# Patient Record
Sex: MALE | Race: WHITE | Employment: OTHER | URBAN - METROPOLITAN AREA
[De-identification: names, ages, dates, MRNs, and addresses within clinical notes are randomized per-mention and may not be internally consistent; named-entity substitution may affect disease eponyms.]

---

## 2017-01-10 ENCOUNTER — PATIENT OUTREACH (OUTPATIENT)
Dept: CASE MANAGEMENT | Age: 82
End: 2017-01-10

## 2017-01-22 ENCOUNTER — ANESTHESIA (OUTPATIENT)
Dept: ENDOSCOPY | Age: 82
DRG: 378 | End: 2017-01-22
Payer: MEDICARE

## 2017-01-22 ENCOUNTER — ANESTHESIA EVENT (OUTPATIENT)
Dept: ENDOSCOPY | Age: 82
DRG: 378 | End: 2017-01-22
Payer: MEDICARE

## 2017-01-22 ENCOUNTER — HOSPITAL ENCOUNTER (INPATIENT)
Age: 82
LOS: 3 days | Discharge: HOME | DRG: 378 | End: 2017-01-25
Attending: EMERGENCY MEDICINE | Admitting: INTERNAL MEDICINE
Payer: MEDICARE

## 2017-01-22 DIAGNOSIS — K92.2 GASTROINTESTINAL HEMORRHAGE, UNSPECIFIED GASTROINTESTINAL HEMORRHAGE TYPE: Primary | ICD-10-CM

## 2017-01-22 LAB
ABO + RH BLD: NORMAL
ALBUMIN SERPL BCP-MCNC: 3.2 G/DL (ref 3.2–4.6)
ALBUMIN/GLOB SERPL: 0.7 {RATIO} (ref 1.2–3.5)
ALP SERPL-CCNC: 99 U/L (ref 50–136)
ALT SERPL-CCNC: 15 U/L (ref 12–65)
ANION GAP BLD CALC-SCNC: 9 MMOL/L (ref 7–16)
AST SERPL W P-5'-P-CCNC: 15 U/L (ref 15–37)
BASOPHILS # BLD AUTO: 0 K/UL (ref 0–0.2)
BASOPHILS # BLD: 0 % (ref 0–2)
BILIRUB SERPL-MCNC: 0.6 MG/DL (ref 0.2–1.1)
BLOOD GROUP ANTIBODIES SERPL: NORMAL
BUN SERPL-MCNC: 25 MG/DL (ref 8–23)
CALCIUM SERPL-MCNC: 8.3 MG/DL (ref 8.3–10.4)
CHLORIDE SERPL-SCNC: 101 MMOL/L (ref 98–107)
CO2 SERPL-SCNC: 28 MMOL/L (ref 21–32)
CREAT SERPL-MCNC: 1.54 MG/DL (ref 0.8–1.5)
DIFFERENTIAL METHOD BLD: ABNORMAL
EOSINOPHIL # BLD: 0.3 K/UL (ref 0–0.8)
EOSINOPHIL NFR BLD: 3 % (ref 0.5–7.8)
ERYTHROCYTE [DISTWIDTH] IN BLOOD BY AUTOMATED COUNT: 15.1 % (ref 11.9–14.6)
EST. AVERAGE GLUCOSE BLD GHB EST-MCNC: 169 MG/DL
GLOBULIN SER CALC-MCNC: 4.4 G/DL (ref 2.3–3.5)
GLUCOSE BLD STRIP.AUTO-MCNC: 133 MG/DL (ref 65–100)
GLUCOSE BLD STRIP.AUTO-MCNC: 180 MG/DL (ref 65–100)
GLUCOSE BLD STRIP.AUTO-MCNC: 198 MG/DL (ref 65–100)
GLUCOSE BLD STRIP.AUTO-MCNC: 222 MG/DL (ref 65–100)
GLUCOSE SERPL-MCNC: 339 MG/DL (ref 65–100)
HBA1C MFR BLD: 7.5 % (ref 4.8–6)
HCT VFR BLD AUTO: 39.6 % (ref 41.1–50.3)
HCT VFR BLD AUTO: 41.3 % (ref 41.1–50.3)
HCT VFR BLD AUTO: 41.7 % (ref 41.1–50.3)
HGB BLD-MCNC: 12.6 G/DL (ref 13.6–17.2)
HGB BLD-MCNC: 13.3 G/DL (ref 13.6–17.2)
HGB BLD-MCNC: 13.5 G/DL (ref 13.6–17.2)
IMM GRANULOCYTES # BLD: 0 K/UL (ref 0–0.5)
IMM GRANULOCYTES NFR BLD AUTO: 0.2 % (ref 0–5)
INR PPP: 1 (ref 0.9–1.2)
LYMPHOCYTES # BLD AUTO: 13 % (ref 13–44)
LYMPHOCYTES # BLD: 1.1 K/UL (ref 0.5–4.6)
MCH RBC QN AUTO: 28 PG (ref 26.1–32.9)
MCHC RBC AUTO-ENTMCNC: 32.7 G/DL (ref 31.4–35)
MCV RBC AUTO: 85.7 FL (ref 79.6–97.8)
MONOCYTES # BLD: 0.5 K/UL (ref 0.1–1.3)
MONOCYTES NFR BLD AUTO: 6 % (ref 4–12)
NEUTS SEG # BLD: 7.1 K/UL (ref 1.7–8.2)
NEUTS SEG NFR BLD AUTO: 78 % (ref 43–78)
PLATELET # BLD AUTO: 154 K/UL (ref 150–450)
PMV BLD AUTO: 12.1 FL (ref 10.8–14.1)
POTASSIUM SERPL-SCNC: 3.8 MMOL/L (ref 3.5–5.1)
PROT SERPL-MCNC: 7.6 G/DL (ref 6.3–8.2)
PROTHROMBIN TIME: 11 SEC (ref 9.6–12)
RBC # BLD AUTO: 4.82 M/UL (ref 4.23–5.67)
SODIUM SERPL-SCNC: 138 MMOL/L (ref 136–145)
SPECIMEN EXP DATE BLD: NORMAL
WBC # BLD AUTO: 9 K/UL (ref 4.3–11.1)

## 2017-01-22 PROCEDURE — 74011250637 HC RX REV CODE- 250/637: Performed by: INTERNAL MEDICINE

## 2017-01-22 PROCEDURE — 88305 TISSUE EXAM BY PATHOLOGIST: CPT | Performed by: INTERNAL MEDICINE

## 2017-01-22 PROCEDURE — 74011000258 HC RX REV CODE- 258: Performed by: EMERGENCY MEDICINE

## 2017-01-22 PROCEDURE — 74011250636 HC RX REV CODE- 250/636: Performed by: ANESTHESIOLOGY

## 2017-01-22 PROCEDURE — C9113 INJ PANTOPRAZOLE SODIUM, VIA: HCPCS | Performed by: EMERGENCY MEDICINE

## 2017-01-22 PROCEDURE — 82962 GLUCOSE BLOOD TEST: CPT

## 2017-01-22 PROCEDURE — 76040000025: Performed by: INTERNAL MEDICINE

## 2017-01-22 PROCEDURE — 85610 PROTHROMBIN TIME: CPT | Performed by: EMERGENCY MEDICINE

## 2017-01-22 PROCEDURE — 74011250636 HC RX REV CODE- 250/636

## 2017-01-22 PROCEDURE — 86580 TB INTRADERMAL TEST: CPT | Performed by: INTERNAL MEDICINE

## 2017-01-22 PROCEDURE — 65660000000 HC RM CCU STEPDOWN

## 2017-01-22 PROCEDURE — 74011636637 HC RX REV CODE- 636/637: Performed by: INTERNAL MEDICINE

## 2017-01-22 PROCEDURE — 83036 HEMOGLOBIN GLYCOSYLATED A1C: CPT | Performed by: INTERNAL MEDICINE

## 2017-01-22 PROCEDURE — 74011000302 HC RX REV CODE- 302: Performed by: INTERNAL MEDICINE

## 2017-01-22 PROCEDURE — 74011250636 HC RX REV CODE- 250/636: Performed by: EMERGENCY MEDICINE

## 2017-01-22 PROCEDURE — 77030009426 HC FCPS BIOP ENDOSC BSC -B: Performed by: INTERNAL MEDICINE

## 2017-01-22 PROCEDURE — 80053 COMPREHEN METABOLIC PANEL: CPT | Performed by: EMERGENCY MEDICINE

## 2017-01-22 PROCEDURE — 36415 COLL VENOUS BLD VENIPUNCTURE: CPT | Performed by: INTERNAL MEDICINE

## 2017-01-22 PROCEDURE — 88312 SPECIAL STAINS GROUP 1: CPT | Performed by: INTERNAL MEDICINE

## 2017-01-22 PROCEDURE — 99284 EMERGENCY DEPT VISIT MOD MDM: CPT | Performed by: EMERGENCY MEDICINE

## 2017-01-22 PROCEDURE — 85025 COMPLETE CBC W/AUTO DIFF WBC: CPT | Performed by: EMERGENCY MEDICINE

## 2017-01-22 PROCEDURE — 76060000031 HC ANESTHESIA FIRST 0.5 HR: Performed by: INTERNAL MEDICINE

## 2017-01-22 PROCEDURE — 0DB68ZX EXCISION OF STOMACH, VIA NATURAL OR ARTIFICIAL OPENING ENDOSCOPIC, DIAGNOSTIC: ICD-10-PCS | Performed by: INTERNAL MEDICINE

## 2017-01-22 PROCEDURE — 86900 BLOOD TYPING SEROLOGIC ABO: CPT | Performed by: EMERGENCY MEDICINE

## 2017-01-22 PROCEDURE — 96374 THER/PROPH/DIAG INJ IV PUSH: CPT | Performed by: EMERGENCY MEDICINE

## 2017-01-22 PROCEDURE — 85018 HEMOGLOBIN: CPT | Performed by: INTERNAL MEDICINE

## 2017-01-22 PROCEDURE — 65270000029 HC RM PRIVATE

## 2017-01-22 PROCEDURE — 74011000250 HC RX REV CODE- 250: Performed by: INTERNAL MEDICINE

## 2017-01-22 RX ORDER — ALLOPURINOL 300 MG/1
300 TABLET ORAL DAILY
Status: DISCONTINUED | OUTPATIENT
Start: 2017-01-23 | End: 2017-01-25 | Stop reason: HOSPADM

## 2017-01-22 RX ORDER — PROPOFOL 10 MG/ML
INJECTION, EMULSION INTRAVENOUS AS NEEDED
Status: DISCONTINUED | OUTPATIENT
Start: 2017-01-22 | End: 2017-01-22 | Stop reason: HOSPADM

## 2017-01-22 RX ORDER — ACETAMINOPHEN 325 MG/1
650 TABLET ORAL
Status: DISCONTINUED | OUTPATIENT
Start: 2017-01-22 | End: 2017-01-25 | Stop reason: HOSPADM

## 2017-01-22 RX ORDER — NALOXONE HYDROCHLORIDE 0.4 MG/ML
0.4 INJECTION, SOLUTION INTRAMUSCULAR; INTRAVENOUS; SUBCUTANEOUS AS NEEDED
Status: DISCONTINUED | OUTPATIENT
Start: 2017-01-22 | End: 2017-01-25 | Stop reason: HOSPADM

## 2017-01-22 RX ORDER — LIDOCAINE HYDROCHLORIDE 10 MG/ML
0.1 INJECTION INFILTRATION; PERINEURAL AS NEEDED
Status: DISCONTINUED | OUTPATIENT
Start: 2017-01-22 | End: 2017-01-22 | Stop reason: HOSPADM

## 2017-01-22 RX ORDER — SODIUM CHLORIDE 0.9 % (FLUSH) 0.9 %
5-10 SYRINGE (ML) INJECTION AS NEEDED
Status: DISCONTINUED | OUTPATIENT
Start: 2017-01-22 | End: 2017-01-25 | Stop reason: HOSPADM

## 2017-01-22 RX ORDER — SODIUM CHLORIDE, SODIUM LACTATE, POTASSIUM CHLORIDE, CALCIUM CHLORIDE 600; 310; 30; 20 MG/100ML; MG/100ML; MG/100ML; MG/100ML
100 INJECTION, SOLUTION INTRAVENOUS CONTINUOUS
Status: DISCONTINUED | OUTPATIENT
Start: 2017-01-22 | End: 2017-01-22 | Stop reason: HOSPADM

## 2017-01-22 RX ORDER — SODIUM CHLORIDE 0.9 % (FLUSH) 0.9 %
5-10 SYRINGE (ML) INJECTION EVERY 8 HOURS
Status: DISCONTINUED | OUTPATIENT
Start: 2017-01-22 | End: 2017-01-22 | Stop reason: HOSPADM

## 2017-01-22 RX ORDER — METOPROLOL SUCCINATE 25 MG/1
12.5 TABLET, EXTENDED RELEASE ORAL DAILY
Status: DISCONTINUED | OUTPATIENT
Start: 2017-01-23 | End: 2017-01-25 | Stop reason: HOSPADM

## 2017-01-22 RX ORDER — OXYCODONE HYDROCHLORIDE 15 MG/1
15 TABLET ORAL
Status: DISCONTINUED | OUTPATIENT
Start: 2017-01-22 | End: 2017-01-25 | Stop reason: HOSPADM

## 2017-01-22 RX ORDER — ONDANSETRON 2 MG/ML
4 INJECTION INTRAMUSCULAR; INTRAVENOUS
Status: DISCONTINUED | OUTPATIENT
Start: 2017-01-22 | End: 2017-01-25 | Stop reason: HOSPADM

## 2017-01-22 RX ORDER — PANTOPRAZOLE SODIUM 40 MG/1
40 TABLET, DELAYED RELEASE ORAL
Status: DISCONTINUED | OUTPATIENT
Start: 2017-01-22 | End: 2017-01-25 | Stop reason: HOSPADM

## 2017-01-22 RX ORDER — ISOSORBIDE MONONITRATE 30 MG/1
30 TABLET, EXTENDED RELEASE ORAL DAILY
Status: DISCONTINUED | OUTPATIENT
Start: 2017-01-23 | End: 2017-01-25 | Stop reason: HOSPADM

## 2017-01-22 RX ORDER — FLUTICASONE PROPIONATE 50 MCG
2 SPRAY, SUSPENSION (ML) NASAL DAILY
Status: DISCONTINUED | OUTPATIENT
Start: 2017-01-23 | End: 2017-01-25 | Stop reason: HOSPADM

## 2017-01-22 RX ORDER — INSULIN GLARGINE 100 [IU]/ML
8 INJECTION, SOLUTION SUBCUTANEOUS
Status: DISCONTINUED | OUTPATIENT
Start: 2017-01-22 | End: 2017-01-25 | Stop reason: HOSPADM

## 2017-01-22 RX ORDER — LEVOTHYROXINE SODIUM 50 UG/1
50 TABLET ORAL
Status: DISCONTINUED | OUTPATIENT
Start: 2017-01-23 | End: 2017-01-25 | Stop reason: HOSPADM

## 2017-01-22 RX ORDER — LATANOPROST 50 UG/ML
1 SOLUTION/ DROPS OPHTHALMIC
Status: DISCONTINUED | OUTPATIENT
Start: 2017-01-22 | End: 2017-01-25 | Stop reason: HOSPADM

## 2017-01-22 RX ORDER — SODIUM CHLORIDE 0.9 % (FLUSH) 0.9 %
5-10 SYRINGE (ML) INJECTION AS NEEDED
Status: DISCONTINUED | OUTPATIENT
Start: 2017-01-22 | End: 2017-01-22 | Stop reason: HOSPADM

## 2017-01-22 RX ORDER — ALBUTEROL SULFATE 0.83 MG/ML
5 SOLUTION RESPIRATORY (INHALATION)
Status: DISCONTINUED | OUTPATIENT
Start: 2017-01-22 | End: 2017-01-23 | Stop reason: ALTCHOICE

## 2017-01-22 RX ORDER — SODIUM CHLORIDE 0.9 % (FLUSH) 0.9 %
5-10 SYRINGE (ML) INJECTION EVERY 8 HOURS
Status: DISCONTINUED | OUTPATIENT
Start: 2017-01-22 | End: 2017-01-25 | Stop reason: HOSPADM

## 2017-01-22 RX ORDER — SERTRALINE HYDROCHLORIDE 50 MG/1
50 TABLET, FILM COATED ORAL DAILY
Status: DISCONTINUED | OUTPATIENT
Start: 2017-01-23 | End: 2017-01-25 | Stop reason: HOSPADM

## 2017-01-22 RX ORDER — LORAZEPAM 1 MG/1
1 TABLET ORAL
Status: DISCONTINUED | OUTPATIENT
Start: 2017-01-22 | End: 2017-01-25 | Stop reason: HOSPADM

## 2017-01-22 RX ORDER — PROPOFOL 10 MG/ML
INJECTION, EMULSION INTRAVENOUS
Status: DISCONTINUED | OUTPATIENT
Start: 2017-01-22 | End: 2017-01-22 | Stop reason: HOSPADM

## 2017-01-22 RX ORDER — HYDRALAZINE HYDROCHLORIDE 10 MG/1
10 TABLET, FILM COATED ORAL 3 TIMES DAILY
Status: DISCONTINUED | OUTPATIENT
Start: 2017-01-22 | End: 2017-01-25 | Stop reason: HOSPADM

## 2017-01-22 RX ORDER — INSULIN LISPRO 100 [IU]/ML
INJECTION, SOLUTION INTRAVENOUS; SUBCUTANEOUS
Status: DISCONTINUED | OUTPATIENT
Start: 2017-01-22 | End: 2017-01-25 | Stop reason: HOSPADM

## 2017-01-22 RX ADMIN — TUBERCULIN PURIFIED PROTEIN DERIVATIVE 5 UNITS: 5 INJECTION INTRADERMAL at 18:03

## 2017-01-22 RX ADMIN — SODIUM CHLORIDE 80 MG: 900 INJECTION, SOLUTION INTRAVENOUS at 13:49

## 2017-01-22 RX ADMIN — LATANOPROST 1 DROP: 50 SOLUTION OPHTHALMIC at 21:12

## 2017-01-22 RX ADMIN — INSULIN GLARGINE 8 UNITS: 100 INJECTION, SOLUTION SUBCUTANEOUS at 21:06

## 2017-01-22 RX ADMIN — OXYCODONE HYDROCHLORIDE 15 MG: 15 TABLET ORAL at 18:51

## 2017-01-22 RX ADMIN — Medication 5 ML: at 18:03

## 2017-01-22 RX ADMIN — HYDRALAZINE HYDROCHLORIDE 10 MG: 10 TABLET, FILM COATED ORAL at 18:02

## 2017-01-22 RX ADMIN — INSULIN LISPRO 2 UNITS: 100 INJECTION, SOLUTION INTRAVENOUS; SUBCUTANEOUS at 18:02

## 2017-01-22 RX ADMIN — Medication 5 ML: at 21:11

## 2017-01-22 RX ADMIN — HYDRALAZINE HYDROCHLORIDE 10 MG: 10 TABLET, FILM COATED ORAL at 21:06

## 2017-01-22 RX ADMIN — PROPOFOL 50 MG: 10 INJECTION, EMULSION INTRAVENOUS at 14:54

## 2017-01-22 RX ADMIN — PANTOPRAZOLE SODIUM 40 MG: 40 TABLET, DELAYED RELEASE ORAL at 18:02

## 2017-01-22 RX ADMIN — PROPOFOL 100 MCG/KG/MIN: 10 INJECTION, EMULSION INTRAVENOUS at 14:55

## 2017-01-22 RX ADMIN — SODIUM CHLORIDE, SODIUM LACTATE, POTASSIUM CHLORIDE, AND CALCIUM CHLORIDE 100 ML/HR: 600; 310; 30; 20 INJECTION, SOLUTION INTRAVENOUS at 14:53

## 2017-01-22 NOTE — ANESTHESIA POSTPROCEDURE EVALUATION
Post-Anesthesia Evaluation and Assessment    Patient: Lucille Ennis MRN: [id number]  SSN: [id number]    YOB: 1932  Age: 80 y.o. Sex: male       Cardiovascular Function/Vital Signs  Visit Vitals    /67    Pulse (!) 56    Temp 36.9 °C (98.5 °F)    Resp 18    Ht 5' 10\" (1.778 m)    Wt 114.8 kg (253 lb)    SpO2 93%    BMI 36.3 kg/m2       Patient is status post total IV anesthesia anesthesia for Procedure(s):  ESOPHAGOGASTRODUODENOSCOPY (EGD)  ESOPHAGOGASTRODUODENAL (EGD) BIOPSY. Nausea/Vomiting: None    Postoperative hydration reviewed and adequate. Pain:  Pain Scale 1: Numeric (0 - 10) (01/22/17 1201)  Pain Intensity 1: 0 (01/22/17 1201)   Managed    Neurological Status:   Neuro (WDL): Within Defined Limits (01/22/17 1441)   At baseline    Mental Status and Level of Consciousness: Arousable    Pulmonary Status:   O2 Device: Room air (01/22/17 1201)   Adequate oxygenation and airway patent    Complications related to anesthesia: None    Post-anesthesia assessment completed.  No concerns    Signed By: Kihsa Salcedo MD     January 22, 2017

## 2017-01-22 NOTE — ED NOTES
Bedside report to [facility], RN, patient traveling to preop at this time

## 2017-01-22 NOTE — IP AVS SNAPSHOT
Current Discharge Medication List  
  
Take these medications at their scheduled times Dose & Instructions Dispensing Information Comments Morning Noon Evening Bedtime  
 allopurinol 300 mg tablet Commonly known as:  [address] Your next dose is:  Tomorrow Take  by mouth daily. Refills:  0  
     
   
   
   
  
 aspirin 325 mg tablet Commonly known as:  ASPIRIN Start taking on:  1/30/2017 Dose:  325 mg Take 1 Tab by mouth daily. Quantity:  1 Tab Refills:  0  
     
   
   
   
  
 COMBIVENT RESPIMAT  mcg/actuation inhaler Generic drug:  ipratropium-albuterol Your next dose is: Today Dose:  1 Puff Take 1 Puff by inhalation every six (6) hours. Refills:  0  
     
   
   
   
  
 fluticasone 50 mcg/actuation nasal spray Commonly known as:  Imagene Poot Your next dose is:  Tomorrow Dose:  2 Spray 2 Sprays by Both Nostrils route daily. Quantity:  1 Bottle Refills:  3  
     
   
   
   
  
 glipiZIDE 5 mg tablet Commonly known as:  [facility] Your next dose is: Today Dose:  5 mg Take 5 mg by mouth two (2) times a day. Refills:  0  
     
   
   
  
   
  
 hydrALAZINE 10 mg tablet Commonly known as:  APRESOLINE Your next dose is: Today Take  by mouth three (3) times daily. Refills:  0  
     
   
   
  
   
  
  
 isosorbide mononitrate ER 30 mg tablet Commonly known as:  IMDUR Your next dose is:  Tomorrow Dose:  30 mg Take 1 Tab by mouth daily. Quantity:  30 Tab Refills:  5  
     
   
   
   
  
 K-DUR 20 mEq tablet Generic drug:  potassium chloride Your next dose is:  Tomorrow Dose:  20 mEq Take 20 mEq by mouth daily. Refills:  0  
     
   
   
   
  
 levothyroxine 50 mcg tablet Commonly known as:  SYNTHROID Your next dose is:  Tomorrow Dose:  50 mcg Take 1 Tab by mouth Daily (before breakfast). Quantity:  90 Tab Refills:  1 magnesium oxide 400 mg tablet Commonly known as:  MAG-OX Your next dose is:  Tomorrow Dose:  400 mg Take 1 Tab by mouth daily. Quantity:  30 Tab Refills:  5  
     
   
   
   
  
 pantoprazole 40 mg tablet Commonly known as:  PROTONIX Your next dose is: Today Dose:  40 mg Take 1 Tab by mouth Before breakfast and dinner. Quantity:  120 Tab Refills:  0  
     
   
   
  
   
  
 TRAVATAN Z 0.004 % ophthalmic solution Generic drug:  travoprost  
Your next dose is: Today Dose:  1 Drop Administer 1 Drop to both eyes two (2) times a day. Refills:  0 Take these medications as needed Dose & Instructions Dispensing Information Comments Morning Noon Evening Bedtime  
 furosemide 20 mg tablet Commonly known as:  LASIX Your next dose is:  Tomorrow Dose:  40 mg Take 40 mg by mouth as needed. Refills:  0 LORazepam 1 mg tablet Commonly known as:  ATIVAN Your next dose is: Today Dose:  1 mg Take 1 Tab by mouth two (2) times daily as needed for Anxiety. Max Daily Amount: 2 mg. Quantity:  24 Tab Refills:  0 STOOL SOFTENER 100 mg capsule Generic drug:  docusate sodium Your next dose is:  Tomorrow Dose:  100 mg Take 100 mg by mouth as needed. Refills:  0 Take these medications as directed Dose & Instructions Dispensing Information Comments Morning Noon Evening Bedtime  
 albuterol 2.5 mg /3 mL (0.083 %) nebulizer solution Commonly known as:  PROVENTIL VENTOLIN Your next dose is: Today [address] Q[address]   Order fax to [facility]  Indications: COPD Quantity:  360 Vial  
Refills:  3  
 - DX copd  
 
-  
 
- Pt to use Albuterol 00.83% 1 Vial [address]  Along with Pulmicort 0.5 mg 1 Vial [address] bid  
    
   
   
   
  
 clotrimazole-betamethasone topical cream  
Commonly known as:  Ilya Vieira  
 Your next dose is:  Tomorrow Apply sparingly to the affected areas twice daily for up to 2 weeks. Quantity:  15 g Refills:  1  
     
   
   
   
  
 cpap machine kit Your next dose is: Today  
   
 by Does Not Apply route. Bilevel 12/8 Refills:  0 HYDROcodone-acetaminophen  mg tablet Commonly known as:  Liliam Mejía Your next dose is: Today Take one tab by mouth every 4-6 hours as need for pain. Quantity:  140 Tab Refills:  0 PHARMACY FILL ON TIME. NO EARLY REFILLS. metoprolol succinate 25 mg XL tablet Commonly known as:  TOPROL-XL Your next dose is:  Tomorrow Pt takes 1/2 tab po daily. Quantity:  45 Tab Refills:  3  
     
   
   
   
  
 oxyCODONE IR 15 mg immediate release tablet Commonly known as:  OXY-IR Your next dose is: Today Take 1 tablet up to 4 times daily as needed for pain. Quantity:  120 Tab Refills:  0 PHARMACY FILL ON TIME. NO EARLY REFILLS. sertraline 50 mg tablet Commonly known as:  ZOLOFT Your next dose is: Today Take one tablet each evening for anxiety  Indications: GENERALIZED ANXIETY DISORDER Quantity:  30 Tab Refills:  3 Where to Get Your Medications Information about where to get these medications is not yet available ! Ask your nurse or doctor about these medications  
  aspirin 325 mg tablet  
 pantoprazole 40 mg tablet

## 2017-01-22 NOTE — PERIOP NOTES
TRANSFER - IN REPORT:    Verbal report received from Amy Guillory RN on [facility] [place]  being received from ER for ordered procedure      Report consisted of patients Situation, Background, Assessment and   Recommendations(SBAR). Information from the following report(s) Kardex, MAR and Recent Results was reviewed with the receiving nurse. Opportunity for questions and clarification was provided. Assessment completed upon patients arrival to unit and care assumed.

## 2017-01-22 NOTE — H&P
History and Physical    Subjective: Adin Palmer is an 80 y. o. white male, with a pmh of afib on aspirin 325 mg and CAD status post CABG last year, who presents to the ED today after waking this morning with his shoots covered in melena. He proceeded to the shower and to the ED from there as melenotic stool continued to run out. Hg and blood pressure stable in the ED. Had a gi bleed about a year ago for which his coumadin was stopped. PPI drip ordered in ED. Feels weak but denies chest pain, sob. No BRBPR. Has consistent bowel movements and takes stool softener at home. Denies NSAID use. Full code  PCP: Dr. Karey Young  Next of kin: wife, Lurdes Gaona    Past Medical History   Diagnosis Date    Atopic dermatitis 11/21/2012    Atrial fibrillation ([address]     CAD (coronary artery disease)     Carotid stenosis, bilateral 11/21/2012     50-79%  3-2010    1. Carotid Doppler (6/1/07): Greater than 70% stenosis in proximal LICA. 50% stenosis in right ICA. 2.  CTA of neck (3/15/10): Occluded distal segments of the vertebral arteries bilaterally. Atherosclerosis of the carotid bulbs bilaterally with a 50% stenosis on the left and a stenosis of less than 30% on the right. Small nodule in the right upper lobe near the apex. 3. CTA (8/29/13):  Less than 30% diameter stenosis of the cervical internal carotid arteries bilaterally.  CHF (congestive heart failure) ([address] 11/21/2012    Chronic kidney disease      hx elevated labs    Chronic obstructive pulmonary disease (HCC)     Diabetes (HCC)     Diastolic CHF, acute on chronic ([address] 9/12/2015     1. Echo (9/11/15) : EF 55-60%. Mild LVH. Moderate biatrial enlargement. Moderate mitral/tricuspid regurgitation.      Failed CABG (coronary artery bypass graft) 11/21/2012    GI bleed 1/2015     Hospitalized [facility]    Gout 11/21/2012    History of tobacco use     [place] (hard of hearing)     Hypercholesterolemia     Hypertension     Hyperuricemia 2012    [facility]bid [facility]     PAD (peripheral artery disease) ([facility] 2012     1. Bilateral proximal common iliac PCI (08):  8.0 X 100 mm Cordis smart stent on right and 10 X 40 mm cordis smart stent on right. Both inflated to 7.0 mm.  Poor historian     Radiologic findings of lung field, abnormal 10/31/2016     1. CT of chest  (11/24/10): Multiple small nodules in the right lobe and stable interstitial prominence. Consistent with chronic lung disease. No evidence of malignancy.  Seizure disorder ([facility] 2013    Shortness of breath dyspnea 2013    Thyroid disease     Unspecified sleep apnea       Past Surgical History   Procedure Laterality Date    Pr cardiac surg procedure unlist       cath ,cabg ,lexiscan cardiolite 11/10    Hx coronary artery bypass graft  2007    Hx cataract removal Left      os    Hx heart catheterization       left-2007, 2011    Hx coronary stent placement  2008     bilateral iliac artery PCI and stents    Hx heent  1970s     neck lipoma    Hx colonoscopy       Family History   Problem Relation Age of Onset    Heart Attack Mother     Other Father      old age   [facility] Other Brother      brain aneurysm    Heart Disease Other      2 children  with heart concerns, 36 & 47 yo    Heart Disease Son       Social History   Substance Use Topics    Smoking status: Former Smoker     Packs/day: 1.00     Years: 45.00     Types: Cigarettes     Quit date: 1984    Smokeless tobacco: Never Used      Comment: (stopped smoking in 1980s)    Alcohol use No       Prior to Admission medications    Medication Sig Start Date End Date Taking? Authorizing Provider   HYDROcodone-acetaminophen (NORCO)  mg tablet Take one tab by mouth every 4-6 hours as need for pain. 16   Ethel Rojas MD   oxyCODONE IR (OXY-IR) 15 mg immediate release tablet Take 1 tablet up to 4 times daily as needed for pain.  16 Elsy Moya MD   furosemide (LASIX) 20 mg tablet Take 40 mg by mouth as needed. Historical Provider   ipratropium-albuterol (COMBIVENT RESPIMAT)  mcg/actuation inhaler Take 1 Puff by inhalation every six (6) hours. Historical Provider   LORazepam (ATIVAN) 1 mg tablet Take 1 Tab by mouth two (2) times daily as needed for Anxiety. Max Daily Amount: 2 mg. 9/20/16   Mary Gruber DO   clotrimazole-betamethasone (LOTRISONE) topical cream Apply sparingly to the affected areas twice daily for up to 2 weeks. 8/11/16   Elsy Moya MD   metFORMIN ER (GLUCOPHAGE XR) 750 mg tablet Take one tablet daily with the evening meal for diabetes. 7/12/16   Elsy Moya MD   levothyroxine (SYNTHROID) 50 mcg tablet Take 1 Tab by mouth Daily (before breakfast). 7/7/16   Elsy Moya MD   magnesium oxide (MAG-OX) 400 mg tablet Take 1 Tab by mouth daily. 7/7/16   Elsy Moya MD   metoprolol succinate (TOPROL-XL) 25 mg XL tablet Pt takes 1/2 tab po daily. 7/7/16   Elsy Moya MD   fluticasone (FLONASE) 50 mcg/actuation nasal spray 2 Sprays by Both Nostrils route daily. 6/1/16   Elsy Moya MD   allopurinol (ZYLOPRIM) 300 mg tablet Take  by mouth daily. Historical Provider   sertraline (ZOLOFT) 50 mg tablet Take one tablet each evening for anxiety  Indications: GENERALIZED ANXIETY DISORDER 3/30/16   Elsy Moya MD   aspirin (ASPIRIN) 325 mg tablet Take 325 mg by mouth daily. Historical Provider   docusate sodium (STOOL SOFTENER) 100 mg capsule Take 100 mg by mouth as needed. Historical Provider   hydrALAZINE (APRESOLINE) 10 mg tablet Take  by mouth three (3) times daily. Historical Provider   cpap machine kit by Does Not Apply route. Bilevel 12/8    Historical Provider   isosorbide mononitrate ER (IMDUR) 30 mg tablet Take 1 Tab by mouth daily.  8/11/14   Elsy Moya MD   albuterol (PROVENTIL VENTOLIN) 2.5 mg /3 mL (0.083 %) nebulizer solution 1 Vial [address] Qid      Order fax to [facility]  Indications: COPD 12/11/13   Luis Kayser, NP   travoprost (TRAVATAN Z) 0.004 % ophthalmic solution Administer 1 Drop to both eyes two (2) times a day. Historical Provider   glipiZIDE (GLUCOTROL) 5 mg tablet Take 5 mg by mouth two (2) times a day. Historical Provider   K-DUR 20 mEq tablet Take 20 mEq by mouth daily. 1/13/11   Phys MD Gumaro     No Known Allergies     Review of Systems:  A comprehensive 12 point ROS was obtained and findings negative unless stated otherwise in above HPI     1/22/17:  Lives with wife, Ann Deal. No pets  Has had a blood transfusion (during sxr for CABG)  Has a left arm tattoo  No recent foreign travel    Objective: Intake and Output:            Physical Exam:     General: awake, alert, oriented, no acute distress, cooperative  Eyes; non icteric, EOMI  Neck; supple  CV; RRR  PULM: CTAB  Abd; soft, non tender, non distended, active BS  Neuro: 5/5 strength of all extremities, cranial nerves II-XII grossly intact  Ext; signs of bilateral calf venous stasis, dry skin    Data Review:   Recent Results (from the past 24 hour(s))   CBC WITH AUTOMATED DIFF    Collection Time: 01/22/17 12:03 PM   Result Value Ref Range    WBC 9.0 4.3 - 11.1 K/uL    RBC 4.82 4.23 - 5.67 M/uL    HGB 13.5 (L) 13.6 - 17.2 g/dL    HCT 41.3 41.1 - 50.3 %    MCV 85.7 79.6 - 97.8 FL    MCH 28.0 26.1 - 32.9 PG    MCHC 32.7 31.4 - 35.0 g/dL    RDW 15.1 (H) 11.9 - 14.6 %    PLATELET 506 610 - 978 K/uL    MPV 12.1 10.8 - 14.1 FL    DF AUTOMATED      NEUTROPHILS 78 43 - 78 %    LYMPHOCYTES 13 13 - 44 %    MONOCYTES 6 4.0 - 12.0 %    EOSINOPHILS 3 0.5 - 7.8 %    BASOPHILS 0 0.0 - 2.0 %    IMMATURE GRANULOCYTES 0.2 0.0 - 5.0 %    ABS. NEUTROPHILS 7.1 1.7 - 8.2 K/UL    ABS. LYMPHOCYTES 1.1 0.5 - 4.6 K/UL    ABS. MONOCYTES 0.5 0.1 - 1.3 K/UL    ABS. EOSINOPHILS 0.3 0.0 - 0.8 K/UL    ABS. BASOPHILS 0.0 0.0 - 0.2 K/UL    ABS. IMM. GRANS. 0.0 0.0 - 0.5 K/UL   METABOLIC PANEL, COMPREHENSIVE    Collection Time: 01/22/17 12:03 PM   Result Value Ref Range    Sodium 138 136 - 145 mmol/L    Potassium 3.8 3.5 - 5.1 mmol/L    Chloride 101 98 - 107 mmol/L    CO2 28 21 - 32 mmol/L    Anion gap 9 7 - 16 mmol/L    Glucose 339 (H) 65 - 100 mg/dL    BUN 25 (H) 8 - 23 MG/DL    Creatinine 1.54 (H) 0.8 - 1.5 MG/DL    GFR est AA 56 (L) >60 ml/min/1.73m2    GFR est non-AA 46 (L) >60 ml/min/1.73m2    Calcium 8.3 8.3 - 10.4 MG/DL    Bilirubin, total 0.6 0.2 - 1.1 MG/DL    ALT 15 12 - 65 U/L    AST 15 15 - 37 U/L    Alk. phosphatase 99 50 - 136 U/L    Protein, total 7.6 6.3 - 8.2 g/dL    Albumin 3.2 3.2 - 4.6 g/dL    Globulin 4.4 (H) 2.3 - 3.5 g/dL    A-G Ratio 0.7 (L) 1.2 - 3.5     PROTHROMBIN TIME + INR    Collection Time: 01/22/17 12:03 PM   Result Value Ref Range    Prothrombin time 11.0 9.6 - 12.0 sec    INR 1.0 0.9 - 1.2       Assessment:     Active Problems:    Upper GI bleed (1/22/2017)        Plan:     Admit to medical bed. Currently hemodynamically stable but has passed a lot of blood, will watch closely. Q6 h and h's, type and cross. Consult GI. protonix drip. Hold oral diabetic agents. BS >300. Hold oral agents. Start lantus 8 units qhs, SSI and check A1C. Ppx: SCDs. Full code  Place PPD. Consult PT, SW.   Anticipated date of dc: 3 days    Signed By: Massimo Rodrigues MD     January 22, 2017

## 2017-01-22 NOTE — CONSULTS
[facility] Consult Note       Primary GI Physician: Dr. Pilar Gallagher    Referring Physician: Dr. Adelaida Rogers Date:  1/22/2017    Admit Date:  1/22/2017    Chief Complaint:  GIB    Subjective:     History of Present Illness:  Patient is a 80 y.o. male with PMH of (but not limited to) A fib (on  mg only), CAD s/p CABG 2007, carotid artery stenosis, CKD, DM , CHF, HLD, HTN, COPD not on O2, ROBERTO with CPAP and seizure disorder who is seen in consultation at the request of Dr. Christin Villalobos for evaluation of GIB. Mr. Ankush Isabel awakened this am with a large amount of \"dark tarry stool in his bed. \" He has continued to have dark tarry stool and it is now becoming more marroon. It increases when he stands up. He has no associated abdominal pain, N&V, GERD, recent change in bowel habits or weight loss. He denies any anticoagulants or NSAIDS except  mg one po daily. He denies pepto bismol or iron tablets. He is being admitted by the hospitalist. He has a Hgb of 13.5(14.1 10/16). He has been typed and crossed and protonix drip has been started. Incidentally, his glucose was over 300 this am which is unusual for him. He just drank 1/2 cup of water in the ED. He had breakfast at 0900. He has chronic constipation unchanged with a BM every other day with stool softeners. He denies any hard stools. He denies any straining. Mr. Ankush Isabel had a similar incident in 2015, but with more BRRB and he was on warfarin at the time. He underwent an EGD on 1/23/15 by Dr. Daniel Mayorga that revealed a few small gastric erosions likely not the cause of bleeding. Colonoscopy on 1/22/15 by Dr. Pilar Gallagher revealed extensive diverticulosis without active bleeding. He had recurrent bleeding with a drop in his Hgb and underwent repeat EGD on 5/6/15 by Dr. Glen Coronado with friable gastric mucosa. Pillcam study was suggested, but not done.      PMH:  Past Medical History   Diagnosis Date    Atopic dermatitis 11/21/2012    Atrial fibrillation ([address]     CAD (coronary artery disease)     Carotid stenosis, bilateral 11/21/2012     50-79%  3-2010    1. Carotid Doppler (6/1/07): Greater than 70% stenosis in proximal LICA. 50% stenosis in right ICA. 2.  CTA of neck (3/15/10): Occluded distal segments of the vertebral arteries bilaterally. Atherosclerosis of the carotid bulbs bilaterally with a 50% stenosis on the left and a stenosis of less than 30% on the right. Small nodule in the right upper lobe near the apex. 3. CTA (8/29/13):  Less than 30% diameter stenosis of the cervical internal carotid arteries bilaterally.  CHF (congestive heart failure) ([address] 11/21/2012    Chronic kidney disease      hx elevated labs    Chronic obstructive pulmonary disease (HCC)     Diabetes (HCC)     Diastolic CHF, acute on chronic ([address] 9/12/2015     1. Echo (9/11/15) : EF 55-60%. Mild LVH. Moderate biatrial enlargement. Moderate mitral/tricuspid regurgitation.  Failed CABG (coronary artery bypass graft) 11/21/2012    GI bleed 1/2015     Hospitalized [facility]    Gout 11/21/2012    History of tobacco use     [place] (hard of hearing)     Hypercholesterolemia     Hypertension     Hyperuricemia 11/21/2012    Morbid obesity ([address]     PAD (peripheral artery disease) ([address] 11/21/2012     1. Bilateral proximal common iliac PCI (2/21/08):  8.0 X 100 mm Cordis smart stent on right and 10 X 40 mm cordis smart stent on right. Both inflated to 7.0 mm.  Poor historian     Radiologic findings of lung field, abnormal 10/31/2016     1. CT of chest  (11/24/10): Multiple small nodules in the right lobe and stable interstitial prominence. Consistent with chronic lung disease. No evidence of malignancy.     Seizure disorder ([address] 8/5/2013    Shortness of breath dyspnea 8/5/2013    Thyroid disease     Unspecified sleep apnea        PSH:  Past Surgical History   Procedure Laterality Date    Pr cardiac surg procedure unlist       cath 5/07,cabg 6/07,lexiscan cardiolite 11/10    Hx coronary artery bypass graft  06-    Hx cataract removal Left 2003     os    Hx heart catheterization       left-05-, 01-    Hx coronary stent placement  02-     bilateral iliac artery PCI and stents    Hx heent  1970s     neck lipoma    Hx colonoscopy         Allergies:  No Known Allergies    Home Medications:  Prior to Admission medications    Medication Sig Start Date End Date Taking? Authorizing Provider   HYDROcodone-acetaminophen (NORCO)  mg tablet Take one tab by mouth every 4-6 hours as need for pain. 12/13/16   Suze Concepcion MD   oxyCODONE IR (OXY-IR) 15 mg immediate release tablet Take 1 tablet up to 4 times daily as needed for pain. 12/13/16   Suze Concepcion MD   furosemide (LASIX) 20 mg tablet Take 40 mg by mouth as needed. Historical Provider   ipratropium-albuterol (COMBIVENT RESPIMAT)  mcg/actuation inhaler Take 1 Puff by inhalation every six (6) hours. Historical Provider   LORazepam (ATIVAN) 1 mg tablet Take 1 Tab by mouth two (2) times daily as needed for Anxiety. Max Daily Amount: 2 mg. 9/20/16   Patricia Joana,    clotrimazole-betamethasone (LOTRISONE) topical cream Apply sparingly to the affected areas twice daily for up to 2 weeks. 8/11/16   Suze Concepcion MD   metFORMIN ER (GLUCOPHAGE XR) 750 mg tablet Take one tablet daily with the evening meal for diabetes. 7/12/16   Suze Concepcion MD   levothyroxine (SYNTHROID) 50 mcg tablet Take 1 Tab by mouth Daily (before breakfast). 7/7/16   Suze Concepcion MD   magnesium oxide (MAG-OX) 400 mg tablet Take 1 Tab by mouth daily. 7/7/16   Suze Concepcion MD   metoprolol succinate (TOPROL-XL) 25 mg XL tablet Pt takes 1/2 tab po daily. 7/7/16   Suze Concepcion MD   fluticasone (FLONASE) 50 mcg/actuation nasal spray 2 Sprays by Both Nostrils route daily.  6/1/16   Suze Concepcion MD   allopurinol (ZYLOPRIM) 300 mg tablet Take  by mouth daily. Historical Provider   sertraline (ZOLOFT) 50 mg tablet Take one tablet each evening for anxiety  Indications: GENERALIZED ANXIETY DISORDER 3/30/16   Christ Morrissey MD   aspirin (ASPIRIN) 325 mg tablet Take 325 mg by mouth daily. Historical Provider   docusate sodium (STOOL SOFTENER) 100 mg capsule Take 100 mg by mouth as needed. Historical Provider   hydrALAZINE (APRESOLINE) 10 mg tablet Take  by mouth three (3) times daily. Historical Provider   cpap machine kit by Does Not Apply route. Bilevel 12/8    Historical Provider   isosorbide mononitrate ER (IMDUR) 30 mg tablet Take 1 Tab by mouth daily. 8/11/14   Christ Morrissey MD   albuterol (PROVENTIL VENTOLIN) 2.5 mg /3 mL (0.083 %) nebulizer solution 1 Vial Via Neb. Qid      [facility]  Indications: COPD 12/11/13   Verner Friends, NP   travoprost (TRAVATAN Z) 0.004 % ophthalmic solution Administer 1 Drop to both eyes two (2) times a day. Historical Provider   glipiZIDE (GLUCOTROL) 5 mg tablet Take 5 mg by mouth two (2) times a day. Historical Provider   K-DUR 20 mEq tablet Take 20 mEq by mouth daily.  1/13/11   Montez Naik MD       [facility] Medications:  Current Facility-Administered Medications   Medication Dose Route Frequency    pantoprazole (PROTONIX) 80 mg in 0.9% sodium chloride 100 mL infusion  80 mg IntraVENous ONCE    pantoprazole (PROTONIX) 80 mg in 0.9% sodium chloride 250 mL infusion  80 mg IntraVENous NOW    pantoprazole (PROTONIX) 80 mg in 0.9% sodium chloride 250 mL infusion  80 mg IntraVENous CONTINUOUS    tuberculin injection 5 Units  5 Units IntraDERMal ONCE    insulin lispro (HUMALOG) injection   SubCUTAneous AC&HS    insulin glargine (LANTUS) injection 8 Units  8 Units SubCUTAneous QHS     Current Outpatient Prescriptions   Medication Sig    HYDROcodone-acetaminophen (NORCO)  mg tablet Take one tab by mouth every 4-6 hours as need for pain.    oxyCODONE IR (OXY-IR) 15 mg immediate release tablet Take 1 tablet up to 4 times daily as needed for pain.  furosemide (LASIX) 20 mg tablet Take 40 mg by mouth as needed.  ipratropium-albuterol (COMBIVENT RESPIMAT)  mcg/actuation inhaler Take 1 Puff by inhalation every six (6) hours.  LORazepam (ATIVAN) 1 mg tablet Take 1 Tab by mouth two (2) times daily as needed for Anxiety. Max Daily Amount: 2 mg.  clotrimazole-betamethasone (LOTRISONE) topical cream Apply sparingly to the affected areas twice daily for up to 2 weeks.  metFORMIN ER (GLUCOPHAGE XR) 750 mg tablet Take one tablet daily with the evening meal for diabetes.  levothyroxine (SYNTHROID) 50 mcg tablet Take 1 Tab by mouth Daily (before breakfast).  magnesium oxide (MAG-OX) 400 mg tablet Take 1 Tab by mouth daily.  metoprolol succinate (TOPROL-XL) 25 mg XL tablet Pt takes 1/2 tab po daily.  fluticasone (FLONASE) 50 mcg/actuation nasal spray 2 Sprays by Both Nostrils route daily.  allopurinol (ZYLOPRIM) 300 mg tablet Take  by mouth daily.  sertraline (ZOLOFT) 50 mg tablet Take one tablet each evening for anxiety  Indications: GENERALIZED ANXIETY DISORDER    aspirin (ASPIRIN) 325 mg tablet Take 325 mg by mouth daily.  docusate sodium (STOOL SOFTENER) 100 mg capsule Take 100 mg by mouth as needed.  hydrALAZINE (APRESOLINE) 10 mg tablet Take  by mouth three (3) times daily.  cpap machine kit by Does Not Apply route. Bilevel 12/8    isosorbide mononitrate ER (IMDUR) 30 mg tablet Take 1 Tab by mouth daily.  albuterol (PROVENTIL VENTOLIN) 2.5 mg /3 mL (0.083 %) nebulizer solution 1 Vial Via Neb. Qid      Order fax to [facility]  Indications: COPD    travoprost (TRAVATAN Z) 0.004 % ophthalmic solution Administer 1 Drop to both eyes two (2) times a day.  glipiZIDE (GLUCOTROL) 5 mg tablet Take 5 mg by mouth two (2) times a day.  K-DUR 20 mEq tablet Take 20 mEq by mouth daily.        Social History:  Social History   Substance Use Topics    Smoking status: Former Smoker     Packs/day: 1.00     Years: 45.00     Types: Cigarettes     Quit date: 1984    Smokeless tobacco: Never Used      Comment: (stopped smoking in )    Alcohol use No       Family History:  Family History   Problem Relation Age of Onset    Heart Attack Mother    Aeleatha Other Father      old age   Ranjan Other Brother      brain aneurysm    Heart Disease Other      2 children  with heart concerns, 36 & 47 yo    Heart Disease Son        Review of Systems:  A detailed 10 system ROS is obtained, with pertinent positives as listed above. All others are negative. Diet:  NPO    Objective:     Physical Exam:  Vitals:  Visit Vitals    /72 (BP 1 Location: Left arm, BP Patient Position: At rest)    Pulse 79    Temp 98.5 °F (36.9 °C)    Resp 18    Ht 5' 10\" (1.778 m)    Wt 114.8 kg (253 lb)    SpO2 94%    BMI 36.3 kg/m2     Gen:  Pt is alert, cooperative, no acute distress  Skin:  Extremities and face reveal no rashes. HEENT: Sclerae anicteric. Extra-occular muscles are intact. No oral ulcers. No abnormal pigmentation of the lips. The neck is supple. Cardiovascular: Regular rate and rhythm. No murmurs, gallops, or rubs. Respiratory:  Comfortable breathing with no accessory muscle use. Clear breath sounds anteriorly with no wheezes, rales, or rhonchi. GI:  Abdomen nondistended, soft, and nontender. Normal active bowel sounds. No enlargement of the liver or spleen. No masses palpable. Rectal:  Deferred  Musculoskeletal:  No pitting edema of the lower legs. Neurological:  Gross memory appears intact. Patient is alert and oriented. Psychiatric:  Mood appears appropriate with judgement intact. Lymphatic:  No cervical or supraclavicular adenopathy.     Laboratory:    Recent Labs      17   1203   WBC  9.0   HGB  13.5*   HCT  41.3   PLT  154   MCV  85.7   NA  138   K  3.8   CL  101   CO2  28   BUN  25* CREA  1.54*   CA  8.3   GLU  339*   AP  99   SGOT  15   ALT  15   TBILI  0.6   ALB  3.2   TP  7.6   PTP  11.0   INR  1.0          Assessment:     Active Problems:    Upper GI bleed (1/22/2017)      Patient is a 80 y.o. male with PMH of (but not limited to) A fib (on  mg only), CAD s/p CABG 2007, carotid artery stenosis, CKD, DM , CHF, HLD, HTN, COPD not on O2, ROBERTO with CPAP and seizure disorder who is seen in consultation at the request of Dr. Kelvin Goel for evaluation of GIB. Mr. Modesto Sousa awakened this am with a large amount of \"dark tarry stool in his bed. \" He has continued to have dark tarry stool and it is now becoming more marroon. He had a similar incident in 2015, but with more BRRB and he was on warfarin at the time. He underwent an EGD on 1/23/15 by Dr. Emil Wu that revealed a few small gastric erosions likely not the cause of bleeding. Colonoscopy on 1/22/15 by Dr. Abe Malloy revealed extensive diverticulosis without active bleeding. He had recurrent bleeding with a drop in his Hgb and underwent repeat EGD on 5/6/15 by Dr. Ladonna Pierson with friable gastric mucosa. Pillcam study was suggested, but not done. His current Hgb is 13.5 (14.1 10/2016) and he is hemodynamically stable. This likely represent AVM, but PUD, Daron's erosions, Dieulafoy lesion are in the differential.       Plan: 1. Agree with PPI drip  2. NPO (explained to patient no further water in ED-he verbalized understanding). 3.Serial H&H and transfuse as needed  4. Plan for EGD by Dr. Reinier Bermudez today. Risks and benefits discussed to include risk of infection, bleeding, perforation, and anesthesia. If negative EGD, may consider colonoscopy tomorrow. Lauren Dodson. [place]i BullockT[place] [id number]   [facility]    Patient is seen and examined in collaboration with Dr. Reinier Bermudez. Assessment and plan as per Dr. Reinier Bermudez.

## 2017-01-22 NOTE — PERIOP NOTES
TRANSFER - OUT REPORT:    Verbal report given to Emery Palomino RN on [facility]  being transferred to [facility] for ordered procedure       Report consisted of patients Situation, Background, Assessment and   Recommendations(SBAR). Information from the following report(s) Kardex, MAR and Recent Results was reviewed with the receiving nurse. Lines:   Peripheral IV 09/16/16 Left Antecubital (Active)       Peripheral IV 09/16/16 Left Hand (Active)        Opportunity for questions and clarification was provided.       Patient transported with:   Registered Nurse

## 2017-01-22 NOTE — ED PROVIDER NOTES
HPI Comments: 81 yo male with history of GI bleed in the past presents with \"alot of blood\" in his stool. States he woke up this morning with dark blood all in his bed. States he got up to take a shower and states \"I was pouring blood\" all during the shower. He denies any abdominal pain, no nausea or vomiting, no fevers or chills, no chest pain or SOB, no further complaints. Patient is very well appearing, in NAD. Patient is a 80 y.o. male presenting with anal bleeding. The history is provided by the patient. No  was used. Rectal Bleeding    Associated symptoms include diarrhea. Pertinent negatives include no abdominal pain, no dysuria, no chills, no fever, no nausea, no back pain and no vomiting. Past Medical History:   Diagnosis Date    Atopic dermatitis 11/21/2012    Atrial fibrillation (HCC)     CAD (coronary artery disease)     Carotid stenosis, bilateral 11/21/2012     50-79%  3-2010    1. Carotid Doppler (6/1/07): Greater than 70% stenosis in proximal LICA. 50% stenosis in right ICA. 2.  CTA of neck (3/15/10): Occluded distal segments of the vertebral arteries bilaterally. Atherosclerosis of the carotid bulbs bilaterally with a 50% stenosis on the left and a stenosis of less than 30% on the right. Small nodule in the right upper lobe near the apex. 3. CTA (8/29/13):  Less than 30% diameter stenosis of the cervical internal carotid arteries bilaterally.  CHF (congestive heart failure) ([address] 11/21/2012    Chronic kidney disease      hx elevated labs    Chronic obstructive pulmonary disease (HCC)     Diabetes (HCC)     Diastolic CHF, acute on chronic ([address] 9/12/2015     1. Echo (9/11/15) : EF 55-60%. Mild LVH. Moderate biatrial enlargement. Moderate mitral/tricuspid regurgitation.      Failed CABG (coronary artery bypass graft) 11/21/2012    GI bleed 1/2015     Hospitalized [facility]    Gout 11/21/2012    History of tobacco use     [place] (hard of hearing)     Hypercholesterolemia     Hypertension     Hyperuricemia 2012    Morbid [facility]     PAD (peripheral artery disease) ([facility] 2012     1. Bilateral proximal common iliac PCI (08):  8.0 X 100 mm Cordis smart stent on right and 10 X 40 mm cordis smart stent on right. Both inflated to 7.0 mm.  Poor historian     Radiologic findings of lung field, abnormal 10/31/2016     1. CT of chest  (11/24/10): Multiple small nodules in the right lobe and stable interstitial prominence. Consistent with chronic lung disease. No evidence of malignancy.  Seizure disorder ([facility] 2013    Shortness of breath dyspnea 2013    Thyroid disease     Unspecified sleep apnea        Past Surgical History:   Procedure Laterality Date    Pr cardiac surg procedure unlist       cath ,cabg ,lexiscan cardiolite 11/10    Hx coronary artery bypass graft  2007    Hx cataract removal Left      os    Hx heart catheterization       left-2007, 2011    Hx coronary stent placement  2008     bilateral iliac artery PCI and stents    Hx heent       neck lipoma    Hx colonoscopy           Family History:   Problem Relation Age of Onset    Heart Attack Mother     Other Father      old age   [facility] Other Brother      brain aneurysm    Heart Disease Other      2 children  with heart concerns, 36 & 47 yo    Heart Disease Son        Social History     Social History    Marital status:      Spouse name: N/A    Number of children: N/A    Years of education: N/A     Occupational History    [employer]ile industry.  Retired     sales, 12 yrs     Social History Main Topics    Smoking status: Former Smoker     Packs/day: 1.00     Years: 45.00     Types: Cigarettes     Quit date: 1984    Smokeless tobacco: Never Used      Comment: (stopped smoking in )    Alcohol use No    Drug use: No    Sexual activity: Not Currently     Other Topics Concern    Not on file Social History Narrative    45 pack year history cigarette smoking, stopped in . 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 , two living children, two children  with coronary artery disease, ages 36 and 48. Has always lived in [address] No pets. ALLERGIES: Review of patient's allergies indicates no known allergies. Review of Systems   Constitutional: Negative for chills and fever. HENT: Negative for rhinorrhea and sore throat. Eyes: Negative for visual disturbance. Respiratory: Negative for cough and shortness of breath. Cardiovascular: Negative for chest pain and leg swelling. Gastrointestinal: Positive for anal bleeding, blood in stool and diarrhea. Negative for abdominal pain, nausea and vomiting. Genitourinary: Negative for dysuria. Musculoskeletal: Negative for back pain and neck pain. Skin: Negative for rash. Neurological: Negative for weakness and headaches. Psychiatric/Behavioral: The patient is not nervous/anxious. Vitals:    17 1201   BP: 161/72   Pulse: 79   Resp: 18   Temp: 98.5 °F (36.9 °C)   SpO2: 94%   Weight: 114.8 kg (253 lb)   Height: 5' 10\" (1.778 m)            Physical Exam   Constitutional: He is oriented to person, place, and time. He appears well-developed and well-nourished. HENT:   Head: Normocephalic. Right Ear: External ear normal.   Left Ear: External ear normal.   Eyes: Conjunctivae and EOM are normal. Pupils are equal, round, and reactive to light. Neck: Normal range of motion. Neck supple. No tracheal deviation present. Cardiovascular: Normal rate, regular rhythm, normal heart sounds and intact distal pulses. No murmur heard. Pulmonary/Chest: Effort normal and breath sounds normal. No respiratory distress. Abdominal: Soft. He exhibits no distension. There is no tenderness. There is no rebound.    Non-tender to deep palpation through-out, no epigastric tenderness   Genitourinary: Rectal exam shows guaiac positive stool. Genitourinary Comments: Large amount of melanotic stool. Sphincter tone intact   Musculoskeletal: Normal range of motion. Neurological: He is alert and oriented to person, place, and time. No cranial nerve deficit. Skin: No rash noted. Nursing note and vitals reviewed. MDM  Number of Diagnoses or Management Options  Gastrointestinal hemorrhage, unspecified gastrointestinal hemorrhage type: new and requires workup     Amount and/or Complexity of Data Reviewed  Clinical lab tests: ordered and reviewed  Review and summarize past medical records: yes  Discuss the patient with other providers: yes (Gastroenterology and Internal medicine)    Risk of Complications, Morbidity, and/or Mortality  Presenting problems: high  Diagnostic procedures: high  Management options: high    Patient Progress  Patient progress: stable    ED Course       Procedures    Recent Results (from the past 12 hour(s))   CBC WITH AUTOMATED DIFF    Collection Time: 01/22/17 12:03 PM   Result Value Ref Range    WBC 9.0 4.3 - 11.1 K/uL    RBC 4.82 4.23 - 5.67 M/uL    HGB 13.5 (L) 13.6 - 17.2 g/dL    HCT 41.3 41.1 - 50.3 %    MCV 85.7 79.6 - 97.8 FL    MCH 28.0 26.1 - 32.9 PG    MCHC 32.7 31.4 - 35.0 g/dL    RDW 15.1 (H) 11.9 - 14.6 %    PLATELET 748 841 - 690 K/uL    MPV 12.1 10.8 - 14.1 FL    DF AUTOMATED      NEUTROPHILS 78 43 - 78 %    LYMPHOCYTES 13 13 - 44 %    MONOCYTES 6 4.0 - 12.0 %    EOSINOPHILS 3 0.5 - 7.8 %    BASOPHILS 0 0.0 - 2.0 %    IMMATURE GRANULOCYTES 0.2 0.0 - 5.0 %    ABS. NEUTROPHILS 7.1 1.7 - 8.2 K/UL    ABS. LYMPHOCYTES 1.1 0.5 - 4.6 K/UL    ABS. MONOCYTES 0.5 0.1 - 1.3 K/UL    ABS. EOSINOPHILS 0.3 0.0 - 0.8 K/UL    ABS. BASOPHILS 0.0 0.0 - 0.2 K/UL    ABS. IMM.  GRANS. 0.0 0.0 - 0.5 K/UL   METABOLIC PANEL, COMPREHENSIVE    Collection Time: 01/22/17 12:03 PM   Result Value Ref Range    Sodium 138 136 - 145 mmol/L    Potassium 3.8 3.5 - 5.1 mmol/L    Chloride 101 98 - 107 mmol/L    CO2 28 21 - 32 mmol/L    Anion gap 9 7 - 16 mmol/L    Glucose 339 (H) 65 - 100 mg/dL    BUN 25 (H) 8 - 23 MG/DL    Creatinine 1.54 (H) 0.8 - 1.5 MG/DL    GFR est AA 56 (L) >60 ml/min/1.73m2    GFR est non-AA 46 (L) >60 ml/min/1.73m2    Calcium 8.3 8.3 - 10.4 MG/DL    Bilirubin, total 0.6 0.2 - 1.1 MG/DL    ALT 15 12 - 65 U/L    AST 15 15 - 37 U/L    Alk. phosphatase 99 50 - 136 U/L    Protein, total 7.6 6.3 - 8.2 g/dL    Albumin 3.2 3.2 - 4.6 g/dL    Globulin 4.4 (H) 2.3 - 3.5 g/dL    A-G Ratio 0.7 (L) 1.2 - 3.5     PROTHROMBIN TIME + INR    Collection Time: 01/22/17 12:03 PM   Result Value Ref Range    Prothrombin time 11.0 9.6 - 12.0 sec    INR 1.0 0.9 - 1.2           81 yo male with large GI bleed:     Patient with large blood on rectal exam. At this time Hgb stable and VSS however I feel given severity of bleed he needs to see GI today for possible scope. Started on protonix and discussed with GI who is coming to see patient.   Patient admitted to hospitalist

## 2017-01-22 NOTE — PROCEDURES
Esophagogastroduodenoscopy    DATE of PROCEDURE: 1/22/2017    INDICATION:  1. GI Bleed/Melena    POSTPROCEDURE DIAGNOSIS:  1. Gastritis  2. Gastric ulcer  3. Duodenitis    MEDICATIONS ADMINISTERED: MAC. Further details per anesthesia note. INSTRUMENT: [id number]    PROCEDURE:  After obtaining informed consent, the patient was placed in the left lateral position and sedated. The endoscope was advanced under direct vision without difficulty. The esophagus, stomach (including retroflexed views) and duodenum were evaluated. The patient was taken to the recovery area in stable condition. FINDINGS:  ESOPHAGUS: Normal exam.  STOMACH: 5-8 mm clean-based ulcer in antrum. Cold-forceps biopsies for pathology. No evidence of active or recent bleeding. Mild to moderate erosive gastritis of antrum. Mild gastritis of body of stomach. Cold-forceps biopsies for pathology. DUODENUM: Minimal duodenitis of bulb. Otherwise normal exam of 1st, 2nd, and 3rd portions of duodenum. No evidence of active or recent bleeding. Estimated blood loss: 0-minimal     Complications: none    Specimens obtained during procedure:   1. Gastric biopsies    PLAN:  1. Follow-up pathology  2. Minimize NSAID use  3. Protonix 40 mg PO BID for 8 weeks then daily  4. Clear liquid diet  5. NM Bleed scan if has significant GI bleeding overnight  6.  Monitor Hgb Q8H and transfuse for goal hgb 8-9    Will follow    Shiv Dixon MD  [facility] [place]

## 2017-01-22 NOTE — IP AVS SNAPSHOT
Blain Kehr 
 
 
 [address] 
[phone] Patient: Roberto Mejia MRN: [id number] You are allergic to the following No active allergies Immunizations Administered for This Admission Name Date  
 TB Skin Test (PPD) Intradermal 1/22/2017 Recent Documentation Height Weight BMI Smoking Status 1.778 m 114.8 kg 36.3 kg/m2 Former Smoker Emergency Contacts Name Discharge Info Relation Home Work Mobile Eulalia Navarro  Spouse [3] [id number] About your hospitalization You were admitted on:  January 22, 2017 You last received care in the:  [facility] 6 MED SURG You were discharged on:  January 25, 2017 Unit phone number:  [phone] Why you were hospitalized Your primary diagnosis was:  Not on File Your diagnoses also included:  Upper Gi Bleed Providers Seen During Your Hospitalizations Provider Role Specialty Primary office phone Irene Reese MD Attending Provider Emergency Medicine [phone] Candi Royal MD Attending Provider Internal Medicine [phone] Your Primary Care Physician (PCP) Primary Care Physician Office Phone Office Fax [facility] [phone] Follow-up Information Follow up With Details Comments Contact Info Marce Villalpando MD  left message for office to call patient with appointment date and time. [address] [place] [id number] 
[phone] Your Appointments Wednesday February 01, 2017 10:30 AM EST Office Visit with Irene Aguilar MD  
[facility]) [address] [place] [address] [phone] Monday March 06, 2017  1:20 PM EST  
(Arrive by 12:50 PM) Return appointment with TANGELA [facility] ([facility]) [address] [facility] [address] [place] [address] [facility]  
[phone] Current Discharge Medication List  
  
START taking these medications Dose & Instructions Dispensing Information Comments Morning Noon Evening Bedtime  
 pantoprazole 40 mg tablet Commonly known as:  PROTONIX Your next dose is: Today Dose:  40 mg Take 1 Tab by mouth Before breakfast and dinner. Quantity:  120 Tab Refills:  0 CONTINUE these medications which have NOT CHANGED Dose & Instructions Dispensing Information Comments Morning Noon Evening Bedtime  
 albuterol 2.5 mg /3 mL (0.083 %) nebulizer solution Commonly known as:  PROVENTIL VENTOLIN Your next dose is: Today [address] Qid   Order fax to [facility]  Indications: COPD Quantity:  360 Vial  
Refills:  3  
 - DX copd  
 
-  
 
- Pt to use Albuterol 00.83% 1 Vial Via Neb. Along with Pulmicort 0.5 mg 1 Vial Via Neb. bid  
    
   
   
   
  
 allopurinol 300 mg tablet Commonly known as:  Germain Night Your next dose is:  Tomorrow Take  by mouth daily. Refills:  0  
     
   
   
   
  
 aspirin 325 mg tablet Commonly known as:  ASPIRIN Start taking on:  1/30/2017 Dose:  325 mg Take 1 Tab by mouth daily. Quantity:  1 Tab Refills:  0  
     
   
   
   
  
 clotrimazole-betamethasone topical cream  
Commonly known as:  Penne Mole Your next dose is:  Tomorrow Apply sparingly to the affected areas twice daily for up to 2 weeks. Quantity:  15 g Refills:  1 COMBIVENT RESPIMAT  mcg/actuation inhaler Generic drug:  ipratropium-albuterol Your next dose is: Today Dose:  1 Puff Take 1 Puff by inhalation every six (6) hours. Refills:  0  
     
   
   
   
  
 cpap machine kit Your next dose is: Today  
   
 by Does Not Apply route. Bilevel 12/8 Refills:  0 fluticasone 50 mcg/actuation nasal spray Commonly known as:  Soraya [place] Your next dose is:  Tomorrow Dose:  2 Spray 2 Sprays by Both Nostrils route daily. Quantity:  1 Bottle Refills:  3  
     
   
   
   
  
 furosemide 20 mg tablet Commonly known as:  LASIX Your next dose is:  Tomorrow Dose:  40 mg Take 40 mg by mouth as needed. Refills:  0  
     
   
   
   
  
 glipiZIDE 5 mg tablet Commonly known as:  Sita Limb Your next dose is: Today Dose:  5 mg Take 5 mg by mouth two (2) times a day. Refills:  0  
     
   
   
  
   
  
 hydrALAZINE 10 mg tablet Commonly known as:  APRESOLINE Your next dose is: Today Take  by mouth three (3) times daily. Refills:  0 HYDROcodone-acetaminophen  mg tablet Commonly known as:  Radha Fothergill Your next dose is: Today Take one tab by mouth every 4-6 hours as need for pain. Quantity:  140 Tab Refills:  0 PHARMACY FILL ON TIME. NO EARLY REFILLS. isosorbide mononitrate ER 30 mg tablet Commonly known as:  IMDUR Your next dose is:  Tomorrow Dose:  30 mg Take 1 Tab by mouth daily. Quantity:  30 Tab Refills:  5  
     
   
   
   
  
 K-DUR 20 mEq tablet Generic drug:  potassium chloride Your next dose is:  Tomorrow Dose:  20 mEq Take 20 mEq by mouth daily. Refills:  0  
     
   
   
   
  
 levothyroxine 50 mcg tablet Commonly known as:  SYNTHROID Your next dose is:  Tomorrow Dose:  50 mcg Take 1 Tab by mouth Daily (before breakfast). Quantity:  90 Tab Refills:  1 LORazepam 1 mg tablet Commonly known as:  ATIVAN Your next dose is: Today Dose:  1 mg Take 1 Tab by mouth two (2) times daily as needed for Anxiety. Max Daily Amount: 2 mg. Quantity:  24 Tab Refills:  0  
     
   
   
   
  
 magnesium oxide 400 mg tablet Commonly known as:  MAG-OX Your next dose is:  Tomorrow Dose:  400 mg Take 1 Tab by mouth daily. Quantity:  30 Tab Refills:  5  
     
   
   
   
  
 metoprolol succinate 25 mg XL tablet Commonly known as:  TOPROL-XL Your next dose is:  Tomorrow Pt takes 1/2 tab po daily. Quantity:  45 Tab Refills:  3  
     
   
   
   
  
 oxyCODONE IR 15 mg immediate release tablet Commonly known as:  OXY-IR Your next dose is: Today Take 1 tablet up to 4 times daily as needed for pain. Quantity:  120 Tab Refills:  0 PHARMACY FILL ON TIME. NO EARLY REFILLS. sertraline 50 mg tablet Commonly known as:  ZOLOFT Your next dose is: Today Take one tablet each evening for anxiety  Indications: GENERALIZED ANXIETY DISORDER Quantity:  30 Tab Refills:  3 STOOL SOFTENER 100 mg capsule Generic drug:  docusate sodium Your next dose is:  Tomorrow Dose:  100 mg Take 100 mg by mouth as needed. Refills:  0  
     
   
   
   
  
 TRAVATAN Z 0.004 % ophthalmic solution Generic drug:  travoprost  
Your next dose is: Today Dose:  1 Drop Administer 1 Drop to both eyes two (2) times a day. Refills:  0 STOP taking these medications   
 metFORMIN  mg tablet Commonly known as:  GLUCOPHAGE XR  
   
  
 ZAROXOLYN 5 mg tablet Generic drug:  metOLazone Where to Get Your Medications Information on where to get these meds will be given to you by the nurse or doctor. ! Ask your nurse or doctor about these medications  
  aspirin 325 mg tablet  
 pantoprazole 40 mg tablet Discharge Instructions DISCHARGE SUMMARY from Nurse The following personal items are in your possession at time of discharge: 
 
Dental Appliances: With patient Home Medications: None Jewelry: With patient Clothing: With patient Other Valuables: With patient PATIENT INSTRUCTIONS: 
 
 
F-face looks uneven A-arms unable to move or move unevenly S-speech slurred or non-existent T-time-call 911 as soon as signs and symptoms begin-DO NOT go Back to bed or wait to see if you get better-TIME IS BRAIN. Warning Signs of HEART ATTACK Call 911 if you have these symptoms: 
? Chest discomfort. Most heart attacks involve discomfort in the center of the chest that lasts more than a few minutes, or that goes away and comes back. It can feel like uncomfortable pressure, squeezing, fullness, or pain. ? Discomfort in other areas of the upper body. Symptoms can include pain or discomfort in one or both arms, the back, neck, jaw, or stomach. ? Shortness of breath with or without chest discomfort. ? Other signs may include breaking out in a cold sweat, nausea, or lightheadedness. Don't wait more than five minutes to call [address]! Fast action can save your life. Calling 911 is almost always the fastest way to get lifesaving treatment. Emergency Medical Services staff can begin treatment when they arrive  up to an hour sooner than if someone gets to the hospital by car. The discharge information has been reviewed with the patient. The patient verbalized understanding. Discharge medications reviewed with the patient and appropriate educational materials and side effects teaching were provided. Gastrointestinal Bleeding: Care Instructions Your Care Instructions The digestive or gastrointestinal tract goes from the mouth to the anus. It is often called the GI tract. Bleeding can happen anywhere in the GI tract. It may be caused by an ulcer, an infection, or cancer. It may also be caused by medicines such as aspirin or ibuprofen. Light bleeding may not cause any symptoms at first. But if you continue to bleed for a while, you may feel very weak or tired. Sudden, heavy bleeding means you need to see a doctor right away. This kind of bleeding can be very dangerous. But it can usually be cured or controlled. The doctor may do some tests to find the cause of your bleeding. Follow-up care is a key part of your treatment and safety. Be sure to make and go to all appointments, and call your doctor if you are having problems. It's also a good idea to know your test results and keep a list of the medicines you take. How can you care for yourself at home? · Be safe with medicines. Take your medicines exactly as prescribed. Call your doctor if you think you are having a problem with your medicine. You will get more details on the specific medicines your doctor prescribes. · Do not take aspirin or other anti-inflammatory medicines, such as naproxen (Aleve) or ibuprofen (Advil, Motrin), without talking to your doctor first. Ask your doctor if it is okay to use acetaminophen (Tylenol). · Do not drink alcohol. · The bleeding may make you lose iron. So it's important to eat foods that have a lot of iron. These include red meat, shellfish, poultry, and eggs. They also include beans, raisins, whole-grain breads, and leafy green vegetables. If you want help planning meals, you can make an appointment with a dietitian. When should you call for help? Call 911 anytime you think you may need emergency care. For example, call if: 
· You have sudden, severe belly pain. · You vomit blood or what looks like coffee grounds. · You passed out (lost consciousness). · Your stools are maroon or very bloody. Call your doctor now or seek immediate medical care if: · You are dizzy or lightheaded, or you feel like you may faint. · Your stools are black and look like tar, or they have streaks of blood. · You have belly pain. · You vomit or have nausea. · You have trouble swallowing, or it hurts when you swallow. Watch closely for changes in your health, and be sure to contact your doctor if: 
· You do not get better as expected. Where can you learn more? Go to http://angelina-rajiv.info/. Enter D394 in the search box to learn more about \"Gastrointestinal Bleeding: Care Instructions. \" Current as of: May 27, 2016 Content Version: 11.1 © 6062-8522 [employer]. Care instructions adapted under license by [employer] (which disclaims liability or warranty for this information). If you have questions about a medical condition or this instruction, always ask your healthcare professional. [place] [address] any warranty or liability for your use of this information. Discharge Orders None [employer] Announcement We are excited to announce that we are making your provider's discharge notes available to you in [employer]. You will see these notes when they are completed and signed by the physician that discharged you from your recent hospital stay. If you have any questions or concerns about any information you see in [employer], please call the Health Information Department where you were seen or reach out to your Primary Care Provider for more information about your plan of care. Introducing [facility] HE[facility]! Nai Cline introduces [employer] patient portal. Now you can access parts of your medical record, email your doctor's office, and request medication refills online. 1. In your internet browser, go to [email or web address] 2. Click on the First Time User? Click Here link in the Sign In box. You will see the New Member Sign Up page. 3. Enter your [employer] Access Code exactly as it appears below. You will not need to use this code after youve completed the sign-up process. If you do not sign up before the expiration date, you must request a new code. · [employer] Access Code: [email or web address] Expires: 2/8/2017 10:13 AM 
 
4. Enter the last four digits of your Social Security Number (xxxx) and Date of Birth (mm/dd/yyyy) as indicated and click Submit. You will be taken to the next sign-up page. 5. Create a [employer] ID. This will be your [employer] login ID and cannot be changed, so think of one that is secure and easy to remember. 6. Create a [employer] password. You can change your password at any time. 7. Enter your Password Reset Question and Answer. This can be used at a later time if you forget your password. 8. Enter your e-mail address. You will receive e-mail notification when new information is available in [address]. 9. Click Sign Up. You can now view and download portions of your medical record. 10. Click the Download Summary menu link to download a portable copy of your medical information. If you have questions, please visit the Frequently Asked Questions section of the [employer] website. Remember, [employer] is NOT to be used for urgent needs. For medical emergencies, dial 911. Now available from your iPhone and Android! General Information Please provide this summary of care documentation to your next provider. Patient Signature:  ____________________________________________________________ Date:  ____________________________________________________________  
  
[facility] Provider Signature:  ____________________________________________________________ Date:  ____________________________________________________________

## 2017-01-22 NOTE — PERIOP NOTES
TRANSFER - OUT REPORT:    Verbal report given to Dasia MILLER on [employer]  being transferred to [address] [place] [address] for routine post - op       Report consisted of patients Situation, Background, Assessment and   Recommendations(SBAR). Information from the following report(s) SBAR, Kardex, OR Summary, Procedure Summary, Intake/Output and MAR was reviewed with the receiving nurse. Lines:   Peripheral IV 09/16/16 Left Antecubital (Active)       Peripheral IV 09/16/16 Left Hand (Active)       Peripheral IV 01/22/17 Left Wrist (Active)   Site Assessment Clean, dry, & intact 1/22/2017  3:16 PM   Phlebitis Assessment 0 1/22/2017  3:16 PM   Infiltration Assessment 0 1/22/2017  3:16 PM   Dressing Status Clean, dry, & intact 1/22/2017  3:16 PM   Dressing Type Transparent;Tape 1/22/2017  3:16 PM   Hub Color/Line Status Infusing 1/22/2017  3:16 PM   Alcohol Cap Used No 1/22/2017  3:16 PM        Opportunity for questions and clarification was provided. Patient transported on room air. With Registered Nurse and Cardiac Monitor    VTE prophylaxis orders have not been written for [facility]. Patient given room number and nurses name. Family updated re: pt status after security code verified.

## 2017-01-22 NOTE — ADDENDUM NOTE
Addendum  created 01/22/17 172 by Jolly Schaeffer MD    Anesthesia Intra Flowsheets edited, Flowsheet data copied forward

## 2017-01-22 NOTE — ANESTHESIA PREPROCEDURE EVALUATION
Anesthetic History               Review of Systems / Medical History  Patient summary reviewed, nursing notes reviewed and pertinent labs reviewed    Pulmonary    COPD: moderate    Sleep apnea           Neuro/Psych              Cardiovascular    Hypertension: well controlled  Valvular problems/murmurs: mitral insufficiency    CHF (Compensated)  Dysrhythmias : atrial fibrillation  CABG (2006)    Exercise tolerance: <4 METS     GI/Hepatic/Renal         Renal disease: CRI      Comments: Upper GI hemorrhage Endo/Other    Diabetes: poorly controlled, type 2  Hypothyroidism: well controlled  Obesity     Other Findings   Comments: Gout         Physical Exam    Airway  Mallampati: II  TM Distance: > 6 cm  Neck ROM: decreased range of motion, short neck   Mouth opening: Normal     Cardiovascular    Rhythm: irregular  Rate: normal         Dental    Dentition: Edentulous     Pulmonary  Breath sounds clear to auscultation               Abdominal  GI exam deferred       Other Findings            Anesthetic Plan    ASA: 4, emergent  Anesthesia type: total IV anesthesia            Anesthetic plan and risks discussed with: Patient

## 2017-01-22 NOTE — PROGRESS NOTES
TRANSFER - IN REPORT:    Verbal report received from Parisa Richards, [address] [place] [address] on [facility]  being received from PACU for routine progression of care      Report consisted of patients Situation, Background, Assessment and   Recommendations(SBAR). Information from the following report(s) Procedure Summary was reviewed with the receiving nurse. Opportunity for questions and clarification was provided. Assessment completed upon patients arrival to unit and care assumed.

## 2017-01-22 NOTE — PROGRESS NOTES
Admitted to room 628 from PACU/GI lab. Family at bedside. Oriented to room and call light system, instructed to call with needs, verbalized understanding. Dual skin assessment with this RN and Lady Yordan RN. Wound to L great toe. Will order wound care. Admission assessment and database completed upon arrival to floor.

## 2017-01-22 NOTE — ED TRIAGE NOTES
Reports black tarry, bloody stools since this AM.  States has had this before when he was on coumadin but he is no longer taking the coumadin.

## 2017-01-23 LAB
ANION GAP BLD CALC-SCNC: 6 MMOL/L (ref 7–16)
BUN SERPL-MCNC: 27 MG/DL (ref 8–23)
CALCIUM SERPL-MCNC: 8.2 MG/DL (ref 8.3–10.4)
CHLORIDE SERPL-SCNC: 103 MMOL/L (ref 98–107)
CO2 SERPL-SCNC: 33 MMOL/L (ref 21–32)
CREAT SERPL-MCNC: 1.43 MG/DL (ref 0.8–1.5)
GLUCOSE BLD STRIP.AUTO-MCNC: 157 MG/DL (ref 65–100)
GLUCOSE BLD STRIP.AUTO-MCNC: 181 MG/DL (ref 65–100)
GLUCOSE BLD STRIP.AUTO-MCNC: 186 MG/DL (ref 65–100)
GLUCOSE BLD STRIP.AUTO-MCNC: 205 MG/DL (ref 65–100)
GLUCOSE SERPL-MCNC: 188 MG/DL (ref 65–100)
HCT VFR BLD AUTO: 36.2 % (ref 41.1–50.3)
HCT VFR BLD AUTO: 38.3 % (ref 41.1–50.3)
HCT VFR BLD AUTO: 39.1 % (ref 41.1–50.3)
HGB BLD-MCNC: 11.6 G/DL (ref 13.6–17.2)
HGB BLD-MCNC: 12.3 G/DL (ref 13.6–17.2)
HGB BLD-MCNC: 12.4 G/DL (ref 13.6–17.2)
MAGNESIUM SERPL-MCNC: 2.3 MG/DL (ref 1.8–2.4)
PHOSPHATE SERPL-MCNC: 2.8 MG/DL (ref 2.3–3.7)
POTASSIUM SERPL-SCNC: 4.2 MMOL/L (ref 3.5–5.1)
SODIUM SERPL-SCNC: 142 MMOL/L (ref 136–145)

## 2017-01-23 PROCEDURE — 85018 HEMOGLOBIN: CPT | Performed by: INTERNAL MEDICINE

## 2017-01-23 PROCEDURE — 74011000250 HC RX REV CODE- 250: Performed by: INTERNAL MEDICINE

## 2017-01-23 PROCEDURE — 80048 BASIC METABOLIC PNL TOTAL CA: CPT | Performed by: INTERNAL MEDICINE

## 2017-01-23 PROCEDURE — 74011636637 HC RX REV CODE- 636/637: Performed by: INTERNAL MEDICINE

## 2017-01-23 PROCEDURE — 83735 ASSAY OF MAGNESIUM: CPT | Performed by: INTERNAL MEDICINE

## 2017-01-23 PROCEDURE — 84100 ASSAY OF PHOSPHORUS: CPT | Performed by: INTERNAL MEDICINE

## 2017-01-23 PROCEDURE — 94660 CPAP INITIATION&MGMT: CPT

## 2017-01-23 PROCEDURE — 77010033678 HC OXYGEN DAILY

## 2017-01-23 PROCEDURE — 36415 COLL VENOUS BLD VENIPUNCTURE: CPT | Performed by: INTERNAL MEDICINE

## 2017-01-23 PROCEDURE — 74011250637 HC RX REV CODE- 250/637: Performed by: INTERNAL MEDICINE

## 2017-01-23 PROCEDURE — G8978 MOBILITY CURRENT STATUS: HCPCS

## 2017-01-23 PROCEDURE — G8979 MOBILITY GOAL STATUS: HCPCS

## 2017-01-23 PROCEDURE — 94640 AIRWAY INHALATION TREATMENT: CPT

## 2017-01-23 PROCEDURE — 82962 GLUCOSE BLOOD TEST: CPT

## 2017-01-23 PROCEDURE — 97161 PT EVAL LOW COMPLEX 20 MIN: CPT

## 2017-01-23 PROCEDURE — 94760 N-INVAS EAR/PLS OXIMETRY 1: CPT

## 2017-01-23 PROCEDURE — 65270000029 HC RM PRIVATE

## 2017-01-23 RX ORDER — IPRATROPIUM BROMIDE AND ALBUTEROL SULFATE 2.5; .5 MG/3ML; MG/3ML
3 SOLUTION RESPIRATORY (INHALATION)
Status: DISCONTINUED | OUTPATIENT
Start: 2017-01-23 | End: 2017-01-25 | Stop reason: HOSPADM

## 2017-01-23 RX ORDER — METOLAZONE 5 MG/1
5 TABLET ORAL DAILY
COMMUNITY
End: 2017-01-25

## 2017-01-23 RX ORDER — HYDROCODONE BITARTRATE AND ACETAMINOPHEN 10; 325 MG/1; MG/1
1 TABLET ORAL
Status: DISCONTINUED | OUTPATIENT
Start: 2017-01-23 | End: 2017-01-25 | Stop reason: HOSPADM

## 2017-01-23 RX ADMIN — IPRATROPIUM BROMIDE AND ALBUTEROL SULFATE 3 ML: 2.5; .5 SOLUTION RESPIRATORY (INHALATION) at 11:35

## 2017-01-23 RX ADMIN — Medication 5 ML: at 22:32

## 2017-01-23 RX ADMIN — SERTRALINE HYDROCHLORIDE 50 MG: 50 TABLET, FILM COATED ORAL at 08:58

## 2017-01-23 RX ADMIN — ALBUTEROL SULFATE 2.5 MG: 2.5 SOLUTION RESPIRATORY (INHALATION) at 04:43

## 2017-01-23 RX ADMIN — INSULIN LISPRO 2 UNITS: 100 INJECTION, SOLUTION INTRAVENOUS; SUBCUTANEOUS at 17:55

## 2017-01-23 RX ADMIN — FLUTICASONE PROPIONATE 2 SPRAY: 50 SPRAY, METERED NASAL at 09:00

## 2017-01-23 RX ADMIN — OXYCODONE HYDROCHLORIDE 15 MG: 15 TABLET ORAL at 09:33

## 2017-01-23 RX ADMIN — PANTOPRAZOLE SODIUM 40 MG: 40 TABLET, DELAYED RELEASE ORAL at 16:44

## 2017-01-23 RX ADMIN — INSULIN LISPRO 2 UNITS: 100 INJECTION, SOLUTION INTRAVENOUS; SUBCUTANEOUS at 09:07

## 2017-01-23 RX ADMIN — OXYCODONE HYDROCHLORIDE 15 MG: 15 TABLET ORAL at 16:44

## 2017-01-23 RX ADMIN — OXYCODONE HYDROCHLORIDE 15 MG: 15 TABLET ORAL at 02:03

## 2017-01-23 RX ADMIN — INSULIN LISPRO 4 UNITS: 100 INJECTION, SOLUTION INTRAVENOUS; SUBCUTANEOUS at 12:13

## 2017-01-23 RX ADMIN — INSULIN LISPRO 2 UNITS: 100 INJECTION, SOLUTION INTRAVENOUS; SUBCUTANEOUS at 22:29

## 2017-01-23 RX ADMIN — LORAZEPAM 1 MG: 1 TABLET ORAL at 22:31

## 2017-01-23 RX ADMIN — ALLOPURINOL 300 MG: 300 TABLET ORAL at 08:58

## 2017-01-23 RX ADMIN — Medication 10 ML: at 14:00

## 2017-01-23 RX ADMIN — HYDRALAZINE HYDROCHLORIDE 10 MG: 10 TABLET, FILM COATED ORAL at 22:31

## 2017-01-23 RX ADMIN — LATANOPROST 1 DROP: 50 SOLUTION OPHTHALMIC at 22:32

## 2017-01-23 RX ADMIN — METOPROLOL SUCCINATE 12.5 MG: 25 TABLET, EXTENDED RELEASE ORAL at 08:58

## 2017-01-23 RX ADMIN — PANTOPRAZOLE SODIUM 40 MG: 40 TABLET, DELAYED RELEASE ORAL at 06:53

## 2017-01-23 RX ADMIN — ISOSORBIDE MONONITRATE 30 MG: 30 TABLET, EXTENDED RELEASE ORAL at 08:59

## 2017-01-23 RX ADMIN — HYDRALAZINE HYDROCHLORIDE 10 MG: 10 TABLET, FILM COATED ORAL at 16:44

## 2017-01-23 RX ADMIN — LEVOTHYROXINE SODIUM 50 MCG: 50 TABLET ORAL at 06:53

## 2017-01-23 RX ADMIN — LORAZEPAM 1 MG: 1 TABLET ORAL at 16:50

## 2017-01-23 RX ADMIN — HYDRALAZINE HYDROCHLORIDE 10 MG: 10 TABLET, FILM COATED ORAL at 08:59

## 2017-01-23 RX ADMIN — INSULIN GLARGINE 8 UNITS: 100 INJECTION, SOLUTION SUBCUTANEOUS at 22:29

## 2017-01-23 NOTE — PROGRESS NOTES
Problem: Mobility Impaired (Adult and Pediatric)  Goal: *Acute Goals and Plan of Care (Insert Text)  LTG:  (1.)Mr. Navarro will move from supine to sit and sit to supine, scoot up and down and roll side to side INDEPENDENTLY with bed flat within 7 day(s). (2.)Mr. Navarro will transfer from bed to chair and chair to bed INDEPENDENTLY using the least restrictive device within 7 day(s). (3.)Mr. Navarro will ambulate with SUPERVISION for 300+ feet with good balance and safety awareness using the least restrictive device within 7 day(s). (4.)Mr. Navarro will maintain O2 sats 90% or above during activity/ambulation within 7 days. ________________________________________________________________________________________________      PHYSICAL THERAPY: INITIAL ASSESSMENT, AM 1/23/2017  INPATIENT: Hospital Day: 2  Payor: [address] [place] [address] / Plan: [address] / Product Type: Managed Care Medicare /      NAME/AGE/GENDER: Suzi Butler is a 80 y.o. male      PRIMARY DIAGNOSIS: Melena [K92.1] <principal problem not specified> <principal problem not specified>  Procedure(s) (LRB):  ESOPHAGOGASTRODUODENOSCOPY (EGD) (N/A)  ESOPHAGOGASTRODUODENAL (EGD) BIOPSY (N/A)  1 Day Post-Op  ICD-10: Treatment Diagnosis:       · Generalized Muscle Weakness (M62.81)  · Other abnormalities of gait and mobility (R26.89)   Precaution/Allergies:  Review of patient's allergies indicates no known allergies. ASSESSMENT:      Mr. Reyna Iqbal is an 80year old male admitted from home for upper GI bleed; he is s/p EGD. Presents in supine without complaints and is agreeable to therapy assessment. Pt transfers to sitting with supervision and head of bed elevated. Pt performs transfers and mobility in room/bathroom with CGA-SBA for safety. Needs assist with donning new brief in standing. Pt has history of neuropathy in B LEs with some numbness at baseline and states uses cane to help with balance.  Ambulates ~170 ft in hallway with CGA-SBA for safety and some mild trunk sway using cane. He moves fairly well overall but does fatigue quickly and takes one standing rest break. Pt returned to room and up to chair afterwards with needs in reach. Mr. Johanne Matos currently appears to be slightly weaker than baseline with new GI bleed and will benefit from continued therapy during acute care stay to maximize safety and independence for discharge. Anticipate discharge home with possible home health PT. This section established at most recent assessment   PROBLEM LIST (Impairments causing functional limitations):  1. Decreased Strength  2. Decreased Transfer Abilities  3. Decreased Ambulation Ability/Technique  4. Decreased Balance  5. Decreased Activity Tolerance  6. Increased Shortness of Breath    INTERVENTIONS PLANNED: (Benefits and precautions of physical therapy have been discussed with the patient.)  1. Balance Exercise  2. Bed Mobility  3. Gait Training  4. Therapeutic Activites  5. Therapeutic Exercise/Strengthening  6. Transfer Training  7. Group Therapy      TREATMENT PLAN: Frequency/Duration: 3-4 times a week for duration of hospital stay  Rehabilitation Potential For Stated Goals: GOOD      RECOMMENDED REHABILITATION/EQUIPMENT: (at time of discharge pending progress): Continue Skilled Therapy and Home Health: Physical Therapy. HISTORY:   History of Present Injury/Illness (Reason for Referral):  Per H&P, \"Ken Lafleur is an 80 y. o. white male, with a pmh of afib on aspirin 325 mg and CAD status post CABG last year, who presents to the ED today after waking this morning with his shoots covered in melena. He proceeded to the shower and to the ED from there as melenotic stool continued to run out. Hg and blood pressure stable in the ED. Had a gi bleed about a year ago for which his coumadin was stopped. PPI drip ordered in ED. Feels weak but denies chest pain, sob. No BRBPR.  Has consistent bowel movements and takes stool softener at home. Denies NSAID use\"     Past Medical History/Comorbidities:   Mr. Kenya Tanner  has a past medical history of Atopic dermatitis (11/21/2012); Atrial fibrillation ([facility] [address] CAD (coronary artery disease); Carotid stenosis, bilateral (11/21/2012); CHF (congestive heart failure) ([facility] [address] (11/21/2012); Chronic kidney disease; Chronic obstructive pulmonary disease ([address] Diabetes ([facility] [address] Diastolic CHF, acute on chronic (HCC) (9/12/2015); Failed CABG (coronary artery bypass graft) (11/21/2012); GI bleed (1/2015); Gout (11/21/2012); History of tobacco use; [place] (hard of hearing); Hypercholesterolemia; Hypertension; Hyperuricemia (11/21/2012); Morbid obesity ([facility] [address] PAD (peripheral artery disease) ([facility] [address] (11/21/2012); Poor historian; Radiologic findings of lung field, abnormal (10/31/2016); Seizure disorder ([address] (8/5/2013); Shortness of breath dyspnea (8/5/2013); Thyroid disease; and Unspecified sleep apnea. He also has no past medical history of Arthritis; Asthma; Cancer ([address] Liver disease; Nausea & vomiting; PUD (peptic ulcer disease); or Stroke ([address] Mr. Kenya Tanner  has a past surgical history that includes cardiac surg procedure unlist; coronary artery bypass graft (06-); cataract removal (Left, 2003); heart catheterization; coronary stent placement (02-); heent (1970s); and colonoscopy. Social History/Living Environment:   Home Environment: Private residence  # Steps to Enter: 1  One/Two Story Residence: One story  Living Alone: No  Support Systems: Family member(s), Spouse/Significant Other/Partner, Child(tai)  Patient Expects to be Discharged to[de-identified] Private residence  Current DME Used/Available at Home: Cane, straight, Grab bars, Tub transfer bench  Tub or Shower Type: Tub/Shower combination  Prior Level of Function/Work/Activity:  Mr. Kenya Tanner lives at home with wife in single story home with 1 step to enter. He is independent with ambulation using cane at all times.  Wife assists with ADLs, specifically donning shoes and socks and does stand by while pt takes shower. He reports one fall last year but attributes this to developing pna. Number of Personal Factors/Comorbidities that affect the Plan of Care:  Neuropathy  Shoulder fracture last fall  Hx fall <6 months ago 1-2: MODERATE COMPLEXITY   EXAMINATION:   Most Recent Physical Functioning:   Gross Assessment:  AROM: Within functional limits  Strength: Generally decreased, functional  Coordination: Within functional limits  Sensation:  (pt reports neuropathy B feet)               Posture:  Posture (WDL): Exceptions to WDL  Posture Assessment: Forward head, Rounded shoulders  Balance:  Sitting: Intact  Standing: Impaired  Standing - Static: Good  Standing - Dynamic : Fair Bed Mobility:  Supine to Sit: Supervision  Scooting: Supervision  Wheelchair Mobility:     Transfers:  Sit to Stand: Supervision  Stand to Sit: Supervision  Bed to Chair: Stand-by asssistance  Gait:     Base of Support: Center of gravity altered  Speed/Helen: Slow  Step Length: Left shortened;Right shortened  Gait Abnormalities: Trunk sway increased;Decreased step clearance; Path deviations  Distance (ft): 170 Feet (ft)  Assistive Device: Cane, straight  Ambulation - Level of Assistance: Contact guard assistance;Stand-by asssistance  Interventions: Verbal cues; Visual/Demos; Safety awareness training; Tactile cues       Body Structures Involved:  1. Lungs  2. Muscles Body Functions Affected:  1. Sensory/Pain  2. Neuromusculoskeletal  3. Movement Related  4. Digestive Activities and Participation Affected:  1. General Tasks and Demands  2. Mobility  3. Self Care  4.  Community, Social and [place]   Number of elements that affect the Plan of Care: 1-2: LOW COMPLEXITY   CLINICAL PRESENTATION:   Presentation: Stable and uncomplicated: LOW COMPLEXITY   CLINICAL DECISION MAKIN [facility] Inpatient Short Form  How much difficulty does the patient currently have. .. Unable A Lot A Little None   1. Turning over in bed (including adjusting bedclothes, sheets and blankets)? [ ] 1   [ ] 2   [ ] 3   [X] 4   2. Sitting down on and standing up from a chair with arms ( e.g., wheelchair, bedside commode, etc.)   [ ] 1   [ ] 2   [ ] 3   [X] 4   3. Moving from lying on back to sitting on the side of the bed? [ ] 1   [ ] 2   [ ] 3   [X] 4   How much help from another person does the patient currently need. .. Total A Lot A Little None   4. Moving to and from a bed to a chair (including a wheelchair)? [ ] 1   [ ] 2   [X] 3   [ ] 4   5. Need to walk in hospital room? [ ] 1   [ ] 2   [X] 3   [ ] 4   6. Climbing 3-5 steps with a railing? [ ] 1   [ ] 2   [X] 3   [ ] 4   © 2007, Trustees of [facility] [employer], under license to [employer]. All rights reserved    Score:  Initial: 21 Most Recent: X (Date: -- )     Interpretation of Tool:  Represents activities that are increasingly more difficult (i.e. Bed mobility, Transfers, Gait). Score 24 23 22-20 19-15 14-10 9-7 6       Modifier CH CI CJ CK CL CM CN         · Mobility - Walking and Moving Around:               - CURRENT STATUS:    CJ - 20%-39% impaired, limited or restricted               - GOAL STATUS:           CI - 1%-19% impaired, limited or restricted               - D/C STATUS:                       ---------------To be determined---------------  Payor: [employer] MEDICARE COMPLETE / Plan: [employer] / Product Type: Managed Care Medicare /       Medical Necessity:     · Patient demonstrates good rehab potential due to higher previous functional level. Reason for Services/Other Comments:  · Patient continues to demonstrate capacity to improve strength, mobility, balance, transfers, activity tolerance which will increase independence, decrease amount of assistance required from caregiver and increase safety.    Use of outcome tool(s) and clinical judgement create a POC that gives a: Clear prediction of patient's progress: LOW COMPLEXITY                 TREATMENT:   (In addition to Assessment/Re-Assessment sessions the following treatments were rendered)   Pre-treatment Symptoms/Complaints:  Chronic LE/foot pain pre and post evaluation; otherwise no complaints  Pain: Initial:   Pain Intensity 1: 4  Pain Location 1: Foot  Pain Orientation 1: Left, Right  Pain Intervention(s) 1: Nurse notified  Post Session:  4/10 (RN notified)      Therapeutic Activity: (    10 Minutes): Therapeutic activities including Bed transfers, Chair transfers, Toilet transfers, standing static/dynamic balance activities during functional tasks, Ambulation on level ground and review of activity pacing techniques/breathing techniques to improve mobility, strength, balance and activity tolerance. Required minimal Verbal cues; Visual/Demos; Safety awareness training; Tactile cues to promote dynamic balance in standing. Treatment/Session Assessment:    · Response to Treatment:  Tolerates well, some shortness of breath following ambulation  · Interdisciplinary Collaboration:  · Physical Therapist  · Registered Nurse  · After treatment position/precautions:  · Up in chair  · Bed/Chair-wheels locked  · Bed in low position  · Call light within reach  · Compliance with Program/Exercises: compliant all of the time. · Recommendations/Intent for next treatment session: \"Next visit will focus on advancements to more challenging activities and reduction in assistance provided\".   Total Treatment Duration:  PT Patient Time In/Time Out  Time In: 0900  Time Out: José Rodriges DPT

## 2017-01-23 NOTE — PROGRESS NOTES
Hospitalist Progress Note    2017  Admit Date: 2017 12:38 PM   NAME: Johnson Morse   :  1932   MRN:  [id number]   Attending: Catalina Trujillo MD  PCP:  Mini Lee MD    SUBJECTIVE:    Johnson Morse is an 80 y. o. white male, with a pmh of afib on aspirin 325 mg and CAD status post CABG last year, who presents to the ED for teddy melena on day of admit. Had a gi bleed about a year ago for which his coumadin was stopped. GI consulted. S/P EGD  with finding of gastritis/ duodenitis.  - seen s/p EGD. Reports melena is improving. No acute complaints now. Family at bedside    Review of Systems negative with exception of pertinent positives noted above  PHYSICAL EXAM     Visit Vitals    /58    Pulse 74    Temp 98.4 °F (36.9 °C)    Resp 19    Ht 5' 10\" (1.778 m)    Wt 114.8 kg (253 lb)    SpO2 98%    BMI 36.3 kg/m2      Temp (24hrs), Av.6 °F (37 °C), Min:98.3 °F (36.8 °C), Max:98.9 °F (37.2 °C)    Oxygen Therapy  O2 Sat (%): 98 % (17 1557)  Pulse via Oximetry: 75 beats per minute (17 1135)  O2 Device: Nasal cannula (17 1135)  O2 Flow Rate (L/min): 2 l/min (17 1135)    Intake/Output Summary (Last 24 hours) at 17 1845  Last data filed at 17 1801   Gross per 24 hour   Intake              720 ml   Output                0 ml   Net              720 ml      General: No acute distress    Lungs:  CTA Bilaterally. Heart:  Regular rate and rhythm,  No murmur, rub, or gallop  Abdomen: Soft, Non distended, Non tender, Positive bowel sounds  Extremities: No cyanosis, clubbing or edema  Neurologic:  No focal deficits    ASSESSMENT      Active Hospital Problems    Diagnosis Date Noted    Upper GI bleed 2017     A/P  - Upper GI bleed - egd report as above. Cont PPI bid x 8 weeks. Avoid NSAID. Serial h/h. Advance diet. - CAD/ hx of PAFIB - will hold on resuming asa for 1 week.    - DM2 -  Fair control, cont current  - hypothyriodism - synthroid    DVT Prophylaxis: scds    Dispo - hopefully home in AM.     Signed By: Cornelius Neff DO     January 23, 2017

## 2017-01-23 NOTE — WOUND CARE
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 It has been present for over a year. Callous build up being pared down by dermatologist. Wife stated they do not have a special insert offloading this area in shoe. Discussed cause of wound and importance of offloading to allow wound to heal and callous to stop forming. This can be done by orthotist outpatient. Understanding verbalized. Answered all questions. Placed xeroform gauze dressing to site. Recommend this be changed daily.

## 2017-01-23 NOTE — PROGRESS NOTES
GI DAILY PROGRESS NOTE    Admit Date:  1/22/2017    Today's Date:  1/23/2017    CC:  GI Bleed, Melena    Subjective:     Per RN notes since admit 1/22 pt with oozing maroon stools to brief several times. Per pt, none this morning. No c/o abdominal pain, N/V. No BM this AM.  He is requesting diet. Medications:   Current Facility-Administered Medications   Medication Dose Route Frequency    HYDROcodone-acetaminophen (NORCO)  mg tablet 1 Tab  1 Tab Oral Q4H PRN    albuterol (PROVENTIL VENTOLIN) nebulizer solution 5 mg  5 mg Nebulization Q4H PRN    allopurinol (ZYLOPRIM) tablet 300 mg  300 mg Oral DAILY    fluticasone (FLONASE) 50 mcg/actuation nasal spray 2 Spray  2 Spray Both Nostrils DAILY    hydrALAZINE (APRESOLINE) tablet 10 mg  10 mg Oral TID    isosorbide mononitrate ER (IMDUR) tablet 30 mg  30 mg Oral DAILY    levothyroxine (SYNTHROID) tablet 50 mcg  50 mcg Oral ACB    LORazepam (ATIVAN) tablet 1 mg  1 mg Oral BID PRN    metoprolol succinate (TOPROL-XL) XL tablet 12.5 mg  12.5 mg Oral DAILY    oxyCODONE IR (OXY-IR) immediate release tablet 15 mg  15 mg Oral Q6H PRN    sertraline (ZOLOFT) tablet 50 mg  50 mg Oral DAILY    latanoprost (XALATAN) 0.005 % ophthalmic solution 1 Drop  1 Drop Both Eyes QHS    sodium chloride (NS) flush 5-10 mL  5-10 mL IntraVENous Q8H    sodium chloride (NS) flush 5-10 mL  5-10 mL IntraVENous PRN    acetaminophen (TYLENOL) tablet 650 mg  650 mg Oral Q4H PRN    naloxone (NARCAN) injection 0.4 mg  0.4 mg IntraVENous PRN    ondansetron (ZOFRAN) injection 4 mg  4 mg IntraVENous Q4H PRN    insulin lispro (HUMALOG) injection   SubCUTAneous AC&HS    insulin glargine (LANTUS) injection 8 Units  8 Units SubCUTAneous QHS    pantoprazole (PROTONIX) tablet 40 mg  40 mg Oral ACB&D       Review of Systems:  ROS was obtained, with pertinent positives as listed above. No chest pain or SOB.     Diet:  NPO    Objective:   Vitals:  Visit Vitals    /54    Pulse 84  Temp 98.3 °F (36.8 °C)    Resp 18    Ht 5' 10\" (1.778 m)    Wt 114.8 kg (253 lb)    SpO2 94%    BMI 36.3 kg/m2     Intake/Output:     01/21 1901 - 01/23 0700  In: 500 [I.V.:500]  Out: 0   Exam:  General appearance: alert, cooperative, no distress. Sitting up in chair. Lungs: clear to auscultation bilaterally anteriorly  Heart: regular rate and rhythm  Abdomen: soft, obese, Non-tender. Bowel sounds normal. No masses, no organomegaly  Neuro:  alert and oriented    Data Review (Labs):    Recent Labs      01/23/17   0525  01/22/17   2140  01/22/17   1650  01/22/17   1203   WBC   --    --    --   9.0   HGB  12.4*  12.6*  13.3*  13.5*   HCT  39.1*  39.6*  41.7  41.3   PLT   --    --    --   154   MCV   --    --    --   85.7   NA  142   --    --   138   K  4.2   --    --   3.8   CL  103   --    --   101   CO2  33*   --    --   28   BUN  27*   --    --   25*   CREA  1.43   --    --   1.54*   CA  8.2*   --    --   8.3   MG  2.3   --    --    --    GLU  188*   --    --   339*   AP   --    --    --   99   SGOT   --    --    --   15   ALT   --    --    --   15   TBILI   --    --    --   0.6   ALB   --    --    --   3.2   TP   --    --    --   7.6   PTP   --    --    --   11.0   INR   --    --    --   1.0     Esophagogastroduodenoscopy 1/22/2017  INDICATION:  1. GI Bleed/Melena  POSTPROCEDURE DIAGNOSIS:  1. Gastritis  2. Gastric ulcer  3. Duodenitis  MEDICATIONS ADMINISTERED: MAC. Further details per anesthesia note. INSTRUMENT: [id number]  PROCEDURE: After obtaining informed consent, the patient was placed in the left lateral position and sedated. The endoscope was advanced under direct vision without difficulty. The esophagus, stomach (including retroflexed views) and duodenum were evaluated. The patient was taken to the recovery area in stable condition. FINDINGS:  ESOPHAGUS: Normal exam.  STOMACH: 5-8 mm clean-based ulcer in antrum. Cold-forceps biopsies for pathology. No evidence of active or recent bleeding.  Mild to moderate erosive gastritis of antrum. Mild gastritis of body of stomach. Cold-forceps biopsies for pathology. DUODENUM: Minimal duodenitis of bulb. Otherwise normal exam of 1st, 2nd, and 3rd portions of duodenum. No evidence of active or recent bleeding. Estimated blood loss: 0-minimal   Complications: none  Specimens obtained during procedure:   1. Gastric biopsies     Assessment:     Active Problems:    Upper GI bleed (1/22/2017)      80 y.o. male with PMH of (but not limited to) A fib (on  mg only), CAD s/p CABG 2007, carotid artery stenosis, CKD, DM , CHF, HLD, HTN, COPD not on O2, ROBERTO with CPAP and seizure disorder who is seen in consultation 1/22/17 at the request of Dr. Stevenson Guy for evaluation of GIB with c/o \"dark tarry stool in his bed. \" Similar incident in 2015, but with more BRRB and he was on warfarin at the time. EGD on 1/23/15 by Dr. Corina Byrd that revealed a few small gastric erosions likely not the cause of bleeding. Colonoscopy on 1/22/15 by Dr. Travis Tesfaye revealed extensive diverticulosis without active bleeding. He had recurrent bleeding with a drop in his Hgb and underwent repeat EGD on 5/6/15 by Dr. Bhaskar Baez with friable gastric mucosa. Pillcam study was suggested, but not done. His current Hgb is 12.4 (12.6, 13.3, 13.5) (14.1 10/2016). EGD 1/22 as outlined above showed gastritis, , Duodenitis. Plan:     1. Follow-up pathology  2. Protonix 40 mg PO BID for 8 weeks then daily. Minimize NSAID use  3. Monitor for evidence of recurrent GI bleeding. Follow serial H/H-  Hgb Q8H and transfuse for goal hgb 8-9  4. Advance to clear liquid diet. As long as no further GI bleeding and H/H appears stable ok for gradual advancement as tolerated.   Pito Page PA-C

## 2017-01-24 LAB
COLLECTION COMMENT, COLCM: NORMAL
ERYTHROCYTE [DISTWIDTH] IN BLOOD BY AUTOMATED COUNT: 15.4 % (ref 11.9–14.6)
GLUCOSE BLD STRIP.AUTO-MCNC: 155 MG/DL (ref 65–100)
GLUCOSE BLD STRIP.AUTO-MCNC: 168 MG/DL (ref 65–100)
GLUCOSE BLD STRIP.AUTO-MCNC: 184 MG/DL (ref 65–100)
GLUCOSE BLD STRIP.AUTO-MCNC: 204 MG/DL (ref 65–100)
HCT VFR BLD AUTO: 34.5 % (ref 41.1–50.3)
HCT VFR BLD AUTO: 35.4 % (ref 41.1–50.3)
HCT VFR BLD AUTO: 36.6 % (ref 41.1–50.3)
HGB BLD-MCNC: 11.3 G/DL (ref 13.6–17.2)
HGB BLD-MCNC: 11.3 G/DL (ref 13.6–17.2)
HGB BLD-MCNC: 11.8 G/DL (ref 13.6–17.2)
MCH RBC QN AUTO: 28 PG (ref 26.1–32.9)
MCHC RBC AUTO-ENTMCNC: 32.8 G/DL (ref 31.4–35)
MCV RBC AUTO: 85.4 FL (ref 79.6–97.8)
PLATELET # BLD AUTO: 139 K/UL (ref 150–450)
PMV BLD AUTO: 11.8 FL (ref 10.8–14.1)
RBC # BLD AUTO: 4.04 M/UL (ref 4.23–5.67)
WBC # BLD AUTO: 7.2 K/UL (ref 4.3–11.1)

## 2017-01-24 PROCEDURE — 65270000029 HC RM PRIVATE

## 2017-01-24 PROCEDURE — 74011250637 HC RX REV CODE- 250/637: Performed by: INTERNAL MEDICINE

## 2017-01-24 PROCEDURE — 74011636637 HC RX REV CODE- 636/637: Performed by: INTERNAL MEDICINE

## 2017-01-24 PROCEDURE — 77010033678 HC OXYGEN DAILY

## 2017-01-24 PROCEDURE — 36415 COLL VENOUS BLD VENIPUNCTURE: CPT | Performed by: INTERNAL MEDICINE

## 2017-01-24 PROCEDURE — 94660 CPAP INITIATION&MGMT: CPT

## 2017-01-24 PROCEDURE — 82962 GLUCOSE BLOOD TEST: CPT

## 2017-01-24 PROCEDURE — 85018 HEMOGLOBIN: CPT | Performed by: INTERNAL MEDICINE

## 2017-01-24 PROCEDURE — 94760 N-INVAS EAR/PLS OXIMETRY 1: CPT

## 2017-01-24 PROCEDURE — 74011000250 HC RX REV CODE- 250: Performed by: INTERNAL MEDICINE

## 2017-01-24 PROCEDURE — 85027 COMPLETE CBC AUTOMATED: CPT | Performed by: INTERNAL MEDICINE

## 2017-01-24 PROCEDURE — 94640 AIRWAY INHALATION TREATMENT: CPT

## 2017-01-24 PROCEDURE — 97530 THERAPEUTIC ACTIVITIES: CPT

## 2017-01-24 RX ADMIN — Medication 5 ML: at 23:04

## 2017-01-24 RX ADMIN — HYDRALAZINE HYDROCHLORIDE 10 MG: 10 TABLET, FILM COATED ORAL at 10:19

## 2017-01-24 RX ADMIN — INSULIN LISPRO 4 UNITS: 100 INJECTION, SOLUTION INTRAVENOUS; SUBCUTANEOUS at 18:29

## 2017-01-24 RX ADMIN — IPRATROPIUM BROMIDE AND ALBUTEROL SULFATE 3 ML: 2.5; .5 SOLUTION RESPIRATORY (INHALATION) at 20:06

## 2017-01-24 RX ADMIN — FLUTICASONE PROPIONATE 2 SPRAY: 50 SPRAY, METERED NASAL at 09:30

## 2017-01-24 RX ADMIN — HYDROCODONE BITARTRATE AND ACETAMINOPHEN 1 TABLET: 10; 325 TABLET ORAL at 15:45

## 2017-01-24 RX ADMIN — Medication 10 ML: at 06:19

## 2017-01-24 RX ADMIN — LORAZEPAM 1 MG: 1 TABLET ORAL at 11:00

## 2017-01-24 RX ADMIN — PANTOPRAZOLE SODIUM 40 MG: 40 TABLET, DELAYED RELEASE ORAL at 18:30

## 2017-01-24 RX ADMIN — IPRATROPIUM BROMIDE AND ALBUTEROL SULFATE 3 ML: 2.5; .5 SOLUTION RESPIRATORY (INHALATION) at 13:29

## 2017-01-24 RX ADMIN — LATANOPROST 1 DROP: 50 SOLUTION OPHTHALMIC at 22:00

## 2017-01-24 RX ADMIN — ISOSORBIDE MONONITRATE 30 MG: 30 TABLET, EXTENDED RELEASE ORAL at 10:19

## 2017-01-24 RX ADMIN — OXYCODONE HYDROCHLORIDE 15 MG: 15 TABLET ORAL at 11:45

## 2017-01-24 RX ADMIN — ALLOPURINOL 300 MG: 300 TABLET ORAL at 10:19

## 2017-01-24 RX ADMIN — Medication 5 ML: at 14:00

## 2017-01-24 RX ADMIN — HYDRALAZINE HYDROCHLORIDE 10 MG: 10 TABLET, FILM COATED ORAL at 15:45

## 2017-01-24 RX ADMIN — LEVOTHYROXINE SODIUM 50 MCG: 50 TABLET ORAL at 06:18

## 2017-01-24 RX ADMIN — INSULIN GLARGINE 8 UNITS: 100 INJECTION, SOLUTION SUBCUTANEOUS at 23:01

## 2017-01-24 RX ADMIN — METOPROLOL SUCCINATE 12.5 MG: 25 TABLET, EXTENDED RELEASE ORAL at 10:19

## 2017-01-24 RX ADMIN — IPRATROPIUM BROMIDE AND ALBUTEROL SULFATE 3 ML: 2.5; .5 SOLUTION RESPIRATORY (INHALATION) at 08:02

## 2017-01-24 RX ADMIN — SERTRALINE HYDROCHLORIDE 50 MG: 50 TABLET, FILM COATED ORAL at 10:19

## 2017-01-24 RX ADMIN — INSULIN LISPRO 2 UNITS: 100 INJECTION, SOLUTION INTRAVENOUS; SUBCUTANEOUS at 23:01

## 2017-01-24 RX ADMIN — PANTOPRAZOLE SODIUM 40 MG: 40 TABLET, DELAYED RELEASE ORAL at 06:18

## 2017-01-24 NOTE — PROGRESS NOTES
Problem: Mobility Impaired (Adult and Pediatric)  Goal: *Acute Goals and Plan of Care (Insert Text)  LTG:  (1.)Mr. Navarro will move from supine to sit and sit to supine, scoot up and down and roll side to side INDEPENDENTLY with bed flat within 7 day(s). (2.)Mr. Navarro will transfer from bed to chair and chair to bed INDEPENDENTLY using the least restrictive device within 7 day(s). (3.)Mr. Navarro will ambulate with SUPERVISION for 300+ feet with good balance and safety awareness using the least restrictive device within 7 day(s). (4.)Mr. Navarro will maintain O2 sats 90% or above during activity/ambulation within 7 days. ________________________________________________________________________________________________      PHYSICAL THERAPY: Daily Note, Treatment Day: 1st and PM 1/24/2017  INPATIENT: Hospital Day: 3  Payor: [address] [place] [address] / Plan: [address] / Product Type: [employer] Medicare /      NAME/AGE/GENDER: Wesley Andrade is a 80 y.o. male      PRIMARY DIAGNOSIS: Melena [K92.1] <principal problem not specified> <principal problem not specified>  Procedure(s) (LRB):  ESOPHAGOGASTRODUODENOSCOPY (EGD) (N/A)  ESOPHAGOGASTRODUODENAL (EGD) BIOPSY (N/A)  2 Days Post-Op  ICD-10: Treatment Diagnosis:       · Generalized Muscle Weakness (M62.81)  · Other abnormalities of gait and mobility (R26.89)   Precaution/Allergies:  Review of patient's allergies indicates no known allergies. ASSESSMENT:      Mr. Rose Nuñez presents sitting up in chair without complaints, pleasant and agreeable to therapy treatment. Transfers to standing with supervision. Pt ambulates total of 250 ft in hallway with straight cane however he fatigues quickly and takes 4 short standing rest breaks. O2 sats on room air drop from 96% at rest to 93-94% during ambulation. Pt c/o fatigue, SOB, LE weakness. Reviewed activity pacing and pursed lip breathing technique.  Pt returned to room and up to chair after activity; using inhaler at his request. Overall pt demonstrates good progress with mobility and total gait distance however does need frequent breaks. Will benefit from continued therapy to address strength, balance, and activity tolerance. This section established at most recent assessment   PROBLEM LIST (Impairments causing functional limitations):  1. Decreased Strength  2. Decreased Transfer Abilities  3. Decreased Ambulation Ability/Technique  4. Decreased Balance  5. Decreased Activity Tolerance  6. Increased Shortness of Breath    INTERVENTIONS PLANNED: (Benefits and precautions of physical therapy have been discussed with the patient.)  1. Balance Exercise  2. Bed Mobility  3. Gait Training  4. Therapeutic Activites  5. Therapeutic Exercise/Strengthening  6. Transfer Training  7. Group Therapy      TREATMENT PLAN: Frequency/Duration: 3-4 times a week for duration of hospital stay  Rehabilitation Potential For Stated Goals: GOOD      RECOMMENDED REHABILITATION/EQUIPMENT: (at time of discharge pending progress): Continue Skilled Therapy and Home Health: Physical Therapy. HISTORY:   History of Present Injury/Illness (Reason for Referral):  Per H&P, \"Ken Qiu is an 80 y. o. white male, with a pmh of afib on aspirin 325 mg and CAD status post CABG last year, who presents to the ED today after waking this morning with his shoots covered in melena. He proceeded to the shower and to the ED from there as melenotic stool continued to run out. Hg and blood pressure stable in the ED. Had a gi bleed about a year ago for which his coumadin was stopped. PPI drip ordered in ED. Feels weak but denies chest pain, sob. No BRBPR. Has consistent bowel movements and takes stool softener at home. Denies NSAID use\"     Past Medical History/Comorbidities:   Mr. Contreras Hsieh  has a past medical history of Atopic dermatitis (11/21/2012); Atrial fibrillation ([facility] [address] CAD (coronary artery disease);  Carotid stenosis, bilateral (11/21/2012); CHF (congestive heart failure) ([facility] [address] (11/21/2012); Chronic kidney disease; Chronic obstructive pulmonary disease ([facility] [address] Diabetes ([facility] [address] Diastolic CHF, acute on chronic (HCC) (9/12/2015); Failed CABG (coronary artery bypass graft) (11/21/2012); GI bleed (1/2015); Gout (11/21/2012); History of tobacco use; [place] (hard of hearing); Hypercholesterolemia; Hypertension; Hyperuricemia (11/21/2012); Morbid obesity ([facility] [address] PAD (peripheral artery disease) ([facility] [address] (11/21/2012); Poor historian; Radiologic findings of lung field, abnormal (10/31/2016); Seizure disorder ([facility] [address] (8/5/2013); Shortness of breath dyspnea (8/5/2013); Thyroid disease; and Unspecified sleep apnea. He also has no past medical history of Arthritis; Asthma; Cancer ([facility] [address] Liver disease; Nausea & vomiting; PUD (peptic ulcer disease); or Stroke ([facility] [address] Mr. Barbie Benjamin  has a past surgical history that includes cardiac surg procedure unlist; coronary artery bypass graft (06-); cataract removal (Left, 2003); heart catheterization; coronary stent placement (02-); heent (1970s); and colonoscopy. Social History/Living Environment:   Home Environment: Private residence  # Steps to Enter: 1  One/Two Story Residence: One story  Living Alone: No  Support Systems: Family member(s), Spouse/Significant Other/Partner, Child(tai)  Patient Expects to be Discharged to[de-identified] Private residence  Current DME Used/Available at Home: Cane, straight, Grab bars, Tub transfer bench  Tub or Shower Type: Tub/Shower combination  Prior Level of Function/Work/Activity:  Mr. Barbie Benjamin lives at home with wife in single story home with 1 step to enter. He is independent with ambulation using cane at all times. Wife assists with ADLs, specifically donning shoes and socks and does stand by while pt takes shower. He reports one fall last year but attributes this to developing pna.        Number of Personal Factors/Comorbidities that affect the Plan of Care:  Neuropathy  Shoulder fracture last fall  Hx fall <6 months ago 1-2: MODERATE COMPLEXITY   EXAMINATION:   Most Recent Physical Functioning:   Gross Assessment:  AROM: Within functional limits  Strength: Generally decreased, functional  Coordination: Within functional limits  Sensation:  (pt reports neuropathy B feet)               Posture:  Posture (WDL): Exceptions to WDL  Posture Assessment: Forward head, Rounded shoulders  Balance:  Sitting: Intact  Standing: Impaired  Standing - Static: Good  Standing - Dynamic : Fair (+) Bed Mobility:     Wheelchair Mobility:     Transfers:  Sit to Stand: Supervision  Stand to Sit: Supervision  Interventions: Verbal cues; Visual cues; Safety awareness training  Duration: 17 Minutes  Gait:     Base of Support: Center of gravity altered  Speed/Helen: Slow;Pace decreased (<100 feet/min)  Step Length: Left shortened;Right shortened  Gait Abnormalities: Trunk sway increased; Path deviations  Distance (ft): 250 Feet (ft) (with 4 standing rest break due to fatigue, SOB)  Assistive Device: Cane, straight  Ambulation - Level of Assistance: Contact guard assistance  Interventions: Verbal cues; Visual/Demos; Safety awareness training       Body Structures Involved:  1. Lungs  2. Muscles Body Functions Affected:  1. Sensory/Pain  2. Neuromusculoskeletal  3. Movement Related  4. Digestive Activities and Participation Affected:  1. General Tasks and Demands  2. Mobility  3. Self Care  4. Community, Social and [place]   Number of elements that affect the Plan of Care: 1-2: LOW COMPLEXITY   CLINICAL PRESENTATION:   Presentation: Stable and uncomplicated: LOW COMPLEXITY   CLINICAL DECISION MAKIN [facility] Mobility Inpatient Short Form  How much difficulty does the patient currently have. .. Unable A Lot A Little None   1. Turning over in bed (including adjusting bedclothes, sheets and blankets)? [ ] 1   [ ] 2   [ ] 3   [X] 4   2.   Sitting down on and standing up from a chair with arms ( e.g., wheelchair, bedside commode, etc.)   [ ] 1   [ ] 2   [ ] 3   [X] 4   3. Moving from lying on back to sitting on the side of the bed? [ ] 1   [ ] 2   [ ] 3   [X] 4   How much help from another person does the patient currently need. .. Total A Lot A Little None   4. Moving to and from a bed to a chair (including a wheelchair)? [ ] 1   [ ] 2   [X] 3   [ ] 4   5. Need to walk in hospital room? [ ] 1   [ ] 2   [X] 3   [ ] 4   6. Climbing 3-5 steps with a railing? [ ] 1   [ ] 2   [X] 3   [ ] 4   © 2007, Trustees of [address] [place] [address] [address] [id number], under license to [employer]. All rights reserved    Score:  Initial: 21 Most Recent: X (Date: -- )     Interpretation of Tool:  Represents activities that are increasingly more difficult (i.e. Bed mobility, Transfers, Gait). Score 24 23 22-20 19-15 14-10 9-7 6       Modifier CH CI CJ CK CL CM CN         · Mobility - Walking and Moving Around:               - CURRENT STATUS:    CJ - 20%-39% impaired, limited or restricted               - GOAL STATUS:           CI - 1%-19% impaired, limited or restricted               - D/C STATUS:                       ---------------To be determined---------------  Payor: [employer]ARE COMPLETE / Plan: [address] / Product Type: Managed Care Medicare /       Medical Necessity:     · Patient demonstrates good rehab potential due to higher previous functional level. Reason for Services/Other Comments:  · Patient continues to demonstrate capacity to improve strength, mobility, balance, transfers, activity tolerance which will increase independence, decrease amount of assistance required from caregiver and increase safety.    Use of outcome tool(s) and clinical judgement create a POC that gives a: Clear prediction of patient's progress: LOW COMPLEXITY                 TREATMENT:   (In addition to Assessment/Re-Assessment sessions the following treatments were rendered)   Pre-treatment Symptoms/Complaints: no complaints  Pain: Initial:   Pain Intensity 1: 0  Pain Location 1: Foot  Pain Orientation 1: Left, Right  Pain Intervention(s) 1: Nurse notified  Post Session: 0/10      Therapeutic Activity: (  17 Minutes ):  Therapeutic activities including Chair transfers, standing static/dynamic balance activities during functional tasks, Ambulation on level ground and review of activity pacing techniques/breathing techniques to improve mobility, strength, balance and activity tolerance. Required minimal Verbal cues; Visual/Demos; Safety awareness training to promote dynamic balance in standing. Treatment/Session Assessment:    · Response to Treatment:  No complications during/after activity  · Interdisciplinary Collaboration:  · Physical Therapist  · Registered Nurse  · After treatment position/precautions:  · Up in chair  · Bed/Chair-wheels locked  · Bed in low position  · Call light within reach  · Compliance with Program/Exercises: compliant all of the time. · Recommendations/Intent for next treatment session: \"Next visit will focus on advancements to more challenging activities and reduction in assistance provided\".   Total Treatment Duration:  PT Patient Time In/Time Out  Time In: 1400  Time Out: [address], [place]

## 2017-01-24 NOTE — PROGRESS NOTES
Hospitalist Progress Note    2017  Admit Date: 2017 12:38 PM   NAME: Tonia Richards   :  1932   MRN:  [id number]   Attending: Vibha Nettles MD  PCP:  Johnie Soto MD    SUBJECTIVE:    Tonia Richards is an 80 y. o. white male, with a pmh of afib on aspirin 325 mg and CAD status post CABG last year, who presents to the ED for teddy melena on day of admit. Had a gi bleed about a year ago for which his coumadin was stopped. GI consulted. S/P EGD  with finding of gastritis/ duodenitis. - appears agitated today. Small smear of melanotic stool this afternoon. Wife at bedside - also anxious appearing    Review of Systems negative with exception of pertinent positives noted above  PHYSICAL EXAM     Visit Vitals    /59    Pulse 68    Temp 98.3 °F (36.8 °C)    Resp 19    Ht 5' 10\" (1.778 m)    Wt 114.8 kg (253 lb)    SpO2 95%    BMI 36.3 kg/m2      Temp (24hrs), Av.1 °F (36.7 °C), Min:96.4 °F (35.8 °C), Max:98.7 °F (37.1 °C)    Oxygen Therapy  O2 Sat (%): 95 % (17 1627)  Pulse via Oximetry: 72 beats per minute (17 1329)  O2 Device: Room air (17 1417)  O2 Flow Rate (L/min): 2 l/min (17 0802)  FIO2 (%): 21 % (17 1329)    Intake/Output Summary (Last 24 hours) at 17 1638  Last data filed at 17 1552   Gross per 24 hour   Intake             1080 ml   Output                0 ml   Net             1080 ml      General: No acute distress    Lungs:  CTA Bilaterally. Heart:  Regular rate and rhythm,  No murmur, rub, or gallop  Abdomen: Soft, Non distended, Non tender, Positive bowel sounds  Extremities: No cyanosis, clubbing or edema  Neurologic:  No focal deficits    ASSESSMENT      Active Hospital Problems    Diagnosis Date Noted    Upper GI bleed 2017     A/P  - Upper GI bleed - egd report as above. Cont PPI bid x 8 weeks. Avoid NSAID. Serial h/h.   - CAD/ hx of PAFIB - will hold on resuming asa for 1 week.    - DM2 - Fair control, cont current  - hypothyriodism - synthroid    DVT Prophylaxis: scds    Dispo - hopefully home in AM.     Signed By: Sarita Dai DO     January 24, 2017

## 2017-01-24 NOTE — PROGRESS NOTES
GI DAILY PROGRESS NOTE    Admit Date:  1/22/2017    Today's Date:  1/24/2017    CC:  GI Bleed, Melena    Subjective:     Per RN notes, 2 small burgundy stools during the night. None this AM.  Pt and his wife report less volume than what was previously seen. Hgb appears stable. No c/o abdominal pain, N/V. He is tolerating clear liquids and requesting more to eat.     Medications:   Current Facility-Administered Medications   Medication Dose Route Frequency    HYDROcodone-acetaminophen (NORCO)  mg tablet 1 Tab  1 Tab Oral Q4H PRN    albuterol-ipratropium (DUO-NEB) 2.5 MG-0.5 MG/3 ML  3 mL Nebulization Q6HWA RT    albuterol-ipratropium (DUO-NEB) 2.5 MG-0.5 MG/3 ML  3 mL Nebulization Q6H PRN    allopurinol (ZYLOPRIM) tablet 300 mg  300 mg Oral DAILY    fluticasone (FLONASE) 50 mcg/actuation nasal spray 2 Spray  2 Spray Both Nostrils DAILY    hydrALAZINE (APRESOLINE) tablet 10 mg  10 mg Oral TID    isosorbide mononitrate ER (IMDUR) tablet 30 mg  30 mg Oral DAILY    levothyroxine (SYNTHROID) tablet 50 mcg  50 mcg Oral ACB    LORazepam (ATIVAN) tablet 1 mg  1 mg Oral BID PRN    metoprolol succinate (TOPROL-XL) XL tablet 12.5 mg  12.5 mg Oral DAILY    oxyCODONE IR (OXY-IR) immediate release tablet 15 mg  15 mg Oral Q6H PRN    sertraline (ZOLOFT) tablet 50 mg  50 mg Oral DAILY    latanoprost (XALATAN) 0.005 % ophthalmic solution 1 Drop  1 Drop Both Eyes QHS    sodium chloride (NS) flush 5-10 mL  5-10 mL IntraVENous Q8H    sodium chloride (NS) flush 5-10 mL  5-10 mL IntraVENous PRN    acetaminophen (TYLENOL) tablet 650 mg  650 mg Oral Q4H PRN    naloxone (NARCAN) injection 0.4 mg  0.4 mg IntraVENous PRN    ondansetron (ZOFRAN) injection 4 mg  4 mg IntraVENous Q4H PRN    insulin lispro (HUMALOG) injection   SubCUTAneous AC&HS    insulin glargine (LANTUS) injection 8 Units  8 Units SubCUTAneous QHS    pantoprazole (PROTONIX) tablet 40 mg  40 mg Oral ACB&D       Review of Systems:  ROS was obtained, with pertinent positives as listed above. No chest pain or SOB. Diet:  Clear liquids    Objective:   Vitals:  Visit Vitals    /58    Pulse 66    Temp 98.3 °F (36.8 °C)    Resp 18    Ht 5' 10\" (1.778 m)    Wt 114.8 kg (253 lb)    SpO2 94%    BMI 36.3 kg/m2     Intake/Output:     01/22 1901 - 01/24 0700  In: 720 [P.O.:720]  Out: -   Exam:  General appearance: alert, cooperative, no distress. Sitting up on side of bed. Lungs: clear to auscultation bilaterally anteriorly  Heart: regular rate and rhythm  Abdomen: soft, obese, Non-tender. Bowel sounds normal. No masses, no organomegaly  Neuro:  alert and oriented    Data Review (Labs):    Recent Labs      01/24/17   0639  01/23/17   2155  01/23/17   1439  01/23/17   0525  01/22/17   2140  01/22/17   1650  01/22/17   1203   WBC  7.2   --    --    --    --    --   9.0   HGB  11.3*  11.6*  12.3*  12.4*  12.6*  13.3*  13.5*   HCT  34.5*  36.2*  38.3*  39.1*  39.6*  41.7  41.3   PLT  139*   --    --    --    --    --   154   MCV  85.4   --    --    --    --    --   85.7   NA   --    --    --   142   --    --   138   K   --    --    --   4.2   --    --   3.8   CL   --    --    --   103   --    --   101   CO2   --    --    --   33*   --    --   28   BUN   --    --    --   27*   --    --   25*   CREA   --    --    --   1.43   --    --   1.54*   CA   --    --    --   8.2*   --    --   8.3   MG   --    --    --   2.3   --    --    --    GLU   --    --    --   188*   --    --   339*   AP   --    --    --    --    --    --   99   SGOT   --    --    --    --    --    --   15   ALT   --    --    --    --    --    --   15   TBILI   --    --    --    --    --    --   0.6   ALB   --    --    --    --    --    --   3.2   TP   --    --    --    --    --    --   7.6   PTP   --    --    --    --    --    --   11.0   INR   --    --    --    --    --    --   1.0     Esophagogastroduodenoscopy 1/22/2017  INDICATION:  1. GI Bleed/Melena  POSTPROCEDURE DIAGNOSIS:  1. Gastritis  2. Gastric ulcer  3. Duodenitis  MEDICATIONS ADMINISTERED: MAC. Further details per anesthesia note. INSTRUMENT: [id number]  PROCEDURE: After obtaining informed consent, the patient was placed in the left lateral position and sedated. The endoscope was advanced under direct vision without difficulty. The esophagus, stomach (including retroflexed views) and duodenum were evaluated. The patient was taken to the recovery area in stable condition. FINDINGS:  ESOPHAGUS: Normal exam.  STOMACH: 5-8 mm clean-based ulcer in antrum. Cold-forceps biopsies for pathology. No evidence of active or recent bleeding. Mild to moderate erosive gastritis of antrum. Mild gastritis of body of stomach. Cold-forceps biopsies for pathology. DUODENUM: Minimal duodenitis of bulb. Otherwise normal exam of 1st, 2nd, and 3rd portions of duodenum. No evidence of active or recent bleeding. Estimated blood loss: 0-minimal   Complications: none  Specimens obtained during procedure:   1. Gastric biopsies- results pending     Assessment:     Active Problems:    Upper GI bleed (1/22/2017)      80 y.o. male with PMH of (but not limited to) A fib (on  mg only), CAD s/p CABG 2007, carotid artery stenosis, CKD, DM , CHF, HLD, HTN, COPD not on O2, ROBERTO with CPAP and seizure disorder who is seen in consultation 1/22/17 at the request of Dr. Rich Daily for evaluation of GIB with c/o \"dark tarry stool in his bed. \" Similar incident in 2015, but with more BRRB and he was on warfarin at the time. EGD on 1/23/15 by Dr. Priya Santo that revealed a few small gastric erosions likely not the cause of bleeding. Colonoscopy on 1/22/15 by Dr. Qiana Hernandez revealed extensive diverticulosis without active bleeding. He had recurrent bleeding with a drop in his Hgb and underwent repeat EGD on 5/6/15 by Dr. Louis Hernandez with friable gastric mucosa. Pillcam study was suggested, but not done. His current Hgb is 11.3 (11.6, 12.3, 12.4,12.6, 13.3, 13.5) (14.1 10/2016).   EGD 1/22 as outlined above showed gastritis, , Duodenitis. Plan:     1. Follow-up pathology  2. Protonix 40 mg PO BID for 8 weeks then daily. Minimize NSAID use  3. No further GI bleeding this morning. Hgb slightly lower though overall appears stable. Monitor for evidence of recurrent GI bleeding. Follow serial H/H-  Hgb Q8H and transfuse for goal hgb 8-9  4. Advance to GI soft diet. As long as no further GI bleeding and H/H appears stable ok for gradual advancement as tolerated.   Dom Pierre PA-C

## 2017-01-24 NOTE — PROGRESS NOTES
END OF SHIFT NOTE:  Slept at long intervals. Cpap on. Wife at bs.  2 small burgundy stools during the night. Hgb 11.6 hct 36.2. Denies nausea or pain. Ativan 1mg given for sleep. INTAKE/OUTPUT  01/23 0701 - 01/24 0700  In: 720 [P.O.:720]  Out: -   Voiding: YES  Catheter: NO  Color: clear  Drain:              DIET  GI soft    Flatus: Patient does have flatus present. Stool:  2 occurrences. Characteristics:  Stool Assessment  Stool Color: Black  Stool Appearance: Melena  Stool Amount: Medium  Stool Source/Status: Rectum    Ambulating  YES    Emesis: 0 occurrences.     Characteristics:          VITAL SIGNS  Patient Vitals for the past 12 hrs:   Temp Pulse Resp BP SpO2   01/24/17 0428 96.4 °F (35.8 °C) 72 18 138/71 93 %   01/23/17 2340 98.6 °F (37 °C) 73 18 138/72 94 %   01/23/17 1943 98.7 °F (37.1 °C) 71 18 142/79 92 %       Pain Assessment  Pain Intensity 1: 0 (01/24/17 0407)  Pain Location 1: Foot, Hand  Pain Intervention(s) 1: Medication (see MAR)  Patient Stated Pain Goal: 0            Briseida Chua RN

## 2017-01-24 NOTE — PROGRESS NOTES
Care Management Interventions  PCP Verified by CM: Yes  Transition of Care Consult (CM Consult): Discharge Planning  Current Support Network: Lives with Spouse  Confirm Follow Up Transport: Family  Plan discussed with Pt/Family/Caregiver: Yes   Met with patient and wife for discharge planning. Patient is 81 yo male admitted for GI bleed. Prior to admission patient was independent of ADL's and wife assists as needed. He has a cane he has a CPAP machine at home. He states he needs to be more compliant with CPAP at night. He was in a rehab for 35 days around October 2016 after fall. He is able to ambulate and uses a cane as needed. Wife assists patient with ADL's, follow up appointments. He has had home health care PT at home up until 1 week before [facility]. They voice no discharge needs and plan to return home once medically stable.

## 2017-01-25 ENCOUNTER — PATIENT OUTREACH (OUTPATIENT)
Dept: CASE MANAGEMENT | Age: 82
End: 2017-01-25

## 2017-01-25 VITALS
OXYGEN SATURATION: 95 % | TEMPERATURE: 97.9 F | HEART RATE: 69 BPM | HEIGHT: 70 IN | RESPIRATION RATE: 18 BRPM | BODY MASS INDEX: 36.22 KG/M2 | WEIGHT: 253 LBS | SYSTOLIC BLOOD PRESSURE: 148 MMHG | DIASTOLIC BLOOD PRESSURE: 71 MMHG

## 2017-01-25 LAB
GLUCOSE BLD STRIP.AUTO-MCNC: 164 MG/DL (ref 65–100)
HCT VFR BLD AUTO: 36.4 % (ref 41.1–50.3)
HGB BLD-MCNC: 11.7 G/DL (ref 13.6–17.2)

## 2017-01-25 PROCEDURE — 36415 COLL VENOUS BLD VENIPUNCTURE: CPT | Performed by: INTERNAL MEDICINE

## 2017-01-25 PROCEDURE — 85018 HEMOGLOBIN: CPT | Performed by: INTERNAL MEDICINE

## 2017-01-25 PROCEDURE — 74011000250 HC RX REV CODE- 250: Performed by: INTERNAL MEDICINE

## 2017-01-25 PROCEDURE — 99218 HC RM OBSERVATION: CPT

## 2017-01-25 PROCEDURE — 94760 N-INVAS EAR/PLS OXIMETRY 1: CPT

## 2017-01-25 PROCEDURE — 82962 GLUCOSE BLOOD TEST: CPT

## 2017-01-25 PROCEDURE — 74011636637 HC RX REV CODE- 636/637: Performed by: INTERNAL MEDICINE

## 2017-01-25 PROCEDURE — 74011250637 HC RX REV CODE- 250/637: Performed by: INTERNAL MEDICINE

## 2017-01-25 PROCEDURE — 94640 AIRWAY INHALATION TREATMENT: CPT

## 2017-01-25 RX ORDER — POLYETHYLENE GLYCOL 3350 17 G/17G
17 POWDER, FOR SOLUTION ORAL
Status: DISCONTINUED | OUTPATIENT
Start: 2017-01-25 | End: 2017-01-25 | Stop reason: HOSPADM

## 2017-01-25 RX ORDER — ASPIRIN 325 MG
325 TABLET ORAL DAILY
Qty: 1 TAB | Refills: 0 | Status: SHIPPED
Start: 2017-01-30 | End: 2017-05-26

## 2017-01-25 RX ORDER — PANTOPRAZOLE SODIUM 40 MG/1
40 TABLET, DELAYED RELEASE ORAL
Qty: 120 TAB | Refills: 0 | Status: SHIPPED
Start: 2017-01-25 | End: 2017-02-01 | Stop reason: SDUPTHER

## 2017-01-25 RX ADMIN — Medication 5 ML: at 06:35

## 2017-01-25 RX ADMIN — LEVOTHYROXINE SODIUM 50 MCG: 50 TABLET ORAL at 06:35

## 2017-01-25 RX ADMIN — FLUTICASONE PROPIONATE 2 SPRAY: 50 SPRAY, METERED NASAL at 09:21

## 2017-01-25 RX ADMIN — ISOSORBIDE MONONITRATE 30 MG: 30 TABLET, EXTENDED RELEASE ORAL at 09:20

## 2017-01-25 RX ADMIN — METOPROLOL SUCCINATE 12.5 MG: 25 TABLET, EXTENDED RELEASE ORAL at 09:20

## 2017-01-25 RX ADMIN — OXYCODONE HYDROCHLORIDE 15 MG: 15 TABLET ORAL at 09:25

## 2017-01-25 RX ADMIN — IPRATROPIUM BROMIDE AND ALBUTEROL SULFATE 3 ML: 2.5; .5 SOLUTION RESPIRATORY (INHALATION) at 09:55

## 2017-01-25 RX ADMIN — HYDRALAZINE HYDROCHLORIDE 10 MG: 10 TABLET, FILM COATED ORAL at 09:20

## 2017-01-25 RX ADMIN — Medication 5 ML: at 09:33

## 2017-01-25 RX ADMIN — INSULIN LISPRO 2 UNITS: 100 INJECTION, SOLUTION INTRAVENOUS; SUBCUTANEOUS at 08:50

## 2017-01-25 RX ADMIN — PANTOPRAZOLE SODIUM 40 MG: 40 TABLET, DELAYED RELEASE ORAL at 06:35

## 2017-01-25 RX ADMIN — ALLOPURINOL 300 MG: 300 TABLET ORAL at 09:20

## 2017-01-25 NOTE — DISCHARGE INSTRUCTIONS
DISCHARGE SUMMARY from Nurse    The following personal items are in your possession at time of discharge:    Dental Appliances: With patient        Home Medications: None  Jewelry: With patient  Clothing: With patient  Other Valuables: With patient             PATIENT INSTRUCTIONS:    After general anesthesia or intravenous sedation, for 24 hours or while taking prescription Narcotics:  · Limit your activities  · Do not drive and operate hazardous machinery  · Do not make important personal or business decisions  · Do  not drink alcoholic beverages  · If you have not urinated within 8 hours after discharge, please contact your surgeon on call. Report the following to your surgeon:  · Excessive pain, swelling, redness or odor of or around the surgical area  · Temperature over 100.5  · Nausea and vomiting lasting longer than 4 hours or if unable to take medications  · Any signs of decreased circulation or nerve impairment to extremity: change in color, persistent  numbness, tingling, coldness or increase pain  · Any questions        What to do at Home:  Recommended activity: Activity as tolerated, diet as tolerated    If you experience any of the following symptoms blood ins tool, vomiting blood, extreme fatigue, dizziness, or shortness of breath, please follow up with your primary care physician. *  Please give a list of your current medications to your Primary Care Provider. *  Please update this list whenever your medications are discontinued, doses are      changed, or new medications (including over-the-counter products) are added. *  Please carry medication information at all times in case of emergency situations. These are general instructions for a healthy lifestyle:    No smoking/ No tobacco products/ Avoid exposure to second hand smoke    Surgeon General's Warning:  Quitting smoking now greatly reduces serious risk to your health.     Obesity, smoking, and sedentary lifestyle greatly increases your risk for illness    A healthy diet, regular physical exercise & weight monitoring are important for maintaining a healthy lifestyle    You may be retaining fluid if you have a history of heart failure or if you experience any of the following symptoms:  Weight gain of 3 pounds or more overnight or 5 pounds in a week, increased swelling in our hands or feet or shortness of breath while lying flat in bed. Please call your doctor as soon as you notice any of these symptoms; do not wait until your next office visit. Recognize signs and symptoms of STROKE:    F-face looks uneven    A-arms unable to move or move unevenly    S-speech slurred or non-existent    T-time-call 911 as soon as signs and symptoms begin-DO NOT go       Back to bed or wait to see if you get better-TIME IS BRAIN. Warning Signs of HEART ATTACK     Call 911 if you have these symptoms:   Chest discomfort. Most heart attacks involve discomfort in the center of the chest that lasts more than a few minutes, or that goes away and comes back. It can feel like uncomfortable pressure, squeezing, fullness, or pain.  Discomfort in other areas of the upper body. Symptoms can include pain or discomfort in one or both arms, the back, neck, jaw, or stomach.  Shortness of breath with or without chest discomfort.  Other signs may include breaking out in a cold sweat, nausea, or lightheadedness. Don't wait more than five minutes to call 911 - MINUTES MATTER! Fast action can save your life. Calling 911 is almost always the fastest way to get lifesaving treatment. Emergency Medical Services staff can begin treatment when they arrive -- up to an hour sooner than if someone gets to the hospital by car. The discharge information has been reviewed with the patient. The patient verbalized understanding.     Discharge medications reviewed with the patient and appropriate educational materials and side effects teaching were provided. Gastrointestinal Bleeding: Care Instructions  Your Care Instructions    The digestive or gastrointestinal tract goes from the mouth to the anus. It is often called the GI tract. Bleeding can happen anywhere in the GI tract. It may be caused by an ulcer, an infection, or cancer. It may also be caused by medicines such as aspirin or ibuprofen. Light bleeding may not cause any symptoms at first. But if you continue to bleed for a while, you may feel very weak or tired. Sudden, heavy bleeding means you need to see a doctor right away. This kind of bleeding can be very dangerous. But it can usually be cured or controlled. The doctor may do some tests to find the cause of your bleeding. Follow-up care is a key part of your treatment and safety. Be sure to make and go to all appointments, and call your doctor if you are having problems. It's also a good idea to know your test results and keep a list of the medicines you take. How can you care for yourself at home? · Be safe with medicines. Take your medicines exactly as prescribed. Call your doctor if you think you are having a problem with your medicine. You will get more details on the specific medicines your doctor prescribes. · Do not take aspirin or other anti-inflammatory medicines, such as naproxen (Aleve) or ibuprofen (Advil, Motrin), without talking to your doctor first. Ask your doctor if it is okay to use acetaminophen (Tylenol). · Do not drink alcohol. · The bleeding may make you lose iron. So it's important to eat foods that have a lot of iron. These include red meat, shellfish, poultry, and eggs. They also include beans, raisins, whole-grain breads, and leafy green vegetables. If you want help planning meals, you can make an appointment with a dietitian. When should you call for help? Call 911 anytime you think you may need emergency care. For example, call if:  · You have sudden, severe belly pain.   · You vomit blood or what looks like coffee grounds. · You passed out (lost consciousness). · Your stools are maroon or very bloody. Call your doctor now or seek immediate medical care if:  · You are dizzy or lightheaded, or you feel like you may faint. · Your stools are black and look like tar, or they have streaks of blood. · You have belly pain. · You vomit or have nausea. · You have trouble swallowing, or it hurts when you swallow. Watch closely for changes in your health, and be sure to contact your doctor if:  · You do not get better as expected. Where can you learn more? Go to http://angelina-rajiv.info/. Enter M539 in the search box to learn more about \"Gastrointestinal Bleeding: Care Instructions. \"  Current as of: May 27, 2016  Content Version: 11.1  © 9867-6061 [employer]. Care instructions adapted under license by [employer] (which disclaims liability or warranty for this information). If you have questions about a medical condition or this instruction, always ask your healthcare professional. [address] any warranty or liability for your use of this information.

## 2017-01-25 NOTE — PROGRESS NOTES
Discharge instructions and prescriptions given and reviewed with pt and wife, verbalizes understanding, medication side effect sheet reviewed with pt, pt to be discharged home, waiting on prescription from MD.

## 2017-01-25 NOTE — PROGRESS NOTES
Problem: Gas Exchange - Impaired  Goal: *Absence of hypoxia  Outcome: Progressing Towards Goal  Pt sating 95% on room air. BBS diminished.

## 2017-01-25 NOTE — PROGRESS NOTES
GI DAILY PROGRESS NOTE    Admit Date:  1/22/2017    Today's Date:  1/25/2017    CC:  GI Bleed, Melena    Subjective:     Pt reports very small line of blood smear on bed this AM.  Feels much less volume than what he had experienced earlier in admission. Hgb value remains pending this AM.  No c/o abdominal pain, N/V. No BM for at least a few days per pt. He is tolerating GI soft diet.     Medications:   Current Facility-Administered Medications   Medication Dose Route Frequency    HYDROcodone-acetaminophen (NORCO)  mg tablet 1 Tab  1 Tab Oral Q4H PRN    albuterol-ipratropium (DUO-NEB) 2.5 MG-0.5 MG/3 ML  3 mL Nebulization Q6HWA RT    albuterol-ipratropium (DUO-NEB) 2.5 MG-0.5 MG/3 ML  3 mL Nebulization Q6H PRN    allopurinol (ZYLOPRIM) tablet 300 mg  300 mg Oral DAILY    fluticasone (FLONASE) 50 mcg/actuation nasal spray 2 Spray  2 Spray Both Nostrils DAILY    hydrALAZINE (APRESOLINE) tablet 10 mg  10 mg Oral TID    isosorbide mononitrate ER (IMDUR) tablet 30 mg  30 mg Oral DAILY    levothyroxine (SYNTHROID) tablet 50 mcg  50 mcg Oral ACB    LORazepam (ATIVAN) tablet 1 mg  1 mg Oral BID PRN    metoprolol succinate (TOPROL-XL) XL tablet 12.5 mg  12.5 mg Oral DAILY    oxyCODONE IR (OXY-IR) immediate release tablet 15 mg  15 mg Oral Q6H PRN    sertraline (ZOLOFT) tablet 50 mg  50 mg Oral DAILY    latanoprost (XALATAN) 0.005 % ophthalmic solution 1 Drop  1 Drop Both Eyes QHS    sodium chloride (NS) flush 5-10 mL  5-10 mL IntraVENous Q8H    sodium chloride (NS) flush 5-10 mL  5-10 mL IntraVENous PRN    acetaminophen (TYLENOL) tablet 650 mg  650 mg Oral Q4H PRN    naloxone (NARCAN) injection 0.4 mg  0.4 mg IntraVENous PRN    ondansetron (ZOFRAN) injection 4 mg  4 mg IntraVENous Q4H PRN    insulin lispro (HUMALOG) injection   SubCUTAneous AC&HS    insulin glargine (LANTUS) injection 8 Units  8 Units SubCUTAneous QHS    pantoprazole (PROTONIX) tablet 40 mg  40 mg Oral ACB&D       Review of Systems:  ROS was obtained, with pertinent positives as listed above. No chest pain or SOB. Diet:  GI soft    Objective:   Vitals:  Visit Vitals    /71 (BP 1 Location: Right arm, BP Patient Position: Sitting)    Pulse 69    Temp 97.9 °F (36.6 °C)    Resp 18    Ht 5' 10\" (1.778 m)    Wt 114.8 kg (253 lb)    SpO2 95%    BMI 36.3 kg/m2     Intake/Output:     01/23 1901 - 01/25 0700  In: 720 [P.O.:720]  Out: -   Exam:  General appearance: alert, cooperative, no distress. Sitting up on side of bed. Lungs: clear to auscultation bilaterally anteriorly  Heart: regular rate and rhythm  Abdomen: soft, obese, Non-tender.  Bowel sounds normal. No masses, no organomegaly  Neuro:  alert and oriented    Data Review (Labs):    Recent Labs      01/24/17   2111  01/24/17   1422  01/24/17   0639  01/23/17   2155  01/23/17   1439  01/23/17   0525  01/22/17   2140  01/22/17   1650  01/22/17   1203   WBC   --    --   7.2   --    --    --    --    --   9.0   HGB  11.3*  11.8*  11.3*  11.6*  12.3*  12.4*  12.6*  13.3*  13.5*   HCT  35.4*  36.6*  34.5*  36.2*  38.3*  39.1*  39.6*  41.7  41.3   PLT   --    --   139*   --    --    --    --    --   154   MCV   --    --   85.4   --    --    --    --    --   85.7   NA   --    --    --    --    --   142   --    --   138   K   --    --    --    --    --   4.2   --    --   3.8   CL   --    --    --    --    --   103   --    --   101   CO2   --    --    --    --    --   33*   --    --   28   BUN   --    --    --    --    --   27*   --    --   25*   CREA   --    --    --    --    --   1.43   --    --   1.54*   CA   --    --    --    --    --   8.2*   --    --   8.3   MG   --    --    --    --    --   2.3   --    --    --    GLU   --    --    --    --    --   188*   --    --   339*   AP   --    --    --    --    --    --    --    --   99   SGOT   --    --    --    --    --    --    --    --   15   ALT   --    --    --    --    --    --    --    --   15   TBILI   --    --    -- --    --    --    --    --   0.6   ALB   --    --    --    --    --    --    --    --   3.2   TP   --    --    --    --    --    --    --    --   7.6   PTP   --    --    --    --    --    --    --    --   11.0   INR   --    --    --    --    --    --    --    --   1.0     Esophagogastroduodenoscopy 1/22/2017  INDICATION:  1. GI Bleed/Melena  POSTPROCEDURE DIAGNOSIS:  1. Gastritis  2. Gastric ulcer  3. Duodenitis  MEDICATIONS ADMINISTERED: MAC. Further details per anesthesia note. INSTRUMENT: [id number]  PROCEDURE: After obtaining informed consent, the patient was placed in the left lateral position and sedated. The endoscope was advanced under direct vision without difficulty. The esophagus, stomach (including retroflexed views) and duodenum were evaluated. The patient was taken to the recovery area in stable condition. FINDINGS:  ESOPHAGUS: Normal exam.  STOMACH: 5-8 mm clean-based ulcer in antrum. Cold-forceps biopsies for pathology. No evidence of active or recent bleeding. Mild to moderate erosive gastritis of antrum. Mild gastritis of body of stomach. Cold-forceps biopsies for pathology. DUODENUM: Minimal duodenitis of bulb. Otherwise normal exam of 1st, 2nd, and 3rd portions of duodenum. No evidence of active or recent bleeding. Estimated blood loss: 0-minimal   Complications: none  Specimens obtained during procedure:   1. Gastric biopsies- results pending     Assessment:     Active Problems:    Upper GI bleed (1/22/2017)      80 y.o. male with PMH of (but not limited to) A fib (on  mg only), CAD s/p CABG 2007, carotid artery stenosis, CKD, DM , CHF, HLD, HTN, COPD not on O2, ROBERTO with CPAP and seizure disorder who is seen in consultation 1/22/17 at the request of Dr. Jarek Leon for evaluation of GIB with c/o \"dark tarry stool in his bed. \" Similar incident in 2015, but with more BRRB and he was on warfarin at the time.  EGD on 1/23/15 by Dr. Eliseo Godoy that revealed a few small gastric erosions likely not the cause of bleeding. Colonoscopy on 1/22/15 by Dr. Shaneka Quezada revealed extensive diverticulosis without active bleeding. He had recurrent bleeding with a drop in his Hgb and underwent repeat EGD on 5/6/15 by Dr. Michael Nicole with friable gastric mucosa. Pillcam study was suggested, but not done. His current Hgb is 11.3 (11.6, 12.3, 12.4,12.6, 13.3, 13.5) (14.1 10/2016). EGD 1/22 as outlined above showed gastritis, , Duodenitis. Plan:     1. Follow-up pathology  2. Protonix 40 mg PO BID for 8 weeks then daily. Minimize NSAID use  3. Very minimal streak of blood this AM per pt. Hgb value pending this AM, follow up results. Monitor for evidence of recurrent GI bleeding. Follow serial H/H-  Hgb Q8H and transfuse for goal hgb 8-9  4. Will add MiraLax as needed and advised pt to ask for this if necessary. 5. Possible d/c home in near future?   Mis Franz PA-C

## 2017-01-25 NOTE — PROGRESS NOTES
Transition of Care Discharge Follow-up Questionnaire   Date/Time of Call:   1/25/17 4pm    What was the patient hospitalized for? Upper GI bleed    Does the patient understand his/her diagnosis and/or treatment and what happened during the hospitalization? Yes    Did the patient receive discharge instructions? Yes    Review any discharge instructions. Ask patient if they understand these. Do they have any questions? Yes    Were home services ordered   No        If so, has the first visit occurred? If not, why? N/A   Was any DME ordered? No    If so, has it been received? If not, why?  N/A         Complete a review of all medications  CM, patient, and wife discussed changes to meds:  Lasix prn based upon daily weights which wife is good about taking daily. Hold metformin with, wife encouraged and provided rationale on why to check BSs; wife aware to hold to asa until 1/30  New:  pantoprazole (PROTONIX) 40 mg tablet  Stop:  metFORMIN ER (GLUCOPHAGE XR) 750 mg tablet;  metOLazone (ZAROXOLYN) 5 mg tablet   Were all new prescriptions filled? If not, why? Yes   Does the patient understand the purpose and dosing instructions for all medications? Yes, CM explained the patient the importance of drinking water with protonix   Does the patient have any problems in performing ADLs? No, same as previous assessment              Does the patient have all follow-up appointments scheduled? Has transportation been arranged? 2/1/2017 10:30 AM [facility] [id number]  Yes, wife transports    Any other questions or concerns expressed by the patient? No  wife was very appreciative of CM calling to follow up as she reported she did have questions about the meds once she was able to get home and review the discharge paperwork; wife has already setup patients weekly med box   Schedule next appointment with RN Case Manager as appropriate.  Yes    OSCAR Call Completed By: Leandra Crane, MSN, ABN, RN, CDP             This note will not be viewable in [address]

## 2017-01-25 NOTE — PROGRESS NOTES
Patient to be discharged today. He voices no concerns or discharge needs. Discharged home with wife.

## 2017-01-25 NOTE — PROGRESS NOTES
Spoke with Dr. Naeem Sales who states patient will be changed to observation as he does not meet inpatient admission. Reviewed  Medicare Observation Condition Code 40 with with patient and wife. Signed copy of observation letter and condition code 44 placed in chart and copy given to patient.

## 2017-01-25 NOTE — DISCHARGE SUMMARY
Hospitalist Discharge Summary     Patient ID:  Halina Marks  [id number]  88 y.o.  2/12/1932  Admit date: 1/22/2017 12:38 PM  Discharge date and time: 1/25/2017  Attending: Angel Moss MD  PCP:  Gurdeep Arias MD  Treatment Team: Attending Provider: Angel Moss MD; Consulting Provider: Guillermo Nunez MD; Utilization Review: Carlos Mcdonald RN; Care Manager: Sim Hunt RN    Principal Diagnosis <principal problem not specified>   Active Problems:    Upper GI bleed (1/22/2017)             Hospital Course:  Please refer to the admission H&P for details of presentation. In summary, Halina Marks is an 80 y. o. white male, with a pmh of afib on aspirin 325 mg and CAD status post CABG last year, who presents to the ED for teddy melena on day of admit. Had a gi bleed about a year ago for which his coumadin was stopped. GI consulted. S/P EGD 1-23 with finding of gastritis/ gastric ulcer/ duodenitis. He was started on PPI BID with resolution of melena. Did not require blood transfusion during admission. His diuretics were held on admit due to ALFREDITO which has resolved to baseline Cr 1.4. Will resume lasix prn based upon daily weights as taken PTA. Will hold metformin with instructions to continue BG log and call PCP if BG >200 - may need insulin. Instructed to hold asa until next week.        Significant Diagnostic Studies:   EGD - as above.  CXR - non acute    Labs: Results:       Chemistry Recent Labs      01/23/17   0525  01/22/17   1203   GLU  188*  339*   NA  142  138   K  4.2  3.8   CL  103  101   CO2  33*  28   BUN  27*  25*   CREA  1.43  1.54*   CA  8.2*  8.3   AGAP  6*  9   AP   --   99   TP   --   7.6   ALB   --   3.2   GLOB   --   4.4*   AGRAT   --   0.7*      CBC w/Diff Recent Labs      01/25/17   0508  01/24/17   2111  01/24/17   1422  01/24/17   0639   01/22/17   1203   WBC   --    --    --   7.2   --   9.0   RBC   --    --    --   4.04*   --   4.82   HGB  11.7*  11.3*  11.8*  11.3* < >  13.5*   HCT  36.4*  35.4*  36.6*  34.5*   < >  41.3   PLT   --    --    --   139*   --   154   GRANS   --    --    --    --    --   78   LYMPH   --    --    --    --    --   13   EOS   --    --    --    --    --   3    < > = values in this interval not displayed. Cardiac Enzymes No results for input(s): CPK, CKND1, RICK in the last 72 hours. No lab exists for component: CKRMB, TROIP   Coagulation Recent Labs      01/22/17   1203   PTP  11.0   INR  1.0       Lipid Panel Lab Results   Component Value Date/Time    Cholesterol, total 160 04/26/2016 09:20 AM    HDL Cholesterol 33 04/26/2016 09:20 AM    LDL, calculated 100 04/26/2016 09:20 AM    VLDL, calculated 27 04/26/2016 09:20 AM    Triglyceride 136 04/26/2016 09:20 AM    CHOL/HDL Ratio 4.9 04/26/2016 09:20 AM      BNP No results for input(s): BNPP in the last 72 hours. Liver Enzymes Recent Labs      01/22/17   1203   TP  7.6   ALB  3.2   AP  99   SGOT  15      Thyroid Studies Lab Results   Component Value Date/Time    T4, Total 8.0 11/05/2008 04:49 PM    T3 Uptake 31 11/05/2008 04:49 PM    TSH 8.150 05/15/2015 11:30 PM            Discharge Exam:  Visit Vitals    /71 (BP 1 Location: Right arm, BP Patient Position: Sitting)    Pulse 69    Temp 97.9 °F (36.6 °C)    Resp 18    Ht 5' 10\" (1.778 m)    Wt 114.8 kg (253 lb)    SpO2 95%    BMI 36.3 kg/m2     General appearance: alert, cooperative, no distress, appears stated age  Lungs: clear to auscultation bilaterally  Heart: regular rate and rhythm, S1, S2 normal, no murmur, click, rub or gallop  Abdomen: soft, non-tender. Bowel sounds normal. No masses,  no organomegaly  Extremities: no cyanosis or edema  Neurologic: Grossly normal    Disposition:stable  Discharge Condition: stable  Patient Instructions:   Current Discharge Medication List      START taking these medications    Details   pantoprazole (PROTONIX) 40 mg tablet Take 1 Tab by mouth Before breakfast and dinner.   Qty: 120 Tab, Refills: 0         CONTINUE these medications which have CHANGED    Details   aspirin (ASPIRIN) 325 mg tablet Take 1 Tab by mouth daily. Qty: 1 Tab, Refills: 0         CONTINUE these medications which have NOT CHANGED    Details   HYDROcodone-acetaminophen (NORCO)  mg tablet Take one tab by mouth every 4-6 hours as need for pain. Qty: 140 Tab, Refills: 0    Comments: PHARMACY FILL ON TIME. NO EARLY REFILLS. Associated Diagnoses: Other chronic pain      oxyCODONE IR (OXY-IR) 15 mg immediate release tablet Take 1 tablet up to 4 times daily as needed for pain. Qty: 120 Tab, Refills: 0    Comments: PHARMACY FILL ON TIME. NO EARLY REFILLS. Associated Diagnoses: Other chronic pain      furosemide (LASIX) 20 mg tablet Take 40 mg by mouth as needed. ipratropium-albuterol (COMBIVENT RESPIMAT)  mcg/actuation inhaler Take 1 Puff by inhalation every six (6) hours. LORazepam (ATIVAN) 1 mg tablet Take 1 Tab by mouth two (2) times daily as needed for Anxiety. Max Daily Amount: 2 mg. Qty: 24 Tab, Refills: 0      clotrimazole-betamethasone (LOTRISONE) topical cream Apply sparingly to the affected areas twice daily for up to 2 weeks. Qty: 15 g, Refills: 1      levothyroxine (SYNTHROID) 50 mcg tablet Take 1 Tab by mouth Daily (before breakfast). Qty: 90 Tab, Refills: 1      magnesium oxide (MAG-OX) 400 mg tablet Take 1 Tab by mouth daily. Qty: 30 Tab, Refills: 5      metoprolol succinate (TOPROL-XL) 25 mg XL tablet Pt takes 1/2 tab po daily. Qty: 45 Tab, Refills: 3      fluticasone (FLONASE) 50 mcg/actuation nasal spray 2 Sprays by Both Nostrils route daily. Qty: 1 Bottle, Refills: 3      allopurinol (ZYLOPRIM) 300 mg tablet Take  by mouth daily. sertraline (ZOLOFT) 50 mg tablet Take one tablet each evening for anxiety  Indications: GENERALIZED ANXIETY DISORDER  Qty: 30 Tab, Refills: 3      docusate sodium (STOOL SOFTENER) 100 mg capsule Take 100 mg by mouth as needed.       hydrALAZINE (APRESOLINE) 10 mg tablet Take  by mouth three (3) times daily. cpap machine kit by Does Not Apply route. Bilevel 12/8      isosorbide mononitrate ER (IMDUR) 30 mg tablet Take 1 Tab by mouth daily. Qty: 30 Tab, Refills: 5      albuterol (PROVENTIL VENTOLIN) 2.5 mg /3 mL (0.083 %) nebulizer solution 1 Vial Via Neb. Qid      Order fax [facility]  Indications: COPD  Qty: 360 Vial, Refills: 3    Comments: DX copd     Pt to use Albuterol 00.83% 1 Vial Via [employer]. Along with Pulmicort 0.5 mg 1 Vial Via Neb. bid  Associated Diagnoses: SOB (shortness of breath)      travoprost (TRAVATAN Z) 0.004 % ophthalmic solution Administer 1 Drop to both eyes two (2) times a day. glipiZIDE (GLUCOTROL) 5 mg tablet Take 5 mg by mouth two (2) times a day. K-DUR 20 mEq tablet Take 20 mEq by mouth daily.          STOP taking these medications       metOLazone (ZAROXOLYN) 5 mg tablet Comments:   Reason for Stopping:         metFORMIN ER (GLUCOPHAGE XR) 750 mg tablet Comments:   Reason for Stopping:               Activity: as tolerated  Diet: diabetic      Follow-up  · 1-2 weeks with PCP, repeat CBC    Time spent to discharge patient 35 minutes  Signed:  Jd Hameed DO  1/25/2017  10:56 AM

## 2017-01-25 NOTE — PROGRESS NOTES
END OF SHIFT NOTE:  Slept at long intervals. Wife at bs. No active bleeding noted. cpap at night. No c/o of pain. INTAKE/OUTPUT  01/24 0701 - 01/25 0700  In: 720 [P.O.:720]  Out: -   Voiding: YES  Catheter: NO  Color: no seen  Drain:              DIET  diabetic GI soft    Flatus: Patient does have flatus present. Stool:  1 occurrences. Characteristics:  Stool Assessment  Stool Color: Black  Stool Appearance: Melena  Stool Amount: Medium  Stool Source/Status: Rectum    Ambulating  YES    Emesis: 0 occurrences.     Characteristics:          VITAL SIGNS  Patient Vitals for the past 12 hrs:   Temp Pulse Resp BP SpO2   01/25/17 0410 98.3 °F (36.8 °C) 69 18 136/64 95 %   01/24/17 2323 98.2 °F (36.8 °C) 62 18 149/81 94 %   01/24/17 2020 98.8 °F (37.1 °C) 79 20 157/81 98 %   01/24/17 2006 - - - - 95 %       Pain Assessment  Pain Intensity 1: 0 (01/25/17 0410)  Pain Location 1: Leg, Foot  Pain Intervention(s) 1: Medication (see MAR)  Patient Stated Pain Goal: 0            Jin Murrell RN

## 2017-01-26 ENCOUNTER — HOSPITAL ENCOUNTER (OUTPATIENT)
Age: 82
Setting detail: OBSERVATION
Discharge: HOME | End: 2017-01-27
Admitting: INTERNAL MEDICINE
Payer: MEDICARE

## 2017-01-26 DIAGNOSIS — K92.2 UPPER GI BLEEDING: Primary | ICD-10-CM

## 2017-01-26 LAB
ALBUMIN SERPL BCP-MCNC: 3.2 G/DL (ref 3.2–4.6)
ALBUMIN/GLOB SERPL: 0.7 {RATIO} (ref 1.2–3.5)
ALP SERPL-CCNC: 76 U/L (ref 50–136)
ALT SERPL-CCNC: 15 U/L (ref 12–65)
ANION GAP BLD CALC-SCNC: 9 MMOL/L (ref 7–16)
APTT PPP: 28.5 SEC (ref 23.5–31.7)
AST SERPL W P-5'-P-CCNC: 20 U/L (ref 15–37)
BASOPHILS # BLD AUTO: 0 K/UL (ref 0–0.2)
BASOPHILS # BLD: 0 % (ref 0–2)
BILIRUB SERPL-MCNC: 0.7 MG/DL (ref 0.2–1.1)
BUN SERPL-MCNC: 27 MG/DL (ref 8–23)
CALCIUM SERPL-MCNC: 8.5 MG/DL (ref 8.3–10.4)
CHLORIDE SERPL-SCNC: 103 MMOL/L (ref 98–107)
CO2 SERPL-SCNC: 28 MMOL/L (ref 21–32)
CREAT SERPL-MCNC: 1.7 MG/DL (ref 0.8–1.5)
DIFFERENTIAL METHOD BLD: ABNORMAL
EOSINOPHIL # BLD: 0.2 K/UL (ref 0–0.8)
EOSINOPHIL NFR BLD: 3 % (ref 0.5–7.8)
ERYTHROCYTE [DISTWIDTH] IN BLOOD BY AUTOMATED COUNT: 15.6 % (ref 11.9–14.6)
GLOBULIN SER CALC-MCNC: 4.3 G/DL (ref 2.3–3.5)
GLUCOSE BLD STRIP.AUTO-MCNC: 130 MG/DL (ref 65–100)
GLUCOSE BLD STRIP.AUTO-MCNC: 156 MG/DL (ref 65–100)
GLUCOSE SERPL-MCNC: 182 MG/DL (ref 65–100)
HCT VFR BLD AUTO: 36.5 % (ref 41.1–50.3)
HCT VFR BLD AUTO: 37.9 % (ref 41.1–50.3)
HGB BLD-MCNC: 11.6 G/DL (ref 13.6–17.2)
HGB BLD-MCNC: 12.2 G/DL (ref 13.6–17.2)
IMM GRANULOCYTES # BLD: 0 K/UL (ref 0–0.5)
IMM GRANULOCYTES NFR BLD AUTO: 0.3 % (ref 0–5)
INR PPP: 1.1 (ref 0.9–1.2)
LYMPHOCYTES # BLD AUTO: 14 % (ref 13–44)
LYMPHOCYTES # BLD: 1 K/UL (ref 0.5–4.6)
MCH RBC QN AUTO: 27.4 PG (ref 26.1–32.9)
MCHC RBC AUTO-ENTMCNC: 31.8 G/DL (ref 31.4–35)
MCV RBC AUTO: 86.1 FL (ref 79.6–97.8)
MONOCYTES # BLD: 0.3 K/UL (ref 0.1–1.3)
MONOCYTES NFR BLD AUTO: 4 % (ref 4–12)
NEUTS SEG # BLD: 5.5 K/UL (ref 1.7–8.2)
NEUTS SEG NFR BLD AUTO: 79 % (ref 43–78)
PLATELET # BLD AUTO: 172 K/UL (ref 150–450)
PMV BLD AUTO: 11.9 FL (ref 10.8–14.1)
POTASSIUM SERPL-SCNC: 4.1 MMOL/L (ref 3.5–5.1)
PROT SERPL-MCNC: 7.5 G/DL (ref 6.3–8.2)
PROTHROMBIN TIME: 11.5 SEC (ref 9.6–12)
RBC # BLD AUTO: 4.24 M/UL (ref 4.23–5.67)
SODIUM SERPL-SCNC: 140 MMOL/L (ref 136–145)
WBC # BLD AUTO: 7 K/UL (ref 4.3–11.1)

## 2017-01-26 PROCEDURE — 96376 TX/PRO/DX INJ SAME DRUG ADON: CPT

## 2017-01-26 PROCEDURE — 94660 CPAP INITIATION&MGMT: CPT

## 2017-01-26 PROCEDURE — 99284 EMERGENCY DEPT VISIT MOD MDM: CPT

## 2017-01-26 PROCEDURE — 74011000250 HC RX REV CODE- 250

## 2017-01-26 PROCEDURE — 99218 HC RM OBSERVATION: CPT

## 2017-01-26 PROCEDURE — 80053 COMPREHEN METABOLIC PANEL: CPT

## 2017-01-26 PROCEDURE — 96374 THER/PROPH/DIAG INJ IV PUSH: CPT

## 2017-01-26 PROCEDURE — 94762 N-INVAS EAR/PLS OXIMTRY CONT: CPT

## 2017-01-26 PROCEDURE — 74011000250 HC RX REV CODE- 250: Performed by: INTERNAL MEDICINE

## 2017-01-26 PROCEDURE — 85730 THROMBOPLASTIN TIME PARTIAL: CPT

## 2017-01-26 PROCEDURE — 74011636637 HC RX REV CODE- 636/637: Performed by: INTERNAL MEDICINE

## 2017-01-26 PROCEDURE — 85018 HEMOGLOBIN: CPT | Performed by: INTERNAL MEDICINE

## 2017-01-26 PROCEDURE — 36415 COLL VENOUS BLD VENIPUNCTURE: CPT | Performed by: INTERNAL MEDICINE

## 2017-01-26 PROCEDURE — C9113 INJ PANTOPRAZOLE SODIUM, VIA: HCPCS

## 2017-01-26 PROCEDURE — 77030032490 HC SLV COMPR SCD KNE COVD -B

## 2017-01-26 PROCEDURE — 85610 PROTHROMBIN TIME: CPT

## 2017-01-26 PROCEDURE — 82962 GLUCOSE BLOOD TEST: CPT

## 2017-01-26 PROCEDURE — 74011250637 HC RX REV CODE- 250/637: Performed by: PHYSICIAN ASSISTANT

## 2017-01-26 PROCEDURE — 74011250637 HC RX REV CODE- 250/637: Performed by: INTERNAL MEDICINE

## 2017-01-26 PROCEDURE — C9113 INJ PANTOPRAZOLE SODIUM, VIA: HCPCS | Performed by: PHYSICIAN ASSISTANT

## 2017-01-26 PROCEDURE — 85025 COMPLETE CBC W/AUTO DIFF WBC: CPT

## 2017-01-26 PROCEDURE — 74011250636 HC RX REV CODE- 250/636: Performed by: PHYSICIAN ASSISTANT

## 2017-01-26 PROCEDURE — 74011250636 HC RX REV CODE- 250/636

## 2017-01-26 RX ORDER — INSULIN LISPRO 100 [IU]/ML
INJECTION, SOLUTION INTRAVENOUS; SUBCUTANEOUS
Status: DISCONTINUED | OUTPATIENT
Start: 2017-01-26 | End: 2017-01-27 | Stop reason: HOSPADM

## 2017-01-26 RX ORDER — LATANOPROST 50 UG/ML
1 SOLUTION/ DROPS OPHTHALMIC
Status: DISCONTINUED | OUTPATIENT
Start: 2017-01-26 | End: 2017-01-27 | Stop reason: HOSPADM

## 2017-01-26 RX ORDER — HYDROCODONE BITARTRATE AND ACETAMINOPHEN 10; 325 MG/1; MG/1
1 TABLET ORAL
Status: DISCONTINUED | OUTPATIENT
Start: 2017-01-26 | End: 2017-01-27 | Stop reason: HOSPADM

## 2017-01-26 RX ORDER — HYDRALAZINE HYDROCHLORIDE 20 MG/ML
20 INJECTION INTRAMUSCULAR; INTRAVENOUS
Status: DISCONTINUED | OUTPATIENT
Start: 2017-01-26 | End: 2017-01-27 | Stop reason: HOSPADM

## 2017-01-26 RX ORDER — LORAZEPAM 1 MG/1
1 TABLET ORAL
Status: DISCONTINUED | OUTPATIENT
Start: 2017-01-26 | End: 2017-01-27 | Stop reason: HOSPADM

## 2017-01-26 RX ORDER — ALBUTEROL SULFATE 0.83 MG/ML
2.5 SOLUTION RESPIRATORY (INHALATION)
Status: DISCONTINUED | OUTPATIENT
Start: 2017-01-26 | End: 2017-01-27 | Stop reason: HOSPADM

## 2017-01-26 RX ORDER — IPRATROPIUM BROMIDE AND ALBUTEROL SULFATE 2.5; .5 MG/3ML; MG/3ML
3 SOLUTION RESPIRATORY (INHALATION)
Status: DISCONTINUED | OUTPATIENT
Start: 2017-01-26 | End: 2017-01-27 | Stop reason: HOSPADM

## 2017-01-26 RX ORDER — HYDRALAZINE HYDROCHLORIDE 10 MG/1
10 TABLET, FILM COATED ORAL 3 TIMES DAILY
Status: DISCONTINUED | OUTPATIENT
Start: 2017-01-26 | End: 2017-01-27 | Stop reason: HOSPADM

## 2017-01-26 RX ORDER — SODIUM CHLORIDE 0.9 % (FLUSH) 0.9 %
5-10 SYRINGE (ML) INJECTION EVERY 8 HOURS
Status: DISCONTINUED | OUTPATIENT
Start: 2017-01-26 | End: 2017-01-27 | Stop reason: HOSPADM

## 2017-01-26 RX ORDER — SODIUM CHLORIDE 0.9 % (FLUSH) 0.9 %
5-10 SYRINGE (ML) INJECTION AS NEEDED
Status: DISCONTINUED | OUTPATIENT
Start: 2017-01-26 | End: 2017-01-27 | Stop reason: HOSPADM

## 2017-01-26 RX ORDER — ACETAMINOPHEN 325 MG/1
650 TABLET ORAL
Status: DISCONTINUED | OUTPATIENT
Start: 2017-01-26 | End: 2017-01-27 | Stop reason: HOSPADM

## 2017-01-26 RX ORDER — PANTOPRAZOLE SODIUM 40 MG/10ML
40 INJECTION, POWDER, LYOPHILIZED, FOR SOLUTION INTRAVENOUS EVERY 12 HOURS
Status: DISCONTINUED | OUTPATIENT
Start: 2017-01-26 | End: 2017-01-27 | Stop reason: HOSPADM

## 2017-01-26 RX ORDER — POLYETHYLENE GLYCOL 3350 17 G/17G
255 POWDER, FOR SOLUTION ORAL ONCE
Status: COMPLETED | OUTPATIENT
Start: 2017-01-26 | End: 2017-01-26

## 2017-01-26 RX ORDER — POTASSIUM CHLORIDE 20 MEQ/1
20 TABLET, EXTENDED RELEASE ORAL DAILY
Status: DISCONTINUED | OUTPATIENT
Start: 2017-01-27 | End: 2017-01-27 | Stop reason: HOSPADM

## 2017-01-26 RX ORDER — LEVOTHYROXINE SODIUM 50 UG/1
50 TABLET ORAL
Status: DISCONTINUED | OUTPATIENT
Start: 2017-01-27 | End: 2017-01-27 | Stop reason: HOSPADM

## 2017-01-26 RX ORDER — SERTRALINE HYDROCHLORIDE 50 MG/1
50 TABLET, FILM COATED ORAL DAILY
Status: DISCONTINUED | OUTPATIENT
Start: 2017-01-27 | End: 2017-01-27 | Stop reason: HOSPADM

## 2017-01-26 RX ORDER — BISACODYL 5 MG
10 TABLET, DELAYED RELEASE (ENTERIC COATED) ORAL
Status: COMPLETED | OUTPATIENT
Start: 2017-01-26 | End: 2017-01-26

## 2017-01-26 RX ORDER — ISOSORBIDE MONONITRATE 30 MG/1
30 TABLET, EXTENDED RELEASE ORAL DAILY
Status: DISCONTINUED | OUTPATIENT
Start: 2017-01-27 | End: 2017-01-27 | Stop reason: HOSPADM

## 2017-01-26 RX ORDER — FLUTICASONE PROPIONATE 50 MCG
2 SPRAY, SUSPENSION (ML) NASAL DAILY
Status: DISCONTINUED | OUTPATIENT
Start: 2017-01-27 | End: 2017-01-27 | Stop reason: HOSPADM

## 2017-01-26 RX ORDER — ONDANSETRON 2 MG/ML
4 INJECTION INTRAMUSCULAR; INTRAVENOUS
Status: DISCONTINUED | OUTPATIENT
Start: 2017-01-26 | End: 2017-01-27 | Stop reason: HOSPADM

## 2017-01-26 RX ORDER — METOPROLOL SUCCINATE 25 MG/1
12.5 TABLET, EXTENDED RELEASE ORAL DAILY
Status: DISCONTINUED | OUTPATIENT
Start: 2017-01-27 | End: 2017-01-27 | Stop reason: HOSPADM

## 2017-01-26 RX ORDER — LANOLIN ALCOHOL/MO/W.PET/CERES
400 CREAM (GRAM) TOPICAL DAILY
Status: DISCONTINUED | OUTPATIENT
Start: 2017-01-27 | End: 2017-01-27 | Stop reason: HOSPADM

## 2017-01-26 RX ORDER — ALLOPURINOL 300 MG/1
300 TABLET ORAL DAILY
Status: DISCONTINUED | OUTPATIENT
Start: 2017-01-27 | End: 2017-01-27 | Stop reason: HOSPADM

## 2017-01-26 RX ADMIN — Medication 10 ML: at 21:19

## 2017-01-26 RX ADMIN — PANTOPRAZOLE SODIUM 40 MG: 40 INJECTION, POWDER, FOR SOLUTION INTRAVENOUS at 20:29

## 2017-01-26 RX ADMIN — LATANOPROST 1 DROP: 50 SOLUTION OPHTHALMIC at 20:24

## 2017-01-26 RX ADMIN — HYDRALAZINE HYDROCHLORIDE 10 MG: 10 TABLET, FILM COATED ORAL at 21:42

## 2017-01-26 RX ADMIN — HYDROCODONE BITARTRATE AND ACETAMINOPHEN 1 TABLET: 10; 325 TABLET ORAL at 19:41

## 2017-01-26 RX ADMIN — INSULIN LISPRO 2 UNITS: 100 INJECTION, SOLUTION INTRAVENOUS; SUBCUTANEOUS at 21:17

## 2017-01-26 RX ADMIN — BISACODYL 10 MG: 5 TABLET, COATED ORAL at 18:31

## 2017-01-26 RX ADMIN — SODIUM CHLORIDE 80 MG: 9 INJECTION INTRAMUSCULAR; INTRAVENOUS; SUBCUTANEOUS at 13:38

## 2017-01-26 RX ADMIN — PANTOPRAZOLE SODIUM 40 MG: 40 INJECTION, POWDER, FOR SOLUTION INTRAVENOUS at 18:31

## 2017-01-26 RX ADMIN — POLYETHYLENE GLYCOL 3350 255 G: 17 POWDER, FOR SOLUTION ORAL at 20:21

## 2017-01-26 NOTE — ED TRIAGE NOTES
Pt d/c yesterday from 6th floor, was called by the gastroenterologist b/c abnormal labs and pt still bleeding from stool. Had 3 bloody BM's since last night.

## 2017-01-26 NOTE — ED PROVIDER NOTES
HPI Comments: 63-year-old male presents with black tarry bowel movements. Patient was discharged from the hospital yesterday for similar problems. Patient had upper GI bleeding. He was seen by the GI head endoscopy was placed on a PPI. Patient states that the patient had a black tarry bowel movement for discharge but did not fill the admitting service under the understanding that his bowel movements haven't returned to normal.  Was at home last night he had another large part bloody bowel movement and then had another one this morning. They contacted GI who told him to come to the ED. Patient is a 80 y.o. male presenting with anal bleeding. The history is provided by the patient. Rectal Bleeding    This is a recurrent problem. The current episode started 12 to 24 hours ago. Pertinent negatives include no abdominal pain, no dysuria, no abdominal distention, no fever, no back pain, no vomiting, no diarrhea and no constipation. He has tried nothing for the symptoms. Past Medical History:   Diagnosis Date    Atopic dermatitis 11/21/2012    Atrial fibrillation (HCC)     CAD (coronary artery disease)     Carotid stenosis, bilateral 11/21/2012     50-79%  3-2010    1. Carotid Doppler (6/1/07): Greater than 70% stenosis in proximal LICA. 50% stenosis in right ICA. 2.  CTA of neck (3/15/10): Occluded distal segments of the vertebral arteries bilaterally. Atherosclerosis of the carotid bulbs bilaterally with a 50% stenosis on the left and a stenosis of less than 30% on the right. Small nodule in the right upper lobe near the apex. 3. CTA (8/29/13):  Less than 30% diameter stenosis of the cervical internal carotid arteries bilaterally.  CHF (congestive heart failure) ([address] 11/21/2012    Chronic kidney disease      hx elevated labs    Chronic obstructive pulmonary disease (HCC)     Diabetes (HCC)     Diastolic CHF, acute on chronic ([address] 9/12/2015     1. Echo (9/11/15) : EF 55-60%. Mild LVH. Moderate biatrial enlargement. Moderate mitral/tricuspid regurgitation.  Failed CABG (coronary artery bypass graft) 2012    GI bleed 2015     Hospitalized [facility]    Gout 2012    History of tobacco use     [place] (hard of hearing)     Hypercholesterolemia     Hypertension     Hyperuricemia 2012    Morbid obesity ([address]     PAD [facility]peripheral artery disease) [facility] 2012     1. Bilateral proximal common iliac PCI (08):  8.0 X 100 mm Cordis smart stent on right and 10 X 40 mm cordis smart stent on right. Both inflated to 7.0 mm.  Poor historian     Radiologic findings of lung field, abnormal 10/31/2016     1. CT of chest  (11/24/10): Multiple small nodules in the right lobe and stable interstitial prominence. Consistent with chronic lung disease. No evidence of malignancy.  Seizure disorder ([facility] 2013    Shortness of breath dyspnea 2013    Thyroid disease     Unspecified sleep apnea        Past Surgical History:   Procedure Laterality Date    Pr cardiac surg procedure unlist       cath ,cabg ,lexiscan cardiolite 11/10    Hx coronary artery bypass graft  2007    Hx cataract removal Left      os    Hx heart catheterization       left-2007, 2011    Hx coronary stent placement  2008     bilateral iliac artery PCI and stents    Hx heent  1970s     neck lipoma    Hx colonoscopy           Family History:   Problem Relation Age of Onset    Heart Attack Mother     Other Father      old age   Sam Carter Other Brother      brain aneurysm    Heart Disease Other      2 children  with heart concerns, 36 & 49 yo    Heart Disease Son        Social History     Social History    Marital status:      Spouse name: N/A    Number of children: N/A    Years of education: N/A     Occupational History    [employer] industry.  Retired     sales, 12 yrs     Social History Main Topics    Smoking status: Former Smoker     Packs/day: 1.00 Years: 45.00     Types: Cigarettes     Quit date: 1984    Smokeless tobacco: Never Used      Comment: (stopped smoking in )    Alcohol use No    Drug use: No    Sexual activity: Not Currently     Other Topics Concern    Not on file     Social History Narrative    45 pack year history cigarette smoking, stopped in . 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 , two living children, two children  with coronary artery disease, ages 36 and 48. Has always lived in [address] No pets. ALLERGIES: Review of patient's allergies indicates no known allergies. Review of Systems   Constitutional: Negative. Negative for activity change and fever. HENT: Negative. Eyes: Negative. Respiratory: Negative. Cardiovascular: Negative. Gastrointestinal: Positive for anal bleeding. Negative for abdominal distention, abdominal pain, constipation, diarrhea and vomiting. Genitourinary: Negative. Negative for dysuria. Musculoskeletal: Negative. Negative for back pain. Skin: Negative. Neurological: Negative. Psychiatric/Behavioral: Negative. All other systems reviewed and are negative. Vitals:    17 0949   BP: 106/61   Pulse: 69   Resp: 16   Temp: 97.5 °F (36.4 °C)   SpO2: 94%   Weight: 114.8 kg (253 lb)   Height: 5' 10\" (1.778 m)            Physical Exam   Constitutional: He is oriented to person, place, and time. He appears well-developed and well-nourished. No distress. HENT:   Head: Normocephalic and atraumatic. Right Ear: External ear normal.   Left Ear: External ear normal.   Nose: Nose normal.   Mouth/Throat: Oropharynx is clear and moist. No oropharyngeal exudate. Eyes: Conjunctivae and EOM are normal. Pupils are equal, round, and reactive to light. Right eye exhibits no discharge. Left eye exhibits no discharge. No scleral icterus. Neck: Normal range of motion. Neck supple.  No JVD present. No tracheal deviation present. Cardiovascular: Normal rate, regular rhythm and intact distal pulses. Pulmonary/Chest: Effort normal and breath sounds normal. No stridor. No respiratory distress. He has no wheezes. He exhibits no tenderness. Abdominal: Soft. Bowel sounds are normal. He exhibits no distension and no mass. There is no tenderness. Genitourinary: Rectal exam shows guaiac positive stool. Musculoskeletal: Normal range of motion. He exhibits no edema or tenderness. Neurological: He is alert and oriented to person, place, and time. No cranial nerve deficit. Skin: Skin is warm and dry. No rash noted. He is not diaphoretic. No erythema. There is pallor. Psychiatric: He has a normal mood and affect. His behavior is normal. Thought content normal.   Nursing note and vitals reviewed. MDM  Number of Diagnoses or Management Options  Upper GI bleeding: established and worsening  Diagnosis management comments: Please include this black tarry bowel movements he had at home were residual blood transit length about blood. Contacted GI to see the patient contacted hospitals the patient for observation.        Amount and/or Complexity of Data Reviewed  Clinical lab tests: ordered and reviewed  Tests in the medicine section of CPT®: ordered and reviewed    Risk of Complications, Morbidity, and/or Mortality  Presenting problems: moderate  Diagnostic procedures: moderate  Management options: moderate      ED Course       Procedures

## 2017-01-26 NOTE — Clinical Note
Status[de-identified] Inpatient [101] Type of Bed: Medical [8] Inpatient Hospitalization Certified Necessary for the Following Reasons: 3. Patient receiving treatment that can only be provided in an inpatient setting (further clarification in H&P documentation) Admitting Diagnosis: GI bleed [818570] Admitting Physician: Bryon Lemos [[id number]] Attending Physician: Bryon Lemos [[id number]] Estimated Length of Stay: > or = to 2 Midnights Discharge Plan[de-identified] Home with Office Follow-up

## 2017-01-26 NOTE — H&P
HOSPITALIST H&P/CONSULT  NAME:  Shanae Vigil   Age:  80 y.o.  :   1932   MRN:   [id number]  PCP: Abby Smith MD  Consulting MD:  Treatment Team: Attending Provider: Soni Tay MD; Primary Nurse: Cate Juares RN; Consulting Provider: Salima Moe MD  HPI:   Patient Shanae Vigil is an 80 y. o. white male, with a pmh of afib on aspirin 325 mg and CAD status post CABG last year, who presents to the ED for teddy melena x 3 episodes overnight. Pt just discharged day prior () after admission for melena. S/P EGD  with finding of gastritis/ gastric ulcer/ duodenitis. He was started then on PPI BID continued at discharge and was instructed to hold asa until following week.    Denies abd pain, nausea, vomiting or other acute complaints. Sitting upright in bed and telling me jokes. GI has seen with plan for EGD/COLO in AM.       Complete ROS done and is as stated in HPI or otherwise negative  Past Medical History   Diagnosis Date    Atopic dermatitis 2012    [facility]     CAD (coronary artery disease)     Carotid stenosis, bilateral 2012     50-79%  3-    1. Carotid Doppler (07): Greater than 70% stenosis in proximal LICA. 50% stenosis in right ICA. 2.  CTA of neck (3/15/10): Occluded distal segments of the vertebral arteries bilaterally. Atherosclerosis of the carotid bulbs bilaterally with a 50% stenosis on the left and a stenosis of less than 30% on the right. Small nodule in the right upper lobe near the apex. 3. CTA (13):  Less than 30% diameter stenosis of the cervical internal carotid arteries bilaterally.  CHF (congestive heart failure) ([facility] 2012    Chronic kidney disease      hx elevated labs    Chronic obstructive pulmonary disease (HCC)     Diabetes (HCC)     Diastolic CHF, acute on chronic ([facility] 2015     1. Echo (9/11/15) : EF 55-60%. Mild LVH. Moderate biatrial enlargement.   Moderate mitral/tricuspid regurgitation.  Failed CABG (coronary artery bypass graft) 2012    GI bleed 2015     Hospitalized [facility]    Gout 2012    History of tobacco use     [place] (hard of hearing)     Hypercholesterolemia     Hypertension     Hyperuricemia 2012    [facility]     PAD (peripheral artery disease) ([facility] 2012     1. Bilateral proximal common iliac PCI (08):  8.0 X 100 mm Cordis smart stent on right and 10 X 40 mm cordis smart stent on right. Both inflated to 7.0 mm.  Poor historian     Radiologic findings of lung field, abnormal 10/31/2016     1. CT of chest  (11/24/10): Multiple small nodules in the right lobe and stable interstitial prominence. Consistent with chronic lung disease. No evidence of malignancy.  Seizure disorder ([facility] 2013    Shortness of breath dyspnea 2013    Thyroid disease     Unspecified sleep apnea       Past Surgical History   Procedure Laterality Date    Pr cardiac surg procedure unlist       cath ,cabg ,lexiscan cardiolite 11/10    Hx coronary artery bypass graft  2007    Hx cataract removal Left      os    Hx heart catheterization       left-2007, 2011    Hx coronary stent placement  2008     bilateral iliac artery PCI and stents    Hx heent  1970s     neck lipoma    Hx colonoscopy        Prior to Admission Medications   Prescriptions Last Dose Informant Patient Reported? Taking? HYDROcodone-acetaminophen (NORCO)  mg tablet   No No   Sig: Take one tab by mouth every 4-6 hours as need for pain. K-DUR 20 mEq tablet   Yes No   Sig: Take 20 mEq by mouth daily. LORazepam (ATIVAN) 1 mg tablet   No No   Sig: Take 1 Tab by mouth two (2) times daily as needed for Anxiety. Max Daily Amount: 2 mg. albuterol (PROVENTIL VENTOLIN) 2.5 mg /3 mL (0.083 %) nebulizer solution   No No   Si [address]  Qid      Order fax to [facility]  Indications: COPD   allopurinol (ZYLOPRIM) 300 mg tablet   Yes No   Sig: Take  by mouth daily. aspirin (ASPIRIN) 325 mg tablet   No No   Sig: Take 1 Tab by mouth daily. clotrimazole-betamethasone (LOTRISONE) topical cream   No No   Sig: Apply sparingly to the affected areas twice daily for up to 2 weeks. cpap machine kit   Yes No   Sig: by Does Not Apply route. Bilevel    docusate sodium (STOOL SOFTENER) 100 mg capsule   Yes No   Sig: Take 100 mg by mouth as needed. fluticasone (FLONASE) 50 mcg/actuation nasal spray   No No   Si Sprays by Both Nostrils route daily. furosemide (LASIX) 20 mg tablet   Yes No   Sig: Take 40 mg by mouth as needed. glipiZIDE (GLUCOTROL) 5 mg tablet   Yes No   Sig: Take 5 mg by mouth two (2) times a day. hydrALAZINE (APRESOLINE) 10 mg tablet   Yes No   Sig: Take  by mouth three (3) times daily. ipratropium-albuterol (COMBIVENT RESPIMAT)  mcg/actuation inhaler   Yes No   Sig: Take 1 Puff by inhalation every six (6) hours. isosorbide mononitrate ER (IMDUR) 30 mg tablet   No No   Sig: Take 1 Tab by mouth daily. levothyroxine (SYNTHROID) 50 mcg tablet   No No   Sig: Take 1 Tab by mouth Daily (before breakfast). magnesium oxide (MAG-OX) 400 mg tablet   No No   Sig: Take 1 Tab by mouth daily. metoprolol succinate (TOPROL-XL) 25 mg XL tablet   No No   Sig: Pt takes 1/2 tab po daily. oxyCODONE IR (OXY-IR) 15 mg immediate release tablet   No No   Sig: Take 1 tablet up to 4 times daily as needed for pain. pantoprazole (PROTONIX) 40 mg tablet   No No   Sig: Take 1 Tab by mouth Before breakfast and dinner. sertraline (ZOLOFT) 50 mg tablet   No No   Sig: Take one tablet each evening for anxiety  Indications: GENERALIZED ANXIETY DISORDER   travoprost (TRAVATAN Z) 0.004 % ophthalmic solution   Yes No   Sig: Administer 1 Drop to both eyes two (2) times a day.       Facility-Administered Medications: None     No Known Allergies   Social History   Substance Use Topics    Smoking status: Former Smoker     Packs/day: 1.00     Years: 45.00     Types: Cigarettes     Quit date: 1984    Smokeless tobacco: Never Used      Comment: (stopped smoking in )    Alcohol use No      Family History   Problem Relation Age of Onset    Heart Attack Mother    Aundria Mohit Other Father      old age   Aundreduardo Rueda Other Brother      brain aneurysm    Heart Disease Other      2 children  with heart concerns, 36 & 49 yo    Heart Disease Son       Objective:     Visit Vitals    /61    Pulse 69    Temp 97.5 °F (36.4 °C)    Resp 16    Ht 5' 10\" (1.778 m)    Wt 114.8 kg (253 lb)    SpO2 94%    BMI 36.3 kg/m2      Temp (24hrs), Av.5 °F (36.4 °C), Min:97.5 °F (36.4 °C), Max:97.5 °F (36.4 °C)    Oxygen Therapy  O2 Sat (%): 94 % (17 0949)  O2 Device: Room air (17 09)  Physical Exam:  General:    Alert, cooperative, no distress, appears stated age. Head:   Normocephalic, without obvious abnormality, atraumatic. Nose:  Nares normal. No drainage or sinus tenderness. Lungs:   Clear to auscultation bilaterally. No Wheezing or Rhonchi. No rales. Heart:   Regular rate and rhythm,  no murmur, rub or gallop. Abdomen:   Soft, non-tender. Not distended. Bowel sounds normal.   Extremities: No cyanosis. No edema. No clubbing  Skin:     Texture, turgor normal. No rashes or lesions.   Not Jaundiced  Neurologic: Alert and oriented x 3, no focal deficits   Data Review:   Recent Results (from the past 24 hour(s))   CBC WITH AUTOMATED DIFF    Collection Time: 17 10:05 AM   Result Value Ref Range    WBC 7.0 4.3 - 11.1 K/uL    RBC 4.24 4.23 - 5.67 M/uL    HGB 11.6 (L) 13.6 - 17.2 g/dL    HCT 36.5 (L) 41.1 - 50.3 %    MCV 86.1 79.6 - 97.8 FL    MCH 27.4 26.1 - 32.9 PG    MCHC 31.8 31.4 - 35.0 g/dL    RDW 15.6 (H) 11.9 - 14.6 %    PLATELET 205 456 - 627 K/uL    MPV 11.9 10.8 - 14.1 FL    DF AUTOMATED      NEUTROPHILS 79 (H) 43 - 78 %    LYMPHOCYTES 14 13 - 44 %    MONOCYTES 4 4.0 - 12.0 %    EOSINOPHILS 3 0.5 - 7.8 %    BASOPHILS 0 0.0 - 2.0 %    IMMATURE GRANULOCYTES 0.3 0.0 - 5.0 %    ABS. NEUTROPHILS 5.5 1.7 - 8.2 K/UL    ABS. LYMPHOCYTES 1.0 0.5 - 4.6 K/UL    ABS. MONOCYTES 0.3 0.1 - 1.3 K/UL    ABS. EOSINOPHILS 0.2 0.0 - 0.8 K/UL    ABS. BASOPHILS 0.0 0.0 - 0.2 K/UL    ABS. IMM. GRANS. 0.0 0.0 - 0.5 K/UL   METABOLIC PANEL, COMPREHENSIVE    Collection Time: 01/26/17 10:05 AM   Result Value Ref Range    Sodium 140 136 - 145 mmol/L    Potassium 4.1 3.5 - 5.1 mmol/L    Chloride 103 98 - 107 mmol/L    CO2 28 21 - 32 mmol/L    Anion gap 9 7 - 16 mmol/L    Glucose 182 (H) 65 - 100 mg/dL    BUN 27 (H) 8 - 23 MG/DL    Creatinine 1.70 (H) 0.8 - 1.5 MG/DL    GFR est AA 50 (L) >60 ml/min/1.73m2    GFR est non-AA 41 (L) >60 ml/min/1.73m2    Calcium 8.5 8.3 - 10.4 MG/DL    Bilirubin, total 0.7 0.2 - 1.1 MG/DL    ALT 15 12 - 65 U/L    AST 20 15 - 37 U/L    Alk. phosphatase 76 50 - 136 U/L    Protein, total 7.5 6.3 - 8.2 g/dL    Albumin 3.2 3.2 - 4.6 g/dL    Globulin 4.3 (H) 2.3 - 3.5 g/dL    A-G Ratio 0.7 (L) 1.2 - 3.5     PTT    Collection Time: 01/26/17 10:05 AM   Result Value Ref Range    aPTT 28.5 23.5 - 31.7 SEC   PROTHROMBIN TIME + INR    Collection Time: 01/26/17 10:05 AM   Result Value Ref Range    Prothrombin time 11.5 9.6 - 12.0 sec    INR 1.1 0.9 - 1.2           Assessment and Plan: Active Hospital Problems    Diagnosis Date Noted    GI bleed 01/21/2015     ADMIT TO OBS    A/P   - GI bleed -  Cont PPI bid, serial h/h. Plan for repeat EGD +colo in AM.   - CAD/ hx of PAFIB - holding asa, rate controlled  - DM2 - hold oral meds, start SSI  - hypothyriodism - synthroid  - CKD - close to baseline. Pt not taking diuretics daily at home. Euvolemic.  Hold and monitor w/ GI prep    Code Status: Full code    Anticipated discharge: 1-2 days    Signed By: Hermna Naranjo DO     January 26, 2017

## 2017-01-26 NOTE — CONSULTS
[facility] Consult Note       Consulting GI Physician: Dr. Brigido Cannon    Referring Physician:  Dr. Renetta Alonzo in [facility] ER    Consult Date:  1/26/2017    Admit Date:  1/26/2017    Chief Complaint:  GI bleed    Subjective:     History of Present Illness:  Patient is a  80 y.o. male with PMH of (but not limited to) A fib (on  mg only), CAD s/p CABG 2007, carotid artery stenosis, CKD, DM , CHF, HLD, HTN, COPD not on O2, ROBERTO with CPAP and seizure disorder who is being seen in consult at the request of Dr. Renetta Alonzo for GI bleed. He was discharged from this hospital 1/25/17 after addmission from 1/22/17 to 1/25/17, then seen in consultation 1/22/17 for GIB after pt c/o \"dark tarry stool in his bed. \" Similar incident in 2015, but with more BRRB and he was on warfarin at the time. EGD on 1/23/15 by Dr. Veronica Davenport revealed a few small gastric erosions, felt not likely the cause of his bleeding. Colonoscopy on 1/22/15 by Dr. Brando Vergara revealed extensive diverticulosis without active bleeding. He had recurrent bleeding with a drop in his Hgb and underwent repeat EGD on 5/6/15 by Dr. Jewels Collins with friable gastric mucosa. Pillcam study was suggested, but not done. His Hgb at time of his discharge on 1/25/17 was 11.7, (11.3,11.8, 11.3, 11.6, 12.3,12.4,12.6, 13.3, 13.5) (14.1 10/2016). EGD 1/22 as outlined below showed gastritis, , Duodenitis. He returned to [facility] ER today with reports of recurrent bloody bowel movement at least 2-3x since hospital discharge. In [facility] ED he was reported to have a rectal exam that revealed guaiac positive stool. He reports to me a large \"black\" stool that was difficult to clean at 1AM and 7AM this morning. He has remained off of his ASA. He denies use of NSAIDs or ETOH. He denies use of Iron or Pepto Bismol. He denies any associated symptoms such as N/V, heartburn, abdominal pain, chest pain.   He has shortness of breath at baseline with his known underlying COPD and denies any worsened breathing. Hgb returned at 11.6. Esophagogastroduodenoscopy 1/22/2017  INDICATION:  1. GI Bleed/Melena  POSTPROCEDURE DIAGNOSIS:  1. Gastritis  2. Gastric ulcer  3. Duodenitis  MEDICATIONS ADMINISTERED: MAC. Further details per anesthesia note. INSTRUMENT: [id number]  PROCEDURE: After obtaining informed consent, the patient was placed in the left lateral position and sedated. The endoscope was advanced under direct vision without difficulty. The esophagus, stomach (including retroflexed views) and duodenum were evaluated. The patient was taken to the recovery area in stable condition. FINDINGS:  ESOPHAGUS: Normal exam.  STOMACH: 5-8 mm clean-based ulcer in antrum. Cold-forceps biopsies for pathology. No evidence of active or recent bleeding. Mild to moderate erosive gastritis of antrum. Mild gastritis of body of stomach. Cold-forceps biopsies for pathology. DUODENUM: Minimal duodenitis of bulb. Otherwise normal exam of 1st, 2nd, and 3rd portions of duodenum. No evidence of active or recent bleeding. Estimated blood loss: 0-minimal   Complications: none  GASTRIC BIOPSIES: GASTRIC MUCOSA WITH CHRONIC REACTIVE AND REPARATIVE CHANGES. FOCALLY PROMINENT LAMINA PROPRIA LYMPHOID AGGREGATES PRESENT. NO DEFINITIVE HELICOBACTER PYLORI MICROORGANISMS IDENTIFIED. PMH:  Past Medical History   Diagnosis Date    Atopic dermatitis 11/21/2012    Atrial fibrillation (HCC)     CAD (coronary artery disease)     Carotid stenosis, bilateral 11/21/2012     50-79%  3-2010    1. Carotid Doppler (6/1/07): Greater than 70% stenosis in proximal LICA. 50% stenosis in right ICA. 2.  CTA of neck (3/15/10): Occluded distal segments of the vertebral arteries bilaterally. Atherosclerosis of the carotid bulbs bilaterally with a 50% stenosis on the left and a stenosis of less than 30% on the right. Small nodule in the right upper lobe near the apex.  3. CTA (8/29/13):  Less than 30% diameter stenosis of the cervical internal carotid arteries bilaterally.  CHF (congestive heart failure) ([facility] [address] 11/21/2012    Chronic kidney disease      hx elevated labs    Chronic obstructive pulmonary disease (HCC)     Diabetes (HCC)     Diastolic CHF, acute on chronic ([address] 9/12/2015     1. Echo (9/11/15) : EF 55-60%. Mild LVH. Moderate biatrial enlargement. Moderate mitral/tricuspid regurgitation.  Failed CABG (coronary artery bypass graft) 11/21/2012    GI bleed 1/2015     Hospitalized [facility]    Gout 11/21/2012    History of tobacco use     [place] (hard of hearing)     Hypercholesterolemia     Hypertension     Hyperuricemia 11/21/2012    Morbid obesity ([address]     PAD (peripheral artery disease) ([facility] [address] 11/21/2012     1. Bilateral proximal common iliac PCI (2/21/08):  8.0 X 100 mm Cordis smart stent on right and 10 X 40 mm cordis smart stent on right. Both inflated to 7.0 mm.  Poor historian     Radiologic findings of lung field, abnormal 10/31/2016     1. CT of chest  (11/24/10): Multiple small nodules in the right lobe and stable interstitial prominence. Consistent with chronic lung disease. No evidence of malignancy.  Seizure disorder ([facility] [address] 8/5/2013    Shortness of breath dyspnea 8/5/2013    Thyroid disease     Unspecified sleep apnea        PSH:  Past Surgical History   Procedure Laterality Date    Pr cardiac surg procedure unlist       cath 5/07,cabg 6/07,lexiscan cardiolite 11/10    Hx coronary artery bypass graft  06-    Hx cataract removal Left 2003     os    Hx heart catheterization       left-05-, 01-    Hx coronary stent placement  02-     bilateral iliac artery PCI and stents    Hx heent  1970s     neck lipoma    Hx colonoscopy         Allergies:  No Known Allergies    Home Medications:  Prior to Admission medications    Medication Sig Start Date End Date Taking? Authorizing Provider   aspirin (ASPIRIN) 325 mg tablet Take 1 Tab by mouth daily.  1/30/17   Syeda Garcia DO Dave   pantoprazole (PROTONIX) 40 mg tablet Take 1 Tab by mouth Before breakfast and dinner. 1/25/17   Teresa Herman DO   HYDROcodone-acetaminophen [facility])  mg tablet Take one tab by mouth every 4-6 hours as need for pain. 12/13/16   Elsy Moya MD   oxyCODONE IR (OXY-IR) 15 mg immediate release tablet Take 1 tablet up to 4 times daily as needed for pain. 12/13/16   Elsy Moya MD   furosemide (LASIX) 20 mg tablet Take 40 mg by mouth as needed. Historical Provider   ipratropium-albuterol (COMBIVENT RESPIMAT)  mcg/actuation inhaler Take 1 Puff by inhalation every six (6) hours. Historical Provider   LORazepam (ATIVAN) 1 mg tablet Take 1 Tab by mouth two (2) times daily as needed for Anxiety. Max Daily Amount: 2 mg. 9/20/16   Mary Gruber DO   clotrimazole-betamethasone (LOTRISONE) topical cream Apply sparingly to the affected areas twice daily for up to 2 weeks. 8/11/16   Elsy Moya MD   levothyroxine (SYNTHROID) 50 mcg tablet Take 1 Tab by mouth Daily (before breakfast). 7/7/16   Elsy Moya MD   magnesium oxide (MAG-OX) 400 mg tablet Take 1 Tab by mouth daily. 7/7/16   Elsy Moya MD   metoprolol succinate (TOPROL-XL) 25 mg XL tablet Pt takes 1/2 tab po daily. 7/7/16   Elsy Moya MD   fluticasone (FLONASE) 50 mcg/actuation nasal spray 2 Sprays by Both Nostrils route daily. 6/1/16   Elsy Moya MD   allopurinol (ZYLOPRIM) 300 mg tablet Take  by mouth daily. Historical Provider   sertraline (ZOLOFT) 50 mg tablet Take one tablet each evening for anxiety  Indications: GENERALIZED ANXIETY DISORDER 3/30/16   Elsy Moya MD   docusate sodium (STOOL SOFTENER) 100 mg capsule Take 100 mg by mouth as needed. Historical Provider   hydrALAZINE (APRESOLINE) 10 mg tablet Take  by mouth three (3) times daily. Historical Provider   cpap machine kit by Does Not Apply route.  Bilevel 12/8 Historical Provider   isosorbide mononitrate ER (IMDUR) 30 mg tablet Take 1 Tab by mouth daily. 8/11/14   Mari Glover MD   albuterol (PROVENTIL VENTOLIN) 2.5 mg /3 mL (0.083 %) nebulizer solution 1 Vial Via Neb. Qid      Order fax to [facility]  Indications: COPD 12/11/13   Steven Sorensen NP   travoprost (TRAVATAN Z) 0.004 % ophthalmic solution Administer 1 Drop to both eyes two (2) times a day. Historical Provider   glipiZIDE (GLUCOTROL) 5 mg tablet Take 5 mg by mouth two (2) times a day. Historical Provider   K-DUR 20 mEq tablet Take 20 mEq by mouth daily. 1/13/11   Montez Naik MD       Hospital Medications:  Current Facility-Administered Medications   Medication Dose Route Frequency    pantoprazole (PROTONIX) 80 mg in sodium chloride 0.9 % 20 mL injection  80 mg IntraVENous ONCE     Current Outpatient Prescriptions   Medication Sig    [START ON 1/30/2017] aspirin (ASPIRIN) 325 mg tablet Take 1 Tab by mouth daily.  pantoprazole (PROTONIX) 40 mg tablet Take 1 Tab by mouth Before breakfast and dinner.  HYDROcodone-acetaminophen (NORCO)  mg tablet Take one tab by mouth every 4-6 hours as need for pain.  oxyCODONE IR (OXY-IR) 15 mg immediate release tablet Take 1 tablet up to 4 times daily as needed for pain.  furosemide (LASIX) 20 mg tablet Take 40 mg by mouth as needed.  ipratropium-albuterol (COMBIVENT RESPIMAT)  mcg/actuation inhaler Take 1 Puff by inhalation every six (6) hours.  LORazepam (ATIVAN) 1 mg tablet Take 1 Tab by mouth two (2) times daily as needed for Anxiety. Max Daily Amount: 2 mg.  clotrimazole-betamethasone (LOTRISONE) topical cream Apply sparingly to the affected areas twice daily for up to 2 weeks.  levothyroxine (SYNTHROID) 50 mcg tablet Take 1 Tab by mouth Daily (before breakfast).  magnesium oxide (MAG-OX) 400 mg tablet Take 1 Tab by mouth daily.  metoprolol succinate (TOPROL-XL) 25 mg XL tablet Pt takes 1/2 tab po daily.     fluticasone (FLONASE) 50 mcg/actuation nasal spray 2 Sprays by Both Nostrils route daily.  allopurinol (ZYLOPRIM) 300 mg tablet Take  by mouth daily.  sertraline (ZOLOFT) 50 mg tablet Take one tablet each evening for anxiety  Indications: GENERALIZED ANXIETY DISORDER    docusate sodium (STOOL SOFTENER) 100 mg capsule Take 100 mg by mouth as needed.  hydrALAZINE (APRESOLINE) 10 mg tablet Take  by mouth three (3) times daily.  cpap machine kit by Does Not Apply route. Bilevel     isosorbide mononitrate ER (IMDUR) 30 mg tablet Take 1 Tab by mouth daily.  albuterol (PROVENTIL VENTOLIN) 2.5 mg /3 mL (0.083 %) nebulizer solution 1 Vial Via Neb. Qid      Order fax to [facility]  Indications: COPD    travoprost (TRAVATAN Z) 0.004 % ophthalmic solution Administer 1 Drop to both eyes two (2) times a day.  glipiZIDE (GLUCOTROL) 5 mg tablet Take 5 mg by mouth two (2) times a day.  K-DUR 20 mEq tablet Take 20 mEq by mouth daily. Social History:  Social History   Substance Use Topics    Smoking status: Former Smoker     Packs/day: 1.00     Years: 45.00     Types: Cigarettes     Quit date: 1984    Smokeless tobacco: Never Used      Comment: (stopped smoking in )    Alcohol use No     Family History:  Family History   Problem Relation Age of Onset    Heart Attack Mother    Gina Hernandez Other Father      old age   Gina Hernandez Other Brother      brain aneurysm    Heart Disease Other      2 children  with heart concerns, 36 & 49 yo    Heart Disease Son        Review of Systems:  A detailed 10 system ROS is obtained, with pertinent positives as listed above. All others are negative. Objective:     Physical Exam:  Vitals:  Visit Vitals    /61    Pulse 69    Temp 97.5 °F (36.4 °C)    Resp 16    Ht 5' 10\" (1.778 m)    Wt 114.8 kg (253 lb)    SpO2 94%    BMI 36.3 kg/m2     Gen:  Pt is alert, cooperative, no acute distress. Skin: Face reveal no rashes.    HEENT: Sclerae anicteric. Cardiovascular: Regular rate and rhythm. Respiratory:  Comfortable breathing with no accessory muscle use. Clear breath sounds anteriorly with no wheezes, rales, or rhonchi. GI:  Abdomen nondistended,Obese, and Nontender. Normal active bowel sounds. No masses palpable. Rectal:  Deferred  Neurological:  Gross memory appears intact. Patient is alert     Laboratory:    Recent Labs      01/26/17   1005  01/25/17   0508  01/24/17   2111  01/24/17   1422  01/24/17   0639  01/23/17   2155  01/23/17   1439   WBC  7.0   --    --    --   7.2   --    --    HGB  11.6*  11.7*  11.3*  11.8*  11.3*  11.6*  12.3*   HCT  36.5*  36.4*  35.4*  36.6*  34.5*  36.2*  38.3*   PLT  172   --    --    --   139*   --    --    MCV  86.1   --    --    --   85.4   --    --    NA  140   --    --    --    --    --    --    K  4.1   --    --    --    --    --    --    CL  103   --    --    --    --    --    --    CO2  28   --    --    --    --    --    --    BUN  27*   --    --    --    --    --    --    CREA  1.70*   --    --    --    --    --    --    CA  8.5   --    --    --    --    --    --    GLU  182*   --    --    --    --    --    --    AP  76   --    --    --    --    --    --    SGOT  20   --    --    --    --    --    --    ALT  15   --    --    --    --    --    --    TBILI  0.7   --    --    --    --    --    --    ALB  3.2   --    --    --    --    --    --    TP  7.5   --    --    --    --    --    --    PTP  11.5   --    --    --    --    --    --    INR  1.1   --    --    --    --    --    --    APTT  28.5   --    --    --    --    --    --           Assessment:     Active Problems:   GI Bleed     80 y.o. male with PMH of (but not limited to) A fib (on  mg only), CAD s/p CABG 2007, carotid artery stenosis, CKD, DM , CHF, HLD, HTN, COPD not on O2, ROBERTO with CPAP and seizure disorder who is being seen in consult at the request of Dr. Esha Hooker for GI bleed.  S/p recent admission 1/22/17 to 1/25/17 for GI bleed with EGD that was significant only for 5-8 mm clean-based ulcer in antrum, Mild to moderate erosive gastritis of antrum, Mild gastritis of body of stomach, and Minimal duodenitis of bulb with unremarkable gastric biopsies and no evidence for H.pylori. EGD on 1/23/15 by Dr. Sariah Adams revealed a few small gastric erosions, felt not likely the cause of his bleeding. Colonoscopy on 1/22/15 by Dr. Conchis Herr revealed extensive diverticulosis without active bleeding. Repeat EGD on 5/6/15 by Dr. Banuelos Beams with friable gastric mucosa. Plan:     - Will plan Colonoscopy +/- repeat EGD tomorrow to see if we can identify source of GI bleeding. Based upon the results he would benefit from small bowel evaluation.   - PPI therapy with IV Protonix 40mg q12hrs. - Monitor for evidence of recurrent GI bleeding.   - Follow H/H, transfuse as needed. Further recommendations will be based upon findings on endoscopic evaluation and pt clinical course. Jose Dawkins PA-C  Patient is seen and examined in collaboration with Dr. Tsering Anne. Assessment and plan as per Dr. Tsering Anne.

## 2017-01-26 NOTE — IP AVS SNAPSHOT
[facility] 
 
 
 [address] [place] [address] [address] [facility] 
[phone] Patient: Roberto Mejia MRN: [id number] You are allergic to the following No active allergies Recent Documentation Height Weight BMI Smoking Status 1.778 m 117.2 kg 37.08 kg/m2 Former Smoker Emergency Contacts Name Discharge Info Relation Home Work Mobile Eulalia Navarro  Spouse [3] [id number] About your hospitalization You were admitted on:  January 26, 2017 You last received care in the:  [address] You were discharged on:  January 27, 2017 Unit phone number:  [phone] Why you were hospitalized Your primary diagnosis was:  Gi Bleed Your diagnoses also included:  Chronic Diastolic Congestive Heart Failure (Hcc), Ckd (Chronic Kidney Disease) Stage 3, Gfr 30-59 Ml/Min, Copd (Chronic Obstructive Pulmonary Disease) (Hcc), Essential Hypertension, Billy On Cpap, Persistent Atrial Fibrillation (Hcc) Providers Seen During Your Hospitalizations Provider Role Specialty Primary office phone Pepper Beasley MD Attending Provider Emergency Medicine [phone] Krystal Barrera MD Attending Provider Internal Medicine [phone] Heaven Hernandez DO Attending Provider Internal Medicine [phone] Your Primary Care Physician (PCP) Primary Care Physician Office Phone Office Fax Chevis Cover [phone] Follow-up Information Follow up With Details Comments Contact Info Aury Amado MD In 3 days Call Monday for appointment [address] ,[address] 
[phone] Odessa Andrade MD In 2 weeks Call office on Monday for appointment  [address] [facility] [address] [facility] [address] 
[phone] Your Appointments Wednesday February 01, 2017 10:30 AM EST Office Visit with Reynaldo Davies MD  
[facility]) [address] [facility] [address] [phone] Monday March 06, 2017  1:20 PM EST  
(Arrive by 12:50 PM) Return appointment with TANGELA [facility] ([facility]) [address] [facility] [address] [place] [address] [facility]  
[phone] Current Discharge Medication List  
  
START taking these medications Dose & Instructions Dispensing Information Comments Morning Noon Evening Bedtime  
 polyethylene glycol 17 gram packet Commonly known as:  Carolin Fail Your next dose is: Today, Tomorrow Other:  _________ Dose:  17 g Take 1 Packet by mouth daily for 30 days. Quantity:  30 Packet Refills:  0 CONTINUE these medications which have NOT CHANGED Dose & Instructions Dispensing Information Comments Morning Noon Evening Bedtime  
 albuterol 2.5 mg /3 mL (0.083 %) nebulizer solution Commonly known as:  PROVENTIL VENTOLIN Your next dose is: Today, Tomorrow Other:  _________ [address] [facility] [address] Qid   Order fax to [facility]  Indications: COPD Quantity:  360 Vial  
Refills:  3  
 - DX copd  
 
-  
 
- Pt to use Albuterol 00.83% 1 Vial [address] Neb. Along with Pulmicort 0.5 mg 1 Vial [address] Neb. bid  
    
   
   
   
  
 allopurinol 300 mg tablet Commonly known as:  Bong Cons Your next dose is: Today, Tomorrow Other:  _________ Take  by mouth daily. Refills:  0  
     
   
   
   
  
 aspirin 325 mg tablet Commonly known as:  ASPIRIN Start taking on:  1/30/2017 Your next dose is: Today, Tomorrow Other:  _________ Dose:  325 mg Take 1 Tab by mouth daily. Quantity:  1 Tab Refills:  0  
     
   
   
   
  
 clotrimazole-betamethasone topical cream  
Commonly known as:  Thomos Apley  
   
 Your next dose is: Today, Tomorrow Other:  _________ Apply sparingly to the affected areas twice daily for up to 2 weeks. Quantity:  15 g Refills:  1 COMBIVENT RESPIMAT  mcg/actuation inhaler Generic drug:  ipratropium-albuterol Your next dose is: Today, Tomorrow Other:  _________ Dose:  1 Puff Take 1 Puff by inhalation every six (6) hours. Refills:  0  
     
   
   
   
  
 cpap machine kit Your next dose is: Today, Tomorrow Other:  _________  
   
   
 by Does Not Apply route. Bilevel 12/8 Refills:  0  
     
   
   
   
  
 fluticasone 50 mcg/actuation nasal spray Commonly known as:  Aracelis Kingston Your next dose is: Today, Tomorrow Other:  _________ Dose:  2 Spray 2 Sprays by Both Nostrils route daily. Quantity:  1 Bottle Refills:  3  
     
   
   
   
  
 furosemide 20 mg tablet Commonly known as:  LASIX Your next dose is: Today, Tomorrow Other:  _________ Dose:  40 mg Take 40 mg by mouth as needed. Refills:  0  
     
   
   
   
  
 glipiZIDE 5 mg tablet Commonly known as:  Murdock Helling Your next dose is: Today, Tomorrow Other:  _________ Dose:  5 mg Take 5 mg by mouth two (2) times a day. Refills:  0  
     
   
   
   
  
 hydrALAZINE 10 mg tablet Commonly known as:  APRESOLINE Your next dose is: Today, Tomorrow Other:  _________ Take  by mouth three (3) times daily. Refills:  0 HYDROcodone-acetaminophen  mg tablet Commonly known as:  Annamary Steve Your next dose is: Today, Tomorrow Other:  _________ Take one tab by mouth every 4-6 hours as need for pain. Quantity:  140 Tab Refills:  0 PHARMACY FILL ON TIME. NO EARLY REFILLS. isosorbide mononitrate ER 30 mg tablet Commonly known as:  IMDUR  
   
 Your next dose is: Today, Tomorrow Other:  _________ Dose:  30 mg Take 1 Tab by mouth daily. Quantity:  30 Tab Refills:  5  
     
   
   
   
  
 K-DUR 20 mEq tablet Generic drug:  potassium chloride Your next dose is: Today, Tomorrow Other:  _________ Dose:  20 mEq Take 20 mEq by mouth daily. Refills:  0  
     
   
   
   
  
 levothyroxine 50 mcg tablet Commonly known as:  SYNTHROID Your next dose is: Today, Tomorrow Other:  _________ Dose:  50 mcg Take 1 Tab by mouth Daily (before breakfast). Quantity:  90 Tab Refills:  1 LORazepam 1 mg tablet Commonly known as:  ATIVAN Your next dose is: Today, Tomorrow Other:  _________ Dose:  1 mg Take 1 Tab by mouth two (2) times daily as needed for Anxiety. Max Daily Amount: 2 mg. Quantity:  24 Tab Refills:  0  
     
   
   
   
  
 magnesium oxide 400 mg tablet Commonly known as:  MAG-OX Your next dose is: Today, Tomorrow Other:  _________ Dose:  400 mg Take 1 Tab by mouth daily. Quantity:  30 Tab Refills:  5  
     
   
   
   
  
 metoprolol succinate 25 mg XL tablet Commonly known as:  TOPROL-XL Your next dose is: Today, Tomorrow Other:  _________ Pt takes 1/2 tab po daily. Quantity:  45 Tab Refills:  3  
     
   
   
   
  
 oxyCODONE IR 15 mg immediate release tablet Commonly known as:  OXY-IR Your next dose is: Today, Tomorrow Other:  _________ Take 1 tablet up to 4 times daily as needed for pain. Quantity:  120 Tab Refills:  0 PHARMACY FILL ON TIME. NO EARLY REFILLS. pantoprazole 40 mg tablet Commonly known as:  PROTONIX Your next dose is: Today, Tomorrow Other:  _________ Dose:  40 mg Take 1 Tab by mouth Before breakfast and dinner. Quantity:  120 Tab Refills:  0 sertraline 50 mg tablet Commonly known as:  ZOLOFT Your next dose is: Today, Tomorrow Other:  _________ Take one tablet each evening for anxiety  Indications: GENERALIZED ANXIETY DISORDER Quantity:  30 Tab Refills:  3 STOOL SOFTENER 100 mg capsule Generic drug:  docusate sodium Your next dose is: Today, Tomorrow Other:  _________ Dose:  100 mg Take 100 mg by mouth as needed. Refills:  0  
     
   
   
   
  
 TRAVATAN Z 0.004 % ophthalmic solution Generic drug:  travoprost  
   
Your next dose is: Today, Tomorrow Other:  _________ Dose:  1 Drop Administer 1 Drop to both eyes two (2) times a day. Refills:  0 Where to Get Your Medications Information on where to get these meds will be given to you by the nurse or doctor. ! Ask your nurse or doctor about these medications  
  polyethylene glycol 17 gram packet Discharge Instructions DISCHARGE SUMMARY from Nurse The following personal items are in your possession at time of discharge: 
 
Dental Appliances: Lowers, Uppers, With patient Visual Aid: Glasses Hearing Aids/Status: At bedside Home Medications: None Jewelry: Other (comment) Clothing: Footwear, Shirt, Pants, Shorts, Slippers, Socks, Undergarments, With patient Other Valuables: Cell Phone, Jeramie Andrew PATIENT INSTRUCTIONS: 
 
After general anesthesia or intravenous sedation, for 24 hours or while taking prescription Narcotics: · Limit your activities · Do not drive and operate hazardous machinery · Do not make important personal or business decisions · Do  not drink alcoholic beverages · If you have not urinated within 8 hours after discharge, please contact your surgeon on call. Report the following to your surgeon: 
· Excessive pain, swelling, redness or odor of or around the surgical area · Temperature over 100.5 · Nausea and vomiting lasting longer than 4 hours or if unable to take medications · Any signs of decreased circulation or nerve impairment to extremity: change in color, persistent  numbness, tingling, coldness or increase pain · Any questions What to do at Home: 
Recommended activity: Activity as tolerated, per MD instructions If you experience any of the following symptoms fever > 101, nausea, vomiting, abdominal pain, rectal bleeding, chest pain, shortness of breath please follow up with MD. 
 
 
*  Please give a list of your current medications to your Primary Care Provider. *  Please update this list whenever your medications are discontinued, doses are 
    changed, or new medications (including over-the-counter products) are added. *  Please carry medication information at all times in case of emergency situations. These are general instructions for a healthy lifestyle: No smoking/ No tobacco products/ Avoid exposure to second hand smoke Surgeon General's Warning:  Quitting smoking now greatly reduces serious risk to your health. Obesity, smoking, and sedentary lifestyle greatly increases your risk for illness A healthy diet, regular physical exercise & weight monitoring are important for maintaining a healthy lifestyle You may be retaining fluid if you have a history of heart failure or if you experience any of the following symptoms:  Weight gain of 3 pounds or more overnight or 5 pounds in a week, increased swelling in our hands or feet or shortness of breath while lying flat in bed. Please call your doctor as soon as you notice any of these symptoms; do not wait until your next office visit. Recognize signs and symptoms of STROKE: 
 
F-face looks uneven A-arms unable to move or move unevenly S-speech slurred or non-existent T-time-call 911 as soon as signs and symptoms begin-DO NOT go  
 Back to bed or wait to see if you get better-TIME IS BRAIN. Warning Signs of HEART ATTACK Call 911 if you have these symptoms: 
? Chest discomfort. Most heart attacks involve discomfort in the center of the chest that lasts more than a few minutes, or that goes away and comes back. It can feel like uncomfortable pressure, squeezing, fullness, or pain. ? Discomfort in other areas of the upper body. Symptoms can include pain or discomfort in one or both arms, the back, neck, jaw, or stomach. ? Shortness of breath with or without chest discomfort. ? Other signs may include breaking out in a cold sweat, nausea, or lightheadedness. Don't wait more than five minutes to call [address]! Fast action can save your life. Calling 911 is almost always the fastest way to get lifesaving treatment. Emergency Medical Services staff can begin treatment when they arrive  up to an hour sooner than if someone gets to the hospital by car. The discharge information has been reviewed with the patient. The patient verbalized understanding. Discharge medications reviewed with the patient and appropriate educational materials and side effects teaching were provided. Gastrointestinal Bleeding: Care Instructions Your Care Instructions The digestive or gastrointestinal tract goes from the mouth to the anus. It is often called the GI tract. Bleeding can happen anywhere in the GI tract. It may be caused by an ulcer, an infection, or cancer. It may also be caused by medicines such as aspirin or ibuprofen. Light bleeding may not cause any symptoms at first. But if you continue to bleed for a while, you may feel very weak or tired. Sudden, heavy bleeding means you need to see a doctor right away. This kind of bleeding can be very dangerous. But it can usually be cured or controlled. The doctor may do some tests to find the cause of your bleeding. Follow-up care is a key part of your treatment and safety. Be sure to make and go to all appointments, and call your doctor if you are having problems. It's also a good idea to know your test results and keep a list of the medicines you take. How can you care for yourself at home? · Be safe with medicines. Take your medicines exactly as prescribed. Call your doctor if you think you are having a problem with your medicine. You will get more details on the specific medicines your doctor prescribes. · Do not take aspirin or other anti-inflammatory medicines, such as naproxen (Aleve) or ibuprofen (Advil, Motrin), without talking to your doctor first. Ask your doctor if it is okay to use acetaminophen (Tylenol). · Do not drink alcohol. · The bleeding may make you lose iron. So it's important to eat foods that have a lot of iron. These include red meat, shellfish, poultry, and eggs. They also include beans, raisins, whole-grain breads, and leafy green vegetables. If you want help planning meals, you can make an appointment with a dietitian. When should you call for help? Call 911 anytime you think you may need emergency care. For example, call if: 
· You have sudden, severe belly pain. · You vomit blood or what looks like coffee grounds. · You passed out (lost consciousness). · Your stools are maroon or very bloody. Call your doctor now or seek immediate medical care if: 
· You are dizzy or lightheaded, or you feel like you may faint. · Your stools are black and look like tar, or they have streaks of blood. · You have belly pain. · You vomit or have nausea. · You have trouble swallowing, or it hurts when you swallow. Watch closely for changes in your health, and be sure to contact your doctor if: 
· You do not get better as expected. Where can you learn more? Go to http://angelina-rajiv.info/.  
Enter V895 in the search box to learn more about \"Gastrointestinal Bleeding: Care Instructions. \" Current as of: May 27, 2016 Content Version: 11.1 © 8384-0442 [employer]. Care instructions adapted under license by [employer] (which disclaims liability or warranty for this information). If you have questions about a medical condition or this instruction, always ask your healthcare professional. [address] any warranty or liability for your use of this information. Discharge Orders None Introducing [facility]! Kasey Mera introduces Specpage patient portal. Now you can access parts of your medical record, email your doctor's office, and request medication refills online. 1. In your internet browser, go to [email or web address] 2. Click on the First Time User? Click Here link in the Sign In box. You will see the New Member Sign Up page. 3. Enter your Specpage Access Code exactly as it appears below. You will not need to use this code after youve completed the sign-up process. If you do not sign up before the expiration date, you must request a new code. · Specpage Access Code: [email or web address] Expires: 2/8/2017 10:13 AM 
 
4. Enter the last four digits of your Social Security Number (xxxx) and Date of Birth (mm/dd/yyyy) as indicated and click Submit. You will be taken to the next sign-up page. 5. Create a Specpage ID. This will be your Specpage login ID and cannot be changed, so think of one that is secure and easy to remember. 6. Create a Specpage password. You can change your password at any time. 7. Enter your Password Reset Question and Answer. This can be used at a later time if you forget your password. 8. Enter your e-mail address. You will receive e-mail notification when new information is available in [address] 9. Click Sign Up. You can now view and download portions of your medical record.  
10. Click the Download Summary menu link to download a portable copy of your medical information. If you have questions, please visit the Frequently Asked Questions section of the [employer] website. Remember, MyChart is NOT to be used for urgent needs. For medical emergencies, dial 911. Now available from your iPhone and Android! General Information Please provide this summary of care documentation to your next provider. Patient Signature:  ____________________________________________________________ Date:  ____________________________________________________________  
  
Leann Tifton Provider Signature:  ____________________________________________________________ Date:  ____________________________________________________________

## 2017-01-27 ENCOUNTER — ANESTHESIA (OUTPATIENT)
Dept: ENDOSCOPY | Age: 82
End: 2017-01-27
Payer: MEDICARE

## 2017-01-27 ENCOUNTER — ANESTHESIA EVENT (OUTPATIENT)
Dept: ENDOSCOPY | Age: 82
End: 2017-01-27
Payer: MEDICARE

## 2017-01-27 VITALS
SYSTOLIC BLOOD PRESSURE: 172 MMHG | TEMPERATURE: 97.7 F | BODY MASS INDEX: 36.99 KG/M2 | HEART RATE: 63 BPM | DIASTOLIC BLOOD PRESSURE: 75 MMHG | WEIGHT: 258.4 LBS | HEIGHT: 70 IN | RESPIRATION RATE: 18 BRPM | OXYGEN SATURATION: 96 %

## 2017-01-27 LAB
ANION GAP BLD CALC-SCNC: 11 MMOL/L (ref 7–16)
BUN SERPL-MCNC: 25 MG/DL (ref 8–23)
CALCIUM SERPL-MCNC: 8 MG/DL (ref 8.3–10.4)
CHLORIDE SERPL-SCNC: 108 MMOL/L (ref 98–107)
CO2 SERPL-SCNC: 25 MMOL/L (ref 21–32)
CREAT SERPL-MCNC: 1.5 MG/DL (ref 0.8–1.5)
ERYTHROCYTE [DISTWIDTH] IN BLOOD BY AUTOMATED COUNT: 15.4 % (ref 11.9–14.6)
GLUCOSE BLD STRIP.AUTO-MCNC: 141 MG/DL (ref 65–100)
GLUCOSE BLD STRIP.AUTO-MCNC: 149 MG/DL (ref 65–100)
GLUCOSE SERPL-MCNC: 146 MG/DL (ref 65–100)
HCT VFR BLD AUTO: 35.9 % (ref 41.1–50.3)
HGB BLD-MCNC: 11.4 G/DL (ref 13.6–17.2)
MCH RBC QN AUTO: 27.2 PG (ref 26.1–32.9)
MCHC RBC AUTO-ENTMCNC: 31.8 G/DL (ref 31.4–35)
MCV RBC AUTO: 85.7 FL (ref 79.6–97.8)
PLATELET # BLD AUTO: 187 K/UL (ref 150–450)
PMV BLD AUTO: 11.6 FL (ref 10.8–14.1)
POTASSIUM SERPL-SCNC: 4 MMOL/L (ref 3.5–5.1)
RBC # BLD AUTO: 4.19 M/UL (ref 4.23–5.67)
SODIUM SERPL-SCNC: 144 MMOL/L (ref 136–145)
WBC # BLD AUTO: 7.1 K/UL (ref 4.3–11.1)

## 2017-01-27 PROCEDURE — 80048 BASIC METABOLIC PNL TOTAL CA: CPT | Performed by: INTERNAL MEDICINE

## 2017-01-27 PROCEDURE — 74011250636 HC RX REV CODE- 250/636

## 2017-01-27 PROCEDURE — 74011250636 HC RX REV CODE- 250/636: Performed by: ANESTHESIOLOGY

## 2017-01-27 PROCEDURE — 76060000032 HC ANESTHESIA 0.5 TO 1 HR: Performed by: INTERNAL MEDICINE

## 2017-01-27 PROCEDURE — 74011000250 HC RX REV CODE- 250: Performed by: ANESTHESIOLOGY

## 2017-01-27 PROCEDURE — 85027 COMPLETE CBC AUTOMATED: CPT | Performed by: INTERNAL MEDICINE

## 2017-01-27 PROCEDURE — 36415 COLL VENOUS BLD VENIPUNCTURE: CPT | Performed by: INTERNAL MEDICINE

## 2017-01-27 PROCEDURE — 99218 HC RM OBSERVATION: CPT

## 2017-01-27 PROCEDURE — 74011250637 HC RX REV CODE- 250/637: Performed by: INTERNAL MEDICINE

## 2017-01-27 PROCEDURE — 82962 GLUCOSE BLOOD TEST: CPT

## 2017-01-27 PROCEDURE — 76040000025: Performed by: INTERNAL MEDICINE

## 2017-01-27 PROCEDURE — 74011000250 HC RX REV CODE- 250

## 2017-01-27 RX ORDER — SODIUM CHLORIDE, SODIUM LACTATE, POTASSIUM CHLORIDE, CALCIUM CHLORIDE 600; 310; 30; 20 MG/100ML; MG/100ML; MG/100ML; MG/100ML
100 INJECTION, SOLUTION INTRAVENOUS CONTINUOUS
Status: CANCELLED | OUTPATIENT
Start: 2017-01-27

## 2017-01-27 RX ORDER — FAMOTIDINE 10 MG/ML
20 INJECTION INTRAVENOUS
Status: COMPLETED | OUTPATIENT
Start: 2017-01-27 | End: 2017-01-27

## 2017-01-27 RX ORDER — PROPOFOL 10 MG/ML
INJECTION, EMULSION INTRAVENOUS AS NEEDED
Status: DISCONTINUED | OUTPATIENT
Start: 2017-01-27 | End: 2017-01-27 | Stop reason: HOSPADM

## 2017-01-27 RX ORDER — LIDOCAINE HYDROCHLORIDE 20 MG/ML
INJECTION, SOLUTION EPIDURAL; INFILTRATION; INTRACAUDAL; PERINEURAL AS NEEDED
Status: DISCONTINUED | OUTPATIENT
Start: 2017-01-27 | End: 2017-01-27 | Stop reason: HOSPADM

## 2017-01-27 RX ORDER — POLYETHYLENE GLYCOL 3350 17 G/17G
17 POWDER, FOR SOLUTION ORAL DAILY
Qty: 30 PACKET | Refills: 0 | Status: SHIPPED | OUTPATIENT
Start: 2017-01-27 | End: 2017-02-26

## 2017-01-27 RX ORDER — SODIUM CHLORIDE 0.9 % (FLUSH) 0.9 %
5-10 SYRINGE (ML) INJECTION AS NEEDED
Status: CANCELLED | OUTPATIENT
Start: 2017-01-27

## 2017-01-27 RX ORDER — PROPOFOL 10 MG/ML
INJECTION, EMULSION INTRAVENOUS
Status: DISCONTINUED | OUTPATIENT
Start: 2017-01-27 | End: 2017-01-27 | Stop reason: HOSPADM

## 2017-01-27 RX ORDER — SODIUM CHLORIDE, SODIUM LACTATE, POTASSIUM CHLORIDE, CALCIUM CHLORIDE 600; 310; 30; 20 MG/100ML; MG/100ML; MG/100ML; MG/100ML
100 INJECTION, SOLUTION INTRAVENOUS CONTINUOUS
Status: DISCONTINUED | OUTPATIENT
Start: 2017-01-27 | End: 2017-01-27 | Stop reason: HOSPADM

## 2017-01-27 RX ADMIN — METOPROLOL SUCCINATE 12.5 MG: 25 TABLET, EXTENDED RELEASE ORAL at 09:17

## 2017-01-27 RX ADMIN — HYDRALAZINE HYDROCHLORIDE 10 MG: 10 TABLET, FILM COATED ORAL at 09:17

## 2017-01-27 RX ADMIN — LEVOTHYROXINE SODIUM 50 MCG: 50 TABLET ORAL at 05:25

## 2017-01-27 RX ADMIN — Medication 400 MG: at 15:49

## 2017-01-27 RX ADMIN — HYDRALAZINE HYDROCHLORIDE 10 MG: 10 TABLET, FILM COATED ORAL at 15:49

## 2017-01-27 RX ADMIN — SODIUM CHLORIDE, SODIUM LACTATE, POTASSIUM CHLORIDE, AND CALCIUM CHLORIDE 100 ML/HR: 600; 310; 30; 20 INJECTION, SOLUTION INTRAVENOUS at 10:09

## 2017-01-27 RX ADMIN — ISOSORBIDE MONONITRATE 30 MG: 30 TABLET, EXTENDED RELEASE ORAL at 09:17

## 2017-01-27 RX ADMIN — LIDOCAINE HYDROCHLORIDE 40 MG: 20 INJECTION, SOLUTION EPIDURAL; INFILTRATION; INTRACAUDAL; PERINEURAL at 11:36

## 2017-01-27 RX ADMIN — SERTRALINE HYDROCHLORIDE 50 MG: 50 TABLET ORAL at 15:49

## 2017-01-27 RX ADMIN — PROPOFOL 180 MCG/KG/MIN: 10 INJECTION, EMULSION INTRAVENOUS at 11:36

## 2017-01-27 RX ADMIN — FAMOTIDINE 20 MG: 10 INJECTION, SOLUTION INTRAVENOUS at 10:21

## 2017-01-27 RX ADMIN — PROPOFOL 20 MG: 10 INJECTION, EMULSION INTRAVENOUS at 11:36

## 2017-01-27 RX ADMIN — ALLOPURINOL 300 MG: 300 TABLET ORAL at 15:49

## 2017-01-27 RX ADMIN — Medication 10 ML: at 05:20

## 2017-01-27 RX ADMIN — POTASSIUM CHLORIDE 20 MEQ: 20 TABLET, EXTENDED RELEASE ORAL at 15:49

## 2017-01-27 NOTE — PROGRESS NOTES
Discharge instructions and prescriptions given and reviewed with pt, verbalizes understanding, medication side effect sheet reviewed with pt, pt discharged home with family.

## 2017-01-27 NOTE — DISCHARGE SUMMARY
Patient ID:  Es Henson  [id number]  68 y.o.  2/12/1932  Admit date: 1/26/2017 12:41 PM  Discharge date and time: 1/27/2017  Attending: Ryan Thomson DO  PCP:  Roseann Smiley MD  Treatment Team: Attending Provider: Ryan Thomson DO; Consulting Provider: Ledy Gutierrez MD; Utilization Review: Bob López RN    Principal Diagnosis GI bleed   Principal Problem:    GI bleed (1/21/2015)    Active Problems:    Essential hypertension (11/21/2012)      Persistent atrial fibrillation ([facility] (11/21/2012)      Overview: Coumadin therapy discontinued due to recurrent GI bleeding. COPD (chronic obstructive pulmonary disease) ([facility] (11/21/2012)      CKD (chronic kidney disease) stage 3, GFR 30-59 ml/min (9/18/2013)      ROBERTO on CPAP (2/20/2015)      Overview: Patient is on BiPAP, no supplementary oxygen      Chronic diastolic congestive heart failure ([facility] (9/12/2015)      Overview: 1.  Echo (9/11/15) : EF 55-60%. Mild LVH. Moderate biatrial enlargement. Moderate mitral/tricuspid regurgitation. Hospital Course:  Please refer to the admission H&P for details of presentation. In summary, the patient is a 80year old gentleman with afib on aspirin, and CAD s/p CABG who was in hospital 1-22 - 1/25 with GI bleed found to have duodenal ulcer. He was monitored, and discharged home. Represented on 1/26 with melena bowel movement. His Hb did not drop, underwent an EGD and colonoscopy. EGD showed same ulcer with currently no bleeding. Colonoscopy showed some diverticulosis, and brown stool throughout. Patient has remained stable with no need for blood transfusion. Melena was likely delayed transition from previous gi bleed on first admission. He has chronic constipation which we will treat with miralax daily. Continue protonix bid,  monitor closely for bleeding, hold aspirin to 1 week.  Follow up with GI for healing of duodenal ulcer, will likely need repeat colonoscopy for screening will defer to PCP    Significant Diagnostic Studies:       Labs: Results:       Chemistry Recent Labs      01/27/17   0511  01/26/17   1005   GLU  146*  182*   NA  144  140   K  4.0  4.1   CL  108*  103   CO2  25  28   BUN  25*  27*   CREA  1.50  1.70*   CA  8.0*  8.5   AGAP  11  9   AP   --   76   TP   --   7.5   ALB   --   3.2   GLOB   --   4.3*   AGRAT   --   0.7*      CBC w/Diff Recent Labs      01/27/17   0511  01/26/17   2121  01/26/17   1005   WBC  7.1   --   7.0   RBC  4.19*   --   4.24   HGB  11.4*  12.2*  11.6*   HCT  35.9*  37.9*  36.5*   PLT  187   --   172   GRANS   --    --   79*   LYMPH   --    --   14   EOS   --    --   3      Cardiac Enzymes No results for input(s): CPK, CKND1, RICK in the last 72 hours. No lab exists for component: CKRMB, TROIP   Coagulation Recent Labs      01/26/17   1005   PTP  11.5   INR  1.1   APTT  28.5       Lipid Panel Lab Results   Component Value Date/Time    Cholesterol, total 160 04/26/2016 09:20 AM    HDL Cholesterol 33 04/26/2016 09:20 AM    LDL, calculated 100 04/26/2016 09:20 AM    VLDL, calculated 27 04/26/2016 09:20 AM    Triglyceride 136 04/26/2016 09:20 AM    CHOL/HDL Ratio 4.9 04/26/2016 09:20 AM      BNP No results for input(s): BNPP in the last 72 hours.    Liver Enzymes Recent Labs      01/26/17   1005   TP  7.5   ALB  3.2   AP  76   SGOT  20      Thyroid Studies Lab Results   Component Value Date/Time    T4, Total 8.0 11/05/2008 04:49 PM    T3 Uptake 31 11/05/2008 04:49 PM    TSH 8.150 05/15/2015 11:30 PM          Results for orders placed or performed during the hospital encounter of 09/16/16   EKG, 12 LEAD, INITIAL   Result Value Ref Range    Systolic BP  mmHg    Diastolic BP  mmHg    Ventricular Rate 64 BPM    Atrial Rate 66 BPM    P-R Interval  ms    QRS Duration 96 ms    Q-T Interval 420 ms    QTC Calculation (Bezet) 433 ms    Calculated P Axis  degrees    Calculated R Axis 30 degrees    Calculated T Axis -162 degrees    Diagnosis       Atrial fibrillation  T wave abnormality, consider lateral ischemia  Abnormal ECG  Confirmed by Cyndee Phipps MD (), ANA ROSA VARELA ([id number]) on 9/17/2016 8:02:18 AM       CT Results (most recent):    Results from Hospital Encounter encounter on 09/16/16   CT HEAD WO CONT   Narrative CT HEAD WITHOUT CONTRAST     HISTORY:  Head trauma. COMPARISON: 10/3/2015    TECHNIQUE: Axial imaging was performed without intravenous contrast utilizing  5mm slice thickness. Sagittal and coronal reformats were performed. FINDINGS:        *BRAIN:     -  There are no early signs of territorial or lacunar infarction by CT.    -  No intracranial mass, hematoma, or hydrocephalus. -  White matter changes consistent with chronic cerebral atherosclerosis. *VISUALIZED PARANASAL SINUSES: Well aerated. *MASTOIDS:  Clear. *CALVARIUM AND SCALP: Unremarkable. Impression IMPRESSION:    No evidence of acute intracranial trauma. Date of Dictation: 9/16/2016 11:20 PM        VAS/US Results (most recent):    Results from Office Visit encounter on 04/20/15   DUPLEX LOW EXT ARTERIES WITH RIANA   Narrative See scanned final report. XR Results (most recent):    Results from Hospital Encounter encounter on 11/03/16   XR CHEST PA LAT   Narrative Chest X-ray    INDICATION:  COPD, pneumonia    PA and lateral views of the chest were obtained. FINDINGS: Interstitial prominence appears chronic. There are no focal  infiltrates or effusions. There are stable cardia megaly. Sternotomy changes  are present.            Impression IMPRESSION: No acute findings in the chest          Discharge Exam:  Visit Vitals    /75    Pulse 63    Temp 97.7 °F (36.5 °C)    Resp 18    Ht 5' 10\" (1.778 m)    Wt 117.2 kg (258 lb 6.4 oz)    SpO2 96%    BMI 37.08 kg/m2     General appearance: alert, cooperative, no distress, appears stated age  Lungs: clear to auscultation bilaterally  Heart: regular rate and rhythm, S1, S2 normal, no murmur, click, rub or gallop  Abdomen: soft, non-tender. Bowel sounds normal. No masses,  no organomegaly  Extremities: no cyanosis or edema  Neurologic: Grossly normal    Disposition: home  Discharge Condition: stable  Patient Instructions:   Current Discharge Medication List      START taking these medications    Details   polyethylene glycol (MIRALAX) 17 gram packet Take 1 Packet by mouth daily for 30 days. Qty: 30 Packet, Refills: 0         CONTINUE these medications which have NOT CHANGED    Details   aspirin (ASPIRIN) 325 mg tablet Take 1 Tab by mouth daily. Qty: 1 Tab, Refills: 0      pantoprazole (PROTONIX) 40 mg tablet Take 1 Tab by mouth Before breakfast and dinner. Qty: 120 Tab, Refills: 0      HYDROcodone-acetaminophen (NORCO)  mg tablet Take one tab by mouth every 4-6 hours as need for pain. Qty: 140 Tab, Refills: 0    Comments: PHARMACY FILL ON TIME. NO EARLY REFILLS. Associated Diagnoses: Other chronic pain      oxyCODONE IR (OXY-IR) 15 mg immediate release tablet Take 1 tablet up to 4 times daily as needed for pain. Qty: 120 Tab, Refills: 0    Comments: PHARMACY FILL ON TIME. NO EARLY REFILLS. Associated Diagnoses: Other chronic pain      furosemide (LASIX) 20 mg tablet Take 40 mg by mouth as needed. ipratropium-albuterol (COMBIVENT RESPIMAT)  mcg/actuation inhaler Take 1 Puff by inhalation every six (6) hours. LORazepam (ATIVAN) 1 mg tablet Take 1 Tab by mouth two (2) times daily as needed for Anxiety. Max Daily Amount: 2 mg. Qty: 24 Tab, Refills: 0      clotrimazole-betamethasone (LOTRISONE) topical cream Apply sparingly to the affected areas twice daily for up to 2 weeks. Qty: 15 g, Refills: 1      levothyroxine (SYNTHROID) 50 mcg tablet Take 1 Tab by mouth Daily (before breakfast). Qty: 90 Tab, Refills: 1      magnesium oxide (MAG-OX) 400 mg tablet Take 1 Tab by mouth daily.   Qty: 30 Tab, Refills: 5      metoprolol succinate (TOPROL-XL) 25 mg XL tablet Pt takes 1/2 tab po daily.  Qty: 45 Tab, Refills: 3      fluticasone (FLONASE) 50 mcg/actuation nasal spray 2 Sprays by Both Nostrils route daily. Qty: 1 Bottle, Refills: 3      allopurinol (ZYLOPRIM) 300 mg tablet Take  by mouth daily. sertraline (ZOLOFT) 50 mg tablet Take one tablet each evening for anxiety  Indications: GENERALIZED ANXIETY DISORDER  Qty: 30 Tab, Refills: 3      docusate sodium (STOOL SOFTENER) 100 mg capsule Take 100 mg by mouth as needed. hydrALAZINE (APRESOLINE) 10 mg tablet Take  by mouth three (3) times daily. cpap machine kit by Does Not Apply route. Bilevel 12/8      isosorbide mononitrate ER (IMDUR) 30 mg tablet Take 1 Tab by mouth daily. Qty: 30 Tab, Refills: 5      albuterol (PROVENTIL VENTOLIN) 2.5 mg /3 mL (0.083 %) nebulizer solution 1 Vial Via Neb. Qid      Order fax to [facility]  Indications: COPD  Qty: 360 Vial, Refills: 3    Comments: DX copd     Pt to use Albuterol 00.83% 1 Vial Via [employer]. Along with Pulmicort 0.5 mg 1 Vial Via Neb. bid  Associated Diagnoses: SOB (shortness of breath)      travoprost (TRAVATAN Z) 0.004 % ophthalmic solution Administer 1 Drop to both eyes two (2) times a day. glipiZIDE (GLUCOTROL) 5 mg tablet Take 5 mg by mouth two (2) times a day. K-DUR 20 mEq tablet Take 20 mEq by mouth daily.              Activity: Activity as tolerated  Diet: Cardiac Diet  Wound Care: None needed    Follow-up  ·   PCP 3 days  · GI 2-3 weeks  Time spent to discharge patient greater than 30 minutes  Signed:  Linda Amezcua MD  1/27/2017  2:48 PM

## 2017-01-27 NOTE — PROGRESS NOTES
TRANSFER - OUT REPORT:    Verbal report given to Esthela on [facility]  being transferred to GI unit for ordered procedure       Report consisted of patients Situation, Background, Assessment and   Recommendations(SBAR). Information from the following report(s) SBAR was reviewed with the receiving nurse. Lines:   Peripheral IV 01/26/17 Right Antecubital (Active)   Site Assessment Clean, dry, & intact 1/27/2017  3:11 AM   Phlebitis Assessment 0 1/27/2017  3:11 AM   Infiltration Assessment 0 1/27/2017  3:11 AM   Dressing Status Clean, dry, & intact 1/27/2017  3:11 AM   Dressing Type Tape;Transparent 1/27/2017  3:11 AM   Hub Color/Line Status Capped 1/27/2017  3:11 AM        Opportunity for questions and clarification was provided.       Patient transported with:   [employer]

## 2017-01-27 NOTE — DISCHARGE INSTRUCTIONS
DISCHARGE SUMMARY from Nurse    The following personal items are in your possession at time of discharge:    Dental Appliances: Lowers, Uppers, With patient  Visual Aid: Glasses  Hearing Aids/Status: At bedside  Home Medications: None  Jewelry: Other (comment)  Clothing: Footwear, Shirt, Pants, Shorts, Slippers, Socks, Undergarments, With patient  Other Valuables: Cell Phone, Cane             PATIENT INSTRUCTIONS:    After general anesthesia or intravenous sedation, for 24 hours or while taking prescription Narcotics:  · Limit your activities  · Do not drive and operate hazardous machinery  · Do not make important personal or business decisions  · Do  not drink alcoholic beverages  · If you have not urinated within 8 hours after discharge, please contact your surgeon on call. Report the following to your surgeon:  · Excessive pain, swelling, redness or odor of or around the surgical area  · Temperature over 100.5  · Nausea and vomiting lasting longer than 4 hours or if unable to take medications  · Any signs of decreased circulation or nerve impairment to extremity: change in color, persistent  numbness, tingling, coldness or increase pain  · Any questions        What to do at Home:  Recommended activity: Activity as tolerated, per MD instructions    If you experience any of the following symptoms fever > 101, nausea, vomiting, abdominal pain, rectal bleeding, chest pain, shortness of breath please follow up with MD.      *  Please give a list of your current medications to your Primary Care Provider. *  Please update this list whenever your medications are discontinued, doses are      changed, or new medications (including over-the-counter products) are added. *  Please carry medication information at all times in case of emergency situations.           These are general instructions for a healthy lifestyle:    No smoking/ No tobacco products/ Avoid exposure to second hand smoke    Surgeon Rip Doing Warning:  Quitting smoking now greatly reduces serious risk to your health. Obesity, smoking, and sedentary lifestyle greatly increases your risk for illness    A healthy diet, regular physical exercise & weight monitoring are important for maintaining a healthy lifestyle    You may be retaining fluid if you have a history of heart failure or if you experience any of the following symptoms:  Weight gain of 3 pounds or more overnight or 5 pounds in a week, increased swelling in our hands or feet or shortness of breath while lying flat in bed. Please call your doctor as soon as you notice any of these symptoms; do not wait until your next office visit. Recognize signs and symptoms of STROKE:    F-face looks uneven    A-arms unable to move or move unevenly    S-speech slurred or non-existent    T-time-call 911 as soon as signs and symptoms begin-DO NOT go       Back to bed or wait to see if you get better-TIME IS BRAIN. Warning Signs of HEART ATTACK     Call 911 if you have these symptoms:   Chest discomfort. Most heart attacks involve discomfort in the center of the chest that lasts more than a few minutes, or that goes away and comes back. It can feel like uncomfortable pressure, squeezing, fullness, or pain.  Discomfort in other areas of the upper body. Symptoms can include pain or discomfort in one or both arms, the back, neck, jaw, or stomach.  Shortness of breath with or without chest discomfort.  Other signs may include breaking out in a cold sweat, nausea, or lightheadedness. Don't wait more than five minutes to call 911 - MINUTES MATTER! Fast action can save your life. Calling 911 is almost always the fastest way to get lifesaving treatment. Emergency Medical Services staff can begin treatment when they arrive -- up to an hour sooner than if someone gets to the hospital by car. The discharge information has been reviewed with the patient.   The patient verbalized understanding. Discharge medications reviewed with the patient and appropriate educational materials and side effects teaching were provided. Gastrointestinal Bleeding: Care Instructions  Your Care Instructions    The digestive or gastrointestinal tract goes from the mouth to the anus. It is often called the GI tract. Bleeding can happen anywhere in the GI tract. It may be caused by an ulcer, an infection, or cancer. It may also be caused by medicines such as aspirin or ibuprofen. Light bleeding may not cause any symptoms at first. But if you continue to bleed for a while, you may feel very weak or tired. Sudden, heavy bleeding means you need to see a doctor right away. This kind of bleeding can be very dangerous. But it can usually be cured or controlled. The doctor may do some tests to find the cause of your bleeding. Follow-up care is a key part of your treatment and safety. Be sure to make and go to all appointments, and call your doctor if you are having problems. It's also a good idea to know your test results and keep a list of the medicines you take. How can you care for yourself at home? · Be safe with medicines. Take your medicines exactly as prescribed. Call your doctor if you think you are having a problem with your medicine. You will get more details on the specific medicines your doctor prescribes. · Do not take aspirin or other anti-inflammatory medicines, such as naproxen (Aleve) or ibuprofen (Advil, Motrin), without talking to your doctor first. Ask your doctor if it is okay to use acetaminophen (Tylenol). · Do not drink alcohol. · The bleeding may make you lose iron. So it's important to eat foods that have a lot of iron. These include red meat, shellfish, poultry, and eggs. They also include beans, raisins, whole-grain breads, and leafy green vegetables. If you want help planning meals, you can make an appointment with a dietitian. When should you call for help?   Call [phone] anytime you think you may need emergency care. For example, call if:  · You have sudden, severe belly pain. · You vomit blood or what looks like coffee grounds. · You passed out (lost consciousness). · Your stools are maroon or very bloody. Call your doctor now or seek immediate medical care if:  · You are dizzy or lightheaded, or you feel like you may faint. · Your stools are black and look like tar, or they have streaks of blood. · You have belly pain. · You vomit or have nausea. · You have trouble swallowing, or it hurts when you swallow. Watch closely for changes in your health, and be sure to contact your doctor if:  · You do not get better as expected. Where can you learn more? Go to http://angelina-rajiv.info/. Enter M696 in the search box to learn more about \"Gastrointestinal Bleeding: Care Instructions. \"  Current as of: May 27, 2016  Content Version: 11.1  © 2451-9185 [employer]. Care instructions adapted under license by [employer] (which disclaims liability or warranty for this information). If you have questions about a medical condition or this instruction, always ask your healthcare professional. [place] [address] any warranty or liability for your use of this information.

## 2017-01-27 NOTE — PROGRESS NOTES
Dual skin assessment completed with Esperanza. Has open diabetic foot ulcer on the Left great toe with dressing intact.

## 2017-01-27 NOTE — INTERVAL H&P NOTE
H&P Update: Lisa Guillaume was seen and examined. History and physical has been reviewed. The patient has been examined.  There have been no significant clinical changes since the completion of the originally dated History and Physical.    Signed By: Natalia Hills MD     January 27, 2017 11:14 AM

## 2017-01-27 NOTE — PROGRESS NOTES
TRANSFER - OUT REPORT:    Verbal report given to [address] on [facility]  being transferred to [facility] (unit) for routine progression of care       Report consisted of patients Situation, Background, Assessment and   Recommendations(SBAR). Information from the following report(s) SBAR, Procedure Summary and Recent Results was reviewed with the receiving nurse. Lines:   Peripheral IV 01/26/17 Right Antecubital (Active)   Site Assessment Clean, dry, & intact 1/27/2017 10:00 AM   Phlebitis Assessment 0 1/27/2017 10:00 AM   Infiltration Assessment 0 1/27/2017 10:00 AM   Dressing Status Clean, dry, & intact 1/27/2017 10:00 AM   Dressing Type Tape;Transparent 1/27/2017 10:00 AM   Hub Color/Line Status Infusing 1/27/2017 10:00 AM        Opportunity for questions and clarification was provided.       Patient transported with:   [employer]

## 2017-01-27 NOTE — H&P (VIEW-ONLY)
[facility] Consult Note       Consulting GI Physician: Dr. Terence Paget    Referring Physician:  Dr. Leatha Douglas in [facility] ER    Consult Date:  1/26/2017    Admit Date:  1/26/2017    Chief Complaint:  GI bleed    Subjective:     History of Present Illness:  Patient is a  80 y.o. male with PMH of (but not limited to) A fib (on  mg only), CAD s/p CABG 2007, carotid artery stenosis, CKD, DM , CHF, HLD, HTN, COPD not on O2, ROBERTO with CPAP and seizure disorder who is being seen in consult at the request of Dr. Leatha Douglas for GI bleed. He was discharged from this hospital 1/25/17 after addmission from 1/22/17 to 1/25/17, then seen in consultation 1/22/17 for GIB after pt c/o \"dark tarry stool in his bed. \" Similar incident in 2015, but with more BRRB and he was on warfarin at the time. EGD on 1/23/15 by Dr. Daniel Mayorga revealed a few small gastric erosions, felt not likely the cause of his bleeding. Colonoscopy on 1/22/15 by Dr. Pilar Gallagher revealed extensive diverticulosis without active bleeding. He had recurrent bleeding with a drop in his Hgb and underwent repeat EGD on 5/6/15 by Dr. Glen Coronado with friable gastric mucosa. Pillcam study was suggested, but not done. His Hgb at time of his discharge on 1/25/17 was 11.7, (11.3,11.8, 11.3, 11.6, 12.3,12.4,12.6, 13.3, 13.5) (14.1 10/2016). EGD 1/22 as outlined below showed gastritis, , Duodenitis. He returned to [facility] ER today with reports of recurrent bloody bowel movement at least 2-3x since hospital discharge. In [facility] ED he was reported to have a rectal exam that revealed guaiac positive stool. He reports to me a large \"black\" stool that was difficult to clean at 1AM and 7AM this morning. He has remained off of his ASA. He denies use of NSAIDs or ETOH. He denies use of Iron or Pepto Bismol. He denies any associated symptoms such as N/V, heartburn, abdominal pain, chest pain.   He has shortness of breath at baseline with his known underlying COPD and denies any worsened breathing. Hgb returned at 11.6. Esophagogastroduodenoscopy 1/22/2017  INDICATION:  1. GI Bleed/Melena  POSTPROCEDURE DIAGNOSIS:  1. Gastritis  2. Gastric ulcer  3. Duodenitis  MEDICATIONS ADMINISTERED: MAC. Further details per anesthesia note. INSTRUMENT: [id number]  PROCEDURE: After obtaining informed consent, the patient was placed in the left lateral position and sedated. The endoscope was advanced under direct vision without difficulty. The esophagus, stomach (including retroflexed views) and duodenum were evaluated. The patient was taken to the recovery area in stable condition. FINDINGS:  ESOPHAGUS: Normal exam.  STOMACH: 5-8 mm clean-based ulcer in antrum. Cold-forceps biopsies for pathology. No evidence of active or recent bleeding. Mild to moderate erosive gastritis of antrum. Mild gastritis of body of stomach. Cold-forceps biopsies for pathology. DUODENUM: Minimal duodenitis of bulb. Otherwise normal exam of 1st, 2nd, and 3rd portions of duodenum. No evidence of active or recent bleeding. Estimated blood loss: 0-minimal   Complications: none  GASTRIC BIOPSIES: GASTRIC MUCOSA WITH CHRONIC REACTIVE AND REPARATIVE CHANGES. FOCALLY PROMINENT LAMINA PROPRIA LYMPHOID AGGREGATES PRESENT. NO DEFINITIVE HELICOBACTER PYLORI MICROORGANISMS IDENTIFIED. PMH:  Past Medical History   Diagnosis Date    Atopic dermatitis 11/21/2012    Atrial fibrillation (HCC)     CAD (coronary artery disease)     Carotid stenosis, bilateral 11/21/2012     50-79%  3-2010    1. Carotid Doppler (6/1/07): Greater than 70% stenosis in proximal LICA. 50% stenosis in right ICA. 2.  CTA of neck (3/15/10): Occluded distal segments of the vertebral arteries bilaterally. Atherosclerosis of the carotid bulbs bilaterally with a 50% stenosis on the left and a stenosis of less than 30% on the right. Small nodule in the right upper lobe near the apex.  3. CTA (8/29/13):  Less than 30% diameter stenosis of the cervical internal carotid arteries bilaterally.  CHF (congestive heart failure) ([address] 11/21/2012    Chronic kidney disease      hx elevated labs    Chronic obstructive pulmonary disease (HCC)     Diabetes (HCC)     Diastolic CHF, acute on chronic ([address] 9/12/2015     1. Echo (9/11/15) : EF 55-60%. Mild LVH. Moderate biatrial enlargement. Moderate mitral/tricuspid regurgitation.  Failed CABG (coronary artery bypass graft) 11/21/2012    GI bleed 1/2015     Hospitalized [facility]    Gout 11/21/2012    History of tobacco use     [place] (hard of hearing)     Hypercholesterolemia     Hypertension     Hyperuricemia 11/21/2012    Morbid obesity ([address]     PAD (peripheral artery disease) ([facility] 11/21/2012     1. Bilateral proximal common iliac PCI (2/21/08):  8.0 X 100 mm Cordis smart stent on right and 10 X 40 mm cordis smart stent on right. Both inflated to 7.0 mm.  Poor historian     Radiologic findings of lung field, abnormal 10/31/2016     1. CT of chest  (11/24/10): Multiple small nodules in the right lobe and stable interstitial prominence. Consistent with chronic lung disease. No evidence of malignancy.  Seizure disorder ([facility] 8/5/2013    Shortness of breath dyspnea 8/5/2013    Thyroid disease     Unspecified sleep apnea        PSH:  Past Surgical History   Procedure Laterality Date    Pr cardiac surg procedure unlist       cath 5/07,cabg 6/07,lexiscan cardiolite 11/10    Hx coronary artery bypass graft  06-    Hx cataract removal Left 2003     os    Hx heart catheterization       left-05-, 01-    Hx coronary stent placement  02-     bilateral iliac artery PCI and stents    Hx heent  1970s     neck lipoma    Hx colonoscopy         Allergies:  No Known Allergies    Home Medications:  Prior to Admission medications    Medication Sig Start Date End Date Taking? Authorizing Provider   aspirin (ASPIRIN) 325 mg tablet Take 1 Tab by mouth daily.  1/30/17   Azucena Elders DO Dave   pantoprazole (PROTONIX) 40 mg tablet Take 1 Tab by mouth Before breakfast and dinner. 1/25/17   Sarita Dai DO   HYDROcodone-acetaminophen [facility])  mg tablet Take one tab by mouth every 4-6 hours as need for pain. 12/13/16   Maxime Romano MD   oxyCODONE IR (OXY-IR) 15 mg immediate release tablet Take 1 tablet up to 4 times daily as needed for pain. 12/13/16   Maxime Romano MD   furosemide (LASIX) 20 mg tablet Take 40 mg by mouth as needed. Historical Provider   ipratropium-albuterol (COMBIVENT RESPIMAT)  mcg/actuation inhaler Take 1 Puff by inhalation every six (6) hours. Historical Provider   LORazepam (ATIVAN) 1 mg tablet Take 1 Tab by mouth two (2) times daily as needed for Anxiety. Max Daily Amount: 2 mg. 9/20/16   Amanda Vallejo DO   clotrimazole-betamethasone (LOTRISONE) topical cream Apply sparingly to the affected areas twice daily for up to 2 weeks. 8/11/16   Maxime Romano MD   levothyroxine (SYNTHROID) 50 mcg tablet Take 1 Tab by mouth Daily (before breakfast). 7/7/16   Maxime Romano MD   magnesium oxide (MAG-OX) 400 mg tablet Take 1 Tab by mouth daily. 7/7/16   Maxime Romano MD   metoprolol succinate (TOPROL-XL) 25 mg XL tablet Pt takes 1/2 tab po daily. 7/7/16   Maxime Romano MD   fluticasone (FLONASE) 50 mcg/actuation nasal spray 2 Sprays by Both Nostrils route daily. 6/1/16   Maxime Romano MD   allopurinol (ZYLOPRIM) 300 mg tablet Take  by mouth daily. Historical Provider   sertraline (ZOLOFT) 50 mg tablet Take one tablet each evening for anxiety  Indications: GENERALIZED ANXIETY DISORDER 3/30/16   Maxime Romano MD   docusate sodium (STOOL SOFTENER) 100 mg capsule Take 100 mg by mouth as needed. Historical Provider   hydrALAZINE (APRESOLINE) 10 mg tablet Take  by mouth three (3) times daily. Historical Provider   cpap machine kit by Does Not Apply route.  Bilevel 12/8 Historical Provider   isosorbide mononitrate ER (IMDUR) 30 mg tablet Take 1 Tab by mouth daily. 8/11/14   Shiv Donaldson MD   albuterol (PROVENTIL VENTOLIN) 2.5 mg /3 mL (0.083 %) nebulizer solution 1 Vial Via Neb. Qid      Order fax to [facility]  Indications: COPD 12/11/13   James Salinas NP   travoprost (TRAVATAN Z) 0.004 % ophthalmic solution Administer 1 Drop to both eyes two (2) times a day. Historical Provider   glipiZIDE (GLUCOTROL) 5 mg tablet Take 5 mg by mouth two (2) times a day. Historical Provider   K-DUR 20 mEq tablet Take 20 mEq by mouth daily. 1/13/11   Montez Naik MD       Hospital Medications:  Current Facility-Administered Medications   Medication Dose Route Frequency    pantoprazole (PROTONIX) 80 mg in sodium chloride 0.9 % 20 mL injection  80 mg IntraVENous ONCE     Current Outpatient Prescriptions   Medication Sig    [START ON 1/30/2017] aspirin (ASPIRIN) 325 mg tablet Take 1 Tab by mouth daily.  pantoprazole (PROTONIX) 40 mg tablet Take 1 Tab by mouth Before breakfast and dinner.  HYDROcodone-acetaminophen (NORCO)  mg tablet Take one tab by mouth every 4-6 hours as need for pain.  oxyCODONE IR (OXY-IR) 15 mg immediate release tablet Take 1 tablet up to 4 times daily as needed for pain.  furosemide (LASIX) 20 mg tablet Take 40 mg by mouth as needed.  ipratropium-albuterol (COMBIVENT RESPIMAT)  mcg/actuation inhaler Take 1 Puff by inhalation every six (6) hours.  LORazepam (ATIVAN) 1 mg tablet Take 1 Tab by mouth two (2) times daily as needed for Anxiety. Max Daily Amount: 2 mg.  clotrimazole-betamethasone (LOTRISONE) topical cream Apply sparingly to the affected areas twice daily for up to 2 weeks.  levothyroxine (SYNTHROID) 50 mcg tablet Take 1 Tab by mouth Daily (before breakfast).  magnesium oxide (MAG-OX) 400 mg tablet Take 1 Tab by mouth daily.  metoprolol succinate (TOPROL-XL) 25 mg XL tablet Pt takes 1/2 tab po daily.     fluticasone (FLONASE) 50 mcg/actuation nasal spray 2 Sprays by Both Nostrils route daily.  allopurinol (ZYLOPRIM) 300 mg tablet Take  by mouth daily.  sertraline (ZOLOFT) 50 mg tablet Take one tablet each evening for anxiety  Indications: GENERALIZED ANXIETY DISORDER    docusate sodium (STOOL SOFTENER) 100 mg capsule Take 100 mg by mouth as needed.  hydrALAZINE (APRESOLINE) 10 mg tablet Take  by mouth three (3) times daily.  cpap machine kit by Does Not Apply route. Bilevel     isosorbide mononitrate ER (IMDUR) 30 mg tablet Take 1 Tab by mouth daily.  albuterol (PROVENTIL VENTOLIN) 2.5 mg /3 mL (0.083 %) nebulizer solution 1 Vial Via Neb. Qid      Order fax to [facility]  Indications: COPD    travoprost (TRAVATAN Z) 0.004 % ophthalmic solution Administer 1 Drop to both eyes two (2) times a day.  glipiZIDE (GLUCOTROL) 5 mg tablet Take 5 mg by mouth two (2) times a day.  K-DUR 20 mEq tablet Take 20 mEq by mouth daily. Social History:  Social History   Substance Use Topics    Smoking status: Former Smoker     Packs/day: 1.00     Years: 45.00     Types: Cigarettes     Quit date: 1984    Smokeless tobacco: Never Used      Comment: (stopped smoking in )    Alcohol use No     Family History:  Family History   Problem Relation Age of Onset    Heart Attack Mother    Job Carlisle Other Father      old age   Job Carlisle Other Brother      brain aneurysm    Heart Disease Other      2 children  with heart concerns, 36 & 47 yo    Heart Disease Son        Review of Systems:  A detailed 10 system ROS is obtained, with pertinent positives as listed above. All others are negative. Objective:     Physical Exam:  Vitals:  Visit Vitals    /61    Pulse 69    Temp 97.5 °F (36.4 °C)    Resp 16    Ht 5' 10\" (1.778 m)    Wt 114.8 kg (253 lb)    SpO2 94%    BMI 36.3 kg/m2     Gen:  Pt is alert, cooperative, no acute distress. Skin: Face reveal no rashes.    HEENT: Sclerae anicteric. Cardiovascular: Regular rate and rhythm. Respiratory:  Comfortable breathing with no accessory muscle use. Clear breath sounds anteriorly with no wheezes, rales, or rhonchi. GI:  Abdomen nondistended,Obese, and Nontender. Normal active bowel sounds. No masses palpable. Rectal:  Deferred  Neurological:  Gross memory appears intact. Patient is alert     Laboratory:    Recent Labs      01/26/17   1005  01/25/17   0508  01/24/17   2111  01/24/17   1422  01/24/17   0639  01/23/17   2155  01/23/17   1439   WBC  7.0   --    --    --   7.2   --    --    HGB  11.6*  11.7*  11.3*  11.8*  11.3*  11.6*  12.3*   HCT  36.5*  36.4*  35.4*  36.6*  34.5*  36.2*  38.3*   PLT  172   --    --    --   139*   --    --    MCV  86.1   --    --    --   85.4   --    --    NA  140   --    --    --    --    --    --    K  4.1   --    --    --    --    --    --    CL  103   --    --    --    --    --    --    CO2  28   --    --    --    --    --    --    BUN  27*   --    --    --    --    --    --    CREA  1.70*   --    --    --    --    --    --    CA  8.5   --    --    --    --    --    --    GLU  182*   --    --    --    --    --    --    AP  76   --    --    --    --    --    --    SGOT  20   --    --    --    --    --    --    ALT  15   --    --    --    --    --    --    TBILI  0.7   --    --    --    --    --    --    ALB  3.2   --    --    --    --    --    --    TP  7.5   --    --    --    --    --    --    PTP  11.5   --    --    --    --    --    --    INR  1.1   --    --    --    --    --    --    APTT  28.5   --    --    --    --    --    --           Assessment:     Active Problems:   GI Bleed     80 y.o. male with PMH of (but not limited to) A fib (on  mg only), CAD s/p CABG 2007, carotid artery stenosis, CKD, DM , CHF, HLD, HTN, COPD not on O2, ROBERTO with CPAP and seizure disorder who is being seen in consult at the request of Dr. Lis Hooker for GI bleed.  S/p recent admission 1/22/17 to 1/25/17 for GI bleed with EGD that was significant only for 5-8 mm clean-based ulcer in antrum, Mild to moderate erosive gastritis of antrum, Mild gastritis of body of stomach, and Minimal duodenitis of bulb with unremarkable gastric biopsies and no evidence for H.pylori. EGD on 1/23/15 by Dr. Veronica Davenport revealed a few small gastric erosions, felt not likely the cause of his bleeding. Colonoscopy on 1/22/15 by Dr. Brando Vergara revealed extensive diverticulosis without active bleeding. Repeat EGD on 5/6/15 by Dr. Jewels Collins with friable gastric mucosa. Plan:     - Will plan Colonoscopy +/- repeat EGD tomorrow to see if we can identify source of GI bleeding. Based upon the results he would benefit from small bowel evaluation.   - PPI therapy with IV Protonix 40mg q12hrs. - Monitor for evidence of recurrent GI bleeding.   - Follow H/H, transfuse as needed. Further recommendations will be based upon findings on endoscopic evaluation and pt clinical course. Amber Barrios PA-C  Patient is seen and examined in collaboration with Dr. Brigido Cannon. Assessment and plan as per Dr. Brigido Cannon.

## 2017-01-27 NOTE — ADT AUTH CERT NOTES
R1 PAS RECOMMENDATION: Dr. Ratna Padilla with R1              Physician Advisor Notes              Dulce Ren is a 80-year-old male who presented to the ED on 1/22/17, with care beginning at approximately 12 PM, with complaints of rectal bleeding; he had no abdominal pain, nausea, vomiting, or fevers. His past medical/surgical history includes atrial fibrillation, coronary artery disease, failed CABG, carotid stenosis, chronic kidney disease, diabetes, diastolic heart failure, gout, hyperlipidemia, chronic obstructive pulmonary disease, seizure. He lives at home. His home medications include aspirin, Lasix, Ativan, metformin, levothyroxine, Zoloft, glipizide, albuterol, metoprolol. Vitals on presentation: T 98.5, /72, HR 79, R 18, SpO2 94%. On physical exam, he was alert and oriented, with clear lungs, regular heart rhythm, no abdominal tenderness; he had melenic heme positive stool. Pertinent labs: WBC 9.0, hemoglobin 13.5, platelets 423, Na 721, K 3.8, BUN 25, creatinine 1.54, INR 1.0, glucose 339. He was admitted to the hospital as an inpatient on 1/22/17 at 13:25 PM (and subsequently changed to outpatient status on 1/25/17) with the diagnosis of upper GI bleed and uncontrolled diabetes, with plan including serial hemoglobin, gastroenterology consultation, Protonix IV drip, insulin, and monitoring. GI consultant recommended to continue IV Protonix, NPO for EGD, consider colonoscopy if negative EGD, serial hemoglobin and transfuse as needed. EGD on 1/22/17 at 3 PM showed gastritis, gastric ulcer and duodenitis without active bleeding. Subsequent GI recommendations were for oral Protonix, clear liquid diet, minimize NSAIDs use, and nuclear bleeding scan if has significant GI bleeding overnight.  On 1/23/17, vitals stable, melena was improving, hemoglobin levels were 12.4, 12.3, and 11.6; GI consultant recommended continued monitoring for evidence of recurrent GI bleeding, follow serial hemoglobin every 8 hours, transfuse for goal hemoglobin 8-9, and advance diet gradually. On 1/24/17, vitals stable; the patient tolerating clear liquids, appeared agitated, with small smear of melanotic stool, hemoglobin 11.3 to 11.8; attending noted possible discharge in AM. On 1/25/17, the patient reported very small line of blood smear on bed, with no bowel movement. There are no updated notes and care plan. Per MAR review, the patient has received oral Protonix since 1/23/17.                In summary, Obi Cruz is a 60-year-old male with a history of atrial fibrillation, coronary artery disease, failed CABG, carotid stenosis, chronic kidney disease, diabetes, diastolic heart failure, hyperlipidemia, COPD, and seizure who presented to the ED on 1/22/17 with rectal bleeding. He was admitted to the hospital as an inpatient on 1/22/17 at 13:25 PM for management of suspected upper GI bleed. The patient had medical necessity for hospitalization on 1/22/17 due to acute bleeding requiring GI consultation, IV Protonix, further diagnostic testing, and monitoring. On day 2, this patient with severe comorbidities and high risk of poor outcome had declining hemoglobin levels warranting ongoing acute hospitalization for a second midnight on 1/23/17 based on the need for frequent laboratory testing and close clinical monitoring. The available documentation supports a greater than 2 midnight medically necessary hospital stay at the inpatient level.                Therefore, we recommend admitting Obi Cruz as an inpatient. INPATIENT.

## 2017-01-27 NOTE — PROGRESS NOTES
TRANSFER - IN REPORT:    Verbal report received from Leopoldo Stanton on [facility]  being received from Ed for routine progression of care      Report consisted of patients Situation, Background, Assessment and   Recommendations(SBAR). Information from the following report(s) SBAR was reviewed with the receiving nurse. Opportunity for questions and clarification was provided. Assessment completed upon patients arrival to unit and care assumed.

## 2017-01-27 NOTE — ANESTHESIA PREPROCEDURE EVALUATION
Anesthetic History               Review of Systems / Medical History  Patient summary reviewed, nursing notes reviewed and pertinent labs reviewed    Pulmonary    COPD: moderate    Sleep apnea           Neuro/Psych              Cardiovascular    Hypertension: well controlled  Valvular problems/murmurs: mitral insufficiency    CHF (Compensated)  Dysrhythmias : atrial fibrillation  CABG (2006)    Exercise tolerance: <4 METS     GI/Hepatic/Renal         Renal disease: CRI      Comments: Upper GI hemorrhage Endo/Other    Diabetes: poorly controlled, type 2  Hypothyroidism: well controlled  Obesity     Other Findings   Comments: Gout           Physical Exam    Airway  Mallampati: II  TM Distance: > 6 cm  Neck ROM: decreased range of motion, short neck   Mouth opening: Normal     Cardiovascular    Rhythm: irregular  Rate: normal         Dental    Dentition: Edentulous     Pulmonary  Breath sounds clear to auscultation               Abdominal  GI exam deferred       Other Findings            Anesthetic Plan    ASA: 4  Anesthesia type: total IV anesthesia            Anesthetic plan and risks discussed with: Patient and Spouse

## 2017-01-27 NOTE — ANESTHESIA POSTPROCEDURE EVALUATION
Post-Anesthesia Evaluation and Assessment    Patient: Roge Howell MRN: [id number]  SSN: [id number]    YOB: 1932  Age: 80 y.o. Sex: male       Cardiovascular Function/Vital Signs  Visit Vitals    /65    Pulse 67    Temp 36.5 °C (97.7 °F)    Resp 16    Ht 5' 10\" (1.778 m)    Wt 117.2 kg (258 lb 6.4 oz)    SpO2 97%    BMI 37.08 kg/m2       Patient is status post total IV anesthesia anesthesia for Procedure(s):  ESOPHAGOGASTRODUODENOSCOPY (EGD)  COLONOSCOPY  BMI 36 TO BE ADMITTED 1/26/17. Nausea/Vomiting: None    Postoperative hydration reviewed and adequate. Pain:  Pain Scale 1: Numeric (0 - 10) (01/27/17 0959)  Pain Intensity 1: 5 (01/27/17 0959)   Managed    Neurological Status: At baseline    Mental Status and Level of Consciousness: Arousable    Pulmonary Status:   O2 Device: Nasal cannula (01/27/17 1209)   Adequate oxygenation and airway patent    Complications related to anesthesia: None    Post-anesthesia assessment completed.  No concerns    Signed By: Shady Turner MD     January 27, 2017

## 2017-01-27 NOTE — PROGRESS NOTES
TRANSFER - IN REPORT:    Verbal report received from [facility], [facility] (name) on Halina Marks  being received from room 514 (unit) for ordered procedure. Report consisted of patients Situation, Background, Assessment and   Recommendations(SBAR). Information from the following report(s) SBAR was reviewed with the primary nurse. Opportunity for questions and clarification was provided. Procedure is scheduled for this afternoon.

## 2017-01-27 NOTE — PROGRESS NOTES
TRANSFER - IN REPORT:    Verbal report received from [address] on [facility]  being received from er) for routine progression of care      Report consisted of patients Situation, Background, Assessment and   Recommendations(SBAR). Information from the following report(s) SBAR was reviewed with the receiving nurse. Opportunity for questions and clarification was provided. Assessment to be  completed upon patients arrival to unit and care assumed.

## 2017-01-27 NOTE — PROGRESS NOTES
Cpap is ready in the room for pt, he stated that he was not ready for it yet and he will put it on when ready.

## 2017-01-27 NOTE — PROGRESS NOTES
Care Management Interventions  PCP Verified by CM: Yes  Palliative Care Consult (Criteria: CHF and RRAT>21): Yes  Mode of Transport at Discharge: Other (see comment)  Transition of Care Consult (CM Consult): Other  MyChart Signup: No  Discharge Durable Medical Equipment: No  Health Maintenance Reviewed: Yes  Physical Therapy Consult: No  Occupational Therapy Consult: No  Current Support Network: Other, Lives with Spouse, Own Home  Confirm Follow Up Transport: Family  Plan discussed with Pt/Family/Caregiver: Yes  Freedom of Choice Offered: Yes  Discharge Location  Discharge Placement: Home     Patient was provided his medicare observation and code 40 letter to day.   His wife was not present

## 2017-01-27 NOTE — PROCEDURES
Esophagogastroduodenoscopy    DATE of PROCEDURE: 1/27/2017    MEDICATIONS ADMINISTERED: MAC    INSTRUMENT: GIF    PROCEDURE:  After obtaining informed consent, the patient was placed in the left lateral position and sedated. The endoscope was advanced under direct vision without difficulty. The esophagus, stomach (including retroflexed views) and duodenum were evaluated. The patient was taken to the recovery area in stable condition. FINDINGS:  ESOPHAGUS:  Normal without esophagitis or hernia  STOMACH: 5-8 mm clean-based ulcer in antrum noted as on recent endoscopy without any stigmata of recent bleeding. No significant gastritis   DUODENUM: Normal without ulcerations    Estimated blood loss: 0-minimal     PLAN:  1. Continue PPI therapy  2. Colonoscopy to follow  3.  Repeat EGD in 3 months to confirm healing    Halima Edwards MD  [facility] [place]

## 2017-01-27 NOTE — PROGRESS NOTES
Pt transferred back to room via stretcher by transport tech. VSS on room air. Dr. Stacy Higginbotham spoke to pt and family at bedside.

## 2017-01-27 NOTE — PROCEDURES
COLONOSCOPY    DATE of PROCEDURE: 1/27/2017    MEDICATION:  MAC      INSTRUMENT: PCF    PROCEDURE: After obtaining informed consent, the patient was placed in the left lateral position and sedated. The endoscope was advanced to the cecum where the appendiceal orifice and ileocecal valve were identified. Scope was then advanced to ileum. On withdrawal, the colon was carefully visualized in a 360 degree fashion. Retroflexion was performed in the rectum. The patient was taken to the recovery area in stable condition. FINDINGS:  Rectum: normal  Sigmoid: Extensive diverticulosis with large amount of fecalith  Descending Colon: Poor prep throughout with all stool being brown  Transverse Colon: normal  Ascending Colon: normal  Cecum: Poor Prep that could not be suctioned and irrigated  Terminal ileum: normal with brown stool    Estimated blood loss: 0-minimal         ASSESSMENT/PLAN:  1. Bowel regimen  2. Advance diet as tolerated and check CBC periodically  3.  This was not a screening exam       Rivera Mcnally MD  [facility], [address]

## 2017-01-28 NOTE — PROGRESS NOTES
TRANSFER - IN REPORT:    Verbal report received from Jade Chadwick [id number] on [facility]  being received from GI unit for routine progression of care      Report consisted of patients Situation, Background, Assessment and   Recommendations(SBAR). Information from the following report(s) SBAR, Kardex and MAR was reviewed with the receiving nurse. Opportunity for questions and clarification was provided. Assessment completed upon patients arrival to unit and care assumed.

## 2017-02-01 ENCOUNTER — PATIENT OUTREACH (OUTPATIENT)
Dept: CASE MANAGEMENT | Age: 82
End: 2017-02-01

## 2017-02-01 NOTE — PROGRESS NOTES
CM, patient, and wife spoke. Wife updated CM on office visit today. Wife does report she continues to check patient's blood pressures at home. Patient is checking blood sugars, but not on regular schedule, last checked yesterday and was 140. CM reviewed noted which was to hold asa for a week, wife reported understanding. CM educated wife on the importance of monitoring sugars regularly since regimen has been changed. CM and wife discussed the differences between louann-lax and protonix. Transition of Care Discharge Follow-up Questionnaire   Date/Time of Call: 2/1/17 231pm   What was the patient hospitalized for? Upper GI bleed    Does the patient understand his/her diagnosis and/or treatment and what happened during the hospitalization? Yes    Did the patient receive discharge instructions? Yes    Review any discharge instructions. Ask patient if they understand these. Do they have any questions? Yes    Were home services ordered  No       If so, has the first visit occurred? If not, why? N/A   Was any DME ordered? No    If so, has it been received? If not, why?  N/A         Complete a review of all medications  CM, patient, and wife discussed changes to meds:  Stop ASA; Start Louann-lax   Were all new prescriptions filled? If not, why? Yes   Does the patient understand the purpose and dosing instructions for all medications? Yes, CM explained the patient the importance of drinking water with protonix and louann-lax   Does the patient have any problems in performing ADLs? No, same as previous assessment    Wife does stand by assist            Does the patient have all follow-up appointments scheduled? Has transportation been arranged? 2/1/2017 10:30 AM MD [facility]AR[facility] [address]  Yes, wife transports    Any other questions or concerns expressed by the patient?     Wife reported concerns that the patient was discharged to early from original admission date and was not pleased they had to return   Schedule next appointment with RN Case Manager as appropriate. Yes    OSCAR Call Completed By: Naveed Knutson, MSN, BSN, RN, CDP         This note will not be viewable in [address]

## 2017-02-07 ENCOUNTER — PATIENT OUTREACH (OUTPATIENT)
Dept: CASE MANAGEMENT | Age: 82
End: 2017-02-07

## 2017-02-07 NOTE — PROGRESS NOTES
CM and patient spoke, patient reported he is feeling well and has no complaints. CM spoke to patient's wife. Patient's blood sugars have been 190-200s, with the lowest one being today as 153, all are fasting. CM explained to wife she needs to call PCP office and inform of the recent removal of one of his diabetic meds while in the hospital. Wife has not called cardiologist office yet to follow up on ASA. Patient has had no bleeding. CM gave wife cardiologist office number. CM explained to wife what to say when she calls each office, wife reported understanding. This note will not be viewable in [address]

## 2017-02-15 ENCOUNTER — PATIENT OUTREACH (OUTPATIENT)
Dept: CASE MANAGEMENT | Age: 82
End: 2017-02-15

## 2017-02-15 NOTE — PROGRESS NOTES
CM, patient, and wife who is caregiver spoke. Patient has upcoming appointment with wound MD on Monday. CM and wife discussed appointments set for March, per record. Wife does not recall making an appointment with GI as recommended at last discharge. CM provided wife number again to call GI office for follow up. CM and wife discussed orders for Miralax. Patient denies any recent blood loss. Patient reports taking Protonix as ordered. Patient has started back on ASA with no issues. Wife was not aware of the calls the patient received from the office. CM updated the wife on the calls, patient did not remember to tell wife. CM informed that sugars have been about the same, neither the patient or wife wrote down the numbers. CM again explained the importance of writing down the sugars for the MD to review and make best decision for regimen. CM encouraged patient and wife to start today writing down fasting blood sugars, and be prepared to notify office of Dr. Suze Lara if over 150. CM, patient, and wife completed focused assessments as noted. This note will not be viewable in [address]

## 2017-02-20 ENCOUNTER — PATIENT OUTREACH (OUTPATIENT)
Dept: CASE MANAGEMENT | Age: 82
End: 2017-02-20

## 2017-02-20 NOTE — PROGRESS NOTES
CM reviewing records, and noted the call r/t breathing issues. CM called and spoke with wife and patient. Patient has started back taking Lasix, as he weight did increase a little. Patient now only reports shortness of breath during moderate activity. CM was also going to discuss changes with insurance, however during the call a rep from their insurance arrived to their home. CM requested wife of patient to return call to , when it is determined what they chose to do, so that CM can help facilitate, wife agreed. Patient and wife will transition over to [facility] in order to remain in [facility] network of providers. Patient has received new patient paperwork from GI office. Wife reports sugars over 150, but unsure if patient was taking before or after meals. CM again expressed the importance of having only fasting sugars to determine best DM regimen. This note will not be viewable in [address]

## 2017-02-28 ENCOUNTER — PATIENT OUTREACH (OUTPATIENT)
Dept: CASE MANAGEMENT | Age: 82
End: 2017-02-28

## 2017-03-06 ENCOUNTER — PATIENT OUTREACH (OUTPATIENT)
Dept: CASE MANAGEMENT | Age: 82
End: 2017-03-06

## 2017-03-06 NOTE — PROGRESS NOTES
Wife is caregiver to patient, although patient able to self care. Wife exhibits confusion at times. CHF- Patient's weight is stable and takes Lasix once a day. DM- wife reports she has made the changes to increase the glipizide, and the patient still takes it twice a day, but the patient's sugars remain over 150, up into 209. Patient reports he is doing better with his shoulder. Patient did see wound MD for foot ulcer, which does not requires daily wound care and is not open, but MD did discuss with patient having a small procedure in the office to remove the area causing pressure to the area. CM explained it is best to wait until blood sugars are better under control before that occurs, wife and patient both report understanding. Wife and patient both received new insurance cards with [facility], [employer] informed them patient needs to reschedule ov with pulmonary team.   CM reviewed and updated assessment as well completed focused assessments. CM will reach out to office to notify of concerns of blood sugars. This note will not be viewable in [address]

## 2017-03-06 NOTE — Clinical Note
Good morning, Dr. Maximiliano Roach, it seems the patient is still having issues maintain adequate blood sugar readings, readings are fasting, per patient and are over 150, this morning the wife reported his sugar was 209.

## 2017-03-09 ENCOUNTER — PATIENT OUTREACH (OUTPATIENT)
Dept: CASE MANAGEMENT | Age: 82
End: 2017-03-09

## 2017-03-09 NOTE — PROGRESS NOTES
Ambulatory Care Coordination  Social Work Assessment   Referral from which ANGELA CM: Manasa Hicks   Previously referred? If so, reason and brief outcome No   Reason for current referral: Januvia cost; was told $200 to fill at local pharmacy [facility]) but they only charged $40 yesterday. Income information (if needed): Didnt inquire on household in come yet as may not need if VA is affordable for the medication   Sources of Support: One son but still works, a granddaughter, and a daughter. Had two other children but both have passed. Medication Cost assistance needed? Yes   Referral to CLTC/Medicaid needed? No   Referral to Medicare Extra Help/LIS program needed? No   Small home repair needed? no   MOW referral? No   Any other concerns/questions? CM inquired of other needs and listed a few examples (groceries, other bills, transportation, etc) but pts spouse couldnt think of any. States granddaughter has been helping out quite a bit since spouse fell two weeks ago. Next steps: Pt or pts spouse is to call their pharmacy, [facility], and ask them to send the Januvia rx to the [place] to inquire if will be cheaper. CM and pts spouse made a plan to do this by Tuesday. Pt or Pts spouse will call the VA once rx sent to ask price. If cheaper, will utilize. If not, CM will  complete assistance application. CM will f/u on Tuesday afternoon. CM provided contact information if has questions prior to that time. This note will not be viewable in [address]

## 2017-03-14 ENCOUNTER — PATIENT OUTREACH (OUTPATIENT)
Dept: CASE MANAGEMENT | Age: 82
End: 2017-03-14

## 2017-03-14 NOTE — PROGRESS NOTES
[employer] outreached to pts spouse as planned re: follow up with [employer] for [employer]    Pt's spouse had not contacted yet but said will today. CM advised will contact tmrw or Thursday to inquire about what is learned. This note will not be viewable in [address]

## 2017-03-16 ENCOUNTER — PATIENT OUTREACH (OUTPATIENT)
Dept: CASE MANAGEMENT | Age: 82
End: 2017-03-16

## 2017-03-16 NOTE — PROGRESS NOTES
Social Work CM followed up with pt and spouse as planned re: requesting Januvia rx be sent to [place] from their pharmacy to determine if cost savings. Pt's spouse reported pt has not felt \"up to it\" so this still hasn't been done. CM advised this is something CM can't request on their behalf due to privacy so again encouraged to place the call to both [facility] [address] to have them send over the rx and then to the [place] pharmacy to see mann. Mrs. Derl Lennox said she would try to get pt to do this next week. CM did advise she most likely can call as well as they both use that pharmacy and are . CM did advise can assist with completing an assistance application if the VA is still too high. Pt's spouse and CM agreed to speak in 10 days, to allow time for them to contact, and then will assist from there if able. Pts spouse asked AMANDO ROGER if thought pt would be on this medicine long. CM suggested asking RN CM or office this as can't say but did share that if his blood sugar is doing better, it may be b/c he is taking this new medication so they may in fact want him to stay on for some time. This note will not be viewable in [address]

## 2017-03-20 ENCOUNTER — PATIENT OUTREACH (OUTPATIENT)
Dept: CASE MANAGEMENT | Age: 82
End: 2017-03-20

## 2017-03-20 NOTE — PROGRESS NOTES
CM, patient, and wife spoke. Wife reports she is doing better since her fall, patient has not had any falls. Patient continues with lasix once a day and more if needed based upon daily weights. Patient has not experienced any bleeding since started back on asa. Patient continues with glipizide to 5 mg bid; SITagliptin (JANUVIA) 50 mg tablet. CM and wife spoke about SS/SW CM notes, in regards to being on Januvia for long term. Patient has increased Combivent to 4 times daily 1 puff each time. The overnight on CPAP and room air is still pending. Patient has started with Mucinex 600 mg twice daily. CM and wife discussed in the past the need for the patient to follow up with the [address] [place] [address], but this was never done. CM noted it was again recommended, CM and wife discussed. Wife reported they received [employer] sne[employer] cards with their new insurance and is considering attending the local fitness center for her to walk and the patient to use the warm pool, where he likes to walk around in the pool. CM and wife did discuss the importance of activity r/t chronic disease management. CM completed focused assessments as noted. This note will not be viewable in [address]

## 2017-03-28 ENCOUNTER — PATIENT OUTREACH (OUTPATIENT)
Dept: CASE MANAGEMENT | Age: 82
End: 2017-03-28

## 2017-03-28 NOTE — Clinical Note
FYI only. May not be able to assist with cost of Januvia given VA coverage concern. Will keep you posted.

## 2017-03-28 NOTE — PROGRESS NOTES
[employer] f/u with pt and spouse re: [address] [facility] contact for Sylvia Guerrero. Pt was told by [facility] he would have to call due to the [address] asking too many questions when attempted to send rx over. CM then contacted [facility] pharmacy to inquire about needs for Januva coverage option. CM spoke with rep who advised Januvia is not on their formulary so would require special approval.  Rep shared pt [facility] and would need to bring records from outside provider for review but no guarantee they would approve. If approved however, cost would be $11 for 30 day supply. CM shared this information with pt's spouse who stated she has a few visit summaries from recent appt's with PCP she can bring to 5/2 [facility] appt. CM encouraged to do this but did not still possible they may not approve. Pt's spouse has enough Januvia for this week and part of next; CM will call pt/spouse on 4/10 to inquire if [facility] filled Januvia again for discounted rate. If so, will wait for 5/2 appt for approval decision, if not, will assist in completing [employer] application but on application it does state if has VA coverage then cannot assist.    This note will not be viewable in [address]

## 2017-04-03 ENCOUNTER — PATIENT OUTREACH (OUTPATIENT)
Dept: CASE MANAGEMENT | Age: 82
End: 2017-04-03

## 2017-04-03 NOTE — PROGRESS NOTES
CM reviewed record. CM called patient's home and spoke with wife. She reported they were trying to get ready to go to  the patient's new CPAP machine at the DME provider, CM requested wife to return call when they were available, wife agreed. This note will not be viewable in [address]

## 2017-04-10 ENCOUNTER — PATIENT OUTREACH (OUTPATIENT)
Dept: CASE MANAGEMENT | Age: 82
End: 2017-04-10

## 2017-04-10 NOTE — PROGRESS NOTES
Social Work CM followed up with pt's spouse as planned re: [facility] status with local pharmacy. Pt's spouse, Perfecto Acosta, reports they are going to  the prescription from [place] today and take to the [place] to see if they will fill this. CM reviewed what we discussed last call about needing outside providers notes to justify medication need as not on the VA's formulary--unsure if they will do this for them outside of an appt or not. CM asked pt's spouse to call with outcome of attempt to fill. CM reminded of appt on 5/2 with [facility]. Pt's spouse had a question about the pt's blood sugar being around 120 this morning and if that was too low. CM advised best to ask RN CM this and provided contact number. CM will attempt pt/spouse once more if no call back by middle of next week. If VA won't fill, then happy to assist with assistance application but again, on application, states if has VA benefits then ineligible. This note will not be viewable in [address]

## 2017-04-11 ENCOUNTER — PATIENT OUTREACH (OUTPATIENT)
Dept: CASE MANAGEMENT | Age: 82
End: 2017-04-11

## 2017-04-20 ENCOUNTER — PATIENT OUTREACH (OUTPATIENT)
Dept: CASE MANAGEMENT | Age: 82
End: 2017-04-20

## 2017-04-20 NOTE — PROGRESS NOTES
[employer] outreached to pt's spouse, Sariah Gamble, for update on [place] prescription status. Mrs. Barker Feng reports they took the rx for trulicity to the [place] after last spoke, but without an appt they didn't see a provider. A nurse took a picture of the rx and advised she'd show to the doctor and they'd call the pt to see if they will fill. They have not been called as of yet. Mrs Barker Feng also reported they now have insurance with [facility] rather than [facility] and is going to see if that is less expensive at their local pharmacy today or tmrw. CM advised to 1) f/u on [facility] costs at local pharmacy and 2) keep 5/2 appt with [facility] and take the rx again to that appt with the visit summary print outs from [place]. They should be able to tell if will fill at that appt. CM to f/u for final time on afternoon of 5/2 to learn outcome and if can be of any other assistance. This note will not be viewable in [address]

## 2017-05-02 ENCOUNTER — PATIENT OUTREACH (OUTPATIENT)
Dept: CASE MANAGEMENT | Age: 82
End: 2017-05-02

## 2017-05-02 NOTE — PROGRESS NOTES
Social Work CM outreached to pt's spouse as scheduled. CM inquired about [facility] appt and if they reviewed the [employer] costs. . Shakira Junior advised the appt is tmrw morning, not today as initially thought. CM advised of PTO remaining this week but will f/u in one week to see outcome of visit and if medication will be covered. This note will not be viewable in [address]

## 2017-05-10 ENCOUNTER — PATIENT OUTREACH (OUTPATIENT)
Dept: CASE MANAGEMENT | Age: 82
End: 2017-05-10

## 2017-05-10 NOTE — PROGRESS NOTES
Social Work [address] outreached to pt's spouse as planned re: VA appt and Januvia coverage. Mrs. Samia Robbins reported the [place] will not cover the Januvia but prescribed something in it's place; spouse could not recall the name and reports they will be mailing pt all the prescriptions he has that the [place] formulary will cover to help with costs. CM encouraged pt's spouse to take their print out they received after his appt yesterday to PCP's appt tmrw afternoon so they can review and be aware of what patient is actually taking. Due to the pt switching to a more affordable option, CM will cease outreach attempts to help. CM will advise PCP of todays call so aware for tmrw's visit. CM ending goal and removing from caseload. This note will not be viewable in [address]

## 2017-08-04 ENCOUNTER — HOSPITAL ENCOUNTER (INPATIENT)
Age: 82
LOS: 2 days | Discharge: HOME | DRG: 189 | End: 2017-08-06
Attending: EMERGENCY MEDICINE | Admitting: INTERNAL MEDICINE
Payer: MEDICARE

## 2017-08-04 ENCOUNTER — APPOINTMENT (OUTPATIENT)
Dept: GENERAL RADIOLOGY | Age: 82
DRG: 189 | End: 2017-08-04
Attending: EMERGENCY MEDICINE
Payer: MEDICARE

## 2017-08-04 DIAGNOSIS — J44.1 ACUTE EXACERBATION OF CHRONIC OBSTRUCTIVE PULMONARY DISEASE (COPD) (HCC): Primary | ICD-10-CM

## 2017-08-04 DIAGNOSIS — L03.116 CELLULITIS OF LEFT LOWER EXTREMITY: ICD-10-CM

## 2017-08-04 DIAGNOSIS — M10.039 ACUTE IDIOPATHIC GOUT OF WRIST, UNSPECIFIED LATERALITY: ICD-10-CM

## 2017-08-04 PROBLEM — J96.01 ACUTE RESPIRATORY FAILURE WITH HYPOXIA (HCC): Status: ACTIVE | Noted: 2017-08-04

## 2017-08-04 LAB
ALBUMIN SERPL BCP-MCNC: 3.1 G/DL (ref 3.2–4.6)
ALBUMIN/GLOB SERPL: 0.7 {RATIO} (ref 1.2–3.5)
ALP SERPL-CCNC: 92 U/L (ref 50–136)
ALT SERPL-CCNC: 16 U/L (ref 12–65)
ANION GAP BLD CALC-SCNC: 7 MMOL/L (ref 7–16)
AST SERPL W P-5'-P-CCNC: 27 U/L (ref 15–37)
BASOPHILS # BLD AUTO: 0 K/UL (ref 0–0.2)
BASOPHILS # BLD: 0 % (ref 0–2)
BILIRUB SERPL-MCNC: 1.9 MG/DL (ref 0.2–1.1)
BUN SERPL-MCNC: 45 MG/DL (ref 8–23)
CALCIUM SERPL-MCNC: 8.4 MG/DL (ref 8.3–10.4)
CHLORIDE SERPL-SCNC: 104 MMOL/L (ref 98–107)
CO2 SERPL-SCNC: 27 MMOL/L (ref 21–32)
CREAT SERPL-MCNC: 2.17 MG/DL (ref 0.8–1.5)
DIFFERENTIAL METHOD BLD: ABNORMAL
EOSINOPHIL # BLD: 0.2 K/UL (ref 0–0.8)
EOSINOPHIL NFR BLD: 1 % (ref 0.5–7.8)
ERYTHROCYTE [DISTWIDTH] IN BLOOD BY AUTOMATED COUNT: 18 % (ref 11.9–14.6)
GLOBULIN SER CALC-MCNC: 4.6 G/DL (ref 2.3–3.5)
GLUCOSE BLD STRIP.AUTO-MCNC: 252 MG/DL (ref 65–100)
GLUCOSE SERPL-MCNC: 170 MG/DL (ref 65–100)
HCT VFR BLD AUTO: 40.7 % (ref 41.1–50.3)
HGB BLD-MCNC: 13.5 G/DL (ref 13.6–17.2)
IMM GRANULOCYTES # BLD: 0 K/UL (ref 0–0.5)
IMM GRANULOCYTES NFR BLD AUTO: 0.3 % (ref 0–5)
LACTATE BLD-SCNC: 1 MMOL/L (ref 0.5–1.9)
LYMPHOCYTES # BLD AUTO: 6 % (ref 13–44)
LYMPHOCYTES # BLD: 0.7 K/UL (ref 0.5–4.6)
MCH RBC QN AUTO: 25.9 PG (ref 26.1–32.9)
MCHC RBC AUTO-ENTMCNC: 33.2 G/DL (ref 31.4–35)
MCV RBC AUTO: 78 FL (ref 79.6–97.8)
MONOCYTES # BLD: 0.8 K/UL (ref 0.1–1.3)
MONOCYTES NFR BLD AUTO: 7 % (ref 4–12)
NEUTS SEG # BLD: 10 K/UL (ref 1.7–8.2)
NEUTS SEG NFR BLD AUTO: 86 % (ref 43–78)
PLATELET # BLD AUTO: 145 K/UL (ref 150–450)
PMV BLD AUTO: ABNORMAL FL (ref 10.8–14.1)
POTASSIUM SERPL-SCNC: 5.3 MMOL/L (ref 3.5–5.1)
PROCALCITONIN SERPL-MCNC: 0.3 NG/ML
PROT SERPL-MCNC: 7.7 G/DL (ref 6.3–8.2)
RBC # BLD AUTO: 5.22 M/UL (ref 4.23–5.67)
SODIUM SERPL-SCNC: 138 MMOL/L (ref 136–145)
TROPONIN I SERPL-MCNC: <0.02 NG/ML (ref 0.02–0.05)
WBC # BLD AUTO: 11.7 K/UL (ref 4.3–11.1)

## 2017-08-04 PROCEDURE — 74011250636 HC RX REV CODE- 250/636: Performed by: INTERNAL MEDICINE

## 2017-08-04 PROCEDURE — 74011000258 HC RX REV CODE- 258: Performed by: EMERGENCY MEDICINE

## 2017-08-04 PROCEDURE — 74011000250 HC RX REV CODE- 250: Performed by: EMERGENCY MEDICINE

## 2017-08-04 PROCEDURE — 71010 XR CHEST PORT: CPT

## 2017-08-04 PROCEDURE — 74011250636 HC RX REV CODE- 250/636: Performed by: EMERGENCY MEDICINE

## 2017-08-04 PROCEDURE — 85025 COMPLETE CBC W/AUTO DIFF WBC: CPT | Performed by: EMERGENCY MEDICINE

## 2017-08-04 PROCEDURE — 96365 THER/PROPH/DIAG IV INF INIT: CPT | Performed by: EMERGENCY MEDICINE

## 2017-08-04 PROCEDURE — 94640 AIRWAY INHALATION TREATMENT: CPT

## 2017-08-04 PROCEDURE — 84484 ASSAY OF TROPONIN QUANT: CPT | Performed by: EMERGENCY MEDICINE

## 2017-08-04 PROCEDURE — 94760 N-INVAS EAR/PLS OXIMETRY 1: CPT

## 2017-08-04 PROCEDURE — 74011000250 HC RX REV CODE- 250: Performed by: INTERNAL MEDICINE

## 2017-08-04 PROCEDURE — 84145 PROCALCITONIN (PCT): CPT | Performed by: EMERGENCY MEDICINE

## 2017-08-04 PROCEDURE — 99285 EMERGENCY DEPT VISIT HI MDM: CPT | Performed by: EMERGENCY MEDICINE

## 2017-08-04 PROCEDURE — 77010033678 HC OXYGEN DAILY

## 2017-08-04 PROCEDURE — 83605 ASSAY OF LACTIC ACID: CPT

## 2017-08-04 PROCEDURE — 65660000000 HC RM CCU STEPDOWN

## 2017-08-04 PROCEDURE — 80053 COMPREHEN METABOLIC PANEL: CPT | Performed by: EMERGENCY MEDICINE

## 2017-08-04 PROCEDURE — 74011250637 HC RX REV CODE- 250/637: Performed by: INTERNAL MEDICINE

## 2017-08-04 PROCEDURE — 74011636637 HC RX REV CODE- 636/637: Performed by: INTERNAL MEDICINE

## 2017-08-04 PROCEDURE — 96375 TX/PRO/DX INJ NEW DRUG ADDON: CPT | Performed by: EMERGENCY MEDICINE

## 2017-08-04 PROCEDURE — 82962 GLUCOSE BLOOD TEST: CPT

## 2017-08-04 RX ORDER — ONDANSETRON 2 MG/ML
4 INJECTION INTRAMUSCULAR; INTRAVENOUS
Status: DISCONTINUED | OUTPATIENT
Start: 2017-08-04 | End: 2017-08-06 | Stop reason: HOSPADM

## 2017-08-04 RX ORDER — SERTRALINE HYDROCHLORIDE 50 MG/1
50 TABLET, FILM COATED ORAL DAILY
Status: DISCONTINUED | OUTPATIENT
Start: 2017-08-05 | End: 2017-08-06 | Stop reason: HOSPADM

## 2017-08-04 RX ORDER — HYDRALAZINE HYDROCHLORIDE 10 MG/1
10 TABLET, FILM COATED ORAL 2 TIMES DAILY
Status: DISCONTINUED | OUTPATIENT
Start: 2017-08-05 | End: 2017-08-06 | Stop reason: HOSPADM

## 2017-08-04 RX ORDER — SODIUM CHLORIDE 0.9 % (FLUSH) 0.9 %
5-10 SYRINGE (ML) INJECTION EVERY 8 HOURS
Status: DISCONTINUED | OUTPATIENT
Start: 2017-08-04 | End: 2017-08-06 | Stop reason: HOSPADM

## 2017-08-04 RX ORDER — OXYCODONE HYDROCHLORIDE 15 MG/1
15 TABLET ORAL
Status: DISCONTINUED | OUTPATIENT
Start: 2017-08-04 | End: 2017-08-06 | Stop reason: HOSPADM

## 2017-08-04 RX ORDER — IPRATROPIUM BROMIDE 0.5 MG/2.5ML
0.5 SOLUTION RESPIRATORY (INHALATION)
Status: DISCONTINUED | OUTPATIENT
Start: 2017-08-04 | End: 2017-08-06 | Stop reason: HOSPADM

## 2017-08-04 RX ORDER — PANTOPRAZOLE SODIUM 40 MG/1
40 TABLET, DELAYED RELEASE ORAL
Status: DISCONTINUED | OUTPATIENT
Start: 2017-08-05 | End: 2017-08-06 | Stop reason: HOSPADM

## 2017-08-04 RX ORDER — BUDESONIDE 0.5 MG/2ML
500 INHALANT ORAL
Status: DISCONTINUED | OUTPATIENT
Start: 2017-08-05 | End: 2017-08-06 | Stop reason: HOSPADM

## 2017-08-04 RX ORDER — ENOXAPARIN SODIUM 100 MG/ML
30 INJECTION SUBCUTANEOUS EVERY 24 HOURS
Status: DISCONTINUED | OUTPATIENT
Start: 2017-08-04 | End: 2017-08-06 | Stop reason: HOSPADM

## 2017-08-04 RX ORDER — GABAPENTIN 100 MG/1
100 CAPSULE ORAL 3 TIMES DAILY
Status: DISCONTINUED | OUTPATIENT
Start: 2017-08-04 | End: 2017-08-06 | Stop reason: HOSPADM

## 2017-08-04 RX ORDER — LEVOFLOXACIN 5 MG/ML
500 INJECTION, SOLUTION INTRAVENOUS
Status: DISCONTINUED | OUTPATIENT
Start: 2017-08-04 | End: 2017-08-06 | Stop reason: HOSPADM

## 2017-08-04 RX ORDER — HYDROCODONE BITARTRATE AND ACETAMINOPHEN 10; 325 MG/1; MG/1
1 TABLET ORAL
Status: DISCONTINUED | OUTPATIENT
Start: 2017-08-04 | End: 2017-08-06 | Stop reason: HOSPADM

## 2017-08-04 RX ORDER — SODIUM CHLORIDE 0.9 % (FLUSH) 0.9 %
5-10 SYRINGE (ML) INJECTION AS NEEDED
Status: DISCONTINUED | OUTPATIENT
Start: 2017-08-04 | End: 2017-08-06 | Stop reason: HOSPADM

## 2017-08-04 RX ORDER — METOPROLOL SUCCINATE 25 MG/1
25 TABLET, EXTENDED RELEASE ORAL DAILY
Status: DISCONTINUED | OUTPATIENT
Start: 2017-08-05 | End: 2017-08-06 | Stop reason: HOSPADM

## 2017-08-04 RX ORDER — CEPHALEXIN 500 MG/1
500 CAPSULE ORAL 3 TIMES DAILY
Qty: 21 CAP | Refills: 0 | Status: SHIPPED | OUTPATIENT
Start: 2017-08-04 | End: 2017-08-10

## 2017-08-04 RX ORDER — INSULIN LISPRO 100 [IU]/ML
INJECTION, SOLUTION INTRAVENOUS; SUBCUTANEOUS
Status: DISCONTINUED | OUTPATIENT
Start: 2017-08-04 | End: 2017-08-06 | Stop reason: HOSPADM

## 2017-08-04 RX ORDER — GUAIFENESIN 100 MG/5ML
81 LIQUID (ML) ORAL DAILY
Status: DISCONTINUED | OUTPATIENT
Start: 2017-08-05 | End: 2017-08-06 | Stop reason: HOSPADM

## 2017-08-04 RX ORDER — ALBUTEROL SULFATE 0.83 MG/ML
2.5 SOLUTION RESPIRATORY (INHALATION)
Status: COMPLETED | OUTPATIENT
Start: 2017-08-04 | End: 2017-08-04

## 2017-08-04 RX ORDER — HYDROMORPHONE HYDROCHLORIDE 1 MG/ML
0.5 INJECTION, SOLUTION INTRAMUSCULAR; INTRAVENOUS; SUBCUTANEOUS
Status: COMPLETED | OUTPATIENT
Start: 2017-08-04 | End: 2017-08-04

## 2017-08-04 RX ORDER — PREDNISONE 5 MG/1
TABLET ORAL
Qty: 21 TAB | Refills: 0 | Status: SHIPPED | OUTPATIENT
Start: 2017-08-04 | End: 2017-08-06

## 2017-08-04 RX ORDER — LEVOFLOXACIN 5 MG/ML
500 INJECTION, SOLUTION INTRAVENOUS EVERY 24 HOURS
Status: DISCONTINUED | OUTPATIENT
Start: 2017-08-04 | End: 2017-08-04 | Stop reason: DRUGHIGH

## 2017-08-04 RX ORDER — LANOLIN ALCOHOL/MO/W.PET/CERES
400 CREAM (GRAM) TOPICAL DAILY
Status: DISCONTINUED | OUTPATIENT
Start: 2017-08-05 | End: 2017-08-06 | Stop reason: HOSPADM

## 2017-08-04 RX ORDER — ALBUTEROL SULFATE 0.83 MG/ML
2.5 SOLUTION RESPIRATORY (INHALATION)
Status: DISCONTINUED | OUTPATIENT
Start: 2017-08-04 | End: 2017-08-05 | Stop reason: ALTCHOICE

## 2017-08-04 RX ORDER — DOCUSATE SODIUM 100 MG/1
100 CAPSULE, LIQUID FILLED ORAL 2 TIMES DAILY
Status: DISCONTINUED | OUTPATIENT
Start: 2017-08-04 | End: 2017-08-06 | Stop reason: HOSPADM

## 2017-08-04 RX ORDER — LEVOTHYROXINE SODIUM 50 UG/1
50 TABLET ORAL
Status: DISCONTINUED | OUTPATIENT
Start: 2017-08-05 | End: 2017-08-06 | Stop reason: HOSPADM

## 2017-08-04 RX ORDER — ISOSORBIDE MONONITRATE 30 MG/1
30 TABLET, EXTENDED RELEASE ORAL DAILY
Status: DISCONTINUED | OUTPATIENT
Start: 2017-08-05 | End: 2017-08-06 | Stop reason: HOSPADM

## 2017-08-04 RX ORDER — ONDANSETRON 2 MG/ML
4 INJECTION INTRAMUSCULAR; INTRAVENOUS
Status: COMPLETED | OUTPATIENT
Start: 2017-08-04 | End: 2017-08-04

## 2017-08-04 RX ORDER — LATANOPROST 50 UG/ML
1 SOLUTION/ DROPS OPHTHALMIC EVERY EVENING
Status: DISCONTINUED | OUTPATIENT
Start: 2017-08-05 | End: 2017-08-06 | Stop reason: HOSPADM

## 2017-08-04 RX ORDER — ENOXAPARIN SODIUM 100 MG/ML
40 INJECTION SUBCUTANEOUS EVERY 24 HOURS
Status: DISCONTINUED | OUTPATIENT
Start: 2017-08-04 | End: 2017-08-04 | Stop reason: DRUGHIGH

## 2017-08-04 RX ORDER — MORPHINE SULFATE 2 MG/ML
2 INJECTION, SOLUTION INTRAMUSCULAR; INTRAVENOUS
Status: DISCONTINUED | OUTPATIENT
Start: 2017-08-04 | End: 2017-08-04 | Stop reason: ALTCHOICE

## 2017-08-04 RX ADMIN — METHYLPREDNISOLONE SODIUM SUCCINATE 40 MG: 40 INJECTION, POWDER, FOR SOLUTION INTRAMUSCULAR; INTRAVENOUS at 22:29

## 2017-08-04 RX ADMIN — CEFAZOLIN SODIUM 1 G: 1 INJECTION, POWDER, FOR SOLUTION INTRAMUSCULAR; INTRAVENOUS at 14:42

## 2017-08-04 RX ADMIN — ALBUTEROL SULFATE 2.5 MG: 2.5 SOLUTION RESPIRATORY (INHALATION) at 23:30

## 2017-08-04 RX ADMIN — IPRATROPIUM BROMIDE 0.5 MG: 0.5 SOLUTION RESPIRATORY (INHALATION) at 23:30

## 2017-08-04 RX ADMIN — ENOXAPARIN SODIUM 30 MG: 30 INJECTION SUBCUTANEOUS at 22:28

## 2017-08-04 RX ADMIN — METHYLPREDNISOLONE SODIUM SUCCINATE 125 MG: 125 INJECTION, POWDER, FOR SOLUTION INTRAMUSCULAR; INTRAVENOUS at 14:42

## 2017-08-04 RX ADMIN — INSULIN LISPRO 6 UNITS: 100 INJECTION, SOLUTION INTRAVENOUS; SUBCUTANEOUS at 22:27

## 2017-08-04 RX ADMIN — LEVOFLOXACIN 500 MG: 5 INJECTION, SOLUTION INTRAVENOUS at 22:29

## 2017-08-04 RX ADMIN — DOCUSATE SODIUM 100 MG: 100 CAPSULE, LIQUID FILLED ORAL at 22:29

## 2017-08-04 RX ADMIN — ALBUTEROL SULFATE 2.5 MG: 2.5 SOLUTION RESPIRATORY (INHALATION) at 13:06

## 2017-08-04 RX ADMIN — ONDANSETRON 4 MG: 2 INJECTION INTRAMUSCULAR; INTRAVENOUS at 14:42

## 2017-08-04 RX ADMIN — Medication 10 ML: at 22:29

## 2017-08-04 RX ADMIN — ALBUTEROL SULFATE 2.5 MG: 2.5 SOLUTION RESPIRATORY (INHALATION) at 21:03

## 2017-08-04 RX ADMIN — GABAPENTIN 100 MG: 100 CAPSULE ORAL at 22:42

## 2017-08-04 RX ADMIN — IPRATROPIUM BROMIDE 0.5 MG: 0.5 SOLUTION RESPIRATORY (INHALATION) at 21:03

## 2017-08-04 RX ADMIN — HYDROMORPHONE HYDROCHLORIDE 0.5 MG: 1 INJECTION, SOLUTION INTRAMUSCULAR; INTRAVENOUS; SUBCUTANEOUS at 14:42

## 2017-08-04 NOTE — ED PROVIDER NOTES
HPI Comments: Patient presents to the emergency department via EMS with a multitude of complaints to include bilateral wrist pain secondary to his gout, increased redness to his left lower extremity with fevers to 100.9 last night, cough productive of yellowish sputum and the worsening shortness of breath since last night. According the patient's spouse patient is too weak to get up or move around and she is too old and weak to care for him. Patient is a 80 y.o. male presenting with shortness of breath. The history is provided by the patient and the spouse. Shortness of Breath   This is a new problem. The average episode lasts 1 day. The problem occurs continuously. The current episode started 12 to 24 hours ago. The problem has not changed since onset. Associated symptoms include a fever (100.9 last night), cough, sputum production, wheezing, orthopnea and leg swelling (chronic, primarily LLE since vein stripping for CABG). Pertinent negatives include no headaches, no coryza, no rhinorrhea, no sore throat, no swollen glands, no ear pain, no neck pain, no hemoptysis, no PND, no chest pain, no syncope, no vomiting, no abdominal pain, no rash, no leg pain and no claudication. He has tried nothing for the symptoms. The treatment provided no relief. He has had prior hospitalizations. He has had prior ED visits. He has had no prior ICU admissions. Associated medical issues include COPD, chronic lung disease, CAD and heart failure. Associated medical issues do not include asthma, pneumonia, PE, past MI, DVT or recent surgery. Past Medical History:   Diagnosis Date    Atopic dermatitis 11/21/2012    Atrial fibrillation (HCC)     CAD (coronary artery disease)     Carotid stenosis, bilateral 11/21/2012    50-79%  3-2010    1. Carotid Doppler (6/1/07): Greater than 70% stenosis in proximal LICA. 50% stenosis in right ICA. 2.  CTA of neck (3/15/10):    Occluded distal segments of the vertebral arteries bilaterally. Atherosclerosis of the carotid bulbs bilaterally with a 50% stenosis on the left and a stenosis of less than 30% on the right. Small nodule in the right upper lobe near the apex. 3. CTA (8/29/13):  Less than 30% diameter stenosis of the cervical internal carotid arteries bilaterally.  CHF (congestive heart failure) ([address] 11/21/2012    Chronic kidney disease     hx elevated labs    Chronic obstructive pulmonary disease (HCC)     Diabetes (HCC)     Diastolic CHF, acute on chronic ([address] 9/12/2015    1. Echo (9/11/15) : EF 55-60%. Mild LVH. Moderate biatrial enlargement. Moderate mitral/tricuspid regurgitation.  Failed CABG (coronary artery bypass graft) 11/21/2012    GI bleed 1/2015    Hospitalized [facility]    Gout 11/21/2012    History of tobacco use     [place] (hard of hearing)     Hypercholesterolemia     Hypertension     Hyperuricemia 11/21/2012    Morbid obesity ([address]     PAD (peripheral artery disease) ([address] 11/21/2012    1. Bilateral proximal common iliac PCI (2/21/08):  8.0 X 100 mm Cordis smart stent on right and 10 X 40 mm cordis smart stent on right. Both inflated to 7.0 mm.  Poor historian     Radiologic findings of lung field, abnormal 10/31/2016    1. CT of chest  (11/24/10): Multiple small nodules in the right lobe and stable interstitial prominence. Consistent with chronic lung disease. No evidence of malignancy.     Seizure disorder ([address] 8/5/2013    Shortness of breath dyspnea 8/5/2013    Thyroid disease     Unspecified sleep apnea        Past Surgical History:   Procedure Laterality Date    CARDIAC SURG PROCEDURE UNLIST      cath 5/07,cabg 6/07,lexiscan cardiolite 11/10    COLONOSCOPY N/A 1/27/2017    COLONOSCOPY  BMI 36 TO BE ADMITTED 1/26/17 performed by Hector Garcia MD at [facility] ENDOSCOPY    HX CATARACT REMOVAL Left 2003    os    HX COLONOSCOPY      HX CORONARY ARTERY BYPASS GRAFT  06-    HX CORONARY STENT PLACEMENT  02-    bilateral iliac artery PCI and stents    HX HEART CATHETERIZATION      left-2007, 2011    HX HEENT  1970s    neck lipoma         Family History:   Problem Relation Age of Onset    Heart Attack Mother    Miguelina Arzola Other Father      old age   Miguelina Arzola Other Brother      brain aneurysm    Heart Disease Other      2 children  with heart concerns, 36 & 47 yo    Heart Disease Son        Social History     Social History    Marital status:      Spouse name: N/A    Number of children: N/A    Years of education: N/A     Occupational History    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 , two living children, two children  with coronary artery disease, ages 36 and 48. Has always lived in [address]. No pets. ALLERGIES: Review of patient's allergies indicates no known allergies. Review of Systems   Constitutional: Positive for activity change, appetite change and fever (100.9 last night). Negative for chills. HENT: Negative for ear pain, rhinorrhea and sore throat. Respiratory: Positive for cough, sputum production, shortness of breath and wheezing. Negative for hemoptysis. Cardiovascular: Positive for orthopnea and leg swelling (chronic, primarily LLE since vein stripping for CABG). Negative for chest pain, claudication, syncope and PND. Gastrointestinal: Negative for abdominal pain and vomiting. Musculoskeletal: Positive for arthralgias. Negative for neck pain. Skin: Positive for color change (LLE). Negative for rash. Neurological: Negative for headaches. All other systems reviewed and are negative. Vitals:    08/04/17 1233   BP: 143/79   Pulse: 62   Resp: 24   Temp: 99.3 °F (37.4 °C)   SpO2: 93%   Weight: 95.3 kg (210 lb)   Height: 5' 10\" (1.778 m)            Physical Exam   Constitutional: He is oriented to person, place, and time. He appears well-developed and well-nourished. He appears distressed. Patient and spouse are extremely poor historians   HENT:   Head: Normocephalic and atraumatic. Right Ear: Tympanic membrane and external ear normal.   Left Ear: Tympanic membrane and external ear normal.   Mouth/Throat: Oropharynx is clear and moist.   Eyes: Conjunctivae and EOM are normal. Pupils are equal, round, and reactive to light. Neck: Normal range of motion. Neck supple. No tracheal deviation present. Cardiovascular: Normal rate, regular rhythm, normal heart sounds and intact distal pulses. Exam reveals no gallop and no friction rub. No murmur heard. Pulmonary/Chest: Effort normal. No tachypnea. No respiratory distress. He has wheezes (scattered throughout). He has rhonchi (diffuse and scattered). He has no rales. Abdominal: Soft. Bowel sounds are normal. He exhibits no distension and no mass. There is no hepatosplenomegaly. There is no tenderness. There is no rebound and no guarding. Musculoskeletal: Normal range of motion. He exhibits edema. Bilateral wrist mildly erythematous, warm and tender consistent with acute gout flare. Left lower extremity with 2+ pitting edema,  Chronic venous stasis changes,  And moderate erythema three quarters the way up the leg consistent with cellulitis. Lymphadenopathy:     He has no cervical adenopathy. Neurological: He is alert and oriented to person, place, and time. He displays normal reflexes. No cranial nerve deficit or sensory deficit. Skin: Skin is warm and dry. No rash noted.  He is not diaphoretic. No erythema. Psychiatric: He has a normal mood and affect. His speech is normal and behavior is normal. Cognition and memory are normal.   Nursing note and vitals reviewed. MDM  Number of Diagnoses or Management Options  Acute exacerbation of chronic obstructive pulmonary disease (COPD) ([facility]): established and worsening  Acute idiopathic gout of wrist, unspecified laterality: new and requires workup  Cellulitis of left lower extremity: new and requires workup     Amount and/or Complexity of Data Reviewed  Clinical lab tests: ordered and reviewed  Tests in the radiology section of CPT®: reviewed and ordered  Decide to obtain previous medical records or to obtain history from someone other than the patient: yes  Obtain history from someone other than the patient: yes  Review and summarize past medical records: yes  Independent visualization of images, tracings, or specimens: yes    Risk of Complications, Morbidity, and/or Mortality  Presenting problems: high  Diagnostic procedures: moderate  Management options: moderate    Patient Progress  Patient progress: improved    ED Course       Procedures    The patient was observed in the ED. He was given a very low dose of Dilaudid for his discomfort and breathing treatment for his respiratory distress, as well as antibiotics for the infection to his left lower extremity. Attempts to take patient off oxygen were unsuccessful without a drop into the mid 80s on his oxygen saturation. Case was discussed with the hospitalist will admit for COPD exacerbation as well as cellulitis left lower extremity.     Results Reviewed:      Recent Results (from the past 24 hour(s))   CBC WITH AUTOMATED DIFF    Collection Time: 08/04/17 12:36 PM   Result Value Ref Range    WBC 11.7 (H) 4.3 - 11.1 K/uL    RBC 5.22 4.23 - 5.67 M/uL    HGB 13.5 (L) 13.6 - 17.2 g/dL    HCT 40.7 (L) 41.1 - 50.3 %    MCV 78.0 (L) 79.6 - 97.8 FL    MCH 25.9 (L) 26.1 - 32.9 PG    MCHC 33.2 31.4 - 35.0 g/dL    RDW 18.0 (H) 11.9 - 14.6 %    PLATELET 918 (L) 864 - 450 K/uL    MPV Cannot be calulated 10.8 - 14.1 FL    DF AUTOMATED      NEUTROPHILS 86 (H) 43 - 78 %    LYMPHOCYTES 6 (L) 13 - 44 %    MONOCYTES 7 4.0 - 12.0 %    EOSINOPHILS 1 0.5 - 7.8 %    BASOPHILS 0 0.0 - 2.0 %    IMMATURE GRANULOCYTES 0.3 0.0 - 5.0 %    ABS. NEUTROPHILS 10.0 (H) 1.7 - 8.2 K/UL    ABS. LYMPHOCYTES 0.7 0.5 - 4.6 K/UL    ABS. MONOCYTES 0.8 0.1 - 1.3 K/UL    ABS. EOSINOPHILS 0.2 0.0 - 0.8 K/UL    ABS. BASOPHILS 0.0 0.0 - 0.2 K/UL    ABS. IMM. GRANS. 0.0 0.0 - 0.5 K/UL   METABOLIC PANEL, COMPREHENSIVE    Collection Time: 08/04/17 12:36 PM   Result Value Ref Range    Sodium 138 136 - 145 mmol/L    Potassium 5.3 (H) 3.5 - 5.1 mmol/L    Chloride 104 98 - 107 mmol/L    CO2 27 21 - 32 mmol/L    Anion gap 7 7 - 16 mmol/L    Glucose 170 (H) 65 - 100 mg/dL    BUN 45 (H) 8 - 23 MG/DL    Creatinine 2.17 (H) 0.8 - 1.5 MG/DL    GFR est AA 37 (L) >60 ml/min/1.73m2    GFR est non-AA 31 (L) >60 ml/min/1.73m2    Calcium 8.4 8.3 - 10.4 MG/DL    Bilirubin, total 1.9 (H) 0.2 - 1.1 MG/DL    ALT (SGPT) 16 12 - 65 U/L    AST (SGOT) 27 15 - 37 U/L    Alk. phosphatase 92 50 - 136 U/L    Protein, total 7.7 6.3 - 8.2 g/dL    Albumin 3.1 (L) 3.2 - 4.6 g/dL    Globulin 4.6 (H) 2.3 - 3.5 g/dL    A-G Ratio 0.7 (L) 1.2 - 3.5     TROPONIN I    Collection Time: 08/04/17 12:36 PM   Result Value Ref Range    Troponin-I, Qt. <0.02 (L) 0.02 - 0.05 NG/ML   PROCALCITONIN    Collection Time: 08/04/17 12:36 PM   Result Value Ref Range    Procalcitonin 0.3 ng/mL   POC LACTIC ACID    Collection Time: 08/04/17  1:20 PM   Result Value Ref Range    Lactic Acid (POC) 1.0 0.5 - 1.9 mmol/L     XR CHEST PORT   Final Result   IMPRESSION: Cardiac silhouette enlarged but no evidence of active failure seen.

## 2017-08-04 NOTE — H&P
HOSPITALIST H&P    NAME:  Merlene Goncalves   Age:  80 y.o.  :   1932   MRN:   [id number]  PCP: Merary Headley MD  Chief complaint: SOB    Subjective:     Patient is a 80 y.o. male who presents with shortness of breath that started last night. The patient is not a good historian, he seems to be tired. Not very interested in talking. According to his son in law, last night he started to complain of shortness of breath, which progressively worsened throughout the night. He has had cough, nonproductive. No fevers or chills but had diaphoresis. C/o chest tightness. No N/V/AP or diarrhea. No dizziness, or syncope  In the ED, patient was hypoxic at 85% on RA. Improved after he was placed on oxygen. CXR negative for CHF exacerbation or infiltrates. Lab work unremarkable. Patient received duonebs, Solumedrol, Antibiotics and then hospitalist was called to admit for COPD exacerbation. Past Medical History:   Diagnosis Date    Atopic dermatitis 2012    Atrial fibrillation (HCC)     CAD (coronary artery disease)     Carotid stenosis, bilateral 2012    50-79%  3-2010    1. Carotid Doppler (07): Greater than 70% stenosis in proximal LICA. 50% stenosis in right ICA. 2.  CTA of neck (3/15/10): Occluded distal segments of the vertebral arteries bilaterally. Atherosclerosis of the carotid bulbs bilaterally with a 50% stenosis on the left and a stenosis of less than 30% on the right. Small nodule in the right upper lobe near the apex. 3. CTA (13):  Less than 30% diameter stenosis of the cervical internal carotid arteries bilaterally.  CHF (congestive heart failure) ([facility] 2012    Chronic kidney disease     hx elevated labs    Chronic obstructive pulmonary disease (HCC)     Diabetes (HCC)     Diastolic CHF, acute on chronic ([address] 2015    1. Echo (9/11/15) : EF 55-60%. Mild LVH. Moderate biatrial enlargement. Moderate mitral/tricuspid regurgitation.      Failed CABG (coronary artery bypass graft) 11/21/2012    GI bleed 1/2015    Hospitalized [facility]    Gout 11/21/2012    History of tobacco use     [place] (hard of hearing)     Hypercholesterolemia     Hypertension     Hyperuricemia 11/21/2012    Morbid obesity ([facility]     PAD (peripheral artery disease) ([facility] 11/21/2012    1. Bilateral proximal common iliac PCI (2/21/08):  8.0 X 100 mm Cordis smart stent on right and 10 X 40 mm cordis smart stent on right. Both inflated to 7.0 mm.  Poor historian     Radiologic findings of lung field, abnormal 10/31/2016    1. CT of chest  (11/24/10): Multiple small nodules in the right lobe and stable interstitial prominence. Consistent with chronic lung disease. No evidence of malignancy.  Seizure disorder ([facility] 8/5/2013    Shortness of breath dyspnea 8/5/2013    Thyroid disease     Unspecified sleep apnea        Past Surgical History:   Procedure Laterality Date    CARDIAC SURG PROCEDURE UNLIST      cath 5/07,cabg 6/07,lexiscan cardiolite 11/10    COLONOSCOPY N/A 1/27/2017    COLONOSCOPY  BMI 36 TO BE ADMITTED 1/26/17 performed by Vida Heck MD at [facility] HX CATARACT REMOVAL Left 2003    os    HX COLONOSCOPY      HX CORONARY ARTERY BYPASS GRAFT  06-    HX CORONARY STENT PLACEMENT  02-    bilateral iliac artery PCI and stents    HX HEART CATHETERIZATION      left-05-, 01-    HX HEENT  1970s    neck lipoma       No current facility-administered medications on file prior to encounter. Current Outpatient Prescriptions on File Prior to Encounter   Medication Sig Dispense Refill    oxyCODONE IR (OXY-IR) 15 mg immediate release tablet Take 1 Tab by mouth every eight (8) hours as needed for Pain. Max Daily Amount: 45 mg. 90 Tab 0    HYDROcodone-acetaminophen (NORCO)  mg tablet . 90 Tab 0    aspirin 81 mg chewable tablet Take 1 Tab by mouth daily.  90 Tab 3    levothyroxine (SYNTHROID) 50 mcg tablet Take 1 Tab by mouth Daily (before breakfast). 90 Tab 3    sAXagliptin (ONGLYZA) 2.5 mg tablet Take 2.5 mg by mouth daily.  ferrous sulfate 324 mg (65 mg iron) tablet Take  by mouth Daily (before breakfast).  budesonide-formoterol (SYMBICORT) 160-4.5 mcg/actuation HFA inhaler Take 2 Puffs by inhalation two (2) times a day.  gabapentin (NEURONTIN) 100 mg capsule Take 1 Cap by mouth three (3) times daily. 90 Cap 3    SITagliptin (JANUVIA) 50 mg tablet Take 1 Tab by mouth daily. 90 Tab 3    pantoprazole (PROTONIX) 40 mg tablet Take 1 Tab by mouth Before breakfast and dinner. 120 Tab 0    magnesium oxide (MAG-OX) 400 mg tablet Take 1 Tab by mouth daily. 30 Tab 5    metoprolol succinate (TOPROL-XL) 25 mg XL tablet Pt takes 1/2 tab po daily. 45 Tab 3    fluticasone (FLONASE) 50 mcg/actuation nasal spray 2 Sprays by Both Nostrils route daily. 1 Bottle 3    sertraline (ZOLOFT) 50 mg tablet Take one tablet each evening for anxiety  Indications: GENERALIZED ANXIETY DISORDER 30 Tab 3    furosemide (LASIX) 20 mg tablet Take 40 mg by mouth as needed.  ipratropium-albuterol (COMBIVENT RESPIMAT)  mcg/actuation inhaler Take 1 Puff by inhalation every six (6) hours.  allopurinol (ZYLOPRIM) 300 mg tablet Take  by mouth daily.  docusate sodium (STOOL SOFTENER) 100 mg capsule Take 100 mg by mouth as needed.  hydrALAZINE (APRESOLINE) 10 mg tablet Take 10 mg by mouth two (2) times a day.  cpap machine kit by Does Not Apply route. Bilevel 12/8      isosorbide mononitrate ER (IMDUR) 30 mg tablet Take 1 Tab by mouth daily. 30 Tab 5    albuterol (PROVENTIL VENTOLIN) 2.5 mg /3 mL (0.083 %) nebulizer solution 1 Vial Via Neb. Qid      Order fax to [facility]  Indications: COPD 360 Vial 3    travoprost (TRAVATAN Z) 0.004 % ophthalmic solution Administer 1 Drop to both eyes two (2) times a day.  glipiZIDE (GLUCOTROL) 5 mg tablet Take 5 mg by mouth two (2) times a day.       K-DUR 20 mEq tablet Take 20 mEq by mouth daily. No Known Allergies    Social History   Substance Use Topics    Smoking status: Former Smoker     Packs/day: 1.00     Years: 45.00     Types: Cigarettes     Quit date: 1984    Smokeless tobacco: Never Used      Comment: (stopped smoking in )    Alcohol use No       Family History   Problem Relation Age of Onset    Heart Attack Mother    Ольга Hampton Other Father      old age   Ольга Hampton Other Brother      brain aneurysm    Heart Disease Other      2 children  with heart concerns, 36 & 47 yo    Heart Disease Son        I have personally reviewed and reconciled patients history. Review of Systems  A comprehensive 12 point review of systems is negative other than what is listed above. Objective:     Patient Vitals for the past 24 hrs:   BP Temp Pulse Resp SpO2 Height Weight   17 1840 170/80 - 71 - 94 % - -   17 1741 151/66 - 67 - 90 % - -   17 1620 153/66 - 65 - 93 % - -   17 1600 169/70 - 67 - 91 % - -   17 1540 147/66 - 60 - 94 % - -   17 1520 147/63 - 66 - 94 % - -   17 1459 147/63 - 63 - 92 % - -   17 1445 141/61 - 70 - 91 % - -   17 1440 162/80 - 67 - 92 % - -   17 1420 193/71 - 68 - 94 % - -   17 1400 172/70 - 62 - 94 % - -   17 1340 164/63 - 69 - 95 % - -   17 1320 147/64 - 70 - 94 % - -   17 1317 155/66 - 62 - 94 % - -   17 1306 - - - - 96 % - -   17 1233 143/79 99.3 °F (37.4 °C) 62 24 93 % 5' 10\" (1.778 m) 95.3 kg (210 lb)       Exam:  General: awake, alert, mild respiratory distress  Eyes: anicteric  Neck: Supple, trachea midline  Lungs: diminished breath sounds bilaterally. Rales at both bases. Mild expiratory wheezes. No rhonchi. Heart: Regular rate and rhythm. No appreciable murmur. Abdomen: Soft, nontender, nondistended. Bowel sounds normal.  No rebound tenderness, guarding, or rigidity. Extremities:  Left LE edema and erythema.  Chronic venous insufficiency changes  Skin: Warm/dry. No rashes or lesions. Neurologic: CN II-XII grossly intact bilaterally. Psych: AOx3. Flat affect. ECG: Negative for acute ischemic ST-T changes. I have personally reviewed and interpreted ECG. Data Review (Labs):   Recent Results (from the past 24 hour(s))   CBC WITH AUTOMATED DIFF    Collection Time: 08/04/17 12:36 PM   Result Value Ref Range    WBC 11.7 (H) 4.3 - 11.1 K/uL    RBC 5.22 4.23 - 5.67 M/uL    HGB 13.5 (L) 13.6 - 17.2 g/dL    HCT 40.7 (L) 41.1 - 50.3 %    MCV 78.0 (L) 79.6 - 97.8 FL    MCH 25.9 (L) 26.1 - 32.9 PG    MCHC 33.2 31.4 - 35.0 g/dL    RDW 18.0 (H) 11.9 - 14.6 %    PLATELET 053 (L) 250 - 450 K/uL    MPV Cannot be calulated 10.8 - 14.1 FL    DF AUTOMATED      NEUTROPHILS 86 (H) 43 - 78 %    LYMPHOCYTES 6 (L) 13 - 44 %    MONOCYTES 7 4.0 - 12.0 %    EOSINOPHILS 1 0.5 - 7.8 %    BASOPHILS 0 0.0 - 2.0 %    IMMATURE GRANULOCYTES 0.3 0.0 - 5.0 %    ABS. NEUTROPHILS 10.0 (H) 1.7 - 8.2 K/UL    ABS. LYMPHOCYTES 0.7 0.5 - 4.6 K/UL    ABS. MONOCYTES 0.8 0.1 - 1.3 K/UL    ABS. EOSINOPHILS 0.2 0.0 - 0.8 K/UL    ABS. BASOPHILS 0.0 0.0 - 0.2 K/UL    ABS. IMM. GRANS. 0.0 0.0 - 0.5 K/UL   METABOLIC PANEL, COMPREHENSIVE    Collection Time: 08/04/17 12:36 PM   Result Value Ref Range    Sodium 138 136 - 145 mmol/L    Potassium 5.3 (H) 3.5 - 5.1 mmol/L    Chloride 104 98 - 107 mmol/L    CO2 27 21 - 32 mmol/L    Anion gap 7 7 - 16 mmol/L    Glucose 170 (H) 65 - 100 mg/dL    BUN 45 (H) 8 - 23 MG/DL    Creatinine 2.17 (H) 0.8 - 1.5 MG/DL    GFR est AA 37 (L) >60 ml/min/1.73m2    GFR est non-AA 31 (L) >60 ml/min/1.73m2    Calcium 8.4 8.3 - 10.4 MG/DL    Bilirubin, total 1.9 (H) 0.2 - 1.1 MG/DL    ALT (SGPT) 16 12 - 65 U/L    AST (SGOT) 27 15 - 37 U/L    Alk.  phosphatase 92 50 - 136 U/L    Protein, total 7.7 6.3 - 8.2 g/dL    Albumin 3.1 (L) 3.2 - 4.6 g/dL    Globulin 4.6 (H) 2.3 - 3.5 g/dL    A-G Ratio 0.7 (L) 1.2 - 3.5     TROPONIN I    Collection Time: 08/04/17 12:36 PM   Result Value Ref Range Troponin-I, Qt. <0.02 (L) 0.02 - 0.05 NG/ML   PROCALCITONIN    Collection Time: 08/04/17 12:36 PM   Result Value Ref Range    Procalcitonin 0.3 ng/mL   POC LACTIC ACID    Collection Time: 08/04/17  1:20 PM   Result Value Ref Range    Lactic Acid (POC) 1.0 0.5 - 1.9 mmol/L       Assessment:   Active Problems:    Acute respiratory failure with hypoxia ([facility] (8/4/2017)        Plan:   # acute respiratory failure with hypoxia, likely secondary to COPD exacerbation  -duonebs, IV solumedrol and Abx with Levaquin    # COPD exacerbation, suspect exacerbated by early PNA  -continue treatment as above. # HTN, benign  -continue home meds    # chronic diastolic CHF  -seems stable and compensated  -will continue home regimen    # Diabetes mellitus type 2  -will hold home meds.  Start ISS    # DVT prophylaxis  -Lovenox      Plan of care discussed with: patient and son in law  Time spent on patient care: 30 minutes  Anticipated length of stay more than 2 midnights    Garcia King MD

## 2017-08-04 NOTE — ED TRIAGE NOTES
Patient from home via EMS. EMS called for SOB. Patient found to be lying in bed with home CPAP off. VSS for EMS. Patient reports pain 8/10 from gout and has been taking his medication. Patients right wrist appears red and swollen.

## 2017-08-04 NOTE — ED NOTES
This RN called to room over family concerns that \"patient isn't acting right. \" Dr Cabrera Fellers to bedside. It is believed that 0.5 mg Dilaudid that was administered to patient has made him drowsy. Patient repositioned in bed and nasal cannula at 3 L on patient.

## 2017-08-04 NOTE — ED NOTES
Patient continues to rest in bed with eyes closed. Respirations present. Vitals cycling. No distress observed.

## 2017-08-04 NOTE — ED NOTES
Patient sat straight up in bed and removed nasal cannula per MD orders. Patient unable to maintain o2 saturation above 85%. MD informed and nasal cannula put back on patient.

## 2017-08-04 NOTE — IP AVS SNAPSHOT
Radha Reece 
 
 
 [address] [facility] 
[phone] Patient: Roberto Mejia MRN: [id number] You are allergic to the following No active allergies Recent Documentation Height Weight BMI Smoking Status 1.778 m 118.3 kg 37.41 kg/m2 Former Smoker Emergency Contacts Name Discharge Info Relation Home Work Mobile Eulalia Navarro  Spouse [3] [id number] About your hospitalization You were admitted on:  August 4, 2017 You last received care in the:  [facility] 8 MED SURG You were discharged on:  August 6, 2017 Unit phone number:  [phone] Why you were hospitalized Your primary diagnosis was:  Not on File Your diagnoses also included:  Acute Respiratory Failure With Hypoxia (Hcc), Copd (Chronic Obstructive Pulmonary Disease) (Hcc) Providers Seen During Your Hospitalizations Provider Role Specialty Primary office phone Wilber Ring MD Attending Provider Emergency Medicine [phone] Laura Martinez MD Attending Provider Internal Medicine [phone] Your Primary Care Physician (PCP) Primary Care Physician Office Phone Office Fax Meena Matos [phone] Follow-up Information Follow up With Details Comments Contact Info Kelvin Felton MD Schedule an appointment as soon as possible for a visit in 1 week call on Monday for follow up appointment in 1 week: post hospital admission [address] ,[address] 
[phone] Your Appointments Thursday August 10, 2017  2:45 PM EDT Office Visit with Kelvin Felton MD  
[address]) [address] [facility] [address] [phone] Friday September 08, 2017  8:40 AM EDT Return appointment with Cynthia Mitchell NP  
[address] ([facility]) [address] [place] [address] [address] [place] [address] [facility]  
[phone] Current Discharge Medication List  
  
START taking these medications Dose & Instructions Dispensing Information Comments Morning Noon Evening Bedtime  
 cephALEXin 500 mg capsule Commonly known as:  Junius Alpers Your last dose was:  none Your next dose is: Today at noon Dose:  500 mg Take 1 Cap by mouth three (3) times daily for 7 days. Quantity:  21 Cap Refills:  0 CONTINUE these medications which have CHANGED Dose & Instructions Dispensing Information Comments Morning Noon Evening Bedtime  
 predniSONE 5 mg dose pack Commonly known as:  STERAPRED What changed:  additional instructions Your last dose was:  none Your next dose is: Today per pharmacy instructions See administration instruction per 5mg dose pack Quantity:  21 Tab Refills:  0 CONTINUE these medications which have NOT CHANGED Dose & Instructions Dispensing Information Comments Morning Noon Evening Bedtime  
 albuterol 2.5 mg /3 mL (0.083 %) nebulizer solution Commonly known as:  PROVENTIL VENTOLIN  
   
 1 Vial Via Neb. Qid   Order fax to [facility]  Indications: COPD Quantity:  360 Vial  
Refills:  3  
 - DX copd  
 
-  
 
- Pt to use Albuterol 00.83% 1 Vial [address] Neb. Along with Pulmicort 0.5 mg 1 Vial [address] bid  
    
   
   
   
  
 allopurinol 300 mg tablet Commonly known as:  Cassidy Orta Your last dose was:  8/6/17 am  
Your next dose is:  8/7/17 Take  by mouth daily. Refills:  0  
     
   
   
   
  
 aspirin 81 mg chewable tablet Your last dose was:  8/6/17 am  
Your next dose is:  8/7/17 Dose:  81 mg Take 1 Tab by mouth daily. Quantity:  90 Tab Refills:  3  
     
   
   
   
  
 budesonide-formoterol 160-4.5 mcg/actuation HFA inhaler Commonly known as:  SYMBICORT Dose:  2 Puff Take 2 Puffs by inhalation two (2) times a day. Refills:  0  
     
   
   
   
  
 COMBIVENT RESPIMAT  mcg/actuation inhaler Generic drug:  ipratropium-albuterol Dose:  1 Puff Take 1 Puff by inhalation every six (6) hours. Refills:  0  
     
   
   
   
  
 cpap machine kit  
   
 by Does Not Apply route. Bilevel 12/8 Refills:  0  
     
   
   
   
  
 ferrous sulfate 324 mg (65 mg iron) tablet Take  by mouth Daily (before breakfast). Refills:  0  
     
   
   
   
  
 fluticasone 50 mcg/actuation nasal spray Commonly known as:  Katlin Makefatuma Dose:  2 Spray 2 Sprays by Both Nostrils route daily. Quantity:  1 Bottle Refills:  3  
     
   
   
   
  
 furosemide 20 mg tablet Commonly known as:  LASIX Your last dose was:  8/5/17 pm  
Your next dose is:  As ordered Dose:  40 mg Take 40 mg by mouth as needed. Refills:  0  
     
   
   
   
  
 gabapentin 100 mg capsule Commonly known as:  NEURONTIN Your last dose was:  8/6/17 am  
Your next dose is:  8/6/17 pm  
   
 Dose:  100 mg Take 1 Cap by mouth three (3) times daily. Quantity:  90 Cap Refills:  3  
     
   
   
   
  
 glipiZIDE 5 mg tablet Commonly known as:  Emilee Hewitt Your last dose was:  8/5/17 Your next dose is:  8/6/17 Dose:  5 mg Take 5 mg by mouth two (2) times a day. Refills:  0  
     
   
   
   
  
 hydrALAZINE 10 mg tablet Commonly known as:  APRESOLINE Your last dose was:  8/6/17 am  
Your next dose is:  8/6/17 pm  
   
 Dose:  10 mg Take 10 mg by mouth two (2) times a day. Refills:  0 HYDROcodone-acetaminophen  mg tablet Commonly known as:  Trisha Bellevue Your last dose was:  8/6/17 am  
Your next dose is:  As needed Renetta Chaudhari Quantity:  90 Tab Refills:  0 PHARMACY FILL ON TIME. NO EARLY REFILLS. isosorbide mononitrate ER 30 mg tablet Commonly known as:  IMDUR  
 Your last dose was:  8/6/17 am  
Your next dose is:  8/7/17 am  
   
 Dose:  30 mg Take 1 Tab by mouth daily. Quantity:  30 Tab Refills:  5  
     
   
   
   
  
 K-DUR 20 mEq tablet Generic drug:  potassium chloride Dose:  20 mEq Take 20 mEq by mouth daily. Refills:  0  
     
   
   
   
  
 levothyroxine 50 mcg tablet Commonly known as:  SYNTHROID Dose:  50 mcg Take 1 Tab by mouth Daily (before breakfast). Quantity:  90 Tab Refills:  3  
     
   
   
   
  
 magnesium oxide 400 mg tablet Commonly known as:  MAG-OX Your last dose was:  8/6/17 am  
Your next dose is:  8/7/17 am  
   
 Dose:  400 mg Take 1 Tab by mouth daily. Quantity:  30 Tab Refills:  5  
     
   
   
   
  
 metoprolol succinate 25 mg XL tablet Commonly known as:  TOPROL-XL Your last dose was:  8/6/17 am  
Your next dose is:  8/7/17 am  
   
 Pt takes 1/2 tab po daily. Quantity:  45 Tab Refills:  3  
     
   
   
   
  
 oxyCODONE IR 15 mg immediate release tablet Commonly known as:  OXY-IR Dose:  15 mg Take 1 Tab by mouth every eight (8) hours as needed for Pain. Max Daily Amount: 45 mg.  
 Quantity:  90 Tab Refills:  0 PHARMACY FILL ON TIME. NO EARLY REFILLS. pantoprazole 40 mg tablet Commonly known as:  PROTONIX Dose:  40 mg Take 1 Tab by mouth Before breakfast and dinner. Quantity:  120 Tab Refills:  0  
     
   
   
   
  
 sAXagliptin 2.5 mg tablet Commonly known as:  ONGLYZA Dose:  2.5 mg Take 2.5 mg by mouth daily. Refills:  0  
     
   
   
   
  
 sertraline 50 mg tablet Commonly known as:  ZOLOFT Your last dose was:  8/6/17 am  
Your next dose is:  8/7/17 am  
   
 Take one tablet each evening for anxiety  Indications: GENERALIZED ANXIETY DISORDER Quantity:  30 Tab Refills:  3 SITagliptin 50 mg tablet Commonly known as:  Trini Chough  Dose:  50 mg  
 Take 1 Tab by mouth daily. Quantity:  90 Tab Refills:  3 STOOL SOFTENER 100 mg capsule Generic drug:  docusate sodium Your last dose was:  8/6/17 am  
Your next dose is:  8/7/17 as needed Dose:  100 mg Take 100 mg by mouth as needed. Refills:  0  
     
   
   
   
  
 TRAVATAN Z 0.004 % ophthalmic solution Generic drug:  travoprost  
Your last dose was:  8/5/17 pm  
Your next dose is:  8/6/17 pm  
   
 Dose:  1 Drop Administer 1 Drop to both eyes two (2) times a day. Refills:  0 Where to Get Your Medications These medications were sent to [address] [place] [address] Phone:  [phone]  
  predniSONE 5 mg dose pack Information on where to get these meds will be given to you by the nurse or doctor. ! Ask your nurse or doctor about these medications  
  cephALEXin 500 mg capsule Discharge Instructions DISCHARGE SUMMARY from Nurse The following personal items are in your possession at time of discharge: 
 
  
  
  
  
  
  
  
  
 
 
 
 
PATIENT INSTRUCTIONS: 
 
 
F-face looks uneven A-arms unable to move or move unevenly S-speech slurred or non-existent T-time-call 911 as soon as signs and symptoms begin-DO NOT go Back to bed or wait to see if you get better-TIME IS BRAIN. Warning Signs of HEART ATTACK Call 911 if you have these symptoms: 
? Chest discomfort.  Most heart attacks involve discomfort in the center of the chest that lasts more than a few minutes, or that goes away and comes back. It can feel like uncomfortable pressure, squeezing, fullness, or pain. ? Discomfort in other areas of the upper body. Symptoms can include pain or discomfort in one or both arms, the back, neck, jaw, or stomach. ? Shortness of breath with or without chest discomfort. ? Other signs may include breaking out in a cold sweat, nausea, or lightheadedness. Don't wait more than five minutes to call [address]! Fast action can save your life. Calling 911 is almost always the fastest way to get lifesaving treatment. Emergency Medical Services staff can begin treatment when they arrive  up to an hour sooner than if someone gets to the hospital by car. The discharge information has been reviewed with the patient and spouse. The patient and spouse verbalized understanding. Discharge medications reviewed with the patient and spouse and appropriate educational materials and side effects teaching were provided. Chronic Obstructive Pulmonary Disease (COPD): Care Instructions Your Care Instructions Chronic obstructive pulmonary disease (COPD) is a general term for a group of lung diseases, including emphysema and chronic bronchitis. People with COPD have decreased airflow in and out of the lungs, which makes it hard to breathe. The airways also can get clogged with thick mucus. Cigarette smoking is a major cause of COPD. Although there is no cure for COPD, you can slow its progress. Following your treatment plan and taking care of yourself can help you feel better and live longer. Follow-up care is a key part of your treatment and safety. Be sure to make and go to all appointments, and call your doctor if you are having problems. It's also a good idea to know your test results and keep a list of the medicines you take. How can you care for yourself at home? Staying healthy · Do not smoke.  This is the most important step you can take to prevent more damage to your lungs. If you need help quitting, talk to your doctor about stop-smoking programs and medicines. These can increase your chances of quitting for good. · Avoid colds and flu. Get a pneumococcal vaccine shot. If you have had one before, ask your doctor whether you need a second dose. Get the flu vaccine every fall. If you must be around people with colds or the flu, wash your hands often. · Avoid secondhand smoke, air pollution, and high altitudes. Also avoid cold, dry air and hot, humid air. Stay at home with your windows closed when air pollution is bad. Medicines and oxygen therapy · Take your medicines exactly as prescribed. Call your doctor if you think you are having a problem with your medicine. · You may be taking medicines such as: ¨ Bronchodilators. These help open your airways and make breathing easier. Bronchodilators are either short-acting (work for 6 to 9 hours) or long-acting (work for 24 hours). You inhale most bronchodilators, so they start to act quickly. Always carry your quick-relief inhaler with you in case you need it while you are away from home. ¨ Corticosteroids (prednisone, budesonide). These reduce airway inflammation. They come in pill or inhaled form. You must take these medicines every day for them to work well. · A spacer may help you get more inhaled medicine to your lungs. Ask your doctor or pharmacist if a spacer is right for you. If it is, ask how to use it properly. · Do not take any vitamins, over-the-counter medicine, or herbal products without talking to your doctor first. 
· If your doctor prescribed antibiotics, take them as directed. Do not stop taking them just because you feel better. You need to take the full course of antibiotics. · Oxygen therapy boosts the amount of oxygen in your blood and helps you breathe easier. Use the flow rate your doctor has recommended, and do not change it without talking to your doctor first. 
Activity · Get regular exercise. Walking is an easy way to get exercise. Start out slowly, and walk a little more each day. · Pay attention to your breathing. You are exercising too hard if you cannot talk while you are exercising. · Take short rest breaks when doing household chores and other activities. · Learn breathing methodssuch as breathing through pursed lipsto help you become less short of breath. · If your doctor has not set you up with a pulmonary rehabilitation program, talk to him or her about whether rehab is right for you. Rehab includes exercise programs, education about your disease and how to manage it, help with diet and other changes, and emotional support. Diet · Eat regular, healthy meals. Use bronchodilators about 1 hour before you eat to make it easier to eat. Eat several small meals instead of three large ones. Drink beverages at the end of the meal. Avoid foods that are hard to chew. · Eat foods that contain protein so that you do not lose muscle mass. · Talk with your doctor if you gain too much weight or if you lose weight without trying. Mental health · Talk to your family, friends, or a therapist about your feelings. It is normal to feel frightened, angry, hopeless, helpless, and even guilty. Talking openly about bad feelings can help you cope. If these feelings last, talk to your doctor. When should you call for help? Call 911 anytime you think you may need emergency care. For example, call if: 
· You have severe trouble breathing. Call your doctor now or seek immediate medical care if: 
· You have new or worse trouble breathing. · You cough up blood. · You have a fever. Watch closely for changes in your health, and be sure to contact your doctor if: 
· You cough more deeply or more often, especially if you notice more mucus or a change in the color of your mucus. · You have new or worse swelling in your legs or belly. · You are not getting better as expected. Where can you learn more? Go to http://angelina-rajiv.info/. Luis Goes in the search box to learn more about \"Chronic Obstructive Pulmonary Disease (COPD): Care Instructions. \" Current as of: March 25, 2017 Content Version: 11.3 © 8849-9364 [employer]. Care instructions adapted under license by "[employer] (which disclaims liability or warranty for this information). If you have questions about a medical condition or this instruction, always ask your healthcare professional. [address] any warranty or liability for your use of this information. Gout: Care Instructions Your Care Instructions Gout is a form of arthritis caused by a buildup of uric acid crystals in a joint. It causes sudden attacks of pain, swelling, redness, and stiffness, usually in one joint, especially the big toe. Gout usually comes on without a cause. But it can be brought on by drinking alcohol (especially beer) or eating seafood and red meat. Taking certain medicines, such as diuretics or aspirin, also can bring on an attack of gout. Taking your medicines as prescribed and following up with your doctor regularly can help you avoid gout attacks in the future. Follow-up care is a key part of your treatment and safety. Be sure to make and go to all appointments, and call your doctor if you are having problems. Its also a good idea to know your test results and keep a list of the medicines you take. How can you care for yourself at home? · If the joint is swollen, put ice or a cold pack on the area for 10 to 20 minutes at a time. Put a thin cloth between the ice and your skin. · Prop up the sore limb on a pillow when you ice it or anytime you sit or lie down during the next 3 days. Try to keep it above the level of your heart. This will help reduce swelling. · Rest sore joints.  Avoid activities that put weight or strain on the joints for a few days. Take short rest breaks from your regular activities during the day. · Take your medicines exactly as prescribed. Call your doctor if you think you are having a problem with your medicine. · Take pain medicines exactly as directed. ¨ If the doctor gave you a prescription medicine for pain, take it as prescribed. ¨ If you are not taking a prescription pain medicine, ask your doctor if you can take an over-the-counter medicine. · Eat less seafood and red meat. · Check with your doctor before drinking alcohol. · Losing weight, if you are overweight, may help reduce attacks of gout. But do not go on a [employer]. \" Losing a lot of weight in a short amount of time can cause a gout attack. When should you call for help? Call your doctor now or seek immediate medical care if: 
· You have a fever. · The joint is so painful you cannot use it. · You have sudden, unexplained swelling, redness, warmth, or severe pain in one or more joints. Watch closely for changes in your health, and be sure to contact your doctor if: 
· You have joint pain. · Your symptoms get worse or are not improving after 2 or 3 days. Where can you learn more? Go to http://angelina-rajiv.info/. Enter F758 in the search box to learn more about \"Gout: Care Instructions. \" Current as of: October 31, 2016 Content Version: 11.3 © 9009-0777 [employer]. Care instructions adapted under license by [employer] (which disclaims liability or warranty for this information). If you have questions about a medical condition or this instruction, always ask your healthcare professional. [place] [address] any warranty or liability for your use of this information. Discharge Orders None [employer] Announcement We are excited to announce that we are making your provider's discharge notes available to you in [employer].   You will see these notes when they are completed and signed by the physician that discharged you from your recent hospital stay. If you have any questions or concerns about any information you see in [employer], please call the Health Information Department where you were seen or reach out to your Primary Care Provider for more information about your plan of care. Introducing [facility]! [facility] introduces [employer] patient portal. Now you can access parts of your medical record, email your doctor's office, and request medication refills online. 1. In your internet browser, go to [email or web address] 2. Click on the First Time User? Click Here link in the Sign In box. You will see the New Member Sign Up page. 3. Enter your [employer] Access Code exactly as it appears below. You will not need to use this code after youve completed the sign-up process. If you do not sign up before the expiration date, you must request a new code. · [employer] Access Code: [email or web address] Expires: 8/22/2017  3:27 PM 
 
4. Enter the last four digits of your Social Security Number (xxxx) and Date of Birth (mm/dd/yyyy) as indicated and click Submit. You will be taken to the next sign-up page. 5. Create a [employer] ID. This will be your [employer] login ID and cannot be changed, so think of one that is secure and easy to remember. 6. Create a [employer] password. You can change your password at any time. 7. Enter your Password Reset Question and Answer. This can be used at a later time if you forget your password. 8. Enter your e-mail address. You will receive e-mail notification when new information is available in [address] 9. Click Sign Up. You can now view and download portions of your medical record. 10. Click the Download Summary menu link to download a portable copy of your medical information.  
 
If you have questions, please visit the Frequently Asked Questions section of the [employer]. Remember, MyChart is NOT to be used for urgent needs. For medical emergencies, dial 911. Now available from your iPhone and Android! General Information Please provide this summary of care documentation to your next provider. Patient Signature:  ____________________________________________________________ Date:  ____________________________________________________________  
  
Claudene Born Provider Signature:  ____________________________________________________________ Date:  ____________________________________________________________

## 2017-08-05 LAB
ANION GAP BLD CALC-SCNC: 11 MMOL/L (ref 7–16)
BASOPHILS # BLD AUTO: 0 K/UL (ref 0–0.2)
BASOPHILS # BLD: 0 % (ref 0–2)
BUN SERPL-MCNC: 51 MG/DL (ref 8–23)
CALCIUM SERPL-MCNC: 8.2 MG/DL (ref 8.3–10.4)
CHLORIDE SERPL-SCNC: 102 MMOL/L (ref 98–107)
CO2 SERPL-SCNC: 26 MMOL/L (ref 21–32)
CREAT SERPL-MCNC: 2.34 MG/DL (ref 0.8–1.5)
DIFFERENTIAL METHOD BLD: ABNORMAL
EOSINOPHIL # BLD: 0 K/UL (ref 0–0.8)
EOSINOPHIL NFR BLD: 0 % (ref 0.5–7.8)
ERYTHROCYTE [DISTWIDTH] IN BLOOD BY AUTOMATED COUNT: 18.2 % (ref 11.9–14.6)
GLUCOSE BLD STRIP.AUTO-MCNC: 189 MG/DL (ref 65–100)
GLUCOSE BLD STRIP.AUTO-MCNC: 261 MG/DL (ref 65–100)
GLUCOSE BLD STRIP.AUTO-MCNC: 288 MG/DL (ref 65–100)
GLUCOSE BLD STRIP.AUTO-MCNC: 294 MG/DL (ref 65–100)
GLUCOSE SERPL-MCNC: 280 MG/DL (ref 65–100)
HCT VFR BLD AUTO: 38.4 % (ref 41.1–50.3)
HGB BLD-MCNC: 12.3 G/DL (ref 13.6–17.2)
IMM GRANULOCYTES # BLD: 0 K/UL (ref 0–0.5)
IMM GRANULOCYTES NFR BLD AUTO: 0.2 % (ref 0–5)
LYMPHOCYTES # BLD AUTO: 3 % (ref 13–44)
LYMPHOCYTES # BLD: 0.3 K/UL (ref 0.5–4.6)
MAGNESIUM SERPL-MCNC: 2.8 MG/DL (ref 1.8–2.4)
MCH RBC QN AUTO: 25.2 PG (ref 26.1–32.9)
MCHC RBC AUTO-ENTMCNC: 32 G/DL (ref 31.4–35)
MCV RBC AUTO: 78.5 FL (ref 79.6–97.8)
MONOCYTES # BLD: 0.1 K/UL (ref 0.1–1.3)
MONOCYTES NFR BLD AUTO: 1 % (ref 4–12)
NEUTS SEG # BLD: 8.6 K/UL (ref 1.7–8.2)
NEUTS SEG NFR BLD AUTO: 96 % (ref 43–78)
PLATELET # BLD AUTO: 125 K/UL (ref 150–450)
PMV BLD AUTO: ABNORMAL FL (ref 10.8–14.1)
POTASSIUM SERPL-SCNC: 4.8 MMOL/L (ref 3.5–5.1)
RBC # BLD AUTO: 4.89 M/UL (ref 4.23–5.67)
SODIUM SERPL-SCNC: 139 MMOL/L (ref 136–145)
WBC # BLD AUTO: 9 K/UL (ref 4.3–11.1)

## 2017-08-05 PROCEDURE — 65270000029 HC RM PRIVATE

## 2017-08-05 PROCEDURE — 94760 N-INVAS EAR/PLS OXIMETRY 1: CPT

## 2017-08-05 PROCEDURE — 74011636637 HC RX REV CODE- 636/637: Performed by: INTERNAL MEDICINE

## 2017-08-05 PROCEDURE — 74011250636 HC RX REV CODE- 250/636: Performed by: INTERNAL MEDICINE

## 2017-08-05 PROCEDURE — 74011000250 HC RX REV CODE- 250: Performed by: INTERNAL MEDICINE

## 2017-08-05 PROCEDURE — 82962 GLUCOSE BLOOD TEST: CPT

## 2017-08-05 PROCEDURE — 83735 ASSAY OF MAGNESIUM: CPT | Performed by: INTERNAL MEDICINE

## 2017-08-05 PROCEDURE — 77030018846 HC SOL IRR STRL H20 ICUM -A

## 2017-08-05 PROCEDURE — 36415 COLL VENOUS BLD VENIPUNCTURE: CPT | Performed by: INTERNAL MEDICINE

## 2017-08-05 PROCEDURE — 80048 BASIC METABOLIC PNL TOTAL CA: CPT | Performed by: INTERNAL MEDICINE

## 2017-08-05 PROCEDURE — 85025 COMPLETE CBC W/AUTO DIFF WBC: CPT | Performed by: INTERNAL MEDICINE

## 2017-08-05 PROCEDURE — 74011250637 HC RX REV CODE- 250/637: Performed by: INTERNAL MEDICINE

## 2017-08-05 PROCEDURE — 94640 AIRWAY INHALATION TREATMENT: CPT

## 2017-08-05 RX ORDER — ALBUTEROL SULFATE 0.83 MG/ML
2.5 SOLUTION RESPIRATORY (INHALATION) EVERY 8 HOURS
Status: DISCONTINUED | OUTPATIENT
Start: 2017-08-05 | End: 2017-08-06 | Stop reason: HOSPADM

## 2017-08-05 RX ORDER — FUROSEMIDE 40 MG/1
40 TABLET ORAL ONCE
Status: COMPLETED | OUTPATIENT
Start: 2017-08-05 | End: 2017-08-05

## 2017-08-05 RX ORDER — ALLOPURINOL 300 MG/1
300 TABLET ORAL DAILY
Status: DISCONTINUED | OUTPATIENT
Start: 2017-08-05 | End: 2017-08-06 | Stop reason: HOSPADM

## 2017-08-05 RX ADMIN — INSULIN LISPRO 4 UNITS: 100 INJECTION, SOLUTION INTRAVENOUS; SUBCUTANEOUS at 12:35

## 2017-08-05 RX ADMIN — Medication 5 ML: at 05:38

## 2017-08-05 RX ADMIN — DOCUSATE SODIUM 100 MG: 100 CAPSULE, LIQUID FILLED ORAL at 10:19

## 2017-08-05 RX ADMIN — ENOXAPARIN SODIUM 30 MG: 30 INJECTION SUBCUTANEOUS at 22:05

## 2017-08-05 RX ADMIN — Medication 5 ML: at 22:05

## 2017-08-05 RX ADMIN — IPRATROPIUM BROMIDE 0.5 MG: 0.5 SOLUTION RESPIRATORY (INHALATION) at 07:20

## 2017-08-05 RX ADMIN — GABAPENTIN 100 MG: 100 CAPSULE ORAL at 10:19

## 2017-08-05 RX ADMIN — GABAPENTIN 100 MG: 100 CAPSULE ORAL at 22:07

## 2017-08-05 RX ADMIN — IPRATROPIUM BROMIDE 0.5 MG: 0.5 SOLUTION RESPIRATORY (INHALATION) at 21:02

## 2017-08-05 RX ADMIN — METHYLPREDNISOLONE SODIUM SUCCINATE 20 MG: 40 INJECTION, POWDER, FOR SOLUTION INTRAMUSCULAR; INTRAVENOUS at 10:40

## 2017-08-05 RX ADMIN — IPRATROPIUM BROMIDE 0.5 MG: 0.5 SOLUTION RESPIRATORY (INHALATION) at 13:27

## 2017-08-05 RX ADMIN — GABAPENTIN 100 MG: 100 CAPSULE ORAL at 15:53

## 2017-08-05 RX ADMIN — ALBUTEROL SULFATE 2.5 MG: 2.5 SOLUTION RESPIRATORY (INHALATION) at 03:36

## 2017-08-05 RX ADMIN — DOCUSATE SODIUM 100 MG: 100 CAPSULE, LIQUID FILLED ORAL at 16:08

## 2017-08-05 RX ADMIN — INSULIN LISPRO 6 UNITS: 100 INJECTION, SOLUTION INTRAVENOUS; SUBCUTANEOUS at 16:40

## 2017-08-05 RX ADMIN — HYDRALAZINE HYDROCHLORIDE 10 MG: 10 TABLET, FILM COATED ORAL at 10:19

## 2017-08-05 RX ADMIN — SERTRALINE HYDROCHLORIDE 50 MG: 50 TABLET ORAL at 10:19

## 2017-08-05 RX ADMIN — ISOSORBIDE MONONITRATE 30 MG: 30 TABLET, EXTENDED RELEASE ORAL at 10:19

## 2017-08-05 RX ADMIN — ALBUTEROL SULFATE 2.5 MG: 2.5 SOLUTION RESPIRATORY (INHALATION) at 21:05

## 2017-08-05 RX ADMIN — ALBUTEROL SULFATE 2.5 MG: 2.5 SOLUTION RESPIRATORY (INHALATION) at 13:27

## 2017-08-05 RX ADMIN — Medication 400 MG: at 10:19

## 2017-08-05 RX ADMIN — PANTOPRAZOLE SODIUM 40 MG: 40 TABLET, DELAYED RELEASE ORAL at 16:02

## 2017-08-05 RX ADMIN — ASPIRIN 81 MG 81 MG: 81 TABLET ORAL at 10:18

## 2017-08-05 RX ADMIN — ALLOPURINOL 300 MG: 300 TABLET ORAL at 17:49

## 2017-08-05 RX ADMIN — METHYLPREDNISOLONE SODIUM SUCCINATE 20 MG: 40 INJECTION, POWDER, FOR SOLUTION INTRAMUSCULAR; INTRAVENOUS at 22:05

## 2017-08-05 RX ADMIN — INSULIN LISPRO 6 UNITS: 100 INJECTION, SOLUTION INTRAVENOUS; SUBCUTANEOUS at 06:11

## 2017-08-05 RX ADMIN — METHYLPREDNISOLONE SODIUM SUCCINATE 40 MG: 40 INJECTION, POWDER, FOR SOLUTION INTRAMUSCULAR; INTRAVENOUS at 05:37

## 2017-08-05 RX ADMIN — BUDESONIDE 500 MCG: 0.5 INHALANT RESPIRATORY (INHALATION) at 09:45

## 2017-08-05 RX ADMIN — ALBUTEROL SULFATE 2.5 MG: 2.5 SOLUTION RESPIRATORY (INHALATION) at 07:20

## 2017-08-05 RX ADMIN — HYDROCODONE BITARTRATE AND ACETAMINOPHEN 1 TABLET: 10; 325 TABLET ORAL at 10:36

## 2017-08-05 RX ADMIN — HYDROCODONE BITARTRATE AND ACETAMINOPHEN 1 TABLET: 10; 325 TABLET ORAL at 20:27

## 2017-08-05 RX ADMIN — Medication 5 ML: at 15:56

## 2017-08-05 RX ADMIN — FUROSEMIDE 40 MG: 40 TABLET ORAL at 17:49

## 2017-08-05 RX ADMIN — LEVOTHYROXINE SODIUM 50 MCG: 50 TABLET ORAL at 05:39

## 2017-08-05 RX ADMIN — INSULIN LISPRO 2 UNITS: 100 INJECTION, SOLUTION INTRAVENOUS; SUBCUTANEOUS at 22:07

## 2017-08-05 RX ADMIN — HYDRALAZINE HYDROCHLORIDE 10 MG: 10 TABLET, FILM COATED ORAL at 16:06

## 2017-08-05 RX ADMIN — PANTOPRAZOLE SODIUM 40 MG: 40 TABLET, DELAYED RELEASE ORAL at 05:39

## 2017-08-05 RX ADMIN — METOPROLOL SUCCINATE 25 MG: 25 TABLET, EXTENDED RELEASE ORAL at 10:19

## 2017-08-05 NOTE — ED NOTES
TRANSFER - OUT REPORT:    Verbal report given to [facility] RN(name) on [facility] Showers  being transferred to  810(unit) for routine progression of care       Report consisted of patients Situation, Background, Assessment and   Recommendations(SBAR). Information from the following report(s) ED Summary was reviewed with the receiving nurse. Lines:       Opportunity for questions and clarification was provided.       Patient transported with:   O2 @ 3 liters

## 2017-08-05 NOTE — PROGRESS NOTES
Respirations even and unlabored. No c/o pain or SOB. Encouraged to call for needs. Assessment completed. Spouse present.

## 2017-08-05 NOTE — PROGRESS NOTES
Pt and spouse arrived to floor. Pt and spouse oriented to room. Pt is alert and oriented. No c/o distress or pain. VS stable, RRR, cardiac monitor to be placed, pt to be daily weight. Skin assessment completed with Yury Lim RN. Pt's LLE is reddened and swollen (+3 non-pitting). 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 No other skin abnormalities found at this time. Spouse is staying the night with pt. Call light is within reach. Pt and spouse know to call for any assistance. Will continue to monitor.

## 2017-08-05 NOTE — PROGRESS NOTES
Received shift report from Abiodun psear RN. Assessment complete. Patient resting in bed, call light within reach. Family at bedside. Respirations even and unlabored, patient wearing bipap. No acute distress noted. Will continue to monitor.

## 2017-08-05 NOTE — PROGRESS NOTES
Visit with patient to build rapport with . Patient hopes to be discharged today. Thanked him for allowing us the privilege of caring for him.   Signed by chaplain Talya

## 2017-08-05 NOTE — PROGRESS NOTES
Notified Dr. Espinoza Post re: PTA medications lasix 40 mg PO and allopurinol 300 mg PO. Orders received and placed.

## 2017-08-05 NOTE — PROGRESS NOTES
Progress Note    Patient: Chely Silveira MRN: [id number]  SSN: [id number]    YOB: 1932  Age: 80 y.o. Sex: male      Admit Date: 8/4/2017    LOS: 1 day     Subjective:     Patient is feeling significantly better today. His breathing is better. No cough. No oxygen requirements and breathing at 94% on RA. No CP. No fevers or chills. Patient admitted taking one pill of Ativan 2 mg prior to the onset of his shortness of breath. He states he never took Ativan in the past.       Review of Systems:  Pertinent per HPI.     Medications:  Current Facility-Administered Medications   Medication Dose Route Frequency    methylPREDNISolone (PF) (SOLU-MEDROL) injection 20 mg  20 mg IntraVENous Q12H    albuterol (PROVENTIL VENTOLIN) nebulizer solution 2.5 mg  2.5 mg Nebulization Q8H    sodium chloride (NS) flush 5-10 mL  5-10 mL IntraVENous Q8H    sodium chloride (NS) flush 5-10 mL  5-10 mL IntraVENous PRN    ondansetron (ZOFRAN) injection 4 mg  4 mg IntraVENous Q4H PRN    ipratropium (ATROVENT) 0.02 % nebulizer solution 0.5 mg  0.5 mg Nebulization Q6H RT    docusate sodium (COLACE) capsule 100 mg  100 mg Oral BID    levoFLOXacin (LEVAQUIN) 500 mg in D5W IVPB  500 mg IntraVENous Q48H    enoxaparin (LOVENOX) injection 30 mg  30 mg SubCUTAneous Q24H    insulin lispro (HUMALOG) injection   SubCUTAneous AC&HS    aspirin chewable tablet 81 mg  81 mg Oral DAILY    budesonide (PULMICORT) 500 mcg/2 ml nebulizer suspension  500 mcg Oral BID RT    levothyroxine (SYNTHROID) tablet 50 mcg  50 mcg Oral ACB    isosorbide mononitrate ER (IMDUR) tablet 30 mg  30 mg Oral DAILY    HYDROcodone-acetaminophen (NORCO)  mg tablet 1 Tab  1 Tab Oral Q6H PRN    hydrALAZINE (APRESOLINE) tablet 10 mg  10 mg Oral BID    gabapentin (NEURONTIN) capsule 100 mg  100 mg Oral TID    magnesium oxide (MAG-OX) tablet 400 mg  400 mg Oral DAILY    metoprolol succinate (TOPROL-XL) XL tablet 25 mg  25 mg Oral DAILY    oxyCODONE IR (OXY-IR) immediate release tablet 15 mg  15 mg Oral Q8H PRN    sertraline (ZOLOFT) tablet 50 mg  50 mg Oral DAILY    latanoprost (XALATAN) 0.005 % ophthalmic solution 1 Drop  1 Drop Both Eyes QPM    pantoprazole (PROTONIX) tablet 40 mg  40 mg Oral ACB&D       Objective:     Vitals:    08/05/17 0335 08/05/17 0337 08/05/17 0723 08/05/17 0729   BP: 134/68   143/63   Pulse: 60   67   Resp: 19   18   Temp: 98.4 °F (36.9 °C)   97.9 °F (36.6 °C)   SpO2: 94% 94% 94% 98%   Weight:       Height:            Physical Exam:   General: awake, alert, no apparent distress  HEENT: anicteric. PERRL  Lungs: Clear to auscultation bilaterally. Bibasilar dry rales up to 2/3 of the lungs. No wheezes, no rhonchi. Breathing nonlabored. Heart: Regular rate and rhythm. Abdomen: Soft, nontender, nondistended. Bowel sounds normal.  No rebound tenderness, guarding, or rigidity. Extremities:  Left leg trace pitting edema and erythema. Chronic venous insufficiency changes. Skin: Warm/dry. Chronic venous insufficiency changes in both legs L > R  Neurologic: nonfocal  Psych: AA Ox3. Normal mood and affect. Lab/Data Review:  Recent Results (from the past 24 hour(s))   CBC WITH AUTOMATED DIFF    Collection Time: 08/04/17 12:36 PM   Result Value Ref Range    WBC 11.7 (H) 4.3 - 11.1 K/uL    RBC 5.22 4.23 - 5.67 M/uL    HGB 13.5 (L) 13.6 - 17.2 g/dL    HCT 40.7 (L) 41.1 - 50.3 %    MCV 78.0 (L) 79.6 - 97.8 FL    MCH 25.9 (L) 26.1 - 32.9 PG    MCHC 33.2 31.4 - 35.0 g/dL    RDW 18.0 (H) 11.9 - 14.6 %    PLATELET 164 (L) 128 - 450 K/uL    MPV Cannot be calulated 10.8 - 14.1 FL    DF AUTOMATED      NEUTROPHILS 86 (H) 43 - 78 %    LYMPHOCYTES 6 (L) 13 - 44 %    MONOCYTES 7 4.0 - 12.0 %    EOSINOPHILS 1 0.5 - 7.8 %    BASOPHILS 0 0.0 - 2.0 %    IMMATURE GRANULOCYTES 0.3 0.0 - 5.0 %    ABS. NEUTROPHILS 10.0 (H) 1.7 - 8.2 K/UL    ABS. LYMPHOCYTES 0.7 0.5 - 4.6 K/UL    ABS. MONOCYTES 0.8 0.1 - 1.3 K/UL    ABS.  EOSINOPHILS 0.2 0.0 - 0.8 K/UL ABS. BASOPHILS 0.0 0.0 - 0.2 K/UL    ABS. IMM. GRANS. 0.0 0.0 - 0.5 K/UL   METABOLIC PANEL, COMPREHENSIVE    Collection Time: 08/04/17 12:36 PM   Result Value Ref Range    Sodium 138 136 - 145 mmol/L    Potassium 5.3 (H) 3.5 - 5.1 mmol/L    Chloride 104 98 - 107 mmol/L    CO2 27 21 - 32 mmol/L    Anion gap 7 7 - 16 mmol/L    Glucose 170 (H) 65 - 100 mg/dL    BUN 45 (H) 8 - 23 MG/DL    Creatinine 2.17 (H) 0.8 - 1.5 MG/DL    GFR est AA 37 (L) >60 ml/min/1.73m2    GFR est non-AA 31 (L) >60 ml/min/1.73m2    Calcium 8.4 8.3 - 10.4 MG/DL    Bilirubin, total 1.9 (H) 0.2 - 1.1 MG/DL    ALT (SGPT) 16 12 - 65 U/L    AST (SGOT) 27 15 - 37 U/L    Alk.  phosphatase 92 50 - 136 U/L    Protein, total 7.7 6.3 - 8.2 g/dL    Albumin 3.1 (L) 3.2 - 4.6 g/dL    Globulin 4.6 (H) 2.3 - 3.5 g/dL    A-G Ratio 0.7 (L) 1.2 - 3.5     TROPONIN I    Collection Time: 08/04/17 12:36 PM   Result Value Ref Range    Troponin-I, Qt. <0.02 (L) 0.02 - 0.05 NG/ML   PROCALCITONIN    Collection Time: 08/04/17 12:36 PM   Result Value Ref Range    Procalcitonin 0.3 ng/mL   POC LACTIC ACID    Collection Time: 08/04/17  1:20 PM   Result Value Ref Range    Lactic Acid (POC) 1.0 0.5 - 1.9 mmol/L   GLUCOSE, POC    Collection Time: 08/04/17 10:20 PM   Result Value Ref Range    Glucose (POC) 252 (H) 65 - 100 mg/dL   GLUCOSE, POC    Collection Time: 08/05/17  5:40 AM   Result Value Ref Range    Glucose (POC) 294 (H) 65 - 100 mg/dL   CBC WITH AUTOMATED DIFF    Collection Time: 08/05/17  6:45 AM   Result Value Ref Range    WBC 9.0 4.3 - 11.1 K/uL    RBC 4.89 4.23 - 5.67 M/uL    HGB 12.3 (L) 13.6 - 17.2 g/dL    HCT 38.4 (L) 41.1 - 50.3 %    MCV 78.5 (L) 79.6 - 97.8 FL    MCH 25.2 (L) 26.1 - 32.9 PG    MCHC 32.0 31.4 - 35.0 g/dL    RDW 18.2 (H) 11.9 - 14.6 %    PLATELET 456 (L) 630 - 450 K/uL    MPV Cannot be calulated 10.8 - 14.1 FL    DF AUTOMATED      NEUTROPHILS 96 (H) 43 - 78 %    LYMPHOCYTES 3 (L) 13 - 44 %    MONOCYTES 1 (L) 4.0 - 12.0 %    EOSINOPHILS 0 (L) 0.5 - 7.8 %    BASOPHILS 0 0.0 - 2.0 %    IMMATURE GRANULOCYTES 0.2 0.0 - 5.0 %    ABS. NEUTROPHILS 8.6 (H) 1.7 - 8.2 K/UL    ABS. LYMPHOCYTES 0.3 (L) 0.5 - 4.6 K/UL    ABS. MONOCYTES 0.1 0.1 - 1.3 K/UL    ABS. EOSINOPHILS 0.0 0.0 - 0.8 K/UL    ABS. BASOPHILS 0.0 0.0 - 0.2 K/UL    ABS. IMM. GRANS. 0.0 0.0 - 0.5 K/UL   METABOLIC PANEL, BASIC    Collection Time: 08/05/17  6:45 AM   Result Value Ref Range    Sodium 139 136 - 145 mmol/L    Potassium 4.8 3.5 - 5.1 mmol/L    Chloride 102 98 - 107 mmol/L    CO2 26 21 - 32 mmol/L    Anion gap 11 7 - 16 mmol/L    Glucose 280 (H) 65 - 100 mg/dL    BUN 51 (H) 8 - 23 MG/DL    Creatinine 2.34 (H) 0.8 - 1.5 MG/DL    GFR est AA 34 (L) >60 ml/min/1.73m2    GFR est non-AA 28 (L) >60 ml/min/1.73m2    Calcium 8.2 (L) 8.3 - 10.4 MG/DL   MAGNESIUM    Collection Time: 08/05/17  6:45 AM   Result Value Ref Range    Magnesium 2.8 (H) 1.8 - 2.4 mg/dL     I have reviewed new clinical data. Assessment:           Plan:   # Acute respiratory failure with hypoxia. -presumed secondary to COPD exacerbation, but probably multifactorial. Most likely cause is probably adverse effect of Ativan and then COPD exacerbation.  -today he is significantly better.  -will cut down his solumedrol dose to 20mg BID  -will decrease the Duonebs frequency to Q8h  -if symptoms stable by tomorrow, he can be discharged home    # COPD exacerbation  -suspected triggered by underlying developing or early PNA  -patient is doing well today. Will continue same management as described above  -will continue Levaquin for a total of 5 days. # Chronic diastolic CHF  -stable and compensated today    # Diabetes mellitus type 2  -will hold home meds. Start ISS     # HTN, benign  -continue home meds    # DVT prophylaxis  -Lovenox    # Disposition  -Home, no Home health needs. Tentative discharge date tomorrow.      Signed By: Alexandra Stark MD     August 5, 2017

## 2017-08-05 NOTE — PROGRESS NOTES
Sitting quietly on side of bed without c/o SOB or pain, respirations even and unlabored. Safety maintained through shift. Call bell and personal items within reach. Family present.

## 2017-08-06 VITALS
WEIGHT: 260.7 LBS | TEMPERATURE: 97.9 F | BODY MASS INDEX: 37.32 KG/M2 | RESPIRATION RATE: 20 BRPM | HEIGHT: 70 IN | HEART RATE: 71 BPM | DIASTOLIC BLOOD PRESSURE: 96 MMHG | OXYGEN SATURATION: 97 % | SYSTOLIC BLOOD PRESSURE: 147 MMHG

## 2017-08-06 LAB
GLUCOSE BLD STRIP.AUTO-MCNC: 206 MG/DL (ref 65–100)
GLUCOSE BLD STRIP.AUTO-MCNC: 214 MG/DL (ref 65–100)
GLUCOSE BLD STRIP.AUTO-MCNC: 272 MG/DL (ref 65–100)

## 2017-08-06 PROCEDURE — 74011250636 HC RX REV CODE- 250/636: Performed by: INTERNAL MEDICINE

## 2017-08-06 PROCEDURE — 77010033678 HC OXYGEN DAILY

## 2017-08-06 PROCEDURE — 94640 AIRWAY INHALATION TREATMENT: CPT

## 2017-08-06 PROCEDURE — 74011000250 HC RX REV CODE- 250: Performed by: INTERNAL MEDICINE

## 2017-08-06 PROCEDURE — 82962 GLUCOSE BLOOD TEST: CPT

## 2017-08-06 PROCEDURE — 94760 N-INVAS EAR/PLS OXIMETRY 1: CPT

## 2017-08-06 PROCEDURE — 74011636637 HC RX REV CODE- 636/637: Performed by: INTERNAL MEDICINE

## 2017-08-06 PROCEDURE — 74011250637 HC RX REV CODE- 250/637: Performed by: INTERNAL MEDICINE

## 2017-08-06 RX ORDER — PREDNISONE 5 MG/1
TABLET ORAL
Qty: 21 TAB | Refills: 0 | Status: SHIPPED | OUTPATIENT
Start: 2017-08-06 | End: 2017-08-10

## 2017-08-06 RX ADMIN — Medication 5 ML: at 06:12

## 2017-08-06 RX ADMIN — INSULIN LISPRO 4 UNITS: 100 INJECTION, SOLUTION INTRAVENOUS; SUBCUTANEOUS at 06:10

## 2017-08-06 RX ADMIN — SERTRALINE HYDROCHLORIDE 50 MG: 50 TABLET ORAL at 09:37

## 2017-08-06 RX ADMIN — ALBUTEROL SULFATE 2.5 MG: 2.5 SOLUTION RESPIRATORY (INHALATION) at 04:36

## 2017-08-06 RX ADMIN — PANTOPRAZOLE SODIUM 40 MG: 40 TABLET, DELAYED RELEASE ORAL at 06:12

## 2017-08-06 RX ADMIN — METOPROLOL SUCCINATE 25 MG: 25 TABLET, EXTENDED RELEASE ORAL at 09:37

## 2017-08-06 RX ADMIN — ALBUTEROL SULFATE 2.5 MG: 2.5 SOLUTION RESPIRATORY (INHALATION) at 08:02

## 2017-08-06 RX ADMIN — ALLOPURINOL 300 MG: 300 TABLET ORAL at 09:37

## 2017-08-06 RX ADMIN — LEVOTHYROXINE SODIUM 50 MCG: 50 TABLET ORAL at 06:12

## 2017-08-06 RX ADMIN — Medication 400 MG: at 09:37

## 2017-08-06 RX ADMIN — HYDRALAZINE HYDROCHLORIDE 10 MG: 10 TABLET, FILM COATED ORAL at 09:37

## 2017-08-06 RX ADMIN — BUDESONIDE 500 MCG: 0.5 INHALANT RESPIRATORY (INHALATION) at 08:00

## 2017-08-06 RX ADMIN — METHYLPREDNISOLONE SODIUM SUCCINATE 20 MG: 40 INJECTION, POWDER, FOR SOLUTION INTRAMUSCULAR; INTRAVENOUS at 09:37

## 2017-08-06 RX ADMIN — HYDROCODONE BITARTRATE AND ACETAMINOPHEN 1 TABLET: 10; 325 TABLET ORAL at 09:37

## 2017-08-06 RX ADMIN — ASPIRIN 81 MG 81 MG: 81 TABLET ORAL at 09:37

## 2017-08-06 RX ADMIN — DOCUSATE SODIUM 100 MG: 100 CAPSULE, LIQUID FILLED ORAL at 09:37

## 2017-08-06 RX ADMIN — IPRATROPIUM BROMIDE 0.5 MG: 0.5 SOLUTION RESPIRATORY (INHALATION) at 08:02

## 2017-08-06 RX ADMIN — ISOSORBIDE MONONITRATE 30 MG: 30 TABLET, EXTENDED RELEASE ORAL at 09:38

## 2017-08-06 RX ADMIN — INSULIN LISPRO 6 UNITS: 100 INJECTION, SOLUTION INTRAVENOUS; SUBCUTANEOUS at 12:13

## 2017-08-06 RX ADMIN — IPRATROPIUM BROMIDE 0.5 MG: 0.5 SOLUTION RESPIRATORY (INHALATION) at 04:37

## 2017-08-06 RX ADMIN — GABAPENTIN 100 MG: 100 CAPSULE ORAL at 09:37

## 2017-08-06 NOTE — PROGRESS NOTES
Escorted per [place] CNA in [facility] to discharge area to waiting vehicle at 1300.

## 2017-08-06 NOTE — DISCHARGE INSTRUCTIONS
DISCHARGE SUMMARY from Nurse    The following personal items are in your possession at time of discharge:                                    PATIENT INSTRUCTIONS:    After general anesthesia or intravenous sedation, for 24 hours or while taking prescription Narcotics:  · Limit your activities  · Do not drive and operate hazardous machinery  · Do not make important personal or business decisions  · Do  not drink alcoholic beverages  · If you have not urinated within 8 hours after discharge, please contact your surgeon on call. Report the following to your surgeon:  · Excessive pain, swelling, redness or odor of or around the surgical area  · Temperature over 100.5  · Nausea and vomiting lasting longer than 4 hours or if unable to take medications  · Any signs of decreased circulation or nerve impairment to extremity: change in color, persistent  numbness, tingling, coldness or increase pain  · Any questions        What to do at Home:  Recommended activity: Activity as tolerated, cardiac low salt diet    If you experience any of the following symptoms shortness of breath, chest pain, confusion, please follow up with Dr. Hillary Arrington at [phone]. *  Please give a list of your current medications to your Primary Care Provider. *  Please update this list whenever your medications are discontinued, doses are      changed, or new medications (including over-the-counter products) are added. *  Please carry medication information at all times in case of emergency situations. These are general instructions for a healthy lifestyle:    No smoking/ No tobacco products/ Avoid exposure to second hand smoke    Surgeon General's Warning:  Quitting smoking now greatly reduces serious risk to your health.     Obesity, smoking, and sedentary lifestyle greatly increases your risk for illness    A healthy diet, regular physical exercise & weight monitoring are important for maintaining a healthy lifestyle    You may be retaining fluid if you have a history of heart failure or if you experience any of the following symptoms:  Weight gain of 3 pounds or more overnight or 5 pounds in a week, increased swelling in our hands or feet or shortness of breath while lying flat in bed. Please call your doctor as soon as you notice any of these symptoms; do not wait until your next office visit. Recognize signs and symptoms of STROKE:    F-face looks uneven    A-arms unable to move or move unevenly    S-speech slurred or non-existent    T-time-call 911 as soon as signs and symptoms begin-DO NOT go       Back to bed or wait to see if you get better-TIME IS BRAIN. Warning Signs of HEART ATTACK     Call 911 if you have these symptoms:   Chest discomfort. Most heart attacks involve discomfort in the center of the chest that lasts more than a few minutes, or that goes away and comes back. It can feel like uncomfortable pressure, squeezing, fullness, or pain.  Discomfort in other areas of the upper body. Symptoms can include pain or discomfort in one or both arms, the back, neck, jaw, or stomach.  Shortness of breath with or without chest discomfort.  Other signs may include breaking out in a cold sweat, nausea, or lightheadedness. Don't wait more than five minutes to call 911 - MINUTES MATTER! Fast action can save your life. Calling 911 is almost always the fastest way to get lifesaving treatment. Emergency Medical Services staff can begin treatment when they arrive -- up to an hour sooner than if someone gets to the hospital by car. The discharge information has been reviewed with the patient and spouse. The patient and spouse verbalized understanding. Discharge medications reviewed with the patient and spouse and appropriate educational materials and side effects teaching were provided.                Chronic Obstructive Pulmonary Disease (COPD): Care Instructions  Your Care Instructions    Chronic obstructive pulmonary disease (COPD) is a general term for a group of lung diseases, including emphysema and chronic bronchitis. People with COPD have decreased airflow in and out of the lungs, which makes it hard to breathe. The airways also can get clogged with thick mucus. Cigarette smoking is a major cause of COPD. Although there is no cure for COPD, you can slow its progress. Following your treatment plan and taking care of yourself can help you feel better and live longer. Follow-up care is a key part of your treatment and safety. Be sure to make and go to all appointments, and call your doctor if you are having problems. It's also a good idea to know your test results and keep a list of the medicines you take. How can you care for yourself at home? Staying healthy  · Do not smoke. This is the most important step you can take to prevent more damage to your lungs. If you need help quitting, talk to your doctor about stop-smoking programs and medicines. These can increase your chances of quitting for good. · Avoid colds and flu. Get a pneumococcal vaccine shot. If you have had one before, ask your doctor whether you need a second dose. Get the flu vaccine every fall. If you must be around people with colds or the flu, wash your hands often. · Avoid secondhand smoke, air pollution, and high altitudes. Also avoid cold, dry air and hot, humid air. Stay at home with your windows closed when air pollution is bad. Medicines and oxygen therapy  · Take your medicines exactly as prescribed. Call your doctor if you think you are having a problem with your medicine. · You may be taking medicines such as:  ¨ Bronchodilators. These help open your airways and make breathing easier. Bronchodilators are either short-acting (work for 6 to 9 hours) or long-acting (work for 24 hours). You inhale most bronchodilators, so they start to act quickly.  Always carry your quick-relief inhaler with you in case you need it while you are away from home.  ¨ Corticosteroids (prednisone, budesonide). These reduce airway inflammation. They come in pill or inhaled form. You must take these medicines every day for them to work well. · A spacer may help you get more inhaled medicine to your lungs. Ask your doctor or pharmacist if a spacer is right for you. If it is, ask how to use it properly. · Do not take any vitamins, over-the-counter medicine, or herbal products without talking to your doctor first.  · If your doctor prescribed antibiotics, take them as directed. Do not stop taking them just because you feel better. You need to take the full course of antibiotics. · Oxygen therapy boosts the amount of oxygen in your blood and helps you breathe easier. Use the flow rate your doctor has recommended, and do not change it without talking to your doctor first.  Activity  · Get regular exercise. Walking is an easy way to get exercise. Start out slowly, and walk a little more each day. · Pay attention to your breathing. You are exercising too hard if you cannot talk while you are exercising. · Take short rest breaks when doing household chores and other activities. · Learn breathing methods--such as breathing through pursed lips--to help you become less short of breath. · If your doctor has not set you up with a pulmonary rehabilitation program, talk to him or her about whether rehab is right for you. Rehab includes exercise programs, education about your disease and how to manage it, help with diet and other changes, and emotional support. Diet  · Eat regular, healthy meals. Use bronchodilators about 1 hour before you eat to make it easier to eat. Eat several small meals instead of three large ones. Drink beverages at the end of the meal. Avoid foods that are hard to chew. · Eat foods that contain protein so that you do not lose muscle mass. · Talk with your doctor if you gain too much weight or if you lose weight without trying.   Mental health  · Talk to your family, friends, or a therapist about your feelings. It is normal to feel frightened, angry, hopeless, helpless, and even guilty. Talking openly about bad feelings can help you cope. If these feelings last, talk to your doctor. When should you call for help? Call 911 anytime you think you may need emergency care. For example, call if:  · You have severe trouble breathing. Call your doctor now or seek immediate medical care if:  · You have new or worse trouble breathing. · You cough up blood. · You have a fever. Watch closely for changes in your health, and be sure to contact your doctor if:  · You cough more deeply or more often, especially if you notice more mucus or a change in the color of your mucus. · You have new or worse swelling in your legs or belly. · You are not getting better as expected. Where can you learn more? Go to http://angelina-rajiv.info/. Andrea Newman in the search box to learn more about \"Chronic Obstructive Pulmonary Disease (COPD): Care Instructions. \"  Current as of: March 25, 2017  Content Version: 11.3  © 5935-7610 [employer]. Care instructions adapted under license by [employer] (which disclaims liability or warranty for this information). If you have questions about a medical condition or this instruction, always ask your healthcare professional. [address] any warranty or liability for your use of this information. Gout: Care Instructions  Your Care Instructions  Gout is a form of arthritis caused by a buildup of uric acid crystals in a joint. It causes sudden attacks of pain, swelling, redness, and stiffness, usually in one joint, especially the big toe. Gout usually comes on without a cause. But it can be brought on by drinking alcohol (especially beer) or eating seafood and red meat. Taking certain medicines, such as diuretics or aspirin, also can bring on an attack of gout.   Taking your medicines as prescribed and following up with your doctor regularly can help you avoid gout attacks in the future. Follow-up care is a key part of your treatment and safety. Be sure to make and go to all appointments, and call your doctor if you are having problems. Its also a good idea to know your test results and keep a list of the medicines you take. How can you care for yourself at home? · If the joint is swollen, put ice or a cold pack on the area for 10 to 20 minutes at a time. Put a thin cloth between the ice and your skin. · Prop up the sore limb on a pillow when you ice it or anytime you sit or lie down during the next 3 days. Try to keep it above the level of your heart. This will help reduce swelling. · Rest sore joints. Avoid activities that put weight or strain on the joints for a few days. Take short rest breaks from your regular activities during the day. · Take your medicines exactly as prescribed. Call your doctor if you think you are having a problem with your medicine. · Take pain medicines exactly as directed. ¨ If the doctor gave you a prescription medicine for pain, take it as prescribed. ¨ If you are not taking a prescription pain medicine, ask your doctor if you can take an over-the-counter medicine. · Eat less seafood and red meat. · Check with your doctor before drinking alcohol. · Losing weight, if you are overweight, may help reduce attacks of gout. But do not go on a [employer]. \" Losing a lot of weight in a short amount of time can cause a gout attack. When should you call for help? Call your doctor now or seek immediate medical care if:  · You have a fever. · The joint is so painful you cannot use it. · You have sudden, unexplained swelling, redness, warmth, or severe pain in one or more joints. Watch closely for changes in your health, and be sure to contact your doctor if:  · You have joint pain.   · Your symptoms get worse or are not improving after 2 or 3 days.  Where can you learn more? Go to http://angelina-rajiv.info/. Enter F990 in the search box to learn more about \"Gout: Care Instructions. \"  Current as of: October 31, 2016  Content Version: 11.3  © 3254-2687 [employer]. Care instructions adapted under license by [employer] (which disclaims liability or warranty for this information). If you have questions about a medical condition or this instruction, always ask your healthcare professional. [address] any warranty or liability for your use of this information.

## 2017-08-06 NOTE — DISCHARGE SUMMARY
Physician Discharge Summary     Patient: Dallas Locke MRN: [id number]  SSN: [id number]    YOB: 1932  Age: 80 y.o. Sex: male       Admit Date: 8/4/2017    Discharge Date: 8/6/2017    Admission Diagnoses: Acute respiratory failure with hypoxia [facility])    Discharge Diagnoses:   1. Acute respiratory failure with hypoxia, secondary to combination of Ativan adverse effect and COPD exacerbation  2. COPD exacerbation  3. Chronic diastolic CHF, with mild exacerbation, improved  4. Diabetes mellitus type 2  5. HTN, benign, controlled    Discharge Condition:   -stable    Hospital Course:   Patient is a 80 y.o. male who presented with shortness of breath that started the night before admission. The patient was not a good historian, he seemed to be tired. Not very interested in talking. According to his son in law who brought him to the ED, the night before he started to complain of shortness of breath, which progressively worsened throughout the night. He has had cough, nonproductive and chest tightness. No fevers or chills but had diaphoresis. In the ED, patient was hypoxic at 85% on RA. Improved after he was placed on oxygen. CXR negative for CHF exacerbation or infiltrates. Lab work unremarkable. Patient received duonebs, Solumedrol, Antibiotics and then hospitalist was called to admit for COPD exacerbation. The next day the patient was significantly better. His oxygen saturation was improved. He admitted taking a pill of Ativan 2mg, but he stated that he did not know why he took it. He had never taken Ativan in the past and it was his wife's medication. Most likely his acute respiratory failure with hypoxia was secondary to adverse effect of Ativan in addition to COPD exacerbation. He showed rapid improvements during this hospitalization and at the time of discharge he was recommended to continue tapering dose of Prednisone 5 mg for 6 days. No need to continue antibiotics.  He is to continue his regular medications for COPD. Primary Care Physician:  Rafia Ortez MD     Consults:   -none    Significant Diagnostic Studies:  1. CHEST XRAY 8/7/17     Findings: The cardiac silhouette is enlarged, median sternotomy wires are seen, lung fields appear clear, spurs are seen on the shoulders. Discharge Exam:  GA: comfortable. No acute distress  CV: S1S2 normal. RRR  Lungs: Clear to auscultation bilaterally. Bibasilar dry rales. No wheezing. Abdomen: Soft. Nondistended  Neuro: Nonfocal    Disposition:   -home    Discharge Medications:   Current Discharge Medication List      START taking these medications    Details   cephALEXin (KEFLEX) 500 mg capsule Take 1 Cap by mouth three (3) times daily for 7 days. Qty: 21 Cap, Refills: 0         CONTINUE these medications which have CHANGED    Details   predniSONE (STERAPRED) 5 mg dose pack See administration instruction per 5mg dose pack  Qty: 21 Tab, Refills: 0         CONTINUE these medications which have NOT CHANGED    Details   oxyCODONE IR (OXY-IR) 15 mg immediate release tablet Take 1 Tab by mouth every eight (8) hours as needed for Pain. Max Daily Amount: 45 mg.  Qty: 90 Tab, Refills: 0    Comments: PHARMACY FILL ON TIME. NO EARLY REFILLS. Associated Diagnoses: Other chronic pain      HYDROcodone-acetaminophen (NORCO)  mg tablet . Qty: 90 Tab, Refills: 0    Comments: PHARMACY FILL ON TIME. NO EARLY REFILLS. Associated Diagnoses: Other chronic pain      aspirin 81 mg chewable tablet Take 1 Tab by mouth daily. Qty: 90 Tab, Refills: 3      levothyroxine (SYNTHROID) 50 mcg tablet Take 1 Tab by mouth Daily (before breakfast). Qty: 90 Tab, Refills: 3      sAXagliptin (ONGLYZA) 2.5 mg tablet Take 2.5 mg by mouth daily. ferrous sulfate 324 mg (65 mg iron) tablet Take  by mouth Daily (before breakfast).       budesonide-formoterol (SYMBICORT) 160-4.5 mcg/actuation HFA inhaler Take 2 Puffs by inhalation two (2) times a day.      gabapentin (NEURONTIN) 100 mg capsule Take 1 Cap by mouth three (3) times daily. Qty: 90 Cap, Refills: 3      SITagliptin (JANUVIA) 50 mg tablet Take 1 Tab by mouth daily. Qty: 90 Tab, Refills: 3      pantoprazole (PROTONIX) 40 mg tablet Take 1 Tab by mouth Before breakfast and dinner. Qty: 120 Tab, Refills: 0      magnesium oxide (MAG-OX) 400 mg tablet Take 1 Tab by mouth daily. Qty: 30 Tab, Refills: 5      metoprolol succinate (TOPROL-XL) 25 mg XL tablet Pt takes 1/2 tab po daily. Qty: 45 Tab, Refills: 3      fluticasone (FLONASE) 50 mcg/actuation nasal spray 2 Sprays by Both Nostrils route daily. Qty: 1 Bottle, Refills: 3      sertraline (ZOLOFT) 50 mg tablet Take one tablet each evening for anxiety  Indications: GENERALIZED ANXIETY DISORDER  Qty: 30 Tab, Refills: 3      furosemide (LASIX) 20 mg tablet Take 40 mg by mouth as needed. ipratropium-albuterol (COMBIVENT RESPIMAT)  mcg/actuation inhaler Take 1 Puff by inhalation every six (6) hours. allopurinol (ZYLOPRIM) 300 mg tablet Take  by mouth daily. docusate sodium (STOOL SOFTENER) 100 mg capsule Take 100 mg by mouth as needed. hydrALAZINE (APRESOLINE) 10 mg tablet Take 10 mg by mouth two (2) times a day. cpap machine kit by Does Not Apply route. Bilevel 12/8      isosorbide mononitrate ER (IMDUR) 30 mg tablet Take 1 Tab by mouth daily. Qty: 30 Tab, Refills: 5      albuterol (PROVENTIL VENTOLIN) 2.5 mg /3 mL (0.083 %) nebulizer solution 1 Vial Via Neb. Qid      Order fax to [facility]  Indications: COPD  Qty: 360 Vial, Refills: 3    Comments: DX copd     Pt to use Albuterol 00.83% 1 Vial Via [employer]. Along with Pulmicort 0.5 mg 1 Vial Via Neb. bid  Associated Diagnoses: SOB (shortness of breath)      travoprost (TRAVATAN Z) 0.004 % ophthalmic solution Administer 1 Drop to both eyes two (2) times a day. glipiZIDE (GLUCOTROL) 5 mg tablet Take 5 mg by mouth two (2) times a day. K-DUR 20 mEq tablet Take 20 mEq by mouth daily. Activity:   -As tolerated    Diet:   -cardiac and diabetic diet      Follow-up Appointments   Procedures    FOLLOW UP VISIT Appointment in: One Week PCP     PCP     Standing Status:   Standing     Number of Occurrences:   1     Order Specific Question:   Appointment in     Answer:    One Week        Time spent on discharge 32 minutes    Signed By: Alexandra Stark MD     August 6, 2017

## 2017-08-06 NOTE — PROGRESS NOTES
Discharge instructions given per MD orders to patient and family. Opportunity given for questions, verbalized understanding. Will eat lunch then ready for discharge.

## 2017-08-06 NOTE — PROGRESS NOTES
Patient resting in bed, call light within. Family at bedside. Patient diaphoretic, fsbs 214. Patient denies any discomfort. Respirations even and unlabored. Home cpap in place. No acute distress noted. Will continue to monitor.

## 2017-08-07 ENCOUNTER — PATIENT OUTREACH (OUTPATIENT)
Dept: CASE MANAGEMENT | Age: 82
End: 2017-08-07

## 2017-08-07 NOTE — PROGRESS NOTES
This note will not be viewable in [address] Transition of Care Discharge Follow-up Questionnaire   Date/Time of Call:   August 7, 2017 1:08PM   What was the patient hospitalized for? Patient hospitalized for Acute respiratory failure with hypoxia. Does the patient understand his/her diagnosis and/or treatment and what happened during the hospitalization? Patient states understanding of diagnosis and treatment during hospitalization. Did the patient receive discharge instructions? Yes     Review any discharge instructions (see notes in [facility]). Ask patient if they understand these. Do they have any questions? Patient states understanding of discharge instructions, patient states no questions. Were home services ordered (nursing, PT, OT, ST, etc.)? No home health services ordered. If so, has the first visit occurred? If not, why? (Assist with coordination of services if necessary. ) NA         Was any DME ordered? No durable medical equipment ordered. If so, has it been received? If not, why?  (Assist with coordination of arranging DME orders if necessary. ) NA         Complete a review of all medications (new, continued and discontinued meds per the D/C instructions and medication tab in [facility]). Care Coordinator reviewed all medications with patient per [facility], one new medication prescribed and one current medication which have changed. Were all new prescriptions filled? If not, why?  (Assist with obtainment of medications if necessary.) Yes, patient states prescriptions filled and currently being taken per doctor's order. Does the patient understand the purpose and dosing instructions for all medications? (If patient has questions, provide explanation and education.) Patient states understanding of all current medications and dosing instructions.     Does the patient have any problems in performing ADLs? (If patient is unable to perform ADLs  what is the limiting factor(s)? Do they have a support system that can assist? If no support system is present, discuss possible assistance that they may be able to obtain.) Patient states he is independent with ADL's and ambulation. Patient states he is feeling good and states everything is going well. Does the patient have all follow-up appointments scheduled? 7 day f/up with PCP?    7-14 day f/up with specialist?    If f/up has not been made  what actions has the care coordinator made to accomplish this? Has transportation been arranged? [facility] follow-up should be within 7 days of discharge; all others should have PCP follow-up within 7 days of discharge; follow-ups with other specialists should be within 7-14 days of discharge.) Yes    8/10/2017 2:45 PM [address] [facility] MD SCARLET [address]     NA      NA      Yes      NA   Any other questions or concerns expressed by the patient? Patient states no questions or concerns. Schedule next appointment with SJ ANDINO Coordinator or refer to RN Case Manager/  per the workflow guidelines. When is care coordinators next follow-up call scheduled? If referred for CCM  what RN care manager was the referral assigned?  NA          NA      NA   OSCAR Call Completed By: ROLO Patrick ACO  Care Coordinator

## 2017-08-21 ENCOUNTER — APPOINTMENT (OUTPATIENT)
Dept: GENERAL RADIOLOGY | Age: 82
DRG: 291 | End: 2017-08-21
Attending: EMERGENCY MEDICINE
Payer: MEDICARE

## 2017-08-21 ENCOUNTER — HOSPITAL ENCOUNTER (INPATIENT)
Age: 82
LOS: 1 days | Discharge: HOME | DRG: 291 | End: 2017-08-23
Attending: EMERGENCY MEDICINE | Admitting: HOSPITALIST
Payer: MEDICARE

## 2017-08-21 ENCOUNTER — APPOINTMENT (OUTPATIENT)
Dept: ULTRASOUND IMAGING | Age: 82
DRG: 291 | End: 2017-08-21
Attending: INTERNAL MEDICINE
Payer: MEDICARE

## 2017-08-21 DIAGNOSIS — L03.116 CELLULITIS OF LEFT LOWER EXTREMITY: ICD-10-CM

## 2017-08-21 DIAGNOSIS — R09.02 HYPOXIA: Primary | ICD-10-CM

## 2017-08-21 DIAGNOSIS — I50.33 ACUTE ON CHRONIC DIASTOLIC CONGESTIVE HEART FAILURE (HCC): ICD-10-CM

## 2017-08-21 PROBLEM — D69.6 THROMBOCYTOPENIA (HCC): Status: ACTIVE | Noted: 2017-08-21

## 2017-08-21 LAB
ALBUMIN SERPL-MCNC: 2.9 G/DL (ref 3.2–4.6)
ALBUMIN/GLOB SERPL: 0.7 {RATIO} (ref 1.2–3.5)
ALP SERPL-CCNC: 102 U/L (ref 50–136)
ALT SERPL-CCNC: 18 U/L (ref 12–65)
ANION GAP SERPL CALC-SCNC: 8 MMOL/L (ref 7–16)
AST SERPL-CCNC: 14 U/L (ref 15–37)
ATRIAL RATE: 326 BPM
BASOPHILS # BLD: 0 K/UL (ref 0–0.2)
BASOPHILS NFR BLD: 0 % (ref 0–2)
BILIRUB SERPL-MCNC: 0.6 MG/DL (ref 0.2–1.1)
BNP SERPL-MCNC: 317 PG/ML
BUN SERPL-MCNC: 47 MG/DL (ref 8–23)
CALCIUM SERPL-MCNC: 8.7 MG/DL (ref 8.3–10.4)
CALCULATED R AXIS, ECG10: 33 DEGREES
CALCULATED T AXIS, ECG11: 174 DEGREES
CHLORIDE SERPL-SCNC: 104 MMOL/L (ref 98–107)
CO2 SERPL-SCNC: 27 MMOL/L (ref 21–32)
CREAT SERPL-MCNC: 2.46 MG/DL (ref 0.8–1.5)
DIAGNOSIS, 93000: NORMAL
DIFFERENTIAL METHOD BLD: ABNORMAL
EOSINOPHIL # BLD: 0.1 K/UL (ref 0–0.8)
EOSINOPHIL NFR BLD: 2 % (ref 0.5–7.8)
ERYTHROCYTE [DISTWIDTH] IN BLOOD BY AUTOMATED COUNT: 17.5 % (ref 11.9–14.6)
GLOBULIN SER CALC-MCNC: 4.2 G/DL (ref 2.3–3.5)
GLUCOSE BLD STRIP.AUTO-MCNC: 148 MG/DL (ref 65–100)
GLUCOSE BLD STRIP.AUTO-MCNC: 153 MG/DL (ref 65–100)
GLUCOSE SERPL-MCNC: 186 MG/DL (ref 65–100)
HCT VFR BLD AUTO: 39.9 % (ref 41.1–50.3)
HGB BLD-MCNC: 13.1 G/DL (ref 13.6–17.2)
IMM GRANULOCYTES # BLD: 0 K/UL (ref 0–0.5)
IMM GRANULOCYTES NFR BLD: 0.2 % (ref 0–5)
LACTATE BLD-SCNC: 1.7 MMOL/L (ref 0.5–1.9)
LYMPHOCYTES # BLD: 0.7 K/UL (ref 0.5–4.6)
LYMPHOCYTES NFR BLD: 11 % (ref 13–44)
MCH RBC QN AUTO: 25.5 PG (ref 26.1–32.9)
MCHC RBC AUTO-ENTMCNC: 32.8 G/DL (ref 31.4–35)
MCV RBC AUTO: 77.8 FL (ref 79.6–97.8)
MONOCYTES # BLD: 0.5 K/UL (ref 0.1–1.3)
MONOCYTES NFR BLD: 7 % (ref 4–12)
NEUTS SEG # BLD: 5.3 K/UL (ref 1.7–8.2)
NEUTS SEG NFR BLD: 80 % (ref 43–78)
PLATELET # BLD AUTO: 123 K/UL (ref 150–450)
PMV BLD AUTO: ABNORMAL FL (ref 10.8–14.1)
POTASSIUM SERPL-SCNC: 5 MMOL/L (ref 3.5–5.1)
PROCALCITONIN SERPL-MCNC: 0.1 NG/ML
PROT SERPL-MCNC: 7.1 G/DL (ref 6.3–8.2)
Q-T INTERVAL, ECG07: 452 MS
QRS DURATION, ECG06: 98 MS
QTC CALCULATION (BEZET), ECG08: 458 MS
RBC # BLD AUTO: 5.13 M/UL (ref 4.23–5.67)
SODIUM SERPL-SCNC: 139 MMOL/L (ref 136–145)
TROPONIN I BLD-MCNC: 0.03 NG/ML (ref 0–0.08)
VENTRICULAR RATE, ECG03: 62 BPM
WBC # BLD AUTO: 6.6 K/UL (ref 4.3–11.1)

## 2017-08-21 PROCEDURE — 99218 HC RM OBSERVATION: CPT

## 2017-08-21 PROCEDURE — 87040 BLOOD CULTURE FOR BACTERIA: CPT | Performed by: EMERGENCY MEDICINE

## 2017-08-21 PROCEDURE — 74011636637 HC RX REV CODE- 636/637: Performed by: INTERNAL MEDICINE

## 2017-08-21 PROCEDURE — 74011250637 HC RX REV CODE- 250/637: Performed by: INTERNAL MEDICINE

## 2017-08-21 PROCEDURE — 74011000250 HC RX REV CODE- 250: Performed by: INTERNAL MEDICINE

## 2017-08-21 PROCEDURE — 96365 THER/PROPH/DIAG IV INF INIT: CPT | Performed by: EMERGENCY MEDICINE

## 2017-08-21 PROCEDURE — 94760 N-INVAS EAR/PLS OXIMETRY 1: CPT

## 2017-08-21 PROCEDURE — 74011250636 HC RX REV CODE- 250/636: Performed by: EMERGENCY MEDICINE

## 2017-08-21 PROCEDURE — 84145 PROCALCITONIN (PCT): CPT | Performed by: INTERNAL MEDICINE

## 2017-08-21 PROCEDURE — 99285 EMERGENCY DEPT VISIT HI MDM: CPT | Performed by: EMERGENCY MEDICINE

## 2017-08-21 PROCEDURE — 93005 ELECTROCARDIOGRAM TRACING: CPT | Performed by: EMERGENCY MEDICINE

## 2017-08-21 PROCEDURE — C8929 TTE W OR WO FOL WCON,DOPPLER: HCPCS

## 2017-08-21 PROCEDURE — 80053 COMPREHEN METABOLIC PANEL: CPT | Performed by: EMERGENCY MEDICINE

## 2017-08-21 PROCEDURE — 83880 ASSAY OF NATRIURETIC PEPTIDE: CPT | Performed by: EMERGENCY MEDICINE

## 2017-08-21 PROCEDURE — 74011250636 HC RX REV CODE- 250/636: Performed by: INTERNAL MEDICINE

## 2017-08-21 PROCEDURE — 74011250637 HC RX REV CODE- 250/637: Performed by: EMERGENCY MEDICINE

## 2017-08-21 PROCEDURE — 93970 EXTREMITY STUDY: CPT

## 2017-08-21 PROCEDURE — 82962 GLUCOSE BLOOD TEST: CPT

## 2017-08-21 PROCEDURE — 96375 TX/PRO/DX INJ NEW DRUG ADDON: CPT | Performed by: EMERGENCY MEDICINE

## 2017-08-21 PROCEDURE — 85025 COMPLETE CBC W/AUTO DIFF WBC: CPT | Performed by: EMERGENCY MEDICINE

## 2017-08-21 PROCEDURE — 84484 ASSAY OF TROPONIN QUANT: CPT

## 2017-08-21 PROCEDURE — 94640 AIRWAY INHALATION TREATMENT: CPT

## 2017-08-21 PROCEDURE — 86022 PLATELET ANTIBODIES: CPT | Performed by: INTERNAL MEDICINE

## 2017-08-21 PROCEDURE — 71010 XR CHEST PORT: CPT

## 2017-08-21 PROCEDURE — 83605 ASSAY OF LACTIC ACID: CPT

## 2017-08-21 RX ORDER — BUDESONIDE 0.5 MG/2ML
500 INHALANT ORAL
Status: DISCONTINUED | OUTPATIENT
Start: 2017-08-21 | End: 2017-08-23 | Stop reason: HOSPADM

## 2017-08-21 RX ORDER — INSULIN LISPRO 100 [IU]/ML
INJECTION, SOLUTION INTRAVENOUS; SUBCUTANEOUS
Status: DISCONTINUED | OUTPATIENT
Start: 2017-08-21 | End: 2017-08-23 | Stop reason: HOSPADM

## 2017-08-21 RX ORDER — PANTOPRAZOLE SODIUM 40 MG/1
40 TABLET, DELAYED RELEASE ORAL
Status: DISCONTINUED | OUTPATIENT
Start: 2017-08-21 | End: 2017-08-23 | Stop reason: HOSPADM

## 2017-08-21 RX ORDER — GLIPIZIDE 5 MG/1
5 TABLET ORAL 2 TIMES DAILY
Status: DISCONTINUED | OUTPATIENT
Start: 2017-08-21 | End: 2017-08-23 | Stop reason: HOSPADM

## 2017-08-21 RX ORDER — IPRATROPIUM BROMIDE AND ALBUTEROL SULFATE 2.5; .5 MG/3ML; MG/3ML
3 SOLUTION RESPIRATORY (INHALATION)
Status: DISCONTINUED | OUTPATIENT
Start: 2017-08-21 | End: 2017-08-23 | Stop reason: HOSPADM

## 2017-08-21 RX ORDER — HYDRALAZINE HYDROCHLORIDE 10 MG/1
10 TABLET, FILM COATED ORAL 2 TIMES DAILY
Status: DISCONTINUED | OUTPATIENT
Start: 2017-08-21 | End: 2017-08-23 | Stop reason: HOSPADM

## 2017-08-21 RX ORDER — CLINDAMYCIN PHOSPHATE 900 MG/50ML
900 INJECTION INTRAVENOUS
Status: COMPLETED | OUTPATIENT
Start: 2017-08-21 | End: 2017-08-21

## 2017-08-21 RX ORDER — POTASSIUM CHLORIDE 20 MEQ/1
20 TABLET, EXTENDED RELEASE ORAL DAILY
Status: DISCONTINUED | OUTPATIENT
Start: 2017-08-22 | End: 2017-08-23 | Stop reason: HOSPADM

## 2017-08-21 RX ORDER — LEVOTHYROXINE SODIUM 50 UG/1
50 TABLET ORAL
Status: DISCONTINUED | OUTPATIENT
Start: 2017-08-22 | End: 2017-08-23 | Stop reason: HOSPADM

## 2017-08-21 RX ORDER — NITROGLYCERIN 0.4 MG/1
0.4 TABLET SUBLINGUAL ONCE
Status: COMPLETED | OUTPATIENT
Start: 2017-08-21 | End: 2017-08-21

## 2017-08-21 RX ORDER — FUROSEMIDE 10 MG/ML
60 INJECTION INTRAMUSCULAR; INTRAVENOUS 2 TIMES DAILY
Status: DISCONTINUED | OUTPATIENT
Start: 2017-08-21 | End: 2017-08-22

## 2017-08-21 RX ORDER — FUROSEMIDE 10 MG/ML
40 INJECTION INTRAMUSCULAR; INTRAVENOUS
Status: COMPLETED | OUTPATIENT
Start: 2017-08-21 | End: 2017-08-21

## 2017-08-21 RX ORDER — DOCUSATE SODIUM 100 MG/1
100 CAPSULE, LIQUID FILLED ORAL DAILY
Status: DISCONTINUED | OUTPATIENT
Start: 2017-08-22 | End: 2017-08-23 | Stop reason: HOSPADM

## 2017-08-21 RX ORDER — ALBUTEROL SULFATE 0.83 MG/ML
2.5 SOLUTION RESPIRATORY (INHALATION)
Status: DISCONTINUED | OUTPATIENT
Start: 2017-08-21 | End: 2017-08-21

## 2017-08-21 RX ORDER — OXYCODONE HYDROCHLORIDE 15 MG/1
15 TABLET ORAL
Status: DISCONTINUED | OUTPATIENT
Start: 2017-08-21 | End: 2017-08-23 | Stop reason: HOSPADM

## 2017-08-21 RX ORDER — LANOLIN ALCOHOL/MO/W.PET/CERES
400 CREAM (GRAM) TOPICAL DAILY
Status: DISCONTINUED | OUTPATIENT
Start: 2017-08-22 | End: 2017-08-23 | Stop reason: HOSPADM

## 2017-08-21 RX ORDER — SODIUM CHLORIDE 0.9 % (FLUSH) 0.9 %
5-10 SYRINGE (ML) INJECTION EVERY 8 HOURS
Status: DISCONTINUED | OUTPATIENT
Start: 2017-08-21 | End: 2017-08-23 | Stop reason: HOSPADM

## 2017-08-21 RX ORDER — NALOXONE HYDROCHLORIDE 0.4 MG/ML
0.4 INJECTION, SOLUTION INTRAMUSCULAR; INTRAVENOUS; SUBCUTANEOUS AS NEEDED
Status: DISCONTINUED | OUTPATIENT
Start: 2017-08-21 | End: 2017-08-23 | Stop reason: HOSPADM

## 2017-08-21 RX ORDER — ALLOPURINOL 300 MG/1
300 TABLET ORAL DAILY
Status: DISCONTINUED | OUTPATIENT
Start: 2017-08-22 | End: 2017-08-23 | Stop reason: HOSPADM

## 2017-08-21 RX ORDER — GABAPENTIN 100 MG/1
100 CAPSULE ORAL 3 TIMES DAILY
Status: DISCONTINUED | OUTPATIENT
Start: 2017-08-21 | End: 2017-08-23 | Stop reason: HOSPADM

## 2017-08-21 RX ORDER — GUAIFENESIN 100 MG/5ML
81 LIQUID (ML) ORAL DAILY
Status: DISCONTINUED | OUTPATIENT
Start: 2017-08-22 | End: 2017-08-23 | Stop reason: HOSPADM

## 2017-08-21 RX ORDER — SERTRALINE HYDROCHLORIDE 50 MG/1
50 TABLET, FILM COATED ORAL DAILY
Status: DISCONTINUED | OUTPATIENT
Start: 2017-08-22 | End: 2017-08-23 | Stop reason: HOSPADM

## 2017-08-21 RX ORDER — LATANOPROST 50 UG/ML
1 SOLUTION/ DROPS OPHTHALMIC EVERY EVENING
Status: DISCONTINUED | OUTPATIENT
Start: 2017-08-21 | End: 2017-08-23 | Stop reason: HOSPADM

## 2017-08-21 RX ORDER — ACETAMINOPHEN 325 MG/1
650 TABLET ORAL
Status: DISCONTINUED | OUTPATIENT
Start: 2017-08-21 | End: 2017-08-23 | Stop reason: HOSPADM

## 2017-08-21 RX ORDER — METOPROLOL SUCCINATE 25 MG/1
25 TABLET, EXTENDED RELEASE ORAL DAILY
Status: DISCONTINUED | OUTPATIENT
Start: 2017-08-21 | End: 2017-08-23 | Stop reason: HOSPADM

## 2017-08-21 RX ORDER — ISOSORBIDE MONONITRATE 30 MG/1
30 TABLET, EXTENDED RELEASE ORAL DAILY
Status: DISCONTINUED | OUTPATIENT
Start: 2017-08-21 | End: 2017-08-23 | Stop reason: HOSPADM

## 2017-08-21 RX ORDER — SODIUM CHLORIDE 0.9 % (FLUSH) 0.9 %
5-10 SYRINGE (ML) INJECTION AS NEEDED
Status: DISCONTINUED | OUTPATIENT
Start: 2017-08-21 | End: 2017-08-23 | Stop reason: HOSPADM

## 2017-08-21 RX ADMIN — LATANOPROST 1 DROP: 50 SOLUTION OPHTHALMIC at 22:27

## 2017-08-21 RX ADMIN — METOPROLOL SUCCINATE 25 MG: 25 TABLET, EXTENDED RELEASE ORAL at 14:55

## 2017-08-21 RX ADMIN — FUROSEMIDE 60 MG: 10 INJECTION, SOLUTION INTRAMUSCULAR; INTRAVENOUS at 17:28

## 2017-08-21 RX ADMIN — HYDRALAZINE HYDROCHLORIDE 10 MG: 10 TABLET, FILM COATED ORAL at 17:28

## 2017-08-21 RX ADMIN — FUROSEMIDE 40 MG: 10 INJECTION, SOLUTION INTRAMUSCULAR; INTRAVENOUS at 11:42

## 2017-08-21 RX ADMIN — CLINDAMYCIN PHOSPHATE 900 MG: 18 INJECTION, SOLUTION INTRAMUSCULAR; INTRAVENOUS at 13:00

## 2017-08-21 RX ADMIN — GABAPENTIN 100 MG: 100 CAPSULE ORAL at 14:55

## 2017-08-21 RX ADMIN — ISOSORBIDE MONONITRATE 30 MG: 30 TABLET, EXTENDED RELEASE ORAL at 14:55

## 2017-08-21 RX ADMIN — Medication 5 ML: at 21:41

## 2017-08-21 RX ADMIN — PANTOPRAZOLE SODIUM 40 MG: 40 TABLET, DELAYED RELEASE ORAL at 17:28

## 2017-08-21 RX ADMIN — NITROGLYCERIN 0.4 MG: 0.4 TABLET SUBLINGUAL at 11:42

## 2017-08-21 RX ADMIN — BUDESONIDE 500 MCG: 0.5 INHALANT RESPIRATORY (INHALATION) at 19:55

## 2017-08-21 RX ADMIN — OXYCODONE HYDROCHLORIDE 15 MG: 15 TABLET ORAL at 17:37

## 2017-08-21 RX ADMIN — GLIPIZIDE 5 MG: 5 TABLET ORAL at 17:28

## 2017-08-21 RX ADMIN — PERFLUTREN 1 ML: 6.52 INJECTION, SUSPENSION INTRAVENOUS at 16:17

## 2017-08-21 RX ADMIN — Medication 5 ML: at 14:55

## 2017-08-21 RX ADMIN — INSULIN LISPRO 2 UNITS: 100 INJECTION, SOLUTION INTRAVENOUS; SUBCUTANEOUS at 22:26

## 2017-08-21 RX ADMIN — GABAPENTIN 100 MG: 100 CAPSULE ORAL at 21:40

## 2017-08-21 RX ADMIN — IPRATROPIUM BROMIDE AND ALBUTEROL SULFATE 3 ML: .5; 3 SOLUTION RESPIRATORY (INHALATION) at 19:55

## 2017-08-21 NOTE — WOUND CARE
Left lower leg with 3+ pitting edema, varicose veins present, DP and PT pulses are palpable. Hx diabetes and CHF, treated with lasix. Venous stasis ulcer 3.5x3.5x0.3cm pink granular base, patient states had been there about 1 week. Recommend 2 times per week dressing of barrier cream to kelly wound areas, xeroform to wound, ABD,kerlex and coban from toes to mid calf. Will need follow up with home health or wound clinic. Will monitor.

## 2017-08-21 NOTE — ED TRIAGE NOTES
Pt states shortness of breath and fluid retention for the past few days. Pt has swelling in his legs and has put on 10 lbs of fluids since yesterday per the pt.

## 2017-08-21 NOTE — IP AVS SNAPSHOT
[address] 322 [facility] 
[phone] Patient: Roberto Mejia MRN: [id number] You are allergic to the following No active allergies Recent Documentation Height Weight BMI Smoking Status 1.778 m 119.8 kg 37.89 kg/m2 Former Smoker Unresulted Labs Order Current Status CULTURE, BLOOD Preliminary result CULTURE, BLOOD Preliminary result Emergency Contacts Name Discharge Info Relation Home Work Mobile Eulalia Navarro  Spouse [3] [id number] About your hospitalization You were admitted on:  August 21, 2017 You last received care in the:  [facility] You were discharged on:  August 23, 2017 Unit phone number:  [phone] Why you were hospitalized Your primary diagnosis was:  Acute On Chronic Diastolic (Congestive) Heart Failure (Hcc) Your diagnoses also included:  Hypoxia, Ckd (Chronic Kidney Disease) Stage 3, Gfr 30-59 Ml/Min, 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), Persistent Atrial Fibrillation (Hcc), Thrombocytopenia (Hcc), Billy On Cpap, Cellulitis Of Left Lower Extremity Providers Seen During Your Hospitalizations Provider Role Specialty Primary office phone Kilo Call MD Attending Provider Emergency Medicine [phone] [facility] Spencer Knigsley MD Attending Provider Internal Medicine [phone] Your Primary Care Physician (PCP) Primary Care Physician Office Phone Office Fax Rosie Friday [phone] Follow-up Information Follow up With Details Comments Contact Info Dmitry Tripp MD On 8/31/2017 Aug [facility] at [facility]  [address] [place] [address] 
[phone] Su Jain MD On 8/29/2017 10:15am 2 Eddiee Buck 
[place] [address] 
[phone] Your Appointments Tuesday August 29, 2017 10:15 AM EDT Office Visit with Vahid Martin MD  
[address]) [address] [id number] [phone] Thursday August 31, 2017  2:45 PM EDT TRANSITIONAL CARE with Yolie Harrison MD  
[place] OFFICE ([facility]) [address] [place]  
[address]  
[phone] Friday September 08, 2017  8:40 AM EDT Return appointment with Donya Brandt NP  
[address] ([place] [facility]) [facility] [address] [place] [address] [facility]  
[phone] Thursday September 21, 2017  1:00 PM EDT  
(Arrive by 12:40 PM) Return appointment with TANGELA [facility] ([facility]) [address] [place] [address] [facility]  
[phone] Current Discharge Medication List  
  
CONTINUE these medications which have CHANGED Dose & Instructions Dispensing Information Comments Morning Noon Evening Bedtime  
 furosemide 40 mg tablet Commonly known as:  LASIX What changed:   
- medication strength - when to take this 
- reasons to take this Your next dose is:  Tomorrow Morning Dose:  40 mg Take 1 Tab by mouth daily. Quantity:  30 Tab Refills:  1 HYDROcodone-acetaminophen  mg tablet Commonly known as:  Shraddha Saurav Start taking on:  10/15/2017 What changed:  Another medication with the same name was removed. Continue taking this medication, and follow the directions you see here. .  
 Quantity:  90 Tab Refills:  0 PHARMACY FILL ON TIME. NO EARLY REFILLS. oxyCODONE IR 15 mg immediate release tablet Commonly known as:  OXY-IR Start taking on:  10/15/2017 What changed:  Another medication with the same name was removed.  Continue taking this medication, and follow the directions you see here. Dose:  15 mg Take 1 Tab by mouth every eight (8) hours as needed for Pain. Max Daily Amount: 45 mg.  
 Quantity:  90 Tab Refills:  0 PHARMACY FILL ON TIME. NO EARLY REFILLS. CONTINUE these medications which have NOT CHANGED Dose & Instructions Dispensing Information Comments Morning Noon Evening Bedtime  
 albuterol 2.5 mg /3 mL (0.083 %) nebulizer solution Commonly known as:  PROVENTIL VENTOLIN  
   
 1 Vial [address] Qid   Order fax to [facility]  Indications: COPD Quantity:  360 Vial  
Refills:  3  
 - DX copd  
 
-  
 
- Pt to use Albuterol 00.83% 1 Vial [address] Along with Pulmicort 0.5 mg 1 Vial [address] bid  
    
   
   
   
  
 allopurinol 300 mg tablet Commonly known as:  Brandon Bee Take  by mouth daily. Refills:  0  
     
  
   
   
   
  
 aspirin 81 mg chewable tablet Dose:  81 mg Take 1 Tab by mouth daily. Quantity:  90 Tab Refills:  3  
     
  
   
   
   
  
 budesonide-formoterol 160-4.5 mcg/actuation HFA inhaler Commonly known as:  SYMBICORT Dose:  2 Puff Take 2 Puffs by inhalation two (2) times a day. Refills:  0  
     
  
   
   
  
   
  
 COMBIVENT RESPIMAT  mcg/actuation inhaler Generic drug:  ipratropium-albuterol Dose:  1 Puff Take 1 Puff by inhalation every six (6) hours. Refills:  0  
     
   
   
   
  
 cpap machine kit  
   
 by Does Not Apply route. Bilevel 12/8 Refills:  0  
     
   
   
   
  
 ferrous sulfate 324 mg (65 mg iron) tablet Take  by mouth Daily (before breakfast). Refills:  0  
     
  
   
   
   
  
 fluticasone 50 mcg/actuation nasal spray Commonly known as:  Arlys Pock Dose:  2 Spray 2 Sprays by Both Nostrils route daily. Quantity:  1 Bottle Refills:  3  
     
  
   
   
   
  
 gabapentin 100 mg capsule Commonly known as:  NEURONTIN  Dose:  100 mg  
 Take 1 Cap by mouth three (3) times daily. Quantity:  90 Cap Refills:  3  
     
  
   
   
  
   
  
  
 glipiZIDE 5 mg tablet Commonly known as:  Martya Ayan Dose:  5 mg Take 5 mg by mouth two (2) times a day. Refills:  0  
     
  
   
   
  
   
  
 hydrALAZINE 10 mg tablet Commonly known as:  APRESOLINE Dose:  10 mg Take 10 mg by mouth two (2) times a day. Refills:  0  
     
  
   
   
  
   
  
 isosorbide mononitrate ER 30 mg tablet Commonly known as:  IMDUR Dose:  30 mg Take 1 Tab by mouth daily. Quantity:  30 Tab Refills:  5  
     
  
   
   
   
  
 K-DUR 20 mEq tablet Generic drug:  potassium chloride Dose:  20 mEq Take 20 mEq by mouth daily. Refills:  0  
     
  
   
   
   
  
 levothyroxine 50 mcg tablet Commonly known as:  SYNTHROID Dose:  50 mcg Take 1 Tab by mouth Daily (before breakfast). Quantity:  90 Tab Refills:  3  
     
  
   
   
   
  
 magnesium oxide 400 mg tablet Commonly known as:  MAG-OX Dose:  400 mg Take 1 Tab by mouth daily. Quantity:  90 Tab Refills:  3  
     
  
   
   
   
  
 metoprolol succinate 25 mg XL tablet Commonly known as:  TOPROL-XL Pt takes 1/2 tab po daily. Quantity:  45 Tab Refills:  3  
     
  
   
   
   
  
 pantoprazole 40 mg tablet Commonly known as:  PROTONIX Dose:  40 mg Take 1 Tab by mouth Before breakfast and dinner. Quantity:  180 Tab Refills:  3  
     
  
   
   
  
   
  
 sertraline 50 mg tablet Commonly known as:  ZOLOFT Take one tablet each evening for anxiety  Indications: GENERALIZED ANXIETY DISORDER Quantity:  30 Tab Refills:  3 SITagliptin 50 mg tablet Commonly known as:  Noreene Sly Dose:  50 mg Take 1 Tab by mouth daily. Quantity:  90 Tab Refills:  3 STOOL SOFTENER 100 mg capsule Generic drug:  docusate sodium  Dose:  100 mg  
 Take 100 mg by mouth as needed. Refills:  0  
     
   
   
   
  
 TRAVATAN Z 0.004 % ophthalmic solution Generic drug:  travoprost  
   
 Dose:  1 Drop Administer 1 Drop to both eyes two (2) times a day. Refills:  0 Where to Get Your Medications Information on where to get these meds will be given to you by the nurse or doctor. ! Ask your nurse or doctor about these medications  
  furosemide 40 mg tablet Discharge Instructions Heart Failure: Care Instructions Your Care Instructions Heart failure occurs when your heart does not pump as much blood as the body needs. Failure does not mean that the heart has stopped pumping but rather that it is not pumping as well as it should. Over time, this causes fluid buildup in your lungs and other parts of your body. Fluid buildup can cause shortness of breath, fatigue, swollen ankles, and other problems. By taking medicines regularly, reducing sodium (salt) in your diet, checking your weight every day, and making lifestyle changes, you can feel better and live longer. Follow-up care is a key part of your treatment and safety. Be sure to make and go to all appointments, and call your doctor if you are having problems. It's also a good idea to know your test results and keep a list of the medicines you take. How can you care for yourself at home? Medicines · Be safe with medicines. Take your medicines exactly as prescribed. Call your doctor if you think you are having a problem with your medicine. · Do not take any vitamins, over-the-counter medicine, or herbal products without talking to your doctor first. Catherine Boys not take ibuprofen (Advil or Motrin) and naproxen (Aleve) without talking to your doctor first. They could make your heart failure worse. · You may be taking some of the following medicine.  
¨ Beta-blockers can slow heart rate, decrease blood pressure, and improve your condition. Taking a beta-blocker may lower your chance of needing to be hospitalized. ¨ Angiotensin-converting enzyme inhibitors (ACEIs) reduce the heart's workload, lower blood pressure, and reduce swelling. Taking an ACEI may lower your chance of needing to be hospitalized again. ¨ Angiotensin II receptor blockers (ARBs) work like ACEIs. Your doctor may prescribe them instead of ACEIs. ¨ Diuretics, also called water pills, reduce swelling. ¨ Potassium supplements replace this important mineral, which is sometimes lost with diuretics. ¨ Aspirin and other blood thinners prevent blood clots, which can cause a stroke or heart attack. You will get more details on the specific medicines your doctor prescribes. Diet · Your doctor may suggest that you limit sodium to 2,000 milligrams (mg) a day or less. That is less than 1 teaspoon of salt a day, including all the salt you eat in cooking or in packaged foods. People get most of their sodium from processed foods. Fast food and restaurant meals also tend to be very high in sodium. · Ask your doctor how much liquid you can drink each day. You may have to limit liquids. Weight · Weigh yourself without clothing at the same time each day. Record your weight. Call your doctor if you have a sudden weight gain, such as more than 2 to 3 pounds in a day or 5 pounds in a week. (Your doctor may suggest a different range of weight gain.) A sudden weight gain may mean that your heart failure is getting worse. Activity level · Start light exercise (if your doctor says it is okay). Even if you can only do a small amount, exercise will help you get stronger, have more energy, and manage your weight and your stress. Walking is an easy way to get exercise. Start out by walking a little more than you did before. Bit by bit, increase the amount you walk. · When you exercise, watch for signs that your heart is working too hard. You are pushing yourself too hard if you cannot talk while you are exercising. If you become short of breath or dizzy or have chest pain, stop, sit down, and rest. 
· If you feel \"wiped out\" the day after you exercise, walk slower or for a shorter distance until you can work up to a better pace. · Get enough rest at night. Sleeping with 1 or 2 pillows under your upper body and head may help you breathe easier. Lifestyle changes · Do not smoke. Smoking can make a heart condition worse. If you need help quitting, talk to your doctor about stop-smoking programs and medicines. These can increase your chances of quitting for good. Quitting smoking may be the most important step you can take to protect your heart. · Limit alcohol to 2 drinks a day for men and 1 drink a day for women. Too much alcohol can cause health problems. · Avoid getting sick from colds and the flu. Get a pneumococcal vaccine shot. If you have had one before, ask your doctor whether you need another dose. Get a flu shot each year. If you must be around people with colds or the flu, wash your hands often. When should you call for help? Call 911 if you have symptoms of sudden heart failure such as: 
· You have severe trouble breathing. · You cough up pink, foamy mucus. · You have a new irregular or rapid heartbeat. Call your doctor now or seek immediate medical care if: 
· You have new or increased shortness of breath. · You are dizzy or lightheaded, or you feel like you may faint. · You have sudden weight gain, such as more than 2 to 3 pounds in a day or 5 pounds in a week. (Your doctor may suggest a different range of weight gain.) · You have increased swelling in your legs, ankles, or feet. · You are suddenly so tired or weak that you cannot do your usual activities. Watch closely for changes in your health, and be sure to contact your doctor if: 
· You develop new symptoms. Where can you learn more? Go to http://angelina-rajiv.info/. Enter L719 in the search box to learn more about \"Heart Failure: Care Instructions. \" Current as of: April 3, 2017 Content Version: 11.3 © 7851-2894 [employer]. Care instructions adapted under license by [employer] (which disclaims liability or warranty for this information). If you have questions about a medical condition or this instruction, always ask your healthcare professional. [address] any warranty or liability for your use of this information. DISCHARGE SUMMARY from Nurse The following personal items are in your possession at time of discharge: 
 
Dental Appliances: At home Visual Aid: None Home Medications: None Jewelry: None Clothing: Nonda Ta, Footwear Other Valuables: None PATIENT INSTRUCTIONS: 
 
After general anesthesia or intravenous sedation, for 24 hours or while taking prescription Narcotics: · Limit your activities · Do not drive and operate hazardous machinery · Do not make important personal or business decisions · Do  not drink alcoholic beverages · If you have not urinated within 8 hours after discharge, please contact your surgeon on call. Report the following to your surgeon: 
· Excessive pain, swelling, redness or odor of or around the surgical area · Temperature over 100.5 · Nausea and vomiting lasting longer than 4 hours or if unable to take medications · Any signs of decreased circulation or nerve impairment to extremity: change in color, persistent  numbness, tingling, coldness or increase pain · Any questions What to do at Home: *  Please give a list of your current medications to your Primary Care Provider. *  Please update this list whenever your medications are discontinued, doses are 
    changed, or new medications (including over-the-counter products) are added. *  Please carry medication information at all times in case of emergency situations. These are general instructions for a healthy lifestyle: No smoking/ No tobacco products/ Avoid exposure to second hand smoke Surgeon General's Warning:  Quitting smoking now greatly reduces serious risk to your health. Obesity, smoking, and sedentary lifestyle greatly increases your risk for illness A healthy diet, regular physical exercise & weight monitoring are important for maintaining a healthy lifestyle You may be retaining fluid if you have a history of heart failure or if you experience any of the following symptoms:  Weight gain of 3 pounds or more overnight or 5 pounds in a week, increased swelling in our hands or feet or shortness of breath while lying flat in bed. Please call your doctor as soon as you notice any of these symptoms; do not wait until your next office visit. Recognize signs and symptoms of STROKE: 
 
F-face looks uneven A-arms unable to move or move unevenly S-speech slurred or non-existent T-time-call 911 as soon as signs and symptoms begin-DO NOT go Back to bed or wait to see if you get better-TIME IS BRAIN. Warning Signs of HEART ATTACK Call 911 if you have these symptoms: 
? Chest discomfort. Most heart attacks involve discomfort in the center of the chest that lasts more than a few minutes, or that goes away and comes back. It can feel like uncomfortable pressure, squeezing, fullness, or pain. ? Discomfort in other areas of the upper body. Symptoms can include pain or discomfort in one or both arms, the back, neck, jaw, or stomach. ? Shortness of breath with or without chest discomfort. ? Other signs may include breaking out in a cold sweat, nausea, or lightheadedness. Don't wait more than five minutes to call [address]! Fast action can save your life.  Calling 911 is almost always the fastest way to get lifesaving treatment. Emergency Medical Services staff can begin treatment when they arrive  up to an hour sooner than if someone gets to the hospital by car. The discharge information has been reviewed with the patient. The patient verbalized understanding. Discharge medications reviewed with the patient and appropriate educational materials and side effects teaching were provided. Discharge Orders None MONOCOhart Announcement We are excited to announce that we are making your provider's discharge notes available to you in MessageGears. You will see these notes when they are completed and signed by the physician that discharged you from your recent hospital stay. If you have any questions or concerns about any information you see in MONOCOharLocalsensor, please call the Health Information Department where you were seen or reach out to your Primary Care Provider for more information about your plan of care. Introducing \[facility]\"" & HEALTH SERVICES! Alberto Dc introduces Message[employer] patient portal. Now you can access parts of your medical record, email your doctor's office, and request medication refills online. 1. In your internet browser, go to [email or web address] 2. Click on the First Time User? Click Here link in the Sign In box. You will see the New Member Sign Up page. 3. Enter your MessageGears Access Code exactly as it appears below. You will not need to use this code after youve completed the sign-up process. If you do not sign up before the expiration date, you must request a new code. · MessageG[employer] Access Code: [email or web address] Expires: 11/21/2017  6:38 AM 
 
4. Enter the last four digits of your Social Security Number (xxxx) and Date of Birth (mm/dd/yyyy) as indicated and click Submit. You will be taken to the next sign-up page. 5. Create a MessageGears ID. This will be your MessageGears login ID and cannot be changed, so think of one that is secure and easy to remember. 6. Create a Happy Cloud password. You can change your password at any time. 7. Enter your Password Reset Question and Answer. This can be used at a later time if you forget your password. 8. Enter your e-mail address. You will receive e-mail notification when new information is available in [address] 9. Click Sign Up. You can now view and download portions of your medical record. 10. Click the Download Summary menu link to download a portable copy of your medical information. If you have questions, please visit the Frequently Asked Questions section of the Happy [employer] website. Remember, [employer] is NOT to be used for urgent needs. For medical emergencies, dial 911. Now available from your iPhone and Android! General Information Please provide this summary of care documentation to your next provider. Patient Signature:  ____________________________________________________________ Date:  ____________________________________________________________  
  
[facility] Signature:  ____________________________________________________________ Date:  ____________________________________________________________

## 2017-08-21 NOTE — ED PROVIDER NOTES
HPI Comments: Patient is an 49-year-old male with history of diabetes, COPD, persistent atrial fibrillation as well as diastolic heart failure who presents with 2 days  Of increased shortness of breath, weight gain and leg swelling. Oxygen saturation was 88%  On arrival to the ED. He does not wear oxygen at home. Mentions he takes Lasix daily. Denies any significant fevers, cough,, vomiting or abdominal pain. In addition to the above complaint patient has noticed increased erythema as well as a wound on his left lower extremity. Patient states it is not painful but he has peripheral neuropathy secondary to diabetes. His wife who is bedside says is gotten much more red and infected looking over the past several days. Patient is a 80 y.o. male presenting with shortness of breath. The history is provided by the patient. No  was used. Shortness of Breath   Associated symptoms include rash and leg swelling. Pertinent negatives include no fever, no neck pain, no vomiting and no abdominal pain. Past Medical History:   Diagnosis Date    Atopic dermatitis 11/21/2012    Atrial fibrillation (HCC)     CAD (coronary artery disease)     Carotid stenosis, bilateral 11/21/2012    50-79%  3-2010    1. Carotid Doppler (6/1/07): Greater than 70% stenosis in proximal LICA. 50% stenosis in right ICA. 2.  CTA of neck (3/15/10): Occluded distal segments of the vertebral arteries bilaterally. Atherosclerosis of the carotid bulbs bilaterally with a 50% stenosis on the left and a stenosis of less than 30% on the right. Small nodule in the right upper lobe near the apex. 3. CTA (8/29/13):  Less than 30% diameter stenosis of the cervical internal carotid arteries bilaterally.     CHF (congestive heart failure) ([address] 11/21/2012    Chronic kidney disease     hx elevated labs    Chronic obstructive pulmonary disease (HCC)     Diabetes (HCC)     Diastolic CHF, acute on chronic ([address] 9/12/2015 1.  Echo (9/11/15) : EF 55-60%. Mild LVH. Moderate biatrial enlargement. Moderate mitral/tricuspid regurgitation.  Failed CABG (coronary artery bypass graft) 2012    GI bleed 2015    Hospitalized [facility]    Gout 2012    History of tobacco use     [place] ([place] of hearing)     Hypercholesterolemia     Hypertension     Hyperuricemia 2012    Morbid obesity ([address]     PAD (peripheral artery disease) ([address] 2012    1. Bilateral proximal common iliac PCI (08):  8.0 X 100 mm Cordis smart stent on right and 10 X 40 mm cordis smart stent on right. Both inflated to 7.0 mm.  Poor historian     Radiologic findings of lung field, abnormal 10/31/2016    1. CT of chest  (11/24/10): Multiple small nodules in the right lobe and stable interstitial prominence. Consistent with chronic lung disease. No evidence of malignancy.     Seizure disorder ([address] 2013    Shortness of breath dyspnea 2013    Thyroid disease     Unspecified sleep apnea        Past Surgical History:   Procedure Laterality Date    CARDIAC SURG PROCEDURE UNLIST      cath ,cabg ,lexiscan cardiolite 11/10    COLONOSCOPY N/A 2017    COLONOSCOPY  BMI 36 TO BE ADMITTED 17 performed by Ra Mccord MD at [facility] ENDOSCOPY    HX CATARACT REMOVAL Left     os    HX COLONOSCOPY      HX CORONARY ARTERY BYPASS GRAFT  2007    HX CORONARY STENT PLACEMENT  2008    bilateral iliac artery PCI and stents    HX HEART CATHETERIZATION      left-2007, 2011    HX HEENT  1970s    neck lipoma         Family History:   Problem Relation Age of Onset    Heart Attack Mother    Keny Chaudhary Other Father      old age   Keny Chaudhary Other Brother      brain aneurysm    Heart Disease Other      2 children  with heart concerns, 36 & 47 yo    Heart Disease Son        Social History     Social History    Marital status:      Spouse name: N/A    Number of children: N/A    Years of education: N/A Occupational History   [address] Retired     sales, 12 yrs     Social History Main Topics    Smoking status: Former Smoker     Packs/day: 1.00     Years: 45.00     Types: Cigarettes     Quit date: 1984    Smokeless tobacco: Never Used      Comment: (stopped smoking in )    Alcohol use No    Drug use: No    Sexual activity: Not Currently     Other Topics Concern    Not on file     Social History Narrative    45 pack year history cigarette smoking, stopped in . 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 , two living children, two children  with coronary artery disease, ages 36 and 48. Has always lived in [place]. No pets. ALLERGIES: Review of patient's allergies indicates no known allergies. Review of Systems   Constitutional: Positive for unexpected weight change. Negative for fatigue and fever. HENT: Negative for congestion. Eyes: Negative for redness. Respiratory: Positive for shortness of breath. Cardiovascular: Positive for leg swelling. Gastrointestinal: Negative for abdominal pain, nausea and vomiting. Genitourinary: Negative for flank pain. Musculoskeletal: Negative for neck pain. Skin: Positive for rash. Neurological: Negative for dizziness. Psychiatric/Behavioral: Negative for confusion. Vitals:    17 0951   BP: 115/49   Pulse: 75   Resp: 30   Temp: 97.8 °F (36.6 °C)   SpO2: (!) 88%   Weight: 122 kg (269 lb)   Height: 5' 10\" (1.778 m)            Physical Exam   Constitutional: He is oriented to person, place, and time. He appears well-developed and well-nourished. No distress. HENT:   Head: Normocephalic and atraumatic. Eyes: Conjunctivae and EOM are normal. Pupils are equal, round, and reactive to light. Neck: Normal range of motion. Neck supple. Cardiovascular: Normal rate, regular rhythm and normal heart sounds.     Pulmonary/Chest: Effort normal and breath sounds normal. No respiratory distress. He has no wheezes. He has no rales. Somewhat decreased breath sounds bilaterally. No significant rales or rhonchi appreciated. Abdominal: Soft. He exhibits no distension. There is no tenderness. There is no rebound. Musculoskeletal: Normal range of motion. He exhibits edema. He exhibits no tenderness. +2 pitting edema bilaterally. On the patient's left lower extremity he has evidence of a wound on his left anterior shin with surrounding cellulitis and warmth. Cellulitis extends from approximately his sock line up to his knee and is circumferential.    Neurological: He is alert and oriented to person, place, and time. Skin: Skin is warm and dry. No rash noted. He is not diaphoretic. Psychiatric: He has a normal mood and affect. His behavior is normal.   Vitals reviewed. MDM  Number of Diagnoses or Management Options  Acute on chronic diastolic congestive heart failure ([facility]): new and requires workup  Cellulitis of left lower extremity: new and requires workup  Hypoxia: new and requires workup  Diagnosis management comments: We will admit to the hospitalist for diuresis as well as for IV antibiotics in regards to his left lower extremity cellulitis. Admitted in stable condition. Clindamycin started in the ED. Lasix and nitroglycerin also given in the ED.        Amount and/or Complexity of Data Reviewed  Clinical lab tests: ordered and reviewed (Results for orders placed or performed during the hospital encounter of 08/21/17  -CBC WITH AUTOMATED DIFF       Result                                            Value                         Ref Range                       WBC                                               6.6                           4.3 - 11.1 K/uL                 RBC                                               5.13                          4.23 - 5.67 M/uL                HGB                                               13.1 (L)                      13.6 - 17.2 g/dL                HCT                                               39.9 (L)                      41.1 - 50.3 %                   MCV                                               77.8 (L)                      79.6 - 97.8 FL                  MCH                                               25.5 (L)                      26.1 - 32.9 PG                  MCHC                                              32.8                          31.4 - 35.0 g/dL                RDW                                               17.5 (H)                      11.9 - 14.6 %                   PLATELET                                          123 (L)                       150 - 450 K/uL                  MPV                                               Cannot be calculated          10.8 - 14.1 FL                  DF                                                AUTOMATED                                                     NEUTROPHILS                                       80 (H)                        43 - 78 %                       LYMPHOCYTES                                       11 (L)                        13 - 44 %                       MONOCYTES                                         7                             4.0 - 12.0 %                    EOSINOPHILS                                       2                             0.5 - 7.8 %                     BASOPHILS                                         0                             0.0 - 2.0 %                     IMMATURE GRANULOCYTES                             0.2                           0.0 - 5.0 %                     ABS. NEUTROPHILS                                  5.3                           1.7 - 8.2 K/UL                  ABS.  LYMPHOCYTES                                  0.7                           0.5 - 4.6 K/UL                  ABS. MONOCYTES                                    0.5                           0.1 - 1.3 K/UL ABS. EOSINOPHILS                                  0.1                           0.0 - 0.8 K/UL                  ABS. BASOPHILS                                    0.0                           0.0 - 0.2 K/UL                  ABS. IMM.  GRANS.                                  0.0                           0.0 - 0.5 K/UL             -METABOLIC PANEL, COMPREHENSIVE       Result                                            Value                         Ref Range                       Sodium                                            139                           136 - 145 mmol/L                Potassium                                         5.0                           3.5 - 5.1 mmol/L                Chloride                                          104                           98 - 107 mmol/L                 CO2                                               27                            21 - 32 mmol/L                  Anion gap                                         8                             7 - 16 mmol/L                   Glucose                                           186 (H)                       65 - 100 mg/dL                  BUN                                               47 (H)                        8 - 23 MG/DL                    Creatinine                                        2.46 (H)                      0.8 - 1.5 MG/DL                 GFR est AA                                        32 (L)                        >60 ml/min/1.73m2               GFR est non-AA                                    27 (L)                        >60 ml/min/1.73m2               Calcium                                           8.7                           8.3 - 10.4 MG/DL                Bilirubin, total                                  0.6                           0.2 - 1.1 MG/DL                 ALT (SGPT)                                        18                            12 - 65 U/L AST (SGOT)                                        14 (L)                        15 - 37 U/L                     Alk. phosphatase                                  102                           50 - 136 U/L                    Protein, total                                    7.1                           6.3 - 8.2 g/dL                  Albumin                                           2.9 (L)                       3.2 - 4.6 g/dL                  Globulin                                          4.2 (H)                       2.3 - 3.5 g/dL                  A-G Ratio                                         0.7 (L)                       1.2 - 3.5                  -BNP       Result                                            Value                         Ref Range                       BNP                                               317                           pg/mL                      -POC TROPONIN-I       Result                                            Value                         Ref Range                       Troponin-I (POC)                                  0.03                          0.0 - 0.08 ng/ml           -EKG, 12 LEAD, INITIAL       Result                                            Value                         Ref Range                       Ventricular Rate                                  62                            BPM                             Atrial Rate                                       326                           BPM                             QRS Duration                                      98                            ms                              Q-T Interval                                      452                           ms                              QTC Calculation (Bezet)                           458                           ms                              Calculated R Axis                                 33                            degrees Calculated T Axis                                 174                           degrees                         Diagnosis                                                                                                   !! AGE AND GENDER SPECIFIC ECG ANALYSIS !! Atrial fibrillation   Anteroseptal infarct , age undetermined   ST & T wave abnormality, consider inferolateral ischemia or digitalis effect   Abnormal ECG   When compared with ECG of 16-SEP-2016 19:57,   Anteroseptal infarct is now Present   T wave inversion more evident in Lateral leads     )  Tests in the radiology section of CPT®: ordered and reviewed (Xr Chest Port    Result Date: 8/21/2017  HISTORY: Shortness of breath and fluid retention. EXAM: AP chest radiograph PRIOR STUDY: 08/04/2017 FINDINGS: There are postsurgical changes of prior median sternotomy. The heart is enlarged but stable. There is mild haziness of the pulmonary vasculature. No focal consolidation or evidence of a pleural effusion. There is no pneumothorax.      IMPRESSION: Cardiomegaly with mild pulmonary vascular congestion.    )  Review and summarize past medical records: yes  Discuss the patient with other providers: yes  Independent visualization of images, tracings, or specimens: yes    Risk of Complications, Morbidity, and/or Mortality  Presenting problems: high  Diagnostic procedures: high  Management options: high    Patient Progress  Patient progress: stable    ED Course       Procedures

## 2017-08-21 NOTE — CONSULTS
[address] Cardiology Consult    Attending Cardiologist:Dr. Arely Arciniega    Primary Cardiologist:Dr. Suzette Barr. David--CHF    Subjective: Charity Jodran is a 80 y.o. male with long standing history of CAD, prior CABG, diastolic HF, chronic atrial fibrillation, not on oral anticoagulation due to prior recurrent GI bleeds. He presented to ER today with ten pound wt gain in last two days. He denies cp. He has been compliant with fluid and sodium restrictions and taking his prescribed medications. He does endorse one week hx of orthopnea, PND, progressively worsening lower ext edema in last week. Left lower extremity wound has been present for several months that appears reddened and infected. Past Medical History:   Diagnosis Date    Atopic dermatitis 11/21/2012    Atrial fibrillation (HCC)     CAD (coronary artery disease)     Carotid stenosis, bilateral 11/21/2012    50-79%  3-2010    1. Carotid Doppler (6/1/07): Greater than 70% stenosis in proximal LICA. 50% stenosis in right ICA. 2.  CTA of neck (3/15/10): Occluded distal segments of the vertebral arteries bilaterally. Atherosclerosis of the carotid bulbs bilaterally with a 50% stenosis on the left and a stenosis of less than 30% on the right. Small nodule in the right upper lobe near the apex. 3. CTA (8/29/13):  Less than 30% diameter stenosis of the cervical internal carotid arteries bilaterally.  CHF (congestive heart failure) ([address] 11/21/2012    Chronic kidney disease     hx elevated labs    Chronic obstructive pulmonary disease (HCC)     Diabetes (HCC)     Diastolic CHF, acute on chronic ([address] 9/12/2015    1. Echo (9/11/15) : EF 55-60%. Mild LVH. Moderate biatrial enlargement. Moderate mitral/tricuspid regurgitation.      Failed CABG (coronary artery bypass graft) 11/21/2012    GI bleed 1/2015    Hospitalized SF    Gout 11/21/2012    History of tobacco use     [place] (hard of hearing)     Hypercholesterolemia     Hypertension     Hyperuricemia 11/21/2012    Morbid obesity [address]     PAD (peripheral artery disease) ([facility] [address] [facility] 11/21/2012    1. Bilateral proximal common iliac PCI (2/21/08):  8.0 X 100 mm Cordis smart stent on right and 10 X 40 mm cordis smart stent on right. Both inflated to 7.0 mm.  Poor historian     Radiologic findings of lung field, abnormal 10/31/2016    1. CT of chest  (11/24/10): Multiple small nodules in the right lobe and stable interstitial prominence. Consistent with chronic lung disease. No evidence of malignancy.     Seizure disorder ([facility] [address] [facility] 8/5/2013    Shortness of breath dyspnea 8/5/2013    Thyroid disease     Unspecified sleep apnea       Past Surgical History:   Procedure Laterality Date    CARDIAC SURG PROCEDURE UNLIST      cath 5/07,cabg 6/07,lexiscan cardiolite 11/10    COLONOSCOPY N/A 1/27/2017    COLONOSCOPY  BMI 36 TO BE ADMITTED 1/26/17 performed by Fabrizio Arroyo MD at [facility] ENDOSCOPY    HX CATARACT REMOVAL Left 2003    os    HX COLONOSCOPY      HX CORONARY ARTERY BYPASS GRAFT  06-    HX CORONARY STENT PLACEMENT  02-    bilateral iliac artery PCI and stents    HX HEART CATHETERIZATION      left-05-, 01-    HX HEENT  1970s    neck lipoma      Current Facility-Administered Medications   Medication Dose Route Frequency    [START ON 8/22/2017] allopurinol (ZYLOPRIM) tablet 300 mg  300 mg Oral DAILY    [START ON 8/22/2017] aspirin chewable tablet 81 mg  81 mg Oral DAILY    [START ON 8/22/2017] docusate sodium (COLACE) capsule 100 mg  100 mg Oral DAILY    gabapentin (NEURONTIN) capsule 100 mg  100 mg Oral TID    glipiZIDE (GLUCOTROL) tablet 5 mg  5 mg Oral BID    hydrALAZINE (APRESOLINE) tablet 10 mg  10 mg Oral BID    isosorbide mononitrate ER (IMDUR) tablet 30 mg  30 mg Oral DAILY    [START ON 8/22/2017] potassium chloride (K-DUR, KLOR-CON) SR tablet 20 mEq  20 mEq Oral DAILY    [START ON 2017] levothyroxine (SYNTHROID) tablet 50 mcg  50 mcg Oral ACB    [START ON 2017] magnesium oxide (MAG-OX) tablet 400 mg  400 mg Oral DAILY    metoprolol succinate (TOPROL-XL) XL tablet 25 mg  25 mg Oral DAILY    oxyCODONE IR (OXY-IR) immediate release tablet 15 mg  15 mg Oral Q8H PRN    pantoprazole (PROTONIX) tablet 40 mg  40 mg Oral ACB&D    [START ON 2017] sertraline (ZOLOFT) tablet 50 mg  50 mg Oral DAILY    furosemide (LASIX) injection 60 mg  60 mg IntraVENous BID    sodium chloride (NS) flush 5-10 mL  5-10 mL IntraVENous Q8H    sodium chloride (NS) flush 5-10 mL  5-10 mL IntraVENous PRN    acetaminophen (TYLENOL) tablet 650 mg  650 mg Oral Q4H PRN    naloxone (NARCAN) injection 0.4 mg  0.4 mg IntraVENous PRN    insulin lispro (HUMALOG) injection   SubCUTAneous AC&HS    budesonide (PULMICORT) 500 mcg/2 ml nebulizer suspension  500 mcg Nebulization BID RT    albuterol-ipratropium (DUO-NEB) 2.5 MG-0.5 MG/3 ML  3 mL Nebulization Q6H RT    latanoprost (XALATAN) 0.005 % ophthalmic solution 1 Drop  1 Drop Both Eyes QPM     No Known Allergies   Social History   Substance Use Topics    Smoking status: Former Smoker     Packs/day: 1.00     Years: 45.00     Types: Cigarettes     Quit date: 1984    Smokeless tobacco: Never Used      Comment: (stopped smoking in )    Alcohol use No      Family History   Problem Relation Age of Onset    Heart Attack Mother     Other Father      old age   [facility] Other Brother      brain aneurysm    Heart Disease Other      2 children  with heart concerns, 36 & 49 yo    Heart Disease Son         Review of Systems  Gen: as above, chronic edema, worsening SOB, increased abdominal girth.    HEENT: Denies frequent headaches, dizzyness, visual disturbances, Neck pain or swallowing difficulty  Lungs: asa naun   Cardiovascular: as above   GI: Denies hememesis, dark tarry stools, No prior Hx of GI bleed, Denies constipation  : Denies dysuria, no complaints of frequency, nocturia  Heme: No prior bleeding disorders, no prior Cancer  Neuro: Denies prior CVA, TIA. Endocrine: no diabetes, thyroid disorders  Psychiatric: Denies anxiety, or other psychiatric illnesses. Objective:     Visit Vitals    /70    Pulse (!) 59    Temp 97.9 °F (36.6 °C)    Resp 16    Ht 5' 10\" (1.778 m)    Wt 122 kg (269 lb)    SpO2 96%    BMI 38.6 kg/m2     General:Alert, cooperative, no distress, appears stated age  Head: Normocephalic, without obvious abnormality, atraumatic. Eyes: Conjunctivae/corneas clear. PERRL, EOMs intact  Nose:Nares normal. Septum midline. Mucosa normal. No drainage or sinus tenderness. Throat: Lips, mucosa, and tongue normal. Teeth and gums normal.   Neck: Supple, symmetrical, trachea midline,  no carotid bruit and no JVD. Lungs:Clear to auscultation bilaterally. Chest wall: No tenderness or deformity. Heart: irregular, irregular  Abdomen:Soft, non-tender. Bowel sounds normal. No masses, No organomegaly. Extremities:2+ edema L>R. Large 3 cm x 3 cm wound on left shin, reddened weeping. Pulses: 2+ and symmetric all extremities. Skin: as above   Lymph nodes: Cervical, supraclavicular, and axillary nodes normal  Neurologic:No focal deficits identified                 ECG: atrial fibrillation with controlled response. Nonspecific st/ t wave changes. Data Review:     Recent Results (from the past 24 hour(s))   EKG, 12 LEAD, INITIAL    Collection Time: 08/21/17 10:13 AM   Result Value Ref Range    Ventricular Rate 62 BPM    Atrial Rate 326 BPM    QRS Duration 98 ms    Q-T Interval 452 ms    QTC Calculation (Bezet) 458 ms    Calculated R Axis 33 degrees    Calculated T Axis 174 degrees    Diagnosis       !! AGE AND GENDER SPECIFIC ECG ANALYSIS !!   Atrial fibrillation  Anteroseptal infarct , age undetermined  ST & T wave abnormality, consider inferolateral ischemia or digitalis effect  Abnormal ECG  When compared with ECG of 16-SEP-2016 19:57,  Anteroseptal infarct is now Present  T wave inversion more evident in Lateral leads  Confirmed by ST NADIA AUGUSTE MD (), KERRY LIU ([id number]) on 8/21/2017 2:34:15 PM     CBC WITH AUTOMATED DIFF    Collection Time: 08/21/17 10:30 AM   Result Value Ref Range    WBC 6.6 4.3 - 11.1 K/uL    RBC 5.13 4.23 - 5.67 M/uL    HGB 13.1 (L) 13.6 - 17.2 g/dL    HCT 39.9 (L) 41.1 - 50.3 %    MCV 77.8 (L) 79.6 - 97.8 FL    MCH 25.5 (L) 26.1 - 32.9 PG    MCHC 32.8 31.4 - 35.0 g/dL    RDW 17.5 (H) 11.9 - 14.6 %    PLATELET 803 (L) 527 - 450 K/uL    MPV Cannot be calculated 10.8 - 14.1 FL    DF AUTOMATED      NEUTROPHILS 80 (H) 43 - 78 %    LYMPHOCYTES 11 (L) 13 - 44 %    MONOCYTES 7 4.0 - 12.0 %    EOSINOPHILS 2 0.5 - 7.8 %    BASOPHILS 0 0.0 - 2.0 %    IMMATURE GRANULOCYTES 0.2 0.0 - 5.0 %    ABS. NEUTROPHILS 5.3 1.7 - 8.2 K/UL    ABS. LYMPHOCYTES 0.7 0.5 - 4.6 K/UL    ABS. MONOCYTES 0.5 0.1 - 1.3 K/UL    ABS. EOSINOPHILS 0.1 0.0 - 0.8 K/UL    ABS. BASOPHILS 0.0 0.0 - 0.2 K/UL    ABS. IMM. GRANS. 0.0 0.0 - 0.5 K/UL   METABOLIC PANEL, COMPREHENSIVE    Collection Time: 08/21/17 10:30 AM   Result Value Ref Range    Sodium 139 136 - 145 mmol/L    Potassium 5.0 3.5 - 5.1 mmol/L    Chloride 104 98 - 107 mmol/L    CO2 27 21 - 32 mmol/L    Anion gap 8 7 - 16 mmol/L    Glucose 186 (H) 65 - 100 mg/dL    BUN 47 (H) 8 - 23 MG/DL    Creatinine 2.46 (H) 0.8 - 1.5 MG/DL    GFR est AA 32 (L) >60 ml/min/1.73m2    GFR est non-AA 27 (L) >60 ml/min/1.73m2    Calcium 8.7 8.3 - 10.4 MG/DL    Bilirubin, total 0.6 0.2 - 1.1 MG/DL    ALT (SGPT) 18 12 - 65 U/L    AST (SGOT) 14 (L) 15 - 37 U/L    Alk.  phosphatase 102 50 - 136 U/L    Protein, total 7.1 6.3 - 8.2 g/dL    Albumin 2.9 (L) 3.2 - 4.6 g/dL    Globulin 4.2 (H) 2.3 - 3.5 g/dL    A-G Ratio 0.7 (L) 1.2 - 3.5     BNP    Collection Time: 08/21/17 10:30 AM   Result Value Ref Range     pg/mL   PROCALCITONIN    Collection Time: 08/21/17 10:30 AM   Result Value Ref Range Procalcitonin 0.1 ng/mL   POC TROPONIN-I    Collection Time: 08/21/17 10:33 AM   Result Value Ref Range    Troponin-I (POC) 0.03 0.0 - 0.08 ng/ml   POC LACTIC ACID    Collection Time: 08/21/17 12:06 PM   Result Value Ref Range    Lactic Acid (POC) 1.7 0.5 - 1.9 mmol/L         Assessment / Plan     Principal Problem:    Acute on chronic diastolic (congestive) heart failure (HCC) (8/21/2017)--agree with IV diuresis, continue toprol, no ACE or ARB due to CKD stage 3-4, continue hydralazine --titrate meds as tolerated for SBP goal 130. Will review todays ordered echo. 2015 Echo--Normal EF in 2015 with noted valvular insufficiency. Active Problems:    Persistent atrial fibrillation ([facility] [address] (11/21/2012)--rate controlled. Overview: Coumadin therapy discontinued due to recurrent GI bleeding. CKD (chronic kidney disease) stage 3, GFR 30-59 ml/min (9/18/2013)--monitor closely with IV diuretics       ROBERTO on CPAP (2/20/2015)      Overview: Patient is on BiPAP, no supplementary oxygen      Type 2 diabetes mellitus with peripheral neuropathy ([facility] [address] (11/5/2015)--defer to IM       Hypoxia (8/21/2017)--improved since earlier today with initial response to IV lasix. Thrombocytopenia ([facility] [address] (8/21/2017)--no DVT prophylaxis--IM evaluating for HIT    Left leg edema, ?cellulitus--will check duplex given he is not on OA and size discrepancy with right leg. Alonzo Clark NP         [facility] CARDIOLOGY     8/21/2017     5:51 PM    I have personally seen and examined Beverly Navarro with  Cristobal Rajput NP. I agree and confirm findings in history, physical exam, and assessment/plan as outlined above with following pertinent additions/exceptions:    Patient with known coronary artery disease, morbid obesity, diastolic heart failure and chronic lung disease admitted due to a significant weight gain over the last 24 hours. He notes he gained 9 pounds. Notes worsening dyspnea.   He has received intravenous diuresis and his dyspnea has improved. Cardiology consultation was obtained due to his underlying CHF. PE: CV; IRIR  L: Coarse BS.  E: 3+ left edema. 1+ right. ASS/PLAN:  As above. Cautious diuresis. Daily BMP. Ultrasound of left leg but suspect venous insufficiency. Evaluation and treatment of potential cellulitis per IM.      Reza Kelly MD

## 2017-08-21 NOTE — H&P
HOSPITALIST H&P  NAME:  Chely Silveira   Age:  80 y.o.  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 He is accompanied by his wife. He reports he was doing okay until the last 24 hours when he became more short of breath. He weighed ~260 pounds yesterday and was up to 269 pounds when he weighed himself this morning (he weighs himself daily upon getting out of bed). He feels his legs are slightly more swollen than usual.  Also, he reports an enlarging shallow ulcer on his L anterior leg that he has noticed over the last week or so. He denies pain in that leg. He reports his L leg is always more swollen than his R due to vein havesting from L leg for his CABG. He denies chest pain or palpitations. In the ED, his initial oxygen saturation was 88% on RA. CXR consistent with volume overload with increased pulmonary vasculature. Complete ROS done and is as stated in HPI or otherwise negative  Past Medical History:   Diagnosis Date    Atopic dermatitis 2012    Atrial fibrillation (HCC)     CAD (coronary artery disease)     Carotid stenosis, bilateral 2012    50-79%  3-    1. Carotid Doppler (07): Greater than 70% stenosis in proximal LICA. 50% stenosis in right ICA. 2.  CTA of neck (3/15/10): Occluded distal segments of the vertebral arteries bilaterally. Atherosclerosis of the carotid bulbs bilaterally with a 50% stenosis on the left and a stenosis of less than 30% on the right. Small nodule in the right upper lobe near the apex. 3. CTA (13):  Less than 30% diameter stenosis of the cervical internal carotid arteries bilaterally.     CHF (congestive heart failure) ([facility] 11/21/2012    Chronic kidney disease     hx elevated labs    Chronic obstructive pulmonary disease (HCC)     Diabetes (HCC)     Diastolic CHF, acute on chronic ([address] 9/12/2015    1. Echo (9/11/15) : EF 55-60%. Mild LVH. Moderate biatrial enlargement. Moderate mitral/tricuspid regurgitation.  Failed CABG (coronary artery bypass graft) 11/21/2012    GI bleed 1/2015    Hospitalized [facility]    Gout 11/21/2012    History of tobacco use     Hus[place] (hard of hearing)     Hypercholesterolemia     Hypertension     Hyperuricemia 11/21/2012    Morbid obesity ([facility]     PAD (peripheral artery disease) ([facility] [address] 11/21/2012    1. Bilateral proximal common iliac PCI (2/21/08):  8.0 X 100 mm Cordis smart stent on right and 10 X 40 mm cordis smart stent on right. Both inflated to 7.0 mm.  Poor historian     Radiologic findings of lung field, abnormal 10/31/2016    1. CT of chest  (11/24/10): Multiple small nodules in the right lobe and stable interstitial prominence. Consistent with chronic lung disease. No evidence of malignancy.  Seizure disorder ([facility] [address] 8/5/2013    Shortness of breath dyspnea 8/5/2013    Thyroid disease     Unspecified sleep apnea       Past Surgical History:   Procedure Laterality Date    CARDIAC SURG PROCEDURE UNLIST      cath 5/07,cabg 6/07,lexiscan cardiolite 11/10    COLONOSCOPY N/A 1/27/2017    COLONOSCOPY  BMI 36 TO BE ADMITTED 1/26/17 performed by Adrián Campbell MD at [address] HX CATARACT REMOVAL Left 2003    os    HX COLONOSCOPY      HX CORONARY ARTERY BYPASS GRAFT  06-    HX CORONARY STENT PLACEMENT  02-    bilateral iliac artery PCI and stents    HX HEART CATHETERIZATION      left-05-, 01-    HX HEENT  1970s    neck lipoma      Prior to Admission Medications   Prescriptions Last Dose Informant Patient Reported? Taking? HYDROcodone-acetaminophen (NORCO)  mg tablet   Yes No   Sig: Take 1 Tab by mouth. HYDROcodone-acetaminophen (NORCO)  mg tablet   No No   Sig: . HYDROcodone-acetaminophen (NORCO)  mg tablet   No No   Sig: Take 1 Tab by mouth every eight (8) hours as needed for Pain. Max Daily Amount: 3 Tabs. K-DUR 20 mEq tablet   Yes No   Sig: Take 20 mEq by mouth daily. SITagliptin (JANUVIA) 50 mg tablet   No No   Sig: Take 1 Tab by mouth daily. albuterol (PROVENTIL VENTOLIN) 2.5 mg /3 mL (0.083 %) nebulizer solution   No No   Si Vial [address] Neb. Qid      Order fax to [facility]  Indications: COPD   allopurinol (ZYLOPRIM) 300 mg tablet   Yes No   Sig: Take  by mouth daily. aspirin 81 mg chewable tablet   No No   Sig: Take 1 Tab by mouth daily. budesonide-formoterol (SYMBICORT) 160-4.5 mcg/actuation HFA inhaler   Yes No   Sig: Take 2 Puffs by inhalation two (2) times a day. cpap machine kit   Yes No   Sig: by Does Not Apply route. Bilevel    docusate sodium (STOOL SOFTENER) 100 mg capsule   Yes No   Sig: Take 100 mg by mouth as needed. ferrous sulfate 324 mg (65 mg iron) tablet   Yes No   Sig: Take  by mouth Daily (before breakfast). fluticasone (FLONASE) 50 mcg/actuation nasal spray   No No   Si Sprays by Both Nostrils route daily. furosemide (LASIX) 20 mg tablet   Yes No   Sig: Take 40 mg by mouth as needed. gabapentin (NEURONTIN) 100 mg capsule   No No   Sig: Take 1 Cap by mouth three (3) times daily. glipiZIDE (GLUCOTROL) 5 mg tablet   Yes No   Sig: Take 5 mg by mouth two (2) times a day. hydrALAZINE (APRESOLINE) 10 mg tablet   Yes No   Sig: Take 10 mg by mouth two (2) times a day. ipratropium-albuterol (COMBIVENT RESPIMAT)  mcg/actuation inhaler   Yes No   Sig: Take 1 Puff by inhalation every six (6) hours. isosorbide mononitrate ER (IMDUR) 30 mg tablet   No No   Sig: Take 1 Tab by mouth daily. levothyroxine (SYNTHROID) 50 mcg tablet   No No   Sig: Take 1 Tab by mouth Daily (before breakfast).    magnesium oxide (MAG-OX) 400 mg tablet   No No Sig: Take 1 Tab by mouth daily. metoprolol succinate (TOPROL-XL) 25 mg XL tablet   No No   Sig: Pt takes 1/2 tab po daily. oxyCODONE IR (OXY-IR) 15 mg immediate release tablet   Yes No   Sig: Take 15 mg by mouth every eight (8) hours as needed for Pain. oxyCODONE IR (OXY-IR) 15 mg immediate release tablet   No No   Sig: Take 1 Tab by mouth every eight (8) hours as needed for Pain. Max Daily Amount: 45 mg.   oxyCODONE IR (OXY-IR) 15 mg immediate release tablet   No No   Sig: Take 1 Tab by mouth every eight (8) hours as needed for Pain. Max Daily Amount: 45 mg.   pantoprazole (PROTONIX) 40 mg tablet   No No   Sig: Take 1 Tab by mouth Before breakfast and dinner. sAXagliptin (ONGLYZA) 2.5 mg tablet   Yes No   Sig: Take 2.5 mg by mouth daily. sertraline (ZOLOFT) 50 mg tablet   No No   Sig: Take one tablet each evening for anxiety  Indications: GENERALIZED ANXIETY DISORDER   travoprost (TRAVATAN Z) 0.004 % ophthalmic solution   Yes No   Sig: Administer 1 Drop to both eyes two (2) times a day.       Facility-Administered Medications: None     No Known Allergies   Social History   Substance Use Topics    Smoking status: Former Smoker     Packs/day: 1.00     Years: 45.00     Types: Cigarettes     Quit date: 1984    Smokeless tobacco: Never Used      Comment: (stopped smoking in )    Alcohol use No      Family History   Problem Relation Age of Onset    Heart Attack Mother    [employer] Other Father      old age   [employer] Other Brother      brain aneurysm    Heart Disease Other      2 children  with heart concerns, 36 & 49 yo    Heart Disease Son       Objective:     Visit Vitals    BP (!) 174/111    Pulse 60    Temp 97.8 °F (36.6 °C)    Resp 18    Ht 5' 10\" (1.778 m)    Wt 122 kg (269 lb)    SpO2 91%    BMI 38.6 kg/m2      Temp (24hrs), Av.8 °F (36.6 °C), Min:97.8 °F (36.6 °C), Max:97.8 °F (36.6 °C)    Patient Vitals for the past 24 hrs:   Temp Pulse Resp BP SpO2   17 1219 - 60 - (!) 174/111 -   08/21/17 1141 - 62 - (!) 220/90 91 %   08/21/17 1120 - 61 18 155/67 96 %   08/21/17 1039 - (!) 57 16 138/64 94 %   08/21/17 1031 - (!) 51 23 136/63 95 %   08/21/17 1006 - 64 - 158/71 95 %   08/21/17 0951 97.8 °F (36.6 °C) 75 30 115/49 (!) 88 %     Oxygen Therapy  O2 Sat (%): 91 % (08/21/17 1141)  Pulse via Oximetry: 61 beats per minute (08/21/17 1120)  O2 Device: Room air (08/21/17 0951)  Physical Exam:  General:    Alert, cooperative, no distress, appears stated age. Head:   Normocephalic, without obvious abnormality, atraumatic. Nose:  Nares normal. No drainage or sinus tenderness. Lungs:   Bibasilar rales, no wheeze  Heart:   Irregular rhythm, rate controlled  Abdomen:   Soft, non-tender. Not distended. Bowel sounds normal.   Extremities: 1+ pitting edema of R leg, 2-3+ pitting edema of L leg with shallow based ulcer on anterior L leg with serous fluid noted  Skin:     Texture, turgor normal. L leg ulcer. Neurologic: Alert and oriented x 3, no focal deficits   Data Review:   Recent Results (from the past 24 hour(s))   EKG, 12 LEAD, INITIAL    Collection Time: 08/21/17 10:13 AM   Result Value Ref Range    Ventricular Rate 62 BPM    Atrial Rate 326 BPM    QRS Duration 98 ms    Q-T Interval 452 ms    QTC Calculation (Bezet) 458 ms    Calculated R Axis 33 degrees    Calculated T Axis 174 degrees    Diagnosis       !! AGE AND GENDER SPECIFIC ECG ANALYSIS !!   Atrial fibrillation  Anteroseptal infarct , age undetermined  ST & T wave abnormality, consider inferolateral ischemia or digitalis effect  Abnormal ECG  When compared with ECG of 16-SEP-2016 19:57,  Anteroseptal infarct is now Present  T wave inversion more evident in Lateral leads     CBC WITH AUTOMATED DIFF    Collection Time: 08/21/17 10:30 AM   Result Value Ref Range    WBC 6.6 4.3 - 11.1 K/uL    RBC 5.13 4.23 - 5.67 M/uL    HGB 13.1 (L) 13.6 - 17.2 g/dL    HCT 39.9 (L) 41.1 - 50.3 %    MCV 77.8 (L) 79.6 - 97.8 FL    MCH 25.5 (L) 26.1 - 32.9 PG MCHC 32.8 31.4 - 35.0 g/dL    RDW 17.5 (H) 11.9 - 14.6 %    PLATELET 997 (L) 702 - 450 K/uL    MPV Cannot be calculated 10.8 - 14.1 FL    DF AUTOMATED      NEUTROPHILS 80 (H) 43 - 78 %    LYMPHOCYTES 11 (L) 13 - 44 %    MONOCYTES 7 4.0 - 12.0 %    EOSINOPHILS 2 0.5 - 7.8 %    BASOPHILS 0 0.0 - 2.0 %    IMMATURE GRANULOCYTES 0.2 0.0 - 5.0 %    ABS. NEUTROPHILS 5.3 1.7 - 8.2 K/UL    ABS. LYMPHOCYTES 0.7 0.5 - 4.6 K/UL    ABS. MONOCYTES 0.5 0.1 - 1.3 K/UL    ABS. EOSINOPHILS 0.1 0.0 - 0.8 K/UL    ABS. BASOPHILS 0.0 0.0 - 0.2 K/UL    ABS. IMM. GRANS. 0.0 0.0 - 0.5 K/UL   METABOLIC PANEL, COMPREHENSIVE    Collection Time: 08/21/17 10:30 AM   Result Value Ref Range    Sodium 139 136 - 145 mmol/L    Potassium 5.0 3.5 - 5.1 mmol/L    Chloride 104 98 - 107 mmol/L    CO2 27 21 - 32 mmol/L    Anion gap 8 7 - 16 mmol/L    Glucose 186 (H) 65 - 100 mg/dL    BUN 47 (H) 8 - 23 MG/DL    Creatinine 2.46 (H) 0.8 - 1.5 MG/DL    GFR est AA 32 (L) >60 ml/min/1.73m2    GFR est non-AA 27 (L) >60 ml/min/1.73m2    Calcium 8.7 8.3 - 10.4 MG/DL    Bilirubin, total 0.6 0.2 - 1.1 MG/DL    ALT (SGPT) 18 12 - 65 U/L    AST (SGOT) 14 (L) 15 - 37 U/L    Alk. phosphatase 102 50 - 136 U/L    Protein, total 7.1 6.3 - 8.2 g/dL    Albumin 2.9 (L) 3.2 - 4.6 g/dL    Globulin 4.2 (H) 2.3 - 3.5 g/dL    A-G Ratio 0.7 (L) 1.2 - 3.5     BNP    Collection Time: 08/21/17 10:30 AM   Result Value Ref Range     pg/mL   POC TROPONIN-I    Collection Time: 08/21/17 10:33 AM   Result Value Ref Range    Troponin-I (POC) 0.03 0.0 - 0.08 ng/ml   POC LACTIC ACID    Collection Time: 08/21/17 12:06 PM   Result Value Ref Range    Lactic Acid (POC) 1.7 0.5 - 1.9 mmol/L     Imaging /Procedures /Studies   XR CHEST PORT   Final Result   IMPRESSION: Cardiomegaly with mild pulmonary vascular congestion. Assessment and Plan:      Active Hospital Problems    Diagnosis Date Noted    Acute on chronic diastolic (congestive) heart failure (HCC) 08/21/2017    Hypoxia 08/21/2017    Thrombocytopenia ([facility] 08/21/2017    Type 2 diabetes mellitus with peripheral neuropathy ([facility] 11/05/2015    ROBERTO on CPAP 02/20/2015     Patient is on BiPAP, no supplementary oxygen      CKD (chronic kidney disease) stage 3, GFR 30-59 ml/min 09/18/2013    Persistent atrial fibrillation ([facility] [address] 11/21/2012     Coumadin therapy discontinued due to recurrent GI bleeding. PLAN  · Place in observation status on remote telemetry. · Lasix 60 mg IV BID (start this evening as 40 mg IV given in ED). · Daily weight and strict I/O. Sodium and fluid restricted diabetic diet. · Check echo as last one done 9/11/15. · Supplemental oxygen and wean to room air as tolerated. · Check procalcitonin level. If elevated, will start abx for L leg ulcer. Not convinced there is acute infection. · Check HIT panel as platelet decreased after hospitalization in early August where he received Lovenox. · Consult wound care for L leg ulcer. · Continue home CPAP. · Continue glipizide and cover with SSI. · Monitor Cr. · Continue home bp meds. Plan of care discussed with patient and his wife at bedside. Code Status: Full    Anticipated discharge: 1-2 days    Signed By: Nereyda Vences.  Latesha Dunn MD     August 21, 2017

## 2017-08-21 NOTE — PROGRESS NOTES
Admission assessment complete. Pt resting in recliner. No complaints at this time. Safety measures in place. Dual skin assessment performed with Ирина Tao RN. Bilateral lower legs red, sylvester, ecchymosis, with blister to right leg. Ecchymosis to bilateral arms. Oriented to room and call light. Pt did not want to change into gown at this time. Call light in reach. Instructed to call for assistance.

## 2017-08-21 NOTE — ED NOTES
Patient reports worsening shortness of breath, and left lower leg swelling. States he has fluid build up.

## 2017-08-22 ENCOUNTER — APPOINTMENT (OUTPATIENT)
Dept: ULTRASOUND IMAGING | Age: 82
DRG: 291 | End: 2017-08-22
Attending: HOSPITALIST
Payer: MEDICARE

## 2017-08-22 PROBLEM — L03.116 CELLULITIS OF LEFT LOWER EXTREMITY: Status: ACTIVE | Noted: 2017-08-22

## 2017-08-22 LAB
ANION GAP SERPL CALC-SCNC: 10 MMOL/L (ref 7–16)
BASOPHILS # BLD: 0 K/UL (ref 0–0.2)
BASOPHILS NFR BLD: 0 % (ref 0–2)
BUN SERPL-MCNC: 49 MG/DL (ref 8–23)
CALCIUM SERPL-MCNC: 8.8 MG/DL (ref 8.3–10.4)
CHLORIDE SERPL-SCNC: 101 MMOL/L (ref 98–107)
CO2 SERPL-SCNC: 27 MMOL/L (ref 21–32)
CREAT SERPL-MCNC: 2.41 MG/DL (ref 0.8–1.5)
DIFFERENTIAL METHOD BLD: ABNORMAL
EOSINOPHIL # BLD: 0.3 K/UL (ref 0–0.8)
EOSINOPHIL NFR BLD: 4 % (ref 0.5–7.8)
ERYTHROCYTE [DISTWIDTH] IN BLOOD BY AUTOMATED COUNT: 17.4 % (ref 11.9–14.6)
GLUCOSE BLD STRIP.AUTO-MCNC: 128 MG/DL (ref 65–100)
GLUCOSE BLD STRIP.AUTO-MCNC: 169 MG/DL (ref 65–100)
GLUCOSE BLD STRIP.AUTO-MCNC: 179 MG/DL (ref 65–100)
GLUCOSE BLD STRIP.AUTO-MCNC: 192 MG/DL (ref 65–100)
GLUCOSE SERPL-MCNC: 137 MG/DL (ref 65–100)
HCT VFR BLD AUTO: 38.7 % (ref 41.1–50.3)
HGB BLD-MCNC: 12.2 G/DL (ref 13.6–17.2)
IMM GRANULOCYTES # BLD: 0 K/UL (ref 0–0.5)
IMM GRANULOCYTES NFR BLD: 0.1 % (ref 0–5)
LYMPHOCYTES # BLD: 1.2 K/UL (ref 0.5–4.6)
LYMPHOCYTES NFR BLD: 17 % (ref 13–44)
MCH RBC QN AUTO: 24.7 PG (ref 26.1–32.9)
MCHC RBC AUTO-ENTMCNC: 31.5 G/DL (ref 31.4–35)
MCV RBC AUTO: 78.5 FL (ref 79.6–97.8)
MONOCYTES # BLD: 0.5 K/UL (ref 0.1–1.3)
MONOCYTES NFR BLD: 7 % (ref 4–12)
NEUTS SEG # BLD: 5 K/UL (ref 1.7–8.2)
NEUTS SEG NFR BLD: 72 % (ref 43–78)
PLATELET # BLD AUTO: 149 K/UL (ref 150–450)
PMV BLD AUTO: 11.1 FL (ref 10.8–14.1)
POTASSIUM SERPL-SCNC: 4.3 MMOL/L (ref 3.5–5.1)
RBC # BLD AUTO: 4.93 M/UL (ref 4.23–5.67)
SODIUM SERPL-SCNC: 138 MMOL/L (ref 136–145)
WBC # BLD AUTO: 6.9 K/UL (ref 4.3–11.1)

## 2017-08-22 PROCEDURE — 74011000250 HC RX REV CODE- 250: Performed by: HOSPITALIST

## 2017-08-22 PROCEDURE — 74011636637 HC RX REV CODE- 636/637: Performed by: INTERNAL MEDICINE

## 2017-08-22 PROCEDURE — 74011250636 HC RX REV CODE- 250/636: Performed by: INTERNAL MEDICINE

## 2017-08-22 PROCEDURE — 85025 COMPLETE CBC W/AUTO DIFF WBC: CPT | Performed by: INTERNAL MEDICINE

## 2017-08-22 PROCEDURE — 74011000258 HC RX REV CODE- 258: Performed by: HOSPITALIST

## 2017-08-22 PROCEDURE — 74011000250 HC RX REV CODE- 250: Performed by: INTERNAL MEDICINE

## 2017-08-22 PROCEDURE — 65660000000 HC RM CCU STEPDOWN

## 2017-08-22 PROCEDURE — 94640 AIRWAY INHALATION TREATMENT: CPT

## 2017-08-22 PROCEDURE — 80048 BASIC METABOLIC PNL TOTAL CA: CPT | Performed by: INTERNAL MEDICINE

## 2017-08-22 PROCEDURE — 36415 COLL VENOUS BLD VENIPUNCTURE: CPT | Performed by: INTERNAL MEDICINE

## 2017-08-22 PROCEDURE — 74011250637 HC RX REV CODE- 250/637: Performed by: INTERNAL MEDICINE

## 2017-08-22 PROCEDURE — 82962 GLUCOSE BLOOD TEST: CPT

## 2017-08-22 PROCEDURE — 93922 UPR/L XTREMITY ART 2 LEVELS: CPT

## 2017-08-22 PROCEDURE — 99218 HC RM OBSERVATION: CPT

## 2017-08-22 PROCEDURE — 94760 N-INVAS EAR/PLS OXIMETRY 1: CPT

## 2017-08-22 RX ORDER — FUROSEMIDE 10 MG/ML
40 INJECTION INTRAMUSCULAR; INTRAVENOUS 2 TIMES DAILY
Status: DISCONTINUED | OUTPATIENT
Start: 2017-08-23 | End: 2017-08-23 | Stop reason: HOSPADM

## 2017-08-22 RX ORDER — POLYVINYL ALCOHOL 14 MG/ML
1 SOLUTION/ DROPS OPHTHALMIC AS NEEDED
Status: DISCONTINUED | OUTPATIENT
Start: 2017-08-22 | End: 2017-08-23 | Stop reason: HOSPADM

## 2017-08-22 RX ADMIN — ISOSORBIDE MONONITRATE 30 MG: 30 TABLET, EXTENDED RELEASE ORAL at 08:50

## 2017-08-22 RX ADMIN — GLIPIZIDE 5 MG: 5 TABLET ORAL at 17:40

## 2017-08-22 RX ADMIN — LATANOPROST 1 DROP: 50 SOLUTION OPHTHALMIC at 21:37

## 2017-08-22 RX ADMIN — FUROSEMIDE 60 MG: 10 INJECTION, SOLUTION INTRAMUSCULAR; INTRAVENOUS at 08:49

## 2017-08-22 RX ADMIN — POTASSIUM CHLORIDE 20 MEQ: 20 TABLET, EXTENDED RELEASE ORAL at 08:49

## 2017-08-22 RX ADMIN — METOPROLOL SUCCINATE 25 MG: 25 TABLET, EXTENDED RELEASE ORAL at 08:50

## 2017-08-22 RX ADMIN — PANTOPRAZOLE SODIUM 40 MG: 40 TABLET, DELAYED RELEASE ORAL at 17:40

## 2017-08-22 RX ADMIN — HYDRALAZINE HYDROCHLORIDE 10 MG: 10 TABLET, FILM COATED ORAL at 17:40

## 2017-08-22 RX ADMIN — DOCUSATE SODIUM 100 MG: 100 CAPSULE, LIQUID FILLED ORAL at 08:50

## 2017-08-22 RX ADMIN — BUDESONIDE 500 MCG: 0.5 INHALANT RESPIRATORY (INHALATION) at 09:05

## 2017-08-22 RX ADMIN — Medication 5 ML: at 13:28

## 2017-08-22 RX ADMIN — ALLOPURINOL 300 MG: 300 TABLET ORAL at 08:50

## 2017-08-22 RX ADMIN — IPRATROPIUM BROMIDE AND ALBUTEROL SULFATE 3 ML: .5; 3 SOLUTION RESPIRATORY (INHALATION) at 20:37

## 2017-08-22 RX ADMIN — GABAPENTIN 100 MG: 100 CAPSULE ORAL at 13:33

## 2017-08-22 RX ADMIN — IPRATROPIUM BROMIDE AND ALBUTEROL SULFATE 3 ML: .5; 3 SOLUTION RESPIRATORY (INHALATION) at 02:40

## 2017-08-22 RX ADMIN — IPRATROPIUM BROMIDE AND ALBUTEROL SULFATE 3 ML: .5; 3 SOLUTION RESPIRATORY (INHALATION) at 14:42

## 2017-08-22 RX ADMIN — SODIUM CHLORIDE 600 MG: 900 INJECTION, SOLUTION INTRAVENOUS at 21:38

## 2017-08-22 RX ADMIN — SERTRALINE HYDROCHLORIDE 50 MG: 50 TABLET ORAL at 08:50

## 2017-08-22 RX ADMIN — SODIUM CHLORIDE 600 MG: 900 INJECTION, SOLUTION INTRAVENOUS at 13:27

## 2017-08-22 RX ADMIN — ASPIRIN 81 MG 81 MG: 81 TABLET ORAL at 08:50

## 2017-08-22 RX ADMIN — POLYVINYL ALCOHOL 1 DROP: 14 SOLUTION/ DROPS OPHTHALMIC at 19:05

## 2017-08-22 RX ADMIN — OXYCODONE HYDROCHLORIDE 15 MG: 15 TABLET ORAL at 08:49

## 2017-08-22 RX ADMIN — INSULIN LISPRO 2 UNITS: 100 INJECTION, SOLUTION INTRAVENOUS; SUBCUTANEOUS at 21:37

## 2017-08-22 RX ADMIN — LEVOTHYROXINE SODIUM 50 MCG: 50 TABLET ORAL at 05:48

## 2017-08-22 RX ADMIN — Medication 10 ML: at 05:48

## 2017-08-22 RX ADMIN — GLIPIZIDE 5 MG: 5 TABLET ORAL at 08:50

## 2017-08-22 RX ADMIN — Medication 400 MG: at 08:50

## 2017-08-22 RX ADMIN — IPRATROPIUM BROMIDE AND ALBUTEROL SULFATE 3 ML: .5; 3 SOLUTION RESPIRATORY (INHALATION) at 09:05

## 2017-08-22 RX ADMIN — PANTOPRAZOLE SODIUM 40 MG: 40 TABLET, DELAYED RELEASE ORAL at 05:48

## 2017-08-22 RX ADMIN — GABAPENTIN 100 MG: 100 CAPSULE ORAL at 05:48

## 2017-08-22 RX ADMIN — GABAPENTIN 100 MG: 100 CAPSULE ORAL at 21:38

## 2017-08-22 RX ADMIN — OXYCODONE HYDROCHLORIDE 15 MG: 15 TABLET ORAL at 17:49

## 2017-08-22 RX ADMIN — INSULIN LISPRO 2 UNITS: 100 INJECTION, SOLUTION INTRAVENOUS; SUBCUTANEOUS at 17:40

## 2017-08-22 RX ADMIN — BUDESONIDE 500 MCG: 0.5 INHALANT RESPIRATORY (INHALATION) at 20:37

## 2017-08-22 RX ADMIN — INSULIN LISPRO 2 UNITS: 100 INJECTION, SOLUTION INTRAVENOUS; SUBCUTANEOUS at 13:27

## 2017-08-22 RX ADMIN — Medication 5 ML: at 21:39

## 2017-08-22 RX ADMIN — HYDRALAZINE HYDROCHLORIDE 10 MG: 10 TABLET, FILM COATED ORAL at 08:50

## 2017-08-22 RX ADMIN — OXYCODONE HYDROCHLORIDE 15 MG: 15 TABLET ORAL at 00:48

## 2017-08-22 NOTE — PROGRESS NOTES
Report to ANGELA Gomez. Did discuss at length with pt and wife earlier regarding 1500cc fluid restriction.  No further changes

## 2017-08-22 NOTE — PROGRESS NOTES
Ocycodone 15mg given po per c/o pain to hands and feet. See doc flowsheet. Pt not called for assistance to bathroom as instructed but does wait for staff when posey pad alarms.

## 2017-08-22 NOTE — PROGRESS NOTES
Assessment completed, see doc flowsheet. Pt sitting at the bedside. No distress. Left leg wrap in place from foot to knee. Denies pain at present but wants to make sure he get his pain pill so he won't hurt. Callbell in place. Pt instructed not to be up without assistance. Stevens pad in place.

## 2017-08-22 NOTE — PROGRESS NOTES
Shift assessment complete. Pt resting on side of bed. Wife at bedside. No complaints other than pain this AM. Will medicate when allowed. Safety measures in place. Call light in reach.

## 2017-08-22 NOTE — PROGRESS NOTES
Pt resting in bed. Wife at bedside. Left eye still bother him. Waiting on eyedrops from pharmacy. Pharmacy called twice already and stated they will send. Call light in reach.

## 2017-08-22 NOTE — PROGRESS NOTES
Hospitalist Progress Note    2017  Admit Date: 2017  9:55 AM   NAME: Cherelle Perez   :  1932   DOS:              17  MRN:  [id number]   Attending: Liseth Arroyo MD  PCP:  Yani Noel MD  Treatment Team: Attending Provider: Lydia Mar. Monica Ovalle MD; Consulting Provider: Clive Reynoso MD; Utilization Review: Jonh Norman; Care Manager: Patricia Alvarez RN    Full Code     SUBJECTIVE:   As previously documented: \" Mr. 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 Complained also of b/l LE swelling along with a enlarging shallow ulcer on his L anterior leg that is getting bigger in the last week. Hx of LLE vein havesting from L leg for his CABG. ED work-up showed SaO2 of 88% on RA and CXR consistent with volume overload with increased pulmonary vasculature. Started on IV lasix and cardiology consulted. \"          17   Mr. Cherelle Perez feels that his breathing is better. Denies SOB, CP or abdominal . Diuresis approx 3L. Comp,ains that LLE redness is larger today. Afebrile. For [address] and ROS please see observation H&P, located in the patients chart. I have reviewed the information and there have been no changes. 10+ ROS reviewed and negative except for positive in HPI.    No Known Allergies  Current Facility-Administered Medications   Medication Dose Route Frequency    clindamycin phosphate (CLEOCIN) 600 mg in 0.9% sodium chloride (MBP/ADV) 100 mL ADV  600 mg IntraVENous Q8H    polyvinyl alcohol (LIQUIFILM TEARS) 1.4 % ophthalmic solution 1 Drop  1 Drop Both Eyes PRN    allopurinol (ZYLOPRIM) tablet 300 mg  300 mg Oral DAILY    aspirin chewable tablet 81 mg  81 mg Oral DAILY    docusate sodium (COLACE) capsule 100 mg  100 mg Oral DAILY    gabapentin (NEURONTIN) capsule 100 mg  100 mg Oral TID    glipiZIDE (GLUCOTROL) tablet 5 mg  5 mg Oral BID    hydrALAZINE (APRESOLINE) tablet 10 mg  10 mg Oral BID    isosorbide mononitrate ER (IMDUR) tablet 30 mg  30 mg Oral DAILY    potassium chloride (K-DUR, KLOR-CON) SR tablet 20 mEq  20 mEq Oral DAILY    levothyroxine (SYNTHROID) tablet 50 mcg  50 mcg Oral ACB    magnesium oxide (MAG-OX) tablet 400 mg  400 mg Oral DAILY    metoprolol succinate (TOPROL-XL) XL tablet 25 mg  25 mg Oral DAILY    oxyCODONE IR (OXY-IR) immediate release tablet 15 mg  15 mg Oral Q8H PRN    pantoprazole (PROTONIX) tablet 40 mg  40 mg Oral ACB&D    sertraline (ZOLOFT) tablet 50 mg  50 mg Oral DAILY    furosemide (LASIX) injection 60 mg  60 mg IntraVENous BID    sodium chloride (NS) flush 5-10 mL  5-10 mL IntraVENous Q8H    sodium chloride (NS) flush 5-10 mL  5-10 mL IntraVENous PRN    acetaminophen (TYLENOL) tablet 650 mg  650 mg Oral Q4H PRN    naloxone (NARCAN) injection 0.4 mg  0.4 mg IntraVENous PRN    insulin lispro (HUMALOG) injection   SubCUTAneous AC&HS    budesonide (PULMICORT) 500 mcg/2 ml nebulizer suspension  500 mcg Nebulization BID RT    albuterol-ipratropium (DUO-NEB) 2.5 MG-0.5 MG/3 ML  3 mL Nebulization Q6H RT    latanoprost (XALATAN) 0.005 % ophthalmic solution 1 Drop  1 Drop Both Eyes QPM         Immunization History   Administered Date(s) Administered    Influenza High Dose Vaccine PF 10/24/2016    Influenza Vaccine 10/01/2010, 09/01/2012, 09/01/2013, 01/26/2015    Influenza Vaccine (Quad) PF 09/11/2015    Influenza Vaccine Whole 10/01/2007    Pneumococcal Conjugate (PCV-13) 10/19/2015    Pneumococcal Vaccine (Unspecified Type) 01/01/2015    TB Skin Test (PPD) Intradermal 05/07/2015, 09/17/2016, 01/22/2017    ZZZ-RETIRED (DO NOT USE) Pneumococcal Vaccine (Unspecified Type) 10/01/2006, 11/21/2011     Objective:     Patient Vitals for the past 24 hrs:   Temp Pulse Resp BP SpO2   08/22/17 2022 98.5 °F (36.9 °C) 64 18 128/65 92 %   08/22/17 1546 97.8 °F (36.6 °C) 65 18 133/64 92 %   17 1444 - - - - 90 %   17 1125 98.6 °F (37 °C) 62 19 136/59 96 %   17 0906 - - - - 90 %   17 0725 98.2 °F (36.8 °C) 65 19 129/50 90 %   17 0339 98.3 °F (36.8 °C) 68 20 116/83 91 %   17 0240 - - - - 91 %   17 2353 97.5 °F (36.4 °C) 69 20 141/70 96 %     Temp (24hrs), Av.2 °F (36.8 °C), Min:97.5 °F (36.4 °C), Max:98.6 °F (37 °C)    Oxygen Therapy  O2 Sat (%): 92 % (17)  Pulse via Oximetry: 70 beats per minute (17 1444)  O2 Device: Room air (17 144)  O2 Flow Rate (L/min): 2 l/min (17 1301)  Oxygen Therapy  O2 Sat (%): 92 % (17)  Pulse via Oximetry: 70 beats per minute (17 1444)  O2 Device: Room air (17 1444)  O2 Flow Rate (L/min): 2 l/min (17 1301)    Physical Exam:  General:         Alert, cooperative, no acute distress. Afebrile. HEENT:               NCAT. No obvious deformity. Nares normal. No drainage  Lungs:             Decreased air entry b/l at the base. Some crackles at the base. [place] No wheezing/rhonchi  Cardiovascular:   RRR. No m/r/g. +1-2 pitting pedal edema b/l. +2 PT/DT pulses b/l. Abdomen:       S/nt/nd. Bowel sounds normal. .   Skin:        Not Jaundiced  Musculoskeletal: LLE swelling, erythema with a superificial ulcer  Neurologic:    AAOx3. CN II- XII grossly WNL. No gross focal deficit. Moves all extremities. Psychiatric:         Good mood. Normal affect.                DIAGNOSTIC STUDIES      Data Review:   Recent Results (from the past 24 hour(s))   GLUCOSE, POC    Collection Time: 17  8:45 PM   Result Value Ref Range    Glucose (POC) 153 (H) 65 - 100 mg/dL   GLUCOSE, POC    Collection Time: 17  5:12 AM   Result Value Ref Range    Glucose (POC) 128 (H) 65 - 894 mg/dL   METABOLIC PANEL, BASIC    Collection Time: 17  5:50 AM   Result Value Ref Range    Sodium 138 136 - 145 mmol/L    Potassium 4.3 3.5 - 5.1 mmol/L    Chloride 101 98 - 107 mmol/L CO2 27 21 - 32 mmol/L    Anion gap 10 7 - 16 mmol/L    Glucose 137 (H) 65 - 100 mg/dL    BUN 49 (H) 8 - 23 MG/DL    Creatinine 2.41 (H) 0.8 - 1.5 MG/DL    GFR est AA 33 (L) >60 ml/min/1.73m2    GFR est non-AA 27 (L) >60 ml/min/1.73m2    Calcium 8.8 8.3 - 10.4 MG/DL   CBC WITH AUTOMATED DIFF    Collection Time: 08/22/17  5:50 AM   Result Value Ref Range    WBC 6.9 4.3 - 11.1 K/uL    RBC 4.93 4.23 - 5.67 M/uL    HGB 12.2 (L) 13.6 - 17.2 g/dL    HCT 38.7 (L) 41.1 - 50.3 %    MCV 78.5 (L) 79.6 - 97.8 FL    MCH 24.7 (L) 26.1 - 32.9 PG    MCHC 31.5 31.4 - 35.0 g/dL    RDW 17.4 (H) 11.9 - 14.6 %    PLATELET 918 (L) 059 - 450 K/uL    MPV 11.1 10.8 - 14.1 FL    DF AUTOMATED      NEUTROPHILS 72 43 - 78 %    LYMPHOCYTES 17 13 - 44 %    MONOCYTES 7 4.0 - 12.0 %    EOSINOPHILS 4 0.5 - 7.8 %    BASOPHILS 0 0.0 - 2.0 %    IMMATURE GRANULOCYTES 0.1 0.0 - 5.0 %    ABS. NEUTROPHILS 5.0 1.7 - 8.2 K/UL    ABS. LYMPHOCYTES 1.2 0.5 - 4.6 K/UL    ABS. MONOCYTES 0.5 0.1 - 1.3 K/UL    ABS. EOSINOPHILS 0.3 0.0 - 0.8 K/UL    ABS. BASOPHILS 0.0 0.0 - 0.2 K/UL    ABS. IMM.  GRANS. 0.0 0.0 - 0.5 K/UL   GLUCOSE, POC    Collection Time: 08/22/17 11:19 AM   Result Value Ref Range    Glucose (POC) 179 (H) 65 - 100 mg/dL   GLUCOSE, POC    Collection Time: 08/22/17  4:12 PM   Result Value Ref Range    Glucose (POC) 169 (H) 65 - 100 mg/dL       All Micro Results     Procedure Component Value Units Date/Time    CULTURE, BLOOD [[id number]] Collected:  08/21/17 1159    Order Status:  Completed Specimen:  Whole Blood from Blood Updated:  08/22/17 0811     Special Requests: LEFT HAND        Culture result: NO GROWTH AFTER 19 HOURS       CULTURE, BLOOD [[id number]] Collected:  08/21/17 1155    Order Status:  Completed Specimen:  Whole Blood from Blood Updated:  08/22/17 0811     Special Requests: RIGHT ANTECUBITAL        Culture result: NO GROWTH AFTER 19 HOURS             Imaging Cline Harada /Studies:    CXR Results  (Last 48 hours)               08/21/17 1003 XR CHEST PORT Final result    Impression:  IMPRESSION: Cardiomegaly with mild pulmonary vascular congestion. Narrative:  HISTORY: Shortness of breath and fluid retention. EXAM: AP chest radiograph       PRIOR STUDY: 2017       FINDINGS: There are postsurgical changes of prior median sternotomy. The heart   is enlarged but stable. There is mild haziness of the pulmonary vasculature. No   focal consolidation or evidence of a pleural effusion. There is no pneumothorax. Results for orders placed or performed during the hospital encounter of 17   2D ECHO COMPLETE ADULT (TTE) W OR [address] [facility] [address], [address] [facility]  [phone]    Transthoracic Echocardiogram  2D, M-mode, Doppler, and Color Doppler    Patient: Angelica Edwards  MR #: [id number]  : 1932  Age: 80 years  Gender: Male  Study date: 21-Aug-2017  Account #: [de-identified]  Height: 70 in  Weight: 268.4 lb  BSA: 2.37 mï¾²  Status:Routine  Location: 809  BP: 175/ 74    Allergies: NO KNOWN ALLERGIES    Sonographer:  Donis Blood RDCS  Group:  [facility]  Referring Physician:  Lito Torres MD  Reading Physician:  Aaron Vitla. Nellie Menezes MD [facility]    INDICATIONS: Diastolic Heart Failure. PROCEDURE: This was a routine study. A transthoracic echocardiogram was  performed. The study included complete 2D imaging, M-mode, complete spectral  Doppler, and color Doppler. Intravenous contrast (Definity) was administered. Echocardiographic views were limited by poor acoustic window availability. This  was a technically difficult study. LEFT VENTRICLE: Size was normal. Systolic function was normal. Ejection  fraction was estimated in the range of 55 % to 60 %. There were no regional  wall motion abnormalities. There was mild concentric hypertrophy. RIGHT VENTRICLE: Not well visualized.  Estimated peak pressure was in the   range  of 35-40 mmHg. LEFT ATRIUM: The atrium was moderately dilated. RIGHT ATRIUM: Not well visualized. SYSTEMIC VEINS: IVC: The inferior vena cava was mildly dilated. The  respirophasic change in diameter was less than 50%. AORTIC VALVE: The valve was probably trileaflet. The valve was not well  visualized. There was no evidence for stenosis. There was mild regurgitation. MITRAL VALVE: Not well visualized. The mitral valve area by pressure half   time  was 2.47 cm2. The peak velocity was 1.85 m/s. The mean pressure gradient was   3  mmHg. The findings were most consistent with mild mitral stenosis. The peak  pressure gradient was 14 mmHg. There was moderate regurgitation. TRICUSPID VALVE: Not well visualized. There was no evidence for stenosis. There  was moderate regurgitation. PULMONIC VALVE: Not well visualized. There was no evidence for stenosis. There  was no insufficiency. PERICARDIUM: There was no pericardial effusion. AORTA: The root exhibited normal size. SUMMARY:    -  Left ventricle: Systolic function was normal. Ejection fraction was  estimated in the range of 55 % to 60 %. There were no regional wall motion  abnormalities. There was mild concentric hypertrophy. -  Left atrium: The atrium was moderately dilated. -  Inferior vena cava, hepatic veins: The inferior vena cava was mildly  dilated. The respirophasic change in diameter was less than 50%. -  Aortic valve: There was mild regurgitation.    -  Mitral valve: There was moderate regurgitation. The mitral valve area by  pressure half time was 2.47 cm2. The findings were most consistent with mild  mitral stenosis. -  Tricuspid valve: There was moderate regurgitation.     SYSTEM MEASUREMENT TABLES    2D mode  AoR Diam (2D): 3.5 cm  LA Dimension (2D): 5.1 cm  Left Atrium Systolic Volume Index; Method of Disks, Biplane; 2D mode;: 54.4  ml/m2  IVS/LVPW (2D): 0.8  IVSd (2D): 1.3 cm  LVIDd (2D): 4.6 cm  LVIDs (2D): 2.5 cm  LVPWd (2D): 1.6 cm    Unspecified Scan Mode  Peak Grad; Mean; Antegrade Flow: 8 mm[Hg]  Vmax; Antegrade Flow: 147 cm/s    Prepared and signed by    Jerry Dodson MD [facility]  Signed 21-Aug-2017 16:38:12         Labs and Studies from previous 24 hours have been personally reviewed by myself [place] Problems as of 8/22/2017  Date Reviewed: 8/5/2017          Codes Class Noted - Resolved POA    Cellulitis of left lower extremity ICD-10-CM: L03.116  ICD-9-CM: 682.6  8/22/2017 - Present Yes        * (Principal)Acute on chronic diastolic (congestive) heart failure ([facility].) ICD-10-CM: I50.33  ICD-9-CM: 428.33, 428.0  8/21/2017 - Present Yes        Hypoxia ICD-10-CM: R09.02  ICD-9-CM: 799.02  8/21/2017 - Present Yes        Thrombocytopenia ([facility] ICD-10-CM: D69.6  ICD-9-CM: 287.5  8/21/2017 - Present Yes        Type 2 diabetes mellitus with peripheral neuropathy ([facility].) ICD-10-CM: E11.42  ICD-9-CM: 250.60, 357.2  11/5/2015 - Present Yes        ROBERTO on CPAP (Chronic) ICD-10-CM: G47.33, Z99.89  ICD-9-CM: 327.23, V46.8  2/20/2015 - Present Yes    Overview Signed 2/20/2015 10:42 AM by Casie Claros NP     Patient is on BiPAP, no supplementary oxygen             CKD (chronic kidney disease) stage 3, GFR 30-59 ml/min (Chronic) ICD-10-CM: N18.3  ICD-9-CM: 585.3  9/18/2013 - Present Yes        Persistent atrial fibrillation ([facility] ICD-10-CM: I48.1  ICD-9-CM: 427.31  11/21/2012 - Present Yes    Overview Addendum 11/22/2016  8:14 PM by Martha Thibodeaux MD     Coumadin therapy discontinued due to recurrent GI bleeding. Plan:  - Continue IV lasix BID, daily weight, strict I/O  - start IV Clindamycin. BC 2/2 negative at 19 hours  - wean O2 supplementation  - wound consult   - renal function improving - continue to monitor   - pAfib - not on coumadin due to GI bleeding.- On Hydralazine/Imdur/Toptol. EF:55-60%  - Continue CPAP when sleeping and napping  - HIT profile pending.  Plts improving   - check US LE  - PT/OT/CM    DVT Prophylaxis: SCDs  CODE Status: Full CODE  Plan of Care Discussed with: patient. Care team.  Medical Risk: moderate  Disposition: pending      Admission status changed to inpatient. For [address] and ROS please see observation H&P, located in the patients chart. I have reviewed the information and there have been no changes.     Fabiana Almaguer MD  08/22/17

## 2017-08-22 NOTE — PROGRESS NOTES
Problem: Interdisciplinary Rounds  Goal: Interdisciplinary Rounds  Outcome: Progressing Towards Goal  Interdisciplinary team rounds were held 8/22/2017 with the following team members:Care Management, Nursing and Clinical Coordinator and the patient. Plan of care discussed. See clinical pathway and/or care plan for interventions and desired outcomes.

## 2017-08-23 VITALS
DIASTOLIC BLOOD PRESSURE: 55 MMHG | HEART RATE: 68 BPM | OXYGEN SATURATION: 93 % | RESPIRATION RATE: 20 BRPM | HEIGHT: 70 IN | SYSTOLIC BLOOD PRESSURE: 106 MMHG | BODY MASS INDEX: 37.81 KG/M2 | WEIGHT: 264.1 LBS | TEMPERATURE: 98.2 F

## 2017-08-23 LAB
ANION GAP SERPL CALC-SCNC: 10 MMOL/L (ref 7–16)
BASOPHILS # BLD: 0 K/UL (ref 0–0.2)
BASOPHILS NFR BLD: 0 % (ref 0–2)
BUN SERPL-MCNC: 55 MG/DL (ref 8–23)
CALCIUM SERPL-MCNC: 8.9 MG/DL (ref 8.3–10.4)
CHLORIDE SERPL-SCNC: 99 MMOL/L (ref 98–107)
CO2 SERPL-SCNC: 29 MMOL/L (ref 21–32)
CREAT SERPL-MCNC: 2.54 MG/DL (ref 0.8–1.5)
DIFFERENTIAL METHOD BLD: ABNORMAL
EOSINOPHIL # BLD: 0.2 K/UL (ref 0–0.8)
EOSINOPHIL NFR BLD: 4 % (ref 0.5–7.8)
ERYTHROCYTE [DISTWIDTH] IN BLOOD BY AUTOMATED COUNT: 17.3 % (ref 11.9–14.6)
GLUCOSE BLD STRIP.AUTO-MCNC: 152 MG/DL (ref 65–100)
GLUCOSE SERPL-MCNC: 166 MG/DL (ref 65–100)
HCT VFR BLD AUTO: 39.7 % (ref 41.1–50.3)
HEPARIN INDUCED PLT,XHIPA: NEGATIVE
HGB BLD-MCNC: 13.2 G/DL (ref 13.6–17.2)
HIT INTERPRETATION,XINTPR: NEGATIVE
HIT PROFILE,XHITT: NORMAL
IMM GRANULOCYTES # BLD: 0 K/UL (ref 0–0.5)
IMM GRANULOCYTES NFR BLD: 0.2 % (ref 0–5)
LYMPHOCYTES # BLD: 1 K/UL (ref 0.5–4.6)
LYMPHOCYTES NFR BLD: 17 % (ref 13–44)
MAGNESIUM SERPL-MCNC: 2.4 MG/DL (ref 1.8–2.4)
MCH RBC QN AUTO: 25.4 PG (ref 26.1–32.9)
MCHC RBC AUTO-ENTMCNC: 33.2 G/DL (ref 31.4–35)
MCV RBC AUTO: 76.3 FL (ref 79.6–97.8)
MONOCYTES # BLD: 0.5 K/UL (ref 0.1–1.3)
MONOCYTES NFR BLD: 9 % (ref 4–12)
NEUTS SEG # BLD: 3.9 K/UL (ref 1.7–8.2)
NEUTS SEG NFR BLD: 70 % (ref 43–78)
OPTICAL DENSITY READ,XHITAO: 0.17 ABS
PLATELET # BLD AUTO: 148 K/UL (ref 150–450)
PMV BLD AUTO: 10.5 FL (ref 10.8–14.1)
POTASSIUM SERPL-SCNC: 4 MMOL/L (ref 3.5–5.1)
RBC # BLD AUTO: 5.2 M/UL (ref 4.23–5.67)
SODIUM SERPL-SCNC: 138 MMOL/L (ref 136–145)
WBC # BLD AUTO: 5.6 K/UL (ref 4.3–11.1)

## 2017-08-23 PROCEDURE — 80048 BASIC METABOLIC PNL TOTAL CA: CPT | Performed by: INTERNAL MEDICINE

## 2017-08-23 PROCEDURE — 74011000250 HC RX REV CODE- 250: Performed by: INTERNAL MEDICINE

## 2017-08-23 PROCEDURE — 74011250637 HC RX REV CODE- 250/637: Performed by: INTERNAL MEDICINE

## 2017-08-23 PROCEDURE — 77030012341 HC CHMB SPCR OPTC MDI VYRM -A

## 2017-08-23 PROCEDURE — 74011000258 HC RX REV CODE- 258: Performed by: HOSPITALIST

## 2017-08-23 PROCEDURE — 94640 AIRWAY INHALATION TREATMENT: CPT

## 2017-08-23 PROCEDURE — 94760 N-INVAS EAR/PLS OXIMETRY 1: CPT

## 2017-08-23 PROCEDURE — 82962 GLUCOSE BLOOD TEST: CPT

## 2017-08-23 PROCEDURE — 36415 COLL VENOUS BLD VENIPUNCTURE: CPT | Performed by: INTERNAL MEDICINE

## 2017-08-23 PROCEDURE — 74011636637 HC RX REV CODE- 636/637: Performed by: INTERNAL MEDICINE

## 2017-08-23 PROCEDURE — 85025 COMPLETE CBC W/AUTO DIFF WBC: CPT | Performed by: INTERNAL MEDICINE

## 2017-08-23 PROCEDURE — 77030020120 HC VLV RESP PEP HI -B

## 2017-08-23 PROCEDURE — 74011000250 HC RX REV CODE- 250: Performed by: HOSPITALIST

## 2017-08-23 PROCEDURE — 83735 ASSAY OF MAGNESIUM: CPT | Performed by: INTERNAL MEDICINE

## 2017-08-23 RX ORDER — FUROSEMIDE 40 MG/1
40 TABLET ORAL DAILY
Qty: 30 TAB | Refills: 1 | Status: SHIPPED | OUTPATIENT
Start: 2017-08-23 | End: 2017-09-15

## 2017-08-23 RX ADMIN — ISOSORBIDE MONONITRATE 30 MG: 30 TABLET, EXTENDED RELEASE ORAL at 09:42

## 2017-08-23 RX ADMIN — ALLOPURINOL 300 MG: 300 TABLET ORAL at 09:42

## 2017-08-23 RX ADMIN — GABAPENTIN 100 MG: 100 CAPSULE ORAL at 05:58

## 2017-08-23 RX ADMIN — GLIPIZIDE 5 MG: 5 TABLET ORAL at 09:42

## 2017-08-23 RX ADMIN — SERTRALINE HYDROCHLORIDE 50 MG: 50 TABLET ORAL at 09:42

## 2017-08-23 RX ADMIN — ASPIRIN 81 MG 81 MG: 81 TABLET ORAL at 09:42

## 2017-08-23 RX ADMIN — LEVOTHYROXINE SODIUM 50 MCG: 50 TABLET ORAL at 06:10

## 2017-08-23 RX ADMIN — INSULIN LISPRO 2 UNITS: 100 INJECTION, SOLUTION INTRAVENOUS; SUBCUTANEOUS at 06:02

## 2017-08-23 RX ADMIN — BUDESONIDE 500 MCG: 0.5 INHALANT RESPIRATORY (INHALATION) at 07:48

## 2017-08-23 RX ADMIN — IPRATROPIUM BROMIDE AND ALBUTEROL SULFATE 3 ML: .5; 3 SOLUTION RESPIRATORY (INHALATION) at 03:06

## 2017-08-23 RX ADMIN — OXYCODONE HYDROCHLORIDE 15 MG: 15 TABLET ORAL at 09:55

## 2017-08-23 RX ADMIN — POTASSIUM CHLORIDE 20 MEQ: 20 TABLET, EXTENDED RELEASE ORAL at 09:42

## 2017-08-23 RX ADMIN — METOPROLOL SUCCINATE 25 MG: 25 TABLET, EXTENDED RELEASE ORAL at 09:42

## 2017-08-23 RX ADMIN — PANTOPRAZOLE SODIUM 40 MG: 40 TABLET, DELAYED RELEASE ORAL at 06:10

## 2017-08-23 RX ADMIN — SODIUM CHLORIDE 600 MG: 900 INJECTION, SOLUTION INTRAVENOUS at 05:59

## 2017-08-23 RX ADMIN — IPRATROPIUM BROMIDE AND ALBUTEROL SULFATE 3 ML: .5; 3 SOLUTION RESPIRATORY (INHALATION) at 07:48

## 2017-08-23 RX ADMIN — HYDRALAZINE HYDROCHLORIDE 10 MG: 10 TABLET, FILM COATED ORAL at 09:42

## 2017-08-23 RX ADMIN — Medication 400 MG: at 09:42

## 2017-08-23 RX ADMIN — POLYVINYL ALCOHOL 1 DROP: 14 SOLUTION/ DROPS OPHTHALMIC at 09:43

## 2017-08-23 RX ADMIN — DOCUSATE SODIUM 100 MG: 100 CAPSULE, LIQUID FILLED ORAL at 09:42

## 2017-08-23 RX ADMIN — Medication 5 ML: at 06:00

## 2017-08-23 NOTE — PROGRESS NOTES
Bedside report given to Yolanda Magallanes, RN. Pt resting with his CPAP in place. No further changes.

## 2017-08-23 NOTE — PROGRESS NOTES
Dr. Loyda Andrade made aware of pt with 18 beats conduction change , rate 94 with return to at fib. Pt sleeping. No distress. AM changed to stat and lab made aware.

## 2017-08-23 NOTE — PROGRESS NOTES
Met with patient and his wife regarding discharge planning. He lives at home with his wife in their own home. Patient is independent in all ADLs at baseline and continues to drive. Patient states that he feels he is back to his baseline today. He sleeps with a CPAP, but neither patient or his wife can tell which DME company it came from. He does report good compliance with his CPAP. He denies use of any other DME. We discussed home health follow up for CHF exacerbation, but patient and his wife state that it is not necessary. Patient has no intent to be homebound, even for a short period. Patient and his wife deny any discharge needs. Case Management will remain available to assist as needed.     Care Management Interventions  Transition of Care Consult (CM Consult): Discharge Planning  Discharge Durable Medical Equipment: No  Physical Therapy Consult: Yes  Occupational Therapy Consult: No  Speech Therapy Consult: No  Current Support Network: Lives with Spouse, Own Home  Confirm Follow Up Transport: Family  Plan discussed with Pt/Family/Caregiver: Yes  Freedom of Choice Offered: Yes  Discharge Location  Discharge Placement: Home

## 2017-08-23 NOTE — ROUTINE PROCESS
CHF teaching started post introduction to pt/family; aware of diagnosis. Planner/scale(home) @ BS and will follow:   Start 2 liter Fluid Restriction daily  Palliative Care score:    CHF teaching continues to pt/family. Emphasis on taking prescription meds as ordered, stops smoking, maintain a healthy weight, reduce stress and to keep F/U appts and to call MD STAT . CHF teaching completed , verbalize emphasis on monitoring self and report to MD:   If you gain 2 lbs in one day or 5 lbs in a week, and short of breath.  If you can not lay flat without developing short of breath or rapid breathing at night; or if it wakes you up. Develop a cough.  If you notice swollen hands/feet/ankles or stomach with a bloated/ full feeling.  If you become confused or mentally fuzzy.  If you notice a rapid or change in your heart rate.  If you become more exhausted all the time and unable to do the same level of activity without stopping to catch your breath.  Reduce stress, Call myself or your doctor foe assistance  Drink no more than 8 cups a day in 8 oz. cups. Your Heart can not handle any more. Stay away from salt (anything with salt or sodium in it).   Pass post- test, diabetic teaching completed will make self available post DC ,if an questions arise:  60 mins total  reduce stress    [facility]

## 2017-08-23 NOTE — PROGRESS NOTES
Report received from [address]. Pt continued c/o of feeling like something is in his right eye. Bilat eyes red without drainage but no obvious foreign object seen. Pt right eye flushed with natural tears with relief of complaints.

## 2017-08-23 NOTE — PROGRESS NOTES
Assessment completed,see doc flowsheet. Pt on remote tele showing contolled at fib. Pt with wrap intact to left lower leg. States wound staff removed portion of wrapped that extended to his forefoot. IV site clear. No dypnea at rest. Lessened edema today.

## 2017-08-23 NOTE — DIABETES MGMT
Patient admitted with CHF, seen by diabetes educator. Patient states he was diagnosed in 2006. Patient reports he checks his blood glucose daily and is compliant with his medications. Patient states he takes glipizide 5 mg daily, and thinks he may take another medication but cannot remember the name of it. Patient states he does not take Onglyza or Januvia anymore. Notified discharge nurse that PTA med list may be incorrect. Blood glucose 186 on admission. Blood glucose ranged 128-192 yesterday with patient receiving glipizide 10 mg and Humalog 6 units. Educated patient on his hospital regimen of glipizide 5 mg BID and Humalog SSI. Patient verbalized understanding. HgA1c 7.5. Wife is at bedside. Patient states that he has never been to diabetic classes. Educated patient and wife about the [employer] program [address] [place] [address] offers. Wife states, \"Oh at our age we aren't interested in going back to school, but thanks. \" Patient and wife voice no questions regarding diabetes at this time.

## 2017-08-23 NOTE — DISCHARGE SUMMARY
Hospitalist Discharge Summary     Patient ID:  Chelsea Navarro  [id number]  1 y.o.  2/12/1932  Admit date: 8/21/2017  9:55 AM  Discharge date and time: 8/23/2017  Attending: Shahana Sharif. Tru Vaz MD  PCP:  Tuan Jennings MD  Treatment Team: Attending Provider: Shahana Sharif. Tru Vaz MD; Consulting Provider: Karen Garay MD; Utilization Review: Samantha Waldron; Care Manager: Joselito Richey RN    Principal Diagnosis Acute on chronic diastolic (congestive) heart failure [facility])   Principal Problem:    Acute on chronic diastolic (congestive) heart failure ([facility] (8/21/2017)    Active Problems:    Persistent atrial fibrillation ([facility] (11/21/2012)      Overview: Coumadin therapy discontinued due to recurrent GI bleeding. CKD (chronic kidney disease) stage 3, GFR 30-59 ml/min (9/18/2013)      ROBERTO on CPAP (2/20/2015)      Overview: Patient is on BiPAP, no supplementary oxygen      Type 2 diabetes mellitus with peripheral neuropathy ([facility] (11/5/2015)      Hypoxia (8/21/2017)      Thrombocytopenia ([facility] (8/21/2017)      Cellulitis of left lower extremity (8/22/2017)     HPI:  Diony Padgett is an 81 yo M with pmhx of diastolic CHF, CAD s/p CABG, a fib, stage 3-4 CKD, and T2DM, who presents with shortness of breath and a 9 pound weight gain over the last 24 hours. He is accompanied by his wife. He reports he was doing okay until the last 24 hours when he became more short of breath. He weighed ~260 pounds yesterday and was up to 269 pounds when he weighed himself this morning (he weighs himself daily upon getting out of bed). He feels his legs are slightly more swollen than usual.  Also, he reports an enlarging shallow ulcer on his L anterior leg that he has noticed over the last week or so. He denies pain in that leg. He reports his L leg is always more swollen than his R due to vein havesting from L leg for his CABG.   He denies chest pain or palpitations    Hospital Course:  Please refer to the admission H&P for details of presentation. In summary, the patient presented with dyspnea related to diastolic chf.  Started on iv lasix 40 mg bid and seen by cardiology in consultation. I/O and daily weights monitored with improvement in symptoms daily. Pt now stable on room air. Cardio recommending increasing daily lasix dose to 40 mg. Has arranged outpatient follow up with cardio in 2 weeks. Currently stable for dc to home, will follow up with pcp. Labs: Results:       Chemistry Recent Labs      08/23/17   0400  08/22/17   0550  08/21/17   1030   GLU  166*  137*  186*   NA  138  138  139   K  4.0  4.3  5.0   CL  99  101  104   CO2  29  27  27   BUN  55*  49*  47*   CREA  2.54*  2.41*  2.46*   CA  8.9  8.8  8.7   AGAP  10  10  8   AP   --    --   102   TP   --    --   7.1   ALB   --    --   2.9*   GLOB   --    --   4.2*   AGRAT   --    --   0.7*      CBC w/Diff Recent Labs      08/23/17   0400  08/22/17   0550  08/21/17   1030   WBC  5.6  6.9  6.6   RBC  5.20  4.93  5.13   HGB  13.2*  12.2*  13.1*   HCT  39.7*  38.7*  39.9*   PLT  148*  149*  123*   GRANS  70  72  80*   LYMPH  17  17  11*   EOS  4  4  2      Cardiac Enzymes No results for input(s): CPK, CKND1, RICK in the last 72 hours. No lab exists for component: CKRMB, TROIP   Coagulation No results for input(s): PTP, INR, APTT in the last 72 hours. No lab exists for component: INREXT    Lipid Panel Lab Results   Component Value Date/Time    Cholesterol, total 144 08/02/2017 10:04 AM    HDL Cholesterol 43 08/02/2017 10:04 AM    LDL, calculated 77 08/02/2017 10:04 AM    VLDL, calculated 24 08/02/2017 10:04 AM    Triglyceride 118 08/02/2017 10:04 AM    CHOL/HDL Ratio 4.9 04/26/2016 09:20 AM      BNP No results for input(s): BNPP in the last 72 hours.    Liver Enzymes Recent Labs      08/21/17   1030   TP  7.1   ALB  2.9*   AP  102   SGOT  14*      Thyroid Studies Lab Results   Component Value Date/Time    T4, Total 8.0 11/05/2008 04:49 PM    T3 Uptake 31 11/05/2008 04:49 PM    TSH 8.150 05/15/2015 11:30 PM            Discharge Exam:  Visit Vitals    /55    Pulse 68    Temp 98.2 °F (36.8 °C)    Resp 20    Ht 5' 10\" (1.778 m)    Wt 119.8 kg (264 lb 1.6 oz)    SpO2 93%    BMI 37.89 kg/m2     General appearance: alert, cooperative, no distress, appears stated age  Lungs: clear to auscultation bilaterally  Heart: irregular, S1, S2 normal, no murmur, click, rub or gallop  Abdomen: soft, non-tender. Bowel sounds normal. No masses,  no organomegaly  Extremities:2+ edema left leg, venous stasis changes   Neurologic: Grossly normal    Disposition: home  Discharge Condition: stable  Patient Instructions:   Current Discharge Medication List      CONTINUE these medications which have CHANGED    Details   furosemide (LASIX) 40 mg tablet Take 1 Tab by mouth daily. Qty: 30 Tab, Refills: 1         CONTINUE these medications which have NOT CHANGED    Details   metoprolol succinate (TOPROL-XL) 25 mg XL tablet Pt takes 1/2 tab po daily. Qty: 45 Tab, Refills: 3      magnesium oxide (MAG-OX) 400 mg tablet Take 1 Tab by mouth daily. Qty: 90 Tab, Refills: 3      pantoprazole (PROTONIX) 40 mg tablet Take 1 Tab by mouth Before breakfast and dinner. Qty: 180 Tab, Refills: 3      !! HYDROcodone-acetaminophen (NORCO)  mg tablet Take 1 Tab by mouth.      !! HYDROcodone-acetaminophen (NORCO)  mg tablet . Qty: 90 Tab, Refills: 0    Comments: PHARMACY FILL ON TIME. NO EARLY REFILLS. Associated Diagnoses: Other chronic pain      !! HYDROcodone-acetaminophen (NORCO)  mg tablet Take 1 Tab by mouth every eight (8) hours as needed for Pain. Max Daily Amount: 3 Tabs. Qty: 90 Tab, Refills: 0      oxyCODONE IR (OXY-IR) 15 mg immediate release tablet Take 1 Tab by mouth every eight (8) hours as needed for Pain. Max Daily Amount: 45 mg.  Qty: 90 Tab, Refills: 0    Comments: PHARMACY FILL ON TIME. NO EARLY REFILLS.   Associated Diagnoses: Other chronic pain      aspirin 81 mg chewable tablet Take 1 Tab by mouth daily. Qty: 90 Tab, Refills: 3      levothyroxine (SYNTHROID) 50 mcg tablet Take 1 Tab by mouth Daily (before breakfast). Qty: 90 Tab, Refills: 3      sAXagliptin (ONGLYZA) 2.5 mg tablet Take 2.5 mg by mouth daily. ferrous sulfate 324 mg (65 mg iron) tablet Take  by mouth Daily (before breakfast). budesonide-formoterol (SYMBICORT) 160-4.5 mcg/actuation HFA inhaler Take 2 Puffs by inhalation two (2) times a day.      gabapentin (NEURONTIN) 100 mg capsule Take 1 Cap by mouth three (3) times daily. Qty: 90 Cap, Refills: 3      SITagliptin (JANUVIA) 50 mg tablet Take 1 Tab by mouth daily. Qty: 90 Tab, Refills: 3      fluticasone (FLONASE) 50 mcg/actuation nasal spray 2 Sprays by Both Nostrils route daily. Qty: 1 Bottle, Refills: 3      sertraline (ZOLOFT) 50 mg tablet Take one tablet each evening for anxiety  Indications: GENERALIZED ANXIETY DISORDER  Qty: 30 Tab, Refills: 3      ipratropium-albuterol (COMBIVENT RESPIMAT)  mcg/actuation inhaler Take 1 Puff by inhalation every six (6) hours. allopurinol (ZYLOPRIM) 300 mg tablet Take  by mouth daily. docusate sodium (STOOL SOFTENER) 100 mg capsule Take 100 mg by mouth as needed. hydrALAZINE (APRESOLINE) 10 mg tablet Take 10 mg by mouth two (2) times a day. cpap machine kit by Does Not Apply route. Bilevel 12/8      isosorbide mononitrate ER (IMDUR) 30 mg tablet Take 1 Tab by mouth daily. Qty: 30 Tab, Refills: 5      albuterol (PROVENTIL VENTOLIN) 2.5 mg /3 mL (0.083 %) nebulizer solution 1 Vial Via Neb. Qid      Order fax to [facility]  Indications: COPD  Qty: 360 Vial, Refills: 3    Comments: DX copd     Pt to use Albuterol 00.83% 1 Vial Via [employer]. Along with Pulmicort 0.5 mg 1 Vial Via Neb. bid  Associated Diagnoses: SOB (shortness of breath)      travoprost (TRAVATAN Z) 0.004 % ophthalmic solution Administer 1 Drop to both eyes two (2) times a day.       glipiZIDE (GLUCOTROL) 5 mg tablet Take 5 mg by mouth two (2) times a day. K-DUR 20 mEq tablet Take 20 mEq by mouth daily. !! - Potential duplicate medications found. Please discuss with provider.           Activity: Activity as tolerated  Diet: Cardiac, diabetic Diet  Wound Care: None needed    Follow-up  ·   With pcp, cardio  Time spent to discharge patient 35 minutes  Signed:  Giovanni Rooney MD  8/23/2017  8:22 AM

## 2017-08-23 NOTE — PROGRESS NOTES
8/23/2017 12:16 PM    Admit Date: 8/21/2017    Admit Diagnosis: Acute on chronic diastolic (congestive) heart failure ([facility]*      Subjective:   No cp or sob      Objective:      Visit Vitals    /55    Pulse 68    Temp 98.2 °F (36.8 °C)    Resp 20    Ht 5' 10\" (1.778 m)    Wt 119.8 kg (264 lb 1.6 oz)    SpO2 93%    BMI 37.89 kg/m2       Physical Exam:  General-Well Developed, Well Nourished, No Acute Distress, Alert & Oriented x 3, appropriate mood. Neck- supple, no JVD  CV- regular rate and rhythm no MRG  Lung- clear bilaterally  Abd- soft, nontender, nondistended  Ext- no edema bilaterally. Skin- warm and dry        Data Review:   Recent Labs      08/23/17   0400   NA  138   K  4.0   BUN  55*   CREA  2.54*   WBC  5.6   HGB  13.2*   HCT  39.7*   PLT  148*       Assessment/Plan:     Principal Problem:    Acute on chronic diastolic (congestive) heart failure (HCC) (8/21/2017)Improved with current therapy. Will continue medications  Agree with dc    Active Problems:    Persistent atrial fibrillation ([facility] (11/21/2012)Stable. Continue current medical therapy. Overview: Coumadin therapy discontinued due to recurrent GI bleeding.        CKD (chronic kidney disease) stage 3, GFR 30-59 ml/min (9/18/2013)      ROBERTO on CPAP (2/20/2015)      Overview: Patient is on BiPAP, no supplementary oxygen      Type 2 diabetes mellitus with peripheral neuropathy ([facility] (11/5/2015)      Hypoxia (8/21/2017)      Thrombocytopenia ([facility] (8/21/2017)      Cellulitis of left lower extremity (8/22/2017)

## 2017-08-23 NOTE — PROGRESS NOTES
Discharge instructions and prescriptions provided and explained to the pt. Med side effect sheet reviewed. Opportunity for questions provided. Primary nurse giving patient morning medications then patient will get dressed. Instructed to call once ready to leave.

## 2017-08-24 NOTE — ROUTINE PROCESS
CHF F/U call completed to pt, left detailed message:    PT ID#:   Date:   Question YES NO COMMENTS   Low sodium diet or answered questions? Any short of breath? Fluid restriction: how much?/  questions        Problems sleeping? What medications do you take? What water pill do you take? Do you feel like you are making progress? Do you have any question about DC instructions? How was the care you received during your admission? Were you kept informed of your plan of care? Could we have done anything else to improve your stay? Can you contact a Nurse 24/7 , if any question or problems occur/ Who? Have a PCP/cardio. Appointment? Domitila Munoz? Did you weigh today? How much do you weigh? Have you gained any weight? What did have for dinner last night? Do you feel stressed?

## 2017-08-26 ENCOUNTER — PATIENT OUTREACH (OUTPATIENT)
Dept: CASE MANAGEMENT | Age: 82
End: 2017-08-26

## 2017-08-26 LAB
BACTERIA SPEC CULT: NORMAL
BACTERIA SPEC CULT: NORMAL
SERVICE CMNT-IMP: NORMAL
SERVICE CMNT-IMP: NORMAL

## 2017-08-26 NOTE — PROGRESS NOTES
Date/Time of Call:   08/24/17 11:00 AM   What was the patient hospitalized for? Patient was hospitalized for Acute on chronic Diastolic CHF   Does the patient understand his/her diagnosis and/or treatment and what happened during the hospitalization? Spoke to patient who verbalized understanding of treatment and diagnosis. Did the patient receive discharge instructions? Patient states d/c instructions received. Review any discharge instructions (see notes in [facility]). Ask patient if they understand these. Do they have any questions? Patient discussed discharge plan and instruction as she understood it to be with Care Coordinator. Which I have documented in the pertinent areas throughout this progress note. Were home services ordered (nursing, PT, OT, ST, etc.)? No HH ordered at d/c. If so, has the first visit occurred? If not, why? (Assist with coordination of services if necessary.) N/A   Was any DME ordered? Pt uses cane and CPAP. If so, has it been received? If not, why?  (Assist with coordination of arranging DME orders if necessary.) N/A   Complete a review of all medications (new, continued and discontinued meds per the D/C instructions and medication tab in [facility]). All meds reviewed with Care Coordinator and Patient. Were all new prescriptions filled? If not, why?  (Assist with obtainment of medications if necessary.) N/A   Does the patient understand the purpose and dosing instructions for all medications? (If patient has questions, provide explanation and education.) Indicated by patient to care coordinator, the purpose and dosing instructions for all medications were understood. Does the patient have any problems in performing ADLs? (If patient is unable to perform ADLs  what is the limiting factor(s)?   Do they have a support system that can assist? If no support system is present, discuss possible assistance that they may be able to obtain.) Patient states he is limited with ADLs, and wife assists as needed. Does the patient have all follow-up appointments scheduled? 7 day f/up with PCP?    7-14 day f/up with specialist?    If f/up has not been made  what actions has the care coordinator made to accomplish this? Has transportation been arranged? [facility] follow-up should be within 7 days of discharge; all others should have PCP follow-up within 7 days of discharge; follow-ups with other specialists as appropriate or ordered.) F/U with Cardiology Dr. Zuleima Limon is scheduled for 8/31 at 2:45 PM at the [place] office, and PCP f/u with Dr. Jose Manuel Palencia is scheduled for 8/29 at 10:15 AM.  Patient states he has transportation. Any other questions or concerns expressed by the patient? Gratitude stated and no further questions asked or needs identified. OSCAR Call Completed By: Vanessa Storm LPN  [facility] [address] [facility] Coordinator          This note will not be viewable in [address]

## 2017-09-11 ENCOUNTER — APPOINTMENT (OUTPATIENT)
Dept: GENERAL RADIOLOGY | Age: 82
DRG: 291 | End: 2017-09-11
Attending: NURSE PRACTITIONER
Payer: MEDICARE

## 2017-09-11 ENCOUNTER — HOSPITAL ENCOUNTER (INPATIENT)
Age: 82
LOS: 1 days | Discharge: HOME | DRG: 291 | End: 2017-09-15
Attending: EMERGENCY MEDICINE | Admitting: INTERNAL MEDICINE
Payer: MEDICARE

## 2017-09-11 DIAGNOSIS — R06.02 SOB (SHORTNESS OF BREATH): Primary | ICD-10-CM

## 2017-09-11 PROBLEM — I50.33 ACUTE ON CHRONIC DIASTOLIC HEART FAILURE (HCC): Status: ACTIVE | Noted: 2017-09-11

## 2017-09-11 LAB
ALBUMIN SERPL-MCNC: 3.3 G/DL (ref 3.2–4.6)
ALBUMIN/GLOB SERPL: 0.7 {RATIO} (ref 1.2–3.5)
ALP SERPL-CCNC: 90 U/L (ref 50–136)
ALT SERPL-CCNC: 17 U/L (ref 12–65)
ANION GAP SERPL CALC-SCNC: 11 MMOL/L (ref 7–16)
AST SERPL-CCNC: 19 U/L (ref 15–37)
ATRIAL RATE: 277 BPM
BASOPHILS # BLD: 0 K/UL (ref 0–0.2)
BASOPHILS NFR BLD: 0 % (ref 0–2)
BILIRUB SERPL-MCNC: 0.9 MG/DL (ref 0.2–1.1)
BNP SERPL-MCNC: 221 PG/ML
BUN SERPL-MCNC: 46 MG/DL (ref 8–23)
CALCIUM SERPL-MCNC: 8.8 MG/DL (ref 8.3–10.4)
CALCULATED R AXIS, ECG10: 26 DEGREES
CALCULATED T AXIS, ECG11: -155 DEGREES
CHLORIDE SERPL-SCNC: 97 MMOL/L (ref 98–107)
CO2 SERPL-SCNC: 30 MMOL/L (ref 21–32)
CREAT SERPL-MCNC: 2.34 MG/DL (ref 0.8–1.5)
DIAGNOSIS, 93000: NORMAL
DIFFERENTIAL METHOD BLD: ABNORMAL
EOSINOPHIL # BLD: 0.1 K/UL (ref 0–0.8)
EOSINOPHIL NFR BLD: 1 % (ref 0.5–7.8)
ERYTHROCYTE [DISTWIDTH] IN BLOOD BY AUTOMATED COUNT: 17 % (ref 11.9–14.6)
ERYTHROCYTE [DISTWIDTH] IN BLOOD BY AUTOMATED COUNT: 17.1 % (ref 11.9–14.6)
GLOBULIN SER CALC-MCNC: 4.5 G/DL (ref 2.3–3.5)
GLUCOSE BLD STRIP.AUTO-MCNC: 183 MG/DL (ref 65–100)
GLUCOSE BLD STRIP.AUTO-MCNC: 184 MG/DL (ref 65–100)
GLUCOSE SERPL-MCNC: 225 MG/DL (ref 65–100)
HCT VFR BLD AUTO: 42.9 % (ref 41.1–50.3)
HCT VFR BLD AUTO: 43 % (ref 41.1–50.3)
HGB BLD-MCNC: 14.4 G/DL (ref 13.6–17.2)
HGB BLD-MCNC: 14.6 G/DL (ref 13.6–17.2)
IMM GRANULOCYTES # BLD: 0 K/UL (ref 0–0.5)
IMM GRANULOCYTES NFR BLD: 0.1 % (ref 0–5)
LYMPHOCYTES # BLD: 1.1 K/UL (ref 0.5–4.6)
LYMPHOCYTES NFR BLD: 14 % (ref 13–44)
MCH RBC QN AUTO: 26.2 PG (ref 26.1–32.9)
MCH RBC QN AUTO: 26.3 PG (ref 26.1–32.9)
MCHC RBC AUTO-ENTMCNC: 33.5 G/DL (ref 31.4–35)
MCHC RBC AUTO-ENTMCNC: 34 G/DL (ref 31.4–35)
MCV RBC AUTO: 77.3 FL (ref 79.6–97.8)
MCV RBC AUTO: 78.2 FL (ref 79.6–97.8)
MONOCYTES # BLD: 0.4 K/UL (ref 0.1–1.3)
MONOCYTES NFR BLD: 5 % (ref 4–12)
NEUTS SEG # BLD: 6.2 K/UL (ref 1.7–8.2)
NEUTS SEG NFR BLD: 80 % (ref 43–78)
PLATELET # BLD AUTO: 160 K/UL (ref 150–450)
PLATELET # BLD AUTO: 161 K/UL (ref 150–450)
PMV BLD AUTO: 10.8 FL (ref 10.8–14.1)
PMV BLD AUTO: 11.3 FL (ref 10.8–14.1)
POTASSIUM SERPL-SCNC: 4.3 MMOL/L (ref 3.5–5.1)
PROT SERPL-MCNC: 7.8 G/DL (ref 6.3–8.2)
Q-T INTERVAL, ECG07: 446 MS
QRS DURATION, ECG06: 98 MS
QTC CALCULATION (BEZET), ECG08: 430 MS
RBC # BLD AUTO: 5.5 M/UL (ref 4.23–5.67)
RBC # BLD AUTO: 5.55 M/UL (ref 4.23–5.67)
SODIUM SERPL-SCNC: 138 MMOL/L (ref 136–145)
TROPONIN I SERPL-MCNC: <0.02 NG/ML (ref 0.02–0.05)
VENTRICULAR RATE, ECG03: 56 BPM
WBC # BLD AUTO: 7.2 K/UL (ref 4.3–11.1)
WBC # BLD AUTO: 7.8 K/UL (ref 4.3–11.1)

## 2017-09-11 PROCEDURE — 83880 ASSAY OF NATRIURETIC PEPTIDE: CPT | Performed by: NURSE PRACTITIONER

## 2017-09-11 PROCEDURE — 99218 HC RM OBSERVATION: CPT

## 2017-09-11 PROCEDURE — 93005 ELECTROCARDIOGRAM TRACING: CPT | Performed by: NURSE PRACTITIONER

## 2017-09-11 PROCEDURE — 74011250637 HC RX REV CODE- 250/637: Performed by: INTERNAL MEDICINE

## 2017-09-11 PROCEDURE — 71020 XR CHEST PA LAT: CPT

## 2017-09-11 PROCEDURE — 74011250636 HC RX REV CODE- 250/636: Performed by: EMERGENCY MEDICINE

## 2017-09-11 PROCEDURE — 80053 COMPREHEN METABOLIC PANEL: CPT | Performed by: NURSE PRACTITIONER

## 2017-09-11 PROCEDURE — 99284 EMERGENCY DEPT VISIT MOD MDM: CPT | Performed by: EMERGENCY MEDICINE

## 2017-09-11 PROCEDURE — 74011250637 HC RX REV CODE- 250/637: Performed by: NURSE PRACTITIONER

## 2017-09-11 PROCEDURE — 85027 COMPLETE CBC AUTOMATED: CPT | Performed by: NURSE PRACTITIONER

## 2017-09-11 PROCEDURE — 74011250636 HC RX REV CODE- 250/636: Performed by: NURSE PRACTITIONER

## 2017-09-11 PROCEDURE — 94640 AIRWAY INHALATION TREATMENT: CPT

## 2017-09-11 PROCEDURE — 85025 COMPLETE CBC W/AUTO DIFF WBC: CPT | Performed by: NURSE PRACTITIONER

## 2017-09-11 PROCEDURE — 74011000250 HC RX REV CODE- 250: Performed by: EMERGENCY MEDICINE

## 2017-09-11 PROCEDURE — 84484 ASSAY OF TROPONIN QUANT: CPT | Performed by: NURSE PRACTITIONER

## 2017-09-11 PROCEDURE — 82962 GLUCOSE BLOOD TEST: CPT

## 2017-09-11 PROCEDURE — 36415 COLL VENOUS BLD VENIPUNCTURE: CPT | Performed by: NURSE PRACTITIONER

## 2017-09-11 PROCEDURE — 74011636637 HC RX REV CODE- 636/637: Performed by: NURSE PRACTITIONER

## 2017-09-11 PROCEDURE — 96374 THER/PROPH/DIAG INJ IV PUSH: CPT | Performed by: EMERGENCY MEDICINE

## 2017-09-11 RX ORDER — DOCUSATE SODIUM 100 MG/1
100 CAPSULE, LIQUID FILLED ORAL
Status: DISCONTINUED | OUTPATIENT
Start: 2017-09-11 | End: 2017-09-15 | Stop reason: HOSPADM

## 2017-09-11 RX ORDER — OXYCODONE HYDROCHLORIDE 5 MG/1
15 TABLET ORAL
Status: DISCONTINUED | OUTPATIENT
Start: 2017-09-11 | End: 2017-09-15 | Stop reason: HOSPADM

## 2017-09-11 RX ORDER — ALBUTEROL SULFATE 0.83 MG/ML
2.5 SOLUTION RESPIRATORY (INHALATION)
Status: DISCONTINUED | OUTPATIENT
Start: 2017-09-11 | End: 2017-09-11 | Stop reason: SDUPTHER

## 2017-09-11 RX ORDER — ALBUTEROL SULFATE 2.5 MG/.5ML
2.5 SOLUTION RESPIRATORY (INHALATION)
Status: DISCONTINUED | OUTPATIENT
Start: 2017-09-11 | End: 2017-09-15 | Stop reason: HOSPADM

## 2017-09-11 RX ORDER — FUROSEMIDE 10 MG/ML
80 INJECTION INTRAMUSCULAR; INTRAVENOUS EVERY 12 HOURS
Status: DISCONTINUED | OUTPATIENT
Start: 2017-09-11 | End: 2017-09-13

## 2017-09-11 RX ORDER — IPRATROPIUM BROMIDE AND ALBUTEROL SULFATE 2.5; .5 MG/3ML; MG/3ML
3 SOLUTION RESPIRATORY (INHALATION)
Status: DISCONTINUED | OUTPATIENT
Start: 2017-09-11 | End: 2017-09-15 | Stop reason: HOSPADM

## 2017-09-11 RX ORDER — ISOSORBIDE MONONITRATE 30 MG/1
30 TABLET, EXTENDED RELEASE ORAL DAILY
Status: DISCONTINUED | OUTPATIENT
Start: 2017-09-12 | End: 2017-09-15 | Stop reason: HOSPADM

## 2017-09-11 RX ORDER — PANTOPRAZOLE SODIUM 40 MG/1
40 TABLET, DELAYED RELEASE ORAL
Status: DISCONTINUED | OUTPATIENT
Start: 2017-09-11 | End: 2017-09-15 | Stop reason: HOSPADM

## 2017-09-11 RX ORDER — LATANOPROST 50 UG/ML
1 SOLUTION/ DROPS OPHTHALMIC
Status: DISCONTINUED | OUTPATIENT
Start: 2017-09-11 | End: 2017-09-15 | Stop reason: HOSPADM

## 2017-09-11 RX ORDER — BUDESONIDE 0.5 MG/2ML
500 INHALANT ORAL
Status: DISCONTINUED | OUTPATIENT
Start: 2017-09-11 | End: 2017-09-15 | Stop reason: HOSPADM

## 2017-09-11 RX ORDER — GABAPENTIN 100 MG/1
100 CAPSULE ORAL 3 TIMES DAILY
Status: DISCONTINUED | OUTPATIENT
Start: 2017-09-11 | End: 2017-09-15 | Stop reason: HOSPADM

## 2017-09-11 RX ORDER — GUAIFENESIN 100 MG/5ML
81 LIQUID (ML) ORAL DAILY
Status: DISCONTINUED | OUTPATIENT
Start: 2017-09-12 | End: 2017-09-15 | Stop reason: HOSPADM

## 2017-09-11 RX ORDER — HYDRALAZINE HYDROCHLORIDE 10 MG/1
10 TABLET, FILM COATED ORAL 2 TIMES DAILY
Status: DISCONTINUED | OUTPATIENT
Start: 2017-09-11 | End: 2017-09-15 | Stop reason: HOSPADM

## 2017-09-11 RX ORDER — SERTRALINE HYDROCHLORIDE 50 MG/1
50 TABLET, FILM COATED ORAL DAILY
Status: DISCONTINUED | OUTPATIENT
Start: 2017-09-12 | End: 2017-09-15 | Stop reason: HOSPADM

## 2017-09-11 RX ORDER — ALLOPURINOL 300 MG/1
300 TABLET ORAL DAILY
Status: DISCONTINUED | OUTPATIENT
Start: 2017-09-12 | End: 2017-09-15 | Stop reason: HOSPADM

## 2017-09-11 RX ORDER — FUROSEMIDE 10 MG/ML
60 INJECTION INTRAMUSCULAR; INTRAVENOUS
Status: COMPLETED | OUTPATIENT
Start: 2017-09-11 | End: 2017-09-11

## 2017-09-11 RX ORDER — METOPROLOL SUCCINATE 25 MG/1
12.5 TABLET, EXTENDED RELEASE ORAL DAILY
Status: DISCONTINUED | OUTPATIENT
Start: 2017-09-12 | End: 2017-09-15 | Stop reason: HOSPADM

## 2017-09-11 RX ORDER — GLIPIZIDE 5 MG/1
5 TABLET ORAL 2 TIMES DAILY
Status: DISCONTINUED | OUTPATIENT
Start: 2017-09-11 | End: 2017-09-15 | Stop reason: HOSPADM

## 2017-09-11 RX ORDER — LEVOTHYROXINE SODIUM 50 UG/1
50 TABLET ORAL
Status: DISCONTINUED | OUTPATIENT
Start: 2017-09-12 | End: 2017-09-15 | Stop reason: HOSPADM

## 2017-09-11 RX ORDER — LANOLIN ALCOHOL/MO/W.PET/CERES
400 CREAM (GRAM) TOPICAL DAILY
Status: DISCONTINUED | OUTPATIENT
Start: 2017-09-12 | End: 2017-09-15 | Stop reason: HOSPADM

## 2017-09-11 RX ORDER — INSULIN LISPRO 100 [IU]/ML
INJECTION, SOLUTION INTRAVENOUS; SUBCUTANEOUS
Status: DISCONTINUED | OUTPATIENT
Start: 2017-09-11 | End: 2017-09-15 | Stop reason: HOSPADM

## 2017-09-11 RX ORDER — HYDROCODONE BITARTRATE AND ACETAMINOPHEN 10; 325 MG/1; MG/1
1 TABLET ORAL
Status: DISCONTINUED | OUTPATIENT
Start: 2017-09-11 | End: 2017-09-15 | Stop reason: HOSPADM

## 2017-09-11 RX ADMIN — OXYCODONE HYDROCHLORIDE 15 MG: 15 TABLET ORAL at 20:04

## 2017-09-11 RX ADMIN — GABAPENTIN 100 MG: 100 CAPSULE ORAL at 22:19

## 2017-09-11 RX ADMIN — FUROSEMIDE 80 MG: 10 INJECTION, SOLUTION INTRAMUSCULAR; INTRAVENOUS at 22:19

## 2017-09-11 RX ADMIN — ALBUTEROL SULFATE 2.5 MG: 2.5 SOLUTION RESPIRATORY (INHALATION) at 20:56

## 2017-09-11 RX ADMIN — INSULIN LISPRO 2 UNITS: 100 INJECTION, SOLUTION INTRAVENOUS; SUBCUTANEOUS at 22:19

## 2017-09-11 RX ADMIN — GLIPIZIDE 5 MG: 5 TABLET ORAL at 18:37

## 2017-09-11 RX ADMIN — BUDESONIDE 500 MCG: 0.5 INHALANT RESPIRATORY (INHALATION) at 20:56

## 2017-09-11 RX ADMIN — HYDROCODONE BITARTRATE AND ACETAMINOPHEN 1 TABLET: 10; 325 TABLET ORAL at 22:31

## 2017-09-11 RX ADMIN — GABAPENTIN 100 MG: 100 CAPSULE ORAL at 18:37

## 2017-09-11 RX ADMIN — PANTOPRAZOLE SODIUM 40 MG: 40 TABLET, DELAYED RELEASE ORAL at 18:37

## 2017-09-11 RX ADMIN — HYDRALAZINE HYDROCHLORIDE 10 MG: 10 TABLET, FILM COATED ORAL at 18:37

## 2017-09-11 RX ADMIN — FUROSEMIDE 60 MG: 10 INJECTION, SOLUTION INTRAMUSCULAR; INTRAVENOUS at 14:45

## 2017-09-11 NOTE — ED TRIAGE NOTES
Charity Jordan is a 80 y.o. male here for shortness of breath that increased this morning. Patient states cough. patient states shortness of breath increases when he lays down. He states he has gained approx 12 lbs over the past 2-3 days. Patient noted to have distended abdomen. Patient noted to have bilateral lower extremity swelling. He states he has been taking his lasix as prescribed. . Rapid assessment performed. --- Orders were placed.     Signed By: CRUZ Kumar     September 11, 2017

## 2017-09-11 NOTE — PROGRESS NOTES
Attempted to visit with patient in ER. Reviewed notes for spiritual concerns.     [id number] [phone]  /   [email or web address]

## 2017-09-11 NOTE — ED PROVIDER NOTES
HPI Comments: Patient is an 79 yo male with shortness of breath. States history of CHF, on lasix which he states he has been taking however states progressive SOB and difficulty laying down due to feeling like \"fluid\" is building up. States gained 5-6 lbs each of the last few days. Denies chest pain, no nausea or vomiting, no fevers or chills, no abdominal pain, no further complaints. Patient is a 80 y.o. male presenting with shortness of breath. The history is provided by the patient. No  was used. Shortness of Breath   Associated symptoms include leg swelling. Pertinent negatives include no fever, no headaches, no rhinorrhea, no sore throat, no neck pain, no cough, no chest pain, no vomiting, no abdominal pain and no rash. Past Medical History:   Diagnosis Date    Atopic dermatitis 11/21/2012    Atrial fibrillation (HCC)     CAD (coronary artery disease)     Carotid stenosis, bilateral 11/21/2012    50-79%  3-2010    1. Carotid Doppler (6/1/07): Greater than 70% stenosis in proximal LICA. 50% stenosis in right ICA. 2.  CTA of neck (3/15/10): Occluded distal segments of the vertebral arteries bilaterally. Atherosclerosis of the carotid bulbs bilaterally with a 50% stenosis on the left and a stenosis of less than 30% on the right. Small nodule in the right upper lobe near the apex. 3. CTA (8/29/13):  Less than 30% diameter stenosis of the cervical internal carotid arteries bilaterally.  CHF (congestive heart failure) ([address] 11/21/2012    Chronic kidney disease     hx elevated labs    Chronic obstructive pulmonary disease (HCC)     Diabetes (HCC)     Diastolic CHF, acute on chronic ([address] 9/12/2015    1. Echo (9/11/15) : EF 55-60%. Mild LVH. Moderate biatrial enlargement. Moderate mitral/tricuspid regurgitation.     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 Bilateral proximal common iliac PCI (08):  8.0 X 100 mm Cordis smart stent on right and 10 X 40 mm cordis smart stent on right. Both inflated to 7.0 mm.  Poor historian     Radiologic findings of lung field, abnormal 10/31/2016    1. CT of chest  (11/24/10): Multiple small nodules in the right lobe and stable interstitial prominence. Consistent with chronic lung disease. No evidence of malignancy.  Seizure disorder ([facility] 2013    Shortness of breath dyspnea 2013    Thyroid disease     Unspecified sleep apnea        Past Surgical History:   Procedure Laterality Date    CARDIAC SURG PROCEDURE UNLIST      cath ,cabg ,lexiscan cardiolite 11/10    COLONOSCOPY N/A 2017    COLONOSCOPY  BMI 36 TO BE ADMITTED 17 performed by Silva Dakins, MD at [facility] ENDOSCOPY    HX CATARACT REMOVAL Left     os    HX COLONOSCOPY      HX CORONARY ARTERY BYPASS GRAFT  2007    HX CORONARY STENT PLACEMENT  2008    bilateral iliac artery PCI and stents    HX HEART CATHETERIZATION      left-2007, 2011    HX HEENT  1970s    neck lipoma         Family History:   Problem Relation Age of Onset    Heart Attack Mother    [facility] Other Father      old age   [facility] Other Brother      brain aneurysm    Heart Disease Other      2 children  with heart concerns, 36 & 47 yo    Heart Disease Son        Social History     Social History    Marital status:      Spouse name: N/A    Number of children: N/A    Years of education: N/A     Occupational History    [employer].  Retired     sales, 12 yrs     Social History Main Topics    Smoking status: Former Smoker     Packs/day: 1.00     Years: 45.00     Types: Cigarettes     Quit date: 1984    Smokeless tobacco: Never Used      Comment: (stopped smoking in )    Alcohol use No    Drug use: No    Sexual activity: Not Currently     Other Topics Concern    Not on file     Social History Narrative    45 pack year history cigarette smoking, stopped in . 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 , two living children, two children  with coronary artery disease, ages 36 and 48. Has always lived in [address] No pets. ALLERGIES: Review of patient's allergies indicates no known allergies. Review of Systems   Constitutional: Negative for chills and fever. HENT: Negative for rhinorrhea and sore throat. Eyes: Negative for visual disturbance. Respiratory: Positive for shortness of breath. Negative for cough. Cardiovascular: Positive for leg swelling. Negative for chest pain. Gastrointestinal: Negative for abdominal pain, diarrhea, nausea and vomiting. Genitourinary: Negative for dysuria. Musculoskeletal: Negative for back pain and neck pain. Skin: Negative for rash. Neurological: Negative for weakness and headaches. Psychiatric/Behavioral: The patient is not nervous/anxious. Vitals:    17 1250   BP: 157/62   Pulse: 64   Resp: 28   Temp: 98.3 °F (36.8 °C)   SpO2: 94%   Weight: 119.3 kg (263 lb)   Height: 5' 10\" (1.778 m)            Physical Exam   Constitutional: He is oriented to person, place, and time. He appears well-developed and well-nourished. HENT:   Head: Normocephalic. Right Ear: External ear normal.   Left Ear: External ear normal.   Eyes: Conjunctivae and EOM are normal. Pupils are equal, round, and reactive to light. Neck: Normal range of motion. Neck supple. No tracheal deviation present. Cardiovascular: Normal rate, regular rhythm, normal heart sounds and intact distal pulses. No murmur heard. Pulmonary/Chest: Effort normal. No respiratory distress. He has rales. Abdominal: Soft. There is no tenderness. Musculoskeletal: Normal range of motion. He exhibits edema. Bilateral edema to knee. DP pulse 2+ bilaterally   Neurological: He is alert and oriented to person, place, and time. No cranial nerve deficit. Skin: No rash noted. Nursing note and vitals reviewed. MDM  Number of Diagnoses or Management Options  SOB (shortness of breath): new and requires workup     Amount and/or Complexity of Data Reviewed  Clinical lab tests: ordered and reviewed  Tests in the radiology section of CPT®: reviewed and ordered  Tests in the medicine section of CPT®: ordered and reviewed  Review and summarize past medical records: yes    Risk of Complications, Morbidity, and/or Mortality  Presenting problems: high  Diagnostic procedures: high  Management options: high    Patient Progress  Patient progress: stable    ED Course       Procedures      Recent Results (from the past 12 hour(s))   CBC WITH AUTOMATED DIFF    Collection Time: 09/11/17  1:00 PM   Result Value Ref Range    WBC 7.8 4.3 - 11.1 K/uL    RBC 5.50 4.23 - 5.67 M/uL    HGB 14.4 13.6 - 17.2 g/dL    HCT 43.0 41.1 - 50.3 %    MCV 78.2 (L) 79.6 - 97.8 FL    MCH 26.2 26.1 - 32.9 PG    MCHC 33.5 31.4 - 35.0 g/dL    RDW 17.1 (H) 11.9 - 14.6 %    PLATELET 207 684 - 417 K/uL    MPV 11.3 10.8 - 14.1 FL    DF AUTOMATED      NEUTROPHILS 80 (H) 43 - 78 %    LYMPHOCYTES 14 13 - 44 %    MONOCYTES 5 4.0 - 12.0 %    EOSINOPHILS 1 0.5 - 7.8 %    BASOPHILS 0 0.0 - 2.0 %    IMMATURE GRANULOCYTES 0.1 0.0 - 5.0 %    ABS. NEUTROPHILS 6.2 1.7 - 8.2 K/UL    ABS. LYMPHOCYTES 1.1 0.5 - 4.6 K/UL    ABS. MONOCYTES 0.4 0.1 - 1.3 K/UL    ABS. EOSINOPHILS 0.1 0.0 - 0.8 K/UL    ABS. BASOPHILS 0.0 0.0 - 0.2 K/UL    ABS. IMM. GRANS. 0.0 0.0 - 0.5 K/UL     Duplex Low Ext Arteries With Rhianna    Result Date: 8/23/2017  History: Peripheral arterial disease. Type 2 diabetes with peripheral neuropathy. Left lower extremity cellulitis.  FINDINGS: Duplex Doppler arterial ultrasound was performed of the lower extremity arterial system bilaterally. Color flow, grayscale, and spectral analysis was performed. Elevated velocity in the left common femoral artery measuring 202 cm/s. Monophasic waveforms in the left lower extremity. Focally elevated velocity at the mid left superficial femoral artery of 374 cm/s. Biphasic waveforms in the right lower extremity. Resting ankle-brachial index on the right 0.9. Resting ankle-brachial index on the left 0.9. Toe index on the right 0.59. Toe index on the left 0.26. Pulse volume recordings in the great toes reveals dampened waveforms bilaterally. Abdominal aorta not seen. IMPRESSION: Elevated velocity in the left common femoral artery raises the possibility of iliac artery stenosis. Left superficial femoral artery stenosis. Evidence of small vessel disease based on the low toe indices bilaterally. Xr Chest Port    Result Date: 8/21/2017  HISTORY: Shortness of breath and fluid retention. EXAM: AP chest radiograph PRIOR STUDY: 08/04/2017 FINDINGS: There are postsurgical changes of prior median sternotomy. The heart is enlarged but stable. There is mild haziness of the pulmonary vasculature. No focal consolidation or evidence of a pleural effusion. There is no pneumothorax. IMPRESSION: Cardiomegaly with mild pulmonary vascular congestion. Duplex Lower Ext Venous Bilat    Result Date: 8/21/2017  Bilateral lower extremity duplex venous study, 8/21/2017. CLINICAL HISTORY: Severe bilateral lower extremity swelling, left greater than right for one month. Technique: Grayscale, and duplex Doppler images of the bilateral lower extremity venous vascular systems were obtained using an 9 MHz transducer. In addition, compression and augmentation were performed at multiple levels. FINDINGS: No lower extremity DVT is directly visualized in either leg. Normal augmentation is seen at all evaluated levels without findings to suggest more proximal occlusion.  The soft tissues particularly in the left calf appear edematous. No abnormal fluid collection is identified, however. IMPRESSION: 1. No evidence for lower extremity DVT in either leg. Xr Chest Pa Lat    Result Date: 9/11/2017  Chest 2 view CLINICAL INDICATION: Acute moderate dyspnea and coughing with history of COPD, cardiac surgery, atrial fibrillation, cardiomegaly COMPARISON: 8/21/2017, 11/3/2016 TECHNIQUE: Upright PA and lateral views of the chest FINDINGS: Lung volumes are well inflated. Cardiomediastinal silhouette and hilar contours are stable with cardiomegaly and previous heart surgery again noted. The most superior sternotomy wires are again slightly fractured. The lungs are unchanged and grossly clear at this time. No acute osseous abnormalities are seen. IMPRESSION: 1. No acute airspace disease. 2. Stable cardiomegaly and previous heart surgery. Duplex Low Ext Arteries With Rhianna    Result Date: 8/23/2017  History: Peripheral arterial disease. Type 2 diabetes with peripheral neuropathy. Left lower extremity cellulitis. FINDINGS: Duplex Doppler arterial ultrasound was performed of the lower extremity arterial system bilaterally. Color flow, grayscale, and spectral analysis was performed. Elevated velocity in the left common femoral artery measuring 202 cm/s. Monophasic waveforms in the left lower extremity. Focally elevated velocity at the mid left superficial femoral artery of 374 cm/s. Biphasic waveforms in the right lower extremity. Resting ankle-brachial index on the right 0.9. Resting ankle-brachial index on the left 0.9. Toe index on the right 0.59. Toe index on the left 0.26. Pulse volume recordings in the great toes reveals dampened waveforms bilaterally. Abdominal aorta not seen. IMPRESSION: Elevated velocity in the left common femoral artery raises the possibility of iliac artery stenosis. Left superficial femoral artery stenosis.  Evidence of small vessel disease based on the low toe indices bilaterally. Xr Chest Port    Result Date: 8/21/2017  HISTORY: Shortness of breath and fluid retention. EXAM: AP chest radiograph PRIOR STUDY: 08/04/2017 FINDINGS: There are postsurgical changes of prior median sternotomy. The heart is enlarged but stable. There is mild haziness of the pulmonary vasculature. No focal consolidation or evidence of a pleural effusion. There is no pneumothorax. IMPRESSION: Cardiomegaly with mild pulmonary vascular congestion. Duplex Lower Ext Venous Bilat    Result Date: 8/21/2017  Bilateral lower extremity duplex venous study, 8/21/2017. CLINICAL HISTORY: Severe bilateral lower extremity swelling, left greater than right for one month. Technique: Grayscale, and duplex Doppler images of the bilateral lower extremity venous vascular systems were obtained using an 9 MHz transducer. In addition, compression and augmentation were performed at multiple levels. FINDINGS: No lower extremity DVT is directly visualized in either leg. Normal augmentation is seen at all evaluated levels without findings to suggest more proximal occlusion. The soft tissues particularly in the left calf appear edematous. No abnormal fluid collection is identified, however. IMPRESSION: 1. No evidence for lower extremity DVT in either leg. Recent Results (from the past 12 hour(s))   CBC WITH AUTOMATED DIFF    Collection Time: 09/11/17  1:00 PM   Result Value Ref Range    WBC 7.8 4.3 - 11.1 K/uL    RBC 5.50 4.23 - 5.67 M/uL    HGB 14.4 13.6 - 17.2 g/dL    HCT 43.0 41.1 - 50.3 %    MCV 78.2 (L) 79.6 - 97.8 FL    MCH 26.2 26.1 - 32.9 PG    MCHC 33.5 31.4 - 35.0 g/dL    RDW 17.1 (H) 11.9 - 14.6 %    PLATELET 714 765 - 836 K/uL    MPV 11.3 10.8 - 14.1 FL    DF AUTOMATED      NEUTROPHILS 80 (H) 43 - 78 %    LYMPHOCYTES 14 13 - 44 %    MONOCYTES 5 4.0 - 12.0 %    EOSINOPHILS 1 0.5 - 7.8 %    BASOPHILS 0 0.0 - 2.0 %    IMMATURE GRANULOCYTES 0.1 0.0 - 5.0 %    ABS. NEUTROPHILS 6.2 1.7 - 8.2 K/UL    ABS. LYMPHOCYTES 1.1 0.5 - 4.6 K/UL    ABS. MONOCYTES 0.4 0.1 - 1.3 K/UL    ABS. EOSINOPHILS 0.1 0.0 - 0.8 K/UL    ABS. BASOPHILS 0.0 0.0 - 0.2 K/UL    ABS. IMM. GRANS. 0.0 0.0 - 0.5 K/UL   METABOLIC PANEL, COMPREHENSIVE    Collection Time: 09/11/17  1:00 PM   Result Value Ref Range    Sodium 138 136 - 145 mmol/L    Potassium 4.3 3.5 - 5.1 mmol/L    Chloride 97 (L) 98 - 107 mmol/L    CO2 30 21 - 32 mmol/L    Anion gap 11 7 - 16 mmol/L    Glucose 225 (H) 65 - 100 mg/dL    BUN 46 (H) 8 - 23 MG/DL    Creatinine 2.34 (H) 0.8 - 1.5 MG/DL    GFR est AA 34 (L) >60 ml/min/1.73m2    GFR est non-AA 28 (L) >60 ml/min/1.73m2    Calcium 8.8 8.3 - 10.4 MG/DL    Bilirubin, total 0.9 0.2 - 1.1 MG/DL    ALT (SGPT) 17 12 - 65 U/L    AST (SGOT) 19 15 - 37 U/L    Alk. phosphatase 90 50 - 136 U/L    Protein, total 7.8 6.3 - 8.2 g/dL    Albumin 3.3 3.2 - 4.6 g/dL    Globulin 4.5 (H) 2.3 - 3.5 g/dL    A-G Ratio 0.7 (L) 1.2 - 3.5     TROPONIN I    Collection Time: 09/11/17  1:00 PM   Result Value Ref Range    Troponin-I, Qt. <0.02 (L) 0.02 - 0.05 NG/ML   BNP    Collection Time: 09/11/17  1:00 PM   Result Value Ref Range     pg/mL   EKG, 12 LEAD, INITIAL    Collection Time: 09/11/17  3:15 PM   Result Value Ref Range    Ventricular Rate 56 BPM    Atrial Rate 277 BPM    QRS Duration 98 ms    Q-T Interval 446 ms    QTC Calculation (Bezet) 430 ms    Calculated R Axis 26 degrees    Calculated T Axis -155 degrees    Diagnosis       !! AGE AND GENDER SPECIFIC ECG ANALYSIS !! Atrial fibrillation with slow ventricular response  Cannot rule out Inferior infarct , age undetermined  Anterior infarct (cited on or before 21-AUG-2017)  ST & T wave abnormality, consider lateral ischemia or digitalis effect  Abnormal ECG  When compared with ECG of 21-AUG-2017 10:13,  Serial changes of Anterior infarct Present         81 yo male with SOB:       Patient with worsening, SOB and weight gain.   BNP stable, discussed with cardiology for discharge home vs. Admission.

## 2017-09-11 NOTE — IP AVS SNAPSHOT
[address] [address] 
[phone] Patient: Roberto Mejia MRN: [id number] GDA:[id number]/[id number] You are allergic to the following No active allergies Recent Documentation Height Weight BMI Smoking Status 1.778 m 117.1 kg 37.03 kg/m2 Former Smoker Emergency Contacts Name Discharge Info Relation Home Work Mobile Eulalia Navarro  Spouse [3] [phone] About your hospitalization You were admitted on:  September 11, 2017 You last received care in the:  [facility] 3 CLINICAL OBSERVATION You were discharged on:  September 15, 2017 Unit phone number:  [phone] Why you were hospitalized Your primary diagnosis was:  Acute On Chronic Diastolic Heart Failure (Hcc) Your diagnoses also included:  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), Thrombocytopenia (Hcc), Persistent Atrial Fibrillation (Hcc), Morbid Obesity (Hcc), Hypoxia, Hyperlipidemia, Gout, Essential Hypertension, Dyspnea, Ckd (Chronic Kidney Disease) Stage 3, Gfr 30-59 Ml/Min, Copd (Chronic Obstructive Pulmonary Disease) (Hcc), Anemia, Chf (Congestive Heart Failure) (Hcc) Providers Seen During Your Hospitalizations Provider Role Specialty Primary office phone Chalo Esteban MD Attending Provider Emergency Medicine [phone] Corrine Tay MD Attending Provider Cardiology [phone] Your Primary Care Physician (PCP) Primary Care Physician Office Phone Office Fax Angi Yadira [phone] Follow-up Information Follow up With Details Comments Contact Info Mellisa Card MD  As needed [address] 
[phone] [facility]  transitional care appt one week with BMP prior.-- 9/25 at 10:15am in [facility] office  [address] 
[address] [address] [facility] 
[phone] Your Appointments Thursday September 21, 2017  1:00 PM EDT  
(Arrive by 12:40 PM) Return appointment with Avani Amin NP [facility] ([facility]) [address] [place] [address] [facility]  
[phone] Monday September 25, 2017 10:15 AM EDT TRANSITIONAL CARE MANAGEMENT with Clement Perez MD  
[address] ([address] [facility]) 2 [facility] 
[address] [place] [address]  
[phone] Wednesday October 11, 2017 10:40 AM EDT  
CPAP with PP CPAP RESOURCE [address] ([facility]) 11 [facility] [address] [place] [address] [facility]  
[phone] Current Discharge Medication List  
  
CONTINUE these medications which have CHANGED Dose & Instructions Dispensing Information Comments Morning Noon Evening Bedtime  
 furosemide 80 mg tablet Commonly known as:  LASIX What changed:   
- medication strength 
- how much to take - when to take this Your next dose is:  9/15 Dose:  80 mg Take 1 Tab by mouth two (2) times a day. Quantity:  60 Tab Refills:  6  
     
   
   
  
   
  
 pantoprazole 40 mg tablet Commonly known as:  PROTONIX What changed:   
- when to take this 
- reasons to take this Your next dose is:  9/15 Dose:  40 mg Take 1 Tab by mouth Before breakfast and dinner. Quantity:  180 Tab Refills:  3  
     
   
   
  
   
  
 sertraline 50 mg tablet Commonly known as:  ZOLOFT What changed:   
- when to take this 
- reasons to take this 
- additional instructions Your next dose is:  9/15 Take one tablet each evening for anxiety  Indications: GENERALIZED ANXIETY DISORDER Quantity:  30 Tab Refills:  3 CONTINUE these medications which have NOT CHANGED Dose & Instructions Dispensing Information Comments Morning Noon Evening Bedtime  
 albuterol 2.5 mg /3 mL (0.083 %) nebulizer solution Commonly known as:  PROVENTIL VENTOLIN  
   
 1 Vial [address] Qid   Order fax to [facility]  Indications: COPD Quantity:  360 Vial  
Refills:  3  
 - DX copd  
 
-  
 
- Pt to use Albuterol 00.83% 1 Vial [address] Along with Pulmicort 0.5 mg 1 Vial [address] bid  
    
   
   
   
  
 allopurinol 300 mg tablet Commonly known as:  Paty Singer Your next dose is:  9/16 Take  by mouth daily. Refills:  0  
     
   
   
   
  
 aspirin 81 mg chewable tablet Your next dose is:  9/16 Dose:  81 mg Take 1 Tab by mouth daily. Quantity:  90 Tab Refills:  3  
     
   
   
   
  
 budesonide-formoterol 160-4.5 mcg/actuation HFA inhaler Commonly known as:  SYMBICORT Dose:  2 Puff Take 2 Puffs by inhalation two (2) times a day. Refills:  0  
     
   
   
   
  
 COMBIVENT RESPIMAT  mcg/actuation inhaler Generic drug:  ipratropium-albuterol Dose:  1 Puff Take 1 Puff by inhalation every six (6) hours. Refills:  0  
     
   
   
   
  
 cpap machine kit  
   
 by Does Not Apply route. Bilevel 12/8 Refills:  0  
     
   
   
   
  
 ferrous sulfate 324 mg (65 mg iron) tablet Your next dose is:  9/16 Take  by mouth Daily (before breakfast). Refills:  0  
     
   
   
   
  
 fluticasone 50 mcg/actuation nasal spray Commonly known as:  Katlin Hallman Dose:  2 Spray 2 Sprays by Both Nostrils route daily. Quantity:  1 Bottle Refills:  3  
     
   
   
   
  
 gabapentin 100 mg capsule Commonly known as:  NEURONTIN Your next dose is:  9/15 Dose:  100 mg Take 1 Cap by mouth three (3) times daily. Quantity:  90 Cap Refills:  3  
     
   
   
   
  
 glipiZIDE 5 mg tablet Commonly known as:  Emilee Hewitt Your next dose is:  9/15 Dose:  5 mg Take 5 mg by mouth two (2) times a day. Refills:  0 hydrALAZINE 10 mg tablet Commonly known as:  APRESOLINE Your next dose is:  9/15 Dose:  10 mg Take 10 mg by mouth two (2) times a day. Refills:  0 HYDROcodone-acetaminophen  mg tablet Commonly known as:  Jasmin Maravilla Start taking on:  10/15/2017 Krishna Gallardo Quantity:  90 Tab Refills:  0 PHARMACY FILL ON TIME. NO EARLY REFILLS. isosorbide mononitrate ER 30 mg tablet Commonly known as:  IMDUR Your next dose is:  9/16 Dose:  30 mg Take 1 Tab by mouth daily. Quantity:  30 Tab Refills:  5  
     
   
   
   
  
 K-DUR 20 mEq tablet Generic drug:  potassium chloride Your next dose is:  9/16 Dose:  20 mEq Take 20 mEq by mouth daily. Refills:  0  
     
   
   
   
  
 levothyroxine 50 mcg tablet Commonly known as:  SYNTHROID Your next dose is:  9/16 Dose:  50 mcg Take 1 Tab by mouth Daily (before breakfast). Quantity:  90 Tab Refills:  3  
     
   
   
   
  
 magnesium oxide 400 mg tablet Commonly known as:  MAG-OX Your next dose is:  9/16 Dose:  400 mg Take 1 Tab by mouth daily. Quantity:  90 Tab Refills:  3  
     
   
   
   
  
 metoprolol succinate 25 mg XL tablet Commonly known as:  TOPROL-XL Your next dose is:  9/16 Pt takes 1/2 tab po daily. Quantity:  45 Tab Refills:  3  
     
   
   
   
  
 mupirocin calcium 2 % topical cream  
Commonly known as:  [facility] Apply  to affected area two (2) times a day. Quantity:  15 g Refills:  0  
     
   
   
   
  
 oxyCODONE IR 15 mg immediate release tablet Commonly known as:  OXY-IR Start taking on:  10/15/2017 Dose:  15 mg Take 1 Tab by mouth every eight (8) hours as needed for Pain. Max Daily Amount: 45 mg.  
 Quantity:  90 Tab Refills:  0 PHARMACY FILL ON TIME. NO EARLY REFILLS. SITagliptin 50 mg tablet Commonly known as:  Trinh López Your next dose is:  9/16 Dose:  50 mg Take 1 Tab by mouth daily. Quantity:  90 Tab Refills:  3 STOOL SOFTENER 100 mg capsule Generic drug:  docusate sodium Dose:  100 mg Take 100 mg by mouth as needed. Refills:  0  
     
   
   
   
  
 TRAVATAN Z 0.004 % ophthalmic solution Generic drug:  travoprost  
   
 Dose:  1 Drop Administer 1 Drop to both eyes two (2) times a day. Refills:  0  
     
   
   
   
  
 triamcinolone 0.5 % topical cream  
Commonly known as:  ARISTOCORT Apply  to affected area two (2) times a day. use thin layer Quantity:  15 g Refills:  0 Where to Get Your Medications Information on where to get these meds will be given to you by the nurse or doctor. ! Ask your nurse or doctor about these medications  
  furosemide 80 mg tablet Discharge Instructions Furosemide (By mouth) Furosemide (stdj-AL-un-mide) Treats fluid retention (edema) and high blood pressure. This medicine is a diuretic (water pill). Brand Name(s): Active-Medicated Specimen Collection Kit, Diuscreen Multi-Drug Medicated Test Kit, Lasix, RX-Specimen Collection Kit, Specimen Collection Kit There may be other brand names for this medicine. When This Medicine Should Not Be Used: This medicine is not right for everyone. Do not use it if you had an allergic reaction to furosemide. How to Use This Medicine:  
Liquid, Tablet · Take your medicine as directed. Your dose may need to be changed several times to find what works best for you. · You may take this medicine with food if it upsets your stomach. · Oral liquid: Measure the oral liquid medicine with a marked measuring spoon, oral syringe, or medicine cup. · Tablet: Swallow the tablet whole. Do not crush, break, or chew it. · Missed dose: Take a dose as soon as you remember.  If it is almost time for your next dose, wait until then and take a regular dose. Do not take extra medicine to make up for a missed dose. · Store the medicine in a closed container at room temperature, away from heat, moisture, and direct light. Drugs and Foods to Avoid: Ask your doctor or pharmacist before using any other medicine, including over-the-counter medicines, vitamins, and herbal products. · Some medicines can affect how furosemide works. Tell your doctor if you are also using any of the following: ¨ Cisplatin, cyclosporine, digoxin, ethacrynic acid, licorice, lithium, methotrexate, or phenytoin ¨ Adrenocorticotropic hormone (ACTH) ¨ Laxative ¨ Medicine to treat an infection ¨ NSAID pain or arthritis medicine (including aspirin, diclofenac, ibuprofen, indomethacin, naproxen) ¨ Other blood pressure medicines ¨ Steroid medicine (including dexamethasone, hydrocortisone, methylprednisolone, prednisolone, prednisone) ¨ Thyroid medicine · If you also take sucralfate, allow at least 2 hours between the time you take furosemide and the time you take sucralfate. · Alcohol, narcotic pain medicine, or sleeping pills may cause you to feel more lightheaded, dizzy, or faint when used with this medicine. Warnings While Using This Medicine: · Tell your doctor if you are pregnant or breastfeeding, or if you have kidney disease, liver disease (including cirrhosis), diabetes, gout, low blood pressure, lupus, an enlarged prostate, trouble urinating, or an allergy to sulfa drugs. Tell your doctor if you are on a low-salt diet. · This medicine may cause the following problems:  
¨ Low levels of minerals in your blood, such as potassium and sodium ¨ Blood sugar level changes ¨ Hearing problems · Make sure any doctor or dentist who treats you knows that you are using this medicine. · This medicine could lower your blood pressure too much, especially when you first use it or if you are dehydrated.  Stand or sit up slowly if you feel lightheaded or dizzy. · This medicine may make your skin more sensitive to sunlight. Wear sunscreen. Do not use sunlamps or tanning beds. · Your doctor will do lab tests at regular visits to check on the effects of this medicine. Keep all appointments. · Keep all medicine out of the reach of children. Never share your medicine with anyone. Possible Side Effects While Using This Medicine:  
Call your doctor right away if you notice any of these side effects: · Allergic reaction: Itching or hives, swelling in your face or hands, swelling or tingling in your mouth or throat, chest tightness, trouble breathing · Blistering, peeling, red skin rash · Confusion, weakness, muscle twitching · Dry mouth, increased thirst, muscle cramps, uneven heartbeat · Sudden and severe stomach pain, nausea, vomiting, fever, lightheadedness · Hearing loss, ringing in the ears · Lightheadedness, dizziness, fainting · Severe diarrhea · Unusual bleeding or bruising · Yellow skin or eyes If you notice these less serious side effects, talk with your doctor: · Loss of appetite, stomach cramps If you notice other side effects that you think are caused by this medicine, tell your doctor. Call your doctor for medical advice about side effects. You may report side effects to FDA at 7-298-PYE-5462 © 2017 [facility] Information is for End User's use only and may not be sold, redistributed or otherwise used for commercial purposes. The above information is an  only. It is not intended as medical advice for individual conditions or treatments. Talk to your doctor, nurse or pharmacist before following any medical regimen to see if it is safe and effective for you. Heart Failure: Care Instructions Your Care Instructions Heart failure occurs when your heart does not pump as much blood as the body needs.  Failure does not mean that the heart has stopped pumping but rather that it is not pumping as well as it should. Over time, this causes fluid buildup in your lungs and other parts of your body. Fluid buildup can cause shortness of breath, fatigue, swollen ankles, and other problems. By taking medicines regularly, reducing sodium (salt) in your diet, checking your weight every day, and making lifestyle changes, you can feel better and live longer. Follow-up care is a key part of your treatment and safety. Be sure to make and go to all appointments, and call your doctor if you are having problems. It's also a good idea to know your test results and keep a list of the medicines you take. How can you care for yourself at home? Medicines · Be safe with medicines. Take your medicines exactly as prescribed. Call your doctor if you think you are having a problem with your medicine. · Do not take any vitamins, over-the-counter medicine, or herbal products without talking to your doctor first. Jesús Fails not take ibuprofen (Advil or Motrin) and naproxen (Aleve) without talking to your doctor first. They could make your heart failure worse. · You may be taking some of the following medicine. ¨ Beta-blockers can slow heart rate, decrease blood pressure, and improve your condition. Taking a beta-blocker may lower your chance of needing to be hospitalized. ¨ Angiotensin-converting enzyme inhibitors (ACEIs) reduce the heart's workload, lower blood pressure, and reduce swelling. Taking an ACEI may lower your chance of needing to be hospitalized again. ¨ Angiotensin II receptor blockers (ARBs) work like ACEIs. Your doctor may prescribe them instead of ACEIs. ¨ Diuretics, also called water pills, reduce swelling. ¨ Potassium supplements replace this important mineral, which is sometimes lost with diuretics. ¨ Aspirin and other blood thinners prevent blood clots, which can cause a stroke or heart attack. You will get more details on the specific medicines your doctor prescribes. Diet · Your doctor may suggest that you limit sodium to 2,000 milligrams (mg) a day or less. That is less than 1 teaspoon of salt a day, including all the salt you eat in cooking or in packaged foods. People get most of their sodium from processed foods. Fast food and restaurant meals also tend to be very high in sodium. · Ask your doctor how much liquid you can drink each day. You may have to limit liquids. Weight · Weigh yourself without clothing at the same time each day. Record your weight. Call your doctor if you have a sudden weight gain, such as more than 2 to 3 pounds in a day or 5 pounds in a week. (Your doctor may suggest a different range of weight gain.) A sudden weight gain may mean that your heart failure is getting worse. Activity level · Start light exercise (if your doctor says it is okay). Even if you can only do a small amount, exercise will help you get stronger, have more energy, and manage your weight and your stress. Walking is an easy way to get exercise. Start out by walking a little more than you did before. Bit by bit, increase the amount you walk. · When you exercise, watch for signs that your heart is working too hard. You are pushing yourself too hard if you cannot talk while you are exercising. If you become short of breath or dizzy or have chest pain, stop, sit down, and rest. 
· If you feel \"wiped out\" the day after you exercise, walk slower or for a shorter distance until you can work up to a better pace. · Get enough rest at night. Sleeping with 1 or 2 pillows under your upper body and head may help you breathe easier. Lifestyle changes · Do not smoke. Smoking can make a heart condition worse. If you need help quitting, talk to your doctor about stop-smoking programs and medicines. These can increase your chances of quitting for good. Quitting smoking may be the most important step you can take to protect your heart. · Limit alcohol to 2 drinks a day for men and 1 drink a day for women. Too much alcohol can cause health problems. · Avoid getting sick from colds and the flu. Get a pneumococcal vaccine shot. If you have had one before, ask your doctor whether you need another dose. Get a flu shot each year. If you must be around people with colds or the flu, wash your hands often. When should you call for help? Call 911 if you have symptoms of sudden heart failure such as: 
· You have severe trouble breathing. · You cough up pink, foamy mucus. · You have a new irregular or rapid heartbeat. Call your doctor now or seek immediate medical care if: 
· You have new or increased shortness of breath. · You are dizzy or lightheaded, or you feel like you may faint. · You have sudden weight gain, such as more than 2 to 3 pounds in a day or 5 pounds in a week. (Your doctor may suggest a different range of weight gain.) · You have increased swelling in your legs, ankles, or feet. · You are suddenly so tired or weak that you cannot do your usual activities. Watch closely for changes in your health, and be sure to contact your doctor if: 
· You develop new symptoms. Where can you learn more? Go to http://angelina-rajiv.info/. Enter G246 in the search box to learn more about \"Heart Failure: Care Instructions. \" Current as of: April 3, 2017 Content Version: 11.3 © 5324-9614 [employer]. Care instructions adapted under license by [employer] (which disclaims liability or warranty for this information). If you have questions about a medical condition or this instruction, always ask your healthcare professional. [place] [address] any warranty or liability for your use of this information. Discharge Orders Procedure Order Date Status Priority Quantity Spec Type Associated Dx METABOLIC PANEL, BASIC 77/40/55 0744 Future Routine 1 Blood MyChart Announcement We are excited to announce that we are making your provider's discharge notes available to you in Beepi. You will see these notes when they are completed and signed by the physician that discharged you from your recent hospital stay. If you have any questions or concerns about any information you see in Beepi, please call the Health Information Department where you were seen or reach out to your Primary Care Provider for more information about your plan of care. Introducing [facility]! [facility] introduces Beepi patient portal. Now you can access parts of your medical record, email your doctor's office, and request medication refills online. 1. In your internet browser, go to [email or web address] 2. Click on the First Time User? Click Here link in the Sign In box. You will see the New Member Sign Up page. 3. Enter your Beepi Access Code exactly as it appears below. You will not need to use this code after youve completed the sign-up process. If you do not sign up before the expiration date, you must request a new code. · Beepi Access Code: [email or web address] Expires: 11/21/2017  6:38 AM 
 
4. Enter the last four digits of your Social Security Number (xxxx) and Date of Birth (mm/dd/yyyy) as indicated and click Submit. You will be taken to the next sign-up page. 5. Create a Beepi ID. This will be your Beepi login ID and cannot be changed, so think of one that is secure and easy to remember. 6. Create a Beepi password. You can change your password at any time. 7. Enter your Password Reset Question and Answer. This can be used at a later time if you forget your password. 8. Enter your e-mail address. You will receive e-mail notification when new information is available in [address] 9. Click Sign Up. You can now view and download portions of your medical record.  
10. Click the Download Summary menu link to download a portable copy of your medical information. If you have questions, please visit the Frequently Asked Questions section of the [employer] website. Remember, MyChart is NOT to be used for urgent needs. For medical emergencies, dial 911. Now available from your iPhone and Android! General Information Please provide this summary of care documentation to your next provider. Patient Signature:  ____________________________________________________________ Date:  ____________________________________________________________  
  
Vicente Snipe Provider Signature:  ____________________________________________________________ Date:  ____________________________________________________________

## 2017-09-11 NOTE — IP AVS SNAPSHOT
[facility] 
 
 
 [address] [facility] [address] 
[phone] Patient: Roberto Mejia MRN: [id number] Current Discharge Medication List  
  
CONTINUE these medications which have CHANGED Dose & Instructions Dispensing Information Comments Morning Noon Evening Bedtime  
 furosemide 80 mg tablet Commonly known as:  LASIX What changed:   
- medication strength 
- how much to take - when to take this Your next dose is:  9/15 Dose:  80 mg Take 1 Tab by mouth two (2) times a day. Quantity:  60 Tab Refills:  6  
     
   
   
  
   
  
 pantoprazole 40 mg tablet Commonly known as:  PROTONIX What changed:   
- when to take this 
- reasons to take this Your next dose is:  9/15 Dose:  40 mg Take 1 Tab by mouth Before breakfast and dinner. Quantity:  180 Tab Refills:  3  
     
   
   
  
   
  
 sertraline 50 mg tablet Commonly known as:  ZOLOFT What changed:   
- when to take this 
- reasons to take this 
- additional instructions Your next dose is:  9/15 Take one tablet each evening for anxiety  Indications: GENERALIZED ANXIETY DISORDER Quantity:  30 Tab Refills:  3 CONTINUE these medications which have NOT CHANGED Dose & Instructions Dispensing Information Comments Morning Noon Evening Bedtime  
 albuterol 2.5 mg /3 mL (0.083 %) nebulizer solution Commonly known as:  PROVENTIL VENTOLIN  
   
 1 Vial Via [employer] Qid   Order fax to [facility]  Indications: COPD Quantity:  360 Vial  
Refills:  3  
 - DX copd  
 
-  
 
- Pt to use Albuterol 00.83% 1 Vial Via [employer]. Along with Pulmicort 0.5 mg 1 Vial Via [employer]. bid  
    
   
   
   
  
 allopurinol 300 mg tablet Commonly known as:  Maritza Salts Your next dose is:  9/16 Take  by mouth daily. Refills:  0  
     
   
   
   
  
 aspirin 81 mg chewable tablet Your next dose is:  9/16 Dose:  81 mg Take 1 Tab by mouth daily. Quantity:  90 Tab Refills:  3  
     
   
   
   
  
 budesonide-formoterol 160-4.5 mcg/actuation HFA inhaler Commonly known as:  SYMBICORT Dose:  2 Puff Take 2 Puffs by inhalation two (2) times a day. Refills:  0  
     
   
   
   
  
 COMBIVENT RESPIMAT  mcg/actuation inhaler Generic drug:  ipratropium-albuterol Dose:  1 Puff Take 1 Puff by inhalation every six (6) hours. Refills:  0  
     
   
   
   
  
 cpap machine kit  
   
 by Does Not Apply route. Bilevel 12/8 Refills:  0  
     
   
   
   
  
 ferrous sulfate 324 mg (65 mg iron) tablet Your next dose is:  9/16 Take  by mouth Daily (before breakfast). Refills:  0  
     
   
   
   
  
 fluticasone 50 mcg/actuation nasal spray Commonly known as:  Star Serve Dose:  2 Spray 2 Sprays by Both Nostrils route daily. Quantity:  1 Bottle Refills:  3  
     
   
   
   
  
 gabapentin 100 mg capsule Commonly known as:  NEURONTIN Your next dose is:  9/15 Dose:  100 mg Take 1 Cap by mouth three (3) times daily. Quantity:  90 Cap Refills:  3  
     
   
   
   
  
 glipiZIDE 5 mg tablet Commonly known as:  Sheila Mert Your next dose is:  9/15 Dose:  5 mg Take 5 mg by mouth two (2) times a day. Refills:  0  
     
   
   
   
  
 hydrALAZINE 10 mg tablet Commonly known as:  APRESOLINE Your next dose is:  9/15 Dose:  10 mg Take 10 mg by mouth two (2) times a day. Refills:  0 HYDROcodone-acetaminophen  mg tablet Commonly known as:  Jenniferen Laura Start taking on:  10/15/2017 Frutoso Golder Quantity:  90 Tab Refills:  0 PHARMACY FILL ON TIME. NO EARLY REFILLS. isosorbide mononitrate ER 30 mg tablet Commonly known as:  IMDUR Your next dose is:  9/16 Dose:  30 mg Take 1 Tab by mouth daily. Quantity:  30 Tab Refills:  5 K-DUR 20 mEq tablet Generic drug:  potassium chloride Your next dose is:  9/16 Dose:  20 mEq Take 20 mEq by mouth daily. Refills:  0  
     
   
   
   
  
 levothyroxine 50 mcg tablet Commonly known as:  SYNTHROID Your next dose is:  9/16 Dose:  50 mcg Take 1 Tab by mouth Daily (before breakfast). Quantity:  90 Tab Refills:  3  
     
   
   
   
  
 magnesium oxide 400 mg tablet Commonly known as:  MAG-OX Your next dose is:  9/16 Dose:  400 mg Take 1 Tab by mouth daily. Quantity:  90 Tab Refills:  3  
     
   
   
   
  
 metoprolol succinate 25 mg XL tablet Commonly known as:  TOPROL-XL Your next dose is:  9/16 Pt takes 1/2 tab po daily. Quantity:  45 Tab Refills:  3  
     
   
   
   
  
 mupirocin calcium 2 % topical cream  
Commonly known as:  [employer] Apply  to affected area two (2) times a day. Quantity:  15 g Refills:  0  
     
   
   
   
  
 oxyCODONE IR 15 mg immediate release tablet Commonly known as:  OXY-IR Start taking on:  10/15/2017 Dose:  15 mg Take 1 Tab by mouth every eight (8) hours as needed for Pain. Max Daily Amount: 45 mg.  
 Quantity:  90 Tab Refills:  0 PHARMACY FILL ON TIME. NO EARLY REFILLS. SITagliptin 50 mg tablet Commonly known as:  Tyler Juarez Your next dose is:  9/16 Dose:  50 mg Take 1 Tab by mouth daily. Quantity:  90 Tab Refills:  3 STOOL SOFTENER 100 mg capsule Generic drug:  docusate sodium Dose:  100 mg Take 100 mg by mouth as needed. Refills:  0  
     
   
   
   
  
 TRAVATAN Z 0.004 % ophthalmic solution Generic drug:  travoprost  
   
 Dose:  1 Drop Administer 1 Drop to both eyes two (2) times a day. Refills:  0  
     
   
   
   
  
 triamcinolone 0.5 % topical cream  
Commonly known as:  ARISTOCORT Apply  to affected area two (2) times a day. use thin layer Quantity:  15 g Refills:  0 Where to Get Your Medications Information on where to get these meds will be given to you by the nurse or doctor. ! Ask your nurse or doctor about these medications  
  furosemide 80 mg tablet

## 2017-09-11 NOTE — ED NOTES
Pt ambulated to and from restroom to have a bowel movement. Back to bed at this time. Sitting up in bed. Family at bedside.

## 2017-09-11 NOTE — H&P
[address] Cardiology History & Physical      Date of  Admission: 9/11/2017 12:57 PM     Primary Care Physician:  Dr. Tano Craig  Primary Cardiologist:  Dr. Emy Barrera  Admitting Physician:  Dr. Maribell Hinton    CC:  LE edema, shortness of breath    HPI:  Alfredo Ovalle is a 80 y.o. male with PMH of CAD with CABG in 07, chronic diastolic HF with EF of 05-89 on echo 8-21-17, DM, thrombocytopenia, anemia, multiple GI bleeds, ROBERTO (cpap at home), CKD, HLP, HTN, persistent a fib, COPD, PAD with PPi to bilateral iliac, and TERRIE, who presented to the ED with complaints of increased edema, 10 lb weight gain and shortness of breath. In the ED he is noted to have increased LE edema as well as abdominal distention. Labs showed , creatinine of 2.34, and trop of 0.02. CXR suggestive of mild pulmonary edema. EKG showed rate controlled a fib. His oxygen sats are 93% on RA. He denies chest pain, palpitations, dizziness, syncope,fever, or chills. Denies missing medications. His creatinine has slowly been worsening since May when it was 1.6 and is now staying in 2.4 range. Past Medical History:   Diagnosis Date    Atopic dermatitis 11/21/2012    Atrial fibrillation (HCC)     CAD (coronary artery disease)     Carotid stenosis, bilateral 11/21/2012    50-79%  3-2010    1. Carotid Doppler (6/1/07): Greater than 70% stenosis in proximal LICA. 50% stenosis in right ICA. 2.  CTA of neck (3/15/10): Occluded distal segments of the vertebral arteries bilaterally. Atherosclerosis of the carotid bulbs bilaterally with a 50% stenosis on the left and a stenosis of less than 30% on the right. Small nodule in the right upper lobe near the apex. 3. CTA (8/29/13):  Less than 30% diameter stenosis of the cervical internal carotid arteries bilaterally.     CHF (congestive heart failure) ([facility] [address] 11/21/2012    Chronic kidney disease     hx elevated labs    Chronic obstructive pulmonary disease (HCC)     Diabetes (HCC)     Diastolic CHF, acute on chronic ([facility] [address] 9/12/2015    1. Echo (9/11/15) : EF 55-60%. Mild LVH. Moderate biatrial enlargement. Moderate mitral/tricuspid regurgitation.  Failed CABG (coronary artery bypass graft) 11/21/2012    GI bleed 1/2015    Hospitalized [facility]    Gout 11/21/2012    History of tobacco use     [place] (hard of hearing)     Hypercholesterolemia     Hypertension     Hyperuricemia 11/21/2012    Morbid obesity ([facility] [address]     PAD (peripheral artery disease) ([facility] [address] 11/21/2012    1. Bilateral proximal common iliac PCI (2/21/08):  8.0 X 100 mm Cordis smart stent on right and 10 X 40 mm cordis smart stent on right. Both inflated to 7.0 mm.  Poor historian     Radiologic findings of lung field, abnormal 10/31/2016    1. CT of chest  (11/24/10): Multiple small nodules in the right lobe and stable interstitial prominence. Consistent with chronic lung disease. No evidence of malignancy.  Seizure disorder ([facility] [address] 8/5/2013    Shortness of breath dyspnea 8/5/2013    Thyroid disease     Unspecified sleep apnea       Past Surgical History:   Procedure Laterality Date    CARDIAC SURG PROCEDURE UNLIST      cath 5/07,cabg 6/07,lexiscan cardiolite 11/10    COLONOSCOPY N/A 1/27/2017    COLONOSCOPY  BMI 36 TO BE ADMITTED 1/26/17 performed by Deb Mesa MD at [address] Left 2003    os    HX COLONOSCOPY      HX CORONARY ARTERY BYPASS GRAFT  06-    HX CORONARY STENT PLACEMENT  02-    bilateral iliac artery PCI and stents    HX HEART CATHETERIZATION      left-05-, 01-    HX HEENT  1970s    neck lipoma       No Known Allergies   Social History     Social History    Marital status:      Spouse name: N/A    Number of children: N/A    Years of education: N/A     Occupational History    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 , two living children, two children  with coronary artery disease, ages 36 and 48. Has always lived in [address]. No pets. Family History   Problem Relation Age of Onset    Heart Attack Mother    Zoë Hensley Other Father      old age   Zoë Hensley Other Brother      brain aneurysm    Heart Disease Other      2 children  with heart concerns, 36 & 49 yo    Heart Disease Son         No current facility-administered medications for this encounter. Current Outpatient Prescriptions   Medication Sig    triamcinolone (ARISTOCORT) 0.5 % topical cream Apply  to affected area two (2) times a day. use thin layer    mupirocin calcium (BACTROBAN) 2 % topical cream Apply  to affected area two (2) times a day.  furosemide (LASIX) 40 mg tablet Take 1 Tab by mouth daily.  metoprolol succinate (TOPROL-XL) 25 mg XL tablet Pt takes 1/2 tab po daily.  magnesium oxide (MAG-OX) 400 mg tablet Take 1 Tab by mouth daily.  pantoprazole (PROTONIX) 40 mg tablet Take 1 Tab by mouth Before breakfast and dinner. (Patient taking differently: Take 40 mg by mouth as needed.)    [START ON 10/15/2017] HYDROcodone-acetaminophen (NORCO)  mg tablet .  [START ON 10/15/2017] oxyCODONE IR (OXY-IR) 15 mg immediate release tablet Take 1 Tab by mouth every eight (8) hours as needed for Pain. Max Daily Amount: 45 mg.    aspirin 81 mg chewable tablet Take 1 Tab by mouth daily.  levothyroxine (SYNTHROID) 50 mcg tablet Take 1 Tab by mouth Daily (before breakfast).     ferrous sulfate 324 mg (65 mg iron) tablet Take  by mouth Daily (before breakfast).  budesonide-formoterol (SYMBICORT) 160-4.5 mcg/actuation HFA inhaler Take 2 Puffs by inhalation two (2) times a day.  gabapentin (NEURONTIN) 100 mg capsule Take 1 Cap by mouth three (3) times daily.  SITagliptin (JANUVIA) 50 mg tablet Take 1 Tab by mouth daily.  fluticasone (FLONASE) 50 mcg/actuation nasal spray 2 Sprays by Both Nostrils route daily.  sertraline (ZOLOFT) 50 mg tablet Take one tablet each evening for anxiety  Indications: GENERALIZED ANXIETY DISORDER (Patient taking differently: as needed. Take one tablet each evening for anxiety  Indications: GENERALIZED ANXIETY DISORDER)    ipratropium-albuterol (COMBIVENT RESPIMAT)  mcg/actuation inhaler Take 1 Puff by inhalation every six (6) hours.  allopurinol (ZYLOPRIM) 300 mg tablet Take  by mouth daily.  docusate sodium (STOOL SOFTENER) 100 mg capsule Take 100 mg by mouth as needed.  hydrALAZINE (APRESOLINE) 10 mg tablet Take 10 mg by mouth two (2) times a day.  cpap machine kit by Does Not Apply route. Bilevel 12/8    isosorbide mononitrate ER (IMDUR) 30 mg tablet Take 1 Tab by mouth daily.  albuterol (PROVENTIL VENTOLIN) 2.5 mg /3 mL (0.083 %) nebulizer solution 1 Vial [employer] Qid      Order fax to [facility]  Indications: COPD    travoprost (TRAVATAN Z) 0.004 % ophthalmic solution Administer 1 Drop to both eyes two (2) times a day.  glipiZIDE (GLUCOTROL) 5 mg tablet Take 5 mg by mouth two (2) times a day.  K-DUR 20 mEq tablet Take 20 mEq by mouth daily. Review of Systems    Review of Systems   Constitution: Positive for weight gain. HENT: Negative. Eyes: Negative. Cardiovascular: Positive for leg swelling. Respiratory: Positive for shortness of breath. Endocrine: Negative. Hematologic/Lymphatic: Negative. Skin: Negative. Musculoskeletal: Negative. Gastrointestinal: Positive for bloating. Genitourinary: Negative. Neurological: Negative. Psychiatric/Behavioral: Negative. Allergic/Immunologic: Negative. Subjective:     Visit Vitals    /58    Pulse (!) 59    Temp 98.3 °F (36.8 °C)    Resp 28    Ht 5' 10\" (1.778 m)    Wt 263 lb (119.3 kg)    SpO2 94%    BMI 37.74 kg/m2     Physical Exam   Constitutional: He is oriented to person, place, and time. He has a sickly appearance. HENT:   Head: Normocephalic. Eyes: Pupils are equal, round, and reactive to light. Neck: Normal range of motion. Cardiovascular: Normal rate. An irregularly irregular rhythm present. Pulmonary/Chest: Tachypnea noted. He has decreased breath sounds. Abdominal: Bowel sounds are normal. He exhibits distension. Musculoskeletal: He exhibits edema. Neurological: He is alert and oriented to person, place, and time. Skin: Skin is warm and dry. Psychiatric: Mood, memory, affect and judgment normal.       Cardiographics  Telemetry: AFIB  ECG: atrial fibrillation, rate 51  Echocardiogram: 8/21/17    Left ventricle: Systolic function was normal. Ejection fraction was  estimated in the range of 55 % to 60 %. There were no regional wall motion  abnormalities. There was mild concentric hypertrophy. -  Left atrium: The atrium was moderately dilated. -  Inferior vena cava, hepatic veins: The inferior vena cava was mildly  dilated. The respirophasic change in diameter was less than 50%. -  Aortic valve: There was mild regurgitation. -  Mitral valve: There was moderate regurgitation. The mitral valve area by  pressure half time was 2.47 cm2. The findings were most consistent with mild  mitral stenosis. -  Tricuspid valve: There was moderate regurgitation.     Labs:   Recent Results (from the past 24 hour(s))   CBC WITH AUTOMATED DIFF    Collection Time: 09/11/17  1:00 PM   Result Value Ref Range    WBC 7.8 4.3 - 11.1 K/uL    RBC 5.50 4.23 - 5.67 M/uL    HGB 14.4 13.6 - 17.2 g/dL    HCT 43.0 41.1 - 50.3 %    MCV 78.2 (L) 79.6 - 97.8 FL MCH 26.2 26.1 - 32.9 PG    MCHC 33.5 31.4 - 35.0 g/dL    RDW 17.1 (H) 11.9 - 14.6 %    PLATELET 051 441 - 871 K/uL    MPV 11.3 10.8 - 14.1 FL    DF AUTOMATED      NEUTROPHILS 80 (H) 43 - 78 %    LYMPHOCYTES 14 13 - 44 %    MONOCYTES 5 4.0 - 12.0 %    EOSINOPHILS 1 0.5 - 7.8 %    BASOPHILS 0 0.0 - 2.0 %    IMMATURE GRANULOCYTES 0.1 0.0 - 5.0 %    ABS. NEUTROPHILS 6.2 1.7 - 8.2 K/UL    ABS. LYMPHOCYTES 1.1 0.5 - 4.6 K/UL    ABS. MONOCYTES 0.4 0.1 - 1.3 K/UL    ABS. EOSINOPHILS 0.1 0.0 - 0.8 K/UL    ABS. BASOPHILS 0.0 0.0 - 0.2 K/UL    ABS. IMM. GRANS. 0.0 0.0 - 0.5 K/UL   METABOLIC PANEL, COMPREHENSIVE    Collection Time: 09/11/17  1:00 PM   Result Value Ref Range    Sodium 138 136 - 145 mmol/L    Potassium 4.3 3.5 - 5.1 mmol/L    Chloride 97 (L) 98 - 107 mmol/L    CO2 30 21 - 32 mmol/L    Anion gap 11 7 - 16 mmol/L    Glucose 225 (H) 65 - 100 mg/dL    BUN 46 (H) 8 - 23 MG/DL    Creatinine 2.34 (H) 0.8 - 1.5 MG/DL    GFR est AA 34 (L) >60 ml/min/1.73m2    GFR est non-AA 28 (L) >60 ml/min/1.73m2    Calcium 8.8 8.3 - 10.4 MG/DL    Bilirubin, total 0.9 0.2 - 1.1 MG/DL    ALT (SGPT) 17 12 - 65 U/L    AST (SGOT) 19 15 - 37 U/L    Alk. phosphatase 90 50 - 136 U/L    Protein, total 7.8 6.3 - 8.2 g/dL    Albumin 3.3 3.2 - 4.6 g/dL    Globulin 4.5 (H) 2.3 - 3.5 g/dL    A-G Ratio 0.7 (L) 1.2 - 3.5     TROPONIN I    Collection Time: 09/11/17  1:00 PM   Result Value Ref Range    Troponin-I, Qt. <0.02 (L) 0.02 - 0.05 NG/ML   BNP    Collection Time: 09/11/17  1:00 PM   Result Value Ref Range     pg/mL       Patient has been seen and examined by Dr. Luna Enriquez and he agrees with the following assessment and plan:     Assessment/Plan:       Principal Problem:    Acute on chronic diastolic heart failure -- admit patient to telemetry for IV lasix 80 mg Q12 hours. Strict I/Os and daily weights. No need to repeat echo as one was just completed 8-21-17.      Active Problems:    Essential hypertension-- stable, continue home Toprol XL, hydralazine and imdur      Persistent atrial fibrillation-- rate controlled, continue BB. AC discontinued due to recurrent GI bleeding. COPD (chronic obstructive pulmonary disease) -- continue home inhalers. PAD (peripheral artery disease)-- stable, continue ASA      Overview: 1. Bilateral proximal common iliac PCI (2/21/08):  8.0 X 100 mm Cordis       smart stent on right and 10 X 40 mm cordis smart stent on right. Both       inflated to 7.0 mm. Gout -- home gout meds ordered      Hyperlipidemia -- check lipids in AM      CKD (chronic kidney disease) stage 3, GFR 30-59 ml/min-- monitor renal function closely with IV diuresis, may need nephrology consult      Morbid obesity -- weight loss needed      Anemia-- hgb stable, will monitor      Type 2 diabetes mellitus with peripheral neuropathy-- home medications ordered will add humolog SSI while in hospital      Dyspnea-- should resolve with fluid out put      Hypoxia -- oxygen as needed       Thrombocytopenia-- monitor platelets.       Haleigh Soto NP  9/11/2017 3:02 PM

## 2017-09-12 LAB
ANION GAP SERPL CALC-SCNC: 15 MMOL/L (ref 7–16)
BUN SERPL-MCNC: 47 MG/DL (ref 8–23)
CALCIUM SERPL-MCNC: 8.8 MG/DL (ref 8.3–10.4)
CHLORIDE SERPL-SCNC: 94 MMOL/L (ref 98–107)
CO2 SERPL-SCNC: 28 MMOL/L (ref 21–32)
CREAT SERPL-MCNC: 2.39 MG/DL (ref 0.8–1.5)
ERYTHROCYTE [DISTWIDTH] IN BLOOD BY AUTOMATED COUNT: 16.9 % (ref 11.9–14.6)
GLUCOSE BLD STRIP.AUTO-MCNC: 139 MG/DL (ref 65–100)
GLUCOSE BLD STRIP.AUTO-MCNC: 147 MG/DL (ref 65–100)
GLUCOSE BLD STRIP.AUTO-MCNC: 169 MG/DL (ref 65–100)
GLUCOSE BLD STRIP.AUTO-MCNC: 235 MG/DL (ref 65–100)
GLUCOSE SERPL-MCNC: 149 MG/DL (ref 65–100)
HCT VFR BLD AUTO: 42.2 % (ref 41.1–50.3)
HGB BLD-MCNC: 14 G/DL (ref 13.6–17.2)
MAGNESIUM SERPL-MCNC: 2.4 MG/DL (ref 1.8–2.4)
MCH RBC QN AUTO: 25.9 PG (ref 26.1–32.9)
MCHC RBC AUTO-ENTMCNC: 33.2 G/DL (ref 31.4–35)
MCV RBC AUTO: 78 FL (ref 79.6–97.8)
PLATELET # BLD AUTO: 165 K/UL (ref 150–450)
PMV BLD AUTO: 11.8 FL (ref 10.8–14.1)
POTASSIUM SERPL-SCNC: 3.6 MMOL/L (ref 3.5–5.1)
RBC # BLD AUTO: 5.41 M/UL (ref 4.23–5.67)
SODIUM SERPL-SCNC: 137 MMOL/L (ref 136–145)
WBC # BLD AUTO: 6.8 K/UL (ref 4.3–11.1)

## 2017-09-12 PROCEDURE — 74011636637 HC RX REV CODE- 636/637: Performed by: NURSE PRACTITIONER

## 2017-09-12 PROCEDURE — 74011250636 HC RX REV CODE- 250/636: Performed by: NURSE PRACTITIONER

## 2017-09-12 PROCEDURE — 74011250637 HC RX REV CODE- 250/637: Performed by: INTERNAL MEDICINE

## 2017-09-12 PROCEDURE — 74011000250 HC RX REV CODE- 250: Performed by: EMERGENCY MEDICINE

## 2017-09-12 PROCEDURE — 74011000250 HC RX REV CODE- 250: Performed by: NURSE PRACTITIONER

## 2017-09-12 PROCEDURE — 36415 COLL VENOUS BLD VENIPUNCTURE: CPT | Performed by: NURSE PRACTITIONER

## 2017-09-12 PROCEDURE — 94640 AIRWAY INHALATION TREATMENT: CPT

## 2017-09-12 PROCEDURE — 77030011256 HC DRSG MEPILEX <16IN NO BORD MOLN -A

## 2017-09-12 PROCEDURE — 74011250637 HC RX REV CODE- 250/637: Performed by: NURSE PRACTITIONER

## 2017-09-12 PROCEDURE — 83735 ASSAY OF MAGNESIUM: CPT | Performed by: NURSE PRACTITIONER

## 2017-09-12 PROCEDURE — 99218 HC RM OBSERVATION: CPT

## 2017-09-12 PROCEDURE — 94760 N-INVAS EAR/PLS OXIMETRY 1: CPT

## 2017-09-12 PROCEDURE — 80048 BASIC METABOLIC PNL TOTAL CA: CPT | Performed by: NURSE PRACTITIONER

## 2017-09-12 PROCEDURE — 85027 COMPLETE CBC AUTOMATED: CPT | Performed by: NURSE PRACTITIONER

## 2017-09-12 PROCEDURE — 77010033678 HC OXYGEN DAILY

## 2017-09-12 PROCEDURE — 82962 GLUCOSE BLOOD TEST: CPT

## 2017-09-12 RX ORDER — POTASSIUM CHLORIDE 20 MEQ/1
40 TABLET, EXTENDED RELEASE ORAL
Status: COMPLETED | OUTPATIENT
Start: 2017-09-12 | End: 2017-09-12

## 2017-09-12 RX ORDER — DIPHENHYDRAMINE HCL 25 MG
50 CAPSULE ORAL
Status: DISCONTINUED | OUTPATIENT
Start: 2017-09-12 | End: 2017-09-15 | Stop reason: HOSPADM

## 2017-09-12 RX ADMIN — ALBUTEROL SULFATE 2.5 MG: 2.5 SOLUTION RESPIRATORY (INHALATION) at 14:21

## 2017-09-12 RX ADMIN — DIPHENHYDRAMINE HYDROCHLORIDE 50 MG: 25 CAPSULE ORAL at 00:35

## 2017-09-12 RX ADMIN — DIPHENHYDRAMINE HYDROCHLORIDE 50 MG: 25 CAPSULE ORAL at 22:52

## 2017-09-12 RX ADMIN — PANTOPRAZOLE SODIUM 40 MG: 40 TABLET, DELAYED RELEASE ORAL at 15:44

## 2017-09-12 RX ADMIN — LEVOTHYROXINE SODIUM 50 MCG: 50 TABLET ORAL at 05:36

## 2017-09-12 RX ADMIN — GABAPENTIN 100 MG: 100 CAPSULE ORAL at 08:16

## 2017-09-12 RX ADMIN — GABAPENTIN 100 MG: 100 CAPSULE ORAL at 15:44

## 2017-09-12 RX ADMIN — SITAGLIPTIN 50 MG: 50 TABLET, FILM COATED ORAL at 08:16

## 2017-09-12 RX ADMIN — METOPROLOL SUCCINATE 12.5 MG: 25 TABLET, EXTENDED RELEASE ORAL at 08:15

## 2017-09-12 RX ADMIN — OXYCODONE HYDROCHLORIDE 15 MG: 15 TABLET ORAL at 18:11

## 2017-09-12 RX ADMIN — POTASSIUM CHLORIDE 40 MEQ: 20 TABLET, EXTENDED RELEASE ORAL at 15:44

## 2017-09-12 RX ADMIN — OXYCODONE HYDROCHLORIDE 15 MG: 15 TABLET ORAL at 08:26

## 2017-09-12 RX ADMIN — LATANOPROST 1 DROP: 50 SOLUTION OPHTHALMIC at 22:52

## 2017-09-12 RX ADMIN — PANTOPRAZOLE SODIUM 40 MG: 40 TABLET, DELAYED RELEASE ORAL at 05:36

## 2017-09-12 RX ADMIN — ALBUTEROL SULFATE 2.5 MG: 2.5 SOLUTION RESPIRATORY (INHALATION) at 01:50

## 2017-09-12 RX ADMIN — ALBUTEROL SULFATE 2.5 MG: 2.5 SOLUTION RESPIRATORY (INHALATION) at 08:48

## 2017-09-12 RX ADMIN — Medication 400 MG: at 08:16

## 2017-09-12 RX ADMIN — INSULIN LISPRO 2 UNITS: 100 INJECTION, SOLUTION INTRAVENOUS; SUBCUTANEOUS at 11:27

## 2017-09-12 RX ADMIN — HYDRALAZINE HYDROCHLORIDE 10 MG: 10 TABLET, FILM COATED ORAL at 17:00

## 2017-09-12 RX ADMIN — FUROSEMIDE 80 MG: 10 INJECTION, SOLUTION INTRAMUSCULAR; INTRAVENOUS at 08:15

## 2017-09-12 RX ADMIN — ALBUTEROL SULFATE 2.5 MG: 2.5 SOLUTION RESPIRATORY (INHALATION) at 21:01

## 2017-09-12 RX ADMIN — SERTRALINE HYDROCHLORIDE 50 MG: 50 TABLET ORAL at 08:16

## 2017-09-12 RX ADMIN — BUDESONIDE 500 MCG: 0.5 INHALANT RESPIRATORY (INHALATION) at 21:01

## 2017-09-12 RX ADMIN — INSULIN LISPRO 4 UNITS: 100 INJECTION, SOLUTION INTRAVENOUS; SUBCUTANEOUS at 17:00

## 2017-09-12 RX ADMIN — ASPIRIN 81 MG 81 MG: 81 TABLET ORAL at 08:16

## 2017-09-12 RX ADMIN — FUROSEMIDE 80 MG: 10 INJECTION, SOLUTION INTRAMUSCULAR; INTRAVENOUS at 20:30

## 2017-09-12 RX ADMIN — GLIPIZIDE 5 MG: 5 TABLET ORAL at 17:00

## 2017-09-12 RX ADMIN — ISOSORBIDE MONONITRATE 30 MG: 30 TABLET, EXTENDED RELEASE ORAL at 08:15

## 2017-09-12 RX ADMIN — HYDROCODONE BITARTRATE AND ACETAMINOPHEN 1 TABLET: 10; 325 TABLET ORAL at 08:26

## 2017-09-12 RX ADMIN — GABAPENTIN 100 MG: 100 CAPSULE ORAL at 22:52

## 2017-09-12 RX ADMIN — BUDESONIDE 500 MCG: 0.5 INHALANT RESPIRATORY (INHALATION) at 08:48

## 2017-09-12 RX ADMIN — GLIPIZIDE 5 MG: 5 TABLET ORAL at 08:16

## 2017-09-12 RX ADMIN — HYDROCODONE BITARTRATE AND ACETAMINOPHEN 1 TABLET: 10; 325 TABLET ORAL at 15:44

## 2017-09-12 RX ADMIN — MENTHOL, METHYL SALICYLATE: 10; 15 CREAM TOPICAL at 00:34

## 2017-09-12 RX ADMIN — HYDRALAZINE HYDROCHLORIDE 10 MG: 10 TABLET, FILM COATED ORAL at 08:16

## 2017-09-12 RX ADMIN — ALLOPURINOL 300 MG: 300 TABLET ORAL at 08:16

## 2017-09-12 NOTE — ED NOTES
TRANSFER - OUT REPORT:    Verbal report given to Fern MILLER on [facility]  being transferred to 3rd floor       Report consisted of patients Situation, Background, Assessment and   Recommendations(SBAR). Information from the following report(s) SBAR, ED Summary and MAR was reviewed with the receiving nurse. Lines:   Peripheral IV 09/11/17 Right Antecubital (Active)   Site Assessment Clean, dry, & intact 9/11/2017  8:23 PM   Phlebitis Assessment 0 9/11/2017  8:23 PM   Infiltration Assessment 0 9/11/2017  8:23 PM   Dressing Status Clean, dry, & intact 9/11/2017  8:23 PM        Opportunity for questions and clarification was provided.       Patient transported with:   Registered Nurse

## 2017-09-12 NOTE — PHYSICIAN ADVISORY
Letter of Determination:  Outpatient states receiving Observation Services    This case was reviewed, and I concur with Outpatient status receiving observation services. This determination may change depending on further medical information, condition changes of the patient, or treatment requirements.       Lexus Guillaume MD, MARY ELLEN,   Physician Abiodun Malik.

## 2017-09-12 NOTE — PROGRESS NOTES
Bedside and Verbal shift change report given to self (oncoming nurse) by Jae Pena RN (offgoing nurse). Report included the following information SBAR, Kardex, ED Summary, Procedure Summary, MAR and Recent Results.

## 2017-09-12 NOTE — PROGRESS NOTES
Bedside and Verbal shift change report given to Keenan Lee RN (oncoming nurse) by Maria Alejandra Martinez RN (offgoing nurse). Report included the following information SBAR, Kardex, MAR and Recent Results.

## 2017-09-12 NOTE — PROGRESS NOTES
Care Management Interventions  PCP Verified by CM: Yes (saw 4 weeks ago)  Mode of Transport at Discharge: Other (see comment) (wife)  Transition of Care Consult (CM Consult): Discharge Planning  Current Support Network: Lives with Spouse  Confirm Follow Up Transport: Family  Plan discussed with Pt/Family/Caregiver: Yes    Met with patient for discharge planning. Patient is 81 yo male admitted for CHF. Patient lives with his wife and is independent with ADL's and if needs assistance his wife will assist. He does not drive regularly but his wife still drives and is able to take him to appointments. He has a cane and CPAP that he uses at home. Does not require Oxygen at home. Per wife they are able to obtain his prescription medications. CM will follow for possible home health care for CHF. Patient is in observation status. Discussed with patient and wife observation status. Medicare Outpatient Observation Notice provided to the patient. Oral explanation was provided and all questions answered. Signed document placed in the medical record under media tab. Copy to patient.

## 2017-09-12 NOTE — PROGRESS NOTES
Cardiac Rehab: Referral received for diagnosis of CHF. Cardiac rehab participation is appropriate for those with stable, chronic heart failure defined as patients with left ventricular ejection fraction of 35% or less and New York Heart Association (NYHA) class II to IV symptoms despite being on optimal heart failure therapy for at least six weeks. Stable patients are defined as pateints who have not had recent (6 weeks or less) or planned (6 months or less) major cardiovascular hospitalizations or procedures. Patient's EF55-60%, this excludes him from participating in CR with a diagnosis of CHF. Patient will be contacted after discharge if qualifying referral is obtained. Thank you.

## 2017-09-12 NOTE — PROGRESS NOTES
Pt complains of chronic pain in BLE. Pt states that at home he will take 15 mg Oxycodone IR and  Norco at the same time. Pt states \"its doesn't knock me out, I have to take  that like this at home. \" Meds given as pt requested. Will monitor.

## 2017-09-12 NOTE — PROGRESS NOTES
Problem: Falls - Risk of  Goal: *Absence of Falls  Document Nehemiah Fall Risk and appropriate interventions in the flowsheet. Outcome: Progressing Towards Goal  Fall Risk Interventions:  Mobility Interventions: Patient to call before getting OOB, Utilize walker, cane, or other assitive device           Medication Interventions: Teach patient to arise slowly, Patient to call before getting OOB             Patient progressing towards goal with no falls on current admission. Patient without confusion, agitation, or sensory perception deficits. Patient has somewhat unsteady gait on ambulation and utilizes a cane on ambulation. Personal belongings are within reach. Bed is in the low and locked position with side rails up x3. Yellow gripper socks to bilateral feet. Call light within reach and patient verbalizes understanding of use.

## 2017-09-12 NOTE — PROGRESS NOTES
9/12/2017 3:10 PM    Admit Date: 9/11/2017    Admit Diagnosis: Acute on chronic diastolic heart failure (HCC)      Subjective:   No cp- sob better but persists      Objective:      Visit Vitals    /79 (BP 1 Location: Left arm, BP Patient Position: Sitting)    Pulse 68    Temp 97.8 °F (36.6 °C)    Resp 16    Ht 5' 10\" (1.778 m)    Wt 117.8 kg (259 lb 9.6 oz)    SpO2 90%    BMI 37.25 kg/m2       Physical Exam:  [address] [facility], Well Nourished, No Acute Distress, Alert & Oriented x 3, appropriate mood. Neck- supple, no JVD  CV- regular rate and rhythm no MRG  Lung- clear bilaterally  Abd- soft, nontender, nondistended  Ext- 2 plus edema bilaterally- improved. Skin- warm and dry        Data Review:   Recent Labs      09/12/17   0531   NA  137   K  3.6   BUN  47*   CREA  2.39*   WBC  6.8   HGB  14.0   HCT  42.2   PLT  165       Assessment/Plan:     Principal Problem:    Acute on chronic diastolic heart failure (HCC) (9/11/2017)Stable. Continue current medical therapy. Continue iv lasix- replete K    Active Problems:    Essential hypertension (11/21/2012)Stable. Continue current medical therapy. Persistent atrial fibrillation ([address] (11/21/2012)      Overview: Coumadin therapy discontinued due to recurrent GI bleeding. COPD (chronic obstructive pulmonary disease) ([address] (11/21/2012)      PAD (peripheral artery disease) ([facility] (11/21/2012)      Overview: 1. Bilateral proximal common iliac PCI (2/21/08):  8.0 X 100 mm Cordis       smart stent on right and 10 X 40 mm cordis smart stent on right. Both       inflated to 7.0 mm.             Gout (11/21/2012)      Hyperlipidemia (8/5/2013)      CKD (chronic kidney disease) stage 3, GFR 30-59 ml/min (9/18/2013)      Morbid obesity ([address] (1/21/2015)      Anemia (9/10/2015)      Type 2 diabetes mellitus with peripheral neuropathy ([address] (11/5/2015)      Dyspnea (1/21/2016)      Hypoxia (8/21/2017)      Thrombocytopenia ([facility] (8/21/2017)

## 2017-09-12 NOTE — PROGRESS NOTES
Dual skin assessment completed on patient's arrival. Patient noted to have sylvester bilateral lower extremities with 3+ pitting edema in the right leg and 4+ pitting edema in the left leg. Left leg also noted to have very scaly, flaky skin and a small fluid filled blister on the outer side of the left leg. Bilateral heels are intact. Sacrum visualized and without impairment. Patient's bridge of nose with some irritation, likely due to CPAP use. Left side of nose with a small scab. Otherwise, skin is warm, dry, flaky, fragile, but intact.

## 2017-09-13 LAB
ANION GAP SERPL CALC-SCNC: 14 MMOL/L (ref 7–16)
BUN SERPL-MCNC: 56 MG/DL (ref 8–23)
CALCIUM SERPL-MCNC: 8.9 MG/DL (ref 8.3–10.4)
CHLORIDE SERPL-SCNC: 92 MMOL/L (ref 98–107)
CO2 SERPL-SCNC: 30 MMOL/L (ref 21–32)
CREAT SERPL-MCNC: 3.04 MG/DL (ref 0.8–1.5)
ERYTHROCYTE [DISTWIDTH] IN BLOOD BY AUTOMATED COUNT: 17.2 % (ref 11.9–14.6)
GLUCOSE BLD STRIP.AUTO-MCNC: 138 MG/DL (ref 65–100)
GLUCOSE BLD STRIP.AUTO-MCNC: 147 MG/DL (ref 65–100)
GLUCOSE BLD STRIP.AUTO-MCNC: 164 MG/DL (ref 65–100)
GLUCOSE BLD STRIP.AUTO-MCNC: 207 MG/DL (ref 65–100)
GLUCOSE SERPL-MCNC: 162 MG/DL (ref 65–100)
HCT VFR BLD AUTO: 44.3 % (ref 41.1–50.3)
HGB BLD-MCNC: 15 G/DL (ref 13.6–17.2)
MAGNESIUM SERPL-MCNC: 2.6 MG/DL (ref 1.8–2.4)
MCH RBC QN AUTO: 26.1 PG (ref 26.1–32.9)
MCHC RBC AUTO-ENTMCNC: 33.9 G/DL (ref 31.4–35)
MCV RBC AUTO: 77.2 FL (ref 79.6–97.8)
PLATELET # BLD AUTO: 160 K/UL (ref 150–450)
PMV BLD AUTO: 11.5 FL (ref 10.8–14.1)
POTASSIUM SERPL-SCNC: 4.2 MMOL/L (ref 3.5–5.1)
RBC # BLD AUTO: 5.74 M/UL (ref 4.23–5.67)
SODIUM SERPL-SCNC: 136 MMOL/L (ref 136–145)
WBC # BLD AUTO: 8.3 K/UL (ref 4.3–11.1)

## 2017-09-13 PROCEDURE — 74011250637 HC RX REV CODE- 250/637: Performed by: INTERNAL MEDICINE

## 2017-09-13 PROCEDURE — 94640 AIRWAY INHALATION TREATMENT: CPT

## 2017-09-13 PROCEDURE — 74011636637 HC RX REV CODE- 636/637: Performed by: NURSE PRACTITIONER

## 2017-09-13 PROCEDURE — 82962 GLUCOSE BLOOD TEST: CPT

## 2017-09-13 PROCEDURE — 94760 N-INVAS EAR/PLS OXIMETRY 1: CPT

## 2017-09-13 PROCEDURE — 83735 ASSAY OF MAGNESIUM: CPT | Performed by: NURSE PRACTITIONER

## 2017-09-13 PROCEDURE — 99218 HC RM OBSERVATION: CPT

## 2017-09-13 PROCEDURE — 36415 COLL VENOUS BLD VENIPUNCTURE: CPT | Performed by: NURSE PRACTITIONER

## 2017-09-13 PROCEDURE — 74011250637 HC RX REV CODE- 250/637: Performed by: NURSE PRACTITIONER

## 2017-09-13 PROCEDURE — 80048 BASIC METABOLIC PNL TOTAL CA: CPT | Performed by: NURSE PRACTITIONER

## 2017-09-13 PROCEDURE — 74011250636 HC RX REV CODE- 250/636: Performed by: NURSE PRACTITIONER

## 2017-09-13 PROCEDURE — 74011000250 HC RX REV CODE- 250: Performed by: EMERGENCY MEDICINE

## 2017-09-13 PROCEDURE — 85027 COMPLETE CBC AUTOMATED: CPT | Performed by: NURSE PRACTITIONER

## 2017-09-13 PROCEDURE — 77030018846 HC SOL IRR STRL H20 ICUM -A

## 2017-09-13 RX ORDER — POLYETHYLENE GLYCOL 3350 17 G/17G
17 POWDER, FOR SOLUTION ORAL 2 TIMES DAILY
Status: DISCONTINUED | OUTPATIENT
Start: 2017-09-13 | End: 2017-09-15 | Stop reason: HOSPADM

## 2017-09-13 RX ORDER — FUROSEMIDE 40 MG/1
40 TABLET ORAL 2 TIMES DAILY
Status: DISCONTINUED | OUTPATIENT
Start: 2017-09-13 | End: 2017-09-14

## 2017-09-13 RX ADMIN — GABAPENTIN 100 MG: 100 CAPSULE ORAL at 17:05

## 2017-09-13 RX ADMIN — Medication 400 MG: at 08:01

## 2017-09-13 RX ADMIN — ALBUTEROL SULFATE 2.5 MG: 2.5 SOLUTION RESPIRATORY (INHALATION) at 20:54

## 2017-09-13 RX ADMIN — INSULIN LISPRO 2 UNITS: 100 INJECTION, SOLUTION INTRAVENOUS; SUBCUTANEOUS at 08:00

## 2017-09-13 RX ADMIN — ASPIRIN 81 MG 81 MG: 81 TABLET ORAL at 08:01

## 2017-09-13 RX ADMIN — SERTRALINE HYDROCHLORIDE 50 MG: 50 TABLET ORAL at 08:01

## 2017-09-13 RX ADMIN — OXYCODONE HYDROCHLORIDE 15 MG: 15 TABLET ORAL at 17:05

## 2017-09-13 RX ADMIN — HYDROCODONE BITARTRATE AND ACETAMINOPHEN 1 TABLET: 10; 325 TABLET ORAL at 01:06

## 2017-09-13 RX ADMIN — BUDESONIDE 500 MCG: 0.5 INHALANT RESPIRATORY (INHALATION) at 08:58

## 2017-09-13 RX ADMIN — ALBUTEROL SULFATE 2.5 MG: 2.5 SOLUTION RESPIRATORY (INHALATION) at 15:59

## 2017-09-13 RX ADMIN — HYDROCODONE BITARTRATE AND ACETAMINOPHEN 1 TABLET: 10; 325 TABLET ORAL at 11:41

## 2017-09-13 RX ADMIN — LATANOPROST 1 DROP: 50 SOLUTION OPHTHALMIC at 22:14

## 2017-09-13 RX ADMIN — GABAPENTIN 100 MG: 100 CAPSULE ORAL at 22:14

## 2017-09-13 RX ADMIN — PANTOPRAZOLE SODIUM 40 MG: 40 TABLET, DELAYED RELEASE ORAL at 17:05

## 2017-09-13 RX ADMIN — LEVOTHYROXINE SODIUM 50 MCG: 50 TABLET ORAL at 06:21

## 2017-09-13 RX ADMIN — METOPROLOL SUCCINATE 12.5 MG: 25 TABLET, EXTENDED RELEASE ORAL at 08:01

## 2017-09-13 RX ADMIN — GABAPENTIN 100 MG: 100 CAPSULE ORAL at 08:01

## 2017-09-13 RX ADMIN — GLIPIZIDE 5 MG: 5 TABLET ORAL at 08:01

## 2017-09-13 RX ADMIN — OXYCODONE HYDROCHLORIDE 15 MG: 15 TABLET ORAL at 08:01

## 2017-09-13 RX ADMIN — ALBUTEROL SULFATE 2.5 MG: 2.5 SOLUTION RESPIRATORY (INHALATION) at 08:58

## 2017-09-13 RX ADMIN — BUDESONIDE 500 MCG: 0.5 INHALANT RESPIRATORY (INHALATION) at 20:54

## 2017-09-13 RX ADMIN — FUROSEMIDE 40 MG: 40 TABLET ORAL at 17:05

## 2017-09-13 RX ADMIN — ALLOPURINOL 300 MG: 300 TABLET ORAL at 08:01

## 2017-09-13 RX ADMIN — SITAGLIPTIN 50 MG: 50 TABLET, FILM COATED ORAL at 08:01

## 2017-09-13 RX ADMIN — HYDROCODONE BITARTRATE AND ACETAMINOPHEN 1 TABLET: 10; 325 TABLET ORAL at 20:41

## 2017-09-13 RX ADMIN — PANTOPRAZOLE SODIUM 40 MG: 40 TABLET, DELAYED RELEASE ORAL at 06:21

## 2017-09-13 RX ADMIN — INSULIN LISPRO 4 UNITS: 100 INJECTION, SOLUTION INTRAVENOUS; SUBCUTANEOUS at 17:05

## 2017-09-13 RX ADMIN — HYDRALAZINE HYDROCHLORIDE 10 MG: 10 TABLET, FILM COATED ORAL at 08:01

## 2017-09-13 RX ADMIN — POLYETHYLENE GLYCOL 3350 17 G: 17 POWDER, FOR SOLUTION ORAL at 17:01

## 2017-09-13 RX ADMIN — GLIPIZIDE 5 MG: 5 TABLET ORAL at 17:05

## 2017-09-13 RX ADMIN — ISOSORBIDE MONONITRATE 30 MG: 30 TABLET, EXTENDED RELEASE ORAL at 08:01

## 2017-09-13 RX ADMIN — HYDRALAZINE HYDROCHLORIDE 10 MG: 10 TABLET, FILM COATED ORAL at 17:05

## 2017-09-13 NOTE — PROGRESS NOTES
Problem: Falls - Risk of  Goal: *Absence of Falls  Document Nehemiah Fall Risk and appropriate interventions in the flowsheet. Outcome: Progressing Towards Goal  Fall Risk Interventions:  Mobility Interventions: Patient to call before getting OOB, Utilize walker, cane, or other assitive device           Medication Interventions: Patient to call before getting OOB, Teach patient to arise slowly             Patient progressing towards goal with no falls on current admission. Patient without confusion, agitation, or sensory perception deficits. Patient has unsteady gait on ambulation. Personal belongings are within reach. Bed is in the low and locked position with side rails up x2. Yellow gripper socks to bilateral feet. Call light within reach and patient verbalizes understanding of use.

## 2017-09-13 NOTE — PROGRESS NOTES
Bedside and Verbal shift change report given to ANGELA Byrnes (oncoming nurse) by Scotty West RN (offgoing nurse). Report included the following information SBAR, Kardex, MAR and Recent Results.

## 2017-09-13 NOTE — PROGRESS NOTES
[facility] CARDIOLOGY PROGRESS NOTE           9/13/2017 2:48 PM    Admit Date: 9/11/2017      Subjective:   Patient complains of cramping while on diuretics. Review of Systems   Constitutional: Negative for chills. HENT: Negative for hearing loss. Respiratory: Negative for cough. Cardiovascular: Positive for leg swelling. Genitourinary: Negative for flank pain. Musculoskeletal: Negative for neck pain. Skin: Positive for rash. Neurological: Positive for tingling and tremors. Objective:      Vitals:    09/13/17 0523 09/13/17 0830 09/13/17 0858 09/13/17 1302   BP: 140/70 129/62  91/55   Pulse: 87 78  73   Resp: 18 19  18   Temp: 97.7 °F (36.5 °C) 97.7 °F (36.5 °C)  97.4 °F (36.3 °C)   SpO2: 93% 90% 90% 93%   Weight: 117.2 kg (258 lb 4.8 oz)      Height:             Physical Exam   HENT:   Head: Atraumatic. Eyes: Left eye exhibits no discharge. Neck: No tracheal deviation present. Cardiovascular: Normal rate. Pulmonary/Chest: No respiratory distress. He has no wheezes. Abdominal: He exhibits no distension. Musculoskeletal: He exhibits edema. chorinic venous stasis changes. Data Review:   Recent Labs      09/13/17   0344  09/12/17   0531   09/11/17   1300   NA  136  137   --   138   K  4.2  3.6   --   4.3   MG  2.6*  2.4   --    --    BUN  56*  47*   --   46*   CREA  3.04*  2.39*   --   2.34*   GLU  162*  149*   --   225*   WBC  8.3  6.8   < >  7.8   HGB  15.0  14.0   < >  14.4   HCT  44.3  42.2   < >  43.0   PLT  160  165   < >  160   TROIQ   --    --    --   <0.02*    < > = values in this interval not displayed.          Intake/Output Summary (Last 24 hours) at 09/13/17 1448  Last data filed at 09/13/17 1314   Gross per 24 hour   Intake              930 ml   Output             1100 ml   Net             -170 ml     Current Facility-Administered Medications   Medication Dose Route Frequency    polyethylene glycol (MIRALAX) packet 17 g  17 g Oral BID    methyl salicylate-menthol (BENGAY) 15-10 % cream   Topical Q6H PRN    diphenhydrAMINE (BENADRYL) capsule 50 mg  50 mg Oral QHS PRN    gabapentin (NEURONTIN) capsule 100 mg  100 mg Oral TID    SITagliptin (JANUVIA) tablet 50 mg  50 mg Oral DAILY    glipiZIDE (GLUCOTROL) tablet 5 mg  5 mg Oral BID    hydrALAZINE (APRESOLINE) tablet 10 mg  10 mg Oral BID    albuterol-ipratropium (DUO-NEB) 2.5 MG-0.5 MG/3 ML  3 mL Nebulization Q6H PRN    metoprolol succinate (TOPROL-XL) XL tablet 12.5 mg  12.5 mg Oral DAILY    allopurinol (ZYLOPRIM) tablet 300 mg  300 mg Oral DAILY    docusate sodium (COLACE) capsule 100 mg  100 mg Oral DAILY PRN    magnesium oxide (MAG-OX) tablet 400 mg  400 mg Oral DAILY    latanoprost (XALATAN) 0.005 % ophthalmic solution 1 Drop  1 Drop Both Eyes QHS    aspirin chewable tablet 81 mg  81 mg Oral DAILY    pantoprazole (PROTONIX) tablet 40 mg  40 mg Oral ACB&D    sertraline (ZOLOFT) tablet 50 mg  50 mg Oral DAILY    levothyroxine (SYNTHROID) tablet 50 mcg  50 mcg Oral ACB    isosorbide mononitrate ER (IMDUR) tablet 30 mg  30 mg Oral DAILY    insulin lispro (HUMALOG) injection   SubCUTAneous AC&HS    budesonide (PULMICORT) 500 mcg/2 ml nebulizer suspension  500 mcg Nebulization BID RT    And    albuterol CONCENTRATE 2.5mg/0.5 mL neb soln  2.5 mg Nebulization Q6H RT    HYDROcodone-acetaminophen (NORCO)  mg tablet 1 Tab  1 Tab Oral Q8H PRN    oxyCODONE IR (OXY-IR) immediate release tablet 15 mg  15 mg Oral Q8H PRN           Assessment/Plan:     Principal Problem:    Acute on chronic diastolic heart failure ([facility] (9/11/2017)   Patient feels respiratory status is improving we will transition back to oral diuretic therapy in light of mildly worsening renal function overnight. Barriers to discharge appeared to be poor mobility which will be addressed with physical therapy consult. Patient lives with wife and daughter who are physically incapable of helping him out of bed.   Patient was not able to get out of bed today during exam.  Active Problems:    Essential hypertension (11/21/2012)      Persistent atrial fibrillation ([address] (11/21/2012)      Overview: Coumadin therapy discontinued due to recurrent GI bleeding. COPD (chronic obstructive pulmonary disease) ([address] (11/21/2012)      PAD (peripheral artery disease) ([address] (11/21/2012)      Overview: 1. Bilateral proximal common iliac PCI (2/21/08):  8.0 X 100 mm Cordis       smart stent on right and 10 X 40 mm cordis smart stent on right.   Both       inflated to 7.0 mm.           CKD (chronic kidney disease) stage 3, GFR 30-59 ml/min (9/18/2013)      Morbid obesity ([facility] (1/21/2015)      Type 2 diabetes mellitus with peripheral neuropathy ([facility] (11/5/2015)      Dyspnea (1/21/2016)            John Flores MD  9/13/2017 2:48 PM

## 2017-09-13 NOTE — PROGRESS NOTES
Bedside and Verbal shift change report given to Alexx Mejía RN  (oncoming nurse) by self Anabel chew). Report included the following information SBAR, Kardex, Procedure Summary, Intake/Output, Recent Results and Cardiac Rhythm a-fib.

## 2017-09-13 NOTE — PROGRESS NOTES
Bedside and Verbal shift change report received from [facility]. Report included the following information SBAR, Kardex, Procedure Summary, Intake/Output, MAR, Recent Results and Cardiac Rhythm A FIB.

## 2017-09-14 PROBLEM — I50.9 CHF (CONGESTIVE HEART FAILURE) (HCC): Status: ACTIVE | Noted: 2017-09-14

## 2017-09-14 LAB
ANION GAP SERPL CALC-SCNC: 11 MMOL/L (ref 7–16)
BUN SERPL-MCNC: 65 MG/DL (ref 8–23)
CALCIUM SERPL-MCNC: 8.4 MG/DL (ref 8.3–10.4)
CHLORIDE SERPL-SCNC: 91 MMOL/L (ref 98–107)
CO2 SERPL-SCNC: 32 MMOL/L (ref 21–32)
CREAT SERPL-MCNC: 3.11 MG/DL (ref 0.8–1.5)
GLUCOSE BLD STRIP.AUTO-MCNC: 153 MG/DL (ref 65–100)
GLUCOSE BLD STRIP.AUTO-MCNC: 172 MG/DL (ref 65–100)
GLUCOSE BLD STRIP.AUTO-MCNC: 200 MG/DL (ref 65–100)
GLUCOSE SERPL-MCNC: 158 MG/DL (ref 65–100)
MAGNESIUM SERPL-MCNC: 2.6 MG/DL (ref 1.8–2.4)
POTASSIUM SERPL-SCNC: 3.3 MMOL/L (ref 3.5–5.1)
SODIUM SERPL-SCNC: 134 MMOL/L (ref 136–145)

## 2017-09-14 PROCEDURE — 94640 AIRWAY INHALATION TREATMENT: CPT

## 2017-09-14 PROCEDURE — 74011250637 HC RX REV CODE- 250/637: Performed by: INTERNAL MEDICINE

## 2017-09-14 PROCEDURE — 94760 N-INVAS EAR/PLS OXIMETRY 1: CPT

## 2017-09-14 PROCEDURE — 82962 GLUCOSE BLOOD TEST: CPT

## 2017-09-14 PROCEDURE — 74011636637 HC RX REV CODE- 636/637: Performed by: NURSE PRACTITIONER

## 2017-09-14 PROCEDURE — 65660000000 HC RM CCU STEPDOWN

## 2017-09-14 PROCEDURE — 74011000250 HC RX REV CODE- 250: Performed by: EMERGENCY MEDICINE

## 2017-09-14 PROCEDURE — 77010033678 HC OXYGEN DAILY

## 2017-09-14 PROCEDURE — 80048 BASIC METABOLIC PNL TOTAL CA: CPT | Performed by: NURSE PRACTITIONER

## 2017-09-14 PROCEDURE — 36415 COLL VENOUS BLD VENIPUNCTURE: CPT | Performed by: NURSE PRACTITIONER

## 2017-09-14 PROCEDURE — G8978 MOBILITY CURRENT STATUS: HCPCS

## 2017-09-14 PROCEDURE — 97162 PT EVAL MOD COMPLEX 30 MIN: CPT

## 2017-09-14 PROCEDURE — 99218 HC RM OBSERVATION: CPT

## 2017-09-14 PROCEDURE — 74011250637 HC RX REV CODE- 250/637: Performed by: NURSE PRACTITIONER

## 2017-09-14 PROCEDURE — 74011250636 HC RX REV CODE- 250/636: Performed by: INTERNAL MEDICINE

## 2017-09-14 PROCEDURE — G8979 MOBILITY GOAL STATUS: HCPCS

## 2017-09-14 PROCEDURE — 83735 ASSAY OF MAGNESIUM: CPT | Performed by: NURSE PRACTITIONER

## 2017-09-14 RX ORDER — SODIUM CHLORIDE 9 MG/ML
75 INJECTION, SOLUTION INTRAVENOUS CONTINUOUS
Status: DISPENSED | OUTPATIENT
Start: 2017-09-14 | End: 2017-09-14

## 2017-09-14 RX ADMIN — GABAPENTIN 100 MG: 100 CAPSULE ORAL at 16:33

## 2017-09-14 RX ADMIN — GLIPIZIDE 5 MG: 5 TABLET ORAL at 08:46

## 2017-09-14 RX ADMIN — ALBUTEROL SULFATE 2.5 MG: 2.5 SOLUTION RESPIRATORY (INHALATION) at 19:50

## 2017-09-14 RX ADMIN — GLIPIZIDE 5 MG: 5 TABLET ORAL at 16:33

## 2017-09-14 RX ADMIN — POLYETHYLENE GLYCOL 3350 17 G: 17 POWDER, FOR SOLUTION ORAL at 08:46

## 2017-09-14 RX ADMIN — ALLOPURINOL 300 MG: 300 TABLET ORAL at 08:46

## 2017-09-14 RX ADMIN — Medication 400 MG: at 08:47

## 2017-09-14 RX ADMIN — LEVOTHYROXINE SODIUM 50 MCG: 50 TABLET ORAL at 07:29

## 2017-09-14 RX ADMIN — ISOSORBIDE MONONITRATE 30 MG: 30 TABLET, EXTENDED RELEASE ORAL at 08:46

## 2017-09-14 RX ADMIN — HYDRALAZINE HYDROCHLORIDE 10 MG: 10 TABLET, FILM COATED ORAL at 16:33

## 2017-09-14 RX ADMIN — HYDRALAZINE HYDROCHLORIDE 10 MG: 10 TABLET, FILM COATED ORAL at 08:47

## 2017-09-14 RX ADMIN — GABAPENTIN 100 MG: 100 CAPSULE ORAL at 20:57

## 2017-09-14 RX ADMIN — PANTOPRAZOLE SODIUM 40 MG: 40 TABLET, DELAYED RELEASE ORAL at 07:30

## 2017-09-14 RX ADMIN — POLYETHYLENE GLYCOL 3350 17 G: 17 POWDER, FOR SOLUTION ORAL at 16:32

## 2017-09-14 RX ADMIN — ALBUTEROL SULFATE 2.5 MG: 2.5 SOLUTION RESPIRATORY (INHALATION) at 09:25

## 2017-09-14 RX ADMIN — HYDROCODONE BITARTRATE AND ACETAMINOPHEN 1 TABLET: 10; 325 TABLET ORAL at 11:26

## 2017-09-14 RX ADMIN — METOPROLOL SUCCINATE 12.5 MG: 25 TABLET, EXTENDED RELEASE ORAL at 08:47

## 2017-09-14 RX ADMIN — SITAGLIPTIN 50 MG: 50 TABLET, FILM COATED ORAL at 08:46

## 2017-09-14 RX ADMIN — INSULIN LISPRO 4 UNITS: 100 INJECTION, SOLUTION INTRAVENOUS; SUBCUTANEOUS at 22:00

## 2017-09-14 RX ADMIN — BUDESONIDE 500 MCG: 0.5 INHALANT RESPIRATORY (INHALATION) at 09:25

## 2017-09-14 RX ADMIN — OXYCODONE HYDROCHLORIDE 15 MG: 15 TABLET ORAL at 20:57

## 2017-09-14 RX ADMIN — LATANOPROST 1 DROP: 50 SOLUTION OPHTHALMIC at 22:01

## 2017-09-14 RX ADMIN — GABAPENTIN 100 MG: 100 CAPSULE ORAL at 08:46

## 2017-09-14 RX ADMIN — SERTRALINE HYDROCHLORIDE 50 MG: 50 TABLET ORAL at 08:46

## 2017-09-14 RX ADMIN — INSULIN LISPRO 2 UNITS: 100 INJECTION, SOLUTION INTRAVENOUS; SUBCUTANEOUS at 11:26

## 2017-09-14 RX ADMIN — INSULIN LISPRO 2 UNITS: 100 INJECTION, SOLUTION INTRAVENOUS; SUBCUTANEOUS at 08:54

## 2017-09-14 RX ADMIN — ASPIRIN 81 MG 81 MG: 81 TABLET ORAL at 08:46

## 2017-09-14 RX ADMIN — BUDESONIDE 500 MCG: 0.5 INHALANT RESPIRATORY (INHALATION) at 19:50

## 2017-09-14 RX ADMIN — PANTOPRAZOLE SODIUM 40 MG: 40 TABLET, DELAYED RELEASE ORAL at 16:33

## 2017-09-14 RX ADMIN — INSULIN LISPRO 2 UNITS: 100 INJECTION, SOLUTION INTRAVENOUS; SUBCUTANEOUS at 16:33

## 2017-09-14 RX ADMIN — SODIUM CHLORIDE 75 ML/HR: 900 INJECTION, SOLUTION INTRAVENOUS at 08:55

## 2017-09-14 NOTE — PROGRESS NOTES
9/14/2017 10:24 AM    Admit Date: 9/11/2017    Admit Diagnosis: Acute on chronic diastolic heart failure (HCC)      Subjective:   No cp or sob      Objective:      Visit Vitals    /53    Pulse 75    Temp 97.6 °F (36.4 °C)    Resp 20    Ht 5' 10\" (1.778 m)    Wt 117.2 kg (258 lb 4.8 oz)    SpO2 92%    BMI 37.06 kg/m2       Physical Exam:  General-Well Developed, Well Nourished, No Acute Distress, Alert & Oriented x 3, appropriate mood. Neck- supple, no JVD  CV- regular rate and rhythm no MRG  Lung- clear bilaterally  Abd- soft, nontender, nondistended  Ext- left leg edema persists  Skin- warm and dry        Data Review:   Recent Labs      09/14/17   0337  09/13/17   0344   NA  134*  136   K  3.3*  4.2   BUN  65*  56*   CREA  3.11*  3.04*   WBC   --   8.3   HGB   --   15.0   HCT   --   44.3   PLT   --   160       Assessment/Plan:     Principal Problem:    Acute on chronic diastolic heart failure (HCC) (9/11/2017)Improved with current therapy. Will continue medications  Gentle hydration with arf    Active Problems:    Essential hypertension (11/21/2012)Stable. Continue current medical therapy. Persistent atrial fibrillation ([facility] (11/21/2012)Stable. Continue current medical therapy. Overview: Coumadin therapy discontinued due to recurrent GI bleeding. COPD (chronic obstructive pulmonary disease) ([facility] (11/21/2012)      PAD (peripheral artery disease) ([facility] (11/21/2012)      Overview: 1. Bilateral proximal common iliac PCI (2/21/08):  8.0 X 100 mm Cordis       smart stent on right and 10 X 40 mm cordis smart stent on right. Both       inflated to 7.0 mm. Gout (11/21/2012)      Hyperlipidemia (8/5/2013)Stable. Continue current medical therapy.       CKD (chronic kidney disease) stage 3, GFR 30-59 ml/min (9/18/2013)      Morbid obesity ([address] (1/21/2015)      Anemia (9/10/2015)      Type 2 diabetes mellitus with peripheral neuropathy ([facility] (11/5/2015)      Dyspnea (1/21/2016)      Hypoxia (8/21/2017)      Thrombocytopenia ([facility] (8/21/2017)

## 2017-09-14 NOTE — PHYSICIAN ADVISORY
Letter of Determination: Upgrade from Observation to Inpatient Status    This patient was originally hospitalized as observation status on 9/11/2017 for congestive heart failure. This patient now meets for Inpatient Admission in accordance with CMS regulation Section 43 .3. Specifically, patient's stay is now over Two Midnights and was medically necessary. The patient's stay was medically necessary based on multiple comorbidities including chronic kidney disease class 4, atrial fibrillation, chronic obstructive pulmonary disease, coronary artery disease, peripheral vascular diseaes, and morbid obesity. Laboratory studies were significant for a creatinine of 3.11 mg/dl, potassium of 3.3 mmol/L. VS were significant for blood pressure to 90/59. Physical exam was significant for increasing debility, being unable to get out of bed during hospitalizaiton. Consistent with CMS guidelines, patient meets for inpatient status. It is our recommendation that this patient's hospitalization status should be upgraded from OBSERVATION to INPATIENT status.      The final decision regarding the patient's hospitalization status depends on the attending physician's judgement.     Scot Ivey MD, MARY ELLEN,   Physician [place].

## 2017-09-14 NOTE — PROGRESS NOTES
Bedside and Verbal shift change report given to self (oncoming nurse) by Lynne Mitchell (offgoing nurse). Report included the following information SBAR, Kardex, MAR and Recent Results.

## 2017-09-14 NOTE — PROGRESS NOTES
Problem: Mobility Impaired (Adult and Pediatric)  Goal: *Acute Goals and Plan of Care (Insert Text)  LTG:  (1.)Mr. Navarro will transfer from bed to chair and chair to bed with modified INDEPENDENT using the least restrictive device within 7 day(s). (2.)Mr. Navarro will ambulate with modified INDEPENDENCE for 250+ feet with standing rest breaks as needed with the least restrictive device within 7 day(s). (3.)Mr. Navarro will perform standing static and dynamic balance activities x 8 minutes with SUPERVISION to improve safety within 7 day(s). (4.)Mr. Navarro will ascend and descend 1 stairs using one hand rail(s) with SUPERVISION to improve functional mobility and safety within 7 day(s). PHYSICAL THERAPY: INITIAL ASSESSMENT, AM 9/14/2017  OBSERVATION: Hospital Day: 4  Payor: [facility] MEDICARE / Plan: [place] 5to1 [facility] MEDICARE / Product Type: [employer] Medicare /      NAME/AGE/GENDER: Griffin Colunga is a 80 y.o. male      PRIMARY DIAGNOSIS: Acute on chronic diastolic heart failure (HCC) Acute on chronic diastolic heart failure (HCC) Acute on chronic diastolic heart failure (HCC)        ICD-10: Treatment Diagnosis:       · Difficulty in walking, Not elsewhere classified (R26.2)   Precaution/Allergies:  Review of patient's allergies indicates no known allergies. ASSESSMENT:      Mr. Reji Sommer presents supine in bed with CPAP on and wife at bedside. Patient removed CPAP mask and transitioned to sit with modified independence. Patient stood with modified independence and ambulated 150' with cane and CGA, SpO2 90%. Patient endorsed leg pain during ambulation and required 3 standing rest breaks. Patient has experienced slight decline in functional mobility, Mr. Reji Sommer would benefit from skilled physical therapy (medically necessary) to address his deficits and maximize his function.           This section established at most recent assessment   PROBLEM LIST (Impairments causing functional limitations):  1. Decreased Strength  2. Decreased ADL/Functional Activities  3. Decreased Transfer Abilities  4. Decreased Ambulation Ability/Technique  5. Decreased Balance  6. Increased Pain  7. Decreased Activity Tolerance    INTERVENTIONS PLANNED: (Benefits and precautions of physical therapy have been discussed with the patient.)  1. Balance Exercise  2. Bed Mobility  3. Gait Training  4. Therapeutic Activites  5. Therapeutic Exercise/Strengthening  6. Transfer Training  7. education  8. Group Therapy      TREATMENT PLAN: Frequency/Duration: 3 times a week for 1 week  Rehabilitation Potential For Stated Goals: GOOD      RECOMMENDED REHABILITATION/EQUIPMENT: (at time of discharge pending progress): Due to the probability of continued deficits (see above) this patient will likely need continued skilled physical therapy after discharge. Equipment:   · None at this time                   HISTORY:   History of Present Injury/Illness (Reason for Referral):  Per MD note, \"HPI:  Jeff Otoole is a 80 y.o. male with PMH of CAD with CABG in 07, chronic diastolic HF with EF of 22-78 on echo 8-21-17, DM, thrombocytopenia, anemia, multiple GI bleeds, ROBERTO (cpap at home), CKD, HLP, HTN, persistent a fib, COPD, PAD with PPi to bilateral iliac, and TERRIE, who presented to the ED with complaints of increased edema, 10 lb weight gain and shortness of breath. In the ED he is noted to have increased LE edema as well as abdominal distention. Labs showed , creatinine of 2.34, and trop of 0.02. CXR suggestive of mild pulmonary edema. EKG showed rate controlled a fib. His oxygen sats are 93% on RA. He denies chest pain, palpitations, dizziness, syncope,fever, or chills. Denies missing medications. His creatinine has slowly been worsening since May when it was 1.6 and is now staying in 2.4 range. \"  Past Medical History/Comorbidities:   Mr. 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 He also has no past medical history of Arthritis; Asthma; Cancer ([facility] Liver disease; Nausea & vomiting; PUD (peptic ulcer disease); or Stroke ([facility] [address] Mr. Michael Crystal  has a past surgical history that includes cardiac surg procedure unlist; coronary artery bypass graft (06-); cataract removal (Left, 2003); heart catheterization; coronary stent placement (02-); heent (1970s); colonoscopy; and colonoscopy (N/A, 1/27/2017). Social History/Living Environment:   Home Environment: Private residence  # Steps to Enter: 1  One/Two Story Residence: One story  Living Alone: No  Support Systems: Family member(s)  Patient Expects to be Discharged to[de-identified] Private residence  Current DME Used/Available at Home: Cane, straight, CPAP, Glucometer  Prior Level of Function/Work/Activity:  Lives at home with wife. Modified independent with adjustable bed and ambulates with cane.       Number of Personal Factors/Comorbidities that affect the Plan of Care: 1-2: MODERATE COMPLEXITY   EXAMINATION:   Most Recent Physical Functioning:   Gross Assessment:  AROM: Generally decreased, functional  Strength: Generally decreased, functional  Coordination: Generally decreased, functional  Sensation: Impaired               Posture:  Posture (WDL): Exceptions to WDL  Posture Assessment: Forward head, Rounded shoulders  Balance:  Sitting: Intact  Standing: Impaired  Standing - Static: Fair  Standing - Dynamic : Poor Bed Mobility:  Supine to Sit: Modified independent  Scooting: Independent  Wheelchair Mobility:     Transfers:  Sit to Stand: Modified independent  Stand to Sit: Modified independent  Gait:     Base of Support: Widened  Speed/Helen: Slow  Step Length: Right shortened;Left shortened  Gait Abnormalities: Altered arm swing;Decreased step clearance;Trunk sway increased  Distance (ft): 150 Feet (ft) (with several standing rest breaks)  Assistive Device: Gait belt;Cane, straight  Ambulation - Level of Assistance: Contact guard assistance  Interventions: Safety awareness training;Verbal cues       Body Structures Involved:  1. Nerves  2. Heart  3. Lungs Body Functions Affected:  1. Sensory/Pain  2. Cardio  3. Respiratory  4. Movement Related Activities and Participation Affected:  1. Mobility  2. Self Care  3. Domestic Life   Number of elements that affect the Plan of Care: 4+: HIGH COMPLEXITY   CLINICAL PRESENTATION:   Presentation: Evolving clinical presentation with changing clinical characteristics: MODERATE COMPLEXITY   CLINICAL DECISION MA[facility] Inpatient Short Form  How much difficulty does the patient currently have. .. Unable A Lot A Little None   1. Turning over in bed (including adjusting bedclothes, sheets and blankets)? [ ] 1   [ ] 2   [ ] 3   [X] 4   2. Sitting down on and standing up from a chair with arms ( e.g., wheelchair, bedside commode, etc.)   [ ] 1   [ ] 2   [ ] 3   [X] 4   3. Moving from lying on back to sitting on the side of the bed? [ ] 1   [ ] 2   [ ] 3   [X] 4   How much help from another person does the patient currently need. .. Total A Lot A Little None   4.   Moving to and from a bed to a chair (including a wheelchair)? [ ] 1   [ ] 2   [X] 3   [ ] 4   5. Need to walk in hospital room? [ ] 1   [ ] 2   [X] 3   [ ] 4   6. Climbing 3-5 steps with a railing? [ ] 1   [ ] 2   [X] 3   [ ] 4   © 2007, Trustees of [address] [place] [address] [address] [id number], under license to [employer]. All rights reserved    Score:  Initial: 21 Most Recent: X (Date: -- )     Interpretation of Tool:  Represents activities that are increasingly more difficult (i.e. Bed mobility, Transfers, Gait). Score 24 23 22-20 19-15 14-10 9-7 6       Modifier CH CI CJ CK CL CM CN         · Mobility - Walking and Moving Around:               - CURRENT STATUS:    CJ - 20%-39% impaired, limited or restricted               - GOAL STATUS:           CI - 1%-19% impaired, limited or restricted               - D/C STATUS:                       ---------------To be determined---------------  Payor: [facility] / Plan: [place] [facility] MED[facility] / Product Type: Managed Care Medicare /       Medical Necessity:     · Patient is expected to demonstrate progress in strength, range of motion, balance, coordination and functional technique to increase independence with   and improve safety during all functional mobility. Reason for Services/Other Comments:  · Patient continues to require skilled intervention due to decline in activity tolerance and safety.    Use of outcome tool(s) and clinical judgement create a POC that gives a: Questionable prediction of patient's progress: MODERATE COMPLEXITY                 TREATMENT:   (In addition to Assessment/Re-Assessment sessions the following treatments were rendered)   Pre-treatment Symptoms/Complaints:    Pain: Initial:   Pain Intensity 1: 6  Pain Location 1: Foot, Hand  Pain Orientation 1: Right, Left  Pain Intervention(s) 1: Repositioned  Post Session:  unchanged      Assessment/Reassessment only, no treatment provided today     Braces/Orthotics/Lines/Etc:   · O2 Device: Room air  Treatment/Session Assessment:    · Response to Treatment:  Leg pain during ambulation, relieved with sitting. · Interdisciplinary Collaboration:  · Physical Therapist  · Registered Nurse  · Certified Nursing Assistant/Patient Care Technician  · After treatment position/precautions:  · Bed/Chair-wheels locked  · Call light within reach  · Family at bedside  · sitting edge of bed  · Compliance with Program/Exercises: compliant all of the time. · Recommendations/Intent for next treatment session: \"Next visit will focus on advancements to more challenging activities and reduction in assistance provided\".   Total Treatment Duration:  PT Patient Time In/Time Out  Time In: 0950  Time Out: [address], PT, DPT

## 2017-09-14 NOTE — PROGRESS NOTES
Verbal bedside report given to Winsome Mccauley oncoming RN. Patient's situation, background, assessment and recommendations provided. Opportunity for questions provided. Oncoming RN assumed care of patient.

## 2017-09-15 VITALS
DIASTOLIC BLOOD PRESSURE: 62 MMHG | SYSTOLIC BLOOD PRESSURE: 154 MMHG | BODY MASS INDEX: 36.95 KG/M2 | WEIGHT: 258.1 LBS | HEART RATE: 63 BPM | RESPIRATION RATE: 18 BRPM | OXYGEN SATURATION: 91 % | TEMPERATURE: 97.2 F | HEIGHT: 70 IN

## 2017-09-15 LAB
ANION GAP SERPL CALC-SCNC: 10 MMOL/L (ref 7–16)
BNP SERPL-MCNC: 241 PG/ML
BUN SERPL-MCNC: 65 MG/DL (ref 8–23)
CALCIUM SERPL-MCNC: 8.7 MG/DL (ref 8.3–10.4)
CHLORIDE SERPL-SCNC: 92 MMOL/L (ref 98–107)
CO2 SERPL-SCNC: 33 MMOL/L (ref 21–32)
CREAT SERPL-MCNC: 2.66 MG/DL (ref 0.8–1.5)
GLUCOSE BLD STRIP.AUTO-MCNC: 198 MG/DL (ref 65–100)
GLUCOSE SERPL-MCNC: 201 MG/DL (ref 65–100)
MAGNESIUM SERPL-MCNC: 3 MG/DL (ref 1.8–2.4)
POTASSIUM SERPL-SCNC: 3.4 MMOL/L (ref 3.5–5.1)
SODIUM SERPL-SCNC: 135 MMOL/L (ref 136–145)

## 2017-09-15 PROCEDURE — 36415 COLL VENOUS BLD VENIPUNCTURE: CPT | Performed by: NURSE PRACTITIONER

## 2017-09-15 PROCEDURE — 94760 N-INVAS EAR/PLS OXIMETRY 1: CPT

## 2017-09-15 PROCEDURE — 74011636637 HC RX REV CODE- 636/637: Performed by: NURSE PRACTITIONER

## 2017-09-15 PROCEDURE — 74011250637 HC RX REV CODE- 250/637: Performed by: INTERNAL MEDICINE

## 2017-09-15 PROCEDURE — 82962 GLUCOSE BLOOD TEST: CPT

## 2017-09-15 PROCEDURE — 74011250637 HC RX REV CODE- 250/637: Performed by: NURSE PRACTITIONER

## 2017-09-15 PROCEDURE — 77010033678 HC OXYGEN DAILY

## 2017-09-15 PROCEDURE — 80048 BASIC METABOLIC PNL TOTAL CA: CPT | Performed by: NURSE PRACTITIONER

## 2017-09-15 PROCEDURE — 83735 ASSAY OF MAGNESIUM: CPT | Performed by: NURSE PRACTITIONER

## 2017-09-15 PROCEDURE — 74011000250 HC RX REV CODE- 250: Performed by: EMERGENCY MEDICINE

## 2017-09-15 PROCEDURE — 83880 ASSAY OF NATRIURETIC PEPTIDE: CPT | Performed by: INTERNAL MEDICINE

## 2017-09-15 PROCEDURE — 94640 AIRWAY INHALATION TREATMENT: CPT

## 2017-09-15 RX ORDER — FUROSEMIDE 40 MG/1
80 TABLET ORAL 2 TIMES DAILY
Status: DISCONTINUED | OUTPATIENT
Start: 2017-09-15 | End: 2017-09-15 | Stop reason: HOSPADM

## 2017-09-15 RX ORDER — FUROSEMIDE 80 MG/1
80 TABLET ORAL 2 TIMES DAILY
Qty: 60 TAB | Refills: 6 | Status: ON HOLD | OUTPATIENT
Start: 2017-09-15 | End: 2018-01-01

## 2017-09-15 RX ADMIN — POLYETHYLENE GLYCOL 3350 17 G: 17 POWDER, FOR SOLUTION ORAL at 08:31

## 2017-09-15 RX ADMIN — SERTRALINE HYDROCHLORIDE 50 MG: 50 TABLET ORAL at 08:32

## 2017-09-15 RX ADMIN — ALBUTEROL SULFATE 2.5 MG: 2.5 SOLUTION RESPIRATORY (INHALATION) at 07:56

## 2017-09-15 RX ADMIN — INSULIN LISPRO 2 UNITS: 100 INJECTION, SOLUTION INTRAVENOUS; SUBCUTANEOUS at 08:31

## 2017-09-15 RX ADMIN — ASPIRIN 81 MG 81 MG: 81 TABLET ORAL at 08:32

## 2017-09-15 RX ADMIN — Medication 400 MG: at 08:32

## 2017-09-15 RX ADMIN — HYDRALAZINE HYDROCHLORIDE 10 MG: 10 TABLET, FILM COATED ORAL at 08:32

## 2017-09-15 RX ADMIN — HYDROCODONE BITARTRATE AND ACETAMINOPHEN 1 TABLET: 10; 325 TABLET ORAL at 05:38

## 2017-09-15 RX ADMIN — ALBUTEROL SULFATE 2.5 MG: 2.5 SOLUTION RESPIRATORY (INHALATION) at 02:59

## 2017-09-15 RX ADMIN — GABAPENTIN 100 MG: 100 CAPSULE ORAL at 08:32

## 2017-09-15 RX ADMIN — LEVOTHYROXINE SODIUM 50 MCG: 50 TABLET ORAL at 05:32

## 2017-09-15 RX ADMIN — ALLOPURINOL 300 MG: 300 TABLET ORAL at 08:32

## 2017-09-15 RX ADMIN — BUDESONIDE 500 MCG: 0.5 INHALANT RESPIRATORY (INHALATION) at 07:56

## 2017-09-15 RX ADMIN — PANTOPRAZOLE SODIUM 40 MG: 40 TABLET, DELAYED RELEASE ORAL at 05:32

## 2017-09-15 RX ADMIN — FUROSEMIDE 80 MG: 40 TABLET ORAL at 08:32

## 2017-09-15 RX ADMIN — METOPROLOL SUCCINATE 12.5 MG: 25 TABLET, EXTENDED RELEASE ORAL at 08:32

## 2017-09-15 RX ADMIN — SITAGLIPTIN 50 MG: 50 TABLET, FILM COATED ORAL at 08:32

## 2017-09-15 RX ADMIN — GLIPIZIDE 5 MG: 5 TABLET ORAL at 08:32

## 2017-09-15 RX ADMIN — ISOSORBIDE MONONITRATE 30 MG: 30 TABLET, EXTENDED RELEASE ORAL at 08:32

## 2017-09-15 NOTE — ROUTINE PROCESS
CHF teaching started post introduction to pt/family; aware of diagnosis. Planner/scale @ BS and will follow. Smoking/ ETOH/Illicit drug use cessation and maintain a healthy weight covered. Pt/family aware that I can not prescribe nor adjust  medications: 15mins  Palliative Care score:  Start 2L/D Fluid restriction  CHF teaching continues to pt/family. Emphasis on taking prescription meds as ordered, to keep F/U appts and to call MD STAT . CHF teaching completed, verbalize emphasis on monitoring self and report to MD:   If you gain 2 lbs in one day or 5 lbs in a week, and short of breath.  If you can not lay flat without developing short of breath or rapid breathing at night; or if it wakes you up. Develop a cough or wheezing.  If you notice swollen hands/feet/ankles or stomach with a bloated/ full feeling.  If you be  ome confused or mentally fuzzy or dizzy.  If you notice a rapid or change in your heart rate.  If you become more exhausted all the time and unable to do the same level of activity without stopping to catch your breath. Drink no more than 8 cups a day in 8 oz. cups. Limit Cola Drinks. Your Heart can not handle any more. Stay away from salt (limit anything with salt or sodium in it). Limit to 250mg per serving. Exercise needs to be started with your Doctors approval.  Reduce stress; Call myself or Provider if assistance is needed. Pass post test via teach back, will make self available post DC ,if an questions arise. Diabetic teaching completed.  Planner/scale @ BS:  60 mins total

## 2017-09-15 NOTE — PROGRESS NOTES
Verbal bedside report given to oncoming RN, Nano Maradiaga. Patient's situation, background, assessment and recommendations provided. Opportunity for questions provided. Oncoming RN assumed care of patient.

## 2017-09-15 NOTE — PROGRESS NOTES
Problem: Falls - Risk of  Goal: *Absence of Falls  Document Nehemiah Fall Risk and appropriate interventions in the flowsheet.    Outcome: Progressing Towards Goal  Fall Risk Interventions:  Mobility Interventions: Bed/chair exit alarm     Mentation Interventions: Bed/chair exit alarm     Medication Interventions: Bed/chair exit alarm     Elimination Interventions: Bed/chair exit alarm

## 2017-09-15 NOTE — DISCHARGE INSTRUCTIONS
Furosemide (By mouth)   Furosemide ([id number]-VD-cp-mide)  Treats fluid retention (edema) and high blood pressure. This medicine is a diuretic (water pill). Brand Name(s): Active-Medicated Specimen Collection Kit, Diuscreen Multi-Drug Medicated Test Kit, Lasix, RX-Specimen Collection Kit, Specimen Collection Kit   There may be other brand names for this medicine. When This Medicine Should Not Be Used: This medicine is not right for everyone. Do not use it if you had an allergic reaction to furosemide. How to Use This Medicine:   Liquid, Tablet  · Take your medicine as directed. Your dose may need to be changed several times to find what works best for you. · You may take this medicine with food if it upsets your stomach. · Oral liquid: Measure the oral liquid medicine with a marked measuring spoon, oral syringe, or medicine cup. · Tablet: Swallow the tablet whole. Do not crush, break, or chew it. · Missed dose: Take a dose as soon as you remember. If it is almost time for your next dose, wait until then and take a regular dose. Do not take extra medicine to make up for a missed dose. · Store the medicine in a closed container at room temperature, away from heat, moisture, and direct light. Drugs and Foods to Avoid:   Ask your doctor or pharmacist before using any other medicine, including over-the-counter medicines, vitamins, and herbal products. · Some medicines can affect how furosemide works.  Tell your doctor if you are also using any of the following:  ¨ Cisplatin, cyclosporine, digoxin, ethacrynic acid, licorice, lithium, methotrexate, or phenytoin  ¨ Adrenocorticotropic hormone (ACTH)  ¨ Laxative  ¨ Medicine to treat an infection  ¨ NSAID pain or arthritis medicine (including aspirin, diclofenac, ibuprofen, indomethacin, naproxen)  ¨ Other blood pressure medicines  ¨ Steroid medicine (including dexamethasone, hydrocortisone, methylprednisolone, prednisolone, prednisone)  ¨ Thyroid medicine  · If you also take sucralfate, allow at least 2 hours between the time you take furosemide and the time you take sucralfate. · Alcohol, narcotic pain medicine, or sleeping pills may cause you to feel more lightheaded, dizzy, or faint when used with this medicine. Warnings While Using This Medicine:   · Tell your doctor if you are pregnant or breastfeeding, or if you have kidney disease, liver disease (including cirrhosis), diabetes, gout, low blood pressure, lupus, an enlarged prostate, trouble urinating, or an allergy to sulfa drugs. Tell your doctor if you are on a low-salt diet. · This medicine may cause the following problems:   ¨ Low levels of minerals in your blood, such as potassium and sodium  ¨ Blood sugar level changes  ¨ Hearing problems  · Make sure any doctor or dentist who treats you knows that you are using this medicine. · This medicine could lower your blood pressure too much, especially when you first use it or if you are dehydrated. Stand or sit up slowly if you feel lightheaded or dizzy. · This medicine may make your skin more sensitive to sunlight. Wear sunscreen. Do not use sunlamps or tanning beds. · Your doctor will do lab tests at regular visits to check on the effects of this medicine. Keep all appointments. · Keep all medicine out of the reach of children. Never share your medicine with anyone.   Possible Side Effects While Using This Medicine:   Call your doctor right away if you notice any of these side effects:  · Allergic reaction: Itching or hives, swelling in your face or hands, swelling or tingling in your mouth or throat, chest tightness, trouble breathing  · Blistering, peeling, red skin rash  · Confusion, weakness, muscle twitching  · Dry mouth, increased thirst, muscle cramps, uneven heartbeat  · Sudden and severe stomach pain, nausea, vomiting, fever, lightheadedness  · Hearing loss, ringing in the ears  · Lightheadedness, dizziness, fainting  · Severe diarrhea  · Unusual bleeding or bruising  · Yellow skin or eyes  If you notice these less serious side effects, talk with your doctor:   · Loss of appetite, stomach cramps  If you notice other side effects that you think are caused by this medicine, tell your doctor. Call your doctor for medical advice about side effects. You may report side effects to FDA at 7-872-FDA-6186  © 2017 [address] Information is for End User's use only and may not be sold, redistributed or otherwise used for commercial purposes. The above information is an  only. It is not intended as medical advice for individual conditions or treatments. Talk to your doctor, nurse or pharmacist before following any medical regimen to see if it is safe and effective for you. Heart Failure: Care Instructions  Your Care Instructions    Heart failure occurs when your heart does not pump as much blood as the body needs. Failure does not mean that the heart has stopped pumping but rather that it is not pumping as well as it should. Over time, this causes fluid buildup in your lungs and other parts of your body. Fluid buildup can cause shortness of breath, fatigue, swollen ankles, and other problems. By taking medicines regularly, reducing sodium (salt) in your diet, checking your weight every day, and making lifestyle changes, you can feel better and live longer. Follow-up care is a key part of your treatment and safety. Be sure to make and go to all appointments, and call your doctor if you are having problems. It's also a good idea to know your test results and keep a list of the medicines you take. How can you care for yourself at home? Medicines  · Be safe with medicines. Take your medicines exactly as prescribed. Call your doctor if you think you are having a problem with your medicine.   · Do not take any vitamins, over-the-counter medicine, or herbal products without talking to your doctor first. Loly Arboleda not take ibuprofen (Advil or Motrin) and naproxen (Aleve) without talking to your doctor first. They could make your heart failure worse. · You may be taking some of the following medicine. ¨ Beta-blockers can slow heart rate, decrease blood pressure, and improve your condition. Taking a beta-blocker may lower your chance of needing to be hospitalized. ¨ Angiotensin-converting enzyme inhibitors (ACEIs) reduce the heart's workload, lower blood pressure, and reduce swelling. Taking an ACEI may lower your chance of needing to be hospitalized again. ¨ Angiotensin II receptor blockers (ARBs) work like ACEIs. Your doctor may prescribe them instead of ACEIs. ¨ Diuretics, also called water pills, reduce swelling. ¨ Potassium supplements replace this important mineral, which is sometimes lost with diuretics. ¨ Aspirin and other blood thinners prevent blood clots, which can cause a stroke or heart attack. You will get more details on the specific medicines your doctor prescribes. Diet  · Your doctor may suggest that you limit sodium to 2,000 milligrams (mg) a day or less. That is less than 1 teaspoon of salt a day, including all the salt you eat in cooking or in packaged foods. People get most of their sodium from processed foods. Fast food and restaurant meals also tend to be very high in sodium. · Ask your doctor how much liquid you can drink each day. You may have to limit liquids. Weight  · Weigh yourself without clothing at the same time each day. Record your weight. Call your doctor if you have a sudden weight gain, such as more than 2 to 3 pounds in a day or 5 pounds in a week. (Your doctor may suggest a different range of weight gain.) A sudden weight gain may mean that your heart failure is getting worse. Activity level  · Start light exercise (if your doctor says it is okay). Even if you can only do a small amount, exercise will help you get stronger, have more energy, and manage your weight and your stress.  Walking is an easy way to get exercise. Start out by walking a little more than you did before. Bit by bit, increase the amount you walk. · When you exercise, watch for signs that your heart is working too hard. You are pushing yourself too hard if you cannot talk while you are exercising. If you become short of breath or dizzy or have chest pain, stop, sit down, and rest.  · If you feel \"wiped out\" the day after you exercise, walk slower or for a shorter distance until you can work up to a better pace. · Get enough rest at night. Sleeping with 1 or 2 pillows under your upper body and head may help you breathe easier. Lifestyle changes  · Do not smoke. Smoking can make a heart condition worse. If you need help quitting, talk to your doctor about stop-smoking programs and medicines. These can increase your chances of quitting for good. Quitting smoking may be the most important step you can take to protect your heart. · Limit alcohol to 2 drinks a day for men and 1 drink a day for women. Too much alcohol can cause health problems. · Avoid getting sick from colds and the flu. Get a pneumococcal vaccine shot. If you have had one before, ask your doctor whether you need another dose. Get a flu shot each year. If you must be around people with colds or the flu, wash your hands often. When should you call for help? Call 911 if you have symptoms of sudden heart failure such as:  · You have severe trouble breathing. · You cough up pink, foamy mucus. · You have a new irregular or rapid heartbeat. Call your doctor now or seek immediate medical care if:  · You have new or increased shortness of breath. · You are dizzy or lightheaded, or you feel like you may faint. · You have sudden weight gain, such as more than 2 to 3 pounds in a day or 5 pounds in a week. (Your doctor may suggest a different range of weight gain.)  · You have increased swelling in your legs, ankles, or feet.   · You are suddenly so tired or weak that you cannot do your usual activities. Watch closely for changes in your health, and be sure to contact your doctor if:  · You develop new symptoms. Where can you learn more? Go to http://angelina-rajiv.info/. Enter R151 in the search box to learn more about \"Heart Failure: Care Instructions. \"  Current as of: April 3, 2017  Content Version: 11.3  © 8261-6826 [employer]. Care instructions adapted under license by [employer] (which disclaims liability or warranty for this information). If you have questions about a medical condition or this instruction, always ask your healthcare professional. [place] [address] any warranty or liability for your use of this information.

## 2017-09-15 NOTE — PROGRESS NOTES
Discharge instructions reviewed with patient and wife. Prescriptions given for lab work and lasix and med info sheets provided for all new medications. Opportunity for questions provided. Patient and wife voiced understanding of all discharge instructions. IVs removed and monitor off at time of discharge.

## 2017-09-15 NOTE — PROGRESS NOTES
Care Management Interventions  PCP Verified by CM: Yes (4 weeks ago)  Mode of Transport at Discharge: Other (see comment) (wife to transport)  Transition of Care Consult (CM Consult): Discharge Planning  Current Support Network: Lives with Spouse  Confirm Follow Up Transport: Family  Plan discussed with Pt/Family/Caregiver: Yes  Freedom of Choice Offered: Yes  Discharge Location  Discharge Placement: Home     Patient to be discharged home today with his wife. He declined home health and states he \"is fine and I do not need it. \" He voices no needs or concerns. Discharged home.

## 2017-09-18 NOTE — ROUTINE PROCESS
CHF F/U call to pt's cell, left detailed message:    PT ID#:   Date:   Question YES NO COMMENTS   Low sodium diet or answered questions? Any short of breath? Fluid restriction: how much?/  questions        Problems sleeping? What medications do you take? What water pill do you take? Do you feel like you are making progress? Do you have any question about DC instructions? How was the care you received during your admission? Were you kept informed of your plan of care? Could we have done anything else to improve your stay? Can you contact a Nurse 24/7 , if any question or problems occur/ Who? Have a PCP/cardio. Appointment? Severino Douglas? Did you weigh today? How much do you weigh? Have you gained any weight? What did have for dinner last night? Do you feel stressed?

## 2017-09-19 ENCOUNTER — PATIENT OUTREACH (OUTPATIENT)
Dept: CASE MANAGEMENT | Age: 82
End: 2017-09-19

## 2017-09-19 NOTE — PROGRESS NOTES
This note will not be viewable in [address] Transition of Care Discharge Follow-up Questionnaire   Date/Time of Call:   September 19, 2017 1:10PM   What was the patient hospitalized for? Patient hospitalized for Acute on chronic diastolic heart failure ([facility] [address]              Does the patient understand his/her diagnosis and/or treatment and what happened during the hospitalization? Patient states understanding of diagnosis and treatment during hospitalization. Did the patient receive discharge instructions? Yes     Review any discharge instructions (see notes in [facility]). Ask patient if they understand these. Do they have any questions? Patient states understanding of discharge instructions, patient states no questions. Were home services ordered (nursing, PT, OT, ST, etc.)? No home health services ordered. If so, has the first visit occurred? If not, why? (Assist with coordination of services if necessary. ) NA         Was any DME ordered? No durable medical equipment ordered. If so, has it been received? If not, why?  (Assist with coordination of arranging DME orders if necessary. ) NA         Complete a review of all medications (new, continued and discontinued meds per the D/C instructions and medication tab in [facility]). Care Coordinator reviewed all medications with patient per [facility], one current medication which have changed. Were all new prescriptions filled? If not, why?  (Assist with obtainment of medications if necessary.) Yes         Does the patient understand the purpose and dosing instructions for all medications? (If patient has questions, provide explanation and education.) Patient states understanding of current medications and dosing instructions. Does the patient have any problems in performing ADLs? (If patient is unable to perform ADLs  what is the limiting factor(s)?   Do they have a support system that can assist? If no support system is present, discuss possible assistance that they may be able to obtain.) Patient states he is independent with ADL's and uses a Cane to assist with ambulation. Patient states spouse assist him as needed. Does the patient have all follow-up appointments scheduled? 7 day f/up with PCP?    7-14 day f/up with specialist?    If f/up has not been made  what actions has the care coordinator made to accomplish this? Has transportation been arranged? [facility] follow-up should be within 7 days of discharge; all others should have PCP follow-up within 7 days of discharge; follow-ups with other specialists should be within 7-14 days of discharge.) Yes      9/21/2017 1:00 PM TANGELA [facility]     9/25/2017 10:15 AM MD Katharine Hendrickson [address]      Yes        NA           Any other questions or concerns expressed by the patient? No further needs identified, patient instructed to call Care Coordinator if further questions or concerns arise. Schedule next appointment with Kenyatta Escalerau Coordinator or refer to RN Case Manager/  per the workflow guidelines. When is care coordinators next follow-up call scheduled? If referred for CCM  what RN care manager was the referral assigned?  NA          NA        NA   OSCAR Call Completed By: ROLO Kim ACO  Care Coordinator

## 2017-10-20 ENCOUNTER — APPOINTMENT (OUTPATIENT)
Dept: GENERAL RADIOLOGY | Age: 82
End: 2017-10-20
Attending: EMERGENCY MEDICINE
Payer: MEDICARE

## 2017-10-20 ENCOUNTER — HOSPITAL ENCOUNTER (EMERGENCY)
Age: 82
Discharge: HOME | End: 2017-10-20
Attending: EMERGENCY MEDICINE
Payer: MEDICARE

## 2017-10-20 VITALS
HEIGHT: 70 IN | DIASTOLIC BLOOD PRESSURE: 70 MMHG | HEART RATE: 68 BPM | RESPIRATION RATE: 20 BRPM | OXYGEN SATURATION: 95 % | BODY MASS INDEX: 36.94 KG/M2 | SYSTOLIC BLOOD PRESSURE: 155 MMHG | TEMPERATURE: 98 F | WEIGHT: 258 LBS

## 2017-10-20 DIAGNOSIS — M25.512 ACUTE PAIN OF LEFT SHOULDER: Primary | ICD-10-CM

## 2017-10-20 DIAGNOSIS — S43.102A SEPARATION OF LEFT ACROMIOCLAVICULAR JOINT, INITIAL ENCOUNTER: ICD-10-CM

## 2017-10-20 PROCEDURE — L3670 SO ACRO/CLAV CAN WEB PRE OTS: HCPCS

## 2017-10-20 PROCEDURE — 73030 X-RAY EXAM OF SHOULDER: CPT

## 2017-10-20 PROCEDURE — 99283 EMERGENCY DEPT VISIT LOW MDM: CPT | Performed by: EMERGENCY MEDICINE

## 2017-10-20 PROCEDURE — 74011250637 HC RX REV CODE- 250/637: Performed by: EMERGENCY MEDICINE

## 2017-10-20 RX ORDER — IBUPROFEN 400 MG/1
400 TABLET ORAL
Status: COMPLETED | OUTPATIENT
Start: 2017-10-20 | End: 2017-10-20

## 2017-10-20 RX ORDER — OXYCODONE HYDROCHLORIDE 5 MG/1
5 TABLET ORAL
Status: COMPLETED | OUTPATIENT
Start: 2017-10-20 | End: 2017-10-20

## 2017-10-20 RX ADMIN — IBUPROFEN 400 MG: 400 TABLET, FILM COATED ORAL at 10:14

## 2017-10-20 RX ADMIN — OXYCODONE HYDROCHLORIDE 5 MG: 5 TABLET ORAL at 10:14

## 2017-10-20 NOTE — ED NOTES
I have reviewed discharge instructions with the patient, spouse and caregiver. The patient, spouse and caregiver verbalized understanding. Patient left ED via Discharge Method: wheelchair to Home with (insert name of family/friend, self, transport this rn pushed wheelchair to waiting room). Opportunity for questions and clarification provided. Patient given 0 scripts.     pt sts he has pain meds at home

## 2017-10-20 NOTE — ED NOTES
Pt to er c/o having left shoulder pain. .. sts he hurt his shoulder when he was pushing himself up in his bed yesterday. .. sts no n/v/d. ..  Pt given ice water to drink after md ok'ed it

## 2017-10-20 NOTE — ED TRIAGE NOTES
Pt states he pushed himself up in the bed yesterday hurting his left shoulder. Pt states the pain is worse with movement. pt states the pain is so great he cannot raise his arm up. Pt denies any shortness of breath or nausea.

## 2017-10-20 NOTE — DISCHARGE INSTRUCTIONS
Wear sling 2 days -- take it off every 3-4 hours and move arm    Follow up with your doctor on Monday or Tuesday for a recheck    Your Xray did not show any broken bones         Shoulder Separation: Care Instructions  Your Care Instructions    A shoulder separation is a tearing of the ligaments that connect two bones of the shoulder--the collarbone (clavicle) and the end of the shoulder blade (acromion). The ligaments can be partially or completely torn. This is usually caused by a blow to the top of the shoulder or a fall onto an outstretched arm. Shoulder injuries can be slow to heal, but with time and effort, your shoulder should get better. Physical therapy can help you regain strength, motion, and flexibility in your shoulder. Follow-up care is a key part of your treatment and safety. Be sure to make and go to all appointments, and call your doctor if you are having problems. It's also a good idea to know your test results and keep a list of the medicines you take. How can you care for yourself at home? · If your doctor put your arm in a sling, wear the sling as directed. Do not take it off before your doctor tells you to. · Take pain medicines exactly as directed. ¨ If the doctor gave you a prescription medicine for pain, take it as prescribed. ¨ If you are not taking a prescription pain medicine, ask your doctor if you can take an over-the-counter medicine. · Rest your shoulder as much as you can. · Put ice or a cold pack on your shoulder for 10 to 20 minutes at a time. Try to do this every 1 to 2 hours for the next 3 days (when you are awake) or until the swelling goes down. Put a thin cloth between the ice and your skin. · You may use warm packs after the first 3 days for 15 to 20 minutes at a time to ease pain. · If your doctor gave you exercises to do at home, do them exactly as instructed. · Do not do anything that makes pain worse. · Go to all follow-up appointments.  You and your doctor will decide if you need further treatment, including surgery. You and your doctor will also decide when to begin physical therapy, if it is needed. When should you call for help? Call your doctor now or seek immediate medical care if:  · Your pain gets a lot worse. · You cannot move your arm. · You have new weakness, numbness, or tingling in your hand or arm. · Your arm or hand is cool or pale or changes color. · Your sling feels too tight, and you cannot loosen it. Watch closely for changes in your health, and be sure to contact your doctor if:  · You have new or increased swelling in your arm. · You have new pain that develops in another area of your arm. For example, you have pain in your hand or elbow. · You do not get better as expected. Where can you learn more? Go to http://angelina-rajiv.info/. Enter S051 in the search box to learn more about \"Shoulder Separation: Care Instructions. \"  Current as of: March 21, 2017  Content Version: 11.3  © 3203-8601 [employer]. Care instructions adapted under license by [employer] (which disclaims liability or warranty for this information). If you have questions about a medical condition or this instruction, always ask your healthcare professional. [place] [address] any warranty or liability for your use of this information. Xr Shoulder Lt Ap/lat Min 2 V    Result Date: 10/20/2017  Left shoulder CLINICAL INDICATION: Left shoulder pain after an injury yesterday FINDINGS: Three views of the left shoulder show no fracture or dislocation. The joint space is well-maintained. The soft tissues are unremarkable. IMPRESSION: No acute osseous abnormality or joint derangement of the left shoulder.

## 2017-10-20 NOTE — ED PROVIDER NOTES
HPI Comments: 42-year-old male. He pushed himself up in bed with his left elbow. Complaining of pain in the left shoulder. Worse with movement. Slightly better with rest.  No deformity. Some swelling    Patient is a 80 y.o. male presenting with shoulder pain. The history is provided by the patient and a relative. Shoulder Pain    The incident occurred 2 days ago. The incident occurred at home. Past Medical History:   Diagnosis Date    Atopic dermatitis 11/21/2012    Atrial fibrillation (HCC)     CAD (coronary artery disease)     Carotid stenosis, bilateral 11/21/2012    50-79%  3-2010    1. Carotid Doppler (6/1/07): Greater than 70% stenosis in proximal LICA. 50% stenosis in right ICA. 2.  CTA of neck (3/15/10): Occluded distal segments of the vertebral arteries bilaterally. Atherosclerosis of the carotid bulbs bilaterally with a 50% stenosis on the left and a stenosis of less than 30% on the right. Small nodule in the right upper lobe near the apex. 3. CTA (8/29/13):  Less than 30% diameter stenosis of the cervical internal carotid arteries bilaterally.  CHF (congestive heart failure) ([address] 11/21/2012    Chronic kidney disease     hx elevated labs    Chronic obstructive pulmonary disease (HCC)     Diabetes (HCC)     Diastolic CHF, acute on chronic ([address] 9/12/2015    1. Echo (9/11/15) : EF 55-60%. Mild LVH. Moderate biatrial enlargement. Moderate mitral/tricuspid regurgitation.  Failed CABG (coronary artery bypass graft) 11/21/2012    GI bleed 1/2015    Hospitalized SF    Gout 11/21/2012    History of tobacco use     [place] (hard of hearing)     Hypercholesterolemia     Hypertension     Hyperuricemia 11/21/2012    Morbid obesity ([address]     PAD (peripheral artery disease) ([address] 11/21/2012    1. Bilateral proximal common iliac PCI (2/21/08):  8.0 X 100 mm Cordis smart stent on right and 10 X 40 mm cordis smart stent on right. Both inflated to 7.0 mm.      Poor historian  Radiologic findings of lung field, abnormal 10/31/2016    1. CT of chest  (11/24/10): Multiple small nodules in the right lobe and stable interstitial prominence. Consistent with chronic lung disease. No evidence of malignancy.  Seizure disorder ([facility] 2013    Shortness of breath dyspnea 2013    Thyroid disease     Unspecified sleep apnea        Past Surgical History:   Procedure Laterality Date    CARDIAC SURG PROCEDURE UNLIST      cath ,cabg ,lexiscan cardiolite 11/10    COLONOSCOPY N/A 2017    COLONOSCOPY  BMI 36 TO BE ADMITTED 17 performed by Shea [facility] END[facility]COPY    HX CATARACT REMOVAL Left     os    HX COLONOSCOPY      HX CORONARY ARTERY BYPASS GRAFT  2007    HX CORONARY STENT PLACEMENT  2008    bilateral iliac artery PCI and stents    HX HEART CATHETERIZATION      left-2007, 2011    HX HEENT  1970s    neck lipoma         Family History:   Problem Relation Age of Onset    Heart Attack Mother    [facility] Other Father      old age   [facility] Other Brother      brain aneurysm    Heart Disease Other      2 children  with heart concerns, 36 & 47 yo    Heart Disease Son        Social History     Social History    Marital status:      Spouse name: N/A    Number of children: N/A    Years of education: N/A     Occupational History    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  , two living children, two children  with coronary artery disease, ages 36 and 48. Has always lived in [address] No pets. ALLERGIES: Review of patient's allergies indicates no known allergies. Review of Systems   Constitutional: Negative. Musculoskeletal: Positive for joint swelling. Vitals:    10/20/17 0930   BP: 158/68   Pulse: 70   Resp: 26   Temp: 98 °F (36.7 °C)   SpO2: 93%   Weight: 117 kg (258 lb)   Height: 5' 10\" (1.778 m)            Physical Exam   Constitutional: He is oriented to person, place, and time. He appears well-developed and well-nourished. Musculoskeletal:        Left shoulder: He exhibits decreased range of motion, tenderness and swelling. He exhibits no effusion, no crepitus and no deformity. Arms:  Neurological: He is alert and oriented to person, place, and time. Nursing note and vitals reviewed. MDM  Number of Diagnoses or Management Options  Diagnosis management comments: Left shoulder pain after pushing himself up. Unlikely mechanism for fracture. There is no deformity noted. Although he does have significant tenderness    X-ray does not show any fracture dislocation. I wonder if he strained AC joint. OR has a musculoskeletal strain. We'll put him in a sling short-term. Have him follow-up with his doctor on Monday or Tuesday.        Amount and/or Complexity of Data Reviewed  Tests in the radiology section of CPT®: ordered and reviewed      ED Course       Procedures

## 2017-11-01 ENCOUNTER — APPOINTMENT (OUTPATIENT)
Dept: GENERAL RADIOLOGY | Age: 82
DRG: 189 | End: 2017-11-01
Attending: EMERGENCY MEDICINE
Payer: MEDICARE

## 2017-11-01 ENCOUNTER — APPOINTMENT (OUTPATIENT)
Dept: CT IMAGING | Age: 82
DRG: 189 | End: 2017-11-01
Attending: EMERGENCY MEDICINE
Payer: MEDICARE

## 2017-11-01 ENCOUNTER — HOSPITAL ENCOUNTER (INPATIENT)
Age: 82
LOS: 2 days | Discharge: HOME | DRG: 189 | End: 2017-11-03
Attending: EMERGENCY MEDICINE | Admitting: INTERNAL MEDICINE
Payer: MEDICARE

## 2017-11-01 DIAGNOSIS — J44.9 CHRONIC OBSTRUCTIVE PULMONARY DISEASE, UNSPECIFIED COPD TYPE (HCC): ICD-10-CM
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
## 2017-11-01 DIAGNOSIS — J96.01 ACUTE RESPIRATORY FAILURE WITH HYPOXIA (HCC): ICD-10-CM

## 2017-11-01 DIAGNOSIS — E66.01 MORBID OBESITY (HCC): Chronic | ICD-10-CM

## 2017-11-01 DIAGNOSIS — J98.4 RESTRICTIVE LUNG DISEASE: Chronic | ICD-10-CM

## 2017-11-01 DIAGNOSIS — N18.30 CKD (CHRONIC KIDNEY DISEASE) STAGE 3, GFR 30-59 ML/MIN (HCC): Chronic | ICD-10-CM

## 2017-11-01 DIAGNOSIS — G47.33 OSA ON CPAP: Chronic | ICD-10-CM

## 2017-11-01 DIAGNOSIS — Z99.89 OSA ON CPAP: Chronic | ICD-10-CM

## 2017-11-01 DIAGNOSIS — R09.02 HYPOXEMIA: ICD-10-CM

## 2017-11-01 DIAGNOSIS — R53.81 DEBILITY: Chronic | ICD-10-CM

## 2017-11-01 DIAGNOSIS — E11.42 TYPE 2 DIABETES MELLITUS WITH PERIPHERAL NEUROPATHY (HCC): Chronic | ICD-10-CM
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
## 2017-11-01 DIAGNOSIS — R09.02 HYPOXIA: Primary | ICD-10-CM
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
## 2017-11-01 LAB
ALBUMIN SERPL-MCNC: 3.1 G/DL (ref 3.2–4.6)
ALBUMIN/GLOB SERPL: 0.6 {RATIO} (ref 1.2–3.5)
ALP SERPL-CCNC: 93 U/L (ref 50–136)
ALT SERPL-CCNC: 13 U/L (ref 12–65)
ANION GAP SERPL CALC-SCNC: 8 MMOL/L (ref 7–16)
APPEARANCE UR: CLEAR
ARTERIAL PATENCY WRIST A: ABNORMAL
AST SERPL-CCNC: 12 U/L (ref 15–37)
ATRIAL RATE: 375 BPM
BASE EXCESS BLDA CALC-SCNC: 3.2 MMOL/L (ref 0–3)
BASOPHILS # BLD: 0 K/UL (ref 0–0.2)
BASOPHILS NFR BLD: 0 % (ref 0–2)
BDY SITE: ABNORMAL
BILIRUB SERPL-MCNC: 0.8 MG/DL (ref 0.2–1.1)
BILIRUB UR QL: NEGATIVE
BNP SERPL-MCNC: 313 PG/ML
BUN SERPL-MCNC: 31 MG/DL (ref 8–23)
CALCIUM SERPL-MCNC: 8.9 MG/DL (ref 8.3–10.4)
CALCULATED R AXIS, ECG10: 36 DEGREES
CALCULATED T AXIS, ECG11: -148 DEGREES
CHLORIDE SERPL-SCNC: 97 MMOL/L (ref 98–107)
CO2 SERPL-SCNC: 32 MMOL/L (ref 21–32)
COHGB MFR BLD: 0.8 % (ref 0.5–1.5)
COLOR UR: YELLOW
CREAT SERPL-MCNC: 1.97 MG/DL (ref 0.8–1.5)
D DIMER PPP FEU-MCNC: 3.56 UG/ML(FEU)
DIAGNOSIS, 93000: NORMAL
DIFFERENTIAL METHOD BLD: ABNORMAL
DO-HGB BLD-MCNC: 10 % (ref 0–5)
EOSINOPHIL # BLD: 0.3 K/UL (ref 0–0.8)
EOSINOPHIL NFR BLD: 4 % (ref 0.5–7.8)
ERYTHROCYTE [DISTWIDTH] IN BLOOD BY AUTOMATED COUNT: 16.9 % (ref 11.9–14.6)
GLOBULIN SER CALC-MCNC: 4.9 G/DL (ref 2.3–3.5)
GLUCOSE BLD STRIP.AUTO-MCNC: 271 MG/DL (ref 65–100)
GLUCOSE BLD STRIP.AUTO-MCNC: 295 MG/DL (ref 65–100)
GLUCOSE SERPL-MCNC: 147 MG/DL (ref 65–100)
GLUCOSE UR STRIP.AUTO-MCNC: NEGATIVE MG/DL
HCO3 BLDA-SCNC: 28 MMOL/L (ref 22–26)
HCT VFR BLD AUTO: 40.1 % (ref 41.1–50.3)
HGB BLD-MCNC: 13.2 G/DL (ref 13.6–17.2)
HGB BLDMV-MCNC: 14 GM/DL (ref 11.7–15)
HGB UR QL STRIP: NEGATIVE
IMM GRANULOCYTES # BLD: 0 K/UL (ref 0–0.5)
IMM GRANULOCYTES NFR BLD: 0 % (ref 0–5)
KETONES UR QL STRIP.AUTO: NEGATIVE MG/DL
LEUKOCYTE ESTERASE UR QL STRIP.AUTO: NEGATIVE
LYMPHOCYTES # BLD: 1 K/UL (ref 0.5–4.6)
LYMPHOCYTES NFR BLD: 14 % (ref 13–44)
MCH RBC QN AUTO: 26.6 PG (ref 26.1–32.9)
MCHC RBC AUTO-ENTMCNC: 32.9 G/DL (ref 31.4–35)
MCV RBC AUTO: 80.7 FL (ref 79.6–97.8)
METHGB MFR BLD: 0.3 % (ref 0–1.5)
MONOCYTES # BLD: 0.3 K/UL (ref 0.1–1.3)
MONOCYTES NFR BLD: 5 % (ref 4–12)
NEUTS SEG # BLD: 5.8 K/UL (ref 1.7–8.2)
NEUTS SEG NFR BLD: 77 % (ref 43–78)
NITRITE UR QL STRIP.AUTO: NEGATIVE
OXYHGB MFR BLDA: 89.3 % (ref 94–97)
PCO2 BLDA: 46 MMHG (ref 35–45)
PH BLDA: 7.41 [PH] (ref 7.35–7.45)
PH UR STRIP: 5.5 [PH] (ref 5–9)
PLATELET # BLD AUTO: 238 K/UL (ref 150–450)
PMV BLD AUTO: 10.2 FL (ref 10.8–14.1)
PO2 BLDA: 59 MMHG (ref 80–105)
POTASSIUM SERPL-SCNC: 4 MMOL/L (ref 3.5–5.1)
PROT SERPL-MCNC: 8 G/DL (ref 6.3–8.2)
PROT UR STRIP-MCNC: NEGATIVE MG/DL
Q-T INTERVAL, ECG07: 410 MS
QRS DURATION, ECG06: 100 MS
QTC CALCULATION (BEZET), ECG08: 442 MS
RBC # BLD AUTO: 4.97 M/UL (ref 4.23–5.67)
SAO2 % BLD: 90 % (ref 92–98.5)
SODIUM SERPL-SCNC: 137 MMOL/L (ref 136–145)
SP GR UR REFRACTOMETRY: 1.01 (ref 1–1.02)
UROBILINOGEN UR QL STRIP.AUTO: 0.2 EU/DL (ref 0.2–1)
VENTRICULAR RATE, ECG03: 70 BPM
WBC # BLD AUTO: 7.4 K/UL (ref 4.3–11.1)

## 2017-11-01 PROCEDURE — 81003 URINALYSIS AUTO W/O SCOPE: CPT | Performed by: INTERNAL MEDICINE

## 2017-11-01 PROCEDURE — 94640 AIRWAY INHALATION TREATMENT: CPT

## 2017-11-01 PROCEDURE — 96374 THER/PROPH/DIAG INJ IV PUSH: CPT | Performed by: EMERGENCY MEDICINE

## 2017-11-01 PROCEDURE — 80053 COMPREHEN METABOLIC PANEL: CPT | Performed by: EMERGENCY MEDICINE

## 2017-11-01 PROCEDURE — 74011636637 HC RX REV CODE- 636/637: Performed by: INTERNAL MEDICINE

## 2017-11-01 PROCEDURE — 74011000250 HC RX REV CODE- 250: Performed by: INTERNAL MEDICINE

## 2017-11-01 PROCEDURE — 93005 ELECTROCARDIOGRAM TRACING: CPT | Performed by: EMERGENCY MEDICINE

## 2017-11-01 PROCEDURE — 86580 TB INTRADERMAL TEST: CPT | Performed by: INTERNAL MEDICINE

## 2017-11-01 PROCEDURE — 99285 EMERGENCY DEPT VISIT HI MDM: CPT | Performed by: EMERGENCY MEDICINE

## 2017-11-01 PROCEDURE — 83880 ASSAY OF NATRIURETIC PEPTIDE: CPT | Performed by: EMERGENCY MEDICINE

## 2017-11-01 PROCEDURE — 36600 WITHDRAWAL OF ARTERIAL BLOOD: CPT

## 2017-11-01 PROCEDURE — 99223 1ST HOSP IP/OBS HIGH 75: CPT | Performed by: INTERNAL MEDICINE

## 2017-11-01 PROCEDURE — 74011250636 HC RX REV CODE- 250/636: Performed by: EMERGENCY MEDICINE

## 2017-11-01 PROCEDURE — 74011250637 HC RX REV CODE- 250/637: Performed by: INTERNAL MEDICINE

## 2017-11-01 PROCEDURE — 65660000000 HC RM CCU STEPDOWN

## 2017-11-01 PROCEDURE — 82962 GLUCOSE BLOOD TEST: CPT

## 2017-11-01 PROCEDURE — 74011000302 HC RX REV CODE- 302: Performed by: INTERNAL MEDICINE

## 2017-11-01 PROCEDURE — 74011250636 HC RX REV CODE- 250/636: Performed by: INTERNAL MEDICINE

## 2017-11-01 PROCEDURE — 70450 CT HEAD/BRAIN W/O DYE: CPT

## 2017-11-01 PROCEDURE — 71010 XR CHEST PORT: CPT

## 2017-11-01 PROCEDURE — 85025 COMPLETE CBC W/AUTO DIFF WBC: CPT | Performed by: EMERGENCY MEDICINE

## 2017-11-01 PROCEDURE — 82803 BLOOD GASES ANY COMBINATION: CPT

## 2017-11-01 PROCEDURE — 77030013140 HC MSK NEB VYRM -A

## 2017-11-01 PROCEDURE — 85379 FIBRIN DEGRADATION QUANT: CPT | Performed by: INTERNAL MEDICINE

## 2017-11-01 PROCEDURE — 74011000250 HC RX REV CODE- 250: Performed by: EMERGENCY MEDICINE

## 2017-11-01 PROCEDURE — 96375 TX/PRO/DX INJ NEW DRUG ADDON: CPT | Performed by: EMERGENCY MEDICINE

## 2017-11-01 PROCEDURE — 36415 COLL VENOUS BLD VENIPUNCTURE: CPT | Performed by: INTERNAL MEDICINE

## 2017-11-01 PROCEDURE — 77030018846 HC SOL IRR STRL H20 ICUM -A

## 2017-11-01 RX ORDER — GUAIFENESIN 100 MG/5ML
81 LIQUID (ML) ORAL DAILY
Status: DISCONTINUED | OUTPATIENT
Start: 2017-11-02 | End: 2017-11-03 | Stop reason: HOSPADM

## 2017-11-01 RX ORDER — TRAVOPROST OPHTHALMIC SOLUTION 0.04 MG/ML
1 SOLUTION OPHTHALMIC 2 TIMES DAILY
Status: DISCONTINUED | OUTPATIENT
Start: 2017-11-01 | End: 2017-11-03 | Stop reason: HOSPADM

## 2017-11-01 RX ORDER — FLUTICASONE PROPIONATE 50 MCG
2 SPRAY, SUSPENSION (ML) NASAL DAILY
Status: DISCONTINUED | OUTPATIENT
Start: 2017-11-02 | End: 2017-11-03 | Stop reason: HOSPADM

## 2017-11-01 RX ORDER — ACETAMINOPHEN 325 MG/1
650 TABLET ORAL
Status: DISCONTINUED | OUTPATIENT
Start: 2017-11-01 | End: 2017-11-03 | Stop reason: HOSPADM

## 2017-11-01 RX ORDER — BUDESONIDE 0.5 MG/2ML
500 INHALANT ORAL
Status: DISCONTINUED | OUTPATIENT
Start: 2017-11-01 | End: 2017-11-03 | Stop reason: HOSPADM

## 2017-11-01 RX ORDER — FUROSEMIDE 10 MG/ML
40 INJECTION INTRAMUSCULAR; INTRAVENOUS ONCE
Status: COMPLETED | OUTPATIENT
Start: 2017-11-01 | End: 2017-11-01

## 2017-11-01 RX ORDER — ALLOPURINOL 300 MG/1
300 TABLET ORAL DAILY
Status: DISCONTINUED | OUTPATIENT
Start: 2017-11-02 | End: 2017-11-03 | Stop reason: HOSPADM

## 2017-11-01 RX ORDER — FUROSEMIDE 40 MG/1
80 TABLET ORAL 2 TIMES DAILY
Status: DISCONTINUED | OUTPATIENT
Start: 2017-11-01 | End: 2017-11-03 | Stop reason: HOSPADM

## 2017-11-01 RX ORDER — PREDNISONE 20 MG/1
20 TABLET ORAL
Status: DISCONTINUED | OUTPATIENT
Start: 2017-11-01 | End: 2017-11-03 | Stop reason: HOSPADM

## 2017-11-01 RX ORDER — IPRATROPIUM BROMIDE AND ALBUTEROL SULFATE 2.5; .5 MG/3ML; MG/3ML
3 SOLUTION RESPIRATORY (INHALATION)
Status: DISCONTINUED | OUTPATIENT
Start: 2017-11-01 | End: 2017-11-03 | Stop reason: HOSPADM

## 2017-11-01 RX ORDER — HYDROCODONE BITARTRATE AND ACETAMINOPHEN 5; 325 MG/1; MG/1
1 TABLET ORAL
Status: DISCONTINUED | OUTPATIENT
Start: 2017-11-01 | End: 2017-11-03 | Stop reason: HOSPADM

## 2017-11-01 RX ORDER — LEVOTHYROXINE SODIUM 50 UG/1
50 TABLET ORAL
Status: DISCONTINUED | OUTPATIENT
Start: 2017-11-02 | End: 2017-11-03 | Stop reason: HOSPADM

## 2017-11-01 RX ORDER — ALBUTEROL SULFATE 0.83 MG/ML
5 SOLUTION RESPIRATORY (INHALATION)
Status: DISCONTINUED | OUTPATIENT
Start: 2017-11-01 | End: 2017-11-03 | Stop reason: HOSPADM

## 2017-11-01 RX ORDER — FUROSEMIDE 10 MG/ML
40 INJECTION INTRAMUSCULAR; INTRAVENOUS
Status: COMPLETED | OUTPATIENT
Start: 2017-11-01 | End: 2017-11-01

## 2017-11-01 RX ORDER — LANOLIN ALCOHOL/MO/W.PET/CERES
325 CREAM (GRAM) TOPICAL
Status: DISCONTINUED | OUTPATIENT
Start: 2017-11-02 | End: 2017-11-03 | Stop reason: HOSPADM

## 2017-11-01 RX ORDER — METOPROLOL SUCCINATE 25 MG/1
12.5 TABLET, EXTENDED RELEASE ORAL DAILY
Status: DISCONTINUED | OUTPATIENT
Start: 2017-11-02 | End: 2017-11-03 | Stop reason: HOSPADM

## 2017-11-01 RX ORDER — INSULIN LISPRO 100 [IU]/ML
INJECTION, SOLUTION INTRAVENOUS; SUBCUTANEOUS
Status: DISCONTINUED | OUTPATIENT
Start: 2017-11-01 | End: 2017-11-03 | Stop reason: HOSPADM

## 2017-11-01 RX ORDER — IPRATROPIUM BROMIDE AND ALBUTEROL SULFATE 2.5; .5 MG/3ML; MG/3ML
3 SOLUTION RESPIRATORY (INHALATION)
Status: COMPLETED | OUTPATIENT
Start: 2017-11-01 | End: 2017-11-01

## 2017-11-01 RX ORDER — NALOXONE HYDROCHLORIDE 0.4 MG/ML
0.4 INJECTION, SOLUTION INTRAMUSCULAR; INTRAVENOUS; SUBCUTANEOUS AS NEEDED
Status: DISCONTINUED | OUTPATIENT
Start: 2017-11-01 | End: 2017-11-03 | Stop reason: HOSPADM

## 2017-11-01 RX ORDER — ISOSORBIDE MONONITRATE 30 MG/1
30 TABLET, EXTENDED RELEASE ORAL DAILY
Status: DISCONTINUED | OUTPATIENT
Start: 2017-11-02 | End: 2017-11-03 | Stop reason: HOSPADM

## 2017-11-01 RX ORDER — GABAPENTIN 100 MG/1
100 CAPSULE ORAL 3 TIMES DAILY
Status: DISCONTINUED | OUTPATIENT
Start: 2017-11-01 | End: 2017-11-03 | Stop reason: HOSPADM

## 2017-11-01 RX ORDER — HYDRALAZINE HYDROCHLORIDE 10 MG/1
10 TABLET, FILM COATED ORAL 2 TIMES DAILY
Status: DISCONTINUED | OUTPATIENT
Start: 2017-11-01 | End: 2017-11-03 | Stop reason: HOSPADM

## 2017-11-01 RX ORDER — SODIUM CHLORIDE 0.9 % (FLUSH) 0.9 %
5-10 SYRINGE (ML) INJECTION EVERY 8 HOURS
Status: DISCONTINUED | OUTPATIENT
Start: 2017-11-01 | End: 2017-11-03 | Stop reason: HOSPADM

## 2017-11-01 RX ORDER — ONDANSETRON 2 MG/ML
4 INJECTION INTRAMUSCULAR; INTRAVENOUS
Status: DISCONTINUED | OUTPATIENT
Start: 2017-11-01 | End: 2017-11-03 | Stop reason: HOSPADM

## 2017-11-01 RX ORDER — ALBUTEROL SULFATE 0.83 MG/ML
5 SOLUTION RESPIRATORY (INHALATION)
Status: DISCONTINUED | OUTPATIENT
Start: 2017-11-01 | End: 2017-11-01

## 2017-11-01 RX ORDER — HEPARIN SODIUM 5000 [USP'U]/ML
5000 INJECTION, SOLUTION INTRAVENOUS; SUBCUTANEOUS EVERY 8 HOURS
Status: DISCONTINUED | OUTPATIENT
Start: 2017-11-01 | End: 2017-11-03 | Stop reason: HOSPADM

## 2017-11-01 RX ORDER — SODIUM CHLORIDE 0.9 % (FLUSH) 0.9 %
5-10 SYRINGE (ML) INJECTION AS NEEDED
Status: DISCONTINUED | OUTPATIENT
Start: 2017-11-01 | End: 2017-11-03 | Stop reason: HOSPADM

## 2017-11-01 RX ADMIN — IPRATROPIUM BROMIDE AND ALBUTEROL SULFATE 3 ML: .5; 3 SOLUTION RESPIRATORY (INHALATION) at 20:38

## 2017-11-01 RX ADMIN — HYDRALAZINE HYDROCHLORIDE 10 MG: 10 TABLET, FILM COATED ORAL at 18:29

## 2017-11-01 RX ADMIN — FUROSEMIDE 40 MG: 10 INJECTION, SOLUTION INTRAMUSCULAR; INTRAVENOUS at 11:29

## 2017-11-01 RX ADMIN — PREDNISONE 20 MG: 20 TABLET ORAL at 18:32

## 2017-11-01 RX ADMIN — HEPARIN SODIUM 5000 UNITS: 5000 INJECTION, SOLUTION INTRAVENOUS; SUBCUTANEOUS at 18:28

## 2017-11-01 RX ADMIN — Medication 5 ML: at 18:33

## 2017-11-01 RX ADMIN — IPRATROPIUM BROMIDE AND ALBUTEROL SULFATE 3 ML: .5; 3 SOLUTION RESPIRATORY (INHALATION) at 08:58

## 2017-11-01 RX ADMIN — FUROSEMIDE 40 MG: 10 INJECTION, SOLUTION INTRAMUSCULAR; INTRAVENOUS at 18:29

## 2017-11-01 RX ADMIN — HYDROCODONE BITARTRATE AND ACETAMINOPHEN 1 TABLET: 5; 325 TABLET ORAL at 21:40

## 2017-11-01 RX ADMIN — GABAPENTIN 100 MG: 100 CAPSULE ORAL at 18:29

## 2017-11-01 RX ADMIN — METHYLPREDNISOLONE SODIUM SUCCINATE 125 MG: 125 INJECTION, POWDER, FOR SOLUTION INTRAMUSCULAR; INTRAVENOUS at 11:30

## 2017-11-01 RX ADMIN — INSULIN LISPRO 6 UNITS: 100 INJECTION, SOLUTION INTRAVENOUS; SUBCUTANEOUS at 21:37

## 2017-11-01 RX ADMIN — INSULIN LISPRO 6 UNITS: 100 INJECTION, SOLUTION INTRAVENOUS; SUBCUTANEOUS at 18:30

## 2017-11-01 RX ADMIN — GABAPENTIN 100 MG: 100 CAPSULE ORAL at 21:40

## 2017-11-01 RX ADMIN — TUBERCULIN PURIFIED PROTEIN DERIVATIVE 5 UNITS: 5 INJECTION, SOLUTION INTRADERMAL at 21:38

## 2017-11-01 RX ADMIN — BUDESONIDE 500 MCG: 0.5 INHALANT RESPIRATORY (INHALATION) at 20:38

## 2017-11-01 RX ADMIN — Medication 10 ML: at 21:41

## 2017-11-01 NOTE — ED TRIAGE NOTES
Pt arrives to this facility via EMS following a fall this AM. Pt reporting weakness this AM. Pt has had multiple falls recently. Pt has a skin tear to his right elbow.

## 2017-11-01 NOTE — CONSULTS
[facility] CONSULT :  11/1/2017    Date of Admission:  11/1/2017    Subjective:     Patient is a 80 y.o.  male presents with confusion and S/P fall. He is super morbidly obese with OHS on BiPAP, restrictive lung disease and chronic debility. His wife is present and helps provide information. He has been in his normal state of health until this morning when he ambulated to the bathroom and fell. She was unable to get him up and called EMS. EMS brought him to the ER for further evaluation. In the ER, CT head was done without acute abnormality. His CXR showed cardiomegaly with no acute abnormality and lungs were clear. He has chronic cellulitis and is debilitated/deconditioned. He was in rehab for ~ 1 month earlier this year and hid wife states that he wakes with a cane at home. He is on 2 lpm with sats in the 90's. He is not on home oxygen. We were asked to see him for hypoxemia. Past Medical History:   Diagnosis Date    Atopic dermatitis 11/21/2012    Atrial fibrillation (HCC)     CAD (coronary artery disease)     Carotid stenosis, bilateral 11/21/2012    50-79%  3-2010    1. Carotid Doppler (6/1/07): Greater than 70% stenosis in proximal LICA. 50% stenosis in right ICA. 2.  CTA of neck (3/15/10): Occluded distal segments of the vertebral arteries bilaterally. Atherosclerosis of the carotid bulbs bilaterally with a 50% stenosis on the left and a stenosis of less than 30% on the right. Small nodule in the right upper lobe near the apex. 3. CTA (8/29/13):  Less than 30% diameter stenosis of the cervical internal carotid arteries bilaterally.  CHF (congestive heart failure) ([address] 11/21/2012    Chronic kidney disease     hx elevated labs    Chronic obstructive pulmonary disease (HCC)     Diabetes (HCC)     Diastolic CHF, acute on chronic ([address] 9/12/2015    1. Echo (9/11/15) : EF 55-60%. Mild LVH. Moderate biatrial enlargement.   Moderate mitral/tricuspid regurgitation.  Failed CABG (coronary artery bypass graft) 2012    GI bleed 2015    Hospitalized [facility]    Gout 2012    History of tobacco use     [place] (hard of hearing)     Hypercholesterolemia     Hypertension     Hyperuricemia 2012    Morbid obesity ([address]     PAD (peripheral artery disease) ([facility] 2012    1. Bilateral proximal common iliac PCI (08):  8.0 X 100 mm Cordis smart stent on right and 10 X 40 mm cordis smart stent on right. Both inflated to 7.0 mm.  Poor historian     Radiologic findings of lung field, abnormal 10/31/2016    1. CT of chest  (11/24/10): Multiple small nodules in the right lobe and stable interstitial prominence. Consistent with chronic lung disease. No evidence of malignancy.     Seizure disorder ([address] 2013    Shortness of breath dyspnea 2013    Thyroid disease     Unspecified sleep apnea       Past Surgical History:   Procedure Laterality Date    CARDIAC SURG PROCEDURE UNLIST      cath ,cabg ,lexiscan cardiolite 11/10    COLONOSCOPY N/A 2017    COLONOSCOPY  BMI 36 TO BE ADMITTED 17 performed by [facility] ENDOSCOPY    HX CATARACT REMOVAL Left     os    HX COLONOSCOPY      HX CORONARY ARTERY BYPASS GRAFT  2007    HX CORONARY STENT PLACEMENT  2008    bilateral iliac artery PCI and stents    HX HEART CATHETERIZATION      left-2007, 2011    HX HEENT  1970s    neck lipoma        Social History   Substance Use Topics    Smoking status: Former Smoker     Packs/day: 1.00     Years: 45.00     Types: Cigarettes     Quit date: 1984    Smokeless tobacco: Never Used      Comment: (stopped smoking in )    Alcohol use No      Family History   Problem Relation Age of Onset    Heart Attack Mother     Other Father      old age   [place]sorio Other Brother      brain aneurysm    Heart Disease Other      2 children  with heart concerns, 36 & 49 yo    Heart Disease Son No Known Allergies   Prior to Admission Medications   Prescriptions Last Dose Informant Patient Reported? Taking? HYDROcodone-acetaminophen (NORCO)  mg tablet   No No   Sig: . Patient taking differently: Take 1 Tab by mouth every six (6) hours as needed. Watson Spies K-DUR 20 mEq tablet   Yes No   Sig: Take 20 mEq by mouth daily. SITagliptin (JANUVIA) 50 mg tablet   No No   Sig: Take 1 Tab by mouth daily. albuterol (PROVENTIL VENTOLIN) 2.5 mg /3 mL (0.083 %) nebulizer solution   Yes No   Si.5 mg by Nebulization route every four (4) hours as needed for Wheezing. allopurinol (ZYLOPRIM) 300 mg tablet   Yes No   Sig: Take  by mouth daily. aspirin 81 mg chewable tablet   No No   Sig: Take 1 Tab by mouth daily. budesonide-formoterol (SYMBICORT) 160-4.5 mcg/actuation HFA inhaler   No No   Sig: Take 2 Puffs by inhalation two (2) times a day. cpap machine kit   Yes No   Sig: by Does Not Apply route. Bilevel    docusate sodium (STOOL SOFTENER) 100 mg capsule   Yes No   Sig: Take 100 mg by mouth as needed. ferrous sulfate 324 mg (65 mg iron) tablet   Yes No   Sig: Take  by mouth Daily (before breakfast). fluticasone (FLONASE) 50 mcg/actuation nasal spray   No No   Si Sprays by Both Nostrils route daily. furosemide (LASIX) 80 mg tablet   No No   Sig: Take 1 Tab by mouth two (2) times a day.   gabapentin (NEURONTIN) 100 mg capsule   No No   Sig: Take 1 Cap by mouth three (3) times daily. glipiZIDE (GLUCOTROL) 5 mg tablet   Yes No   Sig: Take 5 mg by mouth two (2) times a day. hydrALAZINE (APRESOLINE) 10 mg tablet   No No   Sig: Take 1 Tab by mouth two (2) times a day. ipratropium-albuterol (COMBIVENT RESPIMAT)  mcg/actuation inhaler   No No   Sig: Take 1 Puff by inhalation every six (6) hours. isosorbide mononitrate ER (IMDUR) 30 mg tablet   No No   Sig: Take 1 Tab by mouth daily.    levothyroxine (SYNTHROID) 50 mcg tablet   No No   Sig: Take 1 Tab by mouth Daily (before breakfast). magnesium oxide (MAG-OX) 400 mg tablet   No No   Sig: Take 1 Tab by mouth daily. metoprolol succinate (TOPROL-XL) 25 mg XL tablet   No No   Sig: Pt takes 1/2 tab po daily. mupirocin calcium (BACTROBAN) 2 % topical cream   No No   Sig: Apply  to affected area two (2) times a day. oxyCODONE IR (OXY-IR) 15 mg immediate release tablet   No No   Sig: Take 1 Tab by mouth every eight (8) hours as needed for Pain. Max Daily Amount: 45 mg.   pantoprazole (PROTONIX) 40 mg tablet   No No   Sig: Take 1 Tab by mouth Before breakfast and dinner. Patient taking differently: Take 40 mg by mouth as needed. sertraline (ZOLOFT) 50 mg tablet   No No   Sig: Take one tablet each evening for anxiety  Indications: GENERALIZED ANXIETY DISORDER   Patient taking differently: as needed. Take one tablet each evening for anxiety  Indications: GENERALIZED ANXIETY DISORDER   travoprost (TRAVATAN Z) 0.004 % ophthalmic solution   Yes No   Sig: Administer 1 Drop to both eyes two (2) times a day. triamcinolone (ARISTOCORT) 0.5 % topical cream   No No   Sig: Apply  to affected area two (2) times a day. use thin layer      Facility-Administered Medications: None       MEDS SCHEDULED:    No current facility-administered medications for this encounter. Current Outpatient Prescriptions   Medication Sig    gabapentin (NEURONTIN) 100 mg capsule Take 1 Cap by mouth three (3) times daily.  albuterol (PROVENTIL VENTOLIN) 2.5 mg /3 mL (0.083 %) nebulizer solution 2.5 mg by Nebulization route every four (4) hours as needed for Wheezing.  budesonide-formoterol (SYMBICORT) 160-4.5 mcg/actuation HFA inhaler Take 2 Puffs by inhalation two (2) times a day.  ipratropium-albuterol (COMBIVENT RESPIMAT)  mcg/actuation inhaler Take 1 Puff by inhalation every six (6) hours.  hydrALAZINE (APRESOLINE) 10 mg tablet Take 1 Tab by mouth two (2) times a day.     furosemide (LASIX) 80 mg tablet Take 1 Tab by mouth two (2) times a day.    triamcinolone (ARISTOCORT) 0.5 % topical cream Apply  to affected area two (2) times a day. use thin layer    mupirocin calcium (BACTROBAN) 2 % topical cream Apply  to affected area two (2) times a day.  metoprolol succinate (TOPROL-XL) 25 mg XL tablet Pt takes 1/2 tab po daily.  magnesium oxide (MAG-OX) 400 mg tablet Take 1 Tab by mouth daily.  pantoprazole (PROTONIX) 40 mg tablet Take 1 Tab by mouth Before breakfast and dinner. (Patient taking differently: Take 40 mg by mouth as needed.)    HYDROcodone-acetaminophen (NORCO)  mg tablet . (Patient taking differently: Take 1 Tab by mouth every six (6) hours as needed. Christina Banks )    oxyCODONE IR (OXY-IR) 15 mg immediate release tablet Take 1 Tab by mouth every eight (8) hours as needed for Pain. Max Daily Amount: 45 mg.    aspirin 81 mg chewable tablet Take 1 Tab by mouth daily.  levothyroxine (SYNTHROID) 50 mcg tablet Take 1 Tab by mouth Daily (before breakfast).  ferrous sulfate 324 mg (65 mg iron) tablet Take  by mouth Daily (before breakfast).  SITagliptin (JANUVIA) 50 mg tablet Take 1 Tab by mouth daily.  fluticasone (FLONASE) 50 mcg/actuation nasal spray 2 Sprays by Both Nostrils route daily.  sertraline (ZOLOFT) 50 mg tablet Take one tablet each evening for anxiety  Indications: GENERALIZED ANXIETY DISORDER (Patient taking differently: as needed. Take one tablet each evening for anxiety  Indications: GENERALIZED ANXIETY DISORDER)    allopurinol (ZYLOPRIM) 300 mg tablet Take  by mouth daily.  docusate sodium (STOOL SOFTENER) 100 mg capsule Take 100 mg by mouth as needed.  cpap machine kit by Does Not Apply route. Bilevel 12/8    isosorbide mononitrate ER (IMDUR) 30 mg tablet Take 1 Tab by mouth daily.  travoprost (TRAVATAN Z) 0.004 % ophthalmic solution Administer 1 Drop to both eyes two (2) times a day.  glipiZIDE (GLUCOTROL) 5 mg tablet Take 5 mg by mouth two (2) times a day.     K-DUR 20 mEq tablet Take 20 mEq by mouth daily. Review of Systems  Constitutional: positive for fatigue, malaise and falling, super morbid obesity  Respiratory: positive for dyspnea on exertion  Cardiovascular: negative for chest pain, chest pressure/discomfort, irregular heart beats, near-syncope, syncope  Gastrointestinal: negative for reflux symptoms, nausea, vomiting, change in bowel habits, melena, diarrhea, constipation, abdominal pain and jaundice  Musculoskeletal:positive for back pain, deconditioning and severe debility    Objective:     Vitals:    11/01/17 0731 11/01/17 0859 11/01/17 0907 11/01/17 1129   BP: 169/72  154/68 177/74   Pulse: 66  71 69   Resp: 26  21    Temp:   98.7 °F (37.1 °C)    SpO2: 94% 93%     Weight:       Height:                   PHYSICAL EXAM     Physical Exam:   General:  Alert, super morbidly obese   Eyes:  Conjunctivae/corneas clear. Nose: Nares patent. Mucosa pink/moist.    Mouth/Throat: Lips, mucosa, and tongue pink and intact. Neck: Supple, symmetrical.   Respiratory:   CESAR, clear posteriorly to auscultation bilaterally on 2 lpm   Cardiovascular:  Regular rate and rhythm, S1, S2, no murmur, click, rub or gallop. Telemetry monitor:NST   GI:   Soft, non-tender. Bowel sounds active X 4 Q. massive   Extremities: Extremities with erythema > left than right   Pulses: 2+ and symmetric all extremities. Skin: Skin color, texture, turgor normal. No rashes or lesions       Neurologic: 2+ strength bilateral upper and lower extremities. Alert and oriented. CHEST X-RAYS:      CULTURES: NTD    LABS    Recent Labs      11/01/17   0647   WBC  7.4   HGB  13.2*   HCT  40.1*   PLT  238     Recent Labs      11/01/17   0647   NA  137   K  4.0   CL  97*   GLU  147*   CO2  32   BUN  31*   CREA  1.97*     Recent Labs      11/01/17   0746   PH  7.41   PCO2  46*   PO2  59*   HCO3  28*       Chest CT 2010  IMPRESSION: Multiple small nodules in the right lobe and stable interstitial   prominence.  Consistent with chronic lung disease. No evidence of malignancy.        Echocardiogram  SUMMARY:    -  Left ventricle: Systolic function was normal. Ejection fraction was  estimated in the range of 55 % to 60 %. There were no regional wall motion  abnormalities. There was mild concentric hypertrophy. -  Left atrium: The atrium was moderately dilated. -  Inferior vena cava, hepatic veins: The inferior vena cava was mildly  dilated. The respirophasic change in diameter was less than 50%. -  Aortic valve: There was mild regurgitation. -  Mitral valve: There was moderate regurgitation. The mitral valve area by  pressure half time was 2.47 cm2. The findings were most consistent with mild  mitral stenosis. -  Tricuspid valve: There was moderate regurgitation. Assessment:     Hospital Problems  Date Reviewed: 11/1/2017          Codes Class Noted POA    Restrictive lung disease (Chronic) ICD-10-CM: J98.4  ICD-9-CM: 518.89  11/1/2017 Yes        Type 2 diabetes mellitus with peripheral neuropathy (HCC) (Chronic) ICD-10-CM: E11.42  ICD-9-CM: 250.60, 357.2  11/5/2015 Yes        ROBERTO on CPAP (Chronic) ICD-10-CM: G47.33, Z99.89  ICD-9-CM: 327.23, V46.8  2/20/2015 Yes    Overview Signed 2/20/2015 10:42 AM by Flakita Patino NP     Patient is on BiPAP, no supplementary oxygen             Morbid obesity ([address] (Chronic) ICD-10-CM: E66.01  ICD-9-CM: 278.01  1/21/2015 Yes        CKD (chronic kidney disease) stage 3, GFR 30-59 ml/min (Chronic) ICD-10-CM: N18.3  ICD-9-CM: 585.3  9/18/2013 Yes        Debility (Chronic) ICD-10-CM: R53.81  ICD-9-CM: 799.3  8/5/2013 Yes                Plan:     Weight loss  Continue BiPAP Q HS  Severely debilitated and suspect he needs rehab  2 lpm PRN  Add nebulizers  He may need home oxygen. Weight loss imperative. Extremely deconditioned and morbidly obese.    Hospitalist for various other medical issues: cellulitis, debility, CKD falls       [employer], NP-C  More than 50% of time documented was spent in face-to-face contact with the patient and in the care of the patient on the floor/unit where the patient is located. Lungs:  Minimal bibasilar crackles  Heart:  RRR with no Murmur/Rubs/Gallops    Additional Comments:    Patient still appears somewhat sedated. ABG compensated. On pain medication at home for shoulder pain that could be contributing. Also wife reports he c/o dysuria when using urinal here this am. UTI a possibility. Check UA. Agree with hospital admit to w/u fall and AMS from unknown cause. We will follow and make sure COPD is treated adequately. (pulmicort and duoneb to mimic his home regimen). Asked wife to bring in home CPAP to wear at night. I have spoken with and examined the patient. I agree with the above assessment and plan as documented.     Samanta Chaney MD

## 2017-11-01 NOTE — PROGRESS NOTES
Spiritual Care Assessment/Progress Notes    Nahed Fee [id number]  [id number]    2/12/1932  80 y.o.  male    Patient Telephone Number: [phone] (home)   Jehovah's witness Affiliation: [facility]   Language: English   Extended Emergency Contact Information  Primary Emergency Contact: Eulalia Navarro  Address: [address] Phone: [phone]  Relation: Spouse   Patient Active Problem List    Diagnosis Date Noted    Restrictive lung disease 11/01/2017    Hypoxemia 11/01/2017    Fall in home 11/01/2017    CHF (congestive heart failure) ([address] 09/14/2017    Acute on chronic diastolic heart failure ([address] 09/11/2017    Cellulitis of left lower extremity 08/22/2017    Acute on chronic diastolic (congestive) heart failure 08/21/2017    Hypoxia 08/21/2017    Thrombocytopenia ([address] 08/21/2017    Acute respiratory failure with hypoxia ([address] 08/04/2017    Upper GI bleed 01/22/2017    Coronary atherosclerosis of native coronary vessel 10/31/2016    Falls frequently 09/17/2016    Dyspnea 01/21/2016    Type 2 diabetes mellitus with peripheral neuropathy ([address] 11/05/2015    Chronic diastolic congestive heart failure ([address] 09/12/2015    Anemia 09/10/2015    Atherosclerosis of native arteries of extremity with intermittent claudication ([address] 04/17/2015    Atherosclerosis of artery of extremity with ulceration ([address] 04/17/2015    ROBERTO on CPAP 02/20/2015    Colon, diverticulosis 01/24/2015    Chronic pain 01/22/2015    GI bleed 01/21/2015    Morbid obesity ([address] 01/21/2015    Diabetic foot ulcer ([address] 01/21/2015    Seizure disorder ([address] 05/22/2014    CKD (chronic kidney disease) stage 3, GFR 30-59 ml/min 09/18/2013    Polycythemia 09/18/2013    Hyperlipidemia 08/05/2013    Debility 08/05/2013    Essential hypertension 11/21/2012    Persistent atrial fibrillation ([address] 11/21/2012    Peripheral neuropathy 11/21/2012    COPD (chronic obstructive pulmonary disease) ([facility] 11/21/2012    PAD (peripheral artery disease) ([facility] 11/21/2012    Carotid stenosis, bilateral 11/21/2012    Gout 11/21/2012        Date: 11/1/2017       Level of Gnosticist/Spiritual Activity:  []         Involved in frieda tradition/spiritual practice    []         Not involved in frieda tradition/spiritual practice  []         Spiritually oriented    []         Claims no spiritual orientation    []         seeking spiritual identity  []         Feels alienated from Mosque practice/tradition  []         Feels angry about Mosque practice/tradition  [x]         Spirituality/Mosque tradition is not a resource for coping at this time. []         Not able to assess due to medical condition    Services Provided Today:  []         crisis intervention    []         reading Scriptures  []         spiritual assessment    []         prayer  []         empathic listening/emotional support  []         rites and rituals (cite in comments)  []         life review     []         Mosque support  []         theological development   []         advocacy  []         ethical dialog     []         blessing  []         bereavement support    []         support to family  []         anticipatory grief support   []         help with AMD  []         spiritual guidance    []         meditation      Spiritual Care Needs  []         Emotional Support  []         Spiritual/Gnosticist Care  []         Loss/Adjustment  []         Advocacy/Referral                /Ethics  [x]         No needs expressed at               this time  []         Other: (note in               comments)  [address]  []         Follow up visits with               pt/family  []         Provide materials  []         Schedule sacraments  []         Contact Community               Clergy  [x]         Follow up as needed  []         Other: (note in               comments)     Comments: Patient expressed no needs at this time.  offered card; Patient took it and said, \"I'll give this to [facility] when she gets here. \"    Ronan Kong

## 2017-11-01 NOTE — PROGRESS NOTES
Patient received to room 823 from ER  Alert and oriented  On 4l high flow  Grand daughter at bedside

## 2017-11-01 NOTE — Clinical Note
Patient Class[de-identified] Observation [691] Type of Bed: Remote Telemetry [29] Reason for Observation: acute hypoxia Admitting Diagnosis: Hypoxia [842457] Admitting Physician: Víctor Piedra [address] [place] [address] [[id number]] Attending Physician: Víctor Piedra [address] [place] [address] [[id number]]

## 2017-11-01 NOTE — PROGRESS NOTES
TRANSFER - IN REPORT:    Verbal report received from Pineda Escalona RN(name) on [facility]  being received from ER(unit) for routine progression of care      Report consisted of patients Situation, Background, Assessment and   Recommendations(SBAR). Information from the following report(s) SBAR was reviewed with the receiving nurse. Opportunity for questions and clarification was provided. Assessment completed upon patients arrival to unit and care assumed.

## 2017-11-01 NOTE — IP AVS SNAPSHOT
[address] 
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  
  
Phone:  [phone] Where:  [address] Follow up with [address] Phone:  [phone] Where:  [address] [address] Monday Nov 06, 2017 LAB with [address] [place] [address] LAB at  9:20 AM  
Where:  [address]) Monday Nov 13, 2017 Office Visit with Ming Hu MD at  2:15 PM  
Where:  [address]) Wednesday Nov 15, 2017 CPAP with PP CPAP RESOURCE at  1:40 PM  
Where:  [address] ([facility]) Monday Dec 11, 2017 Office Visit with Sherri Martinez MD at  2:00 PM  
Where:  [facility] ([address] [place] [address]) Discharge Orders None A check chandler indicates which time of day the medication should be taken. My Medications TAKE these medications as instructed Instructions Each Dose to Equal  
 Morning Noon Evening Bedtime  
 albuterol 2.5 mg /3 mL (0.083 %) nebulizer solution Commonly known as:  PROVENTIL VENTOLIN Your last dose was:  11/03/17 11:30am  
 Your next dose is:  Per as needed schedule 2.5 mg by Nebulization route every four (4) hours as needed for Wheezing. 2.5 mg  
    
   
   
   
  
 allopurinol 300 mg tablet Commonly known as:  Alexandre Mauricior Your last dose was:  11/03/17 am  
Your next dose is:  11/04/17 am  
   
 Take  by mouth daily. aspirin 81 mg chewable tablet Your last dose was:  11/03/17 am  
Your next dose is:  11/04/17 am  
   
 Take 1 Tab by mouth daily. 81 mg  
    
  
   
   
   
  
 budesonide-formoterol 160-4.5 mcg/actuation Hfaa Commonly known as:  SYMBICORT Your next dose is:  11/03/17 8pm  
   
 Take 2 Puffs by inhalation two (2) times a day. 2 Puff  
    
  
   
   
   
  
  
 cpap machine kit  
   
 by Does Not Apply route. Bilevel 12/8  
     
   
   
   
  
 ferrous sulfate 324 mg (65 mg iron) tablet Your last dose was:  11/03/17 am  
Your next dose is:  11/04/17 am  
   
 Take  by mouth Daily (before breakfast). fluticasone 50 mcg/actuation nasal spray Commonly known as:  Juan Daniel Dunn Your last dose was:  11/03/17 am  
Your next dose is:  11/04/17 am  
   
 2 Sprays by Both Nostrils route daily. 2 Spray  
    
  
   
   
   
  
 furosemide 80 mg tablet Commonly known as:  LASIX Your last dose was:  11/03/17 am  
Your next dose is:  11/03/17 6pm  
   
 Take 1 Tab by mouth two (2) times a day. 80 mg  
    
  
   
   
  
   
  
 gabapentin 100 mg capsule Commonly known as:  NEURONTIN Your last dose was:  11/03/17 am  
Your next dose is:  11/03/17 4pm  
   
 Take 1 Cap by mouth three (3) times daily. 100 mg  
    
  
   
   
  
   
  
  
 glipiZIDE 5 mg tablet Commonly known as:  Carin Barrier Your next dose is:  Resume home schedule Take 5 mg by mouth two (2) times a day. 5 mg  
    
  
   
   
  
   
  
 hydrALAZINE 10 mg tablet Commonly known as:  APRESOLINE Your last dose was:  11/03/17 am  
 Your next dose is:  11/03/17 6pm  
   
 Take 1 Tab by mouth two (2) times a day. 10 mg HYDROcodone-acetaminophen  mg tablet Commonly known as:  Mimi Segura Your last dose was:  11/03/17 9:30am  
   
 .  
     
  
   
   
   
  
 ipratropium-albuterol  mcg/actuation inhaler Commonly known as:  Linda Oppelo Your next dose is:  Resume home schedule Take 1 Puff by inhalation every six (6) hours. 1 Puff  
    
  
   
  
   
  
   
  
  
 isosorbide mononitrate ER 30 mg tablet Commonly known as:  IMDUR Your last dose was:  11/03/17 am  
Your next dose is:  11/04/17 am  
   
 Take 1 Tab by mouth daily. 30 mg  
    
  
   
   
   
  
 K-DUR 20 mEq tablet Generic drug:  potassium chloride Your next dose is:  Resume home schedule Take 20 mEq by mouth daily. 20 mEq  
    
  
   
   
   
  
 levothyroxine 50 mcg tablet Commonly known as:  SYNTHROID Your last dose was:  11/03/17 am  
Your next dose is:  11/04/17 am  
   
 Take 1 Tab by mouth Daily (before breakfast). 50 mcg  
    
  
   
   
   
  
 magnesium oxide 400 mg tablet Commonly known as:  MAG-OX Your next dose is:  Resume home schedule Take 1 Tab by mouth daily. 400 mg  
    
  
   
   
   
  
 metoprolol succinate 25 mg XL tablet Commonly known as:  TOPROL-XL Your last dose was:  11/03/17 am  
Your next dose is:  11/04/17 am  
   
 Pt takes 1/2 tab po daily. mupirocin calcium 2 % topical cream  
Commonly known as:  [facility] Your next dose is:  Resume home schedule Apply  to affected area two (2) times a day. oxyCODONE IR 15 mg immediate release tablet Commonly known as:  OXY-IR Your next dose is:  Per as needed schedule Take 1 Tab by mouth every eight (8) hours as needed for Pain. Max Daily Amount: 45 mg.  
 15 mg  
    
   
   
   
  
 pantoprazole 40 mg tablet Commonly known as:  PROTONIX Your next dose is:  11/03/17 4pm  
   
 Take 1 Tab by mouth Before breakfast and dinner. 40 mg  
    
  
   
   
  
   
  
 sertraline 50 mg tablet Commonly known as:  ZOLOFT Your next dose is:  Resume home schedule Take one tablet each evening for anxiety  Indications: GENERALIZED ANXIETY DISORDER SITagliptin 50 mg tablet Commonly known as:  Tabitha Pipe Your next dose is:  Resume home schedule Take 1 Tab by mouth daily. 50 mg STOOL SOFTENER 100 mg capsule Generic drug:  docusate sodium Your next dose is:  Per as needed schedule Take 100 mg by mouth as needed. 100 mg  
    
   
   
   
  
 TRAVATAN Z 0.004 % ophthalmic solution Generic drug:  travoprost  
Your next dose is:  11/03/17 6pm  
   
 Administer 1 Drop to both eyes two (2) times a day. 1 Drop  
    
  
   
   
  
   
  
 triamcinolone 0.5 % topical cream  
Commonly known as:  ARISTOCORT Your next dose is:  Resume home schedule Apply  to affected area two (2) times a day. use thin layer Discharge Instructions DISCHARGE SUMMARY from Nurse PATIENT INSTRUCTIONS: 
 
After general anesthesia or intravenous sedation, for 24 hours or while taking prescription Narcotics: · Limit your activities · Do not drive and operate hazardous machinery · Do not make important personal or business decisions · Do  not drink alcoholic beverages · If you have not urinated within 8 hours after discharge, please contact your surgeon on call. Report the following to your surgeon: 
· Excessive pain, swelling, redness or odor of or around the surgical area · Temperature over 100.5 · Nausea and vomiting lasting longer than 4 hours or if unable to take medications · Any signs of decreased circulation or nerve impairment to extremity: change in color, persistent  numbness, tingling, coldness or increase pain · Any questions What to do at Home: 
Recommended activity: Activity as tolerated. FALL PRECAUTIONS If you experience any of the following symptoms temp >101.5, unrelieved pain, nausea or vomiting, shortness of breath or fatigue not relieved with rest, please follow up with MD. 
 
*  Please give a list of your current medications to your Primary Care Provider. *  Please update this list whenever your medications are discontinued, doses are 
    changed, or new medications (including over-the-counter products) are added. *  Please carry medication information at all times in case of emergency situations. These are general instructions for a healthy lifestyle: No smoking/ No tobacco products/ Avoid exposure to second hand smoke Surgeon General's Warning:  Quitting smoking now greatly reduces serious risk to your health. Obesity, smoking, and sedentary lifestyle greatly increases your risk for illness A healthy diet, regular physical exercise & weight monitoring are important for maintaining a healthy lifestyle You may be retaining fluid if you have a history of heart failure or if you experience any of the following symptoms:  Weight gain of 3 pounds or more overnight or 5 pounds in a week, increased swelling in our hands or feet or shortness of breath while lying flat in bed. Please call your doctor as soon as you notice any of these symptoms; do not wait until your next office visit. Recognize signs and symptoms of STROKE: 
 
F-face looks uneven A-arms unable to move or move unevenly S-speech slurred or non-existent T-time-call 911 as soon as signs and symptoms begin-DO NOT go Back to bed or wait to see if you get better-TIME IS BRAIN. Warning Signs of HEART ATTACK Call 911 if you have these symptoms: 
? Chest discomfort.  Most heart attacks involve discomfort in the center of the chest that lasts more than a few minutes, or that goes away and comes back. It can feel like uncomfortable pressure, squeezing, fullness, or pain. ? Discomfort in other areas of the upper body. Symptoms can include pain or discomfort in one or both arms, the back, neck, jaw, or stomach. ? Shortness of breath with or without chest discomfort. ? Other signs may include breaking out in a cold sweat, nausea, or lightheadedness. Don't wait more than five minutes to call [address]! Fast action can save your life. Calling 911 is almost always the fastest way to get lifesaving treatment. Emergency Medical Services staff can begin treatment when they arrive  up to an hour sooner than if someone gets to the hospital by car. The discharge information has been reviewed with the patient. The patient verbalized understanding. Discharge medications reviewed with the patient and appropriate educational materials and side effects teaching were provided. Preventing Falls: Care Instructions Your Care Instructions Getting around your home safely can be a challenge if you have injuries or health problems that make it easy for you to fall. Loose rugs and furniture in walkways are among the dangers for many older people who have problems walking or who have poor eyesight. People who have conditions such as arthritis, osteoporosis, or dementia also have to be careful not to fall. You can make your home safer with a few simple measures. Follow-up care is a key part of your treatment and safety. Be sure to make and go to all appointments, and call your doctor if you are having problems. It's also a good idea to know your test results and keep a list of the medicines you take. How can you care for yourself at home? Taking care of yourself · You may get dizzy if you do not drink enough water.  To prevent dehydration, drink plenty of fluids, enough so that your urine is light yellow or clear like water. Choose water and other caffeine-free clear liquids. If you have kidney, heart, or liver disease and have to limit fluids, talk with your doctor before you increase the amount of fluids you drink. · Exercise regularly to improve your strength, muscle tone, and balance. Walk if you can. Swimming may be a good choice if you cannot walk easily. · Have your vision and hearing checked each year or any time you notice a change. If you have trouble seeing and hearing, you might not be able to avoid objects and could lose your balance. · Know the side effects of the medicines you take. Ask your doctor or pharmacist whether the medicines you take can affect your balance. Sleeping pills or sedatives can affect your balance. · Limit the amount of alcohol you drink. Alcohol can impair your balance and other senses. · Ask your doctor whether calluses or corns on your feet need to be removed. If you wear loose-fitting shoes because of calluses or corns, you can lose your balance and fall. · Talk to your doctor if you have numbness in your feet. Preventing falls at home · Remove raised doorway thresholds, throw rugs, and clutter. Repair loose carpet or raised areas in the floor. · Move furniture and electrical cords to keep them out of walking paths. · Use nonskid floor wax, and wipe up spills right away, especially on ceramic tile floors. · If you use a walker or cane, put rubber tips on it. If you use crutches, clean the bottoms of them regularly with an abrasive pad, such as steel wool. · Keep your house well lit, especially Yunier Fake, and outside walkways. Use night-lights in areas such as hallways and bathrooms. Add extra light switches or use remote switches (such as switches that go on or off when you clap your hands) to make it easier to turn lights on if you have to get up during the night. · Install sturdy handrails on stairways. · Move items in your cabinets so that the things you use a lot are on the lower shelves (about waist level). · Keep a cordless phone and a flashlight with new batteries by your bed. If possible, put a phone in each of the main rooms of your house, or carry a cell phone in case you fall and cannot reach a phone. Or, you can wear a device around your neck or wrist. You push a button that sends a signal for help. · Wear low-heeled shoes that fit well and give your feet good support. Use footwear with nonskid soles. Check the heels and soles of your shoes for wear. Repair or replace worn heels or soles. · Do not wear socks without shoes on wood floors. · Walk on the grass when the sidewalks are slippery. If you live in an area that gets snow and ice in the winter, sprinkle salt on slippery steps and sidewalks. Preventing falls in the bath · Install grab bars and nonskid mats inside and outside your shower or tub and near the toilet and sinks. · Use shower chairs and bath benches. · Use a hand-held shower head that will allow you to sit while showering. · Get into a tub or shower by putting the weaker leg in first. Get out of a tub or shower with your strong side first. 
· Repair loose toilet seats and consider installing a raised toilet seat to make getting on and off the toilet easier. · Keep your bathroom door unlocked while you are in the shower. Where can you learn more? Go to http://angelina-rajiv.info/. Enter 0476 [id number] in the search box to learn more about \"Preventing Falls: Care Instructions. \" Current as of: May 12, 2017 Content Version: 11.4 © 6647-4825 Healthwise, Incorporated. Care instructions adapted under license by [employer] (which disclaims liability or warranty for this information).  If you have questions about a medical condition or this instruction, always ask your healthcare professional. Paula Quinter[employer] disclaims any warranty or liability for your use of this information. Learning About Hypoxemia What is hypoxemia? Hypoxemia means that you don't have enough oxygen in your blood. It's a result of diseases that affect your heart or lungs. These include heart failure, COPD, and pulmonary fibrosis (scarring of the lungs). Being at high altitudes can also lead to hypoxemia. What happens when you have hypoxemia? Oxygen gets into your blood through your lungs. Your blood carries the oxygen to all parts of your body. When you have too little oxygen in your blood, your body doesn't get enough of it. With too little oxygen, your heart and other parts of your body don't work very well. What are the symptoms? In addition to the symptoms of whatever is causing your hypoxemia, you may: · Get tired quickly. · Be short of breath when you are active. · Feel like your heart is pounding or racing. · Feel weak or dizzy. · Become confused. How is hypoxemia treated? Your doctor will do tests to find out how much oxygen is in your blood. He or she will look for the cause of your hypoxemia and treat that problem. For example, if you have heart failure, you may need medicines that help your heart pump better. · If your hypoxemia is not severe, your doctor may give you oxygen through a mask or nasal cannula (say \"JACOB-pito-milli\"). A cannula is a thin tube with two openings that fit just inside your nose. · If your hypoxemia is severe, you may have a breathing tube put into your windpipe. The breathing tube is attached to a machine that pushes air into your lungs. This machine is called a ventilator. · If you have a long-term problem with hypoxemia, your doctor may recommend that you use oxygen regularly. Some people need it all the time. Others need it from time to time throughout the day or overnight. Your doctor will tell you how much oxygen you need and how often to use it. Follow-up care is a key part of your treatment and safety. Be sure to make and go to all appointments, and call your doctor if you are having problems. It's also a good idea to know your test results and keep a list of the medicines you take. Where can you learn more? Go to http://angelina-rajiv.info/. Enter M375 in the search box to learn more about \"Learning About Hypoxemia. \" Current as of: May 12, 2017 Content Version: 11.4 © 7617-9518 [employer]. Care instructions adapted under license by [employer] (which disclaims liability or warranty for this information). If you have questions about a medical condition or this instruction, always ask your healthcare professional. [address] any warranty or liability for your use of this information. ___________________________________________________________________________________________________________________________________ readeohart Announcement We are excited to announce that we are making your provider's discharge notes available to you in Nanomix. You will see these notes when they are completed and signed by the physician that discharged you from your recent hospital stay. If you have any questions or concerns about any information you see in Nanomix, please call the Health Information Department where you were seen or reach out to your Primary Care Provider for more information about your plan of care. Introducing [facility]! Sanaz Li introduces Nanomix patient portal. Now you can access parts of your medical record, email your doctor's office, and request medication refills online. 1. In your internet browser, go to [email or web address] 2. Click on the First Time User? Click Here link in the Sign In box. You will see the New Member Sign Up page. 3. Enter your Nanomix Access Code exactly as it appears below.  You will not need to use this code after youve completed the sign-up process. If you do not sign up before the expiration date, you must request a new code. · [facility] Buddy Access Code: [email or web address] Expires: 11/21/2017  6:38 AM 
 
4. Enter the last four digits of your Social Security Number (xxxx) and Date of Birth (mm/dd/yyyy) as indicated and click Submit. You will be taken to the next sign-up page. 5. Create a Heart Buddy ID. This will be your [facility] Buddy login ID and cannot be changed, so think of one that is secure and easy to remember. 6. Create a [employer] Buddy password. You can change your password at any time. 7. Enter your Password Reset Question and Answer. This can be used at a later time if you forget your password. 8. Enter your e-mail address. You will receive e-mail notification when new information is available in [address]. 9. Click Sign Up. You can now view and download portions of your medical record. 10. Click the Download Summary menu link to download a portable copy of your medical information. If you have questions, please visit the Frequently Asked Questions section of the Heart Buddy website. Remember, Heart Buddy is NOT to be used for urgent needs. For medical emergencies, dial 911. Now available from your iPhone and Android! Unresulted Labs-Please follow up with your PCP about these lab tests Order Current Status HEMOGLOBIN A1C WITH EAG In process Providers Seen During Your Hospitalization Provider Specialty Primary office phone Celeste Osuna MD Emergency Medicine [phone] Babatunde Fuller MD Internal Medicine [phone] Immunizations Administered for This Admission Name Date  
 TB Skin Test (PPD) Intradermal 11/1/2017 Your Primary Care Physician (PCP) Primary Care Physician Office Phone Office Fax Andres Atwood [phone] You are allergic to the following No active allergies Recent Documentation Height Weight BMI Smoking Status 1.778 m 116.1 kg 36.73 kg/m2 Former Smoker Emergency Contacts Name Discharge Info Relation Home Work Mobile Eulalia Navarro  Spouse [4] [phone] Patient Belongings The following personal items are in your possession at time of discharge: 
  Dental Appliances: At home  Visual Aid: None      Home Medications: None   Jewelry: None  Clothing: Pants, Shirt, Footwear    Other Valuables: None Please provide this summary of care documentation to your next provider. Signatures-by signing, you are acknowledging that this After Visit Summary has been reviewed with you and you have received a copy. Patient Signature:  ____________________________________________________________ Date:  ____________________________________________________________  
  
Replaced by [facility] Provider Signature:  ____________________________________________________________ Date:  ____________________________________________________________

## 2017-11-01 NOTE — IP AVS SNAPSHOT
[address] 
[phone] Patient: Roberto Mejia MRN: [id number] LEV:[id number] My Medications TAKE these medications as instructed Instructions Each Dose to Equal  
 Morning Noon Evening Bedtime  
 albuterol 2.5 mg /3 mL (0.083 %) nebulizer solution Commonly known as:  PROVENTIL VENTOLIN Your last dose was:  11/03/17 11:30am  
Your next dose is:  Per as needed schedule 2.5 mg by Nebulization route every four (4) hours as needed for Wheezing. 2.5 mg  
    
   
   
   
  
 allopurinol 300 mg tablet Commonly known as:  Pal Cullen Your last dose was:  11/03/17 am  
Your next dose is:  11/04/17 am  
   
 Take  by mouth daily. aspirin 81 mg chewable tablet Your last dose was:  11/03/17 am  
Your next dose is:  11/04/17 am  
   
 Take 1 Tab by mouth daily. 81 mg  
    
  
   
   
   
  
 budesonide-formoterol 160-4.5 mcg/actuation Hfaa Commonly known as:  SYMBICORT Your next dose is:  11/03/17 8pm  
   
 Take 2 Puffs by inhalation two (2) times a day. 2 Puff  
    
  
   
   
   
  
  
 cpap machine kit  
   
 by Does Not Apply route. Bilevel 12/8  
     
   
   
   
  
 ferrous sulfate 324 mg (65 mg iron) tablet Your last dose was:  11/03/17 am  
Your next dose is:  11/04/17 am  
   
 Take  by mouth Daily (before breakfast). fluticasone 50 mcg/actuation nasal spray Commonly known as:  Safia Richey Your last dose was:  11/03/17 am  
Your next dose is:  11/04/17 am  
   
 2 Sprays by Both Nostrils route daily. 2 Spray  
    
  
   
   
   
  
 furosemide 80 mg tablet Commonly known as:  LASIX Your last dose was:  11/03/17 am  
Your next dose is:  11/03/17 6pm  
   
 Take 1 Tab by mouth two (2) times a day. 80 mg  
    
  
   
   
  
   
  
 gabapentin 100 mg capsule Commonly known as:  NEURONTIN Your last dose was:  11/03/17 am  
 Your next dose is:  11/03/17 4pm  
   
 Take 1 Cap by mouth three (3) times daily. 100 mg  
    
  
   
   
  
   
  
  
 glipiZIDE 5 mg tablet Commonly known as:  Tennille Ismarline Your next dose is:  Resume home schedule Take 5 mg by mouth two (2) times a day. 5 mg  
    
  
   
   
  
   
  
 hydrALAZINE 10 mg tablet Commonly known as:  APRESOLINE Your last dose was:  11/03/17 am  
Your next dose is:  11/03/17 6pm  
   
 Take 1 Tab by mouth two (2) times a day. 10 mg HYDROcodone-acetaminophen  mg tablet Commonly known as:  Obi Pringleing Your last dose was:  11/03/17 9:30am  
   
 .  
     
  
   
   
   
  
 ipratropium-albuterol  mcg/actuation inhaler Commonly known as:  Sumaya Deleon Your next dose is:  Resume home schedule Take 1 Puff by inhalation every six (6) hours. 1 Puff  
    
  
   
  
   
  
   
  
  
 isosorbide mononitrate ER 30 mg tablet Commonly known as:  IMDUR Your last dose was:  11/03/17 am  
Your next dose is:  11/04/17 am  
   
 Take 1 Tab by mouth daily. 30 mg  
    
  
   
   
   
  
 K-DUR 20 mEq tablet Generic drug:  potassium chloride Your next dose is:  Resume home schedule Take 20 mEq by mouth daily. 20 mEq  
    
  
   
   
   
  
 levothyroxine 50 mcg tablet Commonly known as:  SYNTHROID Your last dose was:  11/03/17 am  
Your next dose is:  11/04/17 am  
   
 Take 1 Tab by mouth Daily (before breakfast). 50 mcg  
    
  
   
   
   
  
 magnesium oxide 400 mg tablet Commonly known as:  MAG-OX Your next dose is:  Resume home schedule Take 1 Tab by mouth daily. 400 mg  
    
  
   
   
   
  
 metoprolol succinate 25 mg XL tablet Commonly known as:  TOPROL-XL Your last dose was:  11/03/17 am  
Your next dose is:  11/04/17 am  
   
 Pt takes 1/2 tab po daily.   
     
  
   
   
   
  
 mupirocin calcium 2 % topical cream  
Commonly known as:  [employer]  
 Your next dose is:  Resume home schedule Apply  to affected area two (2) times a day. oxyCODONE IR 15 mg immediate release tablet Commonly known as:  OXY-IR Your next dose is:  Per as needed schedule Take 1 Tab by mouth every eight (8) hours as needed for Pain. Max Daily Amount: 45 mg.  
 15 mg  
    
   
   
   
  
 pantoprazole 40 mg tablet Commonly known as:  PROTONIX Your next dose is:  11/03/17 4pm  
   
 Take 1 Tab by mouth Before breakfast and dinner. 40 mg  
    
  
   
   
  
   
  
 sertraline 50 mg tablet Commonly known as:  ZOLOFT Your next dose is:  Resume home schedule Take one tablet each evening for anxiety  Indications: GENERALIZED ANXIETY DISORDER SITagliptin 50 mg tablet Commonly known as:  Ty Muscat Your next dose is:  Resume home schedule Take 1 Tab by mouth daily. 50 mg STOOL SOFTENER 100 mg capsule Generic drug:  docusate sodium Your next dose is:  Per as needed schedule Take 100 mg by mouth as needed. 100 mg  
    
   
   
   
  
 TRAVATAN Z 0.004 % ophthalmic solution Generic drug:  travoprost  
Your next dose is:  11/03/17 6pm  
   
 Administer 1 Drop to both eyes two (2) times a day. 1 Drop  
    
  
   
   
  
   
  
 triamcinolone 0.5 % topical cream  
Commonly known as:  ARISTOCORT Your next dose is:  Resume home schedule Apply  to affected area two (2) times a day. use thin layer

## 2017-11-01 NOTE — PROGRESS NOTES
BSR from [employer], RN. Patient resting in bed with home CPAP in place, resp even and unlabored. . Alert and oriented times three, [place]. Remote telemetry in place. Denies discomfort. Safety measures in place. Wife at bedside.

## 2017-11-01 NOTE — PROGRESS NOTES
Assessment complete  Dual skin assessment with Yi Tolliver RN  Patient with abrasion right knee  Has light bruise left shoulder  Shoulder looks to be dropping  Patient says he fell at home on shoulder but xrays did not show any fractures  Skin is very dry and flaky  Generalized edema is 2t  Lower extremities are edematous and discolored  Heels are blanchable red and boggy

## 2017-11-01 NOTE — ED PROVIDER NOTES
HPI Comments: 72-year-old gentleman with a history of falling this morning. Patient said that he was trying to get out of bed when he fell. He said that he is not in any pain at the moment and otherwise had been feeling fine. Patient says that he went to bed feeling okay yesterday  And was not having any chest pain when he woke up. Apparently the patient has been falling more than usual over the past few weeks. Elements of this note were made using speech recognition software. As such, there may be errors of speech recognition present. Patient is a 80 y.o. male presenting with fall. The history is provided by the patient. Fall   Pertinent negatives include no fever, no abdominal pain, no nausea, no vomiting, no hematuria and no headaches. Past Medical History:   Diagnosis Date    Atopic dermatitis 11/21/2012    Atrial fibrillation (HCC)     CAD (coronary artery disease)     Carotid stenosis, bilateral 11/21/2012    50-79%  3-2010    1. Carotid Doppler (6/1/07): Greater than 70% stenosis in proximal LICA. 50% stenosis in right ICA. 2.  CTA of neck (3/15/10): Occluded distal segments of the vertebral arteries bilaterally. Atherosclerosis of the carotid bulbs bilaterally with a 50% stenosis on the left and a stenosis of less than 30% on the right. Small nodule in the right upper lobe near the apex. 3. CTA (8/29/13):  Less than 30% diameter stenosis of the cervical internal carotid arteries bilaterally.  CHF (congestive heart failure) ([address] 11/21/2012    Chronic kidney disease     hx elevated labs    Chronic obstructive pulmonary disease (HCC)     Diabetes (HCC)     Diastolic CHF, acute on chronic ([address] 9/12/2015    1. Echo (9/11/15) : EF 55-60%. Mild LVH. Moderate biatrial enlargement. Moderate mitral/tricuspid regurgitation.     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 Bilateral proximal common iliac PCI (08):  8.0 X 100 mm Cordis smart stent on right and 10 X 40 mm cordis smart stent on right. Both inflated to 7.0 mm.  Poor historian     Radiologic findings of lung field, abnormal 10/31/2016    1. CT of chest  (11/24/10): Multiple small nodules in the right lobe and stable interstitial prominence. Consistent with chronic lung disease. No evidence of malignancy.  Seizure disorder ([address] 2013    Shortness of breath dyspnea 2013    Thyroid disease     Unspecified sleep apnea        Past Surgical History:   Procedure Laterality Date    CARDIAC SURG PROCEDURE UNLIST      cath ,cabg ,lexiscan cardiolite 11/10    COLONOSCOPY N/A 2017    COLONOSCOPY  BMI 36 TO BE ADMITTED 17 performed by Marlene Borden [facility] at [facility] ENDOSCOPY    HX CATARACT REMOVAL Left     os    HX COLONOSCOPY      HX CORONARY ARTERY BYPASS GRAFT  2007    HX CORONARY STENT PLACEMENT  2008    bilateral iliac artery PCI and stents    HX HEART CATHETERIZATION      left-2007, 2011    HX HEENT  1970s    neck lipoma         Family History:   Problem Relation Age of Onset    Heart Attack Mother    [place] Other Father      old age   [place] Other Brother      brain aneurysm    Heart Disease Other      2 children  with heart concerns, 36 & 47 yo    Heart Disease Son        Social History     Social History    Marital status:      Spouse name: N/A    Number of children: N/A    Years of education: N/A     Occupational History    [employer].  Retired     sales, 12 yrs     Social History Main Topics    Smoking status: Former Smoker     Packs/day: 1.00     Years: 45.00     Types: Cigarettes     Quit date: 1984    Smokeless tobacco: Never Used      Comment: (stopped smoking in )    Alcohol use No    Drug use: No    Sexual activity: Not Currently     Other Topics Concern    Not on file     Social History Narrative    45 pack year history cigarette smoking, stopped in . 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 , two living children, two children  with coronary artery disease, ages 36 and 48. Has always lived in [address] No pets. ALLERGIES: Review of patient's allergies indicates no known allergies. Review of Systems   Constitutional: Negative for chills, diaphoresis and fever. HENT: Negative for congestion, rhinorrhea and sore throat. Eyes: Negative for redness and visual disturbance. Respiratory: Positive for shortness of breath. Negative for cough, chest tightness and wheezing. Cardiovascular: Negative for chest pain and palpitations. Gastrointestinal: Negative for abdominal pain, blood in stool, diarrhea, nausea and vomiting. Endocrine: Negative for polydipsia and polyuria. Genitourinary: Negative for dysuria and hematuria. Musculoskeletal: Negative for arthralgias, myalgias and neck stiffness. Skin: Negative for rash. Allergic/Immunologic: Negative for environmental allergies and food allergies. Neurological: Negative for dizziness, weakness and headaches. Hematological: Negative for adenopathy. Does not bruise/bleed easily. Psychiatric/Behavioral: Negative for confusion and sleep disturbance. The patient is not nervous/anxious. Vitals:    17 0641 17 0642 17 0645   BP: 182/85     Pulse: 71 70 70   Resp: 20     Temp: 99.4 °F (37.4 °C)     SpO2: (!) 88% (!) 86% 94%   Weight: 117.9 kg (260 lb)     Height: 5' 10\" (1.778 m)              Physical Exam   Constitutional: He is oriented to person, place, and time. He appears well-developed and well-nourished. HENT:   Head: Normocephalic and atraumatic.    Eyes: Conjunctivae and EOM are normal. Pupils are equal, round, and reactive to light. Neck: Normal range of motion. Cardiovascular: Normal rate and regular rhythm. Pulmonary/Chest: Effort normal and breath sounds normal. No respiratory distress. He has no wheezes. He has no rales. He exhibits no tenderness. Mild tachypnea   Abdominal: Soft. Bowel sounds are normal. There is no rebound and no guarding. Musculoskeletal: Normal range of motion. He exhibits edema. He exhibits no tenderness. 2+ pitting edema in both legs   Lymphadenopathy:     He has no cervical adenopathy. Neurological: He is alert and oriented to person, place, and time. Skin: Skin is warm and dry. Small skin tear on his right elbow   Psychiatric: He has a normal mood and affect. Nursing note and vitals reviewed. MDM  Number of Diagnoses or Management Options  Hypoxia:   Diagnosis management comments: Given the multiple falls I will get a head CT scan to rule out a chronic subdural.  He also seemed a little tachypneic on exam so I will check a chest x-ray  And he did have some edema so I will check a BNP.    8:53 AM  Patient was hypoxic on his ABG despite being on oxygen. I will get a VQ scan to evaluate for possible PE. His chest x-ray was unrevealing and his BNP was only 300. Patient's family confirmed that he does not use oxygen at home. 11:20 AM  Patient was unable to tolerate the VQ scan. Therefore, given his persistent hypoxia I will discuss the case with on call for pulmonary. I am uncertain if his current clinical scenario represents just a severe COPD exacerbation, congestive heart failure, pulmonary embolism, or a combination of all 3. I spoke with on-call for pulmonary who kindly agreed to see the patient. 12:13 PM  Patient was seen by pulmonary who asked that we talk to the hospitalist about the admission.     ED Course       Procedures

## 2017-11-01 NOTE — PROGRESS NOTES
TRANSFER - IN REPORT:    Verbal report received from arnaud(name) [place]  being received from er(unit) for routine progression of care      Report consisted of patients Situation, Background, Assessment and   Recommendations(SBAR). Information from the following report(s) ED Summary was reviewed with the receiving nurse. Opportunity for questions and clarification was provided. Assessment completed upon patients arrival to unit and care assumed.  Awaiting arrival, report to Sabrina Villaseñor 83 brandan rn primary nurse

## 2017-11-01 NOTE — PROGRESS NOTES
Patient confused and unable to obtain ambulating oxygen status at this time. Patient requiring 4L oxygen to maintain oxygen saturation 92%.

## 2017-11-02 ENCOUNTER — HOME HEALTH ADMISSION (OUTPATIENT)
Dept: HOME HEALTH SERVICES | Facility: HOME HEALTH | Age: 82
End: 2017-11-02
Payer: MEDICARE

## 2017-11-02 ENCOUNTER — PATIENT OUTREACH (OUTPATIENT)
Dept: CASE MANAGEMENT | Age: 82
End: 2017-11-02

## 2017-11-02 LAB
ANION GAP SERPL CALC-SCNC: 12 MMOL/L (ref 7–16)
BASOPHILS # BLD: 0 K/UL (ref 0–0.2)
BASOPHILS NFR BLD: 0 % (ref 0–2)
BUN SERPL-MCNC: 37 MG/DL (ref 8–23)
CALCIUM SERPL-MCNC: 8.8 MG/DL (ref 8.3–10.4)
CHLORIDE SERPL-SCNC: 98 MMOL/L (ref 98–107)
CO2 SERPL-SCNC: 29 MMOL/L (ref 21–32)
CREAT SERPL-MCNC: 1.8 MG/DL (ref 0.8–1.5)
DIFFERENTIAL METHOD BLD: ABNORMAL
EOSINOPHIL # BLD: 0 K/UL (ref 0–0.8)
EOSINOPHIL NFR BLD: 0 % (ref 0.5–7.8)
ERYTHROCYTE [DISTWIDTH] IN BLOOD BY AUTOMATED COUNT: 16.4 % (ref 11.9–14.6)
GLUCOSE BLD STRIP.AUTO-MCNC: 184 MG/DL (ref 65–100)
GLUCOSE BLD STRIP.AUTO-MCNC: 195 MG/DL (ref 65–100)
GLUCOSE BLD STRIP.AUTO-MCNC: 252 MG/DL (ref 65–100)
GLUCOSE BLD STRIP.AUTO-MCNC: 323 MG/DL (ref 65–100)
GLUCOSE SERPL-MCNC: 244 MG/DL (ref 65–100)
HCT VFR BLD AUTO: 38.6 % (ref 41.1–50.3)
HGB BLD-MCNC: 12.6 G/DL (ref 13.6–17.2)
IMM GRANULOCYTES # BLD: 0 K/UL (ref 0–0.5)
IMM GRANULOCYTES NFR BLD: 0 % (ref 0–5)
LYMPHOCYTES # BLD: 0.6 K/UL (ref 0.5–4.6)
LYMPHOCYTES NFR BLD: 7 % (ref 13–44)
MAGNESIUM SERPL-MCNC: 2.8 MG/DL (ref 1.8–2.4)
MCH RBC QN AUTO: 26.1 PG (ref 26.1–32.9)
MCHC RBC AUTO-ENTMCNC: 32.6 G/DL (ref 31.4–35)
MCV RBC AUTO: 79.9 FL (ref 79.6–97.8)
MONOCYTES # BLD: 0.2 K/UL (ref 0.1–1.3)
MONOCYTES NFR BLD: 2 % (ref 4–12)
NEUTS SEG # BLD: 7.5 K/UL (ref 1.7–8.2)
NEUTS SEG NFR BLD: 91 % (ref 43–78)
PHOSPHATE SERPL-MCNC: 3.6 MG/DL (ref 2.3–3.7)
PLATELET # BLD AUTO: 204 K/UL (ref 150–450)
PMV BLD AUTO: 10.5 FL (ref 10.8–14.1)
POTASSIUM SERPL-SCNC: 3.9 MMOL/L (ref 3.5–5.1)
RBC # BLD AUTO: 4.83 M/UL (ref 4.23–5.67)
SODIUM SERPL-SCNC: 139 MMOL/L (ref 136–145)
WBC # BLD AUTO: 8.3 K/UL (ref 4.3–11.1)

## 2017-11-02 PROCEDURE — 84100 ASSAY OF PHOSPHORUS: CPT | Performed by: INTERNAL MEDICINE

## 2017-11-02 PROCEDURE — 85025 COMPLETE CBC W/AUTO DIFF WBC: CPT | Performed by: INTERNAL MEDICINE

## 2017-11-02 PROCEDURE — 94640 AIRWAY INHALATION TREATMENT: CPT

## 2017-11-02 PROCEDURE — 74011250636 HC RX REV CODE- 250/636: Performed by: INTERNAL MEDICINE

## 2017-11-02 PROCEDURE — 74011636637 HC RX REV CODE- 636/637: Performed by: INTERNAL MEDICINE

## 2017-11-02 PROCEDURE — 65660000000 HC RM CCU STEPDOWN

## 2017-11-02 PROCEDURE — 80048 BASIC METABOLIC PNL TOTAL CA: CPT | Performed by: INTERNAL MEDICINE

## 2017-11-02 PROCEDURE — 77010033678 HC OXYGEN DAILY

## 2017-11-02 PROCEDURE — 94762 N-INVAS EAR/PLS OXIMTRY CONT: CPT

## 2017-11-02 PROCEDURE — 94760 N-INVAS EAR/PLS OXIMETRY 1: CPT

## 2017-11-02 PROCEDURE — 74011250637 HC RX REV CODE- 250/637: Performed by: INTERNAL MEDICINE

## 2017-11-02 PROCEDURE — 97161 PT EVAL LOW COMPLEX 20 MIN: CPT

## 2017-11-02 PROCEDURE — 36415 COLL VENOUS BLD VENIPUNCTURE: CPT | Performed by: INTERNAL MEDICINE

## 2017-11-02 PROCEDURE — 99232 SBSQ HOSP IP/OBS MODERATE 35: CPT | Performed by: INTERNAL MEDICINE

## 2017-11-02 PROCEDURE — 83735 ASSAY OF MAGNESIUM: CPT | Performed by: INTERNAL MEDICINE

## 2017-11-02 PROCEDURE — 74011000250 HC RX REV CODE- 250: Performed by: INTERNAL MEDICINE

## 2017-11-02 PROCEDURE — 97530 THERAPEUTIC ACTIVITIES: CPT

## 2017-11-02 PROCEDURE — 82962 GLUCOSE BLOOD TEST: CPT

## 2017-11-02 RX ADMIN — Medication 10 ML: at 05:50

## 2017-11-02 RX ADMIN — GABAPENTIN 100 MG: 100 CAPSULE ORAL at 08:36

## 2017-11-02 RX ADMIN — GABAPENTIN 100 MG: 100 CAPSULE ORAL at 21:05

## 2017-11-02 RX ADMIN — FERROUS SULFATE TAB 325 MG (65 MG ELEMENTAL FE) 325 MG: 325 (65 FE) TAB at 05:53

## 2017-11-02 RX ADMIN — HYDRALAZINE HYDROCHLORIDE 10 MG: 10 TABLET, FILM COATED ORAL at 17:22

## 2017-11-02 RX ADMIN — BUDESONIDE 500 MCG: 0.5 INHALANT RESPIRATORY (INHALATION) at 11:12

## 2017-11-02 RX ADMIN — HEPARIN SODIUM 5000 UNITS: 5000 INJECTION, SOLUTION INTRAVENOUS; SUBCUTANEOUS at 17:23

## 2017-11-02 RX ADMIN — LEVOTHYROXINE SODIUM 50 MCG: 50 TABLET ORAL at 05:52

## 2017-11-02 RX ADMIN — METOPROLOL SUCCINATE 12.5 MG: 25 TABLET, EXTENDED RELEASE ORAL at 08:37

## 2017-11-02 RX ADMIN — Medication 10 ML: at 17:24

## 2017-11-02 RX ADMIN — INSULIN LISPRO 6 UNITS: 100 INJECTION, SOLUTION INTRAVENOUS; SUBCUTANEOUS at 06:33

## 2017-11-02 RX ADMIN — FLUTICASONE PROPIONATE 2 SPRAY: 50 SPRAY, METERED NASAL at 08:38

## 2017-11-02 RX ADMIN — Medication 10 ML: at 21:08

## 2017-11-02 RX ADMIN — FUROSEMIDE 80 MG: 40 TABLET ORAL at 08:36

## 2017-11-02 RX ADMIN — ISOSORBIDE MONONITRATE 30 MG: 30 TABLET, EXTENDED RELEASE ORAL at 08:37

## 2017-11-02 RX ADMIN — HYDRALAZINE HYDROCHLORIDE 10 MG: 10 TABLET, FILM COATED ORAL at 08:38

## 2017-11-02 RX ADMIN — HEPARIN SODIUM 5000 UNITS: 5000 INJECTION, SOLUTION INTRAVENOUS; SUBCUTANEOUS at 12:47

## 2017-11-02 RX ADMIN — HEPARIN SODIUM 5000 UNITS: 5000 INJECTION, SOLUTION INTRAVENOUS; SUBCUTANEOUS at 02:26

## 2017-11-02 RX ADMIN — ALLOPURINOL 300 MG: 300 TABLET ORAL at 08:36

## 2017-11-02 RX ADMIN — PREDNISONE 20 MG: 20 TABLET ORAL at 08:36

## 2017-11-02 RX ADMIN — INSULIN LISPRO 2 UNITS: 100 INJECTION, SOLUTION INTRAVENOUS; SUBCUTANEOUS at 22:00

## 2017-11-02 RX ADMIN — FUROSEMIDE 80 MG: 40 TABLET ORAL at 17:22

## 2017-11-02 RX ADMIN — ASPIRIN 81 MG 81 MG: 81 TABLET ORAL at 08:36

## 2017-11-02 RX ADMIN — INSULIN LISPRO 8 UNITS: 100 INJECTION, SOLUTION INTRAVENOUS; SUBCUTANEOUS at 17:22

## 2017-11-02 RX ADMIN — GABAPENTIN 100 MG: 100 CAPSULE ORAL at 17:22

## 2017-11-02 RX ADMIN — HYDROCODONE BITARTRATE AND ACETAMINOPHEN 1 TABLET: 5; 325 TABLET ORAL at 21:05

## 2017-11-02 RX ADMIN — INSULIN LISPRO 2 UNITS: 100 INJECTION, SOLUTION INTRAVENOUS; SUBCUTANEOUS at 12:46

## 2017-11-02 RX ADMIN — BUDESONIDE 500 MCG: 0.5 INHALANT RESPIRATORY (INHALATION) at 20:20

## 2017-11-02 NOTE — PROGRESS NOTES
Problem: Mobility Impaired (Adult and Pediatric)  Goal: *Acute Goals and Plan of Care (Insert Text)  LTG:  (1.)Mr. Navarro will move from supine to sit and sit to supine , scoot up and down and roll side to side with INDEPENDENT within 7 day(s) from flat surface without handrail. (2.)Mr. Navarro will transfer from bed to chair and chair to bed with MODIFIED INDEPENDENT using the least restrictive device within 7 day(s). (3.)Mr. Navarro will ambulate with MODIFIED INDEPENDENCE for 250+ feet with the least restrictive device within 7 day(s), O2 stats >90%. (4.)Mr. Navarro will perform 2 steps without handrail and SUPERVISION within 7 days for safety ascending and descending stairs for home. (5.)Mr. Ankush Isabel will participate in 23+ minutes of therapeutic activity within 7 days, O2 stats >90% throughout, for increased mobility and activity tolerance.   _____________________________________________________________________________________________    PHYSICAL THERAPY: Initial Assessment, AM 11/2/2017  INPATIENT: Hospital Day: 2  Payor: [facility] [place] MEDICARE / Plan: [facility] MEDICARE / Product Type: Managed Care Medicare /      NAME/AGE/GENDER: Eli Guerra is a 80 y.o. male   PRIMARY DIAGNOSIS: Hypoxia  Acute respiratory failure with hypoxia ([facility] [address] Fall in home Fall in home        ICD-10: Treatment Diagnosis:   · Generalized Muscle Weakness (M62.81)  · Difficulty in walking, Not elsewhere classified (R26.2)   Precaution/Allergies:  Review of patient's allergies indicates no known allergies. ASSESSMENT:     Mr. Ankush Isabel presents with decreased bed mobility, transfers, ambulation, balance, activity tolerance and overall general functional mobility s/p hospital admission with fall at home. Pt with SOB, confused on admission. Pt A & O x 4 on arrival, in bathroom. Spouse present in room. Pt off O2 in bathroom.  Pt required CGA to MIN A for ambulation from bathroom to bed, pt SOB with activity, unsteady using str cane. Pt MMT in sitting, B LE grossly 4+/5, sensation intact to light touch B LE. Pt relates few falls at home, pt uses str cane with ambulation, requires some assist and additional time for ADLs. Pt presently on room air, O2 stats 94% sitting. Pt ambulated into hallway on room air for 100', 3 standing rest breaks required, SOB with activity, O2 stats 88-91%. Pt with increased fatigue and unsteady at times on cane. Pt would benefit from rollator walker for increased balance and ability for seated rest breaks as needed. Pt returned to room and requested assist to bathroom once again. Pt required CGA/MIN A for transfers, independent in all other toileting needs. Pt then left up in chair with spouse present, on room air, stats 92%. RN notified. Spoke with case management regarding discharge needs, pt open to HHPT at discharge and use of RW. PT to cont to follow for acute care needs to address deficits noted above. This section established at most recent assessment   PROBLEM LIST (Impairments causing functional limitations):  1. Decreased Strength  2. Decreased ADL/Functional Activities  3. Decreased Transfer Abilities  4. Decreased Ambulation Ability/Technique  5. Decreased Balance  6. Decreased Activity Tolerance  7. Decreased Pacing Skills  8. Decreased Work Simplification/Energy Conservation Techniques  9. Increased Fatigue  10. Increased Shortness of Breath   INTERVENTIONS PLANNED: (Benefits and precautions of physical therapy have been discussed with the patient.)  1. Balance Exercise  2. Bed Mobility  3. Family Education  4. Gait Training  5. Home Exercise Program (HEP)  6. Therapeutic Activites  7. Therapeutic Exercise/Strengthening  8. Transfer Training  9.  Group Therapy     TREATMENT PLAN: Frequency/Duration: 3 times a week for duration of hospital stay  Rehabilitation Potential For Stated Goals: Good     RECOMMENDED REHABILITATION/EQUIPMENT: (at time of discharge pending progress): Due to the probability of continued deficits (see above) this patient will likely need continued skilled physical therapy after discharge. Equipment:    rollator walker and ? BSC if family does not have at home              HISTORY:   History of Present Injury/Illness (Reason for Referral): Suzi Butler is an 80 y.o. white male with a pmh of COPD, atrial fibrillation and CAD, who presents to the ED today after a fall at home and is found to be in acute hypoxic respiratory failure and is currently requiring 5 liters NC to maintain saturation of 90%. Doesn't wear oxygen at baseline, although I feel he likely needs to. He is on cpap qhs and while sleeping for OHS and is morbidly obese. He endorses CESAR, orthopnea, and cannot lie flat. Echo in August with mild pulmonary HTN and ef 55-60%. His echos are not able to evaluate diastolic function due to body habitus and I suspect he has an element of diastolic dysfunction. Legs with evidence of bilateral venous stasis. ABG shows o2 sat of 90% on two liters earlier, now up to 5 liters. Didn't tolerate VQ scan. Denies chest pain but does have left shoulder pain from a fall and has been taking narcotics at home.      Past Medical History/Comorbidities:   Mr. 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 He also has no past medical history of Arthritis; Asthma; Cancer ([facility] [address] Liver disease; Nausea & vomiting; PUD (peptic ulcer disease); or Stroke ([facility] [address] Mr. Rosalina Prieto  has a past surgical history that includes cardiac surg procedure unlist; coronary artery bypass graft (06-); cataract removal (Left, 2003); heart catheterization; coronary stent placement (02-); heent (1970s); colonoscopy; and colonoscopy (N/A, 1/27/2017). Social History/Living Environment:   Home Environment: Private residence  # Steps to Enter: 2  Rails to Enter: No  One/Two Story Residence: One story  Living Alone: No  Support Systems: Spouse/Significant Other/Partner, Family member(s)  Patient Expects to be Discharged to[de-identified] Private residence  Current DME Used/Available at Home: Cane, straight, Commode, bedside, CPAP, Grab bars, Shower chair, Wheelchair (pt uses str cane, grab bars and shower seat)  Tub or Shower Type: Tub/Shower combination  Prior Level of Function/Work/Activity:  Lives with spouse; uses str cane for ambulation; some assist with ADLs, multiple falls; pt household Ambulator; uses w/c in community     Number of Personal Factors/Comorbidities that affect the Plan of Care:  CHF, DM, age, COPD 3+: HIGH COMPLEXITY   EXAMINATION:   Most Recent Physical Functioning:   Gross Assessment:  AROM: Generally decreased, functional (B LE)  Strength: Generally decreased, functional (B LE grossly 4+/5)  Coordination: Generally decreased, functional  Sensation: Intact               Posture:  Posture (WDL): Exceptions to WDL  Posture Assessment:  Forward head, Rounded shoulders  Balance:  Sitting: Intact  Standing: Impaired  Standing - Static: Good;Fair  Standing - Dynamic : Fair Bed Mobility:  Supine to Sit:  (in bathroom on arrival)  Sit to Supine:  (left sitting up in chair)  Scooting: Supervision  Wheelchair Mobility:     Transfers:  Sit to Stand: Minimum assistance  Stand to Sit: Contact guard assistance  Bed to Chair: Minimum assistance  Gait:     Base of Support: Widened  Speed/Helen: Slow  Step Length: Left shortened;Right shortened  Swing Pattern: Left symmetrical;Right symmetrical  Gait Abnormalities: Decreased step clearance;Trunk sway increased (occ LOB)  Distance (ft): 100 Feet (ft) (3 standing rest breaks, O2 90% room air)  Assistive Device: Cane, straight;Gait belt (would benefit from RW)  Ambulation - Level of Assistance: Contact guard assistance;Minimal assistance  Interventions: Safety awareness training;Verbal cues      Body Structures Involved:  1. Lungs  2. Muscles Body Functions Affected:  1. Respiratory  2. Movement Related Activities and Participation Affected:  1. General Tasks and Demands  2. Mobility  3. Self Care   Number of elements that affect the Plan of Care: 4+: HIGH COMPLEXITY   CLINICAL PRESENTATION:   Presentation: Evolving clinical presentation with changing clinical characteristics: MODERATE COMPLEXITY   CLINICAL DECISION MAKIN [facility] Mobility Inpatient Short Form  How much difficulty does the patient currently have. .. Unable A Lot A Little None   1. Turning over in bed (including adjusting bedclothes, sheets and blankets)? [] 1   [] 2   [x] 3   [] 4   2. Sitting down on and standing up from a chair with arms ( e.g., wheelchair, bedside commode, etc.)   [] 1   [] 2   [x] 3   [] 4   3. Moving from lying on back to sitting on the side of the bed? [] 1   [] 2   [x] 3   [] 4   How much help from another person does the patient currently need. .. Total A Lot A Little None   4. Moving to and from a bed to a chair (including a wheelchair)? [] 1   [] 2   [x] 3   [] 4   5. Need to walk in hospital room? [] 1   [] 2   [x] 3   [] 4   6. Climbing 3-5 steps with a railing? [] 1   [] 2   [x] 3   [] 4   © , Trustees of [address] [place] [address] [address] [id number], under license to [employer].  All rights reserved      Score:  Initial: 18 Most Recent: X (Date: -- )    Interpretation of Tool:  Represents activities that are increasingly more difficult (i.e. Bed mobility, Transfers, Gait). Score 24 23 22-20 19-15 14-10 9-7 6     Modifier CH CI CJ CK CL CM CN      ? Mobility - Walking and Moving Around:     - CURRENT STATUS: CK - 40%-59% impaired, limited or restricted    - GOAL STATUS: CJ - 20%-39% impaired, limited or restricted    - D/C STATUS:  ---------------To be determined---------------  Payor: [employer] [facility] [place] MEDICARE / Plan: [place] [employer] [place] MEDICARE / Product Type: [employer] Care Medicare /      Medical Necessity:     · Patient is expected to demonstrate progress in strength, range of motion, balance and coordination to decrease assistance required with overall functional mobility, transfers, ambulation. · Patient demonstrates good rehab potential due to higher previous functional level. Reason for Services/Other Comments:  · Patient continues to require present interventions due to patient's inability to perform bed mobility, transfers, ambulation safely and effectively. Use of outcome tool(s) and clinical judgement create a POC that gives a: Clear prediction of patient's progress: LOW COMPLEXITY            TREATMENT:   (In addition to Assessment/Re-Assessment sessions the following treatments were rendered)   Pre-treatment Symptoms/Complaints:  \"I am ready to go home\"  Pain: Initial:   Pain Intensity 1: 0  Post Session:  0/10     Therapeutic Activity: (    23 min): Therapeutic activities including Bed transfers, Chair transfers, Toilet transfers, Ambulation on level ground and pursed lip breathing, energy conservation techniques to improve mobility, strength, balance and coordination. Required minimal Safety awareness training;Verbal cues to promote static and dynamic balance in standing and promote coordination of bilateral, lower extremity(s).      Braces/Orthotics/Lines/Etc: · O2 Device: Nasal cannula  Treatment/Session Assessment:    · Response to Treatment:  Fatigues with gait; decreased balance  · Interdisciplinary Collaboration:   o Physical Therapist  o Registered Nurse  o   · After treatment position/precautions:   o Up in chair  o Bed/Chair-wheels locked  o Bed in low position  o Call light within reach  o RN notified  o Family at bedside   · Compliance with Program/Exercises: Will assess as treatment progresses. · Recommendations/Intent for next treatment session: \"Next visit will focus on advancements to more challenging activities\".   Total Treatment Duration:  PT Patient Time In/Time Out  Time In: 1027  Time Out: [address], PT

## 2017-11-02 NOTE — PROGRESS NOTES
Hospitalist Progress Note    2017  Admit Date: 2017  6:39 AM   NAME: Milton Monique   :  1932   MRN:  [id number]   Attending: Rigo Adler MD  PCP:  Oren Peraza MD    SUBJECTIVE:   Patient presented s/p fall at home and found to be hypoxic on arrival requiring 5 L O2 via NC for improvement. Started on neb treatments and seen by pulmonology. Feeling better this am.  Still requiring O2. Notes general weakness      Review of Systems negative with exception of pertinent positives noted above  PHYSICAL EXAM     Visit Vitals    /73    Pulse 62    Temp 97.7 °F (36.5 °C)    Resp 19    Ht 5' 10\" (1.778 m)    Wt 117.3 kg (258 lb 9.6 oz)    SpO2 91%    BMI 37.11 kg/m2      Temp (24hrs), Av °F (36.7 °C), Min:97.5 °F (36.4 °C), Max:98.6 °F (37 °C)    Oxygen Therapy  O2 Sat (%): 91 % (17 1151)  Pulse via Oximetry: 63 beats per minute (17 1151)  O2 Device: Nasal cannula (17 1151)  O2 Flow Rate (L/min): 2 l/min (17 1151)    Intake/Output Summary (Last 24 hours) at 17 1330  Last data filed at 17 1038   Gross per 24 hour   Intake              360 ml   Output              500 ml   Net             -140 ml      General: No acute distress    Lungs:  CTA Bilaterally.    Heart:  Regular rate and rhythm,  No murmur, rub, or gallop  Abdomen: Soft, Non distended, Non tender, Positive bowel sounds, morbid obesity  Extremities: No cyanosis, clubbing or edema  Neurologic:  No focal deficits    ASSESSMENT      Active Hospital Problems    Diagnosis Date Noted    Restrictive lung disease 2017    Hypoxemia 2017    Fall in home 2017    Hypoxia 2017    Acute respiratory failure with hypoxia ([address] 2017    Type 2 diabetes mellitus with peripheral neuropathy ([facility] [address] 2015    ROBERTO on CPAP 2015     Patient is on BiPAP, no supplementary oxygen      Morbid obesity ([address] 2015    CKD (chronic kidney disease) stage 3, GFR 30-59 ml/min 09/18/2013    Debility 08/05/2013     Plan:  · Continue nebs  · Wean O2 as able  · PT to eval and treat  · Check ambulatory sats. Likely needs home O2 arranged.   Discussed with SW  · cpap at night  · Appreciate specialist input    DVT Prophylaxis: heparin    Signed By: Mariama Thomas MD     November 2, 2017

## 2017-11-02 NOTE — PROGRESS NOTES
[facility] [place] - INPATIENT  Face to Face Encounter    Patients Name: Dallas Snow    YOB: 1932    Primary Diagnosis: restrictive lung disease  Date of Face to Face:   11/2/17                             Face to Face Encounter findings are related to primary reason for home care:   yes    1. I certify that the patient needs intermittent skilled nursing care, physical therapy and/or speech therapy. I will not be following this patient in the Community and Dr. Janna Dowling will be responsible for signing the [address] of Care. 2. Initial Orders for Care: Must be completed only if Face to Face MD will not be signing the [address] [facility] [address] Skilled Nursing and Physical Therapy    3. I certify that this patient is homebound for the following reason(s): Requires considerable and taxing effort to leave the home     4. I certify that this patient is under my care and that I, or a nurse practitioner or  [id number] working with me, had a Face-to-Face Encounter that meets the physician Face-to-Face Encounter requirements. Document the physical findings from the Face-to-Face Encounter that support the need for skilled services: Has new diagnosis that requires skilled nursing teaching and intervention     Jimmie Knowles Orf  11/2/2017

## 2017-11-02 NOTE — PROGRESS NOTES
Dr. Vale Esparza called and made aware of d. Dimer =3.56. MD reviewed chart and no new additional orders received.

## 2017-11-02 NOTE — PROGRESS NOTES
Patient has been stable this shift with no c/o distress. Wife at bedside. No distress noted. Report to be given to oncoming nurse.

## 2017-11-02 NOTE — PROGRESS NOTES
Roberto Mejia  Admission Date: 11/1/2017             Daily Progress Note: 11/2/2017     Patient is a 80 y.o.  male presents with confusion and S/P fall. He is super morbidly obese with OHS on BiPAP, restrictive lung disease and chronic debility. His wife is present and helps provide information. He has been in his normal state of health until this morning when he ambulated to the bathroom and fell. She was unable to get him up and called EMS. EMS brought him to the ER for further evaluation. In the ER, CT head was done without acute abnormality. His CXR showed cardiomegaly with no acute abnormality and lungs were clear. He has chronic cellulitis and is debilitated/deconditioned. He was in rehab for ~ 1 month earlier this year and hid wife states that he wakes with a cane at home. He is on 2 lpm with sats in the 90's. He is not on home oxygen. We were asked to see him for hypoxemia. The patient's chart is reviewed and the patient is discussed with the staff. Subjective:     Patient sitting in chair with wife and is awake and alert. Did eat and feels stronger. Slept with his home BIPAP and did fine. Did ambulated and did feel very short of breath earlier. Moist cough  .      Current Facility-Administered Medications   Medication Dose Route Frequency    aspirin chewable tablet 81 mg  81 mg Oral DAILY    allopurinol (ZYLOPRIM) tablet 300 mg  300 mg Oral DAILY    ferrous sulfate tablet 325 mg  325 mg Oral ACB    furosemide (LASIX) tablet 80 mg  80 mg Oral BID    fluticasone (FLONASE) 50 mcg/actuation nasal spray 2 Spray  2 Spray Both Nostrils DAILY    gabapentin (NEURONTIN) capsule 100 mg  100 mg Oral TID    hydrALAZINE (APRESOLINE) tablet 10 mg  10 mg Oral BID    isosorbide mononitrate ER (IMDUR) tablet 30 mg  30 mg Oral DAILY    levothyroxine (SYNTHROID) tablet 50 mcg  50 mcg Oral ACB    metoprolol succinate (TOPROL-XL) XL tablet 12.5 mg  12.5 mg Oral DAILY    travoprost (TRAVATAN Z) 0.004 % ophthalmic drops 1 Drop (Patient Supplied)  1 Drop Both Eyes BID    sodium chloride (NS) flush 5-10 mL  5-10 mL IntraVENous Q8H    sodium chloride (NS) flush 5-10 mL  5-10 mL IntraVENous PRN    tuberculin injection 5 Units  5 Units IntraDERMal ONCE    acetaminophen (TYLENOL) tablet 650 mg  650 mg Oral Q4H PRN    HYDROcodone-acetaminophen (NORCO) 5-325 mg per tablet 1 Tab  1 Tab Oral Q4H PRN    naloxone (NARCAN) injection 0.4 mg  0.4 mg IntraVENous PRN    ondansetron (ZOFRAN) injection 4 mg  4 mg IntraVENous Q4H PRN    heparin (porcine) injection 5,000 Units  5,000 Units SubCUTAneous Q8H    insulin lispro (HUMALOG) injection   SubCUTAneous AC&HS    albuterol (PROVENTIL VENTOLIN) nebulizer solution 5 mg  5 mg Nebulization Q4H PRN    budesonide (PULMICORT) 500 mcg/2 ml nebulizer suspension  500 mcg Nebulization BID RT    albuterol-ipratropium (DUO-NEB) 2.5 MG-0.5 MG/3 ML  3 mL Nebulization QID RT    predniSONE (DELTASONE) tablet 20 mg  20 mg Oral DAILY WITH BREAKFAST     Review of Systems:  -Fever  -Headaches  -Chest pain  +Dyspnea,- wheezing,+ cough  -Abdominal pain, -constipation  -Leg swelling  All other organ systems grossly normal.      Objective:     Vitals:    11/02/17 0738 11/02/17 1027 11/02/17 1140 11/02/17 1151   BP: 157/75  135/73    Pulse: (!) 55  62    Resp: 18  19    Temp: 97.6 °F (36.4 °C)  97.7 °F (36.5 °C)    SpO2: 92% 94%  91%   Weight:       Height:         Intake and Output:   10/31 1901 - 11/02 0700  In: 120 [P.O.:120]  Out: 500 [Urine:500]  11/02 0701 - 11/02 1900  In: 240 [P.O.:240]  Out: -     Physical Exam:   Constitution:  the patient is well developed and in no acute distress  EENMT:  Sclera clear, pupils equal, oral mucosa moist  Respiratory: mild coarse sounds. Moist cough  Cardiovascular:  RRR without M,G,R  Gastrointestinal: soft and non-tender; with positive bowel sounds. Musculoskeletal: warm without cyanosis.  There is chronic lower leg edema.  Skin:  no jaundice or rashes, old chronic wounds   Neurologic: no gross neuro deficits     Psychiatric:  alert and oriented x 3    CXR: essentially clear b/l          LAB  Recent Labs      11/02/17   1132  11/02/17   0548  11/01/17   1943  11/01/17   1737   GLUCPOC  184*  252*  295*  271*      Recent Labs      11/02/17   0600  11/01/17   0647   WBC  8.3  7.4   HGB  12.6*  13.2*   HCT  38.6*  40.1*   PLT  204  238     Recent Labs      11/02/17   0600  11/01/17   0647   NA  139  137   K  3.9  4.0   CL  98  97*   CO2  29  32   GLU  244*  147*   BUN  37*  31*   CREA  1.80*  1.97*   MG  2.8*   --    CA  8.8  8.9   PHOS  3.6   --    ALB   --   3.1*   TBILI   --   0.8   ALT   --   13   SGOT   --   12*     Recent Labs      11/01/17   0746   PH  7.41   PCO2  46*   PO2  59*   HCO3  28*     No results for input(s): LCAD, LAC in the last 72 hours. Assessment:     Hospital Problems  Date Reviewed: 11/1/2017          Codes Class Noted POA    Restrictive lung disease (Chronic) ICD-10-CM: J98.4  ICD-9-CM: 518.89  11/1/2017 Yes        Type 2 diabetes mellitus with peripheral neuropathy (HCC) (Chronic) ICD-10-CM: E11.42  ICD-9-CM: 250.60, 357.2  11/5/2015 Yes        ROBERTO on CPAP (Chronic) ICD-10-CM: G47.33, Z99.89  ICD-9-CM: 327.23, V46.8  2/20/2015 Yes    Overview Signed 2/20/2015 10:42 AM by Milena Maradiaga NP     Patient is on BiPAP, no supplementary oxygen             Morbid obesity ([address] (Chronic) ICD-10-CM: E66.01  ICD-9-CM: 278.01  1/21/2015 Yes        CKD (chronic kidney disease) stage 3, GFR 30-59 ml/min (Chronic) ICD-10-CM: N18.3  ICD-9-CM: 585.3  9/18/2013 Yes        Debility (Chronic) ICD-10-CM: R53.81  ICD-9-CM: 799.3  8/5/2013 Yes                Plan:     --check ambulatory saturation since may need with exertion  --continue nebs and uses at home  --continue home BIPAP QHS. Will get overnight on BIPAP if still here today  --Weight loss imperative. Extremely deconditioned and morbidly obese. --remaining treatment per hospitalist    More than 50% of the time documented was spent in face-to-face contact with the patient and in the care of the patient on the floor/unit where the patient is located.     Devan Padilla MD

## 2017-11-02 NOTE — PROGRESS NOTES
Monitor room reported 2 sec run or 7beats of wide complex rhythm where conduction was flipped then returned to at fib rate 60's. Occurrence called to Dr. Tremaine Mclaughlin without orders received.

## 2017-11-02 NOTE — PROGRESS NOTES
DC screening  AMANDO met with pt's granddaughter. She lives with pt and pt's wife. DME: Cpap, nebulizer  PT recommends a rollator. AMANDO faxed order to [address] RN/PT ordered. [facility] liaison notified of dc. AMANDO watching for home 02 needs.

## 2017-11-02 NOTE — PROGRESS NOTES
Inpatient case management referral - Vaishali Washington - patient high risk, 5th hospitalization in last 6 months.   Current admission precipitated by a fall - patient in acute hypoxic respiratory failure     - 5 L O2/NC to maintain 90% oxygenation     - No O2 @ baseline but uses CPAP for ROBERTO    History of   COPD  AF  CAD - failed CABG  DM  CKD - Stage 3  Morbid obesity    Plan - hospitalist estimated 2-3 days  Coordinate with inpatient case management

## 2017-11-03 ENCOUNTER — PATIENT OUTREACH (OUTPATIENT)
Dept: CASE MANAGEMENT | Age: 82
End: 2017-11-03

## 2017-11-03 VITALS
WEIGHT: 256 LBS | OXYGEN SATURATION: 92 % | RESPIRATION RATE: 18 BRPM | DIASTOLIC BLOOD PRESSURE: 63 MMHG | SYSTOLIC BLOOD PRESSURE: 163 MMHG | BODY MASS INDEX: 36.65 KG/M2 | HEART RATE: 60 BPM | TEMPERATURE: 97.7 F | HEIGHT: 70 IN

## 2017-11-03 LAB
ATRIAL RATE: 441 BPM
CALCULATED R AXIS, ECG10: 35 DEGREES
CALCULATED T AXIS, ECG11: -139 DEGREES
DIAGNOSIS, 93000: NORMAL
EST. AVERAGE GLUCOSE BLD GHB EST-MCNC: 183 MG/DL
GLUCOSE BLD STRIP.AUTO-MCNC: 155 MG/DL (ref 65–100)
GLUCOSE BLD STRIP.AUTO-MCNC: 209 MG/DL (ref 65–100)
HBA1C MFR BLD: 8 % (ref 4.8–6)
Q-T INTERVAL, ECG07: 454 MS
QRS DURATION, ECG06: 100 MS
QTC CALCULATION (BEZET), ECG08: 434 MS
VENTRICULAR RATE, ECG03: 55 BPM

## 2017-11-03 PROCEDURE — 94640 AIRWAY INHALATION TREATMENT: CPT

## 2017-11-03 PROCEDURE — 74011250637 HC RX REV CODE- 250/637: Performed by: INTERNAL MEDICINE

## 2017-11-03 PROCEDURE — 93005 ELECTROCARDIOGRAM TRACING: CPT | Performed by: HOSPITALIST

## 2017-11-03 PROCEDURE — 83036 HEMOGLOBIN GLYCOSYLATED A1C: CPT | Performed by: INTERNAL MEDICINE

## 2017-11-03 PROCEDURE — 82962 GLUCOSE BLOOD TEST: CPT

## 2017-11-03 PROCEDURE — 74011250636 HC RX REV CODE- 250/636: Performed by: INTERNAL MEDICINE

## 2017-11-03 PROCEDURE — 74011000250 HC RX REV CODE- 250: Performed by: INTERNAL MEDICINE

## 2017-11-03 PROCEDURE — 74011636637 HC RX REV CODE- 636/637: Performed by: INTERNAL MEDICINE

## 2017-11-03 PROCEDURE — 97530 THERAPEUTIC ACTIVITIES: CPT

## 2017-11-03 PROCEDURE — 36415 COLL VENOUS BLD VENIPUNCTURE: CPT | Performed by: INTERNAL MEDICINE

## 2017-11-03 PROCEDURE — 99232 SBSQ HOSP IP/OBS MODERATE 35: CPT | Performed by: INTERNAL MEDICINE

## 2017-11-03 RX ADMIN — ISOSORBIDE MONONITRATE 30 MG: 30 TABLET, EXTENDED RELEASE ORAL at 09:23

## 2017-11-03 RX ADMIN — HYDROCODONE BITARTRATE AND ACETAMINOPHEN 1 TABLET: 5; 325 TABLET ORAL at 09:33

## 2017-11-03 RX ADMIN — PREDNISONE 20 MG: 20 TABLET ORAL at 09:24

## 2017-11-03 RX ADMIN — Medication 10 ML: at 05:59

## 2017-11-03 RX ADMIN — LEVOTHYROXINE SODIUM 50 MCG: 50 TABLET ORAL at 05:57

## 2017-11-03 RX ADMIN — FLUTICASONE PROPIONATE 2 SPRAY: 50 SPRAY, METERED NASAL at 09:34

## 2017-11-03 RX ADMIN — ALLOPURINOL 300 MG: 300 TABLET ORAL at 09:23

## 2017-11-03 RX ADMIN — INSULIN LISPRO 4 UNITS: 100 INJECTION, SOLUTION INTRAVENOUS; SUBCUTANEOUS at 11:49

## 2017-11-03 RX ADMIN — GABAPENTIN 100 MG: 100 CAPSULE ORAL at 09:23

## 2017-11-03 RX ADMIN — METOPROLOL SUCCINATE 12.5 MG: 25 TABLET, EXTENDED RELEASE ORAL at 09:24

## 2017-11-03 RX ADMIN — IPRATROPIUM BROMIDE AND ALBUTEROL SULFATE 3 ML: .5; 3 SOLUTION RESPIRATORY (INHALATION) at 11:34

## 2017-11-03 RX ADMIN — FERROUS SULFATE TAB 325 MG (65 MG ELEMENTAL FE) 325 MG: 325 (65 FE) TAB at 05:57

## 2017-11-03 RX ADMIN — HYDROCODONE BITARTRATE AND ACETAMINOPHEN 1 TABLET: 5; 325 TABLET ORAL at 03:29

## 2017-11-03 RX ADMIN — INSULIN LISPRO 2 UNITS: 100 INJECTION, SOLUTION INTRAVENOUS; SUBCUTANEOUS at 06:00

## 2017-11-03 RX ADMIN — HEPARIN SODIUM 5000 UNITS: 5000 INJECTION, SOLUTION INTRAVENOUS; SUBCUTANEOUS at 09:24

## 2017-11-03 RX ADMIN — ASPIRIN 81 MG 81 MG: 81 TABLET ORAL at 09:24

## 2017-11-03 RX ADMIN — HEPARIN SODIUM 5000 UNITS: 5000 INJECTION, SOLUTION INTRAVENOUS; SUBCUTANEOUS at 02:11

## 2017-11-03 RX ADMIN — HYDRALAZINE HYDROCHLORIDE 10 MG: 10 TABLET, FILM COATED ORAL at 09:24

## 2017-11-03 RX ADMIN — IPRATROPIUM BROMIDE AND ALBUTEROL SULFATE 3 ML: .5; 3 SOLUTION RESPIRATORY (INHALATION) at 07:55

## 2017-11-03 RX ADMIN — BUDESONIDE 500 MCG: 0.5 INHALANT RESPIRATORY (INHALATION) at 07:55

## 2017-11-03 RX ADMIN — FUROSEMIDE 80 MG: 40 TABLET ORAL at 09:23

## 2017-11-03 NOTE — PROGRESS NOTES
Problem: Mobility Impaired (Adult and Pediatric)  Goal: *Acute Goals and Plan of Care (Insert Text)  LTG:  (1.)Mr. Navarro will move from supine to sit and sit to supine , scoot up and down and roll side to side with INDEPENDENT within 7 day(s) from flat surface without handrail. (2.)Mr. Navarro will transfer from bed to chair and chair to bed with MODIFIED INDEPENDENT using the least restrictive device within 7 day(s). (3.)Mr. Navarro will ambulate with MODIFIED INDEPENDENCE for 250+ feet with the least restrictive device within 7 day(s), O2 stats >90%. (4.)Mr. Navarro will perform 2 steps without handrail and SUPERVISION within 7 days for safety ascending and descending stairs for home. (5.)Mr. Rosalina Prieto will participate in 23+ minutes of therapeutic activity within 7 days, O2 stats >90% throughout, for increased mobility and activity tolerance.   _____________________________________________________________________________________________    PHYSICAL THERAPY: Daily Note, Treatment Day: 1st, AM 11/3/2017  INPATIENT: Hospital Day: 3  Payor: [facility] MEDICARE / Plan: [facility] MEDICARE / Product Type: Managed Care Medicare /      NAME/AGE/GENDER: Dulce Ren is a 80 y.o. male   PRIMARY DIAGNOSIS: Hypoxia  Acute respiratory failure with hypoxia ([facility] [address] Fall in home Fall in home        ICD-10: Treatment Diagnosis:   · Generalized Muscle Weakness (M62.81)  · Difficulty in walking, Not elsewhere classified (R26.2)   Precaution/Allergies:  Review of patient's allergies indicates no known allergies. ASSESSMENT:     Mr. Rosalina Prieto presents sitting EOB on arrival, on room air. Pt with sling for L UE support due to sore shoulder. Pt doffs sling for ambulation. Pt with improved mobility this date. Pt with improved balance and activity tolerance with use of rollator walker for ambulation. Pt instructed in safety of rollator (using breaks, sitting appropriately with breaks, etc.).  Pt O2 stats ambulating >90% throughout. Resting 94%, ambulation 90-92% on room air. Pt conts to have slow samantha, required 1 seated rest break due to fatigue. Pt expressed that he likes the use of the rollator, feels more comfortable and safe. PT to cont to follow for acute care needs, pt making good progress toward goals. This section established at most recent assessment   PROBLEM LIST (Impairments causing functional limitations):  1. Decreased Strength  2. Decreased ADL/Functional Activities  3. Decreased Transfer Abilities  4. Decreased Ambulation Ability/Technique  5. Decreased Balance  6. Decreased Activity Tolerance  7. Decreased Pacing Skills  8. Decreased Work Simplification/Energy Conservation Techniques  9. Increased Fatigue  10. Increased Shortness of Breath   INTERVENTIONS PLANNED: (Benefits and precautions of physical therapy have been discussed with the patient.)  1. Balance Exercise  2. Bed Mobility  3. Family Education  4. Gait Training  5. Home Exercise Program (HEP)  6. Therapeutic Activites  7. Therapeutic Exercise/Strengthening  8. Transfer Training  9. Group Therapy     TREATMENT PLAN: Frequency/Duration: 3 times a week for duration of hospital stay  Rehabilitation Potential For Stated Goals: Good     RECOMMENDED REHABILITATION/EQUIPMENT: (at time of discharge pending progress): Due to the probability of continued deficits (see above) this patient will likely need continued skilled physical therapy after discharge. Equipment:    rollator walker and ? BSC if family does not have at home              HISTORY:   History of Present Injury/Illness (Reason for Referral): Navya Wood is an 80 y.o. white male with a pmh of COPD, atrial fibrillation and CAD, who presents to the ED today after a fall at home and is found to be in acute hypoxic respiratory failure and is currently requiring 5 liters NC to maintain saturation of 90%. Doesn't wear oxygen at baseline, although I feel he likely needs to.   He is on cpap qhs and while sleeping for OHS and is morbidly obese. He endorses CESAR, orthopnea, and cannot lie flat. Echo in August with mild pulmonary HTN and ef 55-60%. His echos are not able to evaluate diastolic function due to body habitus and I suspect he has an element of diastolic dysfunction. Legs with evidence of bilateral venous stasis. ABG shows o2 sat of 90% on two liters earlier, now up to 5 liters. Didn't tolerate VQ scan. Denies chest pain but does have left shoulder pain from a fall and has been taking narcotics at home.      Past Medical History/Comorbidities:   Mr. 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 He also has no past medical history of Arthritis; Asthma; Cancer ([address] Liver disease; Nausea & vomiting; PUD (peptic ulcer disease); or Stroke ([address] Mr. Kenya Tanner  has a past surgical history that includes cardiac surg procedure unlist; coronary artery bypass graft (06-); cataract removal (Left, 2003); heart catheterization; coronary stent placement (02-); heent (1970s); colonoscopy; and colonoscopy (N/A, 1/27/2017).   Social History/Living Environment:   Home Environment: Private residence  # Steps to Enter: 2  Rails to Enter: No  One/Two Story Residence: One story  Living Alone: No  Support Systems: Spouse/Significant Other/Partner, Family member(s)  Patient Expects to be Discharged to[de-identified] Private residence  Current DME Used/Available at Home: Cane, straight, Commode, bedside, CPAP, Grab bars, Shower chair, Wheelchair (pt uses str cane, grab bars and shower seat)  Tub or Shower Type: Tub/Shower combination  Prior Level of Function/Work/Activity:  Lives with spouse; uses str cane for ambulation; some assist with ADLs, multiple falls; pt household Ambulator; uses w/c in community     Number of Personal Factors/Comorbidities that affect the Plan of Care:  CHF, DM, age, COPD 3+: HIGH COMPLEXITY   EXAMINATION:   Most Recent Physical Functioning:   Gross Assessment:  AROM: Generally decreased, functional (B LE)  Strength: Generally decreased, functional (B LE grossly 4+/5)  Coordination: Generally decreased, functional  Sensation: Intact               Posture:  Posture (WDL): Exceptions to WDL  Posture Assessment: Forward head, Rounded shoulders  Balance:  Sitting: Intact  Standing: Impaired  Standing - Static: Good (with support)  Standing - Dynamic : Fair Bed Mobility:  Supine to Sit:  (sitting EOB on arrival)  Sit to Supine:  (left sitting EOB with spouse present)  Wheelchair Mobility:     Transfers:  Sit to Stand: Stand-by asssistance  Stand to Sit: Stand-by asssistance  Gait:     Base of Support: Widened  Speed/Helen: Slow  Step Length: Left shortened;Right shortened  Swing Pattern: Left symmetrical;Right symmetrical  Gait Abnormalities: Decreased step clearance  Distance (ft): 150 Feet (ft) (1 seated break with rollator)  Assistive Device: Walker, rollator  Ambulation - Level of Assistance: Stand-by asssistance;Contact guard assistance (improved balance, less assist with rollator)  Interventions: Safety awareness training;Verbal cues      Body Structures Involved:  1. Lungs  2.  Muscles Body Functions Affected:  1. Respiratory  2. Movement Related Activities and Participation Affected:  1. General Tasks and Demands  2. Mobility  3. Self Care   Number of elements that affect the Plan of Care: 4+: HIGH COMPLEXITY   CLINICAL PRESENTATION:   Presentation: Evolving clinical presentation with changing clinical characteristics: MODERATE COMPLEXITY   CLINICAL DECISION MAKIN [facility] Mobility Inpatient Short Form  How much difficulty does the patient currently have. .. Unable A Lot A Little None   1. Turning over in bed (including adjusting bedclothes, sheets and blankets)? [] 1   [] 2   [x] 3   [] 4   2. Sitting down on and standing up from a chair with arms ( e.g., wheelchair, bedside commode, etc.)   [] 1   [] 2   [x] 3   [] 4   3. Moving from lying on back to sitting on the side of the bed? [] 1   [] 2   [x] 3   [] 4   How much help from another person does the patient currently need. .. Total A Lot A Little None   4. Moving to and from a bed to a chair (including a wheelchair)? [] 1   [] 2   [x] 3   [] 4   5. Need to walk in hospital room? [] 1   [] 2   [x] 3   [] 4   6. Climbing 3-5 steps with a railing? [] 1   [] 2   [x] 3   [] 4   © , Trustees of [address] [place], [place] [id number], under license to [employer]. All rights reserved      Score:  Initial: 18 Most Recent: X (Date: -- )    Interpretation of Tool:  Represents activities that are increasingly more difficult (i.e. Bed mobility, Transfers, Gait). Score 24 23 22-20 19-15 14-10 9-7 6     Modifier CH CI CJ CK CL CM CN      ?  Mobility - Walking and Moving Around:     - CURRENT STATUS: CK - 40%-59% impaired, limited or restricted    - GOAL STATUS: CJ - 20%-39% impaired, limited or restricted    - D/C STATUS:  ---------------To be determined---------------  Payor: [facility] [place] [facility] / Plan: [place] [facility] [place] / Product Type: Managed Care Medicare /      Medical Necessity:     · Patient is expected to demonstrate progress in strength, range of motion, balance and coordination to decrease assistance required with overall functional mobility, transfers, ambulation. · Patient demonstrates good rehab potential due to higher previous functional level. Reason for Services/Other Comments:  · Patient continues to require present interventions due to patient's inability to perform bed mobility, transfers, ambulation safely and effectively. Use of outcome tool(s) and clinical judgement create a POC that gives a: Clear prediction of patient's progress: LOW COMPLEXITY            TREATMENT:   (In addition to Assessment/Re-Assessment sessions the following treatments were rendered)   Pre-treatment Symptoms/Complaints: \"Are they going to let me go today? \"  Pain: Initial:   Pain Intensity 1: 0  Post Session:  0/10     Therapeutic Activity: (    33 min): Therapeutic activities including sit to stand transfers, use of rollator walker, Ambulation on level ground and pursed lip breathing, energy conservation techniques to improve mobility, strength, balance and coordination. Required minimal Safety awareness training;Verbal cues to promote static and dynamic balance in standing and promote coordination of bilateral, lower extremity(s). Braces/Orthotics/Lines/Etc:   · O2 Device: Nasal cannula  Treatment/Session Assessment:    · Response to Treatment:  Improved balance with use of walker; decreased fatigue  · Interdisciplinary Collaboration:   o Physical Therapist  o Registered Nurse  o   · After treatment position/precautions:   o Bed/Chair-wheels locked  o Bed in low position  o Call light within reach  o RN notified  o Family at bedside  o sitting EOB   · Compliance with Program/Exercises: Will assess as treatment progresses. · Recommendations/Intent for next treatment session: \"Next visit will focus on advancements to more challenging activities\".   Total Treatment Duration:  PT Patient Time In/Time Out  Time In: 1042  Time Out: [address], PT

## 2017-11-03 NOTE — PROGRESS NOTES
Patient to discharge home today  Inpatient SW - Ekaterina Aldana - confirms that [facility] PT & RN have been arranged.     Plan -  OSCAR call by this nurse planned for Monday 11/6/2017  Confirm that [address] has been initiated  Insure that patient keeps post hospitalization follow up appointments  Coordinate with [facility] on plan of care

## 2017-11-03 NOTE — Clinical Note
Ayan Felix - please let me know who RN Case manager will be for this patient.  Thanks so much LEOPOLDO

## 2017-11-03 NOTE — Clinical Note
FYI Only Patient is high risk for readmission I will be following closely - and will cc you on my notes.  Thanks so much Roque Kiran, MSN, RN, [address] [facility]  - [address] [place] [address] [phone]

## 2017-11-03 NOTE — DISCHARGE SUMMARY
Hospitalist Discharge Summary     Patient ID:  Herman Tamayo  [id number]  10 y.o.  2/12/1932  Admit date: 11/1/2017  6:39 AM  Discharge date and time: 11/3/2017  Attending: Beckie Barksdale MD  PCP:  Reynaldo Davies MD  Treatment Team: Attending Provider: Beckie Barksdale MD; Consulting Provider: Samanta Chaney MD; Utilization Review: Pamela Fierro; Care Manager: Esa Viramontes    Principal Diagnosis Fall in home   Principal Problem:    Fall in home (11/1/2017)    Active Problems:    Debility (8/5/2013)      CKD (chronic kidney disease) stage 3, GFR 30-59 ml/min (9/18/2013)      Morbid obesity ([address] (1/21/2015)      ROBERTO on CPAP (2/20/2015)      Overview: Patient is on BiPAP, no supplementary oxygen      Type 2 diabetes mellitus with peripheral neuropathy ([facility] [address] (11/5/2015)      Acute respiratory failure with hypoxia ([address] (8/4/2017)      Hypoxia (8/21/2017)      Restrictive lung disease (11/1/2017)      Hypoxemia (11/1/2017)     HPI: Herman Tamayo is an 80 y.o. white male with a pmh of COPD, atrial fibrillation and CAD, who presents to the ED today after a fall at home and is found to be in acute hypoxic respiratory failure and is currently requiring 5 liters NC to maintain saturation of 90%. Doesn't wear oxygen at baseline, although I feel he likely needs to. He is on cpap qhs and while sleeping for OHS and is morbidly obese. He endorses CESAR, orthopnea, and cannot lie flat. Echo in August with mild pulmonary HTN and ef 55-60%. His echos are not able to evaluate diastolic function due to body habitus and I suspect he has an element of diastolic dysfunction. Legs with evidence of bilateral venous stasis. ABG shows o2 sat of 90% on two liters earlier, now up to 5 liters. Didn't tolerate VQ scan. Denies chest pain but does have left shoulder pain from a fall and has been taking narcotics at home. Hospital Course:  Please refer to the admission H&P for details of presentation.  In summary, the patient presented s/p fall at home and found to be hypoxic on arrival requiring 5 L O2 via NC for improvement. Started on neb treatments and seen by pulmonology. Oxygen weaned to room air and pt able to maintain O2 sat of at least 90% even with ambulation. Evaluated by physical therapy and will benefit from home health PT and use of a walker. SW has arranged care. Pt is currently stable for dc to home, recommend follow up with pcp. Labs: Results:       Chemistry Recent Labs      11/02/17   0600 11/01/17   0647   GLU  244*  147*   NA  139  137   K  3.9  4.0   CL  98  97*   CO2  29  32   BUN  37*  31*   CREA  1.80*  1.97*   CA  8.8  8.9   AGAP  12  8   AP   --   93   TP   --   8.0   ALB   --   3.1*   GLOB   --   4.9*   AGRAT   --   0.6*      CBC w/Diff Recent Labs      11/02/17   0600 11/01/17   0647   WBC  8.3  7.4   RBC  4.83  4.97   HGB  12.6*  13.2*   HCT  38.6*  40.1*   PLT  204  238   GRANS  91*  77   LYMPH  7*  14   EOS  0*  4      Cardiac Enzymes No results for input(s): CPK, CKND1, RICK in the last 72 hours. No lab exists for component: CKRMB, TROIP   Coagulation No results for input(s): PTP, INR, APTT in the last 72 hours. No lab exists for component: INREXT, INREXT    Lipid Panel Lab Results   Component Value Date/Time    Cholesterol, total 144 08/02/2017 10:04 AM    HDL Cholesterol 43 08/02/2017 10:04 AM    LDL, calculated 77 08/02/2017 10:04 AM    VLDL, calculated 24 08/02/2017 10:04 AM    Triglyceride 118 08/02/2017 10:04 AM    CHOL/HDL Ratio 4.9 04/26/2016 09:20 AM      BNP No results for input(s): BNPP in the last 72 hours.    Liver Enzymes Recent Labs      11/01/17   0647   TP  8.0   ALB  3.1*   AP  93   SGOT  12*      Thyroid Studies Lab Results   Component Value Date/Time    T4, Total 8.0 11/05/2008 04:49 PM    T3 Uptake 31 11/05/2008 04:49 PM    TSH 8.150 05/15/2015 11:30 PM            Discharge Exam:  Visit Vitals    /76 (BP 1 Location: Left arm, BP Patient Position: At rest)    Pulse 73    Temp 97.6 °F (36.4 °C)    Resp 18    Ht 5' 10\" (1.778 m)    Wt 116.1 kg (256 lb)    SpO2 97%    BMI 36.73 kg/m2     General appearance: alert, cooperative, no distress, appears stated age  Lungs: clear to auscultation bilaterally  Heart: regular rate and rhythm, S1, S2 normal, no murmur, click, rub or gallop  Abdomen: soft, non-tender. Bowel sounds normal. No masses,  no organomegaly  Extremities: no cyanosis or edema  Neurologic: Grossly normal    Disposition: home  Discharge Condition: stable  Patient Instructions:   Current Discharge Medication List      CONTINUE these medications which have NOT CHANGED    Details   gabapentin (NEURONTIN) 100 mg capsule Take 1 Cap by mouth three (3) times daily. Qty: 270 Cap, Refills: 3      albuterol (PROVENTIL VENTOLIN) 2.5 mg /3 mL (0.083 %) nebulizer solution 2.5 mg by Nebulization route every four (4) hours as needed for Wheezing. budesonide-formoterol (SYMBICORT) 160-4.5 mcg/actuation HFA inhaler Take 2 Puffs by inhalation two (2) times a day. Qty: 3 Inhaler, Refills: 3      ipratropium-albuterol (COMBIVENT RESPIMAT)  mcg/actuation inhaler Take 1 Puff by inhalation every six (6) hours. Qty: 3 Inhaler, Refills: 3      hydrALAZINE (APRESOLINE) 10 mg tablet Take 1 Tab by mouth two (2) times a day. Qty: 180 Tab, Refills: 3      furosemide (LASIX) 80 mg tablet Take 1 Tab by mouth two (2) times a day. Qty: 60 Tab, Refills: 6      triamcinolone (ARISTOCORT) 0.5 % topical cream Apply  to affected area two (2) times a day. use thin layer  Qty: 15 g, Refills: 0      mupirocin calcium (BACTROBAN) 2 % topical cream Apply  to affected area two (2) times a day. Qty: 15 g, Refills: 0      metoprolol succinate (TOPROL-XL) 25 mg XL tablet Pt takes 1/2 tab po daily. Qty: 45 Tab, Refills: 3      magnesium oxide (MAG-OX) 400 mg tablet Take 1 Tab by mouth daily.   Qty: 90 Tab, Refills: 3      pantoprazole (PROTONIX) 40 mg tablet Take 1 Tab by mouth Before breakfast and dinner. Qty: 180 Tab, Refills: 3      HYDROcodone-acetaminophen (NORCO)  mg tablet . Qty: 90 Tab, Refills: 0    Comments: PHARMACY FILL ON TIME. NO EARLY REFILLS. Associated Diagnoses: Other chronic pain      oxyCODONE IR (OXY-IR) 15 mg immediate release tablet Take 1 Tab by mouth every eight (8) hours as needed for Pain. Max Daily Amount: 45 mg.  Qty: 90 Tab, Refills: 0    Comments: PHARMACY FILL ON TIME. NO EARLY REFILLS. Associated Diagnoses: Other chronic pain      aspirin 81 mg chewable tablet Take 1 Tab by mouth daily. Qty: 90 Tab, Refills: 3      levothyroxine (SYNTHROID) 50 mcg tablet Take 1 Tab by mouth Daily (before breakfast). Qty: 90 Tab, Refills: 3      ferrous sulfate 324 mg (65 mg iron) tablet Take  by mouth Daily (before breakfast). SITagliptin (JANUVIA) 50 mg tablet Take 1 Tab by mouth daily. Qty: 90 Tab, Refills: 3      fluticasone (FLONASE) 50 mcg/actuation nasal spray 2 Sprays by Both Nostrils route daily. Qty: 1 Bottle, Refills: 3      sertraline (ZOLOFT) 50 mg tablet Take one tablet each evening for anxiety  Indications: GENERALIZED ANXIETY DISORDER  Qty: 30 Tab, Refills: 3      allopurinol (ZYLOPRIM) 300 mg tablet Take  by mouth daily. docusate sodium (STOOL SOFTENER) 100 mg capsule Take 100 mg by mouth as needed. cpap machine kit by Does Not Apply route. Bilevel 12/8      isosorbide mononitrate ER (IMDUR) 30 mg tablet Take 1 Tab by mouth daily. Qty: 30 Tab, Refills: 5      travoprost (TRAVATAN Z) 0.004 % ophthalmic solution Administer 1 Drop to both eyes two (2) times a day. glipiZIDE (GLUCOTROL) 5 mg tablet Take 5 mg by mouth two (2) times a day. K-DUR 20 mEq tablet Take 20 mEq by mouth daily.              Activity: Activity as tolerated  Diet: Diabetic Diet  Wound Care: None needed    Follow-up  ·   With pcp  Time spent to discharge patient 35 minutes  Signed:  Krystal Barrera MD  11/3/2017  11:29 AM

## 2017-11-03 NOTE — PROGRESS NOTES
Pt started on Overnight sat study on Home BIPAP with 3 LPM O2.  Previous data deleted, date and time are correct

## 2017-11-03 NOTE — PROGRESS NOTES
Remote telemetry reports a 3 sec change in conduction and then back to Afib at 53. On call MD Dr. Evi Ferguson made aware. He will have rounding MD review. No new orders at this time. Patient resting in bed with cpap on, responds to verbal stimuli, alert and oriented times four. Wife at bedside. No distress noted.

## 2017-11-03 NOTE — PROGRESS NOTES
Roberto Mejia  Admission Date: 11/1/2017             Daily Progress Note: 11/3/2017     Patient is a 80 y.o.  male presents with confusion and S/P fall. He is super morbidly obese with OHS on BiPAP, restrictive lung disease and chronic debility. His wife is present and helps provide information. He has been in his normal state of health until this morning when he ambulated to the bathroom and fell. She was unable to get him up and called EMS. EMS brought him to the ER for further evaluation. In the ER, CT head was done without acute abnormality. His CXR showed cardiomegaly with no acute abnormality and lungs were clear. He has chronic cellulitis and is debilitated/deconditioned. He was in rehab for ~ 1 month earlier this year and hid wife states that he wakes with a cane at home. He is on 2 lpm with sats in the 90's. He is not on home oxygen. We were asked to see him for hypoxemia. The patient's chart is reviewed and the patient is discussed with the staff. Subjective:     Patient sitting in chair with wife and doing well. Much stronger today. Hospitalist to discharge today.    .     Current Facility-Administered Medications   Medication Dose Route Frequency    aspirin chewable tablet 81 mg  81 mg Oral DAILY    allopurinol (ZYLOPRIM) tablet 300 mg  300 mg Oral DAILY    ferrous sulfate tablet 325 mg  325 mg Oral ACB    furosemide (LASIX) tablet 80 mg  80 mg Oral BID    fluticasone (FLONASE) 50 mcg/actuation nasal spray 2 Spray  2 Spray Both Nostrils DAILY    gabapentin (NEURONTIN) capsule 100 mg  100 mg Oral TID    hydrALAZINE (APRESOLINE) tablet 10 mg  10 mg Oral BID    isosorbide mononitrate ER (IMDUR) tablet 30 mg  30 mg Oral DAILY    levothyroxine (SYNTHROID) tablet 50 mcg  50 mcg Oral ACB    metoprolol succinate (TOPROL-XL) XL tablet 12.5 mg  12.5 mg Oral DAILY    travoprost (TRAVATAN Z) 0.004 % ophthalmic drops 1 Drop (Patient Supplied)  1 Drop Both Eyes BID    sodium chloride (NS) flush 5-10 mL  5-10 mL IntraVENous Q8H    sodium chloride (NS) flush 5-10 mL  5-10 mL IntraVENous PRN    acetaminophen (TYLENOL) tablet 650 mg  650 mg Oral Q4H PRN    HYDROcodone-acetaminophen (NORCO) 5-325 mg per tablet 1 Tab  1 Tab Oral Q4H PRN    naloxone (NARCAN) injection 0.4 mg  0.4 mg IntraVENous PRN    ondansetron (ZOFRAN) injection 4 mg  4 mg IntraVENous Q4H PRN    heparin (porcine) injection 5,000 Units  5,000 Units SubCUTAneous Q8H    insulin lispro (HUMALOG) injection   SubCUTAneous AC&HS    albuterol (PROVENTIL VENTOLIN) nebulizer solution 5 mg  5 mg Nebulization Q4H PRN    budesonide (PULMICORT) 500 mcg/2 ml nebulizer suspension  500 mcg Nebulization BID RT    albuterol-ipratropium (DUO-NEB) 2.5 MG-0.5 MG/3 ML  3 mL Nebulization QID RT    predniSONE (DELTASONE) tablet 20 mg  20 mg Oral DAILY WITH BREAKFAST     Review of Systems:  -Fever  -Headaches  -Chest pain  -Dyspnea,- wheezing,+ cough  -Abdominal pain, -constipation  -Leg swelling  All other organ systems grossly normal.      Objective:     Vitals:    11/02/17 2339 11/03/17 0425 11/03/17 0630 11/03/17 0740   BP: 123/54 165/77  160/76   Pulse: 84 67  73   Resp: 18 18  18   Temp: 97.5 °F (36.4 °C) 97.5 °F (36.4 °C)  97.6 °F (36.4 °C)   SpO2: 98% 97%  97%   Weight:   256 lb (116.1 kg)    Height:         Intake and Output:   11/01 1901 - 11/03 0700  In: 360 [P.O.:360]  Out: 1700 [Urine:1700]       Physical Exam:   Constitution:  the patient is well developed and in no acute distress  EENMT:  Sclera clear, pupils equal, oral mucosa moist  Respiratory: mild coarse sounds in left lung base. Moist cough  Cardiovascular:  RRR without M,G,R  Gastrointestinal: soft and non-tender; with positive bowel sounds. Musculoskeletal: warm without cyanosis. There is chronic lower leg edema.   Skin:  no jaundice or rashes, old chronic wounds   Neurologic: no gross neuro deficits     Psychiatric:  alert and oriented x 3    CXR: essentially clear b/l          LAB  Recent Labs      11/03/17   0552  11/02/17   2112  11/02/17   1612  11/02/17   1132  11/02/17   0548   GLUCPOC  155*  195*  323*  184*  252*      Recent Labs      11/02/17   0600  11/01/17   0647   WBC  8.3  7.4   HGB  12.6*  13.2*   HCT  38.6*  40.1*   PLT  204  238     Recent Labs      11/02/17   0600  11/01/17   0647   NA  139  137   K  3.9  4.0   CL  98  97*   CO2  29  32   GLU  244*  147*   BUN  37*  31*   CREA  1.80*  1.97*   MG  2.8*   --    CA  8.8  8.9   PHOS  3.6   --    ALB   --   3.1*   TBILI   --   0.8   ALT   --   13   SGOT   --   12*     Recent Labs      11/01/17   0746   PH  7.41   PCO2  46*   PO2  59*   HCO3  28*     No results for input(s): LCAD, LAC in the last 72 hours. 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 [address] (Chronic) ICD-10-CM: E66.01  ICD-9-CM: 278.01  1/21/2015 Yes        CKD (chronic kidney disease) stage 3, GFR 30-59 ml/min (Chronic) ICD-10-CM: N18.3  ICD-9-CM: 585.3  9/18/2013 Yes        Debility (Chronic) ICD-10-CM: R53.81  ICD-9-CM: 799.3  8/5/2013 Yes                Plan:     --ambulatory saturation fine  With RA.   --continue nebs and uses at home  --continue home BIPAP QHS. --Weight loss imperative. Extremely deconditioned and morbidly obese.    --remaining treatment per hospitalist    More than 50% of the time documented was spent in face-to-face contact with the patient and in the care of the patient on the floor/unit where the patient is located.     Jeniffer Maher MD

## 2017-11-03 NOTE — DISCHARGE INSTRUCTIONS
DISCHARGE SUMMARY from Nurse    PATIENT INSTRUCTIONS:    After general anesthesia or intravenous sedation, for 24 hours or while taking prescription Narcotics:  · Limit your activities  · Do not drive and operate hazardous machinery  · Do not make important personal or business decisions  · Do  not drink alcoholic beverages  · If you have not urinated within 8 hours after discharge, please contact your surgeon on call. Report the following to your surgeon:  · Excessive pain, swelling, redness or odor of or around the surgical area  · Temperature over 100.5  · Nausea and vomiting lasting longer than 4 hours or if unable to take medications  · Any signs of decreased circulation or nerve impairment to extremity: change in color, persistent  numbness, tingling, coldness or increase pain  · Any questions    What to do at Home:  Recommended activity: Activity as tolerated. FALL PRECAUTIONS    If you experience any of the following symptoms temp >101.5, unrelieved pain, nausea or vomiting, shortness of breath or fatigue not relieved with rest, please follow up with MD.    *  Please give a list of your current medications to your Primary Care Provider. *  Please update this list whenever your medications are discontinued, doses are      changed, or new medications (including over-the-counter products) are added. *  Please carry medication information at all times in case of emergency situations. These are general instructions for a healthy lifestyle:    No smoking/ No tobacco products/ Avoid exposure to second hand smoke  Surgeon General's Warning:  Quitting smoking now greatly reduces serious risk to your health.     Obesity, smoking, and sedentary lifestyle greatly increases your risk for illness    A healthy diet, regular physical exercise & weight monitoring are important for maintaining a healthy lifestyle    You may be retaining fluid if you have a history of heart failure or if you experience any of the following symptoms:  Weight gain of 3 pounds or more overnight or 5 pounds in a week, increased swelling in our hands or feet or shortness of breath while lying flat in bed. Please call your doctor as soon as you notice any of these symptoms; do not wait until your next office visit. Recognize signs and symptoms of STROKE:    F-face looks uneven    A-arms unable to move or move unevenly    S-speech slurred or non-existent    T-time-call 911 as soon as signs and symptoms begin-DO NOT go       Back to bed or wait to see if you get better-TIME IS BRAIN. Warning Signs of HEART ATTACK     Call 911 if you have these symptoms:   Chest discomfort. Most heart attacks involve discomfort in the center of the chest that lasts more than a few minutes, or that goes away and comes back. It can feel like uncomfortable pressure, squeezing, fullness, or pain.  Discomfort in other areas of the upper body. Symptoms can include pain or discomfort in one or both arms, the back, neck, jaw, or stomach.  Shortness of breath with or without chest discomfort.  Other signs may include breaking out in a cold sweat, nausea, or lightheadedness. Don't wait more than five minutes to call 911 - MINUTES MATTER! Fast action can save your life. Calling 911 is almost always the fastest way to get lifesaving treatment. Emergency Medical Services staff can begin treatment when they arrive -- up to an hour sooner than if someone gets to the hospital by car. The discharge information has been reviewed with the patient. The patient verbalized understanding. Discharge medications reviewed with the patient and appropriate educational materials and side effects teaching were provided. Preventing Falls: Care Instructions  Your Care Instructions    Getting around your home safely can be a challenge if you have injuries or health problems that make it easy for you to fall.  Loose rugs and furniture in walkways are among the dangers for many older people who have problems walking or who have poor eyesight. People who have conditions such as arthritis, osteoporosis, or dementia also have to be careful not to fall. You can make your home safer with a few simple measures. Follow-up care is a key part of your treatment and safety. Be sure to make and go to all appointments, and call your doctor if you are having problems. It's also a good idea to know your test results and keep a list of the medicines you take. How can you care for yourself at home? Taking care of yourself  · You may get dizzy if you do not drink enough water. To prevent dehydration, drink plenty of fluids, enough so that your urine is light yellow or clear like water. Choose water and other caffeine-free clear liquids. If you have kidney, heart, or liver disease and have to limit fluids, talk with your doctor before you increase the amount of fluids you drink. · Exercise regularly to improve your strength, muscle tone, and balance. Walk if you can. Swimming may be a good choice if you cannot walk easily. · Have your vision and hearing checked each year or any time you notice a change. If you have trouble seeing and hearing, you might not be able to avoid objects and could lose your balance. · Know the side effects of the medicines you take. Ask your doctor or pharmacist whether the medicines you take can affect your balance. Sleeping pills or sedatives can affect your balance. · Limit the amount of alcohol you drink. Alcohol can impair your balance and other senses. · Ask your doctor whether calluses or corns on your feet need to be removed. If you wear loose-fitting shoes because of calluses or corns, you can lose your balance and fall. · Talk to your doctor if you have numbness in your feet. Preventing falls at home  · Remove raised doorway thresholds, throw rugs, and clutter. Repair loose carpet or raised areas in the floor.   · Move furniture and electrical cords to keep them out of walking paths. · Use nonskid floor wax, and wipe up spills right away, especially on ceramic tile floors. · If you use a walker or cane, put rubber tips on it. If you use crutches, clean the bottoms of them regularly with an abrasive pad, such as steel wool. · Keep your house well lit, especially Mammie Sitter, and outside walkways. Use night-lights in areas such as hallways and bathrooms. Add extra light switches or use remote switches (such as switches that go on or off when you clap your hands) to make it easier to turn lights on if you have to get up during the night. · Install sturdy handrails on stairways. · Move items in your cabinets so that the things you use a lot are on the lower shelves (about waist level). · Keep a cordless phone and a flashlight with new batteries by your bed. If possible, put a phone in each of the main rooms of your house, or carry a cell phone in case you fall and cannot reach a phone. Or, you can wear a device around your neck or wrist. You push a button that sends a signal for help. · Wear low-heeled shoes that fit well and give your feet good support. Use footwear with nonskid soles. Check the heels and soles of your shoes for wear. Repair or replace worn heels or soles. · Do not wear socks without shoes on wood floors. · Walk on the grass when the sidewalks are slippery. If you live in an area that gets snow and ice in the winter, sprinkle salt on slippery steps and sidewalks. Preventing falls in the bath  · Install grab bars and nonskid mats inside and outside your shower or tub and near the toilet and sinks. · Use shower chairs and bath benches. · Use a hand-held shower head that will allow you to sit while showering.   · Get into a tub or shower by putting the weaker leg in first. Get out of a tub or shower with your strong side first.  · Repair loose toilet seats and consider installing a raised toilet seat to make getting on and off the toilet easier. · Keep your bathroom door unlocked while you are in the shower. Where can you learn more? Go to http://angelina-rajiv.info/. Enter [id number] [id number] [phone] in the search box to learn more about \"Preventing Falls: Care Instructions. \"  Current as of: May 12, 2017  Content Version: 11.4  © 9933-1129 [employer]. Care instructions adapted under license by [employer] (which disclaims liability or warranty for this information). If you have questions about a medical condition or this instruction, always ask your healthcare professional. [place] [address] any warranty or liability for your use of this information. Learning About Hypoxemia  What is hypoxemia? Hypoxemia means that you don't have enough oxygen in your blood. It's a result of diseases that affect your heart or lungs. These include heart failure, COPD, and pulmonary fibrosis (scarring of the lungs). Being at high altitudes can also lead to hypoxemia. What happens when you have hypoxemia? Oxygen gets into your blood through your lungs. Your blood carries the oxygen to all parts of your body. When you have too little oxygen in your blood, your body doesn't get enough of it. With too little oxygen, your heart and other parts of your body don't work very well. What are the symptoms? In addition to the symptoms of whatever is causing your hypoxemia, you may:  · Get tired quickly. · Be short of breath when you are active. · Feel like your heart is pounding or racing. · Feel weak or dizzy. · Become confused. How is hypoxemia treated? Your doctor will do tests to find out how much oxygen is in your blood. He or she will look for the cause of your hypoxemia and treat that problem. For example, if you have heart failure, you may need medicines that help your heart pump better.   · If your hypoxemia is not severe, your doctor may give you oxygen through a mask or nasal cannula (say \"JACOB-yuh-milli\"). A cannula is a thin tube with two openings that fit just inside your nose. · If your hypoxemia is severe, you may have a breathing tube put into your windpipe. The breathing tube is attached to a machine that pushes air into your lungs. This machine is called a ventilator. · If you have a long-term problem with hypoxemia, your doctor may recommend that you use oxygen regularly. Some people need it all the time. Others need it from time to time throughout the day or overnight. Your doctor will tell you how much oxygen you need and how often to use it. Follow-up care is a key part of your treatment and safety. Be sure to make and go to all appointments, and call your doctor if you are having problems. It's also a good idea to know your test results and keep a list of the medicines you take. Where can you learn more? Go to http://angelina-rajiv.info/. Enter M375 in the search box to learn more about \"Learning About Hypoxemia. \"  Current as of: May 12, 2017  Content Version: 11.4  © 5802-5627 [employer]. Care instructions adapted under license by [employer] (which disclaims liability or warranty for this information). If you have questions about a medical condition or this instruction, always ask your healthcare professional. [address] any warranty or liability for your use of this information.     ___________________________________________________________________________________________________________________________________

## 2017-11-03 NOTE — PROGRESS NOTES
Discharge instructions, follow up information, and medication list provided and explained to the pt. No new prescriptions written. IV removed from left AC. Opportunity for questions provided. Instructed to call once ready to leave.

## 2017-11-06 ENCOUNTER — HOME CARE VISIT (OUTPATIENT)
Dept: SCHEDULING | Facility: HOME HEALTH | Age: 82
End: 2017-11-06
Payer: MEDICARE

## 2017-11-06 ENCOUNTER — PATIENT OUTREACH (OUTPATIENT)
Dept: CASE MANAGEMENT | Age: 82
End: 2017-11-06

## 2017-11-06 PROCEDURE — 400013 HH SOC

## 2017-11-06 PROCEDURE — 3331090001 HH PPS REVENUE CREDIT

## 2017-11-06 PROCEDURE — 3331090002 HH PPS REVENUE DEBIT

## 2017-11-06 PROCEDURE — G0299 HHS/HOSPICE OF RN EA 15 MIN: HCPCS

## 2017-11-06 NOTE — Clinical Note
ANGELICAI only Dr. Lin Tirado Please let me know of any input.  Thanks so much, Georgette Price, MSN, [address] [place], [place] [phone]

## 2017-11-06 NOTE — PROGRESS NOTES
Follow up call with patient's wife - Renate Huggins - as Transitions of Care Call  Patient was discharged home on Friday 11/3/2017; [address] is scheduled to do [facility] this afternoon. Patient has been sleeping well, using CPAP - although the mask needs replacing (wife is following up with this later in the week). Patient using his cane, but also has a rollator. Home with hardwood floors except in the bedroom. One step to get into the den. Wife reports that she has been keeping a close eye on patient. Of note - patient's brother unexpectedly  yestreday. This has definitely affected patient's spirits and especially as he doesn't feel he will be able to attend services (as yet not planned).     Plan -  Coordination with Home Health  Provided contact information   Will follow closely

## 2017-11-06 NOTE — PROGRESS NOTES
Per [facility] patient is currently engaged with LEOPOLDO Clement RN CCM, Care Coordinator will send CM notification of patient hospital discharge on 11/03/2017. No Transitions of Care Outreach This note will not be viewable in [email or web address].

## 2017-11-07 PROCEDURE — 3331090002 HH PPS REVENUE DEBIT

## 2017-11-07 PROCEDURE — 3331090001 HH PPS REVENUE CREDIT

## 2017-11-08 ENCOUNTER — HOME CARE VISIT (OUTPATIENT)
Dept: SCHEDULING | Facility: HOME HEALTH | Age: 82
End: 2017-11-08
Payer: MEDICARE

## 2017-11-08 VITALS
SYSTOLIC BLOOD PRESSURE: 132 MMHG | TEMPERATURE: 98.6 F | DIASTOLIC BLOOD PRESSURE: 70 MMHG | OXYGEN SATURATION: 95 % | RESPIRATION RATE: 22 BRPM | HEART RATE: 67 BPM

## 2017-11-08 PROCEDURE — G0151 HHCP-SERV OF PT,EA 15 MIN: HCPCS

## 2017-11-08 PROCEDURE — 3331090002 HH PPS REVENUE DEBIT

## 2017-11-08 PROCEDURE — 3331090001 HH PPS REVENUE CREDIT

## 2017-11-09 VITALS
DIASTOLIC BLOOD PRESSURE: 60 MMHG | OXYGEN SATURATION: 90 % | HEART RATE: 54 BPM | TEMPERATURE: 98.4 F | SYSTOLIC BLOOD PRESSURE: 138 MMHG

## 2017-11-09 PROCEDURE — 3331090001 HH PPS REVENUE CREDIT

## 2017-11-09 PROCEDURE — 3331090002 HH PPS REVENUE DEBIT

## 2017-11-10 ENCOUNTER — HOME CARE VISIT (OUTPATIENT)
Dept: SCHEDULING | Facility: HOME HEALTH | Age: 82
End: 2017-11-10
Payer: MEDICARE

## 2017-11-10 ENCOUNTER — PATIENT OUTREACH (OUTPATIENT)
Dept: CASE MANAGEMENT | Age: 82
End: 2017-11-10

## 2017-11-10 PROCEDURE — G0299 HHS/HOSPICE OF RN EA 15 MIN: HCPCS

## 2017-11-10 PROCEDURE — 3331090001 HH PPS REVENUE CREDIT

## 2017-11-10 PROCEDURE — 3331090002 HH PPS REVENUE DEBIT

## 2017-11-11 VITALS
HEART RATE: 62 BPM | OXYGEN SATURATION: 93 % | DIASTOLIC BLOOD PRESSURE: 64 MMHG | TEMPERATURE: 97.4 F | SYSTOLIC BLOOD PRESSURE: 132 MMHG | RESPIRATION RATE: 20 BRPM

## 2017-11-11 PROCEDURE — 3331090001 HH PPS REVENUE CREDIT

## 2017-11-11 PROCEDURE — 3331090002 HH PPS REVENUE DEBIT

## 2017-11-12 PROCEDURE — 3331090001 HH PPS REVENUE CREDIT

## 2017-11-12 PROCEDURE — 3331090002 HH PPS REVENUE DEBIT

## 2017-11-13 PROCEDURE — 3331090001 HH PPS REVENUE CREDIT

## 2017-11-13 PROCEDURE — 3331090002 HH PPS REVENUE DEBIT

## 2017-11-14 ENCOUNTER — HOME CARE VISIT (OUTPATIENT)
Dept: SCHEDULING | Facility: HOME HEALTH | Age: 82
End: 2017-11-14
Payer: MEDICARE

## 2017-11-14 ENCOUNTER — PATIENT OUTREACH (OUTPATIENT)
Dept: CASE MANAGEMENT | Age: 82
End: 2017-11-14

## 2017-11-14 VITALS
SYSTOLIC BLOOD PRESSURE: 150 MMHG | RESPIRATION RATE: 19 BRPM | DIASTOLIC BLOOD PRESSURE: 80 MMHG | HEART RATE: 70 BPM | OXYGEN SATURATION: 94 % | TEMPERATURE: 97.9 F

## 2017-11-14 PROCEDURE — G0299 HHS/HOSPICE OF RN EA 15 MIN: HCPCS

## 2017-11-14 PROCEDURE — 3331090002 HH PPS REVENUE DEBIT

## 2017-11-14 PROCEDURE — 3331090001 HH PPS REVENUE CREDIT

## 2017-11-14 PROCEDURE — G0157 HHC PT ASSISTANT EA 15: HCPCS

## 2017-11-14 NOTE — PROGRESS NOTES
Confirmed HH involvement. RN Case Manager Lonnie Lopez following this patient.   Patient saw PCP in hospital follow up yesterday 11/13

## 2017-11-15 VITALS
HEART RATE: 68 BPM | DIASTOLIC BLOOD PRESSURE: 60 MMHG | SYSTOLIC BLOOD PRESSURE: 115 MMHG | TEMPERATURE: 97.7 F | RESPIRATION RATE: 20 BRPM | OXYGEN SATURATION: 95 %

## 2017-11-15 PROCEDURE — 3331090001 HH PPS REVENUE CREDIT

## 2017-11-15 PROCEDURE — 3331090002 HH PPS REVENUE DEBIT

## 2017-11-16 ENCOUNTER — HOME CARE VISIT (OUTPATIENT)
Dept: SCHEDULING | Facility: HOME HEALTH | Age: 82
End: 2017-11-16
Payer: MEDICARE

## 2017-11-16 VITALS
RESPIRATION RATE: 18 BRPM | OXYGEN SATURATION: 94 % | DIASTOLIC BLOOD PRESSURE: 60 MMHG | TEMPERATURE: 98.5 F | HEART RATE: 84 BPM | SYSTOLIC BLOOD PRESSURE: 140 MMHG

## 2017-11-16 PROCEDURE — 3331090001 HH PPS REVENUE CREDIT

## 2017-11-16 PROCEDURE — 3331090002 HH PPS REVENUE DEBIT

## 2017-11-16 PROCEDURE — G0157 HHC PT ASSISTANT EA 15: HCPCS

## 2017-11-17 ENCOUNTER — HOME CARE VISIT (OUTPATIENT)
Dept: SCHEDULING | Facility: HOME HEALTH | Age: 82
End: 2017-11-17
Payer: MEDICARE

## 2017-11-17 VITALS
TEMPERATURE: 98.6 F | RESPIRATION RATE: 20 BRPM | DIASTOLIC BLOOD PRESSURE: 60 MMHG | HEART RATE: 83 BPM | SYSTOLIC BLOOD PRESSURE: 118 MMHG | OXYGEN SATURATION: 95 %

## 2017-11-17 PROCEDURE — G0299 HHS/HOSPICE OF RN EA 15 MIN: HCPCS

## 2017-11-17 PROCEDURE — 3331090002 HH PPS REVENUE DEBIT

## 2017-11-17 PROCEDURE — 3331090001 HH PPS REVENUE CREDIT

## 2017-11-18 PROCEDURE — 3331090002 HH PPS REVENUE DEBIT

## 2017-11-18 PROCEDURE — 3331090001 HH PPS REVENUE CREDIT

## 2017-11-19 PROCEDURE — 3331090001 HH PPS REVENUE CREDIT

## 2017-11-19 PROCEDURE — 3331090002 HH PPS REVENUE DEBIT

## 2017-11-20 ENCOUNTER — HOME CARE VISIT (OUTPATIENT)
Dept: SCHEDULING | Facility: HOME HEALTH | Age: 82
End: 2017-11-20
Payer: MEDICARE

## 2017-11-20 VITALS
SYSTOLIC BLOOD PRESSURE: 130 MMHG | HEART RATE: 72 BPM | OXYGEN SATURATION: 93 % | DIASTOLIC BLOOD PRESSURE: 68 MMHG | RESPIRATION RATE: 18 BRPM | TEMPERATURE: 95.7 F

## 2017-11-20 PROCEDURE — 3331090001 HH PPS REVENUE CREDIT

## 2017-11-20 PROCEDURE — G0157 HHC PT ASSISTANT EA 15: HCPCS

## 2017-11-20 PROCEDURE — 3331090002 HH PPS REVENUE DEBIT

## 2017-11-21 ENCOUNTER — HOME CARE VISIT (OUTPATIENT)
Dept: SCHEDULING | Facility: HOME HEALTH | Age: 82
End: 2017-11-21
Payer: MEDICARE

## 2017-11-21 ENCOUNTER — PATIENT OUTREACH (OUTPATIENT)
Dept: CASE MANAGEMENT | Age: 82
End: 2017-11-21

## 2017-11-21 PROCEDURE — 3331090001 HH PPS REVENUE CREDIT

## 2017-11-21 PROCEDURE — G0299 HHS/HOSPICE OF RN EA 15 MIN: HCPCS

## 2017-11-21 PROCEDURE — 3331090002 HH PPS REVENUE DEBIT

## 2017-11-21 NOTE — PROGRESS NOTES
Chart review, coordination with [address] [place] [address] scheduled to follow patient for approximately 10 more days  RN providing nutritional guidance, education re: disease, conservation of energy    Plan - continue to follow remotely  Consideration for cardiac rehab - follow progress with PT

## 2017-11-21 NOTE — Clinical Note
I will not step in with this patient until [facility] has concluded. However, do you think cardiac rehab is a consideration? (when [facility] PT is finished)  Would he tolerate?  Franca Martinez

## 2017-11-22 ENCOUNTER — HOME CARE VISIT (OUTPATIENT)
Dept: SCHEDULING | Facility: HOME HEALTH | Age: 82
End: 2017-11-22
Payer: MEDICARE

## 2017-11-22 VITALS — DIASTOLIC BLOOD PRESSURE: 68 MMHG | RESPIRATION RATE: 18 BRPM | HEART RATE: 70 BPM | SYSTOLIC BLOOD PRESSURE: 138 MMHG

## 2017-11-22 PROCEDURE — G0157 HHC PT ASSISTANT EA 15: HCPCS

## 2017-11-22 PROCEDURE — 3331090002 HH PPS REVENUE DEBIT

## 2017-11-22 PROCEDURE — 3331090001 HH PPS REVENUE CREDIT

## 2017-11-23 PROCEDURE — 3331090002 HH PPS REVENUE DEBIT

## 2017-11-23 PROCEDURE — 3331090001 HH PPS REVENUE CREDIT

## 2017-11-24 ENCOUNTER — HOME CARE VISIT (OUTPATIENT)
Dept: HOME HEALTH SERVICES | Facility: HOME HEALTH | Age: 82
End: 2017-11-24
Payer: MEDICARE

## 2017-11-24 VITALS
TEMPERATURE: 98.3 F | RESPIRATION RATE: 20 BRPM | OXYGEN SATURATION: 95 % | DIASTOLIC BLOOD PRESSURE: 70 MMHG | HEART RATE: 80 BPM | SYSTOLIC BLOOD PRESSURE: 140 MMHG

## 2017-11-24 PROCEDURE — 3331090002 HH PPS REVENUE DEBIT

## 2017-11-24 PROCEDURE — 3331090001 HH PPS REVENUE CREDIT

## 2017-11-25 PROCEDURE — 3331090002 HH PPS REVENUE DEBIT

## 2017-11-25 PROCEDURE — 3331090001 HH PPS REVENUE CREDIT

## 2017-11-26 PROCEDURE — 3331090001 HH PPS REVENUE CREDIT

## 2017-11-26 PROCEDURE — 3331090002 HH PPS REVENUE DEBIT

## 2017-11-27 ENCOUNTER — HOME CARE VISIT (OUTPATIENT)
Dept: SCHEDULING | Facility: HOME HEALTH | Age: 82
End: 2017-11-27
Payer: MEDICARE

## 2017-11-27 VITALS
DIASTOLIC BLOOD PRESSURE: 56 MMHG | OXYGEN SATURATION: 92 % | HEART RATE: 80 BPM | SYSTOLIC BLOOD PRESSURE: 130 MMHG | RESPIRATION RATE: 16 BRPM

## 2017-11-27 VITALS
TEMPERATURE: 99.6 F | SYSTOLIC BLOOD PRESSURE: 140 MMHG | OXYGEN SATURATION: 92 % | RESPIRATION RATE: 18 BRPM | DIASTOLIC BLOOD PRESSURE: 70 MMHG | HEART RATE: 62 BPM

## 2017-11-27 PROCEDURE — 3331090001 HH PPS REVENUE CREDIT

## 2017-11-27 PROCEDURE — 3331090002 HH PPS REVENUE DEBIT

## 2017-11-27 PROCEDURE — G0157 HHC PT ASSISTANT EA 15: HCPCS

## 2017-11-27 PROCEDURE — G0299 HHS/HOSPICE OF RN EA 15 MIN: HCPCS

## 2017-11-28 ENCOUNTER — HOME CARE VISIT (OUTPATIENT)
Dept: HOME HEALTH SERVICES | Facility: HOME HEALTH | Age: 82
End: 2017-11-28
Payer: MEDICARE

## 2017-11-28 PROCEDURE — 3331090002 HH PPS REVENUE DEBIT

## 2017-11-28 PROCEDURE — 3331090001 HH PPS REVENUE CREDIT

## 2017-11-29 ENCOUNTER — HOME CARE VISIT (OUTPATIENT)
Dept: SCHEDULING | Facility: HOME HEALTH | Age: 82
End: 2017-11-29
Payer: MEDICARE

## 2017-11-29 PROCEDURE — 3331090002 HH PPS REVENUE DEBIT

## 2017-11-29 PROCEDURE — 3331090001 HH PPS REVENUE CREDIT

## 2017-11-29 PROCEDURE — G0151 HHCP-SERV OF PT,EA 15 MIN: HCPCS

## 2017-11-30 ENCOUNTER — HOME CARE VISIT (OUTPATIENT)
Dept: HOME HEALTH SERVICES | Facility: HOME HEALTH | Age: 82
End: 2017-11-30
Payer: MEDICARE

## 2017-11-30 VITALS
SYSTOLIC BLOOD PRESSURE: 114 MMHG | OXYGEN SATURATION: 91 % | DIASTOLIC BLOOD PRESSURE: 50 MMHG | TEMPERATURE: 97.9 F | RESPIRATION RATE: 20 BRPM | HEART RATE: 64 BPM

## 2017-11-30 PROCEDURE — 3331090002 HH PPS REVENUE DEBIT

## 2017-11-30 PROCEDURE — 3331090001 HH PPS REVENUE CREDIT

## 2017-12-01 PROCEDURE — 3331090001 HH PPS REVENUE CREDIT

## 2017-12-01 PROCEDURE — 3331090002 HH PPS REVENUE DEBIT

## 2017-12-02 PROCEDURE — 3331090001 HH PPS REVENUE CREDIT

## 2017-12-02 PROCEDURE — 3331090002 HH PPS REVENUE DEBIT

## 2017-12-03 PROCEDURE — 3331090002 HH PPS REVENUE DEBIT

## 2017-12-03 PROCEDURE — 3331090001 HH PPS REVENUE CREDIT

## 2017-12-04 ENCOUNTER — HOME CARE VISIT (OUTPATIENT)
Dept: HOME HEALTH SERVICES | Facility: HOME HEALTH | Age: 82
End: 2017-12-04
Payer: MEDICARE

## 2017-12-04 ENCOUNTER — PATIENT OUTREACH (OUTPATIENT)
Dept: CASE MANAGEMENT | Age: 82
End: 2017-12-04

## 2017-12-04 VITALS
DIASTOLIC BLOOD PRESSURE: 80 MMHG | HEART RATE: 53 BPM | RESPIRATION RATE: 20 BRPM | TEMPERATURE: 98.2 F | SYSTOLIC BLOOD PRESSURE: 142 MMHG

## 2017-12-04 PROCEDURE — G0299 HHS/HOSPICE OF RN EA 15 MIN: HCPCS

## 2017-12-04 PROCEDURE — 3331090002 HH PPS REVENUE DEBIT

## 2017-12-04 PROCEDURE — 3331090001 HH PPS REVENUE CREDIT

## 2017-12-04 NOTE — PROGRESS NOTES
Care Coordination with Home Health re: patient readiness for Cardiac Rehab  Email sent to [employer]Team members  Of note patient has ortho consult pending - PT likely to be on hold

## 2017-12-05 PROCEDURE — 3331090002 HH PPS REVENUE DEBIT

## 2017-12-05 PROCEDURE — 3331090001 HH PPS REVENUE CREDIT

## 2017-12-06 ENCOUNTER — HOME CARE VISIT (OUTPATIENT)
Dept: HOME HEALTH SERVICES | Facility: HOME HEALTH | Age: 82
End: 2017-12-06
Payer: MEDICARE

## 2017-12-06 PROCEDURE — 3331090002 HH PPS REVENUE DEBIT

## 2017-12-06 PROCEDURE — 3331090001 HH PPS REVENUE CREDIT

## 2017-12-07 PROCEDURE — 3331090001 HH PPS REVENUE CREDIT

## 2017-12-07 PROCEDURE — 3331090002 HH PPS REVENUE DEBIT

## 2017-12-08 PROCEDURE — 3331090001 HH PPS REVENUE CREDIT

## 2017-12-08 PROCEDURE — 3331090002 HH PPS REVENUE DEBIT

## 2017-12-09 PROCEDURE — 3331090002 HH PPS REVENUE DEBIT

## 2017-12-09 PROCEDURE — 3331090001 HH PPS REVENUE CREDIT

## 2017-12-10 PROCEDURE — 3331090001 HH PPS REVENUE CREDIT

## 2017-12-10 PROCEDURE — 3331090002 HH PPS REVENUE DEBIT

## 2017-12-11 PROCEDURE — 3331090001 HH PPS REVENUE CREDIT

## 2017-12-11 PROCEDURE — 3331090002 HH PPS REVENUE DEBIT

## 2017-12-12 PROCEDURE — 3331090002 HH PPS REVENUE DEBIT

## 2017-12-12 PROCEDURE — 3331090001 HH PPS REVENUE CREDIT

## 2017-12-13 ENCOUNTER — HOME CARE VISIT (OUTPATIENT)
Dept: HOME HEALTH SERVICES | Facility: HOME HEALTH | Age: 82
End: 2017-12-13
Payer: MEDICARE

## 2017-12-13 PROCEDURE — 3331090002 HH PPS REVENUE DEBIT

## 2017-12-13 PROCEDURE — 3331090001 HH PPS REVENUE CREDIT

## 2017-12-14 PROCEDURE — 3331090001 HH PPS REVENUE CREDIT

## 2017-12-14 PROCEDURE — 3331090002 HH PPS REVENUE DEBIT

## 2017-12-15 ENCOUNTER — HOME CARE VISIT (OUTPATIENT)
Dept: SCHEDULING | Facility: HOME HEALTH | Age: 82
End: 2017-12-15
Payer: MEDICARE

## 2017-12-15 VITALS
DIASTOLIC BLOOD PRESSURE: 80 MMHG | TEMPERATURE: 97.3 F | OXYGEN SATURATION: 95 % | SYSTOLIC BLOOD PRESSURE: 140 MMHG | HEART RATE: 71 BPM

## 2017-12-15 PROCEDURE — 3331090001 HH PPS REVENUE CREDIT

## 2017-12-15 PROCEDURE — 3331090002 HH PPS REVENUE DEBIT

## 2017-12-15 PROCEDURE — G0299 HHS/HOSPICE OF RN EA 15 MIN: HCPCS

## 2017-12-16 PROCEDURE — 3331090001 HH PPS REVENUE CREDIT

## 2017-12-16 PROCEDURE — 3331090002 HH PPS REVENUE DEBIT

## 2017-12-17 PROCEDURE — 3331090001 HH PPS REVENUE CREDIT

## 2017-12-17 PROCEDURE — 3331090002 HH PPS REVENUE DEBIT

## 2017-12-18 ENCOUNTER — HOME CARE VISIT (OUTPATIENT)
Dept: HOME HEALTH SERVICES | Facility: HOME HEALTH | Age: 82
End: 2017-12-18
Payer: MEDICARE

## 2017-12-18 PROCEDURE — 3331090001 HH PPS REVENUE CREDIT

## 2017-12-18 PROCEDURE — 3331090002 HH PPS REVENUE DEBIT

## 2017-12-19 ENCOUNTER — HOME CARE VISIT (OUTPATIENT)
Dept: SCHEDULING | Facility: HOME HEALTH | Age: 82
End: 2017-12-19
Payer: MEDICARE

## 2017-12-19 PROCEDURE — 3331090001 HH PPS REVENUE CREDIT

## 2017-12-19 PROCEDURE — 3331090002 HH PPS REVENUE DEBIT

## 2017-12-20 ENCOUNTER — HOME CARE VISIT (OUTPATIENT)
Dept: SCHEDULING | Facility: HOME HEALTH | Age: 82
End: 2017-12-20
Payer: MEDICARE

## 2017-12-20 VITALS
HEART RATE: 58 BPM | DIASTOLIC BLOOD PRESSURE: 80 MMHG | RESPIRATION RATE: 20 BRPM | TEMPERATURE: 98 F | SYSTOLIC BLOOD PRESSURE: 138 MMHG

## 2017-12-20 PROCEDURE — 3331090002 HH PPS REVENUE DEBIT

## 2017-12-20 PROCEDURE — 3331090001 HH PPS REVENUE CREDIT

## 2017-12-20 PROCEDURE — G0299 HHS/HOSPICE OF RN EA 15 MIN: HCPCS

## 2017-12-21 PROCEDURE — 3331090001 HH PPS REVENUE CREDIT

## 2017-12-21 PROCEDURE — 3331090002 HH PPS REVENUE DEBIT

## 2017-12-22 PROCEDURE — 3331090001 HH PPS REVENUE CREDIT

## 2017-12-22 PROCEDURE — 3331090002 HH PPS REVENUE DEBIT

## 2017-12-23 PROCEDURE — 3331090001 HH PPS REVENUE CREDIT

## 2017-12-23 PROCEDURE — 3331090002 HH PPS REVENUE DEBIT

## 2017-12-24 PROCEDURE — 3331090001 HH PPS REVENUE CREDIT

## 2017-12-24 PROCEDURE — 3331090002 HH PPS REVENUE DEBIT

## 2017-12-25 PROCEDURE — 3331090002 HH PPS REVENUE DEBIT

## 2017-12-25 PROCEDURE — 3331090001 HH PPS REVENUE CREDIT

## 2017-12-26 ENCOUNTER — HOME CARE VISIT (OUTPATIENT)
Dept: HOME HEALTH SERVICES | Facility: HOME HEALTH | Age: 82
End: 2017-12-26
Payer: MEDICARE

## 2017-12-26 VITALS
HEART RATE: 60 BPM | RESPIRATION RATE: 20 BRPM | OXYGEN SATURATION: 98 % | SYSTOLIC BLOOD PRESSURE: 118 MMHG | TEMPERATURE: 97 F | DIASTOLIC BLOOD PRESSURE: 60 MMHG

## 2017-12-26 PROCEDURE — 3331090002 HH PPS REVENUE DEBIT

## 2017-12-26 PROCEDURE — 3331090001 HH PPS REVENUE CREDIT

## 2017-12-26 PROCEDURE — G0299 HHS/HOSPICE OF RN EA 15 MIN: HCPCS

## 2017-12-27 PROCEDURE — 3331090002 HH PPS REVENUE DEBIT

## 2017-12-27 PROCEDURE — 3331090001 HH PPS REVENUE CREDIT

## 2017-12-28 PROCEDURE — 3331090001 HH PPS REVENUE CREDIT

## 2017-12-28 PROCEDURE — 3331090002 HH PPS REVENUE DEBIT

## 2017-12-29 ENCOUNTER — PATIENT OUTREACH (OUTPATIENT)
Dept: CASE MANAGEMENT | Age: 82
End: 2017-12-29

## 2017-12-29 NOTE — PROGRESS NOTES
Encounter opened to set reminder to call patient in one week. SF  has discharged patient.   Next MD follow up appointments are in February

## 2018-01-01 ENCOUNTER — HOME CARE VISIT (OUTPATIENT)
Dept: SCHEDULING | Facility: HOME HEALTH | Age: 83
End: 2018-01-01
Payer: MEDICARE

## 2018-01-01 ENCOUNTER — APPOINTMENT (OUTPATIENT)
Dept: GENERAL RADIOLOGY | Age: 83
End: 2018-01-01
Attending: EMERGENCY MEDICINE
Payer: MEDICARE

## 2018-01-01 ENCOUNTER — HOSPITAL ENCOUNTER (EMERGENCY)
Age: 83
Discharge: HOME | End: 2018-01-17
Payer: MEDICARE

## 2018-01-01 ENCOUNTER — HOME CARE VISIT (OUTPATIENT)
Dept: HOME HEALTH SERVICES | Facility: HOME HEALTH | Age: 83
End: 2018-01-01
Payer: MEDICARE

## 2018-01-01 ENCOUNTER — HOSPITAL ENCOUNTER (OUTPATIENT)
Dept: LAB | Age: 83
Discharge: HOME | End: 2018-07-06
Payer: MEDICARE

## 2018-01-01 ENCOUNTER — PATIENT OUTREACH (OUTPATIENT)
Dept: CASE MANAGEMENT | Age: 83
End: 2018-01-01

## 2018-01-01 ENCOUNTER — HOSPICE ADMISSION (OUTPATIENT)
Dept: HOSPICE | Facility: HOSPICE | Age: 83
End: 2018-01-01

## 2018-01-01 ENCOUNTER — APPOINTMENT (OUTPATIENT)
Dept: CT IMAGING | Age: 83
DRG: 291 | End: 2018-01-01
Payer: MEDICARE

## 2018-01-01 ENCOUNTER — APPOINTMENT (OUTPATIENT)
Dept: GENERAL RADIOLOGY | Age: 83
DRG: 682 | End: 2018-01-01
Attending: INTERNAL MEDICINE
Payer: MEDICARE

## 2018-01-01 ENCOUNTER — APPOINTMENT (OUTPATIENT)
Dept: GENERAL RADIOLOGY | Age: 83
DRG: 871 | End: 2018-01-01
Attending: EMERGENCY MEDICINE
Payer: MEDICARE

## 2018-01-01 ENCOUNTER — APPOINTMENT (OUTPATIENT)
Dept: MRI IMAGING | Age: 83
DRG: 101 | End: 2018-01-01
Attending: NURSE PRACTITIONER
Payer: MEDICARE

## 2018-01-01 ENCOUNTER — APPOINTMENT (OUTPATIENT)
Dept: ULTRASOUND IMAGING | Age: 83
End: 2018-01-01
Attending: EMERGENCY MEDICINE
Payer: MEDICARE

## 2018-01-01 ENCOUNTER — APPOINTMENT (OUTPATIENT)
Dept: INTERVENTIONAL RADIOLOGY/VASCULAR | Age: 83
End: 2018-01-01
Attending: SURGERY
Payer: MEDICARE

## 2018-01-01 ENCOUNTER — APPOINTMENT (OUTPATIENT)
Dept: GENERAL RADIOLOGY | Age: 83
DRG: 689 | End: 2018-01-01
Attending: EMERGENCY MEDICINE
Payer: MEDICARE

## 2018-01-01 ENCOUNTER — HOME HEALTH ADMISSION (OUTPATIENT)
Dept: HOME HEALTH SERVICES | Facility: HOME HEALTH | Age: 83
End: 2018-01-01

## 2018-01-01 ENCOUNTER — APPOINTMENT (OUTPATIENT)
Dept: CT IMAGING | Age: 83
End: 2018-01-01
Attending: EMERGENCY MEDICINE
Payer: MEDICARE

## 2018-01-01 ENCOUNTER — HOME CARE VISIT (OUTPATIENT)
Dept: HOME HEALTH SERVICES | Facility: HOME HEALTH | Age: 83
End: 2018-01-01

## 2018-01-01 ENCOUNTER — HOSPITAL ENCOUNTER (OUTPATIENT)
Age: 83
Setting detail: OUTPATIENT SURGERY
Discharge: HOME | End: 2018-09-12
Attending: SURGERY | Admitting: SURGERY
Payer: MEDICARE

## 2018-01-01 ENCOUNTER — APPOINTMENT (OUTPATIENT)
Dept: CT IMAGING | Age: 83
DRG: 101 | End: 2018-01-01
Attending: EMERGENCY MEDICINE
Payer: MEDICARE

## 2018-01-01 ENCOUNTER — HOSPITAL ENCOUNTER (OUTPATIENT)
Dept: LAB | Age: 83
Discharge: HOME | End: 2018-02-27
Payer: MEDICARE

## 2018-01-01 ENCOUNTER — APPOINTMENT (OUTPATIENT)
Dept: ULTRASOUND IMAGING | Age: 83
DRG: 871 | End: 2018-01-01
Attending: INTERNAL MEDICINE
Payer: MEDICARE

## 2018-01-01 ENCOUNTER — ANESTHESIA (OUTPATIENT)
Dept: SURGERY | Age: 83
End: 2018-01-01
Payer: MEDICARE

## 2018-01-01 ENCOUNTER — APPOINTMENT (OUTPATIENT)
Dept: MRI IMAGING | Age: 83
DRG: 291 | End: 2018-01-01
Attending: FAMILY MEDICINE
Payer: MEDICARE

## 2018-01-01 ENCOUNTER — APPOINTMENT (OUTPATIENT)
Dept: CT IMAGING | Age: 83
End: 2018-01-01
Payer: MEDICARE

## 2018-01-01 ENCOUNTER — APPOINTMENT (OUTPATIENT)
Dept: GENERAL RADIOLOGY | Age: 83
DRG: 871 | End: 2018-01-01
Attending: INTERNAL MEDICINE
Payer: MEDICARE

## 2018-01-01 ENCOUNTER — APPOINTMENT (OUTPATIENT)
Dept: GENERAL RADIOLOGY | Age: 83
DRG: 682 | End: 2018-01-01
Attending: STUDENT IN AN ORGANIZED HEALTH CARE EDUCATION/TRAINING PROGRAM
Payer: MEDICARE

## 2018-01-01 ENCOUNTER — APPOINTMENT (OUTPATIENT)
Dept: CT IMAGING | Age: 83
DRG: 871 | End: 2018-01-01
Attending: INTERNAL MEDICINE
Payer: MEDICARE

## 2018-01-01 ENCOUNTER — ANESTHESIA (OUTPATIENT)
Dept: ENDOSCOPY | Age: 83
DRG: 682 | End: 2018-01-01
Payer: MEDICARE

## 2018-01-01 ENCOUNTER — APPOINTMENT (OUTPATIENT)
Dept: ULTRASOUND IMAGING | Age: 83
DRG: 682 | End: 2018-01-01
Attending: INTERNAL MEDICINE
Payer: MEDICARE

## 2018-01-01 ENCOUNTER — APPOINTMENT (OUTPATIENT)
Dept: ULTRASOUND IMAGING | Age: 83
DRG: 193 | End: 2018-01-01
Attending: INTERNAL MEDICINE
Payer: MEDICARE

## 2018-01-01 ENCOUNTER — APPOINTMENT (OUTPATIENT)
Dept: GENERAL RADIOLOGY | Age: 83
End: 2018-01-01
Payer: MEDICARE

## 2018-01-01 ENCOUNTER — APPOINTMENT (OUTPATIENT)
Dept: NUCLEAR MEDICINE | Age: 83
DRG: 682 | End: 2018-01-01
Payer: MEDICARE

## 2018-01-01 ENCOUNTER — APPOINTMENT (OUTPATIENT)
Dept: ULTRASOUND IMAGING | Age: 83
DRG: 291 | End: 2018-01-01
Attending: FAMILY MEDICINE
Payer: MEDICARE

## 2018-01-01 ENCOUNTER — HOSPITAL ENCOUNTER (INPATIENT)
Age: 83
LOS: 12 days | Discharge: HOSPICE | DRG: 101 | End: 2019-01-11
Attending: EMERGENCY MEDICINE | Admitting: INTERNAL MEDICINE
Payer: MEDICARE

## 2018-01-01 ENCOUNTER — HOSPITAL ENCOUNTER (OUTPATIENT)
Dept: LAB | Age: 83
Discharge: HOME | End: 2018-02-20
Payer: MEDICARE

## 2018-01-01 ENCOUNTER — HOSPITAL ENCOUNTER (EMERGENCY)
Age: 83
Discharge: HOME | End: 2018-10-26
Attending: EMERGENCY MEDICINE
Payer: MEDICARE

## 2018-01-01 ENCOUNTER — HOSPITAL ENCOUNTER (EMERGENCY)
Age: 83
Discharge: HOME | End: 2018-09-02
Attending: EMERGENCY MEDICINE
Payer: MEDICARE

## 2018-01-01 ENCOUNTER — ANESTHESIA EVENT (OUTPATIENT)
Dept: ENDOSCOPY | Age: 83
DRG: 682 | End: 2018-01-01
Payer: MEDICARE

## 2018-01-01 ENCOUNTER — HOSPITAL ENCOUNTER (INPATIENT)
Age: 83
LOS: 1 days | Discharge: HOME | DRG: 689 | End: 2018-10-10
Attending: EMERGENCY MEDICINE | Admitting: FAMILY MEDICINE
Payer: MEDICARE

## 2018-01-01 ENCOUNTER — HOSPITAL ENCOUNTER (INPATIENT)
Age: 83
LOS: 3 days | Discharge: HOME HEALTH | DRG: 291 | End: 2018-04-25
Admitting: FAMILY MEDICINE
Payer: MEDICARE

## 2018-01-01 ENCOUNTER — APPOINTMENT (OUTPATIENT)
Dept: CT IMAGING | Age: 83
DRG: 682 | End: 2018-01-01
Attending: STUDENT IN AN ORGANIZED HEALTH CARE EDUCATION/TRAINING PROGRAM
Payer: MEDICARE

## 2018-01-01 ENCOUNTER — HOSPITAL ENCOUNTER (EMERGENCY)
Age: 83
Discharge: HOME | End: 2018-10-11
Attending: EMERGENCY MEDICINE
Payer: MEDICARE

## 2018-01-01 ENCOUNTER — APPOINTMENT (OUTPATIENT)
Dept: CT IMAGING | Age: 83
DRG: 689 | End: 2018-01-01
Attending: EMERGENCY MEDICINE
Payer: MEDICARE

## 2018-01-01 ENCOUNTER — HOSPITAL ENCOUNTER (OUTPATIENT)
Dept: GENERAL RADIOLOGY | Age: 83
Discharge: HOME | End: 2018-05-09
Payer: MEDICARE

## 2018-01-01 ENCOUNTER — HOSPITAL ENCOUNTER (INPATIENT)
Age: 83
LOS: 13 days | Discharge: NURSING, REHAB OR LONG-TERM CARE | DRG: 871 | End: 2018-11-13
Attending: EMERGENCY MEDICINE | Admitting: INTERNAL MEDICINE
Payer: MEDICARE

## 2018-01-01 ENCOUNTER — HOME HEALTH ADMISSION (OUTPATIENT)
Dept: HOME HEALTH SERVICES | Facility: HOME HEALTH | Age: 83
End: 2018-01-01
Payer: MEDICARE

## 2018-01-01 ENCOUNTER — HOSPITAL ENCOUNTER (OUTPATIENT)
Age: 83
Setting detail: OBSERVATION
Discharge: HOME HEALTH | DRG: 193 | End: 2018-02-02
Attending: STUDENT IN AN ORGANIZED HEALTH CARE EDUCATION/TRAINING PROGRAM | Admitting: INTERNAL MEDICINE
Payer: MEDICARE

## 2018-01-01 ENCOUNTER — APPOINTMENT (OUTPATIENT)
Dept: GENERAL RADIOLOGY | Age: 83
DRG: 291 | End: 2018-01-01
Payer: MEDICARE

## 2018-01-01 ENCOUNTER — HOSPITAL ENCOUNTER (EMERGENCY)
Age: 83
Discharge: HOME | End: 2018-03-01
Attending: EMERGENCY MEDICINE
Payer: MEDICARE

## 2018-01-01 ENCOUNTER — APPOINTMENT (OUTPATIENT)
Dept: GENERAL RADIOLOGY | Age: 83
DRG: 101 | End: 2018-01-01
Attending: EMERGENCY MEDICINE
Payer: MEDICARE

## 2018-01-01 ENCOUNTER — APPOINTMENT (OUTPATIENT)
Dept: MRI IMAGING | Age: 83
DRG: 101 | End: 2018-01-01
Attending: INTERNAL MEDICINE
Payer: MEDICARE

## 2018-01-01 ENCOUNTER — HOSPITAL ENCOUNTER (INPATIENT)
Age: 83
LOS: 4 days | Discharge: NURSING, REHAB OR LONG-TERM CARE | DRG: 682 | End: 2018-08-06
Attending: STUDENT IN AN ORGANIZED HEALTH CARE EDUCATION/TRAINING PROGRAM | Admitting: INTERNAL MEDICINE
Payer: MEDICARE

## 2018-01-01 ENCOUNTER — APPOINTMENT (OUTPATIENT)
Dept: GENERAL RADIOLOGY | Age: 83
DRG: 193 | End: 2018-01-01
Attending: EMERGENCY MEDICINE
Payer: MEDICARE

## 2018-01-01 ENCOUNTER — ANESTHESIA EVENT (OUTPATIENT)
Dept: SURGERY | Age: 83
End: 2018-01-01
Payer: MEDICARE

## 2018-01-01 VITALS
SYSTOLIC BLOOD PRESSURE: 122 MMHG | DIASTOLIC BLOOD PRESSURE: 60 MMHG | HEART RATE: 74 BPM | OXYGEN SATURATION: 95 % | RESPIRATION RATE: 20 BRPM | WEIGHT: 225 LBS | TEMPERATURE: 98.7 F | BODY MASS INDEX: 28.89 KG/M2

## 2018-01-01 VITALS
HEART RATE: 62 BPM | SYSTOLIC BLOOD PRESSURE: 150 MMHG | DIASTOLIC BLOOD PRESSURE: 60 MMHG | OXYGEN SATURATION: 95 % | BODY MASS INDEX: 38.01 KG/M2 | WEIGHT: 250 LBS | RESPIRATION RATE: 18 BRPM | TEMPERATURE: 97.7 F

## 2018-01-01 VITALS
HEART RATE: 68 BPM | DIASTOLIC BLOOD PRESSURE: 64 MMHG | OXYGEN SATURATION: 92 % | RESPIRATION RATE: 18 BRPM | TEMPERATURE: 98.2 F | SYSTOLIC BLOOD PRESSURE: 136 MMHG

## 2018-01-01 VITALS
SYSTOLIC BLOOD PRESSURE: 138 MMHG | OXYGEN SATURATION: 97 % | DIASTOLIC BLOOD PRESSURE: 80 MMHG | TEMPERATURE: 98.1 F | HEART RATE: 68 BPM | RESPIRATION RATE: 18 BRPM

## 2018-01-01 VITALS
WEIGHT: 251 LBS | HEART RATE: 82 BPM | TEMPERATURE: 97.7 F | SYSTOLIC BLOOD PRESSURE: 132 MMHG | DIASTOLIC BLOOD PRESSURE: 66 MMHG | RESPIRATION RATE: 16 BRPM | BODY MASS INDEX: 38.16 KG/M2 | OXYGEN SATURATION: 96 %

## 2018-01-01 VITALS
RESPIRATION RATE: 18 BRPM | HEART RATE: 50 BPM | OXYGEN SATURATION: 95 % | SYSTOLIC BLOOD PRESSURE: 130 MMHG | TEMPERATURE: 97.5 F | DIASTOLIC BLOOD PRESSURE: 58 MMHG

## 2018-01-01 VITALS — HEIGHT: 68 IN | BODY MASS INDEX: 37.74 KG/M2 | WEIGHT: 249 LBS

## 2018-01-01 VITALS
DIASTOLIC BLOOD PRESSURE: 54 MMHG | RESPIRATION RATE: 18 BRPM | BODY MASS INDEX: 35.07 KG/M2 | TEMPERATURE: 97.7 F | WEIGHT: 245 LBS | OXYGEN SATURATION: 90 % | SYSTOLIC BLOOD PRESSURE: 124 MMHG | HEART RATE: 78 BPM | HEIGHT: 70 IN

## 2018-01-01 VITALS
SYSTOLIC BLOOD PRESSURE: 154 MMHG | OXYGEN SATURATION: 98 % | DIASTOLIC BLOOD PRESSURE: 70 MMHG | RESPIRATION RATE: 16 BRPM | TEMPERATURE: 98.2 F | HEART RATE: 78 BPM

## 2018-01-01 VITALS
OXYGEN SATURATION: 99 % | DIASTOLIC BLOOD PRESSURE: 69 MMHG | HEART RATE: 59 BPM | SYSTOLIC BLOOD PRESSURE: 153 MMHG | WEIGHT: 250 LBS | TEMPERATURE: 99.1 F | RESPIRATION RATE: 17 BRPM | BODY MASS INDEX: 37.89 KG/M2 | HEIGHT: 68 IN

## 2018-01-01 VITALS
RESPIRATION RATE: 18 BRPM | SYSTOLIC BLOOD PRESSURE: 132 MMHG | WEIGHT: 260 LBS | DIASTOLIC BLOOD PRESSURE: 60 MMHG | OXYGEN SATURATION: 94 % | BODY MASS INDEX: 39.53 KG/M2 | TEMPERATURE: 97.7 F | HEART RATE: 58 BPM

## 2018-01-01 VITALS
RESPIRATION RATE: 20 BRPM | OXYGEN SATURATION: 99 % | SYSTOLIC BLOOD PRESSURE: 152 MMHG | HEART RATE: 73 BPM | DIASTOLIC BLOOD PRESSURE: 70 MMHG | TEMPERATURE: 98 F

## 2018-01-01 VITALS
RESPIRATION RATE: 18 BRPM | DIASTOLIC BLOOD PRESSURE: 80 MMHG | OXYGEN SATURATION: 93 % | SYSTOLIC BLOOD PRESSURE: 132 MMHG | HEART RATE: 70 BPM

## 2018-01-01 VITALS
HEART RATE: 62 BPM | SYSTOLIC BLOOD PRESSURE: 118 MMHG | DIASTOLIC BLOOD PRESSURE: 60 MMHG | OXYGEN SATURATION: 96 % | RESPIRATION RATE: 16 BRPM | TEMPERATURE: 97.7 F

## 2018-01-01 VITALS
OXYGEN SATURATION: 98 % | DIASTOLIC BLOOD PRESSURE: 60 MMHG | RESPIRATION RATE: 15 BRPM | SYSTOLIC BLOOD PRESSURE: 120 MMHG | HEART RATE: 78 BPM | TEMPERATURE: 98 F

## 2018-01-01 VITALS
RESPIRATION RATE: 19 BRPM | TEMPERATURE: 98.2 F | SYSTOLIC BLOOD PRESSURE: 154 MMHG | HEART RATE: 78 BPM | OXYGEN SATURATION: 98 % | DIASTOLIC BLOOD PRESSURE: 70 MMHG

## 2018-01-01 VITALS
TEMPERATURE: 97.8 F | OXYGEN SATURATION: 94 % | RESPIRATION RATE: 20 BRPM | BODY MASS INDEX: 28.88 KG/M2 | SYSTOLIC BLOOD PRESSURE: 120 MMHG | WEIGHT: 225 LBS | HEART RATE: 60 BPM | DIASTOLIC BLOOD PRESSURE: 61 MMHG | HEIGHT: 74 IN

## 2018-01-01 VITALS
HEIGHT: 74 IN | SYSTOLIC BLOOD PRESSURE: 125 MMHG | WEIGHT: 236.7 LBS | HEART RATE: 63 BPM | TEMPERATURE: 97.8 F | DIASTOLIC BLOOD PRESSURE: 58 MMHG | OXYGEN SATURATION: 95 % | BODY MASS INDEX: 30.38 KG/M2 | RESPIRATION RATE: 18 BRPM

## 2018-01-01 VITALS
WEIGHT: 248.7 LBS | HEIGHT: 70 IN | HEART RATE: 71 BPM | TEMPERATURE: 97.7 F | DIASTOLIC BLOOD PRESSURE: 81 MMHG | SYSTOLIC BLOOD PRESSURE: 159 MMHG | OXYGEN SATURATION: 92 % | BODY MASS INDEX: 35.6 KG/M2 | RESPIRATION RATE: 18 BRPM

## 2018-01-01 VITALS
SYSTOLIC BLOOD PRESSURE: 152 MMHG | HEIGHT: 74 IN | OXYGEN SATURATION: 98 % | DIASTOLIC BLOOD PRESSURE: 68 MMHG | BODY MASS INDEX: 30.67 KG/M2 | TEMPERATURE: 98.8 F | RESPIRATION RATE: 16 BRPM | WEIGHT: 239 LBS | HEART RATE: 68 BPM

## 2018-01-01 VITALS
DIASTOLIC BLOOD PRESSURE: 56 MMHG | HEART RATE: 60 BPM | BODY MASS INDEX: 23.11 KG/M2 | WEIGHT: 152 LBS | RESPIRATION RATE: 18 BRPM | SYSTOLIC BLOOD PRESSURE: 118 MMHG | OXYGEN SATURATION: 95 % | TEMPERATURE: 97.7 F

## 2018-01-01 VITALS
BODY MASS INDEX: 35.93 KG/M2 | HEART RATE: 65 BPM | SYSTOLIC BLOOD PRESSURE: 200 MMHG | HEIGHT: 70 IN | RESPIRATION RATE: 18 BRPM | OXYGEN SATURATION: 97 % | TEMPERATURE: 97.5 F | DIASTOLIC BLOOD PRESSURE: 87 MMHG | WEIGHT: 251 LBS

## 2018-01-01 VITALS
HEART RATE: 68 BPM | OXYGEN SATURATION: 96 % | DIASTOLIC BLOOD PRESSURE: 60 MMHG | TEMPERATURE: 97.7 F | RESPIRATION RATE: 16 BRPM | SYSTOLIC BLOOD PRESSURE: 148 MMHG

## 2018-01-01 VITALS
BODY MASS INDEX: 40.45 KG/M2 | WEIGHT: 266 LBS | HEART RATE: 64 BPM | OXYGEN SATURATION: 91 % | SYSTOLIC BLOOD PRESSURE: 150 MMHG | RESPIRATION RATE: 20 BRPM | TEMPERATURE: 98.4 F | DIASTOLIC BLOOD PRESSURE: 67 MMHG

## 2018-01-01 VITALS
HEART RATE: 71 BPM | SYSTOLIC BLOOD PRESSURE: 138 MMHG | RESPIRATION RATE: 20 BRPM | DIASTOLIC BLOOD PRESSURE: 61 MMHG | TEMPERATURE: 98.6 F | OXYGEN SATURATION: 93 %

## 2018-01-01 VITALS
OXYGEN SATURATION: 90 % | SYSTOLIC BLOOD PRESSURE: 118 MMHG | TEMPERATURE: 97.9 F | DIASTOLIC BLOOD PRESSURE: 56 MMHG | HEART RATE: 64 BPM | RESPIRATION RATE: 18 BRPM

## 2018-01-01 DIAGNOSIS — R91.8 PULMONARY INFILTRATE: ICD-10-CM

## 2018-01-01 DIAGNOSIS — R00.1 BRADYCARDIA: ICD-10-CM

## 2018-01-01 DIAGNOSIS — Z51.5 PALLIATIVE CARE STATUS: ICD-10-CM

## 2018-01-01 DIAGNOSIS — R41.82 ALTERED MENTAL STATUS, UNSPECIFIED ALTERED MENTAL STATUS TYPE: ICD-10-CM

## 2018-01-01 DIAGNOSIS — N18.30 CKD (CHRONIC KIDNEY DISEASE) STAGE 3, GFR 30-59 ML/MIN (HCC): Chronic | ICD-10-CM

## 2018-01-01 DIAGNOSIS — I50.32 CHRONIC DIASTOLIC CONGESTIVE HEART FAILURE (HCC): Chronic | ICD-10-CM

## 2018-01-01 DIAGNOSIS — N30.00 ACUTE CYSTITIS WITHOUT HEMATURIA: ICD-10-CM

## 2018-01-01 DIAGNOSIS — A41.9 SEPTIC SHOCK (HCC): ICD-10-CM

## 2018-01-01 DIAGNOSIS — R78.81 BACTEREMIA: ICD-10-CM

## 2018-01-01 DIAGNOSIS — N18.9 ACUTE RENAL FAILURE SUPERIMPOSED ON CHRONIC KIDNEY DISEASE, UNSPECIFIED CKD STAGE, UNSPECIFIED ACUTE RENAL FAILURE TYPE (HCC): ICD-10-CM

## 2018-01-01 DIAGNOSIS — E16.2 HYPOGLYCEMIA: ICD-10-CM

## 2018-01-01 DIAGNOSIS — G93.40 ENCEPHALOPATHY ACUTE: Primary | ICD-10-CM

## 2018-01-01 DIAGNOSIS — R06.82 TACHYPNEA: ICD-10-CM

## 2018-01-01 DIAGNOSIS — R56.9 SEIZURE (HCC): Primary | ICD-10-CM

## 2018-01-01 DIAGNOSIS — J44.1 COPD EXACERBATION (HCC): ICD-10-CM

## 2018-01-01 DIAGNOSIS — S09.90XA CLOSED HEAD INJURY, INITIAL ENCOUNTER: Primary | ICD-10-CM

## 2018-01-01 DIAGNOSIS — R79.89 ELEVATED LFTS: ICD-10-CM

## 2018-01-01 DIAGNOSIS — N17.9 ACUTE RENAL FAILURE SUPERIMPOSED ON CHRONIC KIDNEY DISEASE, UNSPECIFIED CKD STAGE, UNSPECIFIED ACUTE RENAL FAILURE TYPE (HCC): ICD-10-CM

## 2018-01-01 DIAGNOSIS — G89.29 OTHER CHRONIC PAIN: ICD-10-CM

## 2018-01-01 DIAGNOSIS — R41.82 ALTERED MENTAL STATUS, UNSPECIFIED ALTERED MENTAL STATUS TYPE: Primary | ICD-10-CM

## 2018-01-01 DIAGNOSIS — F95.9 FACIAL TIC: Primary | ICD-10-CM

## 2018-01-01 DIAGNOSIS — M54.6 ACUTE BILATERAL THORACIC BACK PAIN: ICD-10-CM

## 2018-01-01 DIAGNOSIS — R10.32 ABDOMINAL PAIN, LLQ (LEFT LOWER QUADRANT): Primary | ICD-10-CM

## 2018-01-01 DIAGNOSIS — N17.9 AKI (ACUTE KIDNEY INJURY) (HCC): ICD-10-CM

## 2018-01-01 DIAGNOSIS — I10 ESSENTIAL HYPERTENSION: ICD-10-CM

## 2018-01-01 DIAGNOSIS — J10.1 INFLUENZA B: Primary | ICD-10-CM

## 2018-01-01 DIAGNOSIS — A41.9 SEPSIS, DUE TO UNSPECIFIED ORGANISM: Primary | ICD-10-CM

## 2018-01-01 DIAGNOSIS — Z51.5 ENCOUNTER FOR PALLIATIVE CARE: ICD-10-CM

## 2018-01-01 DIAGNOSIS — N18.9 ACUTE RENAL FAILURE WITH OTHER SPECIFIED PATHOLOGICAL KIDNEY LESION SUPERIMPOSED ON CHRONIC KIDNEY DISEASE, UNSPECIFIED CKD STAGE (HCC): ICD-10-CM

## 2018-01-01 DIAGNOSIS — R79.89 ELEVATED BRAIN NATRIURETIC PEPTIDE (BNP) LEVEL: ICD-10-CM

## 2018-01-01 DIAGNOSIS — Z95.828 S/P IVC FILTER: Chronic | ICD-10-CM

## 2018-01-01 DIAGNOSIS — R09.02 HYPOXIA: ICD-10-CM

## 2018-01-01 DIAGNOSIS — I48.19 PERSISTENT ATRIAL FIBRILLATION (HCC): ICD-10-CM

## 2018-01-01 DIAGNOSIS — R53.81 DEBILITY: ICD-10-CM

## 2018-01-01 DIAGNOSIS — J96.01 ACUTE RESPIRATORY FAILURE WITH HYPOXIA (HCC): ICD-10-CM

## 2018-01-01 DIAGNOSIS — E66.01 MORBID OBESITY (HCC): Chronic | ICD-10-CM

## 2018-01-01 DIAGNOSIS — R29.6 FALLS FREQUENTLY: ICD-10-CM
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
## 2018-01-01 DIAGNOSIS — N18.9 CHRONIC KIDNEY DISEASE, UNSPECIFIED CKD STAGE: ICD-10-CM

## 2018-01-01 DIAGNOSIS — J90 PLEURAL EFFUSION: ICD-10-CM

## 2018-01-01 DIAGNOSIS — N17.9 ACUTE RENAL FAILURE, UNSPECIFIED ACUTE RENAL FAILURE TYPE (HCC): Primary | ICD-10-CM

## 2018-01-01 DIAGNOSIS — I50.9 ACUTE ON CHRONIC CONGESTIVE HEART FAILURE, UNSPECIFIED HEART FAILURE TYPE (HCC): Primary | ICD-10-CM

## 2018-01-01 DIAGNOSIS — E80.6 HYPERBILIRUBINEMIA: ICD-10-CM

## 2018-01-01 DIAGNOSIS — J44.9 CHRONIC OBSTRUCTIVE PULMONARY DISEASE, UNSPECIFIED COPD TYPE (HCC): ICD-10-CM

## 2018-01-01 DIAGNOSIS — R65.21 SEPTIC SHOCK (HCC): ICD-10-CM

## 2018-01-01 DIAGNOSIS — R25.1 TREMOR: ICD-10-CM

## 2018-01-01 DIAGNOSIS — I50.32 CHRONIC DIASTOLIC CONGESTIVE HEART FAILURE (HCC): ICD-10-CM

## 2018-01-01 DIAGNOSIS — I48.91 ATRIAL FIBRILLATION, UNSPECIFIED TYPE (HCC): ICD-10-CM

## 2018-01-01 DIAGNOSIS — N17.8 ACUTE RENAL FAILURE WITH OTHER SPECIFIED PATHOLOGICAL KIDNEY LESION SUPERIMPOSED ON CHRONIC KIDNEY DISEASE, UNSPECIFIED CKD STAGE (HCC): ICD-10-CM

## 2018-01-01 DIAGNOSIS — G47.33 OSA TREATED WITH BIPAP: Chronic | ICD-10-CM

## 2018-01-01 LAB
A1AT SERPL-MCNC: 195 MG/DL (ref 90–200)
ACID FAST STN SPEC: NEGATIVE
ACTIN IGG SERPL-ACNC: 12 UNITS (ref 0–19)
ALBUMIN SERPL-MCNC: 2.4 G/DL (ref 3.2–4.6)
ALBUMIN SERPL-MCNC: 2.5 G/DL (ref 3.2–4.6)
ALBUMIN SERPL-MCNC: 2.6 G/DL (ref 3.2–4.6)
ALBUMIN SERPL-MCNC: 2.8 G/DL (ref 3.2–4.6)
ALBUMIN SERPL-MCNC: 2.8 G/DL (ref 3.2–4.6)
ALBUMIN SERPL-MCNC: 2.9 G/DL (ref 3.2–4.6)
ALBUMIN SERPL-MCNC: 3 G/DL (ref 3.2–4.6)
ALBUMIN SERPL-MCNC: 3.1 G/DL (ref 3.2–4.6)
ALBUMIN SERPL-MCNC: 3.2 G/DL (ref 3.2–4.6)
ALBUMIN SERPL-MCNC: 3.2 G/DL (ref 3.2–4.6)
ALBUMIN SERPL-MCNC: 3.3 G/DL (ref 3.2–4.6)
ALBUMIN SERPL-MCNC: 3.3 G/DL (ref 3.2–4.6)
ALBUMIN/GLOB SERPL: 0.6 {RATIO} (ref 1.2–3.5)
ALBUMIN/GLOB SERPL: 0.7 {RATIO} (ref 1.2–3.5)
ALBUMIN/GLOB SERPL: 0.8 {RATIO} (ref 1.2–3.5)
ALP SERPL-CCNC: 112 U/L (ref 50–136)
ALP SERPL-CCNC: 114 U/L (ref 50–136)
ALP SERPL-CCNC: 119 U/L (ref 50–136)
ALP SERPL-CCNC: 121 U/L (ref 50–136)
ALP SERPL-CCNC: 131 U/L (ref 50–136)
ALP SERPL-CCNC: 140 U/L (ref 50–136)
ALP SERPL-CCNC: 142 U/L (ref 50–136)
ALP SERPL-CCNC: 166 U/L (ref 50–136)
ALP SERPL-CCNC: 177 U/L (ref 50–136)
ALP SERPL-CCNC: 194 U/L (ref 50–136)
ALP SERPL-CCNC: 207 U/L (ref 50–136)
ALP SERPL-CCNC: 220 U/L (ref 50–136)
ALP SERPL-CCNC: 260 U/L (ref 50–136)
ALP SERPL-CCNC: 318 U/L (ref 50–136)
ALP SERPL-CCNC: 70 U/L (ref 50–136)
ALT SERPL-CCNC: 11 U/L (ref 12–65)
ALT SERPL-CCNC: 12 U/L (ref 12–65)
ALT SERPL-CCNC: 13 U/L (ref 12–65)
ALT SERPL-CCNC: 14 U/L (ref 12–65)
ALT SERPL-CCNC: 14 U/L (ref 12–65)
ALT SERPL-CCNC: 15 U/L (ref 12–65)
ALT SERPL-CCNC: 16 U/L (ref 12–65)
ALT SERPL-CCNC: 20 U/L (ref 12–65)
ALT SERPL-CCNC: 21 U/L (ref 12–65)
ALT SERPL-CCNC: 25 U/L (ref 12–65)
ALT SERPL-CCNC: 33 U/L (ref 12–65)
ALT SERPL-CCNC: 34 U/L (ref 12–65)
ALT SERPL-CCNC: 58 U/L (ref 12–65)
ALT SERPL-CCNC: 64 U/L (ref 12–65)
ALT SERPL-CCNC: 8 U/L (ref 12–65)
AMMONIA PLAS-SCNC: 18 UMOL/L (ref 11–32)
AMMONIA PLAS-SCNC: 21 UMOL/L (ref 11–32)
AMPHET UR QL SCN: NEGATIVE
ANA SER QL: NEGATIVE
ANION GAP SERPL CALC-SCNC: 10 MMOL/L (ref 7–16)
ANION GAP SERPL CALC-SCNC: 11 MMOL/L (ref 7–16)
ANION GAP SERPL CALC-SCNC: 12 MMOL/L (ref 7–16)
ANION GAP SERPL CALC-SCNC: 13 MMOL/L (ref 7–16)
ANION GAP SERPL CALC-SCNC: 5 MMOL/L (ref 7–16)
ANION GAP SERPL CALC-SCNC: 6 MMOL/L (ref 7–16)
ANION GAP SERPL CALC-SCNC: 7 MMOL/L (ref 7–16)
ANION GAP SERPL CALC-SCNC: 8 MMOL/L (ref 7–16)
ANION GAP SERPL CALC-SCNC: 9 MMOL/L (ref 7–16)
APPEARANCE FLD: CLEAR
APPEARANCE UR: ABNORMAL
APPEARANCE UR: CLEAR
APTT PPP: 35.5 SEC (ref 23.2–35.3)
ARTERIAL PATENCY WRIST A: POSITIVE
ARTERIAL PATENCY WRIST A: YES
AST SERPL-CCNC: 104 U/L (ref 15–37)
AST SERPL-CCNC: 13 U/L (ref 15–37)
AST SERPL-CCNC: 15 U/L (ref 15–37)
AST SERPL-CCNC: 15 U/L (ref 15–37)
AST SERPL-CCNC: 16 U/L (ref 15–37)
AST SERPL-CCNC: 17 U/L (ref 15–37)
AST SERPL-CCNC: 18 U/L (ref 15–37)
AST SERPL-CCNC: 19 U/L (ref 15–37)
AST SERPL-CCNC: 19 U/L (ref 15–37)
AST SERPL-CCNC: 22 U/L (ref 15–37)
AST SERPL-CCNC: 24 U/L (ref 15–37)
AST SERPL-CCNC: 55 U/L (ref 15–37)
AST SERPL-CCNC: 66 U/L (ref 15–37)
AST SERPL-CCNC: 84 U/L (ref 15–37)
AST SERPL-CCNC: 9 U/L (ref 15–37)
ATRIAL RATE: 125 BPM
ATRIAL RATE: 136 BPM
ATRIAL RATE: 202 BPM
ATRIAL RATE: 202 BPM
ATRIAL RATE: 220 BPM
ATRIAL RATE: 227 BPM
ATRIAL RATE: 234 BPM
ATRIAL RATE: 267 BPM
ATRIAL RATE: 357 BPM
ATRIAL RATE: 357 BPM
ATRIAL RATE: 59 BPM
ATRIAL RATE: 70 BPM
BACTERIA SPEC CULT: ABNORMAL
BACTERIA SPEC CULT: NORMAL
BACTERIA URNS QL MICRO: 0 /HPF
BACTERIA URNS QL MICRO: ABNORMAL /HPF
BACTERIA URNS QL MICRO: NORMAL /HPF
BARBITURATES UR QL SCN: NEGATIVE
BASE DEFICIT BLDA-SCNC: 0.6 MMOL/L (ref 0–2)
BASE EXCESS BLD CALC-SCNC: 2 MMOL/L
BASE EXCESS BLDA CALC-SCNC: 2.3 MMOL/L (ref 0–3)
BASE EXCESS BLDA CALC-SCNC: 6.5 MMOL/L (ref 0–3)
BASOPHILS # BLD: 0 K/UL (ref 0–0.2)
BASOPHILS NFR BLD: 0 % (ref 0–2)
BASOPHILS NFR BLD: 1 % (ref 0–2)
BASOPHILS NFR BLD: 1 % (ref 0–2)
BDY SITE: ABNORMAL
BENZODIAZ UR QL: NEGATIVE
BILIRUB DIRECT SERPL-MCNC: 0.8 MG/DL
BILIRUB DIRECT SERPL-MCNC: 0.9 MG/DL
BILIRUB DIRECT SERPL-MCNC: 4.2 MG/DL
BILIRUB DIRECT SERPL-MCNC: 5.1 MG/DL
BILIRUB SERPL-MCNC: 1 MG/DL (ref 0.2–1.1)
BILIRUB SERPL-MCNC: 1.2 MG/DL (ref 0.2–1.1)
BILIRUB SERPL-MCNC: 1.2 MG/DL (ref 0.2–1.1)
BILIRUB SERPL-MCNC: 1.3 MG/DL (ref 0.2–1.1)
BILIRUB SERPL-MCNC: 1.4 MG/DL (ref 0.2–1.1)
BILIRUB SERPL-MCNC: 1.4 MG/DL (ref 0.2–1.1)
BILIRUB SERPL-MCNC: 1.9 MG/DL (ref 0.2–1.1)
BILIRUB SERPL-MCNC: 3.8 MG/DL (ref 0.2–1.1)
BILIRUB SERPL-MCNC: 5.7 MG/DL (ref 0.2–1.1)
BILIRUB SERPL-MCNC: 5.9 MG/DL (ref 0.2–1.1)
BILIRUB SERPL-MCNC: 6.4 MG/DL (ref 0.2–1.1)
BILIRUB UR QL: ABNORMAL
BILIRUB UR QL: NEGATIVE
BNP SERPL-MCNC: 1594 PG/ML
BNP SERPL-MCNC: 2876 PG/ML
BNP SERPL-MCNC: 509 PG/ML
BNP SERPL-MCNC: 529 PG/ML
BNP SERPL-MCNC: 535 PG/ML
BNP SERPL-MCNC: 729 PG/ML
BNP SERPL-MCNC: 830 PG/ML
BNP SERPL-MCNC: 979 PG/ML
BNP SERPL-MCNC: 986 PG/ML
BODY TEMPERATURE: 98.6
BUN SERPL-MCNC: 16 MG/DL (ref 8–23)
BUN SERPL-MCNC: 17 MG/DL (ref 8–23)
BUN SERPL-MCNC: 17 MG/DL (ref 8–23)
BUN SERPL-MCNC: 18 MG/DL (ref 8–23)
BUN SERPL-MCNC: 19 MG/DL (ref 8–23)
BUN SERPL-MCNC: 22 MG/DL (ref 8–23)
BUN SERPL-MCNC: 24 MG/DL (ref 8–23)
BUN SERPL-MCNC: 24 MG/DL (ref 8–23)
BUN SERPL-MCNC: 27 MG/DL (ref 8–23)
BUN SERPL-MCNC: 29 MG/DL (ref 8–23)
BUN SERPL-MCNC: 31 MG/DL (ref 8–23)
BUN SERPL-MCNC: 33 MG/DL (ref 8–23)
BUN SERPL-MCNC: 34 MG/DL (ref 8–23)
BUN SERPL-MCNC: 35 MG/DL (ref 8–23)
BUN SERPL-MCNC: 35 MG/DL (ref 8–23)
BUN SERPL-MCNC: 39 MG/DL (ref 8–23)
BUN SERPL-MCNC: 47 MG/DL (ref 8–23)
BUN SERPL-MCNC: 50 MG/DL (ref 8–23)
BUN SERPL-MCNC: 52 MG/DL (ref 8–23)
BUN SERPL-MCNC: 56 MG/DL (ref 8–23)
BUN SERPL-MCNC: 58 MG/DL (ref 8–23)
BUN SERPL-MCNC: 59 MG/DL (ref 8–23)
BUN SERPL-MCNC: 60 MG/DL (ref 8–23)
BUN SERPL-MCNC: 62 MG/DL (ref 8–23)
BUN SERPL-MCNC: 64 MG/DL (ref 8–23)
BUN SERPL-MCNC: 66 MG/DL (ref 8–23)
BUN SERPL-MCNC: 67 MG/DL (ref 8–23)
BUN SERPL-MCNC: 68 MG/DL (ref 8–23)
BUN SERPL-MCNC: 69 MG/DL (ref 8–23)
BUN SERPL-MCNC: 70 MG/DL (ref 8–23)
BUN SERPL-MCNC: 71 MG/DL (ref 8–23)
BUN SERPL-MCNC: 73 MG/DL (ref 8–23)
BUN SERPL-MCNC: 74 MG/DL (ref 8–23)
BUN SERPL-MCNC: 74 MG/DL (ref 8–23)
BUN SERPL-MCNC: 76 MG/DL (ref 8–23)
BUN SERPL-MCNC: 80 MG/DL (ref 8–23)
CA-I BLD-MCNC: 1.02 MMOL/L (ref 1.12–1.32)
CALCIUM SERPL-MCNC: 7.1 MG/DL (ref 8.3–10.4)
CALCIUM SERPL-MCNC: 7.2 MG/DL (ref 8.3–10.4)
CALCIUM SERPL-MCNC: 7.3 MG/DL (ref 8.3–10.4)
CALCIUM SERPL-MCNC: 7.5 MG/DL (ref 8.3–10.4)
CALCIUM SERPL-MCNC: 7.8 MG/DL (ref 8.3–10.4)
CALCIUM SERPL-MCNC: 7.8 MG/DL (ref 8.3–10.4)
CALCIUM SERPL-MCNC: 7.9 MG/DL (ref 8.3–10.4)
CALCIUM SERPL-MCNC: 8 MG/DL (ref 8.3–10.4)
CALCIUM SERPL-MCNC: 8.1 MG/DL (ref 8.3–10.4)
CALCIUM SERPL-MCNC: 8.2 MG/DL (ref 8.3–10.4)
CALCIUM SERPL-MCNC: 8.2 MG/DL (ref 8.3–10.4)
CALCIUM SERPL-MCNC: 8.3 MG/DL (ref 8.3–10.4)
CALCIUM SERPL-MCNC: 8.4 MG/DL (ref 8.3–10.4)
CALCIUM SERPL-MCNC: 8.5 MG/DL (ref 8.3–10.4)
CALCIUM SERPL-MCNC: 8.5 MG/DL (ref 8.3–10.4)
CALCIUM SERPL-MCNC: 8.6 MG/DL (ref 8.3–10.4)
CALCIUM SERPL-MCNC: 8.7 MG/DL (ref 8.3–10.4)
CALCIUM SERPL-MCNC: 8.7 MG/DL (ref 8.3–10.4)
CALCIUM SERPL-MCNC: 8.8 MG/DL (ref 8.3–10.4)
CALCIUM SERPL-MCNC: 8.9 MG/DL (ref 8.3–10.4)
CALCIUM SERPL-MCNC: 9 MG/DL (ref 8.3–10.4)
CALCIUM SERPL-MCNC: 9.5 MG/DL (ref 8.3–10.4)
CALCULATED P AXIS, ECG09: 39 DEGREES
CALCULATED R AXIS, ECG10: 11 DEGREES
CALCULATED R AXIS, ECG10: 116 DEGREES
CALCULATED R AXIS, ECG10: 119 DEGREES
CALCULATED R AXIS, ECG10: 34 DEGREES
CALCULATED R AXIS, ECG10: 38 DEGREES
CALCULATED R AXIS, ECG10: 44 DEGREES
CALCULATED R AXIS, ECG10: 48 DEGREES
CALCULATED R AXIS, ECG10: 5 DEGREES
CALCULATED R AXIS, ECG10: 50 DEGREES
CALCULATED R AXIS, ECG10: 56 DEGREES
CALCULATED R AXIS, ECG10: 62 DEGREES
CALCULATED R AXIS, ECG10: 64 DEGREES
CALCULATED T AXIS, ECG11: -136 DEGREES
CALCULATED T AXIS, ECG11: -138 DEGREES
CALCULATED T AXIS, ECG11: -142 DEGREES
CALCULATED T AXIS, ECG11: -149 DEGREES
CALCULATED T AXIS, ECG11: -153 DEGREES
CALCULATED T AXIS, ECG11: -157 DEGREES
CALCULATED T AXIS, ECG11: -166 DEGREES
CALCULATED T AXIS, ECG11: -179 DEGREES
CALCULATED T AXIS, ECG11: -3 DEGREES
CALCULATED T AXIS, ECG11: -4 DEGREES
CALCULATED T AXIS, ECG11: 172 DEGREES
CALCULATED T AXIS, ECG11: 177 DEGREES
CANNABINOIDS UR QL SCN: NEGATIVE
CASTS URNS QL MICRO: 0 /LPF
CASTS URNS QL MICRO: ABNORMAL /LPF
CASTS URNS QL MICRO: ABNORMAL /LPF
CASTS URNS QL MICRO: NORMAL /LPF
CHLORIDE SERPL-SCNC: 100 MMOL/L (ref 98–107)
CHLORIDE SERPL-SCNC: 101 MMOL/L (ref 98–107)
CHLORIDE SERPL-SCNC: 102 MMOL/L (ref 98–107)
CHLORIDE SERPL-SCNC: 102 MMOL/L (ref 98–107)
CHLORIDE SERPL-SCNC: 103 MMOL/L (ref 98–107)
CHLORIDE SERPL-SCNC: 104 MMOL/L (ref 98–107)
CHLORIDE SERPL-SCNC: 105 MMOL/L (ref 98–107)
CHLORIDE SERPL-SCNC: 105 MMOL/L (ref 98–107)
CHLORIDE SERPL-SCNC: 106 MMOL/L (ref 98–107)
CHLORIDE SERPL-SCNC: 107 MMOL/L (ref 98–107)
CHLORIDE SERPL-SCNC: 107 MMOL/L (ref 98–107)
CHLORIDE SERPL-SCNC: 109 MMOL/L (ref 98–107)
CHLORIDE SERPL-SCNC: 94 MMOL/L (ref 98–107)
CHLORIDE SERPL-SCNC: 95 MMOL/L (ref 98–107)
CHLORIDE SERPL-SCNC: 96 MMOL/L (ref 98–107)
CHLORIDE SERPL-SCNC: 97 MMOL/L (ref 98–107)
CHLORIDE SERPL-SCNC: 98 MMOL/L (ref 98–107)
CHLORIDE SERPL-SCNC: 99 MMOL/L (ref 98–107)
CHOLEST SERPL-MCNC: 85 MG/DL
CO2 SERPL-SCNC: 23 MMOL/L (ref 21–32)
CO2 SERPL-SCNC: 25 MMOL/L (ref 21–32)
CO2 SERPL-SCNC: 26 MMOL/L (ref 21–32)
CO2 SERPL-SCNC: 26 MMOL/L (ref 21–32)
CO2 SERPL-SCNC: 27 MMOL/L (ref 21–32)
CO2 SERPL-SCNC: 28 MMOL/L (ref 21–32)
CO2 SERPL-SCNC: 29 MMOL/L (ref 21–32)
CO2 SERPL-SCNC: 30 MMOL/L (ref 21–32)
CO2 SERPL-SCNC: 31 MMOL/L (ref 21–32)
CO2 SERPL-SCNC: 32 MMOL/L (ref 21–32)
CO2 SERPL-SCNC: 33 MMOL/L (ref 21–32)
CO2 SERPL-SCNC: 34 MMOL/L (ref 21–32)
CO2 SERPL-SCNC: 36 MMOL/L (ref 21–32)
CO2 SERPL-SCNC: 37 MMOL/L (ref 21–32)
CO2 SERPL-SCNC: 37 MMOL/L (ref 21–32)
COCAINE UR QL SCN: NEGATIVE
COHGB MFR BLD: 0.6 % (ref 0.5–1.5)
COHGB MFR BLD: 0.7 % (ref 0.5–1.5)
COHGB MFR BLD: 1.1 % (ref 0.5–1.5)
COLOR FLD: YELLOW
COLOR UR: ABNORMAL
COLOR UR: YELLOW
CREAT SERPL-MCNC: 1.31 MG/DL (ref 0.8–1.5)
CREAT SERPL-MCNC: 1.48 MG/DL (ref 0.8–1.5)
CREAT SERPL-MCNC: 1.49 MG/DL (ref 0.8–1.5)
CREAT SERPL-MCNC: 1.57 MG/DL (ref 0.8–1.5)
CREAT SERPL-MCNC: 1.59 MG/DL (ref 0.8–1.5)
CREAT SERPL-MCNC: 1.6 MG/DL (ref 0.8–1.5)
CREAT SERPL-MCNC: 1.63 MG/DL (ref 0.8–1.5)
CREAT SERPL-MCNC: 1.65 MG/DL (ref 0.8–1.5)
CREAT SERPL-MCNC: 1.67 MG/DL (ref 0.8–1.5)
CREAT SERPL-MCNC: 1.68 MG/DL (ref 0.8–1.5)
CREAT SERPL-MCNC: 1.68 MG/DL (ref 0.8–1.5)
CREAT SERPL-MCNC: 1.7 MG/DL (ref 0.8–1.5)
CREAT SERPL-MCNC: 1.71 MG/DL (ref 0.8–1.5)
CREAT SERPL-MCNC: 1.72 MG/DL (ref 0.8–1.5)
CREAT SERPL-MCNC: 1.76 MG/DL (ref 0.8–1.5)
CREAT SERPL-MCNC: 1.76 MG/DL (ref 0.8–1.5)
CREAT SERPL-MCNC: 1.9 MG/DL (ref 0.8–1.5)
CREAT SERPL-MCNC: 1.91 MG/DL (ref 0.8–1.5)
CREAT SERPL-MCNC: 1.98 MG/DL (ref 0.8–1.5)
CREAT SERPL-MCNC: 2.01 MG/DL (ref 0.8–1.5)
CREAT SERPL-MCNC: 2.03 MG/DL (ref 0.8–1.5)
CREAT SERPL-MCNC: 2.04 MG/DL (ref 0.8–1.5)
CREAT SERPL-MCNC: 2.07 MG/DL (ref 0.8–1.5)
CREAT SERPL-MCNC: 2.2 MG/DL (ref 0.8–1.5)
CREAT SERPL-MCNC: 2.24 MG/DL (ref 0.8–1.5)
CREAT SERPL-MCNC: 2.27 MG/DL (ref 0.8–1.5)
CREAT SERPL-MCNC: 2.29 MG/DL (ref 0.8–1.5)
CREAT SERPL-MCNC: 2.51 MG/DL (ref 0.8–1.5)
CREAT SERPL-MCNC: 2.58 MG/DL (ref 0.8–1.5)
CREAT SERPL-MCNC: 2.7 MG/DL (ref 0.8–1.5)
CREAT SERPL-MCNC: 2.71 MG/DL (ref 0.8–1.5)
CREAT SERPL-MCNC: 2.78 MG/DL (ref 0.8–1.5)
CREAT SERPL-MCNC: 2.81 MG/DL (ref 0.8–1.5)
CREAT SERPL-MCNC: 2.93 MG/DL (ref 0.8–1.5)
CREAT SERPL-MCNC: 2.97 MG/DL (ref 0.8–1.5)
CREAT SERPL-MCNC: 3.46 MG/DL (ref 0.8–1.5)
CREAT SERPL-MCNC: 4.18 MG/DL (ref 0.8–1.5)
CREAT SERPL-MCNC: 4.56 MG/DL (ref 0.8–1.5)
CREAT UR-MCNC: 117 MG/DL
CRYSTALS URNS QL MICRO: 0 /LPF
CRYSTALS URNS QL MICRO: ABNORMAL /LPF
DIAGNOSIS, 93000: NORMAL
DIFFERENTIAL METHOD BLD: ABNORMAL
DO-HGB BLD-MCNC: 2 % (ref 0–5)
DO-HGB BLD-MCNC: 3 % (ref 0–5)
DO-HGB BLD-MCNC: 9 % (ref 0–5)
EOSINOPHIL # BLD: 0 K/UL (ref 0–0.8)
EOSINOPHIL # BLD: 0.1 K/UL (ref 0–0.8)
EOSINOPHIL # BLD: 0.2 K/UL (ref 0–0.8)
EOSINOPHIL # BLD: 0.3 K/UL (ref 0–0.8)
EOSINOPHIL NFR BLD: 0 % (ref 0.5–7.8)
EOSINOPHIL NFR BLD: 1 % (ref 0.5–7.8)
EOSINOPHIL NFR BLD: 2 % (ref 0.5–7.8)
EOSINOPHIL NFR BLD: 3 % (ref 0.5–7.8)
EOSINOPHIL NFR BLD: 4 % (ref 0.5–7.8)
EPI CELLS #/AREA URNS HPF: ABNORMAL /HPF
EPI CELLS #/AREA URNS HPF: NORMAL /HPF
ERYTHROCYTE [DISTWIDTH] IN BLOOD BY AUTOMATED COUNT: 15.8 % (ref 11.9–14.6)
ERYTHROCYTE [DISTWIDTH] IN BLOOD BY AUTOMATED COUNT: 16.1 % (ref 11.9–14.6)
ERYTHROCYTE [DISTWIDTH] IN BLOOD BY AUTOMATED COUNT: 16.2 % (ref 11.9–14.6)
ERYTHROCYTE [DISTWIDTH] IN BLOOD BY AUTOMATED COUNT: 16.4 % (ref 11.9–14.6)
ERYTHROCYTE [DISTWIDTH] IN BLOOD BY AUTOMATED COUNT: 16.8 %
ERYTHROCYTE [DISTWIDTH] IN BLOOD BY AUTOMATED COUNT: 16.9 %
ERYTHROCYTE [DISTWIDTH] IN BLOOD BY AUTOMATED COUNT: 17.2 %
ERYTHROCYTE [DISTWIDTH] IN BLOOD BY AUTOMATED COUNT: 17.2 %
ERYTHROCYTE [DISTWIDTH] IN BLOOD BY AUTOMATED COUNT: 17.2 % (ref 11.9–14.6)
ERYTHROCYTE [DISTWIDTH] IN BLOOD BY AUTOMATED COUNT: 17.3 %
ERYTHROCYTE [DISTWIDTH] IN BLOOD BY AUTOMATED COUNT: 17.3 % (ref 11.9–14.6)
ERYTHROCYTE [DISTWIDTH] IN BLOOD BY AUTOMATED COUNT: 17.4 % (ref 11.9–14.6)
ERYTHROCYTE [DISTWIDTH] IN BLOOD BY AUTOMATED COUNT: 17.4 % (ref 11.9–14.6)
ERYTHROCYTE [DISTWIDTH] IN BLOOD BY AUTOMATED COUNT: 17.5 %
ERYTHROCYTE [DISTWIDTH] IN BLOOD BY AUTOMATED COUNT: 17.6 % (ref 11.9–14.6)
ERYTHROCYTE [DISTWIDTH] IN BLOOD BY AUTOMATED COUNT: 17.7 %
ERYTHROCYTE [DISTWIDTH] IN BLOOD BY AUTOMATED COUNT: 18 % (ref 11.9–14.6)
ERYTHROCYTE [DISTWIDTH] IN BLOOD BY AUTOMATED COUNT: 18.1 %
ERYTHROCYTE [DISTWIDTH] IN BLOOD BY AUTOMATED COUNT: 18.2 % (ref 11.9–14.6)
ERYTHROCYTE [DISTWIDTH] IN BLOOD BY AUTOMATED COUNT: 18.5 % (ref 11.9–14.6)
ERYTHROCYTE [DISTWIDTH] IN BLOOD BY AUTOMATED COUNT: 18.6 %
ERYTHROCYTE [DISTWIDTH] IN BLOOD BY AUTOMATED COUNT: 18.6 % (ref 11.9–14.6)
ERYTHROCYTE [DISTWIDTH] IN BLOOD BY AUTOMATED COUNT: 19.3 % (ref 11.9–14.6)
ERYTHROCYTE [DISTWIDTH] IN BLOOD BY AUTOMATED COUNT: 19.4 %
ERYTHROCYTE [DISTWIDTH] IN BLOOD BY AUTOMATED COUNT: 19.4 %
ERYTHROCYTE [DISTWIDTH] IN BLOOD BY AUTOMATED COUNT: 19.6 %
ERYTHROCYTE [DISTWIDTH] IN BLOOD BY AUTOMATED COUNT: 19.8 %
EST. AVERAGE GLUCOSE BLD GHB EST-MCNC: 128 MG/DL
EST. AVERAGE GLUCOSE BLD GHB EST-MCNC: 140 MG/DL
EST. AVERAGE GLUCOSE BLD GHB EST-MCNC: 151 MG/DL
EST. AVERAGE GLUCOSE BLD GHB EST-MCNC: 151 MG/DL
ETHANOL SERPL-MCNC: <3 MG/DL
FAX TO INFO,FAXT: NORMAL
FAX TO INFO,FAXT: NORMAL
FAX TO NUMBER,FAXN: NORMAL
FAX TO NUMBER,FAXN: NORMAL
FERRITIN SERPL-MCNC: 130 NG/ML (ref 8–388)
FERRITIN SERPL-MCNC: 49 NG/ML (ref 8–388)
FERRITIN SERPL-MCNC: 83 NG/ML (ref 8–388)
FIO2 ON VENT: 21 %
FLUAV AG NPH QL IA: NEGATIVE
FLUAV AG NPH QL IA: NEGATIVE
FLUBV AG NPH QL IA: NEGATIVE
FLUBV AG NPH QL IA: POSITIVE
FLUID COMMENTS, FCOM: NORMAL
FOLATE SERPL-MCNC: 11.9 NG/ML (ref 3.1–17.5)
FUNGUS CULTURE, RFCO2T: NEGATIVE
FUNGUS SMEAR, RFCO1T: NORMAL
FUNGUS SPEC CULT: NORMAL
FUNGUS STAIN, 188244: NORMAL
GAS FLOW.O2 O2 DELIVERY SYS: 2 L/MIN
GAS FLOW.O2 O2 DELIVERY SYS: 4 L/MIN
GAS FLOW.O2 O2 DELIVERY SYS: ABNORMAL L/MIN
GLOBULIN SER CALC-MCNC: 3.2 G/DL (ref 2.3–3.5)
GLOBULIN SER CALC-MCNC: 3.4 G/DL (ref 2.3–3.5)
GLOBULIN SER CALC-MCNC: 3.6 G/DL (ref 2.3–3.5)
GLOBULIN SER CALC-MCNC: 3.9 G/DL (ref 2.3–3.5)
GLOBULIN SER CALC-MCNC: 4 G/DL (ref 2.3–3.5)
GLOBULIN SER CALC-MCNC: 4.2 G/DL (ref 2.3–3.5)
GLOBULIN SER CALC-MCNC: 4.2 G/DL (ref 2.3–3.5)
GLOBULIN SER CALC-MCNC: 4.3 G/DL (ref 2.3–3.5)
GLOBULIN SER CALC-MCNC: 4.4 G/DL (ref 2.3–3.5)
GLOBULIN SER CALC-MCNC: 4.5 G/DL (ref 2.3–3.5)
GLOBULIN SER CALC-MCNC: 4.6 G/DL (ref 2.3–3.5)
GLOBULIN SER CALC-MCNC: 4.7 G/DL (ref 2.3–3.5)
GLOBULIN SER CALC-MCNC: 4.7 G/DL (ref 2.3–3.5)
GLOBULIN SER CALC-MCNC: 5.1 G/DL (ref 2.3–3.5)
GLOBULIN SER CALC-MCNC: 5.2 G/DL (ref 2.3–3.5)
GLUCOSE BLD STRIP.AUTO-MCNC: 100 MG/DL (ref 65–100)
GLUCOSE BLD STRIP.AUTO-MCNC: 103 MG/DL (ref 65–100)
GLUCOSE BLD STRIP.AUTO-MCNC: 105 MG/DL (ref 65–100)
GLUCOSE BLD STRIP.AUTO-MCNC: 106 MG/DL (ref 65–100)
GLUCOSE BLD STRIP.AUTO-MCNC: 107 MG/DL (ref 65–100)
GLUCOSE BLD STRIP.AUTO-MCNC: 108 MG/DL (ref 65–100)
GLUCOSE BLD STRIP.AUTO-MCNC: 111 MG/DL (ref 65–100)
GLUCOSE BLD STRIP.AUTO-MCNC: 113 MG/DL (ref 65–100)
GLUCOSE BLD STRIP.AUTO-MCNC: 115 MG/DL (ref 65–100)
GLUCOSE BLD STRIP.AUTO-MCNC: 115 MG/DL (ref 65–100)
GLUCOSE BLD STRIP.AUTO-MCNC: 117 MG/DL (ref 65–100)
GLUCOSE BLD STRIP.AUTO-MCNC: 118 MG/DL (ref 65–100)
GLUCOSE BLD STRIP.AUTO-MCNC: 118 MG/DL (ref 65–100)
GLUCOSE BLD STRIP.AUTO-MCNC: 119 MG/DL (ref 65–100)
GLUCOSE BLD STRIP.AUTO-MCNC: 121 MG/DL (ref 65–100)
GLUCOSE BLD STRIP.AUTO-MCNC: 121 MG/DL (ref 65–100)
GLUCOSE BLD STRIP.AUTO-MCNC: 122 MG/DL (ref 65–100)
GLUCOSE BLD STRIP.AUTO-MCNC: 123 MG/DL (ref 65–100)
GLUCOSE BLD STRIP.AUTO-MCNC: 123 MG/DL (ref 65–100)
GLUCOSE BLD STRIP.AUTO-MCNC: 126 MG/DL (ref 65–100)
GLUCOSE BLD STRIP.AUTO-MCNC: 127 MG/DL (ref 65–100)
GLUCOSE BLD STRIP.AUTO-MCNC: 127 MG/DL (ref 65–100)
GLUCOSE BLD STRIP.AUTO-MCNC: 130 MG/DL (ref 65–100)
GLUCOSE BLD STRIP.AUTO-MCNC: 131 MG/DL (ref 65–100)
GLUCOSE BLD STRIP.AUTO-MCNC: 133 MG/DL (ref 65–100)
GLUCOSE BLD STRIP.AUTO-MCNC: 135 MG/DL (ref 65–100)
GLUCOSE BLD STRIP.AUTO-MCNC: 136 MG/DL (ref 65–100)
GLUCOSE BLD STRIP.AUTO-MCNC: 136 MG/DL (ref 65–100)
GLUCOSE BLD STRIP.AUTO-MCNC: 137 MG/DL (ref 65–100)
GLUCOSE BLD STRIP.AUTO-MCNC: 139 MG/DL (ref 65–100)
GLUCOSE BLD STRIP.AUTO-MCNC: 140 MG/DL (ref 65–100)
GLUCOSE BLD STRIP.AUTO-MCNC: 140 MG/DL (ref 65–100)
GLUCOSE BLD STRIP.AUTO-MCNC: 141 MG/DL (ref 65–100)
GLUCOSE BLD STRIP.AUTO-MCNC: 142 MG/DL (ref 65–100)
GLUCOSE BLD STRIP.AUTO-MCNC: 144 MG/DL (ref 65–100)
GLUCOSE BLD STRIP.AUTO-MCNC: 145 MG/DL (ref 65–100)
GLUCOSE BLD STRIP.AUTO-MCNC: 148 MG/DL (ref 65–100)
GLUCOSE BLD STRIP.AUTO-MCNC: 148 MG/DL (ref 65–100)
GLUCOSE BLD STRIP.AUTO-MCNC: 151 MG/DL (ref 65–100)
GLUCOSE BLD STRIP.AUTO-MCNC: 151 MG/DL (ref 65–100)
GLUCOSE BLD STRIP.AUTO-MCNC: 153 MG/DL (ref 65–100)
GLUCOSE BLD STRIP.AUTO-MCNC: 157 MG/DL (ref 65–100)
GLUCOSE BLD STRIP.AUTO-MCNC: 162 MG/DL (ref 65–100)
GLUCOSE BLD STRIP.AUTO-MCNC: 163 MG/DL (ref 65–100)
GLUCOSE BLD STRIP.AUTO-MCNC: 163 MG/DL (ref 65–100)
GLUCOSE BLD STRIP.AUTO-MCNC: 164 MG/DL (ref 65–100)
GLUCOSE BLD STRIP.AUTO-MCNC: 165 MG/DL (ref 65–100)
GLUCOSE BLD STRIP.AUTO-MCNC: 165 MG/DL (ref 65–100)
GLUCOSE BLD STRIP.AUTO-MCNC: 166 MG/DL (ref 65–100)
GLUCOSE BLD STRIP.AUTO-MCNC: 166 MG/DL (ref 65–100)
GLUCOSE BLD STRIP.AUTO-MCNC: 173 MG/DL (ref 65–100)
GLUCOSE BLD STRIP.AUTO-MCNC: 177 MG/DL (ref 65–100)
GLUCOSE BLD STRIP.AUTO-MCNC: 178 MG/DL (ref 65–100)
GLUCOSE BLD STRIP.AUTO-MCNC: 179 MG/DL (ref 65–100)
GLUCOSE BLD STRIP.AUTO-MCNC: 180 MG/DL (ref 65–100)
GLUCOSE BLD STRIP.AUTO-MCNC: 180 MG/DL (ref 65–100)
GLUCOSE BLD STRIP.AUTO-MCNC: 182 MG/DL (ref 65–100)
GLUCOSE BLD STRIP.AUTO-MCNC: 182 MG/DL (ref 65–100)
GLUCOSE BLD STRIP.AUTO-MCNC: 183 MG/DL (ref 65–100)
GLUCOSE BLD STRIP.AUTO-MCNC: 184 MG/DL (ref 65–100)
GLUCOSE BLD STRIP.AUTO-MCNC: 185 MG/DL (ref 65–100)
GLUCOSE BLD STRIP.AUTO-MCNC: 187 MG/DL (ref 65–100)
GLUCOSE BLD STRIP.AUTO-MCNC: 187 MG/DL (ref 65–100)
GLUCOSE BLD STRIP.AUTO-MCNC: 188 MG/DL (ref 65–100)
GLUCOSE BLD STRIP.AUTO-MCNC: 189 MG/DL (ref 65–100)
GLUCOSE BLD STRIP.AUTO-MCNC: 190 MG/DL (ref 65–100)
GLUCOSE BLD STRIP.AUTO-MCNC: 190 MG/DL (ref 65–100)
GLUCOSE BLD STRIP.AUTO-MCNC: 192 MG/DL (ref 65–100)
GLUCOSE BLD STRIP.AUTO-MCNC: 193 MG/DL (ref 65–100)
GLUCOSE BLD STRIP.AUTO-MCNC: 193 MG/DL (ref 65–100)
GLUCOSE BLD STRIP.AUTO-MCNC: 194 MG/DL (ref 65–100)
GLUCOSE BLD STRIP.AUTO-MCNC: 197 MG/DL (ref 65–100)
GLUCOSE BLD STRIP.AUTO-MCNC: 199 MG/DL (ref 65–100)
GLUCOSE BLD STRIP.AUTO-MCNC: 201 MG/DL (ref 65–100)
GLUCOSE BLD STRIP.AUTO-MCNC: 207 MG/DL (ref 65–100)
GLUCOSE BLD STRIP.AUTO-MCNC: 213 MG/DL (ref 65–100)
GLUCOSE BLD STRIP.AUTO-MCNC: 213 MG/DL (ref 65–100)
GLUCOSE BLD STRIP.AUTO-MCNC: 216 MG/DL (ref 65–100)
GLUCOSE BLD STRIP.AUTO-MCNC: 217 MG/DL (ref 65–100)
GLUCOSE BLD STRIP.AUTO-MCNC: 223 MG/DL (ref 65–100)
GLUCOSE BLD STRIP.AUTO-MCNC: 223 MG/DL (ref 65–100)
GLUCOSE BLD STRIP.AUTO-MCNC: 224 MG/DL (ref 65–100)
GLUCOSE BLD STRIP.AUTO-MCNC: 226 MG/DL (ref 65–100)
GLUCOSE BLD STRIP.AUTO-MCNC: 231 MG/DL (ref 65–100)
GLUCOSE BLD STRIP.AUTO-MCNC: 234 MG/DL (ref 65–100)
GLUCOSE BLD STRIP.AUTO-MCNC: 246 MG/DL (ref 65–100)
GLUCOSE BLD STRIP.AUTO-MCNC: 250 MG/DL (ref 65–100)
GLUCOSE BLD STRIP.AUTO-MCNC: 250 MG/DL (ref 65–100)
GLUCOSE BLD STRIP.AUTO-MCNC: 251 MG/DL (ref 65–100)
GLUCOSE BLD STRIP.AUTO-MCNC: 263 MG/DL (ref 65–100)
GLUCOSE BLD STRIP.AUTO-MCNC: 263 MG/DL (ref 65–100)
GLUCOSE BLD STRIP.AUTO-MCNC: 272 MG/DL (ref 65–100)
GLUCOSE BLD STRIP.AUTO-MCNC: 285 MG/DL (ref 65–100)
GLUCOSE BLD STRIP.AUTO-MCNC: 285 MG/DL (ref 65–100)
GLUCOSE BLD STRIP.AUTO-MCNC: 45 MG/DL (ref 65–100)
GLUCOSE BLD STRIP.AUTO-MCNC: 47 MG/DL (ref 65–100)
GLUCOSE BLD STRIP.AUTO-MCNC: 59 MG/DL (ref 65–100)
GLUCOSE BLD STRIP.AUTO-MCNC: 59 MG/DL (ref 65–100)
GLUCOSE BLD STRIP.AUTO-MCNC: 60 MG/DL (ref 65–100)
GLUCOSE BLD STRIP.AUTO-MCNC: 62 MG/DL (ref 65–100)
GLUCOSE BLD STRIP.AUTO-MCNC: 63 MG/DL (ref 65–100)
GLUCOSE BLD STRIP.AUTO-MCNC: 66 MG/DL (ref 65–100)
GLUCOSE BLD STRIP.AUTO-MCNC: 67 MG/DL (ref 65–100)
GLUCOSE BLD STRIP.AUTO-MCNC: 71 MG/DL (ref 65–100)
GLUCOSE BLD STRIP.AUTO-MCNC: 73 MG/DL (ref 65–100)
GLUCOSE BLD STRIP.AUTO-MCNC: 80 MG/DL (ref 65–100)
GLUCOSE BLD STRIP.AUTO-MCNC: 85 MG/DL (ref 65–100)
GLUCOSE BLD STRIP.AUTO-MCNC: 89 MG/DL (ref 65–100)
GLUCOSE BLD STRIP.AUTO-MCNC: 90 MG/DL (ref 65–100)
GLUCOSE BLD STRIP.AUTO-MCNC: 90 MG/DL (ref 65–100)
GLUCOSE BLD STRIP.AUTO-MCNC: 93 MG/DL (ref 65–100)
GLUCOSE BLD STRIP.AUTO-MCNC: 93 MG/DL (ref 65–100)
GLUCOSE BLD STRIP.AUTO-MCNC: 94 MG/DL (ref 65–100)
GLUCOSE BLD STRIP.AUTO-MCNC: 95 MG/DL (ref 65–100)
GLUCOSE BLD STRIP.AUTO-MCNC: 96 MG/DL (ref 65–100)
GLUCOSE BLD STRIP.AUTO-MCNC: 98 MG/DL (ref 65–100)
GLUCOSE BLD STRIP.AUTO-MCNC: 98 MG/DL (ref 65–100)
GLUCOSE BLD STRIP.AUTO-MCNC: 99 MG/DL (ref 65–100)
GLUCOSE FLD-MCNC: 241 MG/DL
GLUCOSE SERPL-MCNC: 103 MG/DL (ref 65–100)
GLUCOSE SERPL-MCNC: 105 MG/DL (ref 65–100)
GLUCOSE SERPL-MCNC: 110 MG/DL (ref 65–100)
GLUCOSE SERPL-MCNC: 112 MG/DL (ref 65–100)
GLUCOSE SERPL-MCNC: 112 MG/DL (ref 65–100)
GLUCOSE SERPL-MCNC: 115 MG/DL (ref 65–100)
GLUCOSE SERPL-MCNC: 118 MG/DL (ref 65–100)
GLUCOSE SERPL-MCNC: 123 MG/DL (ref 65–100)
GLUCOSE SERPL-MCNC: 131 MG/DL (ref 65–100)
GLUCOSE SERPL-MCNC: 132 MG/DL (ref 65–100)
GLUCOSE SERPL-MCNC: 133 MG/DL (ref 65–100)
GLUCOSE SERPL-MCNC: 136 MG/DL (ref 65–100)
GLUCOSE SERPL-MCNC: 137 MG/DL (ref 65–100)
GLUCOSE SERPL-MCNC: 141 MG/DL (ref 65–100)
GLUCOSE SERPL-MCNC: 145 MG/DL (ref 65–100)
GLUCOSE SERPL-MCNC: 148 MG/DL (ref 65–100)
GLUCOSE SERPL-MCNC: 149 MG/DL (ref 65–100)
GLUCOSE SERPL-MCNC: 149 MG/DL (ref 65–100)
GLUCOSE SERPL-MCNC: 153 MG/DL (ref 65–100)
GLUCOSE SERPL-MCNC: 166 MG/DL (ref 65–100)
GLUCOSE SERPL-MCNC: 166 MG/DL (ref 65–100)
GLUCOSE SERPL-MCNC: 173 MG/DL (ref 65–100)
GLUCOSE SERPL-MCNC: 174 MG/DL (ref 65–100)
GLUCOSE SERPL-MCNC: 174 MG/DL (ref 65–100)
GLUCOSE SERPL-MCNC: 175 MG/DL (ref 65–100)
GLUCOSE SERPL-MCNC: 177 MG/DL (ref 65–100)
GLUCOSE SERPL-MCNC: 186 MG/DL (ref 65–100)
GLUCOSE SERPL-MCNC: 211 MG/DL (ref 65–100)
GLUCOSE SERPL-MCNC: 222 MG/DL (ref 65–100)
GLUCOSE SERPL-MCNC: 43 MG/DL (ref 65–100)
GLUCOSE SERPL-MCNC: 58 MG/DL (ref 65–100)
GLUCOSE SERPL-MCNC: 62 MG/DL (ref 65–100)
GLUCOSE SERPL-MCNC: 66 MG/DL (ref 65–100)
GLUCOSE SERPL-MCNC: 71 MG/DL (ref 65–100)
GLUCOSE SERPL-MCNC: 79 MG/DL (ref 65–100)
GLUCOSE SERPL-MCNC: 80 MG/DL (ref 65–100)
GLUCOSE SERPL-MCNC: 87 MG/DL (ref 65–100)
GLUCOSE SERPL-MCNC: 94 MG/DL (ref 65–100)
GLUCOSE UR STRIP.AUTO-MCNC: NEGATIVE MG/DL
GRAM STN SPEC: ABNORMAL
GRAM STN SPEC: NORMAL
GRAM STN SPEC: NORMAL
HAV IGM SERPL QL IA: NEGATIVE
HBA1C MFR BLD: 6.1 % (ref 4.8–6)
HBA1C MFR BLD: 6.5 % (ref 4.8–6)
HBA1C MFR BLD: 6.9 % (ref 4.8–6)
HBA1C MFR BLD: 6.9 % (ref 4.8–6)
HBV CORE IGM SERPL QL IA: NEGATIVE
HBV SURFACE AG SERPL QL IA: NEGATIVE
HCO3 BLD-SCNC: 26.8 MMOL/L (ref 22–26)
HCO3 BLDA-SCNC: 26 MMOL/L (ref 22–26)
HCO3 BLDA-SCNC: 29 MMOL/L (ref 22–26)
HCO3 BLDA-SCNC: 32 MMOL/L (ref 22–26)
HCT VFR BLD AUTO: 25 % (ref 41.1–50.3)
HCT VFR BLD AUTO: 26 % (ref 41.1–50.3)
HCT VFR BLD AUTO: 26.3 % (ref 41.1–50.3)
HCT VFR BLD AUTO: 26.4 % (ref 41.1–50.3)
HCT VFR BLD AUTO: 26.5 % (ref 41.1–50.3)
HCT VFR BLD AUTO: 28.6 % (ref 41.1–50.3)
HCT VFR BLD AUTO: 28.6 % (ref 41.1–50.3)
HCT VFR BLD AUTO: 29 % (ref 41.1–50.3)
HCT VFR BLD AUTO: 29.5 % (ref 41.1–50.3)
HCT VFR BLD AUTO: 30.3 % (ref 41.1–50.3)
HCT VFR BLD AUTO: 31.8 % (ref 41.1–50.3)
HCT VFR BLD AUTO: 32 % (ref 41.1–50.3)
HCT VFR BLD AUTO: 32.5 % (ref 41.1–50.3)
HCT VFR BLD AUTO: 32.5 % (ref 41.1–50.3)
HCT VFR BLD AUTO: 32.7 % (ref 41.1–50.3)
HCT VFR BLD AUTO: 33 % (ref 41.1–50.3)
HCT VFR BLD AUTO: 33.2 % (ref 41.1–50.3)
HCT VFR BLD AUTO: 33.3 % (ref 41.1–50.3)
HCT VFR BLD AUTO: 34.3 % (ref 41.1–50.3)
HCT VFR BLD AUTO: 35.5 % (ref 41.1–50.3)
HCT VFR BLD AUTO: 35.7 % (ref 41.1–50.3)
HCT VFR BLD AUTO: 36.8 % (ref 41.1–50.3)
HCT VFR BLD AUTO: 36.8 % (ref 41.1–50.3)
HCT VFR BLD AUTO: 37.2 % (ref 41.1–50.3)
HCT VFR BLD AUTO: 37.2 % (ref 41.1–50.3)
HCT VFR BLD AUTO: 37.7 % (ref 41.1–50.3)
HCT VFR BLD AUTO: 37.7 % (ref 41.1–50.3)
HCT VFR BLD AUTO: 38.1 % (ref 41.1–50.3)
HCT VFR BLD AUTO: 39.6 % (ref 41.1–50.3)
HCT VFR BLD AUTO: 39.8 % (ref 41.1–50.3)
HCT VFR BLD AUTO: 40.3 % (ref 41.1–50.3)
HCT VFR BLD AUTO: 40.6 % (ref 41.1–50.3)
HCT VFR BLD AUTO: 40.6 % (ref 41.1–50.3)
HCT VFR BLD AUTO: 40.8 % (ref 41.1–50.3)
HCT VFR BLD AUTO: 41.1 % (ref 41.1–50.3)
HCT VFR BLD AUTO: 41.4 % (ref 41.1–50.3)
HCT VFR BLD AUTO: 42.1 % (ref 41.1–50.3)
HCT VFR BLD AUTO: 42.7 % (ref 41.1–50.3)
HCT VFR BLD AUTO: 43.3 % (ref 41.1–50.3)
HCV AB S/CO SERPL IA: <0.1 S/CO RATIO (ref 0–0.9)
HDLC SERPL-MCNC: 43 MG/DL (ref 40–60)
HDLC SERPL: 2 {RATIO}
HEMOCCULT STL QL: POSITIVE
HGB BLD-MCNC: 10.1 G/DL (ref 13.6–17.2)
HGB BLD-MCNC: 10.1 G/DL (ref 13.6–17.2)
HGB BLD-MCNC: 10.3 G/DL (ref 13.6–17.2)
HGB BLD-MCNC: 10.6 G/DL (ref 13.6–17.2)
HGB BLD-MCNC: 11.1 G/DL (ref 13.6–17.2)
HGB BLD-MCNC: 11.1 G/DL (ref 13.6–17.2)
HGB BLD-MCNC: 11.3 G/DL (ref 13.6–17.2)
HGB BLD-MCNC: 11.4 G/DL (ref 13.6–17.2)
HGB BLD-MCNC: 11.4 G/DL (ref 13.6–17.2)
HGB BLD-MCNC: 11.5 G/DL (ref 13.6–17.2)
HGB BLD-MCNC: 12 G/DL (ref 13.6–17.2)
HGB BLD-MCNC: 12.4 G/DL (ref 13.6–17.2)
HGB BLD-MCNC: 12.5 G/DL (ref 13.6–17.2)
HGB BLD-MCNC: 12.6 G/DL (ref 13.6–17.2)
HGB BLD-MCNC: 12.9 G/DL (ref 13.6–17.2)
HGB BLD-MCNC: 13.1 G/DL (ref 13.6–17.2)
HGB BLD-MCNC: 13.3 G/DL (ref 13.6–17.2)
HGB BLD-MCNC: 13.5 G/DL (ref 13.6–17.2)
HGB BLD-MCNC: 13.5 G/DL (ref 13.6–17.2)
HGB BLD-MCNC: 13.7 G/DL (ref 13.6–17.2)
HGB BLD-MCNC: 7 G/DL (ref 13.6–17.2)
HGB BLD-MCNC: 7.4 G/DL (ref 13.6–17.2)
HGB BLD-MCNC: 7.6 G/DL (ref 13.6–17.2)
HGB BLD-MCNC: 7.7 G/DL (ref 13.6–17.2)
HGB BLD-MCNC: 7.7 G/DL (ref 13.6–17.2)
HGB BLD-MCNC: 8.6 G/DL (ref 13.6–17.2)
HGB BLD-MCNC: 8.6 G/DL (ref 13.6–17.2)
HGB BLD-MCNC: 8.7 G/DL (ref 13.6–17.2)
HGB BLD-MCNC: 8.8 G/DL (ref 13.6–17.2)
HGB BLD-MCNC: 8.8 G/DL (ref 13.6–17.2)
HGB BLD-MCNC: 9.2 G/DL (ref 13.6–17.2)
HGB BLD-MCNC: 9.5 G/DL (ref 13.6–17.2)
HGB BLD-MCNC: 9.7 G/DL (ref 13.6–17.2)
HGB BLD-MCNC: 9.8 G/DL (ref 13.6–17.2)
HGB BLD-MCNC: 9.9 G/DL (ref 13.6–17.2)
HGB BLD-MCNC: 9.9 G/DL (ref 13.6–17.2)
HGB BLDMV-MCNC: 11 GM/DL (ref 11.7–15)
HGB BLDMV-MCNC: 12.9 GM/DL (ref 11.7–15)
HGB BLDMV-MCNC: 13.1 GM/DL (ref 11.7–15)
HGB UR QL STRIP: ABNORMAL
HGB UR QL STRIP: NEGATIVE
IMM GRANULOCYTES # BLD: 0 K/UL (ref 0–0.5)
IMM GRANULOCYTES # BLD: 0.1 K/UL (ref 0–0.5)
IMM GRANULOCYTES # BLD: 0.2 K/UL (ref 0–0.5)
IMM GRANULOCYTES NFR BLD AUTO: 0 % (ref 0–5)
IMM GRANULOCYTES NFR BLD AUTO: 1 % (ref 0–5)
INR PPP: 1.8
IRON SATN MFR SERPL: 6 %
IRON SATN MFR SERPL: 7 %
IRON SERPL-MCNC: 106 UG/DL (ref 35–150)
IRON SERPL-MCNC: 16 UG/DL (ref 35–150)
IRON SERPL-MCNC: 16 UG/DL (ref 35–150)
IRON SERPL-MCNC: 20 UG/DL (ref 35–150)
KETONES UR QL STRIP.AUTO: NEGATIVE MG/DL
LACTATE BLD-SCNC: 0.8 MMOL/L (ref 0.5–1.9)
LACTATE BLD-SCNC: 1 MMOL/L (ref 0.5–1.9)
LACTATE BLD-SCNC: 1.1 MMOL/L (ref 0.5–1.9)
LACTATE BLD-SCNC: 1.2 MMOL/L (ref 0.5–1.9)
LACTATE BLD-SCNC: 1.45 MMOL/L (ref 0.5–1.9)
LACTATE BLD-SCNC: 1.8 MMOL/L (ref 0.5–1.9)
LACTATE BLD-SCNC: 2.1 MMOL/L (ref 0.5–1.9)
LACTATE BLD-SCNC: 3.55 MMOL/L (ref 0.5–1.9)
LACTATE BLD-SCNC: 3.87 MMOL/L (ref 0.5–1.9)
LACTATE SERPL-SCNC: 1.6 MMOL/L (ref 0.4–2)
LACTATE SERPL-SCNC: 1.9 MMOL/L (ref 0.4–2)
LACTATE SERPL-SCNC: 2.4 MMOL/L (ref 0.4–2)
LACTATE SERPL-SCNC: 2.5 MMOL/L (ref 0.4–2)
LDH FLD L TO P-CCNC: 94 U/L
LDLC SERPL CALC-MCNC: 28.8 MG/DL
LEUKOCYTE ESTERASE UR QL STRIP.AUTO: ABNORMAL
LEUKOCYTE ESTERASE UR QL STRIP.AUTO: NEGATIVE
LIPASE SERPL-CCNC: 104 U/L (ref 73–393)
LIPASE SERPL-CCNC: 126 U/L (ref 73–393)
LIPID PROFILE,FLP: NORMAL
LYMPHOCYTES # BLD: 0.1 K/UL (ref 0.5–4.6)
LYMPHOCYTES # BLD: 0.3 K/UL (ref 0.5–4.6)
LYMPHOCYTES # BLD: 0.3 K/UL (ref 0.5–4.6)
LYMPHOCYTES # BLD: 0.4 K/UL (ref 0.5–4.6)
LYMPHOCYTES # BLD: 0.6 K/UL (ref 0.5–4.6)
LYMPHOCYTES # BLD: 0.7 K/UL (ref 0.5–4.6)
LYMPHOCYTES # BLD: 0.8 K/UL (ref 0.5–4.6)
LYMPHOCYTES # BLD: 0.9 K/UL (ref 0.5–4.6)
LYMPHOCYTES # BLD: 0.9 K/UL (ref 0.5–4.6)
LYMPHOCYTES # BLD: 1.1 K/UL (ref 0.5–4.6)
LYMPHOCYTES # BLD: 1.2 K/UL (ref 0.5–4.6)
LYMPHOCYTES # BLD: 1.2 K/UL (ref 0.5–4.6)
LYMPHOCYTES # BLD: 1.4 K/UL (ref 0.5–4.6)
LYMPHOCYTES # BLD: 1.5 K/UL (ref 0.5–4.6)
LYMPHOCYTES NFR BLD: 12 % (ref 13–44)
LYMPHOCYTES NFR BLD: 13 % (ref 13–44)
LYMPHOCYTES NFR BLD: 14 % (ref 13–44)
LYMPHOCYTES NFR BLD: 18 % (ref 13–44)
LYMPHOCYTES NFR BLD: 18 % (ref 13–44)
LYMPHOCYTES NFR BLD: 19 % (ref 13–44)
LYMPHOCYTES NFR BLD: 2 % (ref 13–44)
LYMPHOCYTES NFR BLD: 3 % (ref 13–44)
LYMPHOCYTES NFR BLD: 4 % (ref 13–44)
LYMPHOCYTES NFR BLD: 8 % (ref 13–44)
LYMPHOCYTES NFR BLD: 9 % (ref 13–44)
LYMPHOCYTES NFR BLD: 9 % (ref 13–44)
LYMPHOCYTES NFR FLD: 89 %
MAGNESIUM SERPL-MCNC: 1.7 MG/DL (ref 1.8–2.4)
MAGNESIUM SERPL-MCNC: 2.2 MG/DL (ref 1.8–2.4)
MAGNESIUM SERPL-MCNC: 2.4 MG/DL (ref 1.8–2.4)
MAGNESIUM SERPL-MCNC: 2.5 MG/DL (ref 1.8–2.4)
MAGNESIUM SERPL-MCNC: 2.5 MG/DL (ref 1.8–2.4)
MAGNESIUM SERPL-MCNC: 2.6 MG/DL (ref 1.8–2.4)
MAGNESIUM SERPL-MCNC: 2.8 MG/DL (ref 1.8–2.4)
MAGNESIUM SERPL-MCNC: 2.8 MG/DL (ref 1.8–2.4)
MAGNESIUM SERPL-MCNC: 2.9 MG/DL (ref 1.8–2.4)
MCH RBC QN AUTO: 25.3 PG (ref 26.1–32.9)
MCH RBC QN AUTO: 25.6 PG (ref 26.1–32.9)
MCH RBC QN AUTO: 25.7 PG (ref 26.1–32.9)
MCH RBC QN AUTO: 25.7 PG (ref 26.1–32.9)
MCH RBC QN AUTO: 25.8 PG (ref 26.1–32.9)
MCH RBC QN AUTO: 25.8 PG (ref 26.1–32.9)
MCH RBC QN AUTO: 25.9 PG (ref 26.1–32.9)
MCH RBC QN AUTO: 25.9 PG (ref 26.1–32.9)
MCH RBC QN AUTO: 26 PG (ref 26.1–32.9)
MCH RBC QN AUTO: 26.2 PG (ref 26.1–32.9)
MCH RBC QN AUTO: 26.3 PG (ref 26.1–32.9)
MCH RBC QN AUTO: 26.4 PG (ref 26.1–32.9)
MCH RBC QN AUTO: 26.5 PG (ref 26.1–32.9)
MCH RBC QN AUTO: 26.5 PG (ref 26.1–32.9)
MCH RBC QN AUTO: 26.6 PG (ref 26.1–32.9)
MCH RBC QN AUTO: 26.7 PG (ref 26.1–32.9)
MCH RBC QN AUTO: 27.1 PG (ref 26.1–32.9)
MCH RBC QN AUTO: 27.2 PG (ref 26.1–32.9)
MCHC RBC AUTO-ENTMCNC: 28.5 G/DL (ref 31.4–35)
MCHC RBC AUTO-ENTMCNC: 29.2 G/DL (ref 31.4–35)
MCHC RBC AUTO-ENTMCNC: 29.4 G/DL (ref 31.4–35)
MCHC RBC AUTO-ENTMCNC: 29.7 G/DL (ref 31.4–35)
MCHC RBC AUTO-ENTMCNC: 29.7 G/DL (ref 31.4–35)
MCHC RBC AUTO-ENTMCNC: 29.8 G/DL (ref 31.4–35)
MCHC RBC AUTO-ENTMCNC: 29.8 G/DL (ref 31.4–35)
MCHC RBC AUTO-ENTMCNC: 29.9 G/DL (ref 31.4–35)
MCHC RBC AUTO-ENTMCNC: 29.9 G/DL (ref 31.4–35)
MCHC RBC AUTO-ENTMCNC: 30 G/DL (ref 31.4–35)
MCHC RBC AUTO-ENTMCNC: 30.4 G/DL (ref 31.4–35)
MCHC RBC AUTO-ENTMCNC: 30.5 G/DL (ref 31.4–35)
MCHC RBC AUTO-ENTMCNC: 30.9 G/DL (ref 31.4–35)
MCHC RBC AUTO-ENTMCNC: 31.2 G/DL (ref 31.4–35)
MCHC RBC AUTO-ENTMCNC: 31.5 G/DL (ref 31.4–35)
MCHC RBC AUTO-ENTMCNC: 31.6 G/DL (ref 31.4–35)
MCHC RBC AUTO-ENTMCNC: 31.7 G/DL (ref 31.4–35)
MCHC RBC AUTO-ENTMCNC: 31.8 G/DL (ref 31.4–35)
MCHC RBC AUTO-ENTMCNC: 32 G/DL (ref 31.4–35)
MCHC RBC AUTO-ENTMCNC: 32.1 G/DL (ref 31.4–35)
MCHC RBC AUTO-ENTMCNC: 32.8 G/DL (ref 31.4–35)
MCV RBC AUTO: 81.1 FL (ref 79.6–97.8)
MCV RBC AUTO: 81.4 FL (ref 79.6–97.8)
MCV RBC AUTO: 82.3 FL (ref 79.6–97.8)
MCV RBC AUTO: 82.5 FL (ref 79.6–97.8)
MCV RBC AUTO: 83 FL (ref 79.6–97.8)
MCV RBC AUTO: 83.3 FL (ref 79.6–97.8)
MCV RBC AUTO: 83.3 FL (ref 79.6–97.8)
MCV RBC AUTO: 83.4 FL (ref 79.6–97.8)
MCV RBC AUTO: 83.6 FL (ref 79.6–97.8)
MCV RBC AUTO: 84.3 FL (ref 79.6–97.8)
MCV RBC AUTO: 85.1 FL (ref 79.6–97.8)
MCV RBC AUTO: 85.3 FL (ref 79.6–97.8)
MCV RBC AUTO: 85.4 FL (ref 79.6–97.8)
MCV RBC AUTO: 85.4 FL (ref 79.6–97.8)
MCV RBC AUTO: 85.8 FL (ref 79.6–97.8)
MCV RBC AUTO: 86 FL (ref 79.6–97.8)
MCV RBC AUTO: 86.1 FL (ref 79.6–97.8)
MCV RBC AUTO: 86.9 FL (ref 79.6–97.8)
MCV RBC AUTO: 87.1 FL (ref 79.6–97.8)
MCV RBC AUTO: 88.1 FL (ref 79.6–97.8)
MCV RBC AUTO: 88.6 FL (ref 79.6–97.8)
MCV RBC AUTO: 88.7 FL (ref 79.6–97.8)
MCV RBC AUTO: 88.7 FL (ref 79.6–97.8)
MCV RBC AUTO: 88.8 FL (ref 79.6–97.8)
MCV RBC AUTO: 88.8 FL (ref 79.6–97.8)
MCV RBC AUTO: 89.5 FL (ref 79.6–97.8)
MCV RBC AUTO: 89.5 FL (ref 79.6–97.8)
METHADONE UR QL: NEGATIVE
METHGB MFR BLD: 0.3 % (ref 0–1.5)
METHGB MFR BLD: 0.4 % (ref 0–1.5)
METHGB MFR BLD: 0.7 % (ref 0–1.5)
MITOCHONDRIA M2 IGG SER-ACNC: 45.6 UNITS (ref 0–20)
MM INDURATION POC: 0 MM (ref 0–5)
MM INDURATION POC: NORMAL MM (ref 0–5)
MONOCYTES # BLD: 0.1 K/UL (ref 0.1–1.3)
MONOCYTES # BLD: 0.2 K/UL (ref 0.1–1.3)
MONOCYTES # BLD: 0.3 K/UL (ref 0.1–1.3)
MONOCYTES # BLD: 0.3 K/UL (ref 0.1–1.3)
MONOCYTES # BLD: 0.4 K/UL (ref 0.1–1.3)
MONOCYTES # BLD: 0.5 K/UL (ref 0.1–1.3)
MONOCYTES # BLD: 0.6 K/UL (ref 0.1–1.3)
MONOCYTES # BLD: 0.7 K/UL (ref 0.1–1.3)
MONOCYTES # BLD: 0.7 K/UL (ref 0.1–1.3)
MONOCYTES # BLD: 1.1 K/UL (ref 0.1–1.3)
MONOCYTES NFR BLD: 1 % (ref 4–12)
MONOCYTES NFR BLD: 2 % (ref 4–12)
MONOCYTES NFR BLD: 3 % (ref 4–12)
MONOCYTES NFR BLD: 4 % (ref 4–12)
MONOCYTES NFR BLD: 4 % (ref 4–12)
MONOCYTES NFR BLD: 5 % (ref 4–12)
MONOCYTES NFR BLD: 6 % (ref 4–12)
MONOCYTES NFR BLD: 7 % (ref 4–12)
MONOCYTES NFR BLD: 8 % (ref 4–12)
MUCOUS THREADS URNS QL MICRO: 0 /LPF
MYCOBACTERIUM SPEC QL CULT: NEGATIVE
NEUTROPHILS NFR FLD: 11 %
NEUTS SEG # BLD: 17.7 K/UL (ref 1.7–8.2)
NEUTS SEG # BLD: 22.4 K/UL (ref 1.7–8.2)
NEUTS SEG # BLD: 4.4 K/UL (ref 1.7–8.2)
NEUTS SEG # BLD: 4.6 K/UL (ref 1.7–8.2)
NEUTS SEG # BLD: 4.6 K/UL (ref 1.7–8.2)
NEUTS SEG # BLD: 4.7 K/UL (ref 1.7–8.2)
NEUTS SEG # BLD: 4.7 K/UL (ref 1.7–8.2)
NEUTS SEG # BLD: 4.8 K/UL (ref 1.7–8.2)
NEUTS SEG # BLD: 5.3 K/UL (ref 1.7–8.2)
NEUTS SEG # BLD: 5.5 K/UL (ref 1.7–8.2)
NEUTS SEG # BLD: 5.7 K/UL (ref 1.7–8.2)
NEUTS SEG # BLD: 6.1 K/UL (ref 1.7–8.2)
NEUTS SEG # BLD: 6.7 K/UL (ref 1.7–8.2)
NEUTS SEG # BLD: 6.9 K/UL (ref 1.7–8.2)
NEUTS SEG # BLD: 7.2 K/UL (ref 1.7–8.2)
NEUTS SEG # BLD: 7.3 K/UL (ref 1.7–8.2)
NEUTS SEG # BLD: 7.6 K/UL (ref 1.7–8.2)
NEUTS SEG # BLD: 8.6 K/UL (ref 1.7–8.2)
NEUTS SEG # BLD: 9.1 K/UL (ref 1.7–8.2)
NEUTS SEG # BLD: 9.4 K/UL (ref 1.7–8.2)
NEUTS SEG NFR BLD: 70 % (ref 43–78)
NEUTS SEG NFR BLD: 72 % (ref 43–78)
NEUTS SEG NFR BLD: 72 % (ref 43–78)
NEUTS SEG NFR BLD: 76 % (ref 43–78)
NEUTS SEG NFR BLD: 77 % (ref 43–78)
NEUTS SEG NFR BLD: 78 % (ref 43–78)
NEUTS SEG NFR BLD: 80 % (ref 43–78)
NEUTS SEG NFR BLD: 83 % (ref 43–78)
NEUTS SEG NFR BLD: 86 % (ref 43–78)
NEUTS SEG NFR BLD: 87 % (ref 43–78)
NEUTS SEG NFR BLD: 88 % (ref 43–78)
NEUTS SEG NFR BLD: 90 % (ref 43–78)
NEUTS SEG NFR BLD: 91 % (ref 43–78)
NEUTS SEG NFR BLD: 92 % (ref 43–78)
NEUTS SEG NFR BLD: 93 % (ref 43–78)
NEUTS SEG NFR BLD: 96 % (ref 43–78)
NITRITE UR QL STRIP.AUTO: NEGATIVE
NITRITE UR QL STRIP.AUTO: POSITIVE
NRBC # BLD: 0 K/UL (ref 0–0.2)
NRBC # BLD: 0.02 K/UL (ref 0–0.2)
NRBC # BLD: 0.13 K/UL (ref 0–0.2)
NRBC # BLD: 0.14 K/UL (ref 0–0.2)
NUC CELL # FLD: 283 /CU MM
OPIATES UR QL: POSITIVE
OTHER OBSERVATIONS,UCOM: ABNORMAL
OTHER OBSERVATIONS,UCOM: ABNORMAL
OXYHGB MFR BLDA: 89.8 % (ref 94–97)
OXYHGB MFR BLDA: 95.1 % (ref 94–97)
OXYHGB MFR BLDA: 97.5 % (ref 94–97)
PCO2 BLD: 44.1 MMHG (ref 35–45)
PCO2 BLDA: 50 MMHG (ref 35–45)
PCO2 BLDA: 51 MMHG (ref 35–45)
PCO2 BLDA: 54 MMHG (ref 35–45)
PCP UR QL: NEGATIVE
PH BLD: 7.39 [PH] (ref 7.35–7.45)
PH BLDA: 7.33 [PH] (ref 7.35–7.45)
PH BLDA: 7.35 [PH] (ref 7.35–7.45)
PH BLDA: 7.42 [PH] (ref 7.35–7.45)
PH UR STRIP: 5 [PH] (ref 5–9)
PH UR STRIP: 5.5 [PH] (ref 5–9)
PH UR STRIP: 5.5 [PH] (ref 5–9)
PH UR STRIP: 6 [PH] (ref 5–9)
PHOSPHATE SERPL-MCNC: 4.4 MG/DL (ref 2.3–3.7)
PHOSPHATE SERPL-MCNC: 4.9 MG/DL (ref 2.3–3.7)
PHOSPHATE SERPL-MCNC: 5 MG/DL (ref 2.3–3.7)
PLATELET # BLD AUTO: 115 K/UL (ref 150–450)
PLATELET # BLD AUTO: 122 K/UL (ref 150–450)
PLATELET # BLD AUTO: 123 K/UL (ref 150–450)
PLATELET # BLD AUTO: 124 K/UL (ref 150–450)
PLATELET # BLD AUTO: 132 K/UL (ref 150–450)
PLATELET # BLD AUTO: 132 K/UL (ref 150–450)
PLATELET # BLD AUTO: 133 K/UL (ref 150–450)
PLATELET # BLD AUTO: 145 K/UL (ref 150–450)
PLATELET # BLD AUTO: 149 K/UL (ref 150–450)
PLATELET # BLD AUTO: 152 K/UL (ref 150–450)
PLATELET # BLD AUTO: 153 K/UL (ref 150–450)
PLATELET # BLD AUTO: 153 K/UL (ref 150–450)
PLATELET # BLD AUTO: 157 K/UL (ref 150–450)
PLATELET # BLD AUTO: 163 K/UL (ref 150–450)
PLATELET # BLD AUTO: 165 K/UL (ref 150–450)
PLATELET # BLD AUTO: 169 K/UL (ref 150–450)
PLATELET # BLD AUTO: 170 K/UL (ref 150–450)
PLATELET # BLD AUTO: 176 K/UL (ref 150–450)
PLATELET # BLD AUTO: 182 K/UL (ref 150–450)
PLATELET # BLD AUTO: 193 K/UL (ref 150–450)
PLATELET # BLD AUTO: 196 K/UL (ref 150–450)
PLATELET # BLD AUTO: 201 K/UL (ref 150–450)
PLATELET # BLD AUTO: 208 K/UL (ref 150–450)
PLATELET # BLD AUTO: 209 K/UL (ref 150–450)
PLATELET # BLD AUTO: 211 K/UL (ref 150–450)
PLATELET # BLD AUTO: 224 K/UL (ref 150–450)
PLATELET # BLD AUTO: 256 K/UL (ref 150–450)
PMV BLD AUTO: 10.6 FL (ref 10.8–14.1)
PMV BLD AUTO: 10.7 FL (ref 10.8–14.1)
PMV BLD AUTO: 10.9 FL (ref 10.8–14.1)
PMV BLD AUTO: 11 FL (ref 10.8–14.1)
PMV BLD AUTO: 11 FL (ref 9.4–12.3)
PMV BLD AUTO: 11.1 FL (ref 10.8–14.1)
PMV BLD AUTO: 11.1 FL (ref 9.4–12.3)
PMV BLD AUTO: 11.1 FL (ref 9.4–12.3)
PMV BLD AUTO: 11.2 FL (ref 10.8–14.1)
PMV BLD AUTO: 11.2 FL (ref 9.4–12.3)
PMV BLD AUTO: 11.3 FL (ref 10.8–14.1)
PMV BLD AUTO: 11.3 FL (ref 10.8–14.1)
PMV BLD AUTO: 11.3 FL (ref 9.4–12.3)
PMV BLD AUTO: 11.4 FL (ref 9.4–12.3)
PMV BLD AUTO: 11.6 FL (ref 10.8–14.1)
PMV BLD AUTO: 11.6 FL (ref 10.8–14.1)
PMV BLD AUTO: 11.8 FL (ref 9.4–12.3)
PMV BLD AUTO: 11.8 FL (ref 9.4–12.3)
PMV BLD AUTO: 12 FL (ref 9.4–12.3)
PMV BLD AUTO: 12.1 FL (ref 9.4–12.3)
PMV BLD AUTO: 12.2 FL (ref 10.8–14.1)
PMV BLD AUTO: 12.2 FL (ref 9.4–12.3)
PMV BLD AUTO: 12.4 FL (ref 10.8–14.1)
PMV BLD AUTO: 12.4 FL (ref 9.4–12.3)
PMV BLD AUTO: 12.5 FL (ref 9.4–12.3)
PMV BLD AUTO: 12.5 FL (ref 9.4–12.3)
PMV BLD AUTO: 12.7 FL (ref 9.4–12.3)
PO2 BLD: 99 MMHG (ref 75–100)
PO2 BLDA: 150 MMHG (ref 80–105)
PO2 BLDA: 60 MMHG (ref 80–105)
PO2 BLDA: 95 MMHG (ref 80–105)
POTASSIUM BLD-SCNC: 5 MMOL/L (ref 3.5–5.1)
POTASSIUM SERPL-SCNC: 3.3 MMOL/L (ref 3.5–5.1)
POTASSIUM SERPL-SCNC: 3.5 MMOL/L (ref 3.5–5.1)
POTASSIUM SERPL-SCNC: 3.6 MMOL/L (ref 3.5–5.1)
POTASSIUM SERPL-SCNC: 3.6 MMOL/L (ref 3.5–5.1)
POTASSIUM SERPL-SCNC: 3.7 MMOL/L (ref 3.5–5.1)
POTASSIUM SERPL-SCNC: 3.9 MMOL/L (ref 3.5–5.1)
POTASSIUM SERPL-SCNC: 4 MMOL/L (ref 3.5–5.1)
POTASSIUM SERPL-SCNC: 4.1 MMOL/L (ref 3.5–5.1)
POTASSIUM SERPL-SCNC: 4.2 MMOL/L (ref 3.5–5.1)
POTASSIUM SERPL-SCNC: 4.3 MMOL/L (ref 3.5–5.1)
POTASSIUM SERPL-SCNC: 4.3 MMOL/L (ref 3.5–5.1)
POTASSIUM SERPL-SCNC: 4.4 MMOL/L (ref 3.5–5.1)
POTASSIUM SERPL-SCNC: 4.7 MMOL/L (ref 3.5–5.1)
POTASSIUM SERPL-SCNC: 4.8 MMOL/L (ref 3.5–5.1)
POTASSIUM SERPL-SCNC: 4.9 MMOL/L (ref 3.5–5.1)
POTASSIUM SERPL-SCNC: 4.9 MMOL/L (ref 3.5–5.1)
POTASSIUM SERPL-SCNC: 5 MMOL/L (ref 3.5–5.1)
POTASSIUM SERPL-SCNC: 5.1 MMOL/L (ref 3.5–5.1)
POTASSIUM SERPL-SCNC: 5.3 MMOL/L (ref 3.5–5.1)
POTASSIUM SERPL-SCNC: 5.4 MMOL/L (ref 3.5–5.1)
PPD POC: NEGATIVE NEGATIVE
PPD POC: NORMAL NEGATIVE
PROT FLD-MCNC: 2.1 G/DL
PROT SERPL-MCNC: 5.8 G/DL (ref 6.3–8.2)
PROT SERPL-MCNC: 5.9 G/DL (ref 6.3–8.2)
PROT SERPL-MCNC: 6 G/DL (ref 6.3–8.2)
PROT SERPL-MCNC: 6.8 G/DL (ref 6.3–8.2)
PROT SERPL-MCNC: 6.9 G/DL (ref 6.3–8.2)
PROT SERPL-MCNC: 7 G/DL (ref 6.3–8.2)
PROT SERPL-MCNC: 7 G/DL (ref 6.3–8.2)
PROT SERPL-MCNC: 7.3 G/DL (ref 6.3–8.2)
PROT SERPL-MCNC: 7.3 G/DL (ref 6.3–8.2)
PROT SERPL-MCNC: 7.4 G/DL (ref 6.3–8.2)
PROT SERPL-MCNC: 7.6 G/DL (ref 6.3–8.2)
PROT SERPL-MCNC: 7.7 G/DL (ref 6.3–8.2)
PROT SERPL-MCNC: 8 G/DL (ref 6.3–8.2)
PROT SERPL-MCNC: 8 G/DL (ref 6.3–8.2)
PROT SERPL-MCNC: 8.2 G/DL (ref 6.3–8.2)
PROT UR STRIP-MCNC: 100 MG/DL
PROT UR STRIP-MCNC: NEGATIVE MG/DL
PROTHROMBIN TIME: 20.3 SEC (ref 11.5–14.5)
Q-T INTERVAL, ECG07: 348 MS
Q-T INTERVAL, ECG07: 370 MS
Q-T INTERVAL, ECG07: 380 MS
Q-T INTERVAL, ECG07: 402 MS
Q-T INTERVAL, ECG07: 416 MS
Q-T INTERVAL, ECG07: 426 MS
Q-T INTERVAL, ECG07: 432 MS
Q-T INTERVAL, ECG07: 456 MS
Q-T INTERVAL, ECG07: 470 MS
Q-T INTERVAL, ECG07: 476 MS
Q-T INTERVAL, ECG07: 480 MS
Q-T INTERVAL, ECG07: 510 MS
QRS DURATION, ECG06: 104 MS
QRS DURATION, ECG06: 108 MS
QRS DURATION, ECG06: 110 MS
QRS DURATION, ECG06: 90 MS
QRS DURATION, ECG06: 92 MS
QRS DURATION, ECG06: 94 MS
QRS DURATION, ECG06: 96 MS
QRS DURATION, ECG06: 98 MS
QTC CALCULATION (BEZET), ECG08: 386 MS
QTC CALCULATION (BEZET), ECG08: 393 MS
QTC CALCULATION (BEZET), ECG08: 405 MS
QTC CALCULATION (BEZET), ECG08: 406 MS
QTC CALCULATION (BEZET), ECG08: 411 MS
QTC CALCULATION (BEZET), ECG08: 430 MS
QTC CALCULATION (BEZET), ECG08: 432 MS
QTC CALCULATION (BEZET), ECG08: 448 MS
QTC CALCULATION (BEZET), ECG08: 449 MS
QTC CALCULATION (BEZET), ECG08: 463 MS
QTC CALCULATION (BEZET), ECG08: 466 MS
QTC CALCULATION (BEZET), ECG08: 496 MS
RBC # BLD AUTO: 2.93 M/UL (ref 4.23–5.6)
RBC # BLD AUTO: 2.98 M/UL (ref 4.23–5.6)
RBC # BLD AUTO: 3.33 M/UL (ref 4.23–5.6)
RBC # BLD AUTO: 3.35 M/UL (ref 4.23–5.6)
RBC # BLD AUTO: 3.44 M/UL (ref 4.23–5.6)
RBC # BLD AUTO: 3.58 M/UL (ref 4.23–5.6)
RBC # BLD AUTO: 3.72 M/UL (ref 4.23–5.6)
RBC # BLD AUTO: 3.78 M/UL (ref 4.23–5.6)
RBC # BLD AUTO: 3.83 M/UL (ref 4.23–5.6)
RBC # BLD AUTO: 3.9 M/UL (ref 4.23–5.6)
RBC # BLD AUTO: 3.99 M/UL (ref 4.23–5.6)
RBC # BLD AUTO: 4 M/UL (ref 4.23–5.6)
RBC # BLD AUTO: 4.02 M/UL (ref 4.23–5.67)
RBC # BLD AUTO: 4.03 M/UL (ref 4.23–5.6)
RBC # BLD AUTO: 4.21 M/UL (ref 4.23–5.6)
RBC # BLD AUTO: 4.28 M/UL (ref 4.23–5.6)
RBC # BLD AUTO: 4.6 M/UL (ref 4.23–5.67)
RBC # BLD AUTO: 4.72 M/UL (ref 4.23–5.67)
RBC # BLD AUTO: 4.73 M/UL (ref 4.23–5.67)
RBC # BLD AUTO: 4.84 M/UL (ref 4.23–5.67)
RBC # BLD AUTO: 4.85 M/UL (ref 4.23–5.67)
RBC # BLD AUTO: 4.89 M/UL (ref 4.23–5.67)
RBC # BLD AUTO: 4.96 M/UL (ref 4.23–5.67)
RBC # BLD AUTO: 4.98 M/UL (ref 4.23–5.67)
RBC # BLD AUTO: 5.12 M/UL (ref 4.23–5.67)
RBC # BLD AUTO: 5.17 M/UL (ref 4.23–5.67)
RBC # BLD AUTO: 5.2 M/UL (ref 4.23–5.67)
RBC # FLD: 2000 /CU MM
RBC #/AREA URNS HPF: 0 /HPF
RBC #/AREA URNS HPF: >100 /HPF
RBC #/AREA URNS HPF: ABNORMAL /HPF
RBC #/AREA URNS HPF: ABNORMAL /HPF
RBC #/AREA URNS HPF: NORMAL /HPF
REFLEX TO ID, RFCO3T: NORMAL
SAO2 % BLD: 91 % (ref 92–98.5)
SAO2 % BLD: 97 % (ref 92–98.5)
SAO2 % BLD: 98 % (ref 95–98)
SAO2 % BLD: 99 % (ref 92–98.5)
SERVICE CMNT-IMP: ABNORMAL
SERVICE CMNT-IMP: NORMAL
SODIUM BLD-SCNC: 134 MMOL/L (ref 136–145)
SODIUM SERPL-SCNC: 133 MMOL/L (ref 136–145)
SODIUM SERPL-SCNC: 133 MMOL/L (ref 136–145)
SODIUM SERPL-SCNC: 134 MMOL/L (ref 136–145)
SODIUM SERPL-SCNC: 135 MMOL/L (ref 136–145)
SODIUM SERPL-SCNC: 136 MMOL/L (ref 136–145)
SODIUM SERPL-SCNC: 137 MMOL/L (ref 136–145)
SODIUM SERPL-SCNC: 138 MMOL/L (ref 136–145)
SODIUM SERPL-SCNC: 139 MMOL/L (ref 136–145)
SODIUM SERPL-SCNC: 140 MMOL/L (ref 136–145)
SODIUM SERPL-SCNC: 140 MMOL/L (ref 136–145)
SODIUM SERPL-SCNC: 141 MMOL/L (ref 136–145)
SODIUM SERPL-SCNC: 141 MMOL/L (ref 136–145)
SODIUM SERPL-SCNC: 142 MMOL/L (ref 136–145)
SODIUM SERPL-SCNC: 143 MMOL/L (ref 136–145)
SODIUM SERPL-SCNC: 144 MMOL/L (ref 136–145)
SODIUM SERPL-SCNC: 146 MMOL/L (ref 136–145)
SODIUM UR-SCNC: 28 MMOL/L
SP GR UR REFRACTOMETRY: 1.01 (ref 1–1.02)
SP GR UR REFRACTOMETRY: 1.02 (ref 1–1.02)
SPECIMEN PREPARATION: NORMAL
SPECIMEN SOURCE FLD: NORMAL
SPECIMEN SOURCE: NORMAL
SPECIMEN TYPE: ABNORMAL
T3FREE SERPL-MCNC: 2.3 PG/ML (ref 2–4.4)
T4 FREE SERPL-MCNC: 1 NG/DL (ref 0.78–1.46)
TIBC SERPL-MCNC: 241 UG/DL (ref 250–450)
TIBC SERPL-MCNC: 269 UG/DL (ref 250–450)
TRANSFERRIN SERPL-MCNC: 191 MG/DL (ref 202–364)
TRIGL SERPL-MCNC: 66 MG/DL (ref 35–150)
TROPONIN I BLD-MCNC: 0 NG/ML (ref 0.02–0.05)
TROPONIN I BLD-MCNC: 0.01 NG/ML (ref 0.02–0.05)
TROPONIN I BLD-MCNC: 0.05 NG/ML (ref 0.02–0.05)
TROPONIN I BLD-MCNC: 1.65 NG/ML (ref 0.02–0.05)
TROPONIN I SERPL-MCNC: 0.02 NG/ML (ref 0.02–0.05)
TROPONIN I SERPL-MCNC: 0.03 NG/ML (ref 0.02–0.05)
TROPONIN I SERPL-MCNC: <0.02 NG/ML (ref 0.02–0.05)
TSH SERPL DL<=0.005 MIU/L-ACNC: 1.94 UIU/ML (ref 0.36–3.74)
TSH SERPL DL<=0.005 MIU/L-ACNC: 10.1 UIU/ML (ref 0.36–3.74)
TSH SERPL DL<=0.005 MIU/L-ACNC: 4.98 UIU/ML (ref 0.36–3.74)
UROBILINOGEN UR QL STRIP.AUTO: 0.2 EU/DL (ref 0.2–1)
UROBILINOGEN UR QL STRIP.AUTO: 0.2 EU/DL (ref 0.2–1)
UROBILINOGEN UR QL STRIP.AUTO: 1 EU/DL (ref 0.2–1)
UROBILINOGEN UR QL STRIP.AUTO: 1 EU/DL (ref 0.2–1)
VANCOMYCIN SERPL-MCNC: 17.6 UG/ML
VANCOMYCIN SERPL-MCNC: 19.9 UG/ML
VANCOMYCIN SERPL-MCNC: 26.5 UG/ML
VENTILATION MODE VENT: ABNORMAL
VENTRICULAR RATE, ECG03: 44 BPM
VENTRICULAR RATE, ECG03: 49 BPM
VENTRICULAR RATE, ECG03: 52 BPM
VENTRICULAR RATE, ECG03: 55 BPM
VENTRICULAR RATE, ECG03: 56 BPM
VENTRICULAR RATE, ECG03: 57 BPM
VENTRICULAR RATE, ECG03: 60 BPM
VENTRICULAR RATE, ECG03: 72 BPM
VENTRICULAR RATE, ECG03: 72 BPM
VENTRICULAR RATE, ECG03: 75 BPM
VENTRICULAR RATE, ECG03: 77 BPM
VENTRICULAR RATE, ECG03: 77 BPM
VIT B12 SERPL-MCNC: 459 PG/ML (ref 193–986)
VLDLC SERPL CALC-MCNC: 13.2 MG/DL (ref 6–23)
WBC # BLD AUTO: 10 K/UL (ref 4.3–11.1)
WBC # BLD AUTO: 10.2 K/UL (ref 4.3–11.1)
WBC # BLD AUTO: 10.6 K/UL (ref 4.3–11.1)
WBC # BLD AUTO: 18.9 K/UL (ref 4.3–11.1)
WBC # BLD AUTO: 24.4 K/UL (ref 4.3–11.1)
WBC # BLD AUTO: 26.3 K/UL (ref 4.3–11.1)
WBC # BLD AUTO: 5.3 K/UL (ref 4.3–11.1)
WBC # BLD AUTO: 5.8 K/UL (ref 4.3–11.1)
WBC # BLD AUTO: 5.9 K/UL (ref 4.3–11.1)
WBC # BLD AUTO: 6 K/UL (ref 4.3–11.1)
WBC # BLD AUTO: 6.1 K/UL (ref 4.3–11.1)
WBC # BLD AUTO: 6.2 K/UL (ref 4.3–11.1)
WBC # BLD AUTO: 6.5 K/UL (ref 4.3–11.1)
WBC # BLD AUTO: 6.6 K/UL (ref 4.3–11.1)
WBC # BLD AUTO: 7.1 K/UL (ref 4.3–11.1)
WBC # BLD AUTO: 7.2 K/UL (ref 4.3–11.1)
WBC # BLD AUTO: 7.3 K/UL (ref 4.3–11.1)
WBC # BLD AUTO: 7.4 K/UL (ref 4.3–11.1)
WBC # BLD AUTO: 7.9 K/UL (ref 4.3–11.1)
WBC # BLD AUTO: 8.1 K/UL (ref 4.3–11.1)
WBC # BLD AUTO: 8.4 K/UL (ref 4.3–11.1)
WBC # BLD AUTO: 8.7 K/UL (ref 4.3–11.1)
WBC # BLD AUTO: 8.8 K/UL (ref 4.3–11.1)
WBC # BLD AUTO: 9 K/UL (ref 4.3–11.1)
WBC # BLD AUTO: 9.7 K/UL (ref 4.3–11.1)
WBC URNS QL MICRO: >100 /HPF
WBC URNS QL MICRO: ABNORMAL /HPF
YEAST URNS QL MICRO: ABNORMAL
YEAST URNS QL MICRO: NORMAL

## 2018-01-01 PROCEDURE — 94640 AIRWAY INHALATION TREATMENT: CPT

## 2018-01-01 PROCEDURE — 3331090002 HH PPS REVENUE DEBIT

## 2018-01-01 PROCEDURE — 70450 CT HEAD/BRAIN W/O DYE: CPT

## 2018-01-01 PROCEDURE — 74011000250 HC RX REV CODE- 250: Performed by: INTERNAL MEDICINE

## 2018-01-01 PROCEDURE — 74011250637 HC RX REV CODE- 250/637: Performed by: FAMILY MEDICINE

## 2018-01-01 PROCEDURE — 85025 COMPLETE CBC W/AUTO DIFF WBC: CPT

## 2018-01-01 PROCEDURE — 80048 BASIC METABOLIC PNL TOTAL CA: CPT | Performed by: INTERNAL MEDICINE

## 2018-01-01 PROCEDURE — 99285 EMERGENCY DEPT VISIT HI MDM: CPT

## 2018-01-01 PROCEDURE — 74011250636 HC RX REV CODE- 250/636: Performed by: INTERNAL MEDICINE

## 2018-01-01 PROCEDURE — 74011250637 HC RX REV CODE- 250/637: Performed by: INTERNAL MEDICINE

## 2018-01-01 PROCEDURE — 3331090001 HH PPS REVENUE CREDIT

## 2018-01-01 PROCEDURE — 74011000302 HC RX REV CODE- 302: Performed by: INTERNAL MEDICINE

## 2018-01-01 PROCEDURE — 87641 MR-STAPH DNA AMP PROBE: CPT

## 2018-01-01 PROCEDURE — 99218 HC RM OBSERVATION: CPT

## 2018-01-01 PROCEDURE — 94760 N-INVAS EAR/PLS OXIMETRY 1: CPT

## 2018-01-01 PROCEDURE — 81001 URINALYSIS AUTO W/SCOPE: CPT

## 2018-01-01 PROCEDURE — 71045 X-RAY EXAM CHEST 1 VIEW: CPT

## 2018-01-01 PROCEDURE — 74011250636 HC RX REV CODE- 250/636: Performed by: SURGERY

## 2018-01-01 PROCEDURE — 74011636637 HC RX REV CODE- 636/637: Performed by: FAMILY MEDICINE

## 2018-01-01 PROCEDURE — C1769 GUIDE WIRE: HCPCS

## 2018-01-01 PROCEDURE — 74011636637 HC RX REV CODE- 636/637: Performed by: INTERNAL MEDICINE

## 2018-01-01 PROCEDURE — 85027 COMPLETE CBC AUTOMATED: CPT

## 2018-01-01 PROCEDURE — G0151 HHCP-SERV OF PT,EA 15 MIN: HCPCS

## 2018-01-01 PROCEDURE — 77030039270 HC TU BLD FLTR CARD -A

## 2018-01-01 PROCEDURE — 36415 COLL VENOUS BLD VENIPUNCTURE: CPT

## 2018-01-01 PROCEDURE — 80048 BASIC METABOLIC PNL TOTAL CA: CPT

## 2018-01-01 PROCEDURE — 97535 SELF CARE MNGMENT TRAINING: CPT

## 2018-01-01 PROCEDURE — 82962 GLUCOSE BLOOD TEST: CPT

## 2018-01-01 PROCEDURE — 93005 ELECTROCARDIOGRAM TRACING: CPT | Performed by: EMERGENCY MEDICINE

## 2018-01-01 PROCEDURE — 97165 OT EVAL LOW COMPLEX 30 MIN: CPT

## 2018-01-01 PROCEDURE — 0W993ZZ DRAINAGE OF RIGHT PLEURAL CAVITY, PERCUTANEOUS APPROACH: ICD-10-PCS | Performed by: INTERNAL MEDICINE

## 2018-01-01 PROCEDURE — 77010033678 HC OXYGEN DAILY

## 2018-01-01 PROCEDURE — 83735 ASSAY OF MAGNESIUM: CPT

## 2018-01-01 PROCEDURE — 77030011256 HC DRSG MEPILEX <16IN NO BORD MOLN -A

## 2018-01-01 PROCEDURE — 80053 COMPREHEN METABOLIC PANEL: CPT

## 2018-01-01 PROCEDURE — 85018 HEMOGLOBIN: CPT

## 2018-01-01 PROCEDURE — 84100 ASSAY OF PHOSPHORUS: CPT

## 2018-01-01 PROCEDURE — 81001 URINALYSIS AUTO W/SCOPE: CPT | Performed by: INTERNAL MEDICINE

## 2018-01-01 PROCEDURE — 97530 THERAPEUTIC ACTIVITIES: CPT

## 2018-01-01 PROCEDURE — 87086 URINE CULTURE/COLONY COUNT: CPT

## 2018-01-01 PROCEDURE — 97161 PT EVAL LOW COMPLEX 20 MIN: CPT

## 2018-01-01 PROCEDURE — 74011000258 HC RX REV CODE- 258: Performed by: INTERNAL MEDICINE

## 2018-01-01 PROCEDURE — 74011250636 HC RX REV CODE- 250/636: Performed by: EMERGENCY MEDICINE

## 2018-01-01 PROCEDURE — 74011250637 HC RX REV CODE- 250/637: Performed by: NURSE PRACTITIONER

## 2018-01-01 PROCEDURE — 83036 HEMOGLOBIN GLYCOSYLATED A1C: CPT | Performed by: FAMILY MEDICINE

## 2018-01-01 PROCEDURE — 80076 HEPATIC FUNCTION PANEL: CPT

## 2018-01-01 PROCEDURE — 83605 ASSAY OF LACTIC ACID: CPT

## 2018-01-01 PROCEDURE — 76210000006 HC OR PH I REC 0.5 TO 1 HR: Performed by: SURGERY

## 2018-01-01 PROCEDURE — 99232 SBSQ HOSP IP/OBS MODERATE 35: CPT | Performed by: INTERNAL MEDICINE

## 2018-01-01 PROCEDURE — G0299 HHS/HOSPICE OF RN EA 15 MIN: HCPCS

## 2018-01-01 PROCEDURE — 36600 WITHDRAWAL OF ARTERIAL BLOOD: CPT

## 2018-01-01 PROCEDURE — 96361 HYDRATE IV INFUSION ADD-ON: CPT | Performed by: STUDENT IN AN ORGANIZED HEALTH CARE EDUCATION/TRAINING PROGRAM

## 2018-01-01 PROCEDURE — 65660000000 HC RM CCU STEPDOWN

## 2018-01-01 PROCEDURE — 36415 COLL VENOUS BLD VENIPUNCTURE: CPT | Performed by: INTERNAL MEDICINE

## 2018-01-01 PROCEDURE — 82746 ASSAY OF FOLIC ACID SERUM: CPT | Performed by: INTERNAL MEDICINE

## 2018-01-01 PROCEDURE — 82140 ASSAY OF AMMONIA: CPT

## 2018-01-01 PROCEDURE — 84443 ASSAY THYROID STIM HORMONE: CPT

## 2018-01-01 PROCEDURE — 86038 ANTINUCLEAR ANTIBODIES: CPT

## 2018-01-01 PROCEDURE — 74011000250 HC RX REV CODE- 250: Performed by: FAMILY MEDICINE

## 2018-01-01 PROCEDURE — 83516 IMMUNOASSAY NONANTIBODY: CPT

## 2018-01-01 PROCEDURE — 65270000029 HC RM PRIVATE

## 2018-01-01 PROCEDURE — 95816 EEG AWAKE AND DROWSY: CPT | Performed by: INTERNAL MEDICINE

## 2018-01-01 PROCEDURE — 81015 MICROSCOPIC EXAM OF URINE: CPT

## 2018-01-01 PROCEDURE — 85025 COMPLETE CBC W/AUTO DIFF WBC: CPT | Performed by: STUDENT IN AN ORGANIZED HEALTH CARE EDUCATION/TRAINING PROGRAM

## 2018-01-01 PROCEDURE — 96375 TX/PRO/DX INJ NEW DRUG ADDON: CPT | Performed by: EMERGENCY MEDICINE

## 2018-01-01 PROCEDURE — 84484 ASSAY OF TROPONIN QUANT: CPT

## 2018-01-01 PROCEDURE — 99285 EMERGENCY DEPT VISIT HI MDM: CPT | Performed by: EMERGENCY MEDICINE

## 2018-01-01 PROCEDURE — 82803 BLOOD GASES ANY COMBINATION: CPT

## 2018-01-01 PROCEDURE — 65620000000 HC RM CCU GENERAL

## 2018-01-01 PROCEDURE — 71046 X-RAY EXAM CHEST 2 VIEWS: CPT

## 2018-01-01 PROCEDURE — 74011250636 HC RX REV CODE- 250/636: Performed by: FAMILY MEDICINE

## 2018-01-01 PROCEDURE — 83880 ASSAY OF NATRIURETIC PEPTIDE: CPT

## 2018-01-01 PROCEDURE — 97110 THERAPEUTIC EXERCISES: CPT

## 2018-01-01 PROCEDURE — C1894 INTRO/SHEATH, NON-LASER: HCPCS

## 2018-01-01 PROCEDURE — 87088 URINE BACTERIA CULTURE: CPT

## 2018-01-01 PROCEDURE — 94664 DEMO&/EVAL PT USE INHALER: CPT

## 2018-01-01 PROCEDURE — 82570 ASSAY OF URINE CREATININE: CPT | Performed by: INTERNAL MEDICINE

## 2018-01-01 PROCEDURE — 87106 FUNGI IDENTIFICATION YEAST: CPT

## 2018-01-01 PROCEDURE — 84466 ASSAY OF TRANSFERRIN: CPT

## 2018-01-01 PROCEDURE — 83735 ASSAY OF MAGNESIUM: CPT | Performed by: INTERNAL MEDICINE

## 2018-01-01 PROCEDURE — 77030020782 HC GWN BAIR PAWS FLX 3M -B: Performed by: NURSE ANESTHETIST, CERTIFIED REGISTERED

## 2018-01-01 PROCEDURE — 74181 MRI ABDOMEN W/O CONTRAST: CPT

## 2018-01-01 PROCEDURE — 89050 BODY FLUID CELL COUNT: CPT

## 2018-01-01 PROCEDURE — 88305 TISSUE EXAM BY PATHOLOGIST: CPT

## 2018-01-01 PROCEDURE — 74011636320 HC RX REV CODE- 636/320: Performed by: SURGERY

## 2018-01-01 PROCEDURE — 96374 THER/PROPH/DIAG INJ IV PUSH: CPT

## 2018-01-01 PROCEDURE — 77030018846 HC SOL IRR STRL H20 ICUM -A

## 2018-01-01 PROCEDURE — 96374 THER/PROPH/DIAG INJ IV PUSH: CPT | Performed by: EMERGENCY MEDICINE

## 2018-01-01 PROCEDURE — 99284 EMERGENCY DEPT VISIT MOD MDM: CPT | Performed by: EMERGENCY MEDICINE

## 2018-01-01 PROCEDURE — 86580 TB INTRADERMAL TEST: CPT | Performed by: FAMILY MEDICINE

## 2018-01-01 PROCEDURE — 76705 ECHO EXAM OF ABDOMEN: CPT

## 2018-01-01 PROCEDURE — 74011000258 HC RX REV CODE- 258: Performed by: FAMILY MEDICINE

## 2018-01-01 PROCEDURE — 74011250637 HC RX REV CODE- 250/637: Performed by: STUDENT IN AN ORGANIZED HEALTH CARE EDUCATION/TRAINING PROGRAM

## 2018-01-01 PROCEDURE — 36430 TRANSFUSION BLD/BLD COMPNT: CPT

## 2018-01-01 PROCEDURE — 76775 US EXAM ABDO BACK WALL LIM: CPT

## 2018-01-01 PROCEDURE — 93005 ELECTROCARDIOGRAM TRACING: CPT | Performed by: STUDENT IN AN ORGANIZED HEALTH CARE EDUCATION/TRAINING PROGRAM

## 2018-01-01 PROCEDURE — 80053 COMPREHEN METABOLIC PANEL: CPT | Performed by: FAMILY MEDICINE

## 2018-01-01 PROCEDURE — 87205 SMEAR GRAM STAIN: CPT

## 2018-01-01 PROCEDURE — 51798 US URINE CAPACITY MEASURE: CPT

## 2018-01-01 PROCEDURE — G8987 SELF CARE CURRENT STATUS: HCPCS

## 2018-01-01 PROCEDURE — 0DJ08ZZ INSPECTION OF UPPER INTESTINAL TRACT, VIA NATURAL OR ARTIFICIAL OPENING ENDOSCOPIC: ICD-10-PCS | Performed by: INTERNAL MEDICINE

## 2018-01-01 PROCEDURE — 85025 COMPLETE CBC W/AUTO DIFF WBC: CPT | Performed by: INTERNAL MEDICINE

## 2018-01-01 PROCEDURE — 96365 THER/PROPH/DIAG IV INF INIT: CPT | Performed by: EMERGENCY MEDICINE

## 2018-01-01 PROCEDURE — 83735 ASSAY OF MAGNESIUM: CPT | Performed by: STUDENT IN AN ORGANIZED HEALTH CARE EDUCATION/TRAINING PROGRAM

## 2018-01-01 PROCEDURE — 83540 ASSAY OF IRON: CPT

## 2018-01-01 PROCEDURE — 74011250636 HC RX REV CODE- 250/636

## 2018-01-01 PROCEDURE — 77030013794 HC KT TRNSDUC BLD EDWD -B

## 2018-01-01 PROCEDURE — 83615 LACTATE (LD) (LDH) ENZYME: CPT

## 2018-01-01 PROCEDURE — C1880 VENA CAVA FILTER: HCPCS

## 2018-01-01 PROCEDURE — 81003 URINALYSIS AUTO W/O SCOPE: CPT | Performed by: EMERGENCY MEDICINE

## 2018-01-01 PROCEDURE — 92610 EVALUATE SWALLOWING FUNCTION: CPT

## 2018-01-01 PROCEDURE — 0T9B70Z DRAINAGE OF BLADDER WITH DRAINAGE DEVICE, VIA NATURAL OR ARTIFICIAL OPENING: ICD-10-PCS | Performed by: INTERNAL MEDICINE

## 2018-01-01 PROCEDURE — 84295 ASSAY OF SERUM SODIUM: CPT

## 2018-01-01 PROCEDURE — 82248 BILIRUBIN DIRECT: CPT

## 2018-01-01 PROCEDURE — 84443 ASSAY THYROID STIM HORMONE: CPT | Performed by: FAMILY MEDICINE

## 2018-01-01 PROCEDURE — G8979 MOBILITY GOAL STATUS: HCPCS

## 2018-01-01 PROCEDURE — 77030020186 HC BOOT HL PROTCT SAGE -B

## 2018-01-01 PROCEDURE — 87040 BLOOD CULTURE FOR BACTERIA: CPT

## 2018-01-01 PROCEDURE — 74011000250 HC RX REV CODE- 250: Performed by: EMERGENCY MEDICINE

## 2018-01-01 PROCEDURE — 74011250637 HC RX REV CODE- 250/637: Performed by: PHYSICIAN ASSISTANT

## 2018-01-01 PROCEDURE — C9113 INJ PANTOPRAZOLE SODIUM, VIA: HCPCS | Performed by: INTERNAL MEDICINE

## 2018-01-01 PROCEDURE — 85730 THROMBOPLASTIN TIME PARTIAL: CPT

## 2018-01-01 PROCEDURE — A9560 TC99M LABELED RBC: HCPCS

## 2018-01-01 PROCEDURE — 77030013579 HC DRSG WND CA ALG BMS -A

## 2018-01-01 PROCEDURE — 76040000019: Performed by: INTERNAL MEDICINE

## 2018-01-01 PROCEDURE — 85027 COMPLETE CBC AUTOMATED: CPT | Performed by: INTERNAL MEDICINE

## 2018-01-01 PROCEDURE — G8988 SELF CARE GOAL STATUS: HCPCS

## 2018-01-01 PROCEDURE — 85025 COMPLETE CBC W/AUTO DIFF WBC: CPT | Performed by: EMERGENCY MEDICINE

## 2018-01-01 PROCEDURE — 93880 EXTRACRANIAL BILAT STUDY: CPT

## 2018-01-01 PROCEDURE — 74011250636 HC RX REV CODE- 250/636: Performed by: STUDENT IN AN ORGANIZED HEALTH CARE EDUCATION/TRAINING PROGRAM

## 2018-01-01 PROCEDURE — 87804 INFLUENZA ASSAY W/OPTIC: CPT | Performed by: STUDENT IN AN ORGANIZED HEALTH CARE EDUCATION/TRAINING PROGRAM

## 2018-01-01 PROCEDURE — 96372 THER/PROPH/DIAG INJ SC/IM: CPT

## 2018-01-01 PROCEDURE — 76010000138 HC OR TIME 0.5 TO 1 HR: Performed by: SURGERY

## 2018-01-01 PROCEDURE — 77030021668 HC NEB PREFIL KT VYRM -A

## 2018-01-01 PROCEDURE — C8929 TTE W OR WO FOL WCON,DOPPLER: HCPCS

## 2018-01-01 PROCEDURE — 77030020263 HC SOL INJ SOD CL0.9% LFCR 1000ML

## 2018-01-01 PROCEDURE — 84300 ASSAY OF URINE SODIUM: CPT | Performed by: INTERNAL MEDICINE

## 2018-01-01 PROCEDURE — C1887 CATHETER, GUIDING: HCPCS

## 2018-01-01 PROCEDURE — 74011000302 HC RX REV CODE- 302: Performed by: FAMILY MEDICINE

## 2018-01-01 PROCEDURE — P9016 RBC LEUKOCYTES REDUCED: HCPCS

## 2018-01-01 PROCEDURE — 82607 VITAMIN B-12: CPT | Performed by: INTERNAL MEDICINE

## 2018-01-01 PROCEDURE — 77030029488 HC ELECTRD PAD DEFIB ZOLL -B

## 2018-01-01 PROCEDURE — 99285 EMERGENCY DEPT VISIT HI MDM: CPT | Performed by: STUDENT IN AN ORGANIZED HEALTH CARE EDUCATION/TRAINING PROGRAM

## 2018-01-01 PROCEDURE — 77030011254 HC DRSG HYDRGEL S&N -A

## 2018-01-01 PROCEDURE — 96374 THER/PROPH/DIAG INJ IV PUSH: CPT | Performed by: STUDENT IN AN ORGANIZED HEALTH CARE EDUCATION/TRAINING PROGRAM

## 2018-01-01 PROCEDURE — 76040000025: Performed by: INTERNAL MEDICINE

## 2018-01-01 PROCEDURE — 97166 OT EVAL MOD COMPLEX 45 MIN: CPT

## 2018-01-01 PROCEDURE — 81003 URINALYSIS AUTO W/O SCOPE: CPT | Performed by: STUDENT IN AN ORGANIZED HEALTH CARE EDUCATION/TRAINING PROGRAM

## 2018-01-01 PROCEDURE — 74011000250 HC RX REV CODE- 250: Performed by: STUDENT IN AN ORGANIZED HEALTH CARE EDUCATION/TRAINING PROGRAM

## 2018-01-01 PROCEDURE — 77030020120 HC VLV RESP PEP HI -B

## 2018-01-01 PROCEDURE — 76060000032 HC ANESTHESIA 0.5 TO 1 HR: Performed by: INTERNAL MEDICINE

## 2018-01-01 PROCEDURE — 77030019605

## 2018-01-01 PROCEDURE — 83880 ASSAY OF NATRIURETIC PEPTIDE: CPT | Performed by: STUDENT IN AN ORGANIZED HEALTH CARE EDUCATION/TRAINING PROGRAM

## 2018-01-01 PROCEDURE — 30233N1 TRANSFUSION OF NONAUTOLOGOUS RED BLOOD CELLS INTO PERIPHERAL VEIN, PERCUTANEOUS APPROACH: ICD-10-PCS | Performed by: INTERNAL MEDICINE

## 2018-01-01 PROCEDURE — 81003 URINALYSIS AUTO W/O SCOPE: CPT | Performed by: INTERNAL MEDICINE

## 2018-01-01 PROCEDURE — 96367 TX/PROPH/DG ADDL SEQ IV INF: CPT | Performed by: EMERGENCY MEDICINE

## 2018-01-01 PROCEDURE — 83605 ASSAY OF LACTIC ACID: CPT | Performed by: INTERNAL MEDICINE

## 2018-01-01 PROCEDURE — 84157 ASSAY OF PROTEIN OTHER: CPT

## 2018-01-01 PROCEDURE — 82728 ASSAY OF FERRITIN: CPT

## 2018-01-01 PROCEDURE — 84443 ASSAY THYROID STIM HORMONE: CPT | Performed by: STUDENT IN AN ORGANIZED HEALTH CARE EDUCATION/TRAINING PROGRAM

## 2018-01-01 PROCEDURE — 84481 FREE ASSAY (FT-3): CPT | Performed by: FAMILY MEDICINE

## 2018-01-01 PROCEDURE — 97162 PT EVAL MOD COMPLEX 30 MIN: CPT

## 2018-01-01 PROCEDURE — 80202 ASSAY OF VANCOMYCIN: CPT

## 2018-01-01 PROCEDURE — 83690 ASSAY OF LIPASE: CPT

## 2018-01-01 PROCEDURE — 93005 ELECTROCARDIOGRAM TRACING: CPT

## 2018-01-01 PROCEDURE — 77030020253 HC SOL INJ D545NS .05 DEX .45 SAL

## 2018-01-01 PROCEDURE — G8978 MOBILITY CURRENT STATUS: HCPCS

## 2018-01-01 PROCEDURE — 0T2BX0Z CHANGE DRAINAGE DEVICE IN BLADDER, EXTERNAL APPROACH: ICD-10-PCS | Performed by: INTERNAL MEDICINE

## 2018-01-01 PROCEDURE — 87102 FUNGUS ISOLATION CULTURE: CPT

## 2018-01-01 PROCEDURE — 36415 COLL VENOUS BLD VENIPUNCTURE: CPT | Performed by: FAMILY MEDICINE

## 2018-01-01 PROCEDURE — 74011250637 HC RX REV CODE- 250/637: Performed by: EMERGENCY MEDICINE

## 2018-01-01 PROCEDURE — 99223 1ST HOSP IP/OBS HIGH 75: CPT | Performed by: INTERNAL MEDICINE

## 2018-01-01 PROCEDURE — 82272 OCCULT BLD FECES 1-3 TESTS: CPT

## 2018-01-01 PROCEDURE — 81003 URINALYSIS AUTO W/O SCOPE: CPT

## 2018-01-01 PROCEDURE — 80074 ACUTE HEPATITIS PANEL: CPT

## 2018-01-01 PROCEDURE — 96361 HYDRATE IV INFUSION ADD-ON: CPT

## 2018-01-01 PROCEDURE — 93005 ELECTROCARDIOGRAM TRACING: CPT | Performed by: INTERNAL MEDICINE

## 2018-01-01 PROCEDURE — G0157 HHC PT ASSISTANT EA 15: HCPCS

## 2018-01-01 PROCEDURE — 83605 ASSAY OF LACTIC ACID: CPT | Performed by: FAMILY MEDICINE

## 2018-01-01 PROCEDURE — 37191 INS ENDOVAS VENA CAVA FILTR: CPT

## 2018-01-01 PROCEDURE — C1751 CATH, INF, PER/CENT/MIDLINE: HCPCS

## 2018-01-01 PROCEDURE — 77030020245 HC SOL INJ 5% D/0.9%NACL

## 2018-01-01 PROCEDURE — 87804 INFLUENZA ASSAY W/OPTIC: CPT

## 2018-01-01 PROCEDURE — 87186 SC STD MICRODIL/AGAR DIL: CPT

## 2018-01-01 PROCEDURE — 86580 TB INTRADERMAL TEST: CPT | Performed by: INTERNAL MEDICINE

## 2018-01-01 PROCEDURE — 94660 CPAP INITIATION&MGMT: CPT

## 2018-01-01 PROCEDURE — C1729 CATH, DRAINAGE: HCPCS | Performed by: INTERNAL MEDICINE

## 2018-01-01 PROCEDURE — 96365 THER/PROPH/DIAG IV INF INIT: CPT

## 2018-01-01 PROCEDURE — 83880 ASSAY OF NATRIURETIC PEPTIDE: CPT | Performed by: INTERNAL MEDICINE

## 2018-01-01 PROCEDURE — 85025 COMPLETE CBC W/AUTO DIFF WBC: CPT | Performed by: FAMILY MEDICINE

## 2018-01-01 PROCEDURE — 96376 TX/PRO/DX INJ SAME DRUG ADON: CPT

## 2018-01-01 PROCEDURE — 85027 COMPLETE CBC AUTOMATED: CPT | Performed by: FAMILY MEDICINE

## 2018-01-01 PROCEDURE — 96375 TX/PRO/DX INJ NEW DRUG ADDON: CPT

## 2018-01-01 PROCEDURE — 88112 CYTOPATH CELL ENHANCE TECH: CPT

## 2018-01-01 PROCEDURE — 85610 PROTHROMBIN TIME: CPT

## 2018-01-01 PROCEDURE — 83690 ASSAY OF LIPASE: CPT | Performed by: EMERGENCY MEDICINE

## 2018-01-01 PROCEDURE — 74011000250 HC RX REV CODE- 250

## 2018-01-01 PROCEDURE — 93971 EXTREMITY STUDY: CPT

## 2018-01-01 PROCEDURE — 76450000000

## 2018-01-01 PROCEDURE — 99223 1ST HOSP IP/OBS HIGH 75: CPT | Performed by: NURSE PRACTITIONER

## 2018-01-01 PROCEDURE — 71250 CT THORAX DX C-: CPT

## 2018-01-01 PROCEDURE — 77030032490 HC SLV COMPR SCD KNE COVD -B

## 2018-01-01 PROCEDURE — 80053 COMPREHEN METABOLIC PANEL: CPT | Performed by: EMERGENCY MEDICINE

## 2018-01-01 PROCEDURE — 84484 ASSAY OF TROPONIN QUANT: CPT | Performed by: EMERGENCY MEDICINE

## 2018-01-01 PROCEDURE — 77030034850

## 2018-01-01 PROCEDURE — 74176 CT ABD & PELVIS W/O CONTRAST: CPT

## 2018-01-01 PROCEDURE — 80048 BASIC METABOLIC PNL TOTAL CA: CPT | Performed by: FAMILY MEDICINE

## 2018-01-01 PROCEDURE — 74011000258 HC RX REV CODE- 258: Performed by: EMERGENCY MEDICINE

## 2018-01-01 PROCEDURE — 32555 ASPIRATE PLEURA W/ IMAGING: CPT | Performed by: INTERNAL MEDICINE

## 2018-01-01 PROCEDURE — 77030011943

## 2018-01-01 PROCEDURE — 85014 HEMATOCRIT: CPT

## 2018-01-01 PROCEDURE — 99222 1ST HOSP IP/OBS MODERATE 55: CPT | Performed by: PSYCHIATRY & NEUROLOGY

## 2018-01-01 PROCEDURE — 87116 MYCOBACTERIA CULTURE: CPT

## 2018-01-01 PROCEDURE — 83036 HEMOGLOBIN GLYCOSYLATED A1C: CPT | Performed by: INTERNAL MEDICINE

## 2018-01-01 PROCEDURE — 77030020246 HC SOL INJ D 10% LFCR 1000ML BG

## 2018-01-01 PROCEDURE — 80307 DRUG TEST PRSMV CHEM ANLYZR: CPT | Performed by: INTERNAL MEDICINE

## 2018-01-01 PROCEDURE — 86923 COMPATIBILITY TEST ELECTRIC: CPT

## 2018-01-01 PROCEDURE — 84439 ASSAY OF FREE THYROXINE: CPT | Performed by: FAMILY MEDICINE

## 2018-01-01 PROCEDURE — 82103 ALPHA-1-ANTITRYPSIN TOTAL: CPT

## 2018-01-01 PROCEDURE — 77030034849

## 2018-01-01 PROCEDURE — 72070 X-RAY EXAM THORAC SPINE 2VWS: CPT

## 2018-01-01 PROCEDURE — 80053 COMPREHEN METABOLIC PANEL: CPT | Performed by: STUDENT IN AN ORGANIZED HEALTH CARE EDUCATION/TRAINING PROGRAM

## 2018-01-01 PROCEDURE — 80307 DRUG TEST PRSMV CHEM ANLYZR: CPT

## 2018-01-01 PROCEDURE — 92526 ORAL FUNCTION THERAPY: CPT

## 2018-01-01 PROCEDURE — 96368 THER/DIAG CONCURRENT INF: CPT | Performed by: EMERGENCY MEDICINE

## 2018-01-01 PROCEDURE — 72125 CT NECK SPINE W/O DYE: CPT

## 2018-01-01 PROCEDURE — 76060000032 HC ANESTHESIA 0.5 TO 1 HR: Performed by: SURGERY

## 2018-01-01 PROCEDURE — 400013 HH SOC

## 2018-01-01 PROCEDURE — 86900 BLOOD TYPING SEROLOGIC ABO: CPT

## 2018-01-01 PROCEDURE — 83735 ASSAY OF MAGNESIUM: CPT | Performed by: FAMILY MEDICINE

## 2018-01-01 PROCEDURE — 95816 EEG AWAKE AND DROWSY: CPT | Performed by: PSYCHIATRY & NEUROLOGY

## 2018-01-01 PROCEDURE — 92523 SPEECH SOUND LANG COMPREHEN: CPT

## 2018-01-01 PROCEDURE — 82945 GLUCOSE OTHER FLUID: CPT

## 2018-01-01 PROCEDURE — 74011636320 HC RX REV CODE- 636/320: Performed by: EMERGENCY MEDICINE

## 2018-01-01 PROCEDURE — 96360 HYDRATION IV INFUSION INIT: CPT | Performed by: STUDENT IN AN ORGANIZED HEALTH CARE EDUCATION/TRAINING PROGRAM

## 2018-01-01 PROCEDURE — 80061 LIPID PANEL: CPT | Performed by: FAMILY MEDICINE

## 2018-01-01 PROCEDURE — 70551 MRI BRAIN STEM W/O DYE: CPT

## 2018-01-01 DEVICE — FILTER CV L48MM DIA30MM SHTH L65CM DIA7FR UNISET JUG FEM: Type: IMPLANTABLE DEVICE | Site: VENA CAVA | Status: FUNCTIONAL

## 2018-01-01 RX ORDER — HYDROCODONE BITARTRATE AND HOMATROPINE METHYLBROMIDE 1.5; 5 MG/5ML; MG/5ML
5 SYRUP ORAL
Status: DISCONTINUED | OUTPATIENT
Start: 2018-01-01 | End: 2018-01-01 | Stop reason: HOSPADM

## 2018-01-01 RX ORDER — LANSOPRAZOLE 30 MG/1
30 TABLET, ORALLY DISINTEGRATING, DELAYED RELEASE ORAL DAILY
Status: DISCONTINUED | OUTPATIENT
Start: 2018-01-01 | End: 2018-01-01 | Stop reason: HOSPADM

## 2018-01-01 RX ORDER — MIDAZOLAM HYDROCHLORIDE 1 MG/ML
INJECTION, SOLUTION INTRAMUSCULAR; INTRAVENOUS AS NEEDED
Status: DISCONTINUED | OUTPATIENT
Start: 2018-01-01 | End: 2018-01-01 | Stop reason: HOSPADM

## 2018-01-01 RX ORDER — SERTRALINE HYDROCHLORIDE 50 MG/1
50 TABLET, FILM COATED ORAL EVERY EVENING
Status: DISCONTINUED | OUTPATIENT
Start: 2018-01-01 | End: 2018-01-01 | Stop reason: HOSPADM

## 2018-01-01 RX ORDER — PANTOPRAZOLE SODIUM 40 MG/1
40 TABLET, DELAYED RELEASE ORAL
Status: DISCONTINUED | OUTPATIENT
Start: 2018-01-01 | End: 2018-01-01

## 2018-01-01 RX ORDER — FLUTICASONE FUROATE AND VILANTEROL 100; 25 UG/1; UG/1
1 POWDER RESPIRATORY (INHALATION) DAILY
COMMUNITY
End: 2019-01-01

## 2018-01-01 RX ORDER — NYSTATIN 100000 [USP'U]/G
POWDER TOPICAL 2 TIMES DAILY
Status: DISCONTINUED | OUTPATIENT
Start: 2018-01-01 | End: 2018-01-01 | Stop reason: HOSPADM

## 2018-01-01 RX ORDER — ACETAMINOPHEN 325 MG/1
650 TABLET ORAL
Status: DISCONTINUED | OUTPATIENT
Start: 2018-01-01 | End: 2018-01-01 | Stop reason: HOSPADM

## 2018-01-01 RX ORDER — LANOLIN ALCOHOL/MO/W.PET/CERES
400 CREAM (GRAM) TOPICAL DAILY
Status: DISCONTINUED | OUTPATIENT
Start: 2018-01-01 | End: 2018-01-01

## 2018-01-01 RX ORDER — DEXTROSE 50 % IN WATER (D50W) INTRAVENOUS SYRINGE
Status: COMPLETED
Start: 2018-01-01 | End: 2018-01-01

## 2018-01-01 RX ORDER — OXYCODONE HYDROCHLORIDE 5 MG/1
5 TABLET ORAL
Qty: 10 TAB | Refills: 0 | Status: SHIPPED | OUTPATIENT
Start: 2018-01-01 | End: 2019-01-01

## 2018-01-01 RX ORDER — OSELTAMIVIR PHOSPHATE 30 MG/1
30 CAPSULE ORAL 2 TIMES DAILY
Status: DISCONTINUED | OUTPATIENT
Start: 2018-01-01 | End: 2018-01-01 | Stop reason: HOSPADM

## 2018-01-01 RX ORDER — PANTOPRAZOLE SODIUM 40 MG/1
40 TABLET, DELAYED RELEASE ORAL 2 TIMES DAILY
Status: DISCONTINUED | OUTPATIENT
Start: 2018-01-01 | End: 2018-01-01 | Stop reason: HOSPADM

## 2018-01-01 RX ORDER — HYDROCODONE BITARTRATE AND ACETAMINOPHEN 10; 325 MG/1; MG/1
1 TABLET ORAL
Status: DISCONTINUED | OUTPATIENT
Start: 2018-01-01 | End: 2018-01-01

## 2018-01-01 RX ORDER — LANOLIN ALCOHOL/MO/W.PET/CERES
400 CREAM (GRAM) TOPICAL DAILY
Status: DISCONTINUED | OUTPATIENT
Start: 2018-01-01 | End: 2018-01-01 | Stop reason: HOSPADM

## 2018-01-01 RX ORDER — HEPARIN SODIUM 5000 [USP'U]/ML
5000 INJECTION, SOLUTION INTRAVENOUS; SUBCUTANEOUS EVERY 8 HOURS
Status: DISCONTINUED | OUTPATIENT
Start: 2018-01-01 | End: 2018-01-01

## 2018-01-01 RX ORDER — LEVOTHYROXINE SODIUM 50 UG/1
50 TABLET ORAL
Status: DISCONTINUED | OUTPATIENT
Start: 2018-01-01 | End: 2018-01-01 | Stop reason: HOSPADM

## 2018-01-01 RX ORDER — IODIXANOL 320 MG/ML
INJECTION, SOLUTION INTRAVASCULAR AS NEEDED
Status: DISCONTINUED | OUTPATIENT
Start: 2018-01-01 | End: 2018-01-01 | Stop reason: HOSPADM

## 2018-01-01 RX ORDER — SODIUM CHLORIDE 0.9 % (FLUSH) 0.9 %
5-10 SYRINGE (ML) INJECTION AS NEEDED
Status: DISCONTINUED | OUTPATIENT
Start: 2018-01-01 | End: 2018-01-01 | Stop reason: HOSPADM

## 2018-01-01 RX ORDER — FAMOTIDINE 20 MG/1
20 TABLET, FILM COATED ORAL DAILY
Status: DISCONTINUED | OUTPATIENT
Start: 2018-01-01 | End: 2018-01-01 | Stop reason: HOSPADM

## 2018-01-01 RX ORDER — LIDOCAINE HYDROCHLORIDE 20 MG/ML
INJECTION, SOLUTION EPIDURAL; INFILTRATION; INTRACAUDAL; PERINEURAL AS NEEDED
Status: DISCONTINUED | OUTPATIENT
Start: 2018-01-01 | End: 2018-01-01 | Stop reason: HOSPADM

## 2018-01-01 RX ORDER — INSULIN LISPRO 100 [IU]/ML
INJECTION, SOLUTION INTRAVENOUS; SUBCUTANEOUS
Status: DISCONTINUED | OUTPATIENT
Start: 2018-01-01 | End: 2018-01-01 | Stop reason: HOSPADM

## 2018-01-01 RX ORDER — DIPHENHYDRAMINE HYDROCHLORIDE 50 MG/ML
12.5 INJECTION, SOLUTION INTRAMUSCULAR; INTRAVENOUS
Status: DISCONTINUED | OUTPATIENT
Start: 2018-01-01 | End: 2018-01-01 | Stop reason: HOSPADM

## 2018-01-01 RX ORDER — FLUMAZENIL 0.1 MG/ML
0.2 INJECTION INTRAVENOUS AS NEEDED
Status: DISCONTINUED | OUTPATIENT
Start: 2018-01-01 | End: 2018-01-01 | Stop reason: HOSPADM

## 2018-01-01 RX ORDER — PRAVASTATIN SODIUM 20 MG/1
40 TABLET ORAL
Status: DISCONTINUED | OUTPATIENT
Start: 2018-01-01 | End: 2018-01-01 | Stop reason: HOSPADM

## 2018-01-01 RX ORDER — BISACODYL 5 MG
5 TABLET, DELAYED RELEASE (ENTERIC COATED) ORAL DAILY
Status: DISCONTINUED | OUTPATIENT
Start: 2018-01-01 | End: 2018-01-01 | Stop reason: HOSPADM

## 2018-01-01 RX ORDER — OSELTAMIVIR PHOSPHATE 75 MG/1
75 CAPSULE ORAL ONCE
Status: COMPLETED | OUTPATIENT
Start: 2018-01-01 | End: 2018-01-01

## 2018-01-01 RX ORDER — SERTRALINE HYDROCHLORIDE 50 MG/1
50 TABLET, FILM COATED ORAL
Status: DISCONTINUED | OUTPATIENT
Start: 2018-01-01 | End: 2018-01-01 | Stop reason: HOSPADM

## 2018-01-01 RX ORDER — ALLOPURINOL 300 MG/1
300 TABLET ORAL DAILY
Status: DISCONTINUED | OUTPATIENT
Start: 2018-01-01 | End: 2018-01-01 | Stop reason: HOSPADM

## 2018-01-01 RX ORDER — FUROSEMIDE 10 MG/ML
40 INJECTION INTRAMUSCULAR; INTRAVENOUS
Status: COMPLETED | OUTPATIENT
Start: 2018-01-01 | End: 2018-01-01

## 2018-01-01 RX ORDER — DOPAMINE HYDROCHLORIDE 320 MG/100ML
2-20 INJECTION, SOLUTION INTRAVENOUS
Status: DISCONTINUED | OUTPATIENT
Start: 2018-01-01 | End: 2018-01-01

## 2018-01-01 RX ORDER — OXYCODONE HYDROCHLORIDE 5 MG/1
10 TABLET ORAL
Status: DISCONTINUED | OUTPATIENT
Start: 2018-01-01 | End: 2018-01-01 | Stop reason: HOSPADM

## 2018-01-01 RX ORDER — ZOLPIDEM TARTRATE 5 MG/1
5 TABLET ORAL
Status: DISCONTINUED | OUTPATIENT
Start: 2018-01-01 | End: 2018-01-01 | Stop reason: HOSPADM

## 2018-01-01 RX ORDER — SODIUM CHLORIDE 0.9 % (FLUSH) 0.9 %
5-10 SYRINGE (ML) INJECTION EVERY 8 HOURS
Status: DISCONTINUED | OUTPATIENT
Start: 2018-01-01 | End: 2019-01-01 | Stop reason: HOSPADM

## 2018-01-01 RX ORDER — DOBUTAMINE HYDROCHLORIDE 200 MG/100ML
0-10 INJECTION INTRAVENOUS
Status: DISCONTINUED | OUTPATIENT
Start: 2018-01-01 | End: 2019-01-01

## 2018-01-01 RX ORDER — GABAPENTIN 100 MG/1
100 CAPSULE ORAL 3 TIMES DAILY
Status: DISCONTINUED | OUTPATIENT
Start: 2018-01-01 | End: 2018-01-01

## 2018-01-01 RX ORDER — BISACODYL 5 MG
5 TABLET, DELAYED RELEASE (ENTERIC COATED) ORAL DAILY
Qty: 15 TAB | Refills: 0 | Status: SHIPPED | OUTPATIENT
Start: 2018-01-01

## 2018-01-01 RX ORDER — SODIUM CHLORIDE, SODIUM LACTATE, POTASSIUM CHLORIDE, CALCIUM CHLORIDE 600; 310; 30; 20 MG/100ML; MG/100ML; MG/100ML; MG/100ML
100 INJECTION, SOLUTION INTRAVENOUS CONTINUOUS
Status: DISCONTINUED | OUTPATIENT
Start: 2018-01-01 | End: 2018-01-01 | Stop reason: HOSPADM

## 2018-01-01 RX ORDER — FUROSEMIDE 40 MG/1
40 TABLET ORAL 2 TIMES DAILY
Status: DISCONTINUED | OUTPATIENT
Start: 2018-01-01 | End: 2018-01-01 | Stop reason: HOSPADM

## 2018-01-01 RX ORDER — DEXTROSE MONOHYDRATE AND SODIUM CHLORIDE 5; .9 G/100ML; G/100ML
100 INJECTION, SOLUTION INTRAVENOUS CONTINUOUS
Status: DISPENSED | OUTPATIENT
Start: 2018-01-01 | End: 2018-01-01

## 2018-01-01 RX ORDER — ONDANSETRON 4 MG/1
4 TABLET, ORALLY DISINTEGRATING ORAL
Qty: 20 TAB | Refills: 0 | Status: SHIPPED | OUTPATIENT
Start: 2018-01-01 | End: 2018-01-01

## 2018-01-01 RX ORDER — ALBUTEROL SULFATE 0.83 MG/ML
2.5 SOLUTION RESPIRATORY (INHALATION)
Status: DISCONTINUED | OUTPATIENT
Start: 2018-01-01 | End: 2018-01-01 | Stop reason: HOSPADM

## 2018-01-01 RX ORDER — ONDANSETRON 2 MG/ML
4 INJECTION INTRAMUSCULAR; INTRAVENOUS
Status: DISCONTINUED | OUTPATIENT
Start: 2018-01-01 | End: 2018-01-01 | Stop reason: HOSPADM

## 2018-01-01 RX ORDER — SODIUM CHLORIDE 0.9 % (FLUSH) 0.9 %
5 SYRINGE (ML) INJECTION EVERY 8 HOURS
Status: DISCONTINUED | OUTPATIENT
Start: 2018-01-01 | End: 2018-01-01 | Stop reason: HOSPADM

## 2018-01-01 RX ORDER — ISOSORBIDE MONONITRATE 30 MG/1
30 TABLET, EXTENDED RELEASE ORAL DAILY
Status: DISCONTINUED | OUTPATIENT
Start: 2018-01-01 | End: 2018-01-01 | Stop reason: HOSPADM

## 2018-01-01 RX ORDER — HALOPERIDOL 5 MG/ML
2 INJECTION INTRAMUSCULAR
Status: DISCONTINUED | OUTPATIENT
Start: 2018-01-01 | End: 2018-01-01 | Stop reason: HOSPADM

## 2018-01-01 RX ORDER — HALOPERIDOL 5 MG/1
5 TABLET ORAL
Status: DISCONTINUED | OUTPATIENT
Start: 2018-01-01 | End: 2018-01-01 | Stop reason: HOSPADM

## 2018-01-01 RX ORDER — IPRATROPIUM BROMIDE AND ALBUTEROL SULFATE 2.5; .5 MG/3ML; MG/3ML
3 SOLUTION RESPIRATORY (INHALATION)
Status: DISCONTINUED | OUTPATIENT
Start: 2018-01-01 | End: 2018-01-01 | Stop reason: HOSPADM

## 2018-01-01 RX ORDER — CLOPIDOGREL BISULFATE 75 MG/1
75 TABLET ORAL DAILY
Qty: 30 TAB | Refills: 1 | Status: SHIPPED | OUTPATIENT
Start: 2018-01-01 | End: 2018-01-01

## 2018-01-01 RX ORDER — ENOXAPARIN SODIUM 100 MG/ML
30 INJECTION SUBCUTANEOUS EVERY 24 HOURS
Status: DISCONTINUED | OUTPATIENT
Start: 2018-01-01 | End: 2018-01-01

## 2018-01-01 RX ORDER — VANCOMYCIN/0.9 % SOD CHLORIDE 1.5G/250ML
1500 PLASTIC BAG, INJECTION (ML) INTRAVENOUS ONCE
Status: COMPLETED | OUTPATIENT
Start: 2018-01-01 | End: 2018-01-01

## 2018-01-01 RX ORDER — ONDANSETRON 2 MG/ML
4 INJECTION INTRAMUSCULAR; INTRAVENOUS
Status: DISCONTINUED | OUTPATIENT
Start: 2018-01-01 | End: 2019-01-01 | Stop reason: HOSPADM

## 2018-01-01 RX ORDER — BUDESONIDE 0.5 MG/2ML
500 INHALANT ORAL
Status: DISCONTINUED | OUTPATIENT
Start: 2018-01-01 | End: 2018-01-01 | Stop reason: HOSPADM

## 2018-01-01 RX ORDER — LEVOTHYROXINE SODIUM 50 UG/1
50 TABLET ORAL
Status: DISCONTINUED | OUTPATIENT
Start: 2018-01-01 | End: 2019-01-01 | Stop reason: HOSPADM

## 2018-01-01 RX ORDER — BISACODYL 5 MG
5 TABLET, DELAYED RELEASE (ENTERIC COATED) ORAL DAILY PRN
Status: DISCONTINUED | OUTPATIENT
Start: 2018-01-01 | End: 2018-01-01 | Stop reason: HOSPADM

## 2018-01-01 RX ORDER — CEFAZOLIN SODIUM/WATER 2 G/20 ML
2 SYRINGE (ML) INTRAVENOUS ONCE
Status: COMPLETED | OUTPATIENT
Start: 2018-01-01 | End: 2018-01-01

## 2018-01-01 RX ORDER — PANTOPRAZOLE SODIUM 40 MG/1
40 TABLET, DELAYED RELEASE ORAL
Status: DISCONTINUED | OUTPATIENT
Start: 2018-01-01 | End: 2018-01-01 | Stop reason: HOSPADM

## 2018-01-01 RX ORDER — SODIUM CHLORIDE, SODIUM LACTATE, POTASSIUM CHLORIDE, CALCIUM CHLORIDE 600; 310; 30; 20 MG/100ML; MG/100ML; MG/100ML; MG/100ML
INJECTION, SOLUTION INTRAVENOUS
Status: DISCONTINUED | OUTPATIENT
Start: 2018-01-01 | End: 2018-01-01 | Stop reason: HOSPADM

## 2018-01-01 RX ORDER — HYDROCODONE BITARTRATE AND ACETAMINOPHEN 5; 325 MG/1; MG/1
1 TABLET ORAL
Status: DISCONTINUED | OUTPATIENT
Start: 2018-01-01 | End: 2018-01-01 | Stop reason: HOSPADM

## 2018-01-01 RX ORDER — PROPOFOL 10 MG/ML
INJECTION, EMULSION INTRAVENOUS AS NEEDED
Status: DISCONTINUED | OUTPATIENT
Start: 2018-01-01 | End: 2018-01-01 | Stop reason: HOSPADM

## 2018-01-01 RX ORDER — DEXTROSE 40 %
15 GEL (GRAM) ORAL AS NEEDED
Status: DISCONTINUED | OUTPATIENT
Start: 2018-01-01 | End: 2018-01-01 | Stop reason: HOSPADM

## 2018-01-01 RX ORDER — OSELTAMIVIR PHOSPHATE 30 MG/1
30 CAPSULE ORAL 2 TIMES DAILY
Qty: 8 CAP | Refills: 0 | Status: SHIPPED | OUTPATIENT
Start: 2018-01-01 | End: 2018-01-01

## 2018-01-01 RX ORDER — LORAZEPAM 2 MG/ML
0.5 INJECTION INTRAMUSCULAR
Status: COMPLETED | OUTPATIENT
Start: 2018-01-01 | End: 2018-01-01

## 2018-01-01 RX ORDER — OXYCODONE HYDROCHLORIDE 5 MG/1
5 TABLET ORAL
Status: DISCONTINUED | OUTPATIENT
Start: 2018-01-01 | End: 2018-01-01 | Stop reason: HOSPADM

## 2018-01-01 RX ORDER — FUROSEMIDE 10 MG/ML
40 INJECTION INTRAMUSCULAR; INTRAVENOUS ONCE
Status: COMPLETED | OUTPATIENT
Start: 2018-01-01 | End: 2018-01-01

## 2018-01-01 RX ORDER — BUDESONIDE 0.5 MG/2ML
500 INHALANT ORAL
Status: DISCONTINUED | OUTPATIENT
Start: 2018-01-01 | End: 2019-01-01 | Stop reason: HOSPADM

## 2018-01-01 RX ORDER — INSULIN GLARGINE 100 [IU]/ML
10 INJECTION, SOLUTION SUBCUTANEOUS
Status: DISCONTINUED | OUTPATIENT
Start: 2018-01-01 | End: 2018-01-01 | Stop reason: HOSPADM

## 2018-01-01 RX ORDER — SODIUM CHLORIDE, SODIUM LACTATE, POTASSIUM CHLORIDE, CALCIUM CHLORIDE 600; 310; 30; 20 MG/100ML; MG/100ML; MG/100ML; MG/100ML
100 INJECTION, SOLUTION INTRAVENOUS CONTINUOUS
Status: CANCELLED | OUTPATIENT
Start: 2018-01-01

## 2018-01-01 RX ORDER — FLUTICASONE PROPIONATE 50 MCG
2 SPRAY, SUSPENSION (ML) NASAL DAILY
Status: DISCONTINUED | OUTPATIENT
Start: 2018-01-01 | End: 2018-01-01 | Stop reason: HOSPADM

## 2018-01-01 RX ORDER — ASPIRIN 325 MG
325 TABLET ORAL ONCE
Status: COMPLETED | OUTPATIENT
Start: 2018-01-01 | End: 2018-01-01

## 2018-01-01 RX ORDER — NALOXONE HYDROCHLORIDE 0.4 MG/ML
0.1 INJECTION, SOLUTION INTRAMUSCULAR; INTRAVENOUS; SUBCUTANEOUS
Status: DISCONTINUED | OUTPATIENT
Start: 2018-01-01 | End: 2018-01-01 | Stop reason: HOSPADM

## 2018-01-01 RX ORDER — HEPARIN SODIUM 200 [USP'U]/100ML
INJECTION, SOLUTION INTRAVENOUS
Status: DISCONTINUED | OUTPATIENT
Start: 2018-01-01 | End: 2018-01-01 | Stop reason: HOSPADM

## 2018-01-01 RX ORDER — INSULIN LISPRO 100 [IU]/ML
0-10 INJECTION, SOLUTION INTRAVENOUS; SUBCUTANEOUS
Status: DISCONTINUED | OUTPATIENT
Start: 2018-01-01 | End: 2018-01-01

## 2018-01-01 RX ORDER — TRAMADOL HYDROCHLORIDE 50 MG/1
50 TABLET ORAL
Status: DISCONTINUED | OUTPATIENT
Start: 2018-01-01 | End: 2018-01-01 | Stop reason: HOSPADM

## 2018-01-01 RX ORDER — GUAIFENESIN 100 MG/5ML
81 LIQUID (ML) ORAL DAILY
Status: DISCONTINUED | OUTPATIENT
Start: 2018-01-01 | End: 2018-01-01 | Stop reason: HOSPADM

## 2018-01-01 RX ORDER — CALCIUM CARBONATE 500(1250)
500 TABLET ORAL
Status: DISCONTINUED | OUTPATIENT
Start: 2018-01-01 | End: 2018-01-01 | Stop reason: HOSPADM

## 2018-01-01 RX ORDER — OXYCODONE HYDROCHLORIDE 5 MG/1
5 TABLET ORAL
COMMUNITY
End: 2018-01-01

## 2018-01-01 RX ORDER — DEXTROSE 50 % IN WATER (D50W) INTRAVENOUS SYRINGE
25-50 AS NEEDED
Status: DISCONTINUED | OUTPATIENT
Start: 2018-01-01 | End: 2018-01-01 | Stop reason: HOSPADM

## 2018-01-01 RX ORDER — LIDOCAINE HYDROCHLORIDE 10 MG/ML
0.1 INJECTION INFILTRATION; PERINEURAL AS NEEDED
Status: DISCONTINUED | OUTPATIENT
Start: 2018-01-01 | End: 2018-01-01 | Stop reason: HOSPADM

## 2018-01-01 RX ORDER — AMOXICILLIN AND CLAVULANATE POTASSIUM 875; 125 MG/1; MG/1
1 TABLET, FILM COATED ORAL EVERY 12 HOURS
Status: DISCONTINUED | OUTPATIENT
Start: 2018-01-01 | End: 2018-01-01 | Stop reason: DRUGHIGH

## 2018-01-01 RX ORDER — LEVOFLOXACIN 250 MG/1
750 TABLET ORAL
Status: DISCONTINUED | OUTPATIENT
Start: 2018-01-01 | End: 2018-01-01

## 2018-01-01 RX ORDER — POTASSIUM CHLORIDE 20 MEQ/1
20 TABLET, EXTENDED RELEASE ORAL DAILY
Status: DISCONTINUED | OUTPATIENT
Start: 2018-01-01 | End: 2018-01-01 | Stop reason: HOSPADM

## 2018-01-01 RX ORDER — SODIUM CHLORIDE 9 MG/ML
25 INJECTION, SOLUTION INTRAVENOUS CONTINUOUS
Status: DISCONTINUED | OUTPATIENT
Start: 2018-01-01 | End: 2018-01-01

## 2018-01-01 RX ORDER — PRAVASTATIN SODIUM 20 MG/1
40 TABLET ORAL
Status: DISCONTINUED | OUTPATIENT
Start: 2018-01-01 | End: 2019-01-01 | Stop reason: HOSPADM

## 2018-01-01 RX ORDER — LEVOTHYROXINE SODIUM 75 UG/1
75 TABLET ORAL
Status: DISCONTINUED | OUTPATIENT
Start: 2018-01-01 | End: 2018-01-01 | Stop reason: HOSPADM

## 2018-01-01 RX ORDER — SERTRALINE HYDROCHLORIDE 50 MG/1
50 TABLET, FILM COATED ORAL DAILY
Status: DISCONTINUED | OUTPATIENT
Start: 2018-01-01 | End: 2019-01-01 | Stop reason: HOSPADM

## 2018-01-01 RX ORDER — SODIUM CHLORIDE 0.9 % (FLUSH) 0.9 %
5-10 SYRINGE (ML) INJECTION AS NEEDED
Status: DISCONTINUED | OUTPATIENT
Start: 2018-01-01 | End: 2019-01-01 | Stop reason: HOSPADM

## 2018-01-01 RX ORDER — ACETAMINOPHEN 325 MG/1
650 TABLET ORAL
Status: DISCONTINUED | OUTPATIENT
Start: 2018-01-01 | End: 2019-01-01 | Stop reason: HOSPADM

## 2018-01-01 RX ORDER — PREDNISONE 20 MG/1
60 TABLET ORAL
Status: DISCONTINUED | OUTPATIENT
Start: 2018-01-01 | End: 2018-01-01

## 2018-01-01 RX ORDER — METOPROLOL SUCCINATE 50 MG/1
25 TABLET, EXTENDED RELEASE ORAL DAILY
Status: DISCONTINUED | OUTPATIENT
Start: 2018-01-01 | End: 2018-01-01

## 2018-01-01 RX ORDER — CEPHALEXIN 500 MG/1
500 CAPSULE ORAL EVERY 8 HOURS
Status: DISCONTINUED | OUTPATIENT
Start: 2018-01-01 | End: 2018-01-01 | Stop reason: HOSPADM

## 2018-01-01 RX ORDER — LATANOPROST 50 UG/ML
1 SOLUTION/ DROPS OPHTHALMIC
Status: DISCONTINUED | OUTPATIENT
Start: 2018-01-01 | End: 2018-01-01 | Stop reason: HOSPADM

## 2018-01-01 RX ORDER — MELATONIN
1000 DAILY
Status: DISCONTINUED | OUTPATIENT
Start: 2018-01-01 | End: 2019-01-01 | Stop reason: HOSPADM

## 2018-01-01 RX ORDER — NOREPINEPHRINE BIT/0.9 % NACL 4MG/250ML
2-16 PLASTIC BAG, INJECTION (ML) INTRAVENOUS
Status: DISCONTINUED | OUTPATIENT
Start: 2018-01-01 | End: 2018-01-01

## 2018-01-01 RX ORDER — CEPHALEXIN 500 MG/1
500 CAPSULE ORAL 2 TIMES DAILY
Qty: 10 CAP | Refills: 0 | Status: ON HOLD | OUTPATIENT
Start: 2018-01-01 | End: 2018-01-01

## 2018-01-01 RX ORDER — FUROSEMIDE 10 MG/ML
80 INJECTION INTRAMUSCULAR; INTRAVENOUS DAILY
Status: DISCONTINUED | OUTPATIENT
Start: 2018-01-01 | End: 2018-01-01 | Stop reason: HOSPADM

## 2018-01-01 RX ORDER — DOCUSATE SODIUM 100 MG/1
100 CAPSULE, LIQUID FILLED ORAL DAILY
Status: DISCONTINUED | OUTPATIENT
Start: 2018-01-01 | End: 2018-01-01 | Stop reason: HOSPADM

## 2018-01-01 RX ORDER — SERTRALINE HYDROCHLORIDE 50 MG/1
50 TABLET, FILM COATED ORAL DAILY
Status: DISCONTINUED | OUTPATIENT
Start: 2018-01-01 | End: 2018-01-01 | Stop reason: HOSPADM

## 2018-01-01 RX ORDER — FAMOTIDINE 20 MG/1
20 TABLET, FILM COATED ORAL DAILY
Qty: 30 TAB | Refills: 0 | Status: SHIPPED | OUTPATIENT
Start: 2018-01-01 | End: 2018-01-01

## 2018-01-01 RX ORDER — CLOPIDOGREL BISULFATE 75 MG/1
75 TABLET ORAL DAILY
Status: DISCONTINUED | OUTPATIENT
Start: 2018-01-01 | End: 2018-01-01 | Stop reason: HOSPADM

## 2018-01-01 RX ORDER — ONDANSETRON 4 MG/1
4 TABLET, ORALLY DISINTEGRATING ORAL
Status: DISCONTINUED | OUTPATIENT
Start: 2018-01-01 | End: 2018-01-01 | Stop reason: HOSPADM

## 2018-01-01 RX ORDER — INSULIN LISPRO 100 [IU]/ML
3 INJECTION, SOLUTION INTRAVENOUS; SUBCUTANEOUS
Status: DISCONTINUED | OUTPATIENT
Start: 2018-01-01 | End: 2018-01-01 | Stop reason: HOSPADM

## 2018-01-01 RX ORDER — IPRATROPIUM BROMIDE AND ALBUTEROL SULFATE 2.5; .5 MG/3ML; MG/3ML
3 SOLUTION RESPIRATORY (INHALATION)
Status: COMPLETED | OUTPATIENT
Start: 2018-01-01 | End: 2018-01-01

## 2018-01-01 RX ORDER — PROPOFOL 10 MG/ML
INJECTION, EMULSION INTRAVENOUS
Status: DISCONTINUED | OUTPATIENT
Start: 2018-01-01 | End: 2018-01-01 | Stop reason: HOSPADM

## 2018-01-01 RX ORDER — PETROLATUM 42 G/100G
OINTMENT TOPICAL DAILY
Status: DISCONTINUED | OUTPATIENT
Start: 2018-01-01 | End: 2018-01-01 | Stop reason: HOSPADM

## 2018-01-01 RX ORDER — GLIPIZIDE 5 MG/1
5 TABLET ORAL 2 TIMES DAILY WITH MEALS
Status: DISCONTINUED | OUTPATIENT
Start: 2018-01-01 | End: 2018-01-01

## 2018-01-01 RX ORDER — OXYCODONE HYDROCHLORIDE 5 MG/1
15 TABLET ORAL
Status: DISCONTINUED | OUTPATIENT
Start: 2018-01-01 | End: 2018-01-01 | Stop reason: HOSPADM

## 2018-01-01 RX ORDER — INSULIN LISPRO 100 [IU]/ML
8 INJECTION, SOLUTION INTRAVENOUS; SUBCUTANEOUS
Status: DISCONTINUED | OUTPATIENT
Start: 2018-01-01 | End: 2018-01-01 | Stop reason: HOSPADM

## 2018-01-01 RX ORDER — SODIUM CHLORIDE 0.9 % (FLUSH) 0.9 %
5-10 SYRINGE (ML) INJECTION EVERY 8 HOURS
Status: DISCONTINUED | OUTPATIENT
Start: 2018-01-01 | End: 2018-01-01 | Stop reason: HOSPADM

## 2018-01-01 RX ORDER — LANOLIN ALCOHOL/MO/W.PET/CERES
1 CREAM (GRAM) TOPICAL
Status: DISCONTINUED | OUTPATIENT
Start: 2018-01-01 | End: 2019-01-01 | Stop reason: HOSPADM

## 2018-01-01 RX ORDER — ASPIRIN 325 MG
325 TABLET ORAL DAILY
Status: DISCONTINUED | OUTPATIENT
Start: 2018-01-01 | End: 2018-01-01

## 2018-01-01 RX ORDER — ACETAMINOPHEN 325 MG/1
650 TABLET ORAL
Status: COMPLETED | OUTPATIENT
Start: 2018-01-01 | End: 2018-01-01

## 2018-01-01 RX ORDER — SODIUM CHLORIDE 9 MG/ML
1 INJECTION, SOLUTION INTRAVENOUS AS NEEDED
Status: DISCONTINUED | OUTPATIENT
Start: 2018-01-01 | End: 2018-01-01 | Stop reason: HOSPADM

## 2018-01-01 RX ORDER — OXYCODONE HYDROCHLORIDE 5 MG/1
15 TABLET ORAL
Status: DISCONTINUED | OUTPATIENT
Start: 2018-01-01 | End: 2018-01-01

## 2018-01-01 RX ORDER — FLUTICASONE PROPIONATE 50 MCG
2 SPRAY, SUSPENSION (ML) NASAL DAILY
Status: DISCONTINUED | OUTPATIENT
Start: 2018-01-01 | End: 2019-01-01 | Stop reason: HOSPADM

## 2018-01-01 RX ORDER — ACETAMINOPHEN 325 MG/1
650 TABLET ORAL
Qty: 20 TAB | Refills: 0 | Status: SHIPPED | OUTPATIENT
Start: 2018-01-01 | End: 2018-01-01

## 2018-01-01 RX ORDER — MORPHINE SULFATE 2 MG/ML
4 INJECTION, SOLUTION INTRAMUSCULAR; INTRAVENOUS
Status: COMPLETED | OUTPATIENT
Start: 2018-01-01 | End: 2018-01-01

## 2018-01-01 RX ORDER — SODIUM CHLORIDE 9 MG/ML
100 INJECTION, SOLUTION INTRAVENOUS ONCE
Status: DISCONTINUED | OUTPATIENT
Start: 2018-01-01 | End: 2018-01-01

## 2018-01-01 RX ORDER — SODIUM CHLORIDE, SODIUM LACTATE, POTASSIUM CHLORIDE, CALCIUM CHLORIDE 600; 310; 30; 20 MG/100ML; MG/100ML; MG/100ML; MG/100ML
75 INJECTION, SOLUTION INTRAVENOUS CONTINUOUS
Status: DISCONTINUED | OUTPATIENT
Start: 2018-01-01 | End: 2018-01-01 | Stop reason: HOSPADM

## 2018-01-01 RX ORDER — GUAIFENESIN 100 MG/5ML
81 LIQUID (ML) ORAL DAILY
Status: DISCONTINUED | OUTPATIENT
Start: 2018-01-01 | End: 2019-01-01 | Stop reason: HOSPADM

## 2018-01-01 RX ORDER — METOPROLOL SUCCINATE 25 MG/1
12.5 TABLET, EXTENDED RELEASE ORAL DAILY
Status: DISCONTINUED | OUTPATIENT
Start: 2018-01-01 | End: 2018-01-01 | Stop reason: HOSPADM

## 2018-01-01 RX ORDER — ALBUTEROL SULFATE 90 UG/1
2 AEROSOL, METERED RESPIRATORY (INHALATION)
Qty: 1 INHALER | Refills: 0 | Status: SHIPPED | OUTPATIENT
Start: 2018-01-01 | End: 2019-01-01

## 2018-01-01 RX ORDER — TRAMADOL HYDROCHLORIDE 50 MG/1
50 TABLET ORAL
COMMUNITY
End: 2018-01-01

## 2018-01-01 RX ORDER — MAGNESIUM SULFATE 1 G/100ML
1 INJECTION INTRAVENOUS ONCE
Status: COMPLETED | OUTPATIENT
Start: 2018-01-01 | End: 2018-01-01

## 2018-01-01 RX ORDER — OXYCODONE HYDROCHLORIDE 5 MG/1
5 TABLET ORAL
Status: DISCONTINUED | OUTPATIENT
Start: 2018-01-01 | End: 2018-01-01

## 2018-01-01 RX ORDER — OSELTAMIVIR PHOSPHATE 75 MG/1
75 CAPSULE ORAL 2 TIMES DAILY
Status: DISCONTINUED | OUTPATIENT
Start: 2018-01-01 | End: 2018-01-01 | Stop reason: DRUGHIGH

## 2018-01-01 RX ORDER — IPRATROPIUM BROMIDE AND ALBUTEROL SULFATE 2.5; .5 MG/3ML; MG/3ML
3 SOLUTION RESPIRATORY (INHALATION)
Status: DISCONTINUED | OUTPATIENT
Start: 2018-01-01 | End: 2019-01-01 | Stop reason: HOSPADM

## 2018-01-01 RX ORDER — FUROSEMIDE 40 MG/1
80 TABLET ORAL 2 TIMES DAILY
Status: DISCONTINUED | OUTPATIENT
Start: 2018-01-01 | End: 2018-01-01

## 2018-01-01 RX ORDER — GUAIFENESIN 100 MG/5ML
81 LIQUID (ML) ORAL DAILY
Status: DISCONTINUED | OUTPATIENT
Start: 2018-01-01 | End: 2018-01-01

## 2018-01-01 RX ORDER — METOPROLOL SUCCINATE 25 MG/1
12.5 TABLET, EXTENDED RELEASE ORAL ONCE
Status: COMPLETED | OUTPATIENT
Start: 2018-01-01 | End: 2018-01-01

## 2018-01-01 RX ORDER — PREDNISONE 20 MG/1
40 TABLET ORAL
Status: DISCONTINUED | OUTPATIENT
Start: 2018-01-01 | End: 2018-01-01

## 2018-01-01 RX ORDER — FUROSEMIDE 40 MG/1
40 TABLET ORAL 2 TIMES DAILY
Status: DISCONTINUED | OUTPATIENT
Start: 2018-01-01 | End: 2019-01-01

## 2018-01-01 RX ORDER — PETROLATUM 42 G/100G
OINTMENT TOPICAL DAILY
Status: DISCONTINUED | OUTPATIENT
Start: 2019-01-01 | End: 2019-01-01 | Stop reason: HOSPADM

## 2018-01-01 RX ORDER — ALLOPURINOL 300 MG/1
300 TABLET ORAL DAILY
Status: DISCONTINUED | OUTPATIENT
Start: 2018-01-01 | End: 2019-01-01 | Stop reason: HOSPADM

## 2018-01-01 RX ORDER — HEPARIN SODIUM 5000 [USP'U]/ML
5000 INJECTION, SOLUTION INTRAVENOUS; SUBCUTANEOUS EVERY 12 HOURS
Status: DISCONTINUED | OUTPATIENT
Start: 2018-01-01 | End: 2018-01-01

## 2018-01-01 RX ORDER — HYDROCODONE BITARTRATE AND ACETAMINOPHEN 10; 325 MG/1; MG/1
1 TABLET ORAL
Status: DISCONTINUED | OUTPATIENT
Start: 2018-01-01 | End: 2018-01-01 | Stop reason: HOSPADM

## 2018-01-01 RX ORDER — HEPARIN SODIUM 5000 [USP'U]/ML
5000 INJECTION, SOLUTION INTRAVENOUS; SUBCUTANEOUS EVERY 8 HOURS
Status: DISCONTINUED | OUTPATIENT
Start: 2018-01-01 | End: 2018-01-01 | Stop reason: HOSPADM

## 2018-01-01 RX ORDER — INSULIN GLARGINE 100 [IU]/ML
20 INJECTION, SOLUTION SUBCUTANEOUS
Status: DISCONTINUED | OUTPATIENT
Start: 2018-01-01 | End: 2018-01-01

## 2018-01-01 RX ORDER — CLOPIDOGREL BISULFATE 75 MG/1
75 TABLET ORAL DAILY
Status: DISCONTINUED | OUTPATIENT
Start: 2018-01-01 | End: 2018-01-01

## 2018-01-01 RX ORDER — AMOXICILLIN AND CLAVULANATE POTASSIUM 500; 125 MG/1; MG/1
1 TABLET, FILM COATED ORAL 2 TIMES DAILY WITH MEALS
Status: COMPLETED | OUTPATIENT
Start: 2018-01-01 | End: 2018-01-01

## 2018-01-01 RX ORDER — PREDNISONE 20 MG/1
40 TABLET ORAL
Qty: 3 TAB | Refills: 0 | Status: SHIPPED | OUTPATIENT
Start: 2018-01-01 | End: 2018-01-01

## 2018-01-01 RX ORDER — ONDANSETRON 2 MG/ML
4 INJECTION INTRAMUSCULAR; INTRAVENOUS
Status: COMPLETED | OUTPATIENT
Start: 2018-01-01 | End: 2018-01-01

## 2018-01-01 RX ORDER — PREDNISONE 20 MG/1
40 TABLET ORAL DAILY
Qty: 8 TAB | Refills: 0 | Status: ON HOLD | OUTPATIENT
Start: 2018-01-01 | End: 2018-01-01

## 2018-01-01 RX ORDER — LIDOCAINE HYDROCHLORIDE 10 MG/ML
INJECTION INFILTRATION; PERINEURAL AS NEEDED
Status: DISCONTINUED | OUTPATIENT
Start: 2018-01-01 | End: 2018-01-01 | Stop reason: HOSPADM

## 2018-01-01 RX ORDER — GUAIFENESIN 100 MG/5ML
81 LIQUID (ML) ORAL DAILY
Qty: 90 TAB | Refills: 3 | Status: SHIPPED | OUTPATIENT
Start: 2018-01-01 | End: 2018-01-01

## 2018-01-01 RX ORDER — INSULIN LISPRO 100 [IU]/ML
INJECTION, SOLUTION INTRAVENOUS; SUBCUTANEOUS
Status: DISCONTINUED | OUTPATIENT
Start: 2018-01-01 | End: 2019-01-01 | Stop reason: HOSPADM

## 2018-01-01 RX ORDER — HYDROMORPHONE HYDROCHLORIDE 2 MG/ML
0.5 INJECTION, SOLUTION INTRAMUSCULAR; INTRAVENOUS; SUBCUTANEOUS
Status: DISCONTINUED | OUTPATIENT
Start: 2018-01-01 | End: 2018-01-01 | Stop reason: HOSPADM

## 2018-01-01 RX ORDER — PETROLATUM 42 G/100G
OINTMENT TOPICAL AS NEEDED
Status: DISCONTINUED | OUTPATIENT
Start: 2018-01-01 | End: 2018-01-01 | Stop reason: HOSPADM

## 2018-01-01 RX ORDER — DEXTROSE MONOHYDRATE AND SODIUM CHLORIDE 5; .45 G/100ML; G/100ML
50 INJECTION, SOLUTION INTRAVENOUS CONTINUOUS
Status: DISCONTINUED | OUTPATIENT
Start: 2018-01-01 | End: 2018-01-01

## 2018-01-01 RX ORDER — HEPARIN SODIUM 5000 [USP'U]/ML
INJECTION, SOLUTION INTRAVENOUS; SUBCUTANEOUS AS NEEDED
Status: DISCONTINUED | OUTPATIENT
Start: 2018-01-01 | End: 2018-01-01 | Stop reason: HOSPADM

## 2018-01-01 RX ORDER — PREDNISONE 20 MG/1
40 TABLET ORAL
Status: DISCONTINUED | OUTPATIENT
Start: 2018-01-01 | End: 2018-01-01 | Stop reason: HOSPADM

## 2018-01-01 RX ORDER — DEXTROSE MONOHYDRATE AND SODIUM CHLORIDE 5; .9 G/100ML; G/100ML
75 INJECTION, SOLUTION INTRAVENOUS CONTINUOUS
Status: DISCONTINUED | OUTPATIENT
Start: 2018-01-01 | End: 2018-01-01

## 2018-01-01 RX ORDER — SERTRALINE HYDROCHLORIDE 50 MG/1
25 TABLET, FILM COATED ORAL DAILY
Status: DISCONTINUED | OUTPATIENT
Start: 2018-01-01 | End: 2018-01-01 | Stop reason: HOSPADM

## 2018-01-01 RX ORDER — HYDRALAZINE HYDROCHLORIDE 10 MG/1
10 TABLET, FILM COATED ORAL 3 TIMES DAILY
Status: DISCONTINUED | OUTPATIENT
Start: 2018-01-01 | End: 2018-01-01 | Stop reason: HOSPADM

## 2018-01-01 RX ORDER — ENOXAPARIN SODIUM 100 MG/ML
INJECTION SUBCUTANEOUS EVERY 12 HOURS
COMMUNITY
End: 2018-01-01

## 2018-01-01 RX ORDER — DEXTROSE MONOHYDRATE AND SODIUM CHLORIDE 5; .45 G/100ML; G/100ML
50 INJECTION, SOLUTION INTRAVENOUS CONTINUOUS
Status: DISCONTINUED | OUTPATIENT
Start: 2018-01-01 | End: 2018-01-01 | Stop reason: HOSPADM

## 2018-01-01 RX ORDER — FUROSEMIDE 10 MG/ML
80 INJECTION INTRAMUSCULAR; INTRAVENOUS
Status: COMPLETED | OUTPATIENT
Start: 2018-01-01 | End: 2018-01-01

## 2018-01-01 RX ORDER — FUROSEMIDE 20 MG/1
20 TABLET ORAL AS NEEDED
COMMUNITY
End: 2018-01-01

## 2018-01-01 RX ORDER — METOPROLOL SUCCINATE 25 MG/1
25 TABLET, EXTENDED RELEASE ORAL DAILY
Qty: 30 TAB | Refills: 0 | Status: SHIPPED | OUTPATIENT
Start: 2018-01-01 | End: 2018-01-01

## 2018-01-01 RX ORDER — ENOXAPARIN SODIUM 100 MG/ML
40 INJECTION SUBCUTANEOUS EVERY 24 HOURS
Status: DISCONTINUED | OUTPATIENT
Start: 2018-01-01 | End: 2018-01-01 | Stop reason: HOSPADM

## 2018-01-01 RX ORDER — FUROSEMIDE 10 MG/ML
80 INJECTION INTRAMUSCULAR; INTRAVENOUS 2 TIMES DAILY
Status: DISCONTINUED | OUTPATIENT
Start: 2018-01-01 | End: 2018-01-01

## 2018-01-01 RX ORDER — PANTOPRAZOLE SODIUM 40 MG/1
40 GRANULE, DELAYED RELEASE ORAL DAILY
Status: DISCONTINUED | OUTPATIENT
Start: 2018-01-01 | End: 2018-01-01

## 2018-01-01 RX ORDER — LANSOPRAZOLE 30 MG/1
30 TABLET, ORALLY DISINTEGRATING, DELAYED RELEASE ORAL DAILY
Qty: 30 TAB | Refills: 0 | Status: SHIPPED | OUTPATIENT
Start: 2018-01-01 | End: 2018-01-01

## 2018-01-01 RX ORDER — ALBUTEROL SULFATE 0.83 MG/ML
2.5 SOLUTION RESPIRATORY (INHALATION)
Status: DISCONTINUED | OUTPATIENT
Start: 2018-01-01 | End: 2019-01-01 | Stop reason: HOSPADM

## 2018-01-01 RX ORDER — NALOXONE HYDROCHLORIDE 0.4 MG/ML
0.4 INJECTION, SOLUTION INTRAMUSCULAR; INTRAVENOUS; SUBCUTANEOUS AS NEEDED
Status: DISCONTINUED | OUTPATIENT
Start: 2018-01-01 | End: 2018-01-01 | Stop reason: HOSPADM

## 2018-01-01 RX ORDER — LEVOTHYROXINE SODIUM 50 UG/1
50 TABLET ORAL
Status: DISCONTINUED | OUTPATIENT
Start: 2018-01-01 | End: 2018-01-01

## 2018-01-01 RX ORDER — INSULIN GLARGINE 100 [IU]/ML
8 INJECTION, SOLUTION SUBCUTANEOUS
Qty: 2.4 ML | Refills: 0 | Status: SHIPPED | OUTPATIENT
Start: 2018-01-01 | End: 2018-01-01

## 2018-01-01 RX ORDER — METOPROLOL SUCCINATE 25 MG/1
12.5 TABLET, EXTENDED RELEASE ORAL DAILY
Status: DISCONTINUED | OUTPATIENT
Start: 2018-01-01 | End: 2018-01-01

## 2018-01-01 RX ORDER — SODIUM CHLORIDE 9 MG/ML
75 INJECTION, SOLUTION INTRAVENOUS CONTINUOUS
Status: DISCONTINUED | OUTPATIENT
Start: 2018-01-01 | End: 2018-01-01 | Stop reason: HOSPADM

## 2018-01-01 RX ORDER — MORPHINE SULFATE 2 MG/ML
1 INJECTION, SOLUTION INTRAMUSCULAR; INTRAVENOUS ONCE
Status: COMPLETED | OUTPATIENT
Start: 2018-01-01 | End: 2018-01-01

## 2018-01-01 RX ORDER — FUROSEMIDE 40 MG/1
40 TABLET ORAL
Status: DISCONTINUED | OUTPATIENT
Start: 2018-01-01 | End: 2018-01-01

## 2018-01-01 RX ORDER — LANOLIN ALCOHOL/MO/W.PET/CERES
325 CREAM (GRAM) TOPICAL
Status: DISCONTINUED | OUTPATIENT
Start: 2018-01-01 | End: 2018-01-01 | Stop reason: HOSPADM

## 2018-01-01 RX ORDER — ALBUTEROL SULFATE 2.5 MG/.5ML
2.5 SOLUTION RESPIRATORY (INHALATION)
Status: DISCONTINUED | OUTPATIENT
Start: 2018-01-01 | End: 2018-01-01 | Stop reason: HOSPADM

## 2018-01-01 RX ORDER — GABAPENTIN 100 MG/1
100 CAPSULE ORAL 3 TIMES DAILY
Status: DISCONTINUED | OUTPATIENT
Start: 2018-01-01 | End: 2018-01-01 | Stop reason: HOSPADM

## 2018-01-01 RX ORDER — AMOXICILLIN 250 MG
1 CAPSULE ORAL DAILY
Status: DISCONTINUED | OUTPATIENT
Start: 2018-01-01 | End: 2018-01-01 | Stop reason: HOSPADM

## 2018-01-01 RX ORDER — FUROSEMIDE 20 MG/1
20-40 TABLET ORAL DAILY
Qty: 40 TAB | Refills: 1 | Status: SHIPPED | OUTPATIENT
Start: 2018-01-01

## 2018-01-01 RX ORDER — MELATONIN
1000 DAILY
Status: DISCONTINUED | OUTPATIENT
Start: 2018-01-01 | End: 2018-01-01 | Stop reason: HOSPADM

## 2018-01-01 RX ORDER — FUROSEMIDE 80 MG/1
80 TABLET ORAL 2 TIMES DAILY
Qty: 60 TAB | Refills: 1 | Status: SHIPPED | OUTPATIENT
Start: 2018-01-01 | End: 2018-01-01

## 2018-01-01 RX ORDER — DEXTROSE MONOHYDRATE AND SODIUM CHLORIDE 5; .9 G/100ML; G/100ML
75 INJECTION, SOLUTION INTRAVENOUS CONTINUOUS
Status: DISPENSED | OUTPATIENT
Start: 2018-01-01 | End: 2018-01-01

## 2018-01-01 RX ORDER — METOPROLOL SUCCINATE 25 MG/1
25 TABLET, EXTENDED RELEASE ORAL DAILY
Status: DISCONTINUED | OUTPATIENT
Start: 2018-01-01 | End: 2018-01-01 | Stop reason: HOSPADM

## 2018-01-01 RX ORDER — HYDROCORTISONE 25 MG/G
CREAM TOPICAL 4 TIMES DAILY
Status: DISCONTINUED | OUTPATIENT
Start: 2018-01-01 | End: 2018-01-01 | Stop reason: HOSPADM

## 2018-01-01 RX ORDER — GABAPENTIN 100 MG/1
200 CAPSULE ORAL 3 TIMES DAILY
Status: DISCONTINUED | OUTPATIENT
Start: 2018-01-01 | End: 2019-01-01 | Stop reason: HOSPADM

## 2018-01-01 RX ORDER — ALBUTEROL SULFATE 0.83 MG/ML
2.5 SOLUTION RESPIRATORY (INHALATION)
Qty: 1 PACKAGE | Refills: 1 | Status: SHIPPED | OUTPATIENT
Start: 2018-01-01 | End: 2018-01-01

## 2018-01-01 RX ORDER — SODIUM CHLORIDE 9 MG/ML
75 INJECTION, SOLUTION INTRAVENOUS CONTINUOUS
Status: CANCELLED | OUTPATIENT
Start: 2018-01-01 | End: 2018-01-01

## 2018-01-01 RX ORDER — LATANOPROST 50 UG/ML
1 SOLUTION/ DROPS OPHTHALMIC
Status: DISCONTINUED | OUTPATIENT
Start: 2018-01-01 | End: 2019-01-01 | Stop reason: HOSPADM

## 2018-01-01 RX ORDER — SODIUM CHLORIDE 0.9 % (FLUSH) 0.9 %
10 SYRINGE (ML) INJECTION
Status: COMPLETED | OUTPATIENT
Start: 2018-01-01 | End: 2018-01-01

## 2018-01-01 RX ORDER — FAMOTIDINE 20 MG/1
20 TABLET, FILM COATED ORAL EVERY 12 HOURS
Status: DISCONTINUED | OUTPATIENT
Start: 2018-01-01 | End: 2018-01-01

## 2018-01-01 RX ORDER — SODIUM CHLORIDE 9 MG/ML
250 INJECTION, SOLUTION INTRAVENOUS AS NEEDED
Status: DISCONTINUED | OUTPATIENT
Start: 2018-01-01 | End: 2019-01-01 | Stop reason: SDUPTHER

## 2018-01-01 RX ORDER — DIAZEPAM 2 MG/1
2 TABLET ORAL
Status: DISCONTINUED | OUTPATIENT
Start: 2018-01-01 | End: 2018-01-01

## 2018-01-01 RX ADMIN — FUROSEMIDE 40 MG: 10 INJECTION, SOLUTION INTRAMUSCULAR; INTRAVENOUS at 10:13

## 2018-01-01 RX ADMIN — ALBUTEROL SULFATE 2.5 MG: 2.5 SOLUTION RESPIRATORY (INHALATION) at 20:25

## 2018-01-01 RX ADMIN — BUDESONIDE 500 MCG: 0.5 INHALANT RESPIRATORY (INHALATION) at 08:52

## 2018-01-01 RX ADMIN — ASPIRIN 81 MG 81 MG: 81 TABLET ORAL at 08:30

## 2018-01-01 RX ADMIN — MORPHINE SULFATE 1 MG: 2 INJECTION, SOLUTION INTRAMUSCULAR; INTRAVENOUS at 14:00

## 2018-01-01 RX ADMIN — FUROSEMIDE 40 MG: 40 TABLET ORAL at 15:43

## 2018-01-01 RX ADMIN — Medication 400 MG: at 09:35

## 2018-01-01 RX ADMIN — NYSTATIN: 100000 POWDER TOPICAL at 10:04

## 2018-01-01 RX ADMIN — SODIUM CHLORIDE 500 ML: 900 INJECTION, SOLUTION INTRAVENOUS at 14:09

## 2018-01-01 RX ADMIN — GABAPENTIN 100 MG: 100 CAPSULE ORAL at 16:16

## 2018-01-01 RX ADMIN — Medication 400 MG: at 08:52

## 2018-01-01 RX ADMIN — SERTRALINE HYDROCHLORIDE 25 MG: 50 TABLET ORAL at 08:35

## 2018-01-01 RX ADMIN — CLOPIDOGREL BISULFATE 75 MG: 75 TABLET ORAL at 08:53

## 2018-01-01 RX ADMIN — LEVOTHYROXINE SODIUM 75 MCG: 75 TABLET ORAL at 05:26

## 2018-01-01 RX ADMIN — IPRATROPIUM BROMIDE AND ALBUTEROL SULFATE 3 ML: .5; 3 SOLUTION RESPIRATORY (INHALATION) at 20:53

## 2018-01-01 RX ADMIN — SERTRALINE HYDROCHLORIDE 25 MG: 50 TABLET ORAL at 10:02

## 2018-01-01 RX ADMIN — Medication 5 ML: at 21:41

## 2018-01-01 RX ADMIN — DEXTROSE MONOHYDRATE 25 G: 25 INJECTION, SOLUTION INTRAVENOUS at 21:40

## 2018-01-01 RX ADMIN — LATANOPROST 1 DROP: 50 SOLUTION OPHTHALMIC at 00:09

## 2018-01-01 RX ADMIN — TUBERCULIN PURIFIED PROTEIN DERIVATIVE 5 UNITS: 5 INJECTION, SOLUTION INTRADERMAL at 10:06

## 2018-01-01 RX ADMIN — PANTOPRAZOLE SODIUM 40 MG: 40 TABLET, DELAYED RELEASE ORAL at 08:30

## 2018-01-01 RX ADMIN — METOPROLOL SUCCINATE 12.5 MG: 25 TABLET, EXTENDED RELEASE ORAL at 17:39

## 2018-01-01 RX ADMIN — Medication 400 MG: at 09:40

## 2018-01-01 RX ADMIN — ISOSORBIDE MONONITRATE 30 MG: 30 TABLET, EXTENDED RELEASE ORAL at 08:43

## 2018-01-01 RX ADMIN — LATANOPROST 1 DROP: 50 SOLUTION OPHTHALMIC at 21:20

## 2018-01-01 RX ADMIN — LIDOCAINE HYDROCHLORIDE 40 MG: 20 INJECTION, SOLUTION EPIDURAL; INFILTRATION; INTRACAUDAL; PERINEURAL at 15:25

## 2018-01-01 RX ADMIN — INSULIN LISPRO 2 UNITS: 100 INJECTION, SOLUTION INTRAVENOUS; SUBCUTANEOUS at 21:14

## 2018-01-01 RX ADMIN — ISOSORBIDE MONONITRATE 30 MG: 30 TABLET, EXTENDED RELEASE ORAL at 09:05

## 2018-01-01 RX ADMIN — ALBUTEROL SULFATE 2.5 MG: 2.5 SOLUTION RESPIRATORY (INHALATION) at 14:17

## 2018-01-01 RX ADMIN — SERTRALINE HYDROCHLORIDE 50 MG: 50 TABLET ORAL at 17:19

## 2018-01-01 RX ADMIN — POTASSIUM CHLORIDE 20 MEQ: 20 TABLET, EXTENDED RELEASE ORAL at 08:31

## 2018-01-01 RX ADMIN — PRAVASTATIN SODIUM 40 MG: 20 TABLET ORAL at 21:02

## 2018-01-01 RX ADMIN — ASPIRIN 81 MG 81 MG: 81 TABLET ORAL at 09:04

## 2018-01-01 RX ADMIN — SERTRALINE HYDROCHLORIDE 50 MG: 50 TABLET ORAL at 22:38

## 2018-01-01 RX ADMIN — TRAMADOL HYDROCHLORIDE 50 MG: 50 TABLET, FILM COATED ORAL at 08:31

## 2018-01-01 RX ADMIN — Medication 10 ML: at 21:52

## 2018-01-01 RX ADMIN — Medication 10 ML: at 21:50

## 2018-01-01 RX ADMIN — FUROSEMIDE 40 MG: 10 INJECTION, SOLUTION INTRAMUSCULAR; INTRAVENOUS at 15:52

## 2018-01-01 RX ADMIN — HEPARIN SODIUM 5000 UNITS: 5000 INJECTION INTRAVENOUS; SUBCUTANEOUS at 05:33

## 2018-01-01 RX ADMIN — INSULIN LISPRO 234 UNITS: 100 INJECTION, SOLUTION INTRAVENOUS; SUBCUTANEOUS at 12:27

## 2018-01-01 RX ADMIN — Medication 5 ML: at 15:38

## 2018-01-01 RX ADMIN — Medication 10 ML: at 14:00

## 2018-01-01 RX ADMIN — PRAVASTATIN SODIUM 40 MG: 20 TABLET ORAL at 21:12

## 2018-01-01 RX ADMIN — HYDRALAZINE HYDROCHLORIDE 10 MG: 10 TABLET, FILM COATED ORAL at 08:42

## 2018-01-01 RX ADMIN — FUROSEMIDE 40 MG: 40 TABLET ORAL at 17:14

## 2018-01-01 RX ADMIN — Medication 10 ML: at 21:25

## 2018-01-01 RX ADMIN — PRAVASTATIN SODIUM 40 MG: 20 TABLET ORAL at 21:16

## 2018-01-01 RX ADMIN — ALBUTEROL SULFATE 2.5 MG: 2.5 SOLUTION RESPIRATORY (INHALATION) at 08:00

## 2018-01-01 RX ADMIN — HYDROCODONE BITARTRATE AND ACETAMINOPHEN 1 TABLET: 10; 325 TABLET ORAL at 04:00

## 2018-01-01 RX ADMIN — NYSTATIN: 100000 POWDER TOPICAL at 15:36

## 2018-01-01 RX ADMIN — LEVOTHYROXINE SODIUM 50 MCG: 50 TABLET ORAL at 05:33

## 2018-01-01 RX ADMIN — PRAVASTATIN SODIUM 40 MG: 20 TABLET ORAL at 22:38

## 2018-01-01 RX ADMIN — HYDROCORTISONE 2.5%: 25 CREAM TOPICAL at 18:00

## 2018-01-01 RX ADMIN — Medication 5 ML: at 17:03

## 2018-01-01 RX ADMIN — PROPOFOL 20 MG: 10 INJECTION, EMULSION INTRAVENOUS at 10:22

## 2018-01-01 RX ADMIN — CEPHALEXIN 500 MG: 500 CAPSULE ORAL at 14:04

## 2018-01-01 RX ADMIN — Medication 5 ML: at 21:13

## 2018-01-01 RX ADMIN — PROPOFOL 40 MG: 10 INJECTION, EMULSION INTRAVENOUS at 10:17

## 2018-01-01 RX ADMIN — INSULIN LISPRO 6 UNITS: 100 INJECTION, SOLUTION INTRAVENOUS; SUBCUTANEOUS at 16:55

## 2018-01-01 RX ADMIN — Medication 10 ML: at 13:59

## 2018-01-01 RX ADMIN — TRAMADOL HYDROCHLORIDE 50 MG: 50 TABLET, FILM COATED ORAL at 10:44

## 2018-01-01 RX ADMIN — ALLOPURINOL 300 MG: 300 TABLET ORAL at 08:52

## 2018-01-01 RX ADMIN — HEPARIN SODIUM 5000 UNITS: 5000 INJECTION, SOLUTION INTRAVENOUS; SUBCUTANEOUS at 22:31

## 2018-01-01 RX ADMIN — Medication 10 ML: at 13:16

## 2018-01-01 RX ADMIN — LEVOTHYROXINE SODIUM 50 MCG: 50 TABLET ORAL at 05:38

## 2018-01-01 RX ADMIN — SERTRALINE HYDROCHLORIDE 25 MG: 50 TABLET ORAL at 08:53

## 2018-01-01 RX ADMIN — ALBUTEROL SULFATE 2.5 MG: 2.5 SOLUTION RESPIRATORY (INHALATION) at 14:34

## 2018-01-01 RX ADMIN — NYSTATIN: 100000 POWDER TOPICAL at 19:00

## 2018-01-01 RX ADMIN — PANTOPRAZOLE SODIUM 40 MG: 40 TABLET, DELAYED RELEASE ORAL at 17:57

## 2018-01-01 RX ADMIN — CLOPIDOGREL BISULFATE 75 MG: 75 TABLET ORAL at 09:05

## 2018-01-01 RX ADMIN — INSULIN LISPRO 6 UNITS: 100 INJECTION, SOLUTION INTRAVENOUS; SUBCUTANEOUS at 21:36

## 2018-01-01 RX ADMIN — INSULIN GLARGINE 10 UNITS: 100 INJECTION, SOLUTION SUBCUTANEOUS at 21:49

## 2018-01-01 RX ADMIN — ALLOPURINOL 300 MG: 300 TABLET ORAL at 08:05

## 2018-01-01 RX ADMIN — VANCOMYCIN HYDROCHLORIDE 750 MG: 10 INJECTION, POWDER, LYOPHILIZED, FOR SOLUTION INTRAVENOUS at 10:20

## 2018-01-01 RX ADMIN — PANTOPRAZOLE SODIUM 40 MG: 40 TABLET, DELAYED RELEASE ORAL at 08:48

## 2018-01-01 RX ADMIN — INSULIN LISPRO 3 UNITS: 100 INJECTION, SOLUTION INTRAVENOUS; SUBCUTANEOUS at 17:04

## 2018-01-01 RX ADMIN — Medication 5 ML: at 22:16

## 2018-01-01 RX ADMIN — Medication 10 ML: at 05:01

## 2018-01-01 RX ADMIN — PANTOPRAZOLE SODIUM 40 MG: 40 TABLET, DELAYED RELEASE ORAL at 10:03

## 2018-01-01 RX ADMIN — Medication 400 MG: at 09:05

## 2018-01-01 RX ADMIN — FUROSEMIDE 80 MG: 10 INJECTION, SOLUTION INTRAMUSCULAR; INTRAVENOUS at 13:48

## 2018-01-01 RX ADMIN — GABAPENTIN 100 MG: 100 CAPSULE ORAL at 21:32

## 2018-01-01 RX ADMIN — OXYCODONE HYDROCHLORIDE 5 MG: 5 TABLET ORAL at 15:36

## 2018-01-01 RX ADMIN — METOPROLOL SUCCINATE 25 MG: 25 TABLET, EXTENDED RELEASE ORAL at 08:49

## 2018-01-01 RX ADMIN — Medication 10 ML: at 14:12

## 2018-01-01 RX ADMIN — Medication 10 ML: at 05:34

## 2018-01-01 RX ADMIN — Medication 10 ML: at 22:21

## 2018-01-01 RX ADMIN — GABAPENTIN 100 MG: 100 CAPSULE ORAL at 17:57

## 2018-01-01 RX ADMIN — INSULIN LISPRO 3 UNITS: 100 INJECTION, SOLUTION INTRAVENOUS; SUBCUTANEOUS at 15:44

## 2018-01-01 RX ADMIN — OSELTAMIVIR PHOSPHATE 30 MG: 30 CAPSULE ORAL at 05:09

## 2018-01-01 RX ADMIN — SENNOSIDES AND DOCUSATE SODIUM 1 TABLET: 8.6; 5 TABLET ORAL at 08:04

## 2018-01-01 RX ADMIN — INSULIN GLARGINE 10 UNITS: 100 INJECTION, SOLUTION SUBCUTANEOUS at 20:45

## 2018-01-01 RX ADMIN — BISACODYL 5 MG: 5 TABLET, COATED ORAL at 12:28

## 2018-01-01 RX ADMIN — BUDESONIDE 500 MCG: 0.5 INHALANT RESPIRATORY (INHALATION) at 20:46

## 2018-01-01 RX ADMIN — DOCUSATE SODIUM 100 MG: 100 CAPSULE, LIQUID FILLED ORAL at 08:44

## 2018-01-01 RX ADMIN — Medication 400 MG: at 08:31

## 2018-01-01 RX ADMIN — NYSTATIN: 100000 POWDER TOPICAL at 09:04

## 2018-01-01 RX ADMIN — LEVOTHYROXINE SODIUM 50 MCG: 50 TABLET ORAL at 05:30

## 2018-01-01 RX ADMIN — HUMAN INSULIN 2 UNITS: 100 INJECTION, SOLUTION SUBCUTANEOUS at 12:44

## 2018-01-01 RX ADMIN — INSULIN LISPRO 4 UNITS: 100 INJECTION, SOLUTION INTRAVENOUS; SUBCUTANEOUS at 13:54

## 2018-01-01 RX ADMIN — INSULIN LISPRO 4 UNITS: 100 INJECTION, SOLUTION INTRAVENOUS; SUBCUTANEOUS at 17:04

## 2018-01-01 RX ADMIN — BUDESONIDE 500 MCG: 0.5 INHALANT RESPIRATORY (INHALATION) at 20:21

## 2018-01-01 RX ADMIN — GABAPENTIN 100 MG: 100 CAPSULE ORAL at 08:45

## 2018-01-01 RX ADMIN — INSULIN LISPRO 3 UNITS: 100 INJECTION, SOLUTION INTRAVENOUS; SUBCUTANEOUS at 16:43

## 2018-01-01 RX ADMIN — ALBUTEROL SULFATE 2.5 MG: 2.5 SOLUTION RESPIRATORY (INHALATION) at 14:07

## 2018-01-01 RX ADMIN — PIPERACILLIN SODIUM,TAZOBACTAM SODIUM 4.5 G: 4; .5 INJECTION, POWDER, FOR SOLUTION INTRAVENOUS at 21:55

## 2018-01-01 RX ADMIN — INSULIN HUMAN 6 UNITS: 100 INJECTION, SOLUTION PARENTERAL at 22:38

## 2018-01-01 RX ADMIN — Medication 5 ML: at 14:00

## 2018-01-01 RX ADMIN — ASPIRIN 81 MG 81 MG: 81 TABLET ORAL at 08:53

## 2018-01-01 RX ADMIN — GABAPENTIN 100 MG: 100 CAPSULE ORAL at 09:05

## 2018-01-01 RX ADMIN — INSULIN LISPRO 3 UNITS: 100 INJECTION, SOLUTION INTRAVENOUS; SUBCUTANEOUS at 10:02

## 2018-01-01 RX ADMIN — SERTRALINE HYDROCHLORIDE 50 MG: 50 TABLET ORAL at 09:07

## 2018-01-01 RX ADMIN — OXYCODONE HYDROCHLORIDE 5 MG: 5 TABLET ORAL at 09:39

## 2018-01-01 RX ADMIN — Medication 500 MG: at 10:03

## 2018-01-01 RX ADMIN — OXYCODONE HYDROCHLORIDE 15 MG: 5 TABLET ORAL at 16:43

## 2018-01-01 RX ADMIN — LATANOPROST 1 DROP: 50 SOLUTION OPHTHALMIC at 22:20

## 2018-01-01 RX ADMIN — INSULIN LISPRO 3 UNITS: 100 INJECTION, SOLUTION INTRAVENOUS; SUBCUTANEOUS at 11:42

## 2018-01-01 RX ADMIN — ACETAMINOPHEN 650 MG: 325 TABLET ORAL at 11:51

## 2018-01-01 RX ADMIN — PRAVASTATIN SODIUM 40 MG: 20 TABLET ORAL at 21:25

## 2018-01-01 RX ADMIN — POTASSIUM CHLORIDE 20 MEQ: 20 TABLET, EXTENDED RELEASE ORAL at 09:36

## 2018-01-01 RX ADMIN — LIDOCAINE HYDROCHLORIDE 20 MG: 20 INJECTION, SOLUTION EPIDURAL; INFILTRATION; INTRACAUDAL; PERINEURAL at 15:23

## 2018-01-01 RX ADMIN — HYDRALAZINE HYDROCHLORIDE 10 MG: 10 TABLET, FILM COATED ORAL at 16:38

## 2018-01-01 RX ADMIN — BUDESONIDE 500 MCG: 0.5 INHALANT RESPIRATORY (INHALATION) at 20:01

## 2018-01-01 RX ADMIN — NYSTATIN: 100000 POWDER TOPICAL at 16:17

## 2018-01-01 RX ADMIN — DEXTROSE MONOHYDRATE AND SODIUM CHLORIDE 50 ML/HR: 5; .45 INJECTION, SOLUTION INTRAVENOUS at 09:40

## 2018-01-01 RX ADMIN — HALOPERIDOL LACTATE 2 MG: 5 INJECTION, SOLUTION INTRAMUSCULAR at 03:25

## 2018-01-01 RX ADMIN — OXYCODONE HYDROCHLORIDE 5 MG: 5 TABLET ORAL at 05:29

## 2018-01-01 RX ADMIN — OXYCODONE HYDROCHLORIDE 15 MG: 5 TABLET ORAL at 08:52

## 2018-01-01 RX ADMIN — FUROSEMIDE 80 MG: 10 INJECTION, SOLUTION INTRAMUSCULAR; INTRAVENOUS at 09:37

## 2018-01-01 RX ADMIN — HALOPERIDOL LACTATE 2 MG: 5 INJECTION, SOLUTION INTRAMUSCULAR at 23:49

## 2018-01-01 RX ADMIN — ASPIRIN 81 MG 81 MG: 81 TABLET ORAL at 08:35

## 2018-01-01 RX ADMIN — PRAVASTATIN SODIUM 40 MG: 20 TABLET ORAL at 05:50

## 2018-01-01 RX ADMIN — VITAMIN D, TAB 1000IU (100/BT) 1000 UNITS: 25 TAB at 08:45

## 2018-01-01 RX ADMIN — ISOSORBIDE MONONITRATE 30 MG: 30 TABLET, EXTENDED RELEASE ORAL at 09:36

## 2018-01-01 RX ADMIN — IPRATROPIUM BROMIDE AND ALBUTEROL SULFATE 3 ML: .5; 3 SOLUTION RESPIRATORY (INHALATION) at 13:51

## 2018-01-01 RX ADMIN — AMOXICILLIN AND CLAVULANATE POTASSIUM 1 TABLET: 500; 125 TABLET, FILM COATED ORAL at 10:02

## 2018-01-01 RX ADMIN — LANSOPRAZOLE 30 MG: 30 TABLET, ORALLY DISINTEGRATING, DELAYED RELEASE ORAL at 09:39

## 2018-01-01 RX ADMIN — LEVOTHYROXINE SODIUM 50 MCG: 50 TABLET ORAL at 05:18

## 2018-01-01 RX ADMIN — LEVOTHYROXINE SODIUM 50 MCG: 50 TABLET ORAL at 05:35

## 2018-01-01 RX ADMIN — PANTOPRAZOLE SODIUM 40 MG: 40 TABLET, DELAYED RELEASE ORAL at 05:38

## 2018-01-01 RX ADMIN — BUDESONIDE 500 MCG: 0.5 INHALANT RESPIRATORY (INHALATION) at 20:22

## 2018-01-01 RX ADMIN — IPRATROPIUM BROMIDE AND ALBUTEROL SULFATE 3 ML: .5; 3 SOLUTION RESPIRATORY (INHALATION) at 16:50

## 2018-01-01 RX ADMIN — NYSTATIN: 100000 POWDER TOPICAL at 17:05

## 2018-01-01 RX ADMIN — PRAVASTATIN SODIUM 40 MG: 20 TABLET ORAL at 21:44

## 2018-01-01 RX ADMIN — HYDRALAZINE HYDROCHLORIDE 10 MG: 10 TABLET, FILM COATED ORAL at 17:14

## 2018-01-01 RX ADMIN — FUROSEMIDE 80 MG: 40 TABLET ORAL at 17:05

## 2018-01-01 RX ADMIN — Medication 500 MG: at 08:36

## 2018-01-01 RX ADMIN — Medication 5 ML: at 18:47

## 2018-01-01 RX ADMIN — IPRATROPIUM BROMIDE AND ALBUTEROL SULFATE 3 ML: .5; 3 SOLUTION RESPIRATORY (INHALATION) at 14:34

## 2018-01-01 RX ADMIN — SODIUM CHLORIDE, SODIUM LACTATE, POTASSIUM CHLORIDE, CALCIUM CHLORIDE: 600; 310; 30; 20 INJECTION, SOLUTION INTRAVENOUS at 14:55

## 2018-01-01 RX ADMIN — GABAPENTIN 200 MG: 100 CAPSULE ORAL at 21:25

## 2018-01-01 RX ADMIN — HYDROCODONE BITARTRATE AND ACETAMINOPHEN 1 TABLET: 10; 325 TABLET ORAL at 05:26

## 2018-01-01 RX ADMIN — Medication 500 MG: at 08:18

## 2018-01-01 RX ADMIN — INSULIN LISPRO 3 UNITS: 100 INJECTION, SOLUTION INTRAVENOUS; SUBCUTANEOUS at 11:30

## 2018-01-01 RX ADMIN — SERTRALINE HYDROCHLORIDE 25 MG: 50 TABLET ORAL at 08:45

## 2018-01-01 RX ADMIN — INSULIN LISPRO 4 UNITS: 100 INJECTION, SOLUTION INTRAVENOUS; SUBCUTANEOUS at 17:09

## 2018-01-01 RX ADMIN — METHYLPREDNISOLONE SODIUM SUCCINATE 40 MG: 125 INJECTION, POWDER, FOR SOLUTION INTRAMUSCULAR; INTRAVENOUS at 12:23

## 2018-01-01 RX ADMIN — LEVOTHYROXINE SODIUM 50 MCG: 50 TABLET ORAL at 06:17

## 2018-01-01 RX ADMIN — Medication 10 ML: at 21:45

## 2018-01-01 RX ADMIN — PRAVASTATIN SODIUM 40 MG: 20 TABLET ORAL at 21:57

## 2018-01-01 RX ADMIN — LANSOPRAZOLE 30 MG: 30 TABLET, ORALLY DISINTEGRATING, DELAYED RELEASE ORAL at 08:45

## 2018-01-01 RX ADMIN — METOPROLOL SUCCINATE 25 MG: 25 TABLET, EXTENDED RELEASE ORAL at 08:40

## 2018-01-01 RX ADMIN — ONDANSETRON 4 MG: 2 INJECTION INTRAMUSCULAR; INTRAVENOUS at 05:49

## 2018-01-01 RX ADMIN — Medication 2 G: at 15:06

## 2018-01-01 RX ADMIN — LEVOTHYROXINE SODIUM 50 MCG: 50 TABLET ORAL at 06:46

## 2018-01-01 RX ADMIN — ASPIRIN 81 MG 81 MG: 81 TABLET ORAL at 09:39

## 2018-01-01 RX ADMIN — GABAPENTIN 100 MG: 100 CAPSULE ORAL at 20:38

## 2018-01-01 RX ADMIN — PETROLATUM: 42 OINTMENT TOPICAL at 09:00

## 2018-01-01 RX ADMIN — SODIUM CHLORIDE, SODIUM LACTATE, POTASSIUM CHLORIDE, CALCIUM CHLORIDE: 600; 310; 30; 20 INJECTION, SOLUTION INTRAVENOUS at 10:07

## 2018-01-01 RX ADMIN — LEVOTHYROXINE SODIUM 50 MCG: 50 TABLET ORAL at 05:37

## 2018-01-01 RX ADMIN — FLUTICASONE PROPIONATE 2 SPRAY: 50 SPRAY, METERED NASAL at 09:00

## 2018-01-01 RX ADMIN — Medication 10 ML: at 05:19

## 2018-01-01 RX ADMIN — SERTRALINE HYDROCHLORIDE 25 MG: 50 TABLET ORAL at 09:39

## 2018-01-01 RX ADMIN — OXYCODONE HYDROCHLORIDE 5 MG: 5 TABLET ORAL at 11:12

## 2018-01-01 RX ADMIN — BUDESONIDE 500 MCG: 0.5 INHALANT RESPIRATORY (INHALATION) at 07:47

## 2018-01-01 RX ADMIN — METOPROLOL SUCCINATE 12.5 MG: 25 TABLET, EXTENDED RELEASE ORAL at 09:39

## 2018-01-01 RX ADMIN — DEXTROSE MONOHYDRATE AND SODIUM CHLORIDE 100 ML/HR: 5; .9 INJECTION, SOLUTION INTRAVENOUS at 10:45

## 2018-01-01 RX ADMIN — Medication 10 ML: at 05:26

## 2018-01-01 RX ADMIN — ALBUTEROL SULFATE 2.5 MG: 2.5 SOLUTION RESPIRATORY (INHALATION) at 14:26

## 2018-01-01 RX ADMIN — BUDESONIDE 500 MCG: 0.5 INHALANT RESPIRATORY (INHALATION) at 19:30

## 2018-01-01 RX ADMIN — PETROLATUM: 42 OINTMENT TOPICAL at 08:54

## 2018-01-01 RX ADMIN — INSULIN LISPRO 3 UNITS: 100 INJECTION, SOLUTION INTRAVENOUS; SUBCUTANEOUS at 08:56

## 2018-01-01 RX ADMIN — ALBUTEROL SULFATE 2.5 MG: 2.5 SOLUTION RESPIRATORY (INHALATION) at 19:34

## 2018-01-01 RX ADMIN — HYDRALAZINE HYDROCHLORIDE 10 MG: 10 TABLET, FILM COATED ORAL at 08:04

## 2018-01-01 RX ADMIN — SERTRALINE HYDROCHLORIDE 25 MG: 50 TABLET ORAL at 08:36

## 2018-01-01 RX ADMIN — Medication 10 ML: at 06:29

## 2018-01-01 RX ADMIN — INSULIN LISPRO 2 UNITS: 100 INJECTION, SOLUTION INTRAVENOUS; SUBCUTANEOUS at 16:19

## 2018-01-01 RX ADMIN — GABAPENTIN 100 MG: 100 CAPSULE ORAL at 21:02

## 2018-01-01 RX ADMIN — PANTOPRAZOLE SODIUM 40 MG: 40 TABLET, DELAYED RELEASE ORAL at 06:16

## 2018-01-01 RX ADMIN — PIPERACILLIN SODIUM,TAZOBACTAM SODIUM 4.5 G: 4; .5 INJECTION, POWDER, FOR SOLUTION INTRAVENOUS at 05:20

## 2018-01-01 RX ADMIN — GABAPENTIN 100 MG: 100 CAPSULE ORAL at 17:00

## 2018-01-01 RX ADMIN — OXYCODONE HYDROCHLORIDE 5 MG: 5 TABLET ORAL at 22:05

## 2018-01-01 RX ADMIN — HYDROCORTISONE 2.5%: 25 CREAM TOPICAL at 22:07

## 2018-01-01 RX ADMIN — Medication 10 ML: at 06:51

## 2018-01-01 RX ADMIN — TRAMADOL HYDROCHLORIDE 50 MG: 50 TABLET, FILM COATED ORAL at 09:06

## 2018-01-01 RX ADMIN — ASPIRIN 81 MG 81 MG: 81 TABLET ORAL at 08:48

## 2018-01-01 RX ADMIN — HEPARIN SODIUM 5000 UNITS: 5000 INJECTION INTRAVENOUS; SUBCUTANEOUS at 21:52

## 2018-01-01 RX ADMIN — VITAMIN D, TAB 1000IU (100/BT) 1000 UNITS: 25 TAB at 08:04

## 2018-01-01 RX ADMIN — PREDNISONE 40 MG: 20 TABLET ORAL at 08:35

## 2018-01-01 RX ADMIN — INSULIN GLARGINE 10 UNITS: 100 INJECTION, SOLUTION SUBCUTANEOUS at 21:37

## 2018-01-01 RX ADMIN — SENNOSIDES AND DOCUSATE SODIUM 1 TABLET: 8.6; 5 TABLET ORAL at 08:31

## 2018-01-01 RX ADMIN — BUDESONIDE 500 MCG: 0.5 INHALANT RESPIRATORY (INHALATION) at 07:55

## 2018-01-01 RX ADMIN — GABAPENTIN 100 MG: 100 CAPSULE ORAL at 21:44

## 2018-01-01 RX ADMIN — BUDESONIDE 500 MCG: 0.5 INHALANT RESPIRATORY (INHALATION) at 20:25

## 2018-01-01 RX ADMIN — INSULIN LISPRO 3 UNITS: 100 INJECTION, SOLUTION INTRAVENOUS; SUBCUTANEOUS at 08:36

## 2018-01-01 RX ADMIN — CEFTRIAXONE SODIUM 1 G: 1 INJECTION, POWDER, FOR SOLUTION INTRAMUSCULAR; INTRAVENOUS at 17:40

## 2018-01-01 RX ADMIN — INSULIN LISPRO 2 UNITS: 100 INJECTION, SOLUTION INTRAVENOUS; SUBCUTANEOUS at 05:38

## 2018-01-01 RX ADMIN — FUROSEMIDE 40 MG: 40 TABLET ORAL at 08:45

## 2018-01-01 RX ADMIN — ALBUTEROL SULFATE 2.5 MG: 2.5 SOLUTION RESPIRATORY (INHALATION) at 20:22

## 2018-01-01 RX ADMIN — INSULIN LISPRO 2 UNITS: 100 INJECTION, SOLUTION INTRAVENOUS; SUBCUTANEOUS at 17:14

## 2018-01-01 RX ADMIN — NYSTATIN: 100000 POWDER TOPICAL at 08:11

## 2018-01-01 RX ADMIN — MAGNESIUM SULFATE 1 G: 1 INJECTION INTRAVENOUS at 19:36

## 2018-01-01 RX ADMIN — NYSTATIN: 100000 POWDER TOPICAL at 17:27

## 2018-01-01 RX ADMIN — PRAVASTATIN SODIUM 40 MG: 20 TABLET ORAL at 21:41

## 2018-01-01 RX ADMIN — PRAVASTATIN SODIUM 40 MG: 20 TABLET ORAL at 00:53

## 2018-01-01 RX ADMIN — BUDESONIDE 500 MCG: 0.5 INHALANT RESPIRATORY (INHALATION) at 07:54

## 2018-01-01 RX ADMIN — INSULIN LISPRO 2 UNITS: 100 INJECTION, SOLUTION INTRAVENOUS; SUBCUTANEOUS at 11:30

## 2018-01-01 RX ADMIN — Medication 5 ML: at 06:16

## 2018-01-01 RX ADMIN — LATANOPROST 1 DROP: 50 SOLUTION OPHTHALMIC at 21:45

## 2018-01-01 RX ADMIN — PANTOPRAZOLE SODIUM 40 MG: 40 TABLET, DELAYED RELEASE ORAL at 06:46

## 2018-01-01 RX ADMIN — FUROSEMIDE 40 MG: 40 TABLET ORAL at 16:15

## 2018-01-01 RX ADMIN — Medication 5 ML: at 05:57

## 2018-01-01 RX ADMIN — ASPIRIN 81 MG 81 MG: 81 TABLET ORAL at 08:33

## 2018-01-01 RX ADMIN — GABAPENTIN 200 MG: 100 CAPSULE ORAL at 00:53

## 2018-01-01 RX ADMIN — LEVETIRACETAM 500 MG: 100 INJECTION, SOLUTION INTRAVENOUS at 04:51

## 2018-01-01 RX ADMIN — HEPARIN SODIUM 5000 UNITS: 5000 INJECTION, SOLUTION INTRAVENOUS; SUBCUTANEOUS at 17:56

## 2018-01-01 RX ADMIN — INSULIN GLARGINE 10 UNITS: 100 INJECTION, SOLUTION SUBCUTANEOUS at 21:36

## 2018-01-01 RX ADMIN — PERFLUTREN 1 ML: 6.52 INJECTION, SUSPENSION INTRAVENOUS at 10:00

## 2018-01-01 RX ADMIN — METOPROLOL SUCCINATE 12.5 MG: 25 TABLET, EXTENDED RELEASE ORAL at 08:04

## 2018-01-01 RX ADMIN — METOPROLOL SUCCINATE 12.5 MG: 25 TABLET, EXTENDED RELEASE ORAL at 12:28

## 2018-01-01 RX ADMIN — Medication 10 ML: at 22:15

## 2018-01-01 RX ADMIN — FAMOTIDINE 20 MG: 20 TABLET ORAL at 08:53

## 2018-01-01 RX ADMIN — OXYCODONE HYDROCHLORIDE 5 MG: 5 TABLET ORAL at 12:50

## 2018-01-01 RX ADMIN — INSULIN GLARGINE 10 UNITS: 100 INJECTION, SOLUTION SUBCUTANEOUS at 22:16

## 2018-01-01 RX ADMIN — Medication 400 MG: at 09:06

## 2018-01-01 RX ADMIN — BUDESONIDE 500 MCG: 0.5 INHALANT RESPIRATORY (INHALATION) at 08:14

## 2018-01-01 RX ADMIN — ALBUTEROL SULFATE 2.5 MG: 2.5 SOLUTION RESPIRATORY (INHALATION) at 19:55

## 2018-01-01 RX ADMIN — GABAPENTIN 200 MG: 100 CAPSULE ORAL at 08:44

## 2018-01-01 RX ADMIN — LORAZEPAM 0.5 MG: 2 INJECTION INTRAMUSCULAR; INTRAVENOUS at 14:08

## 2018-01-01 RX ADMIN — LEVOTHYROXINE SODIUM 50 MCG: 50 TABLET ORAL at 05:50

## 2018-01-01 RX ADMIN — NYSTATIN: 100000 POWDER TOPICAL at 18:00

## 2018-01-01 RX ADMIN — Medication 400 MG: at 09:04

## 2018-01-01 RX ADMIN — Medication 10 ML: at 21:28

## 2018-01-01 RX ADMIN — PANTOPRAZOLE SODIUM 40 MG: 40 TABLET, DELAYED RELEASE ORAL at 17:04

## 2018-01-01 RX ADMIN — Medication 5 ML: at 05:32

## 2018-01-01 RX ADMIN — ISOSORBIDE MONONITRATE 30 MG: 30 TABLET, EXTENDED RELEASE ORAL at 09:08

## 2018-01-01 RX ADMIN — HYDRALAZINE HYDROCHLORIDE 10 MG: 10 TABLET, FILM COATED ORAL at 21:02

## 2018-01-01 RX ADMIN — OXYCODONE HYDROCHLORIDE 5 MG: 5 TABLET ORAL at 13:50

## 2018-01-01 RX ADMIN — IPRATROPIUM BROMIDE AND ALBUTEROL SULFATE 3 ML: .5; 3 SOLUTION RESPIRATORY (INHALATION) at 07:22

## 2018-01-01 RX ADMIN — ASPIRIN 325 MG ORAL TABLET 325 MG: 325 PILL ORAL at 15:44

## 2018-01-01 RX ADMIN — FUROSEMIDE 80 MG: 10 INJECTION, SOLUTION INTRAMUSCULAR; INTRAVENOUS at 08:51

## 2018-01-01 RX ADMIN — INSULIN LISPRO 3 UNITS: 100 INJECTION, SOLUTION INTRAVENOUS; SUBCUTANEOUS at 18:00

## 2018-01-01 RX ADMIN — OXYCODONE HYDROCHLORIDE 5 MG: 5 TABLET ORAL at 14:41

## 2018-01-01 RX ADMIN — INSULIN LISPRO 2 UNITS: 100 INJECTION, SOLUTION INTRAVENOUS; SUBCUTANEOUS at 17:15

## 2018-01-01 RX ADMIN — LANSOPRAZOLE 30 MG: 30 TABLET, ORALLY DISINTEGRATING, DELAYED RELEASE ORAL at 08:48

## 2018-01-01 RX ADMIN — GABAPENTIN 100 MG: 100 CAPSULE ORAL at 21:13

## 2018-01-01 RX ADMIN — Medication 10 ML: at 22:12

## 2018-01-01 RX ADMIN — SODIUM CHLORIDE 125 MG: 9 INJECTION, SOLUTION INTRAVENOUS at 12:16

## 2018-01-01 RX ADMIN — BUDESONIDE 500 MCG: 0.5 INHALANT RESPIRATORY (INHALATION) at 07:30

## 2018-01-01 RX ADMIN — NYSTATIN: 100000 POWDER TOPICAL at 18:38

## 2018-01-01 RX ADMIN — Medication 500 MG: at 08:30

## 2018-01-01 RX ADMIN — IPRATROPIUM BROMIDE AND ALBUTEROL SULFATE 3 ML: .5; 3 SOLUTION RESPIRATORY (INHALATION) at 19:15

## 2018-01-01 RX ADMIN — GABAPENTIN 100 MG: 100 CAPSULE ORAL at 17:11

## 2018-01-01 RX ADMIN — BUDESONIDE 500 MCG: 0.5 INHALANT RESPIRATORY (INHALATION) at 20:16

## 2018-01-01 RX ADMIN — GABAPENTIN 100 MG: 100 CAPSULE ORAL at 08:04

## 2018-01-01 RX ADMIN — ISOSORBIDE MONONITRATE 30 MG: 30 TABLET, EXTENDED RELEASE ORAL at 08:52

## 2018-01-01 RX ADMIN — ALBUTEROL SULFATE 2.5 MG: 2.5 SOLUTION RESPIRATORY (INHALATION) at 08:23

## 2018-01-01 RX ADMIN — OXYCODONE HYDROCHLORIDE 5 MG: 5 TABLET ORAL at 22:43

## 2018-01-01 RX ADMIN — SODIUM CHLORIDE 500 ML: 900 INJECTION, SOLUTION INTRAVENOUS at 15:12

## 2018-01-01 RX ADMIN — FUROSEMIDE 80 MG: 40 TABLET ORAL at 09:04

## 2018-01-01 RX ADMIN — AMOXICILLIN AND CLAVULANATE POTASSIUM 1 TABLET: 500; 125 TABLET, FILM COATED ORAL at 16:46

## 2018-01-01 RX ADMIN — SERTRALINE HYDROCHLORIDE 50 MG: 50 TABLET ORAL at 08:31

## 2018-01-01 RX ADMIN — INSULIN LISPRO 2 UNITS: 100 INJECTION, SOLUTION INTRAVENOUS; SUBCUTANEOUS at 21:49

## 2018-01-01 RX ADMIN — ALBUTEROL SULFATE 2.5 MG: 2.5 SOLUTION RESPIRATORY (INHALATION) at 07:47

## 2018-01-01 RX ADMIN — TRAMADOL HYDROCHLORIDE 50 MG: 50 TABLET, FILM COATED ORAL at 19:31

## 2018-01-01 RX ADMIN — NYSTATIN: 100000 POWDER TOPICAL at 17:16

## 2018-01-01 RX ADMIN — SODIUM CHLORIDE 125 MG: 9 INJECTION, SOLUTION INTRAVENOUS at 10:54

## 2018-01-01 RX ADMIN — ALBUTEROL SULFATE 2.5 MG: 2.5 SOLUTION RESPIRATORY (INHALATION) at 07:25

## 2018-01-01 RX ADMIN — FERROUS SULFATE TAB 325 MG (65 MG ELEMENTAL FE) 325 MG: 325 (65 FE) TAB at 08:45

## 2018-01-01 RX ADMIN — IPRATROPIUM BROMIDE AND ALBUTEROL SULFATE 3 ML: .5; 3 SOLUTION RESPIRATORY (INHALATION) at 07:40

## 2018-01-01 RX ADMIN — INSULIN LISPRO 4 UNITS: 100 INJECTION, SOLUTION INTRAVENOUS; SUBCUTANEOUS at 11:56

## 2018-01-01 RX ADMIN — PIPERACILLIN SODIUM,TAZOBACTAM SODIUM 4.5 G: 4; .5 INJECTION, POWDER, FOR SOLUTION INTRAVENOUS at 05:14

## 2018-01-01 RX ADMIN — Medication 10 ML: at 04:53

## 2018-01-01 RX ADMIN — SODIUM CHLORIDE 500 ML: 900 INJECTION, SOLUTION INTRAVENOUS at 17:24

## 2018-01-01 RX ADMIN — OXYCODONE HYDROCHLORIDE 5 MG: 5 TABLET ORAL at 21:41

## 2018-01-01 RX ADMIN — Medication 5 ML: at 17:12

## 2018-01-01 RX ADMIN — LEVOTHYROXINE SODIUM 50 MCG: 50 TABLET ORAL at 05:22

## 2018-01-01 RX ADMIN — HYDRALAZINE HYDROCHLORIDE 10 MG: 10 TABLET, FILM COATED ORAL at 08:53

## 2018-01-01 RX ADMIN — PANTOPRAZOLE SODIUM 40 MG: 40 TABLET, DELAYED RELEASE ORAL at 14:00

## 2018-01-01 RX ADMIN — Medication 500 MG: at 08:39

## 2018-01-01 RX ADMIN — INSULIN LISPRO 2 UNITS: 100 INJECTION, SOLUTION INTRAVENOUS; SUBCUTANEOUS at 12:03

## 2018-01-01 RX ADMIN — INSULIN GLARGINE 10 UNITS: 100 INJECTION, SOLUTION SUBCUTANEOUS at 22:09

## 2018-01-01 RX ADMIN — LANSOPRAZOLE 30 MG: 30 TABLET, ORALLY DISINTEGRATING, DELAYED RELEASE ORAL at 08:36

## 2018-01-01 RX ADMIN — PANTOPRAZOLE SODIUM 40 MG: 40 TABLET, DELAYED RELEASE ORAL at 17:13

## 2018-01-01 RX ADMIN — GABAPENTIN 200 MG: 100 CAPSULE ORAL at 15:32

## 2018-01-01 RX ADMIN — HYDROCORTISONE 2.5%: 25 CREAM TOPICAL at 13:00

## 2018-01-01 RX ADMIN — INSULIN GLARGINE 20 UNITS: 100 INJECTION, SOLUTION SUBCUTANEOUS at 22:38

## 2018-01-01 RX ADMIN — Medication 5 ML: at 05:34

## 2018-01-01 RX ADMIN — SODIUM CHLORIDE 100 ML/HR: 900 INJECTION, SOLUTION INTRAVENOUS at 05:50

## 2018-01-01 RX ADMIN — GABAPENTIN 100 MG: 100 CAPSULE ORAL at 21:50

## 2018-01-01 RX ADMIN — ISOSORBIDE MONONITRATE 30 MG: 30 TABLET, EXTENDED RELEASE ORAL at 09:39

## 2018-01-01 RX ADMIN — IPRATROPIUM BROMIDE AND ALBUTEROL SULFATE 3 ML: .5; 3 SOLUTION RESPIRATORY (INHALATION) at 01:21

## 2018-01-01 RX ADMIN — PREDNISONE 40 MG: 20 TABLET ORAL at 08:18

## 2018-01-01 RX ADMIN — Medication 10 ML: at 21:03

## 2018-01-01 RX ADMIN — HYDROCODONE BITARTRATE AND ACETAMINOPHEN 1 TABLET: 10; 325 TABLET ORAL at 21:02

## 2018-01-01 RX ADMIN — DOCUSATE SODIUM 100 MG: 100 CAPSULE, LIQUID FILLED ORAL at 08:53

## 2018-01-01 RX ADMIN — ALBUTEROL SULFATE 2.5 MG: 2.5 SOLUTION RESPIRATORY (INHALATION) at 14:16

## 2018-01-01 RX ADMIN — PRAVASTATIN SODIUM 40 MG: 20 TABLET ORAL at 21:13

## 2018-01-01 RX ADMIN — POTASSIUM CHLORIDE 20 MEQ: 20 TABLET, EXTENDED RELEASE ORAL at 09:05

## 2018-01-01 RX ADMIN — VITAMIN D, TAB 1000IU (100/BT) 1000 UNITS: 25 TAB at 08:31

## 2018-01-01 RX ADMIN — ALBUTEROL SULFATE 2.5 MG: 2.5 SOLUTION RESPIRATORY (INHALATION) at 07:38

## 2018-01-01 RX ADMIN — IPRATROPIUM BROMIDE AND ALBUTEROL SULFATE 3 ML: .5; 3 SOLUTION RESPIRATORY (INHALATION) at 07:48

## 2018-01-01 RX ADMIN — INSULIN LISPRO 2 UNITS: 100 INJECTION, SOLUTION INTRAVENOUS; SUBCUTANEOUS at 21:40

## 2018-01-01 RX ADMIN — ASPIRIN 81 MG 81 MG: 81 TABLET ORAL at 09:11

## 2018-01-01 RX ADMIN — NYSTATIN: 100000 POWDER TOPICAL at 12:28

## 2018-01-01 RX ADMIN — SODIUM CHLORIDE 1 ML/KG/HR: 900 INJECTION, SOLUTION INTRAVENOUS at 12:03

## 2018-01-01 RX ADMIN — AMOXICILLIN AND CLAVULANATE POTASSIUM 1 TABLET: 500; 125 TABLET, FILM COATED ORAL at 08:35

## 2018-01-01 RX ADMIN — INSULIN LISPRO 4 UNITS: 100 INJECTION, SOLUTION INTRAVENOUS; SUBCUTANEOUS at 11:43

## 2018-01-01 RX ADMIN — INSULIN LISPRO 8 UNITS: 100 INJECTION, SOLUTION INTRAVENOUS; SUBCUTANEOUS at 11:59

## 2018-01-01 RX ADMIN — ALBUTEROL SULFATE 2.5 MG: 2.5 SOLUTION RESPIRATORY (INHALATION) at 07:40

## 2018-01-01 RX ADMIN — IPRATROPIUM BROMIDE AND ALBUTEROL SULFATE 3 ML: .5; 3 SOLUTION RESPIRATORY (INHALATION) at 13:37

## 2018-01-01 RX ADMIN — DOCUSATE SODIUM 100 MG: 100 CAPSULE, LIQUID FILLED ORAL at 09:36

## 2018-01-01 RX ADMIN — HYDROCODONE BITARTRATE AND ACETAMINOPHEN 1 TABLET: 10; 325 TABLET ORAL at 06:36

## 2018-01-01 RX ADMIN — ISOSORBIDE MONONITRATE 30 MG: 30 TABLET, EXTENDED RELEASE ORAL at 08:31

## 2018-01-01 RX ADMIN — HALOPERIDOL 5 MG: 5 TABLET ORAL at 21:44

## 2018-01-01 RX ADMIN — PANTOPRAZOLE SODIUM 40 MG: 40 TABLET, DELAYED RELEASE ORAL at 05:09

## 2018-01-01 RX ADMIN — BUDESONIDE 500 MCG: 0.5 INHALANT RESPIRATORY (INHALATION) at 07:25

## 2018-01-01 RX ADMIN — LATANOPROST 1 DROP: 50 SOLUTION OPHTHALMIC at 22:09

## 2018-01-01 RX ADMIN — PRAVASTATIN SODIUM 40 MG: 20 TABLET ORAL at 21:53

## 2018-01-01 RX ADMIN — FERROUS SULFATE TAB 325 MG (65 MG ELEMENTAL FE) 325 MG: 325 (65 FE) TAB at 08:47

## 2018-01-01 RX ADMIN — PANTOPRAZOLE SODIUM 40 MG: 40 TABLET, DELAYED RELEASE ORAL at 08:08

## 2018-01-01 RX ADMIN — SODIUM CHLORIDE 1000 ML: 900 INJECTION, SOLUTION INTRAVENOUS at 17:15

## 2018-01-01 RX ADMIN — METOPROLOL SUCCINATE 12.5 MG: 25 TABLET, EXTENDED RELEASE ORAL at 09:05

## 2018-01-01 RX ADMIN — ASPIRIN 81 MG 81 MG: 81 TABLET ORAL at 08:03

## 2018-01-01 RX ADMIN — BUDESONIDE 500 MCG: 0.5 INHALANT RESPIRATORY (INHALATION) at 20:14

## 2018-01-01 RX ADMIN — ALLOPURINOL 300 MG: 300 TABLET ORAL at 09:05

## 2018-01-01 RX ADMIN — BUDESONIDE 500 MCG: 0.5 INHALANT RESPIRATORY (INHALATION) at 07:38

## 2018-01-01 RX ADMIN — METOPROLOL SUCCINATE 25 MG: 25 TABLET, EXTENDED RELEASE ORAL at 08:07

## 2018-01-01 RX ADMIN — Medication 10 ML: at 18:27

## 2018-01-01 RX ADMIN — GABAPENTIN 100 MG: 100 CAPSULE ORAL at 08:39

## 2018-01-01 RX ADMIN — GABAPENTIN 100 MG: 100 CAPSULE ORAL at 08:44

## 2018-01-01 RX ADMIN — IPRATROPIUM BROMIDE AND ALBUTEROL SULFATE 3 ML: .5; 3 SOLUTION RESPIRATORY (INHALATION) at 08:46

## 2018-01-01 RX ADMIN — ALLOPURINOL 300 MG: 300 TABLET ORAL at 08:43

## 2018-01-01 RX ADMIN — NYSTATIN: 100000 POWDER TOPICAL at 09:00

## 2018-01-01 RX ADMIN — ALBUTEROL SULFATE 2.5 MG: 2.5 SOLUTION RESPIRATORY (INHALATION) at 13:56

## 2018-01-01 RX ADMIN — METHYLPREDNISOLONE SODIUM SUCCINATE 40 MG: 125 INJECTION, POWDER, FOR SOLUTION INTRAMUSCULAR; INTRAVENOUS at 21:41

## 2018-01-01 RX ADMIN — SERTRALINE HYDROCHLORIDE 25 MG: 50 TABLET ORAL at 08:33

## 2018-01-01 RX ADMIN — SODIUM CHLORIDE 125 MG: 9 INJECTION, SOLUTION INTRAVENOUS at 11:02

## 2018-01-01 RX ADMIN — SERTRALINE HYDROCHLORIDE 50 MG: 50 TABLET ORAL at 12:28

## 2018-01-01 RX ADMIN — SERTRALINE HYDROCHLORIDE 25 MG: 50 TABLET ORAL at 08:41

## 2018-01-01 RX ADMIN — BUDESONIDE 500 MCG: 0.5 INHALANT RESPIRATORY (INHALATION) at 08:45

## 2018-01-01 RX ADMIN — OXYCODONE HYDROCHLORIDE 15 MG: 5 TABLET ORAL at 10:52

## 2018-01-01 RX ADMIN — ALBUTEROL SULFATE 2.5 MG: 2.5 SOLUTION RESPIRATORY (INHALATION) at 20:03

## 2018-01-01 RX ADMIN — INSULIN LISPRO 3 UNITS: 100 INJECTION, SOLUTION INTRAVENOUS; SUBCUTANEOUS at 16:19

## 2018-01-01 RX ADMIN — LANSOPRAZOLE 30 MG: 30 TABLET, ORALLY DISINTEGRATING, DELAYED RELEASE ORAL at 08:33

## 2018-01-01 RX ADMIN — PANTOPRAZOLE SODIUM 40 MG: 40 TABLET, DELAYED RELEASE ORAL at 05:35

## 2018-01-01 RX ADMIN — DEXTROSE MONOHYDRATE AND SODIUM CHLORIDE 75 ML/HR: 5; .9 INJECTION, SOLUTION INTRAVENOUS at 16:23

## 2018-01-01 RX ADMIN — GABAPENTIN 100 MG: 100 CAPSULE ORAL at 17:56

## 2018-01-01 RX ADMIN — GABAPENTIN 100 MG: 100 CAPSULE ORAL at 08:31

## 2018-01-01 RX ADMIN — Medication 500 MG: at 08:33

## 2018-01-01 RX ADMIN — ALBUTEROL SULFATE 2.5 MG: 2.5 SOLUTION RESPIRATORY (INHALATION) at 14:13

## 2018-01-01 RX ADMIN — PANTOPRAZOLE SODIUM 40 MG: 40 TABLET, DELAYED RELEASE ORAL at 08:52

## 2018-01-01 RX ADMIN — ALBUTEROL SULFATE 2.5 MG: 2.5 SOLUTION RESPIRATORY (INHALATION) at 20:16

## 2018-01-01 RX ADMIN — LEVOTHYROXINE SODIUM 50 MCG: 50 TABLET ORAL at 09:09

## 2018-01-01 RX ADMIN — Medication 10 ML: at 14:06

## 2018-01-01 RX ADMIN — PANTOPRAZOLE SODIUM 40 MG: 40 TABLET, DELAYED RELEASE ORAL at 16:43

## 2018-01-01 RX ADMIN — NOREPINEPHRINE BITARTRATE 4 MCG/MIN: 1 INJECTION INTRAVENOUS at 14:27

## 2018-01-01 RX ADMIN — Medication 5 ML: at 06:00

## 2018-01-01 RX ADMIN — ACETAMINOPHEN 650 MG: 325 TABLET ORAL at 16:51

## 2018-01-01 RX ADMIN — PREDNISONE 40 MG: 20 TABLET ORAL at 08:53

## 2018-01-01 RX ADMIN — AMOXICILLIN AND CLAVULANATE POTASSIUM 1 TABLET: 500; 125 TABLET, FILM COATED ORAL at 16:15

## 2018-01-01 RX ADMIN — LEVOTHYROXINE SODIUM 50 MCG: 50 TABLET ORAL at 05:49

## 2018-01-01 RX ADMIN — FUROSEMIDE 40 MG: 40 TABLET ORAL at 05:23

## 2018-01-01 RX ADMIN — GABAPENTIN 100 MG: 100 CAPSULE ORAL at 17:10

## 2018-01-01 RX ADMIN — ALBUTEROL SULFATE 2.5 MG: 2.5 SOLUTION RESPIRATORY (INHALATION) at 14:29

## 2018-01-01 RX ADMIN — FLUTICASONE PROPIONATE 2 SPRAY: 50 SPRAY, METERED NASAL at 08:46

## 2018-01-01 RX ADMIN — OSELTAMIVIR PHOSPHATE 30 MG: 30 CAPSULE ORAL at 17:00

## 2018-01-01 RX ADMIN — Medication 500 MG: at 08:52

## 2018-01-01 RX ADMIN — ALBUTEROL SULFATE 2.5 MG: 2.5 SOLUTION RESPIRATORY (INHALATION) at 21:10

## 2018-01-01 RX ADMIN — LACTULOSE 20 G: 20 SOLUTION ORAL at 21:24

## 2018-01-01 RX ADMIN — Medication 500 MG: at 08:08

## 2018-01-01 RX ADMIN — ASPIRIN 81 MG 81 MG: 81 TABLET ORAL at 08:45

## 2018-01-01 RX ADMIN — PIPERACILLIN SODIUM,TAZOBACTAM SODIUM 4.5 G: 4; .5 INJECTION, POWDER, FOR SOLUTION INTRAVENOUS at 21:40

## 2018-01-01 RX ADMIN — NYSTATIN: 100000 POWDER TOPICAL at 09:45

## 2018-01-01 RX ADMIN — SERTRALINE HYDROCHLORIDE 25 MG: 50 TABLET ORAL at 08:30

## 2018-01-01 RX ADMIN — HEPARIN SODIUM 5000 UNITS: 5000 INJECTION, SOLUTION INTRAVENOUS; SUBCUTANEOUS at 16:44

## 2018-01-01 RX ADMIN — Medication 5 ML: at 05:30

## 2018-01-01 RX ADMIN — HEPARIN SODIUM 5000 UNITS: 5000 INJECTION, SOLUTION INTRAVENOUS; SUBCUTANEOUS at 23:54

## 2018-01-01 RX ADMIN — BUDESONIDE 500 MCG: 0.5 INHALANT RESPIRATORY (INHALATION) at 20:26

## 2018-01-01 RX ADMIN — PETROLATUM: 42 OINTMENT TOPICAL at 08:46

## 2018-01-01 RX ADMIN — GABAPENTIN 100 MG: 100 CAPSULE ORAL at 08:48

## 2018-01-01 RX ADMIN — LEVOFLOXACIN 750 MG: 250 TABLET, FILM COATED ORAL at 00:54

## 2018-01-01 RX ADMIN — HEPARIN SODIUM 5000 UNITS: 5000 INJECTION, SOLUTION INTRAVENOUS; SUBCUTANEOUS at 08:51

## 2018-01-01 RX ADMIN — HEPARIN SODIUM 5000 UNITS: 5000 INJECTION, SOLUTION INTRAVENOUS; SUBCUTANEOUS at 23:53

## 2018-01-01 RX ADMIN — INSULIN LISPRO 3 UNITS: 100 INJECTION, SOLUTION INTRAVENOUS; SUBCUTANEOUS at 11:35

## 2018-01-01 RX ADMIN — PIPERACILLIN SODIUM,TAZOBACTAM SODIUM 4.5 G: 4; .5 INJECTION, POWDER, FOR SOLUTION INTRAVENOUS at 14:27

## 2018-01-01 RX ADMIN — GABAPENTIN 100 MG: 100 CAPSULE ORAL at 21:12

## 2018-01-01 RX ADMIN — Medication 10 ML: at 10:22

## 2018-01-01 RX ADMIN — ACETAMINOPHEN 650 MG: 325 TABLET ORAL at 15:44

## 2018-01-01 RX ADMIN — ALBUTEROL SULFATE 2.5 MG: 2.5 SOLUTION RESPIRATORY (INHALATION) at 19:15

## 2018-01-01 RX ADMIN — ALBUTEROL SULFATE 2.5 MG: 2.5 SOLUTION RESPIRATORY (INHALATION) at 08:34

## 2018-01-01 RX ADMIN — BUDESONIDE 500 MCG: 0.5 INHALANT RESPIRATORY (INHALATION) at 07:40

## 2018-01-01 RX ADMIN — SODIUM CHLORIDE 1000 ML: 900 INJECTION, SOLUTION INTRAVENOUS at 09:38

## 2018-01-01 RX ADMIN — FLUTICASONE PROPIONATE 2 SPRAY: 50 SPRAY, METERED NASAL at 12:29

## 2018-01-01 RX ADMIN — ASPIRIN 81 MG 81 MG: 81 TABLET ORAL at 09:00

## 2018-01-01 RX ADMIN — ALBUTEROL SULFATE 2.5 MG: 2.5 SOLUTION RESPIRATORY (INHALATION) at 19:30

## 2018-01-01 RX ADMIN — GABAPENTIN 100 MG: 100 CAPSULE ORAL at 10:03

## 2018-01-01 RX ADMIN — ALBUTEROL SULFATE 2.5 MG: 2.5 SOLUTION RESPIRATORY (INHALATION) at 07:13

## 2018-01-01 RX ADMIN — FUROSEMIDE 40 MG: 40 TABLET ORAL at 08:05

## 2018-01-01 RX ADMIN — Medication 5 ML: at 21:54

## 2018-01-01 RX ADMIN — FUROSEMIDE 40 MG: 40 TABLET ORAL at 09:00

## 2018-01-01 RX ADMIN — ALBUTEROL SULFATE 2.5 MG: 2.5 SOLUTION RESPIRATORY (INHALATION) at 02:14

## 2018-01-01 RX ADMIN — AMOXICILLIN AND CLAVULANATE POTASSIUM 1 TABLET: 500; 125 TABLET, FILM COATED ORAL at 08:39

## 2018-01-01 RX ADMIN — GABAPENTIN 100 MG: 100 CAPSULE ORAL at 21:16

## 2018-01-01 RX ADMIN — FUROSEMIDE 80 MG: 40 TABLET ORAL at 08:52

## 2018-01-01 RX ADMIN — ALBUTEROL SULFATE 2.5 MG: 2.5 SOLUTION RESPIRATORY (INHALATION) at 07:54

## 2018-01-01 RX ADMIN — ALBUTEROL SULFATE 2.5 MG: 2.5 SOLUTION RESPIRATORY (INHALATION) at 07:55

## 2018-01-01 RX ADMIN — OXYCODONE HYDROCHLORIDE 5 MG: 5 TABLET ORAL at 13:34

## 2018-01-01 RX ADMIN — DEXTROSE MONOHYDRATE AND SODIUM CHLORIDE 50 ML/HR: 5; .45 INJECTION, SOLUTION INTRAVENOUS at 08:42

## 2018-01-01 RX ADMIN — METOPROLOL SUCCINATE 25 MG: 25 TABLET, EXTENDED RELEASE ORAL at 08:35

## 2018-01-01 RX ADMIN — BUDESONIDE 500 MCG: 0.5 INHALANT RESPIRATORY (INHALATION) at 08:20

## 2018-01-01 RX ADMIN — HUMAN INSULIN 4 UNITS: 100 INJECTION, SOLUTION SUBCUTANEOUS at 22:09

## 2018-01-01 RX ADMIN — BUDESONIDE 500 MCG: 0.5 INHALANT RESPIRATORY (INHALATION) at 19:55

## 2018-01-01 RX ADMIN — PANTOPRAZOLE SODIUM 40 MG: 40 TABLET, DELAYED RELEASE ORAL at 17:10

## 2018-01-01 RX ADMIN — INSULIN LISPRO 3 UNITS: 100 INJECTION, SOLUTION INTRAVENOUS; SUBCUTANEOUS at 07:30

## 2018-01-01 RX ADMIN — INSULIN LISPRO 3 UNITS: 100 INJECTION, SOLUTION INTRAVENOUS; SUBCUTANEOUS at 12:18

## 2018-01-01 RX ADMIN — PROPOFOL 10 MG: 10 INJECTION, EMULSION INTRAVENOUS at 15:06

## 2018-01-01 RX ADMIN — Medication 5 ML: at 13:21

## 2018-01-01 RX ADMIN — INSULIN LISPRO 4 UNITS: 100 INJECTION, SOLUTION INTRAVENOUS; SUBCUTANEOUS at 22:08

## 2018-01-01 RX ADMIN — PANTOPRAZOLE SODIUM 40 MG: 40 TABLET, DELAYED RELEASE ORAL at 08:35

## 2018-01-01 RX ADMIN — GABAPENTIN 100 MG: 100 CAPSULE ORAL at 17:04

## 2018-01-01 RX ADMIN — TUBERCULIN PURIFIED PROTEIN DERIVATIVE 5 UNITS: 5 INJECTION, SOLUTION INTRADERMAL at 09:02

## 2018-01-01 RX ADMIN — ASPIRIN 325 MG ORAL TABLET 325 MG: 325 PILL ORAL at 08:42

## 2018-01-01 RX ADMIN — BUDESONIDE 500 MCG: 0.5 INHALANT RESPIRATORY (INHALATION) at 08:28

## 2018-01-01 RX ADMIN — POTASSIUM CHLORIDE 20 MEQ: 20 TABLET, EXTENDED RELEASE ORAL at 08:52

## 2018-01-01 RX ADMIN — LANSOPRAZOLE 30 MG: 30 TABLET, ORALLY DISINTEGRATING, DELAYED RELEASE ORAL at 08:52

## 2018-01-01 RX ADMIN — LEVETIRACETAM 500 MG: 100 INJECTION, SOLUTION INTRAVENOUS at 16:59

## 2018-01-01 RX ADMIN — CEFTRIAXONE SODIUM 1 G: 1 INJECTION, POWDER, FOR SOLUTION INTRAMUSCULAR; INTRAVENOUS at 17:11

## 2018-01-01 RX ADMIN — ALBUTEROL SULFATE 2.5 MG: 2.5 SOLUTION RESPIRATORY (INHALATION) at 08:28

## 2018-01-01 RX ADMIN — GABAPENTIN 100 MG: 100 CAPSULE ORAL at 08:08

## 2018-01-01 RX ADMIN — METOPROLOL SUCCINATE 25 MG: 25 TABLET, EXTENDED RELEASE ORAL at 08:30

## 2018-01-01 RX ADMIN — INSULIN LISPRO 2 UNITS: 100 INJECTION, SOLUTION INTRAVENOUS; SUBCUTANEOUS at 18:00

## 2018-01-01 RX ADMIN — NYSTATIN: 100000 POWDER TOPICAL at 17:40

## 2018-01-01 RX ADMIN — PRAVASTATIN SODIUM 40 MG: 20 TABLET ORAL at 20:37

## 2018-01-01 RX ADMIN — METOPROLOL SUCCINATE 12.5 MG: 25 TABLET, EXTENDED RELEASE ORAL at 09:06

## 2018-01-01 RX ADMIN — AMOXICILLIN AND CLAVULANATE POTASSIUM 1 TABLET: 500; 125 TABLET, FILM COATED ORAL at 17:10

## 2018-01-01 RX ADMIN — INSULIN LISPRO 3 UNITS: 100 INJECTION, SOLUTION INTRAVENOUS; SUBCUTANEOUS at 12:45

## 2018-01-01 RX ADMIN — PRAVASTATIN SODIUM 40 MG: 20 TABLET ORAL at 21:43

## 2018-01-01 RX ADMIN — DEXTROSE MONOHYDRATE 25 G: 25 INJECTION, SOLUTION INTRAVENOUS at 06:23

## 2018-01-01 RX ADMIN — IPRATROPIUM BROMIDE AND ALBUTEROL SULFATE 3 ML: .5; 3 SOLUTION RESPIRATORY (INHALATION) at 19:28

## 2018-01-01 RX ADMIN — ASPIRIN 81 MG 81 MG: 81 TABLET ORAL at 08:32

## 2018-01-01 RX ADMIN — METOPROLOL SUCCINATE 12.5 MG: 25 TABLET, EXTENDED RELEASE ORAL at 09:35

## 2018-01-01 RX ADMIN — Medication 5 ML: at 05:48

## 2018-01-01 RX ADMIN — LANSOPRAZOLE 30 MG: 30 TABLET, ORALLY DISINTEGRATING, DELAYED RELEASE ORAL at 08:07

## 2018-01-01 RX ADMIN — LATANOPROST 1 DROP: 50 SOLUTION/ DROPS OPHTHALMIC at 23:18

## 2018-01-01 RX ADMIN — BISACODYL 5 MG: 5 TABLET, COATED ORAL at 09:40

## 2018-01-01 RX ADMIN — Medication 10 ML: at 14:27

## 2018-01-01 RX ADMIN — Medication 5 ML: at 21:40

## 2018-01-01 RX ADMIN — Medication 500 MG: at 09:39

## 2018-01-01 RX ADMIN — NYSTATIN: 100000 POWDER TOPICAL at 08:54

## 2018-01-01 RX ADMIN — LEVOTHYROXINE SODIUM 50 MCG: 50 TABLET ORAL at 05:14

## 2018-01-01 RX ADMIN — GABAPENTIN 100 MG: 100 CAPSULE ORAL at 21:57

## 2018-01-01 RX ADMIN — Medication 5 ML: at 05:17

## 2018-01-01 RX ADMIN — ACETAMINOPHEN 650 MG: 325 TABLET ORAL at 22:44

## 2018-01-01 RX ADMIN — INSULIN LISPRO 2 UNITS: 100 INJECTION, SOLUTION INTRAVENOUS; SUBCUTANEOUS at 12:15

## 2018-01-01 RX ADMIN — LATANOPROST 1 DROP: 50 SOLUTION OPHTHALMIC at 22:08

## 2018-01-01 RX ADMIN — ASPIRIN 81 MG 81 MG: 81 TABLET ORAL at 08:44

## 2018-01-01 RX ADMIN — HYDROCORTISONE 2.5%: 25 CREAM TOPICAL at 08:47

## 2018-01-01 RX ADMIN — ALBUTEROL SULFATE 2.5 MG: 2.5 SOLUTION RESPIRATORY (INHALATION) at 15:39

## 2018-01-01 RX ADMIN — ALBUTEROL SULFATE 2.5 MG: 2.5 SOLUTION RESPIRATORY (INHALATION) at 13:47

## 2018-01-01 RX ADMIN — FUROSEMIDE 40 MG: 10 INJECTION, SOLUTION INTRAMUSCULAR; INTRAVENOUS at 06:44

## 2018-01-01 RX ADMIN — SODIUM CHLORIDE 80 MG: 9 INJECTION, SOLUTION INTRAMUSCULAR; INTRAVENOUS; SUBCUTANEOUS at 21:25

## 2018-01-01 RX ADMIN — Medication: at 23:18

## 2018-01-01 RX ADMIN — HYDROCORTISONE 2.5%: 25 CREAM TOPICAL at 22:20

## 2018-01-01 RX ADMIN — HYDRALAZINE HYDROCHLORIDE 10 MG: 10 TABLET, FILM COATED ORAL at 09:37

## 2018-01-01 RX ADMIN — Medication 10 ML: at 17:56

## 2018-01-01 RX ADMIN — SODIUM CHLORIDE 125 MG: 9 INJECTION, SOLUTION INTRAVENOUS at 12:14

## 2018-01-01 RX ADMIN — ENOXAPARIN SODIUM 30 MG: 100 INJECTION SUBCUTANEOUS at 19:51

## 2018-01-01 RX ADMIN — Medication 400 MG: at 12:28

## 2018-01-01 RX ADMIN — ASPIRIN 81 MG 81 MG: 81 TABLET ORAL at 09:05

## 2018-01-01 RX ADMIN — ALBUTEROL SULFATE 2.5 MG: 2.5 SOLUTION RESPIRATORY (INHALATION) at 14:36

## 2018-01-01 RX ADMIN — Medication 10 ML: at 21:56

## 2018-01-01 RX ADMIN — METHYLPREDNISOLONE SODIUM SUCCINATE 40 MG: 125 INJECTION, POWDER, FOR SOLUTION INTRAMUSCULAR; INTRAVENOUS at 05:32

## 2018-01-01 RX ADMIN — NYSTATIN: 100000 POWDER TOPICAL at 09:08

## 2018-01-01 RX ADMIN — HUMAN INSULIN 2 UNITS: 100 INJECTION, SOLUTION SUBCUTANEOUS at 21:55

## 2018-01-01 RX ADMIN — SERTRALINE HYDROCHLORIDE 50 MG: 50 TABLET ORAL at 17:05

## 2018-01-01 RX ADMIN — CEFTRIAXONE SODIUM 1 G: 1 INJECTION, POWDER, FOR SOLUTION INTRAMUSCULAR; INTRAVENOUS at 08:47

## 2018-01-01 RX ADMIN — CLOPIDOGREL BISULFATE 75 MG: 75 TABLET ORAL at 08:42

## 2018-01-01 RX ADMIN — BUDESONIDE 500 MCG: 0.5 INHALANT RESPIRATORY (INHALATION) at 07:13

## 2018-01-01 RX ADMIN — PRAVASTATIN SODIUM 40 MG: 20 TABLET ORAL at 22:18

## 2018-01-01 RX ADMIN — BUDESONIDE 500 MCG: 0.5 INHALANT RESPIRATORY (INHALATION) at 08:23

## 2018-01-01 RX ADMIN — PIPERACILLIN SODIUM,TAZOBACTAM SODIUM 4.5 G: 4; .5 INJECTION, POWDER, FOR SOLUTION INTRAVENOUS at 15:03

## 2018-01-01 RX ADMIN — BISACODYL 5 MG: 5 TABLET, COATED ORAL at 09:05

## 2018-01-01 RX ADMIN — ALBUTEROL SULFATE 2.5 MG: 2.5 SOLUTION RESPIRATORY (INHALATION) at 20:21

## 2018-01-01 RX ADMIN — ALLOPURINOL 300 MG: 300 TABLET ORAL at 08:31

## 2018-01-01 RX ADMIN — INSULIN LISPRO 6 UNITS: 100 INJECTION, SOLUTION INTRAVENOUS; SUBCUTANEOUS at 23:08

## 2018-01-01 RX ADMIN — INSULIN LISPRO 3 UNITS: 100 INJECTION, SOLUTION INTRAVENOUS; SUBCUTANEOUS at 08:34

## 2018-01-01 RX ADMIN — NYSTATIN: 100000 POWDER TOPICAL at 09:10

## 2018-01-01 RX ADMIN — PROPOFOL 25 MCG/KG/MIN: 10 INJECTION, EMULSION INTRAVENOUS at 15:02

## 2018-01-01 RX ADMIN — NYSTATIN: 100000 POWDER TOPICAL at 18:02

## 2018-01-01 RX ADMIN — ALBUTEROL SULFATE 2.5 MG: 2.5 SOLUTION RESPIRATORY (INHALATION) at 07:58

## 2018-01-01 RX ADMIN — HUMAN INSULIN 2 UNITS: 100 INJECTION, SOLUTION SUBCUTANEOUS at 16:37

## 2018-01-01 RX ADMIN — NYSTATIN: 100000 POWDER TOPICAL at 10:20

## 2018-01-01 RX ADMIN — PANTOPRAZOLE SODIUM 40 MG: 40 TABLET, DELAYED RELEASE ORAL at 17:00

## 2018-01-01 RX ADMIN — Medication 5 ML: at 13:09

## 2018-01-01 RX ADMIN — PROPOFOL 10 MG: 10 INJECTION, EMULSION INTRAVENOUS at 15:11

## 2018-01-01 RX ADMIN — HEPARIN SODIUM 5000 UNITS: 5000 INJECTION INTRAVENOUS; SUBCUTANEOUS at 22:38

## 2018-01-01 RX ADMIN — BUDESONIDE 500 MCG: 0.5 INHALANT RESPIRATORY (INHALATION) at 07:59

## 2018-01-01 RX ADMIN — BUDESONIDE 500 MCG: 0.5 INHALANT RESPIRATORY (INHALATION) at 21:30

## 2018-01-01 RX ADMIN — LEVOTHYROXINE SODIUM 50 MCG: 50 TABLET ORAL at 05:28

## 2018-01-01 RX ADMIN — AMOXICILLIN AND CLAVULANATE POTASSIUM 1 TABLET: 500; 125 TABLET, FILM COATED ORAL at 17:04

## 2018-01-01 RX ADMIN — ALBUTEROL SULFATE 2.5 MG: 2.5 SOLUTION RESPIRATORY (INHALATION) at 20:50

## 2018-01-01 RX ADMIN — METHYLPREDNISOLONE SODIUM SUCCINATE 125 MG: 125 INJECTION, POWDER, FOR SOLUTION INTRAMUSCULAR; INTRAVENOUS at 16:41

## 2018-01-01 RX ADMIN — FUROSEMIDE 40 MG: 40 TABLET ORAL at 16:00

## 2018-01-01 RX ADMIN — PANTOPRAZOLE SODIUM 40 MG: 40 TABLET, DELAYED RELEASE ORAL at 08:45

## 2018-01-01 RX ADMIN — SERTRALINE HYDROCHLORIDE 50 MG: 50 TABLET ORAL at 21:49

## 2018-01-01 RX ADMIN — INSULIN LISPRO 2 UNITS: 100 INJECTION, SOLUTION INTRAVENOUS; SUBCUTANEOUS at 17:00

## 2018-01-01 RX ADMIN — ALBUTEROL SULFATE 2.5 MG: 2.5 SOLUTION RESPIRATORY (INHALATION) at 20:35

## 2018-01-01 RX ADMIN — NYSTATIN: 100000 POWDER TOPICAL at 08:34

## 2018-01-01 RX ADMIN — OXYCODONE HYDROCHLORIDE 5 MG: 5 TABLET ORAL at 14:24

## 2018-01-01 RX ADMIN — Medication 5 ML: at 05:21

## 2018-01-01 RX ADMIN — Medication 5 ML: at 05:14

## 2018-01-01 RX ADMIN — LEVOTHYROXINE SODIUM 50 MCG: 50 TABLET ORAL at 05:23

## 2018-01-01 RX ADMIN — Medication 10 ML: at 00:49

## 2018-01-01 RX ADMIN — DEXTROSE MONOHYDRATE AND SODIUM CHLORIDE 75 ML/HR: 5; .9 INJECTION, SOLUTION INTRAVENOUS at 18:21

## 2018-01-01 RX ADMIN — GABAPENTIN 100 MG: 100 CAPSULE ORAL at 15:36

## 2018-01-01 RX ADMIN — METOPROLOL SUCCINATE 12.5 MG: 25 TABLET, EXTENDED RELEASE ORAL at 13:09

## 2018-01-01 RX ADMIN — LATANOPROST 1 DROP: 50 SOLUTION OPHTHALMIC at 21:47

## 2018-01-01 RX ADMIN — ASPIRIN 81 MG 81 MG: 81 TABLET ORAL at 08:08

## 2018-01-01 RX ADMIN — ALBUTEROL SULFATE 2.5 MG: 2.5 SOLUTION RESPIRATORY (INHALATION) at 08:45

## 2018-01-01 RX ADMIN — NYSTATIN: 100000 POWDER TOPICAL at 17:14

## 2018-01-01 RX ADMIN — BISACODYL 5 MG: 5 TABLET, COATED ORAL at 08:32

## 2018-01-01 RX ADMIN — NITROGLYCERIN 1 INCH: 20 OINTMENT TOPICAL at 13:11

## 2018-01-01 RX ADMIN — HEPARIN SODIUM 5000 UNITS: 5000 INJECTION, SOLUTION INTRAVENOUS; SUBCUTANEOUS at 09:35

## 2018-01-01 RX ADMIN — NYSTATIN: 100000 POWDER TOPICAL at 08:36

## 2018-01-01 RX ADMIN — HEPARIN SODIUM 5000 UNITS: 5000 INJECTION, SOLUTION INTRAVENOUS; SUBCUTANEOUS at 00:32

## 2018-01-01 RX ADMIN — FAMOTIDINE 20 MG: 20 TABLET ORAL at 08:44

## 2018-01-01 RX ADMIN — INSULIN LISPRO 4 UNITS: 100 INJECTION, SOLUTION INTRAVENOUS; SUBCUTANEOUS at 21:38

## 2018-01-01 RX ADMIN — OSELTAMIVIR PHOSPHATE 75 MG: 75 CAPSULE ORAL at 16:57

## 2018-01-01 RX ADMIN — OXYCODONE HYDROCHLORIDE 5 MG: 5 TABLET ORAL at 02:53

## 2018-01-01 RX ADMIN — MIDAZOLAM HYDROCHLORIDE 0.5 MG: 1 INJECTION, SOLUTION INTRAMUSCULAR; INTRAVENOUS at 15:10

## 2018-01-01 RX ADMIN — ALBUTEROL SULFATE 2.5 MG: 2.5 SOLUTION RESPIRATORY (INHALATION) at 20:26

## 2018-01-01 RX ADMIN — GABAPENTIN 100 MG: 100 CAPSULE ORAL at 16:39

## 2018-01-01 RX ADMIN — HEPARIN SODIUM 5000 UNITS: 5000 INJECTION, SOLUTION INTRAVENOUS; SUBCUTANEOUS at 17:19

## 2018-01-01 RX ADMIN — OXYCODONE HYDROCHLORIDE 5 MG: 5 TABLET ORAL at 06:43

## 2018-01-01 RX ADMIN — PIPERACILLIN SODIUM,TAZOBACTAM SODIUM 4.5 G: 4; .5 INJECTION, POWDER, FOR SOLUTION INTRAVENOUS at 14:34

## 2018-01-01 RX ADMIN — SERTRALINE HYDROCHLORIDE 50 MG: 50 TABLET ORAL at 09:04

## 2018-01-01 RX ADMIN — ISOSORBIDE MONONITRATE 30 MG: 30 TABLET, EXTENDED RELEASE ORAL at 08:04

## 2018-01-01 RX ADMIN — INSULIN LISPRO 3 UNITS: 100 INJECTION, SOLUTION INTRAVENOUS; SUBCUTANEOUS at 17:00

## 2018-01-01 RX ADMIN — BUDESONIDE 500 MCG: 0.5 INHALANT RESPIRATORY (INHALATION) at 19:28

## 2018-01-01 RX ADMIN — LEVOTHYROXINE SODIUM 50 MCG: 50 TABLET ORAL at 05:27

## 2018-01-01 RX ADMIN — GABAPENTIN 100 MG: 100 CAPSULE ORAL at 09:36

## 2018-01-01 RX ADMIN — Medication 5 ML: at 13:57

## 2018-01-01 RX ADMIN — ALBUTEROL SULFATE 2.5 MG: 2.5 SOLUTION RESPIRATORY (INHALATION) at 15:12

## 2018-01-01 RX ADMIN — ALLOPURINOL 300 MG: 300 TABLET ORAL at 08:45

## 2018-01-01 RX ADMIN — VANCOMYCIN HYDROCHLORIDE 1500 MG: 10 INJECTION, POWDER, LYOPHILIZED, FOR SOLUTION INTRAVENOUS at 12:48

## 2018-01-01 RX ADMIN — INSULIN LISPRO 2 UNITS: 100 INJECTION, SOLUTION INTRAVENOUS; SUBCUTANEOUS at 15:46

## 2018-01-01 RX ADMIN — GABAPENTIN 100 MG: 100 CAPSULE ORAL at 16:42

## 2018-01-01 RX ADMIN — INSULIN LISPRO 2 UNITS: 100 INJECTION, SOLUTION INTRAVENOUS; SUBCUTANEOUS at 07:42

## 2018-01-01 RX ADMIN — Medication 5 ML: at 21:58

## 2018-01-01 RX ADMIN — NYSTATIN: 100000 POWDER TOPICAL at 18:27

## 2018-01-01 RX ADMIN — DEXTROSE MONOHYDRATE 25 G: 25 INJECTION, SOLUTION INTRAVENOUS at 04:39

## 2018-01-01 RX ADMIN — INSULIN LISPRO 3 UNITS: 100 INJECTION, SOLUTION INTRAVENOUS; SUBCUTANEOUS at 12:04

## 2018-01-01 RX ADMIN — PREDNISONE 40 MG: 20 TABLET ORAL at 09:39

## 2018-01-01 RX ADMIN — CLOPIDOGREL BISULFATE 75 MG: 75 TABLET ORAL at 09:35

## 2018-01-01 RX ADMIN — PANTOPRAZOLE SODIUM 40 MG: 40 TABLET, DELAYED RELEASE ORAL at 05:33

## 2018-01-01 RX ADMIN — Medication 10 ML: at 16:42

## 2018-01-01 RX ADMIN — AMOXICILLIN AND CLAVULANATE POTASSIUM 1 TABLET: 500; 125 TABLET, FILM COATED ORAL at 08:18

## 2018-01-01 RX ADMIN — HYDRALAZINE HYDROCHLORIDE 10 MG: 10 TABLET, FILM COATED ORAL at 21:44

## 2018-01-01 RX ADMIN — INSULIN LISPRO 3 UNITS: 100 INJECTION, SOLUTION INTRAVENOUS; SUBCUTANEOUS at 09:43

## 2018-01-01 RX ADMIN — Medication 5 ML: at 13:49

## 2018-01-01 RX ADMIN — DOBUTAMINE HYDROCHLORIDE 5 MCG/KG/MIN: 200 INJECTION INTRAVENOUS at 18:55

## 2018-01-01 RX ADMIN — POTASSIUM CHLORIDE 20 MEQ: 20 TABLET, EXTENDED RELEASE ORAL at 08:04

## 2018-01-01 RX ADMIN — LIDOCAINE HYDROCHLORIDE 40 MG: 20 INJECTION, SOLUTION EPIDURAL; INFILTRATION; INTRACAUDAL; PERINEURAL at 15:02

## 2018-01-01 RX ADMIN — BUDESONIDE 500 MCG: 0.5 INHALANT RESPIRATORY (INHALATION) at 20:08

## 2018-01-01 RX ADMIN — PRAVASTATIN SODIUM 40 MG: 20 TABLET ORAL at 21:55

## 2018-01-01 RX ADMIN — PRAVASTATIN SODIUM 40 MG: 20 TABLET ORAL at 21:32

## 2018-01-01 RX ADMIN — INSULIN LISPRO 2 UNITS: 100 INJECTION, SOLUTION INTRAVENOUS; SUBCUTANEOUS at 22:17

## 2018-01-01 RX ADMIN — NYSTATIN: 100000 POWDER TOPICAL at 18:09

## 2018-01-01 RX ADMIN — Medication 10 ML: at 15:36

## 2018-01-01 RX ADMIN — Medication 5 ML: at 21:50

## 2018-01-01 RX ADMIN — PROPOFOL 20 MG: 10 INJECTION, EMULSION INTRAVENOUS at 15:02

## 2018-01-01 RX ADMIN — INSULIN GLARGINE 10 UNITS: 100 INJECTION, SOLUTION SUBCUTANEOUS at 21:40

## 2018-01-01 RX ADMIN — DEXTROSE MONOHYDRATE 25 G: 25 INJECTION, SOLUTION INTRAVENOUS at 17:34

## 2018-01-01 RX ADMIN — SODIUM CHLORIDE 125 MG: 9 INJECTION, SOLUTION INTRAVENOUS at 09:00

## 2018-01-01 RX ADMIN — INSULIN LISPRO 3 UNITS: 100 INJECTION, SOLUTION INTRAVENOUS; SUBCUTANEOUS at 17:12

## 2018-01-01 RX ADMIN — ALBUTEROL SULFATE 2.5 MG: 2.5 SOLUTION RESPIRATORY (INHALATION) at 13:51

## 2018-01-01 RX ADMIN — ALBUTEROL SULFATE 2.5 MG: 2.5 SOLUTION RESPIRATORY (INHALATION) at 20:47

## 2018-01-01 RX ADMIN — HYDROCODONE BITARTRATE AND ACETAMINOPHEN 1 TABLET: 10; 325 TABLET ORAL at 06:18

## 2018-01-01 RX ADMIN — LANSOPRAZOLE 30 MG: 30 TABLET, ORALLY DISINTEGRATING, DELAYED RELEASE ORAL at 09:00

## 2018-01-01 RX ADMIN — HYDROCODONE BITARTRATE AND ACETAMINOPHEN 1 TABLET: 10; 325 TABLET ORAL at 11:50

## 2018-01-01 RX ADMIN — HUMAN INSULIN 2 UNITS: 100 INJECTION, SOLUTION SUBCUTANEOUS at 11:25

## 2018-01-01 RX ADMIN — FERROUS SULFATE TAB 325 MG (65 MG ELEMENTAL FE) 325 MG: 325 (65 FE) TAB at 05:33

## 2018-01-01 RX ADMIN — INSULIN LISPRO 2 UNITS: 100 INJECTION, SOLUTION INTRAVENOUS; SUBCUTANEOUS at 12:44

## 2018-01-01 RX ADMIN — Medication 5 ML: at 20:42

## 2018-01-01 RX ADMIN — ALBUTEROL SULFATE 2.5 MG: 2.5 SOLUTION RESPIRATORY (INHALATION) at 20:08

## 2018-01-01 RX ADMIN — INSULIN LISPRO 2 UNITS: 100 INJECTION, SOLUTION INTRAVENOUS; SUBCUTANEOUS at 05:30

## 2018-01-01 RX ADMIN — METOPROLOL SUCCINATE 12.5 MG: 25 TABLET, EXTENDED RELEASE ORAL at 08:36

## 2018-01-01 RX ADMIN — Medication 10 ML: at 14:55

## 2018-01-01 RX ADMIN — PIPERACILLIN SODIUM,TAZOBACTAM SODIUM 4.5 G: 4; .5 INJECTION, POWDER, FOR SOLUTION INTRAVENOUS at 21:44

## 2018-01-01 RX ADMIN — INSULIN LISPRO 2 UNITS: 100 INJECTION, SOLUTION INTRAVENOUS; SUBCUTANEOUS at 16:30

## 2018-01-01 RX ADMIN — LANSOPRAZOLE 30 MG: 30 TABLET, ORALLY DISINTEGRATING, DELAYED RELEASE ORAL at 09:09

## 2018-01-01 RX ADMIN — INSULIN LISPRO 4 UNITS: 100 INJECTION, SOLUTION INTRAVENOUS; SUBCUTANEOUS at 18:07

## 2018-01-01 RX ADMIN — BUDESONIDE 500 MCG: 0.5 INHALANT RESPIRATORY (INHALATION) at 20:35

## 2018-01-01 RX ADMIN — Medication 5 ML: at 05:26

## 2018-01-01 RX ADMIN — SODIUM CHLORIDE 500 ML: 900 INJECTION, SOLUTION INTRAVENOUS at 16:57

## 2018-01-01 RX ADMIN — Medication 500 MG: at 08:35

## 2018-01-01 RX ADMIN — LATANOPROST 1 DROP: 50 SOLUTION OPHTHALMIC at 22:11

## 2018-01-01 RX ADMIN — ALBUTEROL SULFATE 2.5 MG: 2.5 SOLUTION RESPIRATORY (INHALATION) at 14:10

## 2018-01-01 RX ADMIN — Medication 5 ML: at 05:28

## 2018-01-01 RX ADMIN — INSULIN LISPRO 6 UNITS: 100 INJECTION, SOLUTION INTRAVENOUS; SUBCUTANEOUS at 21:51

## 2018-01-01 RX ADMIN — METOPROLOL SUCCINATE 12.5 MG: 25 TABLET, EXTENDED RELEASE ORAL at 08:52

## 2018-01-01 RX ADMIN — METOPROLOL SUCCINATE 25 MG: 50 TABLET, EXTENDED RELEASE ORAL at 09:05

## 2018-01-01 RX ADMIN — TUBERCULIN PURIFIED PROTEIN DERIVATIVE 5 UNITS: 5 INJECTION, SOLUTION INTRADERMAL at 16:42

## 2018-01-01 RX ADMIN — INSULIN LISPRO 3 UNITS: 100 INJECTION, SOLUTION INTRAVENOUS; SUBCUTANEOUS at 12:15

## 2018-01-01 RX ADMIN — POTASSIUM CHLORIDE 20 MEQ: 20 TABLET, EXTENDED RELEASE ORAL at 08:42

## 2018-01-01 RX ADMIN — ENOXAPARIN SODIUM 30 MG: 100 INJECTION SUBCUTANEOUS at 18:07

## 2018-01-01 RX ADMIN — DOPAMINE HYDROCHLORIDE IN DEXTROSE 5 MCG/KG/MIN: 3.2 INJECTION, SOLUTION INTRAVENOUS at 17:50

## 2018-01-01 RX ADMIN — BUDESONIDE 500 MCG: 0.5 INHALANT RESPIRATORY (INHALATION) at 08:34

## 2018-01-01 RX ADMIN — ALBUTEROL SULFATE 2.5 MG: 2.5 SOLUTION RESPIRATORY (INHALATION) at 08:13

## 2018-01-01 RX ADMIN — LEVOTHYROXINE SODIUM 50 MCG: 50 TABLET ORAL at 08:33

## 2018-01-01 RX ADMIN — GABAPENTIN 100 MG: 100 CAPSULE ORAL at 08:52

## 2018-01-01 RX ADMIN — GABAPENTIN 100 MG: 100 CAPSULE ORAL at 08:53

## 2018-01-01 RX ADMIN — FUROSEMIDE 40 MG: 40 TABLET ORAL at 05:22

## 2018-01-01 RX ADMIN — SERTRALINE HYDROCHLORIDE 25 MG: 50 TABLET ORAL at 08:07

## 2018-01-01 RX ADMIN — INSULIN LISPRO 2 UNITS: 100 INJECTION, SOLUTION INTRAVENOUS; SUBCUTANEOUS at 22:02

## 2018-01-01 RX ADMIN — LANSOPRAZOLE 30 MG: 30 TABLET, ORALLY DISINTEGRATING, DELAYED RELEASE ORAL at 08:19

## 2018-01-01 RX ADMIN — SODIUM CHLORIDE 125 MG: 9 INJECTION, SOLUTION INTRAVENOUS at 09:11

## 2018-01-01 RX ADMIN — PIPERACILLIN SODIUM,TAZOBACTAM SODIUM 4.5 G: 4; .5 INJECTION, POWDER, FOR SOLUTION INTRAVENOUS at 05:33

## 2018-01-01 RX ADMIN — NYSTATIN: 100000 POWDER TOPICAL at 16:44

## 2018-01-01 RX ADMIN — ALBUTEROL SULFATE 2.5 MG: 2.5 SOLUTION RESPIRATORY (INHALATION) at 20:01

## 2018-01-01 RX ADMIN — INSULIN LISPRO 3 UNITS: 100 INJECTION, SOLUTION INTRAVENOUS; SUBCUTANEOUS at 16:30

## 2018-01-01 RX ADMIN — DIATRIZOATE MEGLUMINE AND DIATRIZOATE SODIUM 15 ML: 660; 100 LIQUID ORAL; RECTAL at 06:34

## 2018-01-01 RX ADMIN — ACETAMINOPHEN 650 MG: 325 TABLET ORAL at 15:41

## 2018-01-01 RX ADMIN — BUDESONIDE 500 MCG: 0.5 INHALANT RESPIRATORY (INHALATION) at 20:50

## 2018-01-01 RX ADMIN — BUDESONIDE 500 MCG: 0.5 INHALANT RESPIRATORY (INHALATION) at 20:03

## 2018-01-01 RX ADMIN — IPRATROPIUM BROMIDE AND ALBUTEROL SULFATE 3 ML: .5; 3 SOLUTION RESPIRATORY (INHALATION) at 20:34

## 2018-01-01 RX ADMIN — HUMAN INSULIN 2 UNITS: 100 INJECTION, SOLUTION SUBCUTANEOUS at 11:30

## 2018-01-01 RX ADMIN — LEVOTHYROXINE SODIUM 50 MCG: 50 TABLET ORAL at 08:45

## 2018-01-01 RX ADMIN — SODIUM CHLORIDE 40 MG: 9 INJECTION, SOLUTION INTRAMUSCULAR; INTRAVENOUS; SUBCUTANEOUS at 11:43

## 2018-01-01 RX ADMIN — ALBUTEROL SULFATE 2.5 MG: 2.5 SOLUTION RESPIRATORY (INHALATION) at 07:36

## 2018-01-01 RX ADMIN — PRAVASTATIN SODIUM 40 MG: 20 TABLET ORAL at 21:50

## 2018-01-01 RX ADMIN — NYSTATIN: 100000 POWDER TOPICAL at 17:25

## 2018-01-01 RX ADMIN — INSULIN LISPRO 2 UNITS: 100 INJECTION, SOLUTION INTRAVENOUS; SUBCUTANEOUS at 05:39

## 2018-01-01 RX ADMIN — BUDESONIDE 500 MCG: 0.5 INHALANT RESPIRATORY (INHALATION) at 08:00

## 2018-01-01 RX ADMIN — DOBUTAMINE HYDROCHLORIDE 5 MCG/KG/MIN: 200 INJECTION INTRAVENOUS at 11:41

## 2018-01-01 RX ADMIN — METOPROLOL SUCCINATE 25 MG: 25 TABLET, EXTENDED RELEASE ORAL at 08:52

## 2018-01-01 RX ADMIN — IPRATROPIUM BROMIDE AND ALBUTEROL SULFATE 3 ML: .5; 3 SOLUTION RESPIRATORY (INHALATION) at 20:14

## 2018-01-01 RX ADMIN — IPRATROPIUM BROMIDE AND ALBUTEROL SULFATE 3 ML: .5; 3 SOLUTION RESPIRATORY (INHALATION) at 07:03

## 2018-01-01 RX ADMIN — GABAPENTIN 100 MG: 100 CAPSULE ORAL at 17:13

## 2018-01-01 RX ADMIN — OXYCODONE HYDROCHLORIDE 5 MG: 5 TABLET ORAL at 13:14

## 2018-01-01 RX ADMIN — IPRATROPIUM BROMIDE AND ALBUTEROL SULFATE 3 ML: .5; 3 SOLUTION RESPIRATORY (INHALATION) at 14:53

## 2018-01-01 RX ADMIN — ALBUTEROL SULFATE 2.5 MG: 2.5 SOLUTION RESPIRATORY (INHALATION) at 21:31

## 2018-01-01 RX ADMIN — OXYCODONE HYDROCHLORIDE 15 MG: 5 TABLET ORAL at 01:13

## 2018-01-01 RX ADMIN — GABAPENTIN 100 MG: 100 CAPSULE ORAL at 22:18

## 2018-01-01 RX ADMIN — SODIUM CHLORIDE 125 MG: 9 INJECTION, SOLUTION INTRAVENOUS at 11:41

## 2018-01-01 RX ADMIN — NYSTATIN: 100000 POWDER TOPICAL at 08:40

## 2018-01-01 RX ADMIN — INSULIN LISPRO 4 UNITS: 100 INJECTION, SOLUTION INTRAVENOUS; SUBCUTANEOUS at 16:43

## 2018-01-01 RX ADMIN — ALBUTEROL SULFATE 2.5 MG: 2.5 SOLUTION RESPIRATORY (INHALATION) at 08:20

## 2018-01-01 RX ADMIN — SERTRALINE HYDROCHLORIDE 25 MG: 50 TABLET ORAL at 08:49

## 2018-01-01 RX ADMIN — NYSTATIN: 100000 POWDER TOPICAL at 17:11

## 2018-01-01 RX ADMIN — HYDRALAZINE HYDROCHLORIDE 10 MG: 10 TABLET, FILM COATED ORAL at 08:31

## 2018-01-01 RX ADMIN — Medication 5 ML: at 21:55

## 2018-01-01 RX ADMIN — Medication 500 MG: at 08:45

## 2018-01-01 RX ADMIN — PANTOPRAZOLE SODIUM 40 MG: 40 TABLET, DELAYED RELEASE ORAL at 08:41

## 2018-01-01 RX ADMIN — Medication 10 ML: at 05:08

## 2018-01-01 RX ADMIN — INSULIN LISPRO 3 UNITS: 100 INJECTION, SOLUTION INTRAVENOUS; SUBCUTANEOUS at 08:00

## 2018-01-01 RX ADMIN — BUDESONIDE 500 MCG: 0.5 INHALANT RESPIRATORY (INHALATION) at 19:15

## 2018-01-01 RX ADMIN — BUDESONIDE 500 MCG: 0.5 INHALANT RESPIRATORY (INHALATION) at 07:36

## 2018-01-01 RX ADMIN — FUROSEMIDE 40 MG: 40 TABLET ORAL at 05:50

## 2018-01-01 RX ADMIN — METOPROLOL SUCCINATE 25 MG: 25 TABLET, EXTENDED RELEASE ORAL at 08:18

## 2018-01-01 RX ADMIN — AMOXICILLIN AND CLAVULANATE POTASSIUM 1 TABLET: 500; 125 TABLET, FILM COATED ORAL at 17:13

## 2018-01-01 RX ADMIN — HYDROCODONE BITARTRATE AND ACETAMINOPHEN 1 TABLET: 10; 325 TABLET ORAL at 22:20

## 2018-01-01 RX ADMIN — HYDRALAZINE HYDROCHLORIDE 10 MG: 10 TABLET, FILM COATED ORAL at 17:20

## 2018-01-01 RX ADMIN — HEPARIN SODIUM 5000 UNITS: 5000 INJECTION, SOLUTION INTRAVENOUS; SUBCUTANEOUS at 09:04

## 2018-01-01 RX ADMIN — PANTOPRAZOLE SODIUM 40 MG: 40 TABLET, DELAYED RELEASE ORAL at 06:29

## 2018-01-01 RX ADMIN — GABAPENTIN 100 MG: 100 CAPSULE ORAL at 21:47

## 2018-01-01 RX ADMIN — INSULIN GLARGINE 10 UNITS: 100 INJECTION, SOLUTION SUBCUTANEOUS at 22:01

## 2018-01-01 RX ADMIN — HEPARIN SODIUM 5000 UNITS: 5000 INJECTION, SOLUTION INTRAVENOUS; SUBCUTANEOUS at 16:38

## 2018-01-01 RX ADMIN — NYSTATIN: 100000 POWDER TOPICAL at 08:19

## 2018-01-01 RX ADMIN — SODIUM CHLORIDE 125 MG: 9 INJECTION, SOLUTION INTRAVENOUS at 09:43

## 2018-01-01 RX ADMIN — Medication 400 MG: at 08:53

## 2018-01-01 RX ADMIN — FUROSEMIDE 40 MG: 10 INJECTION, SOLUTION INTRAMUSCULAR; INTRAVENOUS at 14:27

## 2018-01-01 RX ADMIN — INSULIN LISPRO 3 UNITS: 100 INJECTION, SOLUTION INTRAVENOUS; SUBCUTANEOUS at 15:59

## 2018-01-01 RX ADMIN — ALBUTEROL SULFATE 2.5 MG: 2.5 SOLUTION RESPIRATORY (INHALATION) at 07:30

## 2018-01-01 RX ADMIN — Medication 10 ML: at 05:57

## 2018-01-01 RX ADMIN — Medication 10 ML: at 00:10

## 2018-01-01 RX ADMIN — HYDRALAZINE HYDROCHLORIDE 10 MG: 10 TABLET, FILM COATED ORAL at 21:11

## 2018-01-01 RX ADMIN — Medication 10 ML: at 05:17

## 2018-01-01 RX ADMIN — DEXTROSE MONOHYDRATE 25 G: 25 INJECTION, SOLUTION INTRAVENOUS at 11:06

## 2018-01-01 RX ADMIN — Medication 5 ML: at 21:37

## 2018-01-01 RX ADMIN — ASPIRIN 81 MG 81 MG: 81 TABLET ORAL at 08:18

## 2018-01-01 RX ADMIN — HYDROCODONE BITARTRATE AND HOMATROPINE METHYLBROMIDE 5 ML: 5; 1.5 SOLUTION ORAL at 04:29

## 2018-01-01 RX ADMIN — GABAPENTIN 100 MG: 100 CAPSULE ORAL at 21:41

## 2018-01-01 RX ADMIN — Medication 500 MG: at 09:09

## 2018-01-01 RX ADMIN — GABAPENTIN 100 MG: 100 CAPSULE ORAL at 08:35

## 2018-01-01 RX ADMIN — FAMOTIDINE 20 MG: 20 TABLET ORAL at 09:04

## 2018-01-01 RX ADMIN — NYSTATIN: 100000 POWDER TOPICAL at 08:42

## 2018-01-01 RX ADMIN — LANSOPRAZOLE 30 MG: 30 TABLET, ORALLY DISINTEGRATING, DELAYED RELEASE ORAL at 08:30

## 2018-01-01 RX ADMIN — INSULIN GLARGINE 10 UNITS: 100 INJECTION, SOLUTION SUBCUTANEOUS at 21:13

## 2018-01-01 RX ADMIN — GABAPENTIN 100 MG: 100 CAPSULE ORAL at 17:19

## 2018-01-01 RX ADMIN — METOPROLOL SUCCINATE 12.5 MG: 25 TABLET, EXTENDED RELEASE ORAL at 08:30

## 2018-01-01 RX ADMIN — DEXTROSE 50 % IN WATER (D50W) INTRAVENOUS SYRINGE 25 G: at 17:34

## 2018-01-01 RX ADMIN — INSULIN LISPRO 2 UNITS: 100 INJECTION, SOLUTION INTRAVENOUS; SUBCUTANEOUS at 06:13

## 2018-01-01 RX ADMIN — ACETAMINOPHEN 650 MG: 325 TABLET ORAL at 03:16

## 2018-01-01 RX ADMIN — HYDROCODONE BITARTRATE AND ACETAMINOPHEN 1 TABLET: 10; 325 TABLET ORAL at 21:13

## 2018-01-01 RX ADMIN — INSULIN LISPRO 3 UNITS: 100 INJECTION, SOLUTION INTRAVENOUS; SUBCUTANEOUS at 12:14

## 2018-01-01 RX ADMIN — SERTRALINE HYDROCHLORIDE 50 MG: 50 TABLET ORAL at 08:45

## 2018-01-01 RX ADMIN — ONDANSETRON 4 MG: 2 INJECTION INTRAMUSCULAR; INTRAVENOUS at 09:54

## 2018-01-01 RX ADMIN — OSELTAMIVIR PHOSPHATE 30 MG: 30 CAPSULE ORAL at 10:13

## 2018-01-01 RX ADMIN — LEVOTHYROXINE SODIUM 50 MCG: 50 TABLET ORAL at 06:18

## 2018-01-01 RX ADMIN — AMOXICILLIN AND CLAVULANATE POTASSIUM 1 TABLET: 500; 125 TABLET, FILM COATED ORAL at 08:52

## 2018-01-01 RX ADMIN — Medication 10 ML: at 06:16

## 2018-01-01 RX ADMIN — IPRATROPIUM BROMIDE AND ALBUTEROL SULFATE 3 ML: .5; 3 SOLUTION RESPIRATORY (INHALATION) at 01:27

## 2018-01-01 RX ADMIN — SERTRALINE HYDROCHLORIDE 25 MG: 50 TABLET ORAL at 09:09

## 2018-01-01 RX ADMIN — HEPARIN SODIUM 5000 UNITS: 5000 INJECTION INTRAVENOUS; SUBCUTANEOUS at 06:46

## 2018-01-01 RX ADMIN — INSULIN LISPRO 3 UNITS: 100 INJECTION, SOLUTION INTRAVENOUS; SUBCUTANEOUS at 08:44

## 2018-01-01 RX ADMIN — FUROSEMIDE 40 MG: 40 TABLET ORAL at 16:49

## 2018-01-01 RX ADMIN — CLOPIDOGREL BISULFATE 75 MG: 75 TABLET ORAL at 09:04

## 2018-01-01 RX ADMIN — PROPOFOL 10 MG: 10 INJECTION, EMULSION INTRAVENOUS at 15:07

## 2018-01-01 RX ADMIN — HYDROCORTISONE 2.5%: 25 CREAM TOPICAL at 13:13

## 2018-01-01 RX ADMIN — OXYCODONE HYDROCHLORIDE 15 MG: 5 TABLET ORAL at 14:25

## 2018-01-01 RX ADMIN — FUROSEMIDE 80 MG: 10 INJECTION, SOLUTION INTRAMUSCULAR; INTRAVENOUS at 17:19

## 2018-01-01 RX ADMIN — LATANOPROST 1 DROP: 50 SOLUTION OPHTHALMIC at 22:13

## 2018-01-01 RX ADMIN — ALBUTEROL SULFATE 2.5 MG: 2.5 SOLUTION RESPIRATORY (INHALATION) at 08:52

## 2018-01-01 RX ADMIN — FUROSEMIDE 40 MG: 40 TABLET ORAL at 22:37

## 2018-01-01 RX ADMIN — PANTOPRAZOLE SODIUM 40 MG: 40 TABLET, DELAYED RELEASE ORAL at 15:36

## 2018-01-01 RX ADMIN — HYDROCODONE BITARTRATE AND ACETAMINOPHEN 1 TABLET: 5; 325 TABLET ORAL at 10:13

## 2018-01-01 RX ADMIN — PRAVASTATIN SODIUM 40 MG: 20 TABLET ORAL at 21:37

## 2018-01-01 RX ADMIN — SODIUM CHLORIDE 40 MG: 9 INJECTION, SOLUTION INTRAMUSCULAR; INTRAVENOUS; SUBCUTANEOUS at 08:47

## 2018-01-01 RX ADMIN — GABAPENTIN 100 MG: 100 CAPSULE ORAL at 21:14

## 2018-01-01 RX ADMIN — MORPHINE SULFATE 4 MG: 2 INJECTION, SOLUTION INTRAMUSCULAR; INTRAVENOUS at 06:44

## 2018-01-01 RX ADMIN — ISOSORBIDE MONONITRATE 30 MG: 30 TABLET, EXTENDED RELEASE ORAL at 12:28

## 2018-01-01 RX ADMIN — TUBERCULIN PURIFIED PROTEIN DERIVATIVE 5 UNITS: 5 INJECTION, SOLUTION INTRADERMAL at 05:50

## 2018-01-01 RX ADMIN — Medication 5 ML: at 21:12

## 2018-01-01 RX ADMIN — INSULIN LISPRO 4 UNITS: 100 INJECTION, SOLUTION INTRAVENOUS; SUBCUTANEOUS at 08:33

## 2018-01-01 RX ADMIN — FAMOTIDINE 20 MG: 20 TABLET ORAL at 09:35

## 2018-01-01 RX ADMIN — ALBUTEROL SULFATE 2.5 MG: 2.5 SOLUTION RESPIRATORY (INHALATION) at 07:15

## 2018-01-01 RX ADMIN — INSULIN LISPRO 3 UNITS: 100 INJECTION, SOLUTION INTRAVENOUS; SUBCUTANEOUS at 08:52

## 2018-01-01 RX ADMIN — ALBUTEROL SULFATE 2.5 MG: 2.5 SOLUTION RESPIRATORY (INHALATION) at 13:42

## 2018-01-01 RX ADMIN — HEPARIN SODIUM 5000 UNITS: 5000 INJECTION INTRAVENOUS; SUBCUTANEOUS at 13:14

## 2018-01-01 RX ADMIN — ISOSORBIDE MONONITRATE 30 MG: 30 TABLET, EXTENDED RELEASE ORAL at 09:06

## 2018-01-01 RX ADMIN — INSULIN LISPRO 4 UNITS: 100 INJECTION, SOLUTION INTRAVENOUS; SUBCUTANEOUS at 20:38

## 2018-01-01 RX ADMIN — BISACODYL 5 MG: 5 TABLET, COATED ORAL at 08:04

## 2018-01-01 RX ADMIN — NYSTATIN: 100000 POWDER TOPICAL at 08:31

## 2018-01-01 RX ADMIN — PETROLATUM: 42 OINTMENT TOPICAL at 08:31

## 2018-01-01 RX ADMIN — FUROSEMIDE 40 MG: 10 INJECTION, SOLUTION INTRAMUSCULAR; INTRAVENOUS at 12:26

## 2018-01-01 RX ADMIN — Medication 5 ML: at 14:04

## 2018-01-01 RX ADMIN — VANCOMYCIN HYDROCHLORIDE 2500 MG: 10 INJECTION, POWDER, LYOPHILIZED, FOR SOLUTION INTRAVENOUS at 15:11

## 2018-01-01 RX ADMIN — INSULIN LISPRO 3 UNITS: 100 INJECTION, SOLUTION INTRAVENOUS; SUBCUTANEOUS at 17:09

## 2018-01-01 RX ADMIN — Medication 10 ML: at 21:20

## 2018-01-01 RX ADMIN — HYDROCODONE BITARTRATE AND ACETAMINOPHEN 1 TABLET: 10; 325 TABLET ORAL at 11:24

## 2018-01-01 RX ADMIN — ALBUTEROL SULFATE 2.5 MG: 2.5 SOLUTION RESPIRATORY (INHALATION) at 13:53

## 2018-01-01 RX ADMIN — ASPIRIN 81 MG 81 MG: 81 TABLET ORAL at 09:36

## 2018-01-01 RX ADMIN — IPRATROPIUM BROMIDE AND ALBUTEROL SULFATE 3 ML: .5; 3 SOLUTION RESPIRATORY (INHALATION) at 03:15

## 2018-01-01 RX ADMIN — HEPARIN SODIUM 5000 UNITS: 5000 INJECTION, SOLUTION INTRAVENOUS; SUBCUTANEOUS at 00:14

## 2018-01-01 RX ADMIN — PIPERACILLIN SODIUM,TAZOBACTAM SODIUM 4.5 G: 4; .5 INJECTION, POWDER, FOR SOLUTION INTRAVENOUS at 05:31

## 2018-01-01 RX ADMIN — PREDNISONE 60 MG: 20 TABLET ORAL at 09:36

## 2018-01-01 RX ADMIN — LANSOPRAZOLE 30 MG: 30 TABLET, ORALLY DISINTEGRATING, DELAYED RELEASE ORAL at 08:35

## 2018-01-01 RX ADMIN — ENOXAPARIN SODIUM 40 MG: 40 INJECTION SUBCUTANEOUS at 16:59

## 2018-01-01 RX ADMIN — BUDESONIDE 500 MCG: 0.5 INHALANT RESPIRATORY (INHALATION) at 19:34

## 2018-01-01 RX ADMIN — HYDROCODONE BITARTRATE AND ACETAMINOPHEN 1 TABLET: 10; 325 TABLET ORAL at 22:16

## 2018-01-01 RX ADMIN — BUDESONIDE 500 MCG: 0.5 INHALANT RESPIRATORY (INHALATION) at 21:09

## 2018-01-01 RX ADMIN — GABAPENTIN 100 MG: 100 CAPSULE ORAL at 22:38

## 2018-01-01 RX ADMIN — PIPERACILLIN SODIUM,TAZOBACTAM SODIUM 4.5 G: 4; .5 INJECTION, POWDER, FOR SOLUTION INTRAVENOUS at 21:56

## 2018-01-01 RX ADMIN — LATANOPROST 1 DROP: 50 SOLUTION OPHTHALMIC at 22:00

## 2018-01-01 RX ADMIN — PRAVASTATIN SODIUM 40 MG: 20 TABLET ORAL at 21:14

## 2018-01-01 RX ADMIN — HYDRALAZINE HYDROCHLORIDE 10 MG: 10 TABLET, FILM COATED ORAL at 09:04

## 2018-01-01 RX ADMIN — METOPROLOL SUCCINATE 25 MG: 50 TABLET, EXTENDED RELEASE ORAL at 08:44

## 2018-01-01 RX ADMIN — INSULIN LISPRO 4 UNITS: 100 INJECTION, SOLUTION INTRAVENOUS; SUBCUTANEOUS at 12:20

## 2018-01-01 RX ADMIN — HEPARIN SODIUM 5000 UNITS: 5000 INJECTION, SOLUTION INTRAVENOUS; SUBCUTANEOUS at 08:43

## 2018-01-01 RX ADMIN — Medication 5 ML: at 05:37

## 2018-01-01 RX ADMIN — TUBERCULIN PURIFIED PROTEIN DERIVATIVE 5 UNITS: 5 INJECTION, SOLUTION INTRADERMAL at 11:03

## 2018-01-01 RX ADMIN — ALBUTEROL SULFATE 2.5 MG: 2.5 SOLUTION RESPIRATORY (INHALATION) at 19:28

## 2018-01-01 RX ADMIN — Medication 5 ML: at 15:03

## 2018-01-01 RX ADMIN — IPRATROPIUM BROMIDE AND ALBUTEROL SULFATE 3 ML: .5; 3 SOLUTION RESPIRATORY (INHALATION) at 15:50

## 2018-01-01 RX ADMIN — BUDESONIDE 500 MCG: 0.5 INHALANT RESPIRATORY (INHALATION) at 07:15

## 2018-01-01 RX ADMIN — DOCUSATE SODIUM 100 MG: 100 CAPSULE, LIQUID FILLED ORAL at 09:04

## 2018-01-01 RX ADMIN — PIPERACILLIN SODIUM,TAZOBACTAM SODIUM 4.5 G: 4; .5 INJECTION, POWDER, FOR SOLUTION INTRAVENOUS at 14:05

## 2018-01-01 RX ADMIN — Medication 5 ML: at 05:08

## 2018-01-01 RX ADMIN — NYSTATIN: 100000 POWDER TOPICAL at 08:51

## 2018-01-01 RX ADMIN — LEVOTHYROXINE SODIUM 50 MCG: 50 TABLET ORAL at 06:30

## 2018-01-01 RX ADMIN — ISOSORBIDE MONONITRATE 30 MG: 30 TABLET, EXTENDED RELEASE ORAL at 10:25

## 2018-01-01 RX ADMIN — INSULIN LISPRO 3 UNITS: 100 INJECTION, SOLUTION INTRAVENOUS; SUBCUTANEOUS at 11:56

## 2018-01-01 RX ADMIN — Medication 5 ML: at 05:33

## 2018-01-01 RX ADMIN — Medication 10 ML: at 22:13

## 2018-01-01 RX ADMIN — INSULIN GLARGINE 10 UNITS: 100 INJECTION, SOLUTION SUBCUTANEOUS at 21:50

## 2018-01-01 RX ADMIN — ALLOPURINOL 300 MG: 300 TABLET ORAL at 09:36

## 2018-01-01 RX ADMIN — IPRATROPIUM BROMIDE AND ALBUTEROL SULFATE 3 ML: .5; 3 SOLUTION RESPIRATORY (INHALATION) at 02:14

## 2018-01-01 RX ADMIN — LEVOTHYROXINE SODIUM 50 MCG: 50 TABLET ORAL at 06:16

## 2018-01-01 RX ADMIN — NYSTATIN: 100000 POWDER TOPICAL at 17:00

## 2018-01-01 RX ADMIN — NYSTATIN: 100000 POWDER TOPICAL at 08:46

## 2018-01-01 RX ADMIN — HYDRALAZINE HYDROCHLORIDE 10 MG: 10 TABLET, FILM COATED ORAL at 17:13

## 2018-01-01 RX ADMIN — Medication 10 ML: at 13:55

## 2018-01-01 RX ADMIN — Medication 500 MG: at 08:48

## 2018-01-01 RX ADMIN — NYSTATIN: 100000 POWDER TOPICAL at 17:18

## 2018-01-01 RX ADMIN — HYDROCORTISONE 2.5%: 25 CREAM TOPICAL at 08:07

## 2018-01-01 RX ADMIN — SERTRALINE HYDROCHLORIDE 25 MG: 50 TABLET ORAL at 08:19

## 2018-01-01 RX ADMIN — SERTRALINE HYDROCHLORIDE 50 MG: 50 TABLET ORAL at 09:40

## 2018-01-01 RX ADMIN — Medication 5 ML: at 15:13

## 2018-01-05 ENCOUNTER — PATIENT OUTREACH (OUTPATIENT)
Dept: CASE MANAGEMENT | Age: 83
End: 2018-01-05

## 2018-01-05 NOTE — PROGRESS NOTES
Follow up outreach today - unable to speak with wife or patient  Left message    Plan - hand off this patient to  associated with PCP practice

## 2018-01-12 ENCOUNTER — PATIENT OUTREACH (OUTPATIENT)
Dept: CASE MANAGEMENT | Age: 83
End: 2018-01-12

## 2018-01-12 NOTE — PROGRESS NOTES
CM reviewed record and recalls being engaged with spouse and patient before. CM made outreach to spouse, CM and spouse reviewed assessment and updates as noted. No major changes to assessment. However patient has had falls. New walker in the home, which patient is using. Spouse is caregiver and is older than patient, granddaughter still lives with them and provides support. Spouse is interested in palliative care. She and patient do not discus end of life care. She does acknowledge patient's health is not improving and that he has lost interest in some things. CM submitted referral to [facility]. This note will not be viewable in [address]

## 2018-01-17 NOTE — PROGRESS NOTES
CM reviewing record to make outreach. CM noted ED visit on today. CM spoke with spouse. Patient fell at home, in a different place than last fall. Patient is using walker. Spouse explained to CM that the house carport was turned into a room and it has 1 stair that the pt has to get up and down in order to go from living room to kitchen. Spouse confirmed the patient has  the walker along with himself in order to get up the stair. CM explained to spouse the reason for this not being a good idea with patient's history of falls, shoulder pain, and his age, CM explained that presents a great risk to the patient and even herself. CM encouraged spouse to speak with son who was able to get bars installed, to ask that the a piece of wood be brought and made into a make shift ramp for the ramp for patient, so that it prevents the need for patient to  the walker, this way he can just roll the walker and walk. Wife was pleased with CM's suggestion and said she would speak with her son tonight. [facility] has seen patient. CM will either work with them to determine if they would be willing to write for the patient to participate in the safe stride program or have spouse move up appointment with Dr. Vladimir Mejia. CM has confirmed with [facility], that they will work to refer patient [facility] [facility] program.       This note will not be viewable in [address]

## 2018-01-17 NOTE — ED PROVIDER NOTES
HPI Comments: Patient triage note and intake state alcohol intoxication, however patient is not known to drink alcohol and does not appear intoxicated. Patient never had an alcohol lab test come back positive for elevated. Family states the patient never drinks alcohol. They are confused on why this was his triage note. 80-year-old male fell at home. Patient has abrasions to both lower extremities. Family relates that he's had a low-grade fever recently. Patient is back to baseline however the wife states that over the last several months he's been increasingly forgetful and  Having some early signs of dementia. Patient is a 80 y.o. male presenting with fall. The history is provided by the patient. [facility]OR[facility]INE of impact: bilateral lower  legs. Pain location: abrasion bilateral lower legs. The pain is at a severity of 0/10. The patient is experiencing no pain. He was ambulatory at the scene. There was no alcohol use involved in the accident. Pertinent negatives include no visual change, no numbness, no abdominal pain, no nausea, no vomiting, no headaches, no extremity weakness, no hearing loss, no loss of consciousness, no tingling and no laceration. The risk factors include dementia and being elderly. The symptoms are aggravated by activity. Past Medical History:   Diagnosis Date    Atopic dermatitis 11/21/2012    Atrial fibrillation (HCC)     CAD (coronary artery disease)     Carotid stenosis, bilateral 11/21/2012    50-79%  3-2010    1. Carotid Doppler (6/1/07): Greater than 70% stenosis in proximal LICA. 50% stenosis in right ICA. 2.  CTA of neck (3/15/10): Occluded distal segments of the vertebral arteries bilaterally. Atherosclerosis of the carotid bulbs bilaterally with a 50% stenosis on the left and a stenosis of less than 30% on the right. Small nodule in the right upper lobe near the apex.  3. CTA (8/29/13):  Less than 30% diameter stenosis of the cervical internal carotid arteries bilaterally.  CHF (congestive heart failure) ([address] 11/21/2012    Chronic kidney disease     hx elevated labs    Chronic obstructive pulmonary disease (HCC)     Diabetes (HCC)     Diastolic CHF, acute on chronic ([address] 9/12/2015    1. Echo (9/11/15) : EF 55-60%. Mild LVH. Moderate biatrial enlargement. Moderate mitral/tricuspid regurgitation.  Failed CABG (coronary artery bypass graft) 11/21/2012    GI bleed 1/2015    Hospitalized [facility]    Gout 11/21/2012    History of tobacco use     [place] (hard of hearing)     Hypercholesterolemia     Hypertension     Hyperuricemia 11/21/2012    Morbid obesity ([address]     PAD (peripheral artery disease) ([address] 11/21/2012    1. Bilateral proximal common iliac PCI (2/21/08):  8.0 X 100 mm Cordis smart stent on right and 10 X 40 mm cordis smart stent on right. Both inflated to 7.0 mm.  Poor historian     Radiologic findings of lung field, abnormal 10/31/2016    1. CT of chest  (11/24/10): Multiple small nodules in the right lobe and stable interstitial prominence. Consistent with chronic lung disease. No evidence of malignancy.     Seizure disorder ([address] 8/5/2013    Shortness of breath dyspnea 8/5/2013    Thyroid disease     Unspecified sleep apnea        Past Surgical History:   Procedure Laterality Date    CARDIAC SURG PROCEDURE UNLIST      cath 5/07,cabg 6/07,lexiscan cardiolite 11/10    COLONOSCOPY N/A 1/27/2017    COLONOSCOPY  BMI 36 TO BE ADMITTED 1/26/17 performed by Therese Squires MD at [address] Left 2003    os    HX COLONOSCOPY      HX CORONARY ARTERY BYPASS GRAFT  06-    HX CORONARY STENT PLACEMENT  02-    bilateral iliac artery PCI and stents    HX HEART CATHETERIZATION      left-05-, 01-    HX HEENT  1970s    neck lipoma         Family History:   Problem Relation Age of Onset    Heart Attack Mother    [facility] Father      old age   [facility] Brother      brain aneurysm    Heart Disease Other      2 children  with heart concerns, 36 & 47 yo    Heart Disease Son        Social History     Social History    Marital status:      Spouse name: N/A    Number of children: N/A    Years of education: N/A     Occupational History    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 , two living children, two children  with coronary artery disease, ages 36 and 48. Has always lived in [address]. No pets. ALLERGIES: Review of patient's allergies indicates no known allergies. Review of Systems   Constitutional: Negative. Negative for activity change. HENT: Negative. Eyes: Negative. Respiratory: Negative. Cardiovascular: Negative. Gastrointestinal: Negative. Negative for abdominal pain, nausea and vomiting. Genitourinary: Negative. Musculoskeletal: Negative. Negative for extremity weakness. Skin: Negative. Neurological: Negative. Negative for tingling, loss of consciousness, numbness and headaches. Psychiatric/Behavioral: Negative. All other systems reviewed and are negative. Vitals:    18 0500   BP: 174/73   Pulse: 70   Resp: 18   Temp: 97.5 °F (36.4 °C)   SpO2: 94%   Weight: 113.9 kg (251 lb)   Height: 5' 10\" (1.778 m)            Physical Exam   Constitutional: He is oriented to person, place, and time. He appears well-developed and well-nourished. No distress.    HENT:   Head: Normocephalic and atraumatic. Right Ear: External ear normal.   Left Ear: External ear normal.   Nose: Nose normal.   Mouth/Throat: Oropharynx is clear and moist. No oropharyngeal exudate. Eyes: Conjunctivae and EOM are normal. Pupils are equal, round, and reactive to light. Right eye exhibits no discharge. Left eye exhibits no discharge. No scleral icterus. Neck: Normal range of motion. Neck supple. No JVD present. No tracheal deviation present. Cardiovascular: Normal rate, regular rhythm and intact distal pulses. Pulmonary/Chest: Effort normal and breath sounds normal. No stridor. No respiratory distress. He has no wheezes. He exhibits no tenderness. Abdominal: Soft. Bowel sounds are normal. He exhibits no distension and no mass. There is no tenderness. Musculoskeletal: Normal range of motion. He exhibits no edema. Right lower leg: He exhibits tenderness. Left lower leg: He exhibits tenderness. Legs:  Neurological: He is alert and oriented to person, place, and time. No cranial nerve deficit. Skin: Skin is warm and dry. No laceration and no rash noted. He is not diaphoretic. No erythema. No pallor. Psychiatric: He has a normal mood and affect. His behavior is normal. Thought content normal.   Nursing note and vitals reviewed. MDM  Number of Diagnoses or Management Options  Fall, initial encounter:   Diagnosis management comments: Patient remains at his baseline mental status. The patient is not an alcoholic and is not intoxicated as intake  Triage nurse indicated. Patient does not drink alcohol. Patient had a fall and does not have intracranial injury labs are normal he is mentating at his baseline and is asking to go home.        Amount and/or Complexity of Data Reviewed  Clinical lab tests: ordered and reviewed  Tests in the radiology section of CPT®: ordered and reviewed  Tests in the medicine section of CPT®: ordered and reviewed    Risk of Complications, Morbidity, and/or Mortality  Presenting problems: high  Diagnostic procedures: high  Management options: high      ED Course       Procedures

## 2018-01-17 NOTE — ED NOTES
I have reviewed discharge instructions with the spouse. The spouse verbalized understanding. Patient left ED via Discharge Method: stretcher to Home with [employer] for questions and clarification provided. Patient given 0 scripts. To continue your aftercare when you leave the hospital, you may receive an automated call from our care team to check in on how you are doing. This is a free service and part of our promise to provide the best care and service to meet your aftercare needs.  If you have questions, or wish to unsubscribe from this service please call [phone]. Thank you for Choosing our [facility] Emergency Department.

## 2018-01-17 NOTE — DISCHARGE INSTRUCTIONS

## 2018-01-22 NOTE — PROGRESS NOTES
CM reviewed record; CM pleased to note patient and spouse changed their mind to attend an earlier follow up appointment. CM spoke with spouse, she reported she has spoken with her son about the wood plank, to make a ramp, for the 1 step patient has to manipulate in the home with his walker. Son agreed to do the work however patient did not believe this would be beneficial, spouse is unsure what to do. Patient seems to report he blacks out during the falls and does not recall falling. Patient has neuropathy also. CM discussed [facility] program. Patient is 72 and patient has had 2 falls requiring emergency medical care. The patient takes several medications and an environmental assessment would be beneficial to family and patient to reduce further risk of falls. Wife is very interested in the program.     CM called [facility] [phone] and was informed of the details of the program and confirmed the fax number is [phone]. This note will not be viewable in [address]

## 2018-01-22 NOTE — Clinical Note
Good afternoon, Dr. Marlon Pruett will be in to see you on 1/25. I was wondering if you would review the [employer] program and determine if you would be willing to write a referral for him for physical therapy/safe strides. It is a in-home program that works to prevent falls from all different aspects. For more information you can review: [email or web address] com/our-services/home-care/types-[email or web address]-care/fall-prevention Their fax number is   Thank you for your time and consideration - Luis Lebron, MSN, BSN, RN, [facility], [facility] Ambulatory Care Manager

## 2018-01-29 NOTE — PROGRESS NOTES
CM reviewed record, patient cancelled appointment. CM attempted to reach, no success on this outreach      This note will not be viewable in [address]

## 2018-01-31 PROBLEM — J10.1 INFLUENZA B: Status: ACTIVE | Noted: 2018-01-01

## 2018-01-31 NOTE — IP AVS SNAPSHOT
[address] [facility] 
 
 
 [address] [facility] 
[phone] Patient: Roberto Mejia MRN: [id number] About your hospitalization You were admitted on:  January 31, 2018 You last received care in the:  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 
[phone] Gerson Iverson MD In 1 week left message for office to call patient with appointment date and time. [address] [facility] [address],[address]  
[phone] Your Scheduled Appointments Tuesday February 06, 2018  9:30 AM EST  
LAB with Democracia [address] [facility]) [address] [address] [phone] Tuesday February 13, 2018  2:15 PM EST Office Visit with Gerson Iverson MD  
[facility]) [address] [address] [phone] Thursday March 22, 2018  1:20 PM EDT  
(Arrive by 1:00 PM) Return appointment with [facility] ([facility]) [address] [place] [address] [facility]  
[phone] Discharge Orders None A check chandler indicates which time of day the medication should be taken. My Medications START taking these medications Instructions Each Dose to Equal  
 Morning Noon Evening Bedtime  
 oseltamivir 30 mg capsule Commonly known as:  TAMIFLU Your next dose is: This evening Take 1 Cap by mouth two (2) times a day for 4 days. 30 mg CHANGE how you take these medications Instructions Each Dose to Equal  
 Morning Noon Evening Bedtime  
 sertraline 50 mg tablet Commonly known as:  ZOLOFT What changed:   
- when to take this 
- reasons to take this 
- additional instructions Your next dose is: This evening Take one tablet each evening for anxiety  Indications: GENERALIZED ANXIETY DISORDER  
     
   
   
  
   
  
  
CONTINUE taking these medications Instructions Each Dose to Equal  
 Morning Noon Evening Bedtime  
 albuterol 2.5 mg /3 mL (0.083 %) nebulizer solution Commonly known as:  PROVENTIL VENTOLIN Your next dose is: Take on as needed schedule 2.5 mg by Nebulization route every four (4) hours as needed for Wheezing. 2.5 mg  
    
   
   
   
  
 allopurinol 300 mg tablet Commonly known as:  Vonore Geeta Your next dose is:  Tomorrow Morning Take  by mouth daily. aspirin 81 mg chewable tablet Your next dose is:  Tomorrow Morning Take 1 Tab by mouth daily. 81 mg  
    
  
   
   
   
  
 budesonide-formoterol 160-4.5 mcg/actuation Hfaa Commonly known as:  SYMBICORT Your next dose is: This evening Take 2 Puffs by inhalation two (2) times a day. 2 Puff  
    
  
   
   
  
   
  
 cholecalciferol 1,000 unit Cap Commonly known as:  VITAMIN D3 Your last dose was: Tomorrow Morning Take 1,000 Units by mouth daily. 1000 Units  
    
  
   
   
   
  
 cpap machine kit  
   
 by Does Not Apply route. Bilevel 12/8  
     
   
   
   
  
 ferrous sulfate 324 mg (65 mg iron) tablet Your next dose is:  Tomorrow Morning Take  by mouth Daily (before breakfast). fluticasone 50 mcg/actuation nasal spray Commonly known as:  Diamond Thomas Your next dose is:  Tomorrow Morning 2 Sprays by Both Nostrils route daily. 2 Spray  
    
  
   
   
   
  
 furosemide 80 mg tablet Commonly known as:  LASIX Your next dose is: This evening Take 1 Tab by mouth two (2) times a day. 80 mg  
    
  
   
   
  
   
  
 gabapentin 100 mg capsule Commonly known as:  NEURONTIN Your next dose is: This afternoon Take 1 Cap by mouth three (3) times daily. 100 mg  
    
  
   
  
   
  
   
  
 glipiZIDE 5 mg tablet Commonly known as:  Purnima Formosa Your next dose is: This evening Take 5 mg by mouth two (2) times a day. 5 mg  
    
  
   
   
  
   
  
 hydrALAZINE 10 mg tablet Commonly known as:  APRESOLINE Your next dose is: This afternoon TAKE 1 TABLET THREE TIMES DAILY. HYDROcodone-acetaminophen  mg tablet Commonly known as:  Mortimer Newness Your next dose is: Take on as needed schedule Take 1 Tab by mouth every eight (8) hours as needed for Pain. Max Daily Amount: 3 Tabs. 1 Tab  
    
   
   
   
  
 ipratropium-albuterol  mcg/actuation inhaler Commonly known as:  Mackenzie Qualia Your next dose is: This afternoon Take 1 Puff by inhalation every six (6) hours. 1 Puff  
    
  
   
  
   
  
   
  
  
 isosorbide mononitrate ER 30 mg tablet Commonly known as:  IMDUR Your next dose is:  Tomorrow Morning Take 1 Tab by mouth daily. 30 mg  
    
  
   
   
   
  
 K-DUR 20 mEq tablet Generic drug:  potassium chloride Your next dose is:  Tomorrow Morning Take 20 mEq by mouth daily. 20 mEq  
    
  
   
   
   
  
 levothyroxine 50 mcg tablet Commonly known as:  SYNTHROID Your next dose is:  Tomorrow Morning Take 1 Tab by mouth Daily (before breakfast). 50 mcg  
    
  
   
   
   
  
 magnesium oxide 400 mg tablet Commonly known as:  MAG-OX Your next dose is:  Tomorrow Morning Take 1 Tab by mouth daily. 400 mg  
    
  
   
   
   
  
 metoprolol succinate 25 mg XL tablet Commonly known as:  TOPROL-XL Your next dose is:  Tomorrow Morning Pt takes 1/2 tab po daily. mupirocin calcium 2 % topical cream  
Commonly known as:  [facility] Your next dose is: This evening Apply  to affected area two (2) times a day. OTHER One pair compression stocking gradient 20 - 30 mm hg Please measure to fit  
     
   
   
   
  
 oxyCODONE IR 15 mg immediate release tablet Commonly known as:  OXY-IR Your next dose is: Take on as needed schedule Take 1 Tab by mouth every eight (8) hours as needed for Pain. Max Daily Amount: 45 mg.  
 15 mg  
    
   
   
   
  
 pravastatin 40 mg tablet Commonly known as:  PRAVACHOL Your next dose is: This evening Take 1 Tab by mouth nightly. 40 mg PROTONIX 40 mg tablet Generic drug:  pantoprazole Your next dose is: Take on as needed schedule Take 40 mg by mouth daily as needed. 40 mg sAXagliptin 2.5 mg tablet Commonly known as:  ONGLYZA Your next dose is:  Tomorrow Morning Take 2.5 mg by mouth daily. 2.5 mg  
    
  
   
   
   
  
 STOOL SOFTENER 100 mg capsule Generic drug:  docusate sodium Your next dose is: Take on as needed schedule Take 100 mg by mouth as needed. 100 mg  
    
   
   
   
  
 TRAVATAN Z 0.004 % ophthalmic solution Generic drug:  travoprost  
Your next dose is: This evening Administer 1 Drop to both eyes two (2) times a day. 1 Drop  * triamcinolone 0.5 % topical cream  
Commonly known as:  ARISTOCORT  
 Your next dose is: This evening Apply  to affected area two (2) times a day. use thin layer * triamcinolone acetonide 0.1 % ointment Commonly known as:  KENALOG Your next dose is: This evening Apply  to affected area two (2) times a day. * Notice: This list has 2 medication(s) that are the same as other medications prescribed for you. Read the directions carefully, and ask your doctor or other care provider to review them with you. Where to Get Your Medications Information on where to get these meds will be given to you by the nurse or doctor. ! Ask your nurse or doctor about these medications  
  oseltamivir 30 mg capsule Discharge Instructions DISCHARGE SUMMARY from Nurse PATIENT INSTRUCTIONS: 
 
 
F-face looks uneven A-arms unable to move or move unevenly S-speech slurred or non-existent T-time-call 911 as soon as signs and symptoms begin-DO NOT go Back to bed or wait to see if you get better-TIME IS BRAIN. Warning Signs of HEART ATTACK Call 911 if you have these symptoms: 
? Chest discomfort. Most heart attacks involve discomfort in the center of the chest that lasts more than a few minutes, or that goes away and comes back. It can feel like uncomfortable pressure, squeezing, fullness, or pain. ? Discomfort in other areas of the upper body. Symptoms can include pain or discomfort in one or both arms, the back, neck, jaw, or stomach. ? Shortness of breath with or without chest discomfort. ? Other signs may include breaking out in a cold sweat, nausea, or lightheadedness. Don't wait more than five minutes to call [address]! Fast action can save your life. Calling 911 is almost always the fastest way to get lifesaving treatment. Emergency Medical Services staff can begin treatment when they arrive  up to an hour sooner than if someone gets to the hospital by car. The discharge information has been reviewed with the patient. The patient verbalized understanding. Discharge medications reviewed with the patient and appropriate educational materials and side effects teaching were provided. ___________________________________________________________________________________________________________________________________ [employer] Announcement We are excited to announce that we are making your provider's discharge notes available to you in [employer]. You will see these notes when they are completed and signed by the physician that discharged you from your recent hospital stay. If you have any questions or concerns about any information you see in [employer], please call the Health Information Department where you were seen or reach out to your Primary Care Provider for more information about your plan of care. Introducing Deon! Cristela Ferris introduces [employer] patient portal. Now you can access parts of your medical record, email your doctor's office, and request medication refills online. 1. In your internet browser, go to [email or web address] [email or web address] 2. Click on the First Time User? Click Here link in the Sign In box. You will see the New Member Sign Up page. 3. Enter your [employer] Access Code exactly as it appears below. You will not need to use this code after youve completed the sign-up process. If you do not sign up before the expiration date, you must request a new code. · [employer] Access Code: [email or web address] Expires: 2/17/2018  3:22 PM 
 
4.  Enter the last four digits of your Social Security Number (xxxx) and Date of Birth (mm/dd/yyyy) as indicated and click Submit. You will be taken to the next sign-up page. 5. Create a [employer] ID. This will be your [employer] login ID and cannot be changed, so think of one that is secure and easy to remember. 6. Create a [employer] password. You can change your password at any time. 7. Enter your Password Reset Question and Answer. This can be used at a later time if you forget your password. 8. Enter your e-mail address. You will receive e-mail notification when new information is available in [address] 9. Click Sign Up. You can now view and download portions of your medical record. 10. Click the Download Summary menu link to download a portable copy of your medical information. If you have questions, please visit the Frequently Asked Questions section of the [employer] website. Remember, [employer] is NOT to be used for urgent needs. For medical emergencies, dial 911. Now available from your iPhone and Android! Providers Seen During Your Hospitalization Provider Specialty Primary office phone Rachel Larson DO Emergency Medicine [phone] William Manuel MD Internal Medicine [phone] Your Primary Care Physician (PCP) Primary Care Physician Office Phone Office Fax Agustin Abraham [phone] You are allergic to the following No active allergies Recent Documentation Height Weight BMI Smoking Status 1.778 m 111.1 kg 35.15 kg/m2 Former Smoker Emergency Contacts Name Discharge Info Relation Home Work Mobile Eulalia Navarro  Spouse [3] [phone] Patient Belongings The following personal items are in your possession at time of discharge: 
                   Clothing: At bedside Discharge Instructions Attachments/References INFLUENZA (ENGLISH) Patient Handouts Influenza (Flu): Care Instructions Your Care Instructions Influenza (flu) is an infection in the lungs and breathing passages. It is caused by the influenza virus. There are different strains, or types, of the flu virus from year to year. Unlike the common cold, the flu comes on suddenly and the symptoms, such as a cough, congestion, fever, chills, fatigue, aches, and pains, are more severe. These symptoms may last up to 10 days. Although the flu can make you feel very sick, it usually doesn't cause serious health problems. Home treatment is usually all you need for flu symptoms. But your doctor may prescribe antiviral medicine to prevent other health problems, such as pneumonia, from developing. Older people and those who have a long-term health condition, such as lung disease, are most at risk for having pneumonia or other health problems. Follow-up care is a key part of your treatment and safety. Be sure to make and go to all appointments, and call your doctor if you are having problems. It's also a good idea to know your test results and keep a list of the medicines you take. How can you care for yourself at home? · Get plenty of rest. 
· Drink plenty of fluids, enough so that your urine is light yellow or clear like water. If you have kidney, heart, or liver disease and have to limit fluids, talk with your doctor before you increase the amount of fluids you drink. · Take an over-the-counter pain medicine if needed, such as acetaminophen (Tylenol), ibuprofen (Advil, Motrin), or naproxen (Aleve), to relieve fever, headache, and muscle aches. Read and follow all instructions on the label. No one younger than 20 should take aspirin. It has been linked to Reye syndrome, a serious illness. · Do not smoke. Smoking can make the flu worse. If you need help quitting, talk to your doctor about stop-smoking programs and medicines. These can increase your chances of quitting for good.  
· Breathe moist air from a hot shower or from a sink filled with hot water to help clear a stuffy nose. · Before you use cough and cold medicines, check the label. These medicines may not be safe for young children or for people with certain health problems. · If the skin around your nose and lips becomes sore, put some petroleum jelly on the area. · To ease coughing: ¨ Drink fluids to soothe a scratchy throat. ¨ Suck on cough drops or plain hard candy. ¨ Take an over-the-counter cough medicine that contains dextromethorphan to help you get some sleep. Read and follow all instructions on the label. ¨ Raise your head at night with an extra pillow. This may help you rest if coughing keeps you awake. · Take any prescribed medicine exactly as directed. Call your doctor if you think you are having a problem with your medicine. To avoid spreading the flu · Wash your hands regularly, and keep your hands away from your face. · Stay home from school, work, and other public places until you are feeling better and your fever has been gone for at least 24 hours. The fever needs to have gone away on its own without the help of medicine. · Ask people living with you to talk to their doctors about preventing the flu. They may get antiviral medicine to keep from getting the flu from you. · To prevent the flu in the future, get a flu vaccine every fall. Encourage people living with you to get the vaccine. · Cover your mouth when you cough or sneeze. When should you call for help? Call 911 anytime you think you may need emergency care. For example, call if: 
? · You have severe trouble breathing. ?Call your doctor now or seek immediate medical care if: 
? · You have new or worse trouble breathing. ? · You seem to be getting much sicker. ? · You feel very sleepy or confused. ? · You have a new or higher fever. ? · You get a new rash. ? Watch closely for changes in your health, and be sure to contact your doctor if: 
? · You begin to get better and then get worse. ? · You are not getting better after 1 week. Where can you learn more? Go to http://angelina-rajiv.info/. Enter Z648 in the search box to learn more about \"Influenza (Flu): Care Instructions. \" Current as of: May 12, 2017 Content Version: 11.4 © 7777-9377 [employer]. Care instructions adapted under license by [employer] (which disclaims liability or warranty for this information). If you have questions about a medical condition or this instruction, always ask your healthcare professional. [address] any warranty or liability for your use of this information. Please provide this summary of care documentation to your next provider. Signatures-by signing, you are acknowledging that this After Visit Summary has been reviewed with you and you have received a copy. Patient Signature:  ____________________________________________________________ Date:  ____________________________________________________________  
  
Sabra Bosch Provider Signature:  ____________________________________________________________ Date:  ____________________________________________________________

## 2018-01-31 NOTE — H&P
HOSPITALIST H&P/CONSULT  NAME:  April López   Age:  80 y.o.  :   1932   MRN:   [id number]  PCP: Richard Rodriguez MD  Consulting MD:  Treatment Team: Attending Provider: Rachel Larson DO; Primary Nurse: Bryan Farr  HPI:   81yo M with multiple medical problems as listed below presented to the ER with CC of Epigastric CP for 2 days with generalized weakness, cough and sneezing for 2-3 days. Styates the pain was epigastric and off and on, has resolved now. He does reports COPD and states he has been having more SOB and CESAR than usual. He denies any fever, chills, nausea, vomiting , diarrhea or abd pain. Denies any recent sick contact or travel. LAbs showed trop -ve X 2, elevated ALK Phos, nasal swan +ve for Flu B. LA was 2.1. He was given Tamiflu and 500cc fluid bolus. BNP was 509. CXR -ve for Pneumonia. Pt became hypoxic on ambulating so Hospitalist asked to admit pt. States he has had increased swelling in Left leg, also has some pain on palpation of left leg. States he feels a bit better since the CP has resolved. 10 point ROS done and is negative except as noted in HPI. Past Medical History:   Diagnosis Date    Atopic dermatitis 2012    Atrial fibrillation (HCC)     CAD (coronary artery disease)     Carotid stenosis, bilateral 2012    50-79%  3-    1. Carotid Doppler (07): Greater than 70% stenosis in proximal LICA. 50% stenosis in right ICA. 2.  CTA of neck (3/15/10): Occluded distal segments of the vertebral arteries bilaterally. Atherosclerosis of the carotid bulbs bilaterally with a 50% stenosis on the left and a stenosis of less than 30% on the right. Small nodule in the right upper lobe near the apex. 3. CTA (13):  Less than 30% diameter stenosis of the cervical internal carotid arteries bilaterally.     CHF (congestive heart failure) ([facility] [address] 2012    Chronic kidney disease     hx elevated labs    Chronic obstructive pulmonary disease [facility] [address]     Diabetes ([facility] [address]     Diastolic CHF, acute on chronic ([facility] [address] 9/12/2015    1. Echo (9/11/15) : EF 55-60%. Mild LVH. Moderate biatrial enlargement. Moderate mitral/tricuspid regurgitation. 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 Bilateral proximal common iliac PCI (2/21/08):  8.0 X 100 mm Cordis smart stent on right and 10 X 40 mm cordis smart stent on right. Both inflated to 7.0 mm.  Poor historian     Radiologic findings of lung field, abnormal 10/31/2016    1. CT of chest  (11/24/10): Multiple small nodules in the right lobe and stable interstitial prominence. Consistent with chronic lung disease. No evidence of malignancy.  Seizure disorder ([facility] [address] 8/5/2013    Shortness of breath dyspnea 8/5/2013    Thyroid disease     Unspecified sleep apnea       Past Surgical History:   Procedure Laterality Date    CARDIAC SURG PROCEDURE UNLIST      cath 5/07,cabg 6/07,lexiscan cardiolite 11/10    COLONOSCOPY N/A 1/27/2017    COLONOSCOPY  BMI 36 TO BE ADMITTED 1/26/17 performed by Brandi Pineda MD at [facility] 58 HX CATARACT REMOVAL Left 2003    os    HX COLONOSCOPY      HX CORONARY ARTERY BYPASS GRAFT  06-    HX CORONARY STENT PLACEMENT  02-    bilateral iliac artery PCI and stents    HX HEART CATHETERIZATION      left-05-, 01-    HX HEENT  1970s    neck lipoma      Prior to Admission Medications   Prescriptions Last Dose Informant Patient Reported? Taking? HYDROcodone-acetaminophen (NORCO)  mg tablet   No No   Sig: Take 1 Tab by mouth every eight (8) hours as needed for Pain. Max Daily Amount: 3 Tabs. K-DUR 20 mEq tablet   Yes No   Sig: Take 20 mEq by mouth daily. OTHER   No No   Sig: One pair compression stocking gradient 20 - 30 mm hg  Please measure to fit   SITagliptin (JANUVIA) 50 mg tablet   No No   Sig: Take 1 Tab by mouth daily. albuterol (PROVENTIL VENTOLIN) 2.5 mg /3 mL (0.083 %) nebulizer solution   Yes No   Si.5 mg by Nebulization route every four (4) hours as needed for Wheezing. allopurinol (ZYLOPRIM) 300 mg tablet   Yes No   Sig: Take  by mouth daily. aspirin 81 mg chewable tablet   No No   Sig: Take 1 Tab by mouth daily. budesonide-formoterol (SYMBICORT) 160-4.5 mcg/actuation HFA inhaler   No No   Sig: Take 2 Puffs by inhalation two (2) times a day. cholecalciferol (VITAMIN D3) 1,000 unit cap   Yes No   Sig: Take 1,000 Units by mouth daily. cpap machine kit   Yes No   Sig: by Does Not Apply route. Bilevel    docusate sodium (STOOL SOFTENER) 100 mg capsule   Yes No   Sig: Take 100 mg by mouth as needed. ferrous sulfate 324 mg (65 mg iron) tablet   Yes No   Sig: Take  by mouth Daily (before breakfast). fluticasone (FLONASE) 50 mcg/actuation nasal spray   No No   Si Sprays by Both Nostrils route daily. furosemide (LASIX) 80 mg tablet   No No   Sig: Take 1 Tab by mouth two (2) times a day.   gabapentin (NEURONTIN) 100 mg capsule   No No   Sig: Take 1 Cap by mouth three (3) times daily. glipiZIDE (GLUCOTROL) 5 mg tablet   Yes No   Sig: Take 5 mg by mouth two (2) times a day. hydrALAZINE (APRESOLINE) 10 mg tablet   No No   Sig: TAKE 1 TABLET THREE TIMES DAILY. ipratropium-albuterol (COMBIVENT RESPIMAT)  mcg/actuation inhaler   No No   Sig: Take 1 Puff by inhalation every six (6) hours. isosorbide mononitrate ER (IMDUR) 30 mg tablet   No No   Sig: Take 1 Tab by mouth daily. levothyroxine (SYNTHROID) 50 mcg tablet   No No   Sig: Take 1 Tab by mouth Daily (before breakfast). magnesium oxide (MAG-OX) 400 mg tablet   No No   Sig: Take 1 Tab by mouth daily.    metoprolol succinate (TOPROL-XL) 25 mg XL tablet   No No   Sig: Pt takes 1/2 tab po daily. mupirocin calcium (BACTROBAN) 2 % topical cream   No No   Sig: Apply  to affected area two (2) times a day. oxyCODONE IR (OXY-IR) 15 mg immediate release tablet   No No   Sig: Take 1 Tab by mouth every eight (8) hours as needed for Pain. Max Daily Amount: 45 mg.   pantoprazole (PROTONIX) 40 mg tablet   Yes No   Sig: Take 40 mg by mouth daily as needed. pravastatin (PRAVACHOL) 40 mg tablet   No No   Sig: Take 1 Tab by mouth nightly. sAXagliptin (ONGLYZA) 2.5 mg tablet   Yes No   Sig: Take 2.5 mg by mouth daily. sertraline (ZOLOFT) 50 mg tablet   No No   Sig: Take one tablet each evening for anxiety  Indications: GENERALIZED ANXIETY DISORDER   Patient taking differently: as needed. Take one tablet each evening for anxiety  Indications: GENERALIZED ANXIETY DISORDER   travoprost (TRAVATAN Z) 0.004 % ophthalmic solution   Yes No   Sig: Administer 1 Drop to both eyes two (2) times a day. triamcinolone (ARISTOCORT) 0.5 % topical cream   No No   Sig: Apply  to affected area two (2) times a day. use thin layer   triamcinolone acetonide (KENALOG) 0.1 % ointment   No No   Sig: Apply  to affected area two (2) times a day. Facility-Administered Medications: None     Home meds reconciled.   No Known Allergies   Social History   Substance Use Topics    Smoking status: Former Smoker     Packs/day: 1.00     Years: 45.00     Types: Cigarettes     Quit date: 1984    Smokeless tobacco: Never Used      Comment: (stopped smoking in )    Alcohol use No      Family History   Problem Relation Age of Onset    Heart Attack Mother    [facility] Other Father      old age   [facility] Other Brother      brain aneurysm    Heart Disease Other      2 children  with heart concerns, 36 & 49 yo    Heart Disease Son       Immunization History   Administered Date(s) Administered    Influenza High Dose Vaccine PF 10/24/2016, 2017    Influenza Vaccine 10/01/2010, 2012, 2013, 2015    Influenza Vaccine (Quad) PF 2015    Influenza Vaccine Whole 10/01/2007    Pneumococcal Conjugate (PCV-13) 10/19/2015    Pneumococcal Vaccine (Unspecified Type) 2015    TB Skin Test (PPD) Intradermal 2015, 2016, 2017, 2017    ZZZ-RETIRED (DO NOT USE) Pneumococcal Vaccine (Unspecified Type) 10/01/2006, 2011     Objective:     Visit Vitals    /72    Pulse 82    Temp (!) 100.6 °F (38.1 °C)    Resp 20    Ht 5' 10\" (1.778 m)    Wt 112.5 kg (248 lb)    SpO2 91%    BMI 35.58 kg/m2      Temp (24hrs), Av.6 °F (38.1 °C), Min:100.6 °F (38.1 °C), Max:100.6 °F (38.1 °C)    Oxygen Therapy  O2 Sat (%): 91 % (18)  Pulse via Oximetry: 73 beats per minute (18)  O2 Device: Room air (18)  Physical Exam:  General:    Alert, cooperative, no distress, unkempt  Head:   NCAT. No obvious deformity  Nose:  Nares normal. No drainage  Lungs:   CTABL. No wheezing/rhonchi/rales  Heart:   RRR. No m/r/g. Abdomen:   S/nt/nd. Bowel sounds normal.   Extremities: No cyanosis. Left leg swollen  Skin:     Some crusted lesions of Left leg. No visible drainage. Neurologic: Moves all extremities. no gross focal deficits      Data Review:   Recent Results (from the past 24 hour(s))   CBC WITH AUTOMATED DIFF    Collection Time: 18 12:38 PM   Result Value Ref Range    WBC 10.6 4.3 - 11.1 K/uL    RBC 4.98 4.23 - 5.67 M/uL    HGB 13.5 (L) 13.6 - 17.2 g/dL    HCT 41.1 41.1 - 50.3 %    MCV 82.5 79.6 - 97.8 FL    MCH 27.1 26.1 - 32.9 PG    MCHC 32.8 31.4 - 35.0 g/dL    RDW 18.2 (H) 11.9 - 14.6 %    PLATELET 036 138 - 460 K/uL    MPV 10.6 (L) 10.8 - 14.1 FL    DF AUTOMATED      NEUTROPHILS 90 (H) 43 - 78 %    LYMPHOCYTES 3 (L) 13 - 44 %    MONOCYTES 6 4.0 - 12.0 %    EOSINOPHILS 1 0.5 - 7.8 %    BASOPHILS 0 0.0 - 2.0 %    IMMATURE GRANULOCYTES 0 0.0 - 5.0 %    ABS. NEUTROPHILS 9.4 (H) 1.7 - 8.2 K/UL    ABS. LYMPHOCYTES 0.3 (L) 0.5 - 4.6 K/UL    ABS. MONOCYTES 0.7 0.1 - 1.3 K/UL    ABS. EOSINOPHILS 0.1 0.0 - 0.8 K/UL    ABS. BASOPHILS 0.0 0.0 - 0.2 K/UL    ABS. IMM. GRANS. 0.0 0.0 - 0.5 K/UL   METABOLIC PANEL, COMPREHENSIVE    Collection Time: 01/31/18 12:38 PM   Result Value Ref Range    Sodium 137 136 - 145 mmol/L    Potassium 4.7 3.5 - 5.1 mmol/L    Chloride 101 98 - 107 mmol/L    CO2 23 21 - 32 mmol/L    Anion gap 13 7 - 16 mmol/L    Glucose 132 (H) 65 - 100 mg/dL    BUN 17 8 - 23 MG/DL    Creatinine 1.31 0.8 - 1.5 MG/DL    GFR est AA >60 >60 ml/min/1.73m2    GFR est non-AA 55 (L) >60 ml/min/1.73m2    Calcium 8.5 8.3 - 10.4 MG/DL    Bilirubin, total 3.8 (H) 0.2 - 1.1 MG/DL    ALT (SGPT) 33 12 - 65 U/L    AST (SGOT) 66 (H) 15 - 37 U/L    Alk. phosphatase 260 (H) 50 - 136 U/L    Protein, total 7.7 6.3 - 8.2 g/dL    Albumin 3.2 3.2 - 4.6 g/dL    Globulin 4.5 (H) 2.3 - 3.5 g/dL    A-G Ratio 0.7 (L) 1.2 - 3.5     LIPASE    Collection Time: 01/31/18 12:38 PM   Result Value Ref Range    Lipase 104 73 - 393 U/L   TROPONIN I    Collection Time: 01/31/18 12:38 PM   Result Value Ref Range    Troponin-I, Qt. <0.02 (L) 0.02 - 0.05 NG/ML   BNP    Collection Time: 01/31/18 12:38 PM   Result Value Ref Range     pg/mL   INFLUENZA A & B AG (RAPID TEST)    Collection Time: 01/31/18  3:00 PM   Result Value Ref Range    Influenza A Ag NEGATIVE  NEG      Influenza B Ag POSITIVE (A) NEG     MAGNESIUM    Collection Time: 01/31/18  3:15 PM   Result Value Ref Range    Magnesium 1.7 (L) 1.8 - 2.4 mg/dL   TROPONIN I    Collection Time: 01/31/18  3:15 PM   Result Value Ref Range    Troponin-I, Qt. 0.03 0.02 - 0.05 NG/ML   EKG, 12 LEAD, INITIAL    Collection Time: 01/31/18  3:53 PM   Result Value Ref Range    Ventricular Rate 77 BPM    Atrial Rate 267 BPM    QRS Duration 92 ms    Q-T Interval 348 ms    QTC Calculation (Bezet) 393 ms    Calculated R Axis 34 degrees    Calculated T Axis -166 degrees    Diagnosis       !! AGE AND GENDER SPECIFIC ECG ANALYSIS !!   Atrial fibrillation  Septal infarct (cited on or before 21-AUG-2017)  ST & T wave abnormality, consider inferolateral ischemia or digitalis effect  Abnormal ECG  When compared with ECG of 17-JAN-2018 09:23,  Questionable change in initial forces of Anterior leads  QT has shortened  Confirmed by ST NADIA AUGUSTE MD (), KERRY LIU ([id number]) on 1/31/2018 4:48:32 PM     POC LACTIC ACID    Collection Time: 01/31/18  4:09 PM   Result Value Ref Range    Lactic Acid (POC) 2.1 (H) 0.5 - 1.9 mmol/L     Imaging /Procedures /Studies:  I personally reviewed all labs, imaging, and other studies this admission:  CXR Results  (Last 48 hours)               01/31/18 1306  XR CHEST PA LAT Final result    Impression:  IMPRESSION: Stable cardiomegaly without significant change. Mild interstitial   prominence. .               Narrative:  CHEST X-RAY, 2 views. HISTORY:  Weakness and chills. TECHNIQUE: PA and lateral views. COMPARISON: 17 January 2018. FINDINGS: Heart is enlarged. Mild interstitial prominence. Cost phrenic angles   are blunted. There are sternal wires. No significant change from prior exam.               CT Results  (Last 48 hours)    None          Assessment and Plan: Active Hospital Problems    Diagnosis Date Noted    Influenza B 01/31/2018    Restrictive lung disease 11/01/2017    Cellulitis of left lower extremity 08/22/2017    Acute respiratory failure with hypoxia ([facility] [address] 08/04/2017    Coronary atherosclerosis of native coronary vessel 10/31/2016     1. Cath (5/31/07):  LMCA: 25%. LAD: ostial 75-95% stenosis. 50-75% mid. D1:  25-50%. OM3: small with 75% stenosis. RCA: 100% mid. with left to right collaterals. 2 Vessel CABG (6/4/07):  LIMA to LAD and SVG to OM. SVG was anastomosed proximally to LIMA to due severe atherosclerosis of aorta. 3.  Lexiscan cardiolite (11/30/10): Abnormal with reversible lateral wall defects. Unable to gate given atrial fibrillation. 4.  LHC (1/14/11):  EF 60%. LVEDP 21. Patent LIMA to LAD and SVG to OM1 (off LIMA). occluded small RCA with left to right collaterals. Small D1 (2 mm vessel) with 70% mid stenosis.  Type 2 diabetes mellitus with peripheral neuropathy ([address] 11/05/2015    Chronic diastolic congestive heart failure ([address] 09/12/2015     1. Echo (9/11/15) : EF 55-60%. Mild LVH. Moderate biatrial enlargement. Moderate mitral/tricuspid regurgitation. 2.  Echo (8/21/17):  EF 55-60%. Moderate LAE. Mild mitral stenosis. Moderate tricuspid regurgitation. RVSP 35-40.  ROBERTO on CPAP 02/20/2015     Patient is on BiPAP, no supplementary oxygen      Seizure disorder ([address] 05/22/2014    Hyperlipidemia 08/05/2013    COPD (chronic obstructive pulmonary disease) ([address] 11/21/2012    Essential hypertension 11/21/2012    Gout 11/21/2012    PAD (peripheral artery disease) ([address] 11/21/2012     1. Bilateral proximal common iliac PCI (2/21/08):  8.0 X 100 mm Cordis smart stent on right and 10 X 40 mm cordis smart stent on right. Both inflated to 7.0 mm.  Persistent atrial fibrillation ([address] 11/21/2012     Coumadin therapy discontinued due to recurrent GI bleeding. PLAN:    · Admit to inpt medical bed  · Start on Tamiflu for 5 days  · He has had IVF in ER, bnp is >500. Watch for fluid overload. · Trend LA in am   · A1c was 8.0 in 11/17, start on ISS and resume home meds  · A Fib not on AC due to [address]. Resume rate control meds  · Confirm PTA meds list and resume pertinent meds.     FEN:  Diabetic/cardiac diet  DVT ppx: lovenox   Code status:  Full  Estimated LOS:  2-3 days  Risk assessment:  High risk given acute resp failure, +ve flu and comorbidities  Plan of care discussed with: patient     Signed By: Lauro Dickens MD     January 31, 2018

## 2018-01-31 NOTE — ED PROVIDER NOTES
HPI Comments: 27-year-old male patient presents with reports of substernal/lower epigastric pain started while at home this morning. Patient estimates that his pain lasted for approximately 1-2 hours. It resolved spontaneously. Patient denies any alleviating or exacerbating factors. He describes a dull aching sensation at the substernal/lower epigastric region, nonradiating in nature. He reports mild shortness of breath and states he was slightly nauseated. Bedside states patient did not feel like eating this morning and did not take his medications as well. He reports subjective fever unquantified fever of 100 earlier today home. Patient reports nasal congestion and coughing, intermittently productive of clear sputum. Patient appears slightly tachypneic on exam.  He is speaking in clear sentences. He states he has no pain at this time. Patient is a 80 y.o. male presenting with epigastric pain. The history is provided by the patient. Epigastric Pain    This is a new problem. The current episode started 3 to 5 hours ago. The problem occurs constantly. The problem has been resolved. The pain is located in the epigastric region and chest. The quality of the pain is aching and dull. The pain is moderate. Associated symptoms include a fever, nausea, constipation and chest pain. Pertinent negatives include no anorexia, no belching, no diarrhea, no flatus, no hematochezia, no melena, no vomiting, no dysuria, no frequency, no hematuria, no headaches, no arthralgias, no myalgias, no trauma, no testicular pain and no back pain. Nothing worsens the pain. The pain is relieved by nothing. Past Medical History:   Diagnosis Date    Atopic dermatitis 11/21/2012    Atrial fibrillation (HCC)     CAD (coronary artery disease)     Carotid stenosis, bilateral 11/21/2012    50-79%  3-2010    1. Carotid Doppler (6/1/07): Greater than 70% stenosis in proximal LICA. 50% stenosis in right ICA.  2.  CTA of neck (3/15/10): Occluded distal segments of the vertebral arteries bilaterally. Atherosclerosis of the carotid bulbs bilaterally with a 50% stenosis on the left and a stenosis of less than 30% on the right. Small nodule in the right upper lobe near the apex. 3. CTA (8/29/13):  Less than 30% diameter stenosis of the cervical internal carotid arteries bilaterally.  CHF (congestive heart failure) ([address] 11/21/2012    Chronic kidney disease     hx elevated labs    Chronic obstructive pulmonary disease (HCC)     Diabetes (HCC)     Diastolic CHF, acute on chronic ([address] 9/12/2015    1. Echo (9/11/15) : EF 55-60%. Mild LVH. Moderate biatrial enlargement. Moderate mitral/tricuspid regurgitation.  Failed CABG (coronary artery bypass graft) 11/21/2012    GI bleed 1/2015    Hospitalized [facility]    Gout 11/21/2012    History of tobacco use     [place] (hard of hearing)     Hypercholesterolemia     Hypertension     Hyperuricemia 11/21/2012    Morbid obesity ([address]     PAD (peripheral artery disease) ([address] 11/21/2012    1. Bilateral proximal common iliac PCI (2/21/08):  8.0 X 100 mm Cordis smart stent on right and 10 X 40 mm cordis smart stent on right. Both inflated to 7.0 mm.  Poor historian     Radiologic findings of lung field, abnormal 10/31/2016    1. CT of chest  (11/24/10): Multiple small nodules in the right lobe and stable interstitial prominence. Consistent with chronic lung disease. No evidence of malignancy.     Seizure disorder ([address] 8/5/2013    Shortness of breath dyspnea 8/5/2013    Thyroid disease     Unspecified sleep apnea        Past Surgical History:   Procedure Laterality Date    CARDIAC SURG PROCEDURE UNLIST      cath 5/07,cabg 6/07,lexiscan cardiolite 11/10    COLONOSCOPY N/A 1/27/2017    COLONOSCOPY  BMI 36 TO BE ADMITTED 1/26/17 performed by Johnnie Hallman MD at [address] Left 2003    os    HX COLONOSCOPY      HX CORONARY ARTERY BYPASS GRAFT 2007    HX CORONARY STENT PLACEMENT  2008    bilateral iliac artery PCI and stents    HX HEART CATHETERIZATION      left-2007, 2011    HX HEENT  1970s    neck lipoma         Family History:   Problem Relation Age of Onset    Heart Attack Mother    [facility] Other Father      old age   [facility] Other Brother      brain aneurysm    Heart Disease Other      2 children  with heart concerns, 36 & 49 yo    Heart Disease Son        Social History     Social History    Marital status:      Spouse name: N/A    Number of children: N/A    Years of education: N/A     Occupational History    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 Worked as a salesman for 16 years and in the [address] [facility] [employer] to [address] yrs. , two living children, two children  with coronary artery disease, ages 36 and 48. Has always lived in [facility] [place]. No pets. ALLERGIES: Review of patient's allergies indicates no known allergies. Review of Systems   Constitutional: Positive for fever. Negative for chills and diaphoresis. HENT: Negative for congestion, sneezing and sore throat. Eyes: Negative for visual disturbance. Respiratory: Positive for cough and shortness of breath. Negative for chest tightness and wheezing. Cardiovascular: Positive for chest pain. Negative for leg swelling. Gastrointestinal: Positive for constipation and nausea. Negative for abdominal pain, anorexia, blood in stool, diarrhea, flatus, hematochezia, melena and vomiting. Endocrine: Negative for polyuria. Genitourinary: Negative for difficulty urinating, dysuria, flank pain, frequency, hematuria, testicular pain and urgency. Musculoskeletal: Negative for arthralgias, back pain, myalgias, neck pain and neck stiffness. Skin: Negative for color change and rash. Neurological: Negative for dizziness, syncope, speech difficulty, weakness, light-headedness, numbness and headaches. Psychiatric/Behavioral: Negative for behavioral problems. All other systems reviewed and are negative. Vitals:    01/31/18 1228   BP: (!) 163/91   Pulse: 88   Resp: 20   Temp: (!) 100.6 °F (38.1 °C)   SpO2: 93%   Weight: 112.5 kg (248 lb)   Height: 5' 10\" (1.778 m)            Physical Exam   Constitutional: He is oriented to person, place, and time. He appears well-developed and well-nourished. No distress. Alert and oriented to person, place and time. No acute distress. Speaks in clear, fluent sentences. HENT:   Head: Normocephalic and atraumatic. Nose: Nose normal.   Eyes: Conjunctivae and EOM are normal. Pupils are equal, round, and reactive to light. Neck: Normal range of motion. Neck supple. No JVD present. No tracheal deviation present. Cardiovascular: Normal rate, regular rhythm, S1 normal, S2 normal, normal heart sounds and intact distal pulses. Exam reveals no gallop, no distant heart sounds and no friction rub. No murmur heard. Pulmonary/Chest: Effort normal. No accessory muscle usage or stridor. Tachypnea noted. No bradypnea. No respiratory distress. He has decreased breath sounds. He has no wheezes. He has no rhonchi. He has no rales. He exhibits no tenderness. Poor inspiratory effort, diminished throughout. No focal findings. Abdominal: Soft. Normal appearance. He exhibits no distension and no mass. There is no hepatosplenomegaly, splenomegaly or hepatomegaly. There is no tenderness.  There is no rigidity, no rebound, no guarding, no CVA tenderness, no tenderness at McBurney's point and negative Mandel's sign. Obese, no reproducible pain on exam.   Musculoskeletal: Normal range of motion. He exhibits no edema, tenderness or deformity. Bilateral lower extremity edema present this is more pronounced over the left lower extremity than the right. There is no pain on exam.  Discoloration of the skin of the lower legs bilaterally consistent with chronic peripheral insufficiency. Patient states the swelling is at baseline. Neurological: He is alert and oriented to person, place, and time. No cranial nerve deficit. Skin: Skin is warm and dry. No rash noted. He is not diaphoretic. Psychiatric: He has a normal mood and affect. His behavior is normal.   Nursing note and vitals reviewed. MDM  Number of Diagnoses or Management Options  Influenza B: new and requires workup  Tachypnea: new and requires workup  Diagnosis management comments: Patient is unable to tell me exactly what brings him to the emergency department at this time however his wife at bedside states that he was complaining of substernal/lower epigastric abdominal pain this morning. His pain resolved spontaneously. Patient is influenza B-positive, troponin within normal limits, chest x-ray clear. Patient becomes very tachypneic with any movement in his saturation dropped into the high 80s. He will be admitted for observation at this time. Spoke with the on-call hospitalist agrees to evaluate patient.        Amount and/or Complexity of Data Reviewed  Clinical lab tests: ordered and reviewed  Tests in the radiology section of CPT®: ordered and reviewed  Tests in the medicine section of CPT®: ordered and reviewed  Independent visualization of images, tracings, or specimens: yes    Risk of Complications, Morbidity, and/or Mortality  Presenting problems: moderate  Diagnostic procedures: low  Management options: moderate    Patient Progress  Patient progress: stable        ED Course Procedures

## 2018-01-31 NOTE — PROGRESS NOTES
CM called, spoke with granddaughter and was informed that EMS was called and patient is headed to ED for chest pain. This note will not be viewable in [address]

## 2018-01-31 NOTE — ED TRIAGE NOTES
Pt to ed via ems. wc with epigastric pain that has been on and off for months - pt denies any n/v/d - bgl 115 -98.4 temp - history of a.fib and a.fib on ems monitor

## 2018-02-01 NOTE — PROGRESS NOTES
Care Management Interventions  PCP Verified by CM: Yes  Transition of Care Consult (CM Consult): Discharge Planning  Physical Therapy Consult: No  Occupational Therapy Consult: No  Current Support Network: Lives with Spouse  Confirm Follow Up Transport: Family  Plan discussed with Pt/Family/Caregiver: Yes  Freedom of Choice Offered: Yes  Discharge Location  Discharge Placement: Home     Met with patient at the bedside. He is alert and oriented and very pleasant. Asking when he can go home. Feeling better and at baseline. He is oob in the chair. Did not feel he needed PT. He lives with spouse in a one level home with one step to enter. He has his own c-pap. He drives occasionally but states his wife does most of the driving. DC plan: Home with spouse. Will add in PT of patient has a prolonged stay. Sha Wagner

## 2018-02-01 NOTE — INTERDISCIPLINARY ROUNDS
Interdisciplinary team rounds were held 2/1/2018 with the following team members:Care Management, Nursing, Physical Therapy and Physician. Plan of care discussed. See clinical pathway and/or care plan for interventions and desired outcomes. Anticipating discharge to home today.

## 2018-02-01 NOTE — ED NOTES
TRANSFER - OUT REPORT:    Verbal report given to Jacqueline Ried RN (name) on Lilo Herman  being transferred to  (unit) for routine progression of care       Report consisted of patients Situation, Background, Assessment and   Recommendations(SBAR). Information from the following report(s) SBAR, ED Summary, [place] and Recent Results was reviewed with the receiving nurse. Lines:   Peripheral IV 01/31/18 Left Hand (Active)       Peripheral IV 01/31/18 Left Antecubital (Active)   Site Assessment Clean, dry, & intact 1/31/2018  7:04 PM   Phlebitis Assessment 0 1/31/2018  7:04 PM   Infiltration Assessment 0 1/31/2018  7:04 PM   Dressing Status Clean, dry, & intact 1/31/2018  7:04 PM   Dressing Type Transparent 1/31/2018  7:04 PM        Opportunity for questions and clarification was provided.       Patient transported with:   O2 @ 2 liters  Tech

## 2018-02-01 NOTE — ED NOTES
Patient report to [facility], [address]. Patient care to be assumed at this time.

## 2018-02-01 NOTE — PROGRESS NOTES
HOSPITALIST H&P/CONSULT  NAME:  Nahed Calixto   Age:  80 y.o.  :   1932   MRN:   [id number]  PCP: Keisha Ji MD  Consulting MD:  Treatment Team: Attending Provider: Dinah Potter MD; Utilization Review: Richard Haney RN; Care Manager: HORACE Delgado  HPI:   79yo M with multiple medical problems as listed below presented to the ER with CC of Epigastric CP for 2 days with generalized weakness, cough and sneezing for 2-3 days. Styates the pain was epigastric and off and on, has resolved now. He does reports COPD and states he has been having more SOB and CESAR than usual. He denies any fever, chills, nausea, vomiting , diarrhea or abd pain. Denies any recent sick contact or travel. LAbs showed trop -ve X 2, elevated ALK Phos, nasal swan +ve for Flu B. LA was 2.1. He was given Tamiflu and 500cc fluid bolus. BNP was 509. CXR -ve for Pneumonia. Pt became hypoxic on ambulating so Hospitalist asked to admit pt. States he has had increased swelling in Left leg, also has some pain on palpation of left leg. States he feels a bit better since the CP has resolved. 10 point ROS done and is negative except as noted in HPI.      2018- seen no new complaints  Past Medical History:   Diagnosis Date    Atopic dermatitis 2012    Atrial fibrillation (HCC)     CAD (coronary artery disease)     Carotid stenosis, bilateral 2012    50-79%  3-    1. Carotid Doppler (07): Greater than 70% stenosis in proximal LICA. 50% stenosis in right ICA. 2.  CTA of neck (3/15/10): Occluded distal segments of the vertebral arteries bilaterally. Atherosclerosis of the carotid bulbs bilaterally with a 50% stenosis on the left and a stenosis of less than 30% on the right. Small nodule in the right upper lobe near the apex. 3. CTA (13):  Less than 30% diameter stenosis of the cervical internal carotid arteries bilaterally.     CHF (congestive heart failure) (HCC) 2012    Chronic kidney disease     hx elevated labs    Chronic obstructive pulmonary disease (HCC)     Diabetes ([address]     Diastolic CHF, acute on chronic ([address] 9/12/2015    1. Echo (9/11/15) : EF 55-60%. Mild LVH. Moderate biatrial enlargement. Moderate mitral/tricuspid regurgitation. 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 Bilateral proximal common iliac PCI (2/21/08):  8.0 X 100 mm Cordis smart stent on right and 10 X 40 mm cordis smart stent on right. Both inflated to 7.0 mm.  Poor historian     Radiologic findings of lung field, abnormal 10/31/2016    1. CT of chest  (11/24/10): Multiple small nodules in the right lobe and stable interstitial prominence. Consistent with chronic lung disease. No evidence of malignancy.  Seizure disorder ([facility] 8/5/2013    Shortness of breath dyspnea 8/5/2013    Thyroid disease     Unspecified sleep apnea       Past Surgical History:   Procedure Laterality Date    CARDIAC SURG PROCEDURE UNLIST      cath 5/07,cabg 6/07,lexiscan cardiolite 11/10    COLONOSCOPY N/A 1/27/2017    COLONOSCOPY  BMI 36 TO BE ADMITTED 1/26/17 performed by Emili White MD at [address] HX CATARACT REMOVAL Left 2003    os    HX COLONOSCOPY      HX CORONARY ARTERY BYPASS GRAFT  06-    HX CORONARY STENT PLACEMENT  02-    bilateral iliac artery PCI and stents    HX HEART CATHETERIZATION      left-05-, 01-    HX HEENT  1970s    neck lipoma      Prior to Admission Medications   Prescriptions Last Dose Informant Patient Reported? Taking? HYDROcodone-acetaminophen (NORCO)  mg tablet   No No   Sig: Take 1 Tab by mouth every eight (8) hours as needed for Pain.  Max Daily Amount: 3 Tabs. K-DUR 20 mEq tablet   Yes No   Sig: Take 20 mEq by mouth daily. OTHER   No No   Sig: One pair compression stocking gradient 20 - 30 mm hg  Please measure to fit   SITagliptin (JANUVIA) 50 mg tablet   No No   Sig: Take 1 Tab by mouth daily. albuterol (PROVENTIL VENTOLIN) 2.5 mg /3 mL (0.083 %) nebulizer solution   Yes No   Si.5 mg by Nebulization route every four (4) hours as needed for Wheezing. allopurinol (ZYLOPRIM) 300 mg tablet   Yes No   Sig: Take  by mouth daily. aspirin 81 mg chewable tablet   No No   Sig: Take 1 Tab by mouth daily. budesonide-formoterol (SYMBICORT) 160-4.5 mcg/actuation HFA inhaler   No No   Sig: Take 2 Puffs by inhalation two (2) times a day. cholecalciferol (VITAMIN D3) 1,000 unit cap   Yes No   Sig: Take 1,000 Units by mouth daily. cpap machine kit   Yes No   Sig: by Does Not Apply route. Bilevel    docusate sodium (STOOL SOFTENER) 100 mg capsule   Yes No   Sig: Take 100 mg by mouth as needed. ferrous sulfate 324 mg (65 mg iron) tablet   Yes No   Sig: Take  by mouth Daily (before breakfast). fluticasone (FLONASE) 50 mcg/actuation nasal spray   No No   Si Sprays by Both Nostrils route daily. furosemide (LASIX) 80 mg tablet   No No   Sig: Take 1 Tab by mouth two (2) times a day.   gabapentin (NEURONTIN) 100 mg capsule   No No   Sig: Take 1 Cap by mouth three (3) times daily. glipiZIDE (GLUCOTROL) 5 mg tablet   Yes No   Sig: Take 5 mg by mouth two (2) times a day. hydrALAZINE (APRESOLINE) 10 mg tablet   No No   Sig: TAKE 1 TABLET THREE TIMES DAILY. ipratropium-albuterol (COMBIVENT RESPIMAT)  mcg/actuation inhaler   No No   Sig: Take 1 Puff by inhalation every six (6) hours. isosorbide mononitrate ER (IMDUR) 30 mg tablet   No No   Sig: Take 1 Tab by mouth daily. levothyroxine (SYNTHROID) 50 mcg tablet   No No   Sig: Take 1 Tab by mouth Daily (before breakfast).    magnesium oxide (MAG-OX) 400 mg tablet   No No   Sig: Take 1 Tab by mouth daily. metoprolol succinate (TOPROL-XL) 25 mg XL tablet   No No   Sig: Pt takes 1/2 tab po daily. mupirocin calcium (BACTROBAN) 2 % topical cream   No No   Sig: Apply  to affected area two (2) times a day. oxyCODONE IR (OXY-IR) 15 mg immediate release tablet   No No   Sig: Take 1 Tab by mouth every eight (8) hours as needed for Pain. Max Daily Amount: 45 mg.   pantoprazole (PROTONIX) 40 mg tablet   Yes No   Sig: Take 40 mg by mouth daily as needed. pravastatin (PRAVACHOL) 40 mg tablet   No No   Sig: Take 1 Tab by mouth nightly. sAXagliptin (ONGLYZA) 2.5 mg tablet   Yes No   Sig: Take 2.5 mg by mouth daily. sertraline (ZOLOFT) 50 mg tablet   No No   Sig: Take one tablet each evening for anxiety  Indications: GENERALIZED ANXIETY DISORDER   Patient taking differently: as needed. Take one tablet each evening for anxiety  Indications: GENERALIZED ANXIETY DISORDER   travoprost (TRAVATAN Z) 0.004 % ophthalmic solution   Yes No   Sig: Administer 1 Drop to both eyes two (2) times a day. triamcinolone (ARISTOCORT) 0.5 % topical cream   No No   Sig: Apply  to affected area two (2) times a day. use thin layer   triamcinolone acetonide (KENALOG) 0.1 % ointment   No No   Sig: Apply  to affected area two (2) times a day. Facility-Administered Medications: None     Home meds reconciled.   No Known Allergies   Social History   Substance Use Topics    Smoking status: Former Smoker     Packs/day: 1.00     Years: 45.00     Types: Cigarettes     Quit date: 1984    Smokeless tobacco: Never Used      Comment: (stopped smoking in )    Alcohol use No      Family History   Problem Relation Age of Onset    Heart Attack Mother    Radha Herman Other Father      old age   Radha Herman Other Brother      brain aneurysm    Heart Disease Other      2 children  with heart concerns, 36 & 49 yo    Heart Disease Son       Immunization History   Administered Date(s) Administered    Influenza High Dose Vaccine PF 10/24/2016, 2017    Influenza Vaccine 10/01/2010, 2012, 2013, 2015    Influenza Vaccine (Quad) PF 2015    Influenza Vaccine Whole 10/01/2007    Pneumococcal Conjugate (PCV-13) 10/19/2015    Pneumococcal Vaccine (Unspecified Type) 2015    TB Skin Test (PPD) Intradermal 2015, 2016, 2017, 2017    ZZZ-RETIRED (DO NOT USE) Pneumococcal Vaccine (Unspecified Type) 10/01/2006, 2011     Objective:     Visit Vitals    /62 (BP 1 Location: Right arm, BP Patient Position: Head of bed elevated (Comment degrees))    Pulse 63    Temp 98.2 °F (36.8 °C)    Resp 20    Ht 5' 10\" (1.778 m)    Wt 112.5 kg (248 lb)    SpO2 97%    BMI 35.58 kg/m2      Temp (24hrs), Av.2 °F (36.8 °C), Min:97.7 °F (36.5 °C), Max:98.9 °F (37.2 °C)    Oxygen Therapy  O2 Sat (%): 97 % (18)  Pulse via Oximetry: 74 beats per minute (18)  O2 Device: Room air (18)  O2 Flow Rate (L/min): 0 l/min (18)  FIO2 (%): 21 % (18)  Physical Exam:  General:    Alert, cooperative, no distress, unkempt  Head:   NCAT. No obvious deformity  Nose:  Nares normal. No drainage  Lungs:   CTABL. No wheezing/rhonchi/rales  Heart:   RRR. No m/r/g. Abdomen:   S/nt/nd. Bowel sounds normal.   Extremities: No cyanosis. Left leg swollen  Skin:     Some crusted lesions of Left leg. No visible drainage. Neurologic: Moves all extremities.   no gross focal deficits      Data Review:   Recent Results (from the past 24 hour(s))   GLUCOSE, POC    Collection Time: 18  9:20 PM   Result Value Ref Range    Glucose (POC) 153 (H) 65 - 100 mg/dL   TROPONIN I    Collection Time: 18 10:52 PM   Result Value Ref Range    Troponin-I, Qt. 0.03 0.02 - 5.20 NG/ML   METABOLIC PANEL, BASIC    Collection Time: 18  5:04 AM   Result Value Ref Range    Sodium 139 136 - 145 mmol/L    Potassium 4.9 3.5 - 5.1 mmol/L    Chloride 102 98 - 107 mmol/L    CO2 27 21 - 32 mmol/L    Anion gap 10 7 - 16 mmol/L    Glucose 136 (H) 65 - 100 mg/dL    BUN 17 8 - 23 MG/DL    Creatinine 1.48 0.8 - 1.5 MG/DL    GFR est AA 58 (L) >60 ml/min/1.73m2    GFR est non-AA 48 (L) >60 ml/min/1.73m2    Calcium 9.0 8.3 - 10.4 MG/DL   CBC W/O DIFF    Collection Time: 02/01/18  5:04 AM   Result Value Ref Range    WBC 8.8 4.3 - 11.1 K/uL    RBC 4.96 4.23 - 5.67 M/uL    HGB 13.1 (L) 13.6 - 17.2 g/dL    HCT 40.8 (L) 41.1 - 50.3 %    MCV 82.3 79.6 - 97.8 FL    MCH 26.4 26.1 - 32.9 PG    MCHC 32.1 31.4 - 35.0 g/dL    RDW 18.5 (H) 11.9 - 14.6 %    PLATELET 617 773 - 761 K/uL    MPV 11.1 10.8 - 14.1 FL   MAGNESIUM    Collection Time: 02/01/18  5:04 AM   Result Value Ref Range    Magnesium 2.4 1.8 - 2.4 mg/dL   LACTIC ACID    Collection Time: 02/01/18  5:04 AM   Result Value Ref Range    Lactic acid 2.4 (HH) 0.4 - 2.0 MMOL/L   GLUCOSE, POC    Collection Time: 02/01/18  8:02 AM   Result Value Ref Range    Glucose (POC) 126 (H) 65 - 100 mg/dL   LACTIC ACID    Collection Time: 02/01/18 10:59 AM   Result Value Ref Range    Lactic acid 1.9 0.4 - 2.0 MMOL/L   GLUCOSE, POC    Collection Time: 02/01/18 11:27 AM   Result Value Ref Range    Glucose (POC) 121 (H) 65 - 100 mg/dL   GLUCOSE, POC    Collection Time: 02/01/18  3:43 PM   Result Value Ref Range    Glucose (POC) 148 (H) 65 - 100 mg/dL     Imaging /Procedures /Studies:  I personally reviewed all labs, imaging, and other studies this admission:  CXR Results  (Last 48 hours)               01/31/18 1306  XR CHEST PA LAT Final result    Impression:  IMPRESSION: Stable cardiomegaly without significant change. Mild interstitial   prominence. .               Narrative:  CHEST X-RAY, 2 views. HISTORY:  Weakness and chills. TECHNIQUE: PA and lateral views. COMPARISON: 17 January 2018. FINDINGS: Heart is enlarged. Mild interstitial prominence. Cost phrenic angles   are blunted. There are sternal wires.  No significant change from prior exam. CT Results  (Last 48 hours)    None          Assessment and Plan: Active Hospital Problems    Diagnosis Date Noted    Influenza B 01/31/2018    Restrictive lung disease 11/01/2017    Cellulitis of left lower extremity 08/22/2017    Acute respiratory failure with hypoxia ([facility] 08/04/2017    Coronary atherosclerosis of native coronary vessel 10/31/2016     1. Cath (5/31/07):  LMCA: 25%. LAD: ostial 75-95% stenosis. 50-75% mid. D1:  25-50%. OM3: small with 75% stenosis. RCA: 100% mid. with left to right collaterals. 2 Vessel CABG (6/4/07):  LIMA to LAD and SVG to OM. SVG was anastomosed proximally to LIMA to due severe atherosclerosis of aorta. 3.  Lexiscan cardiolite (11/30/10): Abnormal with reversible lateral wall defects. Unable to gate given atrial fibrillation. 4.  LHC (1/14/11):  EF 60%. LVEDP 21. Patent LIMA to LAD and SVG to OM1 (off LIMA). occluded small RCA with left to right collaterals. Small D1 (2 mm vessel) with 70% mid stenosis.  Type 2 diabetes mellitus with peripheral neuropathy ([facility] 11/05/2015    Chronic diastolic congestive heart failure ([facility] 09/12/2015     1. Echo (9/11/15) : EF 55-60%. Mild LVH. Moderate biatrial enlargement. Moderate mitral/tricuspid regurgitation. 2.  Echo (8/21/17):  EF 55-60%. Moderate LAE. Mild mitral stenosis. Moderate tricuspid regurgitation. RVSP 35-40.  ROBERTO on CPAP 02/20/2015     Patient is on BiPAP, no supplementary oxygen      Seizure disorder ([address] 05/22/2014    Hyperlipidemia 08/05/2013    COPD (chronic obstructive pulmonary disease) ([address] 11/21/2012    Essential hypertension 11/21/2012    Gout 11/21/2012    PAD (peripheral artery disease) ([facility] 11/21/2012     1. Bilateral proximal common iliac PCI (2/21/08):  8.0 X 100 mm Cordis smart stent on right and 10 X 40 mm cordis smart stent on right. Both inflated to 7.0 mm.         Persistent atrial fibrillation ([facility] 11/21/2012     Coumadin therapy discontinued due to recurrent GI bleeding. PLAN:    · Admit to inpt medical bed  · Continue on Tamiflu for a total 5 days  · He has had IVF in ER and floor. bnp is >500. Granddaughter who is the Primary caretaker has been holding lasix at home because weight stable and makes pt cramp- will give a dose of iv lasix  · A1c was 8.0 in 11/17, start on ISS and resume home meds  · A Fib not on AC due to [address]. Resume rate control meds  · Confirm PTA meds list and resume pertinent meds.   · Pt eval- Granddaughter states pt unsteady and falling at home- pt states he is fine as long as he has his cane  · hopeful for discharge home after pt eval- may need home PT    FEN:  Diabetic/cardiac diet  DVT ppx: lovenox   Code status:  Full  Estimated LOS:  2-3 days  Risk assessment:  High risk given acute resp failure, +ve flu and comorbidities  Plan of care discussed with: patient, family, idt    Signed By: Belia Landis MD     February 1, 2018

## 2018-02-01 NOTE — PROGRESS NOTES
End of shift note:     All hourly rounds were completed on pt throughout shift. Pt was cooperative and med compliant. All pt needs are met at this time. Will continue to monitor pt throughout shift and give report to oncoming day shift nurse.

## 2018-02-01 NOTE — PROGRESS NOTES
Initial visit by  to convey care and concern and encourage patient that  services are available if desired. Mr. Cortez Code voiced no spiritual/emotional concerns. I assisted Mr. Carlos Pappas with using the nurse's call button to discuss his medicine. Provided business card for future reference.      Christin Rene 68  Board Certified

## 2018-02-01 NOTE — PROGRESS NOTES
Rounded every hour and prn. Reports feeling better. Spouse at bedside all day. Ate from meal trays. Given prn pain meds for reported chronic back and LE pain. LE appears more edematous then Right and pt reports this as his baseline since his heart surgery. Two circular healing scabs on LLE that pt and spouse report have been there pta and for quite some time. LLE appears more sylvester than RLE.

## 2018-02-02 PROBLEM — E87.70 VOLUME OVERLOAD: Status: ACTIVE | Noted: 2018-01-01

## 2018-02-02 NOTE — PROGRESS NOTES
Hourly rounds performed; all pt needs met. Pt states he is feeling much better. Voiding to urinal clear kody urine. Bed L/L, SR up x2, call light/ personal items within reach. Bedside shift report to [facility] ANGELA ROSE.

## 2018-02-02 NOTE — DISCHARGE INSTRUCTIONS
DISCHARGE SUMMARY from Nurse    PATIENT INSTRUCTIONS:    After general anesthesia or intravenous sedation, for 24 hours or while taking prescription Narcotics:  · Limit your activities  · Do not drive and operate hazardous machinery  · Do not make important personal or business decisions  · Do  not drink alcoholic beverages  · If you have not urinated within 8 hours after discharge, please contact your surgeon on call. Report the following to your surgeon:  · Excessive pain, swelling, redness or odor of or around the surgical area  · Temperature over 100.5  · Nausea and vomiting lasting longer than 4 hours or if unable to take medications  · Any signs of decreased circulation or nerve impairment to extremity: change in color, persistent  numbness, tingling, coldness or increase pain  · Any questions    What to do at Home:  Recommended activity: Activity as tolerated,     If you experience any of the following symptoms fever, chills, new or unrelieved pain, persistent nausea or vomiting, or any other worrisome symptoms, please follow up with PCP. *  Please give a list of your current medications to your Primary Care Provider. *  Please update this list whenever your medications are discontinued, doses are      changed, or new medications (including over-the-counter products) are added. *  Please carry medication information at all times in case of emergency situations. These are general instructions for a healthy lifestyle:    No smoking/ No tobacco products/ Avoid exposure to second hand smoke  Surgeon General's Warning:  Quitting smoking now greatly reduces serious risk to your health.     Obesity, smoking, and sedentary lifestyle greatly increases your risk for illness    A healthy diet, regular physical exercise & weight monitoring are important for maintaining a healthy lifestyle    You may be retaining fluid if you have a history of heart failure or if you experience any of the following symptoms:  Weight gain of 3 pounds or more overnight or 5 pounds in a week, increased swelling in our hands or feet or shortness of breath while lying flat in bed. Please call your doctor as soon as you notice any of these symptoms; do not wait until your next office visit. Recognize signs and symptoms of STROKE:    F-face looks uneven    A-arms unable to move or move unevenly    S-speech slurred or non-existent    T-time-call 911 as soon as signs and symptoms begin-DO NOT go       Back to bed or wait to see if you get better-TIME IS BRAIN. Warning Signs of HEART ATTACK     Call 911 if you have these symptoms:   Chest discomfort. Most heart attacks involve discomfort in the center of the chest that lasts more than a few minutes, or that goes away and comes back. It can feel like uncomfortable pressure, squeezing, fullness, or pain.  Discomfort in other areas of the upper body. Symptoms can include pain or discomfort in one or both arms, the back, neck, jaw, or stomach.  Shortness of breath with or without chest discomfort.  Other signs may include breaking out in a cold sweat, nausea, or lightheadedness. Don't wait more than five minutes to call 911 - MINUTES MATTER! Fast action can save your life. Calling 911 is almost always the fastest way to get lifesaving treatment. Emergency Medical Services staff can begin treatment when they arrive -- up to an hour sooner than if someone gets to the hospital by car. The discharge information has been reviewed with the patient. The patient verbalized understanding. Discharge medications reviewed with the patient and appropriate educational materials and side effects teaching were provided.   ___________________________________________________________________________________________________________________________________

## 2018-02-02 NOTE — PROGRESS NOTES
Hourly rounds complete this shift, no new complaints at this time. Will continue to monitor, report to day shift, nurse.

## 2018-02-02 NOTE — PROGRESS NOTES
[address]  Face to Face Encounter    Patients Name: Lynnie Duane    YOB: 1932    Ordering Physician: Pedro Mendiola    Primary Diagnosis: Influenza B    Date of Face to Face:   2/2/2018                                  Face to Face Encounter findings are related to primary reason for home care:   yes. 1. I certify that the patient needs intermittent care as follows: skilled nursing care:  skilled observation/assessment, patient education  physical therapy: strengthening, balance training and pt/caregiver education    2. I certify that this patient is homebound, that is: 1) patient requires the use of a walker device, special transportation, or assistance of another to leave the home; or 2) patient's condition makes leaving the home medically contraindicated; and 3) patient has a normal inability to leave the home and leaving the home requires considerable and taxing effort. Patient may leave the home for infrequent and short duration for medical reasons, and occasional absences for non-medical reasons. Homebound status is due to the following functional limitations: Patient with strength deficits limiting the performance of all ADL's without caregiver assistance or the use of an assistive device. 3. I certify that this patient is under my care and that I, or a nurse practitioner or  [id number], or clinical nurse specialist, or certified nurse midwife, working with me, had a Face-to-Face Encounter that meets the physician Face-to-Face Encounter requirements. The following are the clinical findings from the [address] [place] [address] encounter that support the need for skilled services and is a summary of the encounter:     See hospital chart      Megan Garcia, [address]  2/2/2018      THE FOLLOWING TO BE COMPLETED BY THE COMMUNITY PHYSICIAN:    I concur with the findings described above from the [facility] encounter that this patient is homebound and in need of a skilled service.     Certifying Physician: _____________________________________      Printed Certifying Physician Name: _____________________________________    Date: _________________

## 2018-02-02 NOTE — PROGRESS NOTES
Problem: Mobility Impaired (Adult and Pediatric)  Goal: *Acute Goals and Plan of Care (Insert Text)  Discharge Goals:  (1.)Mr. Navarro will move from supine to sit and sit to supine , scoot up and down and roll side to side with MODIFIED INDEPENDENCE within 5 day(s). (2.)Mr. Navarro will transfer from bed to chair and chair to bed with MODIFIED INDEPENDENCE using the least restrictive device within 5 day(s). (3.)Mr. Navarro will ambulate with MODIFIED INDEPENDENCE for 250 feet with the least restrictive device within 5 day(s). (4.)Mr. Navarro will perform standing static and dynamic balance activities x 25+ minutes with MODIFIED INDEPENDENCE to improve safety within 5 day(s). (5.)Mr. Navarro will maintain stable vital signs throughout all functional mobility within 5 days. ________________________________________________________________________________________________      PHYSICAL THERAPY: Initial Assessment, AM 2/2/2018  INPATIENT: Hospital Day: 3  Payor: [facility] MED[facility]ARE / [place] / Product Type: Managed Care Medicare /      NAME/AGE/GENDER: Bill Singletary is a 80 y.o. male   PRIMARY DIAGNOSIS: Influenza B Influenza B Influenza B        ICD-10: Treatment Diagnosis:   · Difficulty in walking, Not elsewhere classified (R26.2)   Precaution/Allergies:  Review of patient's allergies indicates no known allergies. ASSESSMENT:     Mr. Lilly Smith is a 80 y.o. male with influenza B. He lives with wife in single story home and typically ambulates with a rollator. He reports his wife assists with ADLs at baseline and he does not typically drive. He had 1 fall in the past 6 months. He is sitting in chair and agreeable to therapy. Maximal assist to don B shoes. He stood with SBA and additional time, ambulated with rolling walker 150' and SBA to occasional CGA. Noted SOB with ambulation, SpO2 91% on room air. Upon sitting in chair, SpO2 99% within 2 minutes.  Education on pursed lip breathing provided. He perseverated throughout conversation on going home. He is agreeable to HHPT. Discussed with . Latoya Valentin is currently functioning below his baseline and would benefit from skilled PT during acute care stay to maximize safety and independence with functional mobility. This section established at most recent assessment   PROBLEM LIST (Impairments causing functional limitations):  1. Decreased Strength  2. Decreased ADL/Functional Activities  3. Decreased Transfer Abilities  4. Decreased Ambulation Ability/Technique  5. Decreased Balance  6. Increased Pain  7. Decreased Activity Tolerance  8. Decreased Pacing Skills  9. Increased Shortness of Breath  10. Decreased Knowledge of Precautions   INTERVENTIONS PLANNED: (Benefits and precautions of physical therapy have been discussed with the patient.)  1. Balance Exercise  2. Bed Mobility  3. Family Education  4. Gait Training  5. Home Exercise Program (HEP)  6. Therapeutic Activites  7. Therapeutic Exercise/Strengthening  8. Transfer Training  9. Patient Education  10. Group Therapy     TREATMENT PLAN: Frequency/Duration: 3 times a week for duration of hospital stay  Rehabilitation Potential For Stated Goals: Good     RECOMMENDED REHABILITATION/EQUIPMENT: (at time of discharge pending progress): Due to the probability of continued deficits (see above) this patient will likely need continued skilled physical therapy after discharge. Equipment:    None at this time              HISTORY:   History of Present Injury/Illness (Reason for Referral):  Per MD: 81yo M with multiple medical problems as listed below presented to the ER with CC of Epigastric CP for 2 days with generalized weakness, cough and sneezing for 2-3 days. Styates the pain was epigastric and off and on, has resolved now.  He does reports COPD and states he has been having more SOB and CESAR than usual. He denies any fever, chills, nausea, vomiting , diarrhea or abd pain. Denies any recent sick contact or travel. LAbs showed trop -ve X 2, elevated ALK Phos, nasal swan +ve for Flu B. LA was 2.1. He was given Tamiflu and 500cc fluid bolus. BNP was 509. CXR -ve for Pneumonia. Pt became hypoxic on ambulating so Hospitalist asked to admit pt.     States he has had increased swelling in Left leg, also has some pain on palpation of left leg. States he feels a bit better since the CP has resolved. Past Medical History/Comorbidities:   Mr. Manuel Castro  has a past medical history of Atopic dermatitis (11/21/2012); Atrial fibrillation ([address] CAD (coronary artery disease); Carotid stenosis, bilateral (11/21/2012); CHF (congestive heart failure) ([address] (11/21/2012); Chronic kidney disease; Chronic obstructive pulmonary disease ([address] Diabetes ([facility] Diastolic CHF, acute on chronic (HCC) (9/12/2015); Failed CABG (coronary artery bypass graft) (11/21/2012); GI bleed (1/2015); Gout (11/21/2012); History of tobacco use; [place] (hard of hearing); Hypercholesterolemia; Hypertension; Hyperuricemia (11/21/2012); Morbid obesity ([address] PAD (peripheral artery disease) ([facility] (11/21/2012); Poor historian; Radiologic findings of lung field, abnormal (10/31/2016); Seizure disorder ([address] (8/5/2013); Shortness of breath dyspnea (8/5/2013); Thyroid disease; and Unspecified sleep apnea. He also has no past medical history of Arthritis; Asthma; Cancer ([address] Liver disease; Nausea & vomiting; PUD (peptic ulcer disease); or Stroke ([address] Mr. Manuel Castro  has a past surgical history that includes pr cardiac surg procedure unlist; hx coronary artery bypass graft (06-); hx cataract removal (Left, 2003); hx heart catheterization; hx coronary stent placement (02-); hx heent (1970s); hx colonoscopy; and colonoscopy (N/A, 1/27/2017).   Social History/Living Environment:   Home Environment: Private residence  # Steps to Enter: 1  One/Two Story Residence: One story  Living Alone: No  Support Systems: Spouse/Significant Other/Partner, Family member(s)  Patient Expects to be Discharged to[de-identified] Private residence  Current DME Used/Available at Home: [employer], straight, Walker, rollator, CPAP  Prior Level of Function/Work/Activity:  Patient ambulated with a cane or rollator walker, required assistance from wife for ADLs. Number of Personal Factors/Comorbidities that affect the Plan of Care: 3+: HIGH COMPLEXITY   EXAMINATION:   Most Recent Physical Functioning:   Gross Assessment:  AROM: Generally decreased, functional  PROM: Generally decreased, functional  Strength: Generally decreased, functional  Coordination: Generally decreased, functional  Tone: Normal  Sensation: Impaired               Posture:  Posture (WDL): Exceptions to WDL  Posture Assessment: Forward head  Balance:  Sitting: Intact  Standing: Impaired  Standing - Static: Good  Standing - Dynamic : Fair Bed Mobility:     Wheelchair Mobility:     Transfers:  Sit to Stand: Stand-by asssistance  Stand to Sit: Stand-by asssistance  Gait:     Base of Support: Center of gravity altered; Widened  Speed/Helen: Shuffled;Slow;Pace decreased (<100 feet/min)  Step Length: Right shortened;Left shortened  Gait Abnormalities: Decreased step clearance;Trunk sway increased; Path deviations  Distance (ft): 150 Feet (ft)  Assistive Device: Walker, rolling  Ambulation - Level of Assistance: Contact guard assistance;Stand-by asssistance  Interventions: Safety awareness training;Manual cues; Verbal cues; Visual/Demos      Body Structures Involved:  1. Nerves  2. Heart  3. Lungs  4. Muscles Body Functions Affected:  1. Sensory/Pain  2. Cardio  3. Respiratory  4. Neuromusculoskeletal  5. Movement Related Activities and Participation Affected:  1. Mobility  2. Self Care  3. Domestic Life  4. Interpersonal Interactions and Relationships  5.  Community, Social and [place]   Number of elements that affect the Plan of Care: 4+: HIGH COMPLEXITY   CLINICAL PRESENTATION:   Presentation: Stable and uncomplicated: LOW COMPLEXITY   CLINICAL DECISION MAKIN[address] [place] [id number] AM-PAC 6 Clicks   Basic Mobility Inpatient Short Form  How much difficulty does the patient currently have. .. Unable A Lot A Little None   1. Turning over in bed (including adjusting bedclothes, sheets and blankets)? [] 1   [] 2   [x] 3   [] 4   2. Sitting down on and standing up from a chair with arms ( e.g., wheelchair, bedside commode, etc.)   [] 1   [] 2   [x] 3   [] 4   3. Moving from lying on back to sitting on the side of the bed? [] 1   [] 2   [x] 3   [] 4   How much help from another person does the patient currently need. .. Total A Lot A Little None   4. Moving to and from a bed to a chair (including a wheelchair)? [] 1   [] 2   [x] 3   [] 4   5. Need to walk in hospital room? [] 1   [] 2   [x] 3   [] 4   6. Climbing 3-5 steps with a railing? [] 1   [x] 2   [] 3   [] 4   © , Trustees of [address] [place] [address], under license to [employer]. All rights reserved      Score:  Initial: 17 Most Recent: X (Date: -- )    Interpretation of Tool:  Represents activities that are increasingly more difficult (i.e. Bed mobility, Transfers, Gait). Score 24 23 22-20 19-15 14-10 9-7 6     Modifier CH CI CJ CK CL CM CN      ? Mobility - Walking and Moving Around:     - CURRENT STATUS: CK - 40%-59% impaired, limited or restricted    - GOAL STATUS: CJ - 20%-39% impaired, limited or restricted    - D/C STATUS:  ---------------To be determined---------------  Payor: [facility] / [place] / Product Type: Managed Care Medicare /      Medical Necessity:     · Patient demonstrates good rehab potential due to higher previous functional level. Reason for Services/Other Comments:  · Patient continues to require modification of therapeutic interventions to increase complexity of exercises.    Use of outcome tool(s) and clinical judgement create a POC that gives a: Clear prediction of patient's progress: LOW COMPLEXITY            TREATMENT:   (In addition to Assessment/Re-Assessment sessions the following treatments were rendered)   Pre-treatment Symptoms/Complaints:  none  Pain: Initial:   Pain Intensity 1: 5  Post Session:  2/10, hand cramping     Assessment/Reassessment only, no treatment provided today    Braces/Orthotics/Lines/Etc:   · O2 Device: Room air  Treatment/Session Assessment:    · Response to Treatment:  Patient tolerated well with c/o SOB. · Interdisciplinary Collaboration:   o Physical Therapist  o Registered Nurse  o Physician  o   · After treatment position/precautions:   o Up in chair  o Bed/Chair-wheels locked  o Bed in low position  o Call light within reach  o RN notified   · Compliance with Program/Exercises: Will assess as treatment progresses. · Recommendations/Intent for next treatment session: \"Next visit will focus on advancements to more challenging activities and reduction in assistance provided\".   Total Treatment Duration:  PT Patient Time In/Time Out  Time In: 0949  Time Out: [facility]

## 2018-02-02 NOTE — DISCHARGE SUMMARY
Patient ID:  Lilo Herman  [id number]  89 y.o.  2/12/1932  Admit date: 1/31/2018  2:43 PM  Discharge date and time: 2/2/2018  Attending: Lauri Aviles MD  PCP:  Courtney Soto MD  Treatment Team: Attending Provider: Lauri Aviles MD; Care Manager: HORACE Manrique    Principal Diagnosis Influenza B   Principal Problem:    Influenza B (1/31/2018)    Active Problems:    Acute respiratory failure with hypoxia ([address] (8/4/2017)      Volume overload (2/2/2018)      COPD (chronic obstructive pulmonary disease) ([address] (11/21/2012)      Chronic diastolic congestive heart failure ([address] (9/12/2015)      Overview: 1.  Echo (9/11/15) : EF 55-60%. Mild LVH. Moderate biatrial enlargement. Moderate mitral/tricuspid regurgitation. 2.  Echo (8/21/17):  EF 55-60%. Moderate LAE. Mild mitral stenosis. Moderate tricuspid regurgitation. RVSP 35-40. Essential hypertension (11/21/2012)      PAD (peripheral artery disease) ([address] (11/21/2012)      Overview: 1. Bilateral proximal common iliac PCI (2/21/08):  8.0 X 100 mm Cordis       smart stent on right and 10 X 40 mm cordis smart stent on right. Both       inflated to 7.0 mm. Hyperlipidemia (8/5/2013)      ROBERTO on CPAP (2/20/2015)      Overview: Patient is on BiPAP, no supplementary oxygen      Type 2 diabetes mellitus with peripheral neuropathy ([facility] (11/5/2015)      Restrictive lung disease (11/1/2017)             Hospital Course:  Please refer to the admission H&P for details of presentation. In summary, the patient is 81yo M with multiple medical problems as listed below presented to the ER with CC of Epigastric CP for 2 days with generalized weakness, cough and sneezing for 2-3 days. Styates the pain was epigastric and off and on, has resolved now. He does reports COPD and states he has been having more SOB and CESAR than usual. He denies any fever, chills, nausea, vomiting , diarrhea or abd pain. Denies any recent sick contact or travel. LAbs showed trop -ve X 2, elevated ALK Phos, nasal swan +ve for Flu B. LA was 2.1. He was given Tamiflu and 500cc fluid bolus. BNP was 509. CXR -ve for Pneumonia. Pt became hypoxic on ambulating so Hospitalist asked to admit pt.     States he has had increased swelling in Left leg, also has some pain on palpation of left leg. States he feels a bit better since the CP has resolved. The patient was admitted for influenza and hypoxic resp failure which resolved. DVT of the left leg ruled out by ultrasound. Also noted to be a bit volume overloaded and diuresed. The patient does not take his lasix at home secondary to cramping except when he feels its actually needed. Per him and his granddaughter they weigh him daily and give lasix based on that. They do not volume restrict. His symptoms have improved and he is stable for home. He was seen by PT and HHPT are recommended. Significant Diagnostic Studies:       Labs: Results:       Chemistry Recent Labs      02/02/18   1122  02/01/18   0504  01/31/18   1238   GLU  145*  136*  132*   NA  135*  139  137   K  3.9  4.9  4.7   CL  96*  102  101   CO2  27  27  23   BUN  22  17  17   CREA  1.60*  1.48  1.31   CA  8.8  9.0  8.5   AGAP  12  10  13   AP   --    --   260*   TP   --    --   7.7   ALB   --    --   3.2   GLOB   --    --   4.5*   AGRAT   --    --   0.7*      CBC w/Diff Recent Labs      02/01/18   0504  01/31/18   1238   WBC  8.8  10.6   RBC  4.96  4.98   HGB  13.1*  13.5*   HCT  40.8*  41.1   PLT  152  165   GRANS   --   90*   LYMPH   --   3*   EOS   --   1      Cardiac Enzymes No results for input(s): CPK, CKND1, RICK in the last 72 hours. No lab exists for component: CKRMB, TROIP   Coagulation No results for input(s): PTP, INR, APTT in the last 72 hours.     No lab exists for component: INREXT    Lipid Panel Lab Results   Component Value Date/Time    Cholesterol, total 144 08/02/2017 10:04 AM    HDL Cholesterol 43 08/02/2017 10:04 AM    LDL, calculated 77 08/02/2017 10:04 AM    VLDL, calculated 24 08/02/2017 10:04 AM    Triglyceride 118 08/02/2017 10:04 AM    CHOL/HDL Ratio 4.9 04/26/2016 09:20 AM      BNP No results for input(s): BNPP in the last 72 hours. Liver Enzymes Recent Labs      01/31/18   1238   TP  7.7   ALB  3.2   AP  260*   SGOT  66*      Thyroid Studies Lab Results   Component Value Date/Time    T4, Total 8.0 11/05/2008 04:49 PM    T3 Uptake 31 11/05/2008 04:49 PM    TSH 8.150 05/15/2015 11:30 PM          Results for orders placed or performed during the hospital encounter of 01/31/18   EKG, 12 LEAD, INITIAL   Result Value Ref Range    Ventricular Rate 77 BPM    Atrial Rate 267 BPM    QRS Duration 92 ms    Q-T Interval 348 ms    QTC Calculation (Bezet) 393 ms    Calculated R Axis 34 degrees    Calculated T Axis -166 degrees    Diagnosis       !! AGE AND GENDER SPECIFIC ECG ANALYSIS !! Atrial fibrillation  Septal infarct (cited on or before 21-AUG-2017)  ST & T wave abnormality, consider inferolateral ischemia or digitalis effect  Abnormal ECG  When compared with ECG of 17-JAN-2018 09:23,  Questionable change in initial forces of Anterior leads  QT has shortened  Confirmed by ST NADIA AUGUSTE MD (), KERRY LIU ([id number]) on 1/31/2018 4:48:32 PM       CT Results (most recent):    Results from [place] encounter on 01/17/18   CT HEAD WO CONT   Narrative CT HEAD WITHOUT CONTRAST, 1/17/2018     History: Fall today. Patient does not remember fall. Comparison: CT head without contrast 11/1/2017     Technique:   5 mm axial scans from the skull base to the vertex. All CT scans  performed at this facility use one or all of the following: Automated exposure  control, adjustment of the mA and/or kVp according to patient's size, iterative  reconstruction. Findings:  No evidence of intracranial hemorrhage is seen. No abnormal  extra-axial fluid collections are seen. No evidence for acute hydrocephalus is  seen.   No evidence of midline shift or herniation is seen. No abnormal edema  pattern is seen in a vascular distribution to suggest large artery infarction. Stable vague periventricular white matter hypoattenuation is seen likely  representing chronic microangiopathic changes. A stable focal hypodensity is  seen in the right basal ganglia on image 15 likely representing a chronic  lacunar infarct. Evaluation with bone windows shows no acute osseous changes. The visualized  sinuses, middle ears, and mastoid air cells are well aerated. Impression IMPRESSION:    1. No acute intracranial process evident by noncontrast CT study of the head. VAS/US Results (most recent):    Results from Hospital Encounter encounter on 01/31/18   DUPLEX LOWER EXT VENOUS LEFT   Narrative Left lower extremity duplex venous study, 1/31/2018. CLINICAL HISTORY: Mild left lower extremity swelling and pain which is chronic. Technique: Grayscale, and duplex Doppler images of the left lower extremity  venous vascular system were obtained using an 9 MHz transducer. In addition,  compression and augmentation were performed at multiple levels. FINDINGS:    No left lower extremity DVT is directly visualized. The right common femoral  vein is patent. Normal augmentation is seen at all evaluated levels without  findings to suggest more proximal occlusion. Soft tissue edema is suggested in  the left calf without an abnormal fluid collection identified. Impression IMPRESSION:  1. No evidence for left lower extremity DVT. XR Results (most recent):    Results from Hospital Encounter encounter on 01/31/18   XR CHEST PA LAT   Narrative CHEST X-RAY, 2 views. HISTORY:  Weakness and chills. TECHNIQUE: PA and lateral views. COMPARISON: 17 January 2018. FINDINGS: Heart is enlarged. Mild interstitial prominence. Cost phrenic angles  are blunted. There are sternal wires.  No significant change from prior exam.         Impression IMPRESSION: Stable cardiomegaly without significant change. Mild interstitial  prominence. Alicia Mindi Discharge Exam:  Visit Vitals    /54    Pulse 78    Temp 97.7 °F (36.5 °C)    Resp 18    Ht 5' 10\" (1.778 m)    Wt 111.1 kg (245 lb)    SpO2 90%    BMI 35.15 kg/m2     General appearance: alert, cooperative, no distress, appears stated age  Lungs: clear to auscultation bilaterally  Heart: regular rate and rhythm, S1, S2 normal, no murmur, click, rub or gallop  Abdomen: soft, non-tender. Bowel sounds normal. No masses,  no organomegaly  Extremities: no cyanosis or edema  Neurologic: Grossly normal    Disposition: home with HHPT  Discharge Condition: stable  Patient Instructions:   Current Discharge Medication List      START taking these medications    Details   oseltamivir (TAMIFLU) 30 mg capsule Take 1 Cap by mouth two (2) times a day for 4 days. Qty: 8 Cap, Refills: 0         CONTINUE these medications which have NOT CHANGED    Details   hydrALAZINE (APRESOLINE) 10 mg tablet TAKE 1 TABLET THREE TIMES DAILY. Qty: 90 Tab, Refills: 3      cholecalciferol (VITAMIN D3) 1,000 unit cap Take 1,000 Units by mouth daily. sAXagliptin (ONGLYZA) 2.5 mg tablet Take 2.5 mg by mouth daily. pantoprazole (PROTONIX) 40 mg tablet Take 40 mg by mouth daily as needed. pravastatin (PRAVACHOL) 40 mg tablet Take 1 Tab by mouth nightly. Qty: 90 Tab, Refills: 3    Associated Diagnoses: Mixed hyperlipidemia      HYDROcodone-acetaminophen (NORCO)  mg tablet Take 1 Tab by mouth every eight (8) hours as needed for Pain. Max Daily Amount: 3 Tabs. Qty: 60 Tab, Refills: 0      oxyCODONE IR (OXY-IR) 15 mg immediate release tablet Take 1 Tab by mouth every eight (8) hours as needed for Pain. Max Daily Amount: 45 mg.  Qty: 90 Tab, Refills: 0      triamcinolone acetonide (KENALOG) 0.1 % ointment Apply  to affected area two (2) times a day.   Qty: 80 g, Refills: 0      OTHER One pair compression stocking gradient 20 - 30 mm hg  Please measure to fit  Qty: 1 Each, Refills: 1      gabapentin (NEURONTIN) 100 mg capsule Take 1 Cap by mouth three (3) times daily. Qty: 270 Cap, Refills: 3      albuterol (PROVENTIL VENTOLIN) 2.5 mg /3 mL (0.083 %) nebulizer solution 2.5 mg by Nebulization route every four (4) hours as needed for Wheezing. budesonide-formoterol (SYMBICORT) 160-4.5 mcg/actuation HFA inhaler Take 2 Puffs by inhalation two (2) times a day. Qty: 3 Inhaler, Refills: 3      ipratropium-albuterol (COMBIVENT RESPIMAT)  mcg/actuation inhaler Take 1 Puff by inhalation every six (6) hours. Qty: 3 Inhaler, Refills: 3      furosemide (LASIX) 80 mg tablet Take 1 Tab by mouth two (2) times a day. Qty: 60 Tab, Refills: 6      triamcinolone (ARISTOCORT) 0.5 % topical cream Apply  to affected area two (2) times a day. use thin layer  Qty: 15 g, Refills: 0      mupirocin calcium (BACTROBAN) 2 % topical cream Apply  to affected area two (2) times a day. Qty: 15 g, Refills: 0      metoprolol succinate (TOPROL-XL) 25 mg XL tablet Pt takes 1/2 tab po daily. Qty: 45 Tab, Refills: 3      magnesium oxide (MAG-OX) 400 mg tablet Take 1 Tab by mouth daily. Qty: 90 Tab, Refills: 3      aspirin 81 mg chewable tablet Take 1 Tab by mouth daily. Qty: 90 Tab, Refills: 3      levothyroxine (SYNTHROID) 50 mcg tablet Take 1 Tab by mouth Daily (before breakfast). Qty: 90 Tab, Refills: 3      ferrous sulfate 324 mg (65 mg iron) tablet Take  by mouth Daily (before breakfast). fluticasone (FLONASE) 50 mcg/actuation nasal spray 2 Sprays by Both Nostrils route daily. Qty: 1 Bottle, Refills: 3      sertraline (ZOLOFT) 50 mg tablet Take one tablet each evening for anxiety  Indications: GENERALIZED ANXIETY DISORDER  Qty: 30 Tab, Refills: 3      allopurinol (ZYLOPRIM) 300 mg tablet Take  by mouth daily. docusate sodium (STOOL SOFTENER) 100 mg capsule Take 100 mg by mouth as needed. cpap machine kit by Does Not Apply route.  Bilevel 12/8 isosorbide mononitrate ER (IMDUR) 30 mg tablet Take 1 Tab by mouth daily. Qty: 30 Tab, Refills: 5      travoprost (TRAVATAN Z) 0.004 % ophthalmic solution Administer 1 Drop to both eyes two (2) times a day. glipiZIDE (GLUCOTROL) 5 mg tablet Take 5 mg by mouth two (2) times a day. K-DUR 20 mEq tablet Take 20 mEq by mouth daily.              Activity: as tolerated  Diet: Cardiac   Wound Care: none    Follow-up  ·   PCP in 1 week  · Cardiologist as scheduled   Time spent to discharge patient greater than 30 minutes  Signed:  Lianet Douglas MD  2/2/2018  12:53 PM

## 2018-02-05 NOTE — PROGRESS NOTES
Patient has 7 day follow up scheduled. Patient has been seen by Bill Puga RN with interventions required. ACACIA RN updated pall. Care team.    ACACIA RN only speaks with spouse, per request of spouse and patient, patient does not remember to tell wife, wife is the caregiver to patient. Wife was out of the home at time of call    This note will not be viewable in [address]

## 2018-02-06 NOTE — PROGRESS NOTES
Transition of Care Discharge Follow-up Questionnaire   Date/Time of Call:   2/6/18 100pm   What was the patient hospitalized for? Flu   Does the patient understand his/her diagnosis and/or treatment and what happened during the hospitalization? Yes    Did the patient receive discharge instructions? Yes    Review any discharge instructions. Ask patient if they understand these. Do they have any questions? No   Were home services ordered  Yes      If so, has the first visit occurred? If not, why? Yes    Was any DME ordered? No    If so, has it been received? If not, why?  N/A     Complete a review of all medications  Yes, Tamiflu was retrieved as well antibiotics ordered from Dr. Malgorzata Elder office   Were all new prescriptions filled? If not, why? Yes, CM encouraged wife to ensure patient eats and drinks with new medications   Does the patient understand the purpose and dosing instructions for all medications? Yes   Does the patient have any problems in performing ADLs? Yes, wife and granddaughter supports      Does the patient have all follow-up appointments scheduled? Has transportation been arranged? Yes, wife and granddaughter supports   Any other questions or concerns expressed by the patient? No   Schedule next appointment with RN Case Manager as appropriate. Yes   OSCAR Call Completed By: Gwendolyn Eastman, MSN, BSN, RN, [facility]               This note will not be viewable in [address]

## 2018-02-12 PROBLEM — E11.21 TYPE 2 DIABETES WITH NEPHROPATHY (HCC): Status: ACTIVE | Noted: 2018-01-01

## 2018-02-12 NOTE — PROGRESS NOTES
ACACIA RN reviewed record; Patient under care of [facility], with upcoming appointment with Dr. Tushar Santiago. ACACIA RN attempted to reach, no answer, patient maybe headed to appointment. This note will not be viewable in [address]

## 2018-02-19 NOTE — PROGRESS NOTES
ACACIA RN reviewed office notes, no major changes.  continues to see patient. Wife, caregiver to patient, not available at this time, ACACIA RN requested return call      This note will not be viewable in [address]

## 2018-02-26 NOTE — PROGRESS NOTES
ACACIA RN reviewed record, patient has required skilled intervention by [facility], [facility] continues to see patient. Patient has upcoming appointment with primary care to follow up on issues that required skilled intervention. This note will not be viewable in [address]

## 2018-02-28 NOTE — PROGRESS NOTES
ACACIA RN reviewed record, patient has required skilled intervention by [facility], [facility] continues to see patient. Spouse confirmed [facility] continues to see patient as well. Patient prescribed Augmentin and has 2 doses left. Patient keeps leg elevated. Patient has not applied stockings; spouse reports he has no stockings. CM RN encouraged spouse to get some, no prescription is needed as it is not covered by insurance. ACACIA RN explained rationale for stocking use daily and educated on when to take off. ACACIA RN encouraged to purchase at [facility]. Patient did not want to attend appointment because his legs are better. ACACIA RN explained to spouse that this was not a good idea because more antibiotics maybe required if his legs continue to have discoloration. CM RN requested that spouse prepare to call Dr. Mallissa Litten back, if patient complete antibiotic and his legs are still discolored, spouse agreed. This note will not be viewable in [address]

## 2018-03-01 NOTE — ED TRIAGE NOTES
Patient states tremors in his mouth today. Denies feeling bad. States just has some jerks in his mouth today.

## 2018-03-02 NOTE — ED NOTES
I have reviewed discharge instructions with the patient. The patient verbalized understanding. Patient left ED via Discharge Method: ambulatory to Home with wife  . Opportunity for questions and clarification provided. Patient given 0 scripts. To continue your aftercare when you leave the hospital, you may receive an automated call from our care team to check in on how you are doing. This is a free service and part of our promise to provide the best care and service to meet your aftercare needs.  If you have questions, or wish to unsubscribe from this service please call [phone]. Thank you for Choosing our [facility] Emergency Department.

## 2018-03-02 NOTE — DISCHARGE INSTRUCTIONS
Benign Essential Tremor: Care Instructions  Your Care Instructions    Benign essential tremor is a medical term for shaking that you can't control. Your hand or fingers may shake when you lift a cup or point at something. Or your voice may shake when you speak. This type of tremor is not harmful. It is not caused by a stroke or Parkinson's disease. Some things can affect how much you shake. For example, drinking or eating something with caffeine may make tremors worse for a while. Some medicines also can increase tremors. These include antidepressants and too much thyroid replacement. Talk to your doctor if you think one of your medicines makes your tremors worse. If you are self-conscious about your tremors, there are some things you can do to reduce them or make them less noticeable. This includes taking medicine. Follow-up care is a key part of your treatment and safety. Be sure to make and go to all appointments, and call your doctor if you are having problems. It's also a good idea to know your test results and keep a list of the medicines you take. How can you care for yourself at home? · Take your medicines exactly as prescribed. Call your doctor if you think you are having a problem with your medicine. Some medicines that help control tremors have to be taken every day, even if you are not having tremors. You will get more details on the specific medicines your doctor prescribes. · Get plenty of rest.  · Eat a balanced, healthy diet. · Try to reduce stress. Regular exercise and massages may help. · Limit alcohol. Heavy drinking can make your tremors worse. · Avoid drinks or foods with caffeine if they make your tremors worse. These include tea, cola, coffee, and chocolate. · Wear a heavy bracelet or watch. This adds a little weight to your hand. The extra weight may reduce tremors. · Drink from cups or glasses that are only half full. You may also want to try drinking with a straw.   When should you call for help? Watch closely for changes in your health, and be sure to contact your doctor if:  ? · You notice your tremors are getting worse. ? · You can't do your everyday activities because of your tremors. ? · You are sad and embarrassed about your shaking. Where can you learn more? Go to http://angelina-rajiv.info/. Enter B746 in the search box to learn more about \"Benign Essential Tremor: Care Instructions. \"  Current as of: October 14, 2016  Content Version: 11.4  © 3219-3948 [employer]. Care instructions adapted under license by [employer] (which disclaims liability or warranty for this information). If you have questions about a medical condition or this instruction, always ask your healthcare professional. [address] any warranty or liability for your use of this information.            Tics in Children: Care Instructions  Your Care Instructions    Tics are repeated sounds, jerks, or muscle movements, such as in the arms, neck, or face. Repeated clearing of the throat, sniffing, excessive blinking, and shrugging the shoulders are examples of tics. They tend to come and go in spurts. And they may get worse when your child is stressed or tired. Your child may feel an urge that gets stronger before doing the tic. He or she may be able to control the tic, but only for a short time. Tics may be mild, or they may be severe enough at times to get in the way of daily activities. Home treatment is usually all that is needed to help manage mild tics. Your doctor may recommend other treatments, such as medicines or therapy, if tics are severe enough to get in the way of your child's daily life. Habit reversal is a kind of therapy that helps your child become aware of tics and do things in place of the tics. Tics may go away on their own within a year.  In some children, tics may become chronic, which means they last longer than a year.  Follow-up care is a key part of your child's treatment and safety. Be sure to make and go to all appointments, and call your doctor if your child is having problems. It's also a good idea to know your child's test results and keep a list of the medicines your child takes. How can you care for your child at home? · Remember that your child cannot control the tics. Although tics can appear to be \"on purpose\" and may frustrate you, do not show frustration or punish your child for having tics. Give your child plenty of love and support. · Keep a record of your child's tics and what triggers them. After you find out what causes certain tics, you can help your child avoid those triggers. For example, you may find ways to help your child manage stress. · Notice when your child's tics get worse. Reassure your child by staying calm and helping him or her to relax. · Encourage your child to increase responsibilities at his or her own pace. Helping your child keep a manageable schedule can help with stress. · Give your child free time after doing tasks or chores. · If the doctor gave your child a prescription medicine, use it exactly as prescribed. Call your doctor if you think your child is having a problem with his or her medicine. · Talk to your child, your family, and your child's teachers about what tics are and how they're managed. · Ask your child's teachers to make helpful changes at school. For example, ask if they can:  ¨ Give your child a seat with few distractions and some privacy. ¨ Give your child more time to take tests if needed. ¨ Allow for rest periods if needed. ¨ Allow your child to leave the room at times to deal with severe tics in private. When should you call for help? Watch closely for changes in your child's health, and be sure to contact your doctor if:  ? · Your child's tics are frequent or severe enough to get in the way of school or daily activities.    Where can you learn more?  Go to http://angelina-rajiv.info/. Enter E756 in the search box to learn more about \"Tics in Children: Care Instructions. \"  Current as of: May 12, 2017  Content Version: 11.4  © 2616-9120 [employer], [employer]. Care instructions adapted under license by [employer] (which disclaims liability or warranty for this information). If you have questions about a medical condition or this instruction, always ask your healthcare professional. [address] any warranty or liability for your use of this information.

## 2018-03-02 NOTE — ED PROVIDER NOTES
HPI Comments: 24-year-old male w/ history of A. derick presents with complaint of right-sided facial tic/tremor that began on yesterday. States it occurs randomly every few minutes to several hours. Denies headache, focal weakness, numbness, tingling, trouble speaking, nausea, vomiting, or recent trauma or injury. Patient denies chest pain, shortness of breath, vision disturbance, trouble walking. Patient denies any other complaints at this time. States he's never had any similar episodes in the past.    Patient is a 80 y.o. male presenting with tremors. The history is provided by the patient. No  was used. Tremors    This is a new problem. The current episode started yesterday. The problem occurs constantly. The problem has not changed since onset. Pertinent negatives include no anorexia, no fever, no belching, no flatus, no hematochezia, no melena, no nausea, no vomiting, no constipation, no dysuria, no hematuria, no headaches, no arthralgias, no myalgias, no trauma, no chest pain and no back pain. Nothing worsens the pain. The pain is relieved by nothing. Past Medical History:   Diagnosis Date    Atopic dermatitis 11/21/2012    Atrial fibrillation (HCC)     CAD (coronary artery disease)     Carotid stenosis, bilateral 11/21/2012    50-79%  3-2010    1. Carotid Doppler (6/1/07): Greater than 70% stenosis in proximal LICA. 50% stenosis in right ICA. 2.  CTA of neck (3/15/10): Occluded distal segments of the vertebral arteries bilaterally. Atherosclerosis of the carotid bulbs bilaterally with a 50% stenosis on the left and a stenosis of less than 30% on the right. Small nodule in the right upper lobe near the apex. 3. CTA (8/29/13):  Less than 30% diameter stenosis of the cervical internal carotid arteries bilaterally.     CHF (congestive heart failure) ([address] 11/21/2012    Chronic kidney disease     hx elevated labs    Chronic obstructive pulmonary disease ([address]     Diabetes ([address]     Diastolic CHF, acute on chronic ([address] 2015    1. Echo (9/11/15) : EF 55-60%. Mild LVH. Moderate biatrial enlargement. Moderate mitral/tricuspid regurgitation.  Failed CABG (coronary artery bypass graft) 2012    GI bleed 2015    Hospitalized [facility]    Gout 2012    History of tobacco use     [place] (hard of hearing)     Hypercholesterolemia     Hypertension     Hyperuricemia 2012    Morbid obesity ([address]     PAD (peripheral artery disease) ([address] 2012    1. Bilateral proximal common iliac PCI (08):  8.0 X 100 mm Cordis smart stent on right and 10 X 40 mm cordis smart stent on right. Both inflated to 7.0 mm.  Poor historian     Radiologic findings of lung field, abnormal 10/31/2016    1. CT of chest  (11/24/10): Multiple small nodules in the right lobe and stable interstitial prominence. Consistent with chronic lung disease. No evidence of malignancy.     Seizure disorder ([address] 2013    Shortness of breath dyspnea 2013    Thyroid disease     Unspecified sleep apnea        Past Surgical History:   Procedure Laterality Date    CARDIAC SURG PROCEDURE UNLIST      cath ,cabg ,lexiscan cardiolite 11/10    COLONOSCOPY N/A 2017    COLONOSCOPY  BMI 36 TO BE ADMITTED 17 performed by Beba Diaz MD at [facility] ENDOSCOPY    HX CATARACT REMOVAL Left     os    HX COLONOSCOPY      HX CORONARY ARTERY BYPASS GRAFT  2007    HX CORONARY STENT PLACEMENT  2008    bilateral iliac artery PCI and stents    HX HEART CATHETERIZATION      left-2007, 2011    HX HEENT  1970s    neck lipoma         Family History:   Problem Relation Age of Onset    Heart Attack Mother    [facility] Other Father      old age   [facility] Other Brother      brain aneurysm    Heart Disease Other      2 children  with heart concerns, 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 , two living children, two children  with coronary artery disease, ages 36 and 48. Has always lived in [address] No pets. ALLERGIES: Review of patient's allergies indicates no known allergies. Review of Systems   Constitutional: Negative for chills and fever. HENT: Negative for congestion, facial swelling and sore throat. Eyes: Negative for visual disturbance. Respiratory: Negative for cough and shortness of breath. Cardiovascular: Negative for chest pain, palpitations and leg swelling. Gastrointestinal: Negative for abdominal pain, anorexia, constipation, flatus, hematochezia, melena, nausea and vomiting. Genitourinary: Negative for dysuria and hematuria. Musculoskeletal: Negative for arthralgias, back pain, joint swelling, myalgias and neck pain. Skin: Negative for pallor and wound. Neurological: Positive for tremors. Negative for dizziness, syncope, weakness, numbness and headaches. Psychiatric/Behavioral: Negative for agitation and confusion. Vitals:    18 1745   BP: 137/51   Pulse: (!) 58   Resp: 17   Temp: 99.1 °F (37.3 °C)   Weight: 113.4 kg (250 lb)   Height: 5' 8\" (1.727 m)            Physical Exam   Constitutional: He is oriented to person, place, and time.  He appears well-developed and well-nourished. HENT:   Head: Normocephalic. Mouth/Throat: Oropharynx is clear and moist.   No facial tics noticed on examination. Eyes: Conjunctivae and EOM are normal. Pupils are equal, round, and reactive to light. Neck: Normal range of motion. No JVD present. No tracheal deviation present. Cardiovascular: Normal rate, regular rhythm, normal heart sounds and intact distal pulses. No murmur heard. Pulmonary/Chest: Effort normal and breath sounds normal. No respiratory distress. He has no wheezes. He has no rales. He exhibits no tenderness. Abdominal: Soft. There is no tenderness. There is no rebound and no guarding. Musculoskeletal: Normal range of motion. He exhibits no edema, tenderness or deformity. Neurological: He is alert and oriented to person, place, and time. No cranial nerve deficit. Coordination normal.   Strength 5/5 throughout. Normal sensory. No dysarthria. No facial droop. Normal gait. Skin: Skin is warm and dry. Nursing note and vitals reviewed. MDM  Number of Diagnoses or Management Options  Facial tic: new and requires workup  Diagnosis management comments: CT head negative. Vital signs stable. Labs within normal limits. Neuro intact. Will have patient follow-up with neurology as well as primary care physician in 1 to 2 days. Amount and/or Complexity of Data Reviewed  Clinical lab tests: ordered and reviewed  Tests in the radiology section of CPT®: ordered and reviewed  Tests in the medicine section of CPT®: ordered and reviewed  Review and summarize past medical records: yes  Independent visualization of images, tracings, or specimens: yes    Risk of Complications, Morbidity, and/or Mortality  Presenting problems: moderate  Diagnostic procedures: moderate  Management options: moderate    Patient Progress  Patient progress: stable        ED Course   Comment By Time   CT head IMPRESSION:    No acute intracranial abnormalities.  Richard De Leon., MD 03/01 2202       EKG  Date/Time: 3/1/2018 10:19 PM  Performed by: Marj Lyn  Authorized by: Andrzej Gonzalez     ECG reviewed by ED Physician in the absence of a cardiologist: yes    Rate:     ECG rate:  52    ECG rate assessment: bradycardic    Rhythm:     Rhythm: atrial fibrillation    Ectopy:     Ectopy: none    QRS:     QRS axis:  Normal    QRS intervals:  Normal  Conduction:     Conduction: normal    ST segments:     ST segments:  Normal  T waves:     T waves: normal

## 2018-03-02 NOTE — ED NOTES
I have reviewed discharge instructions with the patient. The patient verbalized understanding. Patient left ED via Discharge Method: ambulatory to Home with wife. Opportunity for questions and clarification provided. Patient given 0 scripts. To continue your aftercare when you leave the hospital, you may receive an automated call from our care team to check in on how you are doing. This is a free service and part of our promise to provide the best care and service to meet your aftercare needs.  If you have questions, or wish to unsubscribe from this service please call [phone]. Thank you for Choosing our [facility] Emergency Department.

## 2018-03-07 NOTE — PROGRESS NOTES
ACACIA RN noted ED visit. Patient continues with HH. Patient attended PCP appointment today. ACACIA RN attempted to reach spouse, caregiver to patient, no answer, may still be out for appointment. ACACIA RN will await final office notes to outreach for follow up. This note will not be viewable in [address]

## 2018-03-12 NOTE — PROGRESS NOTES
ACACIA RN reviewed record, patient has discharged from [facility]. Spouse confirmed [facility] continues to see patient as well. Spouse is not feeling well today. She has taking otc cold/flu medication and Tylenol. CM RN advise on the side effects of too much Tylenol as most cold/flu medication have Tylenol as well. Spouse is not sure if she has had a fever. ACACIA RN advised spouse if she is not feeling better by the end of today, then to call her PCP appointment and if she can not get in then visit the urgent care facility as she may need a flu swab since her  was diagnosed weeks prior with flu. Spouse reported understanding. Patient attended follow up appointment, patient still has no interest to follow up with neurologist.      This note will not be viewable in [address]

## 2018-03-26 NOTE — PROGRESS NOTES
ACACIA RN reviewed record, no updates. Spouse confirmed [facility] continues to see patient as well - scheduled now to see patient weekly. Spouse is feeling well today- believes she suffers from allergies due to pollen. She has taking otc medication. Patient is having blood pressures, pulse ox, and blood sugars checked at home. No weight gain or edema. Patient continues to wear CPAP. Patient on room air  CM RN and spouse discussed self-care and one purpose of the palliative is to help patient manage s/s and conditions to relieve her, spouse agreed, that [place] has been a great help. At this time there is no evidence to suggest that further CCM services are needed. No further outreaches being scheduled at this time. This note will not be viewable in [address]
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
## 2018-04-22 PROBLEM — I50.33 ACUTE ON CHRONIC DIASTOLIC (CONGESTIVE) HEART FAILURE (HCC): Status: RESOLVED | Noted: 2017-08-21 | Resolved: 2018-01-01

## 2018-04-22 PROBLEM — D69.6 THROMBOCYTOPENIA (HCC): Chronic | Status: ACTIVE | Noted: 2017-08-21

## 2018-04-22 PROBLEM — E11.21 TYPE 2 DIABETES WITH NEPHROPATHY (HCC): Chronic | Status: ACTIVE | Noted: 2018-01-01

## 2018-04-22 PROBLEM — L03.116 CELLULITIS OF LEFT LOWER EXTREMITY: Status: RESOLVED | Noted: 2017-08-22 | Resolved: 2018-01-01

## 2018-04-22 PROBLEM — J96.01 ACUTE RESPIRATORY FAILURE WITH HYPOXIA (HCC): Status: RESOLVED | Noted: 2017-08-04 | Resolved: 2018-01-01

## 2018-04-22 PROBLEM — K92.2 UPPER GI BLEED: Status: RESOLVED | Noted: 2017-01-22 | Resolved: 2018-01-01

## 2018-04-22 PROBLEM — I87.2 VENOUS STASIS DERMATITIS OF BOTH LOWER EXTREMITIES: Chronic | Status: ACTIVE | Noted: 2018-01-01

## 2018-04-22 PROBLEM — E87.70 VOLUME OVERLOAD: Status: RESOLVED | Noted: 2018-01-01 | Resolved: 2018-01-01

## 2018-04-22 PROBLEM — I50.33 ACUTE ON CHRONIC DIASTOLIC HEART FAILURE (HCC): Status: RESOLVED | Noted: 2017-09-11 | Resolved: 2018-01-01
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
## 2018-04-22 PROBLEM — I50.9 CHF (CONGESTIVE HEART FAILURE) (HCC): Status: RESOLVED | Noted: 2017-09-14 | Resolved: 2018-01-01

## 2018-04-22 PROBLEM — J10.1 INFLUENZA B: Status: RESOLVED | Noted: 2018-01-01 | Resolved: 2018-01-01

## 2018-04-22 PROBLEM — R41.82 ALTERED MENTAL STATE: Status: ACTIVE | Noted: 2018-01-01

## 2018-04-22 PROBLEM — R09.02 HYPOXEMIA: Status: RESOLVED | Noted: 2017-11-01 | Resolved: 2018-01-01

## 2018-04-22 NOTE — PROGRESS NOTES
Chaplains have followed patient since 2017  Full code with no directives on file  [address]  Kaleb Chun [place]  Debility 2013  Obesity 2015  Chronic pain 2015  Falls frequently 2016    Jarad Myrick, staff [address] [facility]  /   [email or web address]

## 2018-04-22 NOTE — PROGRESS NOTES
Patient arrived from the ER via stretcher. Son and wife at bedside. Patient is able to state name, confused to time, place and situation. Reoriented patient. Currently on 5 LPM via Nasal Cannula. Dual skin assessment performed with Paulo Bae RN. Patient has ecchymosis bilaterally on both arms. Noted excoriation under stomach folds with yeast present. Upper chest reveals old open heart surgery scar. Back clean, dry and intact. Lower legs exhibiting cellulitis; left plus 2, right plus 1. Denies pain at this time. No signs of sob, dyspnea, and dizziness. Will continue to monitor.

## 2018-04-22 NOTE — ED TRIAGE NOTES
PT arrived ED via EMS for AMS. Per EMS Pt had an initial pulse rate of 45. PT was given 0.5 mg atropine.  PT BGL of 108

## 2018-04-22 NOTE — ED PROVIDER NOTES
HPI Comments: 51-year-old male brought to the ER for confusion and decreased mental status. States has been going on  All afternoon and got worse. Family states he been talking out of his head at times. No reported fever. The patient has a history of diabetes and COPD and cardiac disease/CHF. Patient is on oxygen at home as needed. Patient is a 80 y.o. male presenting with altered mental status. The history is provided by the patient. Altered mental status    This is a new problem. The current episode started 6 to 12 hours ago. The problem has not changed since onset. Associated symptoms include confusion and somnolence. Pertinent negatives include no unresponsiveness. Mental status baseline is mild dementia. His past medical history is significant for diabetes, seizures, hypertension and COPD. Past Medical History:   Diagnosis Date    Atopic dermatitis 11/21/2012    Atrial fibrillation (HCC)     CAD (coronary artery disease)     Carotid stenosis, bilateral 11/21/2012    50-79%  3-2010    1. Carotid Doppler (6/1/07): Greater than 70% stenosis in proximal LICA. 50% stenosis in right ICA. 2.  CTA of neck (3/15/10): Occluded distal segments of the vertebral arteries bilaterally. Atherosclerosis of the carotid bulbs bilaterally with a 50% stenosis on the left and a stenosis of less than 30% on the right. Small nodule in the right upper lobe near the apex. 3. CTA (8/29/13):  Less than 30% diameter stenosis of the cervical internal carotid arteries bilaterally.  CHF (congestive heart failure) ([address] 11/21/2012    Chronic kidney disease     hx elevated labs    Chronic obstructive pulmonary disease (HCC)     Diabetes (HCC)     Diastolic CHF, acute on chronic ([address] 9/12/2015    1. Echo (9/11/15) : EF 55-60%. Mild LVH. Moderate biatrial enlargement. Moderate mitral/tricuspid regurgitation.     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 Bilateral proximal common iliac PCI (08):  8.0 X 100 mm Cordis smart stent on right and 10 X 40 mm cordis smart stent on right. Both inflated to 7.0 mm.  Poor historian     Radiologic findings of lung field, abnormal 10/31/2016    1. CT of chest  (11/24/10): Multiple small nodules in the right lobe and stable interstitial prominence. Consistent with chronic lung disease. No evidence of malignancy.  Seizure disorder ([address] 2013    Shortness of breath dyspnea 2013    Thyroid disease     Unspecified sleep apnea        Past Surgical History:   Procedure Laterality Date    CARDIAC SURG PROCEDURE UNLIST      cath ,cabg ,lexiscan cardiolite 11/10    COLONOSCOPY N/A 2017    COLONOSCOPY  BMI 36 TO BE ADMITTED 17 performed by Naomi Castillo [facility] at [facility] ENDOSCOPY    HX CATARACT REMOVAL Left     os    HX COLONOSCOPY      HX CORONARY ARTERY BYPASS GRAFT  2007    HX CORONARY STENT PLACEMENT  2008    bilateral iliac artery PCI and stents    HX HEART CATHETERIZATION      left-2007, 2011    HX HEENT  1970s    neck lipoma         Family History:   Problem Relation Age of Onset    Heart Attack Mother    Derek Edouard Other Father      old age   Derek Edouard Other Brother      brain aneurysm    Heart Disease Other      2 children  with heart concerns, 36 & 49 yo    Heart Disease Son        Social History     Social History    Marital status:      Spouse name: N/A    Number of children: N/A    Years of education: N/A     Occupational History    [employer].  Retired     sales, 12 yrs     Social History Main Topics    Smoking status: Former Smoker     Packs/day: 1.00     Years: 45.00     Types: Cigarettes     Quit date: 1984  Smokeless tobacco: Never Used      Comment: (stopped smoking in )    Alcohol use No    Drug use: No    Sexual activity: Not Currently     Other Topics Concern    Not on file     Social History Narrative    45 pack year history cigarette smoking, stopped in . 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 , two living children, two children  with coronary artery disease, ages 36 and 48. Has always lived in [address] No pets. ALLERGIES: Review of patient's allergies indicates no known allergies. Review of Systems   Constitutional: Negative. Negative for activity change. HENT: Negative. Eyes: Negative. Respiratory: Negative. Cardiovascular: Negative. Gastrointestinal: Negative. Genitourinary: Negative. Musculoskeletal: Negative. Skin: Negative. Neurological: Negative. Psychiatric/Behavioral: Positive for confusion. All other systems reviewed and are negative. Vitals:    18 0227 18 0232   BP: 142/62 128/55   Pulse: 60 (!) 58   Resp: 16 13   Temp: 97.7 °F (36.5 °C)    SpO2: (!) 86% (!) 86%            Physical Exam   Constitutional: He is oriented to person, place, and time. He appears well-developed and well-nourished. No distress. HENT:   Head: Normocephalic and atraumatic. Right Ear: External ear normal.   Left Ear: External ear normal.   Nose: Nose normal.   Mouth/Throat: Oropharynx is clear and moist. No oropharyngeal exudate. Eyes: Conjunctivae and EOM are normal. Pupils are equal, round, and reactive to light. Right eye exhibits no discharge. Left eye exhibits no discharge. No scleral icterus. Neck: Normal range of motion. Neck supple. No JVD present. No tracheal deviation present. Cardiovascular: Normal rate, regular rhythm and intact distal pulses. Pulmonary/Chest: Effort normal and breath sounds normal. No stridor. No respiratory distress.  He has no wheezes. He exhibits no tenderness. Abdominal: Soft. Bowel sounds are normal. He exhibits no distension and no mass. There is no tenderness. Musculoskeletal: Normal range of motion. He exhibits no edema or tenderness. Neurological: He is alert and oriented to person, place, and time. No cranial nerve deficit. Skin: Skin is warm and dry. No rash noted. He is not diaphoretic. No erythema. No pallor. Psychiatric: He has a normal mood and affect. His behavior is normal. Thought content normal.   Nursing note and vitals reviewed. MDM  Number of Diagnoses or Management Options  Altered mental status, unspecified altered mental status type:   Diagnosis management comments: Altered mental status etiology is unclear patient was bradycardic in the field however remained in the 50s and 60s while in the emergency department.        Amount and/or Complexity of Data Reviewed  Clinical lab tests: reviewed and ordered  Tests in the radiology section of CPT®: ordered and reviewed  Tests in the medicine section of CPT®: ordered and reviewed    Risk of Complications, Morbidity, and/or Mortality  Presenting problems: high  Diagnostic procedures: high  Management options: high          ED Course       Procedures

## 2018-04-22 NOTE — PROGRESS NOTES
Patient transferred from 8th floor for CVA workup. Patient is alert and oriented x 3. No difficulty swallowing or any neuro deficits noted. VSS, [place] to room and surrounding. Instructions given to patient to call for any assistance if needed. CL placed within reach.  Spouse at bedside

## 2018-04-22 NOTE — PROGRESS NOTES
Hospitalist Progress Note     Admit Date:  2018  2:23 AM   Name:  James Pop   Age:  80 y.o.  :  1932   MRN:  [id number]   PCP:  Mary Simons MD  Treatment Team: Attending Provider: Moy Brannon MD; Primary Nurse: Ace Santiago    Subjective:   Patient is a 80 y.o. male who presents to the ER due to AMS. Started in the afternoon and has just worsened. Family reports that he is \"talking out of his head\". He has multiple significant medical problems- see below. No other neuro signs/symptoms noted. Pt cannot provide history due to AMS. No known fever/chills, n/v/d, chest pain or SOB.    18  Pt awake alert. Says he been seeing things which arent  There, thinks that hsi thoughts have been fluctuating. Apart from that was also mild sob hence was brought to er. Son elva at the bedside thinks that pt had recently started on zoloft- thinks that it has been causing to be confused.   Pt is on 2lit /min oxygen and cpap at night-- presently on 4 lit/min oxygen- says sob is better      Objective:   Patient Vitals for the past 24 hrs:   Temp Pulse Resp BP SpO2   18 1200 97.5 °F (36.4 °C) (!) 54 17 143/68 97 %   18 0951 97.4 °F (36.3 °C) 63 18 150/63 93 %   18 0755 - - - - 100 %   18 0736 97.2 °F (36.2 °C) 60 18 135/68 100 %   18 0441 97.4 °F (36.3 °C) (!) 58 18 156/65 98 %   18 0402 - 62 16 128/59 94 %   18 0302 - (!) 58 14 132/61 90 %   18 0238 - 60 16 - 92 %   18 0232 - (!) 58 13 128/55 (!) 86 %   18 0227 97.7 °F (36.5 °C) 60 16 142/62 (!) 86 %     Oxygen Therapy  O2 Sat (%): 97 % (18 1200)  Pulse via Oximetry: 63 beats per minute (18 0755)  O2 Device: Nasal cannula (18 08)  O2 Flow Rate (L/min): 4 l/min (18 0836)    Intake/Output Summary (Last 24 hours) at 18 1316  Last data filed at 18 0848   Gross per 24 hour   Intake           621.67 ml   Output                0 ml   Net 621.67 ml         General:    Well nourished. Alert. On 4 lit/min oxygen  HEENT normal  CV:   RRR. No murmur, rub, or gallop. Lungs:   Basilar crep - mild wheezing  Abdomen:   Soft, nontender, nondistended. Obese  Cns- moves all ext- awake alert oriented x3, no focal neurological deficits  Extremities: Warm and dry. No cyanosis or edema. Skin:     No rashes or jaundice. psy- normal mood and affect- but says at home recently had been seeing things in tv when infact nothing there, feels that his thought have been differernt- eg says want to go to beach- but also says some body said not to go--but infact no body said that to him     Data Review:  I have reviewed all labs, meds, telemetry events, and studies from the last 24 hours. Recent Results (from the past 24 hour(s))   POC LACTIC ACID    Collection Time: 04/22/18  2:38 AM   Result Value Ref Range    Lactic Acid (POC) 0.8 0.5 - 1.9 mmol/L   CBC WITH AUTOMATED DIFF    Collection Time: 04/22/18  2:39 AM   Result Value Ref Range    WBC 6.1 4.3 - 11.1 K/uL    RBC 4.60 4.23 - 5.67 M/uL    HGB 12.5 (L) 13.6 - 17.2 g/dL    HCT 39.6 (L) 41.1 - 50.3 %    MCV 86.1 79.6 - 97.8 FL    MCH 27.2 26.1 - 32.9 PG    MCHC 31.6 31.4 - 35.0 g/dL    RDW 17.3 (H) 11.9 - 14.6 %    PLATELET 934 (L) 691 - 450 K/uL    MPV 11.3 10.8 - 14.1 FL    DF AUTOMATED      NEUTROPHILS 76 43 - 78 %    LYMPHOCYTES 12 (L) 13 - 44 %    MONOCYTES 8 4.0 - 12.0 %    EOSINOPHILS 4 0.5 - 7.8 %    BASOPHILS 0 0.0 - 2.0 %    IMMATURE GRANULOCYTES 0 0.0 - 5.0 %    ABS. NEUTROPHILS 4.6 1.7 - 8.2 K/UL    ABS. LYMPHOCYTES 0.7 0.5 - 4.6 K/UL    ABS. MONOCYTES 0.5 0.1 - 1.3 K/UL    ABS. EOSINOPHILS 0.3 0.0 - 0.8 K/UL    ABS. BASOPHILS 0.0 0.0 - 0.2 K/UL    ABS. IMM.  GRANS. 0.0 0.0 - 0.5 K/UL   METABOLIC PANEL, COMPREHENSIVE    Collection Time: 04/22/18  2:39 AM   Result Value Ref Range    Sodium 141 136 - 145 mmol/L    Potassium 4.4 3.5 - 5.1 mmol/L    Chloride 106 98 - 107 mmol/L    CO2 29 21 - 32 mmol/L Anion gap 6 (L) 7 - 16 mmol/L    Glucose 103 (H) 65 - 100 mg/dL    BUN 35 (H) 8 - 23 MG/DL    Creatinine 1.65 (H) 0.8 - 1.5 MG/DL    GFR est AA 51 (L) >60 ml/min/1.73m2    GFR est non-AA 42 (L) >60 ml/min/1.73m2    Calcium 8.2 (L) 8.3 - 10.4 MG/DL    Bilirubin, total 1.0 0.2 - 1.1 MG/DL    ALT (SGPT) 14 12 - 65 U/L    AST (SGOT) 18 15 - 37 U/L    Alk. phosphatase 131 50 - 136 U/L    Protein, total 7.0 6.3 - 8.2 g/dL    Albumin 3.1 (L) 3.2 - 4.6 g/dL    Globulin 3.9 (H) 2.3 - 3.5 g/dL    A-G Ratio 0.8 (L) 1.2 - 3.5     TROPONIN I    Collection Time: 04/22/18  2:39 AM   Result Value Ref Range    Troponin-I, Qt. 0.02 0.02 - 0.05 NG/ML   EKG, 12 LEAD, INITIAL    Collection Time: 04/22/18  2:45 AM   Result Value Ref Range    Ventricular Rate 60 BPM    Atrial Rate 70 BPM    QRS Duration 104 ms    Q-T Interval 432 ms    QTC Calculation (Bezet) 432 ms    Calculated R Axis 64 degrees    Calculated T Axis -149 degrees    Diagnosis       !! AGE AND GENDER SPECIFIC ECG ANALYSIS !!   Atrial fibrillation with premature ventricular or aberrantly conducted   complexes  Low voltage QRS  Septal infarct (cited on or before 21-AUG-2017)  ST & T wave abnormality, consider inferolateral ischemia or digitalis effect  Abnormal ECG  When compared with ECG of 01-MAR-2018 22:09,  QT has lengthened     BLOOD GAS, ARTERIAL    Collection Time: 04/22/18  3:00 AM   Result Value Ref Range    pH 7.35 7.35 - 7.45      PCO2 54 (H) 35 - 45 mmHg    PO2 150 (H) 80 - 105 mmHg    BICARBONATE 29 (H) 22 - 26 mmol/L    BASE EXCESS 2.3 0 - 3 mmol/L    TOTAL HEMOGLOBIN 13.1 11.7 - 15.0 GM/DL    O2 SAT 99 (H) 92 - 98.5 %    Arterial O2 Hgb 97.5 (H) 94 - 97 %    CARBOXYHEMOGLOBIN 0.6 0.5 - 1.5 %    METHEMOGLOBIN 0.4 0.0 - 1.5 %    DEOXYHEMOGLOBIN 2 0.0 - 5.0 %    SITE LB     ALLENS TEST POSITIVE      MODE NC     O2 FLOW 4.00 L/min   GLUCOSE, POC    Collection Time: 04/22/18  5:55 AM   Result Value Ref Range    Glucose (POC) 94 65 - 100 mg/dL   GLUCOSE, POC Collection Time: 04/22/18 11:02 AM   Result Value Ref Range    Glucose (POC) 148 (H) 65 - 100 mg/dL        All Micro Results     None          Current Meds:  Current Facility-Administered Medications   Medication Dose Route Frequency    albuterol (PROVENTIL VENTOLIN) nebulizer solution 2.5 mg  2.5 mg Nebulization Q4H PRN    allopurinol (ZYLOPRIM) tablet 300 mg  300 mg Oral DAILY    aspirin chewable tablet 81 mg  81 mg Oral DAILY    docusate sodium (COLACE) capsule 100 mg  100 mg Oral DAILY    furosemide (LASIX) tablet 80 mg  80 mg Oral BID    gabapentin (NEURONTIN) capsule 100 mg  100 mg Oral TID    hydrALAZINE (APRESOLINE) tablet 10 mg  10 mg Oral TID    HYDROcodone-acetaminophen (NORCO)  mg tablet 1 Tab  1 Tab Oral Q8H PRN    albuterol-ipratropium (DUO-NEB) 2.5 MG-0.5 MG/3 ML  3 mL Nebulization Q4H PRN    isosorbide mononitrate ER (IMDUR) tablet 30 mg  30 mg Oral DAILY    potassium chloride (K-DUR, KLOR-CON) SR tablet 20 mEq  20 mEq Oral DAILY    latanoprost (XALATAN) 0.005 % ophthalmic solution 1 Drop  1 Drop Both Eyes QHS    levothyroxine (SYNTHROID) tablet 50 mcg  50 mcg Oral ACB    magnesium oxide (MAG-OX) tablet 400 mg  400 mg Oral DAILY    metoprolol succinate (TOPROL-XL) XL tablet 25 mg  25 mg Oral DAILY    oxyCODONE IR (OXY-IR) immediate release tablet 15 mg  15 mg Oral Q8H PRN    pravastatin (PRAVACHOL) tablet 40 mg  40 mg Oral QHS    sertraline (ZOLOFT) tablet 50 mg  50 mg Oral QPM    0.9% sodium chloride infusion  25 mL/hr IntraVENous CONTINUOUS    sodium chloride (NS) flush 5-10 mL  5-10 mL IntraVENous Q8H    sodium chloride (NS) flush 5-10 mL  5-10 mL IntraVENous PRN    ondansetron (ZOFRAN) injection 4 mg  4 mg IntraVENous Q6H PRN    aspirin (ASPIRIN) tablet 325 mg  325 mg Oral DAILY    acetaminophen (TYLENOL) tablet 650 mg  650 mg Oral Q4H PRN    bisacodyl (DULCOLAX) tablet 5 mg  5 mg Oral DAILY PRN    clopidogrel (PLAVIX) tablet 75 mg  75 mg Oral DAILY    heparin (porcine) injection 5,000 Units  5,000 Units SubCUTAneous Q8H    budesonide (PULMICORT) 500 mcg/2 ml nebulizer suspension  500 mcg Nebulization BID RT    And    albuterol CONCENTRATE 2.5mg/0.5 mL neb soln  2.5 mg Nebulization Q6H RT    insulin regular (NOVOLIN R, HUMULIN R) injection   SubCUTAneous AC&HS    dextrose 40% (GLUTOSE) oral gel 1 Tube  15 g Oral PRN    glucagon (GLUCAGEN) injection 1 mg  1 mg IntraMUSCular PRN    dextrose (D50W) injection syrg 12.5-25 g  25-50 mL IntraVENous PRN    tuberculin injection 5 Units  5 Units IntraDERMal ONCE    nystatin (MYCOSTATIN) 100,000 unit/gram powder   Topical BID    [START ON 4/23/2018] famotidine (PEPCID) tablet 20 mg  20 mg Oral DAILY       Other Studies (last 24 hours):  Ct Head Wo Cont    Result Date: 4/22/2018  Noncontrast CT of the brain. COMPARISON: March 1, 2018 INDICATION: Confusion, decreased mental status TECHNIQUE: Contiguous axial images were obtained from the skull base through the vertex without IV contrast. Radiation dose reduction techniques were used for this study:  Our CT scanners use one or all of the following: Automated exposure control, adjustment of the mA and/or kVp according to patient's size, iterative reconstruction. FINDINGS: There is no acute intracranial hemorrhage or evidence for acute territorial infarction. There is no mass effect, midline shift or hydrocephalus. There is no extra-axial fluid collection. The cerebellum and brainstem are grossly unremarkable. Periventricular diffuse hypodensities are nonspecific and likely secondary to chronic small vessel disease. There is generalized cerebral volume loss, age-related. Included orbits and the globes are unremarkable. Visualized paranasal sinuses and the mastoid air cells are aerated. Surrounding bones are intact. IMPRESSION: 1. Chronic small vessel changes. Generalized cerebral volume loss, age-related.  Findings are similar to prior exam. 2. No evidence of acute intracranial abnormality. No hydrocephalus. Xr Chest Port    Result Date: 4/22/2018  Portable chest xray  COMPARISON: January 31, 2018 INDICATION: Altered mental status FINDINGS: Lungs are underinflated. There is suggestion of vascular congestion. There are bibasilar densities. No pneumothorax. Cardiac silhouette is enlarged. Mediastinal contour is within normal limits. Stable postsurgical changes of sternotomy. IMPRESSION: 1. Bibasilar densities, likely atelectasis or consolidation. Suggestion of vascular congestion. 2. Stable cardiomegaly. 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) ICD-10-CM: E11.21  ICD-9-CM: 250.40, 583.81  2/12/2018 - Present Yes        Restrictive lung disease (Chronic) ICD-10-CM: J98.4  ICD-9-CM: 518.89  11/1/2017 - Present Yes        Hypoxia ICD-10-CM: R09.02  ICD-9-CM: 799.02  8/21/2017 - Present Yes        Thrombocytopenia (HCC) (Chronic) ICD-10-CM: D69.6  ICD-9-CM: 287.5  8/21/2017 - Present Yes        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 Echo (9/11/15) : EF 55-60%. Mild LVH. Moderate biatrial enlargement. Moderate mitral/tricuspid regurgitation. 2.  Echo (8/21/17):  EF 55-60%. Moderate LAE. Mild mitral stenosis. Moderate tricuspid regurgitation. RVSP 35-40.               ROBERTO on CPAP (Chronic) ICD-10-CM: G47.33, Z99.89  ICD-9-CM: 327.23, V46.8  2/20/2015 - Present Yes    Overview Signed 2/20/2015 10:42 AM by Casie Claros     Patient is on BiPAP, no supplementary oxygen             Morbid obesity ([facility] (Chronic) ICD-10-CM: E66.01  ICD-9-CM: 278.01  1/21/2015 - Present Yes        CKD (chronic kidney disease) stage 3, GFR 30-59 ml/min (Chronic) ICD-10-CM: N18.3  ICD-9-CM: 585.3  9/18/2013 - Present Yes        Debility (Chronic) ICD-10-CM: R53.81  ICD-9-CM: 799.3  8/5/2013 - Present Yes        Essential hypertension (Chronic) ICD-10-CM: I10  ICD-9-CM: 401.9  11/21/2012 - Present Yes        Persistent atrial fibrillation (HCC) (Chronic) ICD-10-CM: I48.1  ICD-9-CM: 427.31  11/21/2012 - Present Yes    Overview Addendum 11/22/2016  8:14 PM by Martha Thibodeaux MD     Coumadin therapy discontinued due to recurrent GI bleeding. COPD (chronic obstructive pulmonary disease) (HCC) (Chronic) ICD-10-CM: J44.9  ICD-9-CM: 496  11/21/2012 - Present Yes        Carotid stenosis, bilateral (Chronic) ICD-10-CM: H12.24  ICD-9-CM: 433.10, 433.30  11/21/2012 - Present Yes    Overview Addendum 11/22/2016  8:13 PM by Martha Thibodeaux MD     1. Carotid Doppler (6/1/07): Greater than 70% stenosis in proximal LICA. 50% stenosis in right ICA. 2.  CTA of neck (3/15/10): Occluded distal segments of the vertebral arteries bilaterally. Atherosclerosis of the carotid bulbs bilaterally with a 50% stenosis on the left and a stenosis of less than 30% on the right. Small nodule in the right upper lobe near the apex. 3. CTA (8/29/13):  Less than 30% diameter stenosis of the cervical internal carotid arteries bilaterally. RESOLVED: Seizure disorder ([facility] [address] [facility] (Chronic) ICD-10-CM: G40.909  ICD-9-CM: 345.90  5/22/2014 - 4/22/2018 Yes              PLAN:    AMS-resolved- pt is being worked up for cns etiology-- this could be ?  Psychosis/dementia- on zoloft  Acute on chronic hypoxia- cxr pul congestion- 2 lit at home - presently 4 lit/min-already on lasix- will add bumex- will order incentive spirometry  Obesity  Copd exa- duonebs - steroids  Sleep apnea- on cpap at night  Cad  Dm type 2- insulin    DC planning/Dispo:    DVT ppx:  heparin    Signed:  Jc Navarro MD

## 2018-04-22 NOTE — PROGRESS NOTES
Problem: Dysphagia (Adult)  Goal: *Acute Goals and Plan of Care (Insert Text)  STG: Patient will participate in speech/language and/or cognitive evaluation x1 if/when deemed appropriate following review of MRI results and after discussion with family regarding pt's baseline. LTG: Patient will effectively and efficiently communicate verbally, at his baseline, within direct environment with family/friends and caregivers. Speech language pathology: bedside swallow note: Initial Assessment    NAME/AGE/GENDER: Megan Moore is a 80 y.o. male  DATE: 4/22/2018  PRIMARY DIAGNOSIS: Altered mental state  Hypoxia       ICD-10: Treatment Diagnosis: R13.12 oropharyngeal dysphagia  INTERDISCIPLINARY COLLABORATION: Registered Nurse  PRECAUTIONS/ALLERGIES: Review of patient's allergies indicates no known allergies. ASSESSMENT:Based on the objective data described below, Mr. Nellie Coburn presents with a normal oropharyngeal swallow function characterized by no overt signs/symptoms of laryngeal penetration/aspiration with thin liquids or regular solids as evidenced by no cough response and no change in clear/dry vocal quality. Pt able to tolerate regular solids (breaded chicken cut up by SLP) adequately despite no dentition. No family present to discuss pt's baseline speech/language and cognition level and some confusion noted during conversation. MRI is pending, therefore, SLP will follow-up to review MRI results and discuss pt's baseline with family prior to completing speech/language/cognition evaluation. Recommend continue diet as ordered. ?????? ? ? This section established at most recent assessment??????????  PROBLEM LIST (Impairments causing functional limitations): 1. confusion  REHABILITATION POTENTIAL FOR STATED GOALS: Good  PLAN OF CARE:   Patient will benefit from skilled intervention to address the following impairments.   RECOMMENDATIONS AND PLANNED INTERVENTIONS (Benefits and precautions of therapy have been discussed with the patient.):  · continue prescribed diet  MEDICATIONS:  · With liquid  COMPENSATORY STRATEGIES/MODIFICATIONS INCLUDING:  · Small sips and bites  · Slow rate  · Assist with meals as needed  OTHER RECOMMENDATIONS (including follow up treatment recommendations): · Family training/education  · Patient education  RECOMMENDED DIET MODIFICATIONS DISCUSSED WITH:  · Nursing  · Patient  FREQUENCY/DURATION: Continue to follow patient 3 times a week until goals met. RECOMMENDED REHABILITATION/EQUIPMENT: (at time of discharge pending progress): Due to the probability of continued deficits (see above) this patient will not likely need continued skilled speech therapy after discharge. SUBJECTIVE:   Calm and cooperative with some confusion noted. History of Present Injury/Illness: Mr. 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 ([facility] [address] (4/17/2015); Atopic dermatitis (11/21/2012); Atrial fibrillation ([facility] [address] CAD (coronary artery disease); Carotid stenosis, bilateral (11/21/2012); CHF (congestive heart failure) ([address] (11/21/2012); Chronic kidney disease; Chronic obstructive pulmonary disease ([address] Colon, diverticulosis (1/24/2015); Coronary atherosclerosis of native coronary vessel (10/31/2016); Diabetes ([address] Diastolic CHF, acute on chronic (HCC) (9/12/2015); Failed CABG (coronary artery bypass graft) (11/21/2012); GI bleed (1/2015); Gout (11/21/2012); History of tobacco use; [place] (hard of hearing); Hypercholesterolemia; Hypertension; Hyperuricemia (11/21/2012); Morbid obesity ([address] PAD (peripheral artery disease) ([facility] [address] (11/21/2012); Poor historian; Radiologic findings of lung field, abnormal (10/31/2016); Seizure disorder ([address] (8/5/2013); Shortness of breath dyspnea (8/5/2013); Thyroid disease; and Unspecified sleep apnea.  He also has no past medical history of Arthritis; Asthma; Cancer ([facility] [address] Liver disease; Nausea & vomiting; PUD (peptic ulcer disease); or Stroke ([facility] [address] He also  has a past surgical history that includes pr cardiac surg procedure unlist; hx coronary artery bypass graft (06-); hx cataract removal (Left, 2003); hx heart catheterization; hx coronary stent placement (02-); hx heent (1970s); hx colonoscopy; and colonoscopy (N/A, 1/27/2017). Present Symptoms:    Pain Intensity 1: 0  Current Medications:   No current facility-administered medications on file prior to encounter. Current Outpatient Prescriptions on File Prior to Encounter   Medication Sig Dispense Refill    albuterol (PROVENTIL VENTOLIN) 2.5 mg /3 mL (0.083 %) nebulizer solution 2.5 mg by Nebulization route every four (4) hours as needed for Wheezing.  latanoprost (XALATAN) 0.005 % ophthalmic solution Administer 1 Drop to both eyes nightly.  HYDROcodone-acetaminophen (NORCO)  mg tablet Take 1 Tab by mouth every eight (8) hours as needed for Pain. Max Daily Amount: 3 Tabs. 60 Tab 0    oxyCODONE IR (OXY-IR) 15 mg immediate release tablet Take 1 Tab by mouth every eight (8) hours as needed for Pain. Max Daily Amount: 45 mg. 90 Tab 0    sertraline (ZOLOFT) 50 mg tablet Take one tablet each evening for anxiety  Indications: Generalized Anxiety Disorder 30 Tab 3    fluticasone (FLONASE) 50 mcg/actuation nasal spray 2 Sprays by Both Nostrils route daily. 1 Bottle 3    levothyroxine (SYNTHROID) 50 mcg tablet Take 1 Tab by mouth Daily (before breakfast). 90 Tab 3    hydrALAZINE (APRESOLINE) 10 mg tablet TAKE 1 TABLET THREE TIMES DAILY. 90 Tab 3    cholecalciferol (VITAMIN D3) 1,000 unit cap Take 1,000 Units by mouth daily.  sAXagliptin (ONGLYZA) 2.5 mg tablet Take 2.5 mg by mouth daily.  pravastatin (PRAVACHOL) 40 mg tablet Take 1 Tab by mouth nightly. 90 Tab 3    triamcinolone acetonide (KENALOG) 0.1 % ointment Apply  to affected area two (2) times a day.  80 g 0  OTHER One pair compression stocking gradient 20 - 30 mm hg  Please measure to fit 1 Each 1    gabapentin (NEURONTIN) 100 mg capsule Take 1 Cap by mouth three (3) times daily. 270 Cap 3    budesonide-formoterol (SYMBICORT) 160-4.5 mcg/actuation HFA inhaler Take 2 Puffs by inhalation two (2) times a day. 3 Inhaler 3    ipratropium-albuterol (COMBIVENT RESPIMAT)  mcg/actuation inhaler Take 1 Puff by inhalation every six (6) hours. 3 Inhaler 3    furosemide (LASIX) 80 mg tablet Take 1 Tab by mouth two (2) times a day. 60 Tab 6    triamcinolone (ARISTOCORT) 0.5 % topical cream Apply  to affected area two (2) times a day. use thin layer 15 g 0    mupirocin calcium (BACTROBAN) 2 % topical cream Apply  to affected area two (2) times a day. 15 g 0    metoprolol succinate (TOPROL-XL) 25 mg XL tablet Pt takes 1/2 tab po daily. 45 Tab 3    magnesium oxide (MAG-OX) 400 mg tablet Take 1 Tab by mouth daily. 90 Tab 3    aspirin 81 mg chewable tablet Take 1 Tab by mouth daily. 90 Tab 3    ferrous sulfate 324 mg (65 mg iron) tablet Take  by mouth Daily (before breakfast).  allopurinol (ZYLOPRIM) 300 mg tablet Take  by mouth daily.  docusate sodium (STOOL SOFTENER) 100 mg capsule Take 100 mg by mouth as needed.  cpap machine kit by Does Not Apply route. Bilevel 12/8      isosorbide mononitrate ER (IMDUR) 30 mg tablet Take 1 Tab by mouth daily. 30 Tab 5    glipiZIDE (GLUCOTROL) 5 mg tablet Take 5 mg by mouth two (2) times a day.  K-DUR 20 mEq tablet Take 20 mEq by mouth daily.        Current Dietary Status:  Regular/thin liquids      History of reflux:  NO    Reflux medication:n/a  Social History/Home Situation:    Home Environment: Private residence  One/Two Story Residence: One story  Living Alone: No  Support Systems: Child(tai), Spouse/Significant Other/Partner  Patient Expects to be Discharged to[de-identified] Private residence  Current DME Used/Available at Home: CPAP  OBJECTIVE:   Respiratory Status:  Nasal cannula  4 l/min  CXR Results:bibasilar densities, likely atelectasis or consolidation, suggestion of vascular congestion  MRI/CT Results:MRI pending, CT negative  Oral Motor Structure/Speech:  Oral-Motor Structure/Motor Speech  Labial: No impairment  Dentition: Edentulous (pt stated dentures are at home, \"I don't wear them half the time. \" Pt stated he often eats without them, \"I can eat a steak without them. \")  Oral Hygiene: adequate  Lingual: No impairment    Cognitive and Communication Status:  Neurologic State: Alert;Confused  Orientation Level: Oriented to person  Cognition: Follows commands  Perception: Appears intact  Perseveration: No perseveration noted       BEDSIDE SWALLOW EVALUATION  Oral Assessment:  Oral Assessment  Labial: No impairment  Dentition: Edentulous (pt stated dentures are at home, \"I don't wear them half the time. \" Pt stated he often eats without them, \"I can eat a steak without them. \")  Oral Hygiene: adequate  Lingual: No impairment  P.O. Trials:  Patient Position: upright in chair    The patient was given bite/sip amounts of the following:   Consistency Presented: Thin liquid;Puree; Solid;Mixed consistency  How Presented: Self-fed/presented;Straw;Successive swallows;Cup/gulp;Cup/sip    ORAL PHASE:  Bolus Acceptance: No impairment  Bolus Formation/Control: No impairment  Propulsion: No impairment     Oral Residue: None    PHARYNGEAL PHASE:  Initiation of Swallow: No impairment  Laryngeal Elevation: Functional  Aspiration Signs/Symptoms: None  Vocal Quality: No impairment           Pharyngeal Phase Characteristics: No impairment, issues, or problems     OTHER OBSERVATIONS:  Rate/bite size: WNL   Endurance:   WNL   Comments: assistance needed to cut meat, pt fed self     Tool Used: Dysphagia Outcome and Severity Scale (IRVING)    Score Comments   Normal Diet  [x] 7 With no strategies or extra time needed   Functional Swallow  [] 6 May have mild oral or pharyngeal delay       Mild Dysphagia    [] 5 Which may require one diet consistency restricted (those who demonstrate penetration which is entirely cleared on MBS would be included)   Mild-Moderate Dysphagia  [] 4 With 1-2 diet consistencies restricted       Moderate Dysphagia  [] 3 With 2 or more diet consistencies restricted       Moderately Severe Dysphagia  [] 2 With partial PO strategies (trials with ST only)       Severe Dysphagia  [] 1 With inability to tolerate any PO safely          Score:  Initial: 7 Most Recent: X (Date: -- )   Interpretation of Tool: The Dysphagia Outcome and Severity Scale (IRVING) is a simple, easy-to-use, 7-point scale developed to systematically rate the functional severity of dysphagia based on objective assessment and make recommendations for diet level, independence level, and type of nutrition. Score 7 6 5 4 3 2 1   Modifier CH CI CJ CK CL CM CN   ?  Swallowing:     - CURRENT STATUS: CH - 0% impaired, limited or restricted    - GOAL STATUS:  CH - 0% impaired, limited or restricted    - D/C STATUS:  [address] [place] [address] - 0% impaired, limited or restricted  Payor: Katharine Conrad [id number] / [place] [employer] [place] / Product Type: Managed Care Medicare /     TREATMENT:    (In addition to Assessment/Re-Assessment sessions the following treatments were rendered)  Assessment/Reassessment only, no treatment provided today  MODALITIES:                                                                    ORAL MOTOR  EXERCISES:                                                                                                                                                                      LARYNGEAL / PHARYNGEAL EXERCISES:                                                                                                                                     __________________________________________________________________________________________________  Safety:   After treatment position/precautions:  · Up in chair  · RN notified  Progression/Medical Necessity:   · Skilled intervention continues to be required due to some confusion noted and baseline is unknown, MRI results pending. Compliance with Program/Exercises: Will assess as treatment progresses. Reason for Continuation of Services/Other Comments:  · Patient continues to require skilled intervention due to patient unable to attend/participate in therapy as expected. Recommendations/Intent for next treatment session: \"Treatment next visit will focus on review of MRI results and/or family discussion regarding pt's baseline speech/language/cognitive level\".     Total Treatment Duration:  Time In: 1210  Time Out: [address] MA, CCC-SLP

## 2018-04-22 NOTE — IP AVS SNAPSHOT
[address] 322 [facility] 
[phone] Patient: Roberto Mejia MRN: [id number] QCN:[id number] About your hospitalization You were admitted on:  April 22, 2018 You last received care in the:  [facility] MED SURG You were discharged on:  April 25, 2018 Why you were hospitalized Your primary diagnosis was: Altered Mental State Your diagnoses also included:  Hypoxia, Type 2 Diabetes With Nephropathy (Hcc), Seizure Disorder (Hcc), Restrictive Lung Disease, Billy On Cpap, Morbid Obesity (Hcc), Essential Hypertension, Debility, Copd (Chronic Obstructive Pulmonary Disease) (Hcc), Chronic Diastolic Congestive Heart Failure (Hcc), Ckd (Chronic Kidney Disease) Stage 3, Gfr 30-59 Ml/Min, Persistent Atrial Fibrillation (Hcc), Thrombocytopenia (Hcc), 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 [address] [facility] [address] [facility] [address] 
[phone] Sven Fritz MD In 3 days office to call patient with appointment information [address] [place] [address] 
[phone] Brandon Stearns MD On 5/2/2018 11:30 AM [address] [place] [place] [address] [address] [facility] [place] [address] 
[phone] Judit Miller MD On 5/15/2018 9:30 AM [address]  [address] [facility] [place] [address] 
[phone] Your Scheduled Appointments Wednesday May 02, 2018 11:30 AM EDT HOSPITAL FOLLOW-UP with Brandon Stearns MD  
[facility] ([address] [place] [address]) [address]  
[address] [place] [address]  
[phone]  Friday May 04, 2018  9:00 AM EDT  
LAB with [facility] LAB  
 [address]) [address] [phone] [phone] Wednesday May 09, 2018  1:00 PM EDT  
(Arrive by 12:40 PM) Return appointment with [facility] ([facility]) [address] [place] [address] [facility], [place]  
[phone] Monday May 14, 2018  1:30 PM EDT [facility]ICARE WELLNESS with [address]) [address] [address] [phone] Monday May 14, 2018  2:00 PM EDT Office Visit with [address] [place], MD  
[address]) [address] [id number] [phone] Tuesday May 15, 2018  9:30 AM EDT New Patient with Sarah Leblanc MD  
[facility] VASCULAR SURGERY ([facility]) [facility] [address] [phone]  
[phone] Wednesday June 13, 2018  4:00 PM EDT Office Visit with Moni Lam MD  
[facility] CARDIOLOGY [place] OFFICE ([address] [place] [address]) [address] [place] [address]  
[phone] Discharge Orders None A check chandler indicates which time of day the medication should be taken. My Medications START taking these medications Instructions Each Dose to Equal  
 Morning Noon Evening Bedtime  
 bisacodyl 5 mg EC tablet Commonly known as:  DULCOLAX Your next dose is:  Tomorrow Morning Take 1 Tab by mouth daily. 5 mg  
    
  
   
   
   
  
 clopidogrel 75 mg Tab Commonly known as:  PLAVIX Start taking on:  4/26/2018 Your next dose is:  Tomorrow Morning Take 1 Tab by mouth daily. 75 mg  
    
  
   
   
   
  
 famotidine 20 mg tablet Commonly known as:  PEPCID Start taking on:  4/26/2018 Your next dose is:  Tomorrow Morning Take 1 Tab by mouth daily.   
 20 mg  
 predniSONE 20 mg tablet Commonly known as:  Alistair Bird Start taking on:  4/26/2018 Your next dose is:  Tomorrow Morning Take 2 Tabs by mouth daily (with breakfast). 40 mg CONTINUE taking these medications Instructions Each Dose to Equal  
 Morning Noon Evening Bedtime  
 albuterol 2.5 mg /3 mL (0.083 %) nebulizer solution Commonly known as:  PROVENTIL VENTOLIN Your next dose is: Take on as needed schedule 2.5 mg by Nebulization route every four (4) hours as needed for Wheezing. 2.5 mg  
    
   
   
   
  
 allopurinol 300 mg tablet Commonly known as:  Geradine Colder Your next dose is:  Tomorrow Morning Take  by mouth daily. aspirin 81 mg chewable tablet Your next dose is:  Tomorrow Morning Take 1 Tab by mouth daily. 81 mg  
    
  
   
   
   
  
 budesonide-formoterol 160-4.5 mcg/actuation Hfaa Commonly known as:  SYMBICORT Your next dose is: This evening Take 2 Puffs by inhalation two (2) times a day. 2 Puff  
    
  
   
   
  
   
  
 cholecalciferol 1,000 unit Cap Commonly known as:  VITAMIN D3 Your next dose is:  Tomorrow Morning Take 1,000 Units by mouth daily. 1000 Units  
    
  
   
   
   
  
 cpap machine kit  
   
 by Does Not Apply route. Bilevel 12/8  
     
   
   
   
  
 ferrous sulfate 324 mg (65 mg iron) tablet Your next dose is:  Tomorrow Morning Take  by mouth Daily (before breakfast). fluticasone 50 mcg/actuation nasal spray Commonly known as:  Donna Sale Your next dose is:  Tomorrow Morning 2 Sprays by Both Nostrils route daily. 2 Spray  
    
  
   
   
   
  
 furosemide 80 mg tablet Commonly known as:  LASIX Your next dose is: This evening Take 1 Tab by mouth two (2) times a day. 80 mg  
    
  
   
   
  
   
  
 gabapentin 100 mg capsule Commonly known as:  NEURONTIN Your next dose is:  TODAY, evening and bedtime Take 1 Cap by mouth three (3) times daily. 100 mg  
    
  
   
   
  
   
  
  
 glipiZIDE 5 mg tablet Commonly known as:  Bernardino Fragmin Your next dose is: This evening Take 5 mg by mouth two (2) times a day. 5 mg  
    
  
   
   
  
   
  
 hydrALAZINE 10 mg tablet Commonly known as:  APRESOLINE Your next dose is:  TODAY, evening and bedtime TAKE 1 TABLET THREE TIMES DAILY. HYDROcodone-acetaminophen  mg tablet Commonly known as:  [place] No Your next dose is: Take on as needed schedule Take 1 Tab by mouth every eight (8) hours as needed for Pain. Max Daily Amount: 3 Tabs. 1 Tab  
    
   
   
   
  
 ipratropium-albuterol  mcg/actuation inhaler Commonly known as:  Doc Serge Your next dose is: Take on as needed schedule Take 1 Puff by inhalation every six (6) hours. 1 Puff  
    
   
   
   
  
 isosorbide mononitrate ER 30 mg tablet Commonly known as:  IMDUR Your next dose is:  Tomorrow Morning Take 1 Tab by mouth daily. 30 mg  
    
  
   
   
   
  
 K-DUR 20 mEq tablet Generic drug:  potassium chloride Your next dose is:  Tomorrow Morning Take 20 mEq by mouth daily. 20 mEq  
    
  
   
   
   
  
 levothyroxine 50 mcg tablet Commonly known as:  SYNTHROID Your next dose is:  Tomorrow Morning Take 1 Tab by mouth Daily (before breakfast). 50 mcg  
    
  
   
   
   
  
 magnesium oxide 400 mg tablet Commonly known as:  MAG-OX Take 1 Tab by mouth daily. 400 mg  
    
   
   
   
  
 metoprolol succinate 25 mg XL tablet Commonly known as:  TOPROL-XL Your next dose is:  Tomorrow Morning Pt takes 1/2 tab po daily. mupirocin calcium 2 % topical cream  
Commonly known as:  Marnell Elizabet Your next dose is: This evening Apply  to affected area two (2) times a day. OTHER One pair compression stocking gradient 20 - 30 mm hg Please measure to fit  
     
   
   
   
  
 oxyCODONE IR 15 mg immediate release tablet Commonly known as:  OXY-IR Your next dose is: Take on as needed schedule Take 1 Tab by mouth every eight (8) hours as needed for Pain. Max Daily Amount: 45 mg.  
 15 mg  
    
   
   
   
  
 pravastatin 40 mg tablet Commonly known as:  PRAVACHOL Your next dose is: Take tonight Take 1 Tab by mouth nightly. 40 mg  
    
   
   
   
  
  
 sertraline 50 mg tablet Commonly known as:  ZOLOFT Your next dose is: Take on as needed schedule Take one tablet each evening for anxiety  Indications: Generalized Anxiety Disorder STOOL SOFTENER 100 mg capsule Generic drug:  docusate sodium Your next dose is: Take on as needed schedule Take 100 mg by mouth as needed. 100 mg  
    
   
   
   
  
 * triamcinolone 0.5 % topical cream  
Commonly known as:  ARISTOCORT Your next dose is: This evening Apply  to affected area two (2) times a day. use thin layer * triamcinolone acetonide 0.1 % ointment Commonly known as:  KENALOG Your next dose is: This evening Apply  to affected area two (2) times a day. XALATAN 0.005 % ophthalmic solution Generic drug:  latanoprost  
Your next dose is: Take tonight Administer 1 Drop to both eyes nightly. 1 Drop * Notice: This list has 2 medication(s) that are the same as other medications prescribed for you. Read the directions carefully, and ask your doctor or other care provider to review them with you. STOP taking these medications sAXagliptin 2.5 mg tablet Commonly known as:  ONGLYZA Where to Get Your Medications These medications were sent to [address] [place] [address].[place] [address] Phone:  [phone]  
  aspirin 81 mg chewable tablet  
 bisacodyl 5 mg EC tablet  
 clopidogrel 75 mg Tab  
 famotidine 20 mg tablet  
 furosemide 80 mg tablet  
 predniSONE 20 mg tablet Opioid Education Prescription Opioids: What You Need to Know: 
 
Prescription opioids can be used to help relieve moderate-to-severe pain and are often prescribed following a surgery or injury, or for certain health conditions. These medications can be an important part of treatment but also come with serious risks. Opioids are strong pain medicines. Examples include hydrocodone, oxycodone, fentanyl, and morphine. Heroin is an example of an illegal opioid. It is important to work with your health care provider to make sure you are getting the safest, most effective care. WHAT ARE THE RISKS AND SIDE EFFECTS OF OPIOID USE? Prescription opioids carry serious risks of addiction and overdose, especially with prolonged use. An opioid overdose, often marked by slow breathing, can cause sudden death. The use of prescription opioids can have a number of side effects as well, even when taken as directed. · Tolerance-meaning you might need to take more of a medication for the same pain relief · Physical dependence-meaning you have symptoms of withdrawal when the medication is stopped. Withdrawal symptoms can include nausea, sweating, chills, diarrhea, stomach cramps, and muscle aches. Withdrawal can last up to several weeks, depending on which drug you took and how long you took it. · Increased sensitivity to pain · Constipation · Nausea, vomiting, and dry mouth · Sleepiness and dizziness · Confusion · Depression · Low levels of testosterone that can result in lower sex drive, energy, and strength · Itching and sweating RISKS ARE GREATER WITH:      
 · History of drug misuse, substance use disorder, or overdose · Mental health conditions (such as depression or anxiety) · Sleep apnea · Older age (72 years or older) · Pregnancy Avoid alcohol while taking prescription opioids. Also, unless specifically advised by your health care provider, medications to avoid include: · Benzodiazepines (such as Xanax or Valium) · Muscle relaxants (such as Soma or Flexeril) · Hypnotics (such as Ambien or Lunesta) · Other prescription opioids KNOW YOUR OPTIONS Talk to your health care provider about ways to manage your pain that don't involve prescription opioids. Some of these options may actually work better and have fewer risks and side effects. Options may include: 
· Pain relievers such as acetaminophen, ibuprofen, and naproxen · Some medications that are also used for depression or seizures · Physical therapy and exercise · Counseling to help patients learn how to cope better with triggers of pain and stress. · Application of heat or cold compress · Massage therapy · Relaxation techniques Be Informed Make sure you know the name of your medication, how much and how often to take it, and its potential risks & side effects. IF YOU ARE PRESCRIBED OPIOIDS FOR PAIN: 
· Never take opioids in greater amounts or more often than prescribed. Remember the goal is not to be pain-free but to manage your pain at a tolerable level. · Follow up with your primary care provider to: · Work together to create a plan on how to manage your pain. · Talk about ways to help manage your pain that don't involve prescription opioids. · Talk about any and all concerns and side effects. · Help prevent misuse and abuse. · Never sell or share prescription opioids · Help prevent misuse and abuse. · Store prescription opioids in a secure place and out of reach of others (this may include visitors, children, friends, and family). · Safely dispose of unused/unwanted prescription opioids: Find your community drug take-back program or your pharmacy mail-back program, or flush them down the toilet, following guidance from the Food and Drug Administration (www.fda.gov/Drugs/ResourcesForYou). · Visit www.cdc.gov/drugoverdose to learn about the risks of opioid abuse and overdose. · If you believe you may be struggling with addiction, tell your health care provider and ask for guidance or call [address] [place], [place] [address] at [phone]. Discharge Instructions Chronic Obstructive Pulmonary Disease (COPD): Care Instructions Your Care Instructions Chronic obstructive pulmonary disease (COPD) is a general term for a group of lung diseases, including emphysema and chronic bronchitis. People with COPD have decreased airflow in and out of the lungs, which makes it hard to breathe. The airways also can get clogged with thick mucus. Cigarette smoking is a major cause of COPD. Although there is no cure for COPD, you can slow its progress. Following your treatment plan and taking care of yourself can help you feel better and live longer. Follow-up care is a key part of your treatment and safety. Be sure to make and go to all appointments, and call your doctor if you are having problems. It's also a good idea to know your test results and keep a list of the medicines you take. How can you care for yourself at home? ?Staying healthy ? · Do not smoke. This is the most important step you can take to prevent more damage to your lungs. If you need help quitting, talk to your doctor about stop-smoking programs and medicines. These can increase your chances of quitting for good. ? · Avoid colds and flu. Get a pneumococcal vaccine shot. If you have had one before, ask your doctor whether you need a second dose.  Get the flu vaccine every fall. If you must be around people with colds or the flu, wash your hands often. ? · Avoid secondhand smoke, air pollution, and high altitudes. Also avoid cold, dry air and hot, humid air. Stay at home with your windows closed when air pollution is bad. ?Medicines and oxygen therapy ? · Take your medicines exactly as prescribed. Call your doctor if you think you are having a problem with your medicine. ? · You may be taking medicines such as: ¨ Bronchodilators. These help open your airways and make breathing easier. Bronchodilators are either short-acting (work for 6 to 9 hours) or long-acting (work for 24 hours). You inhale most bronchodilators, so they start to act quickly. Always carry your quick-relief inhaler with you in case you need it while you are away from home. ¨ Corticosteroids (prednisone, budesonide). These reduce airway inflammation. They come in pill or inhaled form. You must take these medicines every day for them to work well. ? · A spacer may help you get more inhaled medicine to your lungs. Ask your doctor or pharmacist if a spacer is right for you. If it is, ask how to use it properly. ? · Do not take any vitamins, over-the-counter medicine, or herbal products without talking to your doctor first.  
? · If your doctor prescribed antibiotics, take them as directed. Do not stop taking them just because you feel better. You need to take the full course of antibiotics. ? · Oxygen therapy boosts the amount of oxygen in your blood and helps you breathe easier. Use the flow rate your doctor has recommended, and do not change it without talking to your doctor first.  
Activity ? · Get regular exercise. Walking is an easy way to get exercise. Start out slowly, and walk a little more each day. ? · Pay attention to your breathing. You are exercising too hard if you cannot talk while you are exercising.   
? · Take short rest breaks when doing household chores and other activities. ? · Learn breathing methods-such as breathing through pursed lips-to help you become less short of breath. ? · If your doctor has not set you up with a pulmonary rehabilitation program, talk to him or her about whether rehab is right for you. Rehab includes exercise programs, education about your disease and how to manage it, help with diet and other changes, and emotional support. Diet ? · Eat regular, healthy meals. Use bronchodilators about 1 hour before you eat to make it easier to eat. Eat several small meals instead of three large ones. Drink beverages at the end of the meal. Avoid foods that are hard to chew. ? · Eat foods that contain protein so that you do not lose muscle mass. ? · Talk with your doctor if you gain too much weight or if you lose weight without trying. ?Mental health ? · Talk to your family, friends, or a therapist about your feelings. It is normal to feel frightened, angry, hopeless, helpless, and even guilty. Talking openly about bad feelings can help you cope. If these feelings last, talk to your doctor. When should you call for help? Call 911 anytime you think you may need emergency care. For example, call if: 
? · You have severe trouble breathing. ?Call your doctor now or seek immediate medical care if: 
? · You have new or worse trouble breathing. ? · You cough up blood. ? · You have a fever. ? Watch closely for changes in your health, and be sure to contact your doctor if: 
? · You cough more deeply or more often, especially if you notice more mucus or a change in the color of your mucus. ? · You have new or worse swelling in your legs or belly. ? · You are not getting better as expected. Where can you learn more? Go to http://angelina-rajiv.info/. Vignesh Look in the search box to learn more about \"Chronic Obstructive Pulmonary Disease (COPD): Care Instructions. \" Current as of: May 12, 2017 Content Version: 11.4 © 6122-0098 Healthwise, Incorporated. Care instructions adapted under license by [employer] (which disclaims liability or warranty for this information). If you have questions about a medical condition or this instruction, always ask your healthcare professional. [address] any warranty or liability for your use of this information. Altered Mental Status: Care Instructions Your Care Instructions Altered mental status is a change in how well your brain is working. As a result, you may be confused, be less alert than usual, or act in odd ways. This may include seeing or hearing things that aren't really there (hallucinations). A mental status change has many possible causes. For example, it may be the result of an infection, an imbalance of chemicals in the body, or a chronic disease such as diabetes or COPD. It can also be caused by things such as a head injury, taking certain medicines, or using alcohol or drugs. The doctor may do tests to look for the cause. These tests may include urine tests, blood tests, and imaging tests such as a CT scan. Sometimes a clear cause isn't found. But tests can help the doctor rule out a serious cause of your symptoms. A change in mental status can be scary. But mental status will often return to normal when the cause is treated. So it is important to get any follow-up testing or treatment the doctor has suggested. The doctor has checked you carefully, but problems can develop later. If you notice any problems or new symptoms, get medical treatment right away. Follow-up care is a key part of your treatment and safety. Be sure to make and go to all appointments, and call your doctor if you are having problems. It's also a good idea to know your test results and keep a list of the medicines you take. How can you care for yourself at home? · Be safe with medicines. Take your medicines exactly as prescribed.  Call your doctor if you think you are having a problem with your medicine. · Have another adult stay with you until you are better. This can help keep you safe. Ask that person to watch for signs that your mental status is getting worse. When should you call for help? Call 911 anytime you think you may need emergency care. For example, call if: 
· You passed out (lost consciousness). Call your doctor now or seek immediate medical care if: 
· Your mental status is getting worse. · You have new symptoms, such as a fever, chills, or shortness of breath. · You do not feel safe. Watch closely for changes in your health, and be sure to contact your doctor if: 
· You do not get better as expected. Where can you learn more? Go to [employer].be Enter M957 in the search box to learn more about \"Altered Mental Status: Care Instructions. \"  
© 5549-0514 Healthwise, BERD. Care instructions adapted under license by [employer] (which disclaims liability or warranty for this information). This care instruction is for use with your licensed healthcare professional. If you have questions about a medical condition or this instruction, always ask your healthcare professional. [address] any warranty or liability for your use of this information. Content Version: 44.8.921186; Current as of: November 20, 2015 DISCHARGE SUMMARY from Nurse PATIENT INSTRUCTIONS: 
 
 
F-face looks uneven A-arms unable to move or move unevenly S-speech slurred or non-existent T-time-call 911 as soon as signs and symptoms begin-DO NOT go Back to bed or wait to see if you get better-TIME IS BRAIN. Warning Signs of HEART ATTACK Call 911 if you have these symptoms: 
? Chest discomfort.  Most heart attacks involve discomfort in the center of the chest that lasts more than a few minutes, or that goes away and comes back. It can feel like uncomfortable pressure, squeezing, fullness, or pain. ? Discomfort in other areas of the upper body. Symptoms can include pain or discomfort in one or both arms, the back, neck, jaw, or stomach. ? Shortness of breath with or without chest discomfort. ? Other signs may include breaking out in a cold sweat, nausea, or lightheadedness. Don't wait more than five minutes to call [address]! Fast action can save your life. Calling 911 is almost always the fastest way to get lifesaving treatment. Emergency Medical Services staff can begin treatment when they arrive  up to an hour sooner than if someone gets to the hospital by car. The discharge information has been reviewed with the patient. The patient verbalized understanding. Discharge medications reviewed with the patient and appropriate educational materials and side effects teaching were provided. ___________________________________________________________________________________________________________________________________ Social[employer] Announcement We are excited to announce that we are making your provider's discharge notes available to you in IMRSV. You will see these notes when they are completed and signed by the physician that discharged you from your recent hospital stay. If you have any questions or concerns about any information you see in Spirationt, please call the Health Information Department where you were seen or reach out to your Primary Care Provider for more information about your plan of care. Introducing [facility] SERVICES! [facility] introduces IMRSV patient portal. Now you can access parts of your medical record, email your doctor's office, and request medication refills online. 1. In your internet browser, go to [email or web address] 2. Click on the First Time User? Click Here link in the Sign In box. You will see the New Member Sign Up page. 3. Enter your Syntricity Access Code exactly as it appears below. You will not need to use this code after youve completed the sign-up process. If you do not sign up before the expiration date, you must request a new code. · Syntricity Access Code: [email or web address] Expires: 5/19/2018 10:39 AM 
 
4. Enter the last four digits of your Social Security Number (xxxx) and Date of Birth (mm/dd/yyyy) as indicated and click Submit. You will be taken to the next sign-up page. 5. Create a Syntricity ID. This will be your Syntricity login ID and cannot be changed, so think of one that is secure and easy to remember. 6. Create a Syntricity password. You can change your password at any time. 7. Enter your Password Reset Question and Answer. This can be used at a later time if you forget your password. 8. Enter your e-mail address. You will receive e-mail notification when new information is available in [address] 9. Click Sign Up. You can now view and download portions of your medical record. 10. Click the Download Summary menu link to download a portable copy of your medical information. If you have questions, please visit the Frequently Asked Questions section of the Syntricity website. Remember, Syntricity is NOT to be used for urgent needs. For medical emergencies, dial 911. Now available from your iPhone and Android! Introducing Lucho Burrell As a [facility] patient, I wanted to make you aware of our electronic visit tool called Lucho Burrell. [facility] 24/7 allows you to connect within minutes with a medical provider 24 hours a day, seven days a week via a mobile device or tablet or logging into a secure website from your computer. You can access Lucho Burrell from anywhere in the [place].  
 
A virtual visit might be right for you when you have a simple condition and feel like you just dont want to get out of bed, or cant get away from work for an appointment, when your regular [facility] provider is not available (evenings, weekends or holidays), or when youre out of town and need minor care. Electronic visits cost only $49 and if the [facility] ([facility] 24/7 provider determines a prescription is needed to treat your condition, one can be electronically transmitted to a nearby pharmacy*. Please take a moment to enroll today if you have not already done so. The enrollment process is free and takes just a few minutes. To enroll, please download the [facility] ([facility] 24/7 yuri to your tablet or phone, or visit [email or web address] to enroll on your computer. And, as an [address] [place] [address] patient with a [employer] account, the results of your visits will be scanned into your electronic medical record and your primary care provider will be able to view the scanned results. We urge you to continue to see your regular [facility] provider for your ongoing medical care. And while your primary care provider may not be the one available when you seek a Collect virtual visit, the peace of mind you get from getting a real diagnosis real time can be priceless. For more information on Collect, view our Frequently Asked Questions (FAQs) at [email or web address]. Sincerely, 
 
Verito Delacruz MD 
Chief Medical Officer Diana Sherry Chappell *:  certain medications cannot be prescribed via Collect Providers Seen During Your Hospitalization Provider Specialty Primary office phone Porter Parker MD Emergency Medicine [phone] Joslyn Umana MD Family Practice [phone] Immunizations Administered for This Admission Name Date  
 TB Skin Test (PPD) Intradermal  Deferred (),  Deferred (), 4/22/2018 Your Primary Care Physician (PCP) Primary Care Physician Office Phone Office Fax Derick Benedict [phone] You are allergic to the following No active allergies Recent Documentation Weight BMI Smoking Status  
  
  
  
 120.7 kg 40.45 kg/m2 Former Smoker Emergency Contacts Name Discharge Info Relation Home Work Mobile Eulalia Navarro  Spouse [3] [phone] Isai Navarro  Son [22]   [phone] Patient Belongings The following personal items are in your possession at time of discharge: 
  Dental Appliances: Uppers, Lowers, At home      Hearing Aids/Status: At home  Home Medications: None   Jewelry: None  Clothing: Socks, Pajamas    Other Valuables: None Please provide this summary of care documentation to your next provider. Signatures-by signing, you are acknowledging that this After Visit Summary has been reviewed with you and you have received a copy. Patient Signature:  ____________________________________________________________ Date:  ____________________________________________________________  
  
[facility] Signature:  ____________________________________________________________ Date:  ____________________________________________________________

## 2018-04-22 NOTE — H&P
HOSPITALIST H&P/CONSULT  NAME:  Malgorzata Fang   Age:  80 y.o.  :   1932   MRN:   [id number]  PCP: Evi Osman MD  Treatment Team: Attending Provider: Vinod Bose MD; Primary Nurse: Janneth Cuadra    Prior     CC: Reason for admission is: AMS    HPI:   Patient history was obtained from the ER provider prior to seeing the patient. Patient is a 80 y.o. male who presents to the ER due to AMS. Started in the afternoon and has just worsened. Family reports that he is \"talking out of his head\". He has multiple significant medical problems- see below. No other neuro signs/symptoms noted. Pt cannot provide history due to AMS. No known fever/chills, n/v/d, chest pain or SOB. ROS:  Pt unable due to AMS      Past Medical History:   Diagnosis Date    Atherosclerosis of artery of extremity with ulceration [facility] 2015    Atherosclerosis of native arteries of extremity with intermittent claudication ([facility] 2015    Atopic dermatitis 2012    Atrial fibrillation (HCC)     CAD (coronary artery disease)     Carotid stenosis, bilateral 2012    50-79%  3-    1. Carotid Doppler (07): Greater than 70% stenosis in proximal LICA. 50% stenosis in right ICA. 2.  CTA of neck (3/15/10): Occluded distal segments of the vertebral arteries bilaterally. Atherosclerosis of the carotid bulbs bilaterally with a 50% stenosis on the left and a stenosis of less than 30% on the right. Small nodule in the right upper lobe near the apex. 3. CTA (13):  Less than 30% diameter stenosis of the cervical internal carotid arteries bilaterally.  CHF (congestive heart failure) ([facility] 2012    Chronic kidney disease     hx elevated labs    Chronic obstructive pulmonary disease (HCC)     Colon, diverticulosis 2015    Coronary atherosclerosis of native coronary vessel 10/31/2016    1. Cath (07):  LMCA: 25%. LAD: ostial 75-95% stenosis. 50-75% mid. D1:  25-50%. OM3: small with 75% stenosis. RCA: 100% mid. with left to right collaterals. 2 Vessel CABG (6/4/07):  LIMA to LAD and SVG to OM. SVG was anastomosed proximally to LIMA to due severe atherosclerosis of aorta. 3.  Lexiscan cardiolite (11/30/10): Abnormal with reversible lateral wall defects. Unable to gate given atrial fibrillation. 4.  LHC (1/14/11):  EF 60%. LVEDP 21. Patent LIMA to LAD and SVG to OM1 (off LIMA). occluded small RCA with left to right collaterals. Small D1 (2 mm vessel) with 70% mid stenosis.  Diabetes ([address]     Diastolic CHF, acute on chronic ([address] 9/12/2015    1. Echo (9/11/15) : EF 55-60%. Mild LVH. Moderate biatrial enlargement. Moderate mitral/tricuspid regurgitation.  Failed CABG (coronary artery bypass graft) 11/21/2012    GI bleed 1/2015    Hospitalized [facility]    Gout 11/21/2012    History of tobacco use     [place] (hard of hearing)     Hypercholesterolemia     Hypertension     Hyperuricemia 11/21/2012    Morbid obesity ([address]     PAD (peripheral artery disease) ([address] 11/21/2012    1. Bilateral proximal common iliac PCI (2/21/08):  8.0 X 100 mm Cordis smart stent on right and 10 X 40 mm cordis smart stent on right. Both inflated to 7.0 mm.  Poor historian     Radiologic findings of lung field, abnormal 10/31/2016    1. CT of chest  (11/24/10): Multiple small nodules in the right lobe and stable interstitial prominence. Consistent with chronic lung disease. No evidence of malignancy.     Seizure disorder ([address] 8/5/2013    Shortness of breath dyspnea 8/5/2013    Thyroid disease     Unspecified sleep apnea       Past Surgical History:   Procedure Laterality Date    CARDIAC SURG PROCEDURE UNLIST      cath 5/07,cabg 6/07,lexiscan cardiolite 11/10    COLONOSCOPY N/A 1/27/2017    COLONOSCOPY  BMI 36 TO BE ADMITTED 1/26/17 performed by Nuha Beaver MD at [address] Left 2003    os    HX COLONOSCOPY      HX CORONARY ARTERY BYPASS GRAFT  2007    HX CORONARY STENT PLACEMENT  2008    bilateral iliac artery PCI and stents    HX HEART CATHETERIZATION      left-2007, 2011    HX HEENT  1970s    neck lipoma      Social History   Substance Use Topics    Smoking status: Former Smoker     Packs/day: 1.00     Years: 45.00     Types: Cigarettes     Quit date: 1984    Smokeless tobacco: Never Used      Comment: (stopped smoking in )    Alcohol use No      Family History   Problem Relation Age of Onset    Heart Attack Mother    [facility] Father      old age   [facility] Brother      brain aneurysm    Heart Disease Other      2 children  with heart concerns, 36 & 47 yo    Heart Disease Son        FH Reviewed and non-contributory to admitting diagnosis    No Known Allergies   Prior to Admission Medications   Prescriptions Last Dose Informant Patient Reported? Taking? HYDROcodone-acetaminophen (NORCO)  mg tablet   No No   Sig: Take 1 Tab by mouth every eight (8) hours as needed for Pain. Max Daily Amount: 3 Tabs. HYDROcodone-acetaminophen (NORCO)  mg tablet   No No   Sig: Take 1 Tab by mouth every eight (8) hours as needed for Pain. Max Daily Amount: 3 Tabs. HYDROcodone-acetaminophen (NORCO)  mg tablet   No No   Sig: Take 1 Tab by mouth every eight (8) hours as needed for Pain. Max Daily Amount: 3 Tabs. K-DUR 20 mEq tablet   Yes No   Sig: Take 20 mEq by mouth daily. OTHER   No No   Sig: One pair compression stocking gradient 20 - 30 mm hg  Please measure to fit   albuterol (PROVENTIL VENTOLIN) 2.5 mg /3 mL (0.083 %) nebulizer solution   Yes No   Si.5 mg by Nebulization route every four (4) hours as needed for Wheezing. allopurinol (ZYLOPRIM) 300 mg tablet   Yes No   Sig: Take  by mouth daily. aspirin 81 mg chewable tablet   No No   Sig: Take 1 Tab by mouth daily.    budesonide-formoterol (SYMBICORT) 160-4.5 mcg/actuation HFA inhaler   No No   Sig: Take 2 Puffs by inhalation two (2) times a day. cholecalciferol (VITAMIN D3) 1,000 unit cap   Yes No   Sig: Take 1,000 Units by mouth daily. cpap machine kit   Yes No   Sig: by Does Not Apply route. Bilevel    docusate sodium (STOOL SOFTENER) 100 mg capsule   Yes No   Sig: Take 100 mg by mouth as needed. ferrous sulfate 324 mg (65 mg iron) tablet   Yes No   Sig: Take  by mouth Daily (before breakfast). fluticasone (FLONASE) 50 mcg/actuation nasal spray   No No   Si Sprays by Both Nostrils route daily. furosemide (LASIX) 80 mg tablet   No No   Sig: Take 1 Tab by mouth two (2) times a day.   gabapentin (NEURONTIN) 100 mg capsule   No No   Sig: Take 1 Cap by mouth three (3) times daily. glipiZIDE (GLUCOTROL) 5 mg tablet   Yes No   Sig: Take 5 mg by mouth two (2) times a day. hydrALAZINE (APRESOLINE) 10 mg tablet   No No   Sig: TAKE 1 TABLET THREE TIMES DAILY. ipratropium-albuterol (COMBIVENT RESPIMAT)  mcg/actuation inhaler   No No   Sig: Take 1 Puff by inhalation every six (6) hours. isosorbide mononitrate ER (IMDUR) 30 mg tablet   No No   Sig: Take 1 Tab by mouth daily. latanoprost (XALATAN) 0.005 % ophthalmic solution   Yes No   Sig: Administer 1 Drop to both eyes nightly. levothyroxine (SYNTHROID) 50 mcg tablet   No No   Sig: Take 1 Tab by mouth Daily (before breakfast). magnesium oxide (MAG-OX) 400 mg tablet   No No   Sig: Take 1 Tab by mouth daily. metoprolol succinate (TOPROL-XL) 25 mg XL tablet   No No   Sig: Pt takes 1/2 tab po daily. mupirocin calcium (BACTROBAN) 2 % topical cream   No No   Sig: Apply  to affected area two (2) times a day. oxyCODONE IR (OXY-IR) 15 mg immediate release tablet   No No   Sig: Take 1 Tab by mouth every eight (8) hours as needed for Pain. Max Daily Amount: 45 mg.   oxyCODONE IR (OXY-IR) 15 mg immediate release tablet   No No   Sig: Take 1 Tab by mouth every eight (8) hours as needed for Pain.  Max Daily Amount: 45 mg.   oxyCODONE IR (OXY-IR) 15 mg immediate release tablet No No   Sig: Take 1 Tab by mouth every eight (8) hours as needed for Pain. Max Daily Amount: 45 mg.   pantoprazole (PROTONIX) 40 mg tablet   Yes No   Sig: Take 40 mg by mouth daily as needed. pravastatin (PRAVACHOL) 40 mg tablet   No No   Sig: Take 1 Tab by mouth nightly. sAXagliptin (ONGLYZA) 2.5 mg tablet   Yes No   Sig: Take 2.5 mg by mouth daily. sertraline (ZOLOFT) 50 mg tablet   No No   Sig: Take one tablet each evening for anxiety  Indications: Generalized Anxiety Disorder   triamcinolone (ARISTOCORT) 0.5 % topical cream   No No   Sig: Apply  to affected area two (2) times a day. use thin layer   triamcinolone acetonide (KENALOG) 0.1 % ointment   No No   Sig: Apply  to affected area two (2) times a day. Facility-Administered Medications: None         Objective:   Patient Vitals for the past 24 hrs:   Temp Pulse Resp BP SpO2   18 - (!) 58 14 132/61 90 %   18 - 60 16 - 92 %   18 - (!) 58 13 128/55 (!) 86 %   18 0227 97.7 °F (36.5 °C) 60 16 142/62 (!) 86 %     No intake or output data in the 24 hours ending 18 0417   Temp (24hrs), Av.7 °F (36.5 °C), Min:97.7 °F (36.5 °C), Max:97.7 °F (36.5 °C)    Oxygen Therapy  O2 Sat (%): 90 % (18)  Pulse via Oximetry: 64 beats per minute (18)  O2 Device: Nasal cannula (18)  O2 Flow Rate (L/min): 4 l/min (18)   There is no height or weight on file to calculate BMI. Physical Exam:    General:    WD and WN, No apparent distress. Head:   Normocephalic, without obvious abnormality, atraumatic. Eyes:  PERRL; EOMI; sclera normal/non-icteric  ENT:  Hearing is normal.  Oropharynx is clear with tacky mucous membranes   Resp:    Clear to auscultation bilaterally. No Wheezing or Rhonchi. Resp are even and unlabored  Heart[de-identified]  Regular rate and rhythm,  no murmur,   1+ indurated LE edema with venous stasis dermatitis  Abdomen:   Soft, non-tender. Not distended.   Bowel sounds normal.  hepato-splenomegaly cannot be assess due to obesity   Musc/SK: Muscle strength is good and tone normal; No cyanosis. No clubbing  Skin:     Texture, turgor normal. No significant rashes or lesions. Neurologic: CN II - XII are grossly intact - other than Eye exam as noted above  Psych: Alert and oriented x 1;  Judgement and insight are impaired     Data Review:   Recent Results (from the past 24 hour(s))   POC LACTIC ACID    Collection Time: 04/22/18  2:38 AM   Result Value Ref Range    Lactic Acid (POC) 0.8 0.5 - 1.9 mmol/L   CBC WITH AUTOMATED DIFF    Collection Time: 04/22/18  2:39 AM   Result Value Ref Range    WBC 6.1 4.3 - 11.1 K/uL    RBC 4.60 4.23 - 5.67 M/uL    HGB 12.5 (L) 13.6 - 17.2 g/dL    HCT 39.6 (L) 41.1 - 50.3 %    MCV 86.1 79.6 - 97.8 FL    MCH 27.2 26.1 - 32.9 PG    MCHC 31.6 31.4 - 35.0 g/dL    RDW 17.3 (H) 11.9 - 14.6 %    PLATELET 815 (L) 997 - 450 K/uL    MPV 11.3 10.8 - 14.1 FL    DF AUTOMATED      NEUTROPHILS 76 43 - 78 %    LYMPHOCYTES 12 (L) 13 - 44 %    MONOCYTES 8 4.0 - 12.0 %    EOSINOPHILS 4 0.5 - 7.8 %    BASOPHILS 0 0.0 - 2.0 %    IMMATURE GRANULOCYTES 0 0.0 - 5.0 %    ABS. NEUTROPHILS 4.6 1.7 - 8.2 K/UL    ABS. LYMPHOCYTES 0.7 0.5 - 4.6 K/UL    ABS. MONOCYTES 0.5 0.1 - 1.3 K/UL    ABS. EOSINOPHILS 0.3 0.0 - 0.8 K/UL    ABS. BASOPHILS 0.0 0.0 - 0.2 K/UL    ABS. IMM. GRANS. 0.0 0.0 - 0.5 K/UL   METABOLIC PANEL, COMPREHENSIVE    Collection Time: 04/22/18  2:39 AM   Result Value Ref Range    Sodium 141 136 - 145 mmol/L    Potassium 4.4 3.5 - 5.1 mmol/L    Chloride 106 98 - 107 mmol/L    CO2 29 21 - 32 mmol/L    Anion gap 6 (L) 7 - 16 mmol/L    Glucose 103 (H) 65 - 100 mg/dL    BUN 35 (H) 8 - 23 MG/DL    Creatinine 1.65 (H) 0.8 - 1.5 MG/DL    GFR est AA 51 (L) >60 ml/min/1.73m2    GFR est non-AA 42 (L) >60 ml/min/1.73m2    Calcium 8.2 (L) 8.3 - 10.4 MG/DL    Bilirubin, total 1.0 0.2 - 1.1 MG/DL    ALT (SGPT) 14 12 - 65 U/L    AST (SGOT) 18 15 - 37 U/L    Alk.  phosphatase 131 50 - 136 U/L    Protein, total 7.0 6.3 - 8.2 g/dL    Albumin 3.1 (L) 3.2 - 4.6 g/dL    Globulin 3.9 (H) 2.3 - 3.5 g/dL    A-G Ratio 0.8 (L) 1.2 - 3.5     TROPONIN I    Collection Time: 04/22/18  2:39 AM   Result Value Ref Range    Troponin-I, Qt. 0.02 0.02 - 0.05 NG/ML   BLOOD GAS, ARTERIAL    Collection Time: 04/22/18  3:00 AM   Result Value Ref Range    pH 7.35 7.35 - 7.45      PCO2 54 (H) 35 - 45 mmHg    PO2 150 (H) 80 - 105 mmHg    BICARBONATE 29 (H) 22 - 26 mmol/L    BASE EXCESS 2.3 0 - 3 mmol/L    TOTAL HEMOGLOBIN 13.1 11.7 - 15.0 GM/DL    O2 SAT 99 (H) 92 - 98.5 %    Arterial O2 Hgb 97.5 (H) 94 - 97 %    CARBOXYHEMOGLOBIN 0.6 0.5 - 1.5 %    METHEMOGLOBIN 0.4 0.0 - 1.5 %    DEOXYHEMOGLOBIN 2 0.0 - 5.0 %    SITE LB     ALLENS TEST POSITIVE      MODE NC     O2 FLOW 4.00 L/min     CXR Results  (Last 48 hours)               04/22/18 0237  XR CHEST PORT Final result    Impression:  IMPRESSION:       1. Bibasilar densities, likely atelectasis or consolidation. Suggestion of   vascular congestion. 2. Stable cardiomegaly. Narrative:  Portable chest xray         COMPARISON: January 31, 2018       INDICATION: Altered mental status       FINDINGS:        Lungs are underinflated. There is suggestion of vascular congestion. There are   bibasilar densities. No pneumothorax. Cardiac silhouette is enlarged. Mediastinal contour is within normal limits. Stable postsurgical changes of   sternotomy. CT Results  (Last 48 hours)    None              Assessment and Plan: Active Hospital Problems    Diagnosis Date Noted    Altered mental state 04/22/2018    Type 2 diabetes with nephropathy ([facility] 02/12/2018    Restrictive lung disease 11/01/2017    Hypoxia 08/21/2017    Thrombocytopenia ([facility] 08/21/2017    Type 2 diabetes mellitus with peripheral neuropathy ([facility] 11/05/2015    Chronic diastolic congestive heart failure ([facility] 09/12/2015     1. Echo (9/11/15) : EF 55-60%. Mild LVH.   Moderate biatrial enlargement. Moderate mitral/tricuspid regurgitation. 2.  Echo (8/21/17):  EF 55-60%. Moderate LAE. Mild mitral stenosis. Moderate tricuspid regurgitation. RVSP 35-40.  ROBERTO on CPAP 02/20/2015     Patient is on BiPAP, no supplementary oxygen      Morbid obesity ([address] 01/21/2015    Seizure disorder ([address] 05/22/2014    CKD (chronic kidney disease) stage 3, GFR 30-59 ml/min 09/18/2013    Debility 08/05/2013    Essential hypertension 11/21/2012    COPD (chronic obstructive pulmonary disease) ([facility] 11/21/2012    Carotid stenosis, bilateral 11/21/2012     1. Carotid Doppler (6/1/07): Greater than 70% stenosis in proximal LICA. 50% stenosis in right ICA. 2.  CTA of neck (3/15/10): Occluded distal segments of the vertebral arteries bilaterally. Atherosclerosis of the carotid bulbs bilaterally with a 50% stenosis on the left and a stenosis of less than 30% on the right. Small nodule in the right upper lobe near the apex. 3. CTA (8/29/13):  Less than 30% diameter stenosis of the cervical internal carotid arteries bilaterally.  Persistent atrial fibrillation ([facility] 11/21/2012     Coumadin therapy discontinued due to recurrent GI bleeding. · PLAN   · CVA w/u; MRI, carotids, labs  · Echo  · SSI  · Cont appropriate home meds (see MAR)  · Control symptoms (pain, n/v, fever, etc)  · Monitor appropriate labs   · DVT prophylaxis:  Heparin  · Code status: Full  · Risk: high  · Anticipated DC needs:  · Estimated LOS:  Greater than 2 midnights  · Plans discussed with patient and/or caregiver; questions answered.       Med records reviewed if applicable; findings:     Critical care time if applicable:      Signed By: Wendy Smith MD     April 22, 2018

## 2018-04-22 NOTE — PROGRESS NOTES
Problem: Mobility Impaired (Adult and Pediatric)  Goal: *Acute Goals and Plan of Care (Insert Text)  Discharge Goals:  (1.)Mr. Navarro will move from supine to sit and sit to supine , scoot up and down and roll side to side with INDEPENDENT within 7 treatment day(s). (2.)Mr. Navarro will transfer from bed to chair and chair to bed with SUPERVISION using the least restrictive device within 7 treatment day(s). (3.)Mr. Navarro will ambulate with SUPERVISION for 250+ feet with the least restrictive device within 7 treatment day(s). ________________________________________________________________________________________________      PHYSICAL THERAPY: Initial Assessment, Treatment Day: Day of Assessment, PM 4/22/2018  INPATIENT: Hospital Day: 1  Payor: [employer] [place] / Plan: [place] [employer] [place] MED[place] H[place]/PPO / Product Type: Managed Care Medicare /      NAME/AGE/GENDER: Patrick Florez is a 80 y.o. male   PRIMARY DIAGNOSIS: Altered mental state  Hypoxia Altered mental state Altered mental state        ICD-10: Treatment Diagnosis:    · Generalized Muscle Weakness (M62.81)  · Difficulty in walking, Not elsewhere classified (R26.2)  · History of falling (Z91.81)   Precaution/Allergies:  Review of patient's allergies indicates no known allergies. ASSESSMENT:     Mr. Lana Stanley is finishing up in bathroom with PCT upon contact and agreeable to PT evaluation this afternoon and is currently on 2L O2 NC. Pt reports 0/10 pain and is A&O X 4 this session. Pt lives with his wife in 1 story home with 1 step to enter. Pt reports use of rollator for household and community gait, independence with ADLs, drives, and 1 fall in past year. Pt states he uses supplemental O2 as needed only at baseline. Pt ambulated 30 ft around room with RW and CGA and returned to bedside chair. Pt stated \"I don't think I could have walked around another time I'm just give out\". Pt with O2 sat at 94% after activity.  Pt left sitting up in chair with all needs met and within reach. Ligia Viveros will benefit from skilled PT (medically necessary) to address decreased strength, decreased balance, decreased functional tolerance, decreased cardiopulmonary endurance affecting participation in basic ADLs and functional tasks. This section established at most recent assessment   PROBLEM LIST (Impairments causing functional limitations):  1. Decreased Strength  2. Decreased ADL/Functional Activities  3. Decreased Transfer Abilities  4. Decreased Ambulation Ability/Technique  5. Decreased Balance  6. Decreased Activity Tolerance  7. Decreased Pacing Skills  8. Increased Fatigue  9. Increased Shortness of Breath  10. Decreased [place] with Home Exercise Program   INTERVENTIONS PLANNED: (Benefits and precautions of physical therapy have been discussed with the patient.)  1. Balance Exercise  2. Bed Mobility  3. Family Education  4. Gait Training  5. Home Exercise Program (HEP)  6. Neuromuscular Re-education/Strengthening  7. Therapeutic Activites  8. Therapeutic Exercise/Strengthening  9. Transfer Training  10. Group Therapy     TREATMENT PLAN: Frequency/Duration: 3 times a week for duration of hospital stay  Rehabilitation Potential For Stated Goals: Good     RECOMMENDED REHABILITATION/EQUIPMENT: (at time of discharge pending progress): Due to the probability of continued deficits (see above) this patient will likely need continued skilled physical therapy after discharge. Equipment:    None at this time              HISTORY:   History of Present Injury/Illness (Reason for Referral):  See H&P below  Patient is a 80 y.o. male who presents to the ER due to [address]. Started in the afternoon and has just worsened. Family reports that he is \"talking out of his head\". He has multiple significant medical problems- see below. No other neuro signs/symptoms noted. Pt cannot provide history due to AMS. No known fever/chills, n/v/d, chest pain or SOB.   Past Medical History/Comorbidities:   Mr. Jasmyn Rodriguez  has a past medical history of Atherosclerosis of artery of extremity with ulceration ([facility] (4/17/2015); Atherosclerosis of native arteries of extremity with intermittent claudication ([facility] (4/17/2015); Atopic dermatitis (11/21/2012); Atrial fibrillation ([address] CAD (coronary artery disease); Carotid stenosis, bilateral (11/21/2012); CHF (congestive heart failure) ([address] (11/21/2012); Chronic kidney disease; Chronic obstructive pulmonary disease ([address] Colon, diverticulosis (1/24/2015); Coronary atherosclerosis of native coronary vessel (10/31/2016); Diabetes ([address] Diastolic CHF, acute on chronic (HCC) (9/12/2015); Failed CABG (coronary artery bypass graft) (11/21/2012); GI bleed (1/2015); Gout (11/21/2012); History of tobacco use; [place] (hard of hearing); Hypercholesterolemia; Hypertension; Hyperuricemia (11/21/2012); Morbid obesity ([facility] PAD (peripheral artery disease) ([facility] (11/21/2012); Poor historian; Radiologic findings of lung field, abnormal (10/31/2016); Seizure disorder ([address] (8/5/2013); Shortness of breath dyspnea (8/5/2013); Thyroid disease; and Unspecified sleep apnea. He also has no past medical history of Arthritis; Asthma; Cancer ([address] Liver disease; Nausea & vomiting; PUD (peptic ulcer disease); or Stroke ([address] Mr. Jasmyn Rodriguez  has a past surgical history that includes pr cardiac surg procedure unlist; hx coronary artery bypass graft (06-); hx cataract removal (Left, 2003); hx heart catheterization; hx coronary stent placement (02-); hx heent (1970s); hx colonoscopy; and colonoscopy (N/A, 1/27/2017).   Social History/Living Environment:   Home Environment: Private residence  # Steps to Enter: 1  One/Two Story Residence: One story  Living Alone: No  Support Systems: Spouse/Significant Other/Partner, Family member(s)  Patient Expects to be Discharged to[de-identified] Private residence  Current DME Used/Available at Home: Shower chair, Walker, rollator, Oxygen, portable  Tub or Shower Type: Tub  Prior Level of Function/Work/Activity:  Lives with wife, use of rollator for gait, indep with ADLs, 1 fall, drives   Number of Personal Factors/Comorbidities that affect the Plan of Care: 3+: HIGH COMPLEXITY   EXAMINATION:   Most Recent Physical Functioning:   Gross Assessment:  AROM: Generally decreased, functional  Strength: Generally decreased, functional  Coordination: Generally decreased, functional               Posture:     Balance:  Sitting: Intact; Without support  Standing: Intact; With support Bed Mobility:     Wheelchair Mobility:     Transfers:  Sit to Stand: Contact guard assistance  Stand to Sit: Contact guard assistance  Gait:     Base of Support: Widened  Speed/Helen: Slow;Shuffled  Step Length: Left shortened;Right shortened  Gait Abnormalities: Decreased step clearance;Trunk sway increased  Distance (ft): 30 Feet (ft)  Assistive Device: Walker, rolling  Ambulation - Level of Assistance: Contact guard assistance  Interventions: Safety awareness training; Tactile cues; Verbal cues      Body Structures Involved:  1. Nerves  2. Heart  3. Lungs  4. Bones  5. Joints  6. Muscles  7. Ligaments Body Functions Affected:  1. Sensory/Pain  2. Cardio  3. Respiratory  4. Neuromusculoskeletal  5. Movement Related Activities and Participation Affected:  1. General Tasks and Demands  2. Mobility  3. Self Care  4. Domestic Life  5. Interpersonal Interactions and Relationships  6. Community, Social and [place]   Number of elements that affect the Plan of Care: 4+: HIGH COMPLEXITY   CLINICAL PRESENTATION:   Presentation: Evolving clinical presentation with changing clinical characteristics: MODERATE COMPLEXITY   CLINICAL DECISION MAKIN [facility] Mobility Inpatient Short Form  How much difficulty does the patient currently have. .. Unable A Lot A Little None   1. Turning over in bed (including adjusting bedclothes, sheets and blankets)?    [] 1 [] 2   [x] 3   [] 4   2. Sitting down on and standing up from a chair with arms ( e.g., wheelchair, bedside commode, etc.)   [] 1   [] 2   [x] 3   [] 4   3. Moving from lying on back to sitting on the side of the bed? [] 1   [] 2   [x] 3   [] 4   How much help from another person does the patient currently need. .. Total A Lot A Little None   4. Moving to and from a bed to a chair (including a wheelchair)? [] 1   [] 2   [x] 3   [] 4   5. Need to walk in hospital room? [] 1   [] 2   [x] 3   [] 4   6. Climbing 3-5 steps with a railing? [] 1   [] 2   [x] 3   [] 4   © 2007, Trustees of [address] [place] [address], under license to [employer]. All rights reserved      Score:  Initial: 18 Most Recent: X (Date: -- )    Interpretation of Tool:  Represents activities that are increasingly more difficult (i.e. Bed mobility, Transfers, Gait). Score 24 23 22-20 19-15 14-10 9-7 6     Modifier CH CI CJ CK CL CM CN      ? Mobility - Walking and Moving Around:     - CURRENT STATUS: CK - 40%-59% impaired, limited or restricted    - GOAL STATUS: CJ - 20%-39% impaired, limited or restricted    - D/C STATUS:  ---------------To be determined---------------  Payor: [facility] [place] MEDICARE / Plan[place] [employer] [place]O / Product Type: Managed Care Medicare /      Medical Necessity:     · Patient is expected to demonstrate progress in strength, balance, coordination and functional technique to decrease assistance required with gait, transfers, and functional mobility. .  Reason for Services/Other Comments:  · Patient continues to require skilled intervention due to decreased strength, decreased balance, decreased functional tolerance, decreased cardiopulmonary endurance affecting participation in basic ADLs and functional tasks.    Use of outcome tool(s) and clinical judgement create a POC that gives a: Clear prediction of patient's progress: LOW COMPLEXITY            TREATMENT:   (In addition to Assessment/Re-Assessment sessions the following treatments were rendered)   Pre-treatment Symptoms/Complaints:  Fatigue, weakness  Pain: Initial:   Pain Intensity 1: 0  Post Session:  0/10     Assessment/Reassessment only, no treatment provided today    Braces/Orthotics/Lines/Etc:   · O2 Device: Nasal cannula  Treatment/Session Assessment:    · Response to Treatment:  Ambulated 30 ft in room with RW and CGA  · Interdisciplinary Collaboration:   o Physical Therapist  o Registered Nurse  o Certified Nursing Assistant/Patient Care Technician  · After treatment position/precautions:   o Up in chair  o Bed alarm/tab alert on  o Bed/Chair-wheels locked  o Bed in low position  o Call light within reach  o RN notified   · Compliance with Program/Exercises: Will assess as treatment progresses. · Recommendations/Intent for next treatment session: \"Next visit will focus on advancements to more challenging activities and reduction in assistance provided\".   Total Treatment Duration:  PT Patient Time In/Time Out  Time In: 1413  Time Out: [address]

## 2018-04-22 NOTE — ED NOTES
TRANSFER - OUT REPORT:    Verbal report given to Zari on [facility]  being transferred to [facility] for routine progression of care       Report consisted of patients Situation, Background, Assessment and   Recommendations(SBAR). Information from the following report(s) SBAR, ED Summary, Recent Results and Cardiac Rhythm Afib was reviewed with the receiving nurse. Lines:   Peripheral IV Left Hand (Active)   Site Assessment Clean, dry, & intact 4/22/2018  2:33 AM   Phlebitis Assessment 0 4/22/2018  2:33 AM   Infiltration Assessment 0 4/22/2018  2:33 AM   Dressing Status Clean, dry, & intact 4/22/2018  2:33 AM   Dressing Type Transparent 4/22/2018  2:33 AM        Opportunity for questions and clarification was provided.       Patient transported with:   O2 @ 4 liters

## 2018-04-22 NOTE — PROGRESS NOTES
STROKE NAVIGATOR: Spoke with Maddie Otto, RN re: pt needs to have STAND completed.  He will inform primary RN, Brock Helm

## 2018-04-23 PROBLEM — J96.01 ACUTE RESPIRATORY FAILURE WITH HYPOXIA (HCC): Status: ACTIVE | Noted: 2018-01-01

## 2018-04-23 NOTE — WOUND CARE
Patient seen for lower leg venous stasis. Wife present and treatment options discussed. Noted PAD diagnosis. Noted RIANA's last year had bilateral results of 0.9. Right toe index 0.59 and left toe 0.26 indicating small vessel disease. Also has history of CHF and kidney disease. Considered compression wraps but not sure this would be appropriate. Skin intact at present, dry and scaly. Wife decided to keep legs elevated and aquaphor ointment for now. May benefit from light compression stockings if edema improves with elevation. Patient in agreement with elevation for now. Wound team will monitor and adjust treatment as needed.

## 2018-04-23 NOTE — PROGRESS NOTES
Initial visit to assess pt's spiritual needs. Pt was sleeping,  left a card.       Chaplain Medina Hernandez, MDiv,ThM,PhD

## 2018-04-23 NOTE — PROGRESS NOTES
Hospitalist Progress Note     Admit Date:  2018  2:23 AM   Name:  Rajesh Fermin   Age:  80 y.o.  :  1932   MRN:  [id number]   PCP:  Jesus Briggs MD  Treatment Team: Attending Provider: Luiz Renee MD; Primary Nurse: Luis Jama; Consulting Provider: Wojciech James MD; Charge Nurse: Maverick Amaro    Subjective:       Mr. Adrien Hartman is a 79 yo male with PMH of HTN,afib (no AC due to recurrent GI bleeding), CAD, carotid stenosis, dCHF, CKD, COPD/chronic hypoxic respiratory failure on 2L NC, ROBERTO on CPAP , evaluated with altered mentation. Per family there is no prior dementia but they are concerned about polypharmacy. CT head negative. MRI brain attempted however he did not tolerate due to inability to lay flat. CXR shows bibasilar densities/ vascular congestion. ECHO EF 50-55% with right to left shunt noted. Carotid duplex shows right ICA 50-69% and left ICA 70-99% stenosis.   He was seen by psychiatry who felt mentation changes were due to delirium  Metabolic workup consisted of TSH (slightly elevated on synthroid) , ABG    dispo will depend upon course           18 granddaughter jani liliana present and feels he is much improved, he is telling riddles and joking, ate ok, has chronic LE edema, some dyspnea, had BM   Follows with pulm and cardiology - was just placed on home O2 by home health according to granddaughter       Objective:   Patient Vitals for the past 24 hrs:   Temp Pulse Resp BP SpO2   18 1602 98.1 °F (36.7 °C) (!) 48 20 159/76 99 %   18 1427 - - - - 98 %   18 1130 - (!) 53 - - -   18 1108 97.6 °F (36.4 °C) (!) 48 22 168/72 98 %   18 0758 97.9 °F (36.6 °C) (!) 55 20 136/44 95 %   18 0713 - - - - 98 %   18 0400 97.8 °F (36.6 °C) (!) 52 18 148/63 95 %   18 0214 - - - - 93 %   18 0000 98.3 °F (36.8 °C) (!) 55 18 159/69 91 %   18 97.6 °F (36.4 °C) (!) 56 18 136/65 91 %   18 - - - - 93 % Oxygen Therapy  O2 Sat (%): 99 % (04/23/18 1602)  Pulse via Oximetry: 54 beats per minute (04/23/18 1427)  O2 Device: Nasal cannula (04/23/18 1427)  O2 Flow Rate (L/min): 3 l/min (04/23/18 1427)  FIO2 (%): 21 % (04/23/18 0214)    Intake/Output Summary (Last 24 hours) at 04/23/18 1652  Last data filed at 04/23/18 1108   Gross per 24 hour   Intake              360 ml   Output                0 ml   Net              360 ml         General:    Well nourished. Alert obese. CV:   Rate controlled and irregular. No murmur, rub, or gallop. 2+ edema with venous stasis  Lungs:   Diffuse rhales and wheezing  Abdomen:   Soft, nontender, nondistended. obese  Extremities: Warm and dry. Skin:     Venous stasis   Neuro:  A and O x 3     Data Review:  I have reviewed all labs, meds, telemetry events, and studies from the last 24 hours.     Recent Results (from the past 24 hour(s))   GLUCOSE, POC    Collection Time: 04/22/18  9:14 PM   Result Value Ref Range    Glucose (POC) 180 (H) 65 - 100 mg/dL   CBC W/O DIFF    Collection Time: 04/23/18  5:19 AM   Result Value Ref Range    WBC 6.6 4.3 - 11.1 K/uL    RBC 4.85 4.23 - 5.67 M/uL    HGB 12.9 (L) 13.6 - 17.2 g/dL    HCT 41.4 41.1 - 50.3 %    MCV 85.4 79.6 - 97.8 FL    MCH 26.6 26.1 - 32.9 PG    MCHC 31.2 (L) 31.4 - 35.0 g/dL    RDW 17.4 (H) 11.9 - 14.6 %    PLATELET 778 (L) 370 - 450 K/uL    MPV 11.6 10.8 - 14.1 FL   LIPID PANEL    Collection Time: 04/23/18  5:19 AM   Result Value Ref Range    LIPID PROFILE          Cholesterol, total 85 <200 MG/DL    Triglyceride 66 35 - 150 MG/DL    HDL Cholesterol 43 40 - 60 MG/DL    LDL, calculated 28.8 <100 MG/DL    VLDL, calculated 13.2 6.0 - 23.0 MG/DL    CHOL/HDL Ratio 2.0     HEMOGLOBIN A1C WITH EAG    Collection Time: 04/23/18  5:19 AM   Result Value Ref Range    Hemoglobin A1c 6.1 (H) 4.8 - 6.0 %    Est. average glucose 128 mg/dL   TSH 3RD GENERATION    Collection Time: 04/23/18  5:19 AM   Result Value Ref Range    TSH 4.980 (H) 0.358 - 3.740 uIU/mL   T4, FREE    Collection Time: 04/23/18  5:19 AM   Result Value Ref Range    T4, Free 1.0 0.78 - 1.94 NG/DL   METABOLIC PANEL, BASIC    Collection Time: 04/23/18  5:19 AM   Result Value Ref Range    Sodium 142 136 - 145 mmol/L    Potassium 4.1 3.5 - 5.1 mmol/L    Chloride 104 98 - 107 mmol/L    CO2 30 21 - 32 mmol/L    Anion gap 8 7 - 16 mmol/L    Glucose 105 (H) 65 - 100 mg/dL    BUN 34 (H) 8 - 23 MG/DL    Creatinine 1.68 (H) 0.8 - 1.5 MG/DL    GFR est AA 50 (L) >60 ml/min/1.73m2    GFR est non-AA 41 (L) >60 ml/min/1.73m2    Calcium 8.4 8.3 - 10.4 MG/DL   MAGNESIUM    Collection Time: 04/23/18  5:19 AM   Result Value Ref Range    Magnesium 2.5 (H) 1.8 - 2.4 mg/dL   PLEASE READ & DOCUMENT PPD TEST IN 24 HRS    Collection Time: 04/23/18  5:55 AM   Result Value Ref Range    PPD neg Negative    mm Induration 0 mm   GLUCOSE, POC    Collection Time: 04/23/18  7:38 AM   Result Value Ref Range    Glucose (POC) 98 65 - 100 mg/dL   GLUCOSE, POC    Collection Time: 04/23/18 11:11 AM   Result Value Ref Range    Glucose (POC) 144 (H) 65 - 100 mg/dL   GLUCOSE, POC    Collection Time: 04/23/18  3:48 PM   Result Value Ref Range    Glucose (POC) 140 (H) 65 - 100 mg/dL        All Micro Results     None          Current Meds:  Current Facility-Administered Medications   Medication Dose Route Frequency    [START ON 4/24/2018] mineral oil-hydrophil petrolat (AQUAPHOR) ointment   Topical DAILY    albuterol (PROVENTIL VENTOLIN) nebulizer solution 2.5 mg  2.5 mg Nebulization Q4H PRN    allopurinol (ZYLOPRIM) tablet 300 mg  300 mg Oral DAILY    aspirin chewable tablet 81 mg  81 mg Oral DAILY    docusate sodium (COLACE) capsule 100 mg  100 mg Oral DAILY    furosemide (LASIX) tablet 80 mg  80 mg Oral BID    gabapentin (NEURONTIN) capsule 100 mg  100 mg Oral TID    hydrALAZINE (APRESOLINE) tablet 10 mg  10 mg Oral TID    HYDROcodone-acetaminophen (NORCO)  mg tablet 1 Tab  1 Tab Oral Q8H PRN    albuterol-ipratropium (DUO-NEB) 2.5 MG-0.5 MG/3 ML  3 mL Nebulization Q4H PRN    isosorbide mononitrate ER (IMDUR) tablet 30 mg  30 mg Oral DAILY    potassium chloride (K-DUR, KLOR-CON) SR tablet 20 mEq  20 mEq Oral DAILY    latanoprost (XALATAN) 0.005 % ophthalmic solution 1 Drop  1 Drop Both Eyes QHS    levothyroxine (SYNTHROID) tablet 50 mcg  50 mcg Oral ACB    magnesium oxide (MAG-OX) tablet 400 mg  400 mg Oral DAILY    metoprolol succinate (TOPROL-XL) XL tablet 25 mg  25 mg Oral DAILY    oxyCODONE IR (OXY-IR) immediate release tablet 15 mg  15 mg Oral Q8H PRN    pravastatin (PRAVACHOL) tablet 40 mg  40 mg Oral QHS    sertraline (ZOLOFT) tablet 50 mg  50 mg Oral QPM    sodium chloride (NS) flush 5-10 mL  5-10 mL IntraVENous Q8H    sodium chloride (NS) flush 5-10 mL  5-10 mL IntraVENous PRN    ondansetron (ZOFRAN) injection 4 mg  4 mg IntraVENous Q6H PRN    acetaminophen (TYLENOL) tablet 650 mg  650 mg Oral Q4H PRN    bisacodyl (DULCOLAX) tablet 5 mg  5 mg Oral DAILY PRN    clopidogrel (PLAVIX) tablet 75 mg  75 mg Oral DAILY    heparin (porcine) injection 5,000 Units  5,000 Units SubCUTAneous Q8H    budesonide (PULMICORT) 500 mcg/2 ml nebulizer suspension  500 mcg Nebulization BID RT    And    albuterol CONCENTRATE 2.5mg/0.5 mL neb soln  2.5 mg Nebulization Q6H RT    insulin regular (NOVOLIN R, HUMULIN R) injection   SubCUTAneous AC&HS    dextrose 40% (GLUTOSE) oral gel 1 Tube  15 g Oral PRN    glucagon (GLUCAGEN) injection 1 mg  1 mg IntraMUSCular PRN    dextrose (D50W) injection syrg 12.5-25 g  25-50 mL IntraVENous PRN    nystatin (MYCOSTATIN) 100,000 unit/gram powder   Topical BID    famotidine (PEPCID) tablet 20 mg  20 mg Oral DAILY    haloperidol (HALDOL) tablet 5 mg  5 mg Oral TID PRN       Diet:  DIET DIABETIC WITH OPTIONS    Other Studies (last 24 hours):  Duplex Carotid Bilateral    Result Date: 4/23/2018  History: Street CVA acute altered mental status Sonographic evaluation of the carotid arteries was performed bilaterally Grayscale imaging on the right demonstrates no thickened plaque disease at the carotid bulb. Velocities are elevated in the internal carotid artery measuring 716 cm/s systolic. Ratio of ICA to CCA is elevated at 2.1. The right vertebral artery demonstrates antegrade flow without evidence of steal. Grayscale imaging on the left demonstrates significant plaque disease at the carotid bulb. Velocities are markedly elevated measuring 381 cm/s systolic within the internal carotid artery. Ratio of ICA to CCA is elevated 3.9. The left vertebral artery demonstrates antegrade flow without evidence of steal.     Impression: 1. 50-69% stenosis of the right internal carotid artery. 2. 70-99% stenosis of the left internal carotid artery. Assessment and Plan:     Hospital Problems as of 4/23/2018  Date Reviewed: 4/3/2018          Codes Class Noted - Resolved POA    * (Principal)Altered mental state ICD-10-CM: R41.82  ICD-9-CM: 780.97  4/22/2018 - Present Yes        Venous stasis dermatitis of both lower extremities (Chronic) ICD-10-CM: I87.2  ICD-9-CM: 454.1  4/22/2018 - Present Yes        Type 2 diabetes with nephropathy (HCC) (Chronic) ICD-10-CM: E11.21  ICD-9-CM: 250.40, 583.81  2/12/2018 - Present Yes        Restrictive lung disease (Chronic) ICD-10-CM: J98.4  ICD-9-CM: 518.89  11/1/2017 - Present Yes        Hypoxia ICD-10-CM: R09.02  ICD-9-CM: 799.02  8/21/2017 - Present Yes        Thrombocytopenia (HCC) (Chronic) ICD-10-CM: D69.6  ICD-9-CM: 287.5  8/21/2017 - Present Yes        Type 2 diabetes mellitus with peripheral neuropathy (HCC) (Chronic) ICD-10-CM: E11.42  ICD-9-CM: 250.60, 357.2  11/5/2015 - Present Yes        Chronic diastolic congestive heart failure (HCC) (Chronic) ICD-10-CM: I50.32  ICD-9-CM: 428.32, 428.0  9/12/2015 - Present Yes    Overview Addendum 8/31/2017  3:44 PM by Ela Mathur MD     1. Echo (9/11/15) : EF 55-60%. Mild LVH.   Moderate biatrial enlargement. Moderate mitral/tricuspid regurgitation. 2.  Echo (8/21/17):  EF 55-60%. Moderate LAE. Mild mitral stenosis. Moderate tricuspid regurgitation. RVSP 35-40. ROBERTO on CPAP (Chronic) ICD-10-CM: G47.33, Z99.89  ICD-9-CM: 327.23, V46.8  2/20/2015 - Present Yes    Overview Signed 2/20/2015 10:42 AM by Sheila Morris     Patient is on BiPAP, no supplementary oxygen             Morbid obesity ([address] (Chronic) ICD-10-CM: E66.01  ICD-9-CM: 278.01  1/21/2015 - Present Yes        CKD (chronic kidney disease) stage 3, GFR 30-59 ml/min (Chronic) ICD-10-CM: N18.3  ICD-9-CM: 585.3  9/18/2013 - Present Yes        Debility (Chronic) ICD-10-CM: R53.81  ICD-9-CM: 799.3  8/5/2013 - Present Yes        Essential hypertension (Chronic) ICD-10-CM: I10  ICD-9-CM: 401.9  11/21/2012 - Present Yes        Persistent atrial fibrillation (HCC) (Chronic) ICD-10-CM: I48.1  ICD-9-CM: 427.31  11/21/2012 - Present Yes    Overview Addendum 11/22/2016  8:14 PM by Heaven Seay MD     Coumadin therapy discontinued due to recurrent GI bleeding. COPD (chronic obstructive pulmonary disease) (HCC) (Chronic) ICD-10-CM: J44.9  ICD-9-CM: 496  11/21/2012 - Present Yes        Carotid stenosis, bilateral (Chronic) ICD-10-CM: U18.64  ICD-9-CM: 433.10, 433.30  11/21/2012 - Present Yes    Overview Addendum 11/22/2016  8:13 PM by Heaven Seay MD     1. Carotid Doppler (6/1/07): Greater than 70% stenosis in proximal LICA. 50% stenosis in right ICA. 2.  CTA of neck (3/15/10): Occluded distal segments of the vertebral arteries bilaterally. Atherosclerosis of the carotid bulbs bilaterally with a 50% stenosis on the left and a stenosis of less than 30% on the right. Small nodule in the right upper lobe near the apex. 3. CTA (8/29/13):  Less than 30% diameter stenosis of the cervical internal carotid arteries bilaterally.              RESOLVED: Seizure disorder (HCC) (Chronic) ICD-10-CM: G40.909  ICD-9-CM: 345.90 5/22/2014 - 4/22/2018 Yes              PLAN:    · Change to IV lasix with strict I and O and daily weights for acute on chronic diastolic CHF exacerbation  · Apparently has been on home O2 due to COPD and dCHF, wean to baseline of 2 L NC as tolerant   · Add oral steroid dose for COPD exacerbation, continue albuterol/pulmicort   · Continue asa/plavix with known carotid disease, unable to CTA head and neck due to CKD, will need vascular surgery followup  · Will need to retry for MRI brain due to recent mentation changes if he is tolerant as he is high risk for CVA due to noted vascular disease and PFO noted on ECHO  · decrease BBlocker due to continuous bradycardia, unable to anticoagulate afib due to recurrent GIBleed  · Place on CPAP QHS/prn  · Continue statin and antihypertensives   · Check UA, UDS, folate, B12  · Increase synthroid and recheck TSH 8 weeks   · PPD placed, PT/OT / case management for discharge needs     DC planning/Dispo:    DVT ppx:  heparin    Signed:  Tori Habermann, MD

## 2018-04-23 NOTE — PROGRESS NOTES
SW met with patient's granddaughter at bedside to discuss discharge planning. Patient was asleep at time of discussion, and wife was not in the room. Granddaughter reports patient lives with wife and granddaughter in a one level home with a one step entry. Patient's son and daughter are also available for additional support, and live nearby. Patient was reportedly independent with ADLs and ambulating with a rollator in the home prior to admission. He reportedly drives approximately 2-3 times per month. PCP and insurance were confirmed as listed. Granddaughter informed patient has a CPAP, cane, rollator, shower chair, and hospital bed in the home. He uses 2 liters/minute of home oxygen, but granddaughter was unable to identify the company providing it. Patient has a [facility] of STR [facility]. Granddaughter states he is current with [facility] for nursing and PT. Patient is a , and granddaughter states patient's prescriptions are typically covered by insurance. Granddaughter informed patient's wife is primary . SW provided granddaughter with a list of SNF options covered by patient's insurance to review with patient and wife. SW will follow up with patient regarding SNF choices for STR, or if patient is interested in resuming home health services. Care Management Interventions  PCP Verified by CM: Yes  Mode of Transport at Discharge:  Other (see comment)  Transition of Care Consult (CM Consult): Discharge Planning  Discharge Durable Medical Equipment: No (Patient has a hospital bed, rollator, cane, and shower chair at home.)  Physical Therapy Consult: Yes  Occupational Therapy Consult: Yes  Speech Therapy Consult: Yes  Current Support Network: Lives with Spouse, Own Home, Family Lives Nearby  Confirm Follow Up Transport: Family  Plan discussed with Pt/Family/Caregiver: Yes  Freedom of Choice Offered: Yes  Discharge Location  Discharge Placement: Unable to determine at this time

## 2018-04-23 NOTE — PROGRESS NOTES
STG: Pt will participate with verbal problem solving tasks related to safety awareness with 95% accuracy  STG: Pt will complete basic math/time reasoning related to ADLs with 90% accuracy  STG: Pt will participate with short term memory tasks with 90% accuracy  STG: Pt will complete functional reading comprehension tasks with 90% accuracy  LTG: Pt will reach highest cognitive level possible for maximum independence at discharge    Speech language pathology: Speech-language and cognitive note: Initial Assessment    NAME/AGE/GENDER: Arash Morgan is a 80 y.o. male  DATE: 4/23/2018  PRIMARY DIAGNOSIS: Altered mental state  Hypoxia       ICD-10: Treatment Diagnosis: cognitive communication impairment R41.841  INTERDISCIPLINARY COLLABORATION: Registered NurseASSESSMENT:Based on the objective data described below, Mr. Krishna Arroyo presents with mild cognitive deficits. Patient's granddaughter was present and reports that mentation has improved compared with admission but not entirely at baseline. His wife assists with medication management and finances. Patient still drives he estimated about 2-3x a month. Patient reports claustrophobia and was unable to have MRI completed this pm.  Granddaughter reports concerns of patient taking too many medications with AMS potentially related to encephalopathy secondary to medication interaction. Patient is oriented except to exact date and only partially oriented to situation unable to recall details of admission. He completed basic reasoning tasks related to safety awareness and ADLs with 85% accuracy. Recommend continued speech therapy while in acute setting for cognition to maximize independence and safety at discharge. Patient will benefit from skilled intervention to address the below impairments. ?????? ? ? This section established at most recent assessment??????????  PROBLEM LIST (Impairments causing functional limitations):   1. cognition  REHABILITATION POTENTIAL FOR STATED GOALS: Good  PLAN OF CARE:   Patient will benefit from skilled intervention to address the following impairments. INTERVENTIONS PLANNED: (Benefits and precautions of therapy have been discussed with the patient.)  1. cognitiive tx  FREQUENCY/DURATION: Continue to follow patient 3 times a week for duration of hospital stay to address above goals. RECOMMENDED REHABILITATION/EQUIPMENT: (at time of discharge pending progress): Due to the probability of continued deficits (see above) this patient will not likely need continued skilled speech therapy after discharge. SUBJECTIVE:   Cooperative. History of Present Injury/Illness: Mr. Jose L Riley  has a past medical history of Atherosclerosis of artery of extremity with ulceration ([facility] [address] (4/17/2015); Atherosclerosis of native arteries of extremity with intermittent claudication ([address] (4/17/2015); Atopic dermatitis (11/21/2012); Atrial fibrillation ([address] CAD (coronary artery disease); Carotid stenosis, bilateral (11/21/2012); CHF (congestive heart failure) ([address] (11/21/2012); Chronic kidney disease; Chronic obstructive pulmonary disease ([address] Colon, diverticulosis (1/24/2015); Coronary atherosclerosis of native coronary vessel (10/31/2016); Diabetes ([address] Diastolic CHF, acute on chronic (HCC) (9/12/2015); Failed CABG (coronary artery bypass graft) (11/21/2012); GI bleed (1/2015); Gout (11/21/2012); History of tobacco use; [place] (hard of hearing); Hypercholesterolemia; Hypertension; Hyperuricemia (11/21/2012); Morbid obesity ([address] PAD (peripheral artery disease) ([address] (11/21/2012); Poor historian; Radiologic findings of lung field, abnormal (10/31/2016); Seizure disorder ([address] (8/5/2013); Shortness of breath dyspnea (8/5/2013); Thyroid disease; and Unspecified sleep apnea. He also has no past medical history of Arthritis; Asthma; Cancer ([address] Liver disease; Nausea & vomiting; PUD (peptic ulcer disease); or Stroke ([address] .  He also  has a past surgical history that includes pr cardiac surg procedure unlist; hx coronary artery bypass graft (06-); hx cataract removal (Left, 2003); hx heart catheterization; hx coronary stent placement (02-); hx heent (1970s); hx colonoscopy; and colonoscopy (N/A, 1/27/2017). Present Symptoms: admitted with AMS  Pain Intensity 1: 0  Pain Location 1: Foot  Pain Orientation 1: Left, Right  Pain Intervention(s) 1: Medication (see MAR)  Current Medications:   No current facility-administered medications on file prior to encounter. Current Outpatient Prescriptions on File Prior to Encounter   Medication Sig Dispense Refill    albuterol (PROVENTIL VENTOLIN) 2.5 mg /3 mL (0.083 %) nebulizer solution 2.5 mg by Nebulization route every four (4) hours as needed for Wheezing.  latanoprost (XALATAN) 0.005 % ophthalmic solution Administer 1 Drop to both eyes nightly.  HYDROcodone-acetaminophen (NORCO)  mg tablet Take 1 Tab by mouth every eight (8) hours as needed for Pain. Max Daily Amount: 3 Tabs. 60 Tab 0    oxyCODONE IR (OXY-IR) 15 mg immediate release tablet Take 1 Tab by mouth every eight (8) hours as needed for Pain. Max Daily Amount: 45 mg. 90 Tab 0    sertraline (ZOLOFT) 50 mg tablet Take one tablet each evening for anxiety  Indications: Generalized Anxiety Disorder 30 Tab 3    fluticasone (FLONASE) 50 mcg/actuation nasal spray 2 Sprays by Both Nostrils route daily. 1 Bottle 3    levothyroxine (SYNTHROID) 50 mcg tablet Take 1 Tab by mouth Daily (before breakfast). 90 Tab 3    hydrALAZINE (APRESOLINE) 10 mg tablet TAKE 1 TABLET THREE TIMES DAILY. 90 Tab 3    cholecalciferol (VITAMIN D3) 1,000 unit cap Take 1,000 Units by mouth daily.  sAXagliptin (ONGLYZA) 2.5 mg tablet Take 2.5 mg by mouth daily.  pravastatin (PRAVACHOL) 40 mg tablet Take 1 Tab by mouth nightly. 90 Tab 3    triamcinolone acetonide (KENALOG) 0.1 % ointment Apply  to affected area two (2) times a day.  80 g 0    OTHER One pair compression stocking gradient 20 - 30 mm hg  Please measure to fit 1 Each 1    gabapentin (NEURONTIN) 100 mg capsule Take 1 Cap by mouth three (3) times daily. 270 Cap 3    budesonide-formoterol (SYMBICORT) 160-4.5 mcg/actuation HFA inhaler Take 2 Puffs by inhalation two (2) times a day. 3 Inhaler 3    ipratropium-albuterol (COMBIVENT RESPIMAT)  mcg/actuation inhaler Take 1 Puff by inhalation every six (6) hours. 3 Inhaler 3    furosemide (LASIX) 80 mg tablet Take 1 Tab by mouth two (2) times a day. 60 Tab 6    triamcinolone (ARISTOCORT) 0.5 % topical cream Apply  to affected area two (2) times a day. use thin layer 15 g 0    mupirocin calcium (BACTROBAN) 2 % topical cream Apply  to affected area two (2) times a day. 15 g 0    metoprolol succinate (TOPROL-XL) 25 mg XL tablet Pt takes 1/2 tab po daily. 45 Tab 3    magnesium oxide (MAG-OX) 400 mg tablet Take 1 Tab by mouth daily. 90 Tab 3    aspirin 81 mg chewable tablet Take 1 Tab by mouth daily. 90 Tab 3    ferrous sulfate 324 mg (65 mg iron) tablet Take  by mouth Daily (before breakfast).  allopurinol (ZYLOPRIM) 300 mg tablet Take  by mouth daily.  docusate sodium (STOOL SOFTENER) 100 mg capsule Take 100 mg by mouth as needed.  cpap machine kit by Does Not Apply route. Bilevel 12/8      isosorbide mononitrate ER (IMDUR) 30 mg tablet Take 1 Tab by mouth daily. 30 Tab 5    glipiZIDE (GLUCOTROL) 5 mg tablet Take 5 mg by mouth two (2) times a day.  K-DUR 20 mEq tablet Take 20 mEq by mouth daily.        Current Dietary Status:  diabetic        Social History/Home Situation: home with wife  Home Environment: Private residence  # Steps to Enter: 1  One/Two Story Residence: One story  Living Alone: No  Support Systems: Spouse/Significant Other/Partner, Family member(s)  Patient Expects to be Discharged to[de-identified] Private residence  Current DME Used/Available at Home: Shower chair, Walker, rollator, Oxygen, portable  Tub or Shower Type: Tub  Work/Activity History: retired  OBJECTIVE:   Oral Motor Structure/Speech:  Oral-Motor Structure/Motor Speech  Labial: No impairment  Dentition: Edentulous (pt stated dentures are at home, \"I don't wear them half the time. \" Pt stated he often eats without them, \"I can eat a steak without them. \")  Oral Hygiene: adequate  Lingual: No impairment    SPEECH-LANGUAGE COGNITIVE EVALUATION  Tests Given:RIPA G    Mental Status:  Neurologic State: Alert  Orientation Level: Oriented X4     Motor Speech:   WFL    Neuro-Linguistics:        Verbal Problem Solving Exercises  Solution Level of Impairment: Intermediate  Solution Accuracy (%): 85 %       Pragmatics:   WFL       Assessment/Reassessment only, no treatment provided today    Tool Used: Functional [place] Measure (FIMTM)   Score Comments   Eating       Comprehension       Expression       Social Interaction       Problem Solving       Memory  5        Score:  Initial:  Most Recent: X (Date: -- )   Interpretation of Tool: Provides a uniform system of measurement for disability based on the International Classification of Impairment, Disabilities and Handicaps; measures the level of a patient's disability and indicates how much assistance is required for the individual to carry out activities of daily living. Score 7 6 5 4 3 2 1   Modifier CH CI CJ CK CL CM CN   ?  Memory:     - CURRENT STATUS: CJ - 20%-39% impaired, limited or restricted    - GOAL STATUS:  CI - 1%-19% impaired, limited or restricted    - D/C STATUS:  ---------------To be determined---------------  Payor: [facility] MEDICARE / Plan: [place] [facility] [place] MEDICARE HMO/PPO / Product Type: Managed Care Medicare /   __________________________________________________________________________________________________  Safety:   After treatment position/precautions:  · Up in chair  · Family at bedside  · MD at bedside  Progression/Medical Necessity:   · Skilled intervention continues to be required due to decreased reading comprehension, decreased cognitive skills and decreased independence with activities of daily living. Compliance with Program/Exercises: Will assess as treatment progresses. Reason for Continuation of Services/Other Comments:  · Patient continues to require skilled intervention due to patient unable to attend/participate in therapy as expected. Recommendations/Intent for next treatment session: \"Treatment next visit will focus on cognitive tx\".     Total Treatment Duration:  Time In: 1403  Time Out: [address] MS, CCC-SLP

## 2018-04-23 NOTE — PROGRESS NOTES
Pt attempted mri and is unable to lay flat due to COPD became very short of breath and red faced.  Pt refused mri due to being unable to lay flat and breath

## 2018-04-24 NOTE — PROGRESS NOTES
OCCUPATIONAL THERAPY: Initial Assessment, Discharge and PM 4/24/2018  INPATIENT: Hospital Day: 3  Payor: [facility] [place] MEDICARE / Plan: [place] MEDICARE HMO/PPO / Product Type: Managed Care Medicare /      NAME/AGE/GENDER: Ligia Viveros is a 80 y.o. male   PRIMARY DIAGNOSIS:  Altered mental state  Hypoxia Altered mental state Altered mental state        ICD-10: Treatment Diagnosis:    · Generalized Muscle Weakness (M62.81)  · Other lack of cordination (R27.8)   Precautions/Allergies:     Review of patient's allergies indicates no known allergies. ASSESSMENT:     Mr. Clemencia Naqvi presents for the above. Pt in chair at sink receiving bath from PCT upon arrival. Pt agreeable and cooperative to OT evaluation. Pt is alert and oriented x 4. Pt reports living with wife in a 1-story home with 1 step to enter. Pt states that he has access to a tub-shower combo with shower chair. Pt also uses rollator for ambulation around the home and community. Pt reports independence in ADLs, with the exception of bathing in which his wife provides assistance. Pt's B UE is within functional limits. Pt able to perform sit to stand transfer with CGA x RW. Pt ambulate to chair with CGA. Pt left upright in chair with posey alarm on and needs met. Pt provided max reinforcement to call nurse when he wanted to return to bed. At this time pt is functioning at baseline for ADL performance. Pt's functional mobility will be deferred to PT at this time. Pt will be discharged from OT services. This section established at most recent assessment   PROBLEM LIST (Impairments causing functional limitations):  1. Decreased ADL/Functional Activities  2. Decreased Transfer Abilities  3. Decreased Ambulation Ability/Technique  4. Decreased Balance  5. Decreased Activity Tolerance   INTERVENTIONS PLANNED: (Benefits and precautions of occupational therapy have been discussed with the patient.)  1.  Activities of daily living training  2. Adaptive equipment training  3. Balance training  4. Clothing management  5. Group therapy  6. Re-evaluation  7. Therapeutic activity  8. Therapeutic exercise     TREATMENT PLAN: Frequency/Duration:   Rehabilitation Potential For Stated Goals:      RECOMMENDED REHABILITATION/EQUIPMENT: (at time of discharge pending progress): Due to the probability of continued deficits (see above) this patient will not likely need continued skilled occupational therapy after discharge. Equipment:    TBD              OCCUPATIONAL PROFILE AND HISTORY:   History of Present Injury/Illness (Reason for Referral):  See H&P  Past Medical History/Comorbidities:   Mr. Mary Lord  has a past medical history of Atherosclerosis of artery of extremity with ulceration ([facility] [address] (4/17/2015); Atherosclerosis of native arteries of extremity with intermittent claudication ([facility] [address] (4/17/2015); Atopic dermatitis (11/21/2012); Atrial fibrillation ([address] CAD (coronary artery disease); Carotid stenosis, bilateral (11/21/2012); CHF (congestive heart failure) ([facility] [address] (11/21/2012); Chronic kidney disease; Chronic obstructive pulmonary disease ([address] Colon, diverticulosis (1/24/2015); Coronary atherosclerosis of native coronary vessel (10/31/2016); Diabetes ([address] Diastolic CHF, acute on chronic (HCC) (9/12/2015); Failed CABG (coronary artery bypass graft) (11/21/2012); GI bleed (1/2015); Gout (11/21/2012); History of tobacco use; [place] (hard of hearing); Hypercholesterolemia; Hypertension; Hyperuricemia (11/21/2012); Morbid obesity ([facility] [address] PAD (peripheral artery disease) ([facility] [address] (11/21/2012); Poor historian; Radiologic findings of lung field, abnormal (10/31/2016); Seizure disorder ([address] (8/5/2013); Shortness of breath dyspnea (8/5/2013); Thyroid disease; and Unspecified sleep apnea. He also has no past medical history of Arthritis; Asthma; Cancer ([address] Liver disease; Nausea & vomiting; PUD (peptic ulcer disease); or Stroke ([address]   Mr. Mary Lord  has a past surgical history that includes pr cardiac surg procedure unlist; hx coronary artery bypass graft (06-); hx cataract removal (Left, 2003); hx heart catheterization; hx coronary stent placement (02-); hx heent (1970s); hx colonoscopy; and colonoscopy (N/A, 1/27/2017). Social History/Living Environment:   Home Environment: Private residence  # Steps to Enter: 1  One/Two Story Residence: One story  Living Alone: No  Support Systems: Family member(s), Spouse/Significant Other/Partner  Patient Expects to be Discharged to[de-identified] Private residence  Current DME Used/Available at Home: Shower chair, Walker, rolling, Grab bars (walking stick)  Tub or Shower Type: Tub/Shower combination   Prior Level of Function/Work/Activity:  Pt reports independence in ADLs prior to hospitalization. Pt lives with wife in [place] home with 1 step to enter. Uses rollator for ambulation within the household and community. Personal Factors:          Sex:  male        Age:  80 y.o. Past/Current Experience:  Hx of falls        Other factors that influence how disability is experienced by the patient:  Multiple co-morbidities    Number of Personal Factors/Comorbidities that affect the Plan of Care: Brief history (0):  LOW COMPLEXITY   ASSESSMENT OF OCCUPATIONAL PERFORMANCE[de-identified]   Activities of Daily Living:           Basic ADLs (From Assessment) Complex ADLs (From Assessment)   Feeding: Independent  Oral Facial Hygiene/Grooming: Independent  Bathing: Moderate assistance  Upper Body Dressing: Minimum assistance  Lower Body Dressing: Moderate assistance  Toileting: Moderate assistance Instrumental ADL  Meal Preparation: Maximum assistance  Homemaking: Maximum assistance   Grooming/Bathing/Dressing Activities of Daily Living     Cognitive Retraining  Safety/Judgement: Decreased awareness of need for assistance;Decreased awareness of need for safety; Fall prevention;Home safety                       Bed/Mat Mobility  Sit to Stand: Contact guard assistance       Most Recent Physical Functioning:   Gross Assessment:  AROM: Generally decreased, functional  Strength: Generally decreased, functional  Coordination: Generally decreased, functional               Posture:     Balance:  Sitting: Intact  Standing: Impaired  Standing - Static: Good  Standing - Dynamic : Good Bed Mobility:     Wheelchair Mobility:     Transfers:  Sit to Stand: Contact guard assistance  Stand to Sit: Contact guard assistance                Patient Vitals for the past 6 hrs:   BP BP Patient Position SpO2 O2 Flow Rate (L/min) Pulse   18 1157 149/68 At rest 98 % - 63   18 1353 - - 97 % 2 l/min -       Mental Status  Neurologic State: Alert  Orientation Level: Oriented X4  Cognition: Follows commands, Decreased attention/concentration, Poor safety awareness  Perception: Appears intact  Perseveration: No perseveration noted  Safety/Judgement: Decreased awareness of need for assistance, Decreased awareness of need for safety, Fall prevention, Home safety                          Physical Skills Involved:  1. Balance  2. Strength  3. Activity Tolerance  4. Gross Motor Control Cognitive Skills Affected (resulting in the inability to perform in a timely and safe manner):  1. Sustained Attention  2. Divided Attention Psychosocial Skills Affected:  1. Habits/Routines  2. Environmental Adaptation  3. Social Interaction   Number of elements that affect the Plan of Care: 5+:  HIGH COMPLEXITY   CLINICAL DECISION MAKIN [facility] [address] [id number] AM-PAC 6 Clicks   Daily Activity Inpatient Short Form  How much help from another person does the patient currently need. .. Total A Lot A Little None   1. Putting on and taking off regular lower body clothing? [] 1   [x] 2   [] 3   [] 4   2. Bathing (including washing, rinsing, drying)? [] 1   [x] 2   [] 3   [] 4   3. Toileting, which includes using toilet, bedpan or urinal?   [] 1   [x] 2   [] 3   [] 4   4.   Putting on and taking off regular upper body clothing? [] 1   [] 2   [x] 3   [] 4   5. Taking care of personal grooming such as brushing teeth? [] 1   [] 2   [x] 3   [x] 4   6. Eating meals? [] 1   [] 2   [] 3   [x] 4   © 2007, Trustees of [address] [place] [address] [address] [address], under license to [employer]. All rights reserved      Score:  Initial: 20 Most Recent: X (Date: -- )    Interpretation of Tool:  Represents activities that are increasingly more difficult (i.e. Bed mobility, Transfers, Gait). Score 24 23 22-20 19-15 14-10 9-7 6     Modifier CH CI CJ CK CL CM CN      ? Self Care:     - CURRENT STATUS: CJ - 20%-39% impaired, limited or restricted    - GOAL STATUS: CJ - 20%-39% impaired, limited or restricted    - D/C STATUS:  CJ - 20%-39% impaired, limited or restricted  Payor: Katharine Conrad [id number] / Plan: [place] [facility] MEDICARE HMO/PPO / Product Type: [employer] Care Medicare /      Medical Necessity:     ·   Reason for Services/Other Comments:  ·    Use of outcome tool(s) and clinical judgement create a POC that gives a: LOW COMPLEXITY         TREATMENT:   (In addition to Assessment/Re-Assessment sessions the following treatments were rendered)     Pre-treatment Symptoms/Complaints:  eval only. Pain: Initial:   Pain Intensity 1: 0 /10 Post Session:  0       Assessment/Reassessment only, no treatment provided today    Braces/Orthotics/Lines/Etc:   · O2 Device: Nasal cannula  Treatment/Session Assessment:    · Response to Treatment:  Fair. eval only.    · Interdisciplinary Collaboration:   o Occupational Therapist  o Registered Nurse  o Certified Nursing Assistant/Patient Care Technician  · After treatment position/precautions:   o Up in chair  o Bed alarm/tab alert on  o Call light within reach  o Family at bedside   · Compliance with Program/Exercises:   · Recommendations/Intent for next treatment session:    Total Treatment Duration:  OT Patient Time In/Time Out  Time In: 1329  Time Out: [facility]

## 2018-04-24 NOTE — PROGRESS NOTES
Hospitalist Progress Note    2018  Admit Date: 2018  2:23 AM   NAME: Bry Cruz   :  1932   DOS:              18  MRN:  [id number]   Attending: Alex Dominguez MD  PCP:  Rose Marie Shelton MD  Treatment Team: Attending Provider: Harika Bernard MD; Primary Nurse: Marie Rodriguez; Consulting Provider: Trev Rock MD; Care Manager: Jorge Guy    Full Code     SUBJECTIVE:   As previously documented: 81 yo male with PMH of HTN,afib (no AC due to recurrent GI bleeding), CAD, carotid stenosis, dCHF, CKD,COPD/chronic hypoxic respiratory failure on 2L NC, ROBERTO on CPAP , evaluated with altered mentation. Per family there is no prior dementia but they are concerned about polypharmacy. CT head negative. MRI brain attempted however he did not tolerate due to inability to lay flat. CXR shows bibasilar densities/ vascular congestion. ECHO EF 50-55% with right to left shunt noted. Carotid duplex shows right ICA 50-69% and left ICA 70-99% stenosis. He was seen by psychiatry who felt mentation changes were due to deliriumMetabolic workup consisted of TSH (slightly elevated on synthroid). Currently on treatment for acute diastolic HF and COPD exacerbation. 18    Ken Navarro stated his breathing is improving. Patient is AAO x 3 and answering questions appropriately. Family member at bedside stated his mental status is improving. 10+ ROS reviewed and negative except for positive in HPI.    No Known Allergies  Current Facility-Administered Medications   Medication Dose Route Frequency    mineral oil-hydrophil petrolat (AQUAPHOR) ointment   Topical DAILY    levothyroxine (SYNTHROID) tablet 75 mcg  75 mcg Oral ACB    furosemide (LASIX) injection 80 mg  80 mg IntraVENous BID    metoprolol succinate (TOPROL-XL) XL tablet 12.5 mg  12.5 mg Oral DAILY    predniSONE (DELTASONE) tablet 60 mg  60 mg Oral DAILY WITH BREAKFAST    albuterol (PROVENTIL VENTOLIN) nebulizer solution 2.5 mg  2.5 mg Nebulization Q4H PRN    allopurinol (ZYLOPRIM) tablet 300 mg  300 mg Oral DAILY    aspirin chewable tablet 81 mg  81 mg Oral DAILY    docusate sodium (COLACE) capsule 100 mg  100 mg Oral DAILY    gabapentin (NEURONTIN) capsule 100 mg  100 mg Oral TID    hydrALAZINE (APRESOLINE) tablet 10 mg  10 mg Oral TID    HYDROcodone-acetaminophen (NORCO)  mg tablet 1 Tab  1 Tab Oral Q8H PRN    albuterol-ipratropium (DUO-NEB) 2.5 MG-0.5 MG/3 ML  3 mL Nebulization Q4H PRN    isosorbide mononitrate ER (IMDUR) tablet 30 mg  30 mg Oral DAILY    potassium chloride (K-DUR, KLOR-CON) SR tablet 20 mEq  20 mEq Oral DAILY    latanoprost (XALATAN) 0.005 % ophthalmic solution 1 Drop  1 Drop Both Eyes QHS    magnesium oxide (MAG-OX) tablet 400 mg  400 mg Oral DAILY    oxyCODONE IR (OXY-IR) immediate release tablet 15 mg  15 mg Oral Q8H PRN    pravastatin (PRAVACHOL) tablet 40 mg  40 mg Oral QHS    sertraline (ZOLOFT) tablet 50 mg  50 mg Oral QPM    sodium chloride (NS) flush 5-10 mL  5-10 mL IntraVENous Q8H    sodium chloride (NS) flush 5-10 mL  5-10 mL IntraVENous PRN    ondansetron (ZOFRAN) injection 4 mg  4 mg IntraVENous Q6H PRN    acetaminophen (TYLENOL) tablet 650 mg  650 mg Oral Q4H PRN    bisacodyl (DULCOLAX) tablet 5 mg  5 mg Oral DAILY PRN    clopidogrel (PLAVIX) tablet 75 mg  75 mg Oral DAILY    heparin (porcine) injection 5,000 Units  5,000 Units SubCUTAneous Q8H    budesonide (PULMICORT) 500 mcg/2 ml nebulizer suspension  500 mcg Nebulization BID RT    And    albuterol CONCENTRATE 2.5mg/0.5 mL neb soln  2.5 mg Nebulization Q6H RT    insulin regular (NOVOLIN R, HUMULIN R) injection   SubCUTAneous AC&HS    dextrose 40% (GLUTOSE) oral gel 1 Tube  15 g Oral PRN    glucagon (GLUCAGEN) injection 1 mg  1 mg IntraMUSCular PRN    dextrose (D50W) injection syrg 12.5-25 g  25-50 mL IntraVENous PRN    nystatin (MYCOSTATIN) 100,000 unit/gram powder   Topical BID    famotidine (PEPCID) tablet 20 mg  20 mg Oral DAILY    haloperidol (HALDOL) tablet 5 mg  5 mg Oral TID PRN         Immunization History   Administered Date(s) Administered    Influenza High Dose Vaccine PF 10/24/2016, 2017    Influenza Vaccine 10/01/2010, 2012, 2013, 2015    Influenza Vaccine (Quad) PF 2015    Influenza Vaccine Whole 10/01/2007    Pneumococcal Conjugate (PCV-13) 10/19/2015    Pneumococcal Vaccine (Unspecified Type) 2015    TB Skin Test (PPD) Intradermal 2015, 2016, 2017, 2017, 2018    ZZZ-RETIRED (DO NOT USE) Pneumococcal Vaccine (Unspecified Type) 10/01/2006, 2011     Objective:   Patient Vitals for the past 24 hrs:   Temp Pulse Resp BP SpO2   18 1157 98.6 °F (37 °C) 63 20 149/68 98 %   18 0801 97.6 °F (36.4 °C) 63 18 157/70 98 %   18 0400 97.5 °F (36.4 °C) (!) 56 18 155/67 91 %   18 0216 - - - - 95 %   18 0000 97.7 °F (36.5 °C) 60 18 144/62 95 %   18 2034 - (!) 56 - - -   18 - - - - 99 %   18 2000 97.4 °F (36.3 °C) (!) 49 18 158/65 96 %   18 1602 98.1 °F (36.7 °C) (!) 48 20 159/76 99 %   18 1427 - - - - 98 %     Temp (24hrs), Av.8 °F (36.6 °C), Min:97.4 °F (36.3 °C), Max:98.6 °F (37 °C)    Oxygen Therapy  O2 Sat (%): 98 % (18 1157)  Pulse via Oximetry: 86 beats per minute (18 0216)  O2 Device: Nasal cannula (18)  O2 Flow Rate (L/min): 2 l/min (18)  FIO2 (%): 21 % (18 021)  Oxygen Therapy  O2 Sat (%): 98 % (18 1157)  Pulse via Oximetry: 86 beats per minute (18)  O2 Device: Nasal cannula (18)  O2 Flow Rate (L/min): 2 l/min (18)  FIO2 (%): 21 % (18 021)    Physical Exam:  General:         Alert, cooperative, no distress   HEENT:               NCAT. No obvious deformity. Nares normal. No drainage  Lungs:                 Some crackles at lung bases.  No wheezing or amish  Cardiovascular:   RRR. No m/r/g. No pedal edema b/l. Abdomen:       S/nt/nd. Bowel sounds normal. .   Skin:         No rashes or lesions. Not Jaundiced  Neurologic:    AAOx3. CN II- XII grossly WNL. No gross focal deficit. Psychiatric:         Good mood. Normal affect.        DIAGNOSTIC STUDIES      Data Review:   Recent Results (from the past 24 hour(s))   GLUCOSE, POC    Collection Time: 04/23/18  3:48 PM   Result Value Ref Range    Glucose (POC) 140 (H) 65 - 100 mg/dL   URINALYSIS W/ RFLX MICROSCOPIC    Collection Time: 04/23/18  5:50 PM   Result Value Ref Range    Color YELLOW      Appearance CLEAR      Specific gravity 1.009 1.001 - 1.023      pH (UA) 5.5 5.0 - 9.0      Protein NEGATIVE  NEG mg/dL    Glucose NEGATIVE  mg/dL    Ketone NEGATIVE  NEG mg/dL    Bilirubin NEGATIVE  NEG      Blood NEGATIVE  NEG      Urobilinogen 0.2 0.2 - 1.0 EU/dL    Nitrites NEGATIVE  NEG      Leukocyte Esterase NEGATIVE  NEG     DRUG SCREEN, URINE    Collection Time: 04/23/18  5:50 PM   Result Value Ref Range    PCP(PHENCYCLIDINE) NEGATIVE       BENZODIAZEPINES NEGATIVE       COCAINE NEGATIVE       AMPHETAMINES NEGATIVE       METHADONE NEGATIVE       THC (TH-CANNABINOL) NEGATIVE       OPIATES POSITIVE      BARBITURATES NEGATIVE      VITAMIN B12    Collection Time: 04/23/18  7:26 PM   Result Value Ref Range    Vitamin B12 459 193 - 986 pg/mL   FOLATE    Collection Time: 04/23/18  7:26 PM   Result Value Ref Range    Folate 11.9 3.1 - 17.5 ng/mL   GLUCOSE, POC    Collection Time: 04/23/18 10:01 PM   Result Value Ref Range    Glucose (POC) 151 (H) 65 - 100 mg/dL   PLEASE READ & DOCUMENT PPD TEST IN 48 HRS    Collection Time: 04/24/18  5:57 AM   Result Value Ref Range    PPD neg Negative    mm Induration 0 mm   GLUCOSE, POC    Collection Time: 04/24/18  6:57 AM   Result Value Ref Range    Glucose (POC) 115 (H) 65 - 100 mg/dL   GLUCOSE, POC    Collection Time: 04/24/18 10:58 AM   Result Value Ref Range    Glucose (POC) 163 (H) 65 - 100 mg/dL       All Micro Results     None          Imaging /Procedures /Studies:    CXR Results  (Last 48 hours)    None        CT Results  (Last 48 hours)    None        No results found. Results for orders placed or performed during the hospital encounter of 18   2D ECHO COMPLETE ADULT (TTE) W OR [address] [facility]  [address] [facility], [address] [facility]  [phone]    Transthoracic Echocardiogram  2D, M-mode, Doppler, and Color Doppler    Patient: Otilia Nathan  MR #: [id number]  : 1932  Age: 80 years  Gender: Male  Study date: 2018  Account #: [de-identified]  Height: 68 in  Weight: 265.5 lb  BSA: 2.31 mï¾²  Status:Routine  Location: [facility]  BP: 148/ 63    Allergies: NO KNOWN ALLERGIES    Sonographer:  PUJA Mckenzie  Group:  [id number] [facility]  Referring Physician:  Yassine Lovelace. Yanci Vegas MD  Reading Physician:  Marilu Roach. [address] MD [facility]    INDICATIONS: Possible CVA, AFIB, Edema    HISTORY: PRIOR HISTORY: CABG    PROCEDURE: This was a routine study. A transthoracic echocardiogram was  performed. The study included complete 2D imaging, M-mode, complete spectral  Doppler, and color Doppler. Intravenous contrast (agitated saline) was  administered in the left arm. Intravenous contrast (Definity) was   administered. Image quality was adequate. LEFT VENTRICLE: Size was normal. Systolic function was at the lower limits of  normal. Ejection fraction was estimated in the range of 50 % to 55 %. There  were no regional wall motion abnormalities. Wall thickness was mildly to  moderately increased. The study was not technically sufficient to allow  evaluation of LV diastolic function in the presence of AFIB. RIGHT VENTRICLE: The ventricle was mildly dilated. Systolic function was  moderately reduced. Estimated peak pressure was in the range of 30-35 mmHg. LEFT ATRIUM: The atrium was markedly dilated.     ATRIAL SEPTUM: There was a right-to-left shunt, induced by the Valsalva  maneuver. RIGHT ATRIUM: The atrium was moderately to markedly dilated. SYSTEMIC VEINS: IVC: The inferior vena cava was moderately dilated. The  respirophasic change in diameter was less than 50%. AORTIC VALVE: The valve was trileaflet. Leaflets exhibited mild sclerosis. There was no evidence for stenosis. There was trace insufficiency. MITRAL VALVE: Valve structure was normal. There was no evidence for stenosis. There was mild to moderate regurgitation. TRICUSPID VALVE: The valve structure was normal. There was no evidence for  stenosis. There was mild regurgitation. PULMONIC VALVE: The valve structure was normal. There was no evidence for  stenosis. There was trivial regurgitation. PERICARDIUM: There was no pericardial effusion. AORTA: The root exhibited normal size. SUMMARY:    -  Left ventricle: Systolic function was at the lower limits of normal.  Ejection fraction was estimated in the range of 50 % to 55 %. There were no  regional wall motion abnormalities. Wall thickness was mildly to moderately  increased. -  Right ventricle: The ventricle was mildly dilated. Systolic function was  moderately reduced. -  Left atrium: The atrium was markedly dilated. -  Atrial septum: There was a right-to-left shunt, induced by the Valsalva  maneuver. -  Right atrium: The atrium was moderately to markedly dilated. -  Inferior vena cava, hepatic veins: The inferior vena cava was moderately  dilated. The respirophasic change in diameter was less than 50%. -  Mitral valve: There was mild to moderate regurgitation.    -  Tricuspid valve: There was mild regurgitation.     SYSTEM MEASUREMENT TABLES    2D mode  AoR Diam (2D): 3 cm  LA Dimension (2D): 4.3 cm  Left Atrium Systolic Volume Index; Method of Disks, Biplane; 2D mode;: 61   ml/m2  IVS/LVPW (2D): 1  IVSd (2D): 1.5 cm  LVIDd (2D): 4.9 cm  LVIDs (2D): 3.4 cm  LVOT Area (2D): 3.1 cm2  LVPWd (2D): 1.6 cm  RVIDd (2D): 4.6 cm    Unspecified Scan Mode  Peak Grad; Mean; Antegrade Flow: 9 mm[Hg]  Vmax; Antegrade Flow: 159 cm/s  LVOT Diam: 2 cm  RVSP: 26 mm[Hg]    Prepared and signed by    Mary Baig. Melvin Infante MD [facility]  Signed 23-Apr-2018 13:53:42         Labs and Studies from previous 24 hours have been personally reviewed by myself [place] Problems    Diagnosis Date Noted    Acute respiratory failure with hypoxia ([address] 04/23/2018    Altered mental state 04/22/2018    Venous stasis dermatitis of both lower extremities 04/22/2018    Type 2 diabetes with nephropathy ([address] 02/12/2018    Restrictive lung disease 11/01/2017    Hypoxia 08/21/2017    Thrombocytopenia ([address] 08/21/2017    Type 2 diabetes mellitus with peripheral neuropathy ([address] 11/05/2015    Chronic diastolic congestive heart failure ([address] 09/12/2015     1. Echo (9/11/15) : EF 55-60%. Mild LVH. Moderate biatrial enlargement. Moderate mitral/tricuspid regurgitation. 2.  Echo (8/21/17):  EF 55-60%. Moderate LAE. Mild mitral stenosis. Moderate tricuspid regurgitation. RVSP 35-40.  ROBERTO on CPAP 02/20/2015     Patient is on BiPAP, no supplementary oxygen      Morbid obesity ([address] 01/21/2015    CKD (chronic kidney disease) stage 3, GFR 30-59 ml/min 09/18/2013    Debility 08/05/2013    Essential hypertension 11/21/2012    COPD (chronic obstructive pulmonary disease) ([address] 11/21/2012    Carotid stenosis, bilateral 11/21/2012     1. Carotid Doppler (6/1/07): Greater than 70% stenosis in proximal LICA. 50% stenosis in right ICA. 2.  CTA of neck (3/15/10): Occluded distal segments of the vertebral arteries bilaterally. Atherosclerosis of the carotid bulbs bilaterally with a 50% stenosis on the left and a stenosis of less than 30% on the right. Small nodule in the right upper lobe near the apex.   3. CTA (8/29/13):  Less than 30% diameter stenosis of the cervical internal carotid arteries bilaterally.  Persistent atrial fibrillation ([facility] 11/21/2012     Coumadin therapy discontinued due to recurrent GI bleeding. Hospital Problems as of 4/24/2018  Date Reviewed: 4/3/2018          Codes Class Noted - Resolved POA    Acute respiratory failure with hypoxia (HCC) ICD-10-CM: J96.01  ICD-9-CM: 518.81  4/23/2018 - Present Yes        * (Principal)Altered mental state ICD-10-CM: R41.82  ICD-9-CM: 780.97  4/22/2018 - Present Yes        Venous stasis dermatitis of both lower extremities (Chronic) ICD-10-CM: I87.2  ICD-9-CM: 454.1  4/22/2018 - Present Yes        Type 2 diabetes with nephropathy (HCC) (Chronic) ICD-10-CM: E11.21  ICD-9-CM: 250.40, 583.81  2/12/2018 - Present Yes        Restrictive lung disease (Chronic) ICD-10-CM: J98.4  ICD-9-CM: 518.89  11/1/2017 - Present Yes        Hypoxia ICD-10-CM: R09.02  ICD-9-CM: 799.02  8/21/2017 - Present Yes        Thrombocytopenia (HCC) (Chronic) ICD-10-CM: D69.6  ICD-9-CM: 287.5  8/21/2017 - Present Yes        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 Echo (9/11/15) : EF 55-60%. Mild LVH. Moderate biatrial enlargement. Moderate mitral/tricuspid regurgitation. 2.  Echo (8/21/17):  EF 55-60%. Moderate LAE. Mild mitral stenosis. Moderate tricuspid regurgitation. RVSP 35-40.               ROBERTO on CPAP (Chronic) ICD-10-CM: G47.33, Z99.89  ICD-9-CM: 327.23, V46.8  2/20/2015 - Present Yes    Overview Signed 2/20/2015 10:42 AM by Adore Haq     Patient is on BiPAP, no supplementary oxygen             Morbid obesity ([address] (Chronic) ICD-10-CM: E66.01  ICD-9-CM: 278.01  1/21/2015 - Present Yes        CKD (chronic kidney disease) stage 3, GFR 30-59 ml/min (Chronic) ICD-10-CM: N18.3  ICD-9-CM: 585.3  9/18/2013 - Present Yes        Debility (Chronic) ICD-10-CM: R53.81  ICD-9-CM: 799.3  8/5/2013 - Present Yes        Essential hypertension (Chronic) ICD-10-CM: I10  ICD-9-CM: 401.9  11/21/2012 - Present Yes        Persistent atrial fibrillation (HCC) (Chronic) ICD-10-CM: I48.1  ICD-9-CM: 427.31  11/21/2012 - Present Yes    Overview Addendum 11/22/2016  8:14 PM by Ruby Francois MD     Coumadin therapy discontinued due to recurrent GI bleeding. COPD (chronic obstructive pulmonary disease) (HCC) (Chronic) ICD-10-CM: J44.9  ICD-9-CM: 496  11/21/2012 - Present Yes        Carotid stenosis, bilateral (Chronic) ICD-10-CM: A05.28  ICD-9-CM: 433.10, 433.30  11/21/2012 - Present Yes    Overview Addendum 11/22/2016  8:13 PM by Ruby Francois MD     1. Carotid Doppler (6/1/07): Greater than 70% stenosis in proximal LICA. 50% stenosis in right ICA. 2.  CTA of neck (3/15/10): Occluded distal segments of the vertebral arteries bilaterally. Atherosclerosis of the carotid bulbs bilaterally with a 50% stenosis on the left and a stenosis of less than 30% on the right. Small nodule in the right upper lobe near the apex. 3. CTA (8/29/13):  Less than 30% diameter stenosis of the cervical internal carotid arteries bilaterally.              RESOLVED: Seizure disorder ([facility] (Chronic) ICD-10-CM: G40.909  ICD-9-CM: 345.90  5/22/2014 - 4/22/2018 Yes              A/P:    -Acute hypoxic respi failure  Likely due to acute diastolic HF and a component of COPD exacerbation  Cr has been stable  Echo as above  Check BMP today  Switch IV lasix to every day as clinically improving    -COPD exacerbation  Taper steroid and cont home treatment    -AMS  Resolved  Likely secondary to delirium from above problems  CT brain with no acute disease  Unable to have MRI as cannot tolerate being flat  Will try on AM    -Carotid stenosis  Chronic problem- seen on US 2013  Unable to get CTA due to CKD  Cont ASA/ plavix  Patient to follow up with vascular surgery upon discharge for possible CEA    -PFO  Incidental finding on echocardiogram  Cont DAPT as above  To follow up with Cardiology upon discharge    -CKD  Cr has been stable    Rest of medical problems stable. DVT Prophylaxis: heparin  CODE Status: Full  Plan of Care Discussed with: patient.  Care team.      Parul Vega MD  04/24/18

## 2018-04-24 NOTE — PROGRESS NOTES
SPEECH PATHOLOGY NOTE:    Patient's wife was present this date. Reports patient now at cognitive baseline. Family concerned regarding initial AMS being related to polypharmacy. CT negative for acute infarct with MRI attempted x2 but patient unable to lie flat for and reporting claustrophobia. Patient appears at baseline level of function from a cognitive standpoint. His wife confirms that she assists with higher level ADLs such as medication/finance management at home. No further speech therapy indicated at this time.     Akin aG MS, CCC-SLP

## 2018-04-24 NOTE — PROGRESS NOTES
[facility] [place] - INPATIENT  Face to Face Encounter    Patients Name: Chelsea Navarro    YOB: 1932    Ordering Physician: Dr. Lore Pal    Primary Diagnosis: Altered mental state  Hypoxia    Date of Face to Face:   4/24/2018                                  Face to Face Encounter findings are related to primary reason for home care:   yes. 1. I certify that the patient needs intermittent care as follows: physical therapy: strengthening, stretching/ROM, transfer training, gait/stair training, balance training and pt/caregiver education    2. I certify that this patient is homebound, that is: 1) patient requires the use of a walker device, special transportation, or assistance of another to leave the home; or 2) patient's condition makes leaving the home medically contraindicated; and 3) patient has a normal inability to leave the home and leaving the home requires considerable and taxing effort. Patient may leave the home for infrequent and short duration for medical reasons, and occasional absences for non-medical reasons. Homebound status is due to the following functional limitations: Patient with strength deficits limiting the performance of all ADL's without caregiver assistance or the use of an assistive device. Patient with poor safety awareness and is at risk for falls without assistance of another person and the use of an assistive device. Patient with poor ambulation endurance limiting their safe ability to ascend/descend the required number of steps to leave the home. 3. I certify that this patient is under my care and that I, or a nurse practitioner or  [id number], or clinical nurse specialist, or certified nurse midwife, working with me, had a Face-to-Face Encounter that meets the physician Face-to-Face Encounter requirements.   The following are the clinical findings from the [address] [place], [place] [address]e Street encounter that support the need for skilled services and is a summary of the encounter:     See hospital chart. Arash Anna  4/24/2018      THE FOLLOWING TO BE COMPLETED BY THE COMMUNITY PHYSICIAN:    I concur with the findings described above from the F2F encounter that this patient is homebound and in need of a skilled service.     Certifying Physician: _____________________________________      Printed Certifying Physician Name: _____________________________________    Date: _________________

## 2018-04-24 NOTE — PROGRESS NOTES
Problem: Mobility Impaired (Adult and Pediatric)  Goal: *Acute Goals and Plan of Care (Insert Text)  Discharge Goals:  (1.)Mr. Navarro will move from supine to sit and sit to supine , scoot up and down and roll side to side with INDEPENDENT within 7 treatment day(s). (2.)Mr. Navarro will transfer from bed to chair and chair to bed with SUPERVISION using the least restrictive device within 7 treatment day(s). (3.)Mr. Navarro will ambulate with SUPERVISION for 250+ feet with the least restrictive device within 7 treatment day(s). ________________________________________________________________________________________________      PHYSICAL THERAPY: Daily Note, Treatment Day: 1st, PM 4/24/2018  INPATIENT: Hospital Day: 3  Payor: [facility] [place] [facility] MEDIC[facility] HMO/PPO / Product Type: Managed Care Medicare /      NAME/AGE/GENDER: Thao Dickerson is a 80 y.o. male   PRIMARY DIAGNOSIS: Altered mental state  Hypoxia Altered mental state Altered mental state        ICD-10: Treatment Diagnosis:    · Generalized Muscle Weakness (M62.81)  · Difficulty in walking, Not elsewhere classified (R26.2)  · History of falling (Z91.81)   Precaution/Allergies:  Review of patient's allergies indicates no known allergies. ASSESSMENT:     Mr. Leti Chappell was supine at arrival stating he just got BTB, but agreed to participate. Pt on 2L O2 and 97% prior to session. Pt able to get to EOB SUP. Stood with SBA, ambulated 259ft on RA with 4 standing rests to check sats, each time he was at 91-92% on RA. Returned to room and reported he wanted to go toilet. After finishing he returned to sitting EOB to visit with visitor. Pt improving with gait distance and O2 during activity. Replaced 2L after session. This section established at most recent assessment   PROBLEM LIST (Impairments causing functional limitations):  1. Decreased Strength  2. Decreased ADL/Functional Activities  3.  Decreased Transfer Abilities  4. Decreased Ambulation Ability/Technique  5. Decreased Balance  6. Decreased Activity Tolerance  7. Decreased Pacing Skills  8. Increased Fatigue  9. Increased Shortness of Breath  10. Decreased [place] with Home Exercise Program   INTERVENTIONS PLANNED: (Benefits and precautions of physical therapy have been discussed with the patient.)  1. Balance Exercise  2. Bed Mobility  3. Family Education  4. Gait Training  5. Home Exercise Program (HEP)  6. Neuromuscular Re-education/Strengthening  7. Therapeutic Activites  8. Therapeutic Exercise/Strengthening  9. Transfer Training  10. Group Therapy     TREATMENT PLAN: Frequency/Duration: 3 times a week for duration of hospital stay  Rehabilitation Potential For Stated Goals: Good     RECOMMENDED REHABILITATION/EQUIPMENT: (at time of discharge pending progress): Due to the probability of continued deficits (see above) this patient will likely need continued skilled physical therapy after discharge. Equipment:    None at this time              HISTORY:   History of Present Injury/Illness (Reason for Referral):  See H&P below  Patient is a 80 y.o. male who presents to the ER due to [address]. Started in the afternoon and has just worsened. Family reports that he is \"talking out of his head\". He has multiple significant medical problems- see below. No other neuro signs/symptoms noted. Pt cannot provide history due to AMS. No known fever/chills, n/v/d, chest pain or SOB. Past Medical History/Comorbidities:   Mr. Karina rossi  has a past medical history of Atherosclerosis of artery of extremity with ulceration ([address] (4/17/2015); Atherosclerosis of native arteries of extremity with intermittent claudication ([address] (4/17/2015); Atopic dermatitis (11/21/2012); Atrial fibrillation ([address] CAD (coronary artery disease); Carotid stenosis, bilateral (11/21/2012); CHF (congestive heart failure) ([address] (11/21/2012);  Chronic kidney disease; Chronic obstructive pulmonary disease ([facility] Colon, diverticulosis (1/24/2015); Coronary atherosclerosis of native coronary vessel (10/31/2016); Diabetes ([facility] Diastolic CHF, acute on chronic (HCC) (9/12/2015); Failed CABG (coronary artery bypass graft) (11/21/2012); GI bleed (1/2015); Gout (11/21/2012); History of tobacco use; [place] (hard of hearing); Hypercholesterolemia; Hypertension; Hyperuricemia (11/21/2012); Morbid obesity ([facility] PAD (peripheral artery disease) ([facility] (11/21/2012); Poor historian; Radiologic findings of lung field, abnormal (10/31/2016); Seizure disorder ([facility] (8/5/2013); Shortness of breath dyspnea (8/5/2013); Thyroid disease; and Unspecified sleep apnea. He also has no past medical history of Arthritis; Asthma; Cancer ([facility] Liver disease; Nausea & vomiting; PUD (peptic ulcer disease); or Stroke ([facility] Mr. Tito Grady  has a past surgical history that includes pr cardiac surg procedure unlist; hx coronary artery bypass graft (06-); hx cataract removal (Left, 2003); hx heart catheterization; hx coronary stent placement (02-); hx heent (1970s); hx colonoscopy; and colonoscopy (N/A, 1/27/2017).   Social History/Living Environment:   Home Environment: Private residence  # Steps to Enter: 1  One/Two Story Residence: One story  Living Alone: No  Support Systems: Family member(s), Spouse/Significant Other/Partner  Patient Expects to be Discharged to[de-identified] Private residence  Current DME Used/Available at Home: Shower chair, Walker, rolling, Grab bars (walking stick)  Tub or Shower Type: Tub/Shower combination  Prior Level of Function/Work/Activity:  Lives with wife, use of rollator for gait, indep with ADLs, 1 fall, drives   Number of Personal Factors/Comorbidities that affect the Plan of Care: 3+: HIGH COMPLEXITY   EXAMINATION:   Most Recent Physical Functioning:   Gross Assessment:                  Posture:     Balance:  Sitting: Intact  Standing: Impaired  Standing - Static: Good  Standing - Dynamic : Good Bed Mobility: Wheelchair Mobility:     Transfers:  Sit to Stand: Stand-by assistance  Stand to Sit: Stand-by assistance  Gait:     Base of Support: Widened  Speed/Helen: Shuffled  Distance (ft): 250 Feet (ft)  Assistive Device: Walker, rolling  Ambulation - Level of Assistance: Contact guard assistance      Body Structures Involved:  1. Nerves  2. Heart  3. Lungs  4. Bones  5. Joints  6. Muscles  7. Ligaments Body Functions Affected:  1. Sensory/Pain  2. Cardio  3. Respiratory  4. Neuromusculoskeletal  5. Movement Related Activities and Participation Affected:  1. General Tasks and Demands  2. Mobility  3. Self Care  4. Domestic Life  5. Interpersonal Interactions and Relationships  6. Community, Social and [place]   Number of elements that affect the Plan of Care: 4+: HIGH COMPLEXITY   CLINICAL PRESENTATION:   Presentation: Evolving clinical presentation with changing clinical characteristics: MODERATE COMPLEXITY   CLINICAL DECISION MAKIN [facility] Mobility Inpatient Short Form  How much difficulty does the patient currently have. .. Unable A Lot A Little None   1. Turning over in bed (including adjusting bedclothes, sheets and blankets)? [] 1   [] 2   [x] 3   [] 4   2. Sitting down on and standing up from a chair with arms ( e.g., wheelchair, bedside commode, etc.)   [] 1   [] 2   [x] 3   [] 4   3. Moving from lying on back to sitting on the side of the bed? [] 1   [] 2   [x] 3   [] 4   How much help from another person does the patient currently need. .. Total A Lot A Little None   4. Moving to and from a bed to a chair (including a wheelchair)? [] 1   [] 2   [x] 3   [] 4   5. Need to walk in hospital room? [] 1   [] 2   [x] 3   [] 4   6. Climbing 3-5 steps with a railing? [] 1   [] 2   [x] 3   [] 4   © , Trustees of [address] [place] [address] [address] [address], under license to [employer].  All rights reserved      Score:  Initial: 18 Most Recent: X (Date: -- )    Interpretation of Tool: Represents activities that are increasingly more difficult (i.e. Bed mobility, Transfers, Gait). Score 24 23 22-20 19-15 14-10 9-7 6     Modifier CH CI CJ CK CL CM CN      ? Mobility - Walking and Moving Around:     - CURRENT STATUS: CK - 40%-59% impaired, limited or restricted    - GOAL STATUS: CJ - 20%-39% impaired, limited or restricted    - D/C STATUS:  ---------------To be determined---------------  [facility] / [place] [facility] [place] / Product Type: Managed Care Medicare /      Medical Necessity:     · Patient is expected to demonstrate progress in strength, balance, coordination and functional technique to decrease assistance required with gait, transfers, and functional mobility. .  Reason for Services/Other Comments:  · Patient continues to require skilled intervention due to decreased strength, decreased balance, decreased functional tolerance, decreased cardiopulmonary endurance affecting participation in basic ADLs and functional tasks. Use of outcome tool(s) and clinical judgement create a POC that gives a: Clear prediction of patient's progress: LOW COMPLEXITY            TREATMENT:   (In addition to Assessment/Re-Assessment sessions the following treatments were rendered)   Pre-treatment Symptoms/Complaints:  Fatigue, weakness  Pain: Initial:   Pain Intensity 1: 0  Post Session:  0/10     Therapeutic Activity: (    24): Therapeutic activities including Bed transfers, Chair transfers, Toilet transfers and Ambulation on level ground to improve mobility, strength, balance and coordination. Required minimal   to promote static and dynamic balance in standing, promote coordination of bilateral, lower extremity(s) and promote motor control of bilateral, lower extremity(s).          Braces/Orthotics/Lines/Etc:   · O2 Device: Nasal cannula  Treatment/Session Assessment:    · Response to Treatment:  Ambulated 30 ft in room with RW and CGA  · Interdisciplinary Collaboration:   o Physical Therapist  o Registered Nurse  · After treatment position/precautions:   o Supine in bed  o Bed alarm/tab alert on  o Bed/Chair-wheels locked  o Bed in low position  o Call light within reach  o RN notified   · Compliance with Program/Exercises: Will assess as treatment progresses. · Recommendations/Intent for next treatment session: \"Next visit will focus on advancements to more challenging activities and reduction in assistance provided\".   Total Treatment Duration:  PT Patient Time In/Time Out  Time In: 1350  Time Out: [address] [facility], [facility]

## 2018-04-24 NOTE — PROGRESS NOTES
Pt unable to undergo second attempt at MRI. Pt st he has difficulty lying flat. We had his head elevated as much as the camera would allow. Pt also stated he was claustrophobic.

## 2018-04-24 NOTE — PROGRESS NOTES
SW met with patient and wife at bedside to follow up regarding their decisions for discharge planning. Patient expressed interest in having [facility] services at discharge for PT. Order, referral, and facetoface completed.

## 2018-04-25 NOTE — PROGRESS NOTES
Wife apparently called someone and obtained the phone # to the oxygen service. His oxygen is serviced by [id number] ([phone]). I spoke with Sterling Thomas at [address] and informed them of the need for a portable oxygen tank. She stated that she would notify his Hospice of the need and it would be delivered to his home today.

## 2018-04-25 NOTE — PROGRESS NOTES
Problem: Falls - Risk of  Goal: *Absence of Falls  Document Nehemiah Fall Risk and appropriate interventions in the flowsheet.    Outcome: Progressing Towards Goal  Fall Risk Interventions:  Mobility Interventions: Bed/chair exit alarm, Patient to call before getting OOB    Mentation Interventions: Bed/chair exit alarm, Adequate sleep, hydration, pain control    Medication Interventions: Patient to call before getting OOB, Bed/chair exit alarm    Elimination Interventions: Bed/chair exit alarm, Call light in reach, Patient to call for help with toileting needs    History of Falls Interventions: Bed/chair exit alarm

## 2018-04-25 NOTE — PROGRESS NOTES
04/25/18 0741   Oxygen Therapy   O2 Sat (%) 97 %   O2 Device Nasal cannula   O2 Flow Rate (L/min) 2 l/min  (decreased to 1)

## 2018-04-25 NOTE — PROGRESS NOTES
Problem: Mobility Impaired (Adult and Pediatric)  Goal: *Acute Goals and Plan of Care (Insert Text)  Discharge Goals:  (1.)Mr. Navarro will move from supine to sit and sit to supine , scoot up and down and roll side to side with INDEPENDENT within 7 treatment day(s). (2.)Mr. Navarro will transfer from bed to chair and chair to bed with SUPERVISION using the least restrictive device within 7 treatment day(s). (3.)Mr. Navarro will ambulate with SUPERVISION for 250+ feet with the least restrictive device within 7 treatment day(s). ________________________________________________________________________________________________      PHYSICAL THERAPY: Daily Note, Treatment Day: 2nd, AM 4/25/2018  INPATIENT: Hospital Day: 4  Payor: [facility] [place] [facility] H[place]/PPO / Product Type: Managed Care Medicare /      NAME/AGE/GENDER: Cherelle Perez is a 80 y.o. male   PRIMARY DIAGNOSIS: Altered mental state  Hypoxia Altered mental state Altered mental state        ICD-10: Treatment Diagnosis:    · Generalized Muscle Weakness (M62.81)  · Difficulty in walking, Not elsewhere classified (R26.2)  · History of falling (Z91.81)   Precaution/Allergies:  Review of patient's allergies indicates no known allergies. ASSESSMENT:     Mr. Kelly Khan was sitting in chair at arrival  agreed to participate. Pt on 2L O2 and 89% prior to session. Stood with SBA, ambulated 250ft on 2L with 4 standing rests to check sats, each time he was at 91-92% on 2L. Returned to room and went to chair. Multiple cues for breathing during ambulation. Pt improving with gait distance and O2 during activity. Replaced 2L after session. This section established at most recent assessment   PROBLEM LIST (Impairments causing functional limitations):  1. Decreased Strength  2. Decreased ADL/Functional Activities  3. Decreased Transfer Abilities  4. Decreased Ambulation Ability/Technique  5.  Decreased Balance  6. Decreased Activity Tolerance  7. Decreased Pacing Skills  8. Increased Fatigue  9. Increased Shortness of Breath  10. Decreased [place] with Home Exercise Program   INTERVENTIONS PLANNED: (Benefits and precautions of physical therapy have been discussed with the patient.)  1. Balance Exercise  2. Bed Mobility  3. Family Education  4. Gait Training  5. Home Exercise Program (HEP)  6. Neuromuscular Re-education/Strengthening  7. Therapeutic Activites  8. Therapeutic Exercise/Strengthening  9. Transfer Training  10. Group Therapy     TREATMENT PLAN: Frequency/Duration: 3 times a week for duration of hospital stay  Rehabilitation Potential For Stated Goals: Good     RECOMMENDED REHABILITATION/EQUIPMENT: (at time of discharge pending progress): Due to the probability of continued deficits (see above) this patient will likely need continued skilled physical therapy after discharge. Equipment:    None at this time              HISTORY:   History of Present Injury/Illness (Reason for Referral):  See H&P below  Patient is a 80 y.o. male who presents to the ER due to [address]. Started in the afternoon and has just worsened. Family reports that he is \"talking out of his head\". He has multiple significant medical problems- see below. No other neuro signs/symptoms noted. Pt cannot provide history due to AMS. No known fever/chills, n/v/d, chest pain or SOB. Past Medical History/Comorbidities:   Mr. Jasmyn Rodriguez  has a past medical history of Atherosclerosis of artery of extremity with ulceration ([address] (4/17/2015); Atherosclerosis of native arteries of extremity with intermittent claudication ([address] (4/17/2015); Atopic dermatitis (11/21/2012); Atrial fibrillation ([address] CAD (coronary artery disease); Carotid stenosis, bilateral (11/21/2012); CHF (congestive heart failure) ([address] (11/21/2012); Chronic kidney disease; Chronic obstructive pulmonary disease ([address] Colon, diverticulosis (1/24/2015);  Coronary atherosclerosis of native coronary vessel (10/31/2016); Diabetes ([facility] Diastolic CHF, acute on chronic (HCC) (9/12/2015); Failed CABG (coronary artery bypass graft) (11/21/2012); GI bleed (1/2015); Gout (11/21/2012); History of tobacco use; [place] (hard of hearing); Hypercholesterolemia; Hypertension; Hyperuricemia (11/21/2012); Morbid obesity ([facility] PAD (peripheral artery disease) ([facility] (11/21/2012); Poor historian; Radiologic findings of lung field, abnormal (10/31/2016); Seizure disorder ([facility] (8/5/2013); Shortness of breath dyspnea (8/5/2013); Thyroid disease; and Unspecified sleep apnea. He also has no past medical history of Arthritis; Asthma; Cancer ([facility] Liver disease; Nausea & vomiting; PUD (peptic ulcer disease); or Stroke ([facility] Mr. Wendy Boone  has a past surgical history that includes pr cardiac surg procedure unlist; hx coronary artery bypass graft (06-); hx cataract removal (Left, 2003); hx heart catheterization; hx coronary stent placement (02-); hx heent (1970s); hx colonoscopy; and colonoscopy (N/A, 1/27/2017).   Social History/Living Environment:   Home Environment: Private residence  # Steps to Enter: 1  One/Two Story Residence: One story  Living Alone: No  Support Systems: Family member(s), Spouse/Significant Other/Partner  Patient Expects to be Discharged to[de-identified] Private residence  Current DME Used/Available at Home: Shower chair, Walker, rolling, Grab bars (walking stick)  Tub or Shower Type: Tub/Shower combination  Prior Level of Function/Work/Activity:  Lives with wife, use of rollator for gait, indep with ADLs, 1 fall, drives   Number of Personal Factors/Comorbidities that affect the Plan of Care: 3+: HIGH COMPLEXITY   EXAMINATION:   Most Recent Physical Functioning:   Gross Assessment:                  Posture:     Balance:  Sitting: Intact  Standing: Impaired  Standing - Static: Good  Standing - Dynamic : Good Bed Mobility:     Wheelchair Mobility:     Transfers:  Sit to Stand: Supervision  Stand to Sit: Supervision  Gait:     Distance (ft): 250 Feet (ft)  Assistive Device: Walker, rolling  Ambulation - Level of Assistance: Stand-by assistance      Body Structures Involved:  1. Nerves  2. Heart  3. Lungs  4. Bones  5. Joints  6. Muscles  7. Ligaments Body Functions Affected:  1. Sensory/Pain  2. Cardio  3. Respiratory  4. Neuromusculoskeletal  5. Movement Related Activities and Participation Affected:  1. General Tasks and Demands  2. Mobility  3. Self Care  4. Domestic Life  5. Interpersonal Interactions and Relationships  6. Community, Social and [place]   Number of elements that affect the Plan of Care: 4+: HIGH COMPLEXITY   CLINICAL PRESENTATION:   Presentation: Evolving clinical presentation with changing clinical characteristics: MODERATE COMPLEXITY   CLINICAL DECISION MAKIN [facility] Mobility Inpatient Short Form  How much difficulty does the patient currently have. .. Unable A Lot A Little None   1. Turning over in bed (including adjusting bedclothes, sheets and blankets)? [] 1   [] 2   [x] 3   [] 4   2. Sitting down on and standing up from a chair with arms ( e.g., wheelchair, bedside commode, etc.)   [] 1   [] 2   [x] 3   [] 4   3. Moving from lying on back to sitting on the side of the bed? [] 1   [] 2   [x] 3   [] 4   How much help from another person does the patient currently need. .. Total A Lot A Little None   4. Moving to and from a bed to a chair (including a wheelchair)? [] 1   [] 2   [x] 3   [] 4   5. Need to walk in hospital room? [] 1   [] 2   [x] 3   [] 4   6. Climbing 3-5 steps with a railing? [] 1   [] 2   [x] 3   [] 4   © , Trustees of [address] [place] [address] [address] [address], under license to [employer]. All rights reserved      Score:  Initial: 18 Most Recent: X (Date: -- )    Interpretation of Tool:  Represents activities that are increasingly more difficult (i.e. Bed mobility, Transfers, Gait).    Score 24 23 22-20 19-15 14-10 9-7 6     Modifier CH CI CJ CK CL CM CN      ? Mobility - Walking and Moving Around:     - CURRENT STATUS: CK - 40%-59% impaired, limited or restricted    - GOAL STATUS: CJ - 20%-39% impaired, limited or restricted    - D/C STATUS:  ---------------To be determined---------------  [place]or[place] [facility] [place] / [place] [facility] [place] / Product Type: Managed Care Medicare /      Medical Necessity:     · Patient is expected to demonstrate progress in strength, balance, coordination and functional technique to decrease assistance required with gait, transfers, and functional mobility. .  Reason for Services/Other Comments:  · Patient continues to require skilled intervention due to decreased strength, decreased balance, decreased functional tolerance, decreased cardiopulmonary endurance affecting participation in basic ADLs and functional tasks. Use of outcome tool(s) and clinical judgement create a POC that gives a: Clear prediction of patient's progress: LOW COMPLEXITY            TREATMENT:   (In addition to Assessment/Re-Assessment sessions the following treatments were rendered)   Pre-treatment Symptoms/Complaints:  Fatigue, weakness  Pain: Initial:   Pain Intensity 1: 0  Post Session:  0/10     Therapeutic Activity: (    24min):  Therapeutic activities including Bed transfers, Chair transfers, Toilet transfers and Ambulation on level ground to improve mobility, strength, balance and coordination. Required minimal   to promote static and dynamic balance in standing, promote coordination of bilateral, lower extremity(s) and promote motor control of bilateral, lower extremity(s).          Braces/Orthotics/Lines/Etc:   · O2 Device: Nasal cannula  Treatment/Session Assessment:    · Response to Treatment:  Ambulated 30 ft in room with RW and CGA  · Interdisciplinary Collaboration:   o Physical Therapist  o Registered Nurse  · After treatment position/precautions:   o Up in chair  o Bed alarm/tab alert on  o Bed/Chair-wheels locked  o Bed in low position  o Call light within reach  o RN notified   · Compliance with Program/Exercises: Will assess as treatment progresses. · Recommendations/Intent for next treatment session: \"Next visit will focus on advancements to more challenging activities and reduction in assistance provided\".   Total Treatment Duration:  PT Patient Time In/Time Out  Time In: 1105  Time Out: [address] [facility] [place]ran, DPT

## 2018-04-25 NOTE — DISCHARGE SUMMARY
Hospitalist Discharge Summary     Admit Date:  2018  2:23 AM   Name:  Earl Jain   Age:  80 y.o.  :  1932   MRN:  [id number]   PCP:  Richard Banerjee MD  Treatment Team: Attending Provider: Philippe Sosa MD; Primary Nurse: Hanane Olvera; Consulting Provider: [place] MD; Care Manager: Jose Eduardo Holder    Problem List for this Hospitalization:  Hospital Problems as of 2018  Date Reviewed: 4/3/2018          Codes Class Noted - Resolved POA    Acute respiratory failure with hypoxia ([facility] ICD-10-CM: J96.01  ICD-9-CM: 518.81  2018 - Present Yes        * (Principal)Altered mental state ICD-10-CM: R41.82  ICD-9-CM: 780.97  2018 - Present Yes        Venous stasis dermatitis of both lower extremities (Chronic) ICD-10-CM: I87.2  ICD-9-CM: 454.1  2018 - Present Yes        Type 2 diabetes with nephropathy ([facility] (Chronic) ICD-10-CM: E11.21  ICD-9-CM: 250.40, 583.81  2018 - Present Yes        Restrictive lung disease (Chronic) ICD-10-CM: J98.4  ICD-9-CM: 518.89  2017 - Present Yes        Hypoxia ICD-10-CM: R09.02  ICD-9-CM: 799.02  2017 - Present Yes        Thrombocytopenia ([facility] (Chronic) ICD-10-CM: D69.6  ICD-9-CM: 287.5  2017 - Present Yes        Type 2 diabetes mellitus with peripheral neuropathy ([facility] (Chronic) ICD-10-CM: E11.42  ICD-9-CM: 250.60, 357.2  2015 - Present Yes        Chronic diastolic congestive heart failure (HCC) (Chronic) ICD-10-CM: I50.32  ICD-9-CM: 428.32, 428.0  2015 - Present Yes    Overview Addendum 2017  3:44 PM by Feroz Willingham MD     1. Echo (9/11/15) : EF 55-60%. Mild LVH. Moderate biatrial enlargement. Moderate mitral/tricuspid regurgitation. 2.  Echo (17):  EF 55-60%. Moderate LAE. Mild mitral stenosis. Moderate tricuspid regurgitation. RVSP 35-40.               ROBERTO on CPAP (Chronic) ICD-10-CM: G47.33, Z99.89  ICD-9-CM: 327.23, V46.8  2015 - Present Yes    Overview Signed 2015 10:42 AM by Sarah Broderick     Patient is on BiPAP, no supplementary oxygen             Morbid obesity ([facility] (Chronic) ICD-10-CM: E66.01  ICD-9-CM: 278.01  1/21/2015 - Present Yes        CKD (chronic kidney disease) stage 3, GFR 30-59 ml/min (Chronic) ICD-10-CM: N18.3  ICD-9-CM: 585.3  9/18/2013 - Present Yes        Debility (Chronic) ICD-10-CM: R53.81  ICD-9-CM: 799.3  8/5/2013 - Present Yes        Essential hypertension (Chronic) ICD-10-CM: I10  ICD-9-CM: 401.9  11/21/2012 - Present Yes        Persistent atrial fibrillation (HCC) (Chronic) ICD-10-CM: I48.1  ICD-9-CM: 427.31  11/21/2012 - Present Yes    Overview Addendum 11/22/2016  8:14 PM by Clive Reynoso MD     Coumadin therapy discontinued due to recurrent GI bleeding. COPD (chronic obstructive pulmonary disease) (HCC) (Chronic) ICD-10-CM: J44.9  ICD-9-CM: 496  11/21/2012 - Present Yes        Carotid stenosis, bilateral (Chronic) ICD-10-CM: G51.47  ICD-9-CM: 433.10, 433.30  11/21/2012 - Present Yes    Overview Addendum 11/22/2016  8:13 PM by Clive Reynoso MD     1. Carotid Doppler (6/1/07): Greater than 70% stenosis in proximal LICA. 50% stenosis in right ICA. 2.  CTA of neck (3/15/10): Occluded distal segments of the vertebral arteries bilaterally. Atherosclerosis of the carotid bulbs bilaterally with a 50% stenosis on the left and a stenosis of less than 30% on the right. Small nodule in the right upper lobe near the apex. 3. CTA (8/29/13):  Less than 30% diameter stenosis of the cervical internal carotid arteries bilaterally.              RESOLVED: Seizure disorder ([address] [facility] (Chronic) ICD-10-CM: G40.909  ICD-9-CM: 345.90  5/22/2014 - 4/22/2018 Yes                Admission HPI from 4/22/2018:    \"  See HPI for more details\"    Hospital Course:    79 yo male with PMH of HTN,afib (no AC due to recurrent GI bleeding), CAD, carotid stenosis, dCHF, CKD,COPD/chronic hypoxic respiratory failure on 2L NC, ROBERTO on CPAP , evaluated with altered mentation. Per family there is no prior dementia but they are concerned about polypharmacy. CT head negative. MRI brain attempted however he did not tolerate due to inability to lay flat. CXR shows bibasilar densities/ vascular congestion. ECHO EF 50-55% with right to left shunt noted. Carotid duplex shows right ICA 50-69% and left ICA 70-99% stenosis. He was seen by psychiatry who felt mentation changes were due to delirium. Patient had acute hypoxic respiratory failure likely secondary to acute diastolic HF. Patient also had a component of COPD exacerbation. Patient was started on IV lasix, nebs and steroids with resolution of the symptoms. Patient symptoms improved and O2 was weaned to 1L NC. Patient and wife were advised to follow up appointments as scheduled. Upon discharge patient was AAO x3 following commands appropiately. AMS likely delirium from hypoxia, less likely stroke. Patient to follow up with vascular for chronic carotids stenosis. Patient needs to follow up with cardiology for PFO and diastolic HF. Follow up instructions below. Plan was discussed with Patient/family/ careteam.  All questions answered. Patient was stable at time of discharge and was instructed to call or return if there are any concerns or recurrence of symptoms. Diagnostic Imaging/Tests: All Micro Results     None          Labs: Results:       BMP, Mg, Phos Recent Labs      04/25/18   0525  04/24/18   1301  04/23/18   0519   NA  142  143  142   K  4.2  4.2  4.1   CL  101  101  104   CO2  33*  36*  30   AGAP  8  6*  8   BUN  39*  34*  34*   CREA  1.72*  1.76*  1.68*   CA  8.7  8.6  8.4   GLU  131*  174*  105*   MG   --    --   2.5*      CBC Recent Labs      04/23/18   0519   WBC  6.6   RBC  4.85   HGB  12.9*   HCT  41.4   PLT  145*      LFT No results for input(s): SGOT, ALT, TBIL, AP, TP, ALB, GLOB, AGRAT, GPT in the last 72 hours.    Cardiac Testing Lab Results   Component Value Date/Time     02/27/2018 11:30 AM     02/20/2018 11:10 AM     01/31/2018 12:38 PM     01/21/2016 12:23 PM     09/21/2015 02:38 PM     09/09/2015 10:10 AM    CK 91 05/06/2015 07:55 PM     09/21/2013 06:35 AM    Troponin-I <0.05 03/28/2012 03:00 PM    Troponin-I, Qt. 0.02 04/22/2018 02:39 AM    Troponin-I, Qt. 0.03 01/31/2018 10:52 PM    Troponin-I, Qt. 0.03 01/31/2018 03:15 PM      Coagulation Tests Lab Results   Component Value Date/Time    Prothrombin time 11.5 01/26/2017 10:05 AM    Prothrombin time 11.0 01/22/2017 12:03 PM    Prothrombin time 27.8 (H) 09/12/2015 07:05 AM    INR 1.1 01/26/2017 10:05 AM    INR 1.0 01/22/2017 12:03 PM    INR 2.5 (H) 09/12/2015 07:05 AM    aPTT 28.5 01/26/2017 10:05 AM    aPTT 37.1 (H) 07/13/2008 05:40 AM    aPTT 46.9  HEPARIN THERAPY RANGE:  48 - 75 SECONDS (H) 05/05/2008 05:02 PM      A1c Lab Results   Component Value Date/Time    Hemoglobin A1c 6.1 (H) 04/23/2018 05:19 AM    Hemoglobin A1c 6.3 (H) 02/06/2018 09:40 AM    Hemoglobin A1c 8.0 (H) 11/03/2017 12:12 PM      Lipid Panel Lab Results   Component Value Date/Time    Cholesterol, total 85 04/23/2018 05:19 AM    HDL Cholesterol 43 04/23/2018 05:19 AM    LDL, calculated 28.8 04/23/2018 05:19 AM    VLDL, calculated 13.2 04/23/2018 05:19 AM    Triglyceride 66 04/23/2018 05:19 AM    CHOL/HDL Ratio 2.0 04/23/2018 05:19 AM      Thyroid Panel Lab Results   Component Value Date/Time    T4, Total 8.0 11/05/2008 04:49 PM    T3 Uptake 31 11/05/2008 04:49 PM    TSH 4.980 (H) 04/23/2018 05:19 AM    TSH 8.150 (H) 05/15/2015 11:30 PM        Most Recent UA Lab Results   Component Value Date/Time    Color YELLOW 04/23/2018 05:50 PM    Appearance CLEAR 04/23/2018 05:50 PM    Specific gravity 1.009 04/23/2018 05:50 PM    pH (UA) 5.5 04/23/2018 05:50 PM    Protein NEGATIVE  04/23/2018 05:50 PM    Glucose NEGATIVE  04/23/2018 05:50 PM    Ketone NEGATIVE  04/23/2018 05:50 PM    Bilirubin NEGATIVE  04/23/2018 05:50 PM Blood NEGATIVE  04/23/2018 05:50 PM    Urobilinogen 0.2 04/23/2018 05:50 PM    Nitrites NEGATIVE  04/23/2018 05:50 PM    Leukocyte Esterase NEGATIVE  04/23/2018 05:50 PM        No Known Allergies  Immunization History   Administered Date(s) Administered    Influenza High Dose Vaccine PF 10/24/2016, 08/29/2017    Influenza Vaccine 10/01/2010, 09/01/2012, 09/01/2013, 01/26/2015    Influenza Vaccine (Quad) PF 09/11/2015    Influenza Vaccine Whole 10/01/2007    Pneumococcal Conjugate (PCV-13) 10/19/2015    Pneumococcal Vaccine (Unspecified Type) 01/01/2015    TB Skin Test (PPD) Intradermal 05/07/2015, 09/17/2016, 01/22/2017, 11/01/2017, 04/22/2018    ZZZ-RETIRED (DO NOT USE) Pneumococcal Vaccine (Unspecified Type) 10/01/2006, 11/21/2011       All Labs from Last 24 Hrs:  Recent Results (from the past 24 hour(s))   METABOLIC PANEL, BASIC    Collection Time: 04/24/18  1:01 PM   Result Value Ref Range    Sodium 143 136 - 145 mmol/L    Potassium 4.2 3.5 - 5.1 mmol/L    Chloride 101 98 - 107 mmol/L    CO2 36 (H) 21 - 32 mmol/L    Anion gap 6 (L) 7 - 16 mmol/L    Glucose 174 (H) 65 - 100 mg/dL    BUN 34 (H) 8 - 23 MG/DL    Creatinine 1.76 (H) 0.8 - 1.5 MG/DL    GFR est AA 47 (L) >60 ml/min/1.73m2    GFR est non-AA 39 (L) >60 ml/min/1.73m2    Calcium 8.6 8.3 - 10.4 MG/DL   GLUCOSE, POC    Collection Time: 04/24/18  4:09 PM   Result Value Ref Range    Glucose (POC) 166 (H) 65 - 100 mg/dL   GLUCOSE, POC    Collection Time: 04/24/18  9:29 PM   Result Value Ref Range    Glucose (POC) 216 (H) 65 - 664 mg/dL   METABOLIC PANEL, BASIC    Collection Time: 04/25/18  5:25 AM   Result Value Ref Range    Sodium 142 136 - 145 mmol/L    Potassium 4.2 3.5 - 5.1 mmol/L    Chloride 101 98 - 107 mmol/L    CO2 33 (H) 21 - 32 mmol/L    Anion gap 8 7 - 16 mmol/L    Glucose 131 (H) 65 - 100 mg/dL    BUN 39 (H) 8 - 23 MG/DL    Creatinine 1.72 (H) 0.8 - 1.5 MG/DL    GFR est AA 49 (L) >60 ml/min/1.73m2    GFR est non-AA 40 (L) >60 ml/min/1.73m2 Calcium 8.7 8.3 - 10.4 MG/DL   PLEASE READ & DOCUMENT PPD TEST IN 72 HRS    Collection Time: 04/25/18  5:56 AM   Result Value Ref Range    PPD negative Negative    mm Induration 0 mm   GLUCOSE, POC    Collection Time: 04/25/18  7:44 AM   Result Value Ref Range    Glucose (POC) 135 (H) 65 - 100 mg/dL   GLUCOSE, POC    Collection Time: 04/25/18 11:21 AM   Result Value Ref Range    Glucose (POC) 164 (H) 65 - 100 mg/dL       Discharge Exam:  Patient Vitals for the past 24 hrs:   Temp Pulse Resp BP SpO2   04/25/18 1131 98.4 °F (36.9 °C) 64 20 150/67 91 %   04/25/18 0851 - 65 - - -   04/25/18 0741 - - - - 97 %   04/25/18 0726 97.9 °F (36.6 °C) (!) 51 20 160/72 99 %   04/25/18 0400 97.5 °F (36.4 °C) (!) 55 16 160/68 96 %   04/25/18 0000 98.8 °F (37.1 °C) 62 17 154/64 95 %   04/24/18 2133 - - - - 93 %   04/24/18 2000 98.3 °F (36.8 °C) 63 16 160/75 91 %   04/24/18 1603 98.2 °F (36.8 °C) 60 20 158/83 94 %   04/24/18 1353 - - - - 97 %     Oxygen Therapy  O2 Sat (%): 91 % (04/25/18 1131)  Pulse via Oximetry: 55 beats per minute (04/25/18 0400)  O2 Device: Nasal cannula (04/25/18 0741)  O2 Flow Rate (L/min): 2 l/min (decreased to 1) (04/25/18 0741)  FIO2 (%): 21 % (04/23/18 0214)    Intake/Output Summary (Last 24 hours) at 04/25/18 1221  Last data filed at 04/25/18 0909   Gross per 24 hour   Intake              500 ml   Output              670 ml   Net             -170 ml       General:    Well nourished. Alert. No distress. Eyes:   Normal sclera. Extraocular movements intact. ENT:  Normocephalic, atraumatic. Moist mucous membranes  CV:   Regular rate and rhythm. No murmur, rub, or gallop. Lungs:  Clear to auscultation bilaterally. Abdomen: Soft, nontender, nondistended. Bowel sounds normal.   Extremities: Warm and dry. Trace pedal edema  Neurologic: CN II-XII grossly intact. Sensation intact.  ambulatory      Discharge Info:   Current Discharge Medication List      START taking these medications    Details bisacodyl (DULCOLAX) 5 mg EC tablet Take 1 Tab by mouth daily. Qty: 15 Tab, Refills: 0      clopidogrel (PLAVIX) 75 mg tab Take 1 Tab by mouth daily. Qty: 30 Tab, Refills: 1      famotidine (PEPCID) 20 mg tablet Take 1 Tab by mouth daily. Qty: 30 Tab, Refills: 0      predniSONE (DELTASONE) 20 mg tablet Take 2 Tabs by mouth daily (with breakfast). Qty: 3 Tab, Refills: 0         CONTINUE these medications which have CHANGED    Details   furosemide (LASIX) 80 mg tablet Take 1 Tab by mouth two (2) times a day. Qty: 60 Tab, Refills: 1      aspirin 81 mg chewable tablet Take 1 Tab by mouth daily. Qty: 90 Tab, Refills: 3         CONTINUE these medications which have NOT CHANGED    Details   albuterol (PROVENTIL VENTOLIN) 2.5 mg /3 mL (0.083 %) nebulizer solution 2.5 mg by Nebulization route every four (4) hours as needed for Wheezing. latanoprost (XALATAN) 0.005 % ophthalmic solution Administer 1 Drop to both eyes nightly. HYDROcodone-acetaminophen (NORCO)  mg tablet Take 1 Tab by mouth every eight (8) hours as needed for Pain. Max Daily Amount: 3 Tabs. Qty: 60 Tab, Refills: 0    Associated Diagnoses: Other chronic pain      oxyCODONE IR (OXY-IR) 15 mg immediate release tablet Take 1 Tab by mouth every eight (8) hours as needed for Pain. Max Daily Amount: 45 mg.  Qty: 90 Tab, Refills: 0    Associated Diagnoses: Other chronic pain      sertraline (ZOLOFT) 50 mg tablet Take one tablet each evening for anxiety  Indications: Generalized Anxiety Disorder  Qty: 30 Tab, Refills: 3      fluticasone (FLONASE) 50 mcg/actuation nasal spray 2 Sprays by Both Nostrils route daily. Qty: 1 Bottle, Refills: 3      levothyroxine (SYNTHROID) 50 mcg tablet Take 1 Tab by mouth Daily (before breakfast). Qty: 90 Tab, Refills: 3      hydrALAZINE (APRESOLINE) 10 mg tablet TAKE 1 TABLET THREE TIMES DAILY. Qty: 90 Tab, Refills: 3      cholecalciferol (VITAMIN D3) 1,000 unit cap Take 1,000 Units by mouth daily. pravastatin (PRAVACHOL) 40 mg tablet Take 1 Tab by mouth nightly. Qty: 90 Tab, Refills: 3    Associated Diagnoses: Mixed hyperlipidemia      triamcinolone acetonide (KENALOG) 0.1 % ointment Apply  to affected area two (2) times a day. Qty: 80 g, Refills: 0      OTHER One pair compression stocking gradient 20 - 30 mm hg  Please measure to fit  Qty: 1 Each, Refills: 1      gabapentin (NEURONTIN) 100 mg capsule Take 1 Cap by mouth three (3) times daily. Qty: 270 Cap, Refills: 3      budesonide-formoterol (SYMBICORT) 160-4.5 mcg/actuation HFA inhaler Take 2 Puffs by inhalation two (2) times a day. Qty: 3 Inhaler, Refills: 3      ipratropium-albuterol (COMBIVENT RESPIMAT)  mcg/actuation inhaler Take 1 Puff by inhalation every six (6) hours. Qty: 3 Inhaler, Refills: 3      triamcinolone (ARISTOCORT) 0.5 % topical cream Apply  to affected area two (2) times a day. use thin layer  Qty: 15 g, Refills: 0      mupirocin calcium (BACTROBAN) 2 % topical cream Apply  to affected area two (2) times a day. Qty: 15 g, Refills: 0      metoprolol succinate (TOPROL-XL) 25 mg XL tablet Pt takes 1/2 tab po daily. Qty: 45 Tab, Refills: 3      magnesium oxide (MAG-OX) 400 mg tablet Take 1 Tab by mouth daily. Qty: 90 Tab, Refills: 3      ferrous sulfate 324 mg (65 mg iron) tablet Take  by mouth Daily (before breakfast). allopurinol (ZYLOPRIM) 300 mg tablet Take  by mouth daily. docusate sodium (STOOL SOFTENER) 100 mg capsule Take 100 mg by mouth as needed. cpap machine kit by Does Not Apply route. Bilevel 12/8      isosorbide mononitrate ER (IMDUR) 30 mg tablet Take 1 Tab by mouth daily. Qty: 30 Tab, Refills: 5      glipiZIDE (GLUCOTROL) 5 mg tablet Take 5 mg by mouth two (2) times a day. K-DUR 20 mEq tablet Take 20 mEq by mouth daily.          STOP taking these medications       sAXagliptin (ONGLYZA) 2.5 mg tablet Comments:   Reason for Stopping:                 Disposition: home  Activity: PT/OT per Home Health  Diet: Cardiac Diet    Follow-up Information     Follow up With Details Comments [address] [facility]  They will contact you within 48 hours [facility]. [address] [facility]  [address] [facility]  Hellen Pacheco MD In 3 days  [address] [facility] [address] [facility] [address] In 1 week  [address] [place]   [address] [facility] [address] [facility]  [phone]    [facility] In 2 weeks chronic carotid stenosis [address]. [address] [facility]  [phone]              Signed:   Anabelle Hercules MD

## 2018-04-25 NOTE — PROGRESS NOTES
Spoke with patient and wife regarding suggestion for portable oxygen tank at home. Wife states they already have a service at home that will provide him a portable oxygen tank but they do not remember the name of the company that services his oxygen. Wife was instructed to call the company as soon as she gets home to have them deliver a tank to the home. Wife verbalized understanding. O2 sats were 91% while ambulating on room air prior to discharge. Care Management Interventions  PCP Verified by CM: Yes  Mode of Transport at Discharge:  Other (see comment)  Transition of Care Consult (CM Consult): [address]: Yes  Discharge Durable Medical Equipment: No (Patient has a hospital bed, rollator, cane, and shower chair at home.)  Physical Therapy Consult: Yes  Occupational Therapy Consult: Yes  Speech Therapy Consult: Yes  Current Support Network: Lives with Spouse, Own Home, Family Lives Nearby  Confirm Follow Up Transport: Family  Plan discussed with Pt/Family/Caregiver: Yes  Freedom of Choice Offered: Yes  Discharge Location  Discharge Placement: Home with home health

## 2018-04-25 NOTE — DISCHARGE INSTRUCTIONS
Chronic Obstructive Pulmonary Disease (COPD): Care Instructions  Your Care Instructions    Chronic obstructive pulmonary disease (COPD) is a general term for a group of lung diseases, including emphysema and chronic bronchitis. People with COPD have decreased airflow in and out of the lungs, which makes it hard to breathe. The airways also can get clogged with thick mucus. Cigarette smoking is a major cause of COPD. Although there is no cure for COPD, you can slow its progress. Following your treatment plan and taking care of yourself can help you feel better and live longer. Follow-up care is a key part of your treatment and safety. Be sure to make and go to all appointments, and call your doctor if you are having problems. It's also a good idea to know your test results and keep a list of the medicines you take. How can you care for yourself at home? ?Staying healthy  ? · Do not smoke. This is the most important step you can take to prevent more damage to your lungs. If you need help quitting, talk to your doctor about stop-smoking programs and medicines. These can increase your chances of quitting for good. ? · Avoid colds and flu. Get a pneumococcal vaccine shot. If you have had one before, ask your doctor whether you need a second dose. Get the flu vaccine every fall. If you must be around people with colds or the flu, wash your hands often. ? · Avoid secondhand smoke, air pollution, and high altitudes. Also avoid cold, dry air and hot, humid air. Stay at home with your windows closed when air pollution is bad. ?Medicines and oxygen therapy  ? · Take your medicines exactly as prescribed. Call your doctor if you think you are having a problem with your medicine. ? · You may be taking medicines such as:  ¨ Bronchodilators. These help open your airways and make breathing easier. Bronchodilators are either short-acting (work for 6 to 9 hours) or long-acting (work for 24 hours).  You inhale most bronchodilators, so they start to act quickly. Always carry your quick-relief inhaler with you in case you need it while you are away from home. ¨ Corticosteroids (prednisone, budesonide). These reduce airway inflammation. They come in pill or inhaled form. You must take these medicines every day for them to work well. ? · A spacer may help you get more inhaled medicine to your lungs. Ask your doctor or pharmacist if a spacer is right for you. If it is, ask how to use it properly. ? · Do not take any vitamins, over-the-counter medicine, or herbal products without talking to your doctor first.   ? · If your doctor prescribed antibiotics, take them as directed. Do not stop taking them just because you feel better. You need to take the full course of antibiotics. ? · Oxygen therapy boosts the amount of oxygen in your blood and helps you breathe easier. Use the flow rate your doctor has recommended, and do not change it without talking to your doctor first.   Activity  ? · Get regular exercise. Walking is an easy way to get exercise. Start out slowly, and walk a little more each day. ? · Pay attention to your breathing. You are exercising too hard if you cannot talk while you are exercising. ? · Take short rest breaks when doing household chores and other activities. ? · Learn breathing methods-such as breathing through pursed lips-to help you become less short of breath. ? · If your doctor has not set you up with a pulmonary rehabilitation program, talk to him or her about whether rehab is right for you. Rehab includes exercise programs, education about your disease and how to manage it, help with diet and other changes, and emotional support. Diet  ? · Eat regular, healthy meals. Use bronchodilators about 1 hour before you eat to make it easier to eat. Eat several small meals instead of three large ones. Drink beverages at the end of the meal. Avoid foods that are hard to chew.    ? · Eat foods that contain protein so that you do not lose muscle mass. ? · Talk with your doctor if you gain too much weight or if you lose weight without trying. ?Mental health  ? · Talk to your family, friends, or a therapist about your feelings. It is normal to feel frightened, angry, hopeless, helpless, and even guilty. Talking openly about bad feelings can help you cope. If these feelings last, talk to your doctor. When should you call for help? Call 911 anytime you think you may need emergency care. For example, call if:  ? · You have severe trouble breathing. ?Call your doctor now or seek immediate medical care if:  ? · You have new or worse trouble breathing. ? · You cough up blood. ? · You have a fever. ? Watch closely for changes in your health, and be sure to contact your doctor if:  ? · You cough more deeply or more often, especially if you notice more mucus or a change in the color of your mucus. ? · You have new or worse swelling in your legs or belly. ? · You are not getting better as expected. Where can you learn more? Go to http://angelina-rajiv.info/. Berto Leonard in the search box to learn more about \"Chronic Obstructive Pulmonary Disease (COPD): Care Instructions. \"  Current as of: May 12, 2017  Content Version: 11.4  © 1604-6749 [employer]. Care instructions adapted under license by [employer] (which disclaims liability or warranty for this information). If you have questions about a medical condition or this instruction, always ask your healthcare professional. [place] [address] any warranty or liability for your use of this information. Altered Mental Status: Care Instructions  Your Care Instructions  Altered mental status is a change in how well your brain is working. As a result, you may be confused, be less alert than usual, or act in odd ways.  This may include seeing or hearing things that aren't really there (hallucinations). A mental status change has many possible causes. For example, it may be the result of an infection, an imbalance of chemicals in the body, or a chronic disease such as diabetes or COPD. It can also be caused by things such as a head injury, taking certain medicines, or using alcohol or drugs. The doctor may do tests to look for the cause. These tests may include urine tests, blood tests, and imaging tests such as a CT scan. Sometimes a clear cause isn't found. But tests can help the doctor rule out a serious cause of your symptoms. A change in mental status can be scary. But mental status will often return to normal when the cause is treated. So it is important to get any follow-up testing or treatment the doctor has suggested. The doctor has checked you carefully, but problems can develop later. If you notice any problems or new symptoms, get medical treatment right away. Follow-up care is a key part of your treatment and safety. Be sure to make and go to all appointments, and call your doctor if you are having problems. It's also a good idea to know your test results and keep a list of the medicines you take. How can you care for yourself at home? · Be safe with medicines. Take your medicines exactly as prescribed. Call your doctor if you think you are having a problem with your medicine. · Have another adult stay with you until you are better. This can help keep you safe. Ask that person to watch for signs that your mental status is getting worse. When should you call for help? Call 911 anytime you think you may need emergency care. For example, call if:  · You passed out (lost consciousness). Call your doctor now or seek immediate medical care if:  · Your mental status is getting worse. · You have new symptoms, such as a fever, chills, or shortness of breath. · You do not feel safe.   Watch closely for changes in your health, and be sure to contact your doctor if:  · You do not get better as expected. Where can you learn more? Go to Identify.be  Enter J452 in the search box to learn more about \"Altered Mental Status: Care Instructions. \"   © 9519-1713 [employer]. Care instructions adapted under license by [facility] (which disclaims liability or warranty for this information). This care instruction is for use with your licensed healthcare professional. If you have questions about a medical condition or this instruction, always ask your healthcare professional. [place] [address] any warranty or liability for your use of this information. Content Version: 32.8.931096; Current as of: November 20, 2015    DISCHARGE SUMMARY from Nurse    PATIENT INSTRUCTIONS:    After general anesthesia or intravenous sedation, for 24 hours or while taking prescription Narcotics:  · Limit your activities  · Do not drive and operate hazardous machinery  · Do not make important personal or business decisions  · Do  not drink alcoholic beverages  · If you have not urinated within 8 hours after discharge, please contact your surgeon on call. Report the following to your surgeon:  · Excessive pain, swelling, redness or odor of or around the surgical area  · Temperature over 100.5  · Nausea and vomiting lasting longer than 4 hours or if unable to take medications  · Any signs of decreased circulation or nerve impairment to extremity: change in color, persistent  numbness, tingling, coldness or increase pain  · Any questions    What to do at Home:  Recommended activity: activity as tolerated, diet as tolerated. *  Please give a list of your current medications to your Primary Care Provider. *  Please update this list whenever your medications are discontinued, doses are      changed, or new medications (including over-the-counter products) are added. *  Please carry medication information at all times in case of emergency situations.     These are general instructions for a healthy lifestyle:    No smoking/ No tobacco products/ Avoid exposure to second hand smoke  Surgeon General's Warning:  Quitting smoking now greatly reduces serious risk to your health. Obesity, smoking, and sedentary lifestyle greatly increases your risk for illness    A healthy diet, regular physical exercise & weight monitoring are important for maintaining a healthy lifestyle    You may be retaining fluid if you have a history of heart failure or if you experience any of the following symptoms:  Weight gain of 3 pounds or more overnight or 5 pounds in a week, increased swelling in our hands or feet or shortness of breath while lying flat in bed. Please call your doctor as soon as you notice any of these symptoms; do not wait until your next office visit. Recognize signs and symptoms of STROKE:    F-face looks uneven    A-arms unable to move or move unevenly    S-speech slurred or non-existent    T-time-call 911 as soon as signs and symptoms begin-DO NOT go       Back to bed or wait to see if you get better-TIME IS BRAIN. Warning Signs of HEART ATTACK     Call 911 if you have these symptoms:   Chest discomfort. Most heart attacks involve discomfort in the center of the chest that lasts more than a few minutes, or that goes away and comes back. It can feel like uncomfortable pressure, squeezing, fullness, or pain.  Discomfort in other areas of the upper body. Symptoms can include pain or discomfort in one or both arms, the back, neck, jaw, or stomach.  Shortness of breath with or without chest discomfort.  Other signs may include breaking out in a cold sweat, nausea, or lightheadedness. Don't wait more than five minutes to call 911 - MINUTES MATTER! Fast action can save your life. Calling 911 is almost always the fastest way to get lifesaving treatment.  Emergency Medical Services staff can begin treatment when they arrive -- up to an hour sooner than if someone gets to the hospital by car. The discharge information has been reviewed with the patient. The patient verbalized understanding. Discharge medications reviewed with the patient and appropriate educational materials and side effects teaching were provided.   ___________________________________________________________________________________________________________________________________

## 2018-04-25 NOTE — PROGRESS NOTES
Discharge order noted, pt may require portable O2 at discharge, and will need his wife here for review of discharge instructions. Spoke to pt's son, unsure of company that supplies O2 concentrator at home (no portable tanks) but the son is going to try and get in touch with pt's wife and then call this RN back.

## 2018-04-25 NOTE — PROGRESS NOTES
Problem: Falls - Risk of  Goal: *Absence of Falls  Document Nehemiah Fall Risk and appropriate interventions in the flowsheet.    Outcome: Progressing Towards Goal  Fall Risk Interventions:  Mobility Interventions: Bed/chair exit alarm    Mentation Interventions: Bed/chair exit alarm    Medication Interventions: Patient to call before getting OOB    Elimination Interventions: Bed/chair exit alarm    History of Falls Interventions: Bed/chair exit alarm

## 2018-04-25 NOTE — PROGRESS NOTES
Discharge instructions  given and explained to pt and wife. Prescriptions electronically sent to preferred pharmacy. Pt and wife verbalized understanding. Medication side effects sheet reviewed with pt. Pt to be discharged home.

## 2018-04-26 NOTE — PROGRESS NOTES
Transition of Care Discharge Follow-up Questionnaire   Date/Time of Call:   April 26, 2018 1:02PM   What was the patient hospitalized for? Altered Mental Status       Does the patient understand his/her diagnosis and/or treatment and what happened during the hospitalization? Patient's spouse states understanding of patient diagnosis and treatment plan during hospitalization     Did the patient receive discharge instructions? Yes   CM Assessed Risk for Readmission:       Patient stated Risk for Readmission:      Yes      Patient's spouse states patient has no risk for readmission. Review any discharge instructions (see discharge instructions/AVS in [facility]). Ask patient if they understand these. Do they have any questions? Patient's spouse states understanding of patient discharge instructions, patient's spouse states no questions. Were home services ordered (nursing, PT, OT, ST, etc.)? Yes, home health services ordered with [facility] (PT). If so, has the first visit occurred? If not, why? (Assist with coordination of services if necessary.)   Patient's spouse stated she was unsure of when home health would be out for first visit. Care Coordinator called [facility] @ [phone], spoke with Kathy Mcgill who informed CC that patient would receive a call later today or in the morning informing patient of appointment date/time. Care Coordinator called and informed patient's spouse of home health details. Was any DME ordered? Patient's spouse states Portable Oxygen Tank ordered. If so, has it been received? If not, why?  (Assist patient in obtaining DME orders &/or equipment if necessary.) Patient's spouse states Portable Oxygen Tank is scheduled for delivery today. Complete a review of all medications (new, continued and discontinued meds per the D/C instructions and medication tab in [address] [place] [address]).  Care Coordinator reviewed all medications with patient's spouse per connect care.      START taking these medications     Details   bisacodyl (DULCOLAX) 5 mg EC tablet 04/25/201 Take 1 Tab by mouth daily. Qty: 15 Tab, Refills: 0       clopidogrel (PLAVIX) 75 mg tab  04/26/2018 Take 1 Tab by mouth daily. Qty: 30 Tab, Refills: 1       famotidine (PEPCID) 20 mg tablet 04/26/2018 Take 1 Tab by mouth daily. Qty: 30 Tab, Refills: 0       predniSONE (DELTASONE) 20 mg tablet  04/26/2018 Take 2 Tabs by mouth daily (with breakfast). Qty: 3 Tab, Refills: 0     CONTINUE these medications which have CHANGED 04/25/2018     Details   furosemide (LASIX) 80 mg tablet Take 1 Tab by mouth two (2) times a day. Qty: 60 Tab, Refills: 1       aspirin 81 mg chewable tablet Take 1 Tab by mouth daily. Qty: 90 Tab, Refills: 3         STOP taking these medications         sAXagliptin (ONGLYZA) 2.5 mg tablet Comments:   Reason for Stopping:               Were all new prescriptions filled? If not, why?  (Assist patient in obtaining medications if necessary  escalate for CCM &/or SW if ongoing issues are verbalized by pt or anticipated)   Yes         Does the patient understand the purpose and dosing instructions for all medications? (If patient has questions, provide explanation and education.)   Patient's spouse states understanding of patient current medications and dosing instructions. Care Coordinator educated patient's spouse on the importance of ensuring medication compliance and reporting medication side effects to patient's PCP/Specialist.    Does the patient have any problems in performing ADLs? (If patient is unable to perform ADLs  what is the limiting factor(s)? Do they have a support system that can assist? If no support system is present, discuss possible assistance that they may be able to obtain. Escalate for CCM/SW if ongoing issues are verbalized by pt or anticipated)   Patient's spouse states patient is independent with ADL's and uses a Rollator to assist with ambulation.  Patient's spouse states patient is doing well and states no shortness of breath or mental status changes. Patient's spouse states patient only uses oxygen when he is resting or at bedtime. Patient's spouse states she is assisting patient as needed. Does the patient have all follow-up appointments scheduled? 7 day f/up with PCP?   (f/up with PCP may be w/in 14 days if patient has a f/up with their specialist w/in 7 days)    7-14 day f/up with specialist?   (or per discharge instructions)    If f/up has not been made  what actions has the care coordinator made to accomplish this? Has transportation been arranged? Yes      5/1/2018 3:45 PM Fantasma Martínez MD R Dylan [phone]       5/2/2018 11:30 AM MD Katharine Alvarado [phone]     5/9/2018 1:00 PM GUILLERMO Mayers [facility]     Yes   Any other questions or concerns expressed by the patient? No further needs identified, patient's spouse instructed to call Care Coordinator if further questions or concerns arise. Schedule next appointment with SJ ANDINO Coordinator or refer to RN Case Manager/ per the workflow guidelines. When is care coordinators next follow-up call scheduled? If referred for CCM  what RN care manager was the referral assigned? NA            NA        NA   OSCAR Call Completed By: ROLO Ambrosio ACO  Care Coordinator         This note will not be viewable in [address]

## 2018-08-01 PROBLEM — N18.9 ACUTE ON CHRONIC RENAL FAILURE (HCC): Status: ACTIVE | Noted: 2018-01-01

## 2018-08-01 PROBLEM — E16.2 HYPOGLYCEMIA: Status: ACTIVE | Noted: 2018-01-01

## 2018-08-01 PROBLEM — N17.9 ACUTE ON CHRONIC RENAL FAILURE (HCC): Status: ACTIVE | Noted: 2018-01-01

## 2018-08-01 NOTE — ED TRIAGE NOTES
Pt arrives via EMS with a shake/tic/tremor mainly in upper body and hands since yesterday, no hx of parkinsons. Pt's wife was admitted with pancreatitis yesterday here. Pt states BGL was 70, HR 64, /41, O2 96%

## 2018-08-01 NOTE — H&P
Hospitalist H&P/Consult Note Admit Date:  2018 12:02 PM  
Name:  Jereld Fothergill Age:  80 y.o. 
:  1932 MRN:  [id number] PCP:  Vernon Fritz MD 
Treatment Team: Attending Provider: Kennth Fleischer, DO; Primary Nurse: Roxane Barakat HPI:  
80years old M with PMH of afib not on AC due to GI bleed, COPD, DM, D-CHF, CKD presented to the hospital for worsening tremors of upper extremities since last night. Granddaughter stated symptoms lasted whole night. Tremors usually come and go, last just few seconds. Granddaughter reported patient has been very anxious after his wife was hospitalized at [facility]. Patient reported he feels better after arriving to the ED. Family stated patient is lightly drowsy after medication given at ED for sedation. Family stated he is also having poor PO intake at home for the last day. Patient denies productive cough, fever, urinary symptoms, SOB, abdominal pain, new medication or chest pain. In the ED labs revealed a Cr: 4.5, baseline 1.7. Patient also had hypoglycemia requiring PO diet. CXR did not show any acute disease. 10 systems reviewed and negative except as noted in HPI. Past Medical History:  
Diagnosis Date  Atherosclerosis of artery of extremity with ulceration ([facility] [address] 2015  Atherosclerosis of native arteries of extremity with intermittent claudication ([facility] [address] 2015  Atopic dermatitis 2012  Atrial fibrillation ([facility] [address]  CAD (coronary artery disease)  Carotid stenosis, bilateral 2012 50-79%  3-    1. Carotid Doppler (07): Greater than 70% stenosis in proximal LICA. 50% stenosis in right ICA. 2.  CTA of neck (3/15/10): Occluded distal segments of the vertebral arteries bilaterally. Atherosclerosis of the carotid bulbs bilaterally with a 50% stenosis on the left and a stenosis of less than 30% on the right. Small nodule in the right upper lobe near the apex.  3. CTA (13):  Less than 30% diameter stenosis of the cervical internal carotid arteries bilaterally.  CHF (congestive heart failure) ([address] 11/21/2012  Chronic kidney disease   
 hx elevated labs  Chronic obstructive pulmonary disease ([address]  Colon, diverticulosis 1/24/2015  Coronary atherosclerosis of native coronary vessel 10/31/2016 1. Cath (5/31/07):  LMCA: 25%. LAD: ostial 75-95% stenosis. 50-75% mid. D1:  25-50%. OM3: small with 75% stenosis. RCA: 100% mid. with left to right collaterals. 2 Vessel CABG (6/4/07):  LIMA to LAD and SVG to OM. SVG was anastomosed proximally to LIMA to due severe atherosclerosis of aorta. 3.  Lexiscan cardiolite (11/30/10): Abnormal with reversible lateral wall defects. Unable to gate given atrial fibrillation. 4.  LHC (1/14/11):  EF 60%. LVEDP 21. Patent LIMA to LAD and SVG to OM1 (off LIMA). occluded small RCA with left to right collaterals. Small D1 (2 mm vessel) with 70% mid stenosis.  Diabetes ([address]  Diastolic CHF, acute on chronic (HCC) 9/12/2015 1. Echo (9/11/15) : EF 55-60%. Mild LVH. Moderate biatrial enlargement. Moderate mitral/tricuspid regurgitation.  Failed CABG (coronary artery bypass graft) 11/21/2012  GI bleed 1/2015 Hospitalized [facility]  Gout 11/21/2012  History of tobacco use  [place] (hard of hearing)  Hypercholesterolemia  Hypertension  Hyperuricemia 11/21/2012  Morbid obesity ([address]  PAD (peripheral artery disease) ([address] 11/21/2012 1. Bilateral proximal common iliac PCI (2/21/08):  8.0 X 100 mm Cordis smart stent on right and 10 X 40 mm cordis smart stent on right. Both inflated to 7.0 mm.  Poor historian  Radiologic findings of lung field, abnormal 10/31/2016 1. CT of chest  (11/24/10): Multiple small nodules in the right lobe and stable interstitial prominence. Consistent with chronic lung disease. No evidence of malignancy.  Seizure disorder ([address] 8/5/2013  Shortness of breath dyspnea 2013  Thyroid disease  Unspecified sleep apnea Past Surgical History:  
Procedure Laterality Date  CARDIAC SURG PROCEDURE UNLIST    
 cath ,cabg ,lexiscan cardiolite 11/10  
 COLONOSCOPY N/A 2017 COLONOSCOPY  BMI 36 TO BE ADMITTED 17 performed by Evie Newsome MD at [facility] ENDOSCOPY  
 HX CATARACT REMOVAL Left   
 os  HX COLONOSCOPY    
 HX CORONARY ARTERY BYPASS GRAFT  2007  HX CORONARY STENT PLACEMENT  2008  
 bilateral iliac artery PCI and stents  HX HEART CATHETERIZATION    
 left-2007, 2011  HX HEENT  1970s  
 neck lipoma Prior to Admission Medications Prescriptions Last Dose Informant Patient Reported? Taking? HYDROcodone-acetaminophen (NORCO)  mg tablet   No No  
Sig: Take 1 Tab by mouth two (2) times daily as needed for Pain. Max Daily Amount: 2 Tabs. K-DUR 20 mEq tablet   Yes No  
Sig: Take 20 mEq by mouth daily. OTHER   No No  
Sig: One pair compression stocking gradient 20 - 30 mm hg 
Please measure to fit OXYGEN-AIR DELIVERY SYSTEMS   Yes No  
Sig: by Does Not Apply route. 2LPM every day  
albuterol (PROVENTIL VENTOLIN) 2.5 mg /3 mL (0.083 %) nebulizer solution   Yes No  
Si.5 mg by Nebulization route every four (4) hours as needed for Wheezing. allopurinol (ZYLOPRIM) 300 mg tablet   Yes No  
Sig: Take  by mouth daily. aspirin 81 mg chewable tablet   No No  
Sig: Take 1 Tab by mouth daily. bisacodyl (DULCOLAX) 5 mg EC tablet   No No  
Sig: Take 1 Tab by mouth daily. budesonide-formoterol (SYMBICORT) 160-4.5 mcg/actuation HFA inhaler   No No  
Sig: Take 2 Puffs by inhalation two (2) times a day. calcium carbonate (CALCIUM 600 PO)   Yes No  
Sig: Take  by mouth. cholecalciferol (VITAMIN D3) 1,000 unit cap   Yes No  
Sig: Take 1,000 Units by mouth daily. clopidogrel (PLAVIX) 75 mg tab   No No  
Sig: Take 1 Tab by mouth daily.   
cpap machine kit   Yes No  
Sig: by Does Not Apply route. Bilevel   
docusate sodium (STOOL SOFTENER) 100 mg capsule   Yes No  
Sig: Take 100 mg by mouth as needed. famotidine (PEPCID) 20 mg tablet   No No  
Sig: Take 1 Tab by mouth daily. ferrous sulfate 324 mg (65 mg iron) tablet   Yes No  
Sig: Take  by mouth Daily (before breakfast). fluticasone (FLONASE) 50 mcg/actuation nasal spray   No No  
Si Sprays by Both Nostrils route daily. furosemide (LASIX) 80 mg tablet   No No  
Sig: Take 1 Tab by mouth two (2) times a day.  
gabapentin (NEURONTIN) 100 mg capsule   No No  
Sig: Take 1 Cap by mouth three (3) times daily. glipiZIDE (GLUCOTROL) 5 mg tablet   Yes No  
Sig: Take 5 mg by mouth daily. hydrALAZINE (APRESOLINE) 10 mg tablet   No No  
Sig: TAKE 1 TABLET THREE TIMES DAILY. ipratropium-albuterol (COMBIVENT RESPIMAT)  mcg/actuation inhaler   No No  
Sig: Take 1 Puff by inhalation every six (6) hours. isosorbide mononitrate ER (IMDUR) 30 mg tablet   No No  
Sig: Take 1 Tab by mouth daily. latanoprost (XALATAN) 0.005 % ophthalmic solution   Yes No  
Sig: Administer 1 Drop to both eyes nightly. levothyroxine (SYNTHROID) 50 mcg tablet   No No  
Sig: Take 1 Tab by mouth Daily (before breakfast). magnesium oxide (MAG-OX) 400 mg tablet   No No  
Sig: Take 1 Tab by mouth daily. metoprolol succinate (TOPROL-XL) 25 mg XL tablet   No No  
Sig: Pt takes 1/2 tab po daily. mupirocin calcium (BACTROBAN) 2 % topical cream   No No  
Sig: Apply  to affected area two (2) times a day. oxyCODONE IR (OXY-IR) 15 mg immediate release tablet   No No  
Sig: Take 1 Tab by mouth two (2) times daily as needed for Pain. Max Daily Amount: 30 mg.  
pantoprazole (PROTONIX) 40 mg granules for oral suspension   Yes No  
Si mg daily. pravastatin (PRAVACHOL) 40 mg tablet   No No  
Sig: Take 1 Tab by mouth nightly.   
sertraline (ZOLOFT) 50 mg tablet   No No  
Sig: Take one tablet each evening for anxiety  Indications: Generalized Anxiety Disorder triamcinolone (ARISTOCORT) 0.5 % topical cream   No No  
Sig: Apply  to affected area two (2) times a day. use thin layer  
triamcinolone acetonide (KENALOG) 0.1 % ointment   No No  
Sig: Apply  to affected area two (2) times a day. zolpidem (AMBIEN) 5 mg tablet   Yes No  
Sig: Take  by mouth nightly as needed for Sleep. Facility-Administered Medications: None No Known Allergies Social History Substance Use Topics  Smoking status: Former Smoker Packs/day: 1.00 Years: 45.00 Types: Cigarettes Quit date: 1984  Smokeless tobacco: Never Used Comment: (stopped smoking in )  Alcohol use No  
  
Family History Problem Relation Age of Onset  Heart Attack Mother Tobi Parra Other Father   
  old age  Other Brother   
  brain aneurysm  Heart Disease Other 2 children  with heart concerns, 36 & 47 yo  
 Heart Disease Son Immunization History Administered Date(s) Administered  Influenza High Dose Vaccine PF 10/24/2016, 2017  Influenza Vaccine 10/01/2010, 2012, 2013, 2015  Influenza Vaccine (Quad) PF 2015  Influenza Vaccine Whole 10/01/2007  Pneumococcal Conjugate (PCV-13) 10/19/2015  Pneumococcal Vaccine (Unspecified Type) 2015  TB Skin Test (PPD) Intradermal 2015, 2016, 2017, 2017, 2018  ZZZ-RETIRED (DO NOT USE) Pneumococcal Vaccine (Unspecified Type) 10/01/2006, 2011 Objective:  
Patient Vitals for the past 24 hrs: 
 Temp Pulse Resp BP SpO2  
18 1447 - 60 - 128/56 -  
18 1434 - 62 - (!) 146/106 -  
18 1352 - - - 128/58 91 % 18 1211 - - - - 91 % 18 1210 - - - (!) 150/97 -  
18 1201 97.7 °F (36.5 °C) 60 18 131/63 92 % Oxygen Therapy O2 Sat (%): 91 % (18 1352) Pulse via Oximetry: 67 beats per minute (18 1447) O2 Device: Room air (18 1201) No intake or output data in the 24 hours ending 08/01/18 1602 Physical Exam: 
General:    Well nourished. drowsy Eyes:   Normal sclera. Extraocular movements intact. ENT:  Normocephalic, atraumatic. Moist mucous membranes CV:   Irregular  No murmur, rub, or gallop. Lungs:  CTAB. No wheezing, rhonchi, or rales. Abdomen: Soft, nontender, nondistended. Bowel sounds normal.  
Extremities: Warm and dry. No cyanosis or edema. Neurologic: CN II-XII grossly intact. Sensation intact. Moving upper and lower extremities. AAO x3. Exam limited as drowsy Skin:     No rashes or jaundice. No wounds. Psych:  Normal mood and affect. I reviewed the labs and other studies done this admission. Data Review:  
Recent Results (from the past 24 hour(s)) EKG, 12 LEAD, INITIAL Collection Time: 08/01/18  2:00 PM  
Result Value Ref Range Ventricular Rate 55 BPM  
 Atrial Rate 202 BPM  
 QRS Duration 96 ms  
 Q-T Interval 470 ms QTC Calculation (Bezet) 449 ms Calculated R Axis 48 degrees Calculated T Axis 177 degrees Diagnosis    
  !! AGE AND GENDER SPECIFIC ECG ANALYSIS !! Atrial fibrillation with slow ventricular response ST & T wave abnormality, consider inferolateral ischemia or digitalis effect Abnormal ECG When compared with ECG of 22-APR-2018 02:45, Nonspecific T wave abnormality has replaced inverted T waves in Inferior  
leads CBC WITH AUTOMATED DIFF Collection Time: 08/01/18  2:08 PM  
Result Value Ref Range WBC 9.7 4.3 - 11.1 K/uL  
 RBC 4.89 4.23 - 5.67 M/uL  
 HGB 12.9 (L) 13.6 - 17.2 g/dL HCT 40.6 (L) 41.1 - 50.3 % MCV 83.0 79.6 - 97.8 FL  
 MCH 26.4 26.1 - 32.9 PG  
 MCHC 31.8 31.4 - 35.0 g/dL  
 RDW 15.8 (H) 11.9 - 14.6 % PLATELET 758 100 - 837 K/uL MPV 11.3 10.8 - 14.1 FL  
 DF AUTOMATED NEUTROPHILS 88 (H) 43 - 78 % LYMPHOCYTES 9 (L) 13 - 44 % MONOCYTES 2 (L) 4.0 - 12.0 % EOSINOPHILS 1 0.5 - 7.8 % BASOPHILS 0 0.0 - 2.0 % IMMATURE GRANULOCYTES 0 0.0 - 5.0 %  
 ABS.  NEUTROPHILS 8.6 (H) 1.7 - 8.2 K/UL  
 ABS. LYMPHOCYTES 0.9 0.5 - 4.6 K/UL  
 ABS. MONOCYTES 0.2 0.1 - 1.3 K/UL  
 ABS. EOSINOPHILS 0.1 0.0 - 0.8 K/UL  
 ABS. BASOPHILS 0.0 0.0 - 0.2 K/UL  
 ABS. IMM. GRANS. 0.0 0.0 - 0.5 K/UL METABOLIC PANEL, COMPREHENSIVE Collection Time: 08/01/18  2:08 PM  
Result Value Ref Range Sodium 138 136 - 145 mmol/L Potassium 5.1 3.5 - 5.1 mmol/L Chloride 98 98 - 107 mmol/L  
 CO2 30 21 - 32 mmol/L Anion gap 10 7 - 16 mmol/L Glucose 43 (L) 65 - 100 mg/dL BUN 66 (H) 8 - 23 MG/DL Creatinine 4.56 (H) 0.8 - 1.5 MG/DL  
 GFR est AA 16 (L) >60 ml/min/1.73m2 GFR est non-AA 13 (L) >60 ml/min/1.73m2 Calcium 8.7 8.3 - 10.4 MG/DL Bilirubin, total 1.2 (H) 0.2 - 1.1 MG/DL  
 ALT (SGPT) 13 12 - 65 U/L  
 AST (SGOT) 15 15 - 37 U/L Alk. phosphatase 140 (H) 50 - 136 U/L Protein, total 7.3 6.3 - 8.2 g/dL Albumin 2.6 (L) 3.2 - 4.6 g/dL Globulin 4.7 (H) 2.3 - 3.5 g/dL A-G Ratio 0.6 (L) 1.2 - 3.5 MAGNESIUM Collection Time: 08/01/18  2:08 PM  
Result Value Ref Range Magnesium 2.9 (H) 1.8 - 2.4 mg/dL TSH 3RD GENERATION Collection Time: 08/01/18  2:08 PM  
Result Value Ref Range TSH 1.940 0.358 - 3.740 uIU/mL Imaging Studies: CXR Results  (Last 48 hours) 08/01/18 1311  XR CHEST PA LAT Final result Impression:  IMPRESSION: Cardiac silhouette enlarged without evidence of active failure. Narrative:  Two-view chest x-ray August 1, 2018 Reference exam: May 9, 2018 INDICATION: Chest pain and short of breath FINDINGS: An AP upright and 2 lateral views are presented, cardiac silhouette is  
still felt to be enlarged with median sternotomy wires present, pulmonary  
vascularity and lung fields appear clear with increased AP dimension of  
flattened diaphragms suggesting COPD change. Demineralization is seen in the  
bones demineralization is seen in the bones. CT Results  (Last 48 hours)  08/01/18 1324  CT HEAD WO CONT Final result Impression:  Impression:  Normal CT of the brain for age done without contrast.   
   
   
  
 Narrative:  CT Brain Without Contrast Dated  August 1, 2018 Reference Exam: April 22, 2018 Indication: Tremor in upper body in hand since yesterday Technique: Radiation dose reduction techniques were used for this study using  
one or more of the following: automated exposure control; adjustment of mA  
and/or kV (according to patient size);  iterative reconstruction. Routine CT of  
the brain was performed using 5 mm axial images obtained. Images are presumed to  
have been obtained less than 24 hours from presentation. Findings:  The ventricular system, basilar cisterns, and cortical sulci all are  
normal in size and position for the patient's age. There are no abnormal  
intracranial masses, mass effect, nor extra-axial collections seen. There are  
no abnormal areas of attenuation that would indicate a recent intracranial  
hemorrhage or infarction. Bone windows show no fractures nor foreign bodies. Vascular calcifications are noted again. Assessment and Plan:  
 
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 
ICD-9-CM: 250.40, 583.81  2/12/2018 - Present Yes ROBERTO on CPAP (Chronic) ICD-10-CM: G47.33, Z99.89 ICD-9-CM: 327.23, V46.8  2/20/2015 - Present Yes Overview Signed 2/20/2015 10:42 AM by Eli Roberts Patient is on BiPAP, no supplementary oxygen CKD (chronic kidney disease) stage 3, GFR 30-59 ml/min (Chronic) ICD-10-CM: N18.3 ICD-9-CM: 585.3  9/18/2013 - Present Yes Persistent atrial fibrillation (HCC) (Chronic) ICD-10-CM: I48.1 ICD-9-CM: 427.31  11/21/2012 - Present Yes Overview Addendum 11/22/2016  8:14 PM by Mini Rebolledo MD  
  Coumadin therapy discontinued due to recurrent GI bleeding. COPD (chronic obstructive pulmonary disease) (HCC) (Chronic) ICD-10-CM: J44.9 ICD-9-CM: 953  11/21/2012 - Present Yes A/P: 
 
-Acute on chronic kidney injury ? Poor PO intake On lasix at home for d-CHF Baseline Cr 1.7 Plan Start hydration with IVFs for 12 hours( CHF) Get US kidneys and place alejo cath for strict I&O Consider Nephro evaluation if not improvement -Hypoglycemia On glipizide at home, ? Not excreted in the urine due to ALFREDITO CXR with no infection reported Start D5 with IVFs. Get UA to rule out infection 
 
-Upper extremity tremors ? Due to hypoglycemia CT brain with no acute disease TSH wnl Get EEG 
 
-ROBERTO Restart CPAP 
 
-Afib Rate controlled Restart home meds 
 
-COPD No active wheezing Restart inhalers 
 
-DM Sliding scale for now 
 
-Chronic D-CHF Appears compensated Hold lasix DVT ppx : heparin Code status: full Estimated LOS:  1-2 nights Signed: Gale Pineda MD

## 2018-08-01 NOTE — ED PROVIDER NOTES
HPI Comments: 80-year-old male patient presents with reports of intermittent tremors involving bilateral upper extremities, face and trunk. Patient states his symptoms started last evening. They've been intermittent in nature coming every several seconds. These tremors last seconds prior to resolution. Denies any alleviating or exacerbating factors. Denies any pain with this tremors. Patient denies associated fever, chills, chest pain, shortness of breath, abdominal pain or changes in bowel or bladder habits. He reports no new medications or changes in medication dose. He reports previously similar symptoms to a much less degree lasting a small amount of time in the past.  He has no formal diagnosis to explain these tremors. Of note, patient's wife is currently admitted to the hospital with pancreatitis. In the bedside states he has been anxious about this issue. Patient is a 80 y.o. male presenting with tremors. The history is provided by the patient and a relative. Tremors This is a new problem. The current episode started 6 to 12 hours ago. The problem occurs constantly. The problem has not changed since onset. The patient is experiencing no pain. Pertinent negatives include no fever, no diarrhea, no nausea, no vomiting, no dysuria, no hematuria, no headaches, no myalgias, no chest pain and no back pain. Past Medical History:  
Diagnosis Date  Atherosclerosis of artery of extremity with ulceration ([address] 4/17/2015  Atherosclerosis of native arteries of extremity with intermittent claudication ([address] 4/17/2015  Atopic dermatitis 11/21/2012  Atrial fibrillation ([address]  CAD (coronary artery disease)  Carotid stenosis, bilateral 11/21/2012 50-79%  3-2010    1. Carotid Doppler (6/1/07): Greater than 70% stenosis in proximal LICA. 50% stenosis in right ICA. 2.  CTA of neck (3/15/10): Occluded distal segments of the vertebral arteries bilaterally.  Atherosclerosis of the carotid bulbs bilaterally with a 50% stenosis on the left and a stenosis of less than 30% on the right. Small nodule in the right upper lobe near the apex. 3. CTA (8/29/13):  Less than 30% diameter stenosis of the cervical internal carotid arteries bilaterally.  CHF (congestive heart failure) ([address] 11/21/2012  Chronic kidney disease   
 hx elevated labs  Chronic obstructive pulmonary disease ([address]  Colon, diverticulosis 1/24/2015  Coronary atherosclerosis of native coronary vessel 10/31/2016 1. Cath (5/31/07):  LMCA: 25%. LAD: ostial 75-95% stenosis. 50-75% mid. D1:  25-50%. OM3: small with 75% stenosis. RCA: 100% mid. with left to right collaterals. 2 Vessel CABG (6/4/07):  LIMA to LAD and SVG to OM. SVG was anastomosed proximally to LIMA to due severe atherosclerosis of aorta. 3.  Lexiscan cardiolite (11/30/10): Abnormal with reversible lateral wall defects. Unable to gate given atrial fibrillation. 4.  LHC (1/14/11):  EF 60%. LVEDP 21. Patent LIMA to LAD and SVG to OM1 (off LIMA). occluded small RCA with left to right collaterals. Small D1 (2 mm vessel) with 70% mid stenosis.  Diabetes ([address]  Diastolic CHF, acute on chronic (HCC) 9/12/2015 1. Echo (9/11/15) : EF 55-60%. Mild LVH. Moderate biatrial enlargement. Moderate mitral/tricuspid regurgitation.  Failed CABG (coronary artery bypass graft) 11/21/2012  GI bleed 1/2015 Hospitalized [facility]  Gout 11/21/2012  History of tobacco use  [place] (hard of hearing)  Hypercholesterolemia  Hypertension  Hyperuricemia 11/21/2012  Morbid obesity ([address]  PAD (peripheral artery disease) ([address] 11/21/2012 1. Bilateral proximal common iliac PCI (2/21/08):  8.0 X 100 mm Cordis smart stent on right and 10 X 40 mm cordis smart stent on right. Both inflated to 7.0 mm.  Poor historian  Radiologic findings of lung field, abnormal 10/31/2016 1. CT of chest  (11/24/10):    Multiple small nodules in the right lobe and stable interstitial prominence. Consistent with chronic lung disease. No evidence of malignancy.  Seizure disorder ([address] 2013  Shortness of breath dyspnea 2013  Thyroid disease  Unspecified sleep apnea Past Surgical History:  
Procedure Laterality Date  CARDIAC SURG PROCEDURE UNLIST    
 cath ,cabg ,lexiscan cardiolite 11/10  
 COLONOSCOPY N/A 2017 COLONOSCOPY  BMI 36 TO BE ADMITTED 17 performed by Jimy Figueroa MD at [facility] ENDOSCOPY  
 HX CATARACT REMOVAL Left   
 os  HX COLONOSCOPY    
 HX CORONARY ARTERY BYPASS GRAFT  2007  HX CORONARY STENT PLACEMENT  2008  
 bilateral iliac artery PCI and stents  HX HEART CATHETERIZATION    
 left-2007, 2011  HX HEENT  1970s  
 neck lipoma Family History:  
Problem Relation Age of Onset  Heart Attack Mother [de-identified] Other Father   
  old age  Other Brother   
  brain aneurysm  Heart Disease Other 2 children  with heart concerns, 36 & 49 yo  
 Heart Disease Son   
 
 
Social History Social History  Marital status:  Spouse name: N/A  
 Number of children: N/A  
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  
 Drug use: No  
 Sexual activity: Not Currently Other Topics Concern  Not on file Social History Narrative 45 pack year history cigarette smoking, stopped in . 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 , two living children, two children  with coronary artery disease, ages 36 and 48. Has always lived in [place]. No pets. ALLERGIES: Review of patient's allergies indicates no known allergies. Review of Systems Constitutional: Negative for chills, diaphoresis and fever. HENT: Negative for congestion, sneezing and sore throat. Eyes: Negative for visual disturbance. Respiratory: Negative for cough, chest tightness, shortness of breath and wheezing. Cardiovascular: Negative for chest pain and leg swelling. Gastrointestinal: Negative for abdominal pain, blood in stool, diarrhea, nausea and vomiting. Endocrine: Negative for polyuria. Genitourinary: Negative for difficulty urinating, dysuria, flank pain, hematuria and urgency. Musculoskeletal: Negative for back pain, myalgias, neck pain and neck stiffness. Skin: Negative for color change and rash. Neurological: Positive for tremors. Negative for dizziness, syncope, speech difficulty, weakness, light-headedness, numbness and headaches. Psychiatric/Behavioral: Negative for behavioral problems. All other systems reviewed and are negative. Vitals:  
 08/01/18 1201 BP: 131/63 Pulse: 60 Resp: 18 Temp: 97.7 °F (36.5 °C) SpO2: 92% Weight: 112.5 kg (248 lb) Height: 5' 10\" (1.778 m) Physical Exam  
Constitutional: He is oriented to person, place, and time. He appears well-developed and well-nourished. No distress. Alert and oriented to person, place and time. Mild acute distress. Speaks in clear, fluent sentences. Intermittent twitching  Noted to bilateral upper extremities, face and trunk. Patient moaning intermittently but denies pain. HENT:  
Head: Normocephalic and atraumatic. Right Ear: External ear normal.  
Left Ear: External ear normal.  
Nose: Nose normal.  
Mouth/Throat: Oropharynx is clear and moist.  
Eyes: Conjunctivae and EOM are normal. Pupils are equal, round, and reactive to light. Neck: Normal range of motion. Neck supple. No JVD present. No tracheal deviation present.   
Cardiovascular: Normal rate, regular rhythm, S1 normal, S2 normal, normal heart sounds and intact distal pulses. Exam reveals no gallop, no distant heart sounds and no friction rub. No murmur heard. Pulmonary/Chest: Effort normal and breath sounds normal. No accessory muscle usage or stridor. No tachypnea and no bradypnea. No respiratory distress. He has no decreased breath sounds. He has no wheezes. He has no rhonchi. He has no rales. He exhibits no tenderness. Abdominal: Soft. Normal appearance. He exhibits no distension and no mass. There is no hepatosplenomegaly, splenomegaly or hepatomegaly. There is no tenderness. There is no rigidity, no rebound, no guarding, no CVA tenderness, no tenderness at McBurney's point and negative Mandel's sign. Musculoskeletal: Normal range of motion. He exhibits no edema, tenderness or deformity. Neurological: He is alert and oriented to person, place, and time. He has normal strength. He displays tremor. No cranial nerve deficit or sensory deficit. GCS eye subscore is 4. GCS verbal subscore is 5. GCS motor subscore is 6. There is a tremor involving the bilateral upper extremities. This occurs at rest and involves both the left and right arm, face and trunk. Skin: Skin is warm and dry. No rash noted. He is not diaphoretic. Psychiatric: He has a normal mood and affect. His behavior is normal.  
Nursing note and vitals reviewed. MDM Number of Diagnoses or Management Options Acute renal failure, unspecified acute renal failure type [facility]): new and requires workup Hypoglycemia: new and requires workup Tremor: new and requires workup Diagnosis management comments: EKG obtained on arrival shows rate controlled atrial fibrillation with a rate of 55 beats a minute. No evidence of acute ischemic change. Consistent with prior tracings. CT imaging unremarkable, chest x-ray clear. Labs pending.  
 
On reevaluation, patient reports continued tremors intermittently but states his symptoms have improved. Sleeping soundly at this time. Wakes easily to voice. States he still feels poorly but has trouble describing his symptoms. Discussed findings of new onset acute renal failure with patient and family. Discussed plan for admission for further evaluation and continued IV hydration. Noted hypoglycemia on CMP as well, pt. Provided juice and sandwich, will monitor. Voice understanding and agreement with plan. Amount and/or Complexity of Data Reviewed Clinical lab tests: ordered and reviewed Tests in the radiology section of CPT®: ordered and reviewed Tests in the medicine section of CPT®: ordered and reviewed Discuss the patient with other providers: yes Independent visualization of images, tracings, or specimens: yes Risk of Complications, Morbidity, and/or Mortality Presenting problems: moderate Diagnostic procedures: low Management options: moderate Patient Progress Patient progress: stable ED Course Procedures

## 2018-08-01 NOTE — PROGRESS NOTES
Dual skin assessment done with ANGELA Guillermo. Pt to floor with 2L of O2. No skin breakdown seen. Skin tear to left forearm. Tattoo to left forearm. Pt has very fragile skin. Feet dry and flaky. Ecchymosis to BUE and BLE. Yeasty in skin folds and Nystatin applied. Adams was placed and an immediate 1000mL return of very dark tea colored urine. Bolus infusing and meds given. Pt and family oriented to room and verbalization of understanding of calling nurse or CNA for needs. Call light in reach. Pt eating dinner.

## 2018-08-01 NOTE — ED NOTES
TRANSFER - OUT REPORT: 
 
Verbal report given to [address] [place] [address] RN(name) on [place]  being transferred to 6th floor(unit) for routine progression of care Report consisted of patients Situation, Background, Assessment and  
Recommendations(SBAR). Information from the following report(s) ED Summary was reviewed with the receiving nurse. Lines:  
Peripheral IV 08/01/18 Right Antecubital (Active) Site Assessment Clean, dry, & intact 8/1/2018  2:08 PM  
Phlebitis Assessment 0 8/1/2018  2:08 PM  
Infiltration Assessment 0 8/1/2018  2:08 PM  
Hub Color/Line Status Pink;Patent; Flushed 8/1/2018  2:08 PM  
Action Taken Blood drawn 8/1/2018  2:08 PM  
  
 
Opportunity for questions and clarification was provided. Patient transported with: 
 O2 @ 2 liters

## 2018-08-01 NOTE — PROGRESS NOTES
made initial visit. Staff was working with pt getting him settled into his room and beginning helping him. Pt was alert, verbal, uncomfortable as a Rn was looking for a vein. Pt's son was present and stated that his mother, pt's wife was pt in rm 56.  welcomed them to DT and shared information about  services.  provided spiritual care through presence, pastoral conversation, and assurance of prayer.

## 2018-08-01 NOTE — PROGRESS NOTES
TRANSFER - IN REPORT: 
 
Verbal report received from ANGELA Coleman(name) on [facility]  being received from ED(unit) for routine progression of care Report consisted of patients Situation, Background, Assessment and  
Recommendations(SBAR). Information from the following report(s) SBAR, Kardex, Intake/Output, MAR and Recent Results was reviewed with the receiving nurse. Opportunity for questions and clarification was provided. Assessment completed upon patients arrival to unit and care assumed.

## 2018-08-02 PROBLEM — N17.9 AKI (ACUTE KIDNEY INJURY) (HCC): Status: ACTIVE | Noted: 2018-01-01

## 2018-08-02 NOTE — PROGRESS NOTES
Interdisciplinary Rounds completed 08/02/18. Nursing, Case Management, Physician and PT present. Plan of care reviewed and updated. Creatinine slowly improving.

## 2018-08-02 NOTE — PROGRESS NOTES
Pt is awake and confused. Hourly rounds were done and pt needs were met. Report will be given to day shift nurse and will continue to monitor.

## 2018-08-02 NOTE — PROGRESS NOTES
Leni Saunders granddaughter called and want to be called if anything happened. She is the care taker and her number is [phone].

## 2018-08-02 NOTE — PROGRESS NOTES
Pt BGL had not be stable all night. hospitalist was notify and the order was to hold the morning insulin and the morning team will address the issue. Will continue to monitor.

## 2018-08-02 NOTE — PROGRESS NOTES
Problem: Falls - Risk of 
Goal: *Absence of Falls Document Sunny Blank Fall Risk and appropriate interventions in the flowsheet. Outcome: Progressing Towards Goal 
Fall Risk Interventions: 
Mobility Interventions: Bed/chair exit alarm, OT consult for ADLs, Patient to call before getting OOB, PT Consult for mobility concerns Mentation Interventions: Bed/chair exit alarm, Door open when patient unattended, Increase mobility, More frequent rounding, Reorient patient, Toileting rounds Medication Interventions: Bed/chair exit alarm, Patient to call before getting OOB, Teach patient to arise slowly Elimination Interventions: Bed/chair exit alarm, Call light in reach, Patient to call for help with toileting needs, Toileting schedule/hourly rounds

## 2018-08-02 NOTE — PROGRESS NOTES
Problem: Mobility Impaired (Adult and Pediatric) Goal: *Acute Goals and Plan of Care (Insert Text) STG: 
(1.)Mr. Navarro will move from supine to sit and sit to supine , scoot up and down and roll side to side with MODERATE ASSIST within 3 treatment day(s). (2.)Mr. Navarro will transfer from bed to chair and chair to bed with MODERATE ASSIST with minimal cueing using the least restrictive device within 3 treatment day(s). (3.)Mr. Navarro will ambulate with MODERATE ASSIST for 10 feet with the least restrictive device within 3 treatment day(s). LTG: 
(1.)Mr. Navarro will move from supine to sit and sit to supine , scoot up and down and roll side to side in bed with MINIMAL ASSIST within 7 treatment day(s). (2.)Mr. Navarro will transfer from bed to chair and chair to bed with MINIMAL ASSIST using the least restrictive device within 7 treatment day(s). (3.)Mr. Navarro will ambulate with MINIMAL ASSIST for 100+ feet with the least restrictive device within 7 treatment day(s). ________________________________________________________________________________________________ PHYSICAL THERAPY: Initial Assessment, Treatment Day: Day of Assessment, PM 8/2/2018 INPATIENT: Hospital Day: 2 Payor: [facility] MEDICARE / Plan: [facility] Gall[facility] MEDICARE HMO/PPO / Product Type: Managed Care Medicare /  
  
NAME/AGE/GENDER: Sherlyn Blackwell is a 80 y.o. male PRIMARY DIAGNOSIS: Acute on chronic renal failure ([address] ALFREDITO (acute kidney injury) ([address] Acute on chronic renal failure ([facility] Acute on chronic renal failure ([facility] ICD-10: Treatment Diagnosis:  
 · Other abnormalities of gait and mobility (R26.89) Precaution/Allergies: 
Review of patient's allergies indicates no known allergies. ASSESSMENT:  
 
Mr. Mckinley Saint presents supine in bed and sleeping soundly. He was difficult to awaken, but agreeable for PT. Patient oriented to self only and seems quite confused. UE's twitching.   When instructed to sit up, patient made no attempts to move. He then required total Ax2 to transition from supine to sit. He sat via prop sitting with CGA, with trunk lean right. He had difficulty following commands for MMT and required max cueing. He perseverated on speech and movements. Patient then stood with mod assist of 2 to RW. He was unable to take steps. He required mod assist to sit and max to total assist to return to supine. Patient has declined in functional mobility, Mr. Demetri George would benefit from skilled physical therapy (medically necessary) to address his deficits and maximize his function. This section established at most recent assessment PROBLEM LIST (Impairments causing functional limitations): 1. Decreased Strength 2. Decreased ADL/Functional Activities 3. Decreased Transfer Abilities 4. Decreased Ambulation Ability/Technique 5. Decreased Balance 6. Decreased Activity Tolerance 7. Decreased Cognition INTERVENTIONS PLANNED: (Benefits and precautions of physical therapy have been discussed with the patient.) 1. Balance Exercise 2. Bed Mobility 3. Gait Training 4. Therapeutic Activites 5. Therapeutic Exercise/Strengthening 6. Transfer Training 7. education 8. Group Therapy TREATMENT PLAN: Frequency/Duration: 3 times a week for duration of hospital stay Rehabilitation Potential For Stated Goals: Good RECOMMENDED REHABILITATION/EQUIPMENT: (at time of discharge pending progress): Due to the probability of continued deficits (see above) this patient will likely need continued skilled physical therapy after discharge. Equipment:  
 None at this time HISTORY:  
History of Present Injury/Illness (Reason for Referral): 
Per MD note, \"80 years old M with PMH of afib not on AC due to GI bleed, COPD, DM, D-CHF, CKD presented to the hospital for worsening tremors of upper extremities since last night. Granddaughter stated symptoms lasted whole night.  Tremors usually come and go, last just few seconds. Granddaughter reported patient has been very anxious after his wife was hospitalized at [address] [place] [address]. Patient reported he feels better after arriving to the ED. Family stated patient is lightly drowsy after medication given at ED for sedation. Family stated he is also having poor PO intake at home for the last day. Patient denies productive cough, fever, urinary symptoms, SOB, abdominal pain, new medication or chest pain. \" 
Past Medical History/Comorbidities:  
Mr. Re Montes  has a past medical history of Atherosclerosis of artery of extremity with ulceration ([address] (4/17/2015); Atherosclerosis of native arteries of extremity with intermittent claudication ([address] (4/17/2015); Atopic dermatitis (11/21/2012); Atrial fibrillation ([address] CAD (coronary artery disease); Carotid stenosis, bilateral (11/21/2012); CHF (congestive heart failure) ([address] (11/21/2012); Chronic kidney disease; Chronic obstructive pulmonary disease ([address] Colon, diverticulosis (1/24/2015); Coronary atherosclerosis of native coronary vessel (10/31/2016); Diabetes ([address] Diastolic CHF, acute on chronic (HCC) (9/12/2015); Failed CABG (coronary artery bypass graft) (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 He also has no past medical history of Arthritis; Asthma; Cancer ([address] Liver disease; Nausea & vomiting; PUD (peptic ulcer disease); or Stroke ([address]   Mr. Re Montes  has a past surgical history that includes pr cardiac surg procedure unlist; hx coronary artery bypass graft (06-); hx cataract removal (Left, 2003); hx heart catheterization; hx coronary stent placement (02-); hx heent (1970s); hx colonoscopy; and colonoscopy (N/A, 1/27/2017). Social History/Living Environment:  
Home Environment: Private residence Wheelchair Ramp: Yes One/Two Story Residence: One story Living Alone: No 
Support Systems: Child(tai), Friends \ neighbors, Spouse/Significant Other/Partner Patient Expects to be Discharged to[de-identified] Private residence Current DME Used/Available at Home: Cane, straight, Shower chair Tub or Shower Type: Shower Prior Level of Function/Work/Activity: 
Patient lives at home with wife, who also happens to be in the hospital.  Unreliable historian due to confusion. Number of Personal Factors/Comorbidities that affect the Plan of Care: 3+: HIGH COMPLEXITY EXAMINATION:  
Most Recent Physical Functioning:  
Gross Assessment: 
AROM: Generally decreased, functional 
Strength: Generally decreased, functional 
         
  
Posture: 
Posture (WDL): Exceptions to [facility] Posture Assessment: Forward head, Rounded shoulders Balance: 
Sitting: Impaired Sitting - Static: Prop sitting Sitting - Dynamic: Fair (occasional) Standing: Impaired Standing - Static: Constant support Standing - Dynamic : Poor Bed Mobility: 
Supine to Sit: Assist x2;Total assistance Sit to Supine: Maximum assistance; Total assistance;Assist x2 Scooting: Maximum assistance;Assist x2 Wheelchair Mobility: 
  
Transfers: 
Sit to Stand: Assist x2; Moderate assistance Stand to Sit: Moderate assistance;Assist x2 Gait: 
  
   
  
Body Structures Involved: 1. Muscles Body Functions Affected: 1. Mental 
2. Movement Related 3. Metobolic/Endocrine Activities and Participation Affected: 1. Mobility 2. Self Care 3. Domestic Life Number of elements that affect the Plan of Care: 4+: HIGH COMPLEXITY CLINICAL PRESENTATION:  
Presentation: Evolving clinical presentation with changing clinical characteristics: MODERATE COMPLEXITY CLINICAL DECISION MAKING: [facility] Basic Mobility Inpatient Short Form How much difficulty does the patient currently have. .. Unable A Lot A Little None 1. Turning over in bed (including adjusting bedclothes, sheets and blankets)? [x] 1   [] 2   [] 3   [] 4  
2. Sitting down on and standing up from a chair with arms ( e.g., wheelchair, bedside commode, etc.)   [] 1   [x] 2   [] 3   [] 4  
3. Moving from lying on back to sitting on the side of the bed? [x] 1   [] 2   [] 3   [] 4 How much help from another person does the patient currently need. .. Total A Lot A Little None 4. Moving to and from a bed to a chair (including a wheelchair)? [] 1   [x] 2   [] 3   [] 4  
5. Need to walk in hospital room? [] 1   [x] 2   [] 3   [] 4  
6. Climbing 3-5 steps with a railing? [x] 1   [] 2   [] 3   [] 4  
© 2007, Trustees of [address] [place] [address], under license to [employer]. All rights reserved Score:  Initial: 9 Most Recent: X (Date: -- ) Interpretation of Tool:  Represents activities that are increasingly more difficult (i.e. Bed mobility, Transfers, Gait). Score 24 23 22-20 19-15 14-10 9-7 6 Modifier CH CI CJ CK CL CM CN   
 
? Mobility - Walking and Moving Around:  
  - CURRENT STATUS: CM - 80%-99% impaired, limited or restricted  - GOAL STATUS: CL - 60%-79% impaired, limited or restricted  - D/C STATUS:  ---------------To be determined--------------- Payor: [facility] MEDICARE / Plan: [place] [facility] [place] H[place]/PPO / Product Type: Managed Care Medicare /   
 
Medical Necessity:    
· Patient is expected to demonstrate progress in strength, range of motion, balance, coordination and functional technique to decrease assistance required with all functional mobility. Reason for Services/Other Comments: 
· Patient continues to require skilled intervention due to medical complications and patient unable to attend/participate in therapy as expected.   
Use of outcome tool(s) and clinical judgement create a POC that gives a: Questionable prediction of patient's progress: MODERATE COMPLEXITY  
  
 
 
 
TREATMENT:  
(In addition to Assessment/Re-Assessment sessions the following treatments were rendered) Pre-treatment Symptoms/Complaints:   
Pain: Initial:  
Pain Intensity 1: 0  Post Session:  0 Therapeutic Activity: (    10 minutes): Therapeutic activities including sitting balance activities on EOB to improve mobility, strength and balance. Required maximal cueing   to promote participation. Braces/Orthotics/Lines/Etc:  
· IV 
· alejo catheter · O2 Device: Nasal cannula Treatment/Session Assessment:   
· Response to Treatment:  Pleasant and cooperative. · Interdisciplinary Collaboration:  
o Physical Therapist 
o Registered Nurse 
o Certified Nursing Assistant/Patient Care Technician 
o SPT · After treatment position/precautions:  
o Supine in bed 
o Bed alarm/tab alert on 
o Bed in low position 
o Caregiver at bedside 
o Call light within reach 
o RN notified · Compliance with Program/Exercises: Will assess as treatment progresses. · Recommendations/Intent for next treatment session: \"Next visit will focus on advancements to more challenging activities and reduction in assistance provided\". Total Treatment Duration: PT Patient Time In/Time Out Time In: 1975 Time Out: [address] Nurse, PT, DPT

## 2018-08-02 NOTE — PHYSICIAN ADVISORY
Letter of Determination: Upgrade from Observation to Inpatient Status This patient was originally hospitalized as Outpatient Status with Observation Services on 8/1/2018 for acute on chronic renal disease. This patient now meets for Inpatient Admission based on  medical necessity. The patient's stay was medically necessary based on end stage renal disease, with blood sugar to 45 mg/dl. It is our recommendation that this patient's hospitalization status should be upgraded from OBSERVATION to INPATIENT status.  
  
The final decision regarding the patient's hospitalization status depends on the attending physician's judgement. Belén Hooks MD, MARY ELLEN, Physician Advisor [address] [place] [address].

## 2018-08-02 NOTE — PROGRESS NOTES
Problem: Self Care Deficits Care Plan (Adult) Goal: *Acute Goals and Plan of Care (Insert Text) 1. Patient will upper body bathing and dressing with supervision and adaptive equipment as needed. 2. Patient will complete self-feeding/grooming tasks with set-up to improve participation with ADL. 3. Patient will tolerate 25 minutes of OT treatment with 1-2 rest breaks to increase activity tolerance for ADLs. 4. Patient will complete functional transfers with minimal assistance and adaptive equipment as needed. 5. Patient will complete functional mobility with minimal assistance and minimal cues for safety awareness. Timeframe: 7 visits OCCUPATIONAL THERAPY: Initial Assessment, Treatment Day: 1st and AM 8/2/2018 INPATIENT: Hospital Day: 2 Payor: [facility] [place] MEDICARE / Plan: Angel[place] / Product Type: [employer] Care Medicare /  
  
NAME/AGE/GENDER: Suzi Butler is a 80 y.o. male PRIMARY DIAGNOSIS:  Acute on chronic renal failure ([address] ALFREDITO (acute kidney injury) ([facility] Acute on chronic renal failure ([address] Acute on chronic renal failure ([address] ICD-10: Treatment Diagnosis:  
 · Generalized Muscle Weakness (M62.81) · Other lack of cordination (R27.8) Precautions/Allergies: 
   Review of patient's allergies indicates no known allergies. ASSESSMENT:  
 
Mr. Reyna Iqbal presents to the hospital with ALFREDITO. Pt also has been having difficulty with hypoglycemia. Pt was supine in bed upon arrival and is calling out and tearful. Pt's wife is also here in the hospital and he is very concerned for her. Pt is alert and oriented to self and place but disoriented to situation. Pt is hard of hearing and confused, therefore communication is difficult at times. Pt lives with his wife and states he uses a cane for functional mobility and completes his own ADL. Pt initially states he has had no falls but later mentions he thinks he recently had a fall.  Pt assisted to the edge of the bed with moderate assistance. Pt noted to have twitching throughout his body. Pt's nurse checked blood sugar at the EOB and it was 115. Pt currently has nasal cannula on 2L and reports he normally does not need this at home. Pt perseverates in conversation and with mobility. Pt stood with a walker with minimal to moderate assistance multiple times throughout session. Pt attempted to transfer to the chair with walker but was unable to initiate movement towards chair. Pt is weak and unsteady. Another staff member helped to transfer pt to chair with moderate assistance x 2 with poor ability to follow directions to turn and sit in chair. Pt left sitting upright with lunch tray in front of him. Pt able to take one bite with cueing and set-up of food on his spoon. Pt appears fairly drowsy by end of treatment. Pt is currently limited by AMS, strength, dynamic balance, and activity tolerance impacting his ability to complete ADL. Pt will benefit from OT services to address stated goals and plan of care. This section established at most recent assessment PROBLEM LIST (Impairments causing functional limitations): 1. Decreased Strength 2. Decreased ADL/Functional Activities 3. Decreased Transfer Abilities 4. Decreased Ambulation Ability/Technique 5. Decreased Balance 6. Increased Pain 7. Decreased Activity Tolerance 8. Decreased Flexibility/Joint Mobility 9. Decreased Skin Integrity/Hygeine 10. Decreased [place] with Home Exercise Program 
11. Decreased Cognition INTERVENTIONS PLANNED: (Benefits and precautions of occupational therapy have been discussed with the patient.) 1. Activities of daily living training 2. Adaptive equipment training 3. Balance training 4. Clothing management 5. Cognitive training 6. Donning&doffing training 7. Group therapy 8. Neuromuscular re-eduation 9. Therapeutic activity 10. Therapeutic exercise TREATMENT PLAN: Frequency/Duration: Follow patient 3 times per week to address above goals. Rehabilitation Potential For Stated Goals: Good RECOMMENDED REHABILITATION/EQUIPMENT: (at time of discharge pending progress): Due to the probability of continued deficits (see above) this patient will likely need continued skilled occupational therapy after discharge. Equipment:  TBD OCCUPATIONAL PROFILE AND HISTORY:  
History of Present Injury/Illness (Reason for Referral): 
See H&P Past Medical History/Comorbidities:  
Mr. Barbie Benjamin  has a past medical history of Atherosclerosis of artery of extremity with ulceration ([facility] (4/17/2015); Atherosclerosis of native arteries of extremity with intermittent claudication ([address] (4/17/2015); Atopic dermatitis (11/21/2012); Atrial fibrillation ([address] CAD (coronary artery disease); Carotid stenosis, bilateral (11/21/2012); CHF (congestive heart failure) ([address] (11/21/2012); Chronic kidney disease; Chronic obstructive pulmonary disease ([address] Colon, diverticulosis (1/24/2015); Coronary atherosclerosis of native coronary vessel (10/31/2016); Diabetes ([address] Diastolic CHF, acute on chronic (HCC) (9/12/2015); Failed CABG (coronary artery bypass graft) (11/21/2012); GI bleed (1/2015); Gout (11/21/2012); History of tobacco use; [place] (hard of hearing); Hypercholesterolemia; Hypertension; Hyperuricemia (11/21/2012); Morbid obesity ([address] PAD (peripheral artery disease) ([facility] [address] [facility] (11/21/2012); Poor historian; Radiologic findings of lung field, abnormal (10/31/2016); Seizure disorder ([address] (8/5/2013); Shortness of breath dyspnea (8/5/2013); Thyroid disease; and Unspecified sleep apnea. He also has no past medical history of Arthritis; Asthma; Cancer ([address] Liver disease; Nausea & vomiting; PUD (peptic ulcer disease); or Stroke ([address]   Mr. Barbie Benjamin  has a past surgical history that includes pr cardiac surg procedure unlist; hx coronary artery bypass graft (06-); hx cataract removal (Left, 2003); hx heart catheterization; hx coronary stent placement (02-); hx heent (1970s); hx colonoscopy; and colonoscopy (N/A, 1/27/2017). Social History/Living Environment:  
Home Environment: Private residence Wheelchair Ramp: Yes One/Two Story Residence: One story Living Alone: No 
Support Systems: Child(tai), Friends \ neighbors, Spouse/Significant Other/Partner Patient Expects to be Discharged to[de-identified] Private residence Current DME Used/Available at Home: Cane, straight, Shower chair Tub or Shower Type: Shower Prior Level of Function/Work/Activity: 
Pt lives at home with his wife. Pt had difficulty providing hx due to AMS. States he uses a cane occasionally and completes ADL without assistance. States no falls but later states he may have had a fall. Personal Factors:   
      Age:  80 y.o. Social Background:  Lives with wife, who is also currently admitted into the hospital 
      Overall Behavior:  Confused; tearful; AMS; perseverating Other factors that influence how disability is experienced by the patient:  Multiple co-morbidities Number of Personal Factors/Comorbidities that affect the Plan of Care: Extensive review of physical, cognitive, and psychosocial performance (3+):  HIGH COMPLEXITY ASSESSMENT OF OCCUPATIONAL PERFORMANCE[de-identified]  
Activities of Daily Living:  
Basic ADLs (From Assessment) Complex ADLs (From Assessment) Feeding: Moderate assistance Oral Facial Hygiene/Grooming: Moderate assistance Bathing: Maximum assistance Upper Body Dressing: Maximum assistance Lower Body Dressing: Total assistance Toileting: Total assistance Instrumental ADL Meal Preparation: Total assistance Homemaking: Total assistance Grooming/Bathing/Dressing Activities of Daily Living Cognitive Retraining Safety/Judgement: Fall prevention;Home safety Bed/Mat Mobility Rolling: Moderate assistance Supine to Sit: Moderate assistance Sit to Stand: Minimum assistance; Moderate assistance Bed to Chair: Moderate assistance;Assist x2 Scooting: Minimum assistance Most Recent Physical Functioning:  
Gross Assessment: 
  
         
  
Posture: 
  
Balance: 
Sitting: Impaired Sitting - Static: Prop sitting Sitting - Dynamic: Fair (occasional) Standing: Impaired Standing - Static: Fair Standing - Dynamic : Poor Bed Mobility: 
Rolling: Moderate assistance Supine to Sit: Moderate assistance Scooting: Minimum assistance Wheelchair Mobility: 
  
Transfers: 
Sit to Stand: Minimum assistance; Moderate assistance Stand to Sit: Moderate assistance Bed to Chair: Moderate assistance;Assist x2 Patient Vitals for the past 6 hrs: 
 BP BP Patient Position SpO2 O2 Flow Rate (L/min) Pulse 18 0721 (!) 164/98 At rest 95 % - 60  
18 0834 - - 98 % 1 l/min -  
18 1104 165/73 At rest 97 % - 63 Mental Status Neurologic State: Alert Orientation Level: Disoriented to situation, Oriented to person, Oriented to place Cognition: Decreased attention/concentration, Decreased command following, Impaired decision making, Poor safety awareness Perseveration: Perseverates during ADLS, Perseverates during conversation, Perseverates during mobility Safety/Judgement: Fall prevention, Home safety Physical Skills Involved: 1. Range of Motion 2. Balance 3. Strength 4. Activity Tolerance 5. Sensation 6. Fine Motor Control 7. Gross Motor Control 8. Pain (acute) 9. Skin Integrity Cognitive Skills Affected (resulting in the inability to perform in a timely and safe manner): 1. Perception 2. Executive Function 3. Short Term Recall 4. Sustained Attention 5. Divided Attention 6. Comprehension 7. Expression Psychosocial Skills Affected: 1. Habits/Routines 2. Environmental Adaptation 3. Emotional Regulation 4. Self-Awareness Number of elements that affect the Plan of Care: 5+:  HIGH COMPLEXITY CLINICAL DECISION MAKIN [facility] [address] [address] AM-PAC 6 Clicks Daily Activity Inpatient Short Form How much help from another person does the patient currently need. .. Total A Lot A Little None 1. Putting on and taking off regular lower body clothing? [x] 1   [] 2   [] 3   [] 4  
2. Bathing (including washing, rinsing, drying)? [] 1   [x] 2   [] 3   [] 4  
3. Toileting, which includes using toilet, bedpan or urinal?   [x] 1   [] 2   [] 3   [] 4  
4. Putting on and taking off regular upper body clothing? [] 1   [x] 2   [] 3   [] 4  
5. Taking care of personal grooming such as brushing teeth? [] 1   [x] 2   [] 3   [] 4  
6. Eating meals? [] 1   [x] 2   [] 3   [] 4  
© 2007, Trustees of [address] [place] [id number], under license to [employer]. All rights reserved Score:  Initial: 10 Most Recent: X (Date: -- ) Interpretation of Tool:  Represents activities that are increasingly more difficult (i.e. Bed mobility, Transfers, Gait). Score 24 23 22-20 19-15 14-10 9-7 6 Modifier CH CI CJ CK CL CM CN   
 
? Self Care:  
  - CURRENT STATUS: CL - 60%-79% impaired, limited or restricted  - GOAL STATUS: CK - 40%-59% impaired, limited or restricted  - D/C STATUS:  ---------------To be determined--------------- Payor: [facility] MEDICARE / Plan[place] [employer] [place] / Product Type: Managed Care Medicare /   
 
Medical Necessity:    
· Patient demonstrates good rehab potential due to higher previous functional level. Reason for Services/Other Comments: 
· Patient continues to require skilled intervention due to decreased independence with ADL/functional transfers. Use of outcome tool(s) and clinical judgement create a POC that gives a: MODERATE COMPLEXITY  
 
 
 
TREATMENT:  
(In addition to Assessment/Re-Assessment sessions the following treatments were rendered) Pre-treatment Symptoms/Complaints:   
Pain: Initial:  
Pain Intensity 1: 5 Pain Location 1: Generalized Pain Intervention(s) 1: Repositioned  Post Session:  same Therapeutic Activity: (    25): Therapeutic activities including Bed transfers, Chair transfers, Ambulation on level ground and standing balance edge of bed to improve mobility, strength, balance and coordination. Required moderate assistance x 2   to promote static and dynamic balance in standing.  
 
n/a Braces/Orthotics/Lines/Etc:  
· IV 
· alejo catheter · O2 Device: Nasal cannula Treatment/Session Assessment:   
· Response to Treatment:  Pt tolerated with AMS, perseveration, and poor ability to follow commands. · Interdisciplinary Collaboration:  
o Occupational Therapist 
o Registered Nurse 
o Rehabilitation Attendant · After treatment position/precautions:  
o Up in chair 
o Bed alarm/tab alert on 
o Bed/Chair-wheels locked 
o Call light within reach 
o RN notified 
o MD at bedside · Compliance with Program/Exercises: Will assess as treatment progresses. · Recommendations/Intent for next treatment session: \"Next visit will focus on advancements to more challenging activities and reduction in assistance provided\". Total Treatment Duration: OT Patient Time In/Time Out Time In: 1114 Time Out: 1200 Aletta Dancer, OT

## 2018-08-02 NOTE — PROGRESS NOTES
CM spoke with pt's daughter, Nicola Rosales, to gather information about pt. PCP and insurance provider verified. Pt lives with spouse, who is currently hospitalized on 6th floor as well. Home is handicapped equipped with rails and ramps. DME in the home includes walker-none other requested. CPAP used when needed. Dorie Duran requested that STR referral be sent to [facility], as referral has been sent there for pt's spouse. CM awaiting response. Care Management Interventions PCP Verified by CM: Yes Transition of Care Consult (CM Consult): Discharge Planning Physical Therapy Consult: Yes Occupational Therapy Consult: Yes Current Support Network: Lives with Spouse Confirm Follow Up Transport: Family Plan discussed with Pt/Family/Caregiver: Yes Freedom of Choice Offered: Yes Discharge Location Discharge Placement: Skilled nursing facility

## 2018-08-02 NOTE — PROGRESS NOTES
Pt BGL was 40 and D50W was given , is now 139. Pt is still confuse and with some tremors. Will continue to monitor.

## 2018-08-02 NOTE — PROGRESS NOTES
Patient has been accepted to [facility] rehab. Family has accepted bed offer. Facility to begin precert.

## 2018-08-02 NOTE — PROGRESS NOTES
Progress Note Patient: Margareth Lino MRN: [id number]  SSN: [id number] YOB: 1932  Age: 80 y.o. Sex: male Admit Date: 8/1/2018 LOS: 0 days Subjective:  
CC:\" I feel shaky\" Patient examined while sitting in a chair. He was noted with myoclonus, alert, but disoriented in time. He has had poor appetite, FS readings still borderline. This afternoon he was noted mild drowsy and was unable to swallow. He was told NPO status was established until he sees speech tomorrow morning. ROS: denies nausea, vomiting, chest pain, sob, diarrhea. Objective:  
 
Vitals:  
 08/02/18 8474 08/02/18 3009 08/02/18 0534 08/02/18 1055 BP:  133/57  (!) 164/98 Pulse:  (!) 59  60 Resp:  20  18 Temp:  97.7 °F (36.5 °C)  97.5 °F (36.4 °C) SpO2: 95% 95%  95% Weight:   112.5 kg (248 lb) Height:   5' 10\" (1.778 m) Intake and Output: 
Current Shift:   
Last three shifts: 07/31 1901 - 08/02 0700 In: -  
Out: 9074 [[id number]] Physical Exam:  
GENERAL: alert, cooperative, but mild confused EYE: negative LYMPHATIC: Cervical, supraclavicular, and axillary nodes normal.  
THROAT & NECK: normal and no erythema or exudates noted. LUNG: clear to auscultation bilaterally HEART: regular rate and rhythm, S1, S2 normal, no murmur, click, rub or gallop ABDOMEN: soft, non-tender. Bowel sounds normal. No masses,  no organomegaly EXTREMITIES:  extremities normal, atraumatic, no cyanosis or edema SKIN: Normal. 
NEUROLOGIC: alert, oriented in person, place. Unable to swallow this afternoon. Had on and off myoclonus PSYCHIATRIC: non focal 
 
Lab/Data Review: All lab results for the last 24 hours reviewed. Assessment:  
 
Principal Problem: 
  Acute on chronic renal failure ([facility] (8/1/2018) Active Problems: 
  Persistent atrial fibrillation ([address] (11/21/2012) Overview: Coumadin therapy discontinued due to recurrent GI bleeding.   
 
  COPD (chronic obstructive pulmonary disease) ([facility] (11/21/2012) CKD (chronic kidney disease) stage 3, GFR 30-59 ml/min (9/18/2013) ROBERTO on CPAP (2/20/2015) Overview: Patient is on BiPAP, no supplementary oxygen Type 2 diabetes with nephropathy ([facility] (2/12/2018) Venous stasis dermatitis of both lower extremities (4/22/2018) Hypoglycemia (8/1/2018) Plan:  
-Acute on chronic kidney injury, possible due to poor oral intake at home / lasix or protonix use: Pre-renal in etiology- FeNa of 0.8% Continue hydration with IVFs Strict IO via alejo cath, his urine output is good He had no abnormalities on kidney US Gabapentin was stopped Avoid nephrotoxic meds Monitor labs daily May consult nephrology if no improvement, but cr is trending down  
  
-Hypoglycemia, persisting: On glipizide at home, possible worsening in view of pete On D5 IVF Serial FS checks  
  
-Myoclonus: likely secondary to gabapentin use on top of pete / hypoglycemia induced: 
Gabapentin was stopped He is on d5 ivf CT brain with no acute disease TSH wnl 
EEG normal   
 
-Dysphagia: Npo for now - speech evaluation tomorrow morning  
  
-ROBERTO: CPAP 
  
-Afib: Rate controlled - home meds  
  
-COPD: not in exacerbation  
  
-DM: stable, hypoglycemic now, no need for sliding scale  
  
-Chronic D-CHF: compensated - monitoring - lasix on hold  
 
-Debility: PT/OT 
  
DVT ppx : heparin Code status: full Disposition: STR- [facility] pre-certification started PATIENT'S STATUS CHANGED TO INPATIENT. ROS, PMH, SOCIAL HX, FAMILY HX UNCHANGED FROM HPI DONE ON 8/1/18. Signed By: Ivan Dorantes MD   
 August 2, 2018

## 2018-08-03 NOTE — PROGRESS NOTES
Progress Note Patient: Johnson Morse MRN: [id number]  SSN: [id number] YOB: 1932  Age: 80 y.o. Sex: male Admit Date: 8/1/2018 LOS: 1 day Subjective:  
CC:\" I had bloody stools today\" Patient examined at bedside. He was noted with less tremors. Had episode of bloody stools this morning noted by nurse personnel. He denies vomiting, fever, hematemesis. His HH remains stable. Objective:  
 
Vitals:  
 08/02/18 2021 08/02/18 2022 08/02/18 2335 08/03/18 9159 BP: 158/79  148/68 152/65 Pulse: 64  66 68 Resp: 18 18 18 Temp: 98 °F (36.7 °C)  97.8 °F (36.6 °C) 97.4 °F (36.3 °C) SpO2: 95% 97% 96% 97% Weight:      
Height:      
  
 
Intake and Output: 
Current Shift:   
Last three shifts: 08/01 1901 - 08/03 0700 In: -  
Out: 8426 [[id number]] Physical Exam:  
GENERAL: alert, cooperative, in no distress EYE: negative LYMPHATIC: Cervical, supraclavicular, and axillary nodes normal.  
THROAT & NECK: normal and no erythema or exudates noted. LUNG: clear to auscultation bilaterally HEART: regular rate and rhythm, S1, S2 normal, no murmur, click, rub or gallop ABDOMEN: soft, non-tender. Bowel sounds normal. No masses,  no organomegaly EXTREMITIES:  extremities normal, atraumatic, no cyanosis or edema SKIN: Normal. 
NEUROLOGIC: alert, oriented in person, place. Myoclonus much improved. PSYCHIATRIC: non focal 
 
Lab/Data Review: All lab results for the last 24 hours reviewed. Assessment:  
 
Principal Problem: 
  Acute on chronic renal failure ([facility] (8/1/2018) Active Problems: 
  Persistent atrial fibrillation ([facility] (11/21/2012) Overview: Coumadin therapy discontinued due to recurrent GI bleeding. COPD (chronic obstructive pulmonary disease) ([facility] (11/21/2012) CKD (chronic kidney disease) stage 3, GFR 30-59 ml/min (9/18/2013) ROBERTO on CPAP (2/20/2015) Overview: Patient is on BiPAP, no supplementary oxygen   Type 2 diabetes with nephropathy ([facility] (2/12/2018) Venous stasis dermatitis of both lower extremities (4/22/2018) Hypoglycemia (8/1/2018) ALFREDITO (acute kidney injury) ([facility] (8/2/2018) Plan:  
-Acute blood loss anemia, episode of lower GIB: bright red blood this morning: hemodynamically stable - HH of 11.4gr from 12.9gr - ASA and plavix held - monitor HH q 12hrs - transfuse prn - Red blood cell tag scan was normal - GI on board 
 
-Acute on chronic kidney injury, possible due to poor oral intake at home / lasix or protonix use: Pre-renal in etiology- FeNa of 0.8%: improving Continue hydration with IVFs Strict IO via alejo cath He had no abnormalities on kidney US Gabapentin was stopped Monitor labs daily 
  
-Hypoglycemia induced by glipizide and poor oral intake: better Continue D5 IVF Serial FS checks  
  
-Myoclonus: likely secondary to gabapentin use on top of alfredito / hypoglycemia induced: improving Gabapentin was stopped CT brain with no acute disease TSH wnl 
EEG normal   
 
-Dysphagia: Npo for now - speech following - on IVF  
  
-ROBERTO: CPAP 
  
-Afib: Rate controlled - home meds  
  
-COPD: not in exacerbation  
  
-DM: stable, hypoglycemic now, no need for sliding scale  
  
-Chronic D-CHF: compensated - monitoring - lasix on hold  
 
-Debility: PT/OT 
  
DVT ppx: GCS Code status: full [facility] Signed By: Mundo Bahena MD   
 August 3, 2018

## 2018-08-03 NOTE — PROGRESS NOTES
Pt was confused and blood sugar was stable  all night. Pt urine  And stood is bloody and Dr Shamar Varghese was notify. It has also be reported off to day shift nurse and granddaughter  Was to be notify if there is change in pt status. Granddaughter will be here this morning to talk to the doctor and will continue to monitor.

## 2018-08-03 NOTE — PROGRESS NOTES
Pt had a bloody stool and there is a call to MD to notify him about the new change on the . Awaiting a call back

## 2018-08-03 NOTE — PROGRESS NOTES
Interdisciplinary Rounds completed 08/03/18. Nursing, Case Management, Physician and PT present. Plan of care reviewed and updated. Continue alejo d/t acute renal failure. Pt remains confused. Will be here through the weekend

## 2018-08-03 NOTE — PROGRESS NOTES
Patient was calm Hesitant in talking with  -  Noted AMS Provided supportive presence thru words and touch His wife is in room 630 - she is asleep Shagufta Daily, staff [address] [facility]  /   [email or web address]

## 2018-08-03 NOTE — CONSULTS
[facility] Consult Note Consulting GI Physician: LGI bleed Referring Physician:  Dr. Reyes Hull Consult Date:  8/3/2018 Admit Date:  8/1/2018 Chief Complaint:  LGI bleed Subjective:  
 
History of Present Illness:  Patient is a 80 y.o. male with PMH significant for COPD, HTN, HLD, DM, CAD, PAD, Carotid stenosis, Diastolic CHF, CKD, Afib (no anticoagulation due to history of GIB), admitted 8/2/18 with worsening tremors of upper extremities (with neg CT, nmL EEG), noted to have ALFREDITO felt likely secondary to poor po intake, currently being seen in GI consultation at the request of Dr. Nelsy Munson for LGI bleed. Labs on presentation significant for CR 4.5 (increased from baseline 1.7- improving to Cr 3.46 today) with unremarkable renal US, K 5.1, WBC 9.7, Hgb 12.9, plt 163, Tbili 1.2, Alk phos 140,AST 15, ALT 13. He has a hx GI bleeding in the past and has undergone EGD/Colonoscopy exams on several occasions in the recent past- as outlined below. Most recent EGD/[place] 1/27/17 with findings of extensive diverticulosis though poor bowel prep and 5-8mm clean based gastric ulcer with f/u EGD recommended in 3mo to assess for mucosal healing. Pt is not a great historian and cannot provide history of any bleeding. Per nursing staff- pt was found to be passing bright red blood per rectum with clots. He has difficulty describing his normal bowel patterns though does say that he has been having BMs and he denies any recent straining to have a BM. He denies any abdominal pain, N/V. He denies any acid reflux/heartburn. He denies presence of any dysphagia symptoms though this is noted on review of his chart notes and there is pending speech evaluation. Denies loss of appetite or unexplained weight loss. He denies regular NSAID use. - Colonoscopy 1/27/17 Extensive diverticulosis with large amount of fecalith, Descending Colon: Poor prep throughout with all stool being brown. Transverse & Ascending Colon: normal.  Cecum: Poor Prep that could not be suctioned and irrigated 
- EGD 1/27/17 5-8 mm clean-based ulcer in antrum noted as on recent endoscopy without any stigmata of recent bleeding. No significant gastritis 
- EGD 1/22/2017 for GI Bleed/[place] POSTPROCEDURE DIAGNOSIS: 1. Gastritis-Mild to moderate erosive gastritis of antrum. Mild gastritis of body of stomach  2. Gastric ulcer- 5-8 mm clean-based ulcer in antrum. 3. Duodenitis GASTRIC BIOPSIES: GASTRIC MUCOSA WITH CHRONIC REACTIVE AND REPARATIVE CHANGES. FOCALLY PROMINENT LAMINA PROPRIA LYMPHOID AGGREGATES PRESENT. NO DEFINITIVE HELICOBACTER PYLORI MICROORGANISMS IDENTIFIED. - EGD on 5/6/15 by Dr. Lionel Brown for recurrent GIB with drop in Hgb: friable gastric mucosa. Pillcam study was suggested, but not done. - EGD on 1/23/15 by Dr. Jose Berry revealed a few small gastric erosions, felt not likely the cause of his bleeding.   
- Colonoscopy on 1/22/15 by Dr. Jillian Fuller revealed extensive diverticulosis without active bleeding. PMH: 
Past Medical History:  
Diagnosis Date  Atherosclerosis of artery of extremity with ulceration ([address] 4/17/2015  Atherosclerosis of native arteries of extremity with intermittent claudication ([address] 4/17/2015  Atopic dermatitis 11/21/2012  Atrial fibrillation ([address]  CAD (coronary artery disease)  Carotid stenosis, bilateral 11/21/2012 50-79%  3-2010    1. Carotid Doppler (6/1/07): Greater than 70% stenosis in proximal LICA. 50% stenosis in right ICA. 2.  CTA of neck (3/15/10): Occluded distal segments of the vertebral arteries bilaterally. Atherosclerosis of the carotid bulbs bilaterally with a 50% stenosis on the left and a stenosis of less than 30% on the right. Small nodule in the right upper lobe near the apex. 3. CTA (8/29/13):  Less than 30% diameter stenosis of the cervical internal carotid arteries bilaterally.   
 CHF (congestive heart failure) ([address] 11/21/2012  Chronic kidney disease   
 hx elevated labs  Chronic obstructive pulmonary disease ([address]  Colon, diverticulosis 1/24/2015  Coronary atherosclerosis of native coronary vessel 10/31/2016 1. Cath (5/31/07):  LMCA: 25%. LAD: ostial 75-95% stenosis. 50-75% mid. D1:  25-50%. OM3: small with 75% stenosis. RCA: 100% mid. with left to right collaterals. 2 Vessel CABG (6/4/07):  LIMA to LAD and SVG to OM. SVG was anastomosed proximally to LIMA to due severe atherosclerosis of aorta. 3.  Lexiscan cardiolite (11/30/10): Abnormal with reversible lateral wall defects. Unable to gate given atrial fibrillation. 4.  LHC (1/14/11):  EF 60%. LVEDP 21. Patent LIMA to LAD and SVG to OM1 (off LIMA). occluded small RCA with left to right collaterals. Small D1 (2 mm vessel) with 70% mid stenosis.  Diabetes ([address]  Diastolic CHF, acute on chronic (HCC) 9/12/2015 1. Echo (9/11/15) : EF 55-60%. Mild LVH. Moderate biatrial enlargement. Moderate mitral/tricuspid regurgitation.  Failed CABG (coronary artery bypass graft) 11/21/2012  GI bleed 1/2015 Hospitalized [facility]  Gout 11/21/2012  History of tobacco use  [place] (hard of hearing)  Hypercholesterolemia  Hypertension  Hyperuricemia 11/21/2012  Morbid obesity ([address]  PAD (peripheral artery disease) ([address] 11/21/2012 1. Bilateral proximal common iliac PCI (2/21/08):  8.0 X 100 mm Cordis smart stent on right and 10 X 40 mm cordis smart stent on right. Both inflated to 7.0 mm.  Poor historian  Radiologic findings of lung field, abnormal 10/31/2016 1. CT of chest  (11/24/10): Multiple small nodules in the right lobe and stable interstitial prominence. Consistent with chronic lung disease. No evidence of malignancy.  Seizure disorder ([address] 8/5/2013  Shortness of breath dyspnea 8/5/2013  Thyroid disease  Unspecified sleep apnea PSH: 
Past Surgical History:  
Procedure Laterality Date  CARDIAC SURG PROCEDURE UNLIST    
 cath 5/07,cabg 6/07,lexiscan cardiolite 11/10  
 COLONOSCOPY N/A 1/27/2017 COLONOSCOPY  BMI 36 TO BE ADMITTED 1/26/17 performed by Alice Martines MD at [facility] ENDOSCOPY  
 HX CATARACT REMOVAL Left 2003  
 os  HX COLONOSCOPY    
 HX CORONARY ARTERY BYPASS GRAFT  06-  HX CORONARY STENT PLACEMENT  02-  
 bilateral iliac artery PCI and stents  HX HEART CATHETERIZATION    
 left-05-, 01-  HX HEENT  1970s  
 neck lipoma Allergies: 
No Known Allergies Home Medications: 
Prior to Admission medications Medication Sig Start Date End Date Taking? Authorizing Provider  
oxyCODONE IR (OXY-IR) 15 mg immediate release tablet Take 1 Tab by mouth two (2) times daily as needed for Pain. Max Daily Amount: 30 mg. 7/14/18  Yes Daniel Echavarria MD  
OXYGEN-AIR DELIVERY SYSTEMS by Does Not Apply route. 2LPM every day   Yes Historical Provider  
zolpidem (AMBIEN) 5 mg tablet Take  by mouth nightly as needed for Sleep. Yes Historical Provider  
calcium carbonate (CALCIUM 600 PO) Take  by mouth. Yes Historical Provider  
furosemide (LASIX) 80 mg tablet Take 1 Tab by mouth two (2) times a day. 4/25/18  Yes Moira Chaudhary MD  
bisacodyl (DULCOLAX) 5 mg EC tablet Take 1 Tab by mouth daily. 4/25/18  Yes Moira Chaudhary MD  
aspirin 81 mg chewable tablet Take 1 Tab by mouth daily. 4/25/18  Yes Moira Chaudhary MD  
latanoprost (XALATAN) 0.005 % ophthalmic solution Administer 1 Drop to both eyes nightly. Yes Historical Provider  
sertraline (ZOLOFT) 50 mg tablet Take one tablet each evening for anxiety  Indications: Generalized Anxiety Disorder 2/12/18  Yes Ming Hu MD  
fluticasone (FLONASE) 50 mcg/actuation nasal spray 2 Sprays by Both Nostrils route daily. 2/12/18  Yes Ming Hu MD  
levothyroxine (SYNTHROID) 50 mcg tablet Take 1 Tab by mouth Daily (before breakfast).  2/12/18  Yes Ming Hu MD  
hydrALAZINE (APRESOLINE) 10 mg tablet TAKE 1 TABLET THREE TIMES DAILY. 1/19/18  Yes Monico Reyna MD  
cholecalciferol (VITAMIN D3) 1,000 unit cap Take 1,000 Units by mouth daily. Yes Historical Provider  
pravastatin (PRAVACHOL) 40 mg tablet Take 1 Tab by mouth nightly. 12/11/17  Yes Monico Reyna MD  
triamcinolone acetonide (KENALOG) 0.1 % ointment Apply  to affected area two (2) times a day. 11/13/17  Yes Fantasma Echavarria MD  
gabapentin (NEURONTIN) 100 mg capsule Take 1 Cap by mouth three (3) times daily. 9/25/17  Yes Fantasma Echavarria MD  
albuterol (PROVENTIL VENTOLIN) 2.5 mg /3 mL (0.083 %) nebulizer solution 2.5 mg by Nebulization route every four (4) hours as needed for Wheezing. Yes Historical Provider  
budesonide-formoterol (SYMBICORT) 160-4.5 mcg/actuation HFA inhaler Take 2 Puffs by inhalation two (2) times a day. 9/21/17  Yes Steven Sorensen NP  
ipratropium-albuterol (COMBIVENT RESPIMAT)  mcg/actuation inhaler Take 1 Puff by inhalation every six (6) hours. 9/21/17  Yes Steven Sorensen NP  
triamcinolone (ARISTOCORT) 0.5 % topical cream Apply  to affected area two (2) times a day. use thin layer 9/8/17  Yes GUILLERMO Loomis  
mupirocin calcium (BACTROBAN) 2 % topical cream Apply  to affected area two (2) times a day. 9/8/17  Yes GUILLERMO Loomis  
metoprolol succinate (TOPROL-XL) 25 mg XL tablet Pt takes 1/2 tab po daily. 8/10/17  Yes Fantasma Echavarria MD  
magnesium oxide (MAG-OX) 400 mg tablet Take 1 Tab by mouth daily. 8/10/17  Yes Fantasma Echavarria MD  
ferrous sulfate 324 mg (65 mg iron) tablet Take  by mouth Daily (before breakfast). Yes Historical Provider  
docusate sodium (STOOL SOFTENER) 100 mg capsule Take 100 mg by mouth as needed. Yes Historical Provider  
cpap machine kit by Does Not Apply route. Bilevel 12/8   Yes Historical Provider  
isosorbide mononitrate ER (IMDUR) 30 mg tablet Take 1 Tab by mouth daily.  8/11/14  Yes Mari Glover MD  
glipiZIDE (GLUCOTROL) 5 mg tablet Take 5 mg by mouth daily. Yes Historical Provider K-DUR 20 mEq tablet Take 20 mEq by mouth daily. 1/13/11  Yes Montez Naik MD  
HYDROcodone-acetaminophen (NORCO)  mg tablet Take 1 Tab by mouth two (2) times daily as needed for Pain. Max Daily Amount: 2 Tabs. 7/14/18   Irene Aguilar MD  
pantoprazole (PROTONIX) 40 mg granules for oral suspension 40 mg daily. Historical Provider  
clopidogrel (PLAVIX) 75 mg tab Take 1 Tab by mouth daily. 4/26/18   Jaime Taylor MD  
famotidine (PEPCID) 20 mg tablet Take 1 Tab by mouth daily. 4/26/18   Jaime Taylor MD  
OTHER One pair compression stocking gradient 20 - 30 mm hg 
Please measure to fit 11/13/17   Fantasma Echavarria MD  
allopurinol (ZYLOPRIM) 300 mg tablet Take  by mouth daily. Historical Provider Hospital Medications: 
Current Facility-Administered Medications Medication Dose Route Frequency  ondansetron (ZOFRAN) injection 4 mg  4 mg IntraVENous Q6H PRN  
 traMADol (ULTRAM) tablet 50 mg  50 mg Oral Q6H PRN  
 sodium chloride (NS) flush 5-10 mL  5-10 mL IntraVENous Q8H  
 sodium chloride (NS) flush 5-10 mL  5-10 mL IntraVENous PRN  
 acetaminophen (TYLENOL) tablet 650 mg  650 mg Oral Q4H PRN  
 albuterol (PROVENTIL VENTOLIN) nebulizer solution 2.5 mg  2.5 mg Nebulization Q4H PRN  
 bisacodyl (DULCOLAX) tablet 5 mg  5 mg Oral DAILY  fluticasone (FLONASE) 50 mcg/actuation nasal spray 2 Spray  2 Spray Both Nostrils DAILY  isosorbide mononitrate ER (IMDUR) tablet 30 mg  30 mg Oral DAILY  latanoprost (XALATAN) 0.005 % ophthalmic solution 1 Drop  1 Drop Both Eyes QHS  levothyroxine (SYNTHROID) tablet 50 mcg  50 mcg Oral ACB  magnesium oxide (MAG-OX) tablet 400 mg  400 mg Oral DAILY  metoprolol succinate (TOPROL-XL) XL tablet 12.5 mg  12.5 mg Oral DAILY  sertraline (ZOLOFT) tablet 50 mg  50 mg Oral DAILY  zolpidem (AMBIEN) tablet 5 mg  5 mg Oral QHS PRN  
 budesonide (PULMICORT) 500 mcg/2 ml nebulizer suspension  500 mcg Nebulization BID RT And  
 albuterol (PROVENTIL VENTOLIN) nebulizer solution 2.5 mg  2.5 mg Nebulization Q6HWA RT  
 nystatin (MYCOSTATIN) 100,000 unit/gram powder   Topical BID  dextrose 40% (GLUTOSE) oral gel 1 Tube  15 g Oral PRN  
 glucagon (GLUCAGEN) injection 1 mg  1 mg IntraMUSCular PRN  
 dextrose (D50W) injection syrg 12.5-25 g  25-50 mL IntraVENous PRN Social History: 
Social History Substance Use Topics  Smoking status: Former Smoker Packs/day: 1.00 Years: 45.00 Types: Cigarettes Quit date: 1984  Smokeless tobacco: Never Used Comment: (stopped smoking in )  Alcohol use No  
 
 
Family History: 
Family History Problem Relation Age of Onset  Heart Attack Mother [facility] Other Father   
  old age  Other Brother   
  brain aneurysm  Heart Disease Other 2 children  with heart concerns, 36 & 49 yo  
 Heart Disease Son No known FHx GI malignancy. Review of Systems: A detailed 10 system ROS is obtained, with pertinent positives as listed above. All others are negative. Diet:  NPO Objective:  
 
Physical Exam: 
Vitals: 
Visit Vitals  /59 (BP 1 Location: Left arm, BP Patient Position: At rest)  Pulse 78  Temp 97.4 °F (36.3 °C)  Resp 20  
 Ht 5' 10\" (1.778 m)  Wt 112.5 kg (248 lb)  SpO2 96%  BMI 35.58 kg/m2 Gen:  Pt is alert, cooperative, no acute distress Skin:  Face reveal no rashes. Cardiovascular: Regular rate and rhythm. Respiratory:  Comfortable breathing with no accessory muscle use. Clear breath sounds anteriorly with no wheezes, rales, or rhonchi. GI:  Abdomen nondistended, soft, and Nontender. Normal active bowel sounds. No obvious masses palpable. Rectal:  Noted trace very small amount of maroon blood on rectum and smeared on brief. Neurological:  Gross memory appears intact. Patient is alert and oriented.  
Psychiatric:  Mood appears appropriate with judgement intact. Laboratory:   
Recent Labs 08/03/18 
 0557  08/02/18 
 0612  08/01/18 
 1800  08/01/18 
 1408 WBC   --    --   10.0  9.7 HGB   --    --   12.9*  12.9* HCT   --    --   40.3*  40.6* PLT   --    --   169  163 MCV   --    --   83.3  83.0 NA  142  141   --   138  
K  5.3*  4.8   --   5.1 CL  105  105   --   98  
CO2  27  27   --   30  
BUN  67*  74*   --   66* CREA  3.46*  4.18*   --   4.56* CA  8.4  8.0*   --   8.7 MG   --    --    --   2.9*  
GLU  112*  71   --   43* AP   --    --    --   140* SGOT   --    --    --   15 ALT   --    --    --   13  
TBILI   --    --    --   1.2* ALB   --    --    --   2.6* TP   --    --    --   7.3 Assessment:  
 
Principal Problem: 
  Acute on chronic renal failure ([facility] (8/1/2018) Active Problems: 
  Persistent atrial fibrillation ([facility] (11/21/2012) Overview: Coumadin therapy discontinued due to recurrent GI bleeding. COPD (chronic obstructive pulmonary disease) ([address] (11/21/2012) CKD (chronic kidney disease) stage 3, GFR 30-59 ml/min (9/18/2013) ROBERTO on CPAP (2/20/2015) Overview: Patient is on BiPAP, no supplementary oxygen Type 2 diabetes with nephropathy ([address] (2/12/2018) Venous stasis dermatitis of both lower extremities (4/22/2018) Hypoglycemia (8/1/2018) ALFREDITO (acute kidney injury) ([address] (8/2/2018) 80 y.o. male with PMH significant for COPD, HTN, HLD, DM, CAD, PAD, Carotid stenosis, Diastolic CHF, CKD, Afib (no anticoagulation due to history of GIB), admitted 8/2/18 with worsening tremors of upper extremities, noted to have ALFREDITO felt likely secondary to poor po intake, currently being seen in GI consultation at the request of Dr. Lory Swartz for LGI bleed. S/p fairly recent EGD, Colonoscopy exams on several occasions as outlined above- significant for extensive colonic diverticulosis, clean based gastric ulcer, gastritis.    
Plan:  
 
-Hgb has been stable at 12.9. Follow up Hgb 11.4. Follow trend of H/H and consider transfusion pRBC if needed 
-Monitor for evidence of recurrent/worsening overt GI bleeding. 
-Suspect likely Diverticular bleed, less likely hemorrhoidal (not noted on previous exams), AVMs, polyps, brisk upper GIB in hemodynamically stable pt. Will order Tagged RBC scan to aid in localization of GI bleeding. Based upon results consider f/u EGD given h/o gastric ulcer on most recent egd 1/27/17.   
-Daily PPI Further recommendations will be based upon pt clinical course. Sherry Saavedra PA-C [facility]--patient seen and examined. Agree with above. Given findings at colonoscopy less than 2 years ago as well as comorbidities, we will hold off on the colonoscopy. Tagged scan ordered.   Thanks Cherelle Giles MD

## 2018-08-03 NOTE — PROGRESS NOTES
SPEECH PATHOLOGY NOTE: 
 
Speech therapy consult received and appreciated. Concern for GI bleed. Currently NPO. Will hold on dysphagia assessment until cleared for po intake by GI. [address] CCC-SLP

## 2018-08-04 PROBLEM — R25.1 TREMORS OF NERVOUS SYSTEM: Status: ACTIVE | Noted: 2018-01-01

## 2018-08-04 NOTE — PROGRESS NOTES
Problem: Dysphagia (Adult) Goal: *Acute Goals and Plan of Care (Insert Text) STG: Pt will demonstrate zero signs/sx of aspiration with regular textures with thin liquids with 90% accuracy during all meals. STG: Pt will complete a Modified barium swallow study with zero signs/sx of aspiration  with 100% accuracy during swallow study. LTG: Pt will demonstrate zero signs/sx of aspiration with least restrictive diet with 100% accuracy during all meals. Speech language pathology: bedside swallow note: Initial Assessment NAME/AGE/GENDER: Obi Cruz is a 80 y.o. male DATE: 8/4/2018 PRIMARY DIAGNOSIS: Acute on chronic renal failure ([address] ALFREDITO (acute kidney injury) ([address] ICD-10: Treatment Diagnosis: R12.11 oral phase dysphagia INTERDISCIPLINARY COLLABORATION: Registered Nurse PRECAUTIONS/ALLERGIES: Review of patient's allergies indicates no known allergies. ASSESSMENT:Based on the objective data described below, Mr. Michael Lombardo presents with no overt signs/sx of aspiration with thin via cup and straw, pureed and mixed. Pt with increased mastication with chewable textures secondary to edentulous. Pt reports he has dentures, but does not wear them. Noted belching when leaving room. Encouraged pt to be upright in bed for all meals and to syat upright for at least 20 minutes after meals. Recommend mechanical soft textures with thin liquids. Medications as tolerated. Will follow for diet tolerances x 1. Patient will benefit from skilled intervention to address the below impairments. ?????? ? ? This section established at most recent assessment?????????? 
PROBLEM LIST (Impairments causing functional limitations): 1. dysphagia REHABILITATION POTENTIAL FOR STATED GOALS: Good PLAN OF CARE:  
Patient will benefit from skilled intervention to address the following impairments.  
RECOMMENDATIONS AND PLANNED INTERVENTIONS (Benefits and precautions of therapy have been discussed with the patient.): 
· PO:  Mechanical soft 
· Liquids:  regular thin MEDICATIONS: 
· With liquid COMPENSATORY STRATEGIES/MODIFICATIONS INCLUDING: 
· Cup/sip · Small sips and bites OTHER RECOMMENDATIONS (including follow up treatment recommendations): · Family training/education · Patient education RECOMMENDED DIET MODIFICATIONS DISCUSSED WITH: 
· Nursing · Family · Patient FREQUENCY/DURATION: Continue to follow patient 1 time for diet toleranceRECOMMENDED REHABILITATION/EQUIPMENT: (at time of discharge pending progress): Due to the probability of continued deficits (see above) this patient will likely need continued skilled speech therapy after discharge. SUBJECTIVE:  
\"I am thirsty \" History of Present Injury/Illness: Mr. Reyna Iqbal  has a past medical history of Atherosclerosis of artery of extremity with ulceration ([facility] (4/17/2015); Atherosclerosis of native arteries of extremity with intermittent claudication ([facility] [facility] (4/17/2015); Atopic dermatitis (11/21/2012); Atrial fibrillation ([address] CAD (coronary artery disease); Carotid stenosis, bilateral (11/21/2012); CHF (congestive heart failure) ([address] (11/21/2012); Chronic kidney disease; Chronic obstructive pulmonary disease ([address] Colon, diverticulosis (1/24/2015); Coronary atherosclerosis of native coronary vessel (10/31/2016); Diabetes ([address] Diastolic CHF, acute on chronic (HCC) (9/12/2015); Failed CABG (coronary artery bypass graft) (11/21/2012); GI bleed (1/2015); Gout (11/21/2012); History of tobacco use; [place] (hard of hearing); Hypercholesterolemia; Hypertension; Hyperuricemia (11/21/2012); Morbid obesity ([address] PAD (peripheral artery disease) ([facility] [facility] (11/21/2012); Poor historian; Radiologic findings of lung field, abnormal (10/31/2016); Seizure disorder ([address] (8/5/2013); Shortness of breath dyspnea (8/5/2013); Thyroid disease; and Unspecified sleep apnea. He also has no past medical history of Arthritis; Asthma; Cancer ([address]  Liver disease; Nausea & vomiting; PUD (peptic ulcer disease); or Stroke [facility]). He also  has a past surgical history that includes pr cardiac surg procedure unlist; hx coronary artery bypass graft (06-); hx cataract removal (Left, 2003); hx heart catheterization; hx coronary stent placement (02-); hx heent (1970s); hx colonoscopy; and colonoscopy (N/A, 1/27/2017). Present Symptoms:  
Pain Intensity 1: 0 Pain Location 1: Generalized Pain Orientation 1: Posterior Pain Intervention(s) 1: Distraction (no pain med due at this time) Current Medications: No current facility-administered medications on file prior to encounter. Current Outpatient Prescriptions on File Prior to Encounter Medication Sig Dispense Refill  oxyCODONE IR (OXY-IR) 15 mg immediate release tablet Take 1 Tab by mouth two (2) times daily as needed for Pain. Max Daily Amount: 30 mg. 60 Tab 0  
 OXYGEN-AIR DELIVERY SYSTEMS by Does Not Apply route. 2LPM every day  zolpidem (AMBIEN) 5 mg tablet Take  by mouth nightly as needed for Sleep.  calcium carbonate (CALCIUM 600 PO) Take  by mouth.  furosemide (LASIX) 80 mg tablet Take 1 Tab by mouth two (2) times a day. 60 Tab 1  
 bisacodyl (DULCOLAX) 5 mg EC tablet Take 1 Tab by mouth daily. 15 Tab 0  
 aspirin 81 mg chewable tablet Take 1 Tab by mouth daily. 90 Tab 3  
 latanoprost (XALATAN) 0.005 % ophthalmic solution Administer 1 Drop to both eyes nightly.  sertraline (ZOLOFT) 50 mg tablet Take one tablet each evening for anxiety  Indications: Generalized Anxiety Disorder 30 Tab 3  
 fluticasone (FLONASE) 50 mcg/actuation nasal spray 2 Sprays by Both Nostrils route daily. 1 Bottle 3  
 levothyroxine (SYNTHROID) 50 mcg tablet Take 1 Tab by mouth Daily (before breakfast). 90 Tab 3  
 hydrALAZINE (APRESOLINE) 10 mg tablet TAKE 1 TABLET THREE TIMES DAILY. 90 Tab 3  cholecalciferol (VITAMIN D3) 1,000 unit cap Take 1,000 Units by mouth daily.  pravastatin (PRAVACHOL) 40 mg tablet Take 1 Tab by mouth nightly.  90 Tab 3  
 triamcinolone acetonide (KENALOG) 0.1 % ointment Apply  to affected area two (2) times a day. 80 g 0  
 gabapentin (NEURONTIN) 100 mg capsule Take 1 Cap by mouth three (3) times daily. 270 Cap 3  
 albuterol (PROVENTIL VENTOLIN) 2.5 mg /3 mL (0.083 %) nebulizer solution 2.5 mg by Nebulization route every four (4) hours as needed for Wheezing.  budesonide-formoterol (SYMBICORT) 160-4.5 mcg/actuation HFA inhaler Take 2 Puffs by inhalation two (2) times a day. 3 Inhaler 3  
 ipratropium-albuterol (COMBIVENT RESPIMAT)  mcg/actuation inhaler Take 1 Puff by inhalation every six (6) hours. 3 Inhaler 3  
 triamcinolone (ARISTOCORT) 0.5 % topical cream Apply  to affected area two (2) times a day. use thin layer 15 g 0  
 mupirocin calcium (BACTROBAN) 2 % topical cream Apply  to affected area two (2) times a day. 15 g 0  
 metoprolol succinate (TOPROL-XL) 25 mg XL tablet Pt takes 1/2 tab po daily. 45 Tab 3  
 magnesium oxide (MAG-OX) 400 mg tablet Take 1 Tab by mouth daily. 90 Tab 3  
 ferrous sulfate 324 mg (65 mg iron) tablet Take  by mouth Daily (before breakfast).  docusate sodium (STOOL SOFTENER) 100 mg capsule Take 100 mg by mouth as needed.  cpap machine kit by Does Not Apply route. Bilevel 12/8  isosorbide mononitrate ER (IMDUR) 30 mg tablet Take 1 Tab by mouth daily. 30 Tab 5  
 glipiZIDE (GLUCOTROL) 5 mg tablet Take 5 mg by mouth daily.  K-DUR 20 mEq tablet Take 20 mEq by mouth daily.  HYDROcodone-acetaminophen (NORCO)  mg tablet Take 1 Tab by mouth two (2) times daily as needed for Pain. Max Daily Amount: 2 Tabs. 60 Tab 0  
 pantoprazole (PROTONIX) 40 mg granules for oral suspension 40 mg daily.  clopidogrel (PLAVIX) 75 mg tab Take 1 Tab by mouth daily. 30 Tab 1  
 famotidine (PEPCID) 20 mg tablet Take 1 Tab by mouth daily.  30 Tab 0  
 OTHER One pair compression stocking gradient 20 - 30 mm hg 
Please measure to fit 1 Each 1  
 allopurinol (ZYLOPRIM) 300 mg tablet Take  by mouth daily. Current Dietary Status:   
   mechanical soft/thin 
Social History/Home Situation:  
Home Environment: Private residence Wheelchair Ramp: Yes One/Two Story Residence: One story Living Alone: No 
Support Systems: Child(tai), Friends \ neighbors, Spouse/Significant Other/Partner Patient Expects to be Discharged to[de-identified] Private residence Current DME Used/Available at Home: Cane, straight, Shower chair Tub or Shower Type: Shower OBJECTIVE:  
Respiratory Status:  Nasal cannula  3 l/min Oral Motor Structure/Speech:  Oral-Motor Structure/Motor Speech Labial: No impairment Dentition: Edentulous (has dentures but does not wear them) Oral Hygiene: dry Lingual: No impairment Cognitive and Communication Status: 
Neurologic State: Drowsy; Confused Orientation Level: Oriented to person Cognition: Decreased command following Perception: Appears intact Perseveration: No perseveration noted Safety/Judgement: Fall prevention BEDSIDE SWALLOW EVALUATION Oral Assessment: 
Oral Assessment Labial: No impairment Dentition: Edentulous (has dentures but does not wear them) Oral Hygiene: dry Lingual: No impairment P.O. Trials: 
Patient Position: upright in bed The patient was given teaspoon amounts of the following:  
Consistency Presented: Thin liquid;Mixed consistency How Presented: Straw;Spoon;Cup/sip; Self-fed/presented;SLP-fed/presented ORAL PHASE: 
Bolus Acceptance: No impairment Bolus Formation/Control: Impaired Propulsion: No impairment Type of Impairment: Mastication Oral Residue: None PHARYNGEAL PHASE: 
Initiation of Swallow: No impairment Aspiration Signs/Symptoms: None Vocal Quality: No impairment Pharyngeal Phase Characteristics: No impairment, issues, or problems OTHER OBSERVATIONS: 
Rate/bite size: Impaired Endurance:  Questionable Comments: upright for all meals Tool Used: Dysphagia Outcome and Severity Scale (IRVING) Score Comments Normal Diet  [] 7 With no strategies or extra time needed Functional Swallow  [] 6 May have mild oral or pharyngeal delay Mild Dysphagia [x] 5 Which may require one diet consistency restricted (those who demonstrate penetration which is entirely cleared on MBS would be included) Mild-Moderate Dysphagia  [] 4 With 1-2 diet consistencies restricted Moderate Dysphagia  [] 3 With 2 or more diet consistencies restricted Moderately Severe Dysphagia  [] 2 With partial PO strategies (trials with ST only) Severe Dysphagia  [] 1 With inability to tolerate any PO safely Score:  Initial:5 Most Recent: X (Date: -- ) Interpretation of Tool: The Dysphagia Outcome and Severity Scale (IRVING) is a simple, easy-to-use, 7-point scale developed to systematically rate the functional severity of dysphagia based on objective assessment and make recommendations for diet level, independence level, and type of nutrition. Score 7 6 5 4 3 2 1 Modifier CH CI CJ CK CL CM CN ? Swallowing:  
  - CURRENT STATUS: CJ - 20%-39% impaired, limited or restricted  - GOAL STATUS:  CI - 1%-19% impaired, limited or restricted  - D/C STATUS:  ---------------To be determined--------------- Payor: [facility] MEDICARE / [place] / Product Type: Managed Care Medicare /  
 
TREATMENT:  
 (In addition to Assessment/Re-Assessment sessions the following treatments were rendered) Assessment/Reassessment only, no treatment provided today MODALITIES:  
   
 
ORAL MOTOR  EXERCISES: 
   
 
LARYNGEAL / PHARYNGEAL EXERCISES: 
   
 
__________________________________________________________________________________________________ Safety: After treatment position/precautions: 
· Call light within reach · RN notified · Family at bedside · Upright in Bed Treatment Assessment:   
Progression/Medical Necessity:  
· Patient demonstrates fair rehab potential due to higher previous functional level. Compliance with Program/Exercises: Will assess as treatment progresses. Reason for Continuation of Services/Other Comments: 
· Patient continues to require skilled intervention due to dysphagia. Recommendations/Intent for next treatment session: \"Treatment next visit will focus on diet tolerance x 1\". Total Treatment Duration: 
Time In: 6584 Time Out: 5380  Eugene Smith MA/CCC/SLP

## 2018-08-04 NOTE — PROGRESS NOTES
Problem: Self Care Deficits Care Plan (Adult) Goal: *Acute Goals and Plan of Care (Insert Text) 1. Patient will upper body bathing and dressing with supervision and adaptive equipment as needed. 2. Patient will complete self-feeding/grooming tasks with set-up to improve participation with ADL. 3. Patient will tolerate 25 minutes of OT treatment with 1-2 rest breaks to increase activity tolerance for ADLs. 4. Patient will complete functional transfers with minimal assistance and adaptive equipment as needed. 5. Patient will complete functional mobility with minimal assistance and minimal cues for safety awareness. Timeframe: 7 visits OCCUPATIONAL THERAPY: Daily Note, Treatment Day: 2nd and AM  
 8/4/2018 INPATIENT: Hospital Day: 4 Payor: [facility] MEDICARE / Plan: [employer] [facility] MEDICARE HMO/PPO / Product Type: Managed Care Medicare /  
  
NAME/AGE/GENDER: Dallas Snow is a 80 y.o. male PRIMARY DIAGNOSIS:  Acute on chronic renal failure ([address] ALFREDITO (acute kidney injury) ([address] Acute on chronic renal failure ([address] Acute on chronic renal failure ([address] ICD-10: Treatment Diagnosis:  
 · Generalized Muscle Weakness (M62.81) · Other lack of cordination (R27.8) Precautions/Allergies: 
   Review of patient's allergies indicates no known allergies. ASSESSMENT:  
 
Mr. Contreras Hsieh presents in supine upon arrival. Pt transferred to sitting with mod a x's 2 with additional time and cues. Pt completed sit to stand with min a x's 2 and BSC transfer with mod a. Pt required total assist with toileting and clothing management. Pt left up in the chair to work with the PTA. Good effort with transfers. Continue POC. This section established at most recent assessment PROBLEM LIST (Impairments causing functional limitations): 1. Decreased Strength 2. Decreased ADL/Functional Activities 3. Decreased Transfer Abilities 4. Decreased Ambulation Ability/Technique 5.  Decreased Balance 6. Increased Pain 7. Decreased Activity Tolerance 8. Decreased Flexibility/Joint Mobility 9. Decreased Skin Integrity/Hygeine 10. Decreased Ridge with Home Exercise Program 
11. Decreased Cognition INTERVENTIONS PLANNED: (Benefits and precautions of occupational therapy have been discussed with the patient.) 1. Activities of daily living training 2. Adaptive equipment training 3. Balance training 4. Clothing management 5. Cognitive training 6. Donning&doffing training 7. Group therapy 8. Neuromuscular re-eduation 9. Therapeutic activity 10. Therapeutic exercise TREATMENT PLAN: Frequency/Duration: Follow patient 3 times per week to address above goals. Rehabilitation Potential For Stated Goals: Good RECOMMENDED REHABILITATION/EQUIPMENT: (at time of discharge pending progress): Due to the probability of continued deficits (see above) this patient will likely need continued skilled occupational therapy after discharge. Equipment:  TBD OCCUPATIONAL PROFILE AND HISTORY:  
History of Present Injury/Illness (Reason for Referral): 
See H&P Past Medical History/Comorbidities:  
Mr. Katt Robison  has a past medical history of Atherosclerosis of artery of extremity with ulceration ([facility] (4/17/2015); Atherosclerosis of native arteries of extremity with intermittent claudication ([facility] [address] (4/17/2015); Atopic dermatitis (11/21/2012); Atrial fibrillation ([address] CAD (coronary artery disease); Carotid stenosis, bilateral (11/21/2012); CHF (congestive heart failure) ([address] (11/21/2012); Chronic kidney disease; Chronic obstructive pulmonary disease ([address] Colon, diverticulosis (1/24/2015); Coronary atherosclerosis of native coronary vessel (10/31/2016); Diabetes ([address] Diastolic CHF, acute on chronic (HCC) (9/12/2015); Failed CABG (coronary artery bypass graft) (11/21/2012); GI bleed (1/2015); Gout (11/21/2012); History of tobacco use; [place] (hard of hearing);  Hypercholesterolemia; Hypertension; Hyperuricemia (11/21/2012); Morbid obesity ([facility] PAD (peripheral artery disease) ([facility] (11/21/2012); Poor historian; Radiologic findings of lung field, abnormal (10/31/2016); Seizure disorder ([facility] (8/5/2013); Shortness of breath dyspnea (8/5/2013); Thyroid disease; and Unspecified sleep apnea. He also has no past medical history of Arthritis; Asthma; Cancer ([facility] Liver disease; Nausea & vomiting; PUD (peptic ulcer disease); or Stroke ([facility] Mr. Reyna Iqbal  has a past surgical history that includes pr cardiac surg procedure unlist; hx coronary artery bypass graft (06-); hx cataract removal (Left, 2003); hx heart catheterization; hx coronary stent placement (02-); hx heent (1970s); hx colonoscopy; and colonoscopy (N/A, 1/27/2017). Social History/Living Environment:  
Home Environment: Private residence Wheelchair Ramp: Yes One/Two Story Residence: One story Living Alone: No 
Support Systems: Child(tai), Friends \ neighbors, Spouse/Significant Other/Partner Patient Expects to be Discharged to[de-identified] Private residence Current DME Used/Available at Home: Cane, straight, Shower chair Tub or Shower Type: Shower Prior Level of Function/Work/Activity: 
Pt lives at home with his wife. Pt had difficulty providing hx due to AMS. States he uses a cane occasionally and completes ADL without assistance. States no falls but later states he may have had a fall. Personal Factors:   
      Age:  80 y.o. Social Background:  Lives with wife, who is also currently admitted into the hospital 
      Overall Behavior:  Confused; tearful; AMS; perseverating Other factors that influence how disability is experienced by the patient:  Multiple co-morbidities Number of Personal Factors/Comorbidities that affect the Plan of Care: Extensive review of physical, cognitive, and psychosocial performance (3+):  HIGH COMPLEXITY ASSESSMENT OF OCCUPATIONAL PERFORMANCE[de-identified]  
Activities of Daily Living: Basic ADLs (From Assessment) Complex ADLs (From Assessment) Feeding: Moderate assistance Oral Facial Hygiene/Grooming: Moderate assistance Bathing: Maximum assistance Upper Body Dressing: Maximum assistance Lower Body Dressing: Total assistance Toileting: Total assistance Instrumental ADL Meal Preparation: Total assistance Homemaking: Total assistance Grooming/Bathing/Dressing Activities of Daily Living Cognitive Retraining Safety/Judgement: Fall prevention Toileting Toileting Assistance: Total assistance(dependent) Bowel Hygiene: Total assistance (dependent) Clothing Management: Total assistance (dependent) Functional Transfers Toilet Transfer : Moderate assistance;Assist x2 Bed/Mat Mobility Supine to Sit: Moderate assistance;Assist x2 Sit to Stand: Moderate assistance;Assist x2 Scooting: Maximum assistance Most Recent Physical Functioning:  
Gross Assessment: 
  
         
  
Posture: 
Posture (WD): Exceptions to [facility] Posture Assessment: Forward head, Rounded shoulders Balance: 
Sitting: Impaired Sitting - Static: Good (unsupported) Sitting - Dynamic: Fair (occasional) Standing: Impaired Standing - Static: Constant support Standing - Dynamic : Poor Bed Mobility: 
Supine to Sit: Moderate assistance;Assist x2 Scooting: Maximum assistance Wheelchair Mobility: 
  
Transfers: 
Sit to Stand: Moderate assistance;Assist x2 Stand to Sit: Moderate assistance Patient Vitals for the past 6 hrs: 
 BP BP Patient Position SpO2 O2 Flow Rate (L/min) Pulse 08/04/18 0705 165/75 At rest 97 % - 74  
08/04/18 0744 - - 95 % 3 l/min - Mental Status Neurologic State: Drowsy, Confused Orientation Level: Oriented to person Cognition: Decreased command following Perception: Appears intact Perseveration: No perseveration noted Safety/Judgement: Fall prevention Physical Skills Involved: 1.  Range of Motion 2. Balance 3. Strength 4. Activity Tolerance 5. Sensation 6. Fine Motor Control 7. Gross Motor Control 8. Pain (acute) 9. Skin Integrity Cognitive Skills Affected (resulting in the inability to perform in a timely and safe manner): 1. Perception 2. Executive Function 3. Short Term Recall 4. Sustained Attention 5. Divided Attention 6. Comprehension 7. Expression Psychosocial Skills Affected: 1. Habits/Routines 2. Environmental Adaptation 3. Emotional Regulation 4. Self-Awareness Number of elements that affect the Plan of Care: 5+:  HIGH COMPLEXITY CLINICAL DECISION MAKIN[address] [place] [address] AM-PAC 6 Clicks Daily Activity Inpatient Short Form How much help from another person does the patient currently need. .. Total A Lot A Little None 1. Putting on and taking off regular lower body clothing? [x] 1   [] 2   [] 3   [] 4  
2. Bathing (including washing, rinsing, drying)? [] 1   [x] 2   [] 3   [] 4  
3. Toileting, which includes using toilet, bedpan or urinal?   [x] 1   [] 2   [] 3   [] 4  
4. Putting on and taking off regular upper body clothing? [] 1   [x] 2   [] 3   [] 4  
5. Taking care of personal grooming such as brushing teeth? [] 1   [x] 2   [] 3   [] 4  
6. Eating meals? [] 1   [x] 2   [] 3   [] 4  
© , Trustees of [address] [place] [address], under license to [employer]. All rights reserved Score:  Initial: 10 Most Recent: X (Date: -- ) Interpretation of Tool:  Represents activities that are increasingly more difficult (i.e. Bed mobility, Transfers, Gait). Score 24 23 22-20 19-15 14-10 9-7 6 Modifier CH CI CJ CK CL CM CN   
 
? Self Care:  
  - CURRENT STATUS: CL - 60%-79% impaired, limited or restricted  - GOAL STATUS: CK - 40%-59% impaired, limited or restricted  - D/C STATUS:  ---------------To be determined--------------- Payor: [facility] MED[facility] / [place] / Product Type: Managed Care Medicare /   
 
Medical Necessity:    
· Patient demonstrates good rehab potential due to higher previous functional level. Reason for Services/Other Comments: 
· Patient continues to require skilled intervention due to decreased independence with ADL/functional transfers. Use of outcome tool(s) and clinical judgement create a POC that gives a: MODERATE COMPLEXITY  
 
 
 
TREATMENT:  
(In addition to Assessment/Re-Assessment sessions the following treatments were rendered) Pre-treatment Symptoms/Complaints:   
Pain: Initial:  
Pain Intensity 1: 0  Post Session:  same Therapeutic Activity: (15 minutes): Therapeutic activities including Bed transfers, Toilet transfers, Ambulation on level ground, toilet hygiene, and clothing management  to improve mobility, strength and balance. Required moderate   to promote dynamic balance in standing. Braces/Orthotics/Lines/Etc:  
· IV 
· alejo catheter · O2 Device: Nasal cannula Treatment/Session Assessment:   
· Response to Treatment:  Good effort with transfers · Interdisciplinary Collaboration:  
o Certified Occupational Therapy Assistant 
o Registered Nurse · After treatment position/precautions:  
o Up in chair 
o Bed alarm/tab alert on 
o Bed/Chair-wheels locked 
o Call light within reach 
o RN notified · Compliance with Program/Exercises: Will assess as treatment progresses. · Recommendations/Intent for next treatment session: \"Next visit will focus on advancements to more challenging activities and reduction in assistance provided\". Total Treatment Duration: OT Patient Time In/Time Out Time In: 1000 Time Out: 1015 Odell Mercedes

## 2018-08-04 NOTE — PROGRESS NOTES
Gastrointestinal Progress Note 8/4/2018 Admit Date: 8/1/2018 Subjective:  
 
Patient is on NPO-no complaints; tagged RBC scan negative. Hb noted Pain: Patient complains of no abdominal pain. Bowel Movements: Flatus Bleeding:  bright red blood per rectum yesterday per staff; no stools yet this AM   
 
Current Facility-Administered Medications Medication Dose Route Frequency  dextrose 5 % - 0.45% NaCl infusion  50 mL/hr IntraVENous CONTINUOUS  
 pantoprazole (PROTONIX) tablet 40 mg  40 mg Oral ACB  ondansetron (ZOFRAN) injection 4 mg  4 mg IntraVENous Q6H PRN  
 traMADol (ULTRAM) tablet 50 mg  50 mg Oral Q6H PRN  
 sodium chloride (NS) flush 5-10 mL  5-10 mL IntraVENous Q8H  
 sodium chloride (NS) flush 5-10 mL  5-10 mL IntraVENous PRN  
 acetaminophen (TYLENOL) tablet 650 mg  650 mg Oral Q4H PRN  
 albuterol (PROVENTIL VENTOLIN) nebulizer solution 2.5 mg  2.5 mg Nebulization Q4H PRN  
 bisacodyl (DULCOLAX) tablet 5 mg  5 mg Oral DAILY  fluticasone (FLONASE) 50 mcg/actuation nasal spray 2 Spray  2 Spray Both Nostrils DAILY  isosorbide mononitrate ER (IMDUR) tablet 30 mg  30 mg Oral DAILY  latanoprost (XALATAN) 0.005 % ophthalmic solution 1 Drop  1 Drop Both Eyes QHS  levothyroxine (SYNTHROID) tablet 50 mcg  50 mcg Oral ACB  magnesium oxide (MAG-OX) tablet 400 mg  400 mg Oral DAILY  metoprolol succinate (TOPROL-XL) XL tablet 12.5 mg  12.5 mg Oral DAILY  sertraline (ZOLOFT) tablet 50 mg  50 mg Oral DAILY  zolpidem (AMBIEN) tablet 5 mg  5 mg Oral QHS PRN  
 budesonide (PULMICORT) 500 mcg/2 ml nebulizer suspension  500 mcg Nebulization BID RT And  
 albuterol (PROVENTIL VENTOLIN) nebulizer solution 2.5 mg  2.5 mg Nebulization Q6HWA RT  
 nystatin (MYCOSTATIN) 100,000 unit/gram powder   Topical BID  dextrose 40% (GLUTOSE) oral gel 1 Tube  15 g Oral PRN  
 glucagon (GLUCAGEN) injection 1 mg  1 mg IntraMUSCular PRN  
 dextrose (D50W) injection syrg 12.5-25 g  25-50 mL IntraVENous PRN Objective:  
 
Blood pressure 165/75, pulse 74, temperature 97.7 °F (36.5 °C), resp. rate 20, height 5' 10\" (1.778 m), weight 112.7 kg (248 lb 8 oz), SpO2 95 %. 08/02 1901 - 08/04 0700 In: 0 Out: 2850 [Urine:2850] EXAM:  HEART: regular rate and rhythm, S1, S2 normal, no murmur, click, rub or gallop, LUNGS: chest clear, no wheezing, rales, normal symmetric air entry, Heart exam - S1, S2 normal, no murmur, no gallop, rate regular and ABDOMEN:  Bowel sounds are normal, liver is not enlarged, spleen is not enlarged Data Review Recent Results (from the past 24 hour(s)) GLUCOSE, POC Collection Time: 08/03/18 11:53 AM  
Result Value Ref Range Glucose (POC) 139 (H) 65 - 100 mg/dL GLUCOSE, POC Collection Time: 08/03/18  5:55 PM  
Result Value Ref Range Glucose (POC) 180 (H) 65 - 100 mg/dL HGB & HCT Collection Time: 08/03/18  9:02 PM  
Result Value Ref Range HGB 12.0 (L) 13.6 - 17.2 g/dL HCT 38.1 (L) 41.1 - 50.3 % GLUCOSE, POC Collection Time: 08/04/18 12:14 AM  
Result Value Ref Range Glucose (POC) 223 (H) 65 - 100 mg/dL HGB & HCT Collection Time: 08/04/18  5:07 AM  
Result Value Ref Range HGB 11.3 (L) 13.6 - 17.2 g/dL HCT 36.8 (L) 41.1 - 50.3 % METABOLIC PANEL, BASIC Collection Time: 08/04/18  5:08 AM  
Result Value Ref Range Sodium 146 (H) 136 - 145 mmol/L Potassium 5.1 3.5 - 5.1 mmol/L Chloride 109 (H) 98 - 107 mmol/L  
 CO2 26 21 - 32 mmol/L Anion gap 11 7 - 16 mmol/L Glucose 222 (H) 65 - 100 mg/dL BUN 68 (H) 8 - 23 MG/DL Creatinine 2.93 (H) 0.8 - 1.5 MG/DL  
 GFR est AA 26 (L) >60 ml/min/1.73m2 GFR est non-AA 22 (L) >60 ml/min/1.73m2 Calcium 8.9 8.3 - 10.4 MG/DL  
GLUCOSE, POC Collection Time: 08/04/18  5:46 AM  
Result Value Ref Range Glucose (POC) 213 (H) 65 - 100 mg/dL Assessment:  
 
Principal Problem: 
  Acute on chronic renal failure ([facility] (8/1/2018) Active Problems: Persistent atrial fibrillation ([facility] (11/21/2012) Overview: Coumadin therapy discontinued due to recurrent GI bleeding. COPD (chronic obstructive pulmonary disease) ([address] (11/21/2012) CKD (chronic kidney disease) stage 3, GFR 30-59 ml/min (9/18/2013) ROBERTO on CPAP (2/20/2015) Overview: Patient is on BiPAP, no supplementary oxygen Type 2 diabetes with nephropathy ([address] (2/12/2018) Venous stasis dermatitis of both lower extremities (4/22/2018) Hypoglycemia (8/1/2018) ALFREDITO (acute kidney injury) ([facility] (8/2/2018) GI bleed--probable diverticular (but history of gastric ulcer that was not documented to be healed) Plan:  
 
EGD tomorrow to document healing of the previously noted ulcer. Risks (bleed, perforation, infection, untoward CV or resp. Event, mortality, etc.), benefits and alternatives were discussed with the patient who agrees to proceed.   Thanks Belen East MD

## 2018-08-04 NOTE — PROGRESS NOTES
Problem: Mobility Impaired (Adult and Pediatric) Goal: *Acute Goals and Plan of Care (Insert Text) STG: 
(1.)Mr. Navarro will move from supine to sit and sit to supine , scoot up and down and roll side to side with MODERATE ASSIST within 3 treatment day(s). (2.)Mr. Navarro will transfer from bed to chair and chair to bed with MODERATE ASSIST with minimal cueing using the least restrictive device within 3 treatment day(s). (3.)Mr. Navarro will ambulate with MODERATE ASSIST for 10 feet with the least restrictive device within 3 treatment day(s). LTG: 
(1.)Mr. Navarro will move from supine to sit and sit to supine , scoot up and down and roll side to side in bed with MINIMAL ASSIST within 7 treatment day(s). (2.)Mr. Navarro will transfer from bed to chair and chair to bed with MINIMAL ASSIST using the least restrictive device within 7 treatment day(s). (3.)Mr. Navarro will ambulate with MINIMAL ASSIST for 100+ feet with the least restrictive device within 7 treatment day(s). ________________________________________________________________________________________________ PHYSICAL THERAPY: Daily Note, Treatment Day: 1st, AM 8/4/2018 INPATIENT: Hospital Day: 4 Payor: [facility] [place] MEDICARE / Plan: [employer] [place]O / Product Type: Managed Care Medicare /  
  
NAME/AGE/GENDER: Rachell Weber is a 80 y.o. male PRIMARY DIAGNOSIS: Acute on chronic renal failure ([address] ALFREDITO (acute kidney injury) ([address] EGD Acute on chronic renal failure (HCC) Acute on chronic renal failure ([address] Procedure(s) (LRB): ESOPHAGOGASTRODUODENOSCOPY (EGD) (N/A) ICD-10: Treatment Diagnosis:  
 · Other abnormalities of gait and mobility (R26.89) Precaution/Allergies: 
Review of patient's allergies indicates no known allergies. ASSESSMENT:  
 
Mr. Joe Thorne presents supine in bed. Patient is awake and alert. Son present. Bed mobility required min assist and additional time for completion.   Sitting balance good. Transfers sit to stand with min assist.  Standing balance improved with  Some cues for more upright posture. Patient is able to take a few steps to the [facility] then to the recliner. Patient is able to static standing for cleaning after bowel movement. Good session. Patient is [place] and requires increased verbal cues for commands. Patient is left sitting in the recliner with needs with in reach and son present. PCT is also in the room to assist the patient. Improved noted however the patient is functioning below his baseline. He is pleasant and cooperative. Mr. Kelly Mariano would benefit from skilled physical therapy (medically necessary) to address his deficits and maximize his function. Additional skilled inpatient therapy would be beneficial at discharge as patients wife is also a patient in this hospital.  Will continue PT efforts. This section established at most recent assessment PROBLEM LIST (Impairments causing functional limitations): 1. Decreased Strength 2. Decreased ADL/Functional Activities 3. Decreased Transfer Abilities 4. Decreased Ambulation Ability/Technique 5. Decreased Balance 6. Decreased Activity Tolerance 7. Decreased Cognition INTERVENTIONS PLANNED: (Benefits and precautions of physical therapy have been discussed with the patient.) 1. Balance Exercise 2. Bed Mobility 3. Gait Training 4. Therapeutic Activites 5. Therapeutic Exercise/Strengthening 6. Transfer Training 7. education 8. Group Therapy TREATMENT PLAN: Frequency/Duration: 3 times a week for duration of hospital stay Rehabilitation Potential For Stated Goals: Good RECOMMENDED REHABILITATION/EQUIPMENT: (at time of discharge pending progress): Due to the probability of continued deficits (see above) this patient will likely need continued skilled physical therapy after discharge. Equipment:  
 None at this time HISTORY:  
History of Present Injury/Illness (Reason for Referral): 
Per MD note, \"80 years old M with PMH of afib not on AC due to GI bleed, COPD, DM, D-CHF, CKD presented to the hospital for worsening tremors of upper extremities since last night. Granddaughter stated symptoms lasted whole night. Tremors usually come and go, last just few seconds. Granddaughter reported patient has been very anxious after his wife was hospitalized at [facility]. Patient reported he feels better after arriving to the ED. Family stated patient is lightly drowsy after medication given at ED for sedation. Family stated he is also having poor PO intake at home for the last day. Patient denies productive cough, fever, urinary symptoms, SOB, abdominal pain, new medication or chest pain. \" 
Past Medical History/Comorbidities:  
Mr. Reyna Iqbal  has a past medical history of Atherosclerosis of artery of extremity with ulceration ([address] (4/17/2015); Atherosclerosis of native arteries of extremity with intermittent claudication ([address] (4/17/2015); Atopic dermatitis (11/21/2012); Atrial fibrillation ([address] CAD (coronary artery disease); Carotid stenosis, bilateral (11/21/2012); CHF (congestive heart failure) ([address] (11/21/2012); Chronic kidney disease; Chronic obstructive pulmonary disease ([address] Colon, diverticulosis (1/24/2015); Coronary atherosclerosis of native coronary vessel (10/31/2016); Diabetes ([address] Diastolic CHF, acute on chronic (HCC) (9/12/2015); Failed CABG (coronary artery bypass graft) (11/21/2012); GI bleed (1/2015); Gout (11/21/2012); History of tobacco use; [place] (hard of hearing); Hypercholesterolemia; Hypertension; Hyperuricemia (11/21/2012); Morbid obesity ([address] PAD (peripheral artery disease) ([address] (11/21/2012); Poor historian; Radiologic findings of lung field, abnormal (10/31/2016); Seizure disorder ([address] (8/5/2013); Shortness of breath dyspnea (8/5/2013); Thyroid disease; and Unspecified sleep apnea. He also has no past medical history of Arthritis; Asthma; Cancer ([facility]  Liver disease; Nausea & vomiting; PUD (peptic ulcer disease); or Stroke ([facility] Mr. Modesto Sousa  has a past surgical history that includes pr cardiac surg procedure unlist; hx coronary artery bypass graft (06-); hx cataract removal (Left, 2003); hx heart catheterization; hx coronary stent placement (02-); hx heent (1970s); hx colonoscopy; and colonoscopy (N/A, 1/27/2017). Social History/Living Environment:  
Home Environment: Private residence Wheelchair Ramp: Yes One/Two Story Residence: One story Living Alone: No 
Support Systems: Child(tai), Friends \ neighbors, Spouse/Significant Other/Partner Patient Expects to be Discharged to[de-identified] Private residence Current DME Used/Available at Home: Cane, straight, Shower chair Tub or Shower Type: Shower Prior Level of Function/Work/Activity: 
Patient lives at home with wife, who also happens to be in the hospital.  Unreliable historian due to confusion. Number of Personal Factors/Comorbidities that affect the Plan of Care: 3+: HIGH COMPLEXITY EXAMINATION:  
Most Recent Physical Functioning:  
Gross Assessment: 
  
         
  
Posture: 
  
Balance: 
Sitting: Intact Sitting - Static: Good (unsupported) Sitting - Dynamic: Fair (occasional) Standing: Impaired Standing - Static: Fair Standing - Dynamic : Poor Bed Mobility: 
Rolling: Minimum assistance Supine to Sit: Minimum assistance Scooting: Minimum assistance Wheelchair Mobility: 
  
Transfers: 
Sit to Stand: Minimum assistance Stand to Sit: Minimum assistance Bed to Chair: Minimum assistance Gait: 
  
   
  
Body Structures Involved: 1. Muscles Body Functions Affected: 1. Mental 
2. Movement Related 3. Metobolic/Endocrine Activities and Participation Affected: 1. Mobility 2. Self Care 3. Domestic Life Number of elements that affect the Plan of Care: 4+: HIGH COMPLEXITY CLINICAL PRESENTATION:  
Presentation: Evolving clinical presentation with changing clinical characteristics: MODERATE COMPLEXITY CLINICAL DECISION MAKING:  
[facility] AM-PAC 6 Clicks Basic Mobility Inpatient Short Form How much difficulty does the patient currently have. .. Unable A Lot A Little None 1. Turning over in bed (including adjusting bedclothes, sheets and blankets)? [x] 1   [] 2   [] 3   [] 4  
2. Sitting down on and standing up from a chair with arms ( e.g., wheelchair, bedside commode, etc.)   [] 1   [x] 2   [] 3   [] 4  
3. Moving from lying on back to sitting on the side of the bed? [x] 1   [] 2   [] 3   [] 4 How much help from another person does the patient currently need. .. Total A Lot A Little None 4. Moving to and from a bed to a chair (including a wheelchair)? [] 1   [x] 2   [] 3   [] 4  
5. Need to walk in hospital room? [] 1   [x] 2   [] 3   [] 4  
6. Climbing 3-5 steps with a railing? [x] 1   [] 2   [] 3   [] 4  
© 2007, Trustees of [facility], under license to [employer]. All rights reserved Score:  Initial: 9 Most Recent: X (Date: -- ) Interpretation of Tool:  Represents activities that are increasingly more difficult (i.e. Bed mobility, Transfers, Gait). Score 24 23 22-20 19-15 14-10 9-7 6 Modifier CH CI CJ CK CL CM CN   
 
? Mobility - Walking and Moving Around:  
  - CURRENT STATUS: CM - 80%-99% impaired, limited or restricted  - GOAL STATUS: CL - 60%-79% impaired, limited or restricted  - D/C STATUS:  ---------------To be determined--------------- Payor: [facility] [place] MEDICARE / Plan[place] [employer] [place] / Product Type: Managed Care Medicare /   
 
Medical Necessity:    
· Patient is expected to demonstrate progress in strength, range of motion, balance, coordination and functional technique to decrease assistance required with all functional mobility.  
Reason for Services/Other Comments: 
· Patient continues to require skilled intervention due to medical complications and patient unable to attend/participate in therapy as expected. Use of outcome tool(s) and clinical judgement create a POC that gives a: Questionable prediction of patient's progress: MODERATE COMPLEXITY  
  
 
 
 
TREATMENT:  
(In addition to Assessment/Re-Assessment sessions the following treatments were rendered) Pre-treatment Symptoms/Complaints:  \" I can try\" Pain: Initial:  
Pain Intensity 1: 0  Post Session:  0 Therapeutic Activity: (    15 minutes): Therapeutic activities including sitting balance activities on EOB, sit to stand, transfers and gait training  to improve mobility, strength and balance. Required  Min to moderate assist with maximal cueing   to promote participation. Braces/Orthotics/Lines/Etc:  
· IV 
· alejo catheter · O2 Device: Nasal cannula Treatment/Session Assessment:   
· Response to Treatment:  Pleasant and cooperative. · Interdisciplinary Collaboration:  
o Physical Therapy Assistant 
o Certified Occupational Therapy Assistant 
o Registered Nurse 
o Certified Nursing Assistant/Patient Care Technician · After treatment position/precautions:  
o Up in chair 
o Bed/Chair-wheels locked 
o Bed in low position 
o Caregiver at bedside 
o Call light within reach 
o RN notified 
o Family at bedside · Compliance with Program/Exercises: doing better · Recommendations/Intent for next treatment session: \"Next visit will focus on advancements to more challenging activities and reduction in assistance provided\". Total Treatment Duration: PT Patient Time In/Time Out Time In: 4141 Time Out: 1030 Chey Gomez PTA

## 2018-08-04 NOTE — PROGRESS NOTES
Progress Note Patient: Adin Palmer MRN: [id number]  SSN: [id number] YOB: 1932  Age: 80 y.o. Sex: male Admit Date: 8/1/2018 LOS: 2 days Subjective:  
CC:\" I have this tremors\" Patient examined at bedside. I met with his son and updated him about his condition. Patient complained of tremors, but these are much improved. His son told me he has underlying tremors from before, but not as bad as when he came to he hospital. He has not had anymore bloody stools. Has good urine output. Objective:  
 
Vitals:  
 08/04/18 7859 08/04/18 0615 08/04/18 9445 08/04/18 2475 BP: 146/67  165/75 Pulse: 69  74 Resp: 22  20 Temp: 97.6 °F (36.4 °C)  97.7 °F (36.5 °C) SpO2: 95%  97% 95% Weight:  112.7 kg (248 lb 8 oz) Height:      
  
 
Intake and Output: 
Current Shift:   
Last three shifts: 08/02 1901 - 08/04 0700 In: 0 Out: 2850 [Urine:2850] Physical Exam:  
GENERAL: alert, cooperative, in no distress EYE: negative LYMPHATIC: Cervical, supraclavicular, and axillary nodes normal.  
THROAT & NECK: normal and no erythema or exudates noted. LUNG: clear to auscultation bilaterally HEART: regular rate and rhythm, S1, S2 normal, no murmur, click, rub or gallop ABDOMEN: soft, non-tender. Bowel sounds normal. No masses,  no organomegaly EXTREMITIES:  extremities normal, atraumatic, no cyanosis or edema SKIN: Normal. 
NEUROLOGIC: alert, oriented in person, place. Some tremors on exertion PSYCHIATRIC: non focal 
 
Lab/Data Review: All lab results for the last 24 hours reviewed. Assessment:  
 
Principal Problem: 
  Acute on chronic renal failure ([facility] (8/1/2018) Active Problems: 
  Persistent atrial fibrillation ([facility] [facility] (11/21/2012) Overview: Coumadin therapy discontinued due to recurrent GI bleeding. COPD (chronic obstructive pulmonary disease) ([address] (11/21/2012) CKD (chronic kidney disease) stage 3, GFR 30-59 ml/min (9/18/2013) ROBERTO on CPAP (2/20/2015) Overview: Patient is on BiPAP, no supplementary oxygen Type 2 diabetes with nephropathy ([facility] (2/12/2018) Venous stasis dermatitis of both lower extremities (4/22/2018) Hypoglycemia (8/1/2018) ALFREDITO (acute kidney injury) ([facility] (8/2/2018) Plan:  
-Acute blood loss anemia, episode of lower GIB on 8/3/18, patient has a history of PUD: hemodynamically stable - HH stable - ASA and plavix held - monitor HH - transfuse prn - Red blood cell tag scan was normal - GI on board and plan for EGD tomorrow morning  
 
-Acute on chronic kidney injury, possible due to poor oral intake at home / lasix or protonix use: Pre-renal in etiology- FeNa of 0.8%: improving Continue hydration with IVFs Strict IO via alejo cath Monitor labs daily 
  
-Hypoglycemia induced by glipizide and poor oral intake: resolved Monitor  
  
-Myoclonus, in the setting of underlying tremors: likely secondary to gabapentin use on top of alfredito / hypoglycemia induced: improving Gabapentin was stopped CT brain with no acute disease Monitor  
 
-Dysphagia: evaluated by speech, he may have clear liquids-  
  
-ROBERTO: CPAP 
  
-Afib: Rate controlled - home meds  
  
-COPD: not in exacerbation  
  
-DM: stable, resume sliding scale  
  
-Chronic D-CHF: compensated - monitoring - lasix on hold  
 
-Debility: PT/OT 
  
DVT ppx: GCS Code status: full Disposition: STR- [place] once medically stable Signed By: Ashwin Srivastava MD   
 August 4, 2018

## 2018-08-05 PROBLEM — D62 ACUTE BLOOD LOSS ANEMIA: Status: ACTIVE | Noted: 2018-01-01

## 2018-08-05 NOTE — ANESTHESIA POSTPROCEDURE EVALUATION
Post-Anesthesia Evaluation and Assessment    Patient: Tonia Richards MRN: [id number]  SSN: [id number]    YOB: 1932  Age: 80 y.o. Sex: male       Cardiovascular Function/Vital Signs  Visit Vitals    /77 (BP 1 Location: Left arm, BP Patient Position: At rest)    Pulse 68    Temp 36.6 °C (97.8 °F)    Resp 19    Ht 5' 10\" (1.778 m)    Wt 113.1 kg (249 lb 4.8 oz)    SpO2 100%    BMI 35.77 kg/m2       Patient is status post total IV anesthesia anesthesia for Procedure(s):  ESOPHAGOGASTRODUODENOSCOPY (EGD). Nausea/Vomiting: None    Postoperative hydration reviewed and adequate. Pain:  Pain Scale 1: Numeric (0 - 10) (08/05/18 1037)  Pain Intensity 1: 0 (08/05/18 1037)   Managed    Neurological Status:   Neuro (WDL): Within Defined Limits (08/05/18 1037)  Neuro  Neurologic State: Alert (08/05/18 1037)  Orientation Level: Oriented X4 (08/05/18 1037)  Cognition: Follows commands (08/05/18 1037)  Speech: Clear (08/05/18 1037)  LUE Motor Response: Purposeful (08/05/18 1037)  LLE Motor Response: Purposeful (08/05/18 1037)  RUE Motor Response: Purposeful (08/05/18 1037)  RLE Motor Response: Purposeful (08/05/18 1037)   At baseline    Mental Status and Level of Consciousness: Arousable    Pulmonary Status:   O2 Device: Room air (08/05/18 1100)   Adequate oxygenation and airway patent    Complications related to anesthesia: None    Post-anesthesia assessment completed.  No concerns    Signed By: David Davenport MD     August 5, 2018

## 2018-08-05 NOTE — PROGRESS NOTES
GI--EGD performed with minimal difficulty. Findings 1) No blood in the UGI tract 2) Previously noted ulcer has healed. 3) Mild gastritis. Patient tolerated the procedure well. Recommend 1) H2 blocker therapy (PPI therapy if anticoagulation given) 2) supportive care for the presumed diverticular bleed. Will sign off but please call if needed.    Thanks Elma Larkin MD

## 2018-08-05 NOTE — PROGRESS NOTES
Date of Surgery Update: Dulce Ren was seen and examined. History and physical has been reviewed. The patient has been examined. There have been no significant clinical changes since the completion of the originally dated History and Physical. Hb stable. Will proceed with the EGD--risks (bleed, perforation, infection, untoward CV or resp. Event, mortality, etc.), benefits, and alternatives were discussed with the patient's POA and son who agree to proceed.  Thanks     Signed By: Mindi ePralta MD     August 5, 2018 8:38 AM

## 2018-08-05 NOTE — ANESTHESIA PREPROCEDURE EVALUATION
Anesthetic History               Review of Systems / Medical History  Patient summary reviewed, nursing notes reviewed and pertinent labs reviewed    Pulmonary    COPD: moderate    Sleep apnea           Neuro/Psych              Cardiovascular    Hypertension: well controlled  Valvular problems/murmurs: mitral insufficiency    CHF (Compensated)  Dysrhythmias : atrial fibrillation  CABG (2006)    Exercise tolerance: <4 METS     GI/Hepatic/Renal         Renal disease: CRI      Comments: Upper GI hemorrhage Endo/Other    Diabetes: poorly controlled, type 2  Hypothyroidism: well controlled  Obesity     Other Findings   Comments: Gout         Physical Exam    Airway  Mallampati: II  TM Distance: > 6 cm  Neck ROM: decreased range of motion, short neck   Mouth opening: Normal     Cardiovascular    Rhythm: irregular  Rate: normal         Dental    Dentition: Edentulous     Pulmonary  Breath sounds clear to auscultation               Abdominal  GI exam deferred       Other Findings            Anesthetic Plan    ASA: 4  Anesthesia type: total IV anesthesia          Induction: Intravenous  Anesthetic plan and risks discussed with: Patient and Son / Daughter

## 2018-08-05 NOTE — PROGRESS NOTES
Hourly round completed throughout the shift. Pt still confused throughout the night No complain of pain and denies any needs at this time. Bed in lower position and call light/ personal items within reach. Will continue to monitor and give bed side shift report to on coming day shift nurse.

## 2018-08-05 NOTE — PROGRESS NOTES
Hourly rounding completed this shift. Pt have been very confused this shift. Sat up in the chair for the most part of the shift. Family present. Pt have  Had multiple black tarry stools. No other needs this shift, son present will continue to manage.

## 2018-08-05 NOTE — PROGRESS NOTES
Progress Note Patient: Halina Marks MRN: [id number]  SSN: [id number] YOB: 1932  Age: 80 y.o. Sex: male Admit Date: 8/1/2018 LOS: 3 days Subjective: Mr Tamia Felder is a 80years old M with PMH of afib not on AC due to GI bleed, COPD, DM, D-CHF, CKD stage III, prior PUD on protonix, underlying essential tremors, who was admitted for worsening tremors of upper extremities, decreased appetite and drowsiness.  In the ED labs revealed a Cr: 4.5, baseline 1.7. Patient also had hypoglycemia requiring D10%. His alfredito was pre-renal, and it was presumptive related to poor oral intake and lasix use at home. Myoclonus were exacerbated by gabapentin use, which all these meds were stopped since admission. His myoclonus improved. ALFREDITO much better after IVFs. His clinical status was complicated by dysphagia. Speech evaluated him, now on clear diet. Also, developed 1 episode of bright red blood per rectum on 8/3/18. Asa and plavix were held and [facility] remained stable. GI performed EGD on 8/4/18 with no acute findings of upper GIB, possible etiology was diverticular bleed. Currently his confusion is improved. ROS: all pertinent findings described in my note. Objective:  
 
Vitals:  
 08/04/18 2354 08/05/18 6417 08/05/18 3815 08/05/18 1046 BP: (!) 167/91 156/79 Pulse: 74 88 Resp: 18 18 Temp: 97.5 °F (36.4 °C) 97.9 °F (36.6 °C) SpO2: 96% 95%  96% Weight:   113.1 kg (249 lb 4.8 oz) Height:      
  
 
Intake and Output: 
Current Shift:   
Last three shifts: 08/03 1901 - 08/05 0700 In: -  
Out: Mayte [[id number]] Physical Exam:  
GENERAL: alert, cooperative, in no distress EYE: negative LYMPHATIC: Cervical, supraclavicular, and axillary nodes normal.  
THROAT & NECK: normal and no erythema or exudates noted. LUNG: clear to auscultation bilaterally HEART: regular rate and rhythm, S1, S2 normal, no murmur, click, rub or gallop ABDOMEN: soft, non-tender.  Bowel sounds normal. No masses,  no organomegaly EXTREMITIES:  extremities normal, atraumatic, no cyanosis or edema SKIN: Normal. 
NEUROLOGIC: alert, oriented in person, place. Some tremors on exertion, improved PSYCHIATRIC: non focal 
 
Lab/Data Review: All lab results for the last 24 hours reviewed. Assessment:  
 
Principal Problem: 
  Acute on chronic renal failure ([address] (8/1/2018) Active Problems: 
  Persistent atrial fibrillation ([address] (11/21/2012) Overview: Coumadin therapy discontinued due to recurrent GI bleeding. COPD (chronic obstructive pulmonary disease) ([address] (11/21/2012) CKD (chronic kidney disease) stage 3, GFR 30-59 ml/min (9/18/2013) ROBERTO on CPAP (2/20/2015) Overview: Patient is on BiPAP, no supplementary oxygen Type 2 diabetes with nephropathy ([address] (2/12/2018) Venous stasis dermatitis of both lower extremities (4/22/2018) Hypoglycemia (8/1/2018) ALFREDITO (acute kidney injury) ([address] (8/2/2018) Tremors of nervous system (8/4/2018) Plan:  
-Acute blood loss anemia, episode of lower GIB on 8/3/18, patient has a history of PUD: HH stable - s/p EGD on 8/5/18: normal - ASA and plavix held for now, may be resumed upon discharge-  On protonix - Red blood cell tag scan was normal  
 
-Acute on chronic kidney injury, possible due to poor oral intake at home / lasix or protonix use: Pre-renal in etiology- FeNa of 0.8%: improving Continue hydration with IVFs Strict IO via alejo cath Monitor labs daily 
  
-Hypoglycemia induced by glipizide and poor oral intake: resolved Monitor  
  
-Myoclonus, in the setting of underlying tremors: likely secondary to gabapentin use on top of alfredito / hypoglycemia induced: improving Gabapentin was stopped CT brain with no acute disease Monitor  
 
-Dysphagia: evaluated by speech, on clear liquids now 
  
-ROBERTO: CPAP 
  
-Afib: Rate controlled - home meds  
  
-COPD: not in exacerbation  
  
-DM: stable, resume sliding scale  
  
-Chronic D-CHF: compensated - monitoring - lasix on hold  
 
-Debility: PT/OT 
  
DVT ppx: GCS Code status: full Disposition: STR- [place]wood once medically stable Signed By: Purvi Bhandari MD   
 August 5, 2018

## 2018-08-05 NOTE — ROUTINE PROCESS
TRANSFER - OUT REPORT:    Verbal report given to [address] [place] [address] on [facility]  being transferred to [facility] for routine post - op       Report consisted of patients Situation, Background, Assessment and   Recommendations(SBAR). Information from the following report(s) Procedure Summary was reviewed with the receiving nurse. Lines:   Peripheral IV 08/01/18 Right Antecubital (Active)   Site Assessment Clean, dry, & intact 8/5/2018 12:45 AM   Phlebitis Assessment 0 8/5/2018 12:45 AM   Infiltration Assessment 0 8/5/2018 12:45 AM   Dressing Status Clean, dry, & intact 8/5/2018 12:45 AM   Dressing Type Tape 8/5/2018 12:45 AM   Hub Color/Line Status Pink 8/4/2018  3:24 PM   Action Taken Blood drawn 8/1/2018  2:08 PM   Alcohol Cap Used No 8/5/2018 12:45 AM       Peripheral IV 08/01/18 Right Hand (Active)   Site Assessment Clean, dry, & intact 8/5/2018 12:45 AM   Phlebitis Assessment 0 8/5/2018 12:45 AM   Infiltration Assessment 0 8/5/2018 12:45 AM   Dressing Status Clean, dry, & intact 8/5/2018 12:45 AM   Dressing Type Tape 8/5/2018 12:45 AM   Hub Color/Line Status Green 8/4/2018  3:24 PM   Alcohol Cap Used No 8/4/2018  3:24 PM        Opportunity for questions and clarification was provided. Patient transported with:   O2 @ 2 liters  Tech    VTE prophylaxis orders have not been written for [facility]. Patient and family given floor number and nurses name. Family updated re: pt status after security code verified.

## 2018-08-06 NOTE — PROGRESS NOTES
CM received confirmation from Damian Mendes liaison, that insurance has approved [employer]. Pt will be in room [address]W. Can DC once medically stable.

## 2018-08-06 NOTE — PROGRESS NOTES
Patient to discharge to [facility] rehab room [address]W. Report line given to [facility]. Rosey Anton transportation setup for Terri. Family made aware and in agreement.

## 2018-08-06 NOTE — PROGRESS NOTES
Problem: Dysphagia (Adult)  Goal: *Acute Goals and Plan of Care (Insert Text)  STG: Pt will demonstrate zero signs/sx of aspiration with regular textures with thin liquids with 90% accuracy during all meals. MET 08/06/18    STG: Pt will complete a Modified barium swallow study with zero signs/sx of aspiration  with 100% accuracy during swallow study. NOT INDICATED  LTG: Pt will demonstrate zero signs/sx of aspiration with least restrictive diet with 100% accuracy during all meals. -MET 08/06/18      Speech language pathology: bedside swallow note: Daily Note and Discharge    NAME/AGE/GENDER: Modesto Haney is a 80 y.o. male  DATE: 8/6/2018  PRIMARY DIAGNOSIS: Acute on chronic renal failure (HCC)  ALFREDITO (acute kidney injury) ([facility]  EGD  Procedure(s) (LRB):  ESOPHAGOGASTRODUODENOSCOPY (EGD) (N/A) 1 Day Post-Op  ICD-10: Treatment Diagnosis: R12.11 oral phase dysphagia  INTERDISCIPLINARY COLLABORATION: Registered Nurse  PRECAUTIONS/ALLERGIES: Review of patient's allergies indicates no known allergies. ASSESSMENT:Patient seen for diet tolerance this AM. Son at bedside and reports poor po intake over last several days. Patient sitting in bedside chair- participating well in trials. He consumed thin liquid via cup and straw, puree, mixed consistencies, and solid trials. Prolonged mastication with mechanical soft trials (tough pears), but improved mastication with coarse solid (cracker). Adequate oral clearing after the swallow. Timely swallow initiation upon palpation with serial cup and straw sips. Strong coughing with serial straw sips, but no overt s/s of airway compromise with serial cup sips. Patient and son report that patient rarely uses straw as he prefers cups. Recommend continue with mechanical soft diet and thin liquids. No straws. Medications as tolerated. No further speech therapy indicated as he is tolerating current diet without overt s/s of airway compromise. ?????? ? ? This section established at most recent assessment??????????  PROBLEM LIST (Impairments causing functional limitations):  1. dysphagia  REHABILITATION POTENTIAL FOR STATED GOALS: Good  PLAN OF CARE:   Patient will benefit from skilled intervention to address the following impairments. RECOMMENDATIONS AND PLANNED INTERVENTIONS (Benefits and precautions of therapy have been discussed with the patient.):  · PO:  Mechanical soft  · Liquids:  regular thin  MEDICATIONS:  · With liquid  COMPENSATORY STRATEGIES/MODIFICATIONS INCLUDING:  · Cup/sip  · Small sips and bites  OTHER RECOMMENDATIONS (including follow up treatment recommendations): · Family training/education  · Patient education  RECOMMENDED DIET MODIFICATIONS DISCUSSED WITH:  · Nursing  · Family  · Patient  FREQUENCY/DURATION: No further speech therapy indicated at this time as all therapy goals have been met. Please re-consult if new concerns arise. RECOMMENDED REHABILITATION/EQUIPMENT: (at time of discharge pending progress): Due to the probability of continued deficits (see above) this patient will likely need continued skilled speech therapy after discharge. SUBJECTIVE:   Pleasant, cooperative. Son at bedside. History of Present Injury/Illness: Mr. Michael Lombardo  has a past medical history of Atherosclerosis of artery of extremity with ulceration ([facility] (4/17/2015); Atherosclerosis of native arteries of extremity with intermittent claudication ([address] (4/17/2015); Atopic dermatitis (11/21/2012); Atrial fibrillation ([address] CAD (coronary artery disease); Carotid stenosis, bilateral (11/21/2012); CHF (congestive heart failure) ([address] (11/21/2012); Chronic kidney disease; Chronic obstructive pulmonary disease ([address] Colon, diverticulosis (1/24/2015); Coronary atherosclerosis of native coronary vessel (10/31/2016); Diabetes ([address] Diastolic CHF, acute on chronic (HCC) (9/12/2015); Failed CABG (coronary artery bypass graft) (11/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 (8/5/2013); Shortness of breath dyspnea (8/5/2013); Thyroid disease; and Unspecified sleep apnea. He also has no past medical history of Arthritis; Asthma; Cancer ([facility] Liver disease; Nausea & vomiting; PUD (peptic ulcer disease); or Stroke ([facility] He also  has a past surgical history that includes pr cardiac surg procedure unlist; hx coronary artery bypass graft (06-); hx cataract removal (Left, 2003); hx heart catheterization; hx coronary stent placement (02-); hx heent (1970s); hx colonoscopy; and colonoscopy (N/A, 1/27/2017). Present Symptoms:   Pain Intensity 1: 0  Pain Location 1: Generalized  Pain Orientation 1: Posterior  Pain Intervention(s) 1: Emotional support, Rest, Repositioned  Current Medications:   No current facility-administered medications on file prior to encounter. Current Outpatient Prescriptions on File Prior to Encounter   Medication Sig Dispense Refill    oxyCODONE IR (OXY-IR) 15 mg immediate release tablet Take 1 Tab by mouth two (2) times daily as needed for Pain. Max Daily Amount: 30 mg. 60 Tab 0    OXYGEN-AIR DELIVERY SYSTEMS by Does Not Apply route. 2LPM every day      zolpidem (AMBIEN) 5 mg tablet Take  by mouth nightly as needed for Sleep.  calcium carbonate (CALCIUM 600 PO) Take  by mouth.  furosemide (LASIX) 80 mg tablet Take 1 Tab by mouth two (2) times a day. 60 Tab 1    bisacodyl (DULCOLAX) 5 mg EC tablet Take 1 Tab by mouth daily. 15 Tab 0    aspirin 81 mg chewable tablet Take 1 Tab by mouth daily. 90 Tab 3    latanoprost (XALATAN) 0.005 % ophthalmic solution Administer 1 Drop to both eyes nightly.       sertraline (ZOLOFT) 50 mg tablet Take one tablet each evening for anxiety  Indications: Generalized Anxiety Disorder 30 Tab 3    fluticasone (FLONASE) 50 mcg/actuation nasal spray 2 Sprays by Both Nostrils route daily. 1 Bottle 3    levothyroxine (SYNTHROID) 50 mcg tablet Take 1 Tab by mouth Daily (before breakfast). 90 Tab 3    hydrALAZINE (APRESOLINE) 10 mg tablet TAKE 1 TABLET THREE TIMES DAILY. 90 Tab 3    cholecalciferol (VITAMIN D3) 1,000 unit cap Take 1,000 Units by mouth daily.  pravastatin (PRAVACHOL) 40 mg tablet Take 1 Tab by mouth nightly. 90 Tab 3    triamcinolone acetonide (KENALOG) 0.1 % ointment Apply  to affected area two (2) times a day. 80 g 0    gabapentin (NEURONTIN) 100 mg capsule Take 1 Cap by mouth three (3) times daily. 270 Cap 3    albuterol (PROVENTIL VENTOLIN) 2.5 mg /3 mL (0.083 %) nebulizer solution 2.5 mg by Nebulization route every four (4) hours as needed for Wheezing.  budesonide-formoterol (SYMBICORT) 160-4.5 mcg/actuation HFA inhaler Take 2 Puffs by inhalation two (2) times a day. 3 Inhaler 3    ipratropium-albuterol (COMBIVENT RESPIMAT)  mcg/actuation inhaler Take 1 Puff by inhalation every six (6) hours. 3 Inhaler 3    triamcinolone (ARISTOCORT) 0.5 % topical cream Apply  to affected area two (2) times a day. use thin layer 15 g 0    mupirocin calcium (BACTROBAN) 2 % topical cream Apply  to affected area two (2) times a day. 15 g 0    metoprolol succinate (TOPROL-XL) 25 mg XL tablet Pt takes 1/2 tab po daily. 45 Tab 3    magnesium oxide (MAG-OX) 400 mg tablet Take 1 Tab by mouth daily. 90 Tab 3    ferrous sulfate 324 mg (65 mg iron) tablet Take  by mouth Daily (before breakfast).  docusate sodium (STOOL SOFTENER) 100 mg capsule Take 100 mg by mouth as needed.  cpap machine kit by Does Not Apply route. Bilevel 12/8      isosorbide mononitrate ER (IMDUR) 30 mg tablet Take 1 Tab by mouth daily. 30 Tab 5    glipiZIDE (GLUCOTROL) 5 mg tablet Take 5 mg by mouth daily.  K-DUR 20 mEq tablet Take 20 mEq by mouth daily.       HYDROcodone-acetaminophen (NORCO)  mg tablet Take 1 Tab by mouth two (2) times daily as needed for Pain. Max Daily Amount: 2 Tabs. 60 Tab 0    pantoprazole (PROTONIX) 40 mg granules for oral suspension 40 mg daily.  clopidogrel (PLAVIX) 75 mg tab Take 1 Tab by mouth daily. 30 Tab 1    famotidine (PEPCID) 20 mg tablet Take 1 Tab by mouth daily. 30 Tab 0    OTHER One pair compression stocking gradient 20 - 30 mm hg  Please measure to fit 1 Each 1    allopurinol (ZYLOPRIM) 300 mg tablet Take  by mouth daily. Current Dietary Status:       mechanical soft/thin  Social History/Home Situation:   Home Environment: Private residence  Wheelchair Ramp: Yes  One/Two Story Residence: One story  Living Alone: No  Support Systems: Child(tai), Friends \ neighbors, Spouse/Significant Other/Partner  Patient Expects to be Discharged to[de-identified] Private residence  Current DME Used/Available at Home: Cane, straight, Shower chair  Tub or Shower Type: Shower  OBJECTIVE:   Respiratory Status:  Room air       Oral Motor Structure/Speech:  Oral-Motor Structure/Motor Speech  Labial: No impairment  Dentition: Edentulous  Oral Hygiene: dry  Lingual: No impairment    Cognitive and Communication Status:  Neurologic State: Alert  Orientation Level: Oriented X4  Cognition: Follows commands  Perception: Appears intact  Perseveration: No perseveration noted  Safety/Judgement: Fall prevention    BEDSIDE SWALLOW EVALUATION  Oral Assessment:  Oral Assessment  Labial: No impairment  Dentition: Edentulous  Lingual: No impairment  P.O. Trials:  Patient Position: Bedside chair    The patient was given teaspoon amounts of the following:   Consistency Presented: Thin liquid;Puree;Mixed consistency; Solid       ORAL PHASE:  Bolus Acceptance: No impairment  Bolus Formation/Control: Impaired  Propulsion: No impairment  Type of Impairment: Mastication  Oral Residue: None    PHARYNGEAL PHASE:  Initiation of Swallow: No impairment  Laryngeal Elevation: Functional  Aspiration Signs/Symptoms: Strong cough  Vocal Quality: No impairment           Pharyngeal Phase Characteristics: No impairment, issues, or problems     OTHER OBSERVATIONS:  Rate/bite size: Impaired   Endurance:  Questionable   Comments: upright for all meals    Tool Used: Dysphagia Outcome and Severity Scale (IRVING)    Score Comments   Normal Diet  [] 7 With no strategies or extra time needed   Functional Swallow  [] 6 May have mild oral or pharyngeal delay       Mild Dysphagia    [x] 5 Which may require one diet consistency restricted (those who demonstrate penetration which is entirely cleared on MBS would be included)   Mild-Moderate Dysphagia  [] 4 With 1-2 diet consistencies restricted       Moderate Dysphagia  [] 3 With 2 or more diet consistencies restricted       Moderately Severe Dysphagia  [] 2 With partial PO strategies (trials with ST only)       Severe Dysphagia  [] 1 With inability to tolerate any PO safely          Score:  Initial:5 Most Recent: X (Date: -- )   Interpretation of Tool: The Dysphagia Outcome and Severity Scale (IRVING) is a simple, easy-to-use, 7-point scale developed to systematically rate the functional severity of dysphagia based on objective assessment and make recommendations for diet level, independence level, and type of nutrition. Score 7 6 5 4 3 2 1   Modifier CH CI CJ CK CL CM CN   ?  Swallowing:     - CURRENT STATUS: CJ - 20%-39% impaired, limited or restricted    - GOAL STATUS:  CI - 1%-19% impaired, limited or restricted    - D/C STATUS:  CI - 1%-19% impaired, limited or restricted  Payor: Katharine Conrad [id number] / [place] / Product Type: Managed Care Medicare /     TREATMENT:    (In addition to Assessment/Re-Assessment sessions the following treatments were rendered)  Assessment/Reassessment only, no treatment provided today  MODALITIES:         ORAL MOTOR  EXERCISES:        LARYNGEAL / PHARYNGEAL EXERCISES: __________________________________________________________________________________________________  Safety:   After treatment position/precautions:  · Call light within reach  · RN notified  · Family at bedside  · Upright in Bed    Recommendations/Intent for next treatment session: No further speech therapy indicated at this time as all therapy goals have been met. Please re-consult if new concerns arise.      Total Treatment Duration:  Time In: 0902  Time Out: [address] CCC-SLP

## 2018-08-06 NOTE — DISCHARGE SUMMARY
Discharge Summary     Patient: Dominick Albarado MRN: [id number]  SSN: [id number]    YOB: 1932  Age: 80 y.o.   Sex: male       Admit Date: 8/1/2018    Discharge Date: 8/6/2018      Admission Diagnoses: Acute on chronic renal failure ([facility]  ALFREDITO (acute kidney injury) ([facility]  EGD    Discharge Diagnoses:   Problem List as of 8/6/2018  Date Reviewed: 8/5/2018          Codes Class Noted - Resolved    Acute blood loss anemia ICD-10-CM: D62  ICD-9-CM: 285.1  8/5/2018 - Present        Tremors of nervous system ICD-10-CM: R25.1  ICD-9-CM: 781.0  8/4/2018 - Present        ALFREDITO (acute kidney injury) ([facility] ICD-10-CM: N17.9  ICD-9-CM: 584.9  8/2/2018 - Present        * (Principal)Acute on chronic renal failure ([facility] ICD-10-CM: N17.9, N18.9  ICD-9-CM: 584.9, 585.9  8/1/2018 - Present        Hypoglycemia ICD-10-CM: E16.2  ICD-9-CM: 251.2  8/1/2018 - Present        Acute respiratory failure with hypoxia ([facility] ICD-10-CM: J96.01  ICD-9-CM: 518.81  4/23/2018 - Present        Altered mental state ICD-10-CM: R41.82  ICD-9-CM: 780.97  4/22/2018 - Present        Venous stasis dermatitis of both lower extremities (Chronic) ICD-10-CM: I87.2  ICD-9-CM: 454.1  4/22/2018 - Present        Type 2 diabetes with nephropathy (HCC) (Chronic) ICD-10-CM: E11.21  ICD-9-CM: 250.40, 583.81  2/12/2018 - Present        Restrictive lung disease (Chronic) ICD-10-CM: J98.4  ICD-9-CM: 518.89  11/1/2017 - Present        Hypoxia ICD-10-CM: R09.02  ICD-9-CM: 799.02  8/21/2017 - Present        Thrombocytopenia (HCC) (Chronic) ICD-10-CM: D69.6  ICD-9-CM: 287.5  8/21/2017 - Present        Falls frequently ICD-10-CM: R29.6  ICD-9-CM: V15.88  9/17/2016 - Present        Dyspnea ICD-10-CM: R06.00  ICD-9-CM: 786.09  1/21/2016 - Present        Type 2 diabetes mellitus with peripheral neuropathy (HCC) (Chronic) ICD-10-CM: E11.42  ICD-9-CM: 250.60, 357.2  11/5/2015 - Present        Chronic diastolic congestive heart failure (HCC) (Chronic) ICD-10-CM: I50.32  ICD-9-CM: 428.32, 428.0  9/12/2015 - Present    Overview Addendum 8/31/2017  3:44 PM by Jam Schneider MD     1. Echo (9/11/15) : EF 55-60%. Mild LVH. Moderate biatrial enlargement. Moderate mitral/tricuspid regurgitation. 2.  Echo (8/21/17):  EF 55-60%. Moderate LAE. Mild mitral stenosis. Moderate tricuspid regurgitation. RVSP 35-40. Anemia ICD-10-CM: D64.9  ICD-9-CM: 285.9  9/10/2015 - Present        ROBERTO on CPAP (Chronic) ICD-10-CM: G47.33, Z99.89  ICD-9-CM: 327.23, V46.8  2/20/2015 - Present    Overview Signed 2/20/2015 10:42 AM by Melodie Cochran     Patient is on BiPAP, no supplementary oxygen             Chronic pain ICD-10-CM: G89.29  ICD-9-CM: 338.29  1/22/2015 - Present        Morbid obesity ([address] (Chronic) ICD-10-CM: E66.01  ICD-9-CM: 278.01  1/21/2015 - Present        CKD (chronic kidney disease) stage 3, GFR 30-59 ml/min (Chronic) ICD-10-CM: N18.3  ICD-9-CM: 585.3  9/18/2013 - Present        Hyperlipidemia (Chronic) ICD-10-CM: E78.5  ICD-9-CM: 272.4  8/5/2013 - Present        Debility (Chronic) ICD-10-CM: R53.81  ICD-9-CM: 799.3  8/5/2013 - Present        Essential hypertension (Chronic) ICD-10-CM: I10  ICD-9-CM: 401.9  11/21/2012 - Present        Persistent atrial fibrillation (HCC) (Chronic) ICD-10-CM: I48.1  ICD-9-CM: 427.31  11/21/2012 - Present    Overview Addendum 11/22/2016  8:14 PM by Jam Schneider MD     Coumadin therapy discontinued due to recurrent GI bleeding. Peripheral neuropathy (Chronic) ICD-10-CM: G62.9  ICD-9-CM: 356.9  11/21/2012 - Present        COPD (chronic obstructive pulmonary disease) (HCC) (Chronic) ICD-10-CM: J44.9  ICD-9-CM: 496  11/21/2012 - Present        PAD (peripheral artery disease) (HCC) (Chronic) ICD-10-CM: I73.9  ICD-9-CM: 443.9  11/21/2012 - Present    Overview Signed 10/31/2016  1:21 PM by Keron Mccallum.   Bilateral proximal common iliac PCI (2/21/08):  8.0 X 100 mm Cordis smart stent on right and 10 X 40 mm cordis smart stent on right. Both inflated to 7.0 mm. Carotid stenosis, bilateral (Chronic) ICD-10-CM: E32.62  ICD-9-CM: 433.10, 433.30  11/21/2012 - Present    Overview Addendum 11/22/2016  8:13 PM by Jam Schneider MD     1. Carotid Doppler (6/1/07): Greater than 70% stenosis in proximal LICA. 50% stenosis in right ICA. 2.  CTA of neck (3/15/10): Occluded distal segments of the vertebral arteries bilaterally. Atherosclerosis of the carotid bulbs bilaterally with a 50% stenosis on the left and a stenosis of less than 30% on the right. Small nodule in the right upper lobe near the apex. 3. CTA (8/29/13):  Less than 30% diameter stenosis of the cervical internal carotid arteries bilaterally. Gout (Chronic) ICD-10-CM: M10.9  ICD-9-CM: 274.9  11/21/2012 - Present        RESOLVED: Volume overload ICD-10-CM: E87.70  ICD-9-CM: 276.69  2/2/2018 - 4/22/2018        RESOLVED: Influenza B ICD-10-CM: J10.1  ICD-9-CM: 487.1  1/31/2018 - 4/22/2018        RESOLVED: Hypoxemia ICD-10-CM: R09.02  ICD-9-CM: 799.02  11/1/2017 - 4/22/2018        RESOLVED: Fall in home ICD-10-CM: [address] [place], [place]  ICD-9-CM: G789.1, E849.0  11/1/2017 - 4/22/2018        RESOLVED: CHF (congestive heart failure) ([facility] ICD-10-CM: I50.9  ICD-9-CM: 428.0  9/14/2017 - 4/22/2018        RESOLVED: Acute on chronic diastolic heart failure ([facility] ICD-10-CM: I50.33  ICD-9-CM: 428.33  9/11/2017 - 4/22/2018        RESOLVED: Cellulitis of left lower extremity ICD-10-CM: L03.116  ICD-9-CM: 682.6  8/22/2017 - 4/22/2018        RESOLVED: Acute on chronic diastolic (congestive) heart failure (HCC) ICD-10-CM: I50.33  ICD-9-CM: 428.33, 428.0  8/21/2017 - 4/22/2018        RESOLVED: Acute respiratory failure with hypoxia (HCC) ICD-10-CM: J96.01  ICD-9-CM: 518.81  8/4/2017 - 4/22/2018        RESOLVED: Upper GI bleed ICD-10-CM: K92.2  ICD-9-CM: 578.9  1/22/2017 - 4/22/2018        RESOLVED: Coronary atherosclerosis of native coronary vessel ICD-10-CM: I25.10  ICD-9-CM: 414.01  10/31/2016 - 4/22/2018    Overview Signed 10/31/2016  1:19 PM by Nan Cuenca     1. Cath (5/31/07):  LMCA: 25%. LAD: ostial 75-95% stenosis. 50-75% mid. D1:  25-50%. OM3: small with 75% stenosis. RCA: 100% mid. with left to right collaterals. 2 Vessel CABG (6/4/07):  LIMA to LAD and SVG to OM. SVG was anastomosed proximally to LIMA to due severe atherosclerosis of aorta. 3.  Lexiscan cardiolite (11/30/10): Abnormal with reversible lateral wall defects. Unable to gate given atrial fibrillation. 4.  LHC (1/14/11):  EF 60%. LVEDP 21. Patent LIMA to LAD and SVG to OM1 (off LIMA). occluded small RCA with left to right collaterals. Small D1 (2 mm vessel) with 70% mid stenosis.                RESOLVED: Renal insufficiency ICD-10-CM: N28.9  ICD-9-CM: 593.9  10/31/2016 - 11/22/2016        RESOLVED: UTI (urinary tract infection) ICD-10-CM: N39.0  ICD-9-CM: 599.0  9/17/2016 - 11/22/2016        RESOLVED: ALFREDITO (acute kidney injury) ([facility] ICD-10-CM: N17.9  ICD-9-CM: 584.9  9/16/2016 - 11/22/2016        RESOLVED: Supratherapeutic INR ICD-10-CM: R79.1  ICD-9-CM: 790.92  9/9/2015 - 10/19/2015        RESOLVED: GI bleeding ICD-10-CM: K92.2  ICD-9-CM: 578.9  5/6/2015 - 10/19/2015        RESOLVED: Atherosclerosis of native arteries of extremity with intermittent claudication ([facility] ICD-10-CM: I70.219  ICD-9-CM: 440.21  4/17/2015 - 4/22/2018        RESOLVED: Atherosclerosis of artery of extremity with ulceration ([facility] ICD-10-CM: I70.299, L97.909  ICD-9-CM: 440.23  4/17/2015 - 4/22/2018        RESOLVED: Colon, diverticulosis ICD-10-CM: K57.30  ICD-9-CM: 562.10  1/24/2015 - 4/22/2018        RESOLVED: Opioid dependence, daily use ([facility] ICD-10-CM: F11.20  ICD-9-CM: 304.01  1/22/2015 - 11/22/2016        RESOLVED: GI bleed (Chronic) ICD-10-CM: K92.2  ICD-9-CM: 578.9  1/21/2015 - 4/22/2018        RESOLVED: Coagulopathy ([facility] (Chronic) ICD-10-CM: D68.9  ICD-9-CM: 286.9  1/21/2015 - 11/22/2016 Overview Signed 1/21/2015 11:40 PM by Chance Leon NP     ON COUMADIN               RESOLVED: Diabetic foot ulcer ([facility] ICD-10-CM: X66.674, L97.509  ICD-9-CM: 250.80, 707.15  1/21/2015 - 4/22/2018    Overview Signed 1/21/2015 11:40 PM by Chance Leon NP     LEFT GREAT TOE, PLANTAR ASPECT               RESOLVED: Cough ICD-10-CM: R05  ICD-9-CM: 786.2  1/21/2015 - 11/22/2016        RESOLVED: Dysphagia ICD-10-CM: R13.10  ICD-9-CM: 787.20  5/22/2014 - 8/29/2014        RESOLVED: Seizure disorder ([facility] (Chronic) ICD-10-CM: G40.909  ICD-9-CM: 345.90  5/22/2014 - 4/22/2018        RESOLVED: Acute kidney failure, unspecified ([facility] ICD-10-CM: N17.9  ICD-9-CM: 584.9  2/4/2014 - 8/29/2014        RESOLVED: Ple[place] (Chronic) ICD-10-CM: R23.2  ICD-9-CM: 782.62  1/16/2014 - 11/22/2016        RESOLVED: Edema extremities (Chronic) ICD-10-CM: R60.0  ICD-9-CM: 782.3  1/16/2014 - 11/22/2016        RESOLVED: Injury of great toe of left foot ICD-10-CM: Z70.617H  ICD-9-CM: 959.7  9/21/2013 - 8/29/2014    Overview Signed 9/21/2013  8:05 AM by Matthew Oneill NP     Present on admission  Dark area to plantar surface             RESOLVED: Acute renal failure ([facility] ICD-10-CM: N17.9  ICD-9-CM: 584.9  9/20/2013 - 8/29/2014        RESOLVED: Acute on chronic diastolic heart failure ([facility] ICD-10-CM: I50.33  ICD-9-CM: 428.33  9/18/2013 - 8/29/2014        RESOLVED: ROBERTO on CPAP (Chronic) ICD-10-CM: G47.33, Z99.89  ICD-9-CM: 327.23, V46.8  9/18/2013 - 2/20/2015        RESOLVED: Edema (Chronic) ICD-10-CM: R60.9  ICD-9-CM: 782.3  9/18/2013 - 8/29/2014        RESOLVED: Polycythemia (Chronic) ICD-10-CM: D75.1  ICD-9-CM: 238.4  9/18/2013 - 4/22/2018        RESOLVED: Toxic effect of tobacco(989.84) (Chronic) ICD-10-CM: V52.170T  ICD-9-CM: 989.84  8/5/2013 - 2/20/2015        RESOLVED: CHF (congestive heart failure) ([facility] ICD-10-CM: I50.9  ICD-9-CM: 428.0  11/21/2012 - 9/18/2013        RESOLVED: DM (diabetes mellitus) ([facility] (Chronic) ICD-10-CM: E11.9  ICD-9-CM: 250.00  11/21/2012 - 11/5/2015        RESOLVED: ASCVD (arteriosclerotic cardiovascular disease) (Chronic) ICD-10-CM: I25.10  ICD-9-CM: 429.2, 440.9  11/21/2012 - 11/22/2016        RESOLVED: Atopic dermatitis ICD-10-CM: L20.9  ICD-9-CM: 691.8  11/21/2012 - 9/18/2013        RESOLVED: Hyperuricemia ICD-10-CM: E79.0  ICD-9-CM: 790.6  11/21/2012 - 9/18/2013               Discharge Condition: [facility] Course:   Mr Tierney Knapp is a 80years old M with PMH of afib ( on asa and plavix ) not on AC due to GI bleed, COPD, DM, D-CHF, CKD stage III, prior PUD on protonix, underlying essential tremors, who was admitted for worsening tremors of upper extremities, decreased appetite and drowsiness.  In the ED labs revealed a Cr: 4.5, baseline 1.7. Patient also had hypoglycemia requiring D10%. His alfredito was pre-renal, and it was presumptive related to poor oral intake and lasix use at home. Myoclonus were exacerbated by gabapentin use, and all these meds were stopped since admission. His myoclonus and ALFREDITO resolved after IVFs. His clinical status was complicated by dysphagia. Speech threrapy on board. Also, developed 1 episode of bright red blood per rectum on 8/3/18. Asa and plavix were held and [facility] remained stable. GI performed EGD on 8/4/18 with no acute findings of upper GIB, possible etiology was diverticular bleed. Currently his confusion is improved and speech evaluation stated he may start diet. Patient is stable enough to be transfer to [facility]. Recommendations:   Lasix, held until he sees his primary care. Asa and plavix held due to his lower GI bleeding. He needs to see his PCP and decide back then if they will be resumed at some point in the future. Gabapentin discontinued due to myoclonus.      Physical Exam:   GENERAL: alert, cooperative, in no distress   EYE: negative  LYMPHATIC: Cervical, supraclavicular, and axillary nodes normal.   THROAT & NECK: normal and no erythema or exudates noted.   LUNG: clear to auscultation bilaterally  HEART: regular rate and rhythm, S1, S2 normal, no murmur, click, rub or gallop  ABDOMEN: soft, non-tender. Bowel sounds normal. No masses,  no organomegaly  EXTREMITIES:  extremities normal, atraumatic, no cyanosis or edema  SKIN: Normal.  NEUROLOGIC: alert, oriented in person, place. Some tremors on exertion, improved   PSYCHIATRIC: non focal    Consults: Gastroenterology, Physical Medicine and Rehabilitation and speech therapy     Significant Diagnostic Studies: see dc summary note     Disposition: [facility]     Discharge Medications:   Current Discharge Medication List      START taking these medications    Details   acetaminophen (TYLENOL) 325 mg tablet Take 2 Tabs by mouth every six (6) hours as needed. Qty: 20 Tab, Refills: 0         CONTINUE these medications which have NOT CHANGED    Details   OXYGEN-AIR DELIVERY SYSTEMS by Does Not Apply route. 2LPM every day      calcium carbonate (CALCIUM 600 PO) Take  by mouth.      bisacodyl (DULCOLAX) 5 mg EC tablet Take 1 Tab by mouth daily. Qty: 15 Tab, Refills: 0             latanoprost (XALATAN) 0.005 % ophthalmic solution Administer 1 Drop to both eyes nightly. sertraline (ZOLOFT) 50 mg tablet Take one tablet each evening for anxiety  Indications: Generalized Anxiety Disorder  Qty: 30 Tab, Refills: 3      fluticasone (FLONASE) 50 mcg/actuation nasal spray 2 Sprays by Both Nostrils route daily. Qty: 1 Bottle, Refills: 3      levothyroxine (SYNTHROID) 50 mcg tablet Take 1 Tab by mouth Daily (before breakfast). Qty: 90 Tab, Refills: 3      hydrALAZINE (APRESOLINE) 10 mg tablet TAKE 1 TABLET THREE TIMES DAILY. Qty: 90 Tab, Refills: 3      cholecalciferol (VITAMIN D3) 1,000 unit cap Take 1,000 Units by mouth daily. pravastatin (PRAVACHOL) 40 mg tablet Take 1 Tab by mouth nightly.   Qty: 90 Tab, Refills: 3    Associated Diagnoses: Mixed hyperlipidemia      albuterol (PROVENTIL VENTOLIN) 2.5 mg /3 mL (0.083 %) nebulizer solution 2.5 mg by Nebulization route every four (4) hours as needed for Wheezing. budesonide-formoterol (SYMBICORT) 160-4.5 mcg/actuation HFA inhaler Take 2 Puffs by inhalation two (2) times a day. Qty: 3 Inhaler, Refills: 3      ipratropium-albuterol (COMBIVENT RESPIMAT)  mcg/actuation inhaler Take 1 Puff by inhalation every six (6) hours. Qty: 3 Inhaler, Refills: 3      triamcinolone (ARISTOCORT) 0.5 % topical cream Apply  to affected area two (2) times a day. use thin layer  Qty: 15 g, Refills: 0      mupirocin calcium (BACTROBAN) 2 % topical cream Apply  to affected area two (2) times a day. Qty: 15 g, Refills: 0      metoprolol succinate (TOPROL-XL) 25 mg XL tablet Pt takes 1/2 tab po daily. Qty: 45 Tab, Refills: 3      magnesium oxide (MAG-OX) 400 mg tablet Take 1 Tab by mouth daily. Qty: 90 Tab, Refills: 3      ferrous sulfate 324 mg (65 mg iron) tablet Take  by mouth Daily (before breakfast). docusate sodium (STOOL SOFTENER) 100 mg capsule Take 100 mg by mouth as needed. cpap machine kit by Does Not Apply route. Bilevel 12/8      isosorbide mononitrate ER (IMDUR) 30 mg tablet Take 1 Tab by mouth daily. Qty: 30 Tab, Refills: 5      pantoprazole (PROTONIX) 40 mg granules for oral suspension 40 mg daily. OTHER One pair compression stocking gradient 20 - 30 mm hg  Please measure to fit  Qty: 1 Each, Refills: 1      allopurinol (ZYLOPRIM) 300 mg tablet Take  by mouth daily.          STOP taking these medications       oxyCODONE IR (OXY-IR) 15 mg immediate release tablet Comments:   Reason for Stopping:         zolpidem (AMBIEN) 5 mg tablet Comments:   Reason for Stopping:         furosemide (LASIX) 80 mg tablet Comments:   Reason for Stopping:         triamcinolone acetonide (KENALOG) 0.1 % ointment Comments:   Reason for Stopping:         gabapentin (NEURONTIN) 100 mg capsule Comments:   Reason for Stopping:         glipiZIDE (GLUCOTROL) 5 mg tablet Comments: Reason for Stopping:         K-DUR 20 mEq tablet Comments:   Reason for Stopping:         HYDROcodone-acetaminophen (NORCO)  mg tablet Comments:   Reason for Stopping:         clopidogrel (PLAVIX) 75 mg tab  Aspirin 81 mg po day  Comments:   Reason for Stopping:         famotidine (PEPCID) 20 mg tablet Comments:   Reason for Stopping:               Activity: Activity as tolerated  Diet: Renal Diet- speech evaluation report: Recommend continue with mechanical soft diet and thin liquids. No straws. Medications as tolerated. No further speech therapy indicated as he is tolerating current diet without overt s/s of airway compromise.    Wound Care: None needed    Follow-up Appointments   Procedures    FOLLOW UP VISIT Appointment in: Ten Days PCP     PCP     Standing Status:   Standing     Number of Occurrences:   1     Order Specific Question:   Appointment in     Answer:   Ten Days       Signed By: Ada Carvalho MD     August 6, 2018

## 2018-08-06 NOTE — PROGRESS NOTES
GI DAILY PROGRESS NOTE    Admit Date:  8/1/2018    Today's Date:  8/6/2018    CC:  LGI bleed    Subjective:     Patient has had confusion off and on. He denies any abdominal pain, nausea, or vomiting. He had 3 BMs charted by RN staff on 5 Aug 2018. RN denies any bleeding noted. He has not received blood transfusion over this admission.     Medications:   Current Facility-Administered Medications   Medication Dose Route Frequency    haloperidol lactate (HALDOL) injection 2 mg  2 mg IntraMUSCular Q6H PRN    dextrose 5 % - 0.45% NaCl infusion  50 mL/hr IntraVENous CONTINUOUS    insulin lispro (HUMALOG) injection   SubCUTAneous TIDAC    pantoprazole (PROTONIX) tablet 40 mg  40 mg Oral ACB    ondansetron (ZOFRAN) injection 4 mg  4 mg IntraVENous Q6H PRN    traMADol (ULTRAM) tablet 50 mg  50 mg Oral Q6H PRN    sodium chloride (NS) flush 5-10 mL  5-10 mL IntraVENous Q8H    sodium chloride (NS) flush 5-10 mL  5-10 mL IntraVENous PRN    acetaminophen (TYLENOL) tablet 650 mg  650 mg Oral Q4H PRN    albuterol (PROVENTIL VENTOLIN) nebulizer solution 2.5 mg  2.5 mg Nebulization Q4H PRN    bisacodyl (DULCOLAX) tablet 5 mg  5 mg Oral DAILY    fluticasone (FLONASE) 50 mcg/actuation nasal spray 2 Spray  2 Spray Both Nostrils DAILY    isosorbide mononitrate ER (IMDUR) tablet 30 mg  30 mg Oral DAILY    latanoprost (XALATAN) 0.005 % ophthalmic solution 1 Drop  1 Drop Both Eyes QHS    levothyroxine (SYNTHROID) tablet 50 mcg  50 mcg Oral ACB    magnesium oxide (MAG-OX) tablet 400 mg  400 mg Oral DAILY    metoprolol succinate (TOPROL-XL) XL tablet 12.5 mg  12.5 mg Oral DAILY    sertraline (ZOLOFT) tablet 50 mg  50 mg Oral DAILY    zolpidem (AMBIEN) tablet 5 mg  5 mg Oral QHS PRN    budesonide (PULMICORT) 500 mcg/2 ml nebulizer suspension  500 mcg Nebulization BID RT    And    albuterol (PROVENTIL VENTOLIN) nebulizer solution 2.5 mg  2.5 mg Nebulization Q6HWA RT    nystatin (MYCOSTATIN) 100,000 unit/gram powder Topical BID    dextrose 40% (GLUTOSE) oral gel 1 Tube  15 g Oral PRN    glucagon (GLUCAGEN) injection 1 mg  1 mg IntraMUSCular PRN    dextrose (D50W) injection syrg 12.5-25 g  25-50 mL IntraVENous PRN       Review of Systems:  ROS was obtained, with pertinent positives as listed above. No chest pain or SOB. Diet:  Full liquids    Objective:   Vitals:  Visit Vitals    /88    Pulse 80    Temp 97.5 °F (36.4 °C)    Resp 20    Ht 5' 10\" (1.778 m)    Wt 112.8 kg (248 lb 11.2 oz)    SpO2 93%    BMI 35.68 kg/m2     Intake/Output:     08/04 1901 - 08/06 0700  In: 210 [P.O.:60; I.V.:150]  Out: 2400 [Urine:2400]  Exam:  General appearance: alert, cooperative, no distress, sitting in chair at bedside  Lungs: coarse BS to auscultation bilaterally anteriorly  Heart: regular rate and rhythm  Abdomen: soft, non-tender. Bowel sounds normal. No masses, no organomegaly  Neuro:  alert and confused off and on    Data Review (Labs):    Recent Labs      08/06/18   0530  08/05/18   1244  08/05/18   0506  08/04/18   0508  08/04/18   0507  08/03/18   2102   WBC   --    --   8.4   --    --    --    HGB  11.5*  11.4*  10.6*   --   11.3*  12.0*   HCT  37.7*  36.8*  34.3*   --   36.8*  38.1*   PLT   --    --   201   --    --    --    MCV   --    --   85.3   --    --    --    NA  143   --   144  146*   --    --    K  4.8   --   4.9  5.1   --    --    CL  107   --   107  109*   --    --    CO2  27   --   30  26   --    --    BUN  58*   --   60*  68*   --    --    CREA  2.04*   --   2.24*  2.93*   --    --    CA  9.5   --   8.8  8.9   --    --    GLU  177*   --   149*  222*   --    --      NUCLEAR MEDICINE RED CELL GI BLEEDING SCAN. 3 Aug 2018   HISTORY: Acute gastrointestinal hemorrhage.   TECHNIQUE: 26.4 mCi technetium 99m labeled autologous red cells were reinjected  and the patient and routine dynamic imaging of the 60 minutes.   FINDINGS: There is good tagging of the blood pool.  Normal activity in the heart,  great vessels spleen and liver.   No abnormal collections to suggest gastrointestinal hemorrhage. There is  activity in the genitalia, normal.   IMPRESSION  IMPRESSION: Negative gastrointestinal GI bleeding scan. EGD 5 Aug 2018 with Dr. Parth Curtis with IMPRESSION: 1. Mild gastritis plus erythema in the body of the stomach. No blood or active bleeding site in the upper gastrointestinal tract noted. 2.  Previously noted ulcer has healed. PLAN: DIAGNOSTIC:  Follow H and H. THERAPEUTIC:  1. Continue H2 blocker therapy. 2.  Supportive care for the diverticular bleeding for the present time. Assessment:     Principal Problem:    Acute on chronic renal failure ([address] (8/1/2018)    Active Problems:    Persistent atrial fibrillation ([address] (11/21/2012)      Overview: Coumadin therapy discontinued due to recurrent GI bleeding. COPD (chronic obstructive pulmonary disease) ([address] (11/21/2012)      CKD (chronic kidney disease) stage 3, GFR 30-59 ml/min (9/18/2013)      ROBERTO on CPAP (2/20/2015)      Overview: Patient is on BiPAP, no supplementary oxygen      Type 2 diabetes with nephropathy ([address] (2/12/2018)      Venous stasis dermatitis of both lower extremities (4/22/2018)      Hypoglycemia (8/1/2018)      ALFREDITO (acute kidney injury) ([address] (8/2/2018)      Tremors of nervous system (8/4/2018)      Acute blood loss anemia (8/5/2018)    79 yo male with PMH significant for COPD, HTN, HLD, DM, CAD, PAD, Carotid stenosis, Diastolic CHF, CKD, Afib (no anticoagulation due to history of GIB), who was admitted 8/2/18 with worsening tremors of upper extremities (with neg CT, nmL EEG), noted to have ALFREDITO felt likely secondary to poor po intake, and was seen in GI consultation 3 Aug 2018 at the request of Dr. Aris Henriquez for LGI bleed. Hgb has been stable and he has not received blood transfusion. EGD on 5 Aug 2018 noted healed duodenal ulcer and mild gastritis. Bleeding likely related to diverticulosis. Tagged scan was negative.   He was evaluated by speech therapy and recommended to continue with mechanical soft diet and thin liquids, no straws. Plan:     -Supportive care. -PPI po daily. -Monitor for any increase in bleeding. -Advance diet as tolerated to mechanical soft with thin liquids as recommended by speech therapy. Patient is seen and examined in collaboration with Dr. Ryne Salinas. Assessment and plan as per Dr. Tammy Chambers. Esperanza Sánchez [facility]

## 2018-08-06 NOTE — PROCEDURES
[address] [facility] DANIEL Braswell  MR#: [id number]  : 1932  ACCOUNT #: [de-identified]   DATE OF SERVICE: 2018    SUBJECTIVE:  This 80-year-old male is undergoing upper endoscopy because of GI bleeding. The patient's GI bleed is thought to be diverticular, but the patient has had a history of ulcer disease in the past.  He has a history of an antral ulcer that is not documented to be healed. Upper endoscopy done today to document the presence or absence of any upper GI source of bleeding. Risks (bleeding, perforation, infection, untoward cardiovascular or respiratory event, mortality), benefits and alternatives were discussed in detail with the patient, who agreed to proceed. ANESTHESIA:  As per MAC anesthesia. INSTRUMENT:  GIF-H190 video endoscope. DESCRIPTION OF PROCEDURE:  The patient was anesthetized. The videoscope was passed through the hypopharynx with minimal difficulty to the esophagus. Proximal, mid and distal esophagus were unremarkable. Once inside the stomach, the body and antrum were unremarkable except for dark bile present. A retroflexed view of the angularis was normal.  Retroflexed view of the EG junction and cardia revealed no other specific abnormalities. Attention was then turned to the duodenum. Pyloric channel was patent. Duodenal bulb and C loop were normal.  No active ulcer disease was seen. The endoscope was backed into the stomach, where the antrum was again visualized. No antral ulcer was present. After documentation of hemostasis, the endoscope stomach was backed into the proximal stomach, where air was decompressed and scope backed into the esophagus and out of tender to palpation. The vocal cords appeared normal.  The patient tolerated the procedure well and was taken to recovery area in stable condition. IMPRESSION:  1. Mild gastritis plus erythema in the body of the stomach.   No blood or active bleeding site in the upper gastrointestinal tract noted. 2.  Previously noted ulcer has healed. PLAN:  DIAGNOSTIC:  Follow H and H.  THERAPEUTIC:    1. Continue H2 blocker therapy. 2.  Supportive care for the diverticular bleeding for the present time.       MD MARGARET Manriquez /   D: 08/05/2018 10:40     T: 08/05/2018 22:21  JOB #: [id number]  CC: Xi Abraham MD  CC: Perfecto Padilla MD

## 2018-08-06 NOTE — PROGRESS NOTES
Hourly round completed throughout the shift. Pt have been confuse throughout the night. Bed in lower position and call light/ personal items within reach. Will continue to monitor and give bed side shift report to on coming day shift nurse.

## 2018-08-06 NOTE — PROGRESS NOTES
Problem: Mobility Impaired (Adult and Pediatric)  Goal: *Acute Goals and Plan of Care (Insert Text)  STG:  (1.)Mr. Navarro will move from supine to sit and sit to supine , scoot up and down and roll side to side with MODERATE ASSIST within 3 treatment day(s). (2.)Mr. Navarro will transfer from bed to chair and chair to bed with MODERATE ASSIST with minimal cueing using the least restrictive device within 3 treatment day(s). (3.)Mr. Navarro will ambulate with MODERATE ASSIST for 10 feet with the least restrictive device within 3 treatment day(s). LTG:  (1.)Mr. Navarro will move from supine to sit and sit to supine , scoot up and down and roll side to side in bed with MINIMAL ASSIST within 7 treatment day(s). (2.)Mr. Navarro will transfer from bed to chair and chair to bed with MINIMAL ASSIST using the least restrictive device within 7 treatment day(s). (3.)Mr. Navarro will ambulate with MINIMAL ASSIST for 100+ feet with the least restrictive device within 7 treatment day(s). ________________________________________________________________________________________________      PHYSICAL THERAPY: Daily Note, Treatment Day: 2nd, AM 8/6/2018  INPATIENT: Hospital Day: 6  Payor: [facility] [place] [facility]ARE / [place] [facility] MEDICARE HMO/PPO / Product Type: Managed Care Medicare /      NAME/AGE/GENDER: Kory Jones is a 80 y.o. male   PRIMARY DIAGNOSIS: Acute on chronic renal failure ([address]  ALFREDITO (acute kidney injury) ([address]  EGD Acute on chronic renal failure (HCC) Acute on chronic renal failure (HCC)  Procedure(s) (LRB):  ESOPHAGOGASTRODUODENOSCOPY (EGD) (N/A)  1 Day Post-Op  ICD-10: Treatment Diagnosis:    · Other abnormalities of gait and mobility (R26.40)   Precaution/Allergies:  Review of patient's allergies indicates no known allergies. ASSESSMENT:     Mr. Derl Lennox presents sitting up in chair. Needed a little encouragement to participate but he was pleasant. Scooted to edge of seat.  Sitting balance good.  Transfers sit to stand with mod assist. Pt had brief on and had had a BM. PCT was called to assist with clean up. Pt performed bilateral LE ex while waiting on PCT. Verbal cues needed and manual cues needed initially with most ex; see ex listed below. When she arrived pt stood again but with min assist to be cleaned. Standing balance improved the second time. Some cues for more upright posture. Patient is able to take a few steps forward to be cleaned. Patient is [place] and requires increased verbal cues for commands. He was somewhat lethargic and needed cues to stay on task. Patient is left sitting in the recliner with needs with in reach. PCT is in the room to assist the patient. Improved noted however the patient is functioning below his baseline. Mr. Rosalina Prieto would benefit from skilled physical therapy (medically necessary) to address his deficits and maximize his function. Additional skilled inpatient therapy would be beneficial at discharge as patients wife is also a patient in this hospital.  Will continue PT efforts. This section established at most recent assessment   PROBLEM LIST (Impairments causing functional limitations):  1. Decreased Strength  2. Decreased ADL/Functional Activities  3. Decreased Transfer Abilities  4. Decreased Ambulation Ability/Technique  5. Decreased Balance  6. Decreased Activity Tolerance  7. Decreased Cognition   INTERVENTIONS PLANNED: (Benefits and precautions of physical therapy have been discussed with the patient.)  1. Balance Exercise  2. Bed Mobility  3. Gait Training  4. Therapeutic Activites  5. Therapeutic Exercise/Strengthening  6. Transfer Training  7. education  8.  Group Therapy     TREATMENT PLAN: Frequency/Duration: 3 times a week for duration of hospital stay  Rehabilitation Potential For Stated Goals: Good     RECOMMENDED REHABILITATION/EQUIPMENT: (at time of discharge pending progress): Due to the probability of continued deficits (see above) this patient will likely need continued skilled physical therapy after discharge. Equipment:    None at this time              HISTORY:   History of Present Injury/Illness (Reason for Referral):  Per MD note, \"80 years old M with PMH of afib not on AC due to GI bleed, COPD, DM, D-CHF, CKD presented to the hospital for worsening tremors of upper extremities since last night. Granddaughter stated symptoms lasted whole night. Tremors usually come and go, last just few seconds. Granddaughter reported patient has been very anxious after his wife was hospitalized at [address] [place] [address]. Patient reported he feels better after arriving to the ED. Family stated patient is lightly drowsy after medication given at ED for sedation. Family stated he is also having poor PO intake at home for the last day. Patient denies productive cough, fever, urinary symptoms, SOB, abdominal pain, new medication or chest pain. \"  Past Medical History/Comorbidities:   Mr. Ankush Isabel  has a past medical history of Atherosclerosis of artery of extremity with ulceration ([address] (4/17/2015); Atherosclerosis of native arteries of extremity with intermittent claudication ([address] (4/17/2015); Atopic dermatitis (11/21/2012); Atrial fibrillation ([address] CAD (coronary artery disease); Carotid stenosis, bilateral (11/21/2012); CHF (congestive heart failure) ([address] (11/21/2012); Chronic kidney disease; Chronic obstructive pulmonary disease ([address] Colon, diverticulosis (1/24/2015); Coronary atherosclerosis of native coronary vessel (10/31/2016); Diabetes ([address] Diastolic CHF, acute on chronic (HCC) (9/12/2015); Failed CABG (coronary artery bypass graft) (11/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 He also has no past medical history of Arthritis; Asthma; Cancer ([facility] Liver disease; Nausea & vomiting; PUD (peptic ulcer disease); or Stroke ([facility] Mr. Gina Stewart  has a past surgical history that includes pr cardiac surg procedure unlist; hx coronary artery bypass graft (06-); hx cataract removal (Left, 2003); hx heart catheterization; hx coronary stent placement (02-); hx heent (1970s); hx colonoscopy; and colonoscopy (N/A, 1/27/2017). Social History/Living Environment:   Home Environment: Private residence  Wheelchair Ramp: Yes  One/Two Story Residence: One story  Living Alone: No  Support Systems: Child(tai), Friends \ neighbors, Spouse/Significant Other/Partner  Patient Expects to be Discharged to[de-identified] Private residence  Current DME Used/Available at Home: Samanta Duet, straight, Shower chair  Tub or Shower Type: Shower  Prior Level of Function/Work/Activity:  Patient lives at home with wife, who also happens to be in the hospital.  Unreliable historian due to confusion. Number of Personal Factors/Comorbidities that affect the Plan of Care: 3+: HIGH COMPLEXITY   EXAMINATION:   Most Recent Physical Functioning:   Gross Assessment:                  Posture:     Balance:  Sitting: Intact  Sitting - Static: Good (unsupported)  Sitting - Dynamic: Fair (occasional)  Standing: Impaired  Standing - Static: Fair  Standing - Dynamic : Poor Bed Mobility:     Wheelchair Mobility:     Transfers:  Sit to Stand: Minimum assistance; Moderate assistance  Stand to Sit: Minimum assistance  Gait:            Body Structures Involved:  1. Muscles Body Functions Affected:  1. Mental  2. Movement Related  3. Metobolic/Endocrine Activities and Participation Affected:  1. Mobility  2. Self Care  3.  Domestic Life   Number of elements that affect the Plan of Care: 4+: HIGH COMPLEXITY   CLINICAL PRESENTATION:   Presentation: Evolving clinical presentation with changing clinical characteristics: MODERATE COMPLEXITY   CLINICAL DECISION MAKIN [facility] Mobility Inpatient Short Form  How much difficulty does the patient currently have. .. Unable A Lot A Little None   1. Turning over in bed (including adjusting bedclothes, sheets and blankets)? [x] 1   [] 2   [] 3   [] 4   2. Sitting down on and standing up from a chair with arms ( e.g., wheelchair, bedside commode, etc.)   [] 1   [x] 2   [] 3   [] 4   3. Moving from lying on back to sitting on the side of the bed? [x] 1   [] 2   [] 3   [] 4   How much help from another person does the patient currently need. .. Total A Lot A Little None   4. Moving to and from a bed to a chair (including a wheelchair)? [] 1   [x] 2   [] 3   [] 4   5. Need to walk in hospital room? [] 1   [x] 2   [] 3   [] 4   6. Climbing 3-5 steps with a railing? [x] 1   [] 2   [] 3   [] 4   © 2007, Trustees [place] [address], under license to [employer]. All rights reserved      Score:  Initial: 9 Most Recent: X (Date: -- )    Interpretation of Tool:  Represents activities that are increasingly more difficult (i.e. Bed mobility, Transfers, Gait). Score 24 23 22-20 19-15 14-10 9-7 6     Modifier CH CI CJ CK CL CM CN      ? Mobility - Walking and Moving Around:     - CURRENT STATUS: CM - 80%-99% impaired, limited or restricted    - GOAL STATUS: CL - 60%-79% impaired, limited or restricted    - D/C STATUS:  ---------------To be determined---------------  Payor: [facility] [place] [facility]ARE / [place] [facility] [place] [facility] / Product Type: Managed Care Medicare /      Medical Necessity:     · Patient is expected to demonstrate progress in strength, range of motion, balance, coordination and functional technique to decrease assistance required with all functional mobility.   Reason for Services/Other Comments:  · Patient continues to require skilled intervention due to medical complications and patient unable to attend/participate in therapy as expected. Use of outcome tool(s) and clinical judgement create a POC that gives a: Questionable prediction of patient's progress: MODERATE COMPLEXITY            TREATMENT:   (In addition to Assessment/Re-Assessment sessions the following treatments were rendered)   Pre-treatment Symptoms/Complaints:  \" Where is my wife? What happened to her? \"\"  Pain: Initial: 0     Post Session:  0     Therapeutic Activity: (    15 minutes): Therapeutic activities including sitting and standing balance activities, sit to stand transfers to improve mobility, strength and balance. Required  Min to moderate assist with maximal cueing   to promote participation. Therapeutic Exercise: (  10 min):  Exercises per grid below to improve mobility, strength and balance. Required max visual, verbal and manual cues to promote proper body alignment, promote proper body posture and promote proper body mechanics. Date:  8/6/18 Date:   Date:     Activity/Exercise Seated Parameters Parameters Parameters   Heel raises X 10 B initially manual cues     Toe raises X 10 B initially manual cues     LAQ's X 10 B     Hip Flex X 10 B     Hip ABD                          Braces/Orthotics/Lines/Etc:   · IV  Treatment/Session Assessment:    · Response to Treatment:  Pleasant and cooperative. · Interdisciplinary Collaboration:   o Physical Therapy Assistant  o Registered Nurse  o Certified Nursing Assistant/Patient Care Technician  · After treatment position/precautions:   o Up in chair  o Bed alarm/tab alert on  o Bed/Chair-wheels locked  o Bed in low position  o Call light within reach  o RN notified  o PCT present   · Compliance with Program/Exercises: Is compliant, just seems to have a hard time following verbal commands. · Recommendations/Intent for next treatment session:   \"Next visit will focus on advancements to more challenging activities and reduction in assistance provided\".   Total Treatment Duration:  PT Patient Time In/Time Out  Time In: 1022  Time Out: Josiane Braga PTA

## 2018-08-07 NOTE — PROGRESS NOTES
Per [facility] patient discharged to [facility] Preferred Provider Margarita on 08/06/2018. Patient will be included in weekly care coordination calls, Care Coordinator will send patient discharge disposition to Roseann Rhodes RN [facility]. This note will not be viewable in [address]

## 2018-09-02 NOTE — ED NOTES
Pt presents by EMS from [facility] with complains of neuropathy pain all over, blister to L lower leg, and N/V/D that started today. Pt is on 2L O2 for COPD. Swelling noted to bilateral lower extremities.

## 2018-09-02 NOTE — ED NOTES
Patient awake, resting in bed. Patient denies needs at this time. Call bell in reach. Son at bedside. Patient awaiting CT.

## 2018-09-02 NOTE — ED NOTES
2nd Troponin obtained. Patient resting in bed. Son at bedside. Patient given warm blankets per request.  Patient denies any other needs.

## 2018-09-02 NOTE — ED NOTES
Discharge instructions discussed with the patient and son at bedside. Report given to [facility] EMT.

## 2018-09-02 NOTE — ED PROVIDER NOTES
HPI: 
80 male multiple medical problems brought in from the rehabilitation facility for abdominal pain nausea vomiting diarrhea for the past few days. No altered mental status. Denies any chest pain, worsening shortness of breath. Has history of congestive heart failure. Has history of COPD. Oxygen. No worsened and shortness of breath compared to baseline. No chest pain. Also has leg swelling that is chronic however there is a blister in the left anterior leg that is progressively worsening. Abdominal pain mostly in the upper abdomen and on the left side. No report of bloody stool. Patient stated he feel very nauseous. ROS Constitutional: No fever, no chills Skin:  
Eye:  
ENMT:  
Respiratory: No shortness of breath, no cough Cardiovascular: No chest pain, no palpitations Gastrointestinal:  
: No dysuria MSK: No back pain, no muscle pain Neuro: No headache, no change in mental status, no numbness, no tingling, no weakness Psych:  
Endocrine:  
All other review of systems positive per history of present illness and the above otherwise negative or noncontributory. Visit Vitals  /70  Pulse 64  Temp 97.7 °F (36.5 °C)  Resp 18  SpO2 99% Past Medical History:  
Diagnosis Date  Atherosclerosis of artery of extremity with ulceration ([facility] [address] 4/17/2015  Atherosclerosis of native arteries of extremity with intermittent claudication ([address] 4/17/2015  Atopic dermatitis 11/21/2012  Atrial fibrillation ([facility] [address]  CAD (coronary artery disease)  Carotid stenosis, bilateral 11/21/2012 50-79%  3-2010    1. Carotid Doppler (6/1/07): Greater than 70% stenosis in proximal LICA. 50% stenosis in right ICA. 2.  CTA of neck (3/15/10): Occluded distal segments of the vertebral arteries bilaterally. Atherosclerosis of the carotid bulbs bilaterally with a 50% stenosis on the left and a stenosis of less than 30% on the right.  Small nodule in the right upper lobe near the apex. 3. CTA (8/29/13):  Less than 30% diameter stenosis of the cervical internal carotid arteries bilaterally.  CHF (congestive heart failure) ([address] 11/21/2012  Chronic kidney disease   
 hx elevated labs  Chronic obstructive pulmonary disease ([address]  Colon, diverticulosis 1/24/2015  Coronary atherosclerosis of native coronary vessel 10/31/2016 1. Cath (5/31/07):  LMCA: 25%. LAD: ostial 75-95% stenosis. 50-75% mid. D1:  25-50%. OM3: small with 75% stenosis. RCA: 100% mid. with left to right collaterals. 2 Vessel CABG (6/4/07):  LIMA to LAD and SVG to OM. SVG was anastomosed proximally to LIMA to due severe atherosclerosis of aorta. 3.  Lexiscan cardiolite (11/30/10): Abnormal with reversible lateral wall defects. Unable to gate given atrial fibrillation. 4.  LHC (1/14/11):  EF 60%. LVEDP 21. Patent LIMA to LAD and SVG to OM1 (off LIMA). occluded small RCA with left to right collaterals. Small D1 (2 mm vessel) with 70% mid stenosis.  Diabetes ([address]  Diastolic CHF, acute on chronic (HCC) 9/12/2015 1. Echo (9/11/15) : EF 55-60%. Mild LVH. Moderate biatrial enlargement. Moderate mitral/tricuspid regurgitation. 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 Bilateral proximal common iliac PCI (2/21/08):  8.0 X 100 mm Cordis smart stent on right and 10 X 40 mm cordis smart stent on right. Both inflated to 7.0 mm.  Poor historian  Radiologic findings of lung field, abnormal 10/31/2016 1. CT of chest  (11/24/10): Multiple small nodules in the right lobe and stable interstitial prominence. Consistent with chronic lung disease. No evidence of malignancy.  Seizure disorder ([facility] [address] 2013  Shortness of breath dyspnea 2013  Thyroid disease  Unspecified sleep apnea Past Surgical History:  
Procedure Laterality Date  CARDIAC SURG PROCEDURE UNLIST    
 cath ,cabg ,lexiscan cardiolite 11/10  
 COLONOSCOPY N/A 2017 COLONOSCOPY  BMI 36 TO BE ADMITTED 17 performed by Marlene Borden MD at [facility] ENDOSCOPY  
 HX CATARACT REMOVAL Left   
 os  HX COLONOSCOPY    
 HX CORONARY ARTERY BYPASS GRAFT  2007  HX CORONARY STENT PLACEMENT  2008  
 bilateral iliac artery PCI and stents  HX HEART CATHETERIZATION    
 left-2007, 2011  HX HEENT  1970s  
 neck lipoma Prior to Admission Medications Prescriptions Last Dose Informant Patient Reported? Taking? OXYGEN-AIR DELIVERY SYSTEMS 2018 at Unknown time  Yes Yes Sig: by Does Not Apply route. 2LPM every day  
acetaminophen (TYLENOL) 325 mg tablet 2018 at Unknown time  No Yes Sig: Take 2 Tabs by mouth every six (6) hours as needed. albuterol (PROVENTIL VENTOLIN) 2.5 mg /3 mL (0.083 %) nebulizer solution 2018 at Unknown time  Yes Yes Si.5 mg by Nebulization route every four (4) hours as needed for Wheezing. allopurinol (ZYLOPRIM) 300 mg tablet 2018 at Unknown time  Yes Yes Sig: Take  by mouth daily. bisacodyl (DULCOLAX) 5 mg EC tablet 2018 at Unknown time  No Yes Sig: Take 1 Tab by mouth daily. calcium carbonate (CALCIUM 600 PO) 2018 at Unknown time  Yes Yes Sig: Take  by mouth. cholecalciferol (VITAMIN D3) 1,000 unit cap 2018 at Unknown time  Yes Yes Sig: Take 1,000 Units by mouth daily. cpap machine kit 2018 at Unknown time  Yes Yes Sig: by Does Not Apply route. Bilevel   
enoxaparin (LOVENOX) 100 mg/mL   Yes Yes Sig: by SubCUTAneous route every twelve (12) hours. ferrous sulfate 324 mg (65 mg iron) tablet 2018 at Unknown time  Yes Yes Sig: Take  by mouth Daily (before breakfast). fluticasone (FLONASE) 50 mcg/actuation nasal spray 2018 at Unknown time  No Yes Si Sprays by Both Nostrils route daily. fluticasone-vilanterol (BREO ELLIPTA) 100-25 mcg/dose inhaler   Yes Yes Sig: Take 1 Puff by inhalation daily. furosemide (LASIX) 20 mg tablet 2018 at Unknown time  Yes Yes Sig: Take 20 mg by mouth two (2) times a day. hydrALAZINE (APRESOLINE) 10 mg tablet 2018 at Unknown time  No Yes Sig: TAKE 1 TABLET THREE TIMES DAILY. ipratropium-albuterol (COMBIVENT RESPIMAT)  mcg/actuation inhaler 2018 at Unknown time  No Yes Sig: Take 1 Puff by inhalation every six (6) hours. isosorbide mononitrate ER (IMDUR) 30 mg tablet 2018 at Unknown time  No Yes Sig: Take 1 Tab by mouth daily. latanoprost (XALATAN) 0.005 % ophthalmic solution 2018 at Unknown time  Yes Yes Sig: Administer 1 Drop to both eyes nightly. levothyroxine (SYNTHROID) 50 mcg tablet 2018 at Unknown time  No Yes Sig: Take 1 Tab by mouth Daily (before breakfast). magnesium oxide (MAG-OX) 400 mg tablet 2018 at Unknown time  No Yes Sig: Take 1 Tab by mouth daily. metoprolol succinate (TOPROL-XL) 25 mg XL tablet 2018 at Unknown time  No Yes Sig: Pt takes 1/2 tab po daily. mupirocin calcium (BACTROBAN) 2 % topical cream 2018 at Unknown time  No Yes Sig: Apply  to affected area two (2) times a day. oxyCODONE IR (ROXICODONE) 5 mg immediate release tablet 2018 at Unknown time  Yes Yes Sig: Take 5 mg by mouth every six (6) hours as needed for Pain.  
pantoprazole (PROTONIX) 40 mg granules for oral suspension 2018 at Unknown time  Yes Yes Si mg daily. pravastatin (PRAVACHOL) 40 mg tablet 2018 at Unknown time  No Yes Sig: Take 1 Tab by mouth nightly. sertraline (ZOLOFT) 50 mg tablet 2018 at Unknown time  No Yes Sig: Take one tablet each evening for anxiety  Indications: Generalized Anxiety Disorder  
traMADol (ULTRAM) 50 mg tablet   Yes Yes Sig: Take 50 mg by mouth every twelve (12) hours as needed for Pain.  
triamcinolone (ARISTOCORT) 0.5 % topical cream 9/1/2018 at Unknown time  No Yes Sig: Apply  to affected area two (2) times a day. use thin layer Facility-Administered Medications: None Adult Exam  
General: alert, no acute distress Head: normocephalic, atraumatic ENT: moist mucous membranes Neck: supple, non-tender; full range of motion Cardiovascular: regular rate and rhythm, normal peripheral perfusion, 2+ pitting edema in bilateral lower extremity. Respiratory:  Crackles noted lung base. No wheezing. No bronchospastic cough here Gastrointestinal: soft. Mildly distended. Tenderness to palpation over the epigastric left upper, left lower abdomen. No obvious guarding or rebound Back: non-tender, full range of motion Musculoskeletal: normal range of motion, normal strength, no gross deformities He has redness along bilateral lower extremity and wraps around the entire legs. Consistent with chronic leg swollen with associated edema and erythema. His left lower leg has a very large blister measuring approximately 5 x 3 cm. Fluid-filled. Not bloody. Neurological: alert and oriented x 4, no gross focal deficits; normal speech Psychiatric: cooperative; appropriate mood and affect MDM:  EKG - rate of 72. Afib, normal axis. Pvc. No stemi. No ischemic changes noted. Has abdominal pain with nausea vomiting diarrhea has been ongoing for the past few days. So appear fluid overloaded. We'll obtain CT for assessment of colitis, diverticulitis due to location of pain. He has crackle noted in the chest.  Chest x-ray obtained showing fluid more on left compared to right but unchanged. BNP is 700. Lower than his previous of 1200. We'll give 40 mg IV lasix. His left anterior lower leg with a large blister which I have.   A small cut and drain a significant amount of clear straw-colored fluid. Patient will be signed out pending CT A/P, lasix and reassess. ED Course Xr Chest Aida Jha Result Date: 9/2/2018 EXAM:  XR CHEST PORT INDICATION:  SOB COMPARISON:  8/3/2018 FINDINGS: A portable AP radiograph of the chest was obtained at 0541 hours. Left pleural effusion unchanged. Diffuse increased interstitial markings right greater than left unchanged. .  The lungs are clear. The cardiac and mediastinal contours and pulmonary vascularity are normal.  Median sternotomy. IMPRESSION: Small left pleural effusion unchanged. What is likely asymmetric interstitial pulmonary edema unchanged. Recent Results (from the past 24 hour(s)) CBC WITH AUTOMATED DIFF Collection Time: 09/02/18  5:23 AM  
Result Value Ref Range WBC 8.7 4.3 - 11.1 K/uL  
 RBC 4.03 (L) 4.23 - 5.6 M/uL  
 HGB 10.3 (L) 13.6 - 17.2 g/dL HCT 32.7 (L) 41.1 - 50.3 % MCV 81.1 79.6 - 97.8 FL  
 MCH 25.6 (L) 26.1 - 32.9 PG  
 MCHC 31.5 31.4 - 35.0 g/dL  
 RDW 18.6 % PLATELET 019 040 - 117 K/uL MPV 12.4 (H) 9.4 - 12.3 FL ABSOLUTE NRBC 0.00 0.0 - 0.2 K/uL  
 DF AUTOMATED NEUTROPHILS 83 (H) 43 - 78 % LYMPHOCYTES 9 (L) 13 - 44 % MONOCYTES 7 4.0 - 12.0 % EOSINOPHILS 1 0.5 - 7.8 % BASOPHILS 0 0.0 - 2.0 % IMMATURE GRANULOCYTES 1 0.0 - 5.0 %  
 ABS. NEUTROPHILS 7.2 1.7 - 8.2 K/UL  
 ABS. LYMPHOCYTES 0.8 0.5 - 4.6 K/UL  
 ABS. MONOCYTES 0.6 0.1 - 1.3 K/UL  
 ABS. EOSINOPHILS 0.1 0.0 - 0.8 K/UL  
 ABS. BASOPHILS 0.0 0.0 - 0.2 K/UL  
 ABS. IMM. GRANS. 0.1 0.0 - 0.5 K/UL METABOLIC PANEL, COMPREHENSIVE Collection Time: 09/02/18  5:23 AM  
Result Value Ref Range Sodium 137 136 - 145 mmol/L Potassium 3.6 3.5 - 5.1 mmol/L Chloride 99 98 - 107 mmol/L  
 CO2 33 (H) 21 - 32 mmol/L Anion gap 5 (L) 7 - 16 mmol/L Glucose 148 (H) 65 - 100 mg/dL BUN 19 8 - 23 MG/DL  Creatinine 1.70 (H) 0.8 - 1.5 MG/DL  
 GFR est AA 49 (L) >60 ml/min/1.73m2 GFR est non-AA 41 (L) >60 ml/min/1.73m2 Calcium 8.3 8.3 - 10.4 MG/DL Bilirubin, total 6.4 (H) 0.2 - 1.1 MG/DL  
 ALT (SGPT) 34 12 - 65 U/L  
 AST (SGOT) 55 (H) 15 - 37 U/L Alk. phosphatase 318 (H) 50 - 136 U/L Protein, total 7.0 6.3 - 8.2 g/dL Albumin 2.8 (L) 3.2 - 4.6 g/dL Globulin 4.2 (H) 2.3 - 3.5 g/dL A-G Ratio 0.7 (L) 1.2 - 3.5 BNP Collection Time: 09/02/18  5:23 AM  
Result Value Ref Range  pg/mL POC TROPONIN-I Collection Time: 09/02/18  5:24 AM  
Result Value Ref Range Troponin-I (POC) 0.05 0.02 - 0.05 ng/ml POC LACTIC ACID Collection Time: 09/02/18  5:26 AM  
Result Value Ref Range Lactic Acid (POC) 1.0 0.5 - 1.9 mmol/L Dragon voice recognition software was used to create this note. Although the note has been reviewed and corrected where necessary, additional errors may have been overlooked and remain in the text. 11:27 AM 
CT ABD PELV WO CONT (Final result) Result time: 09/02/18 09:10:44  
  Final result by Marycruz Nguyen MD (09/02/18 09:10:44)  
  Impression:  
  IMPRESSION: 
1. There is diverticulosis but no frankly clear acute diverticulitis findings on 
this exam with note that assessment is limited by the lack of IV contrast. 
2. Small amount of ascites. FINDINGS strongly suggest cirrhosis. Correlate 
clinically. 2. Incidental renal cysts, some have increased in size from before. Bladder now 
lobulated strongly suggesting the presence of multiple diverticuli. Correlate 
for bladder outlet obstruction history. 
 
 
 
 
  
  Narrative:  
  CT of the Abdomen without Contrast and CT of the Pelvis without Contrast 
9/2/2018 8:01 AM 
 
Indication: Upper abdomen and left flank abdomen pain with nausea and vomiting. Comparison: None available at this hospital PACS Technique:  Multiple contiguous axial images encompassing the abdomen and pelvis are obtained with no IV or oral contrast given. Limited amount of oral contrast 
is given. For this CT scanner at least one of the following techniques is 
utilized to decrease patient radiation dose: Automatic exposure control, KVP and 
mA modulation based on patient weight, and iterative reconstruction. FINDINGS: Please note that the lack of IV does limit assessment of the solid 
organs. CT abdomen-there are bilateral small layering effusions today. There is no free 
air in the abdomen, only trace amounts of free fluid. The gallbladder is significantly distended. No obvious liver lesion is seen but 
its morphologic contours now appear abnormal, nodular with greater prominence of 
the left lobe and caudate lobe. The spleen is now enlarged. Kidneys again show 
some cortical scarring and there are bilateral water density cysts. Some have 
progressed in size. No renal stone or hydronephrosis. Adrenals and pancreas 
unremarkable on this exam. Stomach and duodenum are nondistended. Small bowel 
loops nondistended with no obvious inflammation. There probably is diverticulosis but the colon is nondistended with no obvious 
inflammation. There is heavy aortic atherosclerosis, no aneurysm. No enlarged 
abdomen adenopathy seen. CT pelvis-the prostate is not enlarged but the bladder contours are clearly 
lobulated and there are likely multiple diverticuli. Seminal vesicles 
unremarkable. No enlarged abdomen or pelvis adenopathy or obvious acute vascular 
finding. Only a very small amount of free fluid is seen in the pelvis. Rectosigmoid colon is nondistended, sigmoid diverticuli seen. No obvious 
inflammation. Nothing acute seen at the pelvic bowel loops. 
  
   
 
US shows distended gall bladder with sludge and adenomyomatosis. nosigns of cholecystitis. Will discharge with lasix at facility and follow up.

## 2018-09-02 NOTE — ED NOTES
I have reviewed discharge instructions with the patient and son. The patient and son verbalized understanding. Patient left ED via Discharge Method: stretcher to Home with [facility] ambulance. Opportunity for questions and clarification provided. Patient given 0 scripts. To continue your aftercare when you leave the hospital, you may receive an automated call from our care team to check in on how you are doing. This is a free service and part of our promise to provide the best care and service to meet your aftercare needs.  If you have questions, or wish to unsubscribe from this service please call [phone]. Thank you for Choosing our [facility] Emergency Department.

## 2018-09-02 NOTE — DISCHARGE INSTRUCTIONS

## 2018-09-12 NOTE — IP AVS SNAPSHOT
[facility] 
 
 
 [address] [facility] [address] 
[phone] Patient: Roberto Mejia MRN: [id number] TM About your hospitalization You were admitted on:  2018 You last received care in the:  [facility] PACU You were discharged on:  2018 Why you were hospitalized Your primary diagnosis was:  Not on File Follow-up Information Follow up With Details Comments Contact Info Sonja Gonzalez MD   [address] [facility] [address],[address] 
[phone] Radha Wagner MD  FOLLOW UP WITH DR. ALEJO AS NEEDED. [address] [facility] [address] [facility] [address] 
[phone] Your Scheduled Appointments 2018  2:30 PM EDT Office Visit with Sonja Gonzalez MD  
[address]) [address] [facility] [address] [facility] [id number] [phone] 2018 10:15 AM EDT Office Visit with Wenceslao Bob MD  
[facility] ([address] [place] [address]) [address] [place] [address]  
[address] [facility] 81  
[phone] 2018  3:00 PM EDT  
(Arrive by 2:40 PM) Return appointment with Warren Fermin NP  
[address] ([facility]) [address] [facility] [address]  
[phone] Discharge Orders None A check chandler indicates which time of day the medication should be taken. My Medications CHANGE how you take these medications Instructions Each Dose to Equal  
 Morning Noon Evening Bedtime  
 bisacodyl 5 mg EC tablet Commonly known as:  DULCOLAX What changed:  when to take this Your last dose was: Your next dose is: Take 1 Tab by mouth daily. 5 mg CONTINUE taking these medications Instructions Each Dose to Equal  
 Morning Noon Evening Bedtime acetaminophen 325 mg tablet Commonly known as:  TYLENOL Your last dose was: Your next dose is: Take 2 Tabs by mouth every six (6) hours as needed. 650 mg  
    
   
   
   
  
 albuterol 2.5 mg /3 mL (0.083 %) nebulizer solution Commonly known as:  PROVENTIL VENTOLIN Your last dose was: Your next dose is:    
   
   
 2.5 mg by Nebulization route every four (4) hours as needed for Wheezing. 2.5 mg  
    
   
   
   
  
 allopurinol 300 mg tablet Commonly known as:  Fany Orosco Your last dose was: Your next dose is: Take  by mouth daily. BREO ELLIPTA 100-25 mcg/dose inhaler Generic drug:  fluticasone-vilanterol Your last dose was: Your next dose is: Take 1 Puff by inhalation daily. 1 Puff CALCIUM 600 PO Your last dose was: Your next dose is: Take  by mouth. cholecalciferol 1,000 unit Cap Commonly known as:  VITAMIN D3 Your last dose was: Your next dose is: Take 1,000 Units by mouth daily. 1000 Units  
    
   
   
   
  
 cpap machine kit Your last dose was: Your next dose is:    
   
   
 by Does Not Apply route. Bilevel 12/8  
     
   
   
   
  
 docusate calcium 240 mg capsule Commonly known as:  Magdalena Alcantara Your last dose was: Your next dose is: Take 240 mg by mouth two (2) times a day. 240 mg  
    
   
   
   
  
 docusate sodium 100 mg capsule Commonly known as:  Fabby Dilling Your last dose was: Your next dose is: Take 100 mg by mouth daily as needed for Constipation. 100 mg  
    
   
   
   
  
 ferrous sulfate 324 mg (65 mg iron) tablet Your last dose was: Your next dose is: Take  by mouth Daily (before breakfast). fluticasone 50 mcg/actuation nasal spray Commonly known as:  Leona Fetch Your last dose was: Your next dose is: 2 Sprays by Both Nostrils route daily. 2 Spray  
    
   
   
   
  
 hydrALAZINE 10 mg tablet Commonly known as:  APRESOLINE Your last dose was: Your next dose is: TAKE 1 TABLET THREE TIMES DAILY. ipratropium-albuterol  mcg/actuation inhaler Commonly known as:  Zada Sulaiman Your last dose was: Your next dose is: Take 1 Puff by inhalation every six (6) hours. 1 Puff  
    
   
   
   
  
 isosorbide mononitrate ER 30 mg tablet Commonly known as:  IMDUR Your last dose was: Your next dose is: Take 1 Tab by mouth daily. 30 mg  
    
   
   
   
  
 * LASIX 20 mg tablet Generic drug:  furosemide Your last dose was: Your next dose is: Take 20 mg by mouth two (2) times a day. 20 mg  
    
   
   
   
  
 * furosemide 20 mg tablet Commonly known as:  LASIX Your last dose was: Your next dose is: Take 20 mg by mouth See Admin Instructions. Give prn swelling- 1 tab X 3 days, in addition to scheduled dose  
 20 mg  
    
   
   
   
  
 levothyroxine 50 mcg tablet Commonly known as:  SYNTHROID Your last dose was: Your next dose is: Take 1 Tab by mouth Daily (before breakfast). 50 mcg LOVENOX 100 mg/mL Generic drug:  enoxaparin Your last dose was: Your next dose is:    
   
   
 by SubCUTAneous route every twelve (12) hours. \"for nonocclusive thrombosis\" magnesium oxide 400 mg tablet Commonly known as:  MAG-OX Your last dose was: Your next dose is: Take 1 Tab by mouth daily. 400 mg  
    
   
   
   
  
 metoprolol succinate 25 mg XL tablet Commonly known as:  TOPROL-XL  
   
 Your last dose was: Your next dose is:    
   
   
 Pt takes 1/2 tab po daily. mupirocin calcium 2 % topical cream  
Commonly known as:  [facility] Your last dose was: Your next dose is:    
   
   
 Apply  to affected area two (2) times a day. ondansetron 4 mg disintegrating tablet Commonly known as:  ZOFRAN ODT Your last dose was: Your next dose is: Take 1 Tab by mouth every eight (8) hours as needed for Nausea. 4 mg  
    
   
   
   
  
 oxyCODONE IR 5 mg immediate release tablet Commonly known as:  Flori Amena Your last dose was: Your next dose is: Take 5 mg by mouth every six (6) hours as needed for Pain. 5 mg OXYGEN-AIR DELIVERY SYSTEMS Your last dose was: Your next dose is: Take  by inhalation continuous. 2LPM to keep 02 sat >90%  
     
   
   
   
  
 pantoprazole 40 mg granules for oral suspension Commonly known as:  PROTONIX Your last dose was: Your next dose is:    
   
   
 40 mg daily. 40 mg  
    
   
   
   
  
 potassium chloride SR 20 mEq tablet Commonly known as:  K-TAB Your last dose was: Your next dose is: Take 20 mEq by mouth daily. Indications: hypokalemia prevention 20 mEq  
    
   
   
   
  
 pravastatin 40 mg tablet Commonly known as:  PRAVACHOL Your last dose was: Your next dose is: Take 1 Tab by mouth nightly. 40 mg PROCTOSOL HC 2.5 % rectal cream  
Generic drug:  hydrocortisone Your last dose was: Your next dose is: Insert  into rectum every six (6) hours as needed for Hemorrhoids. sertraline 50 mg tablet Commonly known as:  ZOLOFT Your last dose was: Your next dose is: Take one tablet each evening for anxiety  Indications: Generalized Anxiety Disorder  
     
   
   
   
  
 triamcinolone 0.5 % topical cream  
Commonly known as:  ARISTOCORT Your last dose was: Your next dose is:    
   
   
 Apply  to affected area two (2) times a day. use thin layer ULTRAM 50 mg tablet Generic drug:  traMADol Your last dose was: Your next dose is: Take 50 mg by mouth every twelve (12) hours as needed for Pain. 50 mg  
    
   
   
   
  
 XALATAN 0.005 % ophthalmic solution Generic drug:  latanoprost  
   
Your last dose was: Your next dose is:    
   
   
 Administer 1 Drop to both eyes nightly. 1 Drop * Notice: This list has 2 medication(s) that are the same as other medications prescribed for you. Read the directions carefully, and ask your doctor or other care provider to review them with you. Opioid Education Prescription Opioids: What You Need to Know: 
 
Prescription opioids can be used to help relieve moderate-to-severe pain and are often prescribed following a surgery or injury, or for certain health conditions. These medications can be an important part of treatment but also come with serious risks. Opioids are strong pain medicines. Examples include hydrocodone, oxycodone, fentanyl, and morphine. Heroin is an example of an illegal opioid. It is important to work with your health care provider to make sure you are getting the safest, most effective care. WHAT ARE THE RISKS AND SIDE EFFECTS OF OPIOID USE? Prescription opioids carry serious risks of addiction and overdose, especially with prolonged use. An opioid overdose, often marked by slow breathing, can cause sudden death. The use of prescription opioids can have a number of side effects as well, even when taken as directed.  
  
· Tolerance-meaning you might need to take more of a medication for the same pain relief · Physical dependence-meaning you have symptoms of withdrawal when the medication is stopped. Withdrawal symptoms can include nausea, sweating, chills, diarrhea, stomach cramps, and muscle aches. Withdrawal can last up to several weeks, depending on which drug you took and how long you took it. · Increased sensitivity to pain · Constipation · Nausea, vomiting, and dry mouth · Sleepiness and dizziness · Confusion · Depression · Low levels of testosterone that can result in lower sex drive, energy, and strength · Itching and sweating RISKS ARE GREATER WITH:      
· History of drug misuse, substance use disorder, or overdose · Mental health conditions (such as depression or anxiety) · Sleep apnea · Older age (72 years or older) · Pregnancy Avoid alcohol while taking prescription opioids. Also, unless specifically advised by your health care provider, medications to avoid include: · Benzodiazepines (such as Xanax or Valium) · Muscle relaxants (such as Soma or Flexeril) · Hypnotics (such as Ambien or Lunesta) · Other prescription opioids KNOW YOUR OPTIONS Talk to your health care provider about ways to manage your pain that don't involve prescription opioids. Some of these options may actually work better and have fewer risks and side effects. Options may include: 
· Pain relievers such as acetaminophen, ibuprofen, and naproxen · Some medications that are also used for depression or seizures · Physical therapy and exercise · Counseling to help patients learn how to cope better with triggers of pain and stress. · Application of heat or cold compress · Massage therapy · Relaxation techniques Be Informed Make sure you know the name of your medication, how much and how often to take it, and its potential risks & side effects. IF YOU ARE PRESCRIBED OPIOIDS FOR PAIN: 
· Never take opioids in greater amounts or more often than prescribed. Remember the goal is not to be pain-free but to manage your pain at a tolerable level. · Follow up with your primary care provider to: · Work together to create a plan on how to manage your pain. · Talk about ways to help manage your pain that don't involve prescription opioids. · Talk about any and all concerns and side effects. · Help prevent misuse and abuse. · Never sell or share prescription opioids · Help prevent misuse and abuse. · Store prescription opioids in a secure place and out of reach of others (this may include visitors, children, friends, and family). · Safely dispose of unused/unwanted prescription opioids: Find your community drug take-back program or your pharmacy mail-back program, or flush them down the toilet, following guidance from the Food and Drug Administration (www.fda.gov/Drugs/ResourcesForYou). · Visit www.cdc.gov/drugoverdose to learn about the risks of opioid abuse and overdose. · If you believe you may be struggling with addiction, tell your health care provider and ask for guidance or call [employer] at 9-519-254-KVTW. Discharge Instructions Vena Cava Filter Placement: What to Expect at Home Your Recovery A vena cava filter was put into the vena cava using a thin, flexible tube (catheter) that was inserted through a vein in your neck or groin. A vena cava filter helps prevent blood clots from traveling to the lungs and heart, where they may block blood flow. The filter may be permanent or it may be removed later. Vena cava filters may be used if you have problems taking a medicine (called a blood thinner) that prevents blood clots. After having a vena cava filter placed, you may feel tired and have some pain for several days. You may have a small bandage where the catheter was placed.  
This care sheet gives you a general idea about how long it will take for you to recover. But each person recovers at a different pace. Follow the steps below to feel better as quickly as possible. How can you care for yourself at home? Activity 
  · Take it easy for a day or two. Avoid strenuous activities, such as bicycle riding, jogging, weight lifting, or aerobic exercise, until your doctor says it is okay. Diet 
  · You can eat your normal diet. If your stomach is upset, try bland, low-fat foods like plain rice, broiled chicken, toast, and yogurt.  
  · Drink plenty of fluids to avoid becoming dehydrated. Medicines 
  · Your doctor will tell you if and when you can restart your medicines. He or she will also give you instructions about taking any new medicines.  
  · If you take blood thinners, such as warfarin (Coumadin), clopidogrel (Plavix), or aspirin, be sure to talk to your doctor. He or she will tell you if and when to start taking those medicines again. Make sure that you understand exactly what your doctor wants you to do.  
  · Be safe with medicines. Take pain medicines exactly as directed. ¨ If the doctor gave you a prescription medicine for pain, take it as prescribed. ¨ If you are not taking a prescription pain medicine, ask your doctor if you can take an over-the-counter medicine.  
  · If you think your pain medicine is making you sick to your stomach: 
¨ Take your medicine after meals (unless your doctor has told you not to). ¨ Ask your doctor for a different pain medicine.  
 Care of the catheter site 
  · Keep a bandage over the spot where the catheter was inserted for the first day, or for as long as your doctor recommends.  
  · Put ice or a cold pack on the area for 10 to 20 minutes at a time to help with soreness or swelling. Do this every few hours. Put a thin cloth between the ice and your skin. Follow-up care is a key part of your treatment and safety.  Be sure to make and go to all appointments, and call your doctor if you are having problems. It's also a good idea to know your test results and keep a list of the medicines you take. When should you call for help? Call 911 anytime you think you may need emergency care. For example, call if: 
  · You passed out (lost consciousness).  
  · You have severe trouble breathing.  
  · You have sudden chest pain and shortness of breath, or you cough up blood.  
 Call your doctor now or seek immediate medical care if: 
  · You have pain that does not get better after you take pain medicine.  
  · You are bleeding through your dressing. A small amount of bleeding is normal.  
  · You have a fast-growing, painful lump at the catheter site.  
  · You have signs of infection, such as: 
¨ Increased pain, swelling, warmth, or redness. ¨ Red streaks leading from the incision. ¨ Pus draining from the incision. ¨ A fever.  
  · You have symptoms of a blood clot in your leg, such as: 
¨ Pain in the calf, back of the knee, thigh, or groin. ¨ Redness and swelling in your leg or groin.  
 Watch closely for any changes in your health, and be sure to contact your doctor if you have any problems. Where can you learn more? Go to http://angelina-rajiv.info/. Enter N580 in the search box to learn more about \"Vena Cava Filter Placement: What to Expect at Home. \" Current as of: November 21, 2017 Content Version: 11.7 © 2132-4987 [employer]. Care instructions adapted under license by [employer] (which disclaims liability or warranty for this information). If you have questions about a medical condition or this instruction, always ask your healthcare professional. [address] any warranty or liability for your use of this information. After general anesthesia or intravenous sedation, for 24 hours or while taking prescription Narcotics: · Limit your activities · Do not drive and operate hazardous machinery · Do not make important personal or business decisions · Do  not drink alcoholic beverages · If you have not urinated within 8 hours after discharge, please contact your surgeon on call. *  Please give a list of your current medications to your Primary Care Provider. *  Please update this list whenever your medications are discontinued, doses are 
    changed, or new medications (including over-the-counter products) are added. *  Please carry medication information at all times in case of emergency situations. These are general instructions for a healthy lifestyle: No smoking/ No tobacco products/ Avoid exposure to second hand smoke Surgeon General's Warning:  Quitting smoking now greatly reduces serious risk to your health. Obesity, smoking, and sedentary lifestyle greatly increases your risk for illness A healthy diet, regular physical exercise & weight monitoring are important for maintaining a healthy lifestyle You may be retaining fluid if you have a history of heart failure or if you experience any of the following symptoms:  Weight gain of 3 pounds or more overnight or 5 pounds in a week, increased swelling in our hands or feet or shortness of breath while lying flat in bed. Please call your doctor as soon as you notice any of these symptoms; do not wait until your next office visit. Recognize signs and symptoms of STROKE: 
F-face looks uneven A-arms unable to move or move unevenly S-speech slurred or non-existent T-time-call 911 as soon as signs and symptoms begin-DO NOT go Back to bed or wait to see if you get better-TIME IS BRAIN. Introducing hospitals & HEALTH SERVICES! [employer] introduces Qui.lt patient portal. Now you can access parts of your medical record, email your doctor's office, and request medication refills online. 1. In your internet browser, go to [email or web address] 2. Click on the First Time User? Click Here link in the Sign In box. You will see the New Member Sign Up page. 3. Enter your Net[employer]x Access Code exactly as it appears below. You will not need to use this code after youve completed the sign-up process. If you do not sign up before the expiration date, you must request a new code. · [employer] Access Code: [email or web address] Expires: 12/1/2018  5:11 AM 
 
4. Enter the last four digits of your Social Security Number (xxxx) and Date of Birth (mm/dd/yyyy) as indicated and click Submit. You will be taken to the next sign-up page. 5. Create a Netformx ID. This will be your Netformx login ID and cannot be changed, so think of one that is secure and easy to remember. 6. Create a Net[employer] password. You can change your password at any time. 7. Enter your Password Reset Question and Answer. This can be used at a later time if you forget your password. 8. Enter your e-mail address. You will receive e-mail notification when new information is available in [address] 9. Click Sign Up. You can now view and download portions of your medical record. 10. Click the Download Summary menu link to download a portable copy of your medical information. If you have questions, please visit the Frequently Asked Questions section of the Net[employer] website. Remember, Netformx is NOT to be used for urgent needs. For medical emergencies, dial 911. Now available from your iPhone and Android! Introducing Lucho Burrell As a [employer] patient, I wanted to make you aware of our electronic visit tool called Lucho Burrell. [employer] 24/7 allows you to connect within minutes with a medical provider 24 hours a day, seven days a week via a mobile device or tablet or logging into a secure website from your computer. You can access Lucho Burrell from anywhere in the [place].  
 
A virtual visit might be right for you when you have a simple condition and feel like you just dont want to get out of bed, or cant get away from work for an appointment, when your regular [facility] provider is not available (evenings, weekends or holidays), or when youre out of town and need minor care. Electronic visits cost only $49 and if the [facility] 24/7 provider determines a prescription is needed to treat your condition, one can be electronically transmitted to a nearby pharmacy*. Please take a moment to enroll today if you have not already done so. The enrollment process is free and takes just a few minutes. To enroll, please download the [employer]er | VIP 24/7 yuri to your tablet or phone, or visit [email or web address] to enroll on your computer. And, as an [address] [place] [address] patient with a [employer] account, the results of your visits will be scanned into your electronic medical record and your primary care provider will be able to view the scanned results. We urge you to continue to see your regular [facility] provider for your ongoing medical care. And while your primary care provider may not be the one available when you seek a [facility] virtual visit, the peace of mind you get from getting a real diagnosis real time can be priceless. For more information on Dat[employer], view our Frequently Asked Questions (FAQs) at [email or web address]. Sincerely, 
 
Fritz Vivar MD 
Chief Medical Officer Diana Sherry Chappell *:  certain medications cannot be prescribed via Dat[employer]er Unresulted Labs-Please follow up with your PCP about these lab tests Order Current Status IR IVC FILTER PLACEMENT PERCUTANEOUS In process MRSA SCREEN - PCR (NASAL) In process Providers Seen During Your Hospitalization Provider Specialty Primary office phone Tye Suazo MD Vascular Surgery [phone] Your Primary Care Physician (PCP) Primary Care Physician Office Phone Office Fax Agusto Gomez [phone] You are allergic to the following No active allergies Recent Documentation Height Weight BMI Smoking Status 1.88 m 108.4 kg 30.69 kg/m2 Former Smoker Emergency Contacts Name Discharge Info Relation Home Work Mobile Eulalia Navarro  Spouse [3] [phone] Mayi Leon  Other Relative [6] [phone] Isai Navarro  Son [22] [phone] Patient Belongings The following personal items are in your possession at time of discharge: 
  Dental Appliances: None         Home Medications: None   Jewelry: None  Clothing: Shirt, Pants, Undergarments, Footwear, Socks    Other Valuables: None Please provide this summary of care documentation to your next provider. Signatures-by signing, you are acknowledging that this After Visit Summary has been reviewed with you and you have received a copy. Patient Signature:  ____________________________________________________________ Date:  ____________________________________________________________  
  
Theresa Elizabeth Provider Signature:  ____________________________________________________________ Date:  ____________________________________________________________

## 2018-09-12 NOTE — PERIOP NOTES
Patient transported to registration via vital care [phone] from [facility] rehab In wheelchair with oxygen tank. Patients son in law accompanied patient

## 2018-09-12 NOTE — DISCHARGE INSTRUCTIONS
Vena Cava Filter Placement: What to Expect at [facility]    A vena cava filter was put into the vena cava using a thin, flexible tube (catheter) that was inserted through a vein in your neck or groin. A vena cava filter helps prevent blood clots from traveling to the lungs and heart, where they may block blood flow. The filter may be permanent or it may be removed later. Vena cava filters may be used if you have problems taking a medicine (called a blood thinner) that prevents blood clots. After having a vena cava filter placed, you may feel tired and have some pain for several days. You may have a small bandage where the catheter was placed. This care sheet gives you a general idea about how long it will take for you to recover. But each person recovers at a different pace. Follow the steps below to feel better as quickly as possible. How can you care for yourself at home? Activity    · Take it easy for a day or two. Avoid strenuous activities, such as bicycle riding, jogging, weight lifting, or aerobic exercise, until your doctor says it is okay. Diet    · You can eat your normal diet. If your stomach is upset, try bland, low-fat foods like plain rice, broiled chicken, toast, and yogurt.     · Drink plenty of fluids to avoid becoming dehydrated. Medicines    · Your doctor will tell you if and when you can restart your medicines. He or she will also give you instructions about taking any new medicines.     · If you take blood thinners, such as warfarin (Coumadin), clopidogrel (Plavix), or aspirin, be sure to talk to your doctor. He or she will tell you if and when to start taking those medicines again. Make sure that you understand exactly what your doctor wants you to do.     · Be safe with medicines. Take pain medicines exactly as directed. ¨ If the doctor gave you a prescription medicine for pain, take it as prescribed.   ¨ If you are not taking a prescription pain medicine, ask your doctor if you can take an over-the-counter medicine.     · If you think your pain medicine is making you sick to your stomach:  ¨ Take your medicine after meals (unless your doctor has told you not to). ¨ Ask your doctor for a different pain medicine.    Care of the catheter site    · Keep a bandage over the spot where the catheter was inserted for the first day, or for as long as your doctor recommends.     · Put ice or a cold pack on the area for 10 to 20 minutes at a time to help with soreness or swelling. Do this every few hours. Put a thin cloth between the ice and your skin. Follow-up care is a key part of your treatment and safety. Be sure to make and go to all appointments, and call your doctor if you are having problems. It's also a good idea to know your test results and keep a list of the medicines you take. When should you call for help? Call 911 anytime you think you may need emergency care. For example, call if:    · You passed out (lost consciousness).     · You have severe trouble breathing.     · You have sudden chest pain and shortness of breath, or you cough up blood.    Call your doctor now or seek immediate medical care if:    · You have pain that does not get better after you take pain medicine.     · You are bleeding through your dressing. A small amount of bleeding is normal.     · You have a fast-growing, painful lump at the catheter site.     · You have signs of infection, such as:  ¨ Increased pain, swelling, warmth, or redness. ¨ Red streaks leading from the incision. ¨ Pus draining from the incision. ¨ A fever.     · You have symptoms of a blood clot in your leg, such as:  ¨ Pain in the calf, back of the knee, thigh, or groin. ¨ Redness and swelling in your leg or groin.    Watch closely for any changes in your health, and be sure to contact your doctor if you have any problems. Where can you learn more? Go to http://angelina-rajiv.info/.   Enter D116 in the search box to learn more about \"Vena Cava Filter Placement: What to Expect at Home. \"  Current as of: November 21, 2017  Content Version: 11.7  © 4816-8982 [employer]. Care instructions adapted under license by [employer] (which disclaims liability or warranty for this information). If you have questions about a medical condition or this instruction, always ask your healthcare professional. [address] any warranty or liability for your use of this information. After general anesthesia or intravenous sedation, for 24 hours or while taking prescription Narcotics:  · Limit your activities  · Do not drive and operate hazardous machinery  · Do not make important personal or business decisions  · Do  not drink alcoholic beverages  · If you have not urinated within 8 hours after discharge, please contact your surgeon on call. *  Please give a list of your current medications to your Primary Care Provider. *  Please update this list whenever your medications are discontinued, doses are      changed, or new medications (including over-the-counter products) are added. *  Please carry medication information at all times in case of emergency situations. These are general instructions for a healthy lifestyle:  No smoking/ No tobacco products/ Avoid exposure to second hand smoke  Surgeon General's Warning:  Quitting smoking now greatly reduces serious risk to your health. Obesity, smoking, and sedentary lifestyle greatly increases your risk for illness  A healthy diet, regular physical exercise & weight monitoring are important for maintaining a healthy lifestyle    You may be retaining fluid if you have a history of heart failure or if you experience any of the following symptoms:  Weight gain of 3 pounds or more overnight or 5 pounds in a week, increased swelling in our hands or feet or shortness of breath while lying flat in bed.   Please call your doctor as soon as you notice any of these symptoms; do not wait until your next office visit. Recognize signs and symptoms of STROKE:  F-face looks uneven  A-arms unable to move or move unevenly  S-speech slurred or non-existent  T-time-call 911 as soon as signs and symptoms begin-DO NOT go       Back to bed or wait to see if you get better-TIME IS BRAIN.

## 2018-09-12 NOTE — ANESTHESIA POSTPROCEDURE EVALUATION
Post-Anesthesia Evaluation and Assessment Patient: Adin Palmer MRN: [id number]  SSN: [id number] YOB: 1932  Age: 80 y.o. Sex: male Cardiovascular Function/Vital Signs Visit Vitals  /67  Pulse 62  Temp 37.1 °C (98.8 °F)  Resp 16  
 Ht 6' 2\" (1.88 m)  Wt 108.4 kg (239 lb)  SpO2 97%  BMI 30.69 kg/m2 Patient is status post MAC anesthesia for Procedure(s): ULTRASOUND GUIDED ACCESS VENA CAVA FILTER INSERTION. Nausea/Vomiting: None Postoperative hydration reviewed and adequate. Pain: 
Pain Scale 1: Numeric (0 - 10) (09/12/18 1544) Pain Intensity 1: 0 (09/12/18 1544) Managed Neurological Status:  
Neuro (WDL): Exceptions to WDL (09/12/18 1544) Neuro Neurologic State: Alert (09/12/18 1544) Orientation Level: Oriented X4 (09/12/18 1544) Cognition: Appropriate decision making; Appropriate for age attention/concentration; Appropriate safety awareness; Follows commands (09/12/18 1544) Speech: Clear (09/12/18 1544) LUE Motor Response: Purposeful (09/12/18 1544) LLE Motor Response: Purposeful (09/12/18 1544) RUE Motor Response: Purposeful (09/12/18 1544) RLE Motor Response: Purposeful (09/12/18 1544) At baseline Mental Status and Level of Consciousness: Arousable Pulmonary Status:  
O2 Device: Nasal cannula (09/12/18 1544) Adequate oxygenation and airway patent Complications related to anesthesia: None Post-anesthesia assessment completed. No concerns Signed By: Daisha Brady MD   
 September 12, 2018

## 2018-09-12 NOTE — PERIOP NOTES
Report called to [facility] rehab at this time, nurse Tessie verbalized understanding of discharge instructions and report on pt.

## 2018-09-12 NOTE — BRIEF OP NOTE
BRIEF OPERATIVE NOTE Date of Procedure: 9/12/2018 Preoperative Diagnosis: Deep phlebothrombosis, antepartum, with delivery ([address] [O22.30, I82.409] Postoperative Diagnosis: Deep phlebothrombosis, antepartum, with delivery ([facility]) [O22.30, I82.409] Procedure(s): ULTRASOUND GUIDED ACCESS VENA CAVA FILTER INSERTION Surgeon(s) and Role: Cass Tirado MD - Primary Surgical Assistant:  
 
Surgical Staff: 
Circ-1: Randy Scruggs RN Scrub Tech-1: Patricia Portillo Event Time In Incision Start 52 [facility] Incision Close 1532 Anesthesia: General  
Estimated Blood Loss: 25cc Specimens: * No specimens in log * Findings:   
Complications: None Implants:  
Implant Name Type Inv. Item Serial No.  Lot No. LRB No. Used [employer] FILTER VENA CAVA 7F [id number] -- [id number] - [id number]   FILTER VENA CAVA 7F [id number] -- [id number]   [employer] [id number] N/A 1 Implanted

## 2018-09-12 NOTE — ANESTHESIA PREPROCEDURE EVALUATION
Anesthetic History Review of Systems / Medical History Patient summary reviewed and pertinent labs reviewed Pulmonary COPD: severe Sleep apnea Smoker (h/o 45pyh, quit in 80) Neuro/Psych Cardiovascular Hypertension CHF: orthopnea Dysrhythmias CAD and hyperlipidemia Exercise tolerance: <4 METS Comments: Bilateral carotid stenosis. GI/Hepatic/Renal 
  
 
 
Renal disease Endo/Other Diabetes: poorly controlled, type 2 Obesity, arthritis and anemia Other Findings Comments: Neuropathy Home O2 prn Physical Exam 
 
Airway Mallampati: III 
TM Distance: 4 - 6 cm Neck ROM: normal range of motion Mouth opening: Normal 
 
 Cardiovascular Rhythm: irregular Rate: normal 
Peripheral edema Dental 
 
 
  
Pulmonary Rhonchi:bilateral 
 
 
 
 
 
 Abdominal 
 
 
 
 Other Findings Anesthetic Plan ASA: 3 Anesthesia type: total IV anesthesia Induction: Intravenous Anesthetic plan and risks discussed with: Patient Light mac for groin access for IVC filter. Anesthetic History Review of Systems / Medical History Patient summary reviewed, nursing notes reviewed and pertinent labs reviewed Pulmonary COPD: moderate Sleep apnea Neuro/Psych Cardiovascular Hypertension: well controlled Valvular problems/murmurs: mitral insufficiency CHF (Compensated) Dysrhythmias : atrial fibrillation CABG (2006) Exercise tolerance: <4 METS 
  
GI/Hepatic/Renal 
  
 
 
Renal disease: CRI Comments: Upper GI hemorrhage Endo/Other Diabetes: poorly controlled, type 2 Hypothyroidism: well controlled Obesity Other Findings Comments: Gout Physical Exam 
 
Airway Mallampati: II 
TM Distance: > 6 cm Neck ROM: decreased range of motion, short neck Mouth opening: Normal 
 
 Cardiovascular Rhythm: irregular Rate: normal 
 
 
 
 Dental 
 
 Dentition: Edentulous Pulmonary Breath sounds clear to auscultation Abdominal 
GI exam deferred Other Findings Anesthetic Plan ASA: 4 Anesthesia type: total IV anesthesia Induction: Intravenous Anesthetic plan and risks discussed with: Patient and Son / Daughter

## 2018-09-13 NOTE — OP NOTES
[address] 
OPERATIVE REPORT Temo Buck 
MR#: [id number] : 1932 ACCOUNT #: [de-identified] DATE OF SERVICE: 2018 PREOPERATIVE DIAGNOSES:  Deep venous thrombosis/ gastrointestinal bleed. POSTOPERATIVE DIAGNOSES:  Deep venous thrombosis/ gastrointestinal bleed. PROCEDURE PERFORMED:  Inferior vena cava filter placement. SURGEON:  Yennifer Avila MD. ASSISTANT: None ANESTHESIA:  IV sedation plus 1% lidocaine as local. 
 
TOTAL CONTRAST:  20 mL of Visipaque. TOTAL FLUOROSCOPY TIME:  1 minute 36 seconds. ESTIMATED BLOOD LOSS: Minimal 
 
SPECIMENS REMOVED: None IMPLANTS: See chart COMPLICATIONS: None DESCRIPTION OF THE PROCEDURE:  The patient was brought to the angio suite, placed on angio table in the supine position. Following adequate IV sedation and a timeout procedure, the right groin was draped and prepped in sterile fashion. Right common femoral vein was then percutaneously punctured under direct vision using ultrasound. An 0.035 guidewire was advanced followed by a 5-Divehi pigtail catheter. The catheter was positioned just above the confluence of the iliac veins in the IVC. A venacavogram was performed from this position. The location of the renal vein confluence was identified and marked. Next, the introducer sheath of the [facility] filter system was advanced over a guidewire and positioned appropriately at the level of the renal veins. The filter was then advanced to the tip of the sheath. Sheath was withdrawn exposing the filter. The tip of the filter was in excellent position at the level of the renal vein confluence and then deployed at this level. There was no tilt to the filter following deployment. The delivery sheath was then removed and direct pressure was held, hemostasis was achieved. The patient tolerated the procedure well. No complications. IMPRESSION:  Satisfactory placement of inferior vena cava filter. MD Fred Armando / Constance Hills 
D: 09/12/2018 16:35    
T: 09/12/2018 17:58 JOB #: [id number]

## 2018-09-21 PROBLEM — Z95.828 S/P IVC FILTER: Status: ACTIVE | Noted: 2018-01-01

## 2018-10-08 PROBLEM — G93.40 ACUTE ENCEPHALOPATHY: Status: ACTIVE | Noted: 2018-01-01

## 2018-10-08 NOTE — PROGRESS NOTES
10/08/18 1850 Dual Skin Pressure Injury Assessment Dual Skin Pressure Injury Assessment X 
(small skin tear on inner buttcok) Second Care Provider (Based on [address] [place] [address]) Lisa Byrne RN  
pt has a small skin tear on inner buttock. allevyn and zinc cream placed. Pt has a skin tear on RLE mepilex covering. Skin is very dry and flaky. Pt is a/o x3. On 2LNC O2. Will continue to monitor.

## 2018-10-08 NOTE — ED TRIAGE NOTES
Pt arrives via EMS, seeing auditory hallucinations since this morning. Hx of kidney failure, incontinent of bowel. VS: 120/50, a fib at rate of 60-80, RR 18, 97% RA,  with hx of diabetes

## 2018-10-08 NOTE — Clinical Note
Patient Class[de-identified] OBSERVATION [921] Type of Bed: Medical [8] Reason for Observation: AMS, UTI Admitting Diagnosis: Acute encephalopathy [[id number]] Admitting Physician: Isiah Becker [[id number]] Attending Physician: Isiah Becker [[id number]]

## 2018-10-08 NOTE — IP AVS SNAPSHOT
[address] 322 [facility] 
[phone] Patient: Roberto Mejia MRN: [id number] ESU:[id number]/[id number] About your hospitalization You were admitted on:  October 8, 2018 You last received care in the:  [facility] 6 MED SURG You were discharged on:  October 10, 2018 Why you were hospitalized Your primary diagnosis was:  Acute Encephalopathy Your diagnoses also included:  Essential Hypertension, Persistent Atrial Fibrillation (Hcc), Peripheral Neuropathy, Copd (Chronic Obstructive Pulmonary Disease) (Hcc), Hyperlipidemia, Debility, Ckd (Chronic Kidney Disease) Stage 3, Gfr 30-59 Ml/Min (Hcc), Morbid Obesity (Hcc), Billy On Cpap, Chronic Diastolic Congestive Heart Failure (Hcc), 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  
[phone] Your Scheduled Appointments Monday October 15, 2018 11:45 AM EDT Office Visit with Diamond Lamas MD  
[address]) [address] [address] [phone] Thursday November 08, 2018  8:20 AM EST  
REMOTE PT MONITORING with PP SLEEP PITTMAN Ione Kocher [address] ([facility]) [place] [address] [address]  
[phone] Discharge Orders None A check chandler indicates which time of day the medication should be taken. My Medications START taking these medications Instructions Each Dose to Equal  
 Morning Noon Evening Bedtime  
 cephALEXin 500 mg capsule Commonly known as:  Quintanilla Oakes Your next dose is: This evening Take 1 Cap by mouth two (2) times a day. 500 mg CHANGE how you take these medications Instructions Each Dose to Equal  
 Morning Noon Evening Bedtime  
 bisacodyl 5 mg EC tablet Commonly known as:  DULCOLAX What changed:  when to take this Your next dose is:  Tomorrow Morning Take 1 Tab by mouth daily. 5 mg CONTINUE taking these medications Instructions Each Dose to Equal  
 Morning Noon Evening Bedtime  
 albuterol 2.5 mg /3 mL (0.083 %) nebulizer solution Commonly known as:  PROVENTIL VENTOLIN Your next dose is: Take on as needed schedule 2.5 mg by Nebulization route every four (4) hours as needed for Wheezing. 2.5 mg  
    
   
   
   
  
 allopurinol 300 mg tablet Commonly known as:  Chuy Cassette Your next dose is:  Tomorrow Morning Take  by mouth daily. aspirin 81 mg chewable tablet Your next dose is:  Tomorrow Morning Take 1 Tab by mouth daily. 81 mg  
    
  
   
   
   
  
 BREO ELLIPTA 100-25 mcg/dose inhaler Generic drug:  fluticasone-vilanterol Your next dose is:  Tomorrow Morning Take 1 Puff by inhalation daily. 1 Puff CALCIUM 600 PO Your next dose is:  Tomorrow Morning Take  by mouth. cholecalciferol 1,000 unit Cap Commonly known as:  VITAMIN D3 Your next dose is:  Tomorrow Morning Take 1,000 Units by mouth daily. 1000 Units  
    
  
   
   
   
  
 cpap machine kit  
   
 by Does Not Apply route. Bilevel 12/8  
     
   
   
   
  
 docusate calcium 240 mg capsule Commonly known as:  Aldona Parcel Your next dose is: This evening Take 240 mg by mouth two (2) times a day. 240 mg  
    
  
   
   
  
   
  
 docusate sodium 100 mg capsule Commonly known as:  Martell Saunders Your next dose is: Take on as needed schedule Take 100 mg by mouth daily as needed for Constipation. 100 mg  
    
   
   
   
  
 ferrous sulfate 324 mg (65 mg iron) tablet Your next dose is:  Tomorrow Morning Take  by mouth Daily (before breakfast). fluticasone 50 mcg/actuation nasal spray Commonly known as:  Cloretta Saas Your next dose is:  Tomorrow Morning 2 Sprays by Both Nostrils route daily. 2 Spray  
    
  
   
   
   
  
 gabapentin 100 mg capsule Commonly known as:  NEURONTIN Your next dose is: This afternoon Take  by mouth three (3) times daily. glipiZIDE 5 mg tablet Commonly known as:  Karle Mocha Your next dose is: This evening Take  by mouth two (2) times a day. hydrALAZINE 10 mg tablet Commonly known as:  APRESOLINE Your next dose is: This afternoon TAKE 1 TABLET THREE TIMES DAILY. ipratropium-albuterol  mcg/actuation inhaler Commonly known as:  Bridgett Rhymes Your next dose is: This afternoon Take 1 Puff by inhalation every six (6) hours. 1 Puff  
    
  
   
  
   
  
   
  
  
 isosorbide mononitrate ER 30 mg tablet Commonly known as:  IMDUR Your next dose is:  Tomorrow Morning Take 1 Tab by mouth daily. 30 mg  
    
  
   
   
   
  
 LASIX 20 mg tablet Generic drug:  furosemide Your next dose is: Take on as needed schedule Take 20 mg by mouth as needed. 20 mg  
    
   
   
   
  
 levothyroxine 50 mcg tablet Commonly known as:  SYNTHROID Your next dose is:  Tomorrow Morning Take 1 Tab by mouth Daily (before breakfast). 50 mcg  
    
  
   
   
   
  
 magnesium oxide 400 mg tablet Commonly known as:  MAG-OX Your next dose is:  Tomorrow Morning Take 1 Tab by mouth daily. 400 mg  
    
  
   
   
   
  
 metoprolol succinate 25 mg XL tablet Commonly known as:  TOPROL-XL Your next dose is:  Tomorrow Morning Pt takes 1/2 tab po daily. mupirocin calcium 2 % topical cream  
Commonly known as:  [facility] Your next dose is: This evening Apply  to affected area two (2) times a day. ondansetron 4 mg disintegrating tablet Commonly known as:  ZOFRAN ODT Your next dose is: Take on as needed schedule Take 1 Tab by mouth every eight (8) hours as needed for Nausea. 4 mg  
    
   
   
   
  
 oxyCODONE IR 5 mg immediate release tablet Commonly known as:  Verba Govern Your next dose is: Take on as needed schedule Take 5 mg by mouth every six (6) hours as needed for Pain. 5 mg OXYGEN-AIR DELIVERY SYSTEMS Take  by inhalation continuous. 2LPM to keep 02 sat >90%  
     
   
   
   
  
 pantoprazole 40 mg granules for oral suspension Commonly known as:  PROTONIX Your next dose is:  Tomorrow Morning 40 mg daily. 40 mg  
    
  
   
   
   
  
 potassium chloride SR 20 mEq tablet Commonly known as:  K-TAB Your next dose is:  Tomorrow Morning Take 20 mEq by mouth daily. Indications: hypokalemia prevention 20 mEq  
    
  
   
   
   
  
 pravastatin 40 mg tablet Commonly known as:  PRAVACHOL Your next dose is: This evening Take 1 Tab by mouth nightly. 40 mg PROCTOSOL HC 2.5 % rectal cream  
Generic drug:  hydrocortisone Your next dose is: Take on as needed schedule Insert  into rectum every six (6) hours as needed for Hemorrhoids. sAXagliptin 2.5 mg tablet Commonly known as:  ONGLYZA Your next dose is:  Tomorrow Morning Take 2.5 mg by mouth daily. 2.5 mg  
    
  
   
   
   
  
 sertraline 50 mg tablet Commonly known as:  ZOLOFT  Take one tablet each evening for anxiety  Indications: Generalized Anxiety Disorder  
     
   
   
  
   
  
 triamcinolone 0.5 % topical cream  
 Commonly known as:  ARISTOCORT Your next dose is: This evening Apply  to affected area two (2) times a day. use thin layer XALATAN 0.005 % ophthalmic solution Generic drug:  latanoprost  
Your next dose is: This evening Administer 1 Drop to both eyes nightly. 1 Drop Where to Get Your Medications These medications were sent to [address] [place] [address]., [facility] [address] Phone:  [phone]  
  cephALEXin 500 mg capsule Opioid Education Prescription Opioids: What You Need to Know: 
 
 
 
F-face looks uneven A-arms unable to move or move unevenly S-speech slurred or non-existent T-time-call 911 as soon as signs and symptoms begin-DO NOT go Back to bed or wait to see if you get better-TIME IS BRAIN. Warning Signs of HEART ATTACK Call 911 if you have these symptoms: 
? Chest discomfort. Most heart attacks involve discomfort in the center of the chest that lasts more than a few minutes, or that goes away and comes back. It can feel like uncomfortable pressure, squeezing, fullness, or pain. ? Discomfort in other areas of the upper body.  Symptoms can include pain or discomfort in one or both arms, the back, neck, jaw, or stomach. ? Shortness of breath with or without chest discomfort. ? Other signs may include breaking out in a cold sweat, nausea, or lightheadedness. Don't wait more than five minutes to call [address]! Fast action can save your life. Calling 911 is almost always the fastest way to get lifesaving treatment. Emergency Medical Services staff can begin treatment when they arrive  up to an hour sooner than if someone gets to the hospital by car. The discharge information has been reviewed with the patient. The patient verbalized understanding. Discharge medications reviewed with the patient and appropriate educational materials and side effects teaching were provided. ___________________________________________________________________________________________________________________________________ Urinary Tract Infections in Men: Care Instructions Your Care Instructions A urinary tract infection, or UTI, is a general term for an infection anywhere between the kidneys and the tip of the penis. UTIs can also be a result of a prostate problem. Most cause pain or burning when you urinate. Most UTIs are caused by bacteria and can be cured with antibiotics. It is important to complete your treatment so that the infection does not get worse. Follow-up care is a key part of your treatment and safety. Be sure to make and go to all appointments, and call your doctor if you are having problems. It's also a good idea to know your test results and keep a list of the medicines you take. How can you care for yourself at home? · Take your antibiotics as prescribed. Do not stop taking them just because you feel better. You need to take the full course of antibiotics. · Take your medicines exactly as prescribed.  Your doctor may have prescribed a medicine, such as phenazopyridine (Pyridium), to help relieve pain when you urinate. This turns your urine orange. You may stop taking it when your symptoms get better. But be sure to take all of your antibiotics, which treat the infection. · Drink extra water for the next day or two. This will help make the urine less concentrated and help wash out the bacteria causing the infection. (If you have kidney, heart, or liver disease and have to limit your fluids, talk with your doctor before you increase your fluid intake.) · Avoid drinks that are carbonated or have caffeine. They can irritate the bladder. · Urinate often. Try to empty your bladder each time. · To relieve pain, take a hot bath or lay a heating pad (set on low) over your lower belly or genital area. Never go to sleep with a heating pad in place. To help prevent UTIs · Drink plenty of fluids, enough so that your urine is light yellow or clear like water. If you have kidney, heart, or liver disease and have to limit fluids, talk with your doctor before you increase the amount of fluids you drink. · Urinate when you have the urge. Do not hold your urine for a long time. Urinate before you go to sleep. · Keep your penis clean. Catheter care If you have a drainage tube (catheter) in place, the following steps will help you care for it. · Always wash your hands before and after touching your catheter. · Check the area around the urethra for inflammation or signs of infection. Signs of infection include irritated, swollen, red, or tender skin, or pus around the catheter. · Clean the area around the catheter with soap and water two times a day. Dry with a clean towel afterward. · Do not apply powder or lotion to the skin around the catheter. To empty the urine collection bag · Wash your hands with soap and water. · Without touching the drain spout, remove the spout from its sleeve at the bottom of the collection bag. Open the valve on the spout. · Let the urine flow out of the bag and into the toilet or a container. Do not let the tubing or drain spout touch anything. · After you empty the bag, clean the end of the drain spout with tissue and water. Close the valve and put the drain spout back into its sleeve at the bottom of the collection bag. · Wash your hands with soap and water. When should you call for help? Call your doctor now or seek immediate medical care if: 
  · Symptoms such as a fever, chills, nausea, or vomiting get worse or happen for the first time.  
  · You have new pain in your back just below your rib cage. This is called flank pain.  
  · There is new blood or pus in your urine.  
  · You are not able to take or keep down your antibiotics.  
 Watch closely for changes in your health, and be sure to contact your doctor if: 
  · You are not getting better after taking an antibiotic for 2 days.  
  · Your symptoms go away but then come back. Where can you learn more? Go to http://angelina-rajiv.info/. Enter F228 in the search box to learn more about \"Urinary Tract Infections in Men: Care Instructions. \" Current as of: March 21, 2018 Content Version: 11.8 © 1730-2080 [employer] Care instructions adapted under license by [facility] (which disclaims liability or warranty for this information). If you have questions about a medical condition or this instruction, always ask your healthcare professional. [place] [address] any warranty or liability for your use of this information. Introducing [facility]! Negra Flynn introduces Pop.it patient portal. Now you can access parts of your medical record, email your doctor's office, and request medication refills online. 1. In your internet browser, go to [email or web address] 2. Click on the First Time User? Click Here link in the Sign In box. You will see the New Member Sign Up page. 3. Enter your Paydiant Access Code exactly as it appears below. You will not need to use this code after youve completed the sign-up process. If you do not sign up before the expiration date, you must request a new code. · Paydiant Access Code: [email or web address] Expires: 12/1/2018  5:11 AM 
 
4. Enter the last four digits of your Social Security Number (xxxx) and Date of Birth (mm/dd/yyyy) as indicated and click Submit. You will be taken to the next sign-up page. 5. Create a iMedia Comunicazionet ID. This will be your Paydiant login ID and cannot be changed, so think of one that is secure and easy to remember. 6. Create a Paydiant password. You can change your password at any time. 7. Enter your Password Reset Question and Answer. This can be used at a later time if you forget your password. 8. Enter your e-mail address. You will receive e-mail notification when new information is available in [address] 9. Click Sign Up. You can now view and download portions of your medical record. 10. Click the Download Summary menu link to download a portable copy of your medical information. If you have questions, please visit the Frequently Asked Questions section of the Paydiant website. Remember, Paydiant is NOT to be used for urgent needs. For medical emergencies, dial 911. Now available from your iPhone and Android! Introducing Lucho Burrell As a [facility] patient, I wanted to make you aware of our electronic visit tool called Lucho Burrell. [facility] 24/7 allows you to connect within minutes with a medical provider 24 hours a day, seven days a week via a mobile device or tablet or logging into a secure website from your computer. You can access Lucho Burrell from anywhere in the [place].  
 
A virtual visit might be right for you when you have a simple condition and feel like you just dont want to get out of bed, or cant get away from work for an appointment, when your regular [facility] provider is not available (evenings, weekends or holidays), or when youre out of town and need minor care. Electronic visits cost only $49 and if the [facility] 24/7 provider determines a prescription is needed to treat your condition, one can be electronically transmitted to a nearby pharmacy*. Please take a moment to enroll today if you have not already done so. The enrollment process is free and takes just a few minutes. To enroll, please download the [employer] yuri to your tablet or phone, or visit [email or web address] to enroll on your computer. And, as an [address] [place] [address] patient with a [facility] account, the results of your visits will be scanned into your electronic medical record and your primary care provider will be able to view the scanned results. We urge you to continue to see your regular [facility] provider for your ongoing medical care. And while your primary care provider may not be the one available when you seek a MediBeacon virtual visit, the peace of mind you get from getting a real diagnosis real time can be priceless. For more information on MediBeacon, view our Frequently Asked Questions (FAQs) at [email or web address]. Sincerely, 
 
Lucia Ovalle MD 
Chief Medical Officer Diana Sherry Chappell *:  certain medications cannot be prescribed via MediBeacon Unresulted Labs-Please follow up with your PCP about these lab tests Order Current Status CULTURE, URINE Preliminary result Providers Seen During Your Hospitalization Provider Specialty Primary office phone Zoraida Patterson MD Emergency Medicine [phone] Ban Dumont MD Emergency Medicine [phone] Martha Barker DO Internal Medicine [phone] Immunizations Administered for This Admission Name Date TB Skin Test (PPD) Intradermal  Deferred (), 10/9/2018 Your Primary Care Physician (PCP) Primary Care Physician Office Phone Office Fax Johnathan Prince [phone] You are allergic to the following No active allergies Recent Documentation Weight BMI Smoking Status 102.1 kg 28.89 kg/m2 Former Smoker Emergency Contacts Name Discharge Info Relation Home Work Mobile Eulalia Navarro  Spouse [3] [phone] Naeem Haley  Other Relative [6] [phone] Isai Navarro  Son [22] [phone] Patient Belongings The following personal items are in your possession at time of discharge: 
     Visual Aid: None             Clothing: None Please provide this summary of care documentation to your next provider. Signatures-by signing, you are acknowledging that this After Visit Summary has been reviewed with you and you have received a copy. Patient Signature:  ____________________________________________________________ Date:  ____________________________________________________________  
  
Shaw Cline Provider Signature:  ____________________________________________________________ Date:  ____________________________________________________________

## 2018-10-08 NOTE — H&P
HOSPITALIST H&P/CONSULT 
NAME:  Damian Nelson Age:  80 y.o. 
:   1932 MRN:   [id number] PCP: Katharine Livingston MD 
Consulting MD: Treatment Team: Primary Nurse: Soni Garnett RN 
HPI:  
 
79 yo CM with past history of CKD stage III, A fib not on anticoagulation (due to GIB), MVCAD s/p 3vCABG in , HTN, HLD, IDDM type II, gout, COPD, ROBERTO on CPAP, GURINDER on sertraline, chronic diastolic heart failure, chronic venous stasis, hypothyroidism, and morbid obesity presents with a 2 day history of increasing confusion. Per family, patient was a little confused yesterday and has had visual hallucinations where \"I try to reach for something that's right in front of me and it's farther away. \" Family says that he has been urinating a lot more. At bedside, patient states that he has not had any fevers/chills, chest pain, headaches, changes in baseline shortness of breath, abdominal pain, N/V/D, or dysuria. He has ankle swelling that is \"a little bit worse than normal.\"  
 
ED Coures: UA showing 4+ bacteriuria with nitrites and LE, CT head negative, CXR negative, CBG of 115. Medicine called for admission. Complete ROS done and is as stated in HPI or otherwise negative Past Medical History:  
Diagnosis Date  Atherosclerosis of artery of extremity with ulceration ([address] 2015  Atherosclerosis of native arteries of extremity with intermittent claudication ([address] 2015  Atopic dermatitis 2012  Atrial fibrillation ([address]  CAD (coronary artery disease)  Carotid stenosis, bilateral 2012 50-79%  3-2010    1. Carotid Doppler (07): Greater than 70% stenosis in proximal LICA. 50% stenosis in right ICA. 2.  CTA of neck (3/15/10): Occluded distal segments of the vertebral arteries bilaterally. Atherosclerosis of the carotid bulbs bilaterally with a 50% stenosis on the left and a stenosis of less than 30% on the right.  Small nodule in the right upper lobe near the apex. 3. CTA (8/29/13):  Less than 30% diameter stenosis of the cervical internal carotid arteries bilaterally.  CHF (congestive heart failure) ([address] 11/21/2012  Chronic kidney disease   
 hx elevated labs  Chronic obstructive pulmonary disease ([address]  Colon, diverticulosis 1/24/2015  Coronary atherosclerosis of native coronary vessel 10/31/2016 1. Cath (5/31/07):  LMCA: 25%. LAD: ostial 75-95% stenosis. 50-75% mid. D1:  25-50%. OM3: small with 75% stenosis. RCA: 100% mid. with left to right collaterals. 2 Vessel CABG (6/4/07):  LIMA to LAD and SVG to OM. SVG was anastomosed proximally to LIMA to due severe atherosclerosis of aorta. 3.  Lexiscan cardiolite (11/30/10): Abnormal with reversible lateral wall defects. Unable to gate given atrial fibrillation. 4.  LHC (1/14/11):  EF 60%. LVEDP 21. Patent LIMA to LAD and SVG to OM1 (off LIMA). occluded small RCA with left to right collaterals. Small D1 (2 mm vessel) with 70% mid stenosis.  Diabetes ([address]  Diastolic CHF, acute on chronic (HCC) 9/12/2015 1. Echo (9/11/15) : EF 55-60%. Mild LVH. Moderate biatrial enlargement. Moderate mitral/tricuspid regurgitation.  Failed CABG (coronary artery bypass graft) 11/21/2012  GI bleed 1/2015 Hospitalized [facility]  Gout 11/21/2012  History of tobacco use  [place] (hard of hearing)  Hypercholesterolemia  Hypertension  Hyperuricemia 11/21/2012  Morbid obesity ([address]  PAD (peripheral artery disease) ([address] 11/21/2012 1. Bilateral proximal common iliac PCI (2/21/08):  8.0 X 100 mm Cordis smart stent on right and 10 X 40 mm cordis smart stent on right. Both inflated to 7.0 mm.  Poor historian  Radiologic findings of lung field, abnormal 10/31/2016 1. CT of chest  (11/24/10): Multiple small nodules in the right lobe and stable interstitial prominence. Consistent with chronic lung disease. No evidence of malignancy.  Seizure disorder ([facility] 2013  Shortness of breath dyspnea 2013  Thyroid disease  Unspecified sleep apnea   
 uses CPAP Past Surgical History:  
Procedure Laterality Date  CARDIAC SURG PROCEDURE UNLIST    
 cath ,cabg ,lexiscan cardiolite 11/10  
 COLONOSCOPY N/A 2017 COLONOSCOPY  BMI 36 TO BE ADMITTED 17 performed by Baldo Coronado MD at [facility] ENDOSCOPY  
 HX CATARACT REMOVAL Left   
 os  HX COLONOSCOPY    
 HX CORONARY ARTERY BYPASS GRAFT  2007  HX CORONARY STENT PLACEMENT  2008  
 bilateral iliac artery PCI and stents  HX HEART CATHETERIZATION    
 left-2007, 2011  HX HEENT  1970s  
 neck lipoma Prior to Admission Medications Prescriptions Last Dose Informant Patient Reported? Taking? OXYGEN-AIR DELIVERY SYSTEMS   Yes No  
Sig: Take  by inhalation continuous. 2LPM to keep 02 sat >90%  
albuterol (PROVENTIL VENTOLIN) 2.5 mg /3 mL (0.083 %) nebulizer solution   Yes No  
Si.5 mg by Nebulization route every four (4) hours as needed for Wheezing. allopurinol (ZYLOPRIM) 300 mg tablet   Yes No  
Sig: Take  by mouth daily. aspirin 81 mg chewable tablet   No No  
Sig: Take 1 Tab by mouth daily. bisacodyl (DULCOLAX) 5 mg EC tablet   No No  
Sig: Take 1 Tab by mouth daily. Patient taking differently: Take 5 mg by mouth every evening. calcium carbonate (CALCIUM 600 PO)   Yes No  
Sig: Take  by mouth. cholecalciferol (VITAMIN D3) 1,000 unit cap   Yes No  
Sig: Take 1,000 Units by mouth daily. cpap machine kit   Yes No  
Sig: by Does Not Apply route. Bilevel   
docusate calcium (SURFAK) 240 mg capsule   Yes No  
Sig: Take 240 mg by mouth two (2) times a day. docusate sodium (COLACE) 100 mg capsule   Yes No  
Sig: Take 100 mg by mouth daily as needed for Constipation. ferrous sulfate 324 mg (65 mg iron) tablet   Yes No  
Sig: Take  by mouth Daily (before breakfast). fluticasone (FLONASE) 50 mcg/actuation nasal spray   No No  
Si Sprays by Both Nostrils route daily. fluticasone-vilanterol (BREO ELLIPTA) 100-25 mcg/dose inhaler   Yes No  
Sig: Take 1 Puff by inhalation daily. furosemide (LASIX) 20 mg tablet   Yes No  
Sig: Take 20 mg by mouth as needed. gabapentin (NEURONTIN) 100 mg capsule   Yes No  
Sig: Take  by mouth three (3) times daily. glipiZIDE (GLUCOTROL) 5 mg tablet   Yes No  
Sig: Take  by mouth two (2) times a day. hydrALAZINE (APRESOLINE) 10 mg tablet   No No  
Sig: TAKE 1 TABLET THREE TIMES DAILY. hydrocortisone (PROCTOSOL HC) 2.5 % rectal cream   Yes No  
Sig: Insert  into rectum every six (6) hours as needed for Hemorrhoids. ipratropium-albuterol (COMBIVENT RESPIMAT)  mcg/actuation inhaler   No No  
Sig: Take 1 Puff by inhalation every six (6) hours. isosorbide mononitrate ER (IMDUR) 30 mg tablet   No No  
Sig: Take 1 Tab by mouth daily. latanoprost (XALATAN) 0.005 % ophthalmic solution   Yes No  
Sig: Administer 1 Drop to both eyes nightly. levothyroxine (SYNTHROID) 50 mcg tablet   No No  
Sig: Take 1 Tab by mouth Daily (before breakfast). magnesium oxide (MAG-OX) 400 mg (241.3 mg magnesium) tablet   No No  
Sig: Take 1 Tab by mouth daily. metoprolol succinate (TOPROL-XL) 25 mg XL tablet   No No  
Sig: Pt takes 1/2 tab po daily. mupirocin calcium (BACTROBAN) 2 % topical cream   No No  
Sig: Apply  to affected area two (2) times a day. ondansetron (ZOFRAN ODT) 4 mg disintegrating tablet   No No  
Sig: Take 1 Tab by mouth every eight (8) hours as needed for Nausea. oxyCODONE IR (ROXICODONE) 5 mg immediate release tablet   Yes No  
Sig: Take 5 mg by mouth every six (6) hours as needed for Pain.  
pantoprazole (PROTONIX) 40 mg granules for oral suspension   Yes No  
Si mg daily. potassium chloride SR (K-TAB) 20 mEq tablet   Yes No  
Sig: Take 20 mEq by mouth daily. Indications: hypokalemia prevention pravastatin (PRAVACHOL) 40 mg tablet   No No  
Sig: Take 1 Tab by mouth nightly. sAXagliptin (ONGLYZA) 2.5 mg tablet   Yes No  
Sig: Take 2.5 mg by mouth daily. sertraline (ZOLOFT) 50 mg tablet   No No  
Sig: Take one tablet each evening for anxiety  Indications: Generalized Anxiety Disorder  
triamcinolone (ARISTOCORT) 0.5 % topical cream   No No  
Sig: Apply  to affected area two (2) times a day. use thin layer Facility-Administered Medications: None No Known Allergies Social History Substance Use Topics  Smoking status: Former Smoker Packs/day: 1.00 Years: 45.00 Types: Cigarettes Quit date: 1984  Smokeless tobacco: Never Used Comment: (stopped smoking in )  Alcohol use No  
  
Family History Problem Relation Age of Onset  Heart Attack Mother Mireya Ybarra Other Father   
  old age  Other Brother   
  brain aneurysm  Heart Disease Other 2 children  with heart concerns, 36 & 47 yo  
 Heart Disease Son Objective:  
 
Visit Vitals  /69  Pulse 65  Temp 98.1 °F (36.7 °C)  Resp 18  Wt 102.1 kg (225 lb)  SpO2 98%  BMI 28.89 kg/m2 Temp (24hrs), Av.1 °F (36.7 °C), Min:98.1 °F (36.7 °C), Max:98.1 °F (36.7 °C) Oxygen Therapy O2 Sat (%): 98 % (10/08/18 1305) O2 Device: Room air (10/08/18 1305) Physical Exam: 
General:    Alert and awake, dishoveled, obese Head:   Normocephalic, without obvious abnormality, atraumatic. Nose:  Nares normal. No drainage or sinus tenderness. Lungs:   Clear to auscultation bilaterally. No Wheezing or Rhonchi. No rales. Heart:   Regular rate and rhythm,  no murmur, rub or gallop. Abdomen:   Obese, Soft, non-tender. Not distended. Bowel sounds normal. Mild   suprapubic tenderness Extremities: Extensive skin thickening, eczema and skin darkening with +2 pitting   edema to the knees Skin:     Texture, turgor normal. No rashes or lesions. Not Jaundiced Neurologic: Alert and oriented x 3, no focal deficits. CN II-XII intact. +5/5 MS UE   and LEs Data Review:  
Recent Results (from the past 24 hour(s)) CBC WITH AUTOMATED DIFF Collection Time: 10/08/18  1:18 PM  
Result Value Ref Range WBC 7.9 4.3 - 11.1 K/uL  
 RBC 3.72 (L) 4.23 - 5.6 M/uL HGB 9.9 (L) 13.6 - 17.2 g/dL HCT 33.0 (L) 41.1 - 50.3 % MCV 88.7 79.6 - 97.8 FL  
 MCH 26.6 26.1 - 32.9 PG  
 MCHC 30.0 (L) 31.4 - 35.0 g/dL  
 RDW 19.8 % PLATELET 332 255 - 070 K/uL MPV 11.2 9.4 - 12.3 FL ABSOLUTE NRBC 0.00 0.0 - 0.2 K/uL  
 DF AUTOMATED NEUTROPHILS 78 43 - 78 % LYMPHOCYTES 14 13 - 44 % MONOCYTES 6 4.0 - 12.0 % EOSINOPHILS 2 0.5 - 7.8 % BASOPHILS 0 0.0 - 2.0 % IMMATURE GRANULOCYTES 0 0.0 - 5.0 %  
 ABS. NEUTROPHILS 6.1 1.7 - 8.2 K/UL  
 ABS. LYMPHOCYTES 1.1 0.5 - 4.6 K/UL  
 ABS. MONOCYTES 0.5 0.1 - 1.3 K/UL  
 ABS. EOSINOPHILS 0.2 0.0 - 0.8 K/UL  
 ABS. BASOPHILS 0.0 0.0 - 0.2 K/UL  
 ABS. IMM. GRANS. 0.0 0.0 - 0.5 K/UL METABOLIC PANEL, COMPREHENSIVE Collection Time: 10/08/18  1:18 PM  
Result Value Ref Range Sodium 138 136 - 145 mmol/L Potassium 4.1 3.5 - 5.1 mmol/L Chloride 100 98 - 107 mmol/L  
 CO2 32 21 - 32 mmol/L Anion gap 6 (L) 7 - 16 mmol/L Glucose 115 (H) 65 - 100 mg/dL BUN 31 (H) 8 - 23 MG/DL Creatinine 1.59 (H) 0.8 - 1.5 MG/DL  
 GFR est AA 53 (L) >60 ml/min/1.73m2 GFR est non-AA 44 (L) >60 ml/min/1.73m2 Calcium 8.9 8.3 - 10.4 MG/DL Bilirubin, total 1.4 (H) 0.2 - 1.1 MG/DL  
 ALT (SGPT) 20 12 - 65 U/L  
 AST (SGOT) 22 15 - 37 U/L Alk. phosphatase 194 (H) 50 - 136 U/L Protein, total 7.4 6.3 - 8.2 g/dL Albumin 2.8 (L) 3.2 - 4.6 g/dL Globulin 4.6 (H) 2.3 - 3.5 g/dL A-G Ratio 0.6 (L) 1.2 - 3.5 BNP Collection Time: 10/08/18  1:18 PM  
Result Value Ref Range  pg/mL POC LACTIC ACID Collection Time: 10/08/18  4:04 PM  
Result Value Ref Range  Lactic Acid (POC) 1.1 0.5 - 1.9 mmol/L  
 URINALYSIS W/ RFLX MICROSCOPIC Collection Time: 10/08/18  4:18 PM  
Result Value Ref Range Color YELLOW Appearance CLOUDY Specific gravity 1.013 1.001 - 1.023    
 pH (UA) 6.0 5.0 - 9.0 Protein NEGATIVE  NEG mg/dL Glucose NEGATIVE  mg/dL Ketone NEGATIVE  NEG mg/dL Bilirubin NEGATIVE  NEG Blood NEGATIVE  NEG Urobilinogen 1.0 0.2 - 1.0 EU/dL Nitrites POSITIVE (A) NEG Leukocyte Esterase LARGE (A) NEG    
 WBC >100 (H) 0 /hpf  
 RBC 0-3 0 /hpf Epithelial cells 0-3 0 /hpf Bacteria 4+ (H) 0 /hpf Casts 0 0 /lpf  
BLOOD GAS, ARTERIAL Collection Time: 10/08/18  4:30 PM  
Result Value Ref Range pH 7.42 7.35 - 7.45    
 PCO2 50 (H) 35 - 45 mmHg PO2 60 (L) 80 - 105 mmHg BICARBONATE 32 (H) 22 - 26 mmol/L  
 BASE EXCESS 6.5 (H) 0 - 3 mmol/L  
 TOTAL HEMOGLOBIN 11.0 (L) 11.7 - 15.0 GM/DL  
 O2 SAT 91 (L) 92 - 98.5 % Arterial O2 Hgb 89.8 (L) 94 - 97 % CARBOXYHEMOGLOBIN 0.7 0.5 - 1.5 % METHEMOGLOBIN 0.3 0.0 - 1.5 % DEOXYHEMOGLOBIN 9 (H) 0.0 - 5.0 % SITE RR   
 ALLENS TEST POSITIVE    
 MODE RA   
 FIO2 21.0 % Respiratory comment: Dr. Milagro Rausch at 10 8 2018 4 40 05 PM. Read back. Imaging Paulina Ariadne Lynn Assessment and Plan: Active Hospital Problems Diagnosis Date Noted  Acute encephalopathy 10/08/2018  Altered mental state 04/22/2018  Type 2 diabetes with nephropathy ([facility] 02/12/2018  Restrictive lung disease 11/01/2017  Type 2 diabetes mellitus with peripheral neuropathy ([facility] 11/05/2015  Chronic diastolic congestive heart failure ([facility] 09/12/2015 1. Echo (9/11/15) : EF 55-60%. Mild LVH. Moderate biatrial enlargement. Moderate mitral/tricuspid regurgitation. 2.  Echo (8/21/17):  EF 55-60%. Moderate LAE. Mild mitral stenosis. Moderate tricuspid regurgitation. RVSP 35-40.  ROBERTO on CPAP 02/20/2015 Patient is on BiPAP, no supplementary oxygen  Morbid obesity ([address] 01/21/2015  CKD (chronic kidney disease) stage 3, GFR 30-59 ml/min ([facility]) 09/18/2013  Hyperlipidemia 08/05/2013  Debility 08/05/2013  Essential hypertension 11/21/2012  Persistent atrial fibrillation ([facility] 11/21/2012 Coumadin therapy discontinued due to recurrent GI bleeding.  Peripheral neuropathy 11/21/2012  COPD (chronic obstructive pulmonary disease) ([facility] 11/21/2012  PAD (peripheral artery disease) ([facility] 11/21/2012 1. Bilateral proximal common iliac PCI (2/21/08):  8.0 X 100 mm Cordis smart stent on right and 10 X 40 mm cordis smart stent on right. Both inflated to 7.0 mm. PLAN 
 
CKD stage III, A fib not on anticoagulation (due to GIB), MVCAD s/p 3vCABG in 2006, HTN, HLD, IDDM type II, gout, COPD, ROBERTO on CPAP, GURINDER on sertraline, chronic diastolic heart failure, chronic venous stasis, hypothyroidism, and morbid obesity p # Acute encephalopathy, most likely related to UTI as patient with increased urinary frequency and UA in ED, family notes interval improvement in mental status 
- urine culture collected in ED 
- continue with empiric ceftriaxone started in ED 
- PT/OT consult # DM type II 
- stop oral diabetes medications 
- weight based regimen with Lantus 20 units SQ qHS, Humalog 8 units TIDAC, and sliding scale # Chronic diastolic heart failure 
- continue with Lasix 40 mg po BID 
- continue with home meds # A fib (not on anticoagulation) 
- continue with ASA 81 mg # MVCAD s/p CABG 
- continue with beta blocker, ACEi, ASA, statin # HLD 
- continue with statin # Chronic venous stasis 
- keep legs elevated 
- recommend moisturizer to prevent infection F/E/N: no fluids, replete electrolytes, diabetic diet Ppx: heparin for VTE Code Status: FULL CODE Disposition: admit to observation with anticipated discharge home in 1-2 days Home phone number: [phone] Family (Sharita Ghotra): [phone] Signed By: Abigail Damico DO October 8, 2018

## 2018-10-08 NOTE — PROGRESS NOTES
TRANSFER - IN REPORT: 
 
Verbal report received from [place], [facility] on Rafia Allred  being received from ED for routine progression of care Report consisted of patients Situation, Background, Assessment and  
Recommendations(SBAR). Information from the following report(s) SBAR, ED Summary, [facility] and Recent Results was reviewed with the receiving nurse. Opportunity for questions and clarification was provided. Assessment completed upon patients arrival to unit and care assumed.

## 2018-10-08 NOTE — ED PROVIDER NOTES
HPI Comments: maria del rosario colbert 80-year-old male presenting for altered mental status. According to EMS, his wife  Told him that he had  Seemingly had auditory hallucinations throughout the morning. This completely new for him. This concerned her so she had him brought to the emergency department for evaluation. The patient himself states that he \"thinks he was talking out of his head\" and that worried his wife. He now seems back to normal.  He denies any chest pain but he does report that he's had increasing shortness of breath over the past few weeks. He was discharged recently from the hospital for shortness of breath  And started on oxygen. His been using the oxygen more frequently. He denies abdominal pain, nausea, or vomiting. He said the fevers. He is a worsening of his cough. Patient is a 80 y.o. male presenting with altered mental status. The history is provided by the patient. Altered mental status This is a new problem. The current episode started yesterday. The problem has been resolved. Associated symptoms include confusion and hallucinations. Pertinent negatives include no somnolence, no seizures, no unresponsiveness, no weakness, no agitation, no delusions, no self-injury, no violence, no tingling and no numbness. Mental status baseline is normal.  His past medical history does not include diabetes, seizures, liver disease, CVA, TIA, AIDS, hypertension, COPD, depression, dementia, psychotropic medication treatment, head trauma or heart disease. Past Medical History:  
Diagnosis Date  Atherosclerosis of artery of extremity with ulceration ([address] 4/17/2015  Atherosclerosis of native arteries of extremity with intermittent claudication ([address] 4/17/2015  Atopic dermatitis 11/21/2012  Atrial fibrillation ([address]  CAD (coronary artery disease)  Carotid stenosis, bilateral 11/21/2012 50-79%  3-2010    1. Carotid Doppler (6/1/07):   Greater than 70% stenosis in proximal LICA. 50% stenosis in right ICA. 2.  CTA of neck (3/15/10): Occluded distal segments of the vertebral arteries bilaterally. Atherosclerosis of the carotid bulbs bilaterally with a 50% stenosis on the left and a stenosis of less than 30% on the right. Small nodule in the right upper lobe near the apex. 3. CTA (8/29/13):  Less than 30% diameter stenosis of the cervical internal carotid arteries bilaterally.  CHF (congestive heart failure) ([address] 11/21/2012  Chronic kidney disease   
 hx elevated labs  Chronic obstructive pulmonary disease ([address]  Colon, diverticulosis 1/24/2015  Coronary atherosclerosis of native coronary vessel 10/31/2016 1. Cath (5/31/07):  LMCA: 25%. LAD: ostial 75-95% stenosis. 50-75% mid. D1:  25-50%. OM3: small with 75% stenosis. RCA: 100% mid. with left to right collaterals. 2 Vessel CABG (6/4/07):  LIMA to LAD and SVG to OM. SVG was anastomosed proximally to LIMA to due severe atherosclerosis of aorta. 3.  Lexiscan cardiolite (11/30/10): Abnormal with reversible lateral wall defects. Unable to gate given atrial fibrillation. 4.  LHC (1/14/11):  EF 60%. LVEDP 21. Patent LIMA to LAD and SVG to OM1 (off LIMA). occluded small RCA with left to right collaterals. Small D1 (2 mm vessel) with 70% mid stenosis.  Diabetes ([address]  Diastolic CHF, acute on chronic (HCC) 9/12/2015 1. Echo (9/11/15) : EF 55-60%. Mild LVH. Moderate biatrial enlargement. Moderate mitral/tricuspid regurgitation.  Failed CABG (coronary artery bypass graft) 11/21/2012  GI bleed 1/2015 Hospitalized [facility]  Gout 11/21/2012  History of tobacco use  [place] (hard of hearing)  Hypercholesterolemia  Hypertension  Hyperuricemia 11/21/2012  Morbid obesity ([address]  PAD (peripheral artery disease) ([address] 11/21/2012 1.   Bilateral proximal common iliac PCI (2/21/08):  8.0 X 100 mm Cordis smart stent on right and 10 X 40 mm cordis smart stent on right. Both inflated to 7.0 mm.  Poor historian  Radiologic findings of lung field, abnormal 10/31/2016 1. CT of chest  (11/24/10): Multiple small nodules in the right lobe and stable interstitial prominence. Consistent with chronic lung disease. No evidence of malignancy.  Seizure disorder ([address] 2013  Shortness of breath dyspnea 2013  Thyroid disease  Unspecified sleep apnea   
 uses CPAP Past Surgical History:  
Procedure Laterality Date  CARDIAC SURG PROCEDURE UNLIST    
 cath ,cabg ,lexiscan cardiolite 11/10  
 COLONOSCOPY N/A 2017 COLONOSCOPY  BMI 36 TO BE ADMITTED 17 performed by All Becker MD at [facility] ENDOSCOPY  
 HX CATARACT REMOVAL Left   
 os  HX COLONOSCOPY    
 HX CORONARY ARTERY BYPASS GRAFT  2007  HX CORONARY STENT PLACEMENT  2008  
 bilateral iliac artery PCI and stents  HX HEART CATHETERIZATION    
 left-2007, 2011  HX HEENT  1970s  
 neck lipoma Family History:  
Problem Relation Age of Onset  Heart Attack Mother Vanessa Edge Other Father   
  old age  Other Brother   
  brain aneurysm  Heart Disease Other 2 children  with heart concerns, 36 & 47 yo  
 Heart Disease Son   
 
 
Social History Social History  Marital status:  Spouse name: N/A  
 Number of children: N/A  
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  
 Drug use: No  
 Sexual activity: Not Currently Other Topics Concern  Not on file Social History Narrative 45 pack year history cigarette smoking, stopped in . 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 , two living children, two children  with coronary artery disease, ages 36 and 48. Has always lived in [address] No pets. ALLERGIES: Review of patient's allergies indicates no known allergies. Review of Systems Neurological: Negative for tingling, seizures, weakness and numbness. Psychiatric/Behavioral: Positive for confusion and hallucinations. Negative for agitation and self-injury. All other systems reviewed and are negative. Vitals:  
 10/08/18 1305 BP: 118/57 Pulse: 67 Resp: 18 Temp: 98.1 °F (36.7 °C) SpO2: 98% Weight: 102.1 kg (225 lb) Physical Exam  
Constitutional: He is oriented to person, place, and time. He appears well-developed and well-nourished. HENT:  
Head: Normocephalic and atraumatic. Eyes: Conjunctivae are normal. Pupils are equal, round, and reactive to light. Neck: Normal range of motion. Neck supple. Cardiovascular: Normal rate and regular rhythm. Pulmonary/Chest:  
ddecreased breath sounds at the bases bilaterally, expiratory wheezing,  Mild respiratory distress, using nasal cannula. Abdominal: Soft. Musculoskeletal: Normal range of motion. He exhibits edema. Neurological: He is alert and oriented to person, place, and time. Skin: Skin is warm and dry. Psychiatric: He has a normal mood and affect. His behavior is normal.  
Nursing note and vitals reviewed. MDM Number of Diagnoses or Management Options Acute cystitis without hematuria:  
Encephalopathy acute:  
Diagnosis management comments: 68-year-old male presenting for transient altered mental status. Differential includes infection, showing failure,,  Metabolic disarray, renal failure,  Acute CVA, intracranial mass or bleed Amount and/or Complexity of Data Reviewed Clinical lab tests: ordered and reviewed Tests in the radiology section of CPT®: ordered and reviewed Tests in the medicine section of CPT®: ordered and reviewed Decide to obtain previous medical records or to obtain history from someone other than the patient: yes Risk of Complications, Morbidity, and/or Mortality Presenting problems: high Diagnostic procedures: high Management options: high Patient Progress Patient progress: stable ED Course Procedures

## 2018-10-09 PROBLEM — G93.41 ACUTE METABOLIC ENCEPHALOPATHY: Status: ACTIVE | Noted: 2018-01-01

## 2018-10-09 NOTE — PROGRESS NOTES
1. Patient will complete lower body bathing and dressing independently and adaptive equipment as needed. 2. Patient will complete toileting with MOD I.  
3. Patient will tolerate 23 minutes of OT treatment with 3-4 rest breaks to increase activity tolerance for ADLs. 4. Patient will complete functional transfers with MOD I and adaptive equipment as needed. 5. Patient will complete functional mobility for household distances with MOD I and adaptive equipment as needed. Timeframe: 7 visits OCCUPATIONAL THERAPY: Initial Assessment, Daily Note, Treatment Day: Day of Assessment and AM 10/9/2018 OBSERVATION: Hospital Day: 2 Payor: [facility] [place] MEDICARE / Plan: [facility] [place] MEDICARE HMO/PPO / Product Type: Managed Care Medicare /  
  
NAME/AGE/GENDER: Nain Sanchez is a 80 y.o. male PRIMARY DIAGNOSIS:  Acute encephalopathy Acute encephalopathy Acute encephalopathy Acute encephalopathy ICD-10: Treatment Diagnosis:  
 · Generalized Muscle Weakness (M62.81) Precautions/Allergies: 
   Review of patient's allergies indicates no known allergies. ASSESSMENT:  
 
Mr. Seun Foster presents for acute encephalopathy. Upon arrival, pt ambulating in hallway with PT. Pt returned to room to sit in bedside chair. Pt oriented x4 and agreeable to OT evaluation. Pt reports living with wife in a 1-story home with 1 step to enter and access to a walk-in shower with SC. Pt reports occasional assistance for ADLs, including bathing and dressing, at baseline; MOD I for functional mobility with of rollator. Today, pt performed sit to stand and stand to sit transfer with SBA. Pt's static standing balance is intact, however dynamic standing balance is fair. Pt's BUE AROM and strength are generally decreased but WFL; coordination and sensation are intact. Pt able to verito LB briefs and pants with min A for balance. Pt independent with UB dressing.  Pt left sitting upright in bedside chair with needs met and call light within reach. At this time, pt is functioning below baseline for ADLs, functional mobility, and activity tolerance. Pt would benefit from skilled OT services to address OT goals and plan of care. This section established at most recent assessment PROBLEM LIST (Impairments causing functional limitations): 1. Decreased Strength 2. Decreased ADL/Functional Activities 3. Decreased Transfer Abilities 4. Decreased Ambulation Ability/Technique 5. Decreased Balance 6. Decreased Activity Tolerance 7. Increased Fatigue 8. Increased Shortness of Breath INTERVENTIONS PLANNED: (Benefits and precautions of occupational therapy have been discussed with the patient.) 1. Activities of daily living training 2. Adaptive equipment training 3. Balance training 4. Clothing management 5. Community reintergration 6. Donning&doffing training 7. Re-evaluation 8. Therapeutic activity 9. Therapeutic exercise TREATMENT PLAN: Frequency/Duration: Follow patient 3x/week to address above goals. Rehabilitation Potential For Stated Goals: Good RECOMMENDED REHABILITATION/EQUIPMENT: (at time of discharge pending progress): Due to the probability of continued deficits (see above) this patient will likely need continued skilled occupational therapy after discharge. Equipment: ? TBD  
    
 
 
 
OCCUPATIONAL PROFILE AND HISTORY:  
History of Present Injury/Illness (Reason for Referral): 
See H&P Past Medical History/Comorbidities:  
Mr. Ciro Taylor  has a past medical history of Atherosclerosis of artery of extremity with ulceration ([facility] [address] (4/17/2015); Atherosclerosis of native arteries of extremity with intermittent claudication ([facility] [address] (4/17/2015); Atopic dermatitis (11/21/2012); Atrial fibrillation ([facility] [address] CAD (coronary artery disease); Carotid stenosis, bilateral (11/21/2012); CHF (congestive heart failure) ([facility] [address] (11/21/2012);  Chronic kidney disease; Chronic obstructive pulmonary disease ([facility] [address] Colon, diverticulosis (1/24/2015); Coronary atherosclerosis of native coronary vessel (10/31/2016); Diabetes ([facility] [address] Diastolic CHF, acute on chronic (HCC) (9/12/2015); Failed CABG (coronary artery bypass graft) (11/21/2012); GI bleed (1/2015); Gout (11/21/2012); History of tobacco use; [place] (hard of hearing); Hypercholesterolemia; Hypertension; Hyperuricemia (11/21/2012); Morbid obesity ([facility] [address] PAD (peripheral artery disease) ([facility] [address] (11/21/2012); Poor historian; Radiologic findings of lung field, abnormal (10/31/2016); Seizure disorder ([facility] [address] (8/5/2013); Shortness of breath dyspnea (8/5/2013); Thyroid disease; and Unspecified sleep apnea. He also has no past medical history of Arthritis; Asthma; Cancer ([facility] [address] Liver disease; Nausea & vomiting; PUD (peptic ulcer disease); or Stroke ([facility] [address] Mr. Jensen Flood  has a past surgical history that includes pr cardiac surg procedure unlist; hx coronary artery bypass graft (06-); hx cataract removal (Left, 2003); hx heart catheterization; hx coronary stent placement (02-); hx heent (1970s); hx colonoscopy; and colonoscopy (N/A, 1/27/2017). Social History/Living Environment:  
Home Environment: Private residence # Steps to Enter: 1 One/Two Story Residence: One story Living Alone: No 
Support Systems: Spouse/Significant Other/Partner Patient Expects to be Discharged to[de-identified] Private residence Current DME Used/Available at Home: CPAP, Grab bars, Walker, rollator, Shower chair Tub or Shower Type: Shower Prior Level of Function/Work/Activity: 
Min A for bathing and dressing; independent with feeding, grooming, and toileting; MOD I for functional mobility. Personal Factors:   
      Sex:  male Age:  80 y.o. Other factors that influence how disability is experienced by the patient:  Multiple co-morbidities Number of Personal Factors/Comorbidities that affect the Plan of Care: Brief history (0):  LOW COMPLEXITY ASSESSMENT OF OCCUPATIONAL PERFORMANCE[de-identified]  
Activities of Daily Living:  
Basic ADLs (From Assessment) Complex ADLs (From Assessment) Feeding: Independent Oral Facial Hygiene/Grooming: Independent Bathing: Moderate assistance Upper Body Dressing: Independent Lower Body Dressing: Moderate assistance Toileting: Minimum assistance Instrumental ADL Meal Preparation: Maximum assistance Homemaking: Maximum assistance Grooming/Bathing/Dressing Activities of Daily Living Cognitive Retraining Safety/Judgement: Awareness of environment; Fall prevention Bed/Mat Mobility Rolling: Stand-by assistance; Additional time Supine to Sit: Stand-by assistance; Additional time Sit to Stand: Stand-by assistance Scooting: Stand-by assistance Most Recent Physical Functioning:  
Gross Assessment: 
AROM: Generally decreased, functional 
Strength: Generally decreased, functional 
Coordination: Within functional limits Tone: Normal 
Sensation: Intact Posture: 
  
Balance: 
Sitting: Intact Standing: Impaired Standing - Static: Good Standing - Dynamic : Fair Bed Mobility: 
Rolling: Stand-by assistance; Additional time Supine to Sit: Stand-by assistance; Additional time Scooting: Stand-by assistance Wheelchair Mobility: 
  
Transfers: 
Sit to Stand: Stand-by assistance Stand to Sit: Stand-by assistance Patient Vitals for the past 6 hrs: 
 BP BP Patient Position SpO2 Pulse 10/09/18 0740 124/56 At rest 93 % 69  
10/09/18 0748 - - 91 % - Mental Status Neurologic State: Alert Orientation Level: Oriented X4 Cognition: Appropriate decision making, Follows commands Perception: Appears intact Perseveration: No perseveration noted Safety/Judgement: Awareness of environment, Fall prevention Physical Skills Involved: 
1. Balance 2. Strength 3. Activity Tolerance 4.  Gross Motor Control Cognitive Skills Affected (resulting in the inability to perform in a timely and safe manner): 1. none Psychosocial Skills Affected: 1. Habits/Routines Number of elements that affect the Plan of Care: 5+:  HIGH COMPLEXITY CLINICAL DECISION MAKING:  
[employer] AM-[employer]AC[employer] 6 Clicks Daily Activity Inpatient Short Form How much help from another person does the patient currently need. .. Total A Lot A Little None 1. Putting on and taking off regular lower body clothing? [] 1   [x] 2   [] 3   [] 4  
2. Bathing (including washing, rinsing, drying)? [] 1   [x] 2   [] 3   [] 4  
3. Toileting, which includes using toilet, bedpan or urinal?   [] 1   [] 2   [x] 3   [] 4  
4. Putting on and taking off regular upper body clothing? [] 1   [] 2   [] 3   [x] 4  
5. Taking care of personal grooming such as brushing teeth? [] 1   [] 2   [] 3   [x] 4  
6. Eating meals? [] 1   [] 2   [] 3   [x] 4  
© 2007, Trustees of [facility] [employer], under license to [employer]. All rights reserved Score:  Initial: 19 Most Recent: X (Date: -- ) Interpretation of Tool:  Represents activities that are increasingly more difficult (i.e. Bed mobility, Transfers, Gait). Score 24 23 22-20 19-15 14-10 9-7 6 Modifier CH CI CJ CK CL CM CN   
 
? Self Care:  
  - CURRENT STATUS: CK - 40%-59% impaired, limited or restricted  - GOAL STATUS: CJ - 20%-39% impaired, limited or restricted  - D/C STATUS:  ---------------To be determined--------------- Payor: [facility] [place] MEDICARE / Plan: [place] [facility] MEDIC[facility] HMO/PPO / Product Type: Managed Care Medicare /   
 
Medical Necessity:    
· Patient is expected to demonstrate progress in strength, balance and functional technique to decrease assistance required with ADls and functional mobility. Richard Burns Reason for Services/Other Comments: 
· Patient continues to require skilled intervention due to medical complications and patient unable to attend/participate in therapy as expected. Use of outcome tool(s) and clinical judgement create a POC that gives a: LOW COMPLEXITY  
 
 
 
TREATMENT:  
(In addition to Assessment/Re-Assessment sessions the following treatments were rendered) Pre-treatment Symptoms/Complaints:  \"Sometimes I just need to take a break when I'm walking. \" 
Pain: Initial:  
Pain Intensity 1: 5 Pain Location 1: Foot Pain Orientation 1: Right Pain Intervention(s) 1: Ambulation/Increased Activity  Post Session:  same Self Care: (10 minutes): Procedure(s) (per grid) utilized to improve and/or restore self-care/home management as related to dressing. Required minimal verbal and   cueing to facilitate activities of daily living skills. Completed UB dressing independently; required mod A for donning LB briefs and pants. Braces/Orthotics/Lines/Etc:  
· O2 Device: Room air Treatment/Session Assessment:   
· Response to Treatment:  Good participation; tolerated well. · Interdisciplinary Collaboration:  
o Physical Therapist 
o Occupational Therapist 
o Registered Nurse · After treatment position/precautions:  
o Up in chair 
o Bed/Chair-wheels locked 
o Call light within reach · Compliance with Program/Exercises: Will assess as treatment progresses. · Recommendations/Intent for next treatment session: \"Next visit will focus on advancements to more challenging activities and reduction in assistance provided\". Total Treatment Duration: OT Patient Time In/Time Out Time In: 7222 Time Out: 8277 Reshma Camara OT

## 2018-10-09 NOTE — PROGRESS NOTES
Hospitalist Progress Note   
10/9/2018 Admit Date: 10/8/2018  1:09 PM  
NAME: Bailee Trujillo :  1932 MRN:  [id number] Attending: Ester Kimball DO 
PCP:  Adore Shelton MD 
 
SUBJECTIVE:  
Pt is an 52CAB with past history of CKD stage III, A fib not on anticoagulation (due to GIB), MVCAD s/p 3vCABG in 2006, HTN, HLD, IDDM type II, gout, COPD, ROBERTO on CPAP, GURINDER on sertraline, chronic diastolic heart failure, chronic venous stasis, hypothyroidism, and morbid obesity presents with a 2 day history of increasing confusion. Per family, patient was a little confused yesterday and has had visual hallucinations. Increased urination. Patient was found to have evidence of UTI on UA in ER. 
 
10/9/18:  Patient is feeling better today. He is currently A&Ox4. His urine culture is growing >100,000 gram negative rods. He denies fevers, chills. Still with increased urination. Review of Systems negative with exception of pertinent positives noted above PHYSICAL EXAM  
 
Visit Vitals  /54  Pulse 67  Temp 98.1 °F (36.7 °C)  Resp 20  Wt 102.1 kg (225 lb)  SpO2 91%  BMI 28.89 kg/m2 Temp (24hrs), Av.8 °F (36.6 °C), Min:97.4 °F (36.3 °C), Max:98.1 °F (36.7 °C) Oxygen Therapy O2 Sat (%): 91 % (10/09/18 0748) Pulse via Oximetry: 63 beats per minute (10/09/18 0748) O2 Device: Room air (10/09/18 0748) Intake/Output Summary (Last 24 hours) at 10/09/18 1053 Last data filed at 10/08/18 2232 Gross per 24 hour Intake                0 ml Output              900 ml Net             -900 ml General: No acute distress   
Lungs:  CTA Bilaterally. Heart:  Rate controlled,  No murmur, rub, or gallop Abdomen: Soft, Non distended, Non tender, Positive bowel sounds Extremities: No cyanosis, clubbing or edema Neurologic:  No focal deficits ASSESSMENT Active Hospital Problems Diagnosis Date Noted  Acute encephalopathy 10/08/2018  Altered mental state 04/22/2018  Type 2 diabetes with nephropathy ([facility] [address] 02/12/2018  Restrictive lung disease 11/01/2017  Type 2 diabetes mellitus with peripheral neuropathy [facility] 11/05/2015  Chronic diastolic congestive heart failure ([facility] 09/12/2015 1. Echo (9/11/15) : EF 55-60%. Mild LVH. Moderate biatrial enlargement. Moderate mitral/tricuspid regurgitation. 2.  Echo (8/21/17):  EF 55-60%. Moderate LAE. Mild mitral stenosis. Moderate tricuspid regurgitation. RVSP 35-40.  ROBERTO on CPAP 02/20/2015 Patient is on BiPAP, no supplementary oxygen  Morbid obesity ([facility] 01/21/2015  CKD (chronic kidney disease) stage 3, GFR 30-59 ml/min ([facility]) 09/18/2013  Hyperlipidemia 08/05/2013  Debility 08/05/2013  Essential hypertension 11/21/2012  Persistent atrial fibrillation ([facility] 11/21/2012 Coumadin therapy discontinued due to recurrent GI bleeding.  Peripheral neuropathy 11/21/2012  COPD (chronic obstructive pulmonary disease) ([facility] 11/21/2012  PAD (peripheral artery disease) ([facility] 11/21/2012 1. Bilateral proximal common iliac PCI (2/21/08):  8.0 X 100 mm Cordis smart stent on right and 10 X 40 mm cordis smart stent on right. Both inflated to 7.0 mm. Plan: 
Acute encephalopathy 
- UA with evidence of UTI 
- Continue Rocephin 
- Urine culture growing gram negative rods 
- PT/OT consult - Mentation seems to be improving 
  
DM type II 
- stop oral diabetes medications 
- weight based regimen with Lantus 20 units SQ qHS, Humalog 8 units TIDAC, and sliding scale 
  
Chronic diastolic heart failure 
- continue with Lasix 40 mg po BID 
- continue with home meds, beta blocker/ACEi 
  
A fib 
- Rate controlled 
- not on anticoagulation 2/2 GIB in past 
- continue with ASA 81 mg 
  
MVCAD s/p CABG 
- continue with beta blocker, ACEi, ASA, statin 
  
HLD 
- continue with statin 
  
Chronic venous stasis 
- keep legs elevated - recommend moisturizer to prevent infection DISPO:  Continue IV antibiotics until susceptibilities return as urine culture is growing gram negative rods. Signed By: Michaela Magallon MD   
 October 9, 2018

## 2018-10-09 NOTE — PROGRESS NOTES
Problem: Mobility Impaired (Adult and Pediatric) Goal: *Acute Goals and Plan of Care (Insert Text) Discharge Goals: 
(1.)Mr. Navarro will move from supine to sit and sit to supine , scoot up and down and roll side to side in bed with MODIFIED INDEPENDENCE within 7 treatment day(s). (2.)Mr. Navarro will transfer from bed to chair and chair to bed with MODIFIED INDEPENDENCE using the least restrictive device within 7 treatment day(s). (3.)Mr. Navarro will ambulate with MODIFIED INDEPENDENCE for 250+ feet with the least restrictive device within 7 treatment day(s). (4.)Mr. Navarro will tolerate 25+ minutes of therapeutic activity/exercise in order to improve activity tolerance within 7 treatment days. ________________________________________________________________________________________________ PHYSICAL THERAPY: Initial Assessment, Treatment Day: Day of Assessment, AM 10/9/2018 OBSERVATION: Hospital Day: 2 Payor: [facility] MEDICARE / Plan: [facility] MEDICARE HMO/PPO / Product Type: Managed Care Medicare /  
  
NAME/AGE/GENDER: Lyle Nguyễn is a 80 y.o. male PRIMARY DIAGNOSIS: Acute encephalopathy Acute encephalopathy Acute encephalopathy Acute encephalopathy ICD-10: Treatment Diagnosis:  
 · Generalized Muscle Weakness (M62.81) · Difficulty in walking, Not elsewhere classified (R26.2) Precaution/Allergies: 
Review of patient's allergies indicates no known allergies. ASSESSMENT:  
 
Mr. Jeana Ashby is a pleasant 80 y.o. Male with primary diagnosis of acute encephalopathy. Pt is supine in bed upon contact, A&O x 4, and agreeable to PT Evaluation. Pt states he lives with wife and adult granddaughter who assists around hosue, ambulates household distances with rollator, hasn't had any falls, and can drive but doesn't. Pt states he doesn't walk much because he has poor balance.  Pt reports 5/10 chronic neuropathy B feet pain currently. Pt transferred from supine to sitting EOB currently with cuing for sequencing and SBA and additional time to complete task. Pt demonstrates good sitting balance. Pt performed STS with SBA and RW, demonstrating good standing balance. Pt ambulated 10ft to commode with SBA and RW with cuing for RW management, but then stated he did not need to use the restroom currently. Pt ambulated 100ft in hallway with SBA-CGA, demonstrating slow, shuffling gait with narrow SANDEEP with cuing for safety awareness and pacing. Pt required standing rest break for ~3 minutes with increased SOB and cuing for deep breathing techniques, then ambulated to bedside chair. B LE assessment indicates AROM and strength generally decreased but WFL, especially B hip flexors, decreased PROM of B knee extension, and sensation intact. Pt left with OT and all needs met and within reach. Massimo Lazo will benefit from skilled PT (medically necessary) to address decreased strength, decreased balance, decreased functional tolerance, decreased cardiopulmonary endurance affecting participation in basic ADLs and functional tasks. Pt may benefit from HHPT or OP PT upon discharge from hospital. 
 
This section established at most recent assessment PROBLEM LIST (Impairments causing functional limitations): 1. Decreased Strength 2. Decreased Transfer Abilities 3. Decreased Ambulation Ability/Technique 4. Decreased Balance 5. Increased Pain 6. Decreased Activity Tolerance 7. Decreased Pacing Skills 8. Increased Fatigue 9. Increased Shortness of Breath 10. Decreased Gunter with Home Exercise Program 
 INTERVENTIONS PLANNED: (Benefits and precautions of physical therapy have been discussed with the patient.) 1. Balance Exercise 2. Bed Mobility 3. Family Education 4. Gait Training 5. Home Exercise Program (HEP) 6. Manual Therapy 7. Neuromuscular Re-education/Strengthening 8. Range of Motion (ROM) 9. Therapeutic Activites 10. Therapeutic Exercise/Strengthening 11. Transfer Training 12. Group Therapy TREATMENT PLAN: Frequency/Duration: 3 times a week for duration of hospital stay Rehabilitation Potential For Stated Goals: Good RECOMMENDED REHABILITATION/EQUIPMENT: (at time of discharge pending progress): Due to the probability of continued deficits (see above) this patient will likely need continued skilled physical therapy after discharge. Equipment:  
? None at this time HISTORY:  
History of Present Injury/Illness (Reason for Referral): 
81 yo CM with past history of CKD stage III, A fib not on anticoagulation (due to GIB), MVCAD s/p 3vCABG in 2006, HTN, HLD, IDDM type II, gout, COPD, ROBERTO on CPAP, GURINDER on sertraline, chronic diastolic heart failure, chronic venous stasis, hypothyroidism, and morbid obesity presents with a 2 day history of increasing confusion. Per family, patient was a little confused yesterday and has had visual hallucinations where \"I try to reach for something that's right in front of me and it's farther away. \" Family says that he has been urinating a lot more. At bedside, patient states that he has not had any fevers/chills, chest pain, headaches, changes in baseline shortness of breath, abdominal pain, N/V/D, or dysuria. He has ankle swelling that is \"a little bit worse than normal.\"  
Past Medical History/Comorbidities:  
Mr. Rupesh Houser  has a past medical history of Atherosclerosis of artery of extremity with ulceration ([address] (4/17/2015); Atherosclerosis of native arteries of extremity with intermittent claudication ([address] (4/17/2015); Atopic dermatitis (11/21/2012); Atrial fibrillation ([address] CAD (coronary artery disease); Carotid stenosis, bilateral (11/21/2012); CHF (congestive heart failure) ([address] (11/21/2012); Chronic kidney disease; Chronic obstructive pulmonary disease ([address] Colon, diverticulosis (1/24/2015); Coronary atherosclerosis of native coronary vessel (10/31/2016);  Diabetes ([facility] Diastolic CHF, acute on chronic (HCC) (9/12/2015); Failed CABG (coronary artery bypass graft) (11/21/2012); GI bleed (1/2015); Gout (11/21/2012); History of tobacco use; Teller (hard of hearing); Hypercholesterolemia; Hypertension; Hyperuricemia (11/21/2012); Morbid obesity ([facility] PAD (peripheral artery disease) ([facility] (11/21/2012); Poor historian; Radiologic findings of lung field, abnormal (10/31/2016); Seizure disorder ([facility] (8/5/2013); Shortness of breath dyspnea (8/5/2013); Thyroid disease; and Unspecified sleep apnea. He also has no past medical history of Arthritis; Asthma; Cancer ([facility] Liver disease; Nausea & vomiting; PUD (peptic ulcer disease); or Stroke ([facility] Mr. Amish Morgan  has a past surgical history that includes pr cardiac surg procedure unlist; hx coronary artery bypass graft (06-); hx cataract removal (Left, 2003); hx heart catheterization; hx coronary stent placement (02-); hx heent (1970s); hx colonoscopy; and colonoscopy (N/A, 1/27/2017). Social History/Living Environment:  
Home Environment: Private residence # Steps to Enter: 1 One/Two Story Residence: One story Living Alone: No 
Support Systems: Spouse/Significant Other/Partner, Family member(s) Patient Expects to be Discharged to[de-identified] Private residence Current DME Used/Available at Home: CPAP, Grab bars, Shower chair, Walker, rollator Tub or Shower Type: Shower Prior Level of Function/Work/Activity: 
Ambulates household distances with rollator, lives with wife and daughter, no falls, can drive but doesn't  
Number of Personal Factors/Comorbidities that affect the Plan of Care: 3+: HIGH COMPLEXITY EXAMINATION:  
Most Recent Physical Functioning:  
Gross Assessment: 
AROM: Generally decreased, functional (B hip flexion) PROM: Within functional limits (except B knee extension) Strength: Generally decreased, functional 
Coordination: Within functional limits Tone: Normal 
Sensation: Intact Posture: Balance: 
Sitting: Intact Standing: Impaired Standing - Static: Good Standing - Dynamic : Fair Bed Mobility: 
Rolling: Stand-by assistance; Additional time Supine to Sit: Stand-by assistance; Additional time Scooting: Stand-by assistance Wheelchair Mobility: 
  
Transfers: 
Sit to Stand: Stand-by assistance Stand to Sit: Stand-by assistance Gait: 
  
Base of Support: Narrowed Speed/Helen: Shuffled; Slow Step Length: Left shortened;Right shortened Gait Abnormalities: Decreased step clearance;Shuffling gait Distance (ft): 100 Feet (ft) (+20 after standing rest) Assistive Device: Walker, rolling Ambulation - Level of Assistance: Stand-by assistance;Contact guard assistance Interventions: Verbal cues; Visual/Demos; Safety awareness training Body Structures Involved: 1. Nerves 2. Heart 3. Lungs 4. Muscles Body Functions Affected: 1. Sensory/Pain 2. Cardio 3. Respiratory 4. Neuromusculoskeletal 
5. Movement Related Activities and Participation Affected: 1. Mobility 2. Domestic Life 3. Interpersonal Interactions and Relationships 4. Community, Social and [place] Number of elements that affect the Plan of Care: 4+: HIGH COMPLEXITY CLINICAL PRESENTATION:  
Presentation: Stable and uncomplicated: LOW COMPLEXITY CLINICAL DECISION MAKING:  
[employer] AM-PAC 6 Clicks Basic Mobility Inpatient Short Form How much difficulty does the patient currently have. .. Unable A Lot A Little None 1. Turning over in bed (including adjusting bedclothes, sheets and blankets)? [] 1   [] 2   [x] 3   [] 4  
2. Sitting down on and standing up from a chair with arms ( e.g., wheelchair, bedside commode, etc.)   [] 1   [] 2   [x] 3   [] 4  
3. Moving from lying on back to sitting on the side of the bed? [] 1   [] 2   [x] 3   [] 4 How much help from another person does the patient currently need. .. Total A Lot A Little None 4. Moving to and from a bed to a chair (including a wheelchair)? [] 1   [] 2   [x] 3   [] 4  
5. Need to walk in hospital room? [] 1   [] 2   [x] 3   [] 4  
6. Climbing 3-5 steps with a railing? [] 1   [x] 2   [] 3   [] 4  
© 2007, Trustees of [facility] [employer], under license to [employer]. All rights reserved Score:  Initial: 17 Most Recent: X (Date: -- ) Interpretation of Tool:  Represents activities that are increasingly more difficult (i.e. Bed mobility, Transfers, Gait). Score 24 23 22-20 19-15 14-10 9-7 6 Modifier CH CI CJ CK CL CM CN   
 
? Mobility - Walking and Moving Around:  
  - CURRENT STATUS: CK - 40%-59% impaired, limited or restricted  - GOAL STATUS: CJ - 20%-39% impaired, limited or restricted  - D/C STATUS:  ---------------To be determined--------------- Payor: [facility] MEDICARE / Plan: [place] [facility] [place]/PPO / Product Type: Managed Care Medicare /   
 
Medical Necessity:    
· Patient demonstrates good rehab potential due to higher previous functional level. Reason for Services/Other Comments: 
· Patient continues to require skilled intervention due to impaired activity tolerance and functional mobility. Use of outcome tool(s) and clinical judgement create a POC that gives a: Clear prediction of patient's progress: LOW COMPLEXITY  
  
 
 
 
TREATMENT:  
(In addition to Assessment/Re-Assessment sessions the following treatments were rendered) Pre-treatment Symptoms/Complaints:  \"I Mckayla Soulier go home\" Pain: Initial:  
Pain Intensity 1: 5 Pain Location 1: Foot Pain Orientation 1: Left, Right Pain Intervention(s) 1: Ambulation/Increased Activity  Post Session:  Unchanged with mobility, 5/10 In addition to PT Evaluation: 
Therapeutic Activity: (    8 minutes):   Therapeutic activities including Bed transfers, Chair transfers, Ambulation on level ground and education on RW management, safety awareness, and pacing to improve mobility, strength, balance and coordination. Required minimal Verbal cues; Visual/Demos; Safety awareness training to promote dynamic balance in standing. Braces/Orthotics/Lines/Etc:  
· O2 Device: Room air Treatment/Session Assessment:   
· Response to Treatment:  See above · Interdisciplinary Collaboration:  
o Physical Therapist 
o Occupational Therapist 
o Registered Nurse · After treatment position/precautions:  
o Bed/Chair-wheels locked 
o Bed in low position 
o Call light within reach 
o working with OT  
· Compliance with Program/Exercises: Will assess as treatment progresses. · Recommendations/Intent for next treatment session: \"Next visit will focus on advancements to more challenging activities and reduction in assistance provided\". Total Treatment Duration: PT Patient Time In/Time Out Time In: 6596 Time Out: 0930 Maggie Estrada PT

## 2018-10-09 NOTE — PROGRESS NOTES
Hourly rounds complete this shift, , patient c/o: extreme gout flare up in left foot bed in low, locked position, call light and bedside table within reach,  all needs met. Will continue to monitor Report to day shift nurse.

## 2018-10-09 NOTE — PROGRESS NOTES
On 10/9/18, in accordance with Medicare guidelines, the Medicare Outpatient Observation Notice was provided to this Medicare patient. Oral explanation was provided and all questions answered. Signed document placed in the medical record under media tab. Copy provided to patient. Care manager notified. Later in the day on 10/9/18, [facility] patient status was changed from OBS to IP. Important Letter to Medicare was signed by patient and put in patient's chart; patient was provided with copy of Important Letter to Medicare. Patient's Care Managers notified.

## 2018-10-09 NOTE — PROGRESS NOTES
Interdisciplinary Rounds completed 10/09/18. Nursing, Case Management, Physician and PT present. Plan of care reviewed and updated.

## 2018-10-09 NOTE — PROGRESS NOTES
CM familiar with pt. CM attempted to meet with pt but pt was sleeping. CM reached out to family. CM spoke with pt's granddaughter, Breanna Ennis [phone]. She lives with pt and pt's spouse and has for the last 2 years. Campos Alfred stated that the plan for DC is for pt to return home. [id number] (RN) and [facility] (PT/OT/ST) are in place for pt. No needs voiced at this time. Care Management Interventions PCP Verified by CM: Yes Transition of Care Consult (CM Consult): Discharge Planning Physical Therapy Consult: Yes Occupational Therapy Consult: Yes Current Support Network: Lives with Spouse Confirm Follow Up Transport: Family Plan discussed with Pt/Family/Caregiver: Yes Freedom of Choice Offered: Yes Discharge Location Discharge Placement: Home

## 2018-10-10 NOTE — PROGRESS NOTES
Pt continue to yell out in pain. Hospitalist paged. Order received for one time dose of 1mg morphine IVP. Hospitalist to come and examine pt.

## 2018-10-10 NOTE — PROGRESS NOTES
Pt c/o cramping pain in both arms. Pt describes it as being \"locked up. \" pain level 8 out of 10. Pt reports this happens from time to time. roxicodone 5mg PO administered. Will continue to monitor.

## 2018-10-10 NOTE — PROGRESS NOTES
Pt with some confusion. Primary RN to contact family. Will review discharge instructions when family arrives.

## 2018-10-10 NOTE — DISCHARGE INSTRUCTIONS
DISCHARGE SUMMARY from Nurse    PATIENT INSTRUCTIONS:    After general anesthesia or intravenous sedation, for 24 hours or while taking prescription Narcotics:  · Limit your activities  · Do not drive and operate hazardous machinery  · Do not make important personal or business decisions  · Do  not drink alcoholic beverages  · If you have not urinated within 8 hours after discharge, please contact your surgeon on call. Report the following to your surgeon:  · Excessive pain, swelling, redness or odor of or around the surgical area  · Temperature over 100.5  · Nausea and vomiting lasting longer than 4 hours or if unable to take medications  · Any signs of decreased circulation or nerve impairment to extremity: change in color, persistent  numbness, tingling, coldness or increase pain  · Any questions    What to do at Home:  Recommended activity: Activity as tolerated,     If you experience any of the following symptoms fever, chills, new or unrelieved pain, nausea or vomiting, any other worrisome symptoms , please follow up with PCP. *  Please give a list of your current medications to your Primary Care Provider. *  Please update this list whenever your medications are discontinued, doses are      changed, or new medications (including over-the-counter products) are added. *  Please carry medication information at all times in case of emergency situations. These are general instructions for a healthy lifestyle:    No smoking/ No tobacco products/ Avoid exposure to second hand smoke  Surgeon General's Warning:  Quitting smoking now greatly reduces serious risk to your health.     Obesity, smoking, and sedentary lifestyle greatly increases your risk for illness    A healthy diet, regular physical exercise & weight monitoring are important for maintaining a healthy lifestyle    You may be retaining fluid if you have a history of heart failure or if you experience any of the following symptoms:  Weight gain of 3 pounds or more overnight or 5 pounds in a week, increased swelling in our hands or feet or shortness of breath while lying flat in bed. Please call your doctor as soon as you notice any of these symptoms; do not wait until your next office visit. Recognize signs and symptoms of STROKE:    F-face looks uneven    A-arms unable to move or move unevenly    S-speech slurred or non-existent    T-time-call 911 as soon as signs and symptoms begin-DO NOT go       Back to bed or wait to see if you get better-TIME IS BRAIN. Warning Signs of HEART ATTACK     Call 911 if you have these symptoms:   Chest discomfort. Most heart attacks involve discomfort in the center of the chest that lasts more than a few minutes, or that goes away and comes back. It can feel like uncomfortable pressure, squeezing, fullness, or pain.  Discomfort in other areas of the upper body. Symptoms can include pain or discomfort in one or both arms, the back, neck, jaw, or stomach.  Shortness of breath with or without chest discomfort.  Other signs may include breaking out in a cold sweat, nausea, or lightheadedness. Don't wait more than five minutes to call 911 - MINUTES MATTER! Fast action can save your life. Calling 911 is almost always the fastest way to get lifesaving treatment. Emergency Medical Services staff can begin treatment when they arrive -- up to an hour sooner than if someone gets to the hospital by car. The discharge information has been reviewed with the patient. The patient verbalized understanding. Discharge medications reviewed with the patient and appropriate educational materials and side effects teaching were provided.   ___________________________________________________________________________________________________________________________________     Urinary Tract Infections in Men: Care Instructions  Your Care Instructions    A urinary tract infection, or UTI, is a general term for an infection anywhere between the kidneys and the tip of the penis. UTIs can also be a result of a prostate problem. Most cause pain or burning when you urinate. Most UTIs are caused by bacteria and can be cured with antibiotics. It is important to complete your treatment so that the infection does not get worse. Follow-up care is a key part of your treatment and safety. Be sure to make and go to all appointments, and call your doctor if you are having problems. It's also a good idea to know your test results and keep a list of the medicines you take. How can you care for yourself at home? · Take your antibiotics as prescribed. Do not stop taking them just because you feel better. You need to take the full course of antibiotics. · Take your medicines exactly as prescribed. Your doctor may have prescribed a medicine, such as phenazopyridine (Pyridium), to help relieve pain when you urinate. This turns your urine orange. You may stop taking it when your symptoms get better. But be sure to take all of your antibiotics, which treat the infection. · Drink extra water for the next day or two. This will help make the urine less concentrated and help wash out the bacteria causing the infection. (If you have kidney, heart, or liver disease and have to limit your fluids, talk with your doctor before you increase your fluid intake.)  · Avoid drinks that are carbonated or have caffeine. They can irritate the bladder. · Urinate often. Try to empty your bladder each time. · To relieve pain, take a hot bath or lay a heating pad (set on low) over your lower belly or genital area. Never go to sleep with a heating pad in place. To help prevent UTIs  · Drink plenty of fluids, enough so that your urine is light yellow or clear like water. If you have kidney, heart, or liver disease and have to limit fluids, talk with your doctor before you increase the amount of fluids you drink. · Urinate when you have the urge.  Do not hold your urine for a long time. Urinate before you go to sleep. · Keep your penis clean. Catheter care  If you have a drainage tube (catheter) in place, the following steps will help you care for it. · Always wash your hands before and after touching your catheter. · Check the area around the urethra for inflammation or signs of infection. Signs of infection include irritated, swollen, red, or tender skin, or pus around the catheter. · Clean the area around the catheter with soap and water two times a day. Dry with a clean towel afterward. · Do not apply powder or lotion to the skin around the catheter. To empty the urine collection bag  · Wash your hands with soap and water. · Without touching the drain spout, remove the spout from its sleeve at the bottom of the collection bag. Open the valve on the spout. · Let the urine flow out of the bag and into the toilet or a container. Do not let the tubing or drain spout touch anything. · After you empty the bag, clean the end of the drain spout with tissue and water. Close the valve and put the drain spout back into its sleeve at the bottom of the collection bag. · Wash your hands with soap and water. When should you call for help? Call your doctor now or seek immediate medical care if:    · Symptoms such as a fever, chills, nausea, or vomiting get worse or happen for the first time.     · You have new pain in your back just below your rib cage. This is called flank pain.     · There is new blood or pus in your urine.     · You are not able to take or keep down your antibiotics.    Watch closely for changes in your health, and be sure to contact your doctor if:    · You are not getting better after taking an antibiotic for 2 days.     · Your symptoms go away but then come back. Where can you learn more? Go to http://angelina-rajiv.info/. Enter Y395 in the search box to learn more about \"Urinary Tract Infections in Men: Care Instructions. \"  Current as of: March 21, 2018  Content Version: 11.8  © 1584-9741 [employer]. Care instructions adapted under license by [employer] (which disclaims liability or warranty for this information). If you have questions about a medical condition or this instruction, always ask your healthcare professional. [address] any warranty or liability for your use of this information.

## 2018-10-10 NOTE — PROGRESS NOTES
Pt c/o, most of the night, of bilateral cramp. Medicated per Mar 
Hold Lantus ()  and notify MD 
Hourly round completed throughout the shift. Bed in lower position and call light/ personal items within reach. Will continue to monitor and give bed side shift report to on coming day shift nurse.

## 2018-10-10 NOTE — DISCHARGE SUMMARY
Hospitalist Discharge Summary Patient ID: 
Mahamed Sánchez [id number] 
31 y.o. 
2/12/1932 Admit date: 10/8/2018  1:09 PM 
Discharge date and time: 10/10/2018 Attending: No att. providers found PCP:  Wili George MD 
Treatment Team: Utilization Review: Salvatore House; Charge Nurse: Altagracia Navarro; Utilization Review: Aneudy Corona RN 
 
Principal Diagnosis Acute encephalopathy Principal Problem: 
  Acute encephalopathy (10/8/2018) Active Problems: 
  Essential hypertension (11/21/2012) Persistent atrial fibrillation ([address] (11/21/2012) Overview: Coumadin therapy discontinued due to recurrent GI bleeding. Peripheral neuropathy (11/21/2012) COPD (chronic obstructive pulmonary disease) ([address] (11/21/2012) PAD (peripheral artery disease) ([address] (11/21/2012) Overview: 1. Bilateral proximal common iliac PCI (2/21/08):  8.0 X 100 mm Cordis  
    smart stent on right and 10 X 40 mm cordis smart stent on right. Both  
    inflated to 7.0 mm. Hyperlipidemia (8/5/2013) Debility (8/5/2013) CKD (chronic kidney disease) stage 3, GFR 30-59 ml/min ([facility]) (9/18/2013) Morbid obesity ([address] (1/21/2015) ROBERTO on CPAP (2/20/2015) Overview: Patient is on BiPAP, no supplementary oxygen Chronic diastolic congestive heart failure ([address] (9/12/2015) Overview: 1.  Echo (9/11/15) : EF 55-60%. Mild LVH. Moderate biatrial enlargement. Moderate mitral/tricuspid regurgitation. 2.  Echo (8/21/17):  EF 55-60%. Moderate LAE. Mild mitral stenosis. Moderate tricuspid regurgitation. RVSP 35-40. Type 2 diabetes mellitus with peripheral neuropathy ([address] (11/5/2015) Restrictive lung disease (11/1/2017) Type 2 diabetes with nephropathy ([address] (2/12/2018) Altered mental state (4/22/2018) Acute metabolic encephalopathy (96/5/2293) Hospital Course: Please refer to the admission H&P for details of presentation. In summary, the patient is Pt is an 80yoM with past history of CKD stage III, A fib not on anticoagulation (due to GIB), MVCAD s/p 3vCABG in 2006, HTN, HLD, IDDM type II, gout, COPD, ROBERTO on CPAP, GURINDER on sertraline, chronic diastolic heart failure, chronic venous stasis, hypothyroidism, and morbid obesity presents with a 2 day history of increasing confusion. Per family, patient was confused and has had visual hallucinations. Increased urination. Patient was found to have evidence of UTI on UA in ER. Rocephin was started. Patient's mentation improved back to baseline. He was transitioned to Keflex for continued treatment of UTI on discharge. He understands he needs follow up with his PCP in 3-5 days for recheck as final urine culture susceptibilities. Patient feels well and wishes to go home today. Significant Diagnostic Studies:  
 
 
Labs: Results:  
   
Chemistry Recent Labs 10/10/18 
 0448  10/09/18 
 0456  10/08/18 
 1318 GLU  94  58*  115* NA  138  143  138  
K  4.0  3.9  4.1 CL  99  102  100 CO2  31  33*  32 BUN  34*  33*  31* CREA  1.76*  1.68*  1.59* CA  8.4  8.6  8.9 AGAP  8  8  6* AP   --    --   194* TP   --    --   7.4 ALB   --    --   2.8*  
GLOB   --    --   4.6* AGRAT   --    --   0.6* CBC w/Diff Recent Labs 10/10/18 
 0448  10/09/18 
 0456  10/08/18 
 1318 WBC  6.5  7.1  7.9  
RBC  3.44*  3.58*  3.72* HGB  9.2*  9.5*  9.9*  
HCT  30.3*  31.8*  33.0*  
PLT  208  211  209 GRANS  72  78  78 LYMPH  18  12*  14 EOS  4  4  2 Cardiac Enzymes No results for input(s): CPK, CKND1, RICK in the last 72 hours. No lab exists for component: Alonzo Spittle Coagulation No results for input(s): PTP, INR, APTT in the last 72 hours. No lab exists for component: INREXT Lipid Panel Lab Results Component Value Date/Time  Cholesterol, total 127 09/27/2018 01:20 PM  
 HDL Cholesterol 51 09/27/2018 01:20 PM  
 LDL, calculated 60 09/27/2018 01:20 PM  
 VLDL, calculated 16 09/27/2018 01:20 PM  
 Triglyceride 80 09/27/2018 01:20 PM  
 CHOL/HDL Ratio 2.0 04/23/2018 05:19 AM  
  
BNP No results for input(s): BNPP in the last 72 hours. Liver Enzymes Recent Labs 10/08/18 
 1318 TP  7.4 ALB  2.8*  
AP  194* SGOT  22 Thyroid Studies Lab Results Component Value Date/Time T4, Total 8.0 11/05/2008 04:49 PM  
 T3 Uptake 31 11/05/2008 04:49 PM  
 TSH 1.940 08/01/2018 02:08 PM  
    
 
 
Discharge Exam: 
Visit Robert Garcia  /60 (BP 1 Location: Right arm, BP Patient Position: At rest)  Pulse 74  Temp 98.7 °F (37.1 °C)  Resp 20  Wt 102.1 kg (225 lb)  SpO2 95%  BMI 28.89 kg/m2 General appearance: alert, cooperative, no distress, appears stated age Lungs: clear to auscultation bilaterally Heart: regular rate and rhythm, S1, S2 normal, no murmur, click, rub or gallop Abdomen: soft, non-tender. Bowel sounds normal. No masses,  no organomegaly Extremities: no cyanosis or edema Neurologic: Grossly normal 
 
Disposition: home Discharge Condition: stable Patient Instructions:  
Discharge Medication List as of 10/10/2018 12:58 PM  
  
START taking these medications Details  
cephALEXin (KEFLEX) 500 mg capsule Take 1 Cap by mouth two (2) times a day., Normal, Disp-10 Cap, R-0  
  
  
CONTINUE these medications which have NOT CHANGED Details  
isosorbide mononitrate ER (IMDUR) 30 mg tablet Take 1 Tab by mouth daily. , Normal, Disp-30 Tab, R-5  
  
magnesium oxide (MAG-OX) 400 mg (241.3 mg magnesium) tablet Take 1 Tab by mouth daily. , Normal, Disp-90 Tab, R-3  
  
mupirocin calcium (BACTROBAN) 2 % topical cream Apply  to affected area two (2) times a day., Normal, Disp-15 g, R-0  
  
metoprolol succinate (TOPROL-XL) 25 mg XL tablet Pt takes 1/2 tab po daily. , Normal, Disp-45 Tab, R-3  
  
 pravastatin (PRAVACHOL) 40 mg tablet Take 1 Tab by mouth nightly., Normal, Disp-90 Tab, R-3  
  
hydrALAZINE (APRESOLINE) 10 mg tablet TAKE 1 TABLET THREE TIMES DAILY., Normal, Disp-90 Tab, R-3  
  
aspirin 81 mg chewable tablet Take 1 Tab by mouth daily. , No Print, Disp-90 Tab, R-3  
  
glipiZIDE (GLUCOTROL) 5 mg tablet Take  by mouth two (2) times a day., Historical Med  
  
gabapentin (NEURONTIN) 100 mg capsule Take  by mouth three (3) times daily. , Historical Med sAXagliptin (ONGLYZA) 2.5 mg tablet Take 2.5 mg by mouth daily. , Historical Med  
  
potassium chloride SR (K-TAB) 20 mEq tablet Take 20 mEq by mouth daily. Indications: hypokalemia prevention, Historical Med  
  
docusate calcium (SURFAK) 240 mg capsule Take 240 mg by mouth two (2) times a day., Historical Med  
  
docusate sodium (COLACE) 100 mg capsule Take 100 mg by mouth daily as needed for Constipation. , Historical Med  
  
hydrocortisone (PROCTOSOL HC) 2.5 % rectal cream Insert  into rectum every six (6) hours as needed for Hemorrhoids. , Historical Med  
  
fluticasone-vilanterol (BREO ELLIPTA) 100-25 mcg/dose inhaler Take 1 Puff by inhalation daily. , Historical Med  
  
furosemide (LASIX) 20 mg tablet Take 20 mg by mouth as needed., Historical Med  
  
oxyCODONE IR (ROXICODONE) 5 mg immediate release tablet Take 5 mg by mouth every six (6) hours as needed for Pain., Historical Med  
  
ondansetron (ZOFRAN ODT) 4 mg disintegrating tablet Take 1 Tab by mouth every eight (8) hours as needed for Nausea. , Print, Disp-20 Tab, R-0  
  
levothyroxine (SYNTHROID) 50 mcg tablet Take 1 Tab by mouth Daily (before breakfast). , Normal, Disp-90 Tab, R-3 OXYGEN-AIR DELIVERY SYSTEMS Take  by inhalation continuous. 2LPM to keep 02 sat >90%, Historical Med  
  
pantoprazole (PROTONIX) 40 mg granules for oral suspension 40 mg daily. , Historical Med  
  
calcium carbonate (CALCIUM 600 PO) Take  by mouth., Historical Med  
  
 bisacodyl (DULCOLAX) 5 mg EC tablet Take 1 Tab by mouth daily. , Normal, Disp-15 Tab, R-0  
  
latanoprost (XALATAN) 0.005 % ophthalmic solution Administer 1 Drop to both eyes nightly., Historical Med  
  
sertraline (ZOLOFT) 50 mg tablet Take one tablet each evening for anxiety  Indications: Generalized Anxiety Disorder, Normal, Disp-30 Tab, R-3  
  
fluticasone (FLONASE) 50 mcg/actuation nasal spray 2 Sprays by Both Nostrils route daily. , Normal, Disp-1 Bottle, R-3  
  
cholecalciferol (VITAMIN D3) 1,000 unit cap Take 1,000 Units by mouth daily. , Historical Med  
  
albuterol (PROVENTIL VENTOLIN) 2.5 mg /3 mL (0.083 %) nebulizer solution 2.5 mg by Nebulization route every four (4) hours as needed for Wheezing., Historical Med  
  
ipratropium-albuterol (COMBIVENT RESPIMAT)  mcg/actuation inhaler Take 1 Puff by inhalation every six (6) hours. , Print, Disp-3 Inhaler, R-3  
  
triamcinolone (ARISTOCORT) 0.5 % topical cream Apply  to affected area two (2) times a day. use thin layer, Normal, Disp-15 g, R-0  
  
ferrous sulfate 324 mg (65 mg iron) tablet Take  by mouth Daily (before breakfast). , Historical Med  
  
allopurinol (ZYLOPRIM) 300 mg tablet Take  by mouth daily. , Historical Med  
  
cpap machine kit by Does Not Apply route. Bilevel 12/8, Historical Med Activity: Activity as tolerated Diet: Regular Diet Wound Care: None needed Follow-up · PCP in 3-5 days Time spent to discharge patient 35 minutes Signed: 
Hamlet Sheikh MD 
10/10/2018 
6:42 PM

## 2018-10-11 NOTE — ED PROVIDER NOTES
HPI Comments: 79 yo male ast medical history of CKD stage III, A. Fib not on any coagulation due to history of GI bleed, MVCAD s/p 3vCABG in 2006, HTN, ROBERTO on CPAP, chronic DHF, morbid obesity, and recent admission for acute encephalopathy secondary to UTI presents via EMS status post mechanical fall earlier today. States that he was in his wheelchair and his daughter was going to take him into the house however the wheelchair wheels had a problem rolling and resulted in patient being \"dumped to the ground\". Reports hitting his head but denies loss of consciousness. Patient does report mild upper thoracic back pain. Patient denies numbness, tingling, weakness, bowel or bladder incontinence, saddle anesthesia, chest pain, shortness of breath, abdominal pain, neck pain. Patient with difficulty ambulating without assistance at baseline. Patient is a 80 y.o. male presenting with fall. The history is provided by the patient. No  was used. Fall The accident occurred 3 to 5 hours ago. Fall occurred: from wheelchair  He fell from a height of ground level. He landed on hard floor. There was no blood loss. The point of impact was the head (upper back ). The pain is present in the head (upper back ). The pain is at a severity of 3/10. The pain is mild. Pertinent negatives include no visual change, no fever, no numbness, no abdominal pain, no nausea, no vomiting, no hematuria, no headaches, no extremity weakness, no loss of consciousness, no tingling and no laceration. He has tried nothing for the symptoms. The treatment provided no relief. Past Medical History:  
Diagnosis Date  Atherosclerosis of artery of extremity with ulceration ([address] 4/17/2015  Atherosclerosis of native arteries of extremity with intermittent claudication ([address] 4/17/2015  Atopic dermatitis 11/21/2012  Atrial fibrillation ([address]  CAD (coronary artery disease)  Carotid stenosis, bilateral 11/21/2012 50-79%  3-2010    1. Carotid Doppler (6/1/07): Greater than 70% stenosis in proximal LICA. 50% stenosis in right ICA. 2.  CTA of neck (3/15/10): Occluded distal segments of the vertebral arteries bilaterally. Atherosclerosis of the carotid bulbs bilaterally with a 50% stenosis on the left and a stenosis of less than 30% on the right. Small nodule in the right upper lobe near the apex. 3. CTA (8/29/13):  Less than 30% diameter stenosis of the cervical internal carotid arteries bilaterally.  CHF (congestive heart failure) ([address] 11/21/2012  Chronic kidney disease   
 hx elevated labs  Chronic obstructive pulmonary disease ([address]  Colon, diverticulosis 1/24/2015  Coronary atherosclerosis of native coronary vessel 10/31/2016 1. Cath (5/31/07):  LMCA: 25%. LAD: ostial 75-95% stenosis. 50-75% mid. D1:  25-50%. OM3: small with 75% stenosis. RCA: 100% mid. with left to right collaterals. 2 Vessel CABG (6/4/07):  LIMA to LAD and SVG to OM. SVG was anastomosed proximally to LIMA to due severe atherosclerosis of aorta. 3.  Lexiscan cardiolite (11/30/10): Abnormal with reversible lateral wall defects. Unable to gate given atrial fibrillation. 4.  LHC (1/14/11):  EF 60%. LVEDP 21. Patent LIMA to LAD and SVG to OM1 (off LIMA). occluded small RCA with left to right collaterals. Small D1 (2 mm vessel) with 70% mid stenosis.  Diabetes ([address]  Diastolic CHF, acute on chronic (HCC) 9/12/2015 1. Echo (9/11/15) : EF 55-60%. Mild LVH. Moderate biatrial enlargement. Moderate mitral/tricuspid regurgitation.  Failed CABG (coronary artery bypass graft) 11/21/2012  GI bleed 1/2015 Hospitalized [facility]  Gout 11/21/2012  History of tobacco use  [place] (hard of hearing)  Hypercholesterolemia  Hypertension  Hyperuricemia 11/21/2012  Morbid obesity ([address]  PAD (peripheral artery disease) ([address] 11/21/2012 1.  Bilateral proximal common iliac PCI (08):  8.0 X 100 mm Cordis smart stent on right and 10 X 40 mm cordis smart stent on right. Both inflated to 7.0 mm.  Poor historian  Radiologic findings of lung field, abnormal 10/31/2016 1. CT of chest  (11/24/10): Multiple small nodules in the right lobe and stable interstitial prominence. Consistent with chronic lung disease. No evidence of malignancy.  Seizure disorder ([address] 2013  Shortness of breath dyspnea 2013  Thyroid disease  Unspecified sleep apnea   
 uses CPAP Past Surgical History:  
Procedure Laterality Date  CARDIAC SURG PROCEDURE UNLIST    
 cath ,cabg ,lexiscan cardiolite 11/10  
 COLONOSCOPY N/A 2017 COLONOSCOPY  BMI 36 TO BE ADMITTED 17 performed by Esthela Treviño MD at [facility] END[facility]COPY  
 HX CATARACT REMOVAL Left   
 os  HX COLONOSCOPY    
 HX CORONARY ARTERY BYPASS GRAFT  2007  HX CORONARY STENT PLACEMENT  2008  
 bilateral iliac artery PCI and stents  HX HEART CATHETERIZATION    
 left-2007, 2011  HX HEENT  1970s  
 neck lipoma Family History:  
Problem Relation Age of Onset  Heart Attack Mother Ola.Schwab Other Father   
  old age  Other Brother   
  brain aneurysm  Heart Disease Other 2 children  with heart concerns, 36 & 49 yo  
 Heart Disease Son   
 
 
Social History Social History  Marital status:  Spouse name: N/A  
 Number of children: N/A  
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  
 Drug use: No  
 Sexual activity: Not Currently Other Topics Concern  Not on file Social History Narrative 45 pack year history cigarette smoking, stopped in . 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 , two living children, two children  with coronary artery disease, ages 36 and 48. Has always lived in [address] No pets. ALLERGIES: Review of patient's allergies indicates no known allergies. Review of Systems Constitutional: Negative for chills and fever. HENT: Negative for congestion. Respiratory: Negative for cough and shortness of breath. Cardiovascular: Negative for chest pain and palpitations. Gastrointestinal: Negative for abdominal pain, constipation, nausea and vomiting. Genitourinary: Negative for dysuria and hematuria. Musculoskeletal: Positive for back pain. Negative for extremity weakness, joint swelling, myalgias and neck pain. Skin: Negative for pallor and wound. Neurological: Negative for dizziness, tingling, loss of consciousness, weakness, numbness and headaches. Psychiatric/Behavioral: Negative for confusion. Vitals:  
 10/11/18 1432 BP: 116/58 Pulse: 60 Resp: 22 Temp: 97.6 °F (36.4 °C) SpO2: 98% Weight: 102.1 kg (225 lb) Height: 6' 2\" (1.88 m) Physical Exam  
Constitutional: He is oriented to person, place, and time. Patient well-appearing in no acute distress. HENT:  
Head: Normocephalic. MMM. No evidence of basilar skull fracture. Neck: Normal range of motion. No JVD present. No tracheal deviation present. FROM. No midline Cspine TTP. No step-off. Cardiovascular: Normal rate, regular rhythm, normal heart sounds and intact distal pulses. Radial pulses 2+ and equal bilaterally. Pulmonary/Chest: Effort normal and breath sounds normal. He has no wheezes. He has no rales. CTAB. No chest wall TTP. Abdominal: Soft. There is no tenderness. There is no rebound and no guarding. Soft, NTND. No rebound or guarding. No CVAT. Musculoskeletal: Normal range of motion. He exhibits no tenderness. Mild bilateral thoracic spine paraspinal muscle tenderness with age. No midline T-spine or L spine tenderness present. No step-off. No point tenderness. No deformity. FROM of all extremities. Neurological: He is alert and oriented to person, place, and time. No cranial nerve deficit. Coordination normal.  
Patient at baseline mental status. No facial droop. No dysarthria. Normal sensory exam.  Strength 5/5 throughout. No saddle anesthesia. Skin: Skin is warm and dry. No laceration noted. No rash. Psychiatric: He has a normal mood and affect. Judgment normal.  
Nursing note and vitals reviewed. MDM Number of Diagnoses or Management Options Acute bilateral thoracic back pain: new and requires workup Acute cystitis without hematuria:  
Chronic kidney disease, unspecified CKD stage: new and requires workup Closed head injury, initial encounter: new and requires workup Diagnosis management comments: Ct head and CT spine with no acute findings. Labs all within normal limits. Pt with recent dx of UTI. Currently on Keflex. Offered IV Rocephin here but states ready for d/c home. Pt alert and oriented at baseline mental status. XR Tspine w/ no acute findings. Normal neuro. No saddle anesthesia. Pain well controlled. Will d/c home with close outpatient follow-up. Given strict return precautions. Amount and/or Complexity of Data Reviewed Clinical lab tests: ordered and reviewed Tests in the radiology section of CPT®: ordered and reviewed Tests in the medicine section of CPT®: ordered and reviewed Review and summarize past medical records: yes Independent visualization of images, tracings, or specimens: yes Risk of Complications, Morbidity, and/or Mortality Presenting problems: moderate Diagnostic procedures: moderate Management options: moderate Patient Progress Patient progress: stable ED Course Comment By Time CT head Impression: 1. Findings most compatible with mild chronic small vessel ischemic changes. 2. No evidence of acute intracranial abnormality. 3. Intracranial atherosclerosis. Ladan Reynaga MD 10/11 8334 CT Cspine Impressions: 
 
No evidence of acute cervical spine injury. Ladan Reynaga MD 10/11 6356 XR Tspine IMPRESSION: 
1. Multilevel thoracic spondylosis. 2. Probable trace pleural effusions. 3. Osteopenia. Ladan Reynaga MD 10/11 3806 Patient currently on Keflex. Patient at baseline mental status. Patient reports improvement of pain. Patient states he is ready for discharge home. Ladan Reynaga MD 10/11 8672 EKG Date/Time: 10/11/2018 5:22 PM 
Performed by: Jones Haywood Authorized by: GRISEL Wheatley Si  
 
ECG reviewed by ED Physician in the absence of a cardiologist: yes Previous ECG:  
  Previous ECG:  Compared to current Similarity:  No change Rate:  
  ECG rate:  56 ECG rate assessment: bradycardic Rhythm:  
  Rhythm: atrial fibrillation Ectopy:  
  Ectopy: none QRS:  
  QRS axis:  Normal 
  QRS intervals:  Normal 
ST segments: ST segments:  Normal 
T waves:  
  T waves comment:  T wave inversion in leads I, II, aVL, V4-V6 Results Include: 
 
Recent Results (from the past 24 hour(s)) CBC WITH AUTOMATED DIFF Collection Time: 10/11/18  4:42 PM  
Result Value Ref Range WBC 8.8 4.3 - 11.1 K/uL  
 RBC 3.99 (L) 4.23 - 5.6 M/uL  
 HGB 10.6 (L) 13.6 - 17.2 g/dL HCT 35.7 (L) 41.1 - 50.3 % MCV 89.5 79.6 - 97.8 FL  
 MCH 26.6 26.1 - 32.9 PG  
 MCHC 29.7 (L) 31.4 - 35.0 g/dL  
 RDW 19.6 % PLATELET 795 270 - 386 K/uL MPV 11.1 9.4 - 12.3 FL ABSOLUTE NRBC 0.00 0.0 - 0.2 K/uL  
 DF AUTOMATED NEUTROPHILS 76 43 - 78 % LYMPHOCYTES 14 13 - 44 % MONOCYTES 5 4.0 - 12.0 % EOSINOPHILS 4 0.5 - 7.8 %  BASOPHILS 0 0.0 - 2.0 %  
 IMMATURE GRANULOCYTES 1 0.0 - 5.0 %  
 ABS. NEUTROPHILS 6.7 1.7 - 8.2 K/UL  
 ABS. LYMPHOCYTES 1.2 0.5 - 4.6 K/UL  
 ABS. MONOCYTES 0.5 0.1 - 1.3 K/UL  
 ABS. EOSINOPHILS 0.3 0.0 - 0.8 K/UL  
 ABS. BASOPHILS 0.0 0.0 - 0.2 K/UL  
 ABS. IMM. GRANS. 0.1 0.0 - 0.5 K/UL METABOLIC PANEL, COMPREHENSIVE Collection Time: 10/11/18  4:42 PM  
Result Value Ref Range Sodium 138 136 - 145 mmol/L Potassium 4.1 3.5 - 5.1 mmol/L Chloride 98 98 - 107 mmol/L  
 CO2 33 (H) 21 - 32 mmol/L Anion gap 7 7 - 16 mmol/L Glucose 87 65 - 100 mg/dL BUN 35 (H) 8 - 23 MG/DL Creatinine 2.07 (H) 0.8 - 1.5 MG/DL  
 GFR est AA 39 (L) >60 ml/min/1.73m2 GFR est non-AA 33 (L) >60 ml/min/1.73m2 Calcium 8.5 8.3 - 10.4 MG/DL Bilirubin, total 1.0 0.2 - 1.1 MG/DL  
 ALT (SGPT) 16 12 - 65 U/L  
 AST (SGOT) 24 15 - 37 U/L Alk. phosphatase 177 (H) 50 - 136 U/L Protein, total 8.2 6.3 - 8.2 g/dL Albumin 3.0 (L) 3.2 - 4.6 g/dL Globulin 5.2 (H) 2.3 - 3.5 g/dL A-G Ratio 0.6 (L) 1.2 - 3.5    
TROPONIN I Collection Time: 10/11/18  4:42 PM  
Result Value Ref Range Troponin-I, Qt. 0.03 0.02 - 0.05 NG/ML  
URINE MICROSCOPIC Collection Time: 10/11/18  5:05 PM  
Result Value Ref Range WBC >100 (H) 0 /hpf  
 RBC 0 0 /hpf Epithelial cells 0-3 0 /hpf Bacteria 0 0 /hpf Casts 0-3 0 /lpf Radha Garcia MD; 10/11/2018 @5:50 PM Voice dictation software was used during the making of this note. This software is not perfect and grammatical and other typographical errors may be present.   This note has not been proofread for errors. 
===================================================================

## 2018-10-11 NOTE — PROGRESS NOTES
Care Coordinator spoke with patient's granddaughter Yuni Ayala who informed Care Coordinator that patient Mr. Valerie Stevens was currently in the ED Department due to Fall. Care Coordinator will plan follow up call in 24 hours. This note will not be viewable in [address]

## 2018-10-11 NOTE — DISCHARGE INSTRUCTIONS
Urinary Tract Infections in Men: Care Instructions  Your Care Instructions    A urinary tract infection, or UTI, is a general term for an infection anywhere between the kidneys and the tip of the penis. UTIs can also be a result of a prostate problem. Most cause pain or burning when you urinate. Most UTIs are caused by bacteria and can be cured with antibiotics. It is important to complete your treatment so that the infection does not get worse. Follow-up care is a key part of your treatment and safety. Be sure to make and go to all appointments, and call your doctor if you are having problems. It's also a good idea to know your test results and keep a list of the medicines you take. How can you care for yourself at home? · Take your antibiotics as prescribed. Do not stop taking them just because you feel better. You need to take the full course of antibiotics. · Take your medicines exactly as prescribed. Your doctor may have prescribed a medicine, such as phenazopyridine (Pyridium), to help relieve pain when you urinate. This turns your urine orange. You may stop taking it when your symptoms get better. But be sure to take all of your antibiotics, which treat the infection. · Drink extra water for the next day or two. This will help make the urine less concentrated and help wash out the bacteria causing the infection. (If you have kidney, heart, or liver disease and have to limit your fluids, talk with your doctor before you increase your fluid intake.)  · Avoid drinks that are carbonated or have caffeine. They can irritate the bladder. · Urinate often. Try to empty your bladder each time. · To relieve pain, take a hot bath or lay a heating pad (set on low) over your lower belly or genital area. Never go to sleep with a heating pad in place. To help prevent UTIs  · Drink plenty of fluids, enough so that your urine is light yellow or clear like water.  If you have kidney, heart, or liver disease and have to limit fluids, talk with your doctor before you increase the amount of fluids you drink. · Urinate when you have the urge. Do not hold your urine for a long time. Urinate before you go to sleep. · Keep your penis clean. Catheter care  If you have a drainage tube (catheter) in place, the following steps will help you care for it. · Always wash your hands before and after touching your catheter. · Check the area around the urethra for inflammation or signs of infection. Signs of infection include irritated, swollen, red, or tender skin, or pus around the catheter. · Clean the area around the catheter with soap and water two times a day. Dry with a clean towel afterward. · Do not apply powder or lotion to the skin around the catheter. To empty the urine collection bag  · Wash your hands with soap and water. · Without touching the drain spout, remove the spout from its sleeve at the bottom of the collection bag. Open the valve on the spout. · Let the urine flow out of the bag and into the toilet or a container. Do not let the tubing or drain spout touch anything. · After you empty the bag, clean the end of the drain spout with tissue and water. Close the valve and put the drain spout back into its sleeve at the bottom of the collection bag. · Wash your hands with soap and water. When should you call for help? Call your doctor now or seek immediate medical care if:    · Symptoms such as a fever, chills, nausea, or vomiting get worse or happen for the first time.     · You have new pain in your back just below your rib cage. This is called flank pain.     · There is new blood or pus in your urine.     · You are not able to take or keep down your antibiotics.    Watch closely for changes in your health, and be sure to contact your doctor if:    · You are not getting better after taking an antibiotic for 2 days.     · Your symptoms go away but then come back. Where can you learn more?   Go to http://angelina-rajiv.info/. Enter Y073 in the search box to learn more about \"Urinary Tract Infections in Men: Care Instructions. \"  Current as of: March 21, 2018  Content Version: 11.8  © 0512-2743 [employer]. Care instructions adapted under license by [employer] (which disclaims liability or warranty for this information). If you have questions about a medical condition or this instruction, always ask your healthcare professional. [address] any warranty or liability for your use of this information. Back Pain: Care Instructions  Your Care Instructions    Back pain has many possible causes. It is often related to problems with muscles and ligaments of the back. It may also be related to problems with the nerves, discs, or bones of the back. Moving, lifting, standing, sitting, or sleeping in an awkward way can strain the back. Sometimes you don't notice the injury until later. Arthritis is another common cause of back pain. Although it may hurt a lot, back pain usually improves on its own within several weeks. Most people recover in 12 weeks or less. Using good home treatment and being careful not to stress your back can help you feel better sooner. Follow-up care is a key part of your treatment and safety. Be sure to make and go to all appointments, and call your doctor if you are having problems. It's also a good idea to know your test results and keep a list of the medicines you take. How can you care for yourself at home? · Sit or lie in positions that are most comfortable and reduce your pain. Try one of these positions when you lie down:  ¨ Lie on your back with your knees bent and supported by large pillows. ¨ Lie on the floor with your legs on the seat of a sofa or chair. Ritta Bible on your side with your knees and hips bent and a pillow between your legs. ¨ Lie on your stomach if it does not make pain worse.   · Do not sit up in bed, and avoid soft couches and twisted positions. Bed rest can help relieve pain at first, but it delays healing. Avoid bed rest after the first day of back pain. · Change positions every 30 minutes. If you must sit for long periods of time, take breaks from sitting. Get up and walk around, or lie in a comfortable position. · Try using a heating pad on a low or medium setting for 15 to 20 minutes every 2 or 3 hours. Try a warm shower in place of one session with the heating pad. · You can also try an ice pack for 10 to 15 minutes every 2 to 3 hours. Put a thin cloth between the ice pack and your skin. · Take pain medicines exactly as directed. ¨ If the doctor gave you a prescription medicine for pain, take it as prescribed. ¨ If you are not taking a prescription pain medicine, ask your doctor if you can take an over-the-counter medicine. · Take short walks several times a day. You can start with 5 to 10 minutes, 3 or 4 times a day, and work up to longer walks. Walk on level surfaces and avoid hills and stairs until your back is better. · Return to work and other activities as soon as you can. Continued rest without activity is usually not good for your back. · To prevent future back pain, do exercises to stretch and strengthen your back and stomach. Learn how to use good posture, safe lifting techniques, and proper body mechanics. When should you call for help? Call your doctor now or seek immediate medical care if:    · You have new or worsening numbness in your legs.     · You have new or worsening weakness in your legs. (This could make it hard to stand up.)     · You lose control of your bladder or bowels.    Watch closely for changes in your health, and be sure to contact your doctor if:    · You have a fever, lose weight, or don't feel well.     · You do not get better as expected. Where can you learn more? Go to http://angelina-rajiv.info/.   Enter R491 in the search box to learn more about \"Back Pain: Care Instructions. \"  Current as of: November 29, 2017  Content Version: 11.8  © 8679-5602 [employer]. Care instructions adapted under license by [employer] (which disclaims liability or warranty for this information). If you have questions about a medical condition or this instruction, always ask your healthcare professional. [address] any warranty or liability for your use of this information. Musculoskeletal Pain: Care Instructions  Your Care Instructions  Different problems with the bones, muscles, nerves, ligaments, and tendons in the body can cause pain. One or more areas of your body may ache or burn. Or they may feel tired, stiff, or sore. The medical term for this type of pain is musculoskeletal pain. It can have many different causes. Sometimes the pain is caused by an injury such as a strain or sprain. Or you might have pain from using one part of your body in the same way over and over again. This is called overuse. In some cases, the cause of the pain is another health problem such as arthritis or fibromyalgia. The doctor will examine you and ask you questions about your health to help find the cause of your pain. Blood tests or imaging tests like an X-ray may also be helpful. But sometimes doctors can't find a cause of the pain. Treatment depends on your symptoms and the cause of the pain, if known. The doctor has checked you carefully, but problems can develop later. If you notice any problems or new symptoms, get medical treatment right away. Follow-up care is a key part of your treatment and safety. Be sure to make and go to all appointments, and call your doctor if you are having problems. It's also a good idea to know your test results and keep a list of the medicines you take. How can you care for yourself at home? · Rest until you feel better. · Do not do anything that makes the pain worse.  Return to exercise gradually if you feel better and your doctor says it's okay. · Be safe with medicines. Read and follow all instructions on the label. ¨ If the doctor gave you a prescription medicine for pain, take it as prescribed. ¨ If you are not taking a prescription pain medicine, ask your doctor if you can take an over-the-counter medicine. · Put ice or a cold pack on the area for 10 to 20 minutes at a time to ease pain. Put a thin cloth between the ice and your skin. When should you call for help? Call your doctor now or seek immediate medical care if:  · You have new pain, or your pain gets worse. · You have new symptoms such as a fever, a rash, or chills. Watch closely for changes in your health, and be sure to contact your doctor if:  · You do not get better as expected. Where can you learn more? Go to [email or web address]  Enter Q624 in the search box to learn more about \"Musculoskeletal Pain: Care Instructions. \"   © 1886-4740 [employer]. Care instructions adapted under license by [employer] (which disclaims liability or warranty for this information). This care instruction is for use with your licensed healthcare professional. If you have questions about a medical condition or this instruction, always ask your healthcare professional. [address] any warranty or liability for your use of this information. Content Version: 54.3.722172; Current as of: November 20, 2015             Chronic Kidney Disease: Care Instructions  Your Care Instructions    Chronic kidney disease happens when your kidneys don't work as well as they should. Your kidneys have a few important jobs. They remove waste from your blood. This waste leaves your body in your urine. They also balance your body's fluids and chemicals. When your kidneys don't work well, extra waste and fluid can build up. This can poison the body and sometimes cause death.   The most common causes of this disease are diabetes and high blood pressure. In some cases, the disease develops in 2 to 3 months. But it usually develops over many years. If you take medicine and make healthy changes to your lifestyle, you may be able to prevent the disease from getting worse. But if your kidney damage gets worse, you may need dialysis or a kidney transplant. Dialysis uses a machine to filter waste from the blood. A transplant is surgery to give you a healthy kidney from another person. Follow-up care is a key part of your treatment and safety. Be sure to make and go to all appointments, and call your doctor if you are having problems. It's also a good idea to know your test results and keep a list of the medicines you take. How can you care for yourself at home?   Treatments and appointments    · Be safe with medicines. Take your medicines exactly as prescribed. Call your doctor if you have any problems with your medicine. You also may take medicine to control your blood pressure or to treat diabetes. Many people who have diabetes take blood pressure medicine.     · If you have diabetes, do your best to keep your blood sugar in your target range. You may do this by eating healthy food and exercising. You may also take medicines.     · Go to your dialysis appointments if you have this treatment.     · Do not take ibuprofen (Advil, Motrin), naproxen (Aleve), or similar medicines, unless your doctor tells you to. These may make the disease worse.     · Do not take any vitamins, over-the-counter medicines, or herbal products without talking to your doctor first.     · Do not smoke or use other tobacco products. Smoking can reduce blood flow to the kidneys. If you need help quitting, talk to your doctor about stop-smoking programs and medicines. These can increase your chances of quitting for good.     · Do not drink alcohol or use illegal drugs.     · Talk to your doctor about an exercise plan.  Exercise helps lower your blood pressure. It also makes you feel better.     · If you have an advance directive, let your doctor know. It may include a living will and a durable power of  for health care. If you don't have one, you may want to prepare one. It lets your doctor and loved ones know your health care wishes if you become unable to speak for yourself. Diet    · Talk to a registered dietitian. He or she can help you make a meal plan that is right for you. Most people with kidney disease need to limit salt (sodium), fluids, and protein. Some also have to limit potassium and phosphorus.     · You may have to give up many foods you like. But try to focus on the fact that this will help you stay healthy for as long as possible.     · If you have a hard time eating enough, talk to your doctor or dietitian about ways to add calories to your diet.     · Your diet may change as your disease changes. See your doctor for regular testing. And work with a dietitian to change your diet as needed. When should you call for help? Call 911 anytime you think you may need emergency care. For example, call if:    · You passed out (lost consciousness).    Call your doctor now or seek immediate medical care if:    · You have less urine than normal or no urine.     · You have trouble urinating or can urinate only very small amounts.     · You are confused or have trouble thinking clearly.     · You feel weaker or more tired than usual.     · You are very thirsty, lightheaded, or dizzy.     · You have nausea and vomiting.     · You have new swelling of your arms or feet, or your swelling is worse.     · You have blood in your urine.     · You have new or worse trouble breathing.    Watch closely for changes in your health, and be sure to contact your doctor if:    · You have any problems with your medicine or other treatment. Where can you learn more? Go to http://angelina-rajiv.info/.   Enter N276 in the search box to learn more about \"Chronic Kidney Disease: Care Instructions. \"  Current as of: March 15, 2018  Content Version: 11.8  © 0387-1958 [employer]. Care instructions adapted under license by [employer] (which disclaims liability or warranty for this information). If you have questions about a medical condition or this instruction, always ask your healthcare professional. [address] any warranty or liability for your use of this information. Closed Head Injury: After Your Visit  Your Care Instructions  You have had a head injury. Often, people cannot remember what happened right before or right after a head injury. Some head injuries can make you pass out, or lose consciousness, for a few seconds or minutes right after the injury. You need to have someone watch you closely for the next 24 hours. Contact your regular doctor to discuss follow-up care. Follow-up care is a key part of your treatment and safety. Be sure to make and go to all appointments, and call your doctor if you are having problems. It's also a good idea to know your test results and keep a list of the medicines you take. How can you care for yourself at home? · Have another adult watch you closely for the next 24 hours. That person should check for signs that your head injury is getting worse. · Put ice or a cold pack on the sore area for 10 to 20 minutes at a time. Put a thin cloth between the ice and your skin. · Take an over-the-counter pain medicine, such as acetaminophen (Tylenol), ibuprofen (Advil, Motrin), or naproxen (Aleve). Read and follow all instructions on the label. · You may sleep. If your doctor tells you to, have another adult check you at the suggested times to make sure you are able to wake up, recognize the other adult, and act normally. · Take it easy for the next few days or longer if you are not feeling well. · Do not drink any alcohol for at least the next 24 hours.   What is postconcussive syndrome? If you have had a mild concussion, you may have a mild headache or just feel \"not quite right. \" These symptoms are common and usually go away on their own over a few days to 4 weeks. Sometimes after a concussion you may feel as if you are not functioning as well as you did before the injury, and you may develop new symptoms. This is called postconcussive syndrome. You may:  · Have changes in your ability to solve problems, think, concentrate, or remember. · Have headaches. · Have changes in your sleep patterns, such as not being able to sleep or sleeping all the time. · Have changes in your personality. · Lack interest in your daily activities. · Become easily angered or anxious for no clear reason. · Have changes in your sex drive. · Lose your sense of taste or smell. · Be dizzy, lightheaded, or unsteady and find it hard to stand or walk. When should you call for help? Call 911 anytime you think you may need emergency care. For example, call if:  · You have twitching, jerking, or a seizure. · You suddenly cannot walk or stand. · You passed out (lost consciousness). · You are confused, do not know where you are, or are very sleepy or hard to wake up. Call your doctor now or seek immediate medical care if:  · You continue to vomit after 2 hours, or you have new vomiting. · You have a new watery (not like mucus from a cold) or bloody fluid coming from your nose or ears. · You have new weakness or numbness in any part of your body. · You have trouble walking. · Your headaches get worse. · Your vision changes. Watch closely for changes in your health, and be sure to contact your doctor if:  · You do not get better as expected. Where can you learn more? Go to [employer]  Enter B594 in the search box to learn more about \"Closed Head Injury: After Your Visit. \"   © 2136-0281 Healthwise, Incorporated.  Care instructions adapted under license by [facility] (which disclaims liability or warranty for this information). This care instruction is for use with your licensed healthcare professional. If you have questions about a medical condition or this instruction, always ask your healthcare professional. [place] [address] any warranty or liability for your use of this information.   Content Version: 8.4.913032; Last Revised: June 27, 2012

## 2018-10-11 NOTE — ED NOTES
I have reviewed discharge instructions with the patient and caregiver. The patient and caregiver verbalized understanding. Patient left ED via Discharge Method: ambulatory to Home with son/ caregiver. Opportunity for questions and clarification provided. Patient given 0 scripts. No e-sign. To continue your aftercare when you leave the hospital, you may receive an automated call from our care team to check in on how you are doing. This is a free service and part of our promise to provide the best care and service to meet your aftercare needs.  If you have questions, or wish to unsubscribe from this service please call [phone]. Thank you for Choosing our [facility] Emergency Department.

## 2018-10-11 NOTE — ED TRIAGE NOTES
Patient arrived from home via EMS. Was outside and became dizzy. Patient sat down in wheel chair and daughter was going to take him in the house however, wheelchair wheels had a problem rolling and dumped patient to the ground. Patient c/o back pain and hit head per EMS. /60, HR 60 a.fib, o2 sat 98% on 3LNC, Temp 98.4F, . Patient states he was d/sudheer from [facility] ED a few days ago.

## 2018-10-12 NOTE — PROGRESS NOTES
Care Coordinator called home # [phone], constant busy signal, no answer,unable to leave voicemail message Care Coordinator will make final attempt to reach patient for Transitions of Care Outreach in 5 business days. This note will not be viewable in [address]

## 2018-10-24 NOTE — PROGRESS NOTES
Transition of Care Discharge Follow-up Questionnaire Date/Time of Call: 
 October 24, 2018 3:01PM  
What was the patient hospitalized for? Acute encephalopathy Does the patient understand his/her diagnosis and/or treatment and what happened during the hospitalization? Care Coordinator spoke with patient  And Mrs. Dinah De Los Santos  who agreed to [facility]. Patient states understanding of diagnosis and treatment plan during hospitalization. Did the patient receive discharge instructions? Yes  
CM Assessed Risk for Readmission:  
 
 
 
 
Patient stated Risk for Readmission: Low to Moderate risk for readmit related to complications from Acute encephalopathy  and other comorbidities. Patient states he is doing fairly well and not readmit. Review any discharge instructions (see discharge instructions/AVS in [facility]). Ask patient if they understand these. Do they have any questions? Care Coordinator reviewed discharge medications, diet and discharge instructions with patient's spouse who verbalized understanding of discharge instructions. Patient's spouse educated on the importance of ensuring medication compliance and reporting medication side effects to patient PCP/Specialist.  
  
Were home services ordered (nursing, PT, OT, ST, etc.)? Yes, home health services ordered (RN). If so, has the first visit occurred? If not, why? (Assist with coordination of services if necessary.) Yes Was any DME ordered? No durable medical equipment ordered, patient has rolling walker and wheelchair. If so, has it been received? If not, why?  (Assist patient in obtaining DME orders &/or equipment if necessary. ) NA Complete a review of all medications (new, continued and discontinued meds per the D/C instructions and medication tab in [facility]).  Care Coordinator reviewed all medications with patient's spouse per connect care who verbalized understanding of patient medications. Start: cephALEXin (KEFLEX) 500 mg capsule Were all new prescriptions filled? If not, why?  (Assist patient in obtaining medications if necessary  escalate for CCM &/or SW if ongoing issues are verbalized by pt or anticipated) Yes, patient's spouse states patient has completed antibiotic. Does the patient understand the purpose and dosing instructions for all medications? (If patient has questions, provide explanation and education.) Patient's spouse states understanding of patient current medications and dosing instructions. Does the patient have any problems in performing ADLs? (If patient is unable to perform ADLs  what is the limiting factor(s)? Do they have a support system that can assist? If no support system is present, discuss possible assistance that they may be able to obtain. Escalate for CCM/SW if ongoing issues are verbalized by pt or anticipated) Patient's spouse states patient is independent with ADL's and uses a Walker/Wheelchair to assist with ambulation. Patient's spouse states patient is doing as well as to be expected. Patient's spouse states her and family members assist patient 24/7. Does the patient have all follow-up appointments scheduled? 7 day f/up with PCP?  
(f/up with PCP may be w/in 14 days if patient has a f/up with their specialist w/in 7 days) 7-14 day f/up with specialist?  
(or per discharge instructions) If f/up has not been made  what actions has the care coordinator made to accomplish this? Has transportation been arranged? Yes Follow up with [facility] with Dr. Quique Barrera occurred on 10/18/2018. NA 
 
 
NA Yes Any other questions or concerns expressed by the patient? No further needs identified: Patient's spouse instructed to call Care Coordinator if further questions or concerns arise Schedule next appointment with S[facility] Sears or refer to RN Case Manager/ per the workflow guidelines. When is care coordinators next follow-up call scheduled? If referred for CCM  what RN care manager was the referral assigned? NA 
 
 
 
 
3 weeks- Care Coordinator provided contact information if assistance is needed for concerns or questions. Patient's spouse declined referral for CCM services. Patient's spouse states patient has no additional needs and her and family are managing with help of home health nurses. [facility]AR Call Completed By: Silke Nguyễn, TREVORN [facility] [employer] [facility] Community Care Coordinator This note will not be viewable in [address]

## 2018-10-26 NOTE — ED PROVIDER NOTES
Pt presents with dyspnea, onset yesterday Has a h/o copd, and h/o chf 
Better after duoneb en route. / taking lasx as usual. 
Orthopnea maybe a little worse than usual / no leg swelling No fever minimal cough No chest pain or pressure Past Medical History:  
Diagnosis Date  Atherosclerosis of artery of extremity with ulceration ([address] 4/17/2015  Atherosclerosis of native arteries of extremity with intermittent claudication ([address] 4/17/2015  Atopic dermatitis 11/21/2012  Atrial fibrillation ([address]  CAD (coronary artery disease)  Carotid stenosis, bilateral 11/21/2012 50-79%  3-2010    1. Carotid Doppler (6/1/07): Greater than 70% stenosis in proximal LICA. 50% stenosis in right ICA. 2.  CTA of neck (3/15/10): Occluded distal segments of the vertebral arteries bilaterally. Atherosclerosis of the carotid bulbs bilaterally with a 50% stenosis on the left and a stenosis of less than 30% on the right. Small nodule in the right upper lobe near the apex. 3. CTA (8/29/13):  Less than 30% diameter stenosis of the cervical internal carotid arteries bilaterally.  CHF (congestive heart failure) ([address] 11/21/2012  Chronic kidney disease   
 hx elevated labs  Chronic obstructive pulmonary disease ([address]  Colon, diverticulosis 1/24/2015  Coronary atherosclerosis of native coronary vessel 10/31/2016 1. Cath (5/31/07):  LMCA: 25%. LAD: ostial 75-95% stenosis. 50-75% mid. D1:  25-50%. OM3: small with 75% stenosis. RCA: 100% mid. with left to right collaterals. 2 Vessel CABG (6/4/07):  LIMA to LAD and SVG to OM. SVG was anastomosed proximally to LIMA to due severe atherosclerosis of aorta. 3.  Lexiscan cardiolite (11/30/10): Abnormal with reversible lateral wall defects. Unable to gate given atrial fibrillation. 4.  LHC (1/14/11):  EF 60%. LVEDP 21. Patent LIMA to LAD and SVG to OM1 (off LIMA). occluded small RCA with left to right collaterals.  Small D1 (2 mm vessel) with 70% mid stenosis.  Diabetes ([address]  Diastolic CHF, acute on chronic (HCC) 2015 1. Echo (9/11/15) : EF 55-60%. Mild LVH. Moderate biatrial enlargement. Moderate mitral/tricuspid regurgitation.  Failed CABG (coronary artery bypass graft) 2012  GI bleed 2015 Hospitalized [facility]  Gout 2012  History of tobacco use  [place] (hard of hearing)  Hypercholesterolemia  Hypertension  Hyperuricemia 2012  Morbid obesity ([address]  PAD (peripheral artery disease) ([address] 2012 1. Bilateral proximal common iliac PCI (08):  8.0 X 100 mm Cordis smart stent on right and 10 X 40 mm cordis smart stent on right. Both inflated to 7.0 mm.  Poor historian  Radiologic findings of lung field, abnormal 10/31/2016 1. CT of chest  (11/24/10): Multiple small nodules in the right lobe and stable interstitial prominence. Consistent with chronic lung disease. No evidence of malignancy.  Seizure disorder ([address] 2013  Shortness of breath dyspnea 2013  Thyroid disease  Unspecified sleep apnea   
 uses CPAP Past Surgical History:  
Procedure Laterality Date  CARDIAC SURG PROCEDURE UNLIST    
 cath ,cabg ,lexiscan cardiolite 11/10  
 HX CATARACT REMOVAL Left   
 os  HX COLONOSCOPY    
 HX CORONARY ARTERY BYPASS GRAFT  2007  HX CORONARY STENT PLACEMENT  2008  
 bilateral iliac artery PCI and stents  HX HEART CATHETERIZATION    
 left-2007, 2011  HX HEENT  1970s  
 neck lipoma Family History:  
Problem Relation Age of Onset  Heart Attack Mother [facility] Other Father   
     old age  Other Brother   
     brain aneurysm  Heart Disease Other 2 children  with heart concerns, 36 & 47 yo  
 Heart Disease Son   
 
 
Social History Socioeconomic History  Marital status:  Spouse name: Not on file  Number of children: Not on file  Years of education: Not on file  Highest education level: Not on file Social Needs  Financial resource strain: Not on file  Food insecurity - worry: Not on file  Food insecurity - inability: Not on file  Transportation needs - medical: Not on file  Transportation needs - non-medical: Not on file Occupational History  Occupation: [employer]. 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  
  Alcohol/week: 0.0 oz  Drug use: No  
 Sexual activity: Not Currently Other Topics Concern  Not on file Social History Narrative 45 pack year history cigarette smoking, stopped in . 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 , two living children, two children  with coronary artery disease, ages 36 and 48. Has always lived in [address] No pets. ALLERGIES: Patient has no known allergies. Review of Systems Constitutional: Negative for chills and fever. HENT: Negative for rhinorrhea and sore throat. Eyes: Negative for discharge and redness. Respiratory: Positive for shortness of breath. Negative for cough. Cardiovascular: Negative for chest pain and palpitations. Gastrointestinal: Negative for abdominal pain, nausea and vomiting. Genitourinary: Negative for difficulty urinating. Skin: Negative for rash. Neurological: Negative for dizziness and headaches. All other systems reviewed and are negative. Vitals:  
 10/26/18 1311 10/26/18 1328 10/26/18 1348 10/26/18 1553 BP: 131/88 143/64 Pulse: 69  70 SpO2:  100%  96% Physical Exam  
Constitutional: He is oriented to person, place, and time. He appears well-developed and well-nourished. HENT:  
Head: Normocephalic and atraumatic. Eyes: Conjunctivae are normal. Pupils are equal, round, and reactive to light. Right eye exhibits no discharge. Left eye exhibits no discharge. No scleral icterus. Neck: Normal range of motion. Neck supple. Cardiovascular: Normal rate, regular rhythm and normal heart sounds. Exam reveals no gallop. No murmur heard. Pulmonary/Chest: Effort normal. No respiratory distress. He has wheezes. He has rales. Abdominal: Soft. Bowel sounds are normal. There is no tenderness. There is no guarding. Musculoskeletal: Normal range of motion. He exhibits no edema. Neurological: He is alert and oriented to person, place, and time. He exhibits normal muscle tone. cni 2-12 grossly Skin: Skin is warm and dry. He is not diaphoretic. Psychiatric: He has a normal mood and affect. His behavior is normal.  
Nursing note and vitals reviewed. MDM Number of Diagnoses or Management Options Acute on chronic congestive heart failure, unspecified heart failure type [facility]):  
COPD exacerbation [facility]):  
Diagnosis management comments: Medical decision making note: 
Shortness of breath Mixed exacerbation between CHF and COPD Diuresing nicely Wants to go home Ambulates with minimal desaturation, and feels fine doing so This concludes the \"medical decision making note\" part of this emergency department visit note. Amount and/or Complexity of Data Reviewed Clinical lab tests: reviewed and ordered (Results Include: 
 
Recent Results (from the past 24 hour(s)) -CBC WITH AUTOMATED DIFF Collection Time: 10/26/18 12:39 PM 
     Result                      Value             Ref Range      WBC                         8.1               4.3 - 11.1 K* 
     RBC                         4.21 (L)          4.23 - 5.6 M* 
 HGB                         11.1 (L)          13.6 - 17.2 * HCT                         37.7 (L)          41.1 - 50.3 % MCV                         89.5              79.6 - 97.8 * MCH                         26.4              26.1 - 32.9 * MCHC                        29.4 (L)          31.4 - 35.0 * RDW                         18.1              % PLATELET                    193               150 - 450 K/* MPV                         11.1              9.4 - 12.3 FL ABSOLUTE NRBC               0.00              0.0 - 0.2 K/* DF                          AUTOMATED NEUTROPHILS                 70                43 - 78 % LYMPHOCYTES                 18                13 - 44 % MONOCYTES                   7                 4.0 - 12.0 % EOSINOPHILS                 4                 0.5 - 7.8 % BASOPHILS                   1                 0.0 - 2.0 % IMMATURE GRANULOCYTES       0                 0.0 - 5.0 %   
     ABS. NEUTROPHILS            5.7               1.7 - 8.2 K/* ABS. LYMPHOCYTES            1.5               0.5 - 4.6 K/* ABS. MONOCYTES              0.6               0.1 - 1.3 K/* ABS. EOSINOPHILS            0.3               0.0 - 0.8 K/* ABS. BASOPHILS              0.0               0.0 - 0.2 K/* ABS. IMM. GRANS.            0.0               0.0 - 0.5 K/* 
-METABOLIC PANEL, COMPREHENSIVE Collection Time: 10/26/18 12:39 PM 
     Result                      Value             Ref Range Sodium                      140               136 - 145 mm* Potassium                   3.9               3.5 - 5.1 mm* Chloride                    100               98 - 107 mmo* CO2                         34 (H)            21 - 32 mmol*      Anion gap                   6 (L)             7 - 16 mmol/L 
 Glucose                     153 (H)           65 - 100 mg/* BUN                         29 (H)            8 - 23 MG/DL Creatinine                  1.63 (H)          0.8 - 1.5 MG* 
     GFR est AA                  52 (L)            >60 ml/min/1* GFR est non-AA              43 (L)            >60 ml/min/1* Calcium                     8.6               8.3 - 10.4 M* Bilirubin, total            1.3 (H)           0.2 - 1.1 MG* ALT (SGPT)                  15                12 - 65 U/L   
     AST (SGOT)                  17                15 - 37 U/L Alk. phosphatase            166 (H)           50 - 136 U/L Protein, total              8.0               6.3 - 8.2 g/* Albumin                     2.9 (L)           3.2 - 4.6 g/* Globulin                    5.1 (H)           2.3 - 3.5 g/* A-G Ratio                   0.6 (L)           1.2 - 3.5     
-EKG, 12 LEAD, INITIAL Collection Time: 10/26/18 12:40 PM 
     Result                      Value             Ref Range Ventricular Rate            77                BPM           
     Atrial Rate                 227               BPM           
     QRS Duration                110               ms Q-T Interval                380               ms            
     QTC Calculation (Bezet)     430               ms            
     Calculated R Axis           62                degrees Calculated T Axis           -142              degrees Diagnosis                                                   
 !! AGE AND GENDER SPECIFIC ECG ANALYSIS !!  Atrial fibrillation Cannot rule out Inferior infarct (cited on or before 02-SEP-2018) Anteroseptal infarct (cited on or before 02-SEP-2018) ST & T wave abnormality, consider lateral ischemia or digitalis effect Abnormal ECG When compared with ECG of 11-OCT-2018 17:08, Questionable change in initial forces of Anterior leads Confirmed by Devin Dickerson MD (), Grover Lock ([id number]) on 10/26/2018 1:15:27 PM  
-POC LACTIC ACID Collection Time: 10/26/18 12:46 PM 
     Result                      Value             Ref Range Lactic Acid (POC)           1.8               0.5 - 1.9 mm* 
) Tests in the radiology section of CPT®: reviewed and ordered (XR CHEST PORT Final Result IMPRESSION: Findings suggestive of CHF. ) Procedures

## 2018-10-26 NOTE — DISCHARGE INSTRUCTIONS
Increase Lasix from 20 mg to 40 mg for the next 3 mornings - Saturday, Sunday, and Monday. Resume usual Lasix dosing at 20 mg each morning, starting on Tuesday. Start 40 mg of prednisone each day for the next 4 days  Breathing treatments with nebulizer or inhaler as needed  Return to the ER if worse in any way         COPD Exacerbation Plan: Care Instructions  Your Care Instructions    If you have chronic obstructive pulmonary disease (COPD), your usual shortness of breath could suddenly get worse. You may start coughing more and have more mucus. This flare-up is called a COPD exacerbation (say \"[id number]-[id number]\"). A lung infection or air pollution could set off an exacerbation. Sometimes it can happen after a quick change in temperature or being around chemicals. Work with your doctor to make a plan for dealing with an exacerbation. You can better manage it if you plan ahead. Follow-up care is a key part of your treatment and safety. Be sure to make and go to all appointments, and call your doctor if you are having problems. It's also a good idea to know your test results and keep a list of the medicines you take. How can you care for yourself at home? During an exacerbation  · Do not panic if you start to have one. Quick treatment at home may help you prevent serious breathing problems. If you have a COPD exacerbation plan that you developed with your doctor, follow it. · Take your medicines exactly as your doctor tells you.  ? Use your inhaler as directed by your doctor. If your symptoms do not get better after you use your medicine, have someone take you to the emergency room. Call an ambulance if necessary. ? With inhaled medicines, a spacer or a nebulizer may help you get more medicine to your lungs. Ask your doctor or pharmacist how to use them properly. Practice using the spacer in front of a mirror before you have an exacerbation.  This may help you get the medicine into your lungs quickly. ? If your doctor has given you steroid pills, take them as directed. ? Your doctor may have given you a prescription for antibiotics, which you can fill if you need it. ? Talk to your doctor if you have any problems with your medicine. And call your doctor if you have to use your antibiotic or steroid pills. Preventing an exacerbation  · Do not smoke. This is the most important step you can take to prevent more damage to your lungs and prevent problems. If you already smoke, it is never too late to stop. If you need help quitting, talk to your doctor about stop-smoking programs and medicines. These can increase your chances of quitting for good. · Take your daily medicines as prescribed. · Avoid colds and flu. ? Get a pneumococcal vaccine. ? Get a flu vaccine each year, as soon as it is available. Ask those you live or work with to do the same, so they will not get the flu and infect you. ? Try to stay away from people with colds or the flu. ? Wash your hands often. · Avoid secondhand smoke; air pollution; cold, dry air; hot, humid air; and high altitudes. Stay at home with your windows closed when air pollution is bad. · Learn breathing techniques for COPD, such as breathing through pursed lips. These techniques can help you breathe easier during an exacerbation. When should you call for help? Call 911 anytime you think you may need emergency care.  For example, call if:    · You have severe trouble breathing.     · You have severe chest pain.    Call your doctor now or seek immediate medical care if:    · You have new or worse shortness of breath.     · You develop new chest pain.     · You are coughing more deeply or more often, especially if you notice more mucus or a change in the color of your mucus.     · You cough up blood.     · You have new or increased swelling in your legs or belly.     · You have a fever.    Watch closely for changes in your health, and be sure to contact your doctor if:    · You need to use your antibiotic or steroid pills.     · Your symptoms are getting worse. Where can you learn more? Go to http://angelina-rajiv.info/. Enter Q686 in the search box to learn more about \"COPD Exacerbation Plan: Care Instructions. \"  Current as of: December 6, 2017  Content Version: 11.8  © 0142-2174 [employer]. Care instructions adapted under license by [employer] (which disclaims liability or warranty for this information). If you have questions about a medical condition or this instruction, always ask your healthcare professional. [place] [address] any warranty or liability for your use of this information. Heart Failure: Care Instructions  Your Care Instructions    Heart failure occurs when your heart does not pump as much blood as the body needs. Failure does not mean that the heart has stopped pumping but rather that it is not pumping as well as it should. Over time, this causes fluid buildup in your lungs and other parts of your body. Fluid buildup can cause shortness of breath, fatigue, swollen ankles, and other problems. By taking medicines regularly, reducing sodium (salt) in your diet, checking your weight every day, and making lifestyle changes, you can feel better and live longer. Follow-up care is a key part of your treatment and safety. Be sure to make and go to all appointments, and call your doctor if you are having problems. It's also a good idea to know your test results and keep a list of the medicines you take. How can you care for yourself at home? Medicines    · Be safe with medicines. Take your medicines exactly as prescribed.  Call your doctor if you think you are having a problem with your medicine.     · Do not take any vitamins, over-the-counter medicine, or herbal products without talking to your doctor first. Jae Beebe not take ibuprofen (Advil or Motrin) and naproxen (Aleve) without talking to your doctor first. They could make your heart failure worse.     · You may be taking some of the following medicine. ? Beta-blockers can slow heart rate, decrease blood pressure, and improve your condition. Taking a beta-blocker may lower your chance of needing to be hospitalized. ? Angiotensin-converting enzyme inhibitors (ACEIs) reduce the heart's workload, lower blood pressure, and reduce swelling. Taking an ACEI may lower your chance of needing to be hospitalized again. ? Angiotensin II receptor blockers (ARBs) work like ACEIs. Your doctor may prescribe them instead of ACEIs. ? Diuretics, also called water pills, reduce swelling. ? Potassium supplements replace this important mineral, which is sometimes lost with diuretics. ? Aspirin and other blood thinners prevent blood clots, which can cause a stroke or heart attack.    You will get more details on the specific medicines your doctor prescribes. Diet    · Your doctor may suggest that you limit sodium to 2,000 milligrams (mg) a day or less. That is less than 1 teaspoon of salt a day, including all the salt you eat in cooking or in packaged foods. People get most of their sodium from processed foods. Fast food and restaurant meals also tend to be very high in sodium.     · Ask your doctor how much liquid you can drink each day. You may have to limit liquids.    Weight    · Weigh yourself without clothing at the same time each day. Record your weight. Call your doctor if you have a sudden weight gain, such as more than 2 to 3 pounds in a day or 5 pounds in a week. (Your doctor may suggest a different range of weight gain.) A sudden weight gain may mean that your heart failure is getting worse.    Activity level    · Start light exercise (if your doctor says it is okay). Even if you can only do a small amount, exercise will help you get stronger, have more energy, and manage your weight and your stress. Walking is an easy way to get exercise.  Start out by walking a little more than you did before. Bit by bit, increase the amount you walk.     · When you exercise, watch for signs that your heart is working too hard. You are pushing yourself too hard if you cannot talk while you are exercising. If you become short of breath or dizzy or have chest pain, stop, sit down, and rest.     · If you feel \"wiped out\" the day after you exercise, walk slower or for a shorter distance until you can work up to a better pace.     · Get enough rest at night. Sleeping with 1 or 2 pillows under your upper body and head may help you breathe easier.    Lifestyle changes    · Do not smoke. Smoking can make a heart condition worse. If you need help quitting, talk to your doctor about stop-smoking programs and medicines. These can increase your chances of quitting for good. Quitting smoking may be the most important step you can take to protect your heart.     · Limit alcohol to 2 drinks a day for men and 1 drink a day for women. Too much alcohol can cause health problems.     · Avoid getting sick from colds and the flu. Get a pneumococcal vaccine shot. If you have had one before, ask your doctor whether you need another dose. Get a flu shot each year. If you must be around people with colds or the flu, wash your hands often. When should you call for help? Call 911 if you have symptoms of sudden heart failure such as:    · You have severe trouble breathing.     · You cough up pink, foamy mucus.     · You have a new irregular or rapid heartbeat.    Call your doctor now or seek immediate medical care if:    · You have new or increased shortness of breath.     · You are dizzy or lightheaded, or you feel like you may faint.     · You have sudden weight gain, such as more than 2 to 3 pounds in a day or 5 pounds in a week.  (Your doctor may suggest a different range of weight gain.)     · You have increased swelling in your legs, ankles, or feet.     · You are suddenly so tired or weak that you cannot do your usual activities.    Watch closely for changes in your health, and be sure to contact your doctor if you develop new symptoms. Where can you learn more? Go to http://angelina-rajiv.info/. Enter F788 in the search box to learn more about \"Heart Failure: Care Instructions. \"  Current as of: December 6, 2017  Content Version: 11.8  © 6214-7227 [email or web address]. Care instructions adapted under license by [employer] (which disclaims liability or warranty for this information). If you have questions about a medical condition or this instruction, always ask your healthcare professional. [address] any warranty or liability for your use of this information.

## 2018-10-26 NOTE — ED NOTES
I have reviewed discharge instructions with the patient and spouse. The patient and spouse verbalized understanding. Patient left ED via Discharge Method: wheelchair to Home with wife. Opportunity for questions and clarification provided. Patient given 4 scripts. No e-sign. To continue your aftercare when you leave the hospital, you may receive an automated call from our care team to check in on how you are doing. This is a free service and part of our promise to provide the best care and service to meet your aftercare needs.  If you have questions, or wish to unsubscribe from this service please call [phone]. Thank you for Choosing our [facility] Emergency Department.

## 2018-10-26 NOTE — ED TRIAGE NOTES
Pt to ed via EMS from home with c/o shob and respiratory difficulty. Pt currently being cared for by family at home. Pt was given albuterol enroute with some improvement in breathing/saturation. Pt using accessory muscles and lung sounds diminished bilaterally. Pt a & o x3. Pt possibly dx with pneumonia yesterday.
no diabetes and no thyroid trouble.

## 2018-10-26 NOTE — ED NOTES
Oxygen monitoring performed while walking with this RN and ANGELA Byrnes. Pt wearing normal O2 that he wears at home. Lowest O2 was 85% was when pt stopped walking, but pt quickly jumped back up to 90%. Pt remain between 89% and 92% other than that one interest. Dr. Campos Rios aware. Walked about 100 yards total, pt seemed tired after this.

## 2018-10-31 PROBLEM — R65.21 SEPTIC SHOCK (HCC): Status: ACTIVE | Noted: 2018-01-01

## 2018-10-31 PROBLEM — D64.9 ANEMIA: Status: ACTIVE | Noted: 2018-01-01

## 2018-10-31 PROBLEM — N18.9 ACUTE ON CHRONIC RENAL FAILURE (HCC): Status: ACTIVE | Noted: 2018-01-01

## 2018-10-31 PROBLEM — N17.9 ACUTE ON CHRONIC RENAL FAILURE (HCC): Status: ACTIVE | Noted: 2018-01-01

## 2018-10-31 PROBLEM — A41.9 SEPTIC SHOCK (HCC): Status: ACTIVE | Noted: 2018-01-01

## 2018-10-31 PROBLEM — Z95.828 S/P IVC FILTER: Chronic | Status: ACTIVE | Noted: 2018-01-01

## 2018-10-31 PROBLEM — R17 TOTAL BILIRUBIN, ELEVATED: Status: ACTIVE | Noted: 2018-01-01

## 2018-10-31 PROBLEM — R79.89 ELEVATED LFTS: Status: ACTIVE | Noted: 2018-01-01

## 2018-10-31 PROBLEM — J96.01 ACUTE RESPIRATORY FAILURE WITH HYPOXIA (HCC): Status: ACTIVE | Noted: 2018-01-01

## 2018-10-31 NOTE — ED PROVIDER NOTES
She is an 25-year-old male with a history of COPD and congestive heart failure who arrives in the emergency department today via EMS from home. Patient states he has some chronic shortness of breath. He was seen here last week and diuresed slightly. Family later arrived and states he hasn't felt well for several days. He states that today he just slipped and fell and injured his right knee and right elbow. He complained of chronic shortness of breath EMS gave him a nebulizer treatment Friday was in distress and placed him on some BiPAP but his blood pressure when he even little worse of a stopped the BiPAP. Patient tells me he is just chronically weak. He is not a very good historian. The history is provided by the patient and the EMS personnel. Shortness of Breath Pertinent negatives include no fever. Past Medical History:  
Diagnosis Date  Atherosclerosis of artery of extremity with ulceration ([address] 4/17/2015  Atherosclerosis of native arteries of extremity with intermittent claudication ([address] 4/17/2015  Atopic dermatitis 11/21/2012  Atrial fibrillation ([address]  CAD (coronary artery disease)  Carotid stenosis, bilateral 11/21/2012 50-79%  3-2010    1. Carotid Doppler (6/1/07): Greater than 70% stenosis in proximal LICA. 50% stenosis in right ICA. 2.  CTA of neck (3/15/10): Occluded distal segments of the vertebral arteries bilaterally. Atherosclerosis of the carotid bulbs bilaterally with a 50% stenosis on the left and a stenosis of less than 30% on the right. Small nodule in the right upper lobe near the apex. 3. CTA (8/29/13):  Less than 30% diameter stenosis of the cervical internal carotid arteries bilaterally.  CHF (congestive heart failure) ([address] 11/21/2012  Chronic kidney disease   
 hx elevated labs  Chronic obstructive pulmonary disease ([address]  Colon, diverticulosis 1/24/2015  Coronary atherosclerosis of native coronary vessel 10/31/2016 1.  Cath (5/31/07):  LMCA: 25%. LAD: ostial 75-95% stenosis. 50-75% mid. D1:  25-50%. OM3: small with 75% stenosis. RCA: 100% mid. with left to right collaterals. 2 Vessel CABG (6/4/07):  LIMA to LAD and SVG to OM. SVG was anastomosed proximally to LIMA to due severe atherosclerosis of aorta. 3.  Lexiscan cardiolite (11/30/10): Abnormal with reversible lateral wall defects. Unable to gate given atrial fibrillation. 4.  LHC (1/14/11):  EF 60%. LVEDP 21. Patent LIMA to LAD and SVG to OM1 (off LIMA). occluded small RCA with left to right collaterals. Small D1 (2 mm vessel) with 70% mid stenosis.  Diabetes ([address]  Diastolic CHF, acute on chronic (HCC) 9/12/2015 1. Echo (9/11/15) : EF 55-60%. Mild LVH. Moderate biatrial enlargement. Moderate mitral/tricuspid regurgitation.  Failed CABG (coronary artery bypass graft) 11/21/2012  GI bleed 1/2015 Hospitalized [facility]  Gout 11/21/2012  History of tobacco use  [place] (hard of hearing)  Hypercholesterolemia  Hypertension  Hyperuricemia 11/21/2012  Morbid obesity ([address]  PAD (peripheral artery disease) ([address] 11/21/2012 1. Bilateral proximal common iliac PCI (2/21/08):  8.0 X 100 mm Cordis smart stent on right and 10 X 40 mm cordis smart stent on right. Both inflated to 7.0 mm.  Poor historian  Radiologic findings of lung field, abnormal 10/31/2016 1. CT of chest  (11/24/10): Multiple small nodules in the right lobe and stable interstitial prominence. Consistent with chronic lung disease. No evidence of malignancy.  Seizure disorder ([address] 8/5/2013  Shortness of breath dyspnea 8/5/2013  Thyroid disease  Unspecified sleep apnea   
 uses CPAP Past Surgical History:  
Procedure Laterality Date  CARDIAC SURG PROCEDURE UNLIST    
 cath 5/07,cabg 6/07,lexiscan cardiolite 11/10  
 HX CATARACT REMOVAL Left 2003  
 os  HX COLONOSCOPY    
 HX CORONARY ARTERY BYPASS GRAFT  06-  HX CORONARY STENT PLACEMENT  2008  
 bilateral iliac artery PCI and stents  HX HEART CATHETERIZATION    
 left-2007, 2011  HX HEENT  1970s  
 neck lipoma Family History:  
Problem Relation Age of Onset  Heart Attack Mother [facility] Other Father   
     old age  Other Brother   
     brain aneurysm  Heart Disease Other 2 children  with heart concerns, 36 & 47 yo  
 Heart Disease Son   
 
 
Social History Socioeconomic History  Marital status:  Spouse name: Not on file  Number of children: Not on file  Years of education: Not on file  Highest education level: Not on file Social Needs  Financial resource strain: Not on file  Food insecurity - worry: Not on file  Food insecurity - inability: Not on file  Transportation needs - medical: Not on file  Transportation needs - non-medical: Not on file Occupational History  Occupation: [employer]. 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  
  Alcohol/week: 0.0 oz  Drug use: No  
 Sexual activity: Not Currently Other Topics Concern  Not on file Social History Narrative 45 pack year history cigarette smoking, stopped in . 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 , two living children, two children  with coronary artery disease, ages 36 and 48. Has always lived in [address]. No pets. ALLERGIES: Patient has no known allergies. Review of Systems Constitutional: Positive for fatigue. Negative for chills and fever. HENT: Negative. Eyes: Negative. Respiratory: Positive for shortness of breath. Cardiovascular: Negative. Gastrointestinal: Negative. Endocrine: Negative. Genitourinary: Negative. Musculoskeletal: Negative. Skin: Negative. Neurological: Positive for weakness. Negative for seizures and syncope. Vitals:  
 10/31/18 1359 10/31/18 1359 10/31/18 1404 10/31/18 1427 BP: 101/60  (!) 73/33 (!) 81/46 Pulse:    (!) 48 Resp:      
Temp:  98.6 °F (37 °C) SpO2: 91%  (!) 89% Weight:      
Height:      
      
 
Physical Exam  
Constitutional: He is oriented to person, place, and time. He appears well-developed. Chronically ill-appearing HENT:  
Head: Normocephalic and atraumatic. Eyes: Conjunctivae and EOM are normal. Pupils are equal, round, and reactive to light. Icteric sclera Neck: Normal range of motion. Neck supple. Cardiovascular: Intact distal pulses. Bradycardia present. Pulmonary/Chest: Accessory muscle usage present. Mild Rales over the bases and poor air movement. Abdominal: Soft. There is no tenderness. There is no rebound and no guarding. Musculoskeletal: Normal range of motion. He exhibits no edema or tenderness. Superficial skin tear to the right elbow and 1 cm abrasion to the right knee. Lymphadenopathy:  
  He has no cervical adenopathy. Neurological: He is alert and oriented to person, place, and time. He has normal strength. No cranial nerve deficit or sensory deficit. GCS eye subscore is 4. GCS verbal subscore is 5. GCS motor subscore is 6. Skin: Skin is warm. No rash noted. Jaundiced,  Chronic venous stasis changes to bilateral lower extremities Nursing note and vitals reviewed. MDM Number of Diagnoses or Management Options Diagnosis management comments: EKG shows atrial fibrillation with a rate of . Chest x-ray shows some chronic edema however not worse than last week.   Blood work shows a markedly elevated lactate at 3.8 elevated white cell count at 24,000 and Elevated bilirubin at 5.7. Broad spectrum IV antibiotics have been ordered as well as pressors for the hypotension I will hold off on IV fluids because of the congestive heart failure. Also has worsening renal insufficiency. 2:44 PM 
I briefly spoke with the hospitalist service and they request I used the intensivist due to the patient's hypotension and needing pressors. I called and spoke with the pulmonary intensivist who took report and they will come to the ER to evaluate the patient. They did ask that I give 500 mL of IV normal saline bolus. 
 
=================================================================== This patient is critically ill and there is a high probability of of imminent or life threatening deterioration in the patient's condition without immediate management. The nature of the patient's clinical problem is: Sepsis, hypotension I have spent 65 minutes in direct patient care, documentation, review of labs/xrays/old records, discussion with patient, family, intensivist. .  
 
The time involved in the performance of separately reportable procedures was not counted toward critical care time. Dave Sheikh MD; 10/31/2018 @2:44 PM 
=================================================================== Voice dictation software was used during the making of this note. This software is not perfect and grammatical and other typographical errors may be present. This note has been proofread, but may still contain errors. Dave Sheikh MD; 10/31/2018 @2:45 PM  
=================================================================== Amount and/or Complexity of Data Reviewed Clinical lab tests: ordered and reviewed Tests in the radiology section of CPT®: ordered and reviewed Obtain history from someone other than the patient: yes Review and summarize past medical records: yes Discuss the patient with other providers: yes Independent visualization of images, tracings, or specimens: yes Risk of Complications, Morbidity, and/or Mortality Presenting problems: high Diagnostic procedures: moderate Management options: high Critical Care Total time providing critical care: 30-74 minutes Procedures

## 2018-10-31 NOTE — PROGRESS NOTES
Pt's HR improved to 50s. BP improved. Pt reports feeling better, tolerating BIPAP. Dr. Hanna Beam B updated on pt's condition.

## 2018-10-31 NOTE — PROGRESS NOTES
Late entry: BGL noted. Temp noted. Pt's HR in 30s. Dr. Anna Marie BRADY notified, order obtained. Pt states, \"Something is wrong. I can't breathe. \"  Lung sounds reveal crackles. RT to bedside to place patient on BIPAP.

## 2018-10-31 NOTE — PROGRESS NOTES
Pt arrived to room 3306 via stretcher from ER. Pt is alert and oriented with no complaints. Pt's HR on monitor from 30-60s. Dr. Arvind BRADY at bedside and aware. Order obtained to give 500 ml bolus and keep SBP greater than 90. Dual skin assessment completed by myself and Sebastien Whitaker RN. Pt has dry, scaling, flaky, reddened bilat lower extremities. Heels are boggy. Prevalon boots applied. Pt has excoriation and moisture type injury to bilat inguinal folds. Pt bathed and Nystatin powder placed per order. Pt has skin tear to right posterior hand. Skin tear cleaned and steristrips and dressing applied. Pt has abrasion to right knee. Mepilex dressing placed. Pt has small area of non blanchable redness to gluteal cleft and sacral area. Allevyn placed.

## 2018-10-31 NOTE — ED NOTES
Pt is becoming verbally aggressive and states he wants to be bathed now and have something placed on his groin for the pain. Explained to pt and pt wife that I have been attempting to call report to get him upstairs. Pt states he doesn't care and just wants it done now and he will send for something to put on there.

## 2018-10-31 NOTE — ED NOTES
Pt sitting on side of stretcher and states that he feels better sitting that way, but still doesn't really feel great either way. Pt educated not to stand up without assistance because of fall risk. Pt verbalized understanding.

## 2018-10-31 NOTE — ED TRIAGE NOTES
Per EMS, called out for fall and pt was using neb tx while getting out of the floor. Pt reported dizziness after moved into chair and excessive SOB began at that time. Pt has hx of COPD and CHF. Pt hypotensive, tachypneic, and hypoxic in route. Pt initial SpO2 = 86. Sinus Isidro at 48. BP 81/46. Pt was initially placed on CPAP and moved to NRB before arrival r/t hypotension. 100mL NS administered in route. 18g RAC. Pt appears to have labored breathing. Swelling noted to abd and bilateral lower extremity. Pt appears jaundice as well, sclera yellow.

## 2018-10-31 NOTE — PROGRESS NOTES
Attempted bipap, but pt's bp started to drop with increase in hr, then decrease. Per Dr. Andrea Brady bipap on standby at this time.

## 2018-10-31 NOTE — ROUTINE PROCESS
TRANSFER - OUT REPORT: 
 
Verbal report given to ANGELA Byrnes on Fermin Olvera  being transferred to [address] [place] [address] for routine progression of care Report consisted of patients Situation, Background, Assessment and  
Recommendations(SBAR). Information from the following report(s) SBAR was reviewed with the receiving nurse. Lines:  
Peripheral IV 10/31/18 Right Antecubital (Active) Site Assessment Clean, dry, & intact 10/31/2018  1:35 PM  
Phlebitis Assessment 0 10/31/2018  1:35 PM  
Infiltration Assessment 0 10/31/2018  1:35 PM  
Dressing Status Clean, dry, & intact 10/31/2018  1:35 PM  
Dressing Type Transparent 10/31/2018  1:35 PM  
Hub Color/Line Status Green 10/31/2018  1:35 PM  
   
Peripheral IV 10/31/18 Right Forearm (Active) Site Assessment Clean, dry, & intact 10/31/2018  1:36 PM  
Phlebitis Assessment 0 10/31/2018  1:36 PM  
Infiltration Assessment 0 10/31/2018  1:36 PM  
Dressing Status Clean, dry, & intact 10/31/2018  1:36 PM  
Dressing Type Transparent 10/31/2018  1:36 PM  
Hub Color/Line Status Pink 10/31/2018  1:36 PM  
  
 
Opportunity for questions and clarification was provided. Patient transported with: 
 Monitor O2 @ 4 liters Registered Nurse Kwesi Yañez

## 2018-10-31 NOTE — PROGRESS NOTES
Pharmacokinetic Consult to Pharmacist 
 
[place] Or is a 80 y.o. male being treated for sepsis. Height: 6' 2\" (188 cm)  Weight: 99.3 kg (219 lb) Lab Results Component Value Date/Time BUN 52 (H) 10/31/2018 01:34 PM  
 Creatinine 2.58 (H) 10/31/2018 01:34 PM  
 WBC 24.4 (H) 10/31/2018 01:34 PM  
 Procalcitonin 0.1 08/21/2017 10:30 AM  
 Lactic acid 1.9 02/01/2018 10:59 AM  
 Lactic Acid (POC) 3.55 (H) 10/31/2018 04:28 PM  
  
Estimated Creatinine Clearance: 25.9 mL/min (A) (based on SCr of 2.58 mg/dL (H)). CULTURES: 
All Micro Results Procedure Component Value Units Date/Time CULTURE, URINE [[id number]] Order Status:  Sent Specimen:  Urine from [place] Specimen CULTURE, BLOOD [[id number]] Collected:  10/31/18 1445 Order Status:  Completed Specimen:  Blood Updated:  10/31/18 1541 CULTURE, BLOOD [[id number]] Collected:  10/31/18 1409 Order Status:  Completed Specimen:  Blood Updated:  10/31/18 1416 Day 1 of vancomycin. Goal trough is 15-20. Vancomycin 2500mg X1 given on admission. Random level tomorrow AM to guide further redosing. Pharmacist will continue to follow patient. Please call with any questions. Thank you, Rosario Goetz, PharmD, [facility] Clinical Pharmacist 
[phone]

## 2018-10-31 NOTE — H&P
HISTORY AND PHYSICAL [place] 10/31/2018 Date of Admission:  10/31/2018 The patient's chart is reviewed and the patient is discussed with the staff. Subjective:  
 
Patient is a 80 y.o.  male presents via EMS after a fall. Reported dizziness and shortness of breath. Was bradycardic and hypotensive and LA 3.87, troponin 1.65, WBC 24.4, hgb 10.6, creatinine 2.58, BNP 1594. Was placed BIPAP but HR dropped to 38 and SBP down to 60s--was taken off BIPAP and placed on NC. Is a poor historian and states that he \"just slipped walking around the bed\". Denies dizziness. Has chronic home O2 that he uses mainly at night but admits to using during the day as well. Wife states he has not been eating well and is very weak. He was last seen in our sleep lab 9/26/18 for follow up and has been prescribed BIPAP and pressures changed then to 11/7cm--device interrogation did not show daily use. Vascular surgery placed IVC filter 9/12/18 for DVT and hx GIB. Chronic medical:  AFIB on ASA and Plavix, COPD, DM, diastolic heart failure, CKD, PUD, essential tremors, ROBERTO on BIPAP [employer] 2l with sleep. Review of Systems A comprehensive review of systems was negative except for: Constitutional: positive for fatigue and malaise Ears, nose, mouth, throat, and face: positive for hearing loss Respiratory: positive for cough, sputum, dyspnea on exertion or copd, roberto Cardiovascular: positive for dyspnea, fatigue, orthopnea, lower extremity edema, dyspnea on exertion Gastrointestinal: positive for reflux symptoms Musculoskeletal: positive for muscle weakness Behvioral/Psych: positive for depression, obesity and sleep disturbance Endocrine: positive for DM Patient Active Problem List  
Diagnosis Code  Essential hypertension I10  
 Persistent atrial fibrillation (HCC) I48.1  Peripheral neuropathy G62.9  COPD (chronic obstructive pulmonary disease) ([facility]) J44.9  PAD (peripheral artery disease) ([facility]) I73.9  Carotid stenosis, bilateral I65.23  
 Gout M10.9  Hyperlipidemia E78.5  Debility R53.81  
 CKD (chronic kidney disease) stage 3, GFR 30-59 ml/min ([facility]) N18.3  Morbid obesity ([facility]) E66.01  
 Chronic pain G89.29  
 ROBERTO treated with BiPAP G47.33  
 Anemia D64.9  Chronic diastolic congestive heart failure ([facility]) I50.32  Type 2 diabetes mellitus with peripheral neuropathy ([facility]) E11.42  
 Dyspnea R06.00  Falls frequently R29.6  Hypoxia R09.02  Thrombocytopenia ([facility] D69.6  Restrictive lung disease J98.4  Type 2 diabetes with nephropathy ([facility]) E11.21  
 Altered mental state R41.82  Venous stasis dermatitis of both lower extremities I87.2  Acute respiratory failure with hypoxia ([facility]) J96.01  
 Acute on chronic renal failure ([facility]) N17.9, N18.9  Hypoglycemia E16.2  ALFREDITO (acute kidney injury) ([facility] N17.9  Tremors of nervous system R25.1  Acute blood loss anemia D62  
 S/P IVC filter [id number]  Acute encephalopathy G93.40  Acute metabolic encephalopathy A08.44  Septic shock ([facility]) A41.9, R65.21  
 Elevated LFTs R94.5  Total bilirubin, elevated R17 Prior to Admission Medications Prescriptions Last Dose Informant Patient Reported? Taking? OXYGEN-AIR DELIVERY SYSTEMS   Yes No  
Sig: Take  by inhalation continuous. 2LPM to keep 02 sat >90%  
albuterol (PROVENTIL HFA, VENTOLIN HFA, PROAIR HFA) 90 mcg/actuation inhaler   No No  
Sig: Take 2 Puffs by inhalation every six (6) hours as needed for Wheezing or Shortness of Breath. albuterol (PROVENTIL VENTOLIN) 2.5 mg /3 mL (0.083 %) nebulizer solution   Yes No  
Si.5 mg by Nebulization route every four (4) hours as needed for Wheezing.   
albuterol (PROVENTIL VENTOLIN) 2.5 mg /3 mL (0.083 %) nebulizer solution   No No  
Sig: 3 mL by Nebulization route every four (4) hours as needed for Wheezing or Shortness of Breath. Dispense one pack (~20?) of nebules Refill x1  
allopurinol (ZYLOPRIM) 300 mg tablet   Yes No  
Sig: Take  by mouth daily. aspirin 81 mg chewable tablet   No No  
Sig: Take 1 Tab by mouth daily. bisacodyl (DULCOLAX) 5 mg EC tablet   No No  
Sig: Take 1 Tab by mouth daily. Patient taking differently: Take 5 mg by mouth every evening. calcium carbonate (CALCIUM 600 PO)   Yes No  
Sig: Take  by mouth. cephALEXin (KEFLEX) 500 mg capsule   No No  
Sig: Take 1 Cap by mouth two (2) times a day. cholecalciferol (VITAMIN D3) 1,000 unit cap   Yes No  
Sig: Take 1,000 Units by mouth daily. cpap machine kit   Yes No  
Sig: by Does Not Apply route. Bilevel   
docusate calcium (SURFAK) 240 mg capsule   Yes No  
Sig: Take 240 mg by mouth two (2) times a day. docusate sodium (COLACE) 100 mg capsule   Yes No  
Sig: Take 100 mg by mouth daily as needed for Constipation. ferrous sulfate 324 mg (65 mg iron) tablet   Yes No  
Sig: Take  by mouth Daily (before breakfast). fluticasone (FLONASE) 50 mcg/actuation nasal spray   No No  
Si Sprays by Both Nostrils route daily. fluticasone-vilanterol (BREO ELLIPTA) 100-25 mcg/dose inhaler   Yes No  
Sig: Take 1 Puff by inhalation daily. furosemide (LASIX) 20 mg tablet   No No  
Sig: Take 1-2 Tabs by mouth daily. gabapentin (NEURONTIN) 100 mg capsule   Yes No  
Sig: Take  by mouth three (3) times daily. glipiZIDE (GLUCOTROL) 5 mg tablet   Yes No  
Sig: Take  by mouth two (2) times a day. hydrALAZINE (APRESOLINE) 10 mg tablet   No No  
Sig: TAKE 1 TABLET THREE TIMES DAILY. hydrocortisone (PROCTOSOL HC) 2.5 % rectal cream   Yes No  
Sig: Insert  into rectum every six (6) hours as needed for Hemorrhoids. ipratropium-albuterol (COMBIVENT RESPIMAT)  mcg/actuation inhaler   No No  
Sig: Take 1 Puff by inhalation every six (6) hours.   
isosorbide mononitrate ER (IMDUR) 30 mg tablet   No No  
 Sig: Take 1 Tab by mouth daily. latanoprost (XALATAN) 0.005 % ophthalmic solution   Yes No  
Sig: Administer 1 Drop to both eyes nightly. levothyroxine (SYNTHROID) 50 mcg tablet   No No  
Sig: Take 1 Tab by mouth Daily (before breakfast). magnesium oxide (MAG-OX) 400 mg (241.3 mg magnesium) tablet   No No  
Sig: Take 1 Tab by mouth daily. metoprolol succinate (TOPROL-XL) 25 mg XL tablet   No No  
Sig: Pt takes 1/2 tab po daily. mupirocin calcium (BACTROBAN) 2 % topical cream   No No  
Sig: Apply  to affected area two (2) times a day. ondansetron (ZOFRAN ODT) 4 mg disintegrating tablet   No No  
Sig: Take 1 Tab by mouth every eight (8) hours as needed for Nausea. oxyCODONE IR (ROXICODONE) 5 mg immediate release tablet   Yes No  
Sig: Take 5 mg by mouth every six (6) hours as needed for Pain.  
pantoprazole (PROTONIX) 40 mg granules for oral suspension   Yes No  
Si mg daily. potassium chloride SR (K-TAB) 20 mEq tablet   Yes No  
Sig: Take 20 mEq by mouth daily. Indications: hypokalemia prevention  
pravastatin (PRAVACHOL) 40 mg tablet   No No  
Sig: Take 1 Tab by mouth nightly. predniSONE (DELTASONE) 20 mg tablet   No No  
Sig: Take 2 Tabs by mouth daily for 4 doses. Start Saturday morning sAXagliptin (ONGLYZA) 2.5 mg tablet   Yes No  
Sig: Take 2.5 mg by mouth daily. sertraline (ZOLOFT) 50 mg tablet   No No  
Sig: Take one tablet each evening for anxiety  Indications: Generalized Anxiety Disorder  
triamcinolone (ARISTOCORT) 0.5 % topical cream   No No  
Sig: Apply  to affected area two (2) times a day. use thin layer Facility-Administered Medications: None Past Medical History:  
Diagnosis Date  [facility] 2015  Atherosclerosis of native arteries of extremity with intermittent claudication [facility] 2015  Atopic dermatitis 2012  Atrial fibrillation ([facility]  CAD (coronary artery disease)  Carotid stenosis, bilateral 11/21/2012 50-79%  3-2010    1. Carotid Doppler (6/1/07): Greater than 70% stenosis in proximal LICA. 50% stenosis in right ICA. 2.  CTA of neck (3/15/10): Occluded distal segments of the vertebral arteries bilaterally. Atherosclerosis of the carotid bulbs bilaterally with a 50% stenosis on the left and a stenosis of less than 30% on the right. Small nodule in the right upper lobe near the apex. 3. CTA (8/29/13):  Less than 30% diameter stenosis of the cervical internal carotid arteries bilaterally.  CHF (congestive heart failure) ([address] 11/21/2012  Chronic kidney disease   
 hx elevated labs  Chronic obstructive pulmonary disease ([address]  Colon, diverticulosis 1/24/2015  Coronary atherosclerosis of native coronary vessel 10/31/2016 1. Cath (5/31/07):  LMCA: 25%. LAD: ostial 75-95% stenosis. 50-75% mid. D1:  25-50%. OM3: small with 75% stenosis. RCA: 100% mid. with left to right collaterals. 2 Vessel CABG (6/4/07):  LIMA to LAD and SVG to OM. SVG was anastomosed proximally to LIMA to due severe atherosclerosis of aorta. 3.  Lexiscan cardiolite (11/30/10): Abnormal with reversible lateral wall defects. Unable to gate given atrial fibrillation. 4.  LHC (1/14/11):  EF 60%. LVEDP 21. Patent LIMA to LAD and SVG to OM1 (off LIMA). occluded small RCA with left to right collaterals. Small D1 (2 mm vessel) with 70% mid stenosis.  Diabetes ([address]  Diastolic CHF, acute on chronic (HCC) 9/12/2015 1. Echo (9/11/15) : EF 55-60%. Mild LVH. Moderate biatrial enlargement. Moderate mitral/tricuspid regurgitation. 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  Gout 11/21/2012  History of tobacco use  [place] (hard of hearing)  Hypercholesterolemia  Hypertension  Hyperuricemia 11/21/2012  Morbid obesity ([address]  PAD (peripheral artery disease) ([address] 11/21/2012 1.  Bilateral proximal common iliac PCI (08):  8.0 X 100 mm Cordis smart stent on right and 10 X 40 mm cordis smart stent on right. Both inflated to 7.0 mm.  Poor historian  Radiologic findings of lung field, abnormal 10/31/2016 1. CT of chest  (11/24/10): Multiple small nodules in the right lobe and stable interstitial prominence. Consistent with chronic lung disease. No evidence of malignancy.  Seizure disorder ([facility] 2013  Shortness of breath dyspnea 2013  Thyroid disease  Unspecified sleep apnea   
 uses CPAP Past Surgical History:  
Procedure Laterality Date  CARDIAC SURG PROCEDURE UNLIST    
 cath ,cabg ,lexiscan cardiolite 11/10  
 HX CATARACT REMOVAL Left   
 os  HX COLONOSCOPY    
 HX CORONARY ARTERY BYPASS GRAFT  2007  HX CORONARY STENT PLACEMENT  2008  
 bilateral iliac artery PCI and stents  HX HEART CATHETERIZATION    
 left-2007, 2011  HX HEENT  1970s  
 neck lipoma Social History Socioeconomic History  Marital status:  Spouse name: Not on file  Number of children: Not on file  Years of education: Not on file  Highest education level: Not on file Social Needs  Financial resource strain: Not on file  Food insecurity - worry: Not on file  Food insecurity - inability: Not on file  Transportation needs - medical: Not on file  Transportation needs - non-medical: Not on file Occupational History  Occupation: [employer] industry. 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  
  Alcohol/week: 0.0 oz  Drug use:  No  
  Sexual activity: Not Currently Other Topics Concern  Not on file Social History Narrative 45 pack year history cigarette smoking, stopped in 1980s. 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 , two living children, two children  with coronary artery disease, ages 36 and 48. Has always lived in [facility] [address]. No pets. Family History Problem Relation Age of Onset  Heart Attack Mother [facility] Other Father   
     old age  Other Brother   
     brain aneurysm  Heart Disease Other 2 children  with heart concerns, 36 & 49 yo  
 Heart Disease Son No Known Allergies Current Facility-Administered Medications Medication Dose Route Frequency  piperacillin-tazobactam (ZOSYN) 4.5 g in 0.9% sodium chloride (MBP/ADV) 100 mL  4.5 g IntraVENous Q8H  
 vancomycin (VANCOCIN) 2500 mg in  mL infusion  2,500 mg IntraVENous ONCE  
 NOREPINephrine (LEVOPHED) 4 mg/250 mL (16 mcg/mL) in NS infusion  2-16 mcg/min IntraVENous TITRATE  sodium chloride 0.9 % bolus infusion 500 mL  500 mL IntraVENous ONCE Current Outpatient Medications Medication Sig  furosemide (LASIX) 20 mg tablet Take 1-2 Tabs by mouth daily.  albuterol (PROVENTIL HFA, VENTOLIN HFA, PROAIR HFA) 90 mcg/actuation inhaler Take 2 Puffs by inhalation every six (6) hours as needed for Wheezing or Shortness of Breath.  albuterol (PROVENTIL VENTOLIN) 2.5 mg /3 mL (0.083 %) nebulizer solution 3 mL by Nebulization route every four (4) hours as needed for Wheezing or Shortness of Breath. Dispense one pack (~20?) of nebules Refill x1  
 cephALEXin (KEFLEX) 500 mg capsule Take 1 Cap by mouth two (2) times a day.  isosorbide mononitrate ER (IMDUR) 30 mg tablet Take 1 Tab by mouth daily.  magnesium oxide (MAG-OX) 400 mg (241.3 mg magnesium) tablet Take 1 Tab by mouth daily.  mupirocin calcium (BACTROBAN) 2 % topical cream Apply  to affected area two (2) times a day.  metoprolol succinate (TOPROL-XL) 25 mg XL tablet Pt takes 1/2 tab po daily.  pravastatin (PRAVACHOL) 40 mg tablet Take 1 Tab by mouth nightly.  hydrALAZINE (APRESOLINE) 10 mg tablet TAKE 1 TABLET THREE TIMES DAILY.  aspirin 81 mg chewable tablet Take 1 Tab by mouth daily.  glipiZIDE (GLUCOTROL) 5 mg tablet Take  by mouth two (2) times a day.  gabapentin (NEURONTIN) 100 mg capsule Take  by mouth three (3) times daily.  sAXagliptin (ONGLYZA) 2.5 mg tablet Take 2.5 mg by mouth daily.  potassium chloride SR (K-TAB) 20 mEq tablet Take 20 mEq by mouth daily. Indications: hypokalemia prevention  docusate calcium (SURFAK) 240 mg capsule Take 240 mg by mouth two (2) times a day.  docusate sodium (COLACE) 100 mg capsule Take 100 mg by mouth daily as needed for Constipation.  hydrocortisone (PROCTOSOL HC) 2.5 % rectal cream Insert  into rectum every six (6) hours as needed for Hemorrhoids.  fluticasone-vilanterol (BREO ELLIPTA) 100-25 mcg/dose inhaler Take 1 Puff by inhalation daily.  oxyCODONE IR (ROXICODONE) 5 mg immediate release tablet Take 5 mg by mouth every six (6) hours as needed for Pain.  ondansetron (ZOFRAN ODT) 4 mg disintegrating tablet Take 1 Tab by mouth every eight (8) hours as needed for Nausea.  levothyroxine (SYNTHROID) 50 mcg tablet Take 1 Tab by mouth Daily (before breakfast).  OXYGEN-AIR DELIVERY SYSTEMS Take  by inhalation continuous. 2LPM to keep 02 sat >90%  pantoprazole (PROTONIX) 40 mg granules for oral suspension 40 mg daily.  calcium carbonate (CALCIUM 600 PO) Take  by mouth.  bisacodyl (DULCOLAX) 5 mg EC tablet Take 1 Tab by mouth daily. (Patient taking differently: Take 5 mg by mouth every evening.)  latanoprost (XALATAN) 0.005 % ophthalmic solution Administer 1 Drop to both eyes nightly.  sertraline (ZOLOFT) 50 mg tablet Take one tablet each evening for anxiety  Indications: Generalized Anxiety Disorder  fluticasone (FLONASE) 50 mcg/actuation nasal spray 2 Sprays by Both Nostrils route daily.  cholecalciferol (VITAMIN D3) 1,000 unit cap Take 1,000 Units by mouth daily.  albuterol (PROVENTIL VENTOLIN) 2.5 mg /3 mL (0.083 %) nebulizer solution 2.5 mg by Nebulization route every four (4) hours as needed for Wheezing.  ipratropium-albuterol (COMBIVENT RESPIMAT)  mcg/actuation inhaler Take 1 Puff by inhalation every six (6) hours.  triamcinolone (ARISTOCORT) 0.5 % topical cream Apply  to affected area two (2) times a day. use thin layer  ferrous sulfate 324 mg (65 mg iron) tablet Take  by mouth Daily (before breakfast).  allopurinol (ZYLOPRIM) 300 mg tablet Take  by mouth daily.  cpap machine kit by Does Not Apply route. Bilevel 12/8 Objective:  
 
Vitals:  
 10/31/18 1427 10/31/18 1448 10/31/18 1514 10/31/18 1525 BP: (!) 81/46 103/53 93/52 Pulse: (!) 48 (!) 53 Resp:  (!) 37 Temp:      
SpO2:  92% 93% 93% Weight:      
Height: PHYSICAL EXAM  
 
Constitutional:  the patient is morbidly obese and in no acute distress EENMT:  Sclera clear, pupils equal, oral mucosa moist, sclera yellow Respiratory: some basilar crackles Cardiovascular:  RRR without M,G,R 
Gastrointestinal: soft and non-tender; with positive bowel sounds. Musculoskeletal: warm without cyanosis. There is bilateral lower leg edema with left leg > right Skin:  observed jaundice, no rashes or open wounds, LE very glenny and scaly Neurologic: no gross neuro deficits Psychiatric:  alert and oriented x 2 CXR 10/31/18:  Stable pulmonary edema with small bilateral pleural effusions Recent Labs 10/31/18 
1334 WBC 24.4* HGB 10.6* HCT 35.5*  Recent Labs 10/31/18 
1334   
K 5.4* CL 97* GLU 66  
CO2 30 BUN 52* CREA 2.58* CA 7.8* ALB 2.6* TBILI 5.7* ALT 64 SGOT 104* No results for input(s): PH, PCO2, PO2, HCO3 in the last 72 hours. No results for input(s): LCAD, LAC in the last 72 hours. Assessment:  (Medical Decision Making) Hospital Problems  Date Reviewed: 10/31/2018 Codes Class Noted POA * (Principal) Septic shock ([facility] ICD-10-CM: A41.9, R65.21 ICD-9-CM: 038.9, 785.52, 995.92  10/31/2018 Yes Acute respiratory failure with hypoxia [facility]) ICD-10-CM: J96.01 
ICD-9-CM: 518.81  10/31/2018 Yes Acute on chronic renal failure (HCC) ICD-10-CM: N17.9, N18.9 ICD-9-CM: 584.9, 585.9  10/31/2018 Yes Anemia ICD-10-CM: D64.9 ICD-9-CM: 285.9  10/31/2018 Yes Elevated LFTs ICD-10-CM: R94.5 ICD-9-CM: 790.6  10/31/2018 Yes Total bilirubin, elevated ICD-10-CM: R17 
ICD-9-CM: 277.4  10/31/2018 Unknown S/P IVC filter (Chronic) ICD-10-CM: W35.614 ICD-9-CM: V45.89  9/21/2018 Yes Overview Signed 10/31/2018  3:20 PM by Darshana Tidwell NP  
  9/12/18  Dr. Lauren Seymour Altered mental state ICD-10-CM: R41.82 
ICD-9-CM: 780.97  4/22/2018 Yes ROBERTO treated with BiPAP (Chronic) ICD-10-CM: Q76.10 
ICD-9-CM: 327.23  2/20/2015 Unknown Overview Signed 2/20/2015 10:42 AM by Melissa Rojas  
  Patient is on BiPAP, no supplementary oxygen Morbid obesity ([facility] (Chronic) ICD-10-CM: E66.01 
ICD-9-CM: 278.01  1/21/2015 Yes COPD (chronic obstructive pulmonary disease) (HCC) (Chronic) ICD-10-CM: J44.9 ICD-9-CM: 104  11/21/2012 Yes Plan:  (Medical Decision Making) --Will admit for further medical management 
--Supplemental O2  
--Respiratory nebulizer treatments --Blood cultures drawn in ER. 
--steroid therapy 
--antibiotic therapy More than 50% of the time documented was spent in face-to-face contact with the patient and in the care of the patient on the floor/unit where the patient is located. Daquan Vieyra NP Lungs:  Basilar crackles Heart:  RRR with no Murmur/Rubs/Gallops Additional Comments:  Currently on levophed, fluid bolus not given due to chf, elevated bnp Iv antibx started- source of sepsis unclear I have spoken with and examined the patient. I agree with the above assessment and plan as documented.  
 
Ramses Foster MD

## 2018-11-01 PROBLEM — J44.1 COPD EXACERBATION (HCC): Status: ACTIVE | Noted: 2018-01-01

## 2018-11-01 PROBLEM — R79.89 ELEVATED BRAIN NATRIURETIC PEPTIDE (BNP) LEVEL: Status: ACTIVE | Noted: 2018-01-01

## 2018-11-01 NOTE — PROGRESS NOTES
A follow up visit was made to the patient. Emotional support, spiritual presence and  
prayer were provided. The patient was back in his room from his procedure. Chrystal Mustafa

## 2018-11-01 NOTE — PROGRESS NOTES
Report received from ANGELA Byrnes. Pt sleeping, arouses to name, oriented to place, reoriented to time,  ate approx 25% of breakfast.  BG=85. Dopamine continues at 2mcg/kg/min, hr60's afib, sats 99% on 5lnc.

## 2018-11-01 NOTE — PROGRESS NOTES
Critical Care Daily Progress Note: 11/1/2018 [place] Admission Date: 10/31/2018 The patient's chart is reviewed and the patient is discussed with the staff. Subjective:  
 
80 y.o.  male presents via EMS after a fall. Reported dizziness and shortness of breath. Was bradycardic and hypotensive and LA 3.87, troponin 1.65, WBC 24.4, hgb 10.6, creatinine 2.58, BNP 1594. Was placed BIPAP but HR dropped to 38 and SBP down to 60s--was taken off BIPAP and placed on NC. Is a poor historian and states that he \"just slipped walking around the bed\". Denies dizziness. Has chronic home O2 that he uses mainly at night but admits to using during the day as well. Wife states he has not been eating well and is very weak. 
  
He was last seen in our sleep lab 9/26/18 for follow up and has been prescribed BIPAP and pressures changed then to 11/7cm--device interrogation did not show daily use.  
  
Vascular surgery placed IVC filter 9/12/18 for DVT and hx GIB. Chronic medical:  AFIB on ASA and Plavix, COPD, DM, diastolic heart failure, CKD, PUD, essential tremors, ROBERTO on BIPAP Current Facility-Administered Medications Medication Dose Route Frequency  NOREPINephrine (LEVOPHED) 4 mg/250 mL (16 mcg/mL) in NS infusion  2-16 mcg/min IntraVENous TITRATE  aspirin chewable tablet 81 mg  81 mg Oral DAILY  levothyroxine (SYNTHROID) tablet 50 mcg  50 mcg Oral ACB  oxyCODONE IR (ROXICODONE) tablet 5 mg  5 mg Oral Q6H PRN  pravastatin (PRAVACHOL) tablet 40 mg  40 mg Oral QHS  sertraline (ZOLOFT) tablet 25 mg  25 mg Oral DAILY  sodium chloride (NS) flush 5 mL  5 mL InterCATHeter Q8H  
 sodium chloride (NS) flush 5-10 mL  5-10 mL InterCATHeter PRN  
 enoxaparin (LOVENOX) injection 30 mg  30 mg SubCUTAneous Q24H  piperacillin-tazobactam (ZOSYN) 4.5 g in 0.9% sodium chloride (MBP/ADV) 100 mL  4.5 g IntraVENous Q8H  
  nystatin (MYCOSTATIN) 100,000 unit/gram powder   Topical BID  Vancomycin intermittent dosing placeholder   Other Rx Dosing/Monitoring  calcium carbonate (OS-ERIKA) tablet 500 mg [elemental]  500 mg Oral DAILY WITH BREAKFAST  dextrose 40% (GLUTOSE) oral gel 1 Tube  15 g Oral PRN  
 glucagon (GLUCAGEN) injection 1 mg  1 mg IntraMUSCular PRN  
 dextrose (D50W) injection syrg 12.5-25 g  25-50 mL IntraVENous PRN  
 budesonide (PULMICORT) 500 mcg/2 ml nebulizer suspension  500 mcg Nebulization BID RT And  
 albuterol (PROVENTIL VENTOLIN) nebulizer solution 2.5 mg  2.5 mg Nebulization Q6HWA RT  
 lansoprazole (PREVACID SOLUTAB) disintegrating tablet 30 mg  30 mg Oral DAILY  DOPamine (INTROPIN) 800 mg in dextrose 5% 250 mL infusion  2-20 mcg/kg/min IntraVENous TITRATE Review of Systems Constitutional:  negative for fever, chills, sweats Cardiovascular:  negative for chest pain, palpitations, syncope, edema Gastrointestinal:  negative for dysphagia, reflux, vomiting, diarrhea, abdominal pain, or melena Neurologic:  negative for focal weakness, numbness, headache Objective:  
 
Vitals:  
 11/01/18 9477 11/01/18 1995 11/01/18 5691 11/01/18 0354 BP: 101/52 101/55 103/57 Pulse:  64  65 Resp:  25  17 Temp:      
SpO2:  96%  96% Weight:      
Height:      
 
 
Intake and Output:  
10/30 1901 - 11/01 0700 In: 1114.3 [P.O.:720; I.V.:394.3] Out: 825 [Urine:825] No intake/output data recorded. Physical Exam:         
Constitutional:  the patient is well developed and in no acute distress EENMT:  Sclera clear, pupils equal, oral mucosa moist 
Respiratory: exp; wheezes bilateral 
Cardiovascular:  RRR without M,G,R 
Gastrointestinal: soft and non-tender; with positive bowel sounds. Musculoskeletal: warm without cyanosis. There is L>R lower leg edema. Skin:  no jaundice or rashes, no wounds Neurologic: no gross neuro deficits Psychiatric:  alert and oriented x 3 LINES: 
 peripheral 
 
DRIPS: 
dopamine CXR:  
 
 
LAB Recent Labs 11/01/18 
6827 11/01/18 
4811 11/01/18 
0524 10/31/18 
1927 10/31/18 
1759 GLUCPOC 85 89 63* 133* 139* Recent Labs 11/01/18 
0352 10/31/18 
2043 10/31/18 
1426 10/31/18 
1334 WBC 18.9* 26.3*  --  24.4* HGB 9.7* 11.1*  --  10.6* HCT 32.5* 37.2*  --  35.5*  196  --  182 INR  --   --  1.8  --   
 
Recent Labs 11/01/18 
5801 10/31/18 
2324 10/31/18 
2043 10/31/18 
1334   --  133* 136  
K 4.8  --  5.1 5.4*  
CL 99  --  97* 97* CO2 27  --  28 30 GLU 62*  --  133* 66  
BUN 62*  --  56* 52* CREA 2.78*  --  2.81* 2.58* MG 2.6*  --   --   --   
CA 7.5*  --  7.9* 7.8* ALB  --  2.6*  --  2.6* TBILI  --  5.9*  --  5.7* ALT  --  58  --  64 SGOT  --  84*  --  104* Recent Labs 10/31/18 
1332 PHI 7.392 PCO2I 44.1 PO2I 99 HCO3I 26.8* Recent Labs 11/01/18 
0352 10/31/18 
2043 LAC 1.6 2.5* No results for input(s): PH, PCO2, PO2, HCO3 in the last 72 hours. Assessment:  (Medical Decision Making) Hospital Problems  Date Reviewed: 10/31/2018 Codes Class Noted POA Anemia ICD-10-CM: D64.9 ICD-9-CM: 285.9  10/31/2018 Yes Acute respiratory failure with hypoxia [facility]) ICD-10-CM: J96.01 
ICD-9-CM: 518.81  10/31/2018 Yes On nc Acute on chronic renal failure (HCC) ICD-10-CM: N17.9, N18.9 ICD-9-CM: 584.9, 585.9  10/31/2018 Yes No change * (Principal) Septic shock ([facility] [address] ICD-10-CM: A41.9, R65.21 ICD-9-CM: 038.9, 785.52, 995.92  10/31/2018 Yes Down to 2 mics on dopamine Elevated LFTs ICD-10-CM: R94.5 ICD-9-CM: 790.6  10/31/2018 Yes Total bilirubin, elevated ICD-10-CM: R17 
ICD-9-CM: 277.4  10/31/2018 Unknown S/P IVC filter (Chronic) ICD-10-CM: I35.427 ICD-9-CM: V45.89  9/21/2018 Yes Overview Signed 10/31/2018  3:20 PM by Olena Guardado NP  
  9/12/18  Dr. Suellen Clark  Altered mental state ICD-10-CM: R41.82 
 ICD-9-CM: 780.97  4/22/2018 Yes ROBERTO treated with BiPAP (Chronic) ICD-10-CM: I89.59 
ICD-9-CM: 327.23  2/20/2015 Unknown Overview Signed 2/20/2015 10:42 AM by Jacqueline Abbott  
  Patient is on BiPAP, no supplementary oxygen Morbid obesity ([address] (Chronic) ICD-10-CM: E66.01 
ICD-9-CM: 278.01  1/21/2015 Yes COPD (chronic obstructive pulmonary disease) (HCC) (Chronic) ICD-10-CM: J44.9 ICD-9-CM: 811  11/21/2012 Yes Copd e- diffuse wheezing Elevated bnp Plan:  (Medical Decision Making) 1    Iv antibx- day 2 vanc and zosyn 2    Wean dopamine 3    Add iv steroids 4    bnp 
5    May need chest ct 
-- 
 
More than 50% of the time documented was spent in face-to-face contact with the patient and in the care of the patient on the floor/unit where the patient is located.  
 
Valentina Capellan MD

## 2018-11-01 NOTE — PROGRESS NOTES
Initial visit was made, emotional support and a spiritual presence were provided. The patient was out of the room for a procedure.  card was left for the patient. Delano Castrolain

## 2018-11-01 NOTE — PROGRESS NOTES
Physical Therapy Note: 
 
Participated in interdisciplinary rounds in ICU/CCU and chart reviewed. Patient is experiencing decrease in function from baseline. Patient would benefit from skilled acute therapy to increase independence with self care/ADLs, strength, endurance, and functional mobility. Recommend PT/OT consult when medically stable and MD agrees. Thank you for your consideration, Parker Lopes DPT

## 2018-11-01 NOTE — PROGRESS NOTES
BG 63 this morning, provided patient with 4 ounces of juice and snack. Recheck 89. Provided an additional 4 ounces of juice. Dopamine weaned to 2 mcg/kg/min overnight. Marginal pressures with MAP 65-68. HR 60-65. Mentation improved from last night. Pt sat up in chair position in bed and BIPAP taken off at 0430 per pt request. Now on 4LNC, tolerating well.

## 2018-11-01 NOTE — PROGRESS NOTES
Son called for update. Update given after receiving security code. 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 Asked son for information regarding home cpap provider. Son believes it's a company in [place] but not sure.

## 2018-11-01 NOTE — PROGRESS NOTES
Pt screened in CCU. CM will need to follow to assist with d/c needs and POC. Was currently at home with [facility] per Juan C Cadet, liaison. Hopes to return home with family and [facility]. Care Management Interventions PCP Verified by CM: Yes(Dr. Elma Morton) Mode of Transport at Discharge: Other (see comment) Transition of Care Consult (CM Consult): Discharge Planning, Home Hospice([facility] at home) [facility]: No 
Reason Outside [place]: Patient already serviced by other home care/hospice agency Discharge Durable Medical Equipment: (hospital bed, CPAP, cane, Rolator, [place], ) Current Support Network: Lives with Spouse, Own Home(lives with wife, Nicole Cruz and GD, Gissel Beavers, has son and daughter, Chandler Pepper) Confirm Follow Up Transport: Family Plan discussed with Pt/Family/Caregiver: Yes Freedom of Choice Offered: Yes [employer] Information Provided?: (confirms [employer]) Discharge Location Discharge Placement: Unable to determine at this time

## 2018-11-01 NOTE — PROGRESS NOTES
Pt placed on his home bipap machine 11/7 with 5L bled in. Pt tolerating well. No complications noted.

## 2018-11-01 NOTE — PROGRESS NOTES
Problem: Mobility Impaired (Adult and Pediatric) Goal: *Acute Goals and Plan of Care (Insert Text) STG: 
(1.)Mr. Navarro will move from supine to sit and sit to supine  with CONTACT GUARD ASSIST within 3 treatment day(s). (2.)Mr. Navarro will transfer from bed to chair and chair to bed with STAND BY ASSIST using the least restrictive device within 3 treatment day(s). (3.)Mr. Navarro will ambulate with CONTACT GUARD ASSIST for 30 feet with the least restrictive device within 3 treatment day(s). LTG: 
(1.)Mr. Navarro will move from supine to sit and sit to supine  in bed with SUPERVISION within 7 treatment day(s). (2.)Mr. Navarro will transfer from bed to chair and chair to bed with SUPERVISION using the least restrictive device within 7 treatment day(s). (3.)Mr. Navarro will ambulate with STAND BY ASSIST for 150 feet with the least restrictive device within 7 treatment day(s). ________________________________________________________________________________________________ PHYSICAL THERAPY: Initial Assessment, Treatment Day: Day of Assessment, PM 11/1/2018INPATIENT: Hospital Day: 2 Payor: [facility] [place] MEDICARE / Plan: [place]O / Product Type: Managed Care Medicare /  
  
NAME/AGE/GENDER: Conception Breath is a 80 y.o. male PRIMARY DIAGNOSIS: Septic shock ([address] Septic shock ([address] Septic shock ([address] Septic shock ([address] ICD-10: Treatment Diagnosis:  
 · Generalized Muscle Weakness (M62.81) · Difficulty in walking, Not elsewhere classified (R26.2) · History of falling (Z91.81) Precaution/Allergies: 
Patient has no known allergies. ASSESSMENT:  
Mr. Ochoa  is supine in bed upon contact and agreeable to PT evaluation and treatment as pt is very anxious to get moving and out of bed. Pt reports 0/10 pain prior to mobility. Pt is [place] and requires repetition at times.  Pt lives with his wife and adult granddaughter in 3 story home with ramp to enter. Pt is ambulatory with use of rollator household distances and reports 1 recent fall in past 6 months. Pt requires 2L supplemental O2 at baseline and is currently on 4.5L. Pt transitioned supine to sit EOB with min-modA with max cues for technique. Pt demonstrates good static sitting balance and able to scoot towards EOB for feet flat on floor. Pt requires short sitting rest break before performing STS to RW with CGA-Ramírez. Pt ambulated 5 ft to bedside chair with RW and CGA. Pt returned to sitting with O2 sat at 88-89%. O2 sat quickly return to 90-92% with seated rest. Pt performed exercises in both sitting and reclined position tolerating well and requiring VC and TC for technique. Pt left sitting up with all needs met and within reach with nursing at bedside. Corrinne Lincoln will benefit from skilled PT (medically necessary) to address decreased strength, decreased balance, decreased functional tolerance, decreased cardiopulmonary endurance affecting participation in basic ADLs and functional tasks. This section established at most recent assessment PROBLEM LIST (Impairments causing functional limitations): 1. Decreased Strength 2. Decreased ADL/Functional Activities 3. Decreased Transfer Abilities 4. Decreased Ambulation Ability/Technique 5. Decreased Balance 6. Decreased Activity Tolerance 7. Decreased Pacing Skills 8. Increased Fatigue 9. Increased Shortness of Breath 10. Decreased [place] with Home Exercise Program 
 INTERVENTIONS PLANNED: (Benefits and precautions of physical therapy have been discussed with the patient.) 1. Balance Exercise 2. Bed Mobility 3. Family Education 4. Gait Training 5. Home Exercise Program (HEP) 6. Neuromuscular Re-education/Strengthening 7. Range of Motion (ROM) 8. Therapeutic Activites 9. Therapeutic Exercise/Strengthening 10. Transfer Training TREATMENT PLAN: Frequency/Duration: 3 times a week for duration of hospital stay Rehabilitation Potential For Stated Goals: Good RECOMMENDED REHABILITATION/EQUIPMENT: (at time of discharge pending progress): Due to the probability of continued deficits (see above) this patient will likely need continued skilled physical therapy after discharge. Equipment:  
? None at this time HISTORY:  
History of Present Injury/Illness (Reason for Referral): 
See H&P below Patient is a 80 y.o.  male presents via EMS after a fall. Reported dizziness and shortness of breath. Was bradycardic and hypotensive and LA 3.87, troponin 1.65, WBC 24.4, hgb 10.6, creatinine 2.58, BNP 1594. Was placed BIPAP but HR dropped to 38 and SBP down to 60s--was taken off BIPAP and placed on NC. Is a poor historian and states that he \"just slipped walking around the bed\". Denies dizziness. Has chronic home O2 that he uses mainly at night but admits to using during the day as well. Wife states he has not been eating well and is very weak. 
  
He was last seen in our sleep lab 9/26/18 for follow up and has been prescribed BIPAP and pressures changed then to 11/7cm--device interrogation did not show daily use.  
  
Vascular surgery placed IVC filter 9/12/18 for DVT and hx GIB. Chronic medical:  AFIB on ASA and Plavix, COPD, DM, diastolic heart failure, CKD, PUD, essential tremors, ROBERTO on BIPAP 
  
Home DME company 2l with sleep.  
 
 
Past Medical History/Comorbidities:  
Mr. Emily Collazo  has a past medical history of Atherosclerosis of artery of extremity with ulceration ([address] Atherosclerosis of native arteries of extremity with intermittent claudication ([address] Atopic dermatitis, Atrial fibrillation ([address] CAD (coronary artery disease), Carotid stenosis, bilateral, CHF (congestive heart failure) ([address] Chronic kidney disease, Chronic obstructive pulmonary disease ([address] Colon, diverticulosis, Coronary atherosclerosis of native coronary vessel, Diabetes ([address] Diastolic CHF, acute on chronic (HCC), Failed CABG (coronary artery bypass graft), GI bleed, Gout, History of tobacco use, [place] (hard of hearing), Hypercholesterolemia, Hypertension, Hyperuricemia, Morbid obesity ([facility] [address] PAD (peripheral artery disease) ([facility] [address] Poor historian, Radiologic findings of lung field, abnormal, Seizure disorder ([facility] [address] Shortness of breath dyspnea, Thyroid disease, and Unspecified sleep apnea. Mr. Lionel Kemp  has a past surgical history that includes pr cardiac surg procedure unlist; hx coronary artery bypass graft (06-); hx cataract removal (Left, 2003); hx heart catheterization; hx coronary stent placement (02-); hx heent (1970s); hx colonoscopy; ULTRASOUND GUIDED ACCESS VENA CAVA FILTER INSERTION (N/A, 9/12/2018); ESOPHAGOGASTRODUODENOSCOPY (EGD) (N/A, 8/5/2018); ESOPHAGOGASTRODUODENOSCOPY (EGD) (N/A, 1/27/2017); COLONOSCOPY  BMI 36 TO BE ADMITTED 1/26/17 (N/A, 1/27/2017); ESOPHAGOGASTRODUODENOSCOPY (EGD) (N/A, 1/22/2017); ESOPHAGOGASTRODUODENAL (EGD) BIOPSY (N/A, 1/22/2017); ESOPHAGOGASTRODUODENOSCOPY (EGD)   rm 610 (N/A, 5/8/2015); COLONOSCOPY (N/A, 1/24/2015); and ESOPHAGOGASTRODUODENOSCOPY (EGD)   rm 3101 (N/A, 1/23/2015). Social History/Living Environment:  
Home Environment: Private residence # Steps to Enter: 0 Wheelchair Ramp: Yes One/Two Story Residence: One story Living Alone: No 
Support Systems: Child(tai), Spouse/Significant Other/Partner Patient Expects to be Discharged to[de-identified] Private residence Current DME Used/Available at Home: rodriguez Rodriguez Prior Level of Function/Work/Activity: 
Lives with wife and granddaughter, use of rollator for household distances, 2L O2 Number of Personal Factors/Comorbidities that affect the Plan of Care: 3+: HIGH COMPLEXITY EXAMINATION:  
Most Recent Physical Functioning:  
Gross Assessment: 
AROM: Generally decreased, functional 
Strength: Generally decreased, functional 
Coordination: Generally decreased, functional 
 Posture: 
  
Balance: 
Sitting: Intact; Without support Standing: Impaired;Pull to stand; With support Bed Mobility: 
Supine to Sit: Minimum assistance; Moderate assistance Wheelchair Mobility: 
  
Transfers: 
Sit to Stand: Contact guard assistance;Minimum assistance Stand to Sit: Contact guard assistance Gait: 
  
Base of Support: Widened Speed/Helen: Slow;Shuffled Step Length: Left shortened;Right shortened Gait Abnormalities: Decreased step clearance;Shuffling gait;Trunk sway increased Distance (ft): 5 Feet (ft) Assistive Device: Walker, rolling Ambulation - Level of Assistance: Contact guard assistance Interventions: Safety awareness training;Verbal cues; Tactile cues Body Structures Involved: 1. Nerves 2. Heart 3. Lungs 4. Bones 5. Joints 6. Muscles 7. Ligaments Body Functions Affected: 1. Sensory/Pain 2. Cardio 3. Respiratory 4. Neuromusculoskeletal 
5. Movement Related Activities and Participation Affected: 1. General Tasks and Demands 2. Mobility 3. Self Care 4. Domestic Life 5. Interpersonal Interactions and Relationships 6. Community, Social and [place] Number of elements that affect the Plan of Care: 4+: HIGH COMPLEXITY CLINICAL PRESENTATION:  
Presentation: Evolving clinical presentation with changing clinical characteristics: MODERATE COMPLEXITY CLINICAL DECISION MAKIN \[facility]\"" [address] [address] AM-PAC 6 Clicks Basic Mobility Inpatient Short Form How much difficulty does the patient currently have. .. Unable A Lot A Little None 1. Turning over in bed (including adjusting bedclothes, sheets and blankets)? [] 1   [] 2   [x] 3   [] 4  
2. Sitting down on and standing up from a chair with arms ( e.g., wheelchair, bedside commode, etc.)   [] 1   [] 2   [x] 3   [] 4  
3. Moving from lying on back to sitting on the side of the bed? [] 1   [x] 2   [] 3   [] 4 How much help from another person does the patient currently need. ..  Total A Lot A Little None 4. Moving to and from a bed to a chair (including a wheelchair)? [] 1   [] 2   [x] 3   [] 4  
5. Need to walk in hospital room? [] 1   [] 2   [x] 3   [] 4  
6. Climbing 3-5 steps with a railing? [x] 1   [] 2   [] 3   [] 4  
© 2007, Trustees of [address] [place] [address] [address] [address], under license to [employer]. All rights reserved Score:  Initial: 15 Most Recent: X (Date: -- ) Interpretation of Tool:  Represents activities that are increasingly more difficult (i.e. Bed mobility, Transfers, Gait). Score 24 23 22-20 19-15 14-10 9-7 6 Modifier CH CI CJ CK CL CM CN   
 
? Mobility - Walking and Moving Around:  
  - CURRENT STATUS: CK - 40%-59% impaired, limited or restricted  - GOAL STATUS: CJ - 20%-39% impaired, limited or restricted  - D/C STATUS:  ---------------To be determined--------------- Payor: [facility] [place] [facility] / Plan[place] [employer] [place] / Product Type: Managed Care Medicare /   
 
Medical Necessity:    
· Patient is expected to demonstrate progress in strength, balance, coordination and functional technique to decrease assistance required with gait, transfers, and functional mobility. Reason for Services/Other Comments: 
· Patient continues to require skilled intervention due to decreased strength, decreased balance, decreased functional tolerance, decreased cardiopulmonary endurance affecting participation in basic ADLs and functional tasks. Use of outcome tool(s) and clinical judgement create a POC that gives a: Clear prediction of patient's progress: LOW COMPLEXITY  
  
 
 
 
TREATMENT:  
(In addition to Assessment/Re-Assessment sessions the following treatments were rendered) Pre-treatment Symptoms/Complaints:  \"I want to get out of this bed\" Pain: Initial:  
Pain Intensity 1: 0  Post Session:  0/10 In addition to evaluation: 
Therapeutic Exercise: (10 Minutes):  Exercises per grid below to improve mobility and strength. Required minimal visual, verbal and tactile cues to promote proper body alignment, promote proper body posture and promote proper body mechanics. Progressed repetitions and complexity of movement as indicated. Date: 
11/1/18 Date: 
 Date: Activity/Exercise Parameters Parameters Parameters LAQ 10 X B Alt marches 10 X B Ankle pumps 10 X B Quad set 10 X B Heel slides 10 X B Hip abduction 10 X B Braces/Orthotics/Lines/Etc:  
· alejo catheter · CCU monitors · O2 Device: Nasal cannula Treatment/Session Assessment:   
· Response to Treatment:  Bed to chair with CGA and RW 
· Interdisciplinary Collaboration:  
o Physical Therapist 
o Registered Nurse · After treatment position/precautions:  
o Up in chair 
o Bed alarm/tab alert on 
o Bed/Chair-wheels locked 
o Bed in low position 
o Call light within reach 
o RN notified 
o Nurse at bedside · Compliance with Program/Exercises: Will assess as treatment progresses · Recommendations/Intent for next treatment session: \"Next visit will focus on advancements to more challenging activities and reduction in assistance provided\". Total Treatment Duration: PT Patient Time In/Time Out Time In: 1440 Time Out: 4913 [address]

## 2018-11-01 NOTE — PROGRESS NOTES
Dopamine down to 5 mcg/kg/min. HR 68-72 A fib. SBP 120s. LA down to 2.5. Orders received to wean off dopamine if HR tolerates. Repeat LA at 0400. Pt now resting comfortably on home BIPAP with 5l O2 bled in. SpO2 90-94%

## 2018-11-02 PROBLEM — J90 PLEURAL EFFUSION: Status: ACTIVE | Noted: 2018-01-01

## 2018-11-02 PROBLEM — R91.8 PULMONARY INFILTRATE: Status: ACTIVE | Noted: 2018-01-01

## 2018-11-02 PROBLEM — R78.81 BACTEREMIA: Status: ACTIVE | Noted: 2018-01-01

## 2018-11-02 NOTE — PROGRESS NOTES
Pt sat up on side of bed for thoracentesis. Consent obtained. Time out performed. Pts vitals monitored throughout procedure. Right ultrasound done and pic taken of pleural fluid.  ~950 ml yellow pleural fluid from right. Pt tolerated procedure well with no adverse rxn. Specimens sent to the lab x 3 and labeled appropriately. Site dressed appropriately and report given to pts ANGELA Hernandez. Lung sliding done and ultrasound findings reviewed by MD. CXR ordered post procedure.

## 2018-11-02 NOTE — PROGRESS NOTES
MD notified of patient status. Okay to hold blood thinners. Safety measures remain in place, call light in reach. Will continue to monitor and hand care to oncoming shift.

## 2018-11-02 NOTE — PROGRESS NOTES
Pharmacokinetic Consult to Pharmacist 
 
Marzena Toma is a 80 y.o. male being treated for sepsis. Height: 6' 2\" (188 cm)  Weight: 106 kg (233 lb 11 oz) Lab Results Component Value Date/Time BUN 71 (H) 11/02/2018 03:55 AM  
 Creatinine 2.97 (H) 11/02/2018 03:55 AM  
 WBC 7.3 11/02/2018 04:30 AM  
 Procalcitonin 0.1 08/21/2017 10:30 AM  
 Lactic acid 1.6 11/01/2018 03:52 AM  
 Lactic Acid (POC) 3.55 (H) 10/31/2018 04:28 PM  
  
Estimated Creatinine Clearance: 23.2 mL/min (A) (based on SCr of 2.97 mg/dL (H)). CULTURES: 
All Micro Results Procedure Component Value Units Date/Time CULTURE, BLOOD [[id number]]  (Abnormal) Collected:  10/31/18 1409 Order Status:  Completed Specimen:  Blood Updated:  11/02/18 6744 Special Requests: --     
  RIGHT 
HAND 
  
  GRAM STAIN    
  GRAM POS COCCI IN CLUSTERS  
     
      
  AEROBIC AND ANAEROBIC BOTTLES RESULTS VERIFIED, PHONED TO AND READ BACK BY MARIA GUADALUPE LOPEZ ON 1209 ON 11/1/18 EOR Culture result: STAPHYLOCOCCUS SPECIES, COAGULASE NEGATIVE  
     
      
  SA target DNA sequence not detected within the sample. Test performed by PCR  
     
      
  THIS ORGANISM MAY BE INDICATIVE OF CULTURE CONTAMINATION, HOWEVER, CLINICAL CORRELATION NEEDS TO BE EVALUATED, AS EACH CASE IS UNIQUE. CULTURE, URINE [[id number]] Collected:  10/31/18 2047 Order Status:  Completed Specimen:  Urine from [place] Specimen Updated:  11/02/18 3208 Special Requests: NO SPECIAL REQUESTS Culture result: NO GROWTH 1 DAY     
 CULTURE, BLOOD [[id number]] Collected:  10/31/18 1445 Order Status:  Completed Specimen:  Blood Updated:  11/01/18 6146 Special Requests: --     
  RIGHT 
HAND Culture result: NO GROWTH AFTER 15 HOURS Lab Results Component Value Date/Time Vancomycin, random 17.6 11/02/2018 03:55 AM  
 
 
Day 3 of vancomycin. Goal trough is 15-20.   I will redose 1500mg X1 today, Continue intermittent dosing based on renal function. SrCr slightly worse today. Pharmacist will continue to follow patient. Please call with any questions. Thank you, Rosario Austin, PharmD, [facility] Clinical Pharmacist 
[phone]
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
## 2018-11-02 NOTE — PROGRESS NOTES
Patient arrived to floor from CCU, in stable condition with respirations even/unlabored. No acute distress is noted. Telemetry box noted [id number]. Box not reading heart rhythm. Electrodes replaced. Will replace box.  cath noted draining to gravity at bedside. IV site noted. Dressing noted to right hand; clean, dry and intact. Will continue to monitor.

## 2018-11-02 NOTE — PROGRESS NOTES
TRANSFER - IN REPORT: 
 
Verbal report received from [facility] (name) on Massimo Russelligham  being received from CCU (unit) for routine progression of care Report consisted of patients Situation, Background, Assessment and  
Recommendations(SBAR). Information from the following report(s) SBAR, Cardiac Rhythm of A-Fib, ED Summary, Recent Results was reviewed with the receiving nurse. Opportunity for questions and clarification was provided. Assessment completed upon patients arrival to unit and care assumed.

## 2018-11-02 NOTE — PROCEDURES
PROCEDURE: 
 
DIAGNOSTIC/THERAPEUTIC THORACENTESIS/PLEURAL MANNOMETRY. PRE-OP DIAGNOSIS: 
 
R PLEURAL EFFUSION POST-OP DIAGNOSIS: 
 
R PLEURAL EFFUSION 
 
ASSISTANT: 
 
Plumbly ANESTHESIA: 
 
LOCAL ANESTHESIA WITH 1% LIDOCAINE 10 CC TOTAL. CHEST ULTRASOUND FINDINGS: 
 
A Turbo-M, Sonosite ultrasound with a 5-16 mHz probe was used to image the chest and localize the pleural effusion on the Left/and/Right chest. 
 
A large/ anechoic space was seen on the /Right consistent with an uncomplicated pleural effusion. DESCRIPTION OF PROCEDURE: 
 
After obtaining informed consent and localizing the safest location for thoracentesis, the  9th intercostal space was marked with a blunt, plastic needle cap in the mid scapular line. An [employer]k AK-0100 Pleral-Seal thoracentesis kit was used to perform the procedure. The skin was  cleansed with the supplied  chlorhexididne swab and then draped in the usual fasion. Using the previously marked location as a giude, a 22 G 1.5 inch needle was used to inject 10 cc of 1% lidocaine into the skin and subcutaneous tissue, as well as onto the underlying rib and inter-costal muscles, pleural fluid was aspirated to assure proper location, prior to removing the anesthesia needle. A 3mm  incision was then made, with the supplied scalpel in the usual fashion to facilitate the insertiopn of the thoracentesis needle. The needle with an 8French thoracentesis catheter was then introduced into the chest through the previously made incision in the usual fashion, the rib localized with the needle, and the catheter then marched over the rib into the pleural space. After aspirating fluid, the thoracentesis catheter was then placed into the chest using the needle itself as a trocar. The needle was then removed and the catheter was attached to the supplied tubing without complication. 950 cc of /Yellow/ fluid, was aspirated and sent for analysis. Fluid was sent for the following tests: 
 
Cell count with differential 
LDH Glucose Total protein Cytology AFB Fungus Routine culture and Gram stain Post procedure US confirmed complete/incomplete drainage of the effusion. EBL:  
 
minimal 
 
 
COMPLICATIONS: 
 
none Keila Garner MD

## 2018-11-02 NOTE — PROGRESS NOTES
Bandages soiled again with bright red blood. Rewrapped with nonadherent dressing, ABD pad and wrapped with gauze mariah wrap. Patient tolerated well. Will continue to monitor.

## 2018-11-02 NOTE — PROGRESS NOTES
A follow up visit was made to the patient. Emotional support, spiritual presence and  
prayer were provided. The patient is hard of hearing and experiences impatience. Jonathan Mustafa
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
## 2018-11-02 NOTE — H&P
Date of Surgery Update: Larry Garcia was seen and examined. History and physical has been reviewed. The patient has been examined.  There have been no significant clinical changes since the completion of the originally dated History and Physical. 
 
Signed By: Melodie Camilo MD   
 November 2, 2018 10:41 AM

## 2018-11-02 NOTE — INTERDISCIPLINARY ROUNDS
Interdisciplinary team rounds were held 11/2/2018 with the following team members:Nursing, Nurse Practitioner, Palliative Care, Pastoral Care, Pharmacy, Physician, Respiratory Therapy and Clinical Coordinator. Plan of care discussed. See clinical pathway and/or care plan for interventions and desired outcomes.

## 2018-11-02 NOTE — PROGRESS NOTES
TRANSFER - OUT REPORT: 
 
Verbal report given to [facility]) on [place]  being transferred to 826(unit) for routine progression of care Report consisted of patients Situation, Background, Assessment and  
Recommendations(SBAR). Information from the following report(s) SBAR, Kardex, ED Summary, Procedure Summary, Recent Results and Cardiac Rhythm A-fib, controlled was reviewed with the receiving nurse. Lines:  
Peripheral IV 10/31/18 Right Antecubital (Active) Site Assessment Clean, dry, & intact 11/2/2018  7:13 AM  
Phlebitis Assessment 0 11/2/2018  7:13 AM  
Infiltration Assessment 0 11/2/2018  7:13 AM  
Dressing Status Clean, dry, & intact 11/2/2018  7:13 AM  
Dressing Type Transparent;Tape 11/2/2018  7:13 AM  
Hub Color/Line Status Green;Flushed;Patent 11/2/2018  7:13 AM  
Alcohol Cap Used No 11/2/2018  7:13 AM  
   
Peripheral IV 10/31/18 Right Forearm (Active) Site Assessment Clean, dry, & intact 11/2/2018  7:13 AM  
Phlebitis Assessment 0 11/2/2018  7:13 AM  
Infiltration Assessment 0 11/2/2018  7:13 AM  
Dressing Status Clean, dry, & intact 11/2/2018  7:13 AM  
Dressing Type Transparent;Tape 11/2/2018  7:13 AM  
Hub Color/Line Status Pink;Flushed;Patent 11/2/2018  7:13 AM  
Alcohol Cap Used No 11/2/2018  7:13 AM  
   
Peripheral IV 10/31/18 Left;Posterior Hand (Active) Site Assessment Clean, dry, & intact 11/2/2018  7:13 AM  
Phlebitis Assessment 0 11/2/2018  7:13 AM  
Infiltration Assessment 0 11/2/2018  7:13 AM  
Dressing Status Clean, dry, & intact 11/2/2018  7:13 AM  
Dressing Type Transparent;Tape 11/2/2018  7:13 AM  
Hub Color/Line Status Pink;Flushed; Infusing;Patent 11/2/2018  7:13 AM  
Alcohol Cap Used No 11/2/2018  7:13 AM  
  
 
Opportunity for questions and clarification was provided. Patient transported with: 
 Monitor O2 @ 3 liters Registered Nurse

## 2018-11-02 NOTE — PROGRESS NOTES
Patient remains in stable condition. Respirations even/unlabored. No acute distress noted. Wound to R hand bandaged securely with red showdowing showing on bandage. Safety measures remain in place. SBAR given to ANGELA Schafer.

## 2018-11-02 NOTE — PROGRESS NOTES
Dressing changed again. 4x4 gauze used with Kerlex and Gauze mariah wrap. Lovenox held at this time due to patient actively bleeding. MD paged. Will continue to monitor.

## 2018-11-02 NOTE — WOUND CARE
Right hand skin tear, cleansed, steristrips applied, xeroform abd and mariah applied. Patient has very fragile skin, steriod use and asa. Recommend xeroform ABD and mariah daily and prn.

## 2018-11-02 NOTE — PROGRESS NOTES
Right hand rewrapped at this time as blood was coming through the dressing. Non-adherent dressing placed with ABD pad and gauze wrap. Patient tolerated the procedure well. Will continue to janna

## 2018-11-02 NOTE — PROGRESS NOTES
Critical Care Daily Progress Note: 11/2/2018 [place] Admission Date: 10/31/2018 The patient's chart is reviewed and the patient is discussed with the staff. Subjective:  
 
86 y.o.  male admitted on 10/31 after a fall.  Reported dizziness and shortness of breath.  Was bradycardic and hypotensive and LA 3.87, troponin 1.65, WBC 24.4, hgb 10.6, creatinine 2.58, BNP 1594.  Was placed BIPAP but HR dropped to 38 and SBP down to 60s--was taken off BIPAP and placed on NC.  Is a poor historian and states that he \"just slipped walking around the bed\".  Denies dizziness.  Has chronic home O2 that he uses mainly at night but admits to using during the day as well.  Wife states he has not been eating well and is very weak. 
  
He was last seen in our sleep lab 9/26/18 for follow up and has been prescribed BIPAP and pressures changed then to 11/7cm--device interrogation did not show daily use.  
  
Vascular surgery placed IVC filter 9/12/18 for DVT and hx GIB. Chronic medical:  AFIB on ASA and Plavix, COPD, DM, diastolic heart failure, CKD, PUD, essential tremors, ROBERTO on BIPAP The pt is doing better. CT scan did show R effsuion,  LLL infiltrate, cirrhosis and ascites Current Facility-Administered Medications Medication Dose Route Frequency  methylPREDNISolone (PF) (SOLU-MEDROL) injection 40 mg  40 mg IntraVENous Q8H  
 insulin lispro (HUMALOG) injection   SubCUTAneous AC&HS  aspirin chewable tablet 81 mg  81 mg Oral DAILY  levothyroxine (SYNTHROID) tablet 50 mcg  50 mcg Oral ACB  oxyCODONE IR (ROXICODONE) tablet 5 mg  5 mg Oral Q6H PRN  pravastatin (PRAVACHOL) tablet 40 mg  40 mg Oral QHS  sertraline (ZOLOFT) tablet 25 mg  25 mg Oral DAILY  sodium chloride (NS) flush 5 mL  5 mL InterCATHeter Q8H  
 sodium chloride (NS) flush 5-10 mL  5-10 mL InterCATHeter PRN  
 enoxaparin (LOVENOX) injection 30 mg  30 mg SubCUTAneous Q24H  piperacillin-tazobactam (ZOSYN) 4.5 g in 0.9% sodium chloride (MBP/ADV) 100 mL  4.5 g IntraVENous Q8H  
 nystatin (MYCOSTATIN) 100,000 unit/gram powder   Topical BID  Vancomycin intermittent dosing placeholder   Other Rx Dosing/Monitoring  calcium carbonate (OS-ERIKA) tablet 500 mg [elemental]  500 mg Oral DAILY WITH BREAKFAST  dextrose 40% (GLUTOSE) oral gel 1 Tube  15 g Oral PRN  
 glucagon (GLUCAGEN) injection 1 mg  1 mg IntraMUSCular PRN  
 dextrose (D50W) injection syrg 12.5-25 g  25-50 mL IntraVENous PRN  
 budesonide (PULMICORT) 500 mcg/2 ml nebulizer suspension  500 mcg Nebulization BID RT And  
 albuterol (PROVENTIL VENTOLIN) nebulizer solution 2.5 mg  2.5 mg Nebulization Q6HWA RT  
 lansoprazole (PREVACID SOLUTAB) disintegrating tablet 30 mg  30 mg Oral DAILY Review of Systems Constitutional:  negative for fever, chills, sweats Cardiovascular:  negative for chest pain, palpitations, syncope, edema Gastrointestinal:  negative for dysphagia, reflux, vomiting, diarrhea, abdominal pain, or melena Neurologic:  negative for focal weakness, numbness, headache Objective:  
 
Vitals:  
 11/02/18 1023 11/02/18 1028 11/02/18 1033 11/02/18 1038 BP: 136/60 129/62 126/62 136/60 Pulse: 65 67 64 66 Resp: (!) 49 29 (!) 39 (!) 38 Temp:      
SpO2: 94% 94% 95% 95% Weight:      
Height:      
 
 
Intake and Output:  
10/31 1901 - 11/02 0700 In: 2162.6 [P.O.:1540; I.V.:622.6] Out: 2025 [Urine:2025] No intake/output data recorded. Physical Exam:         
Constitutional:  the patient is well developed and in no acute distress EENMT:  Sclera clear, pupils equal, oral mucosa moist 
Respiratory:  Decreased breath sounds R base Cardiovascular:  RRR without M,G,R 
Gastrointestinal: soft and non-tender; with positive bowel sounds. Musculoskeletal: warm without cyanosis. There is chronic lower leg edema. Skin:  no jaundice or rashes, no wounds Neurologic: no gross neuro deficits Psychiatric:  alert and oriented x 3 LINES: 
periphera DRIPS: 
None, coag neg staph CXR:  
None today LAB Recent Labs 11/02/18 
0814 11/01/18 
2306 11/01/18 
2139 11/01/18 
1349 11/01/18 
1055 GLUCPOC 207* 272* 263* 119* 93 Recent Labs 11/02/18 
0430 11/01/18 
0352 10/31/18 
2043 10/31/18 
1426 WBC 7.3 18.9* 26.3*  --   
HGB 10.1* 9.7* 11.1*  --   
HCT 33.2* 32.5* 37.2*  --   
 170 196  --   
INR  --   --   --  1.8 Recent Labs 11/02/18 
9229 11/01/18 
0599 10/31/18 
2324 10/31/18 
2043 10/31/18 
1334 * 136  --  133* 136  
K 5.0 4.8  --  5.1 5.4* CL 97* 99  --  97* 97* CO2 25 27  --  28 30 * 62*  --  133* 66  
BUN 71* 62*  --  56* 52* CREA 2.97* 2.78*  --  2.81* 2.58* MG  --  2.6*  --   --   --   
CA 7.5* 7.5*  --  7.9* 7.8* ALB  --   --  2.6*  --  2.6* TBILI  --   --  5.9*  --  5.7* ALT  --   --  58  --  64 SGOT  --   --  84*  --  104* Recent Labs 10/31/18 
1332 PHI 7.392 PCO2I 44.1 PO2I 99 HCO3I 26.8* Recent Labs 11/01/18 
0352 10/31/18 
2043 LAC 1.6 2.5* No results for input(s): PH, PCO2, PO2, HCO3 in the last 72 hours. Assessment:  (Medical Decision Making) Hospital Problems  Date Reviewed: 10/31/2018 Codes Class Noted POA Pleural effusion ICD-10-CM: J90 ICD-9-CM: 511.9  11/2/2018 Unknown On R  
 Pulmonary infiltrate ICD-10-CM: R91.8 ICD-9-CM: 793.19  11/2/2018 Unknown Left base-? pneumonia COPD exacerbation ([facility] ICD-10-CM: J44.1 ICD-9-CM: 491.21  11/1/2018 Unknown  
 wheeaing is better Elevated brain natriuretic peptide (BNP) level ICD-10-CM: R79.89 ICD-9-CM: 790.99  11/1/2018 Unknown  
 chf  
 Anemia ICD-10-CM: D64.9 ICD-9-CM: 285.9  10/31/2018 Yes Acute respiratory failure with hypoxia [facility]) ICD-10-CM: J96.01 
ICD-9-CM: 518.81  10/31/2018 Yes On nc Acute on chronic renal failure (HCC) ICD-10-CM: N17.9, N18.9 ICD-9-CM: 584.9, 585.9  10/31/2018 Yes Some worse * (Principal) Septic shock ([facility] [facility] ICD-10-CM: A41.9, R65.21 ICD-9-CM: 038.9, 785.52, 995.92  10/31/2018 Yes  
 bp better Elevated LFTs ICD-10-CM: R94.5 ICD-9-CM: 790.6  10/31/2018 Yes  
 cirrhosis Total bilirubin, elevated ICD-10-CM: R17 
ICD-9-CM: 277.4  10/31/2018 Unknown S/P IVC filter (Chronic) ICD-10-CM: R57.537 ICD-9-CM: V45.89  9/21/2018 Yes Overview Signed 10/31/2018  3:20 PM by Nicci Sarabia NP  
  9/12/18  Dr. Rashid Lopez Altered mental state ICD-10-CM: R41.82 
ICD-9-CM: 780.97  4/22/2018 Yes ROBERTO treated with BiPAP (Chronic) ICD-10-CM: V98.55 
ICD-9-CM: 327.23  2/20/2015 Unknown Overview Signed 2/20/2015 10:42 AM by Aimee Carmona  
  Patient is on BiPAP, no supplementary oxygen Morbid obesity ([facility] [address] [facility] (Chronic) ICD-10-CM: E66.01 
ICD-9-CM: 278.01  1/21/2015 Yes Bacteremia- may be contaminant Plan:  (Medical Decision Making) 1    Thoracentesis 2     Zosyn and vanc 3     Lasix x 1 dose 4    Move to floor 5    Decrease steroids 6    hospitalists to become primary -- 
 
More than 50% of the time documented was spent in face-to-face contact with the patient and in the care of the patient on the floor/unit where the patient is located.  
 
Dotty Lara MD

## 2018-11-02 NOTE — PROGRESS NOTES
Problem: Nutrition Deficit Goal: *Optimize nutritional status Nutrition Reason for assessment: BPA - Malnutrition Screening Tool positive for unintended weight loss. Assessment:  
Food/Nutrition Patient History:  Admitted with PE, COPD, s/p thoracentesis. PMH: COPD, DM, ALFREDITO, CKD, MO. Pt reports \"I eat just not much,\" doesn't provide any quantifiable information regarding usual intake. Reports # and recalls losing around 20#, later in conversation he references not losing much wt. Limited historian overall. He recalls eating about half of lunch today. Noted hx of SLP recommending mechanical soft diet, pt denies any texture needs reporting if he takes small bites and chews his food. Reported to be eating poorly at home per H&P. Diet order(s): Cardiac. Anthropometrics:Height: 6' 2\" (188 cm),  Weight: 106 kg (233 lb 11 oz), Weight Source: Bed, Body mass index is 30 kg/m². BMI class of overweight for age >71. WT / BMI WEIGHT  
11/2/2018 233 lb 11 oz  
10/18/2018 228 lb 3.2 oz  
10/11/2018 225 lb  
10/8/2018 225 lb  
9/27/2018 224 lb 3.2 oz  
9/21/2018 247 lb  
9/12/2018 239 lb  
9/11/2018 239 lb  
9/6/2018 248 lb  
8/6/2018 248 lb 11.2 oz  
6/13/2018 247 lb  
5/30/2018 257 lb  
5/15/2018 249 lb  
5/14/2018 249 lb 12.8 oz  
5/9/2018 250 lb  
5/1/2018 246 lb 6.4 oz  
5% wt loss over 6 months per data available EMR review, 3% loss based on pt stated UBW - not clinically significant. Macronutrient needs: EER:  6497-9190 kcal /day (15-20 kcal/kg current BW) EPR:  65-81 grams protein/day (0.8-1 grams/kg ideal BW) (GFR 21) Intake/Comparative Standards: Average intake for past 3 recorded meal(s): 42%. This potentially meets ~47% of kcal and ~58% of protein needs Nutrition Diagnosis: Inadequate oral intake related to inability to consume sufficient po as evidenced by compromised po intake and 16# wt loss. Intervention: 
Meals and snacks: Continue current diet. Nutrition supplement therapy: Glucerna BID. Discharge Nutrition Plan: Too soon to determine. Opp Rufino Spears, [phone]

## 2018-11-03 NOTE — PROGRESS NOTES
Patient is sitting on recliner. Assessment completed. Respirations are even and unlabored. O2 at 3lpm NC. Dressing on right arm id dry with mild bleed. Safety measures in place.

## 2018-11-03 NOTE — PROGRESS NOTES
Critical Care Daily Progress Note: 11/3/2018 [place] Admission Date: 10/31/2018 The patient's chart is reviewed and the patient is discussed with the staff. 86 y.o.  male admitted on 10/31 after a fall.  Reported dizziness and shortness of breath.  Was bradycardic and hypotensive and LA 3.87, troponin 1.65, WBC 24.4, hgb 10.6, creatinine 2.58, BNP 1594.  Was placed BIPAP but HR dropped to 38 and SBP down to 60s--was taken off BIPAP and placed on NC.  Is a poor historian and states that he \"just slipped walking around the bed\".  Denies dizziness.  Has chronic home O2 that he uses mainly at night but admits to using during the day as well.  Wife states he has not been eating well and is very weak. 
  
He was last seen in our sleep lab 9/26/18 for follow up and has been prescribed BIPAP and pressures changed then to 11/7cm--device interrogation did not show daily use.  
  
Vascular surgery placed IVC filter 9/12/18 for DVT and hx GIB. Chronic medical:  AFIB on ASA and Plavix, COPD, DM, diastolic heart failure, CKD, PUD, essential tremors, ROBERTO on BIPAP The pt is doing better. CT scan did show R effsuion,  LLL infiltrate, cirrhosis and ascites Subjective:  
 
Feels better, wanting to go home soon. States breathing is better. Home BIPAP in room, but didn't use it. States he does use it at home with O2 bleed in, but some reports of poor compliance at prior checkups. Current Facility-Administered Medications Medication Dose Route Frequency  furosemide (LASIX) tablet 40 mg  40 mg Oral ACB&D  predniSONE (DELTASONE) tablet 40 mg  40 mg Oral DAILY WITH BREAKFAST  insulin glargine (LANTUS) injection 10 Units  10 Units SubCUTAneous QHS  insulin lispro (HUMALOG) injection 3 Units  3 Units SubCUTAneous TIDAC  insulin lispro (HUMALOG) injection   SubCUTAneous AC&HS  aspirin chewable tablet 81 mg  81 mg Oral DAILY  levothyroxine (SYNTHROID) tablet 50 mcg  50 mcg Oral ACB  pravastatin (PRAVACHOL) tablet 40 mg  40 mg Oral QHS  sertraline (ZOLOFT) tablet 25 mg  25 mg Oral DAILY  sodium chloride (NS) flush 5 mL  5 mL InterCATHeter Q8H  
 sodium chloride (NS) flush 5-10 mL  5-10 mL InterCATHeter PRN  piperacillin-tazobactam (ZOSYN) 4.5 g in 0.9% sodium chloride (MBP/ADV) 100 mL  4.5 g IntraVENous Q8H  
 nystatin (MYCOSTATIN) 100,000 unit/gram powder   Topical BID  Vancomycin intermittent dosing placeholder   Other Rx Dosing/Monitoring  calcium carbonate (OS-ERIKA) tablet 500 mg [elemental]  500 mg Oral DAILY WITH BREAKFAST  dextrose 40% (GLUTOSE) oral gel 1 Tube  15 g Oral PRN  
 glucagon (GLUCAGEN) injection 1 mg  1 mg IntraMUSCular PRN  
 dextrose (D50W) injection syrg 12.5-25 g  25-50 mL IntraVENous PRN  
 budesonide (PULMICORT) 500 mcg/2 ml nebulizer suspension  500 mcg Nebulization BID RT And  
 albuterol (PROVENTIL VENTOLIN) nebulizer solution 2.5 mg  2.5 mg Nebulization Q6HWA RT  
 lansoprazole (PREVACID SOLUTAB) disintegrating tablet 30 mg  30 mg Oral DAILY Review of Systems Constitutional:  negative for fever, chills, sweats Cardiovascular:  negative for chest pain, palpitations, syncope, edema Gastrointestinal:  negative for dysphagia, reflux, vomiting, diarrhea, abdominal pain, or melena Neurologic:  negative for focal weakness, numbness, headache Objective Vitals:  
 11/02/18 2358 11/03/18 6923 11/03/18 1786 11/03/18 1467 BP: 131/74 127/67  138/71 Pulse: 67 67  90 Resp: 17 18  17 Temp: 97.6 °F (36.4 °C) 98 °F (36.7 °C)  97.7 °F (36.5 °C) SpO2: 99% 98%  97% Weight:   228 lb (103.4 kg) Height:      
 
Intake and Output:  
11/01 1901 - 11/03 0700 In: 4974 [P.O.:698; I.V.:800] Out: 2582 [Urine:2475] No intake/output data recorded. Physical Exam:         
Constitutional:  the patient is well developed and in no acute distress EENMT:  Sclera clear, pupils equal, oral mucosa moist 
Respiratory:  Decreased breath sounds R base Cardiovascular:  RRR without M,G,R 
Gastrointestinal: soft and non-tender; with positive bowel sounds. Musculoskeletal: warm without cyanosis. There is chronic lower leg edema. Skin:  no jaundice or rashes, no wounds Neurologic: no gross neuro deficits Psychiatric:  alert and oriented x 3 LINES: 
periphera Adams DRIPS: 
None, coag neg staph CXR: R effusion resolved s/p thora, left infiltrate (no effusion seen on 11/1 CT) LAB Recent Labs 11/03/18 
0524 11/02/18 2011 11/02/18 
1636 11/02/18 22 [id number] 11/02/18 
7923 GLUCPOC 187* 185* 285* 234* 207* Recent Labs 11/03/18 
3885 11/02/18 
1951 11/02/18 
0430 11/01/18 
0352  10/31/18 
1426 WBC 7.2  --  7.3 18.9*   < >  --   
HGB 10.1* 9.9* 10.1* 9.7*   < >  --   
HCT 32.0* 33.3* 33.2* 32.5*   < >  --   
*  --  153 170   < >  --   
INR  --   --   --   --   --  1.8  
 < > = values in this interval not displayed. Recent Labs 11/03/18 
0464 11/02/18 
0355 11/01/18 
0352 10/31/18 
2324  10/31/18 
1334 * 134* 136  --    < > 136  
K 4.3 5.0 4.8  --    < > 5.4* CL 97* 97* 99  --    < > 97* CO2 27 25 27  --    < > 30 * 211* 62*  --    < > 66 BUN 73* 71* 62*  --    < > 52* CREA 2.70* 2.97* 2.78*  --    < > 2.58* MG  --   --  2.6*  --   --   --   
CA 7.1* 7.5* 7.5*  --    < > 7.8* ALB  --   --   --  2.6*  --  2.6* TBILI  --   --   --  5.9*  --  5.7* ALT  --   --   --  58  --  64 SGOT  --   --   --  84*  --  104*  
 < > = values in this interval not displayed. Recent Labs 10/31/18 
1332 PHI 7.392 PCO2I 44.1 PO2I 99 HCO3I 26.8* Recent Labs 11/01/18 
0352 10/31/18 
2043 LAC 1.6 2.5* No results for input(s): PH, PCO2, PO2, HCO3 in the last 72 hours. Assessment:  (Medical Decision Making) Hospital Problems  Date Reviewed: 10/31/2018 Codes Class Noted POA Pleural effusion ICD-10-CM: J90 ICD-9-CM: 511.9  11/2/2018 Unknown On R  
 Pulmonary infiltrate ICD-10-CM: R91.8 ICD-9-CM: 793.19  11/2/2018 Unknown Left base-? pneumonia COPD exacerbation ([facility] ICD-10-CM: J44.1 ICD-9-CM: 491.21  11/1/2018 Unknown  
 wheeaing is better Elevated brain natriuretic peptide (BNP) level ICD-10-CM: R79.89 ICD-9-CM: 790.99  11/1/2018 Unknown  
 chf  
 Anemia ICD-10-CM: D64.9 ICD-9-CM: 285.9  10/31/2018 Yes Acute respiratory failure with hypoxia [facility]) ICD-10-CM: J96.01 
ICD-9-CM: 518.81  10/31/2018 Yes On nc Acute on chronic renal failure (HCC) ICD-10-CM: N17.9, N18.9 ICD-9-CM: 584.9, 585.9  10/31/2018 Yes Some worse * (Principal) Septic shock ([facility] ICD-10-CM: A41.9, R65.21 ICD-9-CM: 038.9, 785.52, 995.92  10/31/2018 Yes  
 bp better Elevated LFTs ICD-10-CM: R94.5 ICD-9-CM: 790.6  10/31/2018 Yes  
 cirrhosis Total bilirubin, elevated ICD-10-CM: R17 
ICD-9-CM: 277.4  10/31/2018 Unknown S/P IVC filter (Chronic) ICD-10-CM: R57.775 ICD-9-CM: V45.89  9/21/2018 Yes Overview Signed 10/31/2018  3:20 PM by Haley Theodore NP  
  9/12/18  Dr. Stokes Client Altered mental state ICD-10-CM: R41.82 
ICD-9-CM: 780.97  4/22/2018 Yes ROBERTO treated with BiPAP (Chronic) ICD-10-CM: O01.71 
ICD-9-CM: 327.23  2/20/2015 Unknown Overview Signed 2/20/2015 10:42 AM by Monet Duncan  
  Patient is on BiPAP, no supplementary oxygen Morbid obesity ([address] (Chronic) ICD-10-CM: E66.01 
ICD-9-CM: 278.01  1/21/2015 Yes Bacteremia- may be contaminant Plan:  (Medical Decision Making) 1     Wean O2 to off as toleraetd 2     Zosyn 4/7, okay to d/c Vanc today with negative cultures 3     Diuresis per primary team 
4     Continue prednisone 40mg x 3 more days then stop 5     Consider removing alejo 6     Home BiPAP use QHS 
7     PT, O2 ambulatory evaluation prior to discharge -- 
 
 More than 50% of the time documented was spent in face-to-face contact with the patient and in the care of the patient on the floor/unit where the patient is located.  
 
Cathie Cox MD

## 2018-11-03 NOTE — PROGRESS NOTES
Patient is lying in bed. Respirations are even and unlabored. No distress at this time. Safety measures in place

## 2018-11-03 NOTE — PROGRESS NOTES
JOSÉ MANUEL recevied from Dianna Gannon RN. Patient remains in stable condition with respirations even/unlabored. No acute distress noted at this time. Oxygen noted. Call light remains within reach, patient encouraged to call nurse prn assist. Will continue to monitor per policy.

## 2018-11-03 NOTE — PROGRESS NOTES
Patient remains in stable condition with respirations even/unlabored. No acute distress noted. No needs noted or voiced at this time. Safety measures in place. IV abx infusing at MD ordered rate. Call light remains within reach. Preparing to give report to oncoming shift.

## 2018-11-03 NOTE — CONSULTS
[facility] Nephrology Consult Subjective: Ada Chase is a 80 y.o.  male who is being seen for ALFREDITO on CKD. This is a pleasant elderly gentleman with a complex and varied PMHx who has underlying stage 3 CKD. Baseline creatinine seems to be in the high 1's. He was admitted in part with heart failure and diureses. , He has had at least one thoracentesis. His respiratory failure was complicated by COPD and is on steroids. He has diffuse heart disease. Creatinine has gone up to the high 2's and has been relatively stable. Diuretics were held. He has a alejo catheter in place and is nonoliguric. Had a renal US in August with age appropriate findings. Denies NSAIDs. He has improved clinically. No nephrotoxins. Past Medical History:  
Diagnosis Date  Atherosclerosis of artery of extremity with ulceration ([address] 4/17/2015  Atherosclerosis of native arteries of extremity with intermittent claudication ([address] 4/17/2015  Atopic dermatitis 11/21/2012  Atrial fibrillation ([address]  CAD (coronary artery disease)  Carotid stenosis, bilateral 11/21/2012 50-79%  3-2010    1. Carotid Doppler (6/1/07): Greater than 70% stenosis in proximal LICA. 50% stenosis in right ICA. 2.  CTA of neck (3/15/10): Occluded distal segments of the vertebral arteries bilaterally. Atherosclerosis of the carotid bulbs bilaterally with a 50% stenosis on the left and a stenosis of less than 30% on the right. Small nodule in the right upper lobe near the apex. 3. CTA (8/29/13):  Less than 30% diameter stenosis of the cervical internal carotid arteries bilaterally.  CHF (congestive heart failure) ([address] 11/21/2012  Chronic kidney disease   
 hx elevated labs  Chronic obstructive pulmonary disease ([address]  Colon, diverticulosis 1/24/2015  Coronary atherosclerosis of native coronary vessel 10/31/2016 1. Cath (5/31/07):  LMCA: 25%. LAD: ostial 75-95% stenosis. 50-75% mid. D1:  25-50%. OM3: small with 75% stenosis. RCA: 100% mid. with left to right collaterals. 2 Vessel CABG (6/4/07):  LIMA to LAD and SVG to OM. SVG was anastomosed proximally to LIMA to due severe atherosclerosis of aorta. 3.  Lexiscan cardiolite (11/30/10): Abnormal with reversible lateral wall defects. Unable to gate given atrial fibrillation. 4.  LHC (1/14/11):  EF 60%. LVEDP 21. Patent LIMA to LAD and SVG to OM1 (off LIMA). occluded small RCA with left to right collaterals. Small D1 (2 mm vessel) with 70% mid stenosis.  Diabetes ([address]  Diastolic CHF, acute on chronic (HCC) 9/12/2015 1. Echo (9/11/15) : EF 55-60%. Mild LVH. Moderate biatrial enlargement. Moderate mitral/tricuspid regurgitation.  Failed CABG (coronary artery bypass graft) 11/21/2012  GI bleed 1/2015 Hospitalized [facility]  Gout 11/21/2012  History of tobacco use  [place] (hard of hearing)  Hypercholesterolemia  Hypertension  Hyperuricemia 11/21/2012  Morbid obesity ([address]  PAD (peripheral artery disease) ([address] 11/21/2012 1. Bilateral proximal common iliac PCI (2/21/08):  8.0 X 100 mm Cordis smart stent on right and 10 X 40 mm cordis smart stent on right. Both inflated to 7.0 mm.  Poor historian  Radiologic findings of lung field, abnormal 10/31/2016 1. CT of chest  (11/24/10): Multiple small nodules in the right lobe and stable interstitial prominence. Consistent with chronic lung disease. No evidence of malignancy.  Seizure disorder ([address] 8/5/2013  Shortness of breath dyspnea 8/5/2013  Thyroid disease  Unspecified sleep apnea   
 uses CPAP Past Surgical History:  
Procedure Laterality Date  CARDIAC SURG PROCEDURE UNLIST    
 cath 5/07,cabg 6/07,lexiscan cardiolite 11/10  
 HX CATARACT REMOVAL Left 2003  
 os  HX COLONOSCOPY    
 HX CORONARY ARTERY BYPASS GRAFT  06-  HX CORONARY STENT PLACEMENT  02- bilateral iliac artery PCI and stents  HX HEART CATHETERIZATION    
 left-2007, 2011  HX HEENT  1970s  
 neck lipoma Family History Problem Relation Age of Onset  Heart Attack Mother Mariana Other Father   
     old age  Other Brother   
     brain aneurysm  Heart Disease Other 2 children  with heart concerns, 36 & 47 yo  
 Heart Disease Son   
  
Social History Tobacco Use  Smoking status: Former Smoker Packs/day: 1.00 Years: 45.00 Pack years: 45.00 Types: Cigarettes Last attempt to quit: 1984 Years since quittin.8  Smokeless tobacco: Never Used  Tobacco comment: (stopped smoking in ) Substance Use Topics  Alcohol use: No  
  Alcohol/week: 0.0 oz  
   
Current Facility-Administered Medications Medication Dose Route Frequency Provider Last Rate Last Dose  furosemide (LASIX) tablet 40 mg  40 mg Oral ACB&D Bognar, Elora Rinne, MD      
 predniSONE (DELTASONE) tablet 40 mg  40 mg Oral DAILY WITH BREAKFAST CiambercoAntoine DO   40 mg at 18 0939  
 insulin glargine (LANTUS) injection 10 Units  10 Units SubCUTAneous QHS Antoine Morrison DO   10 Units at 18 2149  
 insulin lispro (HUMALOG) injection 3 Units  3 Units SubCUTAneous TIDAC CiAntoine harrison DO   3 Units at 18 1442  insulin lispro (HUMALOG) injection   SubCUTAneous AC&HS Connie Roche MD   2 Units at 18 5979  aspirin chewable tablet 81 mg  81 mg Oral DAILY Moe Rodrigez MD   81 mg at 18 9693  levothyroxine (SYNTHROID) tablet 50 mcg  50 mcg Oral ACB Moe Rodrigez MD   50 mcg at 18 0527  
 pravastatin (PRAVACHOL) tablet 40 mg  40 mg Oral QHS Moe Rodrigez MD   40 mg at 18 2143  sertraline (ZOLOFT) tablet 25 mg  25 mg Oral DAILY Moe Rodrigez MD   25 mg at 18 0939  
 sodium chloride (NS) flush 5 mL  5 mL InterCATHeter Q8H Moe Rodrigez MD   5 mL at 18 7404  sodium chloride (NS) flush 5-10 mL  5-10 mL InterCATHeter PRN Rachael Farias MD      
 piperacillin-tazobactam (ZOSYN) 4.5 g in 0.9% sodium chloride (MBP/ADV) 100 mL  4.5 g IntraVENous Q8H Rachael Farias MD 25 mL/hr at 11/03/18 0520 4.5 g at 11/03/18 3226  nystatin (MYCOSTATIN) 100,000 unit/gram powder   Topical BID Rachael Farias MD      
 Vancomycin intermittent dosing placeholder   Other Rx Dosing/Monitoring Rachael Farias MD      
 calcium carbonate (OS-ERIKA) tablet 500 mg [elemental]  500 mg Oral DAILY WITH BREAKFAST Rachael Farias MD   500 mg at 11/03/18 0015  
 dextrose 40% (GLUTOSE) oral gel 1 Tube  15 g Oral PRN Rachael Farias MD      
 glucagon [facility]) injection 1 mg  1 mg IntraMUSCular PRN Rachael Farias MD      
 dextrose (D50W) injection syrg 12.5-25 g  25-50 mL IntraVENous PRN Rachael Farias MD   25 g at 10/31/18 1734  budesonide (PULMICORT) 500 mcg/2 ml nebulizer suspension  500 mcg Nebulization BID RT Rachael Farias MD   500 mcg at 11/03/18 0800 And  
 albuterol (PROVENTIL VENTOLIN) nebulizer solution 2.5 mg  2.5 mg Nebulization Q6HWA RT Rachael Farias MD   2.5 mg at 11/03/18 0800  
 lansoprazole (PREVACID SOLUTAB) disintegrating tablet 30 mg  30 mg Oral DAILY Rachael Farias MD   30 mg at 11/03/18 9532 No Known Allergies Review of Systems: 
Pertinent items are noted in the History of Present Illness. Objective: Intake and Output:   
No intake/output data recorded. 11/01 1901 - 11/03 0700 In: 9735 [P.O.:698; I.V.:800] Out: 1691 [Urine:8965] Physical Exam:  
General appearance: alert, cooperative, no distress, appears stated age Neck: supple, symmetrical 
Lungs: rales and rhonchi bilaterally, reduced both bases Heart: regular rate and rhythm, S1, S2 normal, no murmur, click, rub or gallop Abdomen: soft, non-tender. Bowel sounds normal. No masses,  no organomegaly Extremities: 1+ edema, chronic bilateral induration Data Review:  
Recent Results (from the past 24 hour(s)) CELL COUNT, BODY FLUID Collection Time: 11/02/18 10:30 AM  
Result Value Ref Range BODY FLUID TYPE RIGHT FLUID COLOR YELLOW    
 FLUID APPEARANCE CLEAR    
 FLUID RBC CT. 2,000 /cu mm FLUID WBC COUNT 283 /cu mm  
 FLD NEUTROPHILS 11 % FLD LYMPHS 89 % FLUID COMMENT    
  MODERATE LARGE UNIDENTIFIED MONONUCLEAR CELLS NOTED. GLUCOSE, FLUID Collection Time: 11/02/18 10:30 AM  
Result Value Ref Range Fluid Type: RIGHT Glucose, body fld. PENDING MG/DL  
LDH, BODY FLUID Collection Time: 11/02/18 10:30 AM  
Result Value Ref Range Fluid Type: RIGHT    
 LD, body fld. PENDING U/L  
PROTEIN TOTAL, FLUID Collection Time: 11/02/18 10:30 AM  
Result Value Ref Range Fluid Type: RIGHT Protein total, body fld. PENDING g/dL CULTURE, BODY FLUID W GRAM STAIN Collection Time: 11/02/18 10:30 AM  
Result Value Ref Range Special Requests: RIGHT    
 GRAM STAIN PENDING Culture result: NO GROWTH 1 DAY    
GLUCOSE, POC Collection Time: 11/02/18 12:17 PM  
Result Value Ref Range Glucose (POC) 234 (H) 65 - 100 mg/dL GLUCOSE, POC Collection Time: 11/02/18  4:36 PM  
Result Value Ref Range Glucose (POC) 285 (H) 65 - 100 mg/dL HGB & HCT Collection Time: 11/02/18  7:51 PM  
Result Value Ref Range HGB 9.9 (L) 13.6 - 17.2 g/dL HCT 33.3 (L) 41.1 - 50.3 % GLUCOSE, POC Collection Time: 11/02/18  8:11 PM  
Result Value Ref Range Glucose (POC) 185 (H) 65 - 100 mg/dL GLUCOSE, POC Collection Time: 11/03/18  5:24 AM  
Result Value Ref Range Glucose (POC) 187 (H) 65 - 100 mg/dL CBC WITH AUTOMATED DIFF Collection Time: 11/03/18  7:13 AM  
Result Value Ref Range WBC 7.2 4.3 - 11.1 K/uL  
 RBC 3.83 (L) 4.23 - 5.6 M/uL  
 HGB 10.1 (L) 13.6 - 17.2 g/dL HCT 32.0 (L) 41.1 - 50.3 %  MCV 83.6 79.6 - 97.8 FL  
 MCH 26.4 26.1 - 32.9 PG  
 MCHC 31.6 31.4 - 35.0 g/dL  
 RDW 16.8 % PLATELET 846 (L) 942 - 450 K/uL MPV 11.8 9.4 - 12.3 FL ABSOLUTE NRBC 0.02 0.0 - 0.2 K/uL  
 DF AUTOMATED NEUTROPHILS 96 (H) 43 - 78 % LYMPHOCYTES 2 (L) 13 - 44 % MONOCYTES 1 (L) 4.0 - 12.0 % EOSINOPHILS 0 (L) 0.5 - 7.8 % BASOPHILS 0 0.0 - 2.0 % IMMATURE GRANULOCYTES 1 0.0 - 5.0 %  
 ABS. NEUTROPHILS 6.9 1.7 - 8.2 K/UL  
 ABS. LYMPHOCYTES 0.1 (L) 0.5 - 4.6 K/UL  
 ABS. MONOCYTES 0.1 0.1 - 1.3 K/UL  
 ABS. EOSINOPHILS 0.0 0.0 - 0.8 K/UL  
 ABS. BASOPHILS 0.0 0.0 - 0.2 K/UL  
 ABS. IMM. GRANS. 0.0 0.0 - 0.5 K/UL METABOLIC PANEL, BASIC Collection Time: 11/03/18  7:13 AM  
Result Value Ref Range Sodium 133 (L) 136 - 145 mmol/L Potassium 4.3 3.5 - 5.1 mmol/L Chloride 97 (L) 98 - 107 mmol/L  
 CO2 27 21 - 32 mmol/L Anion gap 9 7 - 16 mmol/L Glucose 166 (H) 65 - 100 mg/dL BUN 73 (H) 8 - 23 MG/DL Creatinine 2.70 (H) 0.8 - 1.5 MG/DL  
 GFR est AA 29 (L) >60 ml/min/1.73m2 GFR est non-AA 24 (L) >60 ml/min/1.73m2 Calcium 7.1 (L) 8.3 - 10.4 MG/DL Chest x-ray moderate CHF and effusion Assessment:  
 
Principal Problem: 
  Septic shock ([facility] (10/31/2018) Active Problems: Morbid obesity ([facility] (1/21/2015) ROBERTO treated with BiPAP (2/20/2015) Overview: Patient is on BiPAP, no supplementary oxygen Anemia (10/31/2018) Altered mental state (4/22/2018) Acute respiratory failure with hypoxia ([facility] (10/31/2018) Acute on chronic renal failure ([facility] (10/31/2018) S/P IVC filter (9/21/2018) Overview: 9/12/18  Dr. Suellen Clark Elevated LFTs (10/31/2018) Total bilirubin, elevated (10/31/2018) COPD exacerbation ([facility] (11/1/2018) Elevated brain natriuretic peptide (BNP) level (11/1/2018) Pleural effusion (11/2/2018) Pulmonary infiltrate (11/2/2018) Bacteremia (11/2/2018) Plan: This is an unfortunate gentleman with underlying stage 3 CKD now with ALFREDITO most likely secondary to a wet/dry problem. He remains moderately fluid overloaded though has had a net negative fluid balance. I would restart diuretics and follow renal function aiming to keep him on the dryer side and allowing some drop in GFR for comfort. He is anmeic though not at the point where I would start him on Epo. Will check iron stores and replete as needed. He would be a poor dialysis candidate should he continue to progress. Thank you for the kind courtesy of this consultation. Will make further adjustments to the medical regimen as the clinical course dictates and follow very closely with you. Signed By: Camilla Macias MD   
 November 3, 2018

## 2018-11-04 PROBLEM — I47.20 V TACH: Status: ACTIVE | Noted: 2018-01-01

## 2018-11-04 NOTE — PROGRESS NOTES
Critical Care Daily Progress Note: 11/4/2018 [place] Admission Date: 10/31/2018 The patient's chart is reviewed and the patient is discussed with the staff. 86 y.o.  male admitted on 10/31 after a fall.  Reported dizziness and shortness of breath.  Was bradycardic and hypotensive and LA 3.87, troponin 1.65, WBC 24.4, hgb 10.6, creatinine 2.58, BNP 1594.  Was placed BIPAP but HR dropped to 38 and SBP down to 60s--was taken off BIPAP and placed on NC.  Is a poor historian and states that he \"just slipped walking around the bed\".  Denies dizziness.  Has chronic home O2 that he uses mainly at night but admits to using during the day as well.  Wife states he has not been eating well and is very weak. 
  
He was last seen in our sleep lab 9/26/18 for follow up and has been prescribed BIPAP and pressures changed then to 11/7cm--device interrogation did not show daily use.  
  
Vascular surgery placed IVC filter 9/12/18 for DVT and hx GIB. Chronic medical:  AFIB on ASA and Plavix, COPD, DM, diastolic heart failure, CKD, PUD, essential tremors, ROBERTO on BIPAP The pt is doing better. CT scan did show R effsuion,  LLL infiltrate, cirrhosis and ascites Subjective:  
 
Feels better, wanting to go home today. Used BiPAP last night. Current Facility-Administered Medications Medication Dose Route Frequency  vancomycin (VANCOCIN) 750 mg in  ml infusion  750 mg IntraVENous ONCE  
 ferric gluconate (FERRLECIT) 125 mg in 0.9% sodium chloride 100 mL ivpb  125 mg IntraVENous DAILY  furosemide (LASIX) tablet 40 mg  40 mg Oral ACB&D  predniSONE (DELTASONE) tablet 40 mg  40 mg Oral DAILY WITH BREAKFAST  insulin glargine (LANTUS) injection 10 Units  10 Units SubCUTAneous QHS  insulin lispro (HUMALOG) injection 3 Units  3 Units SubCUTAneous TIDAC  insulin lispro (HUMALOG) injection   SubCUTAneous AC&HS  aspirin chewable tablet 81 mg  81 mg Oral DAILY  levothyroxine (SYNTHROID) tablet 50 mcg  50 mcg Oral ACB  pravastatin (PRAVACHOL) tablet 40 mg  40 mg Oral QHS  sertraline (ZOLOFT) tablet 25 mg  25 mg Oral DAILY  sodium chloride (NS) flush 5 mL  5 mL InterCATHeter Q8H  
 sodium chloride (NS) flush 5-10 mL  5-10 mL InterCATHeter PRN  piperacillin-tazobactam (ZOSYN) 4.5 g in 0.9% sodium chloride (MBP/ADV) 100 mL  4.5 g IntraVENous Q8H  
 nystatin (MYCOSTATIN) 100,000 unit/gram powder   Topical BID  calcium carbonate (OS-ERIKA) tablet 500 mg [elemental]  500 mg Oral DAILY WITH BREAKFAST  dextrose 40% (GLUTOSE) oral gel 1 Tube  15 g Oral PRN  
 glucagon (GLUCAGEN) injection 1 mg  1 mg IntraMUSCular PRN  
 dextrose (D50W) injection syrg 12.5-25 g  25-50 mL IntraVENous PRN  
 budesonide (PULMICORT) 500 mcg/2 ml nebulizer suspension  500 mcg Nebulization BID RT And  
 albuterol (PROVENTIL VENTOLIN) nebulizer solution 2.5 mg  2.5 mg Nebulization Q6HWA RT  
 lansoprazole (PREVACID SOLUTAB) disintegrating tablet 30 mg  30 mg Oral DAILY Review of Systems Constitutional:  negative for fever, chills, sweats Cardiovascular:  negative for chest pain, palpitations, syncope, edema Gastrointestinal:  negative for dysphagia, reflux, vomiting, diarrhea, abdominal pain, or melena Neurologic:  negative for focal weakness, numbness, headache Objective Vitals:  
 11/03/18 2008 11/03/18 2234 11/04/18 0246 11/04/18 6544 BP:  137/50 123/64 126/66 Pulse:  74 71 65 Resp:  20 22 18 Temp:  98 °F (36.7 °C) 97.5 °F (36.4 °C) 98.1 °F (36.7 °C) SpO2: 99% 99% 98% 97% Weight:      
Height:      
 
Intake and Output:  
11/02 1901 - 11/04 0700 In: 1849 [P.O.:1349; I.V.:500] Out: 1485 [[id number]] 11/04 0701 - 11/04 1900 In: 240 [P.O.:240] Out: - Physical Exam:         
Constitutional:  the patient is well developed and in no acute distress EENMT:  Sclera clear, pupils equal, oral mucosa moist 
Respiratory:  Decreased breath sounds R base Cardiovascular:  RRR without M,G,R 
Gastrointestinal: soft and non-tender; with positive bowel sounds. Musculoskeletal: warm without cyanosis. There is chronic lower leg edema. Skin:  no jaundice or rashes, no wounds Neurologic: no gross neuro deficits Psychiatric:  alert and oriented x 3 LINES: 
periphera Adams DRIPS: 
None, coag neg staph CXR: R effusion resolved s/p thora, left infiltrate (no effusion seen on 11/1 CT) LAB Recent Labs 11/04/18 
2291 11/03/18 
2107 11/03/18 
1545 11/03/18 
1119 11/03/18 
9669 GLUCPOC 123* 251* 173* 217* 187* Recent Labs 11/03/18 
0713 11/02/18 
1951 11/02/18 
0430 WBC 7.2  --  7.3 HGB 10.1* 9.9* 10.1* HCT 32.0* 33.3* 33.2*  
*  --  153 Recent Labs 11/04/18 
0603 11/03/18 
1843 11/03/18 
8632 11/02/18 
2676   --  133* 134* K 4.2  --  4.3 5.0  
CL 99  --  97* 97* CO2 26  --  27 25 *  --  166* 211* BUN 80*  --  73* 71* CREA 2.71*  --  2.70* 2.97* MG 2.6* 2.8*  --   --   
PHOS 4.9* 5.0*  --   --   
CA 7.2*  --  7.1* 7.5* No results for input(s): PH, PCO2, PO2, HCO3, PHI, PCO2I, PO2I, HCO3I in the last 72 hours. No results for input(s): LCAD, LAC in the last 72 hours. No results for input(s): PH, PCO2, PO2, HCO3 in the last 72 hours. Assessment:  (Medical Decision Making) Hospital Problems  Date Reviewed: 10/31/2018 Codes Class Noted POA Pleural effusion ICD-10-CM: J90 ICD-9-CM: 511.9  11/2/2018 Unknown On R  
 Pulmonary infiltrate ICD-10-CM: R91.8 ICD-9-CM: 793.19  11/2/2018 Unknown Left base-? pneumonia COPD exacerbation ([facility] [address] ICD-10-CM: J44.1 ICD-9-CM: 491.21  11/1/2018 Unknown  
 wheeaing is better Elevated brain natriuretic peptide (BNP) level ICD-10-CM: R79.89 ICD-9-CM: 790.99  11/1/2018 Unknown  
 chf  
 Anemia ICD-10-CM: D64.9 ICD-9-CM: 285.9  10/31/2018 Yes  Acute respiratory failure with hypoxia (HCC) ICD-10-CM: J96.01 
 ICD-9-CM: 518.81  10/31/2018 Yes On nc Acute on chronic renal failure (HCC) ICD-10-CM: N17.9, N18.9 ICD-9-CM: 584.9, 585.9  10/31/2018 Yes Some worse * (Principal) Septic shock ([facility] [address] ICD-10-CM: A41.9, R65.21 ICD-9-CM: 038.9, 785.52, 995.92  10/31/2018 Yes  
 bp better Elevated LFTs ICD-10-CM: R94.5 ICD-9-CM: 790.6  10/31/2018 Yes  
 cirrhosis Total bilirubin, elevated ICD-10-CM: R17 
ICD-9-CM: 277.4  10/31/2018 Unknown S/P IVC filter (Chronic) ICD-10-CM: C71.022 ICD-9-CM: V45.89  9/21/2018 Yes Overview Signed 10/31/2018  3:20 PM by Cynthia Xavier NP  
  9/12/18  Dr. Gerri Funk Altered mental state ICD-10-CM: R41.82 
ICD-9-CM: 780.97  4/22/2018 Yes ROBERTO treated with BiPAP (Chronic) ICD-10-CM: E55.56 
ICD-9-CM: 327.23  2/20/2015 Unknown Overview Signed 2/20/2015 10:42 AM by Aleks Lim  
  Patient is on BiPAP, no supplementary oxygen Morbid obesity ([facility] [address] (Chronic) ICD-10-CM: E66.01 
ICD-9-CM: 278.01  1/21/2015 Yes Bacteremia- may be contaminant Plan:  (Medical Decision Making) 1     Okay to d/c from pulmonary perspective 2     Can finish 7 days of antibiotics (day 5) with Augmentin (cultures NGTD) 3     Diuresis per primary team 
4     Continue prednisone 40mg x 2 more days then stop 
5     Home BiPAP use QHS 
6     PT, O2 ambulatory evaluation prior to discharge  
-- 
 
More than 50% of the time documented was spent in face-to-face contact with the patient and in the care of the patient on the floor/unit where the patient is located.  
 
Kasey Toledo MD

## 2018-11-04 NOTE — PROGRESS NOTES
Adams Cath discontinue . Respirations are even and unlabored. No distress at this time. Safety measures in place.

## 2018-11-04 NOTE — PROGRESS NOTES
Patient is resting in chair Assessment completed. Respirations are even and unlabored. O2 at 2lpm NC. Patient denies any pain. No distress at this time. Safety measures in place.

## 2018-11-04 NOTE — PROGRESS NOTES
Progress Note Patient: Larry Garcia MRN: [id number]  SSN: [id number] YOB: 1932  Age: 80 y.o. Sex: male Admit Date: 10/31/2018 LOS: 4 days Subjective: F/U pleural effusion, septic shock, ALFREDITO, v tach Patient had 8 beat run of V tach again this AM around 4am. Patient asymptomatic during this time. S/p thoracentesis on 11/2/18 for pleural effusion. Current Facility-Administered Medications Medication Dose Route Frequency  ferric gluconate (FERRLECIT) 125 mg in 0.9% sodium chloride 100 mL ivpb  125 mg IntraVENous DAILY  metoprolol succinate (TOPROL-XL) XL tablet 12.5 mg  12.5 mg Oral DAILY  amoxicillin-clavulanate (AUGMENTIN) 500-125 mg per tablet 1 Tab  1 Tab Oral BID WITH MEALS  furosemide (LASIX) tablet 40 mg  40 mg Oral ACB&D  predniSONE (DELTASONE) tablet 40 mg  40 mg Oral DAILY WITH BREAKFAST  insulin glargine (LANTUS) injection 10 Units  10 Units SubCUTAneous QHS  insulin lispro (HUMALOG) injection 3 Units  3 Units SubCUTAneous TIDAC  insulin lispro (HUMALOG) injection   SubCUTAneous AC&HS  aspirin chewable tablet 81 mg  81 mg Oral DAILY  levothyroxine (SYNTHROID) tablet 50 mcg  50 mcg Oral ACB  pravastatin (PRAVACHOL) tablet 40 mg  40 mg Oral QHS  sertraline (ZOLOFT) tablet 25 mg  25 mg Oral DAILY  sodium chloride (NS) flush 5 mL  5 mL InterCATHeter Q8H  
 sodium chloride (NS) flush 5-10 mL  5-10 mL InterCATHeter PRN  
 nystatin (MYCOSTATIN) 100,000 unit/gram powder   Topical BID  calcium carbonate (OS-ERIKA) tablet 500 mg [elemental]  500 mg Oral DAILY WITH BREAKFAST  dextrose 40% (GLUTOSE) oral gel 1 Tube  15 g Oral PRN  
 glucagon (GLUCAGEN) injection 1 mg  1 mg IntraMUSCular PRN  
 dextrose (D50W) injection syrg 12.5-25 g  25-50 mL IntraVENous PRN  
 budesonide (PULMICORT) 500 mcg/2 ml nebulizer suspension  500 mcg Nebulization BID RT  And  
 albuterol (PROVENTIL VENTOLIN) nebulizer solution 2.5 mg  2.5 mg Nebulization Q6HWA RT  
 lansoprazole (PREVACID SOLUTAB) disintegrating tablet 30 mg  30 mg Oral DAILY Objective:  
 
Vitals:  
 11/03/18 2234 11/04/18 0246 11/04/18 0749 11/04/18 1100 BP: 137/50 123/64 126/66 141/75 Pulse: 74 71 65 73 Resp: 20 22 18 18 Temp: 98 °F (36.7 °C) 97.5 °F (36.4 °C) 98.1 °F (36.7 °C) 98.2 °F (36.8 °C) SpO2: 99% 98% 97% 92% Weight:      
Height:      
  
  
Intake and Output: 
Current Shift: 11/04 0701 - 11/04 1900 In: 0 [P.O.:480; I.V.:110] Out: 400 [Urine:400] Last three shifts: 11/02 1901 - 11/04 0700 In: 1849 [P.O.:1349; I.V.:500] Out: 3642 [[id number]] Physical Exam:  
General:  Alert, cooperative. Eyes:  Conjunctivae/corneas clear. Ears:  Normal TMs and external ear canals both ears. Nose: Nares normal. Septum midline. Mouth/Throat: Lips, mucosa, and tongue normal. Teeth and gums normal.  
Neck:  no JVD. Back:   deferred Lungs:   Clear to auscultation bilaterally. Heart:  Regular rate and rhythm, S1, S2 normal, no murmur, click, rub or gallop. Abdomen:   Soft, non-tender. Bowel sounds normal. No masses,  No organomegaly. Extremities: Extremities normal, atraumatic, no cyanosis or edema. Pulses: 2+ and symmetric all extremities. Skin: Skin color, texture, turgor normal. No rashes or lesions Lymph nodes: Cervical, supraclavicular, and axillary nodes normal.  
Neurologic: CNII-XII intact. Normal strength, sensation and reflexes throughout. Lab/Data Review: 
 
Recent Results (from the past 24 hour(s)) GLUCOSE, POC Collection Time: 11/03/18  3:45 PM  
Result Value Ref Range Glucose (POC) 173 (H) 65 - 100 mg/dL PHOSPHORUS Collection Time: 11/03/18  6:43 PM  
Result Value Ref Range Phosphorus 5.0 (H) 2.3 - 3.7 MG/DL MAGNESIUM Collection Time: 11/03/18  6:43 PM  
Result Value Ref Range Magnesium 2.8 (H) 1.8 - 2.4 mg/dL GLUCOSE, POC Collection Time: 11/03/18  9:07 PM  
Result Value Ref Range Glucose (POC) 251 (H) 65 - 100 mg/dL GLUCOSE, POC Collection Time: 11/04/18  5:16 AM  
Result Value Ref Range Glucose (POC) 123 (H) 65 - 100 mg/dL Guille Harrison Collection Time: 11/04/18  6:03 AM  
Result Value Ref Range Vancomycin, random 10.3 UG/ML  
METABOLIC PANEL, BASIC Collection Time: 11/04/18  6:03 AM  
Result Value Ref Range Sodium 137 136 - 145 mmol/L Potassium 4.2 3.5 - 5.1 mmol/L Chloride 99 98 - 107 mmol/L  
 CO2 26 21 - 32 mmol/L Anion gap 12 7 - 16 mmol/L Glucose 118 (H) 65 - 100 mg/dL BUN 80 (H) 8 - 23 MG/DL Creatinine 2.71 (H) 0.8 - 1.5 MG/DL  
 GFR est AA 29 (L) >60 ml/min/1.73m2 GFR est non-AA 24 (L) >60 ml/min/1.73m2 Calcium 7.2 (L) 8.3 - 10.4 MG/DL MAGNESIUM Collection Time: 11/04/18  6:03 AM  
Result Value Ref Range Magnesium 2.6 (H) 1.8 - 2.4 mg/dL PHOSPHORUS Collection Time: 11/04/18  6:03 AM  
Result Value Ref Range Phosphorus 4.9 (H) 2.3 - 3.7 MG/DL  
GLUCOSE, POC Collection Time: 11/04/18 11:56 AM  
Result Value Ref Range Glucose (POC) 190 (H) 65 - 100 mg/dL Assessment/ Plan:  
 
Principal Problem: 
  Se[facility] ([facility] (10/31/2018) Active Problems: Morbid obesity ([facility] [address] (1/21/2015) ROBERTO treated with BiPAP (2/20/2015) Overview: Patient is on BiPAP, no supplementary oxygen Anemia (10/31/2018) Altered mental state (4/22/2018) Acute respiratory failure with hypoxia ([facility] [address] (10/31/2018) Acute on chronic renal failure ([facility] [address] (10/31/2018) S/P IVC filter (9/21/2018) Overview: 9/12/18  Dr. Jarret Hurley Elevated LFTs (10/31/2018) Total bilirubin, elevated (10/31/2018) COPD exacerbation ([address] (11/1/2018) Elevated brain natriuretic peptide (BNP) level (11/1/2018) Pleural effusion (11/2/2018) Pulmonary infiltrate (11/2/2018) Bacteremia (11/2/2018) V tach ([address] (11/4/2018) Change Zosyn to augmentin as recommended by pulmonology for two more days. Continue Prednisone for two more days. Use Bipap at home. From pulmonary standpoint, he is clear. Continue pulmicort and albuterol every 6 hours. Nephrology following from ALFREDITO. Have started IV ferric gluconate for iron deficiency anemia. Currently on furosemide 40mg BID for pleural effusion. Takes 20mg furosemide BID at home. I have reviewed patient's chart and no hx of v tach. Since was in septic shock during original admission, he has not been on his anti-hypertensive medications. Will add back BB. Consult cardiology for further recommendations. Magnesium and Phosphorus elevated but trending down. Will continue to monitor. Glucose levels controlled. DVT prophylaxis - SCDs Signed By: Timoteo Shafer DO November 4, 2018

## 2018-11-04 NOTE — PROGRESS NOTES
MD notified that patient had a 13 beat conduction change and then returned to [facility]. Dr. Rick De Anda requests that cardiology be notified. Paged on call cardiologist. Svetlana Dawkins return call. Will continue to monitor.

## 2018-11-04 NOTE — PROGRESS NOTES
Subjective:  
Daily Progress Note: 11/4/2018 9:34 AM 
 
No complaints. Comfortable No CP No SOB No Abd pain MS - 
 
 
Current Facility-Administered Medications Medication Dose Route Frequency  vancomycin (VANCOCIN) 750 mg in  ml infusion  750 mg IntraVENous ONCE  
 furosemide (LASIX) tablet 40 mg  40 mg Oral ACB&D  predniSONE (DELTASONE) tablet 40 mg  40 mg Oral DAILY WITH BREAKFAST  insulin glargine (LANTUS) injection 10 Units  10 Units SubCUTAneous QHS  insulin lispro (HUMALOG) injection 3 Units  3 Units SubCUTAneous TIDAC  insulin lispro (HUMALOG) injection   SubCUTAneous AC&HS  aspirin chewable tablet 81 mg  81 mg Oral DAILY  levothyroxine (SYNTHROID) tablet 50 mcg  50 mcg Oral ACB  pravastatin (PRAVACHOL) tablet 40 mg  40 mg Oral QHS  sertraline (ZOLOFT) tablet 25 mg  25 mg Oral DAILY  sodium chloride (NS) flush 5 mL  5 mL InterCATHeter Q8H  
 sodium chloride (NS) flush 5-10 mL  5-10 mL InterCATHeter PRN  piperacillin-tazobactam (ZOSYN) 4.5 g in 0.9% sodium chloride (MBP/ADV) 100 mL  4.5 g IntraVENous Q8H  
 nystatin (MYCOSTATIN) 100,000 unit/gram powder   Topical BID  calcium carbonate (OS-ERIKA) tablet 500 mg [elemental]  500 mg Oral DAILY WITH BREAKFAST  dextrose 40% (GLUTOSE) oral gel 1 Tube  15 g Oral PRN  
 glucagon (GLUCAGEN) injection 1 mg  1 mg IntraMUSCular PRN  
 dextrose (D50W) injection syrg 12.5-25 g  25-50 mL IntraVENous PRN  
 budesonide (PULMICORT) 500 mcg/2 ml nebulizer suspension  500 mcg Nebulization BID RT And  
 albuterol (PROVENTIL VENTOLIN) nebulizer solution 2.5 mg  2.5 mg Nebulization Q6HWA RT  
 lansoprazole (PREVACID SOLUTAB) disintegrating tablet 30 mg  30 mg Oral DAILY Objective:  
 
Visit Vitals /66 Pulse 65 Temp 98.1 °F (36.7 °C) Resp 18 Ht 6' 2\" (1.88 m) Wt 103.4 kg (228 lb) SpO2 97% BMI 29.27 kg/m² O2 Flow Rate (L/min): 3 l/min O2 Device: Nasal cannula Temp (24hrs), Av.8 °F (36.6 °C), Min:97.3 °F (36.3 °C), Max:98.2 °F (36.8 °C) 701 - 1900 In: 240 [P.O.:240] Out: -  
1901 - 700 In: 1849 [P.O.:1349; I.V.:500] Out: 4447 [[id number]:[id number]] Visit Vitals /66 Pulse 65 Temp 98.1 °F (36.7 °C) Resp 18 Ht 6' 2\" (1.88 m) Wt 103.4 kg (228 lb) SpO2 97% BMI 29.27 kg/m² Head: Normocephalic, without obvious abnormality Neck: no JVD Lungs: decreased at bases Heart: regular rate and rhythm Abdomen: soft, non-tender. Extremities: + edema Data Review Recent Results (from the past 48 hour(s)) CELL COUNT, BODY FLUID Collection Time: 18 10:30 AM  
Result Value Ref Range BODY FLUID TYPE RIGHT FLUID COLOR YELLOW    
 FLUID APPEARANCE CLEAR    
 FLUID RBC CT. 2,000 /cu mm FLUID WBC COUNT 283 /cu mm  
 FLD NEUTROPHILS 11 % FLD LYMPHS 89 % FLUID COMMENT    
  MODERATE LARGE UNIDENTIFIED MONONUCLEAR CELLS NOTED. GLUCOSE, FLUID Collection Time: 18 10:30 AM  
Result Value Ref Range Fluid Type: RIGHT Glucose, body fld. 241 MG/DL  
LDH, BODY FLUID Collection Time: 18 10:30 AM  
Result Value Ref Range Fluid Type: RIGHT    
 LD, body fld. 94 U/L  
PROTEIN TOTAL, FLUID Collection Time: 18 10:30 AM  
Result Value Ref Range Fluid Type: RIGHT Protein total, body fld. 2.1 g/dL AFB CULTURE + SMEAR W/RFLX ID FROM CULTURE Collection Time: 18 10:30 AM  
Result Value Ref Range Source PLEURAL FLUID    
 AFB Specimen processing Concentration Acid Fast Smear NEGATIVE Acid Fast Culture PENDING   
FUNGUS CULTURE AND SMEAR Collection Time: 18 10:30 AM  
Result Value Ref Range Source RIGHT Fungus stain Direct Inoculation Fungus (Mycology) Culture Other source received GLUCOSE, POC Collection Time: 18 12:17 PM  
Result Value Ref Range Glucose (POC) 234 (H) 65 - 100 mg/dL GLUCOSE, POC  
 Collection Time: 11/02/18  4:36 PM  
Result Value Ref Range Glucose (POC) 285 (H) 65 - 100 mg/dL HGB & HCT Collection Time: 11/02/18  7:51 PM  
Result Value Ref Range HGB 9.9 (L) 13.6 - 17.2 g/dL HCT 33.3 (L) 41.1 - 50.3 % GLUCOSE, POC Collection Time: 11/02/18  8:11 PM  
Result Value Ref Range Glucose (POC) 185 (H) 65 - 100 mg/dL GLUCOSE, POC Collection Time: 11/03/18  5:24 AM  
Result Value Ref Range Glucose (POC) 187 (H) 65 - 100 mg/dL CBC WITH AUTOMATED DIFF Collection Time: 11/03/18  7:13 AM  
Result Value Ref Range WBC 7.2 4.3 - 11.1 K/uL  
 RBC 3.83 (L) 4.23 - 5.6 M/uL  
 HGB 10.1 (L) 13.6 - 17.2 g/dL HCT 32.0 (L) 41.1 - 50.3 % MCV 83.6 79.6 - 97.8 FL  
 MCH 26.4 26.1 - 32.9 PG  
 MCHC 31.6 31.4 - 35.0 g/dL  
 RDW 16.8 % PLATELET 212 (L) 582 - 450 K/uL MPV 11.8 9.4 - 12.3 FL ABSOLUTE NRBC 0.02 0.0 - 0.2 K/uL  
 DF AUTOMATED NEUTROPHILS 96 (H) 43 - 78 % LYMPHOCYTES 2 (L) 13 - 44 % MONOCYTES 1 (L) 4.0 - 12.0 % EOSINOPHILS 0 (L) 0.5 - 7.8 % BASOPHILS 0 0.0 - 2.0 % IMMATURE GRANULOCYTES 1 0.0 - 5.0 %  
 ABS. NEUTROPHILS 6.9 1.7 - 8.2 K/UL  
 ABS. LYMPHOCYTES 0.1 (L) 0.5 - 4.6 K/UL  
 ABS. MONOCYTES 0.1 0.1 - 1.3 K/UL  
 ABS. EOSINOPHILS 0.0 0.0 - 0.8 K/UL  
 ABS. BASOPHILS 0.0 0.0 - 0.2 K/UL  
 ABS. IMM. GRANS. 0.0 0.0 - 0.5 K/UL METABOLIC PANEL, BASIC Collection Time: 11/03/18  7:13 AM  
Result Value Ref Range Sodium 133 (L) 136 - 145 mmol/L Potassium 4.3 3.5 - 5.1 mmol/L Chloride 97 (L) 98 - 107 mmol/L  
 CO2 27 21 - 32 mmol/L Anion gap 9 7 - 16 mmol/L Glucose 166 (H) 65 - 100 mg/dL BUN 73 (H) 8 - 23 MG/DL Creatinine 2.70 (H) 0.8 - 1.5 MG/DL  
 GFR est AA 29 (L) >60 ml/min/1.73m2 GFR est non-AA 24 (L) >60 ml/min/1.73m2 Calcium 7.1 (L) 8.3 - 10.4 MG/DL FERRITIN Collection Time: 11/03/18  7:13 AM  
Result Value Ref Range  Ferritin 83 8 - 388 NG/ML  
TRANSFERRIN SATURATION  
 Collection Time: 11/03/18  7:13 AM  
Result Value Ref Range Iron 16 (L) 35 - 150 ug/dL TIBC 269 250 - 450 ug/dL Transferrin Saturation 6 (L) >20 % GLUCOSE, POC Collection Time: 11/03/18 11:19 AM  
Result Value Ref Range Glucose (POC) 217 (H) 65 - 100 mg/dL GLUCOSE, POC Collection Time: 11/03/18  3:45 PM  
Result Value Ref Range Glucose (POC) 173 (H) 65 - 100 mg/dL PHOSPHORUS Collection Time: 11/03/18  6:43 PM  
Result Value Ref Range Phosphorus 5.0 (H) 2.3 - 3.7 MG/DL MAGNESIUM Collection Time: 11/03/18  6:43 PM  
Result Value Ref Range Magnesium 2.8 (H) 1.8 - 2.4 mg/dL GLUCOSE, POC Collection Time: 11/03/18  9:07 PM  
Result Value Ref Range Glucose (POC) 251 (H) 65 - 100 mg/dL GLUCOSE, POC Collection Time: 11/04/18  5:16 AM  
Result Value Ref Range Glucose (POC) 123 (H) 65 - 100 mg/dL Danielle Yu Collection Time: 11/04/18  6:03 AM  
Result Value Ref Range Vancomycin, random 71.3 UG/ML  
METABOLIC PANEL, BASIC Collection Time: 11/04/18  6:03 AM  
Result Value Ref Range Sodium 137 136 - 145 mmol/L Potassium 4.2 3.5 - 5.1 mmol/L Chloride 99 98 - 107 mmol/L  
 CO2 26 21 - 32 mmol/L Anion gap 12 7 - 16 mmol/L Glucose 118 (H) 65 - 100 mg/dL BUN 80 (H) 8 - 23 MG/DL Creatinine 2.71 (H) 0.8 - 1.5 MG/DL  
 GFR est AA 29 (L) >60 ml/min/1.73m2 GFR est non-AA 24 (L) >60 ml/min/1.73m2 Calcium 7.2 (L) 8.3 - 10.4 MG/DL MAGNESIUM Collection Time: 11/04/18  6:03 AM  
Result Value Ref Range Magnesium 2.6 (H) 1.8 - 2.4 mg/dL PHOSPHORUS Collection Time: 11/04/18  6:03 AM  
Result Value Ref Range Phosphorus 4.9 (H) 2.3 - 3.7 MG/DL Assessment Patient Active Problem List  
 Diagnosis Date Noted  Pleural effusion 11/02/2018  Pulmonary infiltrate 11/02/2018  Bacteremia 11/02/2018  COPD exacerbation ([facility] 11/01/2018  Elevated brain natriuretic peptide (BNP) level 11/01/2018  Anemia 10/31/2018  Acute respiratory failure with hypoxia ([address] 10/31/2018  Acute on chronic renal failure ([address] 10/31/2018  Septic shock ([address] 10/31/2018  Elevated LFTs 10/31/2018  Total bilirubin, elevated 10/31/2018  Acute metabolic encephalopathy 87/70/0919  Acute encephalopathy 10/08/2018  S/P IVC filter 09/21/2018  Acute blood loss anemia 08/05/2018  Tremors of nervous system 08/04/2018  ALFREDITO (acute kidney injury) ([address] 08/02/2018  Hypoglycemia 08/01/2018  Altered mental state 04/22/2018  Venous stasis dermatitis of both lower extremities 04/22/2018  Type 2 diabetes with nephropathy ([address] 02/12/2018  Restrictive lung disease 11/01/2017  Hypoxia 08/21/2017  Thrombocytopenia ([address] 08/21/2017  Falls frequently 09/17/2016  Dyspnea 01/21/2016  Type 2 diabetes mellitus with peripheral neuropathy ([address] 11/05/2015  Chronic diastolic congestive heart failure ([address] 09/12/2015  ROBERTO treated with BiPAP 02/20/2015  Chronic pain 01/22/2015  Morbid obesity ([address] 01/21/2015  CKD (chronic kidney disease) stage 3, GFR 30-59 ml/min ([facility]) 09/18/2013  Hyperlipidemia 08/05/2013  Debility 08/05/2013  Essential hypertension 11/21/2012  Persistent atrial fibrillation ([address] 11/21/2012  Peripheral neuropathy 11/21/2012  PAD (peripheral artery disease) ([address] 11/21/2012  Carotid stenosis, bilateral 11/21/2012  Gout 11/21/2012 Problems Addressed by Nephrology CKD 4 Cardiorenal Syndrome Anemia Plan Stable renal function. Tolerating diuretics well. Continue current dose. Likely at baseline. He is iron deficient - start IV iron replacement.

## 2018-11-04 NOTE — PROGRESS NOTES
Progress Note Patient: Kaylah Noble MRN: [id number]  SSN: [id number] YOB: 1932  Age: 80 y.o. Sex: male Admit Date: 10/31/2018 LOS: 3 days Subjective: F/U pleural effusion, septic shock, ALFREDITO Nephrology has seen today. No chest pain or SOB. Admits to edema to LE. Episode of v tach this PM. Phosphorus and Mg elevated. S/p thoracentesis with culture pending. Current Facility-Administered Medications Medication Dose Route Frequency  furosemide (LASIX) tablet 40 mg  40 mg Oral ACB&D  [START ON 11/4/2018] Vancomycin Random Level Reminder   Other ONCE  predniSONE (DELTASONE) tablet 40 mg  40 mg Oral DAILY WITH BREAKFAST  insulin glargine (LANTUS) injection 10 Units  10 Units SubCUTAneous QHS  insulin lispro (HUMALOG) injection 3 Units  3 Units SubCUTAneous TIDAC  insulin lispro (HUMALOG) injection   SubCUTAneous AC&HS  aspirin chewable tablet 81 mg  81 mg Oral DAILY  levothyroxine (SYNTHROID) tablet 50 mcg  50 mcg Oral ACB  pravastatin (PRAVACHOL) tablet 40 mg  40 mg Oral QHS  sertraline (ZOLOFT) tablet 25 mg  25 mg Oral DAILY  sodium chloride (NS) flush 5 mL  5 mL InterCATHeter Q8H  
 sodium chloride (NS) flush 5-10 mL  5-10 mL InterCATHeter PRN  piperacillin-tazobactam (ZOSYN) 4.5 g in 0.9% sodium chloride (MBP/ADV) 100 mL  4.5 g IntraVENous Q8H  
 nystatin (MYCOSTATIN) 100,000 unit/gram powder   Topical BID  Vancomycin intermittent dosing placeholder   Other Rx Dosing/Monitoring  calcium carbonate (OS-ERIKA) tablet 500 mg [elemental]  500 mg Oral DAILY WITH BREAKFAST  dextrose 40% (GLUTOSE) oral gel 1 Tube  15 g Oral PRN  
 glucagon (GLUCAGEN) injection 1 mg  1 mg IntraMUSCular PRN  
 dextrose (D50W) injection syrg 12.5-25 g  25-50 mL IntraVENous PRN  
 budesonide (PULMICORT) 500 mcg/2 ml nebulizer suspension  500 mcg Nebulization BID RT  And  
 albuterol (PROVENTIL VENTOLIN) nebulizer solution 2.5 mg  2.5 mg Nebulization Q6HWA RT  
 lansoprazole (PREVACID SOLUTAB) disintegrating tablet 30 mg  30 mg Oral DAILY Objective:  
 
Vitals:  
 11/03/18 1112 11/03/18 1434 11/03/18 1510 11/03/18 1913 BP: 146/66  152/88 128/58 Pulse: 67  71 67 Resp: 18 19 22 Temp: 97.7 °F (36.5 °C)  97.3 °F (36.3 °C) 98.2 °F (36.8 °C) SpO2: 98% 99% 97% 100% Weight:      
Height:      
  
  
Intake and Output: 
Current Shift: No intake/output data recorded. Last three shifts: 11/02 0701 - 11/03 1900 In: 2067 [P.O.:1167; I.V.:900] Out: 2500 [Urine:2500] Physical Exam:  
General:  Alert, cooperative, no distress, appears stated age. Eyes:  Conjunctivae/corneas clear. PERRL Ears:  Normal TMs and external ear canals both ears. Nose: Nares normal. Septum midline. Mouth/Throat: Lips, mucosa, and tongue normal. Teeth and gums normal.  
Neck:  no JVD. Back:   Symmetric, no curvature. ROM normal. No CVA tenderness. Lungs:   Clear to auscultation bilaterally. Heart:  Regular rate and rhythm, S1, S2 normal, no murmur, click, rub or gallop. Abdomen:   Soft, non-tender. Bowel sounds normal. No masses,  No organomegaly. Obese Extremities: Extremities normal, atraumatic, no cyanosis or edema. Pulses: 2+ and symmetric all extremities. Skin: Skin color, texture, turgor normal. No rashes or lesions Lymph nodes: Cervical, supraclavicular, and axillary nodes normal.  
Neurologic: CNII-XII intact. Normal strength, sensation and reflexes throughout. Lab/Data Review: 
 
Recent Results (from the past 24 hour(s)) GLUCOSE, POC Collection Time: 11/02/18  8:11 PM  
Result Value Ref Range Glucose (POC) 185 (H) 65 - 100 mg/dL GLUCOSE, POC Collection Time: 11/03/18  5:24 AM  
Result Value Ref Range Glucose (POC) 187 (H) 65 - 100 mg/dL CBC WITH AUTOMATED DIFF Collection Time: 11/03/18  7:13 AM  
Result Value Ref Range WBC 7.2 4.3 - 11.1 K/uL  
 RBC 3.83 (L) 4.23 - 5.6 M/uL HGB 10.1 (L) 13.6 - 17.2 g/dL HCT 32.0 (L) 41.1 - 50.3 % MCV 83.6 79.6 - 97.8 FL  
 MCH 26.4 26.1 - 32.9 PG  
 MCHC 31.6 31.4 - 35.0 g/dL  
 RDW 16.8 % PLATELET 713 (L) 394 - 450 K/uL MPV 11.8 9.4 - 12.3 FL ABSOLUTE NRBC 0.02 0.0 - 0.2 K/uL  
 DF AUTOMATED NEUTROPHILS 96 (H) 43 - 78 % LYMPHOCYTES 2 (L) 13 - 44 % MONOCYTES 1 (L) 4.0 - 12.0 % EOSINOPHILS 0 (L) 0.5 - 7.8 % BASOPHILS 0 0.0 - 2.0 % IMMATURE GRANULOCYTES 1 0.0 - 5.0 %  
 ABS. NEUTROPHILS 6.9 1.7 - 8.2 K/UL  
 ABS. LYMPHOCYTES 0.1 (L) 0.5 - 4.6 K/UL  
 ABS. MONOCYTES 0.1 0.1 - 1.3 K/UL  
 ABS. EOSINOPHILS 0.0 0.0 - 0.8 K/UL  
 ABS. BASOPHILS 0.0 0.0 - 0.2 K/UL  
 ABS. IMM. GRANS. 0.0 0.0 - 0.5 K/UL METABOLIC PANEL, BASIC Collection Time: 11/03/18  7:13 AM  
Result Value Ref Range Sodium 133 (L) 136 - 145 mmol/L Potassium 4.3 3.5 - 5.1 mmol/L Chloride 97 (L) 98 - 107 mmol/L  
 CO2 27 21 - 32 mmol/L Anion gap 9 7 - 16 mmol/L Glucose 166 (H) 65 - 100 mg/dL BUN 73 (H) 8 - 23 MG/DL Creatinine 2.70 (H) 0.8 - 1.5 MG/DL  
 GFR est AA 29 (L) >60 ml/min/1.73m2 GFR est non-AA 24 (L) >60 ml/min/1.73m2 Calcium 7.1 (L) 8.3 - 10.4 MG/DL FERRITIN Collection Time: 11/03/18  7:13 AM  
Result Value Ref Range Ferritin 83 8 - 388 NG/ML  
TRANSFERRIN SATURATION Collection Time: 11/03/18  7:13 AM  
Result Value Ref Range Iron 16 (L) 35 - 150 ug/dL TIBC 269 250 - 450 ug/dL Transferrin Saturation 6 (L) >20 % GLUCOSE, POC Collection Time: 11/03/18 11:19 AM  
Result Value Ref Range Glucose (POC) 217 (H) 65 - 100 mg/dL GLUCOSE, POC Collection Time: 11/03/18  3:45 PM  
Result Value Ref Range Glucose (POC) 173 (H) 65 - 100 mg/dL PHOSPHORUS Collection Time: 11/03/18  6:43 PM  
Result Value Ref Range Phosphorus 5.0 (H) 2.3 - 3.7 MG/DL MAGNESIUM Collection Time: 11/03/18  6:43 PM  
Result Value Ref Range Magnesium 2.8 (H) 1.8 - 2.4 mg/dL Assessment/ Plan: Principal Problem: 
  Septic shock ([address] (10/31/2018) Active Problems: Morbid obesity ([address] (1/21/2015) ROBERTO treated with BiPAP (2/20/2015) Overview: Patient is on BiPAP, no supplementary oxygen Anemia (10/31/2018) Altered mental state (4/22/2018) Acute respiratory failure with hypoxia ([address] (10/31/2018) Acute on chronic renal failure ([address] (10/31/2018) S/P IVC filter (9/21/2018) Overview: 9/12/18  Dr. Lan Maki Elevated LFTs (10/31/2018) Total bilirubin, elevated (10/31/2018) COPD exacerbation ([address] (11/1/2018) Elevated brain natriuretic peptide (BNP) level (11/1/2018) Pleural effusion (11/2/2018) Pulmonary infiltrate (11/2/2018) Bacteremia (11/2/2018) Follow up thoracentesis results. Continue scheduled breathing treatments. Prednisone daily Lasix 40mg daily. Monitor Is and Os. Continue zosyn and vancomcyin has been d/c. DVT prophylaxis - SCDs Signed By: Yousif Madera DO November 3, 2018

## 2018-11-04 NOTE — PROGRESS NOTES
Patient has a nose bleed at this time. Sadie Deleon NP with Cardiology at bedside and aware of nosebleed. New order for ocean nasal spray placed. Will continue to monitor.

## 2018-11-04 NOTE — PROGRESS NOTES
Patient remains in stable condition with respirations even/unlabored. No acute distress noted. No needs noted or voiced at this time. Safety measures in place. Oxygen noted per patient cpap. Call light remains within reach. Preparing to give report to oncoming shift.

## 2018-11-04 NOTE — PROGRESS NOTES
Cardiology notified of patients change in rhythm. Patient has just experienced an 11 beat run of v-tach. Paged Dr. Malina Espinal.

## 2018-11-04 NOTE — PROGRESS NOTES
Dr. Vaishali Michele is aware of 11 beat run of v-tach. Will increase beta blocker. Now order being placed. Will medicate.

## 2018-11-04 NOTE — CONSULTS
[facility] Cardiology Consult Attending Cardiologist:Dr. Sergey Ng Primary Cardiologist:Dr. Daya Shearer Primary Care Physician:Dr. Laly Kimbrough                             Referring--Dr. Morrison--NSVT Subjective: Leda Swanson is a 80 y.o. male with multiple medical problems to include CAD, chronic persistent afib ( not on oral anticoagulation due to recurrent GIB), COPD. He presented on 10/31 with hypotension, bradycardia, lactic acidosis and worsening renal function. He was noted to have coag negative staph in blood on cultures. Review of telemetry notes improved ventricular response with only one short run of NSVT for approximately 7 beats around 1400 today. He historically has had normal LV function by echo with last echo in April with normal LV function. Hemodynamically his vital signs have markedly improved with normal K and Mag levels. No recent echo. Denies chest pain. He was asking if he was going home tomorrow. Notes slight nose bleed today. Past Medical History:  
Diagnosis Date  Atherosclerosis of artery of extremity with ulceration ([address] 4/17/2015  Atherosclerosis of native arteries of extremity with intermittent claudication ([address] 4/17/2015  Atopic dermatitis 11/21/2012  Atrial fibrillation ([address]  CAD (coronary artery disease)  Carotid stenosis, bilateral 11/21/2012 50-79%  3-2010    1. Carotid Doppler (6/1/07): Greater than 70% stenosis in proximal LICA. 50% stenosis in right ICA. 2.  CTA of neck (3/15/10): Occluded distal segments of the vertebral arteries bilaterally. Atherosclerosis of the carotid bulbs bilaterally with a 50% stenosis on the left and a stenosis of less than 30% on the right. Small nodule in the right upper lobe near the apex. 3. CTA (8/29/13):  Less than 30% diameter stenosis of the cervical internal carotid arteries bilaterally.  CHF (congestive heart failure) ([address] 11/21/2012  Chronic kidney disease   
 hx elevated labs  Chronic obstructive pulmonary disease ([address]  Colon, diverticulosis 1/24/2015  Coronary atherosclerosis of native coronary vessel 10/31/2016 1. Cath (5/31/07):  LMCA: 25%. LAD: ostial 75-95% stenosis. 50-75% mid. D1:  25-50%. OM3: small with 75% stenosis. RCA: 100% mid. with left to right collaterals. 2 Vessel CABG (6/4/07):  LIMA to LAD and SVG to OM. SVG was anastomosed proximally to LIMA to due severe atherosclerosis of aorta. 3.  Lexiscan cardiolite (11/30/10): Abnormal with reversible lateral wall defects. Unable to gate given atrial fibrillation. 4.  LHC (1/14/11):  EF 60%. LVEDP 21. Patent LIMA to LAD and SVG to OM1 (off LIMA). occluded small RCA with left to right collaterals. Small D1 (2 mm vessel) with 70% mid stenosis.  Diabetes ([address]  Diastolic CHF, acute on chronic (HCC) 9/12/2015 1. Echo (9/11/15) : EF 55-60%. Mild LVH. Moderate biatrial enlargement. Moderate mitral/tricuspid regurgitation.  Failed CABG (coronary artery bypass graft) 11/21/2012  GI bleed 1/2015 Hospitalized [facility]  Gout 11/21/2012  History of tobacco use  [place] (hard of hearing)  Hypercholesterolemia  Hypertension  Hyperuricemia 11/21/2012  Morbid obesity ([address]  PAD (peripheral artery disease) ([address] 11/21/2012 1. Bilateral proximal common iliac PCI (2/21/08):  8.0 X 100 mm Cordis smart stent on right and 10 X 40 mm cordis smart stent on right. Both inflated to 7.0 mm.  Poor historian  Radiologic findings of lung field, abnormal 10/31/2016 1. CT of chest  (11/24/10): Multiple small nodules in the right lobe and stable interstitial prominence. Consistent with chronic lung disease. No evidence of malignancy.  Seizure disorder ([address] 8/5/2013  Shortness of breath dyspnea 8/5/2013  Thyroid disease  Unspecified sleep apnea   
 uses CPAP Past Surgical History:  
Procedure Laterality Date  CARDIAC SURG PROCEDURE UNLIST cath 5/07,cabg 6/07,lexiscan cardiolite 11/10  
 HX CATARACT REMOVAL Left 2003  
 os  HX COLONOSCOPY    
 HX CORONARY ARTERY BYPASS GRAFT  06-  HX CORONARY STENT PLACEMENT  02-  
 bilateral iliac artery PCI and stents  HX HEART CATHETERIZATION    
 left-05-, 01-  HX HEENT  1970s  
 neck lipoma Current Facility-Administered Medications Medication Dose Route Frequency  ferric gluconate (FERRLECIT) 125 mg in 0.9% sodium chloride 100 mL ivpb  125 mg IntraVENous DAILY  metoprolol succinate (TOPROL-XL) XL tablet 12.5 mg  12.5 mg Oral DAILY  amoxicillin-clavulanate (AUGMENTIN) 500-125 mg per tablet 1 Tab  1 Tab Oral BID WITH MEALS  sodium chloride (OCEAN) 0.65 % nasal squeeze bottle 2 Spray  2 Spray Both Nostrils Q2H PRN  
 furosemide (LASIX) tablet 40 mg  40 mg Oral ACB&D  predniSONE (DELTASONE) tablet 40 mg  40 mg Oral DAILY WITH BREAKFAST  insulin glargine (LANTUS) injection 10 Units  10 Units SubCUTAneous QHS  insulin lispro (HUMALOG) injection 3 Units  3 Units SubCUTAneous TIDAC  insulin lispro (HUMALOG) injection   SubCUTAneous AC&HS  aspirin chewable tablet 81 mg  81 mg Oral DAILY  levothyroxine (SYNTHROID) tablet 50 mcg  50 mcg Oral ACB  pravastatin (PRAVACHOL) tablet 40 mg  40 mg Oral QHS  sertraline (ZOLOFT) tablet 25 mg  25 mg Oral DAILY  sodium chloride (NS) flush 5 mL  5 mL InterCATHeter Q8H  
 sodium chloride (NS) flush 5-10 mL  5-10 mL InterCATHeter PRN  
 nystatin (MYCOSTATIN) 100,000 unit/gram powder   Topical BID  calcium carbonate (OS-ERIKA) tablet 500 mg [elemental]  500 mg Oral DAILY WITH BREAKFAST  dextrose 40% (GLUTOSE) oral gel 1 Tube  15 g Oral PRN  
 glucagon (GLUCAGEN) injection 1 mg  1 mg IntraMUSCular PRN  
 dextrose (D50W) injection syrg 12.5-25 g  25-50 mL IntraVENous PRN  
 budesonide (PULMICORT) 500 mcg/2 ml nebulizer suspension  500 mcg Nebulization BID RT  And  
  albuterol (PROVENTIL VENTOLIN) nebulizer solution 2.5 mg  2.5 mg Nebulization Q6HWA RT  
 lansoprazole (PREVACID SOLUTAB) disintegrating tablet 30 mg  30 mg Oral DAILY No Known Allergies Social History Tobacco Use  Smoking status: Former Smoker Packs/day: 1.00 Years: 45.00 Pack years: 45.00 Types: Cigarettes Last attempt to quit: 1984 Years since quittin.8  Smokeless tobacco: Never Used  Tobacco comment: (stopped smoking in ) Substance Use Topics  Alcohol use: No  
  Alcohol/week: 0.0 oz Family History Problem Relation Age of Onset  Heart Attack Mother [facility] Other Father   
     old age  Other Brother   
     brain aneurysm  Heart Disease Other 2 children  with heart concerns, 36 & 47 yo  
 Heart Disease Son Review of Systems Gen: Denies fever, chills, ++malaise or fatigue. Appetite good. HEENT: Denies frequent headaches, dizzyness, visual disturbances, Neck pain or swallowing difficulty Lungs: denies SOB at present, + COPD hx  
Cardiovascular: Denies chest pain, orthopnea, PND, no syncope or near syncope GI: Denies hememesis, dark tarry stools, No prior Hx of GI bleed, Denies constipation--hx GIB  
: Denies dysuria, no complaints of frequency, nocturia Heme: No prior bleeding disorders, no prior Cancer Neuro: Denies prior CVA, TIA. Endocrine:+ DM Psychiatric: Denies anxiety, or other psychiatric illnesses. Objective:  
 
Visit Vitals /75 Pulse 73 Temp 98.2 °F (36.8 °C) Resp 18 Ht 6' 2\" (1.88 m) Wt 103.4 kg (228 lb) SpO2 100% BMI 29.27 kg/m² General:Alert, cooperative, no distress, appears stated age Head: Normocephalic, without obvious abnormality, atraumatic. Eyes: Conjunctivae/corneas clear. PERRL, EOMs intact Nose:Nares normal. Septum midline. Mucosa normal. No drainage or sinus tenderness.   
Throat: Lips, mucosa, and tongue normal. Teeth and gums normal.  
 Neck: Supple, symmetrical, trachea midline,  no carotid bruit and no JVD. Lungs:rales bibasilar Chest wall: No tenderness or deformity. Heart: Regular rate and rhythm, S1, S2 normal, no murmur, click, rub or gallop. Abdomen:Soft, non-tender. Bowel sounds normal. No masses, No organomegaly. Extremities: Extremities normal, atraumatic, no cyanosis or edema. Pulses: 2+ and symmetric all extremities. Skin: Skin color, texture, turgor normal. No rashes or lesions Lymph nodes: Cervical, supraclavicular, and axillary nodes normal 
Neurologic:No focal deficits identified ECG: {telemetry notes persistent afib. Single episode of NSVT at 1400 Data Review:  
 
Recent Results (from the past 24 hour(s)) GLUCOSE, POC Collection Time: 11/03/18  3:45 PM  
Result Value Ref Range Glucose (POC) 173 (H) 65 - 100 mg/dL PHOSPHORUS Collection Time: 11/03/18  6:43 PM  
Result Value Ref Range Phosphorus 5.0 (H) 2.3 - 3.7 MG/DL MAGNESIUM Collection Time: 11/03/18  6:43 PM  
Result Value Ref Range Magnesium 2.8 (H) 1.8 - 2.4 mg/dL GLUCOSE, POC Collection Time: 11/03/18  9:07 PM  
Result Value Ref Range Glucose (POC) 251 (H) 65 - 100 mg/dL GLUCOSE, POC Collection Time: 11/04/18  5:16 AM  
Result Value Ref Range Glucose (POC) 123 (H) 65 - 100 mg/dL Samanta Dorantess Collection Time: 11/04/18  6:03 AM  
Result Value Ref Range Vancomycin, random 49.2 UG/ML  
METABOLIC PANEL, BASIC Collection Time: 11/04/18  6:03 AM  
Result Value Ref Range Sodium 137 136 - 145 mmol/L Potassium 4.2 3.5 - 5.1 mmol/L Chloride 99 98 - 107 mmol/L  
 CO2 26 21 - 32 mmol/L Anion gap 12 7 - 16 mmol/L Glucose 118 (H) 65 - 100 mg/dL BUN 80 (H) 8 - 23 MG/DL Creatinine 2.71 (H) 0.8 - 1.5 MG/DL  
 GFR est AA 29 (L) >60 ml/min/1.73m2 GFR est non-AA 24 (L) >60 ml/min/1.73m2 Calcium 7.2 (L) 8.3 - 10.4 MG/DL MAGNESIUM  Collection Time: 11/04/18  6:03 AM  
 Result Value Ref Range Magnesium 2.6 (H) 1.8 - 2.4 mg/dL PHOSPHORUS Collection Time: 11/04/18  6:03 AM  
Result Value Ref Range Phosphorus 4.9 (H) 2.3 - 3.7 MG/DL  
GLUCOSE, POC Collection Time: 11/04/18 11:56 AM  
Result Value Ref Range Glucose (POC) 190 (H) 65 - 100 mg/dL Assessment / Plan Principal Problem: 
  Septic shock ([address] (10/31/2018)--abx per primary team for bacturemia Active Problems: Morbid obesity ([address] (1/21/2015) ROBERTO treated with BiPAP (2/20/2015) Overview: Patient is on BiPAP, no supplementary oxygen Anemia (10/31/2018)--monitoring closely, no oral anticaoagulation due to this. Altered mental state (4/22/2018)--resolved Acute respiratory failure with hypoxia ([address] (10/31/2018) Acute on chronic renal failure ([address] (10/31/2018) S/P IVC filter (9/21/2018) Overview: 9/12/18  Dr. Emily Cano Elevated LFTs (10/31/2018) Total bilirubin, elevated (10/31/2018) COPD exacerbation ([address] (11/1/2018) Elevated brain natriuretic peptide (BNP) level (11/1/2018)--see below, rales on exam but compensated. Pleural effusion (11/2/2018) Pulmonary infiltrate (11/2/2018) Bacteremia (11/2/2018) V tach ([address] (11/4/2018)--no chest pain, normal electrolytes, no recent echo but historically with normal EF, will repeat echo in am. Noted elevated BNP on 11/1. Consider titrating BB up if HR and BP will allow. Medical management Persistent afib--as above, rate controlled at present. Reluctant to titrate BB up with recent bradycardia. No oral anticoagulation as above Karen Sterling NP

## 2018-11-05 NOTE — PROGRESS NOTES
Roberto Mejia Admission Date: 10/31/2018 Daily Progress Note: 11/5/2018 The patient's chart is reviewed and the patient is discussed with the staff. 86 y.o.  male admitted on 10/31 after a fall.  Reported dizziness and shortness of breath.  Was bradycardic and hypotensive and LA 3.87, troponin 1.65, WBC 24.4, hgb 10.6, creatinine 2.58, BNP 1594.  Was placed BIPAP but HR dropped to 38 and SBP down to 60s--was taken off BIPAP and placed on NC.  Is a poor historian and states that he \"just slipped walking around the bed\".  Denies dizziness.  Has chronic home O2 that he uses mainly at night but admits to using during the day as well.  Wife states he has not been eating well and is very weak. 
  
He was last seen in our sleep lab 9/26/18 for follow up and has been prescribed BIPAP and pressures changed then to 11/7cm--device interrogation did not show daily use.  
  
Vascular surgery placed IVC filter 9/12/18 for DVT and hx GIB. Chronic medical:  AFIB on ASA and Plavix, COPD, DM, diastolic heart failure, CKD, PUD, essential tremors, ROBERTO on BIPAP The pt is doing better. CT scan did show R effsuion,  LLL infiltrate, cirrhosis and ascites Subjective:  
 
Patient laying in bed on room air. Asleep but awakens with stimulus. Denies complaints. Tells me he thinks his breathing is doing well. Denies new complaints. Current Facility-Administered Medications Medication Dose Route Frequency  tuberculin injection 5 Units  5 Units IntraDERMal ONCE  
 ferric gluconate (FERRLECIT) 125 mg in 0.9% sodium chloride 100 mL ivpb  125 mg IntraVENous DAILY  amoxicillin-clavulanate (AUGMENTIN) 500-125 mg per tablet 1 Tab  1 Tab Oral BID WITH MEALS  sodium chloride (OCEAN) 0.65 % nasal squeeze bottle 2 Spray  2 Spray Both Nostrils Q2H PRN  
 metoprolol succinate (TOPROL-XL) XL tablet 25 mg  25 mg Oral DAILY  furosemide (LASIX) tablet 40 mg  40 mg Oral ACB&D  
  predniSONE (DELTASONE) tablet 40 mg  40 mg Oral DAILY WITH BREAKFAST  insulin glargine (LANTUS) injection 10 Units  10 Units SubCUTAneous QHS  insulin lispro (HUMALOG) injection 3 Units  3 Units SubCUTAneous TIDAC  insulin lispro (HUMALOG) injection   SubCUTAneous AC&HS  aspirin chewable tablet 81 mg  81 mg Oral DAILY  levothyroxine (SYNTHROID) tablet 50 mcg  50 mcg Oral ACB  pravastatin (PRAVACHOL) tablet 40 mg  40 mg Oral QHS  sertraline (ZOLOFT) tablet 25 mg  25 mg Oral DAILY  sodium chloride (NS) flush 5 mL  5 mL InterCATHeter Q8H  
 sodium chloride (NS) flush 5-10 mL  5-10 mL InterCATHeter PRN  
 nystatin (MYCOSTATIN) 100,000 unit/gram powder   Topical BID  calcium carbonate (OS-ERIKA) tablet 500 mg [elemental]  500 mg Oral DAILY WITH BREAKFAST  dextrose 40% (GLUTOSE) oral gel 1 Tube  15 g Oral PRN  
 glucagon (GLUCAGEN) injection 1 mg  1 mg IntraMUSCular PRN  
 dextrose (D50W) injection syrg 12.5-25 g  25-50 mL IntraVENous PRN  
 budesonide (PULMICORT) 500 mcg/2 ml nebulizer suspension  500 mcg Nebulization BID RT And  
 albuterol (PROVENTIL VENTOLIN) nebulizer solution 2.5 mg  2.5 mg Nebulization Q6HWA RT  
 lansoprazole (PREVACID SOLUTAB) disintegrating tablet 30 mg  30 mg Oral DAILY Review of Systems Constitutional: negative for fever, chills, sweats Cardiovascular: negative for chest pain, palpitations, syncope, edema Gastrointestinal: negative for dysphagia, reflux, vomiting, diarrhea, abdominal pain, or melena Neurologic: negative for focal weakness, numbness, headache Objective:  
 
Vitals:  
 11/05/18 0300 11/05/18 0706 11/05/18 0734 11/05/18 0901 BP: 143/69  138/76 Pulse: 60  74 Resp: 18  17 Temp: 97.7 °F (36.5 °C)  97.4 °F (36.3 °C) SpO2: 98%  94% 94% Weight:  226 lb 11.2 oz (102.8 kg) Height:      
 
Intake and Output:  
11/03 1901 - 11/05 0700 In: 1430 [P.O.:1120; I.V.:310] Out: 9485 [Urine:2675] 11/05 0701 - 11/05 1900 In: -  
Out: 250 [Urine:250] Physical Exam:  
Constitution:  the patient is well developed and in no acute distress EENMT:  Sclera clear, pupils equal, oral mucosa moist 
Respiratory: Decreased at left base. Otherwise clear. Cardiovascular:  RRR without M,G,R 
Gastrointestinal: soft and non-tender; with positive bowel sounds. Musculoskeletal: warm without cyanosis. There is trace B lower leg edema. Skin:  no jaundice or rashes, LE wounds Neurologic: no gross neuro deficits Psychiatric:  alert and oriented x person, place CHEST XRAY:  
No new imaging LAB No lab exists for component: [place] Recent Labs 11/03/18 
0713 11/02/18 
1951 WBC 7.2  --   
HGB 10.1* 9.9*  
HCT 32.0* 33.3*  
*  --   
 
Recent Labs 11/05/18 
0604 11/04/18 
0603 11/03/18 
1843 11/03/18 
2663  137  --  133* K 3.7 4.2  --  4.3 CL 99 99  --  97* CO2 30 26  --  27  
GLU 79 118*  --  166* BUN 76* 80*  --  73* CREA 2.51* 2.71*  --  2.70* MG 2.6* 2.6* 2.8*  --   
CA 7.5* 7.2*  --  7.1*  
PHOS 4.4* 4.9* 5.0*  -- No results for input(s): PH, PCO2, PO2, HCO3 in the last 72 hours. Assessment:  (Medical Decision Making) Hospital Problems  Date Reviewed: 10/31/2018 Codes Class Noted POA  
 V tach ([facility] ICD-10-CM: I47.2 ICD-9-CM: 427.1  11/4/2018 Unknown Pleural effusion ICD-10-CM: J90 ICD-9-CM: 511.9  11/2/2018 Unknown Pulmonary infiltrate ICD-10-CM: R91.8 ICD-9-CM: 793.19  11/2/2018 Unknown Bacteremia ICD-10-CM: R78.81 ICD-9-CM: 790.7  11/2/2018 Unknown COPD exacerbation ([facility] ICD-10-CM: J44.1 ICD-9-CM: 491.21  11/1/2018 Unknown Elevated brain natriuretic peptide (BNP) level ICD-10-CM: R79.89 ICD-9-CM: 790.99  11/1/2018 Unknown Anemia ICD-10-CM: D64.9 ICD-9-CM: 285.9  10/31/2018 Yes Acute respiratory failure with hypoxia [facility]) ICD-10-CM: J96.01 
ICD-9-CM: 518.81  10/31/2018 Yes Acute on chronic renal failure (HCC) ICD-10-CM: N17.9, N18.9 ICD-9-CM: 584.9, 585.9  10/31/2018 Yes * (Principal) Septic shock ([facility] ICD-10-CM: A41.9, R65.21 ICD-9-CM: 038.9, 785.52, 995.92  10/31/2018 Yes Elevated LFTs ICD-10-CM: R94.5 ICD-9-CM: 790.6  10/31/2018 Yes Total bilirubin, elevated ICD-10-CM: R17 
ICD-9-CM: 277.4  10/31/2018 Unknown S/P IVC filter (Chronic) ICD-10-CM: F46.705 ICD-9-CM: V45.89  9/21/2018 Yes Overview Signed 10/31/2018  3:20 PM by Ina Draper NP  
  9/12/18  Dr. Salvador Galeano Altered mental state ICD-10-CM: R41.82 
ICD-9-CM: 780.97  4/22/2018 Yes ROBERTO treated with BiPAP (Chronic) ICD-10-CM: P96.45 
ICD-9-CM: 327.23  2/20/2015 Unknown Overview Signed 2/20/2015 10:42 AM by Mabel Romberg  
  Patient is on BiPAP, no supplementary oxygen Morbid obesity ([facility] (Chronic) ICD-10-CM: E66.01 
ICD-9-CM: 278.01  1/21/2015 Yes Persistent atrial fibrillation (HCC) (Chronic) ICD-10-CM: I48.1 ICD-9-CM: 427.31  11/21/2012 Yes Overview Addendum 11/22/2016  8:14 PM by Kristofer Jarvis MD  
  Coumadin therapy discontinued due to recurrent GI bleeding. Patient with respiratory failure that is returned to baseline. On Bipap at night, RA during the day. COPD exacerbation clearing. Pleural effusion on R s/p thoracentesis. Minimal on left based on CT. S/p IVC filter for DVT in setting of past GI bleeds. Plan:  (Medical Decision Making) -now awaiting different rehab placement as per case management.  
-day 6/7 augmentin.  
-cont albuterol and pulmicort.  
-last day of prednisone 40mg then can d/c.  
-cont home bipap at night. On room air during day. -No additional pulmonary input at this time. We will sign off but please let us know if new issues arise.   
 
 
 
Ben Seay MD

## 2018-11-05 NOTE — PROGRESS NOTES
Patient's son called CM. He was concerned that patient was going to be discharged today. Son believes that patient will likely need rehab but doesn't want patient to go to [facility]. He would like patient to possibly discharge to [facility]. CM will request a ppd. PT consulted. CM following.

## 2018-11-05 NOTE — PROGRESS NOTES
Nephrology Progress Note: 
 11/5/2018 9:34 AM 
 
Subjective: Patient seen and examined in room. No complaints. Comfortable. No CP, No SOB, No Abd pain Current Facility-Administered Medications Medication Dose Route Frequency  tuberculin injection 5 Units  5 Units IntraDERMal ONCE  
 ferric gluconate (FERRLECIT) 125 mg in 0.9% sodium chloride 100 mL ivpb  125 mg IntraVENous DAILY  amoxicillin-clavulanate (AUGMENTIN) 500-125 mg per tablet 1 Tab  1 Tab Oral BID WITH MEALS  sodium chloride (OCEAN) 0.65 % nasal squeeze bottle 2 Spray  2 Spray Both Nostrils Q2H PRN  
 metoprolol succinate (TOPROL-XL) XL tablet 25 mg  25 mg Oral DAILY  furosemide (LASIX) tablet 40 mg  40 mg Oral ACB&D  predniSONE (DELTASONE) tablet 40 mg  40 mg Oral DAILY WITH BREAKFAST  insulin glargine (LANTUS) injection 10 Units  10 Units SubCUTAneous QHS  insulin lispro (HUMALOG) injection 3 Units  3 Units SubCUTAneous TIDAC  insulin lispro (HUMALOG) injection   SubCUTAneous AC&HS  aspirin chewable tablet 81 mg  81 mg Oral DAILY  levothyroxine (SYNTHROID) tablet 50 mcg  50 mcg Oral ACB  pravastatin (PRAVACHOL) tablet 40 mg  40 mg Oral QHS  sertraline (ZOLOFT) tablet 25 mg  25 mg Oral DAILY  sodium chloride (NS) flush 5 mL  5 mL InterCATHeter Q8H  
 sodium chloride (NS) flush 5-10 mL  5-10 mL InterCATHeter PRN  
 nystatin (MYCOSTATIN) 100,000 unit/gram powder   Topical BID  calcium carbonate (OS-ERIKA) tablet 500 mg [elemental]  500 mg Oral DAILY WITH BREAKFAST  dextrose 40% (GLUTOSE) oral gel 1 Tube  15 g Oral PRN  
 glucagon (GLUCAGEN) injection 1 mg  1 mg IntraMUSCular PRN  
 dextrose (D50W) injection syrg 12.5-25 g  25-50 mL IntraVENous PRN  
 budesonide (PULMICORT) 500 mcg/2 ml nebulizer suspension  500 mcg Nebulization BID RT  And  
 albuterol (PROVENTIL VENTOLIN) nebulizer solution 2.5 mg  2.5 mg Nebulization Q6HWA RT  
  lansoprazole (PREVACID SOLUTAB) disintegrating tablet 30 mg  30 mg Oral DAILY Objective:  
 
Visit Vitals /76 (BP 1 Location: Left arm, BP Patient Position: At rest) Pulse 74 Temp 97.4 °F (36.3 °C) Resp 17 Ht 6' 2\" (1.88 m) Wt 102.8 kg (226 lb 11.2 oz) SpO2 94% BMI 29.11 kg/m² O2 Flow Rate (L/min): 2 l/min O2 Device: Room air Temp (24hrs), Av.6 °F (36.4 °C), Min:97.4 °F (36.3 °C), Max:98.2 °F (36.8 °C) 701 - 1900 In: -  
Out: 250 [Urine:250] 1901 - 700 In: 1430 [P.O.:1120; I.V.:310] Out: 2660 [Urine:2675] Visit Vitals /76 (BP 1 Location: Left arm, BP Patient Position: At rest) Pulse 74 Temp 97.4 °F (36.3 °C) Resp 17 Ht 6' 2\" (1.88 m) Wt 102.8 kg (226 lb 11.2 oz) SpO2 94% BMI 29.11 kg/m² Head: Normocephalic, without obvious abnormality Neck: no JVD Lungs: decreased at bases Heart: regular rate and rhythm Abdomen: soft, non-tender. Extremities: + edema Data Review Recent Results (from the past 48 hour(s)) GLUCOSE, POC Collection Time: 18 11:19 AM  
Result Value Ref Range Glucose (POC) 217 (H) 65 - 100 mg/dL GLUCOSE, POC Collection Time: 18  3:45 PM  
Result Value Ref Range Glucose (POC) 173 (H) 65 - 100 mg/dL PHOSPHORUS Collection Time: 18  6:43 PM  
Result Value Ref Range Phosphorus 5.0 (H) 2.3 - 3.7 MG/DL MAGNESIUM Collection Time: 18  6:43 PM  
Result Value Ref Range Magnesium 2.8 (H) 1.8 - 2.4 mg/dL GLUCOSE, POC Collection Time: 18  9:07 PM  
Result Value Ref Range Glucose (POC) 251 (H) 65 - 100 mg/dL GLUCOSE, POC Collection Time: 18  5:16 AM  
Result Value Ref Range Glucose (POC) 123 (H) 65 - 100 mg/dL Marlys Coup Collection Time: 18  6:03 AM  
Result Value Ref Range Vancomycin, random 73.1 UG/ML  
METABOLIC PANEL, BASIC Collection Time: 18  6:03 AM  
Result Value Ref Range Sodium 137 136 - 145 mmol/L Potassium 4.2 3.5 - 5.1 mmol/L Chloride 99 98 - 107 mmol/L  
 CO2 26 21 - 32 mmol/L Anion gap 12 7 - 16 mmol/L Glucose 118 (H) 65 - 100 mg/dL BUN 80 (H) 8 - 23 MG/DL Creatinine 2.71 (H) 0.8 - 1.5 MG/DL  
 GFR est AA 29 (L) >60 ml/min/1.73m2 GFR est non-AA 24 (L) >60 ml/min/1.73m2 Calcium 7.2 (L) 8.3 - 10.4 MG/DL MAGNESIUM Collection Time: 11/04/18  6:03 AM  
Result Value Ref Range Magnesium 2.6 (H) 1.8 - 2.4 mg/dL PHOSPHORUS Collection Time: 11/04/18  6:03 AM  
Result Value Ref Range Phosphorus 4.9 (H) 2.3 - 3.7 MG/DL  
GLUCOSE, POC Collection Time: 11/04/18 11:56 AM  
Result Value Ref Range Glucose (POC) 190 (H) 65 - 100 mg/dL GLUCOSE, POC Collection Time: 11/04/18  3:44 PM  
Result Value Ref Range Glucose (POC) 122 (H) 65 - 100 mg/dL GLUCOSE, POC Collection Time: 11/04/18  9:06 PM  
Result Value Ref Range Glucose (POC) 250 (H) 65 - 100 mg/dL GLUCOSE, POC Collection Time: 11/05/18  5:38 AM  
Result Value Ref Range Glucose (POC) 96 65 - 100 mg/dL METABOLIC PANEL, BASIC Collection Time: 11/05/18  6:04 AM  
Result Value Ref Range Sodium 138 136 - 145 mmol/L Potassium 3.7 3.5 - 5.1 mmol/L Chloride 99 98 - 107 mmol/L  
 CO2 30 21 - 32 mmol/L Anion gap 9 7 - 16 mmol/L Glucose 79 65 - 100 mg/dL BUN 76 (H) 8 - 23 MG/DL Creatinine 2.51 (H) 0.8 - 1.5 MG/DL  
 GFR est AA 32 (L) >60 ml/min/1.73m2 GFR est non-AA 26 (L) >60 ml/min/1.73m2 Calcium 7.5 (L) 8.3 - 10.4 MG/DL MAGNESIUM Collection Time: 11/05/18  6:04 AM  
Result Value Ref Range Magnesium 2.6 (H) 1.8 - 2.4 mg/dL PHOSPHORUS Collection Time: 11/05/18  6:04 AM  
Result Value Ref Range Phosphorus 4.4 (H) 2.3 - 3.7 MG/DL Assessment Patient Active Problem List  
 Diagnosis Date Noted  V tach ([facility] 11/04/2018  Pleural effusion 11/02/2018  Pulmonary infiltrate 11/02/2018  Bacteremia 11/02/2018  COPD exacerbation ([address] 11/01/2018  Elevated brain natriuretic peptide (BNP) level 11/01/2018  Anemia 10/31/2018  Acute respiratory failure with hypoxia ([address] 10/31/2018  Acute on chronic renal failure ([address] 10/31/2018  Septic shock ([address] 10/31/2018  Elevated LFTs 10/31/2018  Total bilirubin, elevated 10/31/2018  Acute metabolic encephalopathy 43/74/3511  Acute encephalopathy 10/08/2018  S/P IVC filter 09/21/2018  Acute blood loss anemia 08/05/2018  Tremors of nervous system 08/04/2018  ALFREDITO (acute kidney injury) ([address] 08/02/2018  Hypoglycemia 08/01/2018  Altered mental state 04/22/2018  Venous stasis dermatitis of both lower extremities 04/22/2018  Type 2 diabetes with nephropathy ([address] 02/12/2018  Restrictive lung disease 11/01/2017  Hypoxia 08/21/2017  Thrombocytopenia ([address] 08/21/2017  Falls frequently 09/17/2016  Dyspnea 01/21/2016  Type 2 diabetes mellitus with peripheral neuropathy ([address] 11/05/2015  Chronic diastolic congestive heart failure ([address] 09/12/2015  ROBERTO treated with BiPAP 02/20/2015  Chronic pain 01/22/2015  Morbid obesity ([address] 01/21/2015  CKD (chronic kidney disease) stage 3, GFR 30-59 ml/min ([facility]) 09/18/2013  Hyperlipidemia 08/05/2013  Debility 08/05/2013  Essential hypertension 11/21/2012  Persistent atrial fibrillation ([address] 11/21/2012  Peripheral neuropathy 11/21/2012  PAD (peripheral artery disease) ([address] 11/21/2012  Carotid stenosis, bilateral 11/21/2012  Gout 11/21/2012 Problems Addressed by Nephrology CKD 4 - baseline Cr in high 1's 
Cardiorenal Syndrome Anemia Plan Stable renal function. Tolerating diuretics well with creatinine slowly trending down but mid 2's may be his new baseline. Follow. Continue current diuretic dose. Good uop. He is iron deficient - started IV iron replacement.

## 2018-11-05 NOTE — PROGRESS NOTES
Problem: Falls - Risk of 
Goal: *Absence of Falls Document Leny Osman Fall Risk and appropriate interventions in the flowsheet. Outcome: Progressing Towards Goal 
Fall Risk Interventions: 
Mobility Interventions: Assess mobility with egress test, PT Consult for mobility concerns, PT Consult for assist device competence, OT consult for ADLs, Communicate number of staff needed for ambulation/transfer, Patient to call before getting OOB, Strengthening exercises (ROM-active/passive) Mentation Interventions: Bed/chair exit alarm, Door open when patient unattended Medication Interventions: Evaluate medications/consider consulting pharmacy Elimination Interventions: Call light in reach, Patient to call for help with toileting needs History of Falls Interventions: Door open when patient unattended, Investigate reason for fall, Utilize gait belt for transfer/ambulation, Bed/chair exit alarm, Consult care management for discharge planning, Evaluate medications/consider consulting pharmacy Problem: Pressure Injury - Risk of 
Goal: *Prevention of pressure injury Document Tam Scale and appropriate interventions in the flowsheet. Outcome: Progressing Towards Goal 
Pressure Injury Interventions: 
Sensory Interventions: Assess changes in LOC, Check visual cues for pain Moisture Interventions: Absorbent underpads Activity Interventions: Assess need for specialty bed, PT/OT evaluation, Increase time out of bed, Pressure redistribution bed/mattress(bed type) Mobility Interventions: Assess need for specialty bed, HOB 30 degrees or less, Pressure redistribution bed/mattress (bed type), PT/OT evaluation Nutrition Interventions: Document food/fluid/supplement intake, Discuss nutritional consult with provider, Offer support with meals,snacks and hydration Friction and Shear Interventions: HOB 30 degrees or less

## 2018-11-05 NOTE — PROGRESS NOTES
[facility] Patient sitting on side of bed, alert and oriented. C-pap in place with 2L oxygen bled in. Respirations even and unlabored, no acute distress noted. Call light within reach. Safety measures in place. Will continue to monitor.

## 2018-11-05 NOTE — PROGRESS NOTES
Patient voices no concerns at this time. Patient is resting in bed with no complaints at this time. Patient denies any pain and appears comfortable at this time. Call light within reach and patient instructed to call if assistance is needed. Will continue to monitor.

## 2018-11-05 NOTE — PROGRESS NOTES
11/5/2018 3:37 PM 
 
Admit Date: 10/31/2018 Admit Diagnosis: Septic shock ([address] Septic shock ([address] Subjective: No cp or sob Objective:  
  
Visit Vitals /72 Pulse 80 Temp 97.5 °F (36.4 °C) Resp 17 Ht 6' 2\" (1.88 m) Wt 102.8 kg (226 lb 11.2 oz) SpO2 94% BMI 29.11 kg/m² Physical Exam: 
[place], Well Nourished, No Acute Distress, Alert & Oriented x 3, appropriate mood. Neck- supple, no JVD 
CV- irregular rate and rhythm no MRG Lung- clear bilaterally Abd- soft, nontender, nondistended Ext- no edema bilaterally. Skin- warm and dry Data Review:  
Recent Labs 11/05/18 
0604  11/03/18 
7985    < > 133* K 3.7   < > 4.3 BUN 76*   < > 73* CREA 2.51*   < > 2.70* WBC  --   --  7.2 HGB  --   --  10.1* HCT  --   --  32.0*  
PLT  --   --  132*  
 < > = values in this interval not displayed. Assessment/Plan:  
 
Principal Problem: 
  Septic shock ([address] (10/31/2018)= Stable. Continue current medical therapy. Active Problems: 
  Persistent atrial fibrillation ([address] (11/21/2012)Stable. Continue current medical therapy. No ac with recent gib Overview: Coumadin therapy discontinued due to recurrent GI bleeding. Morbid obesity ([facility] (1/21/2015) ROBERTO treated with BiPAP (2/20/2015) Overview: Patient is on BiPAP, no supplementary oxygen Anemia (10/31/2018) Altered mental state (4/22/2018) Acute respiratory failure with hypoxia ([address] (10/31/2018) Acute on chronic renal failure ([address] (10/31/2018) S/P IVC filter (9/21/2018) Overview: 9/12/18  Dr. Mane Sierra Elevated LFTs (10/31/2018) Total bilirubin, elevated (10/31/2018) COPD exacerbation ([address] (11/1/2018) Elevated brain natriuretic peptide (BNP) level (11/1/2018) Pleural effusion (11/2/2018) Pulmonary infiltrate (11/2/2018) Bacteremia (11/2/2018) [facility] (11/4/2018)- no recurrence- continue beta blocker- will sign off

## 2018-11-05 NOTE — PROGRESS NOTES
Patient stable with no complaints at this time. Patient resting in bed with family at bedside for support. Patient continues to be on RA during the day and BiPap at HS. Cardiology and pulmonology signed off today and repeat US of ABD completed. PPD placed at 11:00 today in preparation of d/c. Call light within reach and patient instructed to call if assistance is needed. Report to be given to oncoming RN 7p-7a

## 2018-11-05 NOTE — PROGRESS NOTES
Problem: Mobility Impaired (Adult and Pediatric) Goal: *Acute Goals and Plan of Care (Insert Text) STG: 
(1.)Mr. Navarro will move from supine to sit and sit to supine  with CONTACT GUARD ASSIST within 3 treatment day(s). (2.)Mr. Navarro will transfer from bed to chair and chair to bed with STAND BY ASSIST using the least restrictive device within 3 treatment day(s). (3.)Mr. Navarro will ambulate with CONTACT GUARD ASSIST for 30 feet with the least restrictive device within 3 treatment day(s). LTG: 
(1.)Mr. Navarro will move from supine to sit and sit to supine  in bed with SUPERVISION within 7 treatment day(s). (2.)Mr. Navarro will transfer from bed to chair and chair to bed with SUPERVISION using the least restrictive device within 7 treatment day(s). (3.)Mr. Navarro will ambulate with STAND BY ASSIST for 150 feet with the least restrictive device within 7 treatment day(s). ________________________________________________________________________________________________ PHYSICAL THERAPY: Daily Note, Treatment Day: 1st, PM 11/5/2018INPATIENT: Hospital Day: 6 Payor: [facility] [place] MEDICARE / Plan: [place] / Product Type: Managed Care Medicare /  
  
NAME/AGE/GENDER: Thomas Brown is a 80 y.o. male PRIMARY DIAGNOSIS: Septic shock ([address] Septic shock ([address] Septic shock ([address] Septic shock ([address] Procedure(s) (LRB): ULTRASOUND (Bilateral) THORACENTESIS (Right) 3 Days Post-Op ICD-10: Treatment Diagnosis:  
 · Generalized Muscle Weakness (M62.81) · Difficulty in walking, Not elsewhere classified (R26.2) · History of falling (Z91.81) Precaution/Allergies: 
Patient has no known allergies. ASSESSMENT:  
Mr. Natty Ratliff is supine in bed upon contact and agreeable to PT treatment this afternoon. Pt reports feelings of increased fatigue this session.  Pt transitioned supine to sit EOB with Ramírez and demonstrates good static sitting balance. Pt requiring Ramírez for scooting towards EOB. Pt sat EOB for several minutes and performed exercises. Pt declined standing or gait this session despite encouragement from PT, wife, and granddaughter at bedside. Pt requested to continue sitting EOB at end of session with family at bedside. Pt left with all needs met and within reach. Pt is making slow progress towards goals. Will continue efforts. This section established at most recent assessment PROBLEM LIST (Impairments causing functional limitations): 1. Decreased Strength 2. Decreased ADL/Functional Activities 3. Decreased Transfer Abilities 4. Decreased Ambulation Ability/Technique 5. Decreased Balance 6. Decreased Activity Tolerance 7. Decreased Pacing Skills 8. Increased Fatigue 9. Increased Shortness of Breath 10. Decreased Romayor with Home Exercise Program 
 INTERVENTIONS PLANNED: (Benefits and precautions of physical therapy have been discussed with the patient.) 1. Balance Exercise 2. Bed Mobility 3. Family Education 4. Gait Training 5. Home Exercise Program (HEP) 6. Neuromuscular Re-education/Strengthening 7. Range of Motion (ROM) 8. Therapeutic Activites 9. Therapeutic Exercise/Strengthening 10. Transfer Training TREATMENT PLAN: Frequency/Duration: 3 times a week for duration of hospital stay Rehabilitation Potential For Stated Goals: Good RECOMMENDED REHABILITATION/EQUIPMENT: (at time of discharge pending progress): Due to the probability of continued deficits (see above) this patient will likely need continued skilled physical therapy after discharge. Equipment:  
? None at this time HISTORY:  
History of Present Injury/Illness (Reason for Referral): 
See H&P below Patient is a 80 y.o.  male presents via EMS after a fall. Reported dizziness and shortness of breath.   Was bradycardic and hypotensive and LA 3.87, troponin 1.65, WBC 24.4, hgb 10.6, creatinine 2.58, BNP 1594. Was placed BIPAP but HR dropped to 38 and SBP down to 60s--was taken off BIPAP and placed on NC. Is a poor historian and states that he \"just slipped walking around the bed\". Denies dizziness. Has chronic home O2 that he uses mainly at night but admits to using during the day as well. Wife states he has not been eating well and is very weak. 
  
He was last seen in our sleep lab 9/26/18 for follow up and has been prescribed BIPAP and pressures changed then to 11/7cm--device interrogation did not show daily use.  
  
Vascular surgery placed IVC filter 9/12/18 for DVT and hx GIB. Chronic medical:  AFIB on ASA and Plavix, COPD, DM, diastolic heart failure, CKD, PUD, essential tremors, ROBERTO on BIPAP 
  
Home DME company 2l with sleep. Past Medical History/Comorbidities:  
Mr. Diana Cr  has a past medical history of Atherosclerosis of artery of extremity with ulceration ([address] Atherosclerosis of native arteries of extremity with intermittent claudication ([address] Atopic dermatitis, Atrial fibrillation ([address] CAD (coronary artery disease), Carotid stenosis, bilateral, CHF (congestive heart failure) ([address] Chronic kidney disease, Chronic obstructive pulmonary disease ([address] Colon, diverticulosis, Coronary atherosclerosis of native coronary vessel, Diabetes ([address] Diastolic CHF, acute on chronic ([address] Failed CABG (coronary artery bypass graft), GI bleed, Gout, History of tobacco use, [place] (hard of hearing), Hypercholesterolemia, Hypertension, Hyperuricemia, Morbid obesity ([address] PAD (peripheral artery disease) ([address] Poor historian, Radiologic findings of lung field, abnormal, Seizure disorder ([address] Shortness of breath dyspnea, Thyroid disease, and Unspecified sleep apnea.   Mr. Diana Cr  has a past surgical history that includes pr cardiac surg procedure unlist; hx coronary artery bypass graft (06-); hx cataract removal (Left, 2003); hx heart catheterization; hx coronary stent placement (02-); hx heent (1970s); hx colonoscopy; ULTRASOUND (Bilateral, 11/2/2018); THORACENTESIS (Right, 11/2/2018); ULTRASOUND GUIDED ACCESS VENA CAVA FILTER INSERTION (N/A, 9/12/2018); ESOPHAGOGASTRODUODENOSCOPY (EGD) (N/A, 8/5/2018); ESOPHAGOGASTRODUODENOSCOPY (EGD) (N/A, 1/27/2017); COLONOSCOPY  BMI 36 TO BE ADMITTED 1/26/17 (N/A, 1/27/2017); ESOPHAGOGASTRODUODENOSCOPY (EGD) (N/A, 1/22/2017); ESOPHAGOGASTRODUODENAL (EGD) BIOPSY (N/A, 1/22/2017); ESOPHAGOGASTRODUODENOSCOPY (EGD)   rm 610 (N/A, 5/8/2015); COLONOSCOPY (N/A, 1/24/2015); and ESOPHAGOGASTRODUODENOSCOPY (EGD)   rm 3101 (N/A, 1/23/2015). Social History/Living Environment:  
Home Environment: Private residence # Steps to Enter: 0 Wheelchair Ramp: Yes One/Two Story Residence: One story Living Alone: No 
Support Systems: Spouse/Significant Other/Partner Patient Expects to be Discharged to[de-identified] Private residence Current DME Used/Available at Home: [employer], rollator Prior Level of Function/Work/Activity: 
Lives with wife and granddaughter, use of rollator for household distances, 2L O2 Number of Personal Factors/Comorbidities that affect the Plan of Care: 3+: HIGH COMPLEXITY EXAMINATION:  
Most Recent Physical Functioning:  
Gross Assessment: 
AROM: Generally decreased, functional 
Strength: Generally decreased, functional 
Coordination: Generally decreased, functional 
         
  
Posture: 
  
Balance: 
  Bed Mobility: 
Supine to Sit: Minimum assistance Scooting: Minimum assistance Wheelchair Mobility: 
  
Transfers: 
  
Gait: 
  
   
  
Body Structures Involved: 1. Nerves 2. Heart 3. Lungs 4. Bones 5. Joints 6. Muscles 7. Ligaments Body Functions Affected: 1. Sensory/Pain 2. Cardio 3. Respiratory 4. Neuromusculoskeletal 
5. Movement Related Activities and Participation Affected: 1. General Tasks and Demands 2. Mobility 3. Self Care 4. Domestic Life 5. Interpersonal Interactions and Relationships 6. Community, Social and Emmet [place] Number of elements that affect the Plan of Care: 4+: HIGH COMPLEXITY CLINICAL PRESENTATION:  
Presentation: Evolving clinical presentation with changing clinical characteristics: MODERATE COMPLEXITY CLINICAL DECISION MAKING:  
[facility] [employer] AM-PAC 6 Clicks Basic Mobility Inpatient Short Form How much difficulty does the patient currently have. .. Unable A Lot A Little None 1. Turning over in bed (including adjusting bedclothes, sheets and blankets)? [] 1   [] 2   [x] 3   [] 4  
2. Sitting down on and standing up from a chair with arms ( e.g., wheelchair, bedside commode, etc.)   [] 1   [] 2   [x] 3   [] 4  
3. Moving from lying on back to sitting on the side of the bed? [] 1   [x] 2   [] 3   [] 4 How much help from another person does the patient currently need. .. Total A Lot A Little None 4. Moving to and from a bed to a chair (including a wheelchair)? [] 1   [] 2   [x] 3   [] 4  
5. Need to walk in hospital room? [] 1   [] 2   [x] 3   [] 4  
6. Climbing 3-5 steps with a railing? [x] 1   [] 2   [] 3   [] 4  
© 2007, Trustees of [facility] [employer], under license to [employer] and some sugar. All rights reserved Score:  Initial: 15 Most Recent: X (Date: -- ) Interpretation of Tool:  Represents activities that are increasingly more difficult (i.e. Bed mobility, Transfers, Gait). Score 24 23 22-20 19-15 14-10 9-7 6 Modifier CH CI CJ CK CL CM CN   
 
? Mobility - Walking and Moving Around:  
  - CURRENT STATUS: CK - 40%-59% impaired, limited or restricted  - GOAL STATUS: CJ - 20%-39% impaired, limited or restricted  - D/C STATUS:  ---------------To be determined--------------- Payor: [facility] MEDICARE / Plan[place] [employer] [place] / Product Type: Managed Care Medicare /   
 
Medical Necessity: · Patient is expected to demonstrate progress in strength, balance, coordination and functional technique to decrease assistance required with gait, transfers, and functional mobility. Reason for Services/Other Comments: 
· Patient continues to require skilled intervention due to decreased strength, decreased balance, decreased functional tolerance, decreased cardiopulmonary endurance affecting participation in basic ADLs and functional tasks. Use of outcome tool(s) and clinical judgement create a POC that gives a: Clear prediction of patient's progress: LOW COMPLEXITY  
  
 
 
 
TREATMENT:  
(In addition to Assessment/Re-Assessment sessions the following treatments were rendered) Pre-treatment Symptoms/Complaints: \"Im tired\" Pain: Initial:  
Pain Intensity 1: 0  Post Session:  0/10 Therapeutic Activity: (    12 minutes): Therapeutic activities including Bed transfers and EOB exercises to improve mobility, strength, balance and coordination. Required minimal   to promote static and dynamic balance in sitting and promote coordination of bilateral, lower extremity(s). Therapeutic Exercise: ( ):  Exercises per grid below to improve mobility and strength. Required minimal visual, verbal and tactile cues to promote proper body alignment, promote proper body posture and promote proper body mechanics. Progressed repetitions and complexity of movement as indicated. Date: 
11/1/18 Date: 
11/5/18 Date: Activity/Exercise Parameters Parameters Parameters LAQ 10 X B  10 X B Alt marches 10 X B  10 X B Ankle pumps 10 X B Quad set 10 X B Heel slides 10 X B Hip abduction 10 X B Braces/Orthotics/Lines/Etc:  
· none Treatment/Session Assessment:   
· Response to Treatment:  EOB with Ramírez · Interdisciplinary Collaboration:  
o Physical Therapist 
o Registered Nurse · After treatment position/precautions:  
o Bed/Chair-wheels locked 
o Bed in low position o Call light within reach 
o Family at bedside 
o sitting EOB · Compliance with Program/Exercises: Will assess as treatment progresses · Recommendations/Intent for next treatment session: \"Next visit will focus on advancements to more challenging activities and reduction in assistance provided\". Total Treatment Duration: PT Patient Time In/Time Out Time In: 4342 Time Out: 1405 [address]

## 2018-11-05 NOTE — PROGRESS NOTES
Hospitalist Progress Note Admit Date:  10/31/2018  1:22 PM  
Name:  Carlie Cade Age:  80 y.o. 
:  1932 MRN:  [id number] PCP:  Autumn Tay MD 
Treatment Team: Attending Provider: Jo De Paz DO; Care Manager: Carlee Francisco RN; Utilization Review: Elisha Cox; Consulting Provider: Crystal Riley MD; Consulting Provider: Tino Martinez MD; Consulting Provider: Isai Torres MD; Hospitalist: Janel Cronin MD 
 
Subjective:  
Breathing better today, no complaints. Having TTE done currently. ROS otherwise negative. Objective:  
 
Patient Vitals for the past 24 hrs: 
 Temp Pulse Resp BP SpO2  
18 0743     94 % 18 0734 97.4 °F (36.3 °C) 74 17 138/76 94 % 18 0300 97.7 °F (36.5 °C) 60 18 143/69 98 % 18 0016 97.6 °F (36.4 °C) 62 18 136/69 100 % 18     99 % 18 2003 97.4 °F (36.3 °C) 71 18 133/72 96 % 18 1520 97.4 °F (36.3 °C) 69 18 133/66 99 % 18 1512     93 % 18 1150     100 % 18 1100 98.2 °F (36.8 °C) 73 18 141/75 92 % Oxygen Therapy O2 Sat (%): 94 % (18) Pulse via Oximetry: 67 beats per minute (18) O2 Device: Room air (18) O2 Flow Rate (L/min): 2 l/min (18 115) FIO2 (%): 21 % (18) Intake/Output Summary (Last 24 hours) at 2018 1042 Last data filed at 2018 6719 Gross per 24 hour Intake 590 ml Output 1450 ml Net -860 ml  
   
*Note that automatically entered I/Os may not be accurate; dependent on patient compliance with collection and accurate  by assistants. General:    Well nourished. Alert. Obese. CV:   RRR. No murmur, rub, or gallop. Lungs:   CTAB. No wheezing, rhonchi, or rales. Abdomen:   Soft, nontender, nondistended. Extremities: Warm and dry. No cyanosis or edema. Skin:     No rashes or jaundice. Stasis dermatitis LEs; bruising b/l UEs. Neuro:  No gross focal deficits Data Review: 
I have reviewed all labs, meds, telemetry events, and studies from the last 24 hours: 
 
Recent Results (from the past 24 hour(s)) GLUCOSE, POC Collection Time: 11/04/18 11:56 AM  
Result Value Ref Range Glucose (POC) 190 (H) 65 - 100 mg/dL GLUCOSE, POC Collection Time: 11/04/18  3:44 PM  
Result Value Ref Range Glucose (POC) 122 (H) 65 - 100 mg/dL GLUCOSE, POC Collection Time: 11/04/18  9:06 PM  
Result Value Ref Range Glucose (POC) 250 (H) 65 - 100 mg/dL GLUCOSE, POC Collection Time: 11/05/18  5:38 AM  
Result Value Ref Range Glucose (POC) 96 65 - 100 mg/dL METABOLIC PANEL, BASIC Collection Time: 11/05/18  6:04 AM  
Result Value Ref Range Sodium 138 136 - 145 mmol/L Potassium 3.7 3.5 - 5.1 mmol/L Chloride 99 98 - 107 mmol/L  
 CO2 30 21 - 32 mmol/L Anion gap 9 7 - 16 mmol/L Glucose 79 65 - 100 mg/dL BUN 76 (H) 8 - 23 MG/DL Creatinine 2.51 (H) 0.8 - 1.5 MG/DL  
 GFR est AA 32 (L) >60 ml/min/1.73m2 GFR est non-AA 26 (L) >60 ml/min/1.73m2 Calcium 7.5 (L) 8.3 - 10.4 MG/DL MAGNESIUM Collection Time: 11/05/18  6:04 AM  
Result Value Ref Range Magnesium 2.6 (H) 1.8 - 2.4 mg/dL PHOSPHORUS Collection Time: 11/05/18  6:04 AM  
Result Value Ref Range Phosphorus 4.4 (H) 2.3 - 3.7 MG/DL All Micro Results Procedure Component Value Units Date/Time CULTURE, BLOOD [[id number]] Collected:  10/31/18 1345 Order Status:  Completed Specimen:  Blood Updated:  11/05/18 4937 Special Requests: --     
  RIGHT 
HAND Culture result: NO GROWTH 5 DAYS     
 CULTURE, BODY FLUID LESA Sanders [[id number]] Collected:  11/02/18 7030 Order Status:  Completed Specimen:  Pleural Fluid Updated:  11/04/18 7745 Special Requests: RIGHT     
  GRAM STAIN 0 O 1 WBCS/OIF  
   NO DEFINITE ORGANISM SEEN Culture result: NO GROWTH 2 DAYS AFB CULTURE + SMEAR W/RFLX ID FROM CULTURE [[id number]] Collected:  11/02/18 1030 Order Status:  Completed Specimen:  Miscellaneous sample Updated:  11/03/18 1536 Source PLEURAL FLUID Comment: RIGHT  
  
  AFB Specimen processing Concentration Acid Fast Smear NEGATIVE Comment: (NOTE) Performed At: [address] [id number] Omar Arceo MD [id number] Acid Fast Culture PENDING  
 FUNGUS CULTURE AND SMEAR [[id number]] Collected:  11/02/18 1030 Order Status:  Completed Specimen:  Miscellaneous sample Updated:  11/03/18 1036 Source RIGHT Comment: PLEURAL FLUID Fungus stain Direct Inoculation Fungus (Mycology) Culture Other source received Comment: (NOTE) Performed At: [address] [id number] Omar Arceo MD [id number] CULTURE, URINE [[id number]] Collected:  10/31/18 2047 Order Status:  Completed Specimen:  Urine from [place] Specimen Updated:  11/03/18 5718 Special Requests: NO SPECIAL REQUESTS Culture result: NO GROWTH 2 DAYS     
 CULTURE, BLOOD [[id number]]  (Abnormal) Collected:  10/31/18 1409 Order Status:  Completed Specimen:  Blood Updated:  11/02/18 7310 Special Requests: --     
  RIGHT 
HAND 
  
  GRAM STAIN    
  GRAM POS COCCI IN CLUSTERS  
     
      
  AEROBIC AND ANAEROBIC BOTTLES RESULTS VERIFIED, PHONED TO AND READ BACK BY MARIA GUADALUPE LOPEZ ON 1209 ON 11/1/18 EOR Culture result: STAPHYLOCOCCUS SPECIES, COAGULASE NEGATIVE  
     
      
  SA target DNA sequence not detected within the sample. Test performed by PCR  
     
      
  THIS ORGANISM MAY BE INDICATIVE OF CULTURE CONTAMINATION, HOWEVER, CLINICAL CORRELATION NEEDS TO BE EVALUATED, AS EACH CASE IS UNIQUE. No results found for this visit on 10/31/18. Current Meds: 
Current Facility-Administered Medications Medication Dose Route Frequency  tuberculin injection 5 Units  5 Units IntraDERMal ONCE  
 ferric gluconate (FERRLECIT) 125 mg in 0.9% sodium chloride 100 mL ivpb  125 mg IntraVENous DAILY  amoxicillin-clavulanate (AUGMENTIN) 500-125 mg per tablet 1 Tab  1 Tab Oral BID WITH MEALS  sodium chloride (OCEAN) 0.65 % nasal squeeze bottle 2 Spray  2 Spray Both Nostrils Q2H PRN  
 metoprolol succinate (TOPROL-XL) XL tablet 25 mg  25 mg Oral DAILY  furosemide (LASIX) tablet 40 mg  40 mg Oral ACB&D  predniSONE (DELTASONE) tablet 40 mg  40 mg Oral DAILY WITH BREAKFAST  insulin glargine (LANTUS) injection 10 Units  10 Units SubCUTAneous QHS  insulin lispro (HUMALOG) injection 3 Units  3 Units SubCUTAneous TIDAC  insulin lispro (HUMALOG) injection   SubCUTAneous AC&HS  aspirin chewable tablet 81 mg  81 mg Oral DAILY  levothyroxine (SYNTHROID) tablet 50 mcg  50 mcg Oral ACB  pravastatin (PRAVACHOL) tablet 40 mg  40 mg Oral QHS  sertraline (ZOLOFT) tablet 25 mg  25 mg Oral DAILY  sodium chloride (NS) flush 5 mL  5 mL InterCATHeter Q8H  
 sodium chloride (NS) flush 5-10 mL  5-10 mL InterCATHeter PRN  
 nystatin (MYCOSTATIN) 100,000 unit/gram powder   Topical BID  calcium carbonate (OS-ERIKA) tablet 500 mg [elemental]  500 mg Oral DAILY WITH BREAKFAST  dextrose 40% (GLUTOSE) oral gel 1 Tube  15 g Oral PRN  
 glucagon (GLUCAGEN) injection 1 mg  1 mg IntraMUSCular PRN  
 dextrose (D50W) injection syrg 12.5-25 g  25-50 mL IntraVENous PRN  
 budesonide (PULMICORT) 500 mcg/2 ml nebulizer suspension  500 mcg Nebulization BID RT And  
 albuterol (PROVENTIL VENTOLIN) nebulizer solution 2.5 mg  2.5 mg Nebulization Q6HWA RT  
 lansoprazole (PREVACID SOLUTAB) disintegrating tablet 30 mg  30 mg Oral DAILY Other Studies (last 24 hours): No results found. Assessment and Plan:  
 
Hospital Problems as of 11/5/2018 Date Reviewed: 10/31/2018 Codes Class Noted - Resolved POA V tach ([place] [id number] ICD-10-CM: U92.6 ICD-9-CM: 427.1  11/4/2018 - Present Unknown Pleural effusion ICD-10-CM: J90 ICD-9-CM: 511.9  11/2/2018 - Present Unknown Pulmonary infiltrate ICD-10-CM: R91.8 ICD-9-CM: 793.19  11/2/2018 - Present Unknown Bacteremia ICD-10-CM: R78.81 ICD-9-CM: 790.7  11/2/2018 - Present Unknown COPD exacerbation ([place] [id number] ICD-10-CM: J44.1 ICD-9-CM: 491.21  11/1/2018 - Present Unknown Elevated brain natriuretic peptide (BNP) level ICD-10-CM: R79.89 ICD-9-CM: 790.99  11/1/2018 - Present Unknown Anemia ICD-10-CM: D64.9 ICD-9-CM: 285.9  10/31/2018 - Present Yes Acute respiratory failure with hypoxia [facility]) ICD-10-CM: J96.01 
ICD-9-CM: 518.81  10/31/2018 - Present Yes Acute on chronic renal failure (HCC) ICD-10-CM: N17.9, N18.9 ICD-9-CM: 584.9, 585.9  10/31/2018 - Present Yes * (Principal) Septic shock ([place] [id number] ICD-10-CM: A41.9, R65.21 ICD-9-CM: 038.9, 785.52, 995.92  10/31/2018 - Present Yes Elevated LFTs ICD-10-CM: R94.5 ICD-9-CM: 790.6  10/31/2018 - Present Yes Total bilirubin, elevated ICD-10-CM: R17 
ICD-9-CM: 277.4  10/31/2018 - Present Unknown S/P IVC filter (Chronic) ICD-10-CM: F35.057 ICD-9-CM: V45.89  9/21/2018 - Present Yes Overview Signed 10/31/2018  3:20 PM by Andria Hodgkin, NP  
  9/12/18  Dr. Andrew Burger Altered mental state ICD-10-CM: R41.82 
ICD-9-CM: 780.97  4/22/2018 - Present Yes ROBERTO treated with BiPAP (Chronic) ICD-10-CM: Z45.31 
ICD-9-CM: 327.23  2/20/2015 - Present Unknown Overview Signed 2/20/2015 10:42 AM by Wilfredo Miranda  
  Patient is on BiPAP, no supplementary oxygen Morbid obesity ([address] (Chronic) ICD-10-CM: E66.01 
ICD-9-CM: 278.01  1/21/2015 - Present Yes Persistent atrial fibrillation (HCC) (Chronic) ICD-10-CM: I48.1 ICD-9-CM: 427.31  11/21/2012 - Present Yes  Overview Addendum 11/22/2016  8:14 PM by Everett Rivera MD  
 Coumadin therapy discontinued due to recurrent GI bleeding. RESOLVED: COPD (chronic obstructive pulmonary disease) (HCC) (Chronic) ICD-10-CM: J44.9 ICD-9-CM: 379  11/21/2012 - 11/1/2018 Yes Plan: # Acute volume overload - appears euvolemic; PO lasix - LVEF normal 4/2018; repeat TTE 11/5 # Pulmonary infiltrates 
 - last day of prednisone; last dose Augmentin 11/6 
 - resp status improved 
 - may represent pulm edema, on PO lasix, breathing improved # Possible cirrhosis - Noted on CT; will check RUQ US 
 - did have mildly elevated AST/ALT/alk phos on 10/31 
 - denies h/o alcohol abuse or hepatitis. He's obese. # ALFREDITO on CKD 
 - baseline sCr around 1.5-1.7; peak 2.8 this admission 
 - now down to 2.5; neprho appreciated # Anemia in setting of CKD 
 - iron IV per nephro # Septic shock 
 - admitted to ICU; resolved # Hypothyroid 
 - synthroid # AFib 
 - rate controlled; off warfarin for a while due to recurrent GIBs DC planning/Dispo: Anticipate rehab Diet:  DIET DIABETIC CONSISTENT CARB 
DIET NUTRITIONAL SUPPLEMENTS 
DIET NUTRITIONAL SUPPLEMENTS Signed: 
Vanessa Jones MD

## 2018-11-05 NOTE — PROGRESS NOTES
Observed patient sitting in bed, cpap removed. Assisted with reapplying cpap and instructed patient if cpap removed can apply nasal cannula. Patient request assistance with reapplying cpap. Call light within reach. Will continue to monitor.

## 2018-11-06 NOTE — PROGRESS NOTES
Pt is stable resting in bed. Pt denies pain or discomfort at this time. Abdomen is semi soft with active bowel sounds. Pt is axox3. Bed is in low and locked position. Call light within reach. Gripper socks are on feet. Pt instructed to ask for assistance if needed. Will continue to monitor.

## 2018-11-06 NOTE — PROGRESS NOTES
Hospitalist Progress Note Admit Date:  10/31/2018  1:22 PM  
Name:  Chloe Tripp Age:  80 y.o. 
:  1932 MRN:  [id number] PCP:  Gelacio Betts MD 
Treatment Team: Attending Provider: Cassie Sequeira DO; Care Manager: Parish Reyes RN; Utilization Review: Marie Moncada; Consulting Provider: Shabbir Martinez MD; Consulting Provider: Naomi Sauceda MD; Hospitalist: Ramakrishna Bond MD 
 
Subjective:  
Pt resting in bed. No complaints. Breathing remains stable. Echo reviewed w pt. Tolerating PO lasix. No pain. ROS otherwise negative. Objective:  
 
Patient Vitals for the past 24 hrs: 
 Temp Pulse Resp BP SpO2  
18     100 % 18 07 97.8 °F (36.6 °C) 63 17 133/73 100 % 18 0313 98 °F (36.7 °C) 71 18 140/75 99 % 18 2305 97.2 °F (36.2 °C) 65 18 138/71 98 % 185     99 % 18 1926 98 °F (36.7 °C) 61 18 113/69 96 % 18 1520 97.5 °F (36.4 °C) 80 17 132/72 94 % 18 1413     94 % 18 1121 97.5 °F (36.4 °C) 65 18 130/72 93 % Oxygen Therapy O2 Sat (%): 100 % (18) Pulse via Oximetry: 58 beats per minute (18) O2 Device: Nasal cannula (18) O2 Flow Rate (L/min): 2 l/min(decreased to 1) (18 1147) FIO2 (%): 28 % (18) Intake/Output Summary (Last 24 hours) at 2018 0944 Last data filed at 2018 0040 Gross per 24 hour Intake 0 ml Output 1175 ml Net -1175 ml *Note that automatically entered I/Os may not be accurate; dependent on patient compliance with collection and accurate  by assistants. General:    Well nourished. Alert. CV:   RRR. No murmur, rub, or gallop. Lungs:   CTAB. No wheezing, rhonchi, or rales. Abdomen:   Soft, nontender, nondistended. Extremities: Warm and dry. No cyanosis or edema. Skin:     No rashes or jaundice. Neuro:  No gross focal deficits Data Review: I have reviewed all labs, meds, telemetry events, and studies from the last 24 hours: 
 
Recent Results (from the past 24 hour(s)) GLUCOSE, POC Collection Time: 11/05/18 11:23 AM  
Result Value Ref Range Glucose (POC) 165 (H) 65 - 100 mg/dL GLUCOSE, POC Collection Time: 11/05/18  4:18 PM  
Result Value Ref Range Glucose (POC) 166 (H) 65 - 100 mg/dL GLUCOSE, POC Collection Time: 11/05/18  8:23 PM  
Result Value Ref Range Glucose (POC) 199 (H) 65 - 100 mg/dL HGB & HCT Collection Time: 11/05/18 10:34 PM  
Result Value Ref Range HGB 9.8 (L) 13.6 - 17.2 g/dL HCT 32.5 (L) 41.1 - 50.3 % BNP Collection Time: 11/06/18  2:28 AM  
Result Value Ref Range BNP 2,876 pg/mL METABOLIC PANEL, BASIC Collection Time: 11/06/18  2:29 AM  
Result Value Ref Range Sodium 139 136 - 145 mmol/L Potassium 3.7 3.5 - 5.1 mmol/L Chloride 100 98 - 107 mmol/L  
 CO2 30 21 - 32 mmol/L Anion gap 9 7 - 16 mmol/L Glucose 141 (H) 65 - 100 mg/dL BUN 74 (H) 8 - 23 MG/DL Creatinine 2.20 (H) 0.8 - 1.5 MG/DL  
 GFR est AA 37 (L) >60 ml/min/1.73m2 GFR est non-AA 30 (L) >60 ml/min/1.73m2 Calcium 7.3 (L) 8.3 - 10.4 MG/DL  
HGB & HCT Collection Time: 11/06/18  2:29 AM  
Result Value Ref Range HGB 8.8 (L) 13.6 - 17.2 g/dL HCT 29.5 (L) 41.1 - 50.3 % GLUCOSE, POC Collection Time: 11/06/18  5:15 AM  
Result Value Ref Range Glucose (POC) 59 (L) 65 - 100 mg/dL GLUCOSE, POC Collection Time: 11/06/18  5:34 AM  
Result Value Ref Range Glucose (POC) 140 (H) 65 - 100 mg/dL METABOLIC PANEL, BASIC Collection Time: 11/06/18  6:18 AM  
Result Value Ref Range Sodium 139 136 - 145 mmol/L Potassium 3.7 3.5 - 5.1 mmol/L Chloride 98 98 - 107 mmol/L  
 CO2 31 21 - 32 mmol/L Anion gap 10 7 - 16 mmol/L Glucose 174 (H) 65 - 100 mg/dL BUN 70 (H) 8 - 23 MG/DL Creatinine 2.29 (H) 0.8 - 1.5 MG/DL  
 GFR est AA 35 (L) >60 ml/min/1.73m2 GFR est non-AA 29 (L) >60 ml/min/1.73m2 Calcium 7.5 (L) 8.3 - 10.4 MG/DL MAGNESIUM Collection Time: 11/06/18  6:18 AM  
Result Value Ref Range Magnesium 2.5 (H) 1.8 - 2.4 mg/dL HEMOGLOBIN Collection Time: 11/06/18  6:18 AM  
Result Value Ref Range HGB 8.8 (L) 13.6 - 17.2 g/dL All Micro Results Procedure Component Value Units Date/Time FUNGUS CULTURE AND SMEAR [[id number]] Collected:  11/02/18 0930 Order Status:  Completed Specimen:  Miscellaneous sample Updated:  11/05/18 1237 Source RIGHT Comment: PLEURAL FLUID Fungus stain Direct Inoculation Fungus (Mycology) Culture Other source received Comment: (NOTE) Performed At: [address] [id number] Chad Smith MD [id number] CULTURE, BLOOD [[id number]] Collected:  10/31/18 1345 Order Status:  Completed Specimen:  Blood Updated:  11/05/18 9040 Special Requests: --     
  RIGHT 
HAND Culture result: NO GROWTH 5 DAYS     
 CULTURE, BODY FLUID W Atlee Lie [[id number]] Collected:  11/02/18 0930 Order Status:  Completed Specimen:  Pleural Fluid Updated:  11/04/18 2745 Special Requests: RIGHT     
  GRAM STAIN 0 O 1 WBCS/OIF  
   NO DEFINITE ORGANISM SEEN Culture result: NO GROWTH 2 DAYS     
 AFB CULTURE + SMEAR W/RFLX ID FROM CULTURE [[id number]] Collected:  11/02/18 1030 Order Status:  Completed Specimen:  Miscellaneous sample Updated:  11/03/18 1536 Source PLEURAL FLUID Comment: RIGHT  
  
  AFB Specimen processing Concentration Acid Fast Smear NEGATIVE Comment: (NOTE) Performed At: [address] [id number] Chad Smith MD QO:[id number] Acid Fast Culture PENDING  
 CULTURE, URINE [[id number]] Collected:  10/31/18 2047 Order Status:  Completed Specimen:  Urine from [place] Specimen Updated:  11/03/18 3506 Special Requests: NO SPECIAL REQUESTS Culture result: NO GROWTH 2 DAYS     
 CULTURE, BLOOD [[id number]]  (Abnormal) Collected:  10/31/18 1409 Order Status:  Completed Specimen:  Blood Updated:  18 1203 Special Requests: --     
  RIGHT 
HAND 
  
  GRAM STAIN    
  GRAM POS COCCI IN CLUSTERS  
     
      
  AEROBIC AND ANAEROBIC BOTTLES RESULTS VERIFIED, PHONED TO AND READ BACK BY MARIA GUADALUPE LOPEZ ON 1209 ON 18 EOR Culture result: STAPHYLOCOCCUS SPECIES, COAGULASE NEGATIVE  
     
      
  SA target DNA sequence not detected within the sample. Test performed by PCR  
     
      
  THIS ORGANISM MAY BE INDICATIVE OF CULTURE CONTAMINATION, HOWEVER, CLINICAL CORRELATION NEEDS TO BE EVALUATED, AS EACH CASE IS UNIQUE. Results for orders placed or performed during the hospital encounter of 10/31/18  
2D ECHO COMPLETE ADULT (TTE) W OR WO CONTR Narrative Gabyrashawn One [address] [place], [address] [facility] 
[phone] Transthoracic Echocardiogram 
2D, M-mode, Doppler, and Color Doppler PatientValkathryn Barrera 
MR #: [id number] : 1932 Age: 80 years Gender: Male Study date: 2018 Account #: [de-identified] Height: 74 in 
Weight: 225.5 lb 
BSA: 2.29 mï¾² Status:Routine Location: 826 BP: 143/ 69 Allergies: NO KNOWN ALLERGENS Sonographer:  PUJA Ordaz Group:  [facility] Referring Physician:  Zuleyka Rodriguez [facility] [address] Reading Physician:  Earlene Souza MD 
 
INDICATIONS: NSVT, CAD PROCEDURE: This was a routine study. Previous positive bubble study. A 
transthoracic echocardiogram was performed. The study included complete 2D 
imaging, M-mode, complete spectral Doppler, and color Doppler. Intravenous 
contrast (Definity) was administered. Echocardiographic views were limited by 
poor patient compliance and poor acoustic window availability. This was a 
technically difficult study. LEFT VENTRICLE: Size was normal. Systolic function was normal. Ejection 
fraction was estimated in the range of 50 % to 55 %. There was basal 
inferoseptal hypokinesis. Wall thickness was normal. There was mild  
concentric 
hypertrophy. Doppler parameters were consistent with diastolic dysfunction. Avg 
E/e': 19.5 RIGHT VENTRICLE: The ventricle was moderately dilated. Systolic function was 
normal. Estimated peak pressure was in the range of 35-40 mmHg. LEFT ATRIUM: The atrium was markedly dilated. RIGHT ATRIUM: The atrium was moderately dilated. SYSTEMIC VEINS: IVC: The inferior vena cava was dilated. The respirophasic 
change in diameter was more than 50%. AORTIC VALVE: There is likely a calcified plaque noted at the sinotubular 
junction. The valve was probably trileaflet. Leaflets exhibited mildly 
increased thickness, mild calcification, and mild to moderate sclerosis. There 
was no evidence for stenosis. There was mild regurgitation. MITRAL VALVE: There was mild to moderate annular calcification. There was no 
evidence for stenosis. There was mild to moderate regurgitation. TRICUSPID VALVE: The valve structure was normal. There was no evidence for 
stenosis. There was mild regurgitation. PULMONIC VALVE: Not well visualized. There was no evidence for stenosis. There 
was no insufficiency. PERICARDIUM: There was no pericardial effusion. AORTA: The root exhibited normal size. SUMMARY: 
 
-  Left ventricle: Systolic function was normal. Ejection fraction was 
estimated in the range of 50 % to 55 %. There was basal inferoseptal 
hypokinesis. There was mild concentric hypertrophy. Doppler parameters were 
consistent with diastolic dysfunction. Avg E/e': 19.5 
 
-  Right ventricle: The ventricle was moderately dilated. Systolic function  
was 
normal. 
 
-  Left atrium: The atrium was markedly dilated. -  Right atrium: The atrium was moderately dilated. SYSTEM MEASUREMENT TABLES 
 
2D mode AoR Diam (2D): 3.4 cm 
LA Dimension (2D): 6.1 cm Left Atrium Systolic Volume Index; Method of Disks, Biplane; 2D mode;: 76  
ml/m2 IVS/LVPW (2D): 0.8 IVSd (2D): 1.1 cm 
LVIDd (2D): 4.8 cm LVIDs (2D): 3.9 cm 
LVPWd (2D): 1.2 cm RVIDd (2D): 4.7 cm Tissue Doppler Imaging LV Peak Early Boo Tissue Michael; Lateral MA (TDI): 14.1 cm/s LV Peak Early Boo Tissue Michael; Medial MA (TDI): 5.8 cm/s Unspecified Scan Mode Peak Grad; Mean; Antegrade Flow: 7 mm[Hg] Vmax; Antegrade Flow: 133 cm/s 
MV Peak Michael/LV Peak Tissue Michael E-Wave; Lateral MA: 11.3 MV Peak Michael/LV Peak Tissue Michael E-Wave; Medial MA: 27.7 Prepared and signed by Mayank Llamas MD 
Signed [id number] 57:32:02 Current Meds: 
Current Facility-Administered Medications Medication Dose Route Frequency  tuberculin injection 5 Units  5 Units IntraDERMal ONCE  
 gabapentin (NEURONTIN) capsule 100 mg  100 mg Oral TID  oxyCODONE IR (ROXICODONE) tablet 5 mg  5 mg Oral Q4H PRN  pantoprazole (PROTONIX) tablet 40 mg  40 mg Oral BID  ferric gluconate (FERRLECIT) 125 mg in 0.9% sodium chloride 100 mL ivpb  125 mg IntraVENous DAILY  amoxicillin-clavulanate (AUGMENTIN) 500-125 mg per tablet 1 Tab  1 Tab Oral BID WITH MEALS  sodium chloride (OCEAN) 0.65 % nasal squeeze bottle 2 Spray  2 Spray Both Nostrils Q2H PRN  
 metoprolol succinate (TOPROL-XL) XL tablet 25 mg  25 mg Oral DAILY  furosemide (LASIX) tablet 40 mg  40 mg Oral ACB&D  
 insulin glargine (LANTUS) injection 10 Units  10 Units SubCUTAneous QHS  insulin lispro (HUMALOG) injection 3 Units  3 Units SubCUTAneous TIDAC  insulin lispro (HUMALOG) injection   SubCUTAneous AC&HS  aspirin chewable tablet 81 mg  81 mg Oral DAILY  levothyroxine (SYNTHROID) tablet 50 mcg  50 mcg Oral ACB  pravastatin (PRAVACHOL) tablet 40 mg  40 mg Oral QHS  sertraline (ZOLOFT) tablet 25 mg  25 mg Oral DAILY  sodium chloride (NS) flush 5 mL  5 mL InterCATHeter Q8H  
 sodium chloride (NS) flush 5-10 mL  5-10 mL InterCATHeter PRN  
 nystatin (MYCOSTATIN) 100,000 unit/gram powder   Topical BID  calcium carbonate (OS-ERIKA) tablet 500 mg [elemental]  500 mg Oral DAILY WITH BREAKFAST  dextrose 40% (GLUTOSE) oral gel 1 Tube  15 g Oral PRN  
 glucagon (GLUCAGEN) injection 1 mg  1 mg IntraMUSCular PRN  
 dextrose (D50W) injection syrg 12.5-25 g  25-50 mL IntraVENous PRN  
 budesonide (PULMICORT) 500 mcg/2 ml nebulizer suspension  500 mcg Nebulization BID RT And  
 albuterol (PROVENTIL VENTOLIN) nebulizer solution 2.5 mg  2.5 mg Nebulization Q6HWA RT  
 lansoprazole (PREVACID SOLUTAB) disintegrating tablet 30 mg  30 mg Oral DAILY Other Studies (last 24 hours): [facility] Result Date: 11/5/2018 Right Upper Quadrant Ultrasound INDICATION:  Cirrhosis, follow-up abnormal appearance of gallbladder COMPARISON: Ultrasound and CT 9/2/2028 TECHNIQUE:  Sonographic gray scale and Doppler images were obtained of the right upper quadrant of the abdomen. Technologist reports best possible imaging given difficulty with breath-holding and positioning. FINDINGS: Cirrhotic nodular contour again seen compatible with cirrhosis. There are no discrete lesions in the visualized portions of the liver. Liver size is 16 cm, within normal limits. Main portal vein is patent on Doppler imaging, demonstrating biphasic flow. The common bile duct diameter is 8-9 mm. Gallbladder demonstrates 5 mm wall thickening, small stones, and probable adenomyomatosis as described on prior. Sonographic Mandel's sign is negative. There are no discrete lesions in the limited visualized portions of the pancreas. The right kidney measures 10.6 cm in length. There is no hydronephrosis. There is no evidence of a solid renal mass. A benign renal cyst is again noted measuring up to 6 cm.  There is no evidence of ascites. The aorta and IVC are patent on Doppler imaging. Aortic diameter is within normal limits as partially seen, although mostly obscured distally. IMPRESSION: 1. Abnormal gallbladder again seen with stones, adenomyomatosis, wall thickening similar to prior. 2. Increased biliary dilatation. 3. Cirrhosis. 4. Right renal benign cyst. DC4 [id number] The significant findings in this report have been referred to the Imaging Navigator in order to communicate to the referring provider or his/her designee as outlined in Section II.C.2.a.ii-iii of the ACR Practice Guideline for Communication of Diagnostic Imaging Findings. Assessment and Plan:  
 
Hospital Problems as of 11/6/2018 Date Reviewed: 10/31/2018 Codes Class Noted - Resolved POA  
 V tach ([facility] ICD-10-CM: I47.2 ICD-9-CM: 427.1  11/4/2018 - Present Unknown Pleural effusion ICD-10-CM: J90 ICD-9-CM: 511.9  11/2/2018 - Present Unknown Pulmonary infiltrate ICD-10-CM: R91.8 ICD-9-CM: 793.19  11/2/2018 - Present Unknown Bacteremia ICD-10-CM: R78.81 ICD-9-CM: 790.7  11/2/2018 - Present Unknown COPD exacerbation ([facility] ICD-10-CM: J44.1 ICD-9-CM: 491.21  11/1/2018 - Present Unknown Elevated brain natriuretic peptide (BNP) level ICD-10-CM: R79.89 ICD-9-CM: 790.99  11/1/2018 - Present Unknown Anemia ICD-10-CM: D64.9 ICD-9-CM: 285.9  10/31/2018 - Present Yes Acute respiratory failure with hypoxia [facility]) ICD-10-CM: J96.01 
ICD-9-CM: 518.81  10/31/2018 - Present Yes Acute on chronic renal failure (HCC) ICD-10-CM: N17.9, N18.9 ICD-9-CM: 584.9, 585.9  10/31/2018 - Present Yes * (Principal) [facility] ([facility] ICD-10-CM: A41.9, R65.21 ICD-9-CM: 038.9, 785.52, 995.92  10/31/2018 - Present Yes Elevated LFTs ICD-10-CM: R94.5 ICD-9-CM: 790.6  10/31/2018 - Present Yes Total bilirubin, elevated ICD-10-CM: R17 
ICD-9-CM: 277.4  10/31/2018 - Present Unknown S/P IVC filter (Chronic) ICD-10-CM: E75.264 ICD-9-CM: V45.89  9/21/2018 - Present Yes Overview Signed 10/31/2018  3:20 PM by Haley Theodore NP  
  9/12/18  Dr. Garduno Altered mental state ICD-10-CM: R41.82 
ICD-9-CM: 780.97  4/22/2018 - Present Yes ROBERTO treated with BiPAP (Chronic) ICD-10-CM: S33.14 
ICD-9-CM: 327.23  2/20/2015 - Present Unknown Overview Signed 2/20/2015 10:42 AM by Monet Duncan  
  Patient is on BiPAP, no supplementary oxygen Morbid obesity ([address] (Chronic) ICD-10-CM: E66.01 
ICD-9-CM: 278.01  1/21/2015 - Present Yes Persistent atrial fibrillation (HCC) (Chronic) ICD-10-CM: I48.1 ICD-9-CM: 427.31  11/21/2012 - Present Yes Overview Addendum 11/22/2016  8:14 PM by Jones Nye MD  
  Coumadin therapy discontinued due to recurrent GI bleeding. RESOLVED: COPD (chronic obstructive pulmonary disease) (HCC) (Chronic) ICD-10-CM: J44.9 ICD-9-CM: 688  11/21/2012 - 11/1/2018 Yes Plan: # Acute volume overload - appears euvolemic; PO lasix - LVEF normal 4/2018; repeat TTE 11/5 with normal LVEF 
  
# Pulmonary infiltrates 
            - last day of prednisone; last dose Augmentin 11/6 
            - resp status improved 
            - may represent pulm edema, on PO lasix, breathing improved 
  
# Possible cirrhosis - Noted on CT; will check RUQ US 
            - did have mildly elevated AST/ALT/alk phos on 10/31 
            - denies h/o alcohol abuse or hepatitis. He's obese.  
  
# ALFREDITO on CKD 
            - baseline sCr around 1.5-1.7; peak 2.8 this admission 
            - now down to 2.5; neprho appreciated 
  
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 
            - iron IV per nephro 
  
# Septic shock 
            - admitted to ICU; resolved 
  
# Hypothyroid 
            - synthroid 
  
# AFib 
            - rate controlled; off warfarin for a while due to recurrent GIBs DC planning/Dispo:  Rehab placement pending Diet:  DIET DIABETIC CONSISTENT CARB 
DIET NUTRITIONAL SUPPLEMENTS 
DIET NUTRITIONAL SUPPLEMENTS Signed: 
Melissa Perez MD

## 2018-11-06 NOTE — PROGRESS NOTES
Patient had large, loose, dark brown, incont bm. Assisted to chair while care given, he became short of breath, 2 liters nasal cannula applied. Abdomen tight and distended, edema noted. Resting quietly in bed at this time. Bed alarm on, fall precautions in place as patient very unsteady on feet. Will continue to monitor.

## 2018-11-06 NOTE — PROGRESS NOTES
Nephrology Progress Note: 
 11/6/2018 9:34 AM 
 
Subjective: Patient seen and examined in room. No complaints. Comfortable. No CP, No SOB, No Abd pain. Renal function stable. Current Facility-Administered Medications Medication Dose Route Frequency  tuberculin injection 5 Units  5 Units IntraDERMal ONCE  
 gabapentin (NEURONTIN) capsule 100 mg  100 mg Oral TID  oxyCODONE IR (ROXICODONE) tablet 5 mg  5 mg Oral Q4H PRN  pantoprazole (PROTONIX) tablet 40 mg  40 mg Oral BID  ferric gluconate (FERRLECIT) 125 mg in 0.9% sodium chloride 100 mL ivpb  125 mg IntraVENous DAILY  amoxicillin-clavulanate (AUGMENTIN) 500-125 mg per tablet 1 Tab  1 Tab Oral BID WITH MEALS  sodium chloride (OCEAN) 0.65 % nasal squeeze bottle 2 Spray  2 Spray Both Nostrils Q2H PRN  
 metoprolol succinate (TOPROL-XL) XL tablet 25 mg  25 mg Oral DAILY  furosemide (LASIX) tablet 40 mg  40 mg Oral ACB&D  
 insulin glargine (LANTUS) injection 10 Units  10 Units SubCUTAneous QHS  insulin lispro (HUMALOG) injection 3 Units  3 Units SubCUTAneous TIDAC  insulin lispro (HUMALOG) injection   SubCUTAneous AC&HS  aspirin chewable tablet 81 mg  81 mg Oral DAILY  levothyroxine (SYNTHROID) tablet 50 mcg  50 mcg Oral ACB  pravastatin (PRAVACHOL) tablet 40 mg  40 mg Oral QHS  sertraline (ZOLOFT) tablet 25 mg  25 mg Oral DAILY  sodium chloride (NS) flush 5 mL  5 mL InterCATHeter Q8H  
 sodium chloride (NS) flush 5-10 mL  5-10 mL InterCATHeter PRN  
 nystatin (MYCOSTATIN) 100,000 unit/gram powder   Topical BID  calcium carbonate (OS-ERIKA) tablet 500 mg [elemental]  500 mg Oral DAILY WITH BREAKFAST  dextrose 40% (GLUTOSE) oral gel 1 Tube  15 g Oral PRN  
 glucagon (GLUCAGEN) injection 1 mg  1 mg IntraMUSCular PRN  
 dextrose (D50W) injection syrg 12.5-25 g  25-50 mL IntraVENous PRN  
 budesonide (PULMICORT) 500 mcg/2 ml nebulizer suspension  500 mcg Nebulization BID RT  And  
  albuterol (PROVENTIL VENTOLIN) nebulizer solution 2.5 mg  2.5 mg Nebulization Q6HWA RT  
 lansoprazole (PREVACID SOLUTAB) disintegrating tablet 30 mg  30 mg Oral DAILY Objective:  
 
Visit Vitals /73 (BP 1 Location: Left arm, BP Patient Position: At rest) Pulse 63 Temp 97.8 °F (36.6 °C) Resp 17 Ht 6' 2\" (1.88 m) Wt 102.3 kg (225 lb 9.6 oz) SpO2 100% BMI 28.97 kg/m² O2 Flow Rate (L/min): 2 l/min(decreased to 1) O2 Device: Nasal cannula Temp (24hrs), Av.7 °F (36.5 °C), Min:97.2 °F (36.2 °C), Max:98 °F (36.7 °C) No intake/output data recorded.  1901 -  0700 In: 355 [P.O.:355] Out: 3662 [Urine:2225] Visit Vitals /73 (BP 1 Location: Left arm, BP Patient Position: At rest) Pulse 63 Temp 97.8 °F (36.6 °C) Resp 17 Ht 6' 2\" (1.88 m) Wt 102.3 kg (225 lb 9.6 oz) SpO2 100% BMI 28.97 kg/m² Head: Normocephalic, without obvious abnormality Neck: no JVD Lungs: decreased at bases Heart: regular rate and rhythm Abdomen: soft, non-tender. Extremities: No edema Data Review Recent Results (from the past 48 hour(s)) GLUCOSE, POC Collection Time: 18 11:56 AM  
Result Value Ref Range Glucose (POC) 190 (H) 65 - 100 mg/dL GLUCOSE, POC Collection Time: 18  3:44 PM  
Result Value Ref Range Glucose (POC) 122 (H) 65 - 100 mg/dL GLUCOSE, POC Collection Time: 18  9:06 PM  
Result Value Ref Range Glucose (POC) 250 (H) 65 - 100 mg/dL GLUCOSE, POC Collection Time: 18  5:38 AM  
Result Value Ref Range Glucose (POC) 96 65 - 100 mg/dL METABOLIC PANEL, BASIC Collection Time: 11/05/18  6:04 AM  
Result Value Ref Range Sodium 138 136 - 145 mmol/L Potassium 3.7 3.5 - 5.1 mmol/L Chloride 99 98 - 107 mmol/L  
 CO2 30 21 - 32 mmol/L Anion gap 9 7 - 16 mmol/L Glucose 79 65 - 100 mg/dL BUN 76 (H) 8 - 23 MG/DL  Creatinine 2.51 (H) 0.8 - 1.5 MG/DL  
 GFR est AA 32 (L) >60 ml/min/1.73m2 GFR est non-AA 26 (L) >60 ml/min/1.73m2 Calcium 7.5 (L) 8.3 - 10.4 MG/DL MAGNESIUM Collection Time: 11/05/18  6:04 AM  
Result Value Ref Range Magnesium 2.6 (H) 1.8 - 2.4 mg/dL PHOSPHORUS Collection Time: 11/05/18  6:04 AM  
Result Value Ref Range Phosphorus 4.4 (H) 2.3 - 3.7 MG/DL  
GLUCOSE, POC Collection Time: 11/05/18 11:23 AM  
Result Value Ref Range Glucose (POC) 165 (H) 65 - 100 mg/dL GLUCOSE, POC Collection Time: 11/05/18  4:18 PM  
Result Value Ref Range Glucose (POC) 166 (H) 65 - 100 mg/dL GLUCOSE, POC Collection Time: 11/05/18  8:23 PM  
Result Value Ref Range Glucose (POC) 199 (H) 65 - 100 mg/dL HGB & HCT Collection Time: 11/05/18 10:34 PM  
Result Value Ref Range HGB 9.8 (L) 13.6 - 17.2 g/dL HCT 32.5 (L) 41.1 - 50.3 % BNP Collection Time: 11/06/18  2:28 AM  
Result Value Ref Range BNP 2,876 pg/mL METABOLIC PANEL, BASIC Collection Time: 11/06/18  2:29 AM  
Result Value Ref Range Sodium 139 136 - 145 mmol/L Potassium 3.7 3.5 - 5.1 mmol/L Chloride 100 98 - 107 mmol/L  
 CO2 30 21 - 32 mmol/L Anion gap 9 7 - 16 mmol/L Glucose 141 (H) 65 - 100 mg/dL BUN 74 (H) 8 - 23 MG/DL Creatinine 2.20 (H) 0.8 - 1.5 MG/DL  
 GFR est AA 37 (L) >60 ml/min/1.73m2 GFR est non-AA 30 (L) >60 ml/min/1.73m2 Calcium 7.3 (L) 8.3 - 10.4 MG/DL  
HGB & HCT Collection Time: 11/06/18  2:29 AM  
Result Value Ref Range HGB 8.8 (L) 13.6 - 17.2 g/dL HCT 29.5 (L) 41.1 - 50.3 % GLUCOSE, POC Collection Time: 11/06/18  5:15 AM  
Result Value Ref Range Glucose (POC) 59 (L) 65 - 100 mg/dL GLUCOSE, POC Collection Time: 11/06/18  5:34 AM  
Result Value Ref Range Glucose (POC) 140 (H) 65 - 100 mg/dL METABOLIC PANEL, BASIC Collection Time: 11/06/18  6:18 AM  
Result Value Ref Range Sodium 139 136 - 145 mmol/L Potassium 3.7 3.5 - 5.1 mmol/L  Chloride 98 98 - 107 mmol/L  
 CO2 31 21 - 32 mmol/L Anion gap 10 7 - 16 mmol/L Glucose 174 (H) 65 - 100 mg/dL BUN 70 (H) 8 - 23 MG/DL Creatinine 2.29 (H) 0.8 - 1.5 MG/DL  
 GFR est AA 35 (L) >60 ml/min/1.73m2 GFR est non-AA 29 (L) >60 ml/min/1.73m2 Calcium 7.5 (L) 8.3 - 10.4 MG/DL MAGNESIUM Collection Time: 11/06/18  6:18 AM  
Result Value Ref Range Magnesium 2.5 (H) 1.8 - 2.4 mg/dL HEMOGLOBIN Collection Time: 11/06/18  6:18 AM  
Result Value Ref Range HGB 8.8 (L) 13.6 - 17.2 g/dL Assessment Patient Active Problem List  
 Diagnosis Date Noted  V tach ([address] 11/04/2018  Pleural effusion 11/02/2018  Pulmonary infiltrate 11/02/2018  Bacteremia 11/02/2018  COPD exacerbation ([address] 11/01/2018  Elevated brain natriuretic peptide (BNP) level 11/01/2018  Anemia 10/31/2018  Acute respiratory failure with hypoxia ([address] 10/31/2018  Acute on chronic renal failure ([address] 10/31/2018  Septic shock ([address] 10/31/2018  Elevated LFTs 10/31/2018  Total bilirubin, elevated 10/31/2018  Acute metabolic encephalopathy 11/06/9532  Acute encephalopathy 10/08/2018  S/P IVC filter 09/21/2018  Acute blood loss anemia 08/05/2018  Tremors of nervous system 08/04/2018  ALFREDITO (acute kidney injury) ([address] 08/02/2018  Hypoglycemia 08/01/2018  Altered mental state 04/22/2018  Venous stasis dermatitis of both lower extremities 04/22/2018  Type 2 diabetes with nephropathy ([address] 02/12/2018  Restrictive lung disease 11/01/2017  Hypoxia 08/21/2017  Thrombocytopenia ([address] 08/21/2017  Falls frequently 09/17/2016  Dyspnea 01/21/2016  Type 2 diabetes mellitus with peripheral neuropathy ([address] 11/05/2015  Chronic diastolic congestive heart failure ([address] 09/12/2015  ROBERTO treated with BiPAP 02/20/2015  Chronic pain 01/22/2015  Morbid obesity ([facility] 01/21/2015  CKD (chronic kidney disease) stage 3, GFR 30-59 ml/min ([facility]) 09/18/2013  Hyperlipidemia 08/05/2013  Debility 08/05/2013  Essential hypertension 11/21/2012  Persistent atrial fibrillation ([facility] 11/21/2012  Peripheral neuropathy 11/21/2012  PAD (peripheral artery disease) ([facility] 11/21/2012  Carotid stenosis, bilateral 11/21/2012  Gout 11/21/2012 Assessment and Plan  
 
CKD 4 - baseline Cr in high 1's - Cardiorenal Syndrome 
- Stable renal function. Has been tolerating diuretics well - looks euvolemic now. Hold lasix - low to mid 2's may be his new creatinine baseline. 
- Follow Anemia/GI bleed  
- hemoglobin stable 
- on IV iron for iron deficiency V-tach/A-fib 
- cardiology following 
- on beta blocker  
- no recurrence Right Pleural effusion - s/p thoracentesis DVT 
- s/p IVC filter Resp failure/COPD exacerbation 
- nebs, pulmicort, antibiotics, steroids 
- bipap at night

## 2018-11-06 NOTE — PROGRESS NOTES
Problem: Falls - Risk of 
Goal: *Absence of Falls Document Debria Check Fall Risk and appropriate interventions in the flowsheet. Outcome: Progressing Towards Goal 
Fall Risk Interventions: 
Mobility Interventions: Bed/chair exit alarm, Communicate number of staff needed for ambulation/transfer, Patient to call before getting OOB Mentation Interventions: Bed/chair exit alarm Medication Interventions: Evaluate medications/consider consulting pharmacy Elimination Interventions: Call light in reach, Bed/chair exit alarm History of Falls Interventions: Bed/chair exit alarm, Consult care management for discharge planning, Evaluate medications/consider consulting pharmacy, Door open when patient unattended, Investigate reason for fall Problem: Pressure Injury - Risk of 
Goal: *Prevention of pressure injury Document Tam Scale and appropriate interventions in the flowsheet. Outcome: Progressing Towards Goal 
Pressure Injury Interventions: 
Sensory Interventions: Assess changes in LOC, Pressure redistribution bed/mattress (bed type) Moisture Interventions: Absorbent underpads Activity Interventions: Pressure redistribution bed/mattress(bed type) Mobility Interventions: PT/OT evaluation, Pressure redistribution bed/mattress (bed type), HOB 30 degrees or less Nutrition Interventions: Offer support with meals,snacks and hydration Friction and Shear Interventions: HOB 30 degrees or less

## 2018-11-06 NOTE — PROGRESS NOTES
Patient had large, loose bm with blood. Dr Mari Messina notified. Order given for hemoglobin value now and in am.  Patient resting quietly. Will continue to monitor.

## 2018-11-06 NOTE — PROGRESS NOTES
Patient's blood sugar this morning was 59, juice given and is now 140. Sitting up in bed, no c/o discomfort, wearing oxygen at 2 liters. Fall precautions continue.

## 2018-11-06 NOTE — PROGRESS NOTES
Problem: Mobility Impaired (Adult and Pediatric) Goal: *Acute Goals and Plan of Care (Insert Text) STG: 
(1.)Mr. Navarro will move from supine to sit and sit to supine  with CONTACT GUARD ASSIST within 3 treatment day(s). (2.)Mr. Navarro will transfer from bed to chair and chair to bed with STAND BY ASSIST using the least restrictive device within 3 treatment day(s). (3.)Mr. Navarro will ambulate with CONTACT GUARD ASSIST for 30 feet with the least restrictive device within 3 treatment day(s). LTG: 
(1.)Mr. Navarro will move from supine to sit and sit to supine  in bed with SUPERVISION within 7 treatment day(s). (2.)Mr. Navarro will transfer from bed to chair and chair to bed with SUPERVISION using the least restrictive device within 7 treatment day(s). (3.)Mr. Navarro will ambulate with STAND BY ASSIST for 150 feet with the least restrictive device within 7 treatment day(s). ________________________________________________________________________________________________ PHYSICAL THERAPY: Daily Note, Treatment Day: 2nd, PM 11/6/2018INPATIENT: Hospital Day: 7 Payor: [facility] [place] MEDICARE / Plan: [place] / Product Type: Managed Care Medicare /  
  
NAME/AGE/GENDER: Devan Solis is a 80 y.o. male PRIMARY DIAGNOSIS: Septic shock ([address] Septic shock ([address] Septic shock ([address] Septic shock ([address] Procedure(s) (LRB): ULTRASOUND (Bilateral) THORACENTESIS (Right) 4 Days Post-Op ICD-10: Treatment Diagnosis:  
 · Generalized Muscle Weakness (M62.81) · Difficulty in walking, Not elsewhere classified (R26.2) · History of falling (Z91.81) Precaution/Allergies: 
Patient has no known allergies. ASSESSMENT:  
Mr. Ahsan Marcelo is supine in bed upon contact and agreeable to PT treatment this afternoon with encouragement. Pt with complaints of gum and tongue tenderness. Nursing notified.  Pt currently on 1 L O2 NC with O2 sat at 95% prior to mobility. Removed from O2 prior to activity as pt requires supplemental O2 at night only at baseline. Pt transitioned supine to sit EOB with Ramírez. Pt performed STS to RW with Ramírez and ambulated 12 ft in hallway before needing a seated rest break secondary to LE weakness/fatigue. Pt with reports of SOB with O2 sat at 98%. Pt needed prolonged seated rest break before performing STS again to RW requiring modA (lower surface height) and ambulating another 12 ft back to room with RW and CGA. Pt ambulates with widened SANDEEP, shuffling gait pattern, and increased trunk sway. Pt left sitting up per pt request to visit with wife. Nursing at bedside and aware. Pt is making slow progress towards goals but was able to further gait distance this session. Will continue efforts. This section established at most recent assessment PROBLEM LIST (Impairments causing functional limitations): 1. Decreased Strength 2. Decreased ADL/Functional Activities 3. Decreased Transfer Abilities 4. Decreased Ambulation Ability/Technique 5. Decreased Balance 6. Decreased Activity Tolerance 7. Decreased Pacing Skills 8. Increased Fatigue 9. Increased Shortness of Breath 10. Decreased [place] with Home Exercise Program 
 INTERVENTIONS PLANNED: (Benefits and precautions of physical therapy have been discussed with the patient.) 1. Balance Exercise 2. Bed Mobility 3. Family Education 4. Gait Training 5. Home Exercise Program (HEP) 6. Neuromuscular Re-education/Strengthening 7. Range of Motion (ROM) 8. Therapeutic Activites 9. Therapeutic Exercise/Strengthening 10. Transfer Training TREATMENT PLAN: Frequency/Duration: 3 times a week for duration of hospital stay Rehabilitation Potential For Stated Goals: Good RECOMMENDED REHABILITATION/EQUIPMENT: (at time of discharge pending progress): Due to the probability of continued deficits (see above) this patient will likely need continued skilled physical therapy after discharge. Equipment:  
? None at this time HISTORY:  
History of Present Injury/Illness (Reason for Referral): 
See H&P below Patient is a 80 y.o.  male presents via EMS after a fall. Reported dizziness and shortness of breath. Was bradycardic and hypotensive and LA 3.87, troponin 1.65, WBC 24.4, hgb 10.6, creatinine 2.58, BNP 1594. Was placed BIPAP but HR dropped to 38 and SBP down to 60s--was taken off BIPAP and placed on NC. Is a poor historian and states that he \"just slipped walking around the bed\". Denies dizziness. Has chronic home O2 that he uses mainly at night but admits to using during the day as well. Wife states he has not been eating well and is very weak. 
  
He was last seen in our sleep lab 9/26/18 for follow up and has been prescribed BIPAP and pressures changed then to 11/7cm--device interrogation did not show daily use.  
  
Vascular surgery placed IVC filter 9/12/18 for DVT and hx GIB. Chronic medical:  AFIB on ASA and Plavix, COPD, DM, diastolic heart failure, CKD, PUD, essential tremors, ROBERTO on BIPAP 
  
Home DME company 2l with sleep.  
 
 
Past Medical History/Comorbidities:  
Mr. Sol Riley  has a past medical history of Atherosclerosis of artery of extremity with ulceration ([address] Atherosclerosis of native arteries of extremity with intermittent claudication ([address] Atopic dermatitis, Atrial fibrillation ([address] CAD (coronary artery disease), Carotid stenosis, bilateral, CHF (congestive heart failure) ([address] Chronic kidney disease, Chronic obstructive pulmonary disease ([address] Colon, diverticulosis, Coronary atherosclerosis of native coronary vessel, Diabetes ([address] Diastolic CHF, acute on chronic ([address] Failed CABG (coronary artery bypass graft), GI bleed, Gout, History of tobacco use, Andreafski (hard of hearing), Hypercholesterolemia, Hypertension, Hyperuricemia, Morbid obesity ([address] PAD (peripheral artery disease) ([facility] Poor historian, Radiologic findings of lung field, abnormal, Seizure disorder ([facility] Shortness of breath dyspnea, Thyroid disease, and Unspecified sleep apnea. Mr. Singh Ren  has a past surgical history that includes pr cardiac surg procedure unlist; hx coronary artery bypass graft (06-); hx cataract removal (Left, 2003); hx heart catheterization; hx coronary stent placement (02-); hx heent (1970s); hx colonoscopy; ULTRASOUND (Bilateral, 11/2/2018); THORACENTESIS (Right, 11/2/2018); ULTRASOUND GUIDED ACCESS VENA CAVA FILTER INSERTION (N/A, 9/12/2018); ESOPHAGOGASTRODUODENOSCOPY (EGD) (N/A, 8/5/2018); ESOPHAGOGASTRODUODENOSCOPY (EGD) (N/A, 1/27/2017); COLONOSCOPY  BMI 36 TO BE ADMITTED 1/26/17 (N/A, 1/27/2017); ESOPHAGOGASTRODUODENOSCOPY (EGD) (N/A, 1/22/2017); ESOPHAGOGASTRODUODENAL (EGD) BIOPSY (N/A, 1/22/2017); ESOPHAGOGASTRODUODENOSCOPY (EGD)   rm 610 (N/A, 5/8/2015); COLONOSCOPY (N/A, 1/24/2015); and ESOPHAGOGASTRODUODENOSCOPY (EGD)   rm 3101 (N/A, 1/23/2015). Social History/Living Environment:  
Home Environment: Private residence # Steps to Enter: 0 Wheelchair Ramp: Yes One/Two Story Residence: One story Living Alone: No 
Support Systems: Spouse/Significant Other/Partner Patient Expects to be Discharged to[de-identified] Private residence Current DME Used/Available at Home: [employer] Buck[employer], rollator Prior Level of Function/Work/Activity: 
Lives with wife and granddaughter, use of rollator for household distances, 2L O2 Number of Personal Factors/Comorbidities that affect the Plan of Care: 3+: HIGH COMPLEXITY EXAMINATION:  
Most Recent Physical Functioning:  
Gross Assessment: 
AROM: Generally decreased, functional 
Strength: Generally decreased, functional 
Coordination: Generally decreased, functional 
         
  
Posture: 
  
Balance: 
Sitting: Intact; Without support Standing: Impaired;Pull to stand; With support Standing - Static: Fair;Constant support Standing - Dynamic : Fair Bed Mobility: 
Supine to Sit: Minimum assistance Wheelchair Mobility: 
  
Transfers: 
Sit to Stand: Minimum assistance; Moderate assistance Stand to Sit: Minimum assistance Gait: 
  
Base of Support: Widened Speed/Helen: Slow;Shuffled Step Length: Left shortened;Right shortened Gait Abnormalities: Decreased step clearance;Shuffling gait;Trunk sway increased Distance (ft): 12 Feet (ft)(x2) 
Assistive Device: Walker, rolling Ambulation - Level of Assistance: Contact guard assistance Interventions: Safety awareness training; Tactile cues; Verbal cues Body Structures Involved: 1. Nerves 2. Heart 3. Lungs 4. Bones 5. Joints 6. Muscles 7. Ligaments Body Functions Affected: 1. Sensory/Pain 2. Cardio 3. Respiratory 4. Neuromusculoskeletal 
5. Movement Related Activities and Participation Affected: 1. General Tasks and Demands 2. Mobility 3. Self Care 4. Domestic Life 5. Interpersonal Interactions and Relationships 6. Community, Social and [place] Deputy Number of elements that affect the Plan of Care: 4+: HIGH COMPLEXITY CLINICAL PRESENTATION:  
Presentation: Evolving clinical presentation with changing clinical characteristics: MODERATE COMPLEXITY CLINICAL DECISION MAKING:  
[employer] AM-PAC 6 Clicks Basic Mobility Inpatient Short Form How much difficulty does the patient currently have. .. Unable A Lot A Little None 1. Turning over in bed (including adjusting bedclothes, sheets and blankets)? [] 1   [] 2   [x] 3   [] 4  
2. Sitting down on and standing up from a chair with arms ( e.g., wheelchair, bedside commode, etc.)   [] 1   [] 2   [x] 3   [] 4  
3. Moving from lying on back to sitting on the side of the bed? [] 1   [x] 2   [] 3   [] 4 How much help from another person does the patient currently need. .. Total A Lot A Little None 4. Moving to and from a bed to a chair (including a wheelchair)?    [] 1   [] 2   [x] 3   [] 4  
 5.  Need to walk in hospital room? [] 1   [] 2   [x] 3   [] 4  
6. Climbing 3-5 steps with a railing? [x] 1   [] 2   [] 3   [] 4  
© 2007, Trustees of [facility] [employer], under license to [employer]. All rights reserved Score:  Initial: 15 Most Recent: X (Date: -- ) Interpretation of Tool:  Represents activities that are increasingly more difficult (i.e. Bed mobility, Transfers, Gait). Score 24 23 22-20 19-15 14-10 9-7 6 Modifier CH CI CJ CK CL CM CN   
 
? Mobility - Walking and Moving Around:  
  - CURRENT STATUS: CK - 40%-59% impaired, limited or restricted  - GOAL STATUS: CJ - 20%-39% impaired, limited or restricted  - D/C STATUS:  ---------------To be determined--------------- Payor: [facility] [place] MEDICARE / Plan[place] [employer] [place] / Product Type: Managed Care Medicare /   
 
Medical Necessity:    
· Patient is expected to demonstrate progress in strength, balance, coordination and functional technique to decrease assistance required with gait, transfers, and functional mobility. Reason for Services/Other Comments: 
· Patient continues to require skilled intervention due to decreased strength, decreased balance, decreased functional tolerance, decreased cardiopulmonary endurance affecting participation in basic ADLs and functional tasks. Use of outcome tool(s) and clinical judgement create a POC that gives a: Clear prediction of patient's progress: LOW COMPLEXITY  
  
 
 
 
TREATMENT:  
(In addition to Assessment/Re-Assessment sessions the following treatments were rendered) Pre-treatment Symptoms/Complaints:  weakness Pain: Initial:  
Pain Intensity 1: 2  Post Session:  2/10, unchanged with mobility. Therapeutic Activity: (    26 minutes): Therapeutic activities including Bed transfers, chair transfers, and ambulation on level ground to improve mobility, strength, balance and coordination.   Required minimal- moderate Safety awareness training; Tactile cues; Verbal cues to promote static and dynamic balance in sitting and promote coordination of bilateral, lower extremity(s). Therapeutic Exercise: ( ):  Exercises per grid below to improve mobility and strength. Required minimal visual, verbal and tactile cues to promote proper body alignment, promote proper body posture and promote proper body mechanics. Progressed repetitions and complexity of movement as indicated. Date: 
11/1/18 Date: 
11/5/18 Date: Activity/Exercise Parameters Parameters Parameters LAQ 10 X B  10 X B Alt marches 10 X B  10 X B Ankle pumps 10 X B Quad set 10 X B Heel slides 10 X B Hip abduction 10 X B Braces/Orthotics/Lines/Etc:  
· none Treatment/Session Assessment:   
· Response to Treatment:  Ambulated 12 ft X 2 RW and CGA, fatigues quickly · Interdisciplinary Collaboration:  
o Physical Therapist 
o Registered Nurse · After treatment position/precautions:  
o Bed alarm/tab alert on 
o Bed/Chair-wheels locked 
o Bed in low position 
o Call light within reach 
o Family at bedside 
o sitting EOB · Compliance with Program/Exercises: Will assess as treatment progresses · Recommendations/Intent for next treatment session: \"Next visit will focus on advancements to more challenging activities and reduction in assistance provided\". Total Treatment Duration: PT Patient Time In/Time Out Time In: 5186 Time Out: [address]

## 2018-11-06 NOTE — PROGRESS NOTES
Dr Mary Helm notified of patient's run of [address]. New lab orders given. Patient stable, resting quietly at this time.

## 2018-11-06 NOTE — PROGRESS NOTES
Patient had 2nd large bm with blood. He became very upset, resting quietly now after care given. Has home bi-pap on, head of bed elevated. Will continue to monitor.

## 2018-11-07 PROBLEM — K74.60 CIRRHOSIS (HCC): Status: ACTIVE | Noted: 2018-01-01

## 2018-11-07 NOTE — PROGRESS NOTES
Nephrology Progress Note: 
 11/7/2018 9:34 AM 
 
Subjective: Patient seen and examined in room. No complaints. Comfortable. No CP, No SOB, No Abd pain. Per nursing patient still having blood in stool. Renal function slowly improving. Current Facility-Administered Medications Medication Dose Route Frequency  gabapentin (NEURONTIN) capsule 100 mg  100 mg Oral TID  oxyCODONE IR (ROXICODONE) tablet 5 mg  5 mg Oral Q4H PRN  pantoprazole (PROTONIX) tablet 40 mg  40 mg Oral BID  ferric gluconate (FERRLECIT) 125 mg in 0.9% sodium chloride 100 mL ivpb  125 mg IntraVENous DAILY  amoxicillin-clavulanate (AUGMENTIN) 500-125 mg per tablet 1 Tab  1 Tab Oral BID WITH MEALS  sodium chloride (OCEAN) 0.65 % nasal squeeze bottle 2 Spray  2 Spray Both Nostrils Q2H PRN  
 metoprolol succinate (TOPROL-XL) XL tablet 25 mg  25 mg Oral DAILY  insulin glargine (LANTUS) injection 10 Units  10 Units SubCUTAneous QHS  insulin lispro (HUMALOG) injection 3 Units  3 Units SubCUTAneous TIDAC  insulin lispro (HUMALOG) injection   SubCUTAneous AC&HS  aspirin chewable tablet 81 mg  81 mg Oral DAILY  levothyroxine (SYNTHROID) tablet 50 mcg  50 mcg Oral ACB  pravastatin (PRAVACHOL) tablet 40 mg  40 mg Oral QHS  sertraline (ZOLOFT) tablet 25 mg  25 mg Oral DAILY  sodium chloride (NS) flush 5 mL  5 mL InterCATHeter Q8H  
 sodium chloride (NS) flush 5-10 mL  5-10 mL InterCATHeter PRN  
 nystatin (MYCOSTATIN) 100,000 unit/gram powder   Topical BID  calcium carbonate (OS-ERIKA) tablet 500 mg [elemental]  500 mg Oral DAILY WITH BREAKFAST  dextrose 40% (GLUTOSE) oral gel 1 Tube  15 g Oral PRN  
 glucagon (GLUCAGEN) injection 1 mg  1 mg IntraMUSCular PRN  
 dextrose (D50W) injection syrg 12.5-25 g  25-50 mL IntraVENous PRN  
 budesonide (PULMICORT) 500 mcg/2 ml nebulizer suspension  500 mcg Nebulization BID RT  And  
 albuterol (PROVENTIL VENTOLIN) nebulizer solution 2.5 mg  2.5 mg Nebulization Q6HWA RT  
 lansoprazole (PREVACID SOLUTAB) disintegrating tablet 30 mg  30 mg Oral DAILY Objective:  
 
Visit Vitals /67 Pulse (!) 58 Temp 97.4 °F (36.3 °C) Resp 17 Ht 6' 2\" (1.88 m) Wt 102.3 kg (225 lb 9.6 oz) SpO2 96% BMI 28.97 kg/m² O2 Flow Rate (L/min): 5 l/min O2 Device: CPAP mask Temp (24hrs), Av.7 °F (36.5 °C), Min:97.4 °F (36.3 °C), Max:98 °F (36.7 °C) No intake/output data recorded.  1901 -  0700 In: 472 [P.O.:472] Out: 1600 [Urine:1600] Visit Vitals /67 Pulse (!) 58 Temp 97.4 °F (36.3 °C) Resp 17 Ht 6' 2\" (1.88 m) Wt 102.3 kg (225 lb 9.6 oz) SpO2 96% BMI 28.97 kg/m² Head: Normocephalic, without obvious abnormality Neck: no JVD Lungs: decreased at bases Heart: regular rate and rhythm Abdomen: soft, non-tender. Extremities: No edema Data Review Recent Results (from the past 48 hour(s)) GLUCOSE, POC Collection Time: 18 11:23 AM  
Result Value Ref Range Glucose (POC) 165 (H) 65 - 100 mg/dL GLUCOSE, POC Collection Time: 18  4:18 PM  
Result Value Ref Range Glucose (POC) 166 (H) 65 - 100 mg/dL GLUCOSE, POC Collection Time: 18  8:23 PM  
Result Value Ref Range Glucose (POC) 199 (H) 65 - 100 mg/dL HGB & HCT Collection Time: 18 10:34 PM  
Result Value Ref Range HGB 9.8 (L) 13.6 - 17.2 g/dL HCT 32.5 (L) 41.1 - 50.3 % BNP Collection Time: 18  2:28 AM  
Result Value Ref Range BNP 2,876 pg/mL METABOLIC PANEL, BASIC Collection Time: 18  2:29 AM  
Result Value Ref Range Sodium 139 136 - 145 mmol/L Potassium 3.7 3.5 - 5.1 mmol/L Chloride 100 98 - 107 mmol/L  
 CO2 30 21 - 32 mmol/L Anion gap 9 7 - 16 mmol/L Glucose 141 (H) 65 - 100 mg/dL BUN 74 (H) 8 - 23 MG/DL Creatinine 2.20 (H) 0.8 - 1.5 MG/DL  
 GFR est AA 37 (L) >60 ml/min/1.73m2 GFR est non-AA 30 (L) >60 ml/min/1.73m2 Calcium 7.3 (L) 8.3 - 10.4 MG/DL  
HGB & HCT Collection Time: 11/06/18  2:29 AM  
Result Value Ref Range HGB 8.8 (L) 13.6 - 17.2 g/dL HCT 29.5 (L) 41.1 - 50.3 % GLUCOSE, POC Collection Time: 11/06/18  5:15 AM  
Result Value Ref Range Glucose (POC) 59 (L) 65 - 100 mg/dL GLUCOSE, POC Collection Time: 11/06/18  5:34 AM  
Result Value Ref Range Glucose (POC) 140 (H) 65 - 100 mg/dL METABOLIC PANEL, BASIC Collection Time: 11/06/18  6:18 AM  
Result Value Ref Range Sodium 139 136 - 145 mmol/L Potassium 3.7 3.5 - 5.1 mmol/L Chloride 98 98 - 107 mmol/L  
 CO2 31 21 - 32 mmol/L Anion gap 10 7 - 16 mmol/L Glucose 174 (H) 65 - 100 mg/dL BUN 70 (H) 8 - 23 MG/DL Creatinine 2.29 (H) 0.8 - 1.5 MG/DL  
 GFR est AA 35 (L) >60 ml/min/1.73m2 GFR est non-AA 29 (L) >60 ml/min/1.73m2 Calcium 7.5 (L) 8.3 - 10.4 MG/DL MAGNESIUM Collection Time: 11/06/18  6:18 AM  
Result Value Ref Range Magnesium 2.5 (H) 1.8 - 2.4 mg/dL HEMOGLOBIN Collection Time: 11/06/18  6:18 AM  
Result Value Ref Range HGB 8.8 (L) 13.6 - 17.2 g/dL PLEASE READ & DOCUMENT PPD TEST IN 24 HRS Collection Time: 11/06/18 11:09 AM  
Result Value Ref Range PPD negative Negative  
 mm Induration 0 mm GLUCOSE, POC Collection Time: 11/06/18 11:19 AM  
Result Value Ref Range Glucose (POC) 234 (H) 65 - 100 mg/dL HGB & HCT Collection Time: 11/06/18  2:03 PM  
Result Value Ref Range HGB 8.6 (L) 13.6 - 17.2 g/dL HCT 28.6 (L) 41.1 - 50.3 % GLUCOSE, POC Collection Time: 11/06/18  4:08 PM  
Result Value Ref Range Glucose (POC) 224 (H) 65 - 100 mg/dL GLUCOSE, POC Collection Time: 11/06/18  8:29 PM  
Result Value Ref Range Glucose (POC) 178 (H) 65 - 100 mg/dL GLUCOSE, POC Collection Time: 11/07/18  5:01 AM  
Result Value Ref Range Glucose (POC) 93 65 - 100 mg/dL CBC W/O DIFF Collection Time: 11/07/18  5:27 AM  
Result Value Ref Range WBC 9.0 4.3 - 11.1 K/uL RBC 3.35 (L) 4.23 - 5.6 M/uL HGB 8.7 (L) 13.6 - 17.2 g/dL HCT 28.6 (L) 41.1 - 50.3 % MCV 85.4 79.6 - 97.8 FL  
 MCH 26.0 (L) 26.1 - 32.9 PG  
 MCHC 30.4 (L) 31.4 - 35.0 g/dL  
 RDW 16.9 % PLATELET 689 (L) 568 - 450 K/uL MPV 12.1 9.4 - 12.3 FL ABSOLUTE NRBC 0.13 0.0 - 0.2 K/uL METABOLIC PANEL, BASIC Collection Time: 11/07/18  5:27 AM  
Result Value Ref Range Sodium 138 136 - 145 mmol/L Potassium 3.7 3.5 - 5.1 mmol/L Chloride 99 98 - 107 mmol/L  
 CO2 32 21 - 32 mmol/L Anion gap 7 7 - 16 mmol/L Glucose 80 65 - 100 mg/dL BUN 69 (H) 8 - 23 MG/DL Creatinine 2.01 (H) 0.8 - 1.5 MG/DL  
 GFR est AA 41 (L) >60 ml/min/1.73m2 GFR est non-AA 34 (L) >60 ml/min/1.73m2 Calcium 7.5 (L) 8.3 - 10.4 MG/DL Assessment Patient Active Problem List  
 Diagnosis Date Noted  [facility] 11/07/2018  V tach ([facility] 11/04/2018  Pleural effusion 11/02/2018  Pulmonary infiltrate 11/02/2018  Bacteremia 11/02/2018  COPD exacerbation ([facility] 11/01/2018  Elevated brain natriuretic peptide (BNP) level 11/01/2018  Anemia 10/31/2018  Acute respiratory failure with hypoxia ([facility] 10/31/2018  Acute on chronic renal failure ([facility] 10/31/2018  Septic shock [facility] 10/31/2018  Elevated LFTs 10/31/2018  Total bilirubin, elevated 10/31/2018  Acute metabolic encephalopathy 03/53/3741  Acute encephalopathy 10/08/2018  S/P IVC filter 09/21/2018  Acute blood loss anemia 08/05/2018  Tremors of nervous system 08/04/2018  ALFREDITO (acute kidney injury) ([facility] 08/02/2018  Hypoglycemia 08/01/2018  Altered mental state 04/22/2018  Venous stasis dermatitis of both lower extremities 04/22/2018  Type 2 diabetes with nephropathy ([facility] 02/12/2018  Restrictive lung disease 11/01/2017  Hypoxia 08/21/2017  Thrombocytopenia ([facility] 08/21/2017  Falls frequently 09/17/2016  Dyspnea 01/21/2016  Type 2 diabetes mellitus with peripheral neuropathy ([facility] 11/05/2015  Chronic diastolic congestive heart failure ([facility] 09/12/2015  ROBERTO treated with BiPAP 02/20/2015  Chronic pain 01/22/2015  Morbid obesity ([facility] 01/21/2015  CKD (chronic kidney disease) stage 3, GFR 30-59 ml/min ([facility]) 09/18/2013  Hyperlipidemia 08/05/2013  Debility 08/05/2013  Essential hypertension 11/21/2012  [facility] 11/21/2012  Peripheral neuropathy 11/21/2012  PAD (peripheral artery disease) ([facility] 11/21/2012  Carotid stenosis, bilateral 11/21/2012  Gout 11/21/2012 Assessment and Plan  
 
CKD 4 - baseline Cr in high 1's - Cardiorenal Syndrome 
- Stable renal function. Has been tolerating diuretics well - looks euvolemic now. Holding lasix - low to mid 2's may be his new creatinine baseline - Follow Anemia/GI bleed  
- hemoglobin stable 
- on IV iron for iron deficiency V-tach/A-fib 
- cardiology following 
- on beta blocker  
- no recurrence Right Pleural effusion - s/p thoracentesis DVT 
- s/p IVC filter Resp failure/COPD exacerbation 
- nebs, pulmicort, antibiotics, steroids 
- bipap at night Chart reviewed, Pt examined and agree with above note of CORRINA Simental NP

## 2018-11-07 NOTE — PROGRESS NOTES
Report received from [employer] [facility], assumed care of patient. Patient resting in bed, denies needs at present, call light in reach. Remains on nasal 02.

## 2018-11-07 NOTE — PROGRESS NOTES
Patient resting in bed, CPAP in use. No distress noted. Call light in reach. Medicated x 1 during night shift for chronic pain. Report to be given to day shift nurse.

## 2018-11-07 NOTE — PROGRESS NOTES
Pt is stable resting in bed. Respirations are even and unlabored. Pt denies pain and discomfort at this time. Pt is axox3. Skin tears on right arm are bandaged with no apparent bleeding. Bed is in low and locked position. Call light within reach. Pt instructed to ask for assistance if needed. Will continue to monitor.

## 2018-11-07 NOTE — PROGRESS NOTES
Problem: Interdisciplinary Rounds Goal: Interdisciplinary Rounds Outcome: Progressing Towards Goal 
Interdisciplinary team rounds were held 11/7/2018 with the following team members:Care Management, Nursing, Physical Therapy, Physician and Clinical Coordinator and the patient. Plan of care discussed. See clinical pathway and/or care plan for interventions and desired outcomes.

## 2018-11-07 NOTE — PROGRESS NOTES
Hospitalist Progress Note Admit Date:  10/31/2018  1:22 PM  
Name:  José Boone Age:  80 y.o. 
:  1932 MRN:  [id number] PCP:  Tito Harmon MD 
Treatment Team: Attending Provider: Perla Melendez DO; Utilization Review: Emeka Kelly; Consulting Provider: Pablo Moon MD; Consulting Provider: Bogdan Lomax MD; Hospitalist: Raul Reyes MD; Care Manager: Maria Teresa Johnson Subjective:  
Up in bed eating breakfast. No complaints. Breathing continues to improve. US this week showed cirrhosis and CBD dilation; no prior known h/o cirrhosis/hepatitis. No alcohol in 30 years. Denies any pain. Appetite is good. ROS otherwise negative. Objective:  
 
Patient Vitals for the past 24 hrs: 
 Temp Pulse Resp BP SpO2  
1848     96 % 18 0730 97.4 °F (36.3 °C) (!) 58 17 124/67 100 % 18 0309 98 °F (36.7 °C) (!) 50 18 121/73 100 % 18 2256 98 °F (36.7 °C)  18 128/75 98 % 18 2238     98 % 18 2006     98 % 18 1937 98 °F (36.7 °C) 61 18 109/54 98 % 18 1356     98 % 18 1116 97.5 °F (36.4 °C) 60 18 127/60 99 % 18 1101 97.5 °F (36.4 °C) 60 18 116/78 99 % Oxygen Therapy O2 Sat (%): 96 % (18) Pulse via Oximetry: 60 beats per minute (18) O2 Device: CPAP mask (18) O2 Flow Rate (L/min): 5 l/min (18) FIO2 (%): 28 % (18) Intake/Output Summary (Last 24 hours) at 2018 9788 Last data filed at 2018 9218 Gross per 24 hour Intake 236 ml Output 1000 ml Net -764 ml *Note that automatically entered I/Os may not be accurate; dependent on patient compliance with collection and accurate  by assistants. General:    Well nourished. Alert. Obese. CV:   Irregularly irregular. No murmur, rub, or gallop. Lungs:   CTAB. No wheezing, rhonchi, or rales. Abdomen:   Soft, nontender, nondistended. Extremities: Warm and dry. No cyanosis or edema. Bruises to b/l UEs. Skin:     No rashes or jaundice. Neuro:  No gross focal deficits Data Review: 
I have reviewed all labs, meds, telemetry events, and studies from the last 24 hours: 
 
Recent Results (from the past 24 hour(s)) PLEASE READ & DOCUMENT PPD TEST IN 24 HRS Collection Time: 11/06/18 11:09 AM  
Result Value Ref Range PPD negative Negative  
 mm Induration 0 mm GLUCOSE, POC Collection Time: 11/06/18 11:19 AM  
Result Value Ref Range Glucose (POC) 234 (H) 65 - 100 mg/dL HGB & HCT Collection Time: 11/06/18  2:03 PM  
Result Value Ref Range HGB 8.6 (L) 13.6 - 17.2 g/dL HCT 28.6 (L) 41.1 - 50.3 % GLUCOSE, POC Collection Time: 11/06/18  4:08 PM  
Result Value Ref Range Glucose (POC) 224 (H) 65 - 100 mg/dL GLUCOSE, POC Collection Time: 11/06/18  8:29 PM  
Result Value Ref Range Glucose (POC) 178 (H) 65 - 100 mg/dL GLUCOSE, POC Collection Time: 11/07/18  5:01 AM  
Result Value Ref Range Glucose (POC) 93 65 - 100 mg/dL CBC W/O DIFF Collection Time: 11/07/18  5:27 AM  
Result Value Ref Range WBC 9.0 4.3 - 11.1 K/uL  
 RBC 3.35 (L) 4.23 - 5.6 M/uL HGB 8.7 (L) 13.6 - 17.2 g/dL HCT 28.6 (L) 41.1 - 50.3 % MCV 85.4 79.6 - 97.8 FL  
 MCH 26.0 (L) 26.1 - 32.9 PG  
 MCHC 30.4 (L) 31.4 - 35.0 g/dL  
 RDW 16.9 % PLATELET 174 (L) 225 - 450 K/uL MPV 12.1 9.4 - 12.3 FL ABSOLUTE NRBC 0.13 0.0 - 0.2 K/uL METABOLIC PANEL, BASIC Collection Time: 11/07/18  5:27 AM  
Result Value Ref Range Sodium 138 136 - 145 mmol/L Potassium 3.7 3.5 - 5.1 mmol/L Chloride 99 98 - 107 mmol/L  
 CO2 32 21 - 32 mmol/L Anion gap 7 7 - 16 mmol/L Glucose 80 65 - 100 mg/dL BUN 69 (H) 8 - 23 MG/DL Creatinine 2.01 (H) 0.8 - 1.5 MG/DL  
 GFR est AA 41 (L) >60 ml/min/1.73m2 GFR est non-AA 34 (L) >60 ml/min/1.73m2 Calcium 7.5 (L) 8.3 - 10.4 MG/DL All Micro Results Procedure Component Value Units Date/Time FUNGUS CULTURE AND SMEAR [[id number]] Collected:  11/02/18 0930 Order Status:  Completed Specimen:  Miscellaneous sample Updated:  11/05/18 1237 Source RIGHT Comment: PLEURAL FLUID Fungus stain Direct Inoculation Fungus (Mycology) Culture Other source received Comment: (NOTE) Performed At: [address] [id number] Ibeth Melendez MD [id number] CULTURE, BLOOD [[id number]] Collected:  10/31/18 1345 Order Status:  Completed Specimen:  Blood Updated:  11/05/18 1335 Special Requests: --     
  RIGHT 
HAND Culture result: NO GROWTH 5 DAYS     
 CULTURE, BODY FLUID W Gordon Bhatti [[id number]] Collected:  11/02/18 0930 Order Status:  Completed Specimen:  Pleural Fluid Updated:  11/04/18 2923 Special Requests: RIGHT     
  GRAM STAIN 0 O 1 WBCS/OIF  
   NO DEFINITE ORGANISM SEEN Culture result: NO GROWTH 2 DAYS     
 AFB CULTURE + SMEAR W/RFLX ID FROM CULTURE [[id number]] Collected:  11/02/18 1030 Order Status:  Completed Specimen:  Miscellaneous sample Updated:  11/03/18 1536 Source PLEURAL FLUID Comment: RIGHT  
  
  AFB Specimen processing Concentration Acid Fast Smear NEGATIVE Comment: (NOTE) Performed At: [address] [id number] Ibeth Melendez MD [id number] Acid Fast Culture PENDING  
 CULTURE, URINE [[id number]] Collected:  10/31/18 2047 Order Status:  Completed Specimen:  Urine from [place] Specimen Updated:  11/03/18 4941 Special Requests: NO SPECIAL REQUESTS Culture result: NO GROWTH 2 DAYS     
 CULTURE, BLOOD [[id number]]  (Abnormal) Collected:  10/31/18 1409 Order Status:  Completed Specimen:  Blood Updated:  11/02/18 8065   Special Requests: --     
  RIGHT 
HAND 
  
  GRAM STAIN    
  GRAM POS COCCI IN CLUSTERS  
     
      
  AEROBIC AND ANAEROBIC BOTTLES  
     
      
 RESULTS VERIFIED, PHONED TO AND READ BACK BY MARIA GUADALUPE LOPEZ ON 1209 ON 18 EOR Culture result: STAPHYLOCOCCUS SPECIES, COAGULASE NEGATIVE  
     
      
  SA target DNA sequence not detected within the sample. Test performed by PCR  
     
      
  THIS ORGANISM MAY BE INDICATIVE OF CULTURE CONTAMINATION, HOWEVER, CLINICAL CORRELATION NEEDS TO BE EVALUATED, AS EACH CASE IS UNIQUE. Results for orders placed or performed during the hospital encounter of 10/31/18  
2D ECHO COMPLETE ADULT (TTE) W OR WO CONTR Narrative [facility] [address] [place], [address] [facility] 
[phone] Transthoracic Echocardiogram 
2D, M-mode, Doppler, and Color Doppler PatientPatsie Leyden 
MR #: [id number] : 1932 Age: 80 years Gender: Male Study date: 2018 Account #: [de-identified] Height: 74 in 
Weight: 225.5 lb 
BSA: 2.29 mï¾² Status:Routine Location: 826 BP: 143/ 69 Allergies: NO KNOWN ALLERGENS Sonographer:  PUJA Rabago Group:  [facility] Referring Physician:  Riri Nava [facility] Reading Physician:  Luli Kim MD 
 
INDICATIONS: NSVT, CAD PROCEDURE: This was a routine study. Previous positive bubble study. A 
transthoracic echocardiogram was performed. The study included complete 2D 
imaging, M-mode, complete spectral Doppler, and color Doppler. Intravenous 
contrast (Definity) was administered. Echocardiographic views were limited by 
poor patient compliance and poor acoustic window availability. This was a 
technically difficult study. LEFT VENTRICLE: Size was normal. Systolic function was normal. Ejection 
fraction was estimated in the range of 50 % to 55 %. There was basal 
inferoseptal hypokinesis. Wall thickness was normal. There was mild  
concentric 
hypertrophy. Doppler parameters were consistent with diastolic dysfunction. Avg 
E/e': 19.5 RIGHT VENTRICLE: The ventricle was moderately dilated. Systolic function was 
normal. Estimated peak pressure was in the range of 35-40 mmHg. LEFT ATRIUM: The atrium was markedly dilated. RIGHT ATRIUM: The atrium was moderately dilated. SYSTEMIC VEINS: IVC: The inferior vena cava was dilated. The respirophasic 
change in diameter was more than 50%. AORTIC VALVE: There is likely a calcified plaque noted at the sinotubular 
junction. The valve was probably trileaflet. Leaflets exhibited mildly 
increased thickness, mild calcification, and mild to moderate sclerosis. There 
was no evidence for stenosis. There was mild regurgitation. MITRAL VALVE: There was mild to moderate annular calcification. There was no 
evidence for stenosis. There was mild to moderate regurgitation. TRICUSPID VALVE: The valve structure was normal. There was no evidence for 
stenosis. There was mild regurgitation. PULMONIC VALVE: Not well visualized. There was no evidence for stenosis. There 
was no insufficiency. PERICARDIUM: There was no pericardial effusion. AORTA: The root exhibited normal size. SUMMARY: 
 
-  Left ventricle: Systolic function was normal. Ejection fraction was 
estimated in the range of 50 % to 55 %. There was basal inferoseptal 
hypokinesis. There was mild concentric hypertrophy. Doppler parameters were 
consistent with diastolic dysfunction. Avg E/e': 19.5 
 
-  Right ventricle: The ventricle was moderately dilated. Systolic function  
was 
normal. 
 
-  Left atrium: The atrium was markedly dilated. -  Right atrium: The atrium was moderately dilated. SYSTEM MEASUREMENT TABLES 
 
2D mode AoR Diam (2D): 3.4 cm 
LA Dimension (2D): 6.1 cm Left Atrium Systolic Volume Index; Method of Disks, Biplane; 2D mode;: 76  
ml/m2 IVS/LVPW (2D): 0.8 IVSd (2D): 1.1 cm 
LVIDd (2D): 4.8 cm LVIDs (2D): 3.9 cm 
LVPWd (2D): 1.2 cm RVIDd (2D): 4.7 cm Tissue Doppler Imaging LV Peak Early Boo Tissue Michael; Lateral MA (TDI): 14.1 cm/s LV Peak Early Boo Tissue Michael; Medial MA (TDI): 5.8 cm/s Unspecified Scan Mode Peak Grad; Mean; Antegrade Flow: 7 mm[Hg] Vmax; Antegrade Flow: 133 cm/s 
MV Peak Michael/LV Peak Tissue Michael E-Wave; Lateral MA: 11.3 MV Peak Michael/LV Peak Tissue Michael E-Wave; Medial MA: 27.7 Prepared and signed by Page Jaramillo MD 
Signed [id number] 90:76:21 Current Meds: 
Current Facility-Administered Medications Medication Dose Route Frequency  gabapentin (NEURONTIN) capsule 100 mg  100 mg Oral TID  oxyCODONE IR (ROXICODONE) tablet 5 mg  5 mg Oral Q4H PRN  pantoprazole (PROTONIX) tablet 40 mg  40 mg Oral BID  ferric gluconate (FERRLECIT) 125 mg in 0.9% sodium chloride 100 mL ivpb  125 mg IntraVENous DAILY  amoxicillin-clavulanate (AUGMENTIN) 500-125 mg per tablet 1 Tab  1 Tab Oral BID WITH MEALS  sodium chloride (OCEAN) 0.65 % nasal squeeze bottle 2 Spray  2 Spray Both Nostrils Q2H PRN  
 metoprolol succinate (TOPROL-XL) XL tablet 25 mg  25 mg Oral DAILY  insulin glargine (LANTUS) injection 10 Units  10 Units SubCUTAneous QHS  insulin lispro (HUMALOG) injection 3 Units  3 Units SubCUTAneous TIDAC  insulin lispro (HUMALOG) injection   SubCUTAneous AC&HS  aspirin chewable tablet 81 mg  81 mg Oral DAILY  levothyroxine (SYNTHROID) tablet 50 mcg  50 mcg Oral ACB  pravastatin (PRAVACHOL) tablet 40 mg  40 mg Oral QHS  sertraline (ZOLOFT) tablet 25 mg  25 mg Oral DAILY  sodium chloride (NS) flush 5 mL  5 mL InterCATHeter Q8H  
 sodium chloride (NS) flush 5-10 mL  5-10 mL InterCATHeter PRN  
 nystatin (MYCOSTATIN) 100,000 unit/gram powder   Topical BID  calcium carbonate (OS-ERIKA) tablet 500 mg [elemental]  500 mg Oral DAILY WITH BREAKFAST  dextrose 40% (GLUTOSE) oral gel 1 Tube  15 g Oral PRN  
 glucagon (GLUCAGEN) injection 1 mg  1 mg IntraMUSCular PRN  
  dextrose (D50W) injection syrg 12.5-25 g  25-50 mL IntraVENous PRN  
 budesonide (PULMICORT) 500 mcg/2 ml nebulizer suspension  500 mcg Nebulization BID RT And  
 albuterol (PROVENTIL VENTOLIN) nebulizer solution 2.5 mg  2.5 mg Nebulization Q6HWA RT  
 lansoprazole (PREVACID SOLUTAB) disintegrating tablet 30 mg  30 mg Oral DAILY Other Studies (last 24 hours): No results found. Assessment and Plan:  
 
Hospital Problems as of 11/7/2018 Date Reviewed: 10/31/2018 Codes Class Noted - Resolved POA Cirrhosis ([facility] ICD-10-CM: K74.60 ICD-9-CM: 571.5  11/7/2018 - Present Unknown V tach ([facility] ICD-10-CM: I30.9 ICD-9-CM: 427.1  11/4/2018 - Present Unknown Pleural effusion ICD-10-CM: J90 ICD-9-CM: 511.9  11/2/2018 - Present Unknown Pulmonary infiltrate ICD-10-CM: R91.8 ICD-9-CM: 793.19  11/2/2018 - Present Unknown Bacteremia ICD-10-CM: R78.81 ICD-9-CM: 790.7  11/2/2018 - Present Unknown COPD exacerbation ([facility] ICD-10-CM: J44.1 ICD-9-CM: 491.21  11/1/2018 - Present Unknown Elevated brain natriuretic peptide (BNP) level ICD-10-CM: R79.89 ICD-9-CM: 790.99  11/1/2018 - Present Unknown Anemia ICD-10-CM: D64.9 ICD-9-CM: 285.9  10/31/2018 - Present Yes Acute respiratory failure with hypoxia [facility]) ICD-10-CM: J96.01 
ICD-9-CM: 518.81  10/31/2018 - Present Yes Acute on chronic renal failure (HCC) ICD-10-CM: N17.9, N18.9 ICD-9-CM: 584.9, 585.9  10/31/2018 - Present Yes * (Principal) Septic shock ([facility] ICD-10-CM: A41.9, R65.21 ICD-9-CM: 038.9, 785.52, 995.92  10/31/2018 - Present Yes Elevated LFTs ICD-10-CM: R94.5 ICD-9-CM: 790.6  10/31/2018 - Present Yes Total bilirubin, elevated ICD-10-CM: R17 
ICD-9-CM: 277.4  10/31/2018 - Present Unknown S/P IVC filter (Chronic) ICD-10-CM: B36.761 ICD-9-CM: V45.89  9/21/2018 - Present Yes Overview Signed 10/31/2018  3:20 PM by Terrie Avalos NP  
  9/12/18  Dr. Daxa Lemus Altered mental state ICD-10-CM: R41.82 
ICD-9-CM: 780.97  4/22/2018 - Present Yes ROBERTO treated with BiPAP (Chronic) ICD-10-CM: U65.82 
ICD-9-CM: 327.23  2/20/2015 - Present Unknown Overview Signed 2/20/2015 10:42 AM by Ji Licea  
  Patient is on BiPAP, no supplementary oxygen Morbid obesity ([address] (Chronic) ICD-10-CM: E66.01 
ICD-9-CM: 278.01  1/21/2015 - Present Yes Persistent atrial fibrillation (HCC) (Chronic) ICD-10-CM: I48.1 ICD-9-CM: 427.31  11/21/2012 - Present Yes Overview Addendum 11/22/2016  8:14 PM by Bere Delgado MD  
  Coumadin therapy discontinued due to recurrent GI bleeding. RESOLVED: COPD (chronic obstructive pulmonary disease) (HCC) (Chronic) ICD-10-CM: J44.9 ICD-9-CM: 736  11/21/2012 - 11/1/2018 Yes Plan: # Cirrhosis - Elevated bili noted on 10/31; will add on liver panel and hepatitis panel  
 - Consult GI for new cirrhosis. Pt denies alcohol in at least 30 years - Did have EGD 1/2017 with previously noted gastric ulcer but no varices; colon showed diverticulosis 
 - Has had intermittent rectal bleeding/melena since yesterday but Hb and hemodynamics are stable # Acute volume overload 
            - appears euvolemic; PO lasix             - LVEF normal 4/2018; repeat TTE 11/5 with normal LVEF 
  
# Pulmonary infiltrates             - last day of prednisone; last dose Augmentin 11/6 
            - resp status improved             - may represent pulm edema, on PO lasix, breathing improved 
  
# ALFREDITO on CKD 
            - baseline sCr around 1.5-1.7; peak 2.8 this admission 
            - now down to 2.5; neprho appreciated 
  
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 
            - iron IV per nephro 
  
# Septic shock             - admitted to ICU; resolved 
  
# Hypothyroid 
            - synthroid 
  
# AFib 
            - rate controlled; off warfarin for a while due to recurrent GIBs DC planning/Dispo: Diet:  DIET DIABETIC CONSISTENT CARB 
DIET NUTRITIONAL SUPPLEMENTS 
DIET NUTRITIONAL SUPPLEMENTS 
DVT ppx:   
 
Signed: 
Leigh Fowler MD

## 2018-11-07 NOTE — CONSULTS
[facility] Consult Note Primary GI Physician:  Dr Mary Zhou Referring Physician:  Dr Heidy Schroeder Consult Date:  11/7/2018 Admit Date:  10/31/2018 Chief Complaint:  Cirrhosis Subjective:  
 
History of Present Illness:  Patient is a 80 y.o. male seen in consultation at the request of Dr. Heidy Schroeder for evaluation of new diagnosis of cirrhosis. He was admitted 10/31 by the pulmonary service after presenting to the ER for dizziness and increased work of breathing, followed by a fall. He was noted bradycardic and hypotensive on arrival, requiring ICU admission with Levophed. WBC was 24.4 on admission and he was treated with Vancomycin and Zosyn on admission. Liver chemistry on admission revealed tbili 5.7, ALT 64, , and alk phos 220. BNP was significantly elevated. Pulmonary infiltrates were noted on imaging with concern for pulmonary edema and breathing improved with diuresis. Subsequent RUQ ultrasound was obtained 11/5, revealing a nodular liver and CBD 8-9 mm with wall thickening of the gallbladder, along with adenomyomatosis. Viral hepatitis panel and repeat liver panel have been ordered for today. Patient has previously been seen by [facility] in August for evaluation of red rectal bleeding. Tagged RBC scan 8/3 was negative and patient underwent subsequent EGD 8/5/18 with Dr Earlene Naik which revealed mild gastritis and no active bleeding. LGI bleeding was felt diverticular and supportive care was recommended. Coumadin has been held for recurrent and intermittent bleeding and IVC filter was placed 9/12/18. Ultrasound following that admission, obtained for abd pain, revealed CBD 6mm, gallbladder wall thickening, adenomyomatosis, as well as a nodular liver. Patient denies known hx of liver disease and reports only remote hx of etoh use. History of morbid obesity is documented in the past with BMI up to 39.7 in 2015. He denies abdominal pain or dark urine. PMH significant for COPD, HTN, HLD, DM, CAD, PAD, Carotid stenosis, Diastolic CHF, CKD, Afib (no anticoagulation due to history of GIB). -EGD 8/5/18 Mild gastritis plus erythema in the body of the stomach.  No blood or active bleeding site in the upper gastrointestinal tract noted.  Previously noted ulcer has healed. - Colonoscopy 1/27/17 Extensive diverticulosis with large amount of fecalith, Descending Colon: Poor prep throughout with all stool being brown. Transverse & Ascending Colon: normal.  Cecum: Poor Prep that could not be suctioned and irrigated 
- EGD 1/27/17 5-8 mm clean-based ulcer in antrum noted as on recent endoscopy without any stigmata of recent bleeding. No significant gastritis 
- EGD 1/22/2017 for GI Bleed/Melena POSTPROCEDURE DIAGNOSIS: 1. Gastritis-Mild to moderate erosive gastritis of antrum. Mild gastritis of body of stomach  2. Gastric ulcer- 5-8 mm clean-based ulcer in antrum. 3. Duodenitis GASTRIC BIOPSIES: GASTRIC MUCOSA WITH CHRONIC REACTIVE AND REPARATIVE CHANGES. FOCALLY PROMINENT LAMINA PROPRIA LYMPHOID AGGREGATES PRESENT. NO DEFINITIVE HELICOBACTER PYLORI MICROORGANISMS IDENTIFIED. - EGD on 5/6/15 by Dr. Earlene Naik for recurrent GIB with drop in Hgb: friable gastric mucosa. Pillcam study was suggested, but not done. - EGD on 1/23/15 by Dr. Alonso De Souza revealed a few small gastric erosions, felt not likely the cause of his bleeding.   
- Colonoscopy on 1/22/15 by Dr. Maurisio Velasco revealed extensive diverticulosis without active bleeding. Right Upper Quadrant Ultrasound 11/5/18  
INDICATION:  Cirrhosis, follow-up abnormal appearance of gallbladder  
FINDINGS: Cirrhotic nodular contour again seen compatible with cirrhosis. There 
are no discrete lesions in the visualized portions of the liver. Liver size is 16 cm, within normal limits.  
Main portal vein is patent on Doppler imaging, demonstrating biphasic flow.  
 The common bile duct diameter is 8-9 mm. Gallbladder demonstrates 5 mm wall 
thickening, small stones, and probable adenomyomatosis as described on prior. Sonographic Mandel's sign is negative.  There are no discrete lesions in the limited visualized portions of the 
pancreas.   
The right kidney measures 10.6 cm in length. There is no hydronephrosis. There 
is no evidence of a solid renal mass. A benign renal cyst is again noted 
measuring up to 6 cm.   
There is no evidence of ascites.   
The aorta and IVC are patent on Doppler imaging. Aortic diameter is within 
normal limits as partially seen, although mostly obscured distally.  IMPRESSION IMPRESSION:  
1. Abnormal gallbladder again seen with stones, adenomyomatosis, wall thickening 
similar to prior. 2. Increased biliary dilatation. 3. Cirrhosis. 4. Right renal benign cyst. 
 
 
 
 
PMH: 
Past Medical History:  
Diagnosis Date  Atherosclerosis of artery of extremity with ulceration ([address] 4/17/2015  Atherosclerosis of native arteries of extremity with intermittent claudication ([address] 4/17/2015  Atopic dermatitis 11/21/2012  Atrial fibrillation ([address]  CAD (coronary artery disease)  Carotid stenosis, bilateral 11/21/2012 50-79%  3-2010    1. Carotid Doppler (6/1/07): Greater than 70% stenosis in proximal LICA. 50% stenosis in right ICA. 2.  CTA of neck (3/15/10): Occluded distal segments of the vertebral arteries bilaterally. Atherosclerosis of the carotid bulbs bilaterally with a 50% stenosis on the left and a stenosis of less than 30% on the right. Small nodule in the right upper lobe near the apex. 3. CTA (8/29/13):  Less than 30% diameter stenosis of the cervical internal carotid arteries bilaterally.  CHF (congestive heart failure) ([address] 11/21/2012  Chronic kidney disease   
 hx elevated labs  Chronic obstructive pulmonary disease ([address]  Cirrhosis ([address] 11/7/2018  Colon, diverticulosis 1/24/2015  Coronary atherosclerosis of native coronary vessel 10/31/2016 1. Cath (5/31/07):  LMCA: 25%. LAD: ostial 75-95% stenosis. 50-75% mid. D1:  25-50%. OM3: small with 75% stenosis. RCA: 100% mid. with left to right collaterals. 2 Vessel CABG (6/4/07):  LIMA to LAD and SVG to OM. SVG was anastomosed proximally to LIMA to due severe atherosclerosis of aorta. 3.  Lexiscan cardiolite (11/30/10): Abnormal with reversible lateral wall defects. Unable to gate given atrial fibrillation. 4.  LHC (1/14/11):  EF 60%. LVEDP 21. Patent LIMA to LAD and SVG to OM1 (off LIMA). occluded small RCA with left to right collaterals. Small D1 (2 mm vessel) with 70% mid stenosis.  Diabetes ([address]  Diastolic CHF, acute on chronic (HCC) 9/12/2015 1. Echo (9/11/15) : EF 55-60%. Mild LVH. Moderate biatrial enlargement. Moderate mitral/tricuspid regurgitation.  Failed CABG (coronary artery bypass graft) 11/21/2012  GI bleed 1/2015 Hospitalized [facility]  Gout 11/21/2012  History of tobacco use  [place] (hard of hearing)  Hypercholesterolemia  Hypertension  Hyperuricemia 11/21/2012  Morbid obesity ([address]  PAD (peripheral artery disease) ([address] 11/21/2012 1. Bilateral proximal common iliac PCI (2/21/08):  8.0 X 100 mm Cordis smart stent on right and 10 X 40 mm cordis smart stent on right. Both inflated to 7.0 mm.  Poor historian  Radiologic findings of lung field, abnormal 10/31/2016 1. CT of chest  (11/24/10): Multiple small nodules in the right lobe and stable interstitial prominence. Consistent with chronic lung disease. No evidence of malignancy.  Seizure disorder ([address] 8/5/2013  Shortness of breath dyspnea 8/5/2013  Thyroid disease  Unspecified sleep apnea   
 uses CPAP  
 
 
PSH: 
Past Surgical History:  
Procedure Laterality Date  CARDIAC SURG PROCEDURE UNLIST    
 cath 5/07,cabg 6/07,lexiscan cardiolite 11/10  
 HX CATARACT REMOVAL Left 2003 os  
 HX COLONOSCOPY    
 HX CORONARY ARTERY BYPASS GRAFT  06-  HX CORONARY STENT PLACEMENT  02-  
 bilateral iliac artery PCI and stents  HX HEART CATHETERIZATION    
 left-05-, 01-  HX HEENT  1970s  
 neck lipoma Allergies: 
No Known Allergies Home Medications: 
Prior to Admission medications Medication Sig Start Date End Date Taking? Authorizing Provider  
furosemide (LASIX) 20 mg tablet Take 1-2 Tabs by mouth daily. 10/26/18   Lucy Lacey MD  
albuterol (PROVENTIL HFA, VENTOLIN HFA, PROAIR HFA) 90 mcg/actuation inhaler Take 2 Puffs by inhalation every six (6) hours as needed for Wheezing or Shortness of Breath. 10/26/18   Lucy Lacey MD  
albuterol (PROVENTIL VENTOLIN) 2.5 mg /3 mL (0.083 %) nebulizer solution 3 mL by Nebulization route every four (4) hours as needed for Wheezing or Shortness of Breath. Dispense one pack (~20?) of nebules Refill x1 10/26/18   Lucy Lacey MD  
isosorbide mononitrate ER (IMDUR) 30 mg tablet Take 1 Tab by mouth daily. 9/27/18   Heather Echavarria MD  
magnesium oxide (MAG-OX) 400 mg (241.3 mg magnesium) tablet Take 1 Tab by mouth daily. 9/27/18   Heather Echavarria MD  
mupirocin calcium (BACTROBAN) 2 % topical cream Apply  to affected area two (2) times a day. 9/27/18   Heather Echavarria MD  
metoprolol succinate (TOPROL-XL) 25 mg XL tablet Pt takes 1/2 tab po daily. 9/27/18   Heather Echavarria MD  
pravastatin (PRAVACHOL) 40 mg tablet Take 1 Tab by mouth nightly. 9/27/18   Heather Echavarria MD  
hydrALAZINE (APRESOLINE) 10 mg tablet TAKE 1 TABLET THREE TIMES DAILY. 9/27/18   Heather Echavarria MD  
aspirin 81 mg chewable tablet Take 1 Tab by mouth daily. 9/21/18   Twyla Patel MD  
glipiZIDE (GLUCOTROL) 5 mg tablet Take  by mouth two (2) times a day. Provider, Historical  
gabapentin (NEURONTIN) 100 mg capsule Take  by mouth three (3) times daily.     Provider, Historical  
 sAXagliptin (ONGLYZA) 2.5 mg tablet Take 2.5 mg by mouth daily. Provider, Historical  
potassium chloride SR (K-TAB) 20 mEq tablet Take 20 mEq by mouth daily. Indications: hypokalemia prevention    Provider, Historical  
docusate calcium (SURFAK) 240 mg capsule Take 240 mg by mouth two (2) times a day. Provider, Historical  
docusate sodium (COLACE) 100 mg capsule Take 100 mg by mouth daily as needed for Constipation. Provider, Historical  
hydrocortisone (PROCTOSOL HC) 2.5 % rectal cream Insert  into rectum every six (6) hours as needed for Hemorrhoids. Provider, Historical  
fluticasone-vilanterol (BREO ELLIPTA) 100-25 mcg/dose inhaler Take 1 Puff by inhalation daily. Other, MD Montez  
oxyCODONE IR (ROXICODONE) 5 mg immediate release tablet Take 5 mg by mouth every six (6) hours as needed for Pain. Other, MD Montez  
ondansetron (ZOFRAN ODT) 4 mg disintegrating tablet Take 1 Tab by mouth every eight (8) hours as needed for Nausea. 9/2/18   Andrea Joseph MD  
levothyroxine (SYNTHROID) 50 mcg tablet Take 1 Tab by mouth Daily (before breakfast). 8/7/18   Tasha Echavarria MD  
OXYGEN-AIR DELIVERY SYSTEMS Take  by inhalation continuous. 2LPM to keep 02 sat >90%    Provider, Historical  
pantoprazole (PROTONIX) 40 mg granules for oral suspension 40 mg daily. Provider, Historical  
calcium carbonate (CALCIUM 600 PO) Take  by mouth. Provider, Historical  
bisacodyl (DULCOLAX) 5 mg EC tablet Take 1 Tab by mouth daily. Patient taking differently: Take 5 mg by mouth every evening. 4/25/18   Cassidy Martinez MD  
latanoprost (XALATAN) 0.005 % ophthalmic solution Administer 1 Drop to both eyes nightly. Provider, Historical  
sertraline (ZOLOFT) 50 mg tablet Take one tablet each evening for anxiety  Indications: Generalized Anxiety Disorder 2/12/18   Tasha Echavarria MD  
fluticasone (FLONASE) 50 mcg/actuation nasal spray 2 Sprays by Both Nostrils route daily.  2/12/18   Simba Bojorquez MD  
 cholecalciferol (VITAMIN D3) 1,000 unit cap Take 1,000 Units by mouth daily. Provider, Historical  
albuterol (PROVENTIL VENTOLIN) 2.5 mg /3 mL (0.083 %) nebulizer solution 2.5 mg by Nebulization route every four (4) hours as needed for Wheezing. Provider, Historical  
ipratropium-albuterol (COMBIVENT RESPIMAT)  mcg/actuation inhaler Take 1 Puff by inhalation every six (6) hours. 9/21/17   Wilfredo Miranda NP  
triamcinolone (ARISTOCORT) 0.5 % topical cream Apply  to affected area two (2) times a day. use thin layer 9/8/17   GUILLERMO Wu  
ferrous sulfate 324 mg (65 mg iron) tablet Take  by mouth Daily (before breakfast). Provider, Historical  
allopurinol (ZYLOPRIM) 300 mg tablet Take  by mouth daily. Provider, Historical  
cpap machine kit by Does Not Apply route. Bilevel 12/8    Provider, Historical  
 
 
Hospital Medications: 
Current Facility-Administered Medications Medication Dose Route Frequency  gabapentin (NEURONTIN) capsule 100 mg  100 mg Oral TID  oxyCODONE IR (ROXICODONE) tablet 5 mg  5 mg Oral Q4H PRN  pantoprazole (PROTONIX) tablet 40 mg  40 mg Oral BID  ferric gluconate (FERRLECIT) 125 mg in 0.9% sodium chloride 100 mL ivpb  125 mg IntraVENous DAILY  amoxicillin-clavulanate (AUGMENTIN) 500-125 mg per tablet 1 Tab  1 Tab Oral BID WITH MEALS  sodium chloride (OCEAN) 0.65 % nasal squeeze bottle 2 Spray  2 Spray Both Nostrils Q2H PRN  
 metoprolol succinate (TOPROL-XL) XL tablet 25 mg  25 mg Oral DAILY  insulin glargine (LANTUS) injection 10 Units  10 Units SubCUTAneous QHS  insulin lispro (HUMALOG) injection 3 Units  3 Units SubCUTAneous TIDAC  insulin lispro (HUMALOG) injection   SubCUTAneous AC&HS  aspirin chewable tablet 81 mg  81 mg Oral DAILY  levothyroxine (SYNTHROID) tablet 50 mcg  50 mcg Oral ACB  pravastatin (PRAVACHOL) tablet 40 mg  40 mg Oral QHS  sertraline (ZOLOFT) tablet 25 mg  25 mg Oral DAILY  sodium chloride (NS) flush 5 mL  5 mL InterCATHeter Q8H  
 sodium chloride (NS) flush 5-10 mL  5-10 mL InterCATHeter PRN  
 nystatin (MYCOSTATIN) 100,000 unit/gram powder   Topical BID  calcium carbonate (OS-ERIKA) tablet 500 mg [elemental]  500 mg Oral DAILY WITH BREAKFAST  dextrose 40% (GLUTOSE) oral gel 1 Tube  15 g Oral PRN  
 glucagon (GLUCAGEN) injection 1 mg  1 mg IntraMUSCular PRN  
 dextrose (D50W) injection syrg 12.5-25 g  25-50 mL IntraVENous PRN  
 budesonide (PULMICORT) 500 mcg/2 ml nebulizer suspension  500 mcg Nebulization BID RT And  
 albuterol (PROVENTIL VENTOLIN) nebulizer solution 2.5 mg  2.5 mg Nebulization Q6HWA RT  
 lansoprazole (PREVACID SOLUTAB) disintegrating tablet 30 mg  30 mg Oral DAILY Social History: 
Social History Tobacco Use  Smoking status: Former Smoker Packs/day: 1.00 Years: 45.00 Pack years: 45.00 Types: Cigarettes Last attempt to quit: 1984 Years since quittin.8  Smokeless tobacco: Never Used  Tobacco comment: (stopped smoking in ) Substance Use Topics  Alcohol use: No  
  Alcohol/week: 0.0 oz Family History: 
Family History Problem Relation Age of Onset  Heart Attack Mother Juan Trivedi Other Father   
     old age  Other Brother   
     brain aneurysm  Heart Disease Other 2 children  with heart concerns, 36 & 49 yo  
 Heart Disease Son Review of Systems: A detailed 10 system ROS is obtained, with pertinent positives as listed above. All others are negative. Diet:  Diabetic regular Objective:  
 
Physical Exam: 
Vitals: 
Visit Vitals /67 Pulse (!) 58 Temp 97.4 °F (36.3 °C) Resp 17 Ht 6' 2\" (1.88 m) Wt 102.3 kg (225 lb 9.6 oz) SpO2 96% BMI 28.97 kg/m² Gen:  Pt is alert, cooperative, no acute distress Skin:  Jaundiced appearing, spider angiomata's to face, chest; dark thickened skin to lower extremities c/w vascular disease. HEENT: Sclerae anicteric. Extra-occular muscles are intact. No oral ulcers. No abnormal pigmentation of the lips. The neck is supple. Cardiovascular: Regular rate and rhythm. No murmurs, gallops, or rubs. Respiratory:  Comfortable breathing with no accessory muscle use. Clear breath sounds anteriorly with no wheezes, rales, or rhonchi. GI:  Abdomen distended, full but not tight, nontender. Normal active bowel sounds. No enlargement of the liver or spleen. No masses palpable. Rectal:  Deferred Musculoskeletal:  No pitting edema of the lower legs. Neurological:  Gross memory appears intact but no recall for detail. Patient is alert and oriented. Psychiatric:  Mood appears appropriate with judgement intact. Lymphatic:  No cervical or supraclavicular adenopathy. Laboratory:   
Recent Labs 11/07/18 
[id number] 11/06/18 
1403 11/06/18 
6664 11/06/18 
0229 11/05/18 
2234 11/05/18 
7141 WBC 9.0  --   --   --   --   --   
HGB 8.7* 8.6* 8.8* 8.8* 9.8*  --   
HCT 28.6* 28.6*  --  29.5* 32.5*  --   
*  --   --   --   --   -- MCV 85.4  --   --   --   --   --   
  --  139 139  --  138  
K 3.7  --  3.7 3.7  --  3.7 CL 99  --  98 100  --  99  
CO2 32  --  31 30  --  30  
BUN 69*  --  70* 74*  --  76* CREA 2.01*  --  2.29* 2.20*  --  2.51* CA 7.5*  --  7.5* 7.3*  --  7.5* MG  --   --  2.5*  --   --  2.6*  
GLU 80  --  174* 141*  --  79 Assessment:  
 
Principal Problem: 
  Septic shock ([facility] (10/31/2018) Active Problems: 
  Persistent atrial fibrillation ([facility] (11/21/2012) Overview: Coumadin therapy discontinued due to recurrent GI bleeding. Morbid obesity ([facility] (1/21/2015) ROBERTO treated with BiPAP (2/20/2015) Overview: Patient is on BiPAP, no supplementary oxygen Anemia (10/31/2018) Altered mental state (4/22/2018) Acute respiratory failure with hypoxia ([facility] (10/31/2018) Acute on chronic renal failure ([facility] (10/31/2018) S/P IVC filter (9/21/2018) Overview: 9/12/18  Dr. Mane Sierra Elevated LFTs (10/31/2018) Total bilirubin, elevated (10/31/2018) COPD exacerbation ([address] (11/1/2018) Elevated brain natriuretic peptide (BNP) level (11/1/2018) Pleural effusion (11/2/2018) Pulmonary infiltrate (11/2/2018) Bacteremia (11/2/2018) V tach ([address] (11/4/2018) Cirrhosis ([address] (11/7/2018) 79 yo male admitted 10/31/18 after presenting to the ER with weakness, sob and a fall, with diagnosis of septic shock. Blood cultures positive only for staph aureus. He was treated with supportive care and antibx and has improved, now transferred from ICU to a floor bed. He was noted on admission to have elevated liver chemistry with ultrasound indicative of gallbladder disease, CBD 9 mm, and a nodular appearance to the liver. He has hx of morbid obesity but reports no etoh use in year and no hx of other than infrequent use at that time. Repeat liver panel has been ordered today and viral hep panel is pending. Etiology of cirrhosis likely secondary to fatty liver with worsening of lft on admission which could be due to sepsis. CBD 9 mm and gallbladder wall thickening are noted on ultrasound -  
 
PMH includes COPD, HTN, HLD, DM, CAD, PAD, Carotid stenosis, Diastolic CHF, CKD, Afib (no anticoagulation due to history of GIB). Plan: 1. Repeat liver panel and viral hep panel pending 2. Consider MRCP pending above given CBD 8-9 mm with elevations in liver chem on admission 3. Etiology of cirrhosis likely secondary to fatty liver 4. Following Patient is seen and examined in collaboration with Dr. Hieu Au. Assessment and plan as per Dr. Hieu Au.  
Larisa Kenney NP

## 2018-11-07 NOTE — PROGRESS NOTES
Problem: Falls - Risk of 
Goal: *Absence of Falls Document Debria Check Fall Risk and appropriate interventions in the flowsheet. Outcome: Progressing Towards Goal 
Fall Risk Interventions: 
Mobility Interventions: Bed/chair exit alarm, Communicate number of staff needed for ambulation/transfer Mentation Interventions: Bed/chair exit alarm, Evaluate medications/consider consulting pharmacy Medication Interventions: Evaluate medications/consider consulting pharmacy Elimination Interventions: Bed/chair exit alarm, Call light in reach History of Falls Interventions: Bed/chair exit alarm, Consult care management for discharge planning, Evaluate medications/consider consulting pharmacy, Door open when patient unattended, Investigate reason for fall Problem: Pressure Injury - Risk of 
Goal: *Prevention of pressure injury Document Tam Scale and appropriate interventions in the flowsheet. Outcome: Progressing Towards Goal 
Pressure Injury Interventions: 
Sensory Interventions: Assess changes in LOC Moisture Interventions: Absorbent underpads Activity Interventions: Pressure redistribution bed/mattress(bed type) Mobility Interventions: PT/OT evaluation, Pressure redistribution bed/mattress (bed type), HOB 30 degrees or less Nutrition Interventions: Offer support with meals,snacks and hydration Friction and Shear Interventions: HOB 30 degrees or less

## 2018-11-08 NOTE — PROGRESS NOTES
Problem: Mobility Impaired (Adult and Pediatric) Goal: *Acute Goals and Plan of Care (Insert Text) STG: 
(1.)Mr. Navarro will move from supine to sit and sit to supine  with CONTACT GUARD ASSIST within 3 treatment day(s). (2.)Mr. Navarro will transfer from bed to chair and chair to bed with STAND BY ASSIST using the least restrictive device within 3 treatment day(s). (3.)Mr. Navarro will ambulate with CONTACT GUARD ASSIST for 30 feet with the least restrictive device within 3 treatment day(s). LTG: 
(1.)Mr. Navarro will move from supine to sit and sit to supine  in bed with SUPERVISION within 7 treatment day(s). (2.)Mr. Navarro will transfer from bed to chair and chair to bed with SUPERVISION using the least restrictive device within 7 treatment day(s). (3.)Mr. Navarro will ambulate with STAND BY ASSIST for 150 feet with the least restrictive device within 7 treatment day(s). ________________________________________________________________________________________________ PHYSICAL THERAPY: Daily Note, Treatment Day: 3rd, AM 11/8/2018INPATIENT: Hospital Day: 9 Payor: [place] / Plan: [place]/PPO / Product Type: Managed Care Medicare /  
  
NAME/AGE/GENDER: Keron Begum is a 80 y.o. male PRIMARY DIAGNOSIS: Septic shock ([address] Septic shock ([address] Septic shock ([address] Septic shock ([address] Procedure(s) (LRB): ULTRASOUND (Bilateral) THORACENTESIS (Right) 6 Days Post-Op ICD-10: Treatment Diagnosis:  
 · Generalized Muscle Weakness (M62.81) · Difficulty in walking, Not elsewhere classified (R26.2) · History of falling (Z91.81) Precaution/Allergies: 
Patient has no known allergies. ASSESSMENT:  
Mr. Amish Morgan is sitting upright in recliner upon contact and agreeable to therapy this morning. Patient is currently on 3L of O2 and O2 sats ranged from 93%-99% throughout treatment.  Patient was min-mod A for sit<>stands from recliner and completed several trials. Patient ambulated 30' x2 requiring seated rest breaks < 5 mins each time. Pt ambulates with widened SANDEEP, shuffling gait pattern, and increased trunk sway requiring cues to improve. Patient returned to room via recliner due to fatigue and did not want to participate in therapeutic exercise to improve his overall strength and exercise tolerance. Patient was educated in the importance of participating in physical therapy to improve his decrease from baseline. Pt left sitting upright in his recliner, posey pad on and call bell in reach. Pt is making slow progress towards goals but was able to further gait distance this session. Will continue efforts. This section established at most recent assessment PROBLEM LIST (Impairments causing functional limitations): 1. Decreased Strength 2. Decreased ADL/Functional Activities 3. Decreased Transfer Abilities 4. Decreased Ambulation Ability/Technique 5. Decreased Balance 6. Decreased Activity Tolerance 7. Decreased Pacing Skills 8. Increased Fatigue 9. Increased Shortness of Breath 10. Decreased Benson with Home Exercise Program 
 INTERVENTIONS PLANNED: (Benefits and precautions of physical therapy have been discussed with the patient.) 1. Balance Exercise 2. Bed Mobility 3. Family Education 4. Gait Training 5. Home Exercise Program (HEP) 6. Neuromuscular Re-education/Strengthening 7. Range of Motion (ROM) 8. Therapeutic Activites 9. Therapeutic Exercise/Strengthening 10. Transfer Training TREATMENT PLAN: Frequency/Duration: 3 times a week for duration of hospital stay Rehabilitation Potential For Stated Goals: Good RECOMMENDED REHABILITATION/EQUIPMENT: (at time of discharge pending progress): Due to the probability of continued deficits (see above) this patient will likely need continued skilled physical therapy after discharge. Equipment:  
? None at this time HISTORY:  
 History of Present Injury/Illness (Reason for Referral): 
See H&P below Patient is a 80 y.o.  male presents via EMS after a fall. Reported dizziness and shortness of breath. Was bradycardic and hypotensive and LA 3.87, troponin 1.65, WBC 24.4, hgb 10.6, creatinine 2.58, BNP 1594. Was placed BIPAP but HR dropped to 38 and SBP down to 60s--was taken off BIPAP and placed on NC. Is a poor historian and states that he \"just slipped walking around the bed\". Denies dizziness. Has chronic home O2 that he uses mainly at night but admits to using during the day as well. Wife states he has not been eating well and is very weak. 
  
He was last seen in our sleep lab 9/26/18 for follow up and has been prescribed BIPAP and pressures changed then to 11/7cm--device interrogation did not show daily use.  
  
Vascular surgery placed IVC filter 9/12/18 for DVT and hx GIB. Chronic medical:  AFIB on ASA and Plavix, COPD, DM, diastolic heart failure, CKD, PUD, essential tremors, ROBERTO on BIPAP 
  
Home DME company 2l with sleep.  
 
 
Past Medical History/Comorbidities:  
Mr. Ciro Taylor  has a past medical history of Atherosclerosis of artery of extremity with ulceration ([address] Atherosclerosis of native arteries of extremity with intermittent claudication ([address] Atopic dermatitis, Atrial fibrillation ([address] CAD (coronary artery disease), Carotid stenosis, bilateral, CHF (congestive heart failure) ([address] Chronic kidney disease, Chronic obstructive pulmonary disease ([address] Cirrhosis ([address] Colon, diverticulosis, Coronary atherosclerosis of native coronary vessel, Diabetes ([address] Diastolic CHF, acute on chronic ([address] Failed CABG (coronary artery bypass graft), GI bleed, Gout, History of tobacco use, [place] (hard of hearing), Hypercholesterolemia, Hypertension, Hyperuricemia, Morbid obesity ([address] PAD (peripheral artery disease) ([address] Poor historian, Radiologic findings of lung field, abnormal, Seizure disorder [facility]), Shortness of breath dyspnea, Thyroid disease, and Unspecified sleep apnea. Mr. Kathy Torres  has a past surgical history that includes pr cardiac surg procedure unlist; hx coronary artery bypass graft (06-); hx cataract removal (Left, 2003); hx heart catheterization; hx coronary stent placement (02-); hx heent (1970s); hx colonoscopy; ULTRASOUND (Bilateral, 11/2/2018); THORACENTESIS (Right, 11/2/2018); ULTRASOUND GUIDED ACCESS VENA CAVA FILTER INSERTION (N/A, 9/12/2018); ESOPHAGOGASTRODUODENOSCOPY (EGD) (N/A, 8/5/2018); ESOPHAGOGASTRODUODENOSCOPY (EGD) (N/A, 1/27/2017); COLONOSCOPY  BMI 36 TO BE ADMITTED 1/26/17 (N/A, 1/27/2017); ESOPHAGOGASTRODUODENOSCOPY (EGD) (N/A, 1/22/2017); ESOPHAGOGASTRODUODENAL (EGD) BIOPSY (N/A, 1/22/2017); ESOPHAGOGASTRODUODENOSCOPY (EGD)   rm 610 (N/A, 5/8/2015); COLONOSCOPY (N/A, 1/24/2015); and ESOPHAGOGASTRODUODENOSCOPY (EGD)   rm 3101 (N/A, 1/23/2015). Social History/Living Environment:  
Home Environment: Private residence # Steps to Enter: 0 Wheelchair Ramp: Yes One/Two Story Residence: One story Living Alone: No 
Support Systems: Spouse/Significant Other/Partner Patient Expects to be Discharged to[de-identified] Private residence Current DME Used/Available at Home: rodriguez Zaidi Prior Level of Function/Work/Activity: 
Lives with wife and granddaughter, use of rollator for household distances, 2L O2 Number of Personal Factors/Comorbidities that affect the Plan of Care: 3+: HIGH COMPLEXITY EXAMINATION:  
Most Recent Physical Functioning:  
Gross Assessment: 
  
         
  
Posture: 
  
Balance: 
Sitting: Intact Standing: Impaired Standing - Static: Fair Standing - Dynamic : Fair Bed Mobility: 
  
Wheelchair Mobility: 
  
Transfers: 
Sit to Stand: Minimum assistance; Moderate assistance Stand to Sit: Minimum assistance; Moderate assistance Gait: 
  
Base of Support: Widened;Center of gravity altered Speed/Helen: Slow;Shuffled Step Length: Right shortened;Left shortened Gait Abnormalities: Decreased step clearance;Trunk sway increased; Step to gait Distance (ft): 30 Feet (ft)( x 2) Assistive Device: Gait belt;Walker, rolling Ambulation - Level of Assistance: Contact guard assistance;Minimal assistance Interventions: Safety awareness training; Tactile cues; Verbal cues Body Structures Involved: 1. Nerves 2. Heart 3. Lungs 4. Bones 5. Joints 6. Muscles 7. Ligaments Body Functions Affected: 1. Sensory/Pain 2. Cardio 3. Respiratory 4. Neuromusculoskeletal 
5. Movement Related Activities and Participation Affected: 1. General Tasks and Demands 2. Mobility 3. Self Care 4. Domestic Life 5. Interpersonal Interactions and Relationships 6. Community, Social and [place] Number of elements that affect the Plan of Care: 4+: HIGH COMPLEXITY CLINICAL PRESENTATION:  
Presentation: Evolving clinical presentation with changing clinical characteristics: MODERATE COMPLEXITY CLINICAL DECISION MAKING:  
[facility] AM-PAC 6 Clicks Basic Mobility Inpatient Short Form How much difficulty does the patient currently have. .. Unable A Lot A Little None 1. Turning over in bed (including adjusting bedclothes, sheets and blankets)? [] 1   [] 2   [x] 3   [] 4  
2. Sitting down on and standing up from a chair with arms ( e.g., wheelchair, bedside commode, etc.)   [] 1   [] 2   [x] 3   [] 4  
3. Moving from lying on back to sitting on the side of the bed? [] 1   [x] 2   [] 3   [] 4 How much help from another person does the patient currently need. .. Total A Lot A Little None 4. Moving to and from a bed to a chair (including a wheelchair)? [] 1   [] 2   [x] 3   [] 4  
5. Need to walk in hospital room? [] 1   [] 2   [x] 3   [] 4  
6. Climbing 3-5 steps with a railing? [x] 1   [] 2   [] 3   [] 4  
© 2007, Trustees of [facility], under license to [employer]. All rights reserved Score:  Initial: 15 Most Recent: X (Date: -- ) Interpretation of Tool:  Represents activities that are increasingly more difficult (i.e. Bed mobility, Transfers, Gait). Score 24 23 22-20 19-15 14-10 9-7 6 Modifier CH CI CJ CK CL CM CN   
 
? Mobility - Walking and Moving Around:  
  - CURRENT STATUS: CK - 40%-59% impaired, limited or restricted  - GOAL STATUS: CJ - 20%-39% impaired, limited or restricted  - D/C STATUS:  ---------------To be determined--------------- Payor: [facility] [place] MEDICARE / Plan[place] [employer] / Product Type: Managed Care Medicare /   
 
Medical Necessity:    
· Patient is expected to demonstrate progress in strength, balance, coordination and functional technique to decrease assistance required with gait, transfers, and functional mobility. Reason for Services/Other Comments: 
· Patient continues to require skilled intervention due to decreased strength, decreased balance, decreased functional tolerance, decreased cardiopulmonary endurance affecting participation in basic ADLs and functional tasks. Use of outcome tool(s) and clinical judgement create a POC that gives a: Clear prediction of patient's progress: LOW COMPLEXITY  
  
 
 
 
TREATMENT:  
(In addition to Assessment/Re-Assessment sessions the following treatments were rendered) Pre-treatment Symptoms/Complaints:  fatigue Pain: Initial:  
Pain Intensity 1: 0  Post Session:   
Therapeutic Activity: (    26 minutes): Therapeutic activities including chair transfers,static/dynamic sitting balance, static/dynamic standing balance, posture training and ambulation on level ground to improve mobility, strength, balance and coordination. Required minimal- moderate Safety awareness training; Tactile cues; Verbal cues to promote static and dynamic balance in sitting and promote coordination of bilateral, lower extremity(s). Therapeutic Exercise: ( ):  Exercises per grid below to improve mobility and strength. Required minimal visual, verbal and tactile cues to promote proper body alignment, promote proper body posture and promote proper body mechanics. Progressed repetitions and complexity of movement as indicated. Date: 
11/1/18 Date: 
11/5/18 Date: Activity/Exercise Parameters Parameters Parameters LAQ 10 X B  10 X B Alt marches 10 X B  10 X B Ankle pumps 10 X B Quad set 10 X B Heel slides 10 X B Hip abduction 10 X B Braces/Orthotics/Lines/Etc:  
· none Treatment/Session Assessment:   
· Response to Treatment:  See above · Interdisciplinary Collaboration:  
o Physical Therapy Assistant 
o Rehabilitation Attendant 
o SPTA · After treatment position/precautions:  
o Up in chair 
o Bed alarm/tab alert on 
o Bed/Chair-wheels locked 
o Bed in low position 
o Call light within reach · Compliance with Program/Exercises: Will assess as treatment progresses · Recommendations/Intent for next treatment session: \"Next visit will focus on advancements to more challenging activities and reduction in assistance provided\". Total Treatment Duration: PT Patient Time In/Time Out Time In: 0347 Time Out: 1102 Tasneem Balderas, PTA

## 2018-11-08 NOTE — PROGRESS NOTES
Pt placed on home CPAP at this time with 3 liters bled in. SAT's 100%. Unit plugged in red outlet. No complications noted at this time.

## 2018-11-08 NOTE — PROGRESS NOTES
End of shift report given to Appifier. Patient resting in bed, remains on CPAP this AM. Remains on Telemetry and has some runs of V-Tach converting to A-Fib. Safety maintained, call light in reach.

## 2018-11-08 NOTE — PROGRESS NOTES
Pt resting in bed quietly with no complaints at this time. Respirations are even and unlabored. Tele is on and reading Afib. Skin is cracked, dry and flaking. Skin is red and sylvester in color. Bed is in low and locked position. Call light within reach. Pt instructed to ask for assistance if needed. Will continue to monitor.

## 2018-11-08 NOTE — PROGRESS NOTES
GI DAILY PROGRESS NOTE Admit Date:  10/31/2018 Today's Date:  11/8/2018 CC:  Cirrhosis Subjective:  
 
Patient feels well this morning. Denies any N/V or abdominal pain. Right Upper Quadrant Ultrasound 11/5/18  
INDICATION:  Cirrhosis, follow-up abnormal appearance of gallbladder  
FINDINGS: Cirrhotic nodular contour again seen compatible with cirrhosis. There 
are no discrete lesions in the visualized portions of the liver. Liver size is 16 cm, within normal limits.  
Main portal vein is patent on Doppler imaging, demonstrating biphasic flow.  The common bile duct diameter is 8-9 mm. Gallbladder demonstrates 5 mm wall 
thickening, small stones, and probable adenomyomatosis as described on prior. Sonographic Mandel's sign is negative.  There are no discrete lesions in the limited visualized portions of the 
pancreas.   
The right kidney measures 10.6 cm in length. There is no hydronephrosis. There 
is no evidence of a solid renal mass. A benign renal cyst is again noted 
measuring up to 6 cm.   
There is no evidence of ascites.   
The aorta and IVC are patent on Doppler imaging.  Aortic diameter is within 
normal limits as partially seen, although mostly obscured distally.  IMPRESSION IMPRESSION:  
1. Abnormal gallbladder again seen with stones, adenomyomatosis, wall thickening 
similar to prior. 2. Increased biliary dilatation. 3. Cirrhosis. 4. Right renal benign cyst. 
 
 
Medications:  
Current Facility-Administered Medications Medication Dose Route Frequency  gabapentin (NEURONTIN) capsule 100 mg  100 mg Oral TID  oxyCODONE IR (ROXICODONE) tablet 5 mg  5 mg Oral Q4H PRN  pantoprazole (PROTONIX) tablet 40 mg  40 mg Oral BID  ferric gluconate (FERRLECIT) 125 mg in 0.9% sodium chloride 100 mL ivpb  125 mg IntraVENous DAILY  amoxicillin-clavulanate (AUGMENTIN) 500-125 mg per tablet 1 Tab  1 Tab Oral BID WITH MEALS  
  sodium chloride (OCEAN) 0.65 % nasal squeeze bottle 2 Spray  2 Spray Both Nostrils Q2H PRN  
 metoprolol succinate (TOPROL-XL) XL tablet 25 mg  25 mg Oral DAILY  insulin glargine (LANTUS) injection 10 Units  10 Units SubCUTAneous QHS  insulin lispro (HUMALOG) injection 3 Units  3 Units SubCUTAneous TIDAC  insulin lispro (HUMALOG) injection   SubCUTAneous AC&HS  aspirin chewable tablet 81 mg  81 mg Oral DAILY  levothyroxine (SYNTHROID) tablet 50 mcg  50 mcg Oral ACB  pravastatin (PRAVACHOL) tablet 40 mg  40 mg Oral QHS  sertraline (ZOLOFT) tablet 25 mg  25 mg Oral DAILY  sodium chloride (NS) flush 5 mL  5 mL InterCATHeter Q8H  
 sodium chloride (NS) flush 5-10 mL  5-10 mL InterCATHeter PRN  
 nystatin (MYCOSTATIN) 100,000 unit/gram powder   Topical BID  calcium carbonate (OS-ERIKA) tablet 500 mg [elemental]  500 mg Oral DAILY WITH BREAKFAST  dextrose 40% (GLUTOSE) oral gel 1 Tube  15 g Oral PRN  
 glucagon (GLUCAGEN) injection 1 mg  1 mg IntraMUSCular PRN  
 dextrose (D50W) injection syrg 12.5-25 g  25-50 mL IntraVENous PRN  
 budesonide (PULMICORT) 500 mcg/2 ml nebulizer suspension  500 mcg Nebulization BID RT And  
 albuterol (PROVENTIL VENTOLIN) nebulizer solution 2.5 mg  2.5 mg Nebulization Q6HWA RT  
 lansoprazole (PREVACID SOLUTAB) disintegrating tablet 30 mg  30 mg Oral DAILY Review of Systems: ROS was obtained, with pertinent positives as listed above. No chest pain or SOB. Diet:  DM regular Objective:  
Vitals: 
Visit Vitals /56 (BP 1 Location: Left arm, BP Patient Position: At rest) Pulse (!) 56 Temp 97.3 °F (36.3 °C) Resp 17 Ht 6' 2\" (1.88 m) Wt 104.8 kg (231 lb) SpO2 96% BMI 29.66 kg/m² Intake/Output: 
11/08 0701 - 11/08 1900 In: -  
[facility] 972 [facility] 11/06 1901 - 11/08 0700 In: 320 [P.O.:320] Out: 500 [Urine:500] Exam: 
Gen:  Pt is alert, cooperative, no acute distress Skin:  Jaundiced appearing, spider angiomata's to face, chest 
HEENT: Sclerae anicteric. Extra-occular muscles are intact. No oral ulcers. No abnormal pigmentation of the lips. The neck is supple. Cardiovascular: Regular rate and rhythm. No murmurs, gallops, or rubs. Respiratory:  Clear breath sounds anteriorly with no wheezes, rales, or rhonchi. GI:  Abdomen distended, full but not tight, nontender. Normal active bowel sounds. Musculoskeletal:  No pitting edema of the lower legs. Neurological:  Gross memory appears intact but no recall for detail. Patient is alert and oriented. Data Review (Labs):   
Recent Labs 11/08/18 
9692 11/07/18 
1133 11/07/18 
0527 11/06/18 
1403 11/06/18 
1677 11/06/18 
0229 11/05/18 
2234 WBC 10.2  --  9.0  --   --   --   --   
HGB 8.6*  --  8.7* 8.6* 8.8* 8.8* 9.8* HCT 29.0*  --  28.6* 28.6*  --  29.5* 32.5*  
  --  149*  --   --   --   -- MCV 87.1  --  85.4  --   --   --   --   
*  --  138  --  139 139  --   
K 3.6  --  3.7  --  3.7 3.7  --   
CL 97*  --  99  --  98 100  --   
CO2 31  --  32  --  31 30  --   
BUN 64*  --  69*  --  70* 74*  --   
CREA 1.98*  --  2.01*  --  2.29* 2.20*  --   
CA 7.5*  --  7.5*  --  7.5* 7.3*  --   
MG  --   --   --   --  2.5*  --   --   
*  --  80  --  174* 141*  --   
AP  --  119  --   --   --   --   --   
SGOT  --  19  --   --   --   --   --   
ALT  --  25  --   --   --   --   --   
TBILI  --  1.3*  --   --   --   --   --   
CBIL  --  0.9*  --   --   --   --   --   
ALB  --  2.6*  --   --   --   --   --   
TP  --  5.8*  --   --   --   --   --   
 
 
Assessment:  
 
Principal Problem: 
Septic shock ([facility] (10/31/2018) Active Problems: 
Persistent atrial fibrillation ([facility] (11/21/2012) Overview: Coumadin therapy discontinued due to recurrent GI bleeding. Morbid obesity ([facility] (1/21/2015) ROBERTO treated with BiPAP (2/20/2015) Overview: Patient is on BiPAP, no supplementary oxygen Anemia (10/31/2018) Altered mental state (4/22/2018) Acute respiratory failure with hypoxia ([address] (10/31/2018) Acute on chronic renal failure ([address] (10/31/2018) S/P IVC filter (9/21/2018) Overview: 9/12/18  Dr. Iverson December Elevated LFTs (10/31/2018) Total bilirubin, elevated (10/31/2018) COPD exacerbation ([address] (11/1/2018) Elevated brain natriuretic peptide (BNP) level (11/1/2018) Pleural effusion (11/2/2018) Pulmonary infiltrate (11/2/2018) Bacteremia (11/2/2018) V tach ([address] (11/4/2018) Cirrhosis ([address] (11/7/2018) 81 yo male admitted 10/31/18 after presenting to the ER with weakness, sob and a fall, with diagnosis of septic shock. Blood cultures positive only for staph aureus. He was treated with supportive care and antibx and has improved, now transferred from ICU to a floor bed. He was noted on admission to have elevated liver chemistry with US indicative of gallbladder disease, CBD 9 mm, and a nodular appearance to the liver. He has hx of morbid obesity but reports no etoh use in year and no hx of other than infrequent use at that time. Repeat liver panel has been ordered today and viral hep panel is pending. Etiology of cirrhosis likely secondary to fatty liver with worsening of lft on admission which could be due to sepsis. CBD 9 mm and gallbladder wall thickening are noted on ultrasound Plan:  
 
Repeat LFTs. Follow-up viral hepatitis panel results. Expand hepatic work-up to include autoimmune, A1AT, and hemochromatosis. Consider MRCP pending above given CBD 8-9 mm with elevations in liver chem on admission. Follow. Mortimer Hipp PA-C [facility]

## 2018-11-08 NOTE — PROGRESS NOTES
Hospitalist Progress Note Admit Date:  10/31/2018  1:22 PM  
Name:  Nain Sanchez Age:  80 y.o. 
:  1932 MRN:  [id number] PCP:  Mauro Ghotra MD 
Treatment Team: Attending Provider: Ryder Pedersen DO; Utilization Review: Gigi Aviles; Consulting Provider: Jayme Eng MD; Consulting Provider: Hue Hernandez MD; Hospitalist: Roxy Kimble MD; Care Manager: Era Schwab.; Consulting Provider: Wynonia Favre, MD 
 
Subjective:  
Up to chair, feeling better. Slept well. SOB improved and stable. No complaints otherwise. Objective:  
 
Patient Vitals for the past 24 hrs: 
 Temp Pulse Resp BP SpO2  
18 1525 97.9 °F (36.6 °C) 64 19 115/67 100 % 18 1417     96 % 18 1111 98.1 °F (36.7 °C) (!) 56 18 95/54 100 % 18 0738     96 % 18 0717 97.3 °F (36.3 °C) (!) 56 17 111/56 98 % 18 0318 98 °F (36.7 °C) 61 18 124/63 94 % 18 2300 98 °F (36.7 °C) 63 18 122/67 91 % 18 2026     97 % 18 1923 98 °F (36.7 °C) 61 18 111/63 92 % 18 1554 98.1 °F (36.7 °C) 65 18 115/60 94 % Oxygen Therapy O2 Sat (%): 100 % (18 1525) Pulse via Oximetry: 56 beats per minute (18) O2 Device: Nasal cannula (18) O2 Flow Rate (L/min): 2.5 l/min (18) FIO2 (%): 28 % (18) Intake/Output Summary (Last 24 hours) at 2018 1552 Last data filed at 2018 1525 Gross per 24 hour Intake 515 ml Output 600 ml Net -85 ml *Note that automatically entered I/Os may not be accurate; dependent on patient compliance with collection and accurate  by assistants. General:    Well nourished. Alert. CV:   RRR. No murmur, rub, or gallop. Lungs:   CTAB. No wheezing, rhonchi, or rales. Abdomen:   Soft, nontender, nondistended. Extremities: Warm and dry. No cyanosis or edema. Stasis dermatitis b/l LEs; bruising to b/l UEs Skin:     No rashes or jaundice. Neuro:  No gross focal deficits Data Review: 
I have reviewed all labs, meds, telemetry events, and studies from the last 24 hours: 
 
Recent Results (from the past 24 hour(s)) GLUCOSE, POC Collection Time: 11/07/18  4:09 PM  
Result Value Ref Range Glucose (POC) 184 (H) 65 - 100 mg/dL GLUCOSE, POC Collection Time: 11/07/18  8:01 PM  
Result Value Ref Range Glucose (POC) 246 (H) 65 - 100 mg/dL GLUCOSE, POC Collection Time: 11/08/18  5:04 AM  
Result Value Ref Range Glucose (POC) 163 (H) 65 - 100 mg/dL CBC W/O DIFF Collection Time: 11/08/18  5:25 AM  
Result Value Ref Range WBC 10.2 4.3 - 11.1 K/uL  
 RBC 3.33 (L) 4.23 - 5.6 M/uL HGB 8.6 (L) 13.6 - 17.2 g/dL HCT 29.0 (L) 41.1 - 50.3 % MCV 87.1 79.6 - 97.8 FL  
 MCH 25.8 (L) 26.1 - 32.9 PG  
 MCHC 29.7 (L) 31.4 - 35.0 g/dL  
 RDW 17.7 % PLATELET 037 374 - 182 K/uL MPV 12.7 (H) 9.4 - 12.3 FL ABSOLUTE NRBC 0.14 0.0 - 0.2 K/uL METABOLIC PANEL, BASIC Collection Time: 11/08/18  5:25 AM  
Result Value Ref Range Sodium 135 (L) 136 - 145 mmol/L Potassium 3.6 3.5 - 5.1 mmol/L Chloride 97 (L) 98 - 107 mmol/L  
 CO2 31 21 - 32 mmol/L Anion gap 7 7 - 16 mmol/L Glucose 149 (H) 65 - 100 mg/dL BUN 64 (H) 8 - 23 MG/DL Creatinine 1.98 (H) 0.8 - 1.5 MG/DL  
 GFR est AA 41 (L) >60 ml/min/1.73m2 GFR est non-AA 34 (L) >60 ml/min/1.73m2 Calcium 7.5 (L) 8.3 - 10.4 MG/DL  
HEPATIC FUNCTION PANEL Collection Time: 11/08/18  5:25 AM  
Result Value Ref Range Protein, total 5.9 (L) 6.3 - 8.2 g/dL Albumin 2.5 (L) 3.2 - 4.6 g/dL Globulin 3.4 2.3 - 3.5 g/dL A-G Ratio 0.7 (L) 1.2 - 3.5 Bilirubin, total 1.2 (H) 0.2 - 1.1 MG/DL Bilirubin, direct 0.8 (H) <0.4 MG/DL Alk. phosphatase 114 50 - 136 U/L  
 AST (SGOT) 13 (L) 15 - 37 U/L  
 ALT (SGPT) 21 12 - 65 U/L  
GLUCOSE, POC Collection Time: 11/08/18 11:14 AM  
Result Value Ref Range Glucose (POC) 189 (H) 65 - 100 mg/dL All Micro Results Procedure Component Value Units Date/Time FUNGUS CULTURE AND SMEAR [[id number]] Collected:  11/02/18 0930 Order Status:  Completed Specimen:  Miscellaneous sample Updated:  11/05/18 1237 Source RIGHT Comment: PLEURAL FLUID Fungus stain Direct Inoculation Fungus (Mycology) Culture Other source received Comment: (NOTE) Performed At: [address] [id number] Amna Jeffers MD RO:[id number] CULTURE, BLOOD [[id number]] Collected:  10/31/18 1345 Order Status:  Completed Specimen:  Blood Updated:  11/05/18 7242 Special Requests: --     
  RIGHT 
HAND Culture result: NO GROWTH 5 DAYS     
 CULTURE, BODY FLUID [address] [[id number]] Collected:  11/02/18 0930 Order Status:  Completed Specimen:  Pleural Fluid Updated:  11/04/18 0458 Special Requests: RIGHT     
  GRAM STAIN 0 O 1 WBCS/OIF  
   NO DEFINITE ORGANISM SEEN Culture result: NO GROWTH 2 DAYS     
 AFB CULTURE + SMEAR W/RFLX ID FROM CULTURE [[id number]] Collected:  11/02/18 1030 Order Status:  Completed Specimen:  Miscellaneous sample Updated:  11/03/18 1536 Source PLEURAL FLUID Comment: RIGHT  
  
  AFB Specimen processing Concentration Acid Fast Smear NEGATIVE Comment: (NOTE) Performed At: [address] [id number] Amna Jeffers MD JAIRON:[id number] Acid Fast Culture PENDING  
 CULTURE, URINE [[id number]] Collected:  10/31/18 2047 Order Status:  Completed Specimen:  Urine from [place] Specimen Updated:  11/03/18 7757 Special Requests: NO SPECIAL REQUESTS Culture result: NO GROWTH 2 DAYS     
 CULTURE, BLOOD [[id number]]  (Abnormal) Collected:  10/31/18 1409 Order Status:  Completed Specimen:  Blood Updated:  11/02/18 5573   Special Requests: --     
  RIGHT 
HAND 
  
  GRAM STAIN    
 GRAM POS COCCI IN CLUSTERS  
     
      
  AEROBIC AND ANAEROBIC BOTTLES RESULTS VERIFIED, PHONED TO AND READ BACK BY MARIA GUADALUPE LOPEZ ON 1209 ON 18 EOR Culture result: STAPHYLOCOCCUS SPECIES, COAGULASE NEGATIVE  
     
      
  SA target DNA sequence not detected within the sample. Test performed by PCR  
     
      
  THIS ORGANISM MAY BE INDICATIVE OF CULTURE CONTAMINATION, HOWEVER, CLINICAL CORRELATION NEEDS TO BE EVALUATED, AS EACH CASE IS UNIQUE. Results for orders placed or performed during the hospital encounter of 10/31/18  
2D ECHO COMPLETE ADULT (TTE) W OR WO CONTR Narrative [facility] One [address] [place], [address] [facility] 
[phone] Transthoracic Echocardiogram 
2D, M-mode, Doppler, and Color Doppler Karla Moscoso 
MR #: [id number] : 1932 Age: 80 years Gender: Male Study date: 2018 Account #: [de-identified] Height: 74 in 
Weight: 225.5 lb 
BSA: 2.29 mï¾² Status:Routine Location: 826 BP: 143/ 69 Allergies: NO KNOWN ALLERGENS Sonographer:  Hoa Jose [facility] Group:  [facility] Referring Physician:  Terrie Norman. Jeri Navarro [address] Reading Physician:  Hernan Mcgarry MD 
 
INDICATIONS: NSVT, CAD PROCEDURE: This was a routine study. Previous positive bubble study. A 
transthoracic echocardiogram was performed. The study included complete 2D 
imaging, M-mode, complete spectral Doppler, and color Doppler. Intravenous 
contrast (Definity) was administered. Echocardiographic views were limited by 
poor patient compliance and poor acoustic window availability. This was a 
technically difficult study. LEFT VENTRICLE: Size was normal. Systolic function was normal. Ejection 
fraction was estimated in the range of 50 % to 55 %. There was basal 
inferoseptal hypokinesis. Wall thickness was normal. There was mild  
concentric hypertrophy. Doppler parameters were consistent with diastolic dysfunction. Avg 
E/e': 19.5 RIGHT VENTRICLE: The ventricle was moderately dilated. Systolic function was 
normal. Estimated peak pressure was in the range of 35-40 mmHg. LEFT ATRIUM: The atrium was markedly dilated. RIGHT ATRIUM: The atrium was moderately dilated. SYSTEMIC VEINS: IVC: The inferior vena cava was dilated. The respirophasic 
change in diameter was more than 50%. AORTIC VALVE: There is likely a calcified plaque noted at the sinotubular 
junction. The valve was probably trileaflet. Leaflets exhibited mildly 
increased thickness, mild calcification, and mild to moderate sclerosis. There 
was no evidence for stenosis. There was mild regurgitation. MITRAL VALVE: There was mild to moderate annular calcification. There was no 
evidence for stenosis. There was mild to moderate regurgitation. TRICUSPID VALVE: The valve structure was normal. There was no evidence for 
stenosis. There was mild regurgitation. PULMONIC VALVE: Not well visualized. There was no evidence for stenosis. There 
was no insufficiency. PERICARDIUM: There was no pericardial effusion. AORTA: The root exhibited normal size. SUMMARY: 
 
-  Left ventricle: Systolic function was normal. Ejection fraction was 
estimated in the range of 50 % to 55 %. There was basal inferoseptal 
hypokinesis. There was mild concentric hypertrophy. Doppler parameters were 
consistent with diastolic dysfunction. Avg E/e': 19.5 
 
-  Right ventricle: The ventricle was moderately dilated. Systolic function  
was 
normal. 
 
-  Left atrium: The atrium was markedly dilated. -  Right atrium: The atrium was moderately dilated. SYSTEM MEASUREMENT TABLES 
 
2D mode AoR Diam (2D): 3.4 cm 
LA Dimension (2D): 6.1 cm Left Atrium Systolic Volume Index; Method of Disks, Biplane; 2D mode;: 76  
ml/m2 IVS/LVPW (2D): 0.8 IVSd (2D): 1.1 cm 
LVIDd (2D): 4.8 cm LVIDs (2D): 3.9 cm 
LVPWd (2D): 1.2 cm RVIDd (2D): 4.7 cm Tissue Doppler Imaging LV Peak Early Boo Tissue Michael; Lateral MA (TDI): 14.1 cm/s LV Peak Early Boo Tissue Michael; Medial MA (TDI): 5.8 cm/s Unspecified Scan Mode Peak Grad; Mean; Antegrade Flow: 7 mm[Hg] Vmax; Antegrade Flow: 133 cm/s 
MV Peak Michael/LV Peak Tissue Michael E-Wave; Lateral MA: 11.3 MV Peak Michael/LV Peak Tissue Michael E-Wave; Medial MA: 27.7 Prepared and signed by Mary Mcduffie MD 
Signed [id number] 15:30:42 Current Meds: 
Current Facility-Administered Medications Medication Dose Route Frequency  gabapentin (NEURONTIN) capsule 100 mg  100 mg Oral TID  oxyCODONE IR (ROXICODONE) tablet 5 mg  5 mg Oral Q4H PRN  pantoprazole (PROTONIX) tablet 40 mg  40 mg Oral BID  ferric gluconate (FERRLECIT) 125 mg in 0.9% sodium chloride 100 mL ivpb  125 mg IntraVENous DAILY  amoxicillin-clavulanate (AUGMENTIN) 500-125 mg per tablet 1 Tab  1 Tab Oral BID WITH MEALS  sodium chloride (OCEAN) 0.65 % nasal squeeze bottle 2 Spray  2 Spray Both Nostrils Q2H PRN  
 metoprolol succinate (TOPROL-XL) XL tablet 25 mg  25 mg Oral DAILY  insulin glargine (LANTUS) injection 10 Units  10 Units SubCUTAneous QHS  insulin lispro (HUMALOG) injection 3 Units  3 Units SubCUTAneous TIDAC  insulin lispro (HUMALOG) injection   SubCUTAneous AC&HS  aspirin chewable tablet 81 mg  81 mg Oral DAILY  levothyroxine (SYNTHROID) tablet 50 mcg  50 mcg Oral ACB  pravastatin (PRAVACHOL) tablet 40 mg  40 mg Oral QHS  sertraline (ZOLOFT) tablet 25 mg  25 mg Oral DAILY  sodium chloride (NS) flush 5 mL  5 mL InterCATHeter Q8H  
 sodium chloride (NS) flush 5-10 mL  5-10 mL InterCATHeter PRN  
 nystatin (MYCOSTATIN) 100,000 unit/gram powder   Topical BID  calcium carbonate (OS-ERIKA) tablet 500 mg [elemental]  500 mg Oral DAILY WITH BREAKFAST  dextrose 40% (GLUTOSE) oral gel 1 Tube  15 g Oral PRN  
  glucagon (GLUCAGEN) injection 1 mg  1 mg IntraMUSCular PRN  
 dextrose (D50W) injection syrg 12.5-25 g  25-50 mL IntraVENous PRN  
 budesonide (PULMICORT) 500 mcg/2 ml nebulizer suspension  500 mcg Nebulization BID RT And  
 albuterol (PROVENTIL VENTOLIN) nebulizer solution 2.5 mg  2.5 mg Nebulization Q6HWA RT  
 lansoprazole (PREVACID SOLUTAB) disintegrating tablet 30 mg  30 mg Oral DAILY Other Studies (last 24 hours): No results found. Assessment and Plan:  
 
Hospital Problems as of 11/8/2018 Date Reviewed: 10/31/2018 Codes Class Noted - Resolved POA Cirrhosis ([facility] ICD-10-CM: K74.60 ICD-9-CM: 571.5  11/7/2018 - Present Unknown V tach ([facility] ICD-10-CM: P15.0 ICD-9-CM: 427.1  11/4/2018 - Present Unknown Pleural effusion ICD-10-CM: J90 ICD-9-CM: 511.9  11/2/2018 - Present Unknown Pulmonary infiltrate ICD-10-CM: R91.8 ICD-9-CM: 793.19  11/2/2018 - Present Unknown Bacteremia ICD-10-CM: R78.81 ICD-9-CM: 790.7  11/2/2018 - Present Unknown COPD exacerbation ([facility] ICD-10-CM: J44.1 ICD-9-CM: 491.21  11/1/2018 - Present Unknown Elevated brain natriuretic peptide (BNP) level ICD-10-CM: R79.89 ICD-9-CM: 790.99  11/1/2018 - Present Unknown Anemia ICD-10-CM: D64.9 ICD-9-CM: 285.9  10/31/2018 - Present Yes Acute respiratory failure with hypoxia [facility]) ICD-10-CM: J96.01 
ICD-9-CM: 518.81  10/31/2018 - Present Yes Acute on chronic renal failure (HCC) ICD-10-CM: N17.9, N18.9 ICD-9-CM: 584.9, 585.9  10/31/2018 - Present Yes * (Principal) Septic shock ([address] ICD-10-CM: A41.9, R65.21 ICD-9-CM: 038.9, 785.52, 995.92  10/31/2018 - Present Yes Elevated LFTs ICD-10-CM: R94.5 ICD-9-CM: 790.6  10/31/2018 - Present Yes Total bilirubin, elevated ICD-10-CM: R17 
ICD-9-CM: 277.4  10/31/2018 - Present Unknown S/P IVC filter (Chronic) ICD-10-CM: R15.448 ICD-9-CM: V45.89  9/21/2018 - Present Yes Overview Signed 10/31/2018  3:20 PM by Diamond Hanson NP  
  9/12/18  Dr. Jeremiah Moss Altered mental state ICD-10-CM: R41.82 
ICD-9-CM: 780.97  4/22/2018 - Present Yes ROBERTO treated with BiPAP (Chronic) ICD-10-CM: C60.30 
ICD-9-CM: 327.23  2/20/2015 - Present Unknown Overview Signed 2/20/2015 10:42 AM by Catalina Boone  
  Patient is on BiPAP, no supplementary oxygen Morbid obesity ([address] (Chronic) ICD-10-CM: E66.01 
ICD-9-CM: 278.01  1/21/2015 - Present Yes Persistent atrial fibrillation (HCC) (Chronic) ICD-10-CM: I48.1 ICD-9-CM: 427.31  11/21/2012 - Present Yes Overview Addendum 11/22/2016  8:14 PM by Carolina Camilo MD  
  Coumadin therapy discontinued due to recurrent GI bleeding. RESOLVED: COPD (chronic obstructive pulmonary disease) (HCC) (Chronic) ICD-10-CM: J44.9 ICD-9-CM: 216  11/21/2012 - 11/1/2018 Yes Plan: # Cirrhosis - Bili improved; hepatitis panel neg; AMA/ASM abs pending - GI recs appreciated - Did have EGD 1/2017 with previously noted gastric ulcer but no varices; colon showed diverticulosis - Hb stable 
  
# Acute volume overload 
            - appears euvolemic; PO lasix             - LVEF normal 4/2018; repeat TTE 11/5 with normal LVEF 
  
# Pulmonary infiltrates             - resp status improved # ALFREDITO on CKD 
            - baseline sCr around 1.5-1.7; peak 2.8 this admission 
            - now down to 1.98; neprho appreciated 
  
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 
            - iron IV per nephro 
  
# Septic shock             - admitted to ICU; resolved 
  
# Hypothyroid 
            - synthroid 
  
# AFib 
            - rate controlled; off warfarin for a while due to recurrent GIBs DC planning/Dispo:  Pending placement and GI w/u Diet:  DIET DIABETIC CONSISTENT CARB 
DIET NUTRITIONAL SUPPLEMENTS 
DIET NUTRITIONAL SUPPLEMENTS 
DVT ppx:   
 
Signed: Jessika Stevenson MD

## 2018-11-08 NOTE — PROGRESS NOTES
Assumed care of patient, [facility] SBAR reviewed. Patient resting in bed, nasal 02 in use, no distress noted. Telemetry remains in place.

## 2018-11-08 NOTE — PROGRESS NOTES
Problem: Interdisciplinary Rounds Goal: Interdisciplinary Rounds Interdisciplinary team rounds were held 11/8/2018 with the following team members:Care Management, Physical Therapy, Physician and Clinical Coordinator and the patient. Plan of care discussed. See clinical pathway and/or care plan for interventions and desired outcomes.

## 2018-11-08 NOTE — PROGRESS NOTES
Problem: Falls - Risk of 
Goal: *Absence of Falls Document Dawson Andujar Fall Risk and appropriate interventions in the flowsheet. Outcome: Progressing Towards Goal 
Fall Risk Interventions: 
Mobility Interventions: Bed/chair exit alarm, Communicate number of staff needed for ambulation/transfer Mentation Interventions: Bed/chair exit alarm Medication Interventions: Evaluate medications/consider consulting pharmacy Elimination Interventions: Bed/chair exit alarm, Urinal in reach, Call light in reach History of Falls Interventions: Bed/chair exit alarm, Consult care management for discharge planning, Evaluate medications/consider consulting pharmacy, Door open when patient unattended, Investigate reason for fall Problem: Pressure Injury - Risk of 
Goal: *Prevention of pressure injury Document Tam Scale and appropriate interventions in the flowsheet. Outcome: Progressing Towards Goal 
Pressure Injury Interventions: 
Sensory Interventions: Assess changes in LOC Moisture Interventions: Absorbent underpads Activity Interventions: Pressure redistribution bed/mattress(bed type) Mobility Interventions: PT/OT evaluation, Pressure redistribution bed/mattress (bed type) Nutrition Interventions: Offer support with meals,snacks and hydration Friction and Shear Interventions: HOB 30 degrees or less

## 2018-11-08 NOTE — PROGRESS NOTES
Nephrology Progress Note: 
 11/8/2018 9:34 AM 
 
Subjective: Patient seen and examined in room. No complaints. Comfortable. No CP, No SOB, No Abd pain. Per nursing patient still having blood in stool. Renal function slowly improving. Current Facility-Administered Medications Medication Dose Route Frequency  gabapentin (NEURONTIN) capsule 100 mg  100 mg Oral TID  oxyCODONE IR (ROXICODONE) tablet 5 mg  5 mg Oral Q4H PRN  pantoprazole (PROTONIX) tablet 40 mg  40 mg Oral BID  ferric gluconate (FERRLECIT) 125 mg in 0.9% sodium chloride 100 mL ivpb  125 mg IntraVENous DAILY  amoxicillin-clavulanate (AUGMENTIN) 500-125 mg per tablet 1 Tab  1 Tab Oral BID WITH MEALS  sodium chloride (OCEAN) 0.65 % nasal squeeze bottle 2 Spray  2 Spray Both Nostrils Q2H PRN  
 metoprolol succinate (TOPROL-XL) XL tablet 25 mg  25 mg Oral DAILY  insulin glargine (LANTUS) injection 10 Units  10 Units SubCUTAneous QHS  insulin lispro (HUMALOG) injection 3 Units  3 Units SubCUTAneous TIDAC  insulin lispro (HUMALOG) injection   SubCUTAneous AC&HS  aspirin chewable tablet 81 mg  81 mg Oral DAILY  levothyroxine (SYNTHROID) tablet 50 mcg  50 mcg Oral ACB  pravastatin (PRAVACHOL) tablet 40 mg  40 mg Oral QHS  sertraline (ZOLOFT) tablet 25 mg  25 mg Oral DAILY  sodium chloride (NS) flush 5 mL  5 mL InterCATHeter Q8H  
 sodium chloride (NS) flush 5-10 mL  5-10 mL InterCATHeter PRN  
 nystatin (MYCOSTATIN) 100,000 unit/gram powder   Topical BID  calcium carbonate (OS-ERIKA) tablet 500 mg [elemental]  500 mg Oral DAILY WITH BREAKFAST  dextrose 40% (GLUTOSE) oral gel 1 Tube  15 g Oral PRN  
 glucagon (GLUCAGEN) injection 1 mg  1 mg IntraMUSCular PRN  
 dextrose (D50W) injection syrg 12.5-25 g  25-50 mL IntraVENous PRN  
 budesonide (PULMICORT) 500 mcg/2 ml nebulizer suspension  500 mcg Nebulization BID RT  And  
 albuterol (PROVENTIL VENTOLIN) nebulizer solution 2.5 mg  2.5 mg Nebulization Q6HWA RT  
 lansoprazole (PREVACID SOLUTAB) disintegrating tablet 30 mg  30 mg Oral DAILY Objective:  
 
Visit Vitals /56 (BP 1 Location: Left arm, BP Patient Position: At rest) Pulse (!) 56 Temp 97.3 °F (36.3 °C) Resp 17 Ht 6' 2\" (1.88 m) Wt 104.8 kg (231 lb) SpO2 96% BMI 29.66 kg/m² O2 Flow Rate (L/min): 2.5 l/min O2 Device: Nasal cannula Temp (24hrs), Av.9 °F (36.6 °C), Min:97.3 °F (36.3 °C), Max:98.1 °F (36.7 °C) 701 - 1900 In: -  
Out: 947 [DMTSR:657] 1901 - 700 In: 320 [P.O.:320] Out: 500 [Urine:500] Visit Vitals /56 (BP 1 Location: Left arm, BP Patient Position: At rest) Pulse (!) 56 Temp 97.3 °F (36.3 °C) Resp 17 Ht 6' 2\" (1.88 m) Wt 104.8 kg (231 lb) SpO2 96% BMI 29.66 kg/m² Head: Normocephalic, without obvious abnormality Neck: no JVD Lungs: decreased at bases Heart: regular rate and rhythm Abdomen: soft, non-tender. Extremities: No edema Data Review Recent Results (from the past 48 hour(s)) PLEASE READ & DOCUMENT PPD TEST IN 24 HRS Collection Time: 18 11:09 AM  
Result Value Ref Range PPD negative Negative  
 mm Induration 0 mm GLUCOSE, POC Collection Time: 18 11:19 AM  
Result Value Ref Range Glucose (POC) 234 (H) 65 - 100 mg/dL HGB & HCT Collection Time: 18  2:03 PM  
Result Value Ref Range HGB 8.6 (L) 13.6 - 17.2 g/dL HCT 28.6 (L) 41.1 - 50.3 % GLUCOSE, POC Collection Time: 18  4:08 PM  
Result Value Ref Range Glucose (POC) 224 (H) 65 - 100 mg/dL GLUCOSE, POC Collection Time: 18  8:29 PM  
Result Value Ref Range Glucose (POC) 178 (H) 65 - 100 mg/dL GLUCOSE, POC Collection Time: 18  5:01 AM  
Result Value Ref Range Glucose (POC) 93 65 - 100 mg/dL CBC W/O DIFF Collection Time: 18  5:27 AM  
Result Value Ref Range WBC 9.0 4.3 - 11.1 K/uL  
 RBC 3.35 (L) 4.23 - 5.6 M/uL HGB 8.7 (L) 13.6 - 17.2 g/dL HCT 28.6 (L) 41.1 - 50.3 % MCV 85.4 79.6 - 97.8 FL  
 MCH 26.0 (L) 26.1 - 32.9 PG  
 MCHC 30.4 (L) 31.4 - 35.0 g/dL  
 RDW 16.9 % PLATELET 046 (L) 546 - 450 K/uL MPV 12.1 9.4 - 12.3 FL ABSOLUTE NRBC 0.13 0.0 - 0.2 K/uL METABOLIC PANEL, BASIC Collection Time: 11/07/18  5:27 AM  
Result Value Ref Range Sodium 138 136 - 145 mmol/L Potassium 3.7 3.5 - 5.1 mmol/L Chloride 99 98 - 107 mmol/L  
 CO2 32 21 - 32 mmol/L Anion gap 7 7 - 16 mmol/L Glucose 80 65 - 100 mg/dL BUN 69 (H) 8 - 23 MG/DL Creatinine 2.01 (H) 0.8 - 1.5 MG/DL  
 GFR est AA 41 (L) >60 ml/min/1.73m2 GFR est non-AA 34 (L) >60 ml/min/1.73m2 Calcium 7.5 (L) 8.3 - 10.4 MG/DL  
HEPATIC FUNCTION PANEL Collection Time: 11/07/18 11:33 AM  
Result Value Ref Range Protein, total 5.8 (L) 6.3 - 8.2 g/dL Albumin 2.6 (L) 3.2 - 4.6 g/dL Globulin 3.2 2.3 - 3.5 g/dL A-G Ratio 0.8 (L) 1.2 - 3.5 Bilirubin, total 1.3 (H) 0.2 - 1.1 MG/DL Bilirubin, direct 0.9 (H) <0.4 MG/DL Alk. phosphatase 119 50 - 136 U/L  
 AST (SGOT) 19 15 - 37 U/L  
 ALT (SGPT) 25 12 - 65 U/L  
GLUCOSE, POC Collection Time: 11/07/18 11:36 AM  
Result Value Ref Range Glucose (POC) 141 (H) 65 - 100 mg/dL GLUCOSE, POC Collection Time: 11/07/18  4:09 PM  
Result Value Ref Range Glucose (POC) 184 (H) 65 - 100 mg/dL GLUCOSE, POC Collection Time: 11/07/18  8:01 PM  
Result Value Ref Range Glucose (POC) 246 (H) 65 - 100 mg/dL GLUCOSE, POC Collection Time: 11/08/18  5:04 AM  
Result Value Ref Range Glucose (POC) 163 (H) 65 - 100 mg/dL CBC W/O DIFF Collection Time: 11/08/18  5:25 AM  
Result Value Ref Range WBC 10.2 4.3 - 11.1 K/uL  
 RBC 3.33 (L) 4.23 - 5.6 M/uL HGB 8.6 (L) 13.6 - 17.2 g/dL HCT 29.0 (L) 41.1 - 50.3 % MCV 87.1 79.6 - 97.8 FL  
 MCH 25.8 (L) 26.1 - 32.9 PG  
 MCHC 29.7 (L) 31.4 - 35.0 g/dL  
 RDW 17.7 % PLATELET 215 490 - 963 K/uL MPV 12.7 (H) 9.4 - 12.3 FL ABSOLUTE NRBC 0.14 0.0 - 0.2 K/uL METABOLIC PANEL, BASIC Collection Time: 11/08/18  5:25 AM  
Result Value Ref Range Sodium 135 (L) 136 - 145 mmol/L Potassium 3.6 3.5 - 5.1 mmol/L Chloride 97 (L) 98 - 107 mmol/L  
 CO2 31 21 - 32 mmol/L Anion gap 7 7 - 16 mmol/L Glucose 149 (H) 65 - 100 mg/dL BUN 64 (H) 8 - 23 MG/DL Creatinine 1.98 (H) 0.8 - 1.5 MG/DL  
 GFR est AA 41 (L) >60 ml/min/1.73m2 GFR est non-AA 34 (L) >60 ml/min/1.73m2 Calcium 7.5 (L) 8.3 - 10.4 MG/DL Assessment Patient Active Problem List  
 Diagnosis Date Noted  Cirrhosis ([address] 11/07/2018  V tach ([address] 11/04/2018  Pleural effusion 11/02/2018  Pulmonary infiltrate 11/02/2018  Bacteremia 11/02/2018  COPD exacerbation ([address] 11/01/2018  Elevated brain natriuretic peptide (BNP) level 11/01/2018  Anemia 10/31/2018  Acute respiratory failure with hypoxia ([address] 10/31/2018  Acute on chronic renal failure ([address] 10/31/2018  Septic shock ([address] 10/31/2018  Elevated LFTs 10/31/2018  Total bilirubin, elevated 10/31/2018  Acute metabolic encephalopathy 33/11/8268  Acute encephalopathy 10/08/2018  S/P IVC filter 09/21/2018  Acute blood loss anemia 08/05/2018  Tremors of nervous system 08/04/2018  ALFREDITO (acute kidney injury) ([address] 08/02/2018  Hypoglycemia 08/01/2018  Altered mental state 04/22/2018  Venous stasis dermatitis of both lower extremities 04/22/2018  Type 2 diabetes with nephropathy ([address] 02/12/2018  Restrictive lung disease 11/01/2017  Hypoxia 08/21/2017  Thrombocytopenia ([address] 08/21/2017  Falls frequently 09/17/2016  Dyspnea 01/21/2016  Type 2 diabetes mellitus with peripheral neuropathy ([facility] 11/05/2015  Chronic diastolic congestive heart failure ([facility] 09/12/2015  ROBERTO treated with BiPAP 02/20/2015  Chronic pain 01/22/2015  Morbid obesity ([facility] 01/21/2015  CKD (chronic kidney disease) stage 3, GFR 30-59 ml/min ([facility]) 09/18/2013  Hyperlipidemia 08/05/2013  Debility 08/05/2013  Essential hypertension 11/21/2012  Persistent atrial fibrillation ([facility] 11/21/2012  Peripheral neuropathy 11/21/2012  PAD (peripheral artery disease) ([facility] 11/21/2012  Carotid stenosis, bilateral 11/21/2012  Gout 11/21/2012 Assessment and Plan  
 
CKD 4 - baseline Cr in high 1's - Cardiorenal Syndrome 
- Stable renal function. Has been tolerating diuretics well - looks euvolemic now. Holding lasix - low 2's may be his new creatinine baseline. Creatinine stable last couple of days. 1.98 today - Follow Anemia/GI bleed  
- hemoglobin stable 
- on IV iron for iron deficiency Cirrhosis - GI consulted 
- felt likely 2/2 fatty liver - workup in progress V-tach/A-fib 
- cardiology following 
- on beta blocker Right Pleural effusion - s/p thoracentesis DVT 
- s/p IVC filter Resp failure/COPD exacerbation 
- nebs, pulmicort, antibiotics, steroids 
- bipap at night

## 2018-11-09 NOTE — PROGRESS NOTES
GI DAILY PROGRESS NOTE Admit Date:  10/31/2018 Today's Date:  11/9/2018 CC:  Cirrhosis Subjective:  
 
Patient feels well today. Denies any N/V or abdominal pain. Right Upper Quadrant Ultrasound 11/5/18  
INDICATION:  Cirrhosis, follow-up abnormal appearance of gallbladder  
FINDINGS: Cirrhotic nodular contour again seen compatible with cirrhosis. There 
are no discrete lesions in the visualized portions of the liver. Liver size is 16 cm, within normal limits.  
Main portal vein is patent on Doppler imaging, demonstrating biphasic flow.  The common bile duct diameter is 8-9 mm. Gallbladder demonstrates 5 mm wall 
thickening, small stones, and probable adenomyomatosis as described on prior. Sonographic Mandel's sign is negative.  There are no discrete lesions in the limited visualized portions of the 
pancreas.   
The right kidney measures 10.6 cm in length. There is no hydronephrosis. There 
is no evidence of a solid renal mass. A benign renal cyst is again noted 
measuring up to 6 cm.   
There is no evidence of ascites.   
The aorta and IVC are patent on Doppler imaging.  Aortic diameter is within 
normal limits as partially seen, although mostly obscured distally.  IMPRESSION IMPRESSION:  
1. Abnormal gallbladder again seen with stones, adenomyomatosis, wall thickening 
similar to prior. 2. Increased biliary dilatation. 3. Cirrhosis. 4. Right renal benign cyst. 
 
 
Medications:  
Current Facility-Administered Medications Medication Dose Route Frequency  gabapentin (NEURONTIN) capsule 100 mg  100 mg Oral TID  oxyCODONE IR (ROXICODONE) tablet 5 mg  5 mg Oral Q4H PRN  pantoprazole (PROTONIX) tablet 40 mg  40 mg Oral BID  ferric gluconate (FERRLECIT) 125 mg in 0.9% sodium chloride 100 mL ivpb  125 mg IntraVENous DAILY  sodium chloride (OCEAN) 0.65 % nasal squeeze bottle 2 Spray  2 Spray Both Nostrils Q2H PRN  
  metoprolol succinate (TOPROL-XL) XL tablet 25 mg  25 mg Oral DAILY  insulin glargine (LANTUS) injection 10 Units  10 Units SubCUTAneous QHS  insulin lispro (HUMALOG) injection 3 Units  3 Units SubCUTAneous TIDAC  insulin lispro (HUMALOG) injection   SubCUTAneous AC&HS  aspirin chewable tablet 81 mg  81 mg Oral DAILY  levothyroxine (SYNTHROID) tablet 50 mcg  50 mcg Oral ACB  pravastatin (PRAVACHOL) tablet 40 mg  40 mg Oral QHS  sertraline (ZOLOFT) tablet 25 mg  25 mg Oral DAILY  sodium chloride (NS) flush 5 mL  5 mL InterCATHeter Q8H  
 sodium chloride (NS) flush 5-10 mL  5-10 mL InterCATHeter PRN  
 nystatin (MYCOSTATIN) 100,000 unit/gram powder   Topical BID  calcium carbonate (OS-ERIKA) tablet 500 mg [elemental]  500 mg Oral DAILY WITH BREAKFAST  dextrose 40% (GLUTOSE) oral gel 1 Tube  15 g Oral PRN  
 glucagon (GLUCAGEN) injection 1 mg  1 mg IntraMUSCular PRN  
 dextrose (D50W) injection syrg 12.5-25 g  25-50 mL IntraVENous PRN  
 budesonide (PULMICORT) 500 mcg/2 ml nebulizer suspension  500 mcg Nebulization BID RT And  
 albuterol (PROVENTIL VENTOLIN) nebulizer solution 2.5 mg  2.5 mg Nebulization Q6HWA RT  
 lansoprazole (PREVACID SOLUTAB) disintegrating tablet 30 mg  30 mg Oral DAILY Review of Systems: ROS was obtained, with pertinent positives as listed above. No chest pain or SOB. Diet:  DM regular Objective:  
Vitals: 
Visit Vitals /65 (BP 1 Location: Left arm, BP Patient Position: At rest) Pulse 68 Temp 98.5 °F (36.9 °C) Resp 18 Ht 6' 2\" (1.88 m) Wt 105.3 kg (232 lb 3.2 oz) SpO2 96% BMI 29.81 kg/m² Intake/Output: 
No intake/output data recorded. 11/07 1901 - 11/09 0700 In: 592 [P.O.:592] Out: 800 [Urine:800] Exam: 
Gen:  Pt is alert, cooperative, no acute distress Skin:  Jaundiced appearing, spider angiomata's to face, chest 
HEENT: Sclerae anicteric. Extra-occular muscles are intact.   No oral ulcers. No abnormal pigmentation of the lips. The neck is supple. Cardiovascular: Regular rate and rhythm. No murmurs, gallops, or rubs. Respiratory:  Clear breath sounds anteriorly with no wheezes, rales, or rhonchi. GI:  Abdomen distended, full but not tight, nontender. Normal active bowel sounds. Musculoskeletal:  No pitting edema of the lower legs. Neurological:  Gross memory appears intact but no recall for detail. Patient is alert and oriented. Data Review (Labs):   
Recent Labs 11/08/18 
0525 11/07/18 
1133 11/07/18 
0527 11/06/18 
1403 WBC 10.2  --  9.0  --   
HGB 8.6*  --  8.7* 8.6* HCT 29.0*  --  28.6* 28.6*  
  --  149*  --   
MCV 87.1  --  85.4  --   
*  --  138  --   
K 3.6  --  3.7  --   
CL 97*  --  99  --   
CO2 31  --  32  --   
BUN 64*  --  69*  --   
CREA 1.98*  --  2.01*  --   
CA 7.5*  --  7.5*  --   
*  --  80  --   
 119  --   --   
SGOT 13* 19  --   --   
ALT 21 25  --   --   
TBILI 1.2* 1.3*  --   --   
CBIL 0.8* 0.9*  --   --   
ALB 2.5* 2.6*  --   --   
TP 5.9* 5.8*  --   --   
 
 
Assessment:  
 
Principal Problem: 
Septic shock ([facility] (10/31/2018) Active Problems: 
Persistent atrial fibrillation ([facility] (11/21/2012) Overview: Coumadin therapy discontinued due to recurrent GI bleeding. Morbid obesity ([address] (1/21/2015) ROBERTO treated with BiPAP (2/20/2015) Overview: Patient is on BiPAP, no supplementary oxygen Anemia (10/31/2018) Altered mental state (4/22/2018) Acute respiratory failure with hypoxia ([address] (10/31/2018) Acute on chronic renal failure ([facility] (10/31/2018) S/P IVC filter (9/21/2018) Overview: 9/12/18  Dr. Adelaida Moon Elevated LFTs (10/31/2018) Total bilirubin, elevated (10/31/2018) COPD exacerbation ([facility] (11/1/2018) Elevated brain natriuretic peptide (BNP) level (11/1/2018) Pleural effusion (11/2/2018) Pulmonary infiltrate (11/2/2018) Bacteremia (11/2/2018) V tach ([address] (11/4/2018) Cirrhosis ([facility] (11/7/2018) 79 yo male admitted 10/31/18 after presenting to the ER with weakness, sob and a fall, with diagnosis of septic shock. Blood cultures positive only for staph aureus. He was treated with supportive care and antibx and has improved, now transferred from ICU to a floor bed. He was noted on admission to have elevated liver chemistry with US indicative of gallbladder disease, CBD 9 mm, and a nodular appearance to the liver. He has hx of morbid obesity but reports no etoh use in year and no hx of other than infrequent use at that time. Repeat liver panel has been ordered today and viral hep panel is pending. Etiology of cirrhosis likely secondary to fatty liver with worsening of lft on admission which could be due to sepsis. CBD 9 mm and gallbladder wall thickening are noted on ultrasound Plan:  
 
Viral hepatitis panel negative. Rest of serologic hepatic work-up pending. No plans for MRCP, biliary etiology less likely. Follow. Lita Santamaria PA-C [facility]

## 2018-11-09 NOTE — PROGRESS NOTES
Hospitalist Progress Note Admit Date:  10/31/2018  1:22 PM  
Name:  Bassem Lemon Age:  80 y.o. 
:  1932 MRN:  [id number] PCP:  Brittany Gamble MD 
Treatment Team: Attending Provider: Brando Luna DO; Utilization Review: Franca James; Consulting Provider: Frieda Melendrez MD; Consulting Provider: Miguel Ángel Vergara MD; Hospitalist: Emy Neely MD; Care Manager: Gavino Gutierrez Subjective:  
Resting in bed, son-in-law present. Patient weaker today. Attempted to work with PT and get to chair but couldn't/didn't want to. Breathing is stable. Denies pain. Has not eaten much today. ROS otherwise negative. Objective:  
 
Patient Vitals for the past 24 hrs: 
 Temp Pulse Resp BP SpO2  
18 1124 98.3 °F (36.8 °C) 65 17 116/59 100 % 18 0755     96 % 18 0715 98.5 °F (36.9 °C) 68 18 128/65 100 % 18 0410 97.3 °F (36.3 °C) 80 20 122/55 94 % 18 0210     96 % 18 2355 98.1 °F (36.7 °C) 61 20 108/52 92 % 18 2047     100 % 18 195 97.4 °F (36.3 °C) 62 18 120/53 98 % 18 1525 97.9 °F (36.6 °C) 64 19 115/67 100 % 18 1417     96 % Oxygen Therapy O2 Sat (%): 100 % (18 1124) Pulse via Oximetry: 61 beats per minute (18) O2 Device: Nasal cannula (18) O2 Flow Rate (L/min): 3 l/min (18) FIO2 (%): 32 % (18) Intake/Output Summary (Last 24 hours) at 2018 1413 Last data filed at 2018 Gross per 24 hour Intake 237 ml Output 400 ml Net -163 ml  
   
*Note that automatically entered I/Os may not be accurate; dependent on patient compliance with collection and accurate  by assistants. General:    Well nourished. Alert. CV:   RRR. No murmur, rub, or gallop. Lungs:   CTAB. No wheezing, rhonchi, or rales. Abdomen:   Soft, nontender, nondistended. Extremities: Warm and dry. No cyanosis.  Stasis dermatitis b/l LEs; ecchymosis noted to b/l UEs. Skin:     No rashes or jaundice. Neuro:  No gross focal deficits Data Review: 
I have reviewed all labs, meds, telemetry events, and studies from the last 24 hours: 
 
Recent Results (from the past 24 hour(s)) GLUCOSE, POC Collection Time: 11/08/18  3:49 PM  
Result Value Ref Range Glucose (POC) 231 (H) 65 - 100 mg/dL GLUCOSE, POC Collection Time: 11/08/18  9:32 PM  
Result Value Ref Range Glucose (POC) 201 (H) 65 - 100 mg/dL IRON Collection Time: 11/09/18  5:25 AM  
Result Value Ref Range Iron 106 35 - 150 ug/dL FERRITIN Collection Time: 11/09/18  5:25 AM  
Result Value Ref Range Ferritin 130 8 - 388 NG/ML  
GLUCOSE, POC Collection Time: 11/09/18  5:33 AM  
Result Value Ref Range Glucose (POC) 187 (H) 65 - 100 mg/dL GLUCOSE, POC Collection Time: 11/09/18 11:27 AM  
Result Value Ref Range Glucose (POC) 223 (H) 65 - 100 mg/dL All Micro Results Procedure Component Value Units Date/Time FUNGUS CULTURE AND SMEAR [[id number]] Collected:  11/02/18 0930 Order Status:  Completed Specimen:  Miscellaneous sample Updated:  11/05/18 1237 Source RIGHT Comment: PLEURAL FLUID Fungus stain Direct Inoculation Fungus (Mycology) Culture Other source received Comment: (NOTE) Performed At: [address] [id number] Maykel Dutton MD [id number] CULTURE, BLOOD [[id number]] Collected:  10/31/18 1345 Order Status:  Completed Specimen:  Blood Updated:  11/05/18 1595 Special Requests: --     
  RIGHT 
HAND Culture result: NO GROWTH 5 DAYS     
 CULTURE, BODY FLUID W Clemencia Childress [[id number]] Collected:  11/02/18 0930 Order Status:  Completed Specimen:  Pleural Fluid Updated:  11/04/18 2558 Special Requests: RIGHT     
  GRAM STAIN 0 O 1 WBCS/OIF  
   NO DEFINITE ORGANISM SEEN Culture result: NO GROWTH 2 DAYS AFB CULTURE + SMEAR W/RFLX ID FROM CULTURE [[id number]] Collected:  18 1030 Order Status:  Completed Specimen:  Miscellaneous sample Updated:  18 1536 Source PLEURAL FLUID Comment: RIGHT  
  
  AFB Specimen processing Concentration Acid Fast Smear NEGATIVE Comment: (NOTE) Performed At: [address] [id number] Chad Smith MD [id number] Acid Fast Culture PENDING  
 CULTURE, URINE [[id number]] Collected:  10/31/18 204 Order Status:  Completed Specimen:  Urine from [place] Specimen Updated:  18 8518 Special Requests: NO SPECIAL REQUESTS Culture result: NO GROWTH 2 DAYS     
 CULTURE, BLOOD [[id number]]  (Abnormal) Collected:  10/31/18 1409 Order Status:  Completed Specimen:  Blood Updated:  18 4517 Special Requests: --     
  RIGHT 
HAND 
  
  GRAM STAIN    
  GRAM POS COCCI IN CLUSTERS  
     
      
  AEROBIC AND ANAEROBIC BOTTLES RESULTS VERIFIED, PHONED TO AND READ BACK BY MARIA GUADALUPE LOPEZ ON 1209 ON 18 EOR Culture result: STAPHYLOCOCCUS SPECIES, COAGULASE NEGATIVE  
     
      
  SA target DNA sequence not detected within the sample. Test performed by PCR  
     
      
  THIS ORGANISM MAY BE INDICATIVE OF CULTURE CONTAMINATION, HOWEVER, CLINICAL CORRELATION NEEDS TO BE EVALUATED, AS EACH CASE IS UNIQUE. Results for orders placed or performed during the hospital encounter of 10/31/18  
2D ECHO COMPLETE ADULT (TTE) W OR WO CONTR Narrative Gab[place]ashawn [address] [place], [address] [facility] 
[phone] Transthoracic Echocardiogram 
2D, M-mode, Doppler, and Color Doppler Helena Morataya 
MR #: [id number] : 1932 Age: 80 years Gender: Male Study date: 2018 Account #: [de-identified] Height: 74 in 
Weight: 225.5 lb 
BSA: 2.29 mï¾² Status:Routine Location: [facility] BP: 143/ 69 Allergies: NO KNOWN ALLERGENS Sonographer:  Fidencio Johnson [facility] Group:  [address] 121 Cardiology Referring Physician:  Cuca Workman. Dash Mathis, [facility] 34 Reading Physician:  John Norton MD 
 
INDICATIONS: NSVT, CAD PROCEDURE: This was a routine study. Previous positive bubble study. A 
transthoracic echocardiogram was performed. The study included complete 2D 
imaging, M-mode, complete spectral Doppler, and color Doppler. Intravenous 
contrast (Definity) was administered. Echocardiographic views were limited by 
poor patient compliance and poor acoustic window availability. This was a 
technically difficult study. LEFT VENTRICLE: Size was normal. Systolic function was normal. Ejection 
fraction was estimated in the range of 50 % to 55 %. There was basal 
inferoseptal hypokinesis. Wall thickness was normal. There was mild  
concentric 
hypertrophy. Doppler parameters were consistent with diastolic dysfunction. Avg 
E/e': 19.5 RIGHT VENTRICLE: The ventricle was moderately dilated. Systolic function was 
normal. Estimated peak pressure was in the range of 35-40 mmHg. LEFT ATRIUM: The atrium was markedly dilated. RIGHT ATRIUM: The atrium was moderately dilated. SYSTEMIC VEINS: IVC: The inferior vena cava was dilated. The respirophasic 
change in diameter was more than 50%. AORTIC VALVE: There is likely a calcified plaque noted at the sinotubular 
junction. The valve was probably trileaflet. Leaflets exhibited mildly 
increased thickness, mild calcification, and mild to moderate sclerosis. There 
was no evidence for stenosis. There was mild regurgitation. MITRAL VALVE: There was mild to moderate annular calcification. There was no 
evidence for stenosis. There was mild to moderate regurgitation. TRICUSPID VALVE: The valve structure was normal. There was no evidence for 
stenosis. There was mild regurgitation. PULMONIC VALVE: Not well visualized. There was no evidence for stenosis. There 
was no insufficiency. PERICARDIUM: There was no pericardial effusion. AORTA: The root exhibited normal size. SUMMARY: 
 
-  Left ventricle: Systolic function was normal. Ejection fraction was 
estimated in the range of 50 % to 55 %. There was basal inferoseptal 
hypokinesis. There was mild concentric hypertrophy. Doppler parameters were 
consistent with diastolic dysfunction. Avg E/e': 19.5 
 
-  Right ventricle: The ventricle was moderately dilated. Systolic function  
was 
normal. 
 
-  Left atrium: The atrium was markedly dilated. -  Right atrium: The atrium was moderately dilated. SYSTEM MEASUREMENT TABLES 
 
2D mode AoR Diam (2D): 3.4 cm 
LA Dimension (2D): 6.1 cm Left Atrium Systolic Volume Index; Method of Disks, Biplane; 2D mode;: 76  
ml/m2 IVS/LVPW (2D): 0.8 IVSd (2D): 1.1 cm 
LVIDd (2D): 4.8 cm LVIDs (2D): 3.9 cm 
LVPWd (2D): 1.2 cm RVIDd (2D): 4.7 cm Tissue Doppler Imaging LV Peak Early Boo Tissue Michael; Lateral MA (TDI): 14.1 cm/s LV Peak Early Boo Tissue Michael; Medial MA (TDI): 5.8 cm/s Unspecified Scan Mode Peak Grad; Mean; Antegrade Flow: 7 mm[Hg] Vmax; Antegrade Flow: 133 cm/s 
MV Peak Michael/LV Peak Tissue Michael E-Wave; Lateral MA: 11.3 MV Peak Michael/LV Peak Tissue Michael E-Wave; Medial MA: 27.7 Prepared and signed by Quang Grimaldo MD 
Signed [id number] 89:26:31 Current Meds: 
Current Facility-Administered Medications Medication Dose Route Frequency  gabapentin (NEURONTIN) capsule 100 mg  100 mg Oral TID  oxyCODONE IR (ROXICODONE) tablet 5 mg  5 mg Oral Q4H PRN  pantoprazole (PROTONIX) tablet 40 mg  40 mg Oral BID  ferric gluconate (FERRLECIT) 125 mg in 0.9% sodium chloride 100 mL ivpb  125 mg IntraVENous DAILY  sodium chloride (OCEAN) 0.65 % nasal squeeze bottle 2 Spray  2 Spray Both Nostrils Q2H PRN  
 metoprolol succinate (TOPROL-XL) XL tablet 25 mg  25 mg Oral DAILY  insulin glargine (LANTUS) injection 10 Units  10 Units SubCUTAneous QHS  insulin lispro (HUMALOG) injection 3 Units  3 Units SubCUTAneous TIDAC  insulin lispro (HUMALOG) injection   SubCUTAneous AC&HS  aspirin chewable tablet 81 mg  81 mg Oral DAILY  levothyroxine (SYNTHROID) tablet 50 mcg  50 mcg Oral ACB  pravastatin (PRAVACHOL) tablet 40 mg  40 mg Oral QHS  sertraline (ZOLOFT) tablet 25 mg  25 mg Oral DAILY  sodium chloride (NS) flush 5 mL  5 mL InterCATHeter Q8H  
 sodium chloride (NS) flush 5-10 mL  5-10 mL InterCATHeter PRN  
 nystatin (MYCOSTATIN) 100,000 unit/gram powder   Topical BID  calcium carbonate (OS-ERIKA) tablet 500 mg [elemental]  500 mg Oral DAILY WITH BREAKFAST  dextrose 40% (GLUTOSE) oral gel 1 Tube  15 g Oral PRN  
 glucagon (GLUCAGEN) injection 1 mg  1 mg IntraMUSCular PRN  
 dextrose (D50W) injection syrg 12.5-25 g  25-50 mL IntraVENous PRN  
 budesonide (PULMICORT) 500 mcg/2 ml nebulizer suspension  500 mcg Nebulization BID RT And  
 albuterol (PROVENTIL VENTOLIN) nebulizer solution 2.5 mg  2.5 mg Nebulization Q6HWA RT  
 lansoprazole (PREVACID SOLUTAB) disintegrating tablet 30 mg  30 mg Oral DAILY Other Studies (last 24 hours): No results found. Assessment and Plan:  
 
Hospital Problems as of 11/9/2018 Date Reviewed: 10/31/2018 Codes Class Noted - Resolved POA Cirrhosis ([facility] ICD-10-CM: K74.60 ICD-9-CM: 571.5  11/7/2018 - Present Unknown V tach ([facility] ICD-10-CM: W03.0 ICD-9-CM: 427.1  11/4/2018 - Present Unknown Pleural effusion ICD-10-CM: J90 ICD-9-CM: 511.9  11/2/2018 - Present Unknown Pulmonary infiltrate ICD-10-CM: R91.8 ICD-9-CM: 793.19  11/2/2018 - Present Unknown Bacteremia ICD-10-CM: R78.81 ICD-9-CM: 790.7  11/2/2018 - Present Unknown COPD exacerbation ([facility] ICD-10-CM: J44.1 ICD-9-CM: 491.21  11/1/2018 - Present Unknown Elevated brain natriuretic peptide (BNP) level ICD-10-CM: R79.89 ICD-9-CM: 790.99  11/1/2018 - Present Unknown Anemia ICD-10-CM: D64.9 ICD-9-CM: 285.9  10/31/2018 - Present Yes Acute respiratory failure with hypoxia [facility]) ICD-10-CM: J96.01 
ICD-9-CM: 518.81  10/31/2018 - Present Yes Acute on chronic renal failure (HCC) ICD-10-CM: N17.9, N18.9 ICD-9-CM: 584.9, 585.9  10/31/2018 - Present Yes * (Principal) Septic shock ([facility] ICD-10-CM: A41.9, R65.21 ICD-9-CM: 038.9, 785.52, 995.92  10/31/2018 - Present Yes Elevated LFTs ICD-10-CM: R94.5 ICD-9-CM: 790.6  10/31/2018 - Present Yes Total bilirubin, elevated ICD-10-CM: R17 
ICD-9-CM: 277.4  10/31/2018 - Present Unknown S/P IVC filter (Chronic) ICD-10-CM: P05.103 ICD-9-CM: V45.89  9/21/2018 - Present Yes Overview Signed 10/31/2018  3:20 PM by Harley Aldana NP  
  9/12/18  Dr. Randa Munroe Altered mental state ICD-10-CM: R41.82 
ICD-9-CM: 780.97  4/22/2018 - Present Yes ROBERTO treated with BiPAP (Chronic) ICD-10-CM: K07.89 
ICD-9-CM: 327.23  2/20/2015 - Present Unknown Overview Signed 2/20/2015 10:42 AM by Suzanna Rilye  
  Patient is on BiPAP, no supplementary oxygen Morbid obesity ([facility] (Chronic) ICD-10-CM: E66.01 
ICD-9-CM: 278.01  1/21/2015 - Present Yes Persistent atrial fibrillation (HCC) (Chronic) ICD-10-CM: I48.1 ICD-9-CM: 427.31  11/21/2012 - Present Yes Overview Addendum 11/22/2016  8:14 PM by Bryan Waldron MD  
  Coumadin therapy discontinued due to recurrent GI bleeding. RESOLVED: COPD (chronic obstructive pulmonary disease) (HCC) (Chronic) ICD-10-CM: J44.9 ICD-9-CM: 129  11/21/2012 - 11/1/2018 Yes Plan: # Cirrhosis             - Bili improved; hepatitis panel neg; AMA/ASM abs pending 
            - GI recs appreciated 
            - Did have EGD 1/2017 with previously noted gastric ulcer but no varices; colon showed diverticulosis - Hb stable 
  
# Acute volume overload 
            - appears euvolemic 
            - LVEF normal 4/2018; repeat TTE 11/5 with normal LVEF 
  
# Pulmonary infiltrates             - resp status improved 
  
# ALFREDITO on CKD 
            - baseline sCr around 1.5-1.7; peak 2.8 this admission 
            - now down to 1.98; neprho appreciated 
  
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 
            - iron IV per nephro 
  
# Septic shock             - admitted to ICU; resolved 
  
# Hypothyroid 
            - synthroid 
  
# AFib 
            - rate controlled; off warfarin for a while due to recurrent GIBs DC planning/Dispo: Attempting placement. Needs to eat more, aggressive PT to combat weakness.  
Diet:  DIET DIABETIC CONSISTENT CARB 
DIET NUTRITIONAL SUPPLEMENTS 
DVT ppx:   
 
Signed: 
Ramon Fuchs MD

## 2018-11-09 NOTE — PROGRESS NOTES
Patient stable with no complaints at this time. Patient is resting in bed with eyes closed. Patient has been very sleepy today but easy to arouse. Family has visited and questions answered about their concerns. Patient has not ate a lot today and has refused to eat dinner. Will leave tray in room is patient changes his mind later. Patient denies any pain and appears comfortable at this time. Call light within reach and patient instructed to call if assistance is needed. Report to be given to oncoming RN 7p-7a

## 2018-11-09 NOTE — PROGRESS NOTES
Visit with patient to build rapport with . Patient is calm. Receptive to  presence. Encouraged and assured patient of our continued care. Myrtle Navarro,  Staff  C: [phone]  /  [email or web address]

## 2018-11-09 NOTE — PROGRESS NOTES
Patient voices no concerns at this time except the lunch. New ordered placed for patient and patient seems more satisfied. Patient resting in bed r/t patient to weak to sit up today. Son - in- law at bedside for support. Patient denies any pain and appears comfortable at this time. Call light within reach and patient instructed to call if assistance is needed. Will continue to monitor.

## 2018-11-09 NOTE — PROGRESS NOTES
Pt placed on home CPAP with 3 liters of oxygen bled in. Unit plugged in red outlet. SAT's 97%. No complications noted at this time.

## 2018-11-09 NOTE — PROGRESS NOTES
Problem: Mobility Impaired (Adult and Pediatric) Goal: *Acute Goals and Plan of Care (Insert Text) STG: 
(1.)Mr. Navarro will move from supine to sit and sit to supine  with CONTACT GUARD ASSIST within 3 treatment day(s). (2.)Mr. Navarro will transfer from bed to chair and chair to bed with STAND BY ASSIST using the least restrictive device within 3 treatment day(s). (3.)Mr. Navarro will ambulate with CONTACT GUARD ASSIST for 30 feet with the least restrictive device within 3 treatment day(s). LTG: 
(1.)Mr. Navarro will move from supine to sit and sit to supine  in bed with SUPERVISION within 7 treatment day(s). (2.)Mr. Navarro will transfer from bed to chair and chair to bed with SUPERVISION using the least restrictive device within 7 treatment day(s). (3.)Mr. Navarro will ambulate with STAND BY ASSIST for 150 feet with the least restrictive device within 7 treatment day(s). ________________________________________________________________________________________________ PHYSICAL THERAPY: Daily Note, Treatment Day: 3rd, AM 11/9/2018INPATIENT: Hospital Day: 10 Payor: [facility] [place] MEDICARE / Plan: [place] / Product Type: Managed Care Medicare /  
  
NAME/AGE/GENDER: Cheng Barclay is a 80 y.o. male PRIMARY DIAGNOSIS: Septic shock ([address] Septic shock ([address] Septic shock ([address] Septic shock ([address] Procedure(s) (LRB): ULTRASOUND (Bilateral) THORACENTESIS (Right) 7 Days Post-Op ICD-10: Treatment Diagnosis:  
 · Generalized Muscle Weakness (M62.81) · Difficulty in walking, Not elsewhere classified (R26.2) · History of falling (Z91.81) Precaution/Allergies: 
Patient has no known allergies. ASSESSMENT:  
Mr. Kelly Carballo is sitting upright in recliner upon contact and agreeable to therapy this morning.  Patient is oriented to [facility], [facility], and president but appeared to be disoriented and difficult to understand and decreased command following this morning. Patient sat EOB for 15 minutes with verbal cues to remain upright. Nurse was notified of patients decrease in cognition and overall functional mobility compared to yesterday. Patient was able to ambulate 40' x 2 yesterday but today was only able to sit EOB and perform side steps towards HOB. Patient completed bed mobility with mod-max X2 and sit<>stands with RW and Mod X2 to take side steps to head of bed. Patient returned supine in bed with Max X1. Patient has not met any goals today. Will continue efforts. This section established at most recent assessment PROBLEM LIST (Impairments causing functional limitations): 1. Decreased Strength 2. Decreased ADL/Functional Activities 3. Decreased Transfer Abilities 4. Decreased Ambulation Ability/Technique 5. Decreased Balance 6. Decreased Activity Tolerance 7. Decreased Pacing Skills 8. Increased Fatigue 9. Increased Shortness of Breath 10. Decreased [place] with Home Exercise Program 
 INTERVENTIONS PLANNED: (Benefits and precautions of physical therapy have been discussed with the patient.) 1. Balance Exercise 2. Bed Mobility 3. Family Education 4. Gait Training 5. Home Exercise Program (HEP) 6. Neuromuscular Re-education/Strengthening 7. Range of Motion (ROM) 8. Therapeutic Activites 9. Therapeutic Exercise/Strengthening 10. Transfer Training TREATMENT PLAN: Frequency/Duration: 3 times a week for duration of hospital stay Rehabilitation Potential For Stated Goals: Good RECOMMENDED REHABILITATION/EQUIPMENT: (at time of discharge pending progress): Due to the probability of continued deficits (see above) this patient will likely need continued skilled physical therapy after discharge. Equipment:  
? None at this time HISTORY:  
History of Present Injury/Illness (Reason for Referral): 
See H&P below Patient is a 80 y.o.  male presents via EMS after a fall. Reported dizziness and shortness of breath. Was bradycardic and hypotensive and LA 3.87, troponin 1.65, WBC 24.4, hgb 10.6, creatinine 2.58, BNP 1594. Was placed BIPAP but HR dropped to 38 and SBP down to 60s--was taken off BIPAP and placed on NC. Is a poor historian and states that he \"just slipped walking around the bed\". Denies dizziness. Has chronic home O2 that he uses mainly at night but admits to using during the day as well. Wife states he has not been eating well and is very weak. 
  
He was last seen in our sleep lab 9/26/18 for follow up and has been prescribed BIPAP and pressures changed then to 11/7cm--device interrogation did not show daily use.  
  
Vascular surgery placed IVC filter 9/12/18 for DVT and hx GIB. Chronic medical:  AFIB on ASA and Plavix, COPD, DM, diastolic heart failure, CKD, PUD, essential tremors, ROBERTO on BIPAP 
  
Home DME company 2l with sleep. Past Medical History/Comorbidities:  
Mr. Jeana Ashby  has a past medical history of Atherosclerosis of artery of extremity with ulceration ([address] Atherosclerosis of native arteries of extremity with intermittent claudication ([address] Atopic dermatitis, Atrial fibrillation ([address] CAD (coronary artery disease), Carotid stenosis, bilateral, CHF (congestive heart failure) ([address] Chronic kidney disease, Chronic obstructive pulmonary disease ([address] Cirrhosis ([address] Colon, diverticulosis, Coronary atherosclerosis of native coronary vessel, Diabetes ([address] Diastolic CHF, acute on chronic ([address] Failed CABG (coronary artery bypass graft), GI bleed, Gout, History of tobacco use, [place] (hard of hearing), Hypercholesterolemia, Hypertension, Hyperuricemia, Morbid obesity ([address] PAD (peripheral artery disease) ([address] Poor historian, Radiologic findings of lung field, abnormal, Seizure disorder ([address] Shortness of breath dyspnea, Thyroid disease, and Unspecified sleep apnea.   Mr. Jeana Ashby  has a past surgical history that includes pr cardiac surg procedure unlist; hx coronary artery bypass graft (06-); hx cataract removal (Left, 2003); hx heart catheterization; hx coronary stent placement (02-); hx heent (1970s); hx colonoscopy; ULTRASOUND (Bilateral, 11/2/2018); THORACENTESIS (Right, 11/2/2018); ULTRASOUND GUIDED ACCESS VENA CAVA FILTER INSERTION (N/A, 9/12/2018); ESOPHAGOGASTRODUODENOSCOPY (EGD) (N/A, 8/5/2018); ESOPHAGOGASTRODUODENOSCOPY (EGD) (N/A, 1/27/2017); COLONOSCOPY  BMI 36 TO BE ADMITTED 1/26/17 (N/A, 1/27/2017); ESOPHAGOGASTRODUODENOSCOPY (EGD) (N/A, 1/22/2017); ESOPHAGOGASTRODUODENAL (EGD) BIOPSY (N/A, 1/22/2017); ESOPHAGOGASTRODUODENOSCOPY (EGD)   rm 610 (N/A, 5/8/2015); COLONOSCOPY (N/A, 1/24/2015); and ESOPHAGOGASTRODUODENOSCOPY (EGD)   rm 3101 (N/A, 1/23/2015). Social History/Living Environment:  
Home Environment: Private residence # Steps to Enter: 0 Wheelchair Ramp: Yes One/Two Story Residence: One story Living Alone: No 
Support Systems: Spouse/Significant Other/Partner Patient Expects to be Discharged to[de-identified] Private residence Current DME Used/Available at Home: Sherrine Specking, rollator Prior Level of Function/Work/Activity: 
Lives with wife and granddaughter, use of rollator for household distances, 2L O2 Number of Personal Factors/Comorbidities that affect the Plan of Care: 3+: HIGH COMPLEXITY EXAMINATION:  
Most Recent Physical Functioning:  
Gross Assessment: 
  
         
  
Posture: 
  
Balance: 
Sitting: Intact Bed Mobility: 
Supine to Sit: Moderate assistance;Maximum assistance;Assist x2 Sit to Supine: Moderate assistance;Maximum assistance Scooting: Moderate assistance;Maximum assistance;Assist x2 Level of Assistance: Maximum assistance Interventions: Safety awareness training;Manual cues; Verbal cues Wheelchair Mobility: 
  
Transfers: 
Sit to Stand: Moderate assistance;Assist x2 Stand to Sit: Moderate assistance;Assist x2 Gait: 
  
   
  
Body Structures Involved: 1. Nerves 2. Heart 3. Lungs 4. Bones 5. Joints 6. Muscles 7. Ligaments Body Functions Affected: 1. Sensory/Pain 2. Cardio 3. Respiratory 4. Neuromusculoskeletal 
5. Movement Related Activities and Participation Affected: 1. General Tasks and Demands 2. Mobility 3. Self Care 4. Domestic Life 5. Interpersonal Interactions and Relationships 6. Community, Social and [place] Number of elements that affect the Plan of Care: 4+: HIGH COMPLEXITY CLINICAL PRESENTATION:  
Presentation: Evolving clinical presentation with changing clinical characteristics: MODERATE COMPLEXITY CLINICAL DECISION MAKING:  
[facility] AM-PAC 6 Clicks Basic Mobility Inpatient Short Form How much difficulty does the patient currently have. .. Unable A Lot A Little None 1. Turning over in bed (including adjusting bedclothes, sheets and blankets)? [] 1   [] 2   [x] 3   [] 4  
2. Sitting down on and standing up from a chair with arms ( e.g., wheelchair, bedside commode, etc.)   [] 1   [] 2   [x] 3   [] 4  
3. Moving from lying on back to sitting on the side of the bed? [] 1   [x] 2   [] 3   [] 4 How much help from another person does the patient currently need. .. Total A Lot A Little None 4. Moving to and from a bed to a chair (including a wheelchair)? [] 1   [] 2   [x] 3   [] 4  
5. Need to walk in hospital room? [] 1   [] 2   [x] 3   [] 4  
6. Climbing 3-5 steps with a railing? [x] 1   [] 2   [] 3   [] 4  
© 2007, Trustees of [facility], under license to [employer]. All rights reserved Score:  Initial: 15 Most Recent: X (Date: -- ) Interpretation of Tool:  Represents activities that are increasingly more difficult (i.e. Bed mobility, Transfers, Gait). Score 24 23 22-20 19-15 14-10 9-7 6 Modifier CH CI CJ CK CL CM CN   
 
? Mobility - Walking and Moving Around:  
  - CURRENT STATUS: CK - 40%-59% impaired, limited or restricted  - GOAL STATUS: CJ - 20%-39% impaired, limited or restricted  - D/C STATUS:  ---------------To be determined--------------- Payor: [facility] MED[facility] / Plan[place] [employer] [place] / Product Type: Managed Care Medicare /   
 
Medical Necessity:    
· Patient is expected to demonstrate progress in strength, balance, coordination and functional technique to decrease assistance required with gait, transfers, and functional mobility. Reason for Services/Other Comments: 
· Patient continues to require skilled intervention due to decreased strength, decreased balance, decreased functional tolerance, decreased cardiopulmonary endurance affecting participation in basic ADLs and functional tasks. Use of outcome tool(s) and clinical judgement create a POC that gives a: Clear prediction of patient's progress: LOW COMPLEXITY  
  
 
 
 
TREATMENT:  
(In addition to Assessment/Re-Assessment sessions the following treatments were rendered) Pre-treatment Symptoms/Complaints:  fatigue Pain: Initial:  
Pain Intensity 1: 0  Post Session:   
Therapeutic Activity: (    17 minutes): Therapeutic activities including bed mobility training, sitting EOB, side stepping with RW,static/dynamic sitting balance,  posture training  to improve mobility, strength, balance and coordination. Required minimal- moderate   to promote static and dynamic balance in sitting and promote coordination of bilateral, lower extremity(s). Therapeutic Exercise: ( ):  Exercises per grid below to improve mobility and strength. Required minimal visual, verbal and tactile cues to promote proper body alignment, promote proper body posture and promote proper body mechanics. Progressed repetitions and complexity of movement as indicated. Date: 
11/1/18 Date: 
11/5/18 Date: Activity/Exercise Parameters Parameters Parameters LAQ 10 X B  10 X B Alt marches 10 X B  10 X B Ankle pumps 10 X B Quad set 10 X B Heel slides 10 X B Hip abduction 10 X B    
     
 
 
 
 Braces/Orthotics/Lines/Etc:  
· none Treatment/Session Assessment:   
· Response to Treatment:  See above · Interdisciplinary Collaboration:  
o Physical Therapy Assistant 
o SPTA · After treatment position/precautions:  
o Supine in bed 
o Bed alarm/tab alert on 
o Bed/Chair-wheels locked 
o Bed in low position 
o Call light within reach 
o Side rails x 3  
· Compliance with Program/Exercises: Will assess as treatment progresses · Recommendations/Intent for next treatment session: \"Next visit will focus on advancements to more challenging activities and reduction in assistance provided\". Total Treatment Duration: PT Patient Time In/Time Out Time In: 1013 Time Out: 1030 Abrahan Balderas, PTA

## 2018-11-09 NOTE — PROGRESS NOTES
Problem: Falls - Risk of 
Goal: *Absence of Falls Document Park CartwrightKassidy Fall Risk and appropriate interventions in the flowsheet. Outcome: Progressing Towards Goal 
Fall Risk Interventions: 
Mobility Interventions: Communicate number of staff needed for ambulation/transfer, Patient to call before getting OOB, Strengthening exercises (ROM-active/passive), OT consult for ADLs, PT Consult for mobility concerns, PT Consult for assist device competence Mentation Interventions: Bed/chair exit alarm Medication Interventions: Evaluate medications/consider consulting pharmacy Elimination Interventions: Patient to call for help with toileting needs, Urinal in reach, Call light in reach History of Falls Interventions: Bed/chair exit alarm, Consult care management for discharge planning, Evaluate medications/consider consulting pharmacy, Door open when patient unattended, Investigate reason for fall Problem: Pressure Injury - Risk of 
Goal: *Prevention of pressure injury Document Tam Scale and appropriate interventions in the flowsheet. Outcome: Progressing Towards Goal 
Pressure Injury Interventions: 
Sensory Interventions: Assess changes in LOC Moisture Interventions: Absorbent underpads Activity Interventions: Assess need for specialty bed, PT/OT evaluation, Increase time out of bed, Pressure redistribution bed/mattress(bed type) Mobility Interventions: Assess need for specialty bed, HOB 30 degrees or less, Pressure redistribution bed/mattress (bed type), PT/OT evaluation Nutrition Interventions: Offer support with meals,snacks and hydration Friction and Shear Interventions: HOB 30 degrees or less

## 2018-11-09 NOTE — PROGRESS NOTES
CNA reported patient only voided x2 today with only light wetness. MD notified with orders for I/O. This has been completed with 800 output. Will monitor.

## 2018-11-09 NOTE — PROGRESS NOTES
Patient resting in bed alert and oriented dressings in right wrist and right elbow dry and intact, breathing even and unlabored, denies pain or distress, safety measures in place, call light within reach.

## 2018-11-09 NOTE — PROGRESS NOTES
Problem: Nutrition Deficit Goal: *Optimize nutritional status Nutrition Reason for assessment: Follow Up 
  
Assessment:  
Food/Nutrition Patient History:  The patient is confused today and unable to provide RD with any meaningful information. According to RN he eats when he likes the food and does not eat when he does not like the food. Diet order(s): Cardiac, Glucerna TID   
  
Anthropometrics:Height: 6' 2\" (188 cm),  Weight: 105.3 kg (232 lb 3.2 oz), Weight Source: Bed, Body mass index is 29.8 kg/m². BMI class of normal weight for age >71. Macronutrient needs: EER:  0161-7449 kcal /day (15-20 kcal/kg current BW) EPR:  65-81 grams protein/day (0.8-1 grams/kg ideal BW) (GFR 21) 
  Intake/Comparative Standards: Average intake for past 16 recorded meal(s): 65%. This potentially meets ~81% of kcal and ~90% of protein needs 
  Intervention: 
Meals and snacks: Continue current diet. Nutrition supplement therapy: Glucerna TID. Discharge Nutrition Plan: Too soon to determine. Prerna Iraheta  [address] [facility], [facility] [address], [address] [place] [address], LD  
[phone]

## 2018-11-10 NOTE — PROGRESS NOTES
Oxycodone given for bilateral lower extremities pain 6/10. Cpap in place. Safety measures in place. Call light within reach. Will continue to monitor.

## 2018-11-10 NOTE — PROGRESS NOTES
Bedside report received from Desiree Allen [facility]. Patient resting in bed, alert and oriented. Respirations even and unlabored, on 3L NC. Diminished with auscultation. No acute distress noted. Call light within reach. Safety measures in place. Will continue to monitor.

## 2018-11-10 NOTE — PROGRESS NOTES
Patient resting in bed with no complaints at this time. Patient is alert and somewhat  Disorientated this am but no distress noted. IV intact and patent with no s/s of infection noted. Respirations even and unlabored with heart rate regular. Patient unable to ambulate independently without assistance; needs x1 or 2 r/t weakness, confusion today, and dyspnea. Bed in low locked position with call light within reach. Patient instructed to call if assistance is needed. Will continue to monitor.

## 2018-11-10 NOTE — PROGRESS NOTES
Scheduled gabapentin and bedtime meds given. Patient resting in bed with eyes closed. Safety measures in place. Call light within reach. Will continue to monitor.

## 2018-11-10 NOTE — PROGRESS NOTES
Patient stable with no complaints at this time. Patient c/o pain about 17:30 and pain medication given per order. Patient is now resting in bed with eyes closed. Call light within reach and patient instructed to call if assistance is needed. Report to be given to oncoming RN 7p-7a

## 2018-11-10 NOTE — PROGRESS NOTES
Patient voices no concerns at this time. Patient is sitting up in bed eating [employer] that his family brought to him. Patient appears more alert than this AM and states he feels better. Patient denies any pain and appears comfortable at this time. Family at bedside for support. Call light within reach and patient instructed to call if assistance is needed. Will continue to monitor.

## 2018-11-10 NOTE — PROGRESS NOTES
Problem: Falls - Risk of 
Goal: *Absence of Falls Document Carmen Warner Fall Risk and appropriate interventions in the flowsheet. Outcome: Progressing Towards Goal 
Fall Risk Interventions: 
Mobility Interventions: Communicate number of staff needed for ambulation/transfer, Patient to call before getting OOB, Strengthening exercises (ROM-active/passive), OT consult for ADLs, PT Consult for assist device competence, PT Consult for mobility concerns, Assess mobility with egress test 
 
Mentation Interventions: Bed/chair exit alarm Medication Interventions: Evaluate medications/consider consulting pharmacy Elimination Interventions: Patient to call for help with toileting needs, Call light in reach History of Falls Interventions: Bed/chair exit alarm, Consult care management for discharge planning, Door open when patient unattended, Evaluate medications/consider consulting pharmacy, Investigate reason for fall Problem: Pressure Injury - Risk of 
Goal: *Prevention of pressure injury Document Tam Scale and appropriate interventions in the flowsheet. Outcome: Progressing Towards Goal 
Pressure Injury Interventions: 
Sensory Interventions: Assess changes in LOC Moisture Interventions: Absorbent underpads Activity Interventions: Assess need for specialty bed, PT/OT evaluation, Increase time out of bed, Pressure redistribution bed/mattress(bed type) Mobility Interventions: Assess need for specialty bed, HOB 30 degrees or less, Pressure redistribution bed/mattress (bed type), PT/OT evaluation Nutrition Interventions: Document food/fluid/supplement intake, Discuss nutritional consult with provider, Offer support with meals,snacks and hydration Friction and Shear Interventions: HOB 30 degrees or less

## 2018-11-10 NOTE — PROGRESS NOTES
It was noticed that patient has had no voiding pattern documented. Bladder scan was completed with over 300 ml noted. MD called and orders for alejo placement. Alejo placed with not difficulties. Over 800 ml noted out. Will monitor.

## 2018-11-10 NOTE — PROGRESS NOTES
Hospitalist Progress Note Admit Date:  10/31/2018  1:22 PM  
Name:  Zora Dover Age:  80 y.o. 
:  1932 MRN:  [id number] PCP:  Aby Knowles MD 
Treatment Team: Attending Provider: Sharon Fournier DO; Utilization Review: Amina Guaman; Consulting Provider: Jamaal Lomax MD; Consulting Provider: Mathew Meyers MD; Hospitalist: Thea Srivastava MD; Care Manager: Luis Daniel Lord Subjective:  
Resting comfortably. I/O cath  for 400cc on bladder scan. Adams placed this morning since he apparently did not have much urine out overnight, nearly 1L out since. Pt feels much better today. Breathing stable, 2L NC O2. ROS otherwise negative. Objective:  
 
Patient Vitals for the past 24 hrs: 
 Temp Pulse Resp BP SpO2  
11/10/18 0820     99 % 11/10/18 0712 97.9 °F (36.6 °C) (!) 59 18 122/62 100 % 11/10/18 0352 97.4 °F (36.3 °C) 74 19 100/58 100 % 18 2315 98.9 °F (37.2 °C) 69 20 126/65 99 % 18 1931     99 % 18 1914 98.4 °F (36.9 °C) 61 20 118/54 99 % 18 1524 98 °F (36.7 °C) 61 19 119/57 100 % 18 1430     100 % 18 1124 98.3 °F (36.8 °C) 65 17 116/59 100 % Oxygen Therapy O2 Sat (%): 99 % (11/10/18 0820) Pulse via Oximetry: 60 beats per minute (11/10/18 0820) O2 Device: Nasal cannula (11/10/18 0820) O2 Flow Rate (L/min): 3 l/min([id number]) (11/10/18 0820) FIO2 (%): 32 % (18 0210) Intake/Output Summary (Last 24 hours) at 11/10/2018 1053 Last data filed at 2018 2315 Gross per 24 hour Intake 982 ml Output 800 ml Net 182 ml *Note that automatically entered I/Os may not be accurate; dependent on patient compliance with collection and accurate  by assistants. General:    Well nourished. Alert. CV:   RRR. No murmur, rub, or gallop. Lungs:   CTAB. No wheezing, rhonchi, or rales. Abdomen:   Soft, nontender, nondistended. Extremities: Warm and dry. No cyanosis or edema. Skin:     No rashes or jaundice. Neuro:  No gross focal deficits Data Review: 
I have reviewed all labs, meds, telemetry events, and studies from the last 24 hours: 
 
Recent Results (from the past 24 hour(s)) GLUCOSE, POC Collection Time: 11/09/18 11:27 AM  
Result Value Ref Range Glucose (POC) 223 (H) 65 - 100 mg/dL GLUCOSE, POC Collection Time: 11/09/18  4:02 PM  
Result Value Ref Range Glucose (POC) 234 (H) 65 - 100 mg/dL GLUCOSE, POC Collection Time: 11/09/18  9:14 PM  
Result Value Ref Range Glucose (POC) 194 (H) 65 - 100 mg/dL GLUCOSE, POC Collection Time: 11/10/18  5:20 AM  
Result Value Ref Range Glucose (POC) 179 (H) 65 - 100 mg/dL All Micro Results Procedure Component Value Units Date/Time FUNGUS CULTURE AND SMEAR [[id number]] Collected:  11/02/18 0930 Order Status:  Completed Specimen:  Miscellaneous sample Updated:  11/05/18 1237 Source RIGHT Comment: PLEURAL FLUID Fungus stain Direct Inoculation Fungus (Mycology) Culture Other source received Comment: (NOTE) Performed At: [address] [id number] Jatin Mendez MD [id number] CULTURE, BLOOD [[id number]] Collected:  10/31/18 1345 Order Status:  Completed Specimen:  Blood Updated:  11/05/18 2608 Special Requests: --     
  RIGHT 
HAND Culture result: NO GROWTH 5 DAYS     
 CULTURE, BODY FLUID W Jessica Sheppard [[id number]] Collected:  11/02/18 0930 Order Status:  Completed Specimen:  Pleural Fluid Updated:  11/04/18 1288 Special Requests: RIGHT     
  GRAM STAIN 0 O 1 WBCS/OIF  
   NO DEFINITE ORGANISM SEEN Culture result: NO GROWTH 2 DAYS     
 AFB CULTURE + SMEAR W/RFLX ID FROM CULTURE [[id number]] Collected:  11/02/18 1030 Order Status:  Completed Specimen:  Miscellaneous sample Updated:  11/03/18 1536 Source PLEURAL FLUID Comment: RIGHT AFB Specimen processing Concentration Acid Fast Smear NEGATIVE Comment: (NOTE) Performed At: [facility] [address], [place] [id number] Omar Arceo MD R Acid Fast Culture PENDING  
 CULTURE, URINE [[id number]] Collected:  10/31/18 2047 Order Status:  Completed Specimen:  Urine from Adams Specimen Updated:  18 8792 Special Requests: NO SPECIAL REQUESTS Culture result: NO GROWTH 2 DAYS     
 CULTURE, BLOOD [[id number]]  (Abnormal) Collected:  10/31/18 1409 Order Status:  Completed Specimen:  Blood Updated:  18 7658 Special Requests: --     
  RIGHT 
HAND 
  
  GRAM STAIN    
  GRAM POS COCCI IN CLUSTERS  
     
      
  AEROBIC AND ANAEROBIC BOTTLES RESULTS VERIFIED, PHONED TO AND READ BACK BY MARIA GUADALUPE LOPEZ ON 1209 ON 18 EOR Culture result: STAPHYLOCOCCUS SPECIES, COAGULASE NEGATIVE  
     
      
  SA target DNA sequence not detected within the sample. Test performed by PCR  
     
      
  THIS ORGANISM MAY BE INDICATIVE OF CULTURE CONTAMINATION, HOWEVER, CLINICAL CORRELATION NEEDS TO BE EVALUATED, AS EACH CASE IS UNIQUE. Results for orders placed or performed during the hospital encounter of 10/31/18  
2D ECHO COMPLETE ADULT (TTE) W OR WO CONTR [facility]ative [facility] [address] [place] [address] Dr [place], [address] [facility] 
[phone] Transthoracic Echocardiogram 
2D, M-mode, Doppler, and Color Doppler Karla Moscoso 
MR #: [id number] : 1932 Age: 80 years Gender: Male Study date: 2018 Account #: [de-identified] Height: 74 in 
Weight: 225.5 lb 
BSA: 2.29 mï¾² Status:Routine Location: 826 BP: 143/ 69 Allergies: NO KNOWN ALLERGENS Sonographer:  PUJA Duval Group:  [facility] Referring Physician:  Terrie Norman. Jeri Navarro [facility] Reading Physician:  Hernan Mcgarry MD 
 
INDICATIONS: NSVT, CAD 
 
 PROCEDURE: This was a routine study. Previous positive bubble study. A 
transthoracic echocardiogram was performed. The study included complete 2D 
imaging, M-mode, complete spectral Doppler, and color Doppler. Intravenous 
contrast (Definity) was administered. Echocardiographic views were limited by 
poor patient compliance and poor acoustic window availability. This was a 
technically difficult study. LEFT VENTRICLE: Size was normal. Systolic function was normal. Ejection 
fraction was estimated in the range of 50 % to 55 %. There was basal 
inferoseptal hypokinesis. Wall thickness was normal. There was mild  
concentric 
hypertrophy. Doppler parameters were consistent with diastolic dysfunction. Avg 
E/e': 19.5 RIGHT VENTRICLE: The ventricle was moderately dilated. Systolic function was 
normal. Estimated peak pressure was in the range of 35-40 mmHg. LEFT ATRIUM: The atrium was markedly dilated. RIGHT ATRIUM: The atrium was moderately dilated. SYSTEMIC VEINS: IVC: The inferior vena cava was dilated. The respirophasic 
change in diameter was more than 50%. AORTIC VALVE: There is likely a calcified plaque noted at the sinotubular 
junction. The valve was probably trileaflet. Leaflets exhibited mildly 
increased thickness, mild calcification, and mild to moderate sclerosis. There 
was no evidence for stenosis. There was mild regurgitation. MITRAL VALVE: There was mild to moderate annular calcification. There was no 
evidence for stenosis. There was mild to moderate regurgitation. TRICUSPID VALVE: The valve structure was normal. There was no evidence for 
stenosis. There was mild regurgitation. PULMONIC VALVE: Not well visualized. There was no evidence for stenosis. There 
was no insufficiency. PERICARDIUM: There was no pericardial effusion. AORTA: The root exhibited normal size. SUMMARY: 
 
-  Left ventricle: Systolic function was normal. Ejection fraction was estimated in the range of 50 % to 55 %. There was basal inferoseptal 
hypokinesis. There was mild concentric hypertrophy. Doppler parameters were 
consistent with diastolic dysfunction. Avg E/e': 19.5 
 
-  Right ventricle: The ventricle was moderately dilated. Systolic function  
was 
normal. 
 
-  Left atrium: The atrium was markedly dilated. -  Right atrium: The atrium was moderately dilated. SYSTEM MEASUREMENT TABLES 
 
2D mode AoR Diam (2D): 3.4 cm 
LA Dimension (2D): 6.1 cm Left Atrium Systolic Volume Index; Method of Disks, Biplane; 2D mode;: 76  
ml/m2 IVS/LVPW (2D): 0.8 IVSd (2D): 1.1 cm 
LVIDd (2D): 4.8 cm LVIDs (2D): 3.9 cm 
LVPWd (2D): 1.2 cm RVIDd (2D): 4.7 cm Tissue Doppler Imaging LV Peak Early Boo Tissue Michael; Lateral MA (TDI): 14.1 cm/s LV Peak Early Boo Tissue Michael; Medial MA (TDI): 5.8 cm/s Unspecified Scan Mode Peak Grad; Mean; Antegrade Flow: 7 mm[Hg] Vmax; Antegrade Flow: 133 cm/s 
MV Peak Michael/LV Peak Tissue Michael E-Wave; Lateral MA: 11.3 MV Peak Michael/LV Peak Tissue Michael E-Wave; Medial MA: 27.7 Prepared and signed by Katt Henderson MD 
Signed [id number] 72:69:64 Current Meds: 
Current Facility-Administered Medications Medication Dose Route Frequency  mineral oil-hydrophil petrolat (AQUAPHOR) ointment   Topical PRN  
 gabapentin (NEURONTIN) capsule 100 mg  100 mg Oral TID  oxyCODONE IR (ROXICODONE) tablet 5 mg  5 mg Oral Q4H PRN  pantoprazole (PROTONIX) tablet 40 mg  40 mg Oral BID  ferric gluconate (FERRLECIT) 125 mg in 0.9% sodium chloride 100 mL ivpb  125 mg IntraVENous DAILY  sodium chloride (OCEAN) 0.65 % nasal squeeze bottle 2 Spray  2 Spray Both Nostrils Q2H PRN  
 metoprolol succinate (TOPROL-XL) XL tablet 25 mg  25 mg Oral DAILY  insulin glargine (LANTUS) injection 10 Units  10 Units SubCUTAneous QHS  insulin lispro (HUMALOG) injection 3 Units  3 Units SubCUTAneous TIDAC  
  insulin lispro (HUMALOG) injection   SubCUTAneous AC&HS  aspirin chewable tablet 81 mg  81 mg Oral DAILY  levothyroxine (SYNTHROID) tablet 50 mcg  50 mcg Oral ACB  pravastatin (PRAVACHOL) tablet 40 mg  40 mg Oral QHS  sertraline (ZOLOFT) tablet 25 mg  25 mg Oral DAILY  sodium chloride (NS) flush 5 mL  5 mL InterCATHeter Q8H  
 sodium chloride (NS) flush 5-10 mL  5-10 mL InterCATHeter PRN  
 nystatin (MYCOSTATIN) 100,000 unit/gram powder   Topical BID  calcium carbonate (OS-ERIKA) tablet 500 mg [elemental]  500 mg Oral DAILY WITH BREAKFAST  dextrose 40% (GLUTOSE) oral gel 1 Tube  15 g Oral PRN  
 glucagon (GLUCAGEN) injection 1 mg  1 mg IntraMUSCular PRN  
 dextrose (D50W) injection syrg 12.5-25 g  25-50 mL IntraVENous PRN  
 budesonide (PULMICORT) 500 mcg/2 ml nebulizer suspension  500 mcg Nebulization BID RT And  
 albuterol (PROVENTIL VENTOLIN) nebulizer solution 2.5 mg  2.5 mg Nebulization Q6HWA RT  
 lansoprazole (PREVACID SOLUTAB) disintegrating tablet 30 mg  30 mg Oral DAILY Other Studies (last 24 hours): No results found. Assessment and Plan:  
 
Hospital Problems as of 11/10/2018 Date Reviewed: 10/31/2018 Codes Class Noted - Resolved POA Cirrhosis ([facility] ICD-10-CM: K74.60 ICD-9-CM: 571.5  11/7/2018 - Present Unknown V tach ([facility] ICD-10-CM: V39.2 ICD-9-CM: 427.1  11/4/2018 - Present Unknown Pleural effusion ICD-10-CM: J90 ICD-9-CM: 511.9  11/2/2018 - Present Unknown Pulmonary infiltrate ICD-10-CM: R91.8 ICD-9-CM: 793.19  11/2/2018 - Present Unknown Bacteremia ICD-10-CM: R78.81 ICD-9-CM: 790.7  11/2/2018 - Present Unknown COPD exacerbation ([facility] ICD-10-CM: J44.1 ICD-9-CM: 491.21  11/1/2018 - Present Unknown Elevated brain natriuretic peptide (BNP) level ICD-10-CM: R79.89 ICD-9-CM: 790.99  11/1/2018 - Present Unknown Anemia ICD-10-CM: D64.9 ICD-9-CM: 285.9  10/31/2018 - Present Yes Acute respiratory failure with hypoxia [facility]) ICD-10-CM: J96.01 
ICD-9-CM: 518.81  10/31/2018 - Present Yes Acute on chronic renal failure (HCC) ICD-10-CM: N17.9, N18.9 ICD-9-CM: 584.9, 585.9  10/31/2018 - Present Yes * (Principal) Septic shock ([facility] ICD-10-CM: A41.9, R65.21 ICD-9-CM: 038.9, 785.52, 995.92  10/31/2018 - Present Yes Elevated LFTs ICD-10-CM: R94.5 ICD-9-CM: 790.6  10/31/2018 - Present Yes Total bilirubin, elevated ICD-10-CM: R17 
ICD-9-CM: 277.4  10/31/2018 - Present Unknown S/P IVC filter (Chronic) ICD-10-CM: Z72.570 ICD-9-CM: V45.89  2018 - Present Yes Overview Signed 10/31/2018  3:20 PM by Marcell Benjamin NP  
  18  Dr. Saige Kim Altered mental state ICD-10-CM: R41.82 
ICD-9-CM: 780.97  2018 - Present Yes ROBERTO treated with BiPAP (Chronic) ICD-10-CM: F70.28 
ICD-9-CM: 327.23  2015 - Present Unknown Overview Signed 2015 10:42 AM by Cintia Larry  
  Patient is on BiPAP, no supplementary oxygen [facility] ([facility] (Chronic) ICD-10-CM: E66.01 
ICD-9-CM: 278.01  2015 - Present Yes Persistent atrial fibrillation (HCC) (Chronic) ICD-10-CM: I48.1 ICD-9-CM: 427.31  2012 - Present Yes Overview Addendum 2016  8:14 PM by Sia Flores MD  
  Coumadin therapy discontinued due to recurrent GI bleeding. RESOLVED: COPD (chronic obstructive pulmonary disease) (HCC) (Chronic) ICD-10-CM: J44.9 ICD-9-CM: 769  2012 - 2018 Yes Plan: 
 
 
DC planning/Dispo:   
Diet:  DIET DIABETIC CONSISTENT CARB 
DIET NUTRITIONAL SUPPLEMENTS 
DVT ppx:   
 
Signed: 
Jesus Navarro MD  
  Hospitalist Progress Note Admit Date:  10/31/2018  1:22 PM  
Name:  Carlie Cade Age:  80 y.o. 
:  1932 MRN:  [id number] PCP:  Autumn Tay MD 
Treatment Team: Attending Provider: Jo De Paz DO; Utilization Review: Lorri Yi; Consulting Provider: Tania Pérez MD; Consulting Provider: Wendy Nyhan, MD; Hospitalist: Joy Johnson MD; Care Manager: Jennifer Ibarra Subjective:  
 
 
 
Objective:  
 
Patient Vitals for the past 24 hrs: 
 Temp Pulse Resp BP SpO2  
11/10/18 0820     99 % 11/10/18 0712 97.9 °F (36.6 °C) (!) 59 18 122/62 100 % 11/10/18 0352 97.4 °F (36.3 °C) 74 19 100/58 100 % 11/09/18 2315 98.9 °F (37.2 °C) 69 20 126/65 99 % 11/09/18 1931     99 % 11/09/18 1914 98.4 °F (36.9 °C) 61 20 118/54 99 % 11/09/18 1524 98 °F (36.7 °C) 61 19 119/57 100 % 11/09/18 1430     100 % 11/09/18 1124 98.3 °F (36.8 °C) 65 17 116/59 100 % Oxygen Therapy O2 Sat (%): 99 % (11/10/18 0820) Pulse via Oximetry: 60 beats per minute (11/10/18 0820) O2 Device: Nasal cannula (11/10/18 0820) O2 Flow Rate (L/min): 3 l/min([id number]) (11/10/18 0820) FIO2 (%): 32 % (11/09/18 0210) Intake/Output Summary (Last 24 hours) at 11/10/2018 1053 Last data filed at 11/9/2018 2315 Gross per 24 hour Intake 982 ml Output 800 ml Net 182 ml *Note that automatically entered I/Os may not be accurate; dependent on patient compliance with collection and accurate  by assistants. General:    Well nourished. Alert. CV:   RRR. No murmur, rub, or gallop. Lungs:   CTAB. No wheezing, rhonchi, or rales. Abdomen:   Soft, nontender, nondistended. Extremities: Warm and dry. No cyanosis or edema. Skin:     No rashes or jaundice. Neuro:  No gross focal deficits Data Review: 
I have reviewed all labs, meds, telemetry events, and studies from the last 24 hours: 
 
Recent Results (from the past 24 hour(s)) GLUCOSE, POC Collection Time: 11/09/18 11:27 AM  
Result Value Ref Range Glucose (POC) 223 (H) 65 - 100 mg/dL GLUCOSE, POC Collection Time: 11/09/18  4:02 PM  
Result Value Ref Range Glucose (POC) 234 (H) 65 - 100 mg/dL GLUCOSE, POC  
 Collection Time: 18  9:14 PM  
Result Value Ref Range Glucose (POC) 194 (H) 65 - 100 mg/dL GLUCOSE, POC Collection Time: 11/10/18  5:20 AM  
Result Value Ref Range Glucose (POC) 179 (H) 65 - 100 mg/dL All Micro Results Procedure Component Value Units Date/Time FUNGUS CULTURE AND SMEAR [[id number]] Collected:  18 0930 Order Status:  Completed Specimen:  Miscellaneous sample Updated:  18 1237 Source RIGHT Comment: PLEURAL FLUID Fungus stain Direct Inoculation Fungus (Mycology) Culture Other source received Comment: (NOTE) Performed At: [address] [id number] Faustina Sargent MD [id number] CULTURE, BLOOD [[id number]] Collected:  10/31/18 1345 Order Status:  Completed Specimen:  Blood Updated:  18 8557 Special Requests: --     
  RIGHT 
HAND Culture result: NO GROWTH 5 DAYS     
 CULTURE, BODY FLUID W Perla Tijerina [[id number]] Collected:  1830 Order Status:  Completed Specimen:  Pleural Fluid Updated:  18 2752 Special Requests: RIGHT     
  GRAM STAIN 0 O 1 WBCS/OIF  
   NO DEFINITE ORGANISM SEEN Culture result: NO GROWTH 2 DAYS     
 AFB CULTURE + SMEAR W/RFLX ID FROM CULTURE [[id number]] Collected:  18 1030 Order Status:  Completed Specimen:  Miscellaneous sample Updated:  18 1536 Source PLEURAL FLUID Comment: RIGHT  
  
  AFB Specimen processing Concentration Acid Fast Smear NEGATIVE Comment: (NOTE) Performed At: [address] [id number] Faustina Sargent MD J Acid Fast Culture PENDING  
 CULTURE, URINE [[id number]] Collected:  10/31/18 2047 Order Status:  Completed Specimen:  Urine from Adams Specimen Updated:  18 0391 Special Requests: NO SPECIAL REQUESTS Culture result: NO GROWTH 2 DAYS CULTURE, BLOOD [[id number]]  (Abnormal) Collected:  10/31/18 1409 Order Status:  Completed Specimen:  Blood Updated:  18 1106 Special Requests: --     
  RIGHT 
HAND 
  
  GRAM STAIN    
  GRAM POS COCCI IN CLUSTERS  
     
      
  AEROBIC AND ANAEROBIC BOTTLES RESULTS VERIFIED, PHONED TO AND READ BACK BY MARIA GUADALUPE LOPEZ ON 1209 ON 18 EOR Culture result: STAPHYLOCOCCUS SPECIES, COAGULASE NEGATIVE  
     
      
  SA target DNA sequence not detected within the sample. Test performed by PCR  
     
      
  THIS ORGANISM MAY BE INDICATIVE OF CULTURE CONTAMINATION, HOWEVER, CLINICAL CORRELATION NEEDS TO BE EVALUATED, AS EACH CASE IS UNIQUE. Results for orders placed or performed during the hospital encounter of 10/31/18  
2D ECHO COMPLETE ADULT (TTE) W OR WO CONTR Narrative [facility] One [address] [place], [address] [facility] 
[phone] Transthoracic Echocardiogram 
2D, M-mode, Doppler, and Color Doppler Cruz Loco 
MR #: [id number] : 1932 Age: 80 years Gender: Male Study date: 2018 Account #: [de-identified] Height: 74 in 
Weight: 225.5 lb 
BSA: 2.29 mï¾² Status:Routine Location: 826 BP: 143/ 69 Allergies: NO KNOWN ALLERGENS Sonographer:  Lennox Libra, RCS Group:  [facility] Referring Physician:  Marshall Anne. Rajesh Bird [address] Reading Physician:  Ezra Casey MD 
 
INDICATIONS: NSVT, CAD PROCEDURE: This was a routine study. Previous positive bubble study. A 
transthoracic echocardiogram was performed. The study included complete 2D 
imaging, M-mode, complete spectral Doppler, and color Doppler. Intravenous 
contrast (Definity) was administered. Echocardiographic views were limited by 
poor patient compliance and poor acoustic window availability. This was a 
technically difficult study. LEFT VENTRICLE: Size was normal. Systolic function was normal. Ejection 
fraction was estimated in the range of 50 % to 55 %. There was basal 
inferoseptal hypokinesis. Wall thickness was normal. There was mild  
concentric 
hypertrophy. Doppler parameters were consistent with diastolic dysfunction. Avg 
E/e': 19.5 RIGHT VENTRICLE: The ventricle was moderately dilated. Systolic function was 
normal. Estimated peak pressure was in the range of 35-40 mmHg. LEFT ATRIUM: The atrium was markedly dilated. RIGHT ATRIUM: The atrium was moderately dilated. SYSTEMIC VEINS: IVC: The inferior vena cava was dilated. The respirophasic 
change in diameter was more than 50%. AORTIC VALVE: There is likely a calcified plaque noted at the sinotubular 
junction. The valve was probably trileaflet. Leaflets exhibited mildly 
increased thickness, mild calcification, and mild to moderate sclerosis. There 
was no evidence for stenosis. There was mild regurgitation. MITRAL VALVE: There was mild to moderate annular calcification. There was no 
evidence for stenosis. There was mild to moderate regurgitation. TRICUSPID VALVE: The valve structure was normal. There was no evidence for 
stenosis. There was mild regurgitation. PULMONIC VALVE: Not well visualized. There was no evidence for stenosis. There 
was no insufficiency. PERICARDIUM: There was no pericardial effusion. AORTA: The root exhibited normal size. SUMMARY: 
 
-  Left ventricle: Systolic function was normal. Ejection fraction was 
estimated in the range of 50 % to 55 %. There was basal inferoseptal 
hypokinesis. There was mild concentric hypertrophy. Doppler parameters were 
consistent with diastolic dysfunction. Avg E/e': 19.5 
 
-  Right ventricle: The ventricle was moderately dilated. Systolic function  
was 
normal. 
 
-  Left atrium: The atrium was markedly dilated. -  Right atrium: The atrium was moderately dilated. SYSTEM MEASUREMENT TABLES 
 
2D mode AoR Diam (2D): 3.4 cm 
LA Dimension (2D): 6.1 cm Left Atrium Systolic Volume Index; Method of Disks, Biplane; 2D mode;: 76  
ml/m2 IVS/LVPW (2D): 0.8 IVSd (2D): 1.1 cm 
LVIDd (2D): 4.8 cm LVIDs (2D): 3.9 cm 
LVPWd (2D): 1.2 cm RVIDd (2D): 4.7 cm Tissue Doppler Imaging LV Peak Early Boo Tissue Michael; Lateral MA (TDI): 14.1 cm/s LV Peak Early Boo Tissue Michael; Medial MA (TDI): 5.8 cm/s Unspecified Scan Mode Peak Grad; Mean; Antegrade Flow: 7 mm[Hg] Vmax; Antegrade Flow: 133 cm/s 
MV Peak Michael/LV Peak Tissue Michael E-Wave; Lateral MA: 11.3 MV Peak Michael/LV Peak Tissue Michael E-Wave; Medial MA: 27.7 Prepared and signed by Karli Lee MD 
Signed [id number] 38:15:44 Current Meds: 
Current Facility-Administered Medications Medication Dose Route Frequency  mineral oil-hydrophil petrolat (AQUAPHOR) ointment   Topical PRN  
 gabapentin (NEURONTIN) capsule 100 mg  100 mg Oral TID  oxyCODONE IR (ROXICODONE) tablet 5 mg  5 mg Oral Q4H PRN  pantoprazole (PROTONIX) tablet 40 mg  40 mg Oral BID  ferric gluconate (FERRLECIT) 125 mg in 0.9% sodium chloride 100 mL ivpb  125 mg IntraVENous DAILY  sodium chloride (OCEAN) 0.65 % nasal squeeze bottle 2 Spray  2 Spray Both Nostrils Q2H PRN  
 metoprolol succinate (TOPROL-XL) XL tablet 25 mg  25 mg Oral DAILY  insulin glargine (LANTUS) injection 10 Units  10 Units SubCUTAneous QHS  insulin lispro (HUMALOG) injection 3 Units  3 Units SubCUTAneous TIDAC  insulin lispro (HUMALOG) injection   SubCUTAneous AC&HS  aspirin chewable tablet 81 mg  81 mg Oral DAILY  levothyroxine (SYNTHROID) tablet 50 mcg  50 mcg Oral ACB  pravastatin (PRAVACHOL) tablet 40 mg  40 mg Oral QHS  sertraline (ZOLOFT) tablet 25 mg  25 mg Oral DAILY  sodium chloride (NS) flush 5 mL  5 mL InterCATHeter Q8H  
 sodium chloride (NS) flush 5-10 mL  5-10 mL InterCATHeter PRN  
  nystatin (MYCOSTATIN) 100,000 unit/gram powder   Topical BID  calcium carbonate (OS-ERIKA) tablet 500 mg [elemental]  500 mg Oral DAILY WITH BREAKFAST  dextrose 40% (GLUTOSE) oral gel 1 Tube  15 g Oral PRN  
 glucagon (GLUCAGEN) injection 1 mg  1 mg IntraMUSCular PRN  
 dextrose (D50W) injection syrg 12.5-25 g  25-50 mL IntraVENous PRN  
 budesonide (PULMICORT) 500 mcg/2 ml nebulizer suspension  500 mcg Nebulization BID RT And  
 albuterol (PROVENTIL VENTOLIN) nebulizer solution 2.5 mg  2.5 mg Nebulization Q6HWA RT  
 lansoprazole (PREVACID SOLUTAB) disintegrating tablet 30 mg  30 mg Oral DAILY Other Studies (last 24 hours): No results found. Assessment and Plan:  
 
Hospital Problems as of 11/10/2018 Date Reviewed: 10/31/2018 Codes Class Noted - Resolved POA Cirrhosis ([facility] ICD-10-CM: K74.60 ICD-9-CM: 571.5  11/7/2018 - Present Unknown V tach ([facility] ICD-10-CM: N54.8 ICD-9-CM: 427.1  11/4/2018 - Present Unknown Pleural effusion ICD-10-CM: J90 ICD-9-CM: 511.9  11/2/2018 - Present Unknown Pulmonary infiltrate ICD-10-CM: R91.8 ICD-9-CM: 793.19  11/2/2018 - Present Unknown Bacteremia ICD-10-CM: R78.81 ICD-9-CM: 790.7  11/2/2018 - Present Unknown COPD exacerbation ([facility] ICD-10-CM: J44.1 ICD-9-CM: 491.21  11/1/2018 - Present Unknown Elevated brain natriuretic peptide (BNP) level ICD-10-CM: R79.89 ICD-9-CM: 790.99  11/1/2018 - Present Unknown Anemia ICD-10-CM: D64.9 ICD-9-CM: 285.9  10/31/2018 - Present Yes Acute respiratory failure with hypoxia [facility]) ICD-10-CM: J96.01 
ICD-9-CM: 518.81  10/31/2018 - Present Yes Acute on chronic renal failure (HCC) ICD-10-CM: N17.9, N18.9 ICD-9-CM: 584.9, 585.9  10/31/2018 - Present Yes * (Principal) Septic shock ([facility] ICD-10-CM: A41.9, R65.21 ICD-9-CM: 038.9, 785.52, 995.92  10/31/2018 - Present Yes Elevated LFTs ICD-10-CM: R94.5 ICD-9-CM: 790.6  10/31/2018 - Present Yes Total bilirubin, elevated ICD-10-CM: R17 
ICD-9-CM: 277.4  10/31/2018 - Present Unknown S/P IVC filter (Chronic) ICD-10-CM: W11.888 ICD-9-CM: V45.89  9/21/2018 - Present Yes Overview Signed 10/31/2018  3:20 PM by Jack Vela NP  
  9/12/18  Dr. Garduno Altered mental state ICD-10-CM: R41.82 
ICD-9-CM: 780.97  4/22/2018 - Present Yes ROBERTO treated with BiPAP (Chronic) ICD-10-CM: D16.93 
ICD-9-CM: 327.23  2/20/2015 - Present Unknown Overview Signed 2/20/2015 10:42 AM by Edin Gutierrez  
  Patient is on BiPAP, no supplementary oxygen Morbid obesity ([address] (Chronic) ICD-10-CM: E66.01 
ICD-9-CM: 278.01  1/21/2015 - Present Yes Persistent atrial fibrillation (HCC) (Chronic) ICD-10-CM: I48.1 ICD-9-CM: 427.31  11/21/2012 - Present Yes Overview Addendum 11/22/2016  8:14 PM by Xuan Denis MD  
  Coumadin therapy discontinued due to recurrent GI bleeding. RESOLVED: COPD (chronic obstructive pulmonary disease) (HCC) (Chronic) ICD-10-CM: J44.9 ICD-9-CM: 933  11/21/2012 - 11/1/2018 Yes Plan: # Acute urinary retention - Meds reviewed; alejo 11/10 
 - monitor uop; voiding trial soon - BMP tomorrow # Cirrhosis             - Bili improved; hepatitis panel neg; AMA/ASM abs pending 
            - GI recs appreciated 
            - Did have EGD 1/2017 with previously noted gastric ulcer but no varices; colon showed diverticulosis - Hb stable 
  
# Acute volume overload 
            - appears euvolemic; PO lasix             - LVEF normal 4/2018; repeat TTE 11/5 with normal LVEF 
  
# Pulmonary infiltrates             - resp status improved 
  
# ALFREDITO on CKD 
            - baseline sCr around 1.5-1.7; peak 2.8 this admission 
            - now down to 1.98; neprho appreciated 
  
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 
            - iron IV per nephro 
  
# Septic shock             - admitted to ICU; resolved 
  
# Hypothyroid 
            - synthroid 
  
# AFib 
            - rate controlled; off warfarin for a while due to recurrent GIBs # Cirrhosis             - Bili improved; hepatitis panel neg; AMA/ASM abs pending 
            - GI recs appreciated 
            - Did have EGD 1/2017 with previously noted gastric ulcer but no varices; colon showed diverticulosis - Hb stable 
  
# Acute volume overload 
            - appears euvolemic; PO lasix             - LVEF normal 4/2018; repeat TTE 11/5 with normal LVEF 
  
# Pulmonary infiltrates             - resp status improved 
  
# ALFREDITO on CKD 
            - baseline sCr around 1.5-1.7; peak 2.8 this admission 
            - now down to 1.98; neprho appreciated 
  
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 
            - iron IV per nephro 
  
# Septic shock             - admitted to ICU; resolved 
  
# Hypothyroid 
            - synthroid 
  
# AFib 
            - rate controlled; off warfarin for a while due to recurrent GIBs DC planning/Dispo:   
Diet:  DIET DIABETIC CONSISTENT CARB 
DIET NUTRITIONAL SUPPLEMENTS 
DVT ppx:   
 
Signed: 
Fernando Slater MD

## 2018-11-10 NOTE — PROGRESS NOTES
Patient resting in bed, with eyes closed. Cpap in hand. Assisted patient to reapply cpap. Safety measures in place. Call light within reach. Will continue to monitor.

## 2018-11-11 NOTE — PROGRESS NOTES
Hospitalist Progress Note Admit Date:  10/31/2018  1:22 PM  
Name:  Leda Swanson Age:  80 y.o. 
:  1932 MRN:  [id number] PCP:  Paulo Robbins MD 
Treatment Team: Attending Provider: Jordi Allen DO; Utilization Review: Trang Hollis; Consulting Provider: Sarah Yeager MD; Consulting Provider: Willie Patel MD; Hospitalist: Eunice Randall MD; Care Manager: Barby Mcpherson Subjective:  
Resting quietly in bed. NAD. Family at bedside. Overall thinks he is doing better. Still appears weak. No pain. ROS otherwise negative. Objective:  
 
Patient Vitals for the past 24 hrs: 
 Temp Pulse Resp BP SpO2  
18 1100 98.3 °F (36.8 °C) 62 18 105/43 95 % 18 0738     99 % 18 0700 98.2 °F (36.8 °C) 62 18 120/57 100 % 18 0319 97.8 °F (36.6 °C) 65 18 121/53 94 % 11/10/18 2301 97.8 °F (36.6 °C) 71 18 107/57 92 % 11/10/18 2022     99 % 11/10/18 1927 97.8 °F (36.6 °C) 63 17 113/54 100 % 11/10/18 1459 98 °F (36.7 °C) 66 19 104/51 97 % 11/10/18 1416     97 % Oxygen Therapy O2 Sat (%): 95 % (18 1100) Pulse via Oximetry: 57 beats per minute (18) O2 Device: Nasal cannula (18) O2 Flow Rate (L/min): 2 l/min (18) FIO2 (%): 32 % (18 0210) Intake/Output Summary (Last 24 hours) at 2018 1230 Last data filed at 2018 1155 Gross per 24 hour Intake 840 ml Output 1900 ml Net -1060 ml  
   
*Note that automatically entered I/Os may not be accurate; dependent on patient compliance with collection and accurate  by assistants. General:    Well nourished. Alert. CV:   RRR. No murmur, rub, or gallop. Lungs:   CTAB. No wheezing, rhonchi, or rales. Abdomen:   Soft, nontender, nondistended. Extremities: Warm and dry. No cyanosis or edema. Skin:     No rashes or jaundice. Neuro:  No gross focal deficits. Data Review: I have reviewed all labs, meds, telemetry events, and studies from the last 24 hours: 
 
Recent Results (from the past 24 hour(s)) GLUCOSE, POC Collection Time: 11/10/18  3:33 PM  
Result Value Ref Range Glucose (POC) 192 (H) 65 - 100 mg/dL GLUCOSE, POC Collection Time: 11/10/18  8:12 PM  
Result Value Ref Range Glucose (POC) 182 (H) 65 - 100 mg/dL GLUCOSE, POC Collection Time: 11/11/18  5:11 AM  
Result Value Ref Range Glucose (POC) 111 (H) 65 - 100 mg/dL METABOLIC PANEL, BASIC Collection Time: 11/11/18  5:47 AM  
Result Value Ref Range Sodium 135 (L) 136 - 145 mmol/L Potassium 4.4 3.5 - 5.1 mmol/L Chloride 97 (L) 98 - 107 mmol/L  
 CO2 32 21 - 32 mmol/L Anion gap 6 (L) 7 - 16 mmol/L Glucose 112 (H) 65 - 100 mg/dL BUN 59 (H) 8 - 23 MG/DL Creatinine 1.91 (H) 0.8 - 1.5 MG/DL  
 GFR est AA 43 (L) >60 ml/min/1.73m2 GFR est non-AA 36 (L) >60 ml/min/1.73m2 Calcium 7.9 (L) 8.3 - 10.4 MG/DL  
GLUCOSE, POC Collection Time: 11/11/18 11:23 AM  
Result Value Ref Range Glucose (POC) 197 (H) 65 - 100 mg/dL All Micro Results Procedure Component Value Units Date/Time FUNGUS CULTURE AND SMEAR [[id number]] Collected:  11/02/18 0930 Order Status:  Completed Specimen:  Miscellaneous sample Updated:  11/05/18 1237 Source RIGHT Comment: PLEURAL FLUID Fungus stain Direct Inoculation Fungus (Mycology) Culture Other source received Comment: (NOTE) Performed At: [address] [id number] Clary Angelucci MD LZ:[id number] CULTURE, BLOOD [[id number]] Collected:  10/31/18 1345 Order Status:  Completed Specimen:  Blood Updated:  11/05/18 7479 Special Requests: --     
  RIGHT 
HAND Culture result: NO GROWTH 5 DAYS     
 CULTURE, BODY FLUID W Jeimy Estevez [[id number]] Collected:  11/02/18 0930 Order Status:  Completed Specimen:  Pleural Fluid Updated:  11/04/18 0449 Special Requests: RIGHT     
  GRAM STAIN 0 O 1 WBCS/OIF  
   NO DEFINITE ORGANISM SEEN Culture result: NO GROWTH 2 DAYS     
 AFB CULTURE + SMEAR W/RFLX ID FROM CULTURE [[id number]] Collected:  18 1030 Order Status:  Completed Specimen:  Miscellaneous sample Updated:  18 1536 Source PLEURAL FLUID Comment: RIGHT  
  
  AFB Specimen processing Concentration Acid Fast Smear NEGATIVE Comment: (NOTE) Performed At: [address] [id number] Faustina Sargent MD [id number] Acid Fast Culture PENDING  
 CULTURE, URINE [[id number]] Collected:  10/31/18 204 Order Status:  Completed Specimen:  Urine from [place] Specimen Updated:  18 0557 Special Requests: NO SPECIAL REQUESTS Culture result: NO GROWTH 2 DAYS     
 CULTURE, BLOOD [[id number]]  (Abnormal) Collected:  10/31/18 1409 Order Status:  Completed Specimen:  Blood Updated:  18 9770 Special Requests: --     
  RIGHT 
HAND 
  
  GRAM STAIN    
  GRAM POS COCCI IN CLUSTERS  
     
      
  AEROBIC AND ANAEROBIC BOTTLES RESULTS VERIFIED, PHONED TO AND READ BACK BY MARIA GUADALUPE LOPEZ ON 1209 ON 18 EOR Culture result: STAPHYLOCOCCUS SPECIES, COAGULASE NEGATIVE  
     
      
  SA target DNA sequence not detected within the sample. Test performed by PCR  
     
      
  THIS ORGANISM MAY BE INDICATIVE OF CULTURE CONTAMINATION, HOWEVER, CLINICAL CORRELATION NEEDS TO BE EVALUATED, AS EACH CASE IS UNIQUE. Results for orders placed or performed during the hospital encounter of 10/31/18  
2D ECHO COMPLETE ADULT (TTE) W OR WO CONTR Narrative [place] [place], [address] [facility] 
[phone] Transthoracic Echocardiogram 
2D, M-mode, Doppler, and Color Doppler Mare Daniel 
MR #: [id number] : 1932 Age: 80 years Gender: Male Study date: 05-Nov-2018 Account #: [de-identified] Height: 74 in 
Weight: 225.5 lb 
BSA: 2.29 mï¾² Status:Routine Location: 826 BP: 143/ 69 Allergies: NO KNOWN ALLERGENS Sonographer:  Lennox Libra, RCS Group:  [facility] Referring Physician:  Marshall Anne. Rajesh Bird, [address] Reading Physician:  Ezra Casey MD 
 
INDICATIONS: NSVT, CAD PROCEDURE: This was a routine study. Previous positive bubble study. A 
transthoracic echocardiogram was performed. The study included complete 2D 
imaging, M-mode, complete spectral Doppler, and color Doppler. Intravenous 
contrast (Definity) was administered. Echocardiographic views were limited by 
poor patient compliance and poor acoustic window availability. This was a 
technically difficult study. LEFT VENTRICLE: Size was normal. Systolic function was normal. Ejection 
fraction was estimated in the range of 50 % to 55 %. There was basal 
inferoseptal hypokinesis. Wall thickness was normal. There was mild  
concentric 
hypertrophy. Doppler parameters were consistent with diastolic dysfunction. Avg 
E/e': 19.5 RIGHT VENTRICLE: The ventricle was moderately dilated. Systolic function was 
normal. Estimated peak pressure was in the range of 35-40 mmHg. LEFT ATRIUM: The atrium was markedly dilated. RIGHT ATRIUM: The atrium was moderately dilated. SYSTEMIC VEINS: IVC: The inferior vena cava was dilated. The respirophasic 
change in diameter was more than 50%. AORTIC VALVE: There is likely a calcified plaque noted at the sinotubular 
junction. The valve was probably trileaflet. Leaflets exhibited mildly 
increased thickness, mild calcification, and mild to moderate sclerosis. There 
was no evidence for stenosis. There was mild regurgitation. MITRAL VALVE: There was mild to moderate annular calcification. There was no 
evidence for stenosis. There was mild to moderate regurgitation. TRICUSPID VALVE: The valve structure was normal. There was no evidence for 
stenosis. There was mild regurgitation. PULMONIC VALVE: Not well visualized. There was no evidence for stenosis. There 
was no insufficiency. PERICARDIUM: There was no pericardial effusion. AORTA: The root exhibited normal size. SUMMARY: 
 
-  Left ventricle: Systolic function was normal. Ejection fraction was 
estimated in the range of 50 % to 55 %. There was basal inferoseptal 
hypokinesis. There was mild concentric hypertrophy. Doppler parameters were 
consistent with diastolic dysfunction. Avg E/e': 19.5 
 
-  Right ventricle: The ventricle was moderately dilated. Systolic function  
was 
normal. 
 
-  Left atrium: The atrium was markedly dilated. -  Right atrium: The atrium was moderately dilated. SYSTEM MEASUREMENT TABLES 
 
2D mode AoR Diam (2D): 3.4 cm 
LA Dimension (2D): 6.1 cm Left Atrium Systolic Volume Index; Method of Disks, Biplane; 2D mode;: 76  
ml/m2 IVS/LVPW (2D): 0.8 IVSd (2D): 1.1 cm 
LVIDd (2D): 4.8 cm LVIDs (2D): 3.9 cm 
LVPWd (2D): 1.2 cm RVIDd (2D): 4.7 cm Tissue Doppler Imaging LV Peak Early Boo Tissue Michael; Lateral MA (TDI): 14.1 cm/s LV Peak Early Boo Tissue Michael; Medial MA (TDI): 5.8 cm/s Unspecified Scan Mode Peak Grad; Mean; Antegrade Flow: 7 mm[Hg] Vmax; Antegrade Flow: 133 cm/s 
MV Peak Michael/LV Peak Tissue Michael E-Wave; Lateral MA: 11.3 MV Peak Michael/LV Peak Tissue Michael E-Wave; Medial MA: 27.7 Prepared and signed by Anaid Hansen MD 
Signed [id number] 06:94:49 Current Meds: 
Current Facility-Administered Medications Medication Dose Route Frequency  magic mouthwash (KASSY) oral suspension 10 mL  10 mL Oral Q4H PRN  mineral oil-hydrophil petrolat (AQUAPHOR) ointment   Topical PRN  
 gabapentin (NEURONTIN) capsule 100 mg  100 mg Oral TID  oxyCODONE IR (ROXICODONE) tablet 5 mg  5 mg Oral Q4H PRN  pantoprazole (PROTONIX) tablet 40 mg  40 mg Oral BID  
  sodium chloride (OCEAN) 0.65 % nasal squeeze bottle 2 Spray  2 Spray Both Nostrils Q2H PRN  
 metoprolol succinate (TOPROL-XL) XL tablet 25 mg  25 mg Oral DAILY  insulin glargine (LANTUS) injection 10 Units  10 Units SubCUTAneous QHS  insulin lispro (HUMALOG) injection 3 Units  3 Units SubCUTAneous TIDAC  insulin lispro (HUMALOG) injection   SubCUTAneous AC&HS  aspirin chewable tablet 81 mg  81 mg Oral DAILY  levothyroxine (SYNTHROID) tablet 50 mcg  50 mcg Oral ACB  pravastatin (PRAVACHOL) tablet 40 mg  40 mg Oral QHS  sertraline (ZOLOFT) tablet 25 mg  25 mg Oral DAILY  sodium chloride (NS) flush 5 mL  5 mL InterCATHeter Q8H  
 sodium chloride (NS) flush 5-10 mL  5-10 mL InterCATHeter PRN  
 nystatin (MYCOSTATIN) 100,000 unit/gram powder   Topical BID  calcium carbonate (OS-ERIKA) tablet 500 mg [elemental]  500 mg Oral DAILY WITH BREAKFAST  dextrose 40% (GLUTOSE) oral gel 1 Tube  15 g Oral PRN  
 glucagon (GLUCAGEN) injection 1 mg  1 mg IntraMUSCular PRN  
 dextrose (D50W) injection syrg 12.5-25 g  25-50 mL IntraVENous PRN  
 budesonide (PULMICORT) 500 mcg/2 ml nebulizer suspension  500 mcg Nebulization BID RT And  
 albuterol (PROVENTIL VENTOLIN) nebulizer solution 2.5 mg  2.5 mg Nebulization Q6HWA RT  
 lansoprazole (PREVACID SOLUTAB) disintegrating tablet 30 mg  30 mg Oral DAILY Other Studies (last 24 hours): No results found. Assessment and Plan:  
 
Hospital Problems as of 11/11/2018 Date Reviewed: 10/31/2018 Codes Class Noted - Resolved POA Cirrhosis ([facility] ICD-10-CM: K74.60 ICD-9-CM: 571.5  11/7/2018 - Present Unknown V tach ([facility] ICD-10-CM: Y38.1 ICD-9-CM: 427.1  11/4/2018 - Present Unknown Pleural effusion ICD-10-CM: J90 ICD-9-CM: 511.9  11/2/2018 - Present Unknown Pulmonary infiltrate ICD-10-CM: R91.8 ICD-9-CM: 793.19  11/2/2018 - Present Unknown  Bacteremia ICD-10-CM: R78.81 
 ICD-9-CM: 790.7  11/2/2018 - Present Unknown COPD exacerbation ([facility] ICD-10-CM: J44.1 ICD-9-CM: 491.21  11/1/2018 - Present Unknown Elevated brain natriuretic peptide (BNP) level ICD-10-CM: R79.89 ICD-9-CM: 790.99  11/1/2018 - Present Unknown Anemia ICD-10-CM: D64.9 ICD-9-CM: 285.9  10/31/2018 - Present Yes Acute respiratory failure with hypoxia [facility]) ICD-10-CM: J96.01 
ICD-9-CM: 518.81  10/31/2018 - Present Yes Acute on chronic renal failure (HCC) ICD-10-CM: N17.9, N18.9 ICD-9-CM: 584.9, 585.9  10/31/2018 - Present Yes * (Principal) Septic shock ([facility] ICD-10-CM: A41.9, R65.21 ICD-9-CM: 038.9, 785.52, 995.92  10/31/2018 - Present Yes Elevated LFTs ICD-10-CM: R94.5 ICD-9-CM: 790.6  10/31/2018 - Present Yes Total bilirubin, elevated ICD-10-CM: R17 
ICD-9-CM: 277.4  10/31/2018 - Present Unknown S/P IVC filter (Chronic) ICD-10-CM: U73.111 ICD-9-CM: V45.89  9/21/2018 - Present Yes Overview Signed 10/31/2018  3:20 PM by Roseann Rivers NP  
  9/12/18  Dr. Fiona Velez Altered mental state ICD-10-CM: R41.82 
ICD-9-CM: 780.97  4/22/2018 - Present Yes ROBERTO treated with BiPAP (Chronic) ICD-10-CM: W67.70 
ICD-9-CM: 327.23  2/20/2015 - Present Unknown Overview Signed 2/20/2015 10:42 AM by Good Cali  
  Patient is on BiPAP, no supplementary oxygen Morbid obesity ([facility] (Chronic) ICD-10-CM: E66.01 
ICD-9-CM: 278.01  1/21/2015 - Present Yes Persistent atrial fibrillation (HCC) (Chronic) ICD-10-CM: I48.1 ICD-9-CM: 427.31  11/21/2012 - Present Yes Overview Addendum 11/22/2016  8:14 PM by Josephine Randall MD  
  Coumadin therapy discontinued due to recurrent GI bleeding. RESOLVED: COPD (chronic obstructive pulmonary disease) (HCC) (Chronic) ICD-10-CM: J44.9 ICD-9-CM: 225  11/21/2012 - 11/1/2018 Yes Plan: # Acute urinary retention - Meds reviewed; alejo 11/10 - Bladder training today - May need urology for outpt f/u if continues to have issues retaining. 
  
# Cirrhosis             - Bili improved; hepatitis panel neg; AMA/ASM abs pending 
            - CK recs appreciated 
            - Did have EGD 1/2017 with previously noted gastric ulcer but no varices; colon showed diverticulosis             - Hb stable 
  
# Acute volume overload 
            - appears euvolemic; PO lasix             - LVEF normal 4/2018; repeat TTE 11/5 with normal LVEF 
  
# Pulmonary infiltrates             - resp status improved 
  
# ALFREDITO on CKD 
            - baseline sCr around 1.5-1.7; peak 2.8 this admission 
            - now down to 1.98; neprho appreciated 
  
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 
            - iron IV per nephro 
  
# Septic shock             - admitted to ICU; resolved 
  
# Hypothyroid 
            - synthroid 
  
# AFib 
            - rate controlled; off warfarin for a while due to recurrent GIBs 
  
# Cirrhosis             - Bili improved; hepatitis panel neg; AMA/ASM abs pending 
            - QY recs appreciated 
            - Did have EGD 1/2017 with previously noted gastric ulcer but no varices; colon showed diverticulosis             - Hb stable 
  
# Acute volume overload 
            - appears euvolemic; PO lasix             - LVEF normal 4/2018; repeat TTE 11/5 with normal LVEF 
  
# Pulmonary infiltrates             - resp status improved 
  
# ALFREDITO on CKD 
            - baseline sCr around 1.5-1.7; peak 2.8 this admission 
            - now down to 1.98; neprho appreciated 
  
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 
            - iron IV per nephro 
  
# Septic shock             - admitted to ICU; resolved 
  
# Hypothyroid 
            - synthroid 
  
# AFib 
            - rate controlled; off warfarin for a while due to recurrent GIBs DC planning/Dispo: Hopeful to hear about rehab tomorrow. Diet:  DIET DIABETIC CONSISTENT CARB DIET NUTRITIONAL SUPPLEMENTS 
DVT ppx:  SCDs only Signed: 
Chris Owen MD

## 2018-11-11 NOTE — PROGRESS NOTES
Patient alert and oriented, cooperative with care, breathing even an unlabored, denies pain or distress, safety measures, in place, call light within reach.

## 2018-11-11 NOTE — PROGRESS NOTES
END OF SHIFT NOTE:  Pt remained drowsy throughout the shift. Pt is easily aroused. Family has been in and out throughout the day. Pt sat up in recliner from lunch to after dinner. Pain meds given once during shift. No other needs voiced. Intake/Output 11/11 0701 - 11/11 1900 In: 340 [P.O.:340] Out: 400 [Urine:400] Voiding: yes Catheter: yes Drain:   
 
 
 
 
Stool:  1 occurrences. Stool Assessment Stool Color: [place] Skylar (11/11/18 1436) Stool Appearance: Formed (11/11/18 1436) Stool Amount: Medium (11/11/18 1436) Stool Source/Status: Rectum (11/11/18 1436) Emesis: 0 occurrences. VITAL SIGNS Patient Vitals for the past 12 hrs: 
 Temp Pulse Resp BP SpO2  
11/11/18 1500 98.3 °F (36.8 °C) 60 18 110/48 96 % 11/11/18 1436     99 % 11/11/18 1100 98.3 °F (36.8 °C) 62 18 105/43 95 % 11/11/18 0738     99 % 11/11/18 0700 98.2 °F (36.8 °C) 62 18 120/57 100 % Pain Assessment Pain 1 Pain Scale 1: Visual (11/11/18 0650) Pain Intensity 1: 0 (11/11/18 0650) Patient Stated Pain Goal: 0 (11/11/18 0650) Pain Reassessment 1: Patient sleeping (11/11/18 0650) Pain Onset 1: acute (11/07/18 2243) Pain Location 1: Back (11/11/18 7) Pain Orientation 1: Anterior (11/11/18 9999) Pain Description 1: Constant (11/11/18 8645) Pain Intervention(s) 1: Medication (see MAR) (11/11/18 6949) Ambulating Yes - with assistance Additional Information: 
 
Shift report given to be given to oncoming nurse at the bedside.  
 
Anyi Maradiaga RN

## 2018-11-11 NOTE — PROGRESS NOTES
Problem: Gas Exchange - Impaired Goal: *Absence of hypoxia Patients supplemental O2 demand decreasing.  Progressing toward goal.

## 2018-11-12 NOTE — PROGRESS NOTES
Hospitalist Progress Note Admit Date:  10/31/2018  1:22 PM  
Name:  Corrinne Lincoln Age:  80 y.o. 
:  1932 MRN:  [id number] PCP:  Vesna Prince MD 
Treatment Team: Attending Provider: Rakesh Eng DO; Utilization Review: Mariya Sinha; Consulting Provider: Suraj Pak MD; Consulting Provider: Gilma Hernandez MD; Hospitalist: Claude Prow, MD; Care Manager: Jessica Scheaffer Subjective:  
Resting comfortably. Bladder training started . Not sure patient understands the need for this, does not currently feel the urge to urinate. Adams is clamped with urine in tubing. No abdo pain. ROS otherwise negative. Objective:  
 
Patient Vitals for the past 24 hrs: 
 Temp Pulse Resp BP SpO2  
18 1100 97.6 °F (36.4 °C) 60 18 125/67 97 % 18 0758     97 % 18 0725 98.3 °F (36.8 °C) 60 17 107/55 97 % 18 0400  64     
18 0348 97.2 °F (36.2 °C) (!) 57 18 121/51 98 % 18 2356 97.3 °F (36.3 °C) 61 18 113/54 91 % 18 1955     99 % 18 1939 98.2 °F (36.8 °C) 60 18 124/68 99 % 18 1500 98.3 °F (36.8 °C) 60 18 110/48 96 % 18 1436     99 % Oxygen Therapy O2 Sat (%): 97 % (18 1100) Pulse via Oximetry: 58 beats per minute (18) O2 Device: Nasal cannula (18) O2 Flow Rate (L/min): 2 l/min (18) FIO2 (%): 32 % (18 0210) Intake/Output Summary (Last 24 hours) at 2018 1134 Last data filed at 2018 1666 Gross per 24 hour Intake 240 ml Output 930 ml Net -690 ml *Note that automatically entered I/Os may not be accurate; dependent on patient compliance with collection and accurate  by assistants. General:    Well nourished. Alert. CV:   RRR. No murmur, rub, or gallop. Lungs:   CTAB. No wheezing, rhonchi, or rales. Abdomen:   Soft, nontender, nondistended. :  Adams clamped Extremities: Warm and dry. No cyanosis or edema. Skin:     No rashes or jaundice. Neuro:  No gross focal deficits Data Review: 
I have reviewed all labs, meds, telemetry events, and studies from the last 24 hours: 
 
Recent Results (from the past 24 hour(s)) GLUCOSE, POC Collection Time: 11/11/18  3:19 PM  
Result Value Ref Range Glucose (POC) 190 (H) 65 - 100 mg/dL GLUCOSE, POC Collection Time: 11/11/18  9:40 PM  
Result Value Ref Range Glucose (POC) 183 (H) 65 - 100 mg/dL GLUCOSE, POC Collection Time: 11/12/18  5:13 AM  
Result Value Ref Range Glucose (POC) 117 (H) 65 - 100 mg/dL GLUCOSE, POC Collection Time: 11/12/18 11:03 AM  
Result Value Ref Range Glucose (POC) 177 (H) 65 - 100 mg/dL All Micro Results Procedure Component Value Units Date/Time FUNGUS CULTURE AND SMEAR [[id number]] Collected:  11/02/18 0930 Order Status:  Completed Specimen:  Miscellaneous sample Updated:  11/05/18 1237 Source RIGHT Comment: PLEURAL FLUID Fungus stain Direct Inoculation Fungus (Mycology) Culture Other source received Comment: (NOTE) Performed At: [address] [id number] Lizbeth Teran MD [id number] CULTURE, BLOOD [[id number]] Collected:  10/31/18 1345 Order Status:  Completed Specimen:  Blood Updated:  11/05/18 3620 Special Requests: --     
  RIGHT 
HAND Culture result: NO GROWTH 5 DAYS     
 CULTURE, BODY FLUID W Ju Gilbert [[id number]] Collected:  11/02/18 0930 Order Status:  Completed Specimen:  Pleural Fluid Updated:  11/04/18 5228 Special Requests: RIGHT     
  GRAM STAIN 0 O 1 WBCS/OIF  
   NO DEFINITE ORGANISM SEEN Culture result: NO GROWTH 2 DAYS     
 AFB CULTURE + SMEAR W/RFLX ID FROM CULTURE [[id number]] Collected:  11/02/18 1030 Order Status:  Completed Specimen:  Miscellaneous sample Updated:  11/03/18 1536 Source PLEURAL FLUID Comment: RIGHT AFB Specimen processing Concentration Acid Fast Smear NEGATIVE Comment: (NOTE) Performed At: [address] [id number] Leida Antonio MD [id number] Acid Fast Culture PENDING  
 CULTURE, URINE [[id number]] Collected:  10/31/18 2047 Order Status:  Completed Specimen:  Urine from Adams Specimen Updated:  18 5103 Special Requests: NO SPECIAL REQUESTS Culture result: NO GROWTH 2 DAYS     
 CULTURE, BLOOD [[id number]]  (Abnormal) Collected:  10/31/18 1409 Order Status:  Completed Specimen:  Blood Updated:  18 1361 Special Requests: --     
  RIGHT 
HAND 
  
  GRAM STAIN    
  GRAM POS COCCI IN CLUSTERS  
     
      
  AEROBIC AND ANAEROBIC BOTTLES RESULTS VERIFIED, PHONED TO AND READ BACK BY MARIA GUADALUPE LOPEZ ON 1209 ON 18 EOR Culture result: STAPHYLOCOCCUS SPECIES, COAGULASE NEGATIVE  
     
      
  SA target DNA sequence not detected within the sample. Test performed by PCR  
     
      
  THIS ORGANISM MAY BE INDICATIVE OF CULTURE CONTAMINATION, HOWEVER, CLINICAL CORRELATION NEEDS TO BE EVALUATED, AS EACH CASE IS UNIQUE. Results for orders placed or performed during the hospital encounter of 10/31/18  
2D ECHO COMPLETE ADULT (TTE) W OR WO CONTR Narrative [address] [place], [address] [facility] 
[phone] Transthoracic Echocardiogram 
2D, M-mode, Doppler, and Color Doppler Donaldo Roe 
MR #: [id number] : 1932 Age: 80 years Gender: Male Study date: 2018 Account #: [de-identified] Height: 74 in 
Weight: 225.5 lb 
BSA: 2.29 mï¾² Status:Routine Location: 826 BP: 143/ 69 Allergies: NO KNOWN ALLERGENS Sonographer:  PUJA Ledesma Group:  [facility] Referring Physician:  Foster Vargasvejoe 34 Reading Physician:  Reji Fragoso MD 
 
INDICATIONS: NSVT, CAD 
 
 PROCEDURE: This was a routine study. Previous positive bubble study. A 
transthoracic echocardiogram was performed. The study included complete 2D 
imaging, M-mode, complete spectral Doppler, and color Doppler. Intravenous 
contrast (Definity) was administered. Echocardiographic views were limited by 
poor patient compliance and poor acoustic window availability. This was a 
technically difficult study. LEFT VENTRICLE: Size was normal. Systolic function was normal. Ejection 
fraction was estimated in the range of 50 % to 55 %. There was basal 
inferoseptal hypokinesis. Wall thickness was normal. There was mild  
concentric 
hypertrophy. Doppler parameters were consistent with diastolic dysfunction. Avg 
E/e': 19.5 RIGHT VENTRICLE: The ventricle was moderately dilated. Systolic function was 
normal. Estimated peak pressure was in the range of 35-40 mmHg. LEFT ATRIUM: The atrium was markedly dilated. RIGHT ATRIUM: The atrium was moderately dilated. SYSTEMIC VEINS: IVC: The inferior vena cava was dilated. The respirophasic 
change in diameter was more than 50%. AORTIC VALVE: There is likely a calcified plaque noted at the sinotubular 
junction. The valve was probably trileaflet. Leaflets exhibited mildly 
increased thickness, mild calcification, and mild to moderate sclerosis. There 
was no evidence for stenosis. There was mild regurgitation. MITRAL VALVE: There was mild to moderate annular calcification. There was no 
evidence for stenosis. There was mild to moderate regurgitation. TRICUSPID VALVE: The valve structure was normal. There was no evidence for 
stenosis. There was mild regurgitation. PULMONIC VALVE: Not well visualized. There was no evidence for stenosis. There 
was no insufficiency. PERICARDIUM: There was no pericardial effusion. AORTA: The root exhibited normal size. SUMMARY: 
 
-  Left ventricle: Systolic function was normal. Ejection fraction was estimated in the range of 50 % to 55 %. There was basal inferoseptal 
hypokinesis. There was mild concentric hypertrophy. Doppler parameters were 
consistent with diastolic dysfunction. Avg E/e': 19.5 
 
-  Right ventricle: The ventricle was moderately dilated. Systolic function  
was 
normal. 
 
-  Left atrium: The atrium was markedly dilated. -  Right atrium: The atrium was moderately dilated. SYSTEM MEASUREMENT TABLES 
 
2D mode AoR Diam (2D): 3.4 cm 
LA Dimension (2D): 6.1 cm Left Atrium Systolic Volume Index; Method of Disks, Biplane; 2D mode;: 76  
ml/m2 IVS/LVPW (2D): 0.8 IVSd (2D): 1.1 cm 
LVIDd (2D): 4.8 cm LVIDs (2D): 3.9 cm 
LVPWd (2D): 1.2 cm RVIDd (2D): 4.7 cm Tissue Doppler Imaging LV Peak Early Boo Tissue Michael; Lateral MA (TDI): 14.1 cm/s LV Peak Early Boo Tissue Michael; Medial MA (TDI): 5.8 cm/s Unspecified Scan Mode Peak Grad; Mean; Antegrade Flow: 7 mm[Hg] Vmax; Antegrade Flow: 133 cm/s 
MV Peak Michael/LV Peak Tissue Michael E-Wave; Lateral MA: 11.3 MV Peak Michael/LV Peak Tissue Michael E-Wave; Medial MA: 27.7 Prepared and signed by Yuli Lewis MD 
Signed [id number] 94:61:14 Current Meds: 
Current Facility-Administered Medications Medication Dose Route Frequency  magic mouthwash (KASSY) oral suspension 10 mL  10 mL Oral Q4H PRN  mineral oil-hydrophil petrolat (AQUAPHOR) ointment   Topical PRN  
 gabapentin (NEURONTIN) capsule 100 mg  100 mg Oral TID  oxyCODONE IR (ROXICODONE) tablet 5 mg  5 mg Oral Q4H PRN  pantoprazole (PROTONIX) tablet 40 mg  40 mg Oral BID  sodium chloride (OCEAN) 0.65 % nasal squeeze bottle 2 Spray  2 Spray Both Nostrils Q2H PRN  
 metoprolol succinate (TOPROL-XL) XL tablet 25 mg  25 mg Oral DAILY  insulin glargine (LANTUS) injection 10 Units  10 Units SubCUTAneous QHS  insulin lispro (HUMALOG) injection 3 Units  3 Units SubCUTAneous TIDAC  insulin lispro (HUMALOG) injection   SubCUTAneous AC&HS  
  aspirin chewable tablet 81 mg  81 mg Oral DAILY  levothyroxine (SYNTHROID) tablet 50 mcg  50 mcg Oral ACB  pravastatin (PRAVACHOL) tablet 40 mg  40 mg Oral QHS  sertraline (ZOLOFT) tablet 25 mg  25 mg Oral DAILY  sodium chloride (NS) flush 5 mL  5 mL InterCATHeter Q8H  
 sodium chloride (NS) flush 5-10 mL  5-10 mL InterCATHeter PRN  
 nystatin (MYCOSTATIN) 100,000 unit/gram powder   Topical BID  calcium carbonate (OS-ERIKA) tablet 500 mg [elemental]  500 mg Oral DAILY WITH BREAKFAST  dextrose 40% (GLUTOSE) oral gel 1 Tube  15 g Oral PRN  
 glucagon (GLUCAGEN) injection 1 mg  1 mg IntraMUSCular PRN  
 dextrose (D50W) injection syrg 12.5-25 g  25-50 mL IntraVENous PRN  
 budesonide (PULMICORT) 500 mcg/2 ml nebulizer suspension  500 mcg Nebulization BID RT And  
 albuterol (PROVENTIL VENTOLIN) nebulizer solution 2.5 mg  2.5 mg Nebulization Q6HWA RT  
 lansoprazole (PREVACID SOLUTAB) disintegrating tablet 30 mg  30 mg Oral DAILY Other Studies (last 24 hours): No results found. Assessment and Plan:  
 
Hospital Problems as of 11/12/2018 Date Reviewed: 10/31/2018 Codes Class Noted - Resolved POA Cirrhosis ([facility].) ICD-10-CM: K74.60 ICD-9-CM: 571.5  11/7/2018 - Present Unknown V tach ([facility].) ICD-10-CM: U36.2 ICD-9-CM: 427.1  11/4/2018 - Present Unknown Pleural effusion ICD-10-CM: J90 ICD-9-CM: 511.9  11/2/2018 - Present Unknown Pulmonary infiltrate ICD-10-CM: R91.8 ICD-9-CM: 793.19  11/2/2018 - Present Unknown Bacteremia ICD-10-CM: R78.81 ICD-9-CM: 790.7  11/2/2018 - Present Unknown COPD exacerbation ([facility].) ICD-10-CM: J44.1 ICD-9-CM: 491.21  11/1/2018 - Present Unknown Elevated brain natriuretic peptide (BNP) level ICD-10-CM: R79.89 ICD-9-CM: 790.99  11/1/2018 - Present Unknown Anemia ICD-10-CM: D64.9 ICD-9-CM: 285.9  10/31/2018 - Present Yes  Acute respiratory failure with hypoxia (HCC) ICD-10-CM: J96.01 
 ICD-9-CM: 518.81  10/31/2018 - Present Yes Acute on chronic renal failure (HCC) ICD-10-CM: N17.9, N18.9 ICD-9-CM: 584.9, 585.9  10/31/2018 - Present Yes * (Principal) Septic shock ([facility] ICD-10-CM: A41.9, R65.21 ICD-9-CM: 038.9, 785.52, 995.92  10/31/2018 - Present Yes Elevated LFTs ICD-10-CM: R94.5 ICD-9-CM: 790.6  10/31/2018 - Present Yes Total bilirubin, elevated ICD-10-CM: R17 
ICD-9-CM: 277.4  10/31/2018 - Present Unknown S/P IVC filter (Chronic) ICD-10-CM: S93.221 ICD-9-CM: V45.89  9/21/2018 - Present Yes Overview Signed 10/31/2018  3:20 PM by Terrie Avalos NP  
  9/12/18  Dr. Daxa Lemus Altered mental state ICD-10-CM: R41.82 
ICD-9-CM: 780.97  4/22/2018 - Present Yes ROBERTO treated with BiPAP (Chronic) ICD-10-CM: W02.43 
ICD-9-CM: 327.23  2/20/2015 - Present Unknown Overview Signed 2/20/2015 10:42 AM by Jacqueline Abbott  
  Patient is on BiPAP, no supplementary oxygen Morbid obesity ([facility].) (Chronic) ICD-10-CM: E66.01 
ICD-9-CM: 278.01  1/21/2015 - Present Yes Persistent atrial fibrillation (HCC) (Chronic) ICD-10-CM: I48.1 ICD-9-CM: 427.31  11/21/2012 - Present Yes Overview Addendum 11/22/2016  8:14 PM by Urban Grajeda MD  
  Coumadin therapy discontinued due to recurrent GI bleeding. RESOLVED: COPD (chronic obstructive pulmonary disease) (HCC) (Chronic) ICD-10-CM: J44.9 ICD-9-CM: 756  11/21/2012 - 11/1/2018 Yes Plan: # Acute urinary retention 
            - Meds reviewed; alejo 11/10 
            - Bladder training 
  
# Cirrhosis             - Bili improved; hepatitis panel neg; AMA/ASM abs pending 
            - NS recs appreciated 
            - Did have EGD 1/2017 with previously noted gastric ulcer but no varices; colon showed diverticulosis             - Hb stable 
  
# Acute volume overload 
            - appears euvolemic; PO lasix             - LVEF normal 4/2018; repeat TTE 11/5 with normal LVEF 
  
# Pulmonary infiltrates             - resp status improved 
  
# ALFREDITO on CKD 
            - baseline sCr around 1.5-1.7; peak 2.8 this admission 
            - now down to 1.98; neprho appreciated 
  
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 
            - iron IV per nephro 
  
# Septic shock             - admitted to ICU; resolved 
  
# Hypothyroid 
            - synthroid 
  
# AFib 
            - rate controlled; off warfarin for a while due to recurrent GIBs 
  
# Cirrhosis             - Bili improved; hepatitis panel neg; AMA/ASM abs pending 
            - IM recs appreciated 
            - Did have EGD 1/2017 with previously noted gastric ulcer but no varices; colon showed diverticulosis             - Hb stable 
  
# Acute volume overload 
            - appears euvolemic; PO lasix             - LVEF normal 4/2018; repeat TTE 11/5 with normal LVEF 
  
# Pulmonary infiltrates             - resp status improved 
  
# ALFREDITO on CKD 
            - baseline sCr around 1.5-1.7; peak 2.8 this admission 
            - now down to 1.98; neprho appreciated 
  
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 
            - iron IV per nephro 
  
# Septic shock             - admitted to ICU; resolved 
  
# Hypothyroid 
            - synthroid 
  
# AFib 
            - rate controlled; off warfarin for a while due to recurrent GIBs DC planning/Dispo:  Still no word on rehab. Discharge pending placement. Diet:  DIET DIABETIC CONSISTENT CARB 
DIET NUTRITIONAL SUPPLEMENTS 
DVT ppx: SCDs only with GIB history Signed: 
Lucas Davis MD

## 2018-11-12 NOTE — ADT AUTH CERT NOTES
[address] [facility] Adult-Extended Stay (11/11/2018) by Jing Potts Review Status Review Entered In Primary 11/12/2018 16:15 Criteria Review REVIEW SUMMARY 
  
Patient: ESTHER CASTILLO Review Number: [id number] Review Status: In Primary 
  
Condition Specific: Yes 
  
Condition Level Of Care Code: ACUTE Condition Level Of Care Description: Acute 
  
  
OUTCOMES Outcome Type: Primary 
  
  
  
REVIEW DETAILS 
  
Service Date: 11/11/2018 Admit Date: 10/31/2018 Product: [address] [facility] Adult Subset: Extended Stay (Symptom or finding within 24h) 
  (Excludes PO medications unless noted) Select Level of Care, One: 
            [ ] ACUTE, >= One: 
                [ ] General, One: 
                    [X] Responder, discharge expected today if clinically stable last 12h, Both: 
                    ~--Admin, IQ Admin Admin on 11- 04:15 PM--~ Hospitalist Progress Note Resting quietly in bed. NAD. Family at bedside. Overall thinks he is doing better. Still appears weak. No pain. T 98.2, /68, P 60, R 18, 02 SAT 99% 2L NC 
                     
                    , CL 97, GLUC 112, BUN 59, CREAT 1.91, CA 7.9,  
                     
                     
                    DC planning/Dispo: Hopeful to hear about rehab tomorrow. ROXICODONE 10 MG PO Q 4 HRS PRN X 2 (4X24 HRS),  FERRLECIT IV DC'D,  SSI SC AC & HS (2U X3), OTHER MEDS ARE SAME 
                     
                     
                     
                        [X] Temperature <= 99.4°F(37.4°C) PO 
                        [X] Clinical stability, >= One: 
                            [X] Functional impairment, One: 
                                [X] Discharge to inpatient facility for rehabilitative services ~--Admin, IQ Admin Admin on 11- 04:15 PM--~ Ronal 4 for rehab authorization 
                                 
                                 
                                 
  
Version: InterQual® 2018.1 Shannan Padilla  © 2018 [employer] and/or one of its [place]. All Rights Reserved. CPT only © 2017 American Medical Association. All Rights Reserved. [address] [facility] Adult-Extended Stay (11/10/2018) by Tanja Treviño Review Status Review Entered In Primary 11/12/2018 16:08 Criteria Review REVIEW SUMMARY 
  
Patient: ESTHER CASTILLO Review Number: [id number] Review Status: In Primary 
  
Condition Specific: Yes 
  
Condition Level Of Care Code: ACUTE Condition Level Of Care Description: Acute 
  
  
OUTCOMES Outcome Type: Primary 
  
  
  
REVIEW DETAILS 
  
Service Date: 11/10/2018 Admit Date: 10/31/2018 Product: [address] [facility] Adult Subset: Extended Stay (Symptom or finding within 24h) 
  (Excludes PO medications unless noted) Select Level of Care, One: 
            [ ] ACUTE, >= One: 
                [ ] General, One: 
                    [ ] Responder, discharge expected today if clinically stable last 12h, Both: 
                    ~--Admin, IQ Admin Admin on 11- 04:08 PM--~ Hospitalist Progress Note Resting comfortably. I/O cath 11/9 for 400cc on bladder scan. Adams placed this morning since he apparently did not have much urine out overnight, nearly 1L out since. Pt feels much better today. Breathing stable, 2L NC O2.  
                     
                    T 97.9, /58, P 74, R 19, 20 % 3L NC 
                     
                    POC GLUC 179-193 Plan: # Acute urinary retention - Meds reviewed; sapna 11/10 
                                - monitor uop; voiding trial soon - BMP tomorrow # Cirrhosis - Bili improved; hepatitis panel neg; AMA/ASM abs pending - GI recs appreciated - Did have EGD 1/2017 with previously noted gastric ulcer but no varices; colon showed diverticulosis - Hb stable # Acute volume overload - appears euvolemic; PO lasix - LVEF normal 4/2018; repeat TTE 11/5 with normal LVEF # Pulmonary infiltrates 
                                - resp status improved # ALFREDITO on CKD 
                                - baseline sCr around 1.5-1.7; peak 2.8 this admission 
                                - now down to 1.98; neprho appreciated # Anemia in setting of CKD 
                                - iron IV per nephro # Septic shock 
                                - admitted to ICU; resolved # Hypothyroid 
                                - synthroid # AFib 
                                - rate controlled; off warfarin for a while due to recurrent GIBs # Cirrhosis - Bili improved; hepatitis panel neg; AMA/ASM abs pending - GI recs appreciated                                 - Did have EGD 1/2017 with previously noted gastric ulcer but no varices; colon showed diverticulosis - Hb stable # Acute volume overload - appears euvolemic; PO lasix - LVEF normal 4/2018; repeat TTE 11/5 with normal LVEF # Pulmonary infiltrates 
                                - resp status improved # ALFREDITO on CKD 
                                - baseline sCr around 1.5-1.7; peak 2.8 this admission 
                                - now down to 1.98; neprho appreciated # Anemia in setting of CKD 
                                - iron IV per nephro # Septic shock 
                                - admitted to ICU; resolved # Hypothyroid 
                                - synthroid # AFib 
                                - rate controlled; off warfarin for a while due to recurrent GIBs DC planning/Dispo:   
                    Diet:  DIET DIABETIC CONSISTENT CARB 
                     
                    WHYTE CATHETER, PULMICORT NEB BID, ALBUTEROL NEB Q 6 HRS WA, ASPIRIN 81 MG PO QD, NEURONTIN 100 MG PO TID, LANTUS 10U SC Q HS,  SSI SC AC & HS (2U X4), HUMALOG 3U SC TID AC MEALS, SYNTHROID 50 MCG PO QD, ROXICODONE 5 MG PO Q 4 HRS PRN X2, FERRLECIT 125 MG IV QD 
                     
                     
                     
                        [X] Clinical stability, >= One: 
                            [X] Functional impairment, One: 
                                [X] Discharge to inpatient facility for rehabilitative services ~--Admin, IQ Admin Admin on 11- 04:08 PM--~ Insurance auth of rehab is pending. 
                                 
                                 
                                 
  
Version: InterQual® 2018.1 Nicole Todd  © 2018 [employer] and/or one of its [place]. All Rights Reserved. CPT only © 2017 American Medical Association. All Rights Reserved.

## 2018-11-12 NOTE — PROGRESS NOTES
Patient alert and oriented, cooperative with care, denies pain or distress, breathing even and unlabored, safety measures in place, call light within reach.

## 2018-11-12 NOTE — PROGRESS NOTES
Problem: Interdisciplinary Rounds Goal: Interdisciplinary Rounds Interdisciplinary team rounds were held 11/12/2018 with the following team members:Care Management, Physical Therapy, Physician and Clinical Coordinator and the patient. Plan of care discussed. See clinical pathway and/or care plan for interventions and desired outcomes.

## 2018-11-12 NOTE — PROGRESS NOTES
END OF SHIFT NOTE:  Pt resting comfortably in bed. Around 2pm pt had a bout of SOB. Respiratory was called and gave the pt a breathing treatment. Pt stated that he felt much better after that and he has been napping comfortably off and on ever since. No other needs voiced. Catheter removed. Intake/Output 11/12 0701 - 11/12 1900 In: 355 [P.O.:355] Out: 400 [Urine:400] Voiding: yes Catheter: no 
Drain:   
 
 
 
 
Stool: 1 occurrences. Stool Assessment Stool Color: Black (11/12/18 0037) Stool Appearance: Soft (11/12/18 0037) Stool Amount: Medium (11/12/18 0037) Stool Source/Status: Rectum (11/12/18 0037) Emesis:  0 occurrences. VITAL SIGNS Patient Vitals for the past 12 hrs: 
 Temp Pulse Resp BP SpO2  
11/12/18 1526 98.2 °F (36.8 °C) (!) 58 19 122/66 96 % 11/12/18 1347     100 % 11/12/18 1100 97.6 °F (36.4 °C) 60 18 125/67 97 % 11/12/18 0758     97 % 11/12/18 0725 98.3 °F (36.8 °C) 60 17 107/55 97 % Pain Assessment Pain 1 Pain Scale 1: Visual (11/12/18 1100) Pain Intensity 1: 0 (11/12/18 1100) Patient Stated Pain Goal: 0 (11/12/18 0710) Pain Reassessment 1: Patient sleeping (11/11/18 1350) Pain Onset 1: acute (11/07/18 2243) Pain Location 1: Back (11/11/18 1250) Pain Orientation 1: Anterior (11/11/18 1250) Pain Description 1: Constant (11/11/18 1250) Pain Intervention(s) 1: Medication (see MAR) (11/11/18 1250) Ambulating 
yes Additional Information:  
 
Shift report given to be given to oncoming nurse at the bedside.  
 
Carlos Hager RN

## 2018-11-12 NOTE — PROGRESS NOTES
Problem: Mobility Impaired (Adult and Pediatric) Goal: *Acute Goals and Plan of Care (Insert Text) STG: 
(1.)Mr. Navarro will move from supine to sit and sit to supine  with CONTACT GUARD ASSIST within 3 treatment day(s). (2.)Mr. Navarro will transfer from bed to chair and chair to bed with STAND BY ASSIST using the least restrictive device within 3 treatment day(s). (3.)Mr. Navarro will ambulate with CONTACT GUARD ASSIST for 30 feet with the least restrictive device within 3 treatment day(s). LTG: 
(1.)Mr. Navarro will move from supine to sit and sit to supine  in bed with SUPERVISION within 7 treatment day(s). (2.)Mr. Navarro will transfer from bed to chair and chair to bed with SUPERVISION using the least restrictive device within 7 treatment day(s). (3.)Mr. Navarro will ambulate with STAND BY ASSIST for 150 feet with the least restrictive device within 7 treatment day(s). ________________________________________________________________________________________________ PHYSICAL THERAPY: Daily Note, Treatment Day: 4th, AM 11/12/2018INPATIENT: Hospital Day: 13 Payor: [facility] [place] MED[facility]ARE / Plan: [facility] [place] / Product Type: Managed Care Medicare /  
  
NAME/AGE/GENDER: Damian Nelson is a 80 y.o. male PRIMARY DIAGNOSIS: Septic shock ([address] Septic shock ([address] Septic shock ([address] Septic shock ([address] Procedure(s) (LRB): ULTRASOUND (Bilateral) THORACENTESIS (Right) 10 Days Post-Op ICD-10: Treatment Diagnosis:  
 · Generalized Muscle Weakness (M62.81) · Difficulty in walking, Not elsewhere classified (R26.2) · History of falling (Z91.81) Precaution/Allergies: 
Patient has no known allergies. ASSESSMENT:  
Mr. Ariadna Shi is sitting upright in recliner upon contact and agreeable to therapy this morning. Pt appears more confused this morning than noted in previous session.  Pt performed STS to RW with modA X 2 requiring cues for technique and improves static standing balance. Pt requesting to return to bed as pt states \"I don't think I could even walk 10 ft right now I'm so weak\". Pt ambulated 5 ft from chair back to bed with RW and CGA. Pt ambulates with widened SANDEEP, increased trunk sway, and shuffling gait pattern. Pt returned to sitting with CGA-Ramírez for controlled descent. Pt performed LAQ and alternating marches while sitting EOB but fatigues very quickly. Pt assisted into supine with modA requiring cues for technique and ease of transfers. PT positioned pt for comfort and pt quickly falls to sleep before PT leaves room. Pt left with all needs met and within reach. Pt is making slow progress towards goals. Will continue efforts. This section established at most recent assessment PROBLEM LIST (Impairments causing functional limitations): 1. Decreased Strength 2. Decreased ADL/Functional Activities 3. Decreased Transfer Abilities 4. Decreased Ambulation Ability/Technique 5. Decreased Balance 6. Decreased Activity Tolerance 7. Decreased Pacing Skills 8. Increased Fatigue 9. Increased Shortness of Breath 10. Decreased [place] with Home Exercise Program 
 INTERVENTIONS PLANNED: (Benefits and precautions of physical therapy have been discussed with the patient.) 1. Balance Exercise 2. Bed Mobility 3. Family Education 4. Gait Training 5. Home Exercise Program (HEP) 6. Neuromuscular Re-education/Strengthening 7. Range of Motion (ROM) 8. Therapeutic Activites 9. Therapeutic Exercise/Strengthening 10. Transfer Training TREATMENT PLAN: Frequency/Duration: 3 times a week for duration of hospital stay Rehabilitation Potential For Stated Goals: Good RECOMMENDED REHABILITATION/EQUIPMENT: (at time of discharge pending progress): Due to the probability of continued deficits (see above) this patient will likely need continued skilled physical therapy after discharge. Equipment:  
? None at this time HISTORY:  
History of Present Injury/Illness (Reason for Referral): 
See H&P below Patient is a 80 y.o.  male presents via EMS after a fall. Reported dizziness and shortness of breath. Was bradycardic and hypotensive and LA 3.87, troponin 1.65, WBC 24.4, hgb 10.6, creatinine 2.58, BNP 1594. Was placed BIPAP but HR dropped to 38 and SBP down to 60s--was taken off BIPAP and placed on NC. Is a poor historian and states that he \"just slipped walking around the bed\". Denies dizziness. Has chronic home O2 that he uses mainly at night but admits to using during the day as well. Wife states he has not been eating well and is very weak. 
  
He was last seen in our sleep lab 9/26/18 for follow up and has been prescribed BIPAP and pressures changed then to 11/7cm--device interrogation did not show daily use.  
  
Vascular surgery placed IVC filter 9/12/18 for DVT and hx GIB. Chronic medical:  AFIB on ASA and Plavix, COPD, DM, diastolic heart failure, CKD, PUD, essential tremors, ROBERTO on BIPAP 
  
Home DME company 2l with sleep.  
 
 
Past Medical History/Comorbidities:  
Mr. Angelina Patel  has a past medical history of Atherosclerosis of artery of extremity with ulceration ([address] Atherosclerosis of native arteries of extremity with intermittent claudication ([address] Atopic dermatitis, Atrial fibrillation ([address] CAD (coronary artery disease), Carotid stenosis, bilateral, CHF (congestive heart failure) ([address] Chronic kidney disease, Chronic obstructive pulmonary disease ([address] Cirrhosis ([address] Colon, diverticulosis, Coronary atherosclerosis of native coronary vessel, Diabetes ([address] Diastolic CHF, acute on chronic ([address] Failed CABG (coronary artery bypass graft), GI bleed, Gout, History of tobacco use, [place] (hard of hearing), Hypercholesterolemia, Hypertension, Hyperuricemia, Morbid obesity ([address] PAD (peripheral artery disease) ([address] Poor historian, Radiologic findings of lung field, abnormal, Seizure disorder [facility]), Shortness of breath dyspnea, Thyroid disease, and Unspecified sleep apnea. Mr. Baldo Almaguer  has a past surgical history that includes pr cardiac surg procedure unlist; hx coronary artery bypass graft (06-); hx cataract removal (Left, 2003); hx heart catheterization; hx coronary stent placement (02-); hx heent (1970s); hx colonoscopy; ULTRASOUND (Bilateral, 11/2/2018); THORACENTESIS (Right, 11/2/2018); ULTRASOUND GUIDED ACCESS VENA CAVA FILTER INSERTION (N/A, 9/12/2018); ESOPHAGOGASTRODUODENOSCOPY (EGD) (N/A, 8/5/2018); ESOPHAGOGASTRODUODENOSCOPY (EGD) (N/A, 1/27/2017); COLONOSCOPY  BMI 36 TO BE ADMITTED 1/26/17 (N/A, 1/27/2017); ESOPHAGOGASTRODUODENOSCOPY (EGD) (N/A, 1/22/2017); ESOPHAGOGASTRODUODENAL (EGD) BIOPSY (N/A, 1/22/2017); ESOPHAGOGASTRODUODENOSCOPY (EGD)   rm 610 (N/A, 5/8/2015); COLONOSCOPY (N/A, 1/24/2015); and ESOPHAGOGASTRODUODENOSCOPY (EGD)   rm 3101 (N/A, 1/23/2015). Social History/Living Environment:  
Home Environment: Private residence # Steps to Enter: 0 Wheelchair Ramp: Yes One/Two Story Residence: One story Living Alone: No 
Support Systems: Spouse/Significant Other/Partner Patient Expects to be Discharged to[de-identified] Private residence Current DME Used/Available at Home: [address], rollator Prior Level of Function/Work/Activity: 
Lives with wife and granddaughter, use of rollator for household distances, 2L O2 Number of Personal Factors/Comorbidities that affect the Plan of Care: 3+: HIGH COMPLEXITY EXAMINATION:  
Most Recent Physical Functioning:  
Gross Assessment: 
AROM: Generally decreased, functional 
Strength: Generally decreased, functional 
Coordination: Generally decreased, functional 
         
  
Posture: 
  
Balance: 
Standing - Static: Fair;Constant support Standing - Dynamic : Fair Bed Mobility: 
Supine to Sit: Moderate assistance Wheelchair Mobility: 
  
Transfers: 
Sit to Stand:  Moderate assistance;Assist x2 
 Stand to Sit: Contact guard assistance;Minimum assistance Gait: 
  
Base of Support: Widened Speed/Helen: Slow;Shuffled Step Length: Left shortened;Right shortened Gait Abnormalities: Decreased step clearance;Trunk sway increased; Shuffling gait Distance (ft): 5 Feet (ft) Assistive Device: Walker, rolling Ambulation - Level of Assistance: Contact guard assistance Interventions: Safety awareness training; Tactile cues; Verbal cues Body Structures Involved: 1. Nerves 2. Heart 3. Lungs 4. Bones 5. Joints 6. Muscles 7. Ligaments Body Functions Affected: 1. Sensory/Pain 2. Cardio 3. Respiratory 4. Neuromusculoskeletal 
5. Movement Related Activities and Participation Affected: 1. General Tasks and Demands 2. Mobility 3. Self Care 4. Domestic Life 5. Interpersonal Interactions and Relationships 6. Community, Social and [place] Number of elements that affect the Plan of Care: 4+: HIGH COMPLEXITY CLINICAL PRESENTATION:  
Presentation: Evolving clinical presentation with changing clinical characteristics: MODERATE COMPLEXITY CLINICAL DECISION MAKING:  
[facility] -[facility] 6 Clicks Basic Mobility Inpatient Short Form How much difficulty does the patient currently have. .. Unable A Lot A Little None 1. Turning over in bed (including adjusting bedclothes, sheets and blankets)? [] 1   [] 2   [x] 3   [] 4  
2. Sitting down on and standing up from a chair with arms ( e.g., wheelchair, bedside commode, etc.)   [] 1   [] 2   [x] 3   [] 4  
3. Moving from lying on back to sitting on the side of the bed? [] 1   [x] 2   [] 3   [] 4 How much help from another person does the patient currently need. .. Total A Lot A Little None 4. Moving to and from a bed to a chair (including a wheelchair)? [] 1   [] 2   [x] 3   [] 4  
5. Need to walk in hospital room? [] 1   [] 2   [x] 3   [] 4  
6. Climbing 3-5 steps with a railing?    [x] 1   [] 2   [] 3   [] 4  
 © 2007, Trustees of [facility], under license to [employer]. All rights reserved Score:  Initial: 15 Most Recent: X (Date: -- ) Interpretation of Tool:  Represents activities that are increasingly more difficult (i.e. Bed mobility, Transfers, Gait). Score 24 23 22-20 19-15 14-10 9-7 6 Modifier CH CI CJ CK CL CM CN   
 
? Mobility - Walking and Moving Around:  
  - CURRENT STATUS: CK - 40%-59% impaired, limited or restricted  - GOAL STATUS: CJ - 20%-39% impaired, limited or restricted  - D/C STATUS:  ---------------To be determined--------------- Payor: [facility] [place] [facility] / Plan[place] [employer] [place] / Product Type: Managed Care Medicare /   
 
Medical Necessity:    
· Patient is expected to demonstrate progress in strength, balance, coordination and functional technique to decrease assistance required with gait, transfers, and functional mobility. Reason for Services/Other Comments: 
· Patient continues to require skilled intervention due to decreased strength, decreased balance, decreased functional tolerance, decreased cardiopulmonary endurance affecting participation in basic ADLs and functional tasks. Use of outcome tool(s) and clinical judgement create a POC that gives a: Clear prediction of patient's progress: LOW COMPLEXITY  
  
 
 
 
TREATMENT:  
(In addition to Assessment/Re-Assessment sessions the following treatments were rendered) Pre-treatment Symptoms/Complaints:  fatigue Pain: Initial:  
Pain Intensity 1: 0  Post Session:   
Therapeutic Activity: (    15 minutes): Therapeutic activities including bed mobility training, sitting EOB, side stepping with RW,static/dynamic sitting balance,  posture training  to improve mobility, strength, balance and coordination. Required minimal- moderate Safety awareness training; Tactile cues; Verbal cues to promote static and dynamic balance in sitting and promote coordination of bilateral, lower extremity(s). Therapeutic Exercise: ( ):  Exercises per grid below to improve mobility and strength. Required minimal visual, verbal and tactile cues to promote proper body alignment, promote proper body posture and promote proper body mechanics. Progressed repetitions and complexity of movement as indicated. Date: 
11/1/18 Date: 
11/5/18 Date: 
11/12/18 Activity/Exercise Parameters Parameters Parameters LAQ 10 X B  10 X B  10 X B Alt marches 10 X B  10 X B 10 X B Ankle pumps 10 X B Quad set 10 X B Heel slides 10 X B Hip abduction 10 X B Braces/Orthotics/Lines/Etc:  
· O2 Device: Nasal cannula Treatment/Session Assessment:   
· Response to Treatment:  See above · Interdisciplinary Collaboration:  
o Physical Therapist 
o Rehabilitation Attendant · After treatment position/precautions:  
o Supine in bed 
o Bed alarm/tab alert on 
o Bed/Chair-wheels locked 
o Bed in low position 
o Call light within reach 
o Side rails x 3  
· Compliance with Program/Exercises: Will assess as treatment progresses · Recommendations/Intent for next treatment session: \"Next visit will focus on advancements to more challenging activities and reduction in assistance provided\". Total Treatment Duration: PT Patient Time In/Time Out Time In: 3427 Time Out: [address]

## 2018-11-13 NOTE — PROGRESS NOTES
In accordance with Medicare Guidelines, a second copy of the Important Letter from Medicare was presented to the patient in anticipation of expected discharge within 48 hours. An oral explanation was provided and all questions were answered. A signed second copy of the Important Letter from Medicare was placed in the patient's medical chart, and a signed second copy of the Important Letter from Medicare was provided to the patient. Care Managers were notified.

## 2018-11-13 NOTE — PROGRESS NOTES
Patient voices no concerns at this time. Patient is relaxing in recliner watching TV. Patient denies any pain and appears comfortable at this time. Call light within reach and patient instructed to call if assistance is needed. Will continue to monitor.

## 2018-11-13 NOTE — PROGRESS NOTES
Patient brought home CPAP machine. Patient has his hose and some of our tubing with a large full face mask connected to his ResMed CPAP machine. Patient saturation is 98% on 2 liters bled into the machine. Patient is comfortable and stable.

## 2018-11-13 NOTE — PROGRESS NOTES
Patient is resting quietly in bed. Assessment completed. Respirations are even and unlabored. O2 at 2plm NC. No distress at this time. Bed is low, locked and call light within reach.

## 2018-11-13 NOTE — PROGRESS NOTES
Patient resting in bed with no complaints at this time. Patient is alert and orientated with no distress noted. IV intact and patent with no s/s of infection noted. Respirations even and unlabored with heart rate regular. Patient unable to ambulate independently without assistance r/t weakness and dyspnea. Bed in low locked position with call light within reach. Patient instructed to call if assistance is needed. Will continue to monitor.

## 2018-11-13 NOTE — PROGRESS NOTES
Patient is discharging via [address] Ambulance to [facility]. Patient's family at bedside made aware that patient will be transported at 3:30 pm. CM remains available.

## 2018-11-13 NOTE — PROGRESS NOTES
Patient has not voided since alejo was discontinued. Bladder scan done and 183 ml of urine noted. Will monitor.

## 2018-11-13 NOTE — DISCHARGE SUMMARY
Discharge Summary Patient: Zora Dover MRN: [id number]  SSN: [id number] YOB: 1932  Age: 80 y.o. Sex: male Admit Date: 10/31/2018 Discharge Date: 11/13/2018 Admission Diagnoses: Septic shock ([facility] Septic shock ([facility] Discharge Diagnoses:  
Problem List as of 11/13/2018 Date Reviewed: 10/31/2018 Codes Class Noted - Resolved Cirrhosis ([facility] ICD-10-CM: K74.60 ICD-9-CM: 571.5  11/7/2018 - Present V tach ([facility] ICD-10-CM: G29.9 ICD-9-CM: 427.1  11/4/2018 - Present Pleural effusion ICD-10-CM: J90 ICD-9-CM: 511.9  11/2/2018 - Present Pulmonary infiltrate ICD-10-CM: R91.8 ICD-9-CM: 793.19  11/2/2018 - Present Bacteremia ICD-10-CM: R78.81 ICD-9-CM: 790.7  11/2/2018 - Present COPD exacerbation ([facility] ICD-10-CM: J44.1 ICD-9-CM: 491.21  11/1/2018 - Present Elevated brain natriuretic peptide (BNP) level ICD-10-CM: R79.89 ICD-9-CM: 790.99  11/1/2018 - Present Anemia ICD-10-CM: D64.9 ICD-9-CM: 285.9  10/31/2018 - Present Acute respiratory failure with hypoxia [facility]) ICD-10-CM: J96.01 
ICD-9-CM: 518.81  10/31/2018 - Present Acute on chronic renal failure (HCC) ICD-10-CM: N17.9, N18.9 ICD-9-CM: 584.9, 585.9  10/31/2018 - Present * (Principal) Septic shock ([facility] ICD-10-CM: A41.9, R65.21 ICD-9-CM: 038.9, 785.52, 995.92  10/31/2018 - Present Elevated LFTs ICD-10-CM: R94.5 ICD-9-CM: 790.6  10/31/2018 - Present Total bilirubin, elevated ICD-10-CM: R17 
ICD-9-CM: 277.4  10/31/2018 - Present Acute metabolic encephalopathy XVS-61-DR: G93.41 
ICD-9-CM: 348.31  10/9/2018 - Present Acute encephalopathy ICD-10-CM: G93.40 ICD-9-CM: 348.30  10/8/2018 - Present S/P IVC filter (Chronic) ICD-10-CM: P44.723 ICD-9-CM: V45.89  9/21/2018 - Present Overview Signed 10/31/2018  3:20 PM by Meri Greenberg NP  
  9/12/18  Dr. Kate Franz  Acute blood loss anemia ICD-10-CM: D62 
 ICD-9-CM: 285.1  8/5/2018 - Present Tremors of nervous system ICD-10-CM: R25.1 ICD-9-CM: 781.0  8/4/2018 - Present ALFREDITO (acute kidney injury) ([facility] ICD-10-CM: N17.9 ICD-9-CM: 584.9  8/2/2018 - Present Hypoglycemia ICD-10-CM: E16.2 ICD-9-CM: 251.2  8/1/2018 - Present Altered mental state ICD-10-CM: R41.82 
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 
ICD-9-CM: 250.40, 583.81  2/12/2018 - Present Restrictive lung disease (Chronic) ICD-10-CM: J98.4 ICD-9-CM: 518.89  11/1/2017 - Present Hypoxia ICD-10-CM: R09.02 
ICD-9-CM: 799.02  8/21/2017 - Present Thrombocytopenia (HCC) (Chronic) ICD-10-CM: D69.6 ICD-9-CM: 287.5  8/21/2017 - Present Falls frequently ICD-10-CM: R29.6 ICD-9-CM: V15.88  9/17/2016 - Present Dyspnea ICD-10-CM: R06.00 
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 
ICD-9-CM: 250.60, 357.2  11/5/2015 - Present Chronic diastolic congestive heart failure (HCC) (Chronic) ICD-10-CM: I50.32 
ICD-9-CM: 428.32, 428.0  9/12/2015 - Present Overview Addendum 8/31/2017  3:44 PM by Destinee Cooper MD  
  1. Echo (9/11/15) : EF 55-60%. Mild LVH. Moderate biatrial enlargement. Moderate mitral/tricuspid regurgitation. 2.  Echo (8/21/17):  EF 55-60%. Moderate LAE. Mild mitral stenosis. Moderate tricuspid regurgitation. RVSP 35-40. ROBERTO treated with BiPAP (Chronic) ICD-10-CM: W17.67 
ICD-9-CM: 327.23  2/20/2015 - Present Overview Signed 2/20/2015 10:42 AM by Ra Nix  
  Patient is on BiPAP, no supplementary oxygen Chronic pain ICD-10-CM: G89.29 ICD-9-CM: 338.29  1/22/2015 - Present Morbid obesity ([facility] (Chronic) ICD-10-CM: E66.01 
ICD-9-CM: 278.01  1/21/2015 - Present CKD (chronic kidney disease) stage 3, GFR 30-59 ml/min (HCC) (Chronic) ICD-10-CM: N18.3 ICD-9-CM: 585.3  9/18/2013 - Present Hyperlipidemia (Chronic) ICD-10-CM: Q01.5 ICD-9-CM: 272.4  8/5/2013 - Present Debility (Chronic) ICD-10-CM: R53.81 ICD-9-CM: 799.3  8/5/2013 - Present Essential hypertension (Chronic) ICD-10-CM: I10 
ICD-9-CM: 401.9  11/21/2012 - Present Persistent atrial fibrillation (HCC) (Chronic) ICD-10-CM: I48.1 ICD-9-CM: 427.31  11/21/2012 - Present Overview Addendum 11/22/2016  8:14 PM by Zunilda Zazueta MD  
  Coumadin therapy discontinued due to recurrent GI bleeding. Peripheral neuropathy (Chronic) ICD-10-CM: G62.9 ICD-9-CM: 356.9  11/21/2012 - Present PAD (peripheral artery disease) (HCC) (Chronic) ICD-10-CM: I73.9 ICD-9-CM: 443.9  11/21/2012 - Present Overview Signed 10/31/2016  1:21 PM by Anson Kimbrough 1. Bilateral proximal common iliac PCI (2/21/08):  8.0 X 100 mm Cordis smart stent on right and 10 X 40 mm cordis smart stent on right. Both inflated to 7.0 mm. Carotid stenosis, bilateral (Chronic) ICD-10-CM: Q99.64 ICD-9-CM: 433.10, 433.30  11/21/2012 - Present Overview Addendum 11/22/2016  8:13 PM by Zunilda Zazueta MD  
  1. Carotid Doppler (6/1/07): Greater than 70% stenosis in proximal LICA. 50% stenosis in right ICA. 2.  CTA of neck (3/15/10): Occluded distal segments of the vertebral arteries bilaterally. Atherosclerosis of the carotid bulbs bilaterally with a 50% stenosis on the left and a stenosis of less than 30% on the right. Small nodule in the right upper lobe near the apex. 3. CTA (8/29/13):  Less than 30% diameter stenosis of the cervical internal carotid arteries bilaterally. Gout (Chronic) ICD-10-CM: M10.9 ICD-9-CM: 274.9  11/21/2012 - Present RESOLVED: Volume overload ICD-10-CM: E87.70 ICD-9-CM: 276.69  2/2/2018 - 4/22/2018 RESOLVED: Influenza B ICD-10-CM: J10.1 ICD-9-CM: 487.1  1/31/2018 - 4/22/2018 RESOLVED: Hypoxemia ICD-10-CM: R09.02 
ICD-9-CM: 799.02  11/1/2017 - 4/22/2018 RESOLVED: Fall in home ICD-10-CM: [address] Harjinder Singer, Y92.009 ICD-9-CM: E888.9, E849.0  11/1/2017 - 4/22/2018 RESOLVED: CHF (congestive heart failure) (HCC) ICD-10-CM: I50.9 ICD-9-CM: 428.0  9/14/2017 - 4/22/2018 RESOLVED: Acute on chronic diastolic heart failure (HCC) ICD-10-CM: I50.33 ICD-9-CM: 428.33  9/11/2017 - 4/22/2018 RESOLVED: Cellulitis of left lower extremity ICD-10-CM: L03.116 ICD-9-CM: 682.6  8/22/2017 - 4/22/2018 RESOLVED: Acute on chronic diastolic (congestive) heart failure (HCC) ICD-10-CM: I50.33 ICD-9-CM: 428.33, 428.0  8/21/2017 - 4/22/2018 RESOLVED: Acute respiratory failure with hypoxia ([facility] ICD-10-CM: J96.01 
ICD-9-CM: 518.81  8/4/2017 - 4/22/2018 RESOLVED: Upper GI bleed ICD-10-CM: K92.2 ICD-9-CM: 578.9  1/22/2017 - 4/22/2018 RESOLVED: Coronary atherosclerosis of native coronary vessel ICD-10-CM: I25.10 ICD-9-CM: 414.01  10/31/2016 - 4/22/2018 Overview Signed 10/31/2016  1:19 PM by Feroz Monday 1. Cath (5/31/07):  LMCA: 25%. LAD: ostial 75-95% stenosis. 50-75% mid. D1:  25-50%. OM3: small with 75% stenosis. RCA: 100% mid. with left to right collaterals. 2 Vessel CABG (6/4/07):  LIMA to LAD and SVG to OM. SVG was anastomosed proximally to LIMA to due severe atherosclerosis of aorta. 3.  Lexiscan cardiolite (11/30/10): Abnormal with reversible lateral wall defects. Unable to gate given atrial fibrillation. 4.  LHC (1/14/11):  EF 60%. LVEDP 21. Patent LIMA to LAD and SVG to OM1 (off LIMA). occluded small RCA with left to right collaterals. Small D1 (2 mm vessel) with 70% mid stenosis. RESOLVED: Renal insufficiency ICD-10-CM: N28.9 ICD-9-CM: 593.9  10/31/2016 - 11/22/2016 RESOLVED: UTI (urinary tract infection) ICD-10-CM: N39.0 ICD-9-CM: 599.0  9/17/2016 - 11/22/2016 RESOLVED: ALFREDITO (acute kidney injury) ([facility] ICD-10-CM: N17.9 ICD-9-CM: 584.9  9/16/2016 - 11/22/2016 RESOLVED: Supratherapeutic INR ICD-10-CM: R79.1 ICD-9-CM: 790.92  9/9/2015 - 10/19/2015 RESOLVED: GI bleeding ICD-10-CM: K92.2 ICD-9-CM: 578.9  5/6/2015 - 10/19/2015 RESOLVED: Atherosclerosis of native arteries of extremity with intermittent claudication ([facility] ICD-10-CM: U08.831 ICD-9-CM: 440.21  4/17/2015 - 4/22/2018 RESOLVED: Atherosclerosis of artery of extremity with ulceration ([facility] ICD-10-CM: I70.299, Y50.854 ICD-9-CM: 440.23  4/17/2015 - 4/22/2018 RESOLVED: Colon, diverticulosis ICD-10-CM: K57.30 ICD-9-CM: 562.10  1/24/2015 - 4/22/2018 RESOLVED: Opioid dependence, daily use (HCC) ICD-10-CM: V46.66 ICD-9-CM: 304.01  1/22/2015 - 11/22/2016 RESOLVED: GI bleed (Chronic) ICD-10-CM: Z58.2 ICD-9-CM: 578.9  1/21/2015 - 4/22/2018 RESOLVED: Coagulopathy (HCC) (Chronic) ICD-10-CM: J29.5 ICD-9-CM: 286.9  1/21/2015 - 11/22/2016 Overview Signed 1/21/2015 11:40 PM by Alice Norman NP  
  ON COUMADIN 
 
  
  
   
 RESOLVED: Diabetic foot ulcer ([facility] ICD-10-CM: E11.621, H84.037 ICD-9-CM: 250.80, 707.15  1/21/2015 - 4/22/2018 Overview Signed 1/21/2015 11:40 PM by Alice Norman NP  
  LEFT GREAT TOE, PLANTAR ASPECT 
 
  
  
   
 RESOLVED: Cough ICD-10-CM: R05 ICD-9-CM: 786.2  1/21/2015 - 11/22/2016 RESOLVED: Dysphagia ICD-10-CM: R13.10 ICD-9-CM: 787.20  5/22/2014 - 8/29/2014 RESOLVED: Seizure disorder ([facility] (Chronic) ICD-10-CM: G40.909 ICD-9-CM: 345.90  5/22/2014 - 4/22/2018 RESOLVED: Acute kidney failure, unspecified ([facility] ICD-10-CM: N17.9 ICD-9-CM: 584.9  2/4/2014 - 8/29/2014 RESOLVED: Plethora (Chronic) ICD-10-CM: R23.2 ICD-9-CM: 782.62  1/16/2014 - 11/22/2016 RESOLVED: Edema extremities (Chronic) ICD-10-CM: R60.0 ICD-9-CM: 782.3  1/16/2014 - 11/22/2016 RESOLVED: Injury of great toe of left foot ICD-10-CM: U53.412J ICD-9-CM: 959.7  9/21/2013 - 8/29/2014 Overview Signed 9/21/2013  8:05 AM by Chin Scott NP  
  Present on admission Dark area to plantar surface RESOLVED: Acute renal failure (HCC) ICD-10-CM: N17.9 ICD-9-CM: 584.9  9/20/2013 - 8/29/2014 RESOLVED: Acute on chronic diastolic heart failure (HCC) ICD-10-CM: I50.33 ICD-9-CM: 428.33  9/18/2013 - 8/29/2014 RESOLVED: ROBERTO on CPAP (Chronic) ICD-10-CM: G47.33, Z99.89 ICD-9-CM: 327.23, V46.8  9/18/2013 - 2/20/2015 RESOLVED: Edema (Chronic) ICD-10-CM: R60.9 ICD-9-CM: 782.3  9/18/2013 - 8/29/2014 RESOLVED: Polycythemia (Chronic) ICD-10-CM: D75.1 ICD-9-CM: 238.4  9/18/2013 - 4/22/2018 RESOLVED: Toxic effect of tobacco(989.84) (Chronic) ICD-10-CM: Y45.851G ICD-9-CM: 989.84  8/5/2013 - 2/20/2015 RESOLVED: CHF (congestive heart failure) (HCC) ICD-10-CM: I50.9 ICD-9-CM: 428.0  11/21/2012 - 9/18/2013 RESOLVED: DM (diabetes mellitus) (HCC) (Chronic) ICD-10-CM: E11.9 ICD-9-CM: 250.00  11/21/2012 - 11/5/2015 RESOLVED: COPD (chronic obstructive pulmonary disease) (HCC) (Chronic) ICD-10-CM: J44.9 ICD-9-CM: 099  11/21/2012 - 11/1/2018 RESOLVED: ASCVD (arteriosclerotic cardiovascular disease) (Chronic) ICD-10-CM: I25.10 ICD-9-CM: 429.2, 440.9  11/21/2012 - 11/22/2016 RESOLVED: Atopic dermatitis ICD-10-CM: L20.9 ICD-9-CM: 691.8  11/21/2012 - 9/18/2013 RESOLVED: Hyperuricemia ICD-10-CM: E79.0 ICD-9-CM: 790.6  11/21/2012 - 9/18/2013 Discharge Condition: Stable Hospital Course:  
 
From admission note : 
 
\" Patient is a 80 y.o.  male presents via EMS after a fall. Reported dizziness and shortness of breath.   Was bradycardic and hypotensive and LA 3.87, troponin 1.65, WBC 24.4, hgb 10.6, creatinine 2.58, BNP 1594. Was placed BIPAP but HR dropped to 38 and SBP down to 60s--was taken off BIPAP and placed on NC. Is a poor historian and states that he \"just slipped walking around the bed\". Denies dizziness. Has chronic home O2 that he uses mainly at night but admits to using during the day as well. Wife states he has not been eating well and is very weak. 
  
He was last seen in our sleep lab 9/26/18 for follow up and has been prescribed BIPAP and pressures changed then to 11/7cm--device interrogation did not show daily use.  
  
Vascular surgery placed IVC filter 9/12/18 for DVT and hx GIB. Chronic medical:  AFIB on ASA and Plavix, COPD, DM, diastolic heart failure, CKD, PUD, essential tremors, ROBERTO on BIPAP 
  
Home [employer] 2l with sleep. \" 
 
Patient was admitted with sepsis with shock. He was diagnosed with pneumonia with pleural effusion. He was treated with antibiotics and thoracentesis and improved. He had tachycardia as well. His Toprol was increased and his heart rate is under control. Patient was found to have cirrhosis. GI consultant saw patient and will follow up with patient. Patient has been improving on strength with help from physical therapy. Physical Exam:  
 
General:                    The patient is a pleasant elderly male in no acute distress. Head:                                   Normocephalic/atraumatic. Eyes:                                   No palpebral pallor or scleral icterus. ENT:                                    External auricular and nasal exam within normal limits. Mucous membranes are moist. 
Neck:                                   Supple, non-tender, no JVD. Lungs:                       Clear to auscultation bilaterally without wheezes or crackles.  
                                            No respiratory distress or accessory muscle use. Heart:                                  Regular rate and rhythm, without murmurs, rubs, or gallops. Abdomen:                  Soft, non-tender, non-distended with normoactive bowel sounds. Genitourinary:           No tenderness over the bladder or bilateral CVAs. Extremities:               Without clubbing, cyanosis, or edema. Skin:                                    Normal color, texture, and turgor. No rashes, lesions, or jaundice. Pulses:                      Radial and dorsalis pedis pulses present 2+ bilaterally. Capillary refill <2s. Neurologic:                CN II-XII grossly intact and symmetrical.  
                                            Moving all four extremities well with no focal deficits. Psychiatric:                Pleasant demeanor, appropriate affect. Alert and oriented x 3 Consults: Cardiology, Gastroenterology and Pulmonary/Critical Care Significant Diagnostic Studies:  
 
Recent Results (from the past 24 hour(s)) GLUCOSE, POC Collection Time: 11/12/18  3:51 PM  
Result Value Ref Range Glucose (POC) 188 (H) 65 - 100 mg/dL GLUCOSE, POC Collection Time: 11/12/18  8:13 PM  
Result Value Ref Range Glucose (POC) 263 (H) 65 - 100 mg/dL GLUCOSE, POC Collection Time: 11/13/18  5:36 AM  
Result Value Ref Range Glucose (POC) 90 65 - 100 mg/dL GLUCOSE, POC Collection Time: 11/13/18 11:24 AM  
Result Value Ref Range Glucose (POC) 136 (H) 65 - 100 mg/dL METABOLIC PANEL, BASIC [[id number]] (Order [id number]) Lab Date: 11/10/2018 Department: [place] [id number] Med Surg Released By/Authorizing: Kelly Spangler MD (auto-released) Component Value Flag Ref Range Units Status Sodium 135 Abnormally low   L  136 - 145 mmol/L Final  
Potassium 4.4   3.5 - 5.1 mmol/L Final  
Chloride 97 Abnormally low   L  98 - 107 mmol/L Final  
CO2 32   21 - 32 mmol/L Final  
 Anion gap 6 Abnormally low   L  7 - 16 mmol/L Final  
Glucose 112 Abnormally high   H  65 - 100 mg/dL Final  
Comment:  
47 - 60 mg/dl Consistent with, but not fully diagnostic of hypoglycemia. 101 - 125 mg/dl Impaired fasting glucose/consistent with pre-diabetes mellitus  
> 126 mg/dl Fasting glucose consistent with overt diabetes mellitus BUN 59 Abnormally high   H  8 - 23 MG/DL Final  
Creatinine 1.91 Abnormally high   H  0.8 - 1.5 MG/DL Final  
GFR est AA 43 Abnormally low   L  >60 ml/min/1.73m2 Final  
GFR est non-AA 36 Abnormally low   L  >60 ml/min/1.73m2 Final  
Comment:  
(NOTE) Estimated GFR is calculated using the Modification of Diet in Renal  
Disease (MDRD) Study equation, reported for both  [facility]) and non- Americans (GFRNA), and normalized to 1.73m2  
body surface area. The physician must decide which value applies to  
the patient. The MDRD study equation should only be used in  
individuals age 25 or older. It has not been validated for the  
following: pregnant women, patients with serious comorbid conditions,  
or on certain medications, or persons with extremes of body size,  
muscle mass, or nutritional status. Calcium 7.9 Abnormally low   L      
 
ALPHA-1-ANTITRYPSIN, TOTAL [[id number]] (Order [id number]) Lab Date: 11/8/2018 Department: [facility] Released By/Authorizing: GUILLERMO Betancourt (auto-released) Component Value Flag Ref Range Units Status Alpha-1 Antitrypsin 195   90 - 200 mg/dL Final  
 
YENI BY MULTIPLEX FLOW IA, QL [[id number]] (Order [id number]) Lab Date: 11/8/2018 Department: [facility] Released By/Authorizing: GUILLERMO Betancourt (auto-released) Component Value Flag Ref Range Units Status YENI, Direct NEGATIVE    NEGATIVE   Final  
 
MITOCHONDRIAL M2 AB [id number] (Order [id number]) Lab Date: 11/8/2018 Department: [facility] [place] [address] Med Surg Released By/Authorizing: GUILLERMO Betancourt (auto-released) Component Value Flag Ref Range Units Status Michochondrial (M2) Ab 45.6 Abnormally high   H  0.0 - 20.0 Units Final  
Comment:  
(NOTE)                                Negative    0.0 - 20.0  
                               Equivocal  20.1 - 24.9  
                               Positive         >24.9 Mitochondrial (M2) Antibodies are found in 90-96% of  
patients with primary biliary cirrhosis. Performed At: [address] [id number] Mat Haney MD [id number] ACTIN (SMOOTH MUSCLE) ANTIBODY [id number] (Order [id number]) Lab Date: 11/8/2018 Department: [place] [id number] Med Surg Released By/Authorizing: GUILLERMO Mota (auto-released) Component Value Flag Ref Range Units Status Actin (Smooth Muscle) Ab 12   0 - 19 Units Final  
Comment:  
(NOTE)                 Negative                     0 - 19  
                Weak positive               20 - 30  
                Moderate to strong positive     >30 Actin Antibodies are found in 52-85% of patients with  
autoimmune hepatitis or chronic active hepatitis and  
in 22% of patients with primary biliary cirrhosis. Performed At: [address] [id number] Mat Haney MD [id number] HEPATIC FUNCTION PANEL [[id number]] (Order [id number]) Lab Date: 11/8/2018 Department: [place] [address] Med Surg Released By/Authorizing: GUILLERMO Mota (auto-released) Component Value Flag Ref Range Units Status Protein, total 5.9 Abnormally low   L  6.3 - 8.2 g/dL Final  
Albumin 2.5 Abnormally low   L  3.2 - 4.6 g/dL Final  
Globulin 3.4   2.3 - 3.5 g/dL Final  
A-G Ratio 0.7 Abnormally low   L  1.2 - 3.5   Final  
Bilirubin, total 1.2 Abnormally high   H  0.2 - 1.1 MG/DL Final  
Bilirubin, direct 0.8 Abnormally high   H  <0.4 MG/DL Final  
Alk.  phosphatase 114   50 - 136 U/L Final  
AST (SGOT) 13 Abnormally low   L  15 - 37 U/L Final  
ALT (SGPT) 21   12 - 65 U/L Final  
 
 CBC W/O DIFF [[id number]] (Order [id number]) Lab Date: 11/7/2018 Department: [place] [facility] Released By/Authorizing: Emy Neely MD (auto-released) Component Value Flag Ref Range Units Status WBC 10.2   4.3 - 11.1 K/uL Final  
RBC 3.33 Abnormally low   L  4.23 - 5.6 M/uL Final  
HGB 8.6 Abnormally low   L  13.6 - 17.2 g/dL Final  
HCT 29.0 Abnormally low   L  41.1 - 50.3 % Final  
MCV 87.1   79.6 - 97.8 FL Final  
MCH 25.8 Abnormally low   L  26.1 - 32.9 PG Final  
MCHC 29.7 Abnormally low   L  31.4 - 35.0 g/dL Final  
RDW 17.7    % Final  
PLATELET 906   636 - 450 K/uL Final  
MPV 12.7 Abnormally high   H  9.4 - 12.3 FL Final  
ABSOLUTE NRBC 0.14   0.0 - 0.2 K/uL Final  
Comment: **Note: Absolute NRBC parameter is now reported with Hemogram**  
 
US abdomen 11-5-2018 IMPRESSION:  
1. Abnormal gallbladder again seen with stones, adenomyomatosis, wall thickening 
similar to prior. 2. Increased biliary dilatation. 3. Cirrhosis. 4. Right renal benign cyst. 
 
XR chest  
11-3-2018 IMPRESSION: Pulmonary edema and right pleural effusion improved. 
  
Left pleural effusion unchanged. 
  
CT chest  
11-1-2018 IMPRESSION:   
1. Moderate right and trace left basilar pleural effusion felt to represent 
sequela of heart failure given the associated moderate to severe cardiomegaly. Additional patchy pulmonary infiltrates are seen which may represent pulmonary 
edema. Lastly, there is diffuse mesenteric edema and small scattered ascites. This could represent additional sequela of fluid overload although these changes 
could also be related to the liver which appears cirrhotic. Disposition: rehab facility Discharge Medications:  
Current Discharge Medication List  
  
START taking these medications Details  
insulin glargine (LANTUS) 100 unit/mL injection 8 Units by SubCUTAneous route nightly for 30 doses. Qty: 2.4 mL, Refills: 0 lansoprazole (PREVACID SOLUTAB) 30 mg disintegrating tablet Take 1 Tab by mouth daily for 30 days. Qty: 30 Tab, Refills: 0 CONTINUE these medications which have CHANGED Details  
oxyCODONE IR (ROXICODONE) 5 mg immediate release tablet Take 1 Tab by mouth every six (6) hours as needed for up to 10 doses. Max Daily Amount: 20 mg. 
Qty: 10 Tab, Refills: 0 Associated Diagnoses: Falls frequently  
  
metoprolol succinate (TOPROL-XL) 25 mg XL tablet Take 1 Tab by mouth daily for 30 days. Qty: 30 Tab, Refills: 0 CONTINUE these medications which have NOT CHANGED Details  
furosemide (LASIX) 20 mg tablet Take 1-2 Tabs by mouth daily. Qty: 40 Tab, Refills: 1  
  
albuterol (PROVENTIL HFA, VENTOLIN HFA, PROAIR HFA) 90 mcg/actuation inhaler Take 2 Puffs by inhalation every six (6) hours as needed for Wheezing or Shortness of Breath. Qty: 1 Inhaler, Refills: 0  
  
isosorbide mononitrate ER (IMDUR) 30 mg tablet Take 1 Tab by mouth daily. Qty: 30 Tab, Refills: 5  
  
pravastatin (PRAVACHOL) 40 mg tablet Take 1 Tab by mouth nightly. Qty: 90 Tab, Refills: 3 Associated Diagnoses: Mixed hyperlipidemia  
  
hydrALAZINE (APRESOLINE) 10 mg tablet TAKE 1 TABLET THREE TIMES DAILY. Qty: 90 Tab, Refills: 3  
  
aspirin 81 mg chewable tablet Take 1 Tab by mouth daily. Qty: 90 Tab, Refills: 3  
  
glipiZIDE (GLUCOTROL) 5 mg tablet Take  by mouth two (2) times a day.  
  
gabapentin (NEURONTIN) 100 mg capsule Take  by mouth three (3) times daily. hydrocortisone (PROCTOSOL HC) 2.5 % rectal cream Insert  into rectum every six (6) hours as needed for Hemorrhoids. fluticasone-vilanterol (BREO ELLIPTA) 100-25 mcg/dose inhaler Take 1 Puff by inhalation daily. levothyroxine (SYNTHROID) 50 mcg tablet Take 1 Tab by mouth Daily (before breakfast). Qty: 90 Tab, Refills: 3 OXYGEN-AIR DELIVERY SYSTEMS Take  by inhalation continuous. 2LPM to keep 02 sat >90% bisacodyl (DULCOLAX) 5 mg EC tablet Take 1 Tab by mouth daily. Qty: 15 Tab, Refills: 0  
  
latanoprost (XALATAN) 0.005 % ophthalmic solution Administer 1 Drop to both eyes nightly. sertraline (ZOLOFT) 50 mg tablet Take one tablet each evening for anxiety  Indications: Generalized Anxiety Disorder 
Qty: 30 Tab, Refills: 3  
  
fluticasone (FLONASE) 50 mcg/actuation nasal spray 2 Sprays by Both Nostrils route daily. Qty: 1 Bottle, Refills: 3 cholecalciferol (VITAMIN D3) 1,000 unit cap Take 1,000 Units by mouth daily. albuterol (PROVENTIL VENTOLIN) 2.5 mg /3 mL (0.083 %) nebulizer solution 2.5 mg by Nebulization route every four (4) hours as needed for Wheezing. ipratropium-albuterol (COMBIVENT RESPIMAT)  mcg/actuation inhaler Take 1 Puff by inhalation every six (6) hours. Qty: 3 Inhaler, Refills: 3  
  
ferrous sulfate 324 mg (65 mg iron) tablet Take  by mouth Daily (before breakfast). allopurinol (ZYLOPRIM) 300 mg tablet Take  by mouth daily. cpap machine kit by Does Not Apply route. Bilevel 12/8 STOP taking these medications  
  
 magnesium oxide (MAG-OX) 400 mg (241.3 mg magnesium) tablet Comments:  
Reason for Stopping:   
   
 mupirocin calcium (BACTROBAN) 2 % topical cream Comments:  
Reason for Stopping:   
   
 sAXagliptin (ONGLYZA) 2.5 mg tablet Comments:  
Reason for Stopping:   
   
 potassium chloride SR (K-TAB) 20 mEq tablet Comments:  
Reason for Stopping:   
   
 docusate calcium (SURFAK) 240 mg capsule Comments:  
Reason for Stopping:   
   
 docusate sodium (COLACE) 100 mg capsule Comments:  
Reason for Stopping:   
   
 ondansetron (ZOFRAN ODT) 4 mg disintegrating tablet Comments:  
Reason for Stopping:   
   
 pantoprazole (PROTONIX) 40 mg granules for oral suspension Comments:  
Reason for Stopping:   
   
 calcium carbonate (CALCIUM 600 PO) Comments:  
Reason for Stopping:   
   
 triamcinolone (ARISTOCORT) 0.5 % topical cream Comments: Reason for Stopping:   
   
  
 
 
Activity: Activity as tolerated Diet: Diabetic Diet Wound Care: Keep wound clean and dry Follow-up Appointments Procedures  FOLLOW UP VISIT Appointment in: Two Weeks CNPA [phone] CNPA [phone] Standing Status:   Standing Number of Occurrences:   1 Order Specific Question:   Appointment in Answer: Two Weeks I have discussed the plan of care with patient. Time spent on discharge is 37 minutes. Signed By: Ester Kimball MD   
 November 13, 2018

## 2018-11-13 NOTE — PROGRESS NOTES
Patient keep taking out Cpap face mask. Nurse instructed patient to keep Cpap in place but any time nurse goes out from the room, patient will remove the Cpap mask. Patient is placed on O2 2lpm NC. Will monitor.

## 2018-11-14 NOTE — PROGRESS NOTES
Per [facility] patient discharged to [facility] a Preferred Provider Margarita on 11/13/2018. Patient will be included in weekly care coordination calls, Care Coordinator will send patient discharge disposition to Shellie Lainez RN CCM. This note will not be viewable in [address]

## 2018-11-29 NOTE — PROGRESS NOTES
Bedside and Verbal shift change report given to self (oncoming nurse) by Adore Herman RN (offgoing nurse). Report included the following information SBAR, Kardex, ED Summary, MAR and Cardiac Rhythm a-fib. [No Ischemia] : no Ischemia [___] : [unfilled] [___] : [unfilled] [None] : normal LV function [Severe] : severe mitral regurgitation

## 2018-12-30 PROBLEM — R56.9 SEIZURE (HCC): Status: ACTIVE | Noted: 2018-01-01

## 2018-12-30 NOTE — CONSULTS
[facility] Cardiology Consultation Date of  Admission: 12/30/2018 10:17 AM  
 
Primary Care Physician: Dr. Rivas July Primary Cardiologist: Dr. Jacquelyn Oneil Referring Physician: Dr. Katelynn Sánchez Consulting Physician: Dr. Burton Hdz 
 
CC/Reason for consult: Af with slow ventricular response HPI:  Nayely Garcia is an 80M with h/o AF not on [address] d/t hx of GIB, GERD, HTN, DM (not currently on meds), Cirrhosis, diastolic CHF, CKD, COPD on 4-5LNC continous, BPH, CAD s/p CABG, PAD who presented from [facility] after report of staff witnessing seizure activity x 5-10 min described to ER as tonic clonic. In the ER, workup notable for sow HR in 40's afib, Hgb 7.4, Cr 2.2 (baseline around 1.8-1.9), CXR with bibasilar effusions and CT head negative for acute process. He is lethargic and no family at bedside. Apparently family reported to hospitalist that Pt has had minimal participation in rehab, has had difficulty feeding himself, is usually oriented to self only and has been very sleepy over last few days. Family denies knowledge of any hx of seizures although this is documented on problem list from 2013. He has had two recent hospital admissions. Admitted to [facility] in November for septic shock from PNA with initial bradycardia then some NSVT and he was DC on Toprol XL 25 mg daily. He was discharged to [facility] then admitted to [facility] 12/5-12/14 for pseudomonas UTI, cellulitis, A/C resp failure, urinary retention now w/ alejo and completed Cipro. [facility] Cardiology consulted for slow HR in the 40's. Admit med list didn't list Toprol XL and Pt hasn't been seen by Cardiology since 11/2018 admission. Pt is alert but lethargic and not oriented to place or date/time and cannot recall events. Pt is currently in AF rates 49-68. He denies CP, SOB or palpitations. Past Medical History:  
Diagnosis Date  Atherosclerosis of artery of extremity with ulceration ([facility] [address] 4/17/2015  Atherosclerosis of native arteries of extremity with intermittent claudication ([facility] [address] 4/17/2015  Atopic dermatitis 11/21/2012  Atrial fibrillation ([facility] [address]  CAD (coronary artery disease)  Carotid stenosis, bilateral 11/21/2012 50-79%  3-2010    1. Carotid Doppler (6/1/07): Greater than 70% stenosis in proximal LICA. 50% stenosis in right ICA. 2.  CTA of neck (3/15/10): Occluded distal segments of the vertebral arteries bilaterally. Atherosclerosis of the carotid bulbs bilaterally with a 50% stenosis on the left and a stenosis of less than 30% on the right. Small nodule in the right upper lobe near the apex. 3. CTA (8/29/13):  Less than 30% diameter stenosis of the cervical internal carotid arteries bilaterally.  CHF (congestive heart failure) ([facility] [address] 11/21/2012  Chronic kidney disease   
 hx elevated labs  Chronic obstructive pulmonary disease ([facility] [address]  [facility] 11/7/2018  Colon, diverticulosis 1/24/2015  Coronary atherosclerosis of native coronary vessel 10/31/2016 1. Cath (5/31/07):  LMCA: 25%. LAD: ostial 75-95% stenosis. 50-75% mid. D1:  25-50%. OM3: small with 75% stenosis. RCA: 100% mid. with left to right collaterals. 2 Vessel CABG (6/4/07):  LIMA to LAD and SVG to OM. SVG was anastomosed proximally to LIMA to due severe atherosclerosis of aorta. 3.  Lexiscan cardiolite (11/30/10): Abnormal with reversible lateral wall defects. Unable to gate given atrial fibrillation. 4.  LHC (1/14/11):  EF 60%. LVEDP 21. Patent LIMA to LAD and SVG to OM1 (off LIMA). occluded small RCA with left to right collaterals. Small D1 (2 mm vessel) with 70% mid stenosis.  Diabetes ([address]  Diastolic CHF, acute on chronic (HCC) 9/12/2015 1. Echo (9/11/15) : EF 55-60%. Mild LVH. Moderate biatrial enlargement. Moderate mitral/tricuspid regurgitation.  Failed CABG (coronary artery bypass graft) 11/21/2012  GI bleed 1/2015 Hospitalized [facility]  Gout 11/21/2012  History of tobacco use  [place] (hard of hearing)  Hypercholesterolemia  Hypertension  Hyperuricemia 11/21/2012  Morbid obesity ([address]  PAD (peripheral artery disease) ([facility] [address] 11/21/2012 1. Bilateral proximal common iliac PCI (2/21/08):  8.0 X 100 mm Cordis smart stent on right and 10 X 40 mm cordis smart stent on right. Both inflated to 7.0 mm.  Poor historian  Radiologic findings of lung field, abnormal 10/31/2016 1. CT of chest  (11/24/10): Multiple small nodules in the right lobe and stable interstitial prominence. Consistent with chronic lung disease. No evidence of malignancy.  Seizure disorder ([facility] [address] 8/5/2013  Shortness of breath dyspnea 8/5/2013  Thyroid disease  Unspecified sleep apnea   
 uses CPAP Past Surgical History:  
Procedure Laterality Date  CARDIAC SURG PROCEDURE UNLIST    
 cath 5/07,cabg 6/07,lexiscan cardiolite 11/10  
 COLONOSCOPY N/A 1/27/2017 COLONOSCOPY  BMI 36 TO BE ADMITTED 1/26/17 performed by Candelaria Burks MD at [facility] ENDOSCOPY  
 HX CATARACT REMOVAL Left 2003  
 os  HX COLONOSCOPY    
 HX CORONARY ARTERY BYPASS GRAFT  06-  HX CORONARY STENT PLACEMENT  02-  
 bilateral iliac artery PCI and stents  HX HEART CATHETERIZATION    
 left-05-, 01-  HX HEENT  1970s  
 neck lipoma No Known Allergies Social History Socioeconomic History  Marital status:  Spouse name: Not on file  Number of children: Not on file  Years of education: Not on file  Highest education level: Not on file Social Needs  Financial resource strain: Not on file  Food insecurity - worry: Not on file  Food insecurity - inability: Not on file  Transportation needs - medical: Not on file  Transportation needs - non-medical: Not on file Occupational History  Occupation: [employer] industry. 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  
  Smoking status: Former Smoker Packs/day: 1.00 Years: 45.00 Pack years: 45.00 Types: Cigarettes Last attempt to quit: 1984 Years since quittin.9  Smokeless tobacco: Never Used  Tobacco comment: (stopped smoking in ) Substance and Sexual Activity  Alcohol use: No  
  Alcohol/week: 0.0 oz  Drug use: No  
 Sexual activity: Not Currently Other Topics Concern  Not on file Social History Narrative 45 pack year history cigarette smoking, stopped in . 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 , two living children, two children  with coronary artery disease, ages 36 and 48. [place] [facility]. No pets. Family History Problem Relation Age of Onset  Heart Attack Mother [facility] Other Father   
     old age  Other Brother   
     brain aneurysm  Heart Disease Other 2 children  with heart concerns, 36 & 47 yo  
 Heart Disease Son   
  
 
Current Facility-Administered Medications Medication Dose Route Frequency  levETIRAcetam (KEPPRA) 500 mg in 0.9% sodium chloride 100 mL IVPB  500 mg IntraVENous Q12H  
 tuberculin injection 5 Units  5 Units IntraDERMal ONCE  
 albuterol (PROVENTIL VENTOLIN) nebulizer solution 2.5 mg  2.5 mg Nebulization Q4H PRN  
 [START ON 2018] allopurinol (ZYLOPRIM) tablet 300 mg  300 mg Oral DAILY  [START ON 2018] aspirin chewable tablet 81 mg  81 mg Oral DAILY  [START ON 2018] cholecalciferol (VITAMIN D3) tablet 1,000 Units  1,000 Units Oral DAILY  [START ON 2018] ferrous sulfate tablet 325 mg  1 Tab Oral DAILY WITH BREAKFAST  [START ON 2018] fluticasone (FLONASE) 50 mcg/actuation nasal spray 2 Spray  2 Spray Both Nostrils DAILY  [START ON 2018] furosemide (LASIX) tablet 40 mg  40 mg Oral BID  
 gabapentin (NEURONTIN) capsule 200 mg  200 mg Oral TID  insulin lispro (HUMALOG) injection   SubCUTAneous AC&HS  latanoprost (XALATAN) 0.005 % ophthalmic solution 1 Drop  1 Drop Both Eyes QHS  [START ON 12/31/2018] levothyroxine (SYNTHROID) tablet 50 mcg  50 mcg Oral ACB  pravastatin (PRAVACHOL) tablet 40 mg  40 mg Oral QHS  [START ON 12/31/2018] sertraline (ZOLOFT) tablet 50 mg  50 mg Oral DAILY  [START ON 12/31/2018] pantoprazole (PROTONIX) 40 mg in sodium chloride 0.9% 10 mL injection  40 mg IntraVENous DAILY  sodium chloride (NS) flush 5-10 mL  5-10 mL IntraVENous Q8H  
 sodium chloride (NS) flush 5-10 mL  5-10 mL IntraVENous PRN  
 acetaminophen (TYLENOL) tablet 650 mg  650 mg Oral Q4H PRN  
 ondansetron (ZOFRAN) injection 4 mg  4 mg IntraVENous Q4H PRN  
 budesonide (PULMICORT) 500 mcg/2 ml nebulizer suspension  500 mcg Nebulization BID RT  
 albuterol-ipratropium (DUO-NEB) 2.5 MG-0.5 MG/3 ML  3 mL Nebulization Q6H RT  
 
 
Review of symptoms: 
Unable to obtain secondary to pt condition Subjective:  
Physical Exam 
 
Visit Vitals BP 96/50 (BP 1 Location: Left arm, BP Patient Position: At rest) Pulse (!) 49 Temp 97.9 °F (36.6 °C) Resp 23 Ht 6' 2\" (1.88 m) Wt 108 kg (238 lb) SpO2 100% BMI 30.56 kg/m² General Appearance:  Well developed, appears chronically ill, alert, drowsy Ears/Nose/Mouth/Throat:   Hearing grossly normal. 
  
    Neck: Supple. Chest:   Lungs clear to auscultation bilaterally. Cardiovascular:  Irregular rate and rhythm, S1, S2, slow Abdomen:   Soft, non-tender, bowel sounds are active. Extremities: No edema bilaterally. Skin: Warm and dry. Cardiographics Telemetry: AFIB 
ECG: atrial fibrillation, rate 44 Echocardiogram: SFD 11/5/18 EF 50-55% JAY, most recently done at [address] 12/9 showing EF 45-50% Labs:  
Recent Results (from the past 24 hour(s)) EKG, 12 LEAD, INITIAL Collection Time: 12/30/18 10:24 AM  
Result Value Ref Range  Ventricular Rate 44 BPM  
 Atrial Rate 220 BPM  
 QRS Duration 108 ms Q-T Interval 476 ms QTC Calculation (Bezet) 406 ms Calculated R Axis 50 degrees Calculated T Axis -153 degrees Diagnosis    
  !! AGE AND GENDER SPECIFIC ECG ANALYSIS !! Atrial fibrillation with slow ventricular response Septal infarct , age undetermined Abnormal ECG When compared with ECG of 31-OCT-2018 18:59, 
Vent. rate has decreased BY  31 BPM 
Nonspecific T wave abnormality now evident in Anterior leads Nonspecific T wave abnormality has replaced inverted T waves in Lateral leads CBC WITH AUTOMATED DIFF Collection Time: 12/30/18 10:26 AM  
Result Value Ref Range WBC 6.0 4.3 - 11.1 K/uL  
 RBC 2.93 (L) 4.23 - 5.6 M/uL HGB 7.4 (L) 13.6 - 17.2 g/dL HCT 26.0 (L) 41.1 - 50.3 % MCV 88.7 79.6 - 97.8 FL  
 MCH 25.3 (L) 26.1 - 32.9 PG  
 MCHC 28.5 (L) 31.4 - 35.0 g/dL  
 RDW 19.3 (H) 11.9 - 14.6 % PLATELET 857 (L) 924 - 450 K/uL MPV 12.5 (H) 9.4 - 12.3 FL ABSOLUTE NRBC 0.00 0.0 - 0.2 K/uL  
 DF AUTOMATED NEUTROPHILS 77 43 - 78 % LYMPHOCYTES 14 13 - 44 % MONOCYTES 7 4.0 - 12.0 % EOSINOPHILS 1 0.5 - 7.8 % BASOPHILS 0 0.0 - 2.0 % IMMATURE GRANULOCYTES 1 0.0 - 5.0 %  
 ABS. NEUTROPHILS 4.6 1.7 - 8.2 K/UL  
 ABS. LYMPHOCYTES 0.8 0.5 - 4.6 K/UL  
 ABS. MONOCYTES 0.4 0.1 - 1.3 K/UL  
 ABS. EOSINOPHILS 0.1 0.0 - 0.8 K/UL  
 ABS. BASOPHILS 0.0 0.0 - 0.2 K/UL  
 ABS. IMM. GRANS. 0.0 0.0 - 0.5 K/UL METABOLIC PANEL, COMPREHENSIVE Collection Time: 12/30/18 10:26 AM  
Result Value Ref Range Sodium 138 136 - 145 mmol/L Potassium 3.9 3.5 - 5.1 mmol/L Chloride 94 (L) 98 - 107 mmol/L  
 CO2 37 (H) 21 - 32 mmol/L Anion gap 7 7 - 16 mmol/L Glucose 123 (H) 65 - 100 mg/dL BUN 50 (H) 8 - 23 MG/DL Creatinine 2.27 (H) 0.8 - 1.5 MG/DL  
 GFR est AA 35 (L) >60 ml/min/1.73m2 GFR est non-AA 29 (L) >60 ml/min/1.73m2 Calcium 7.8 (L) 8.3 - 10.4 MG/DL  Bilirubin, total 1.3 (H) 0.2 - 1.1 MG/DL  
 ALT (SGPT) 8 (L) 12 - 65 U/L  
 AST (SGOT) 9 (L) 15 - 37 U/L Alk. phosphatase 70 50 - 136 U/L Protein, total 6.0 (L) 6.3 - 8.2 g/dL Albumin 2.4 (L) 3.2 - 4.6 g/dL Globulin 3.6 (H) 2.3 - 3.5 g/dL A-G Ratio 0.7 (L) 1.2 - 3.5 TSH 3RD GENERATION Collection Time: 12/30/18 10:26 AM  
Result Value Ref Range TSH 10.100 (H) 0.358 - 3.740 uIU/mL  
IRON Collection Time: 12/30/18 10:26 AM  
Result Value Ref Range Iron 20 (L) 35 - 150 ug/dL FERRITIN Collection Time: 12/30/18 10:26 AM  
Result Value Ref Range Ferritin 49 8 - 388 NG/ML  
TRANSFERRIN Collection Time: 12/30/18 10:26 AM  
Result Value Ref Range Transferrin 191 (L) 202 - 364 mg/dL POC LACTIC ACID Collection Time: 12/30/18 10:28 AM  
Result Value Ref Range Lactic Acid (POC) 1.45 0.5 - 1.9 mmol/L  
URINE MICROSCOPIC Collection Time: 12/30/18 12:07 PM  
Result Value Ref Range WBC >100 0 /hpf  
 RBC 10-20 0 /hpf Epithelial cells 10-20 0 /hpf Bacteria TRACE 0 /hpf Casts GRANULAR 0 /lpf Crystals, urine 0 0 /LPF Mucus 0 0 /lpf Yeast MODERATE    
GLUCOSE, POC Collection Time: 12/30/18  4:52 PM  
Result Value Ref Range Glucose (POC) 127 (H) 65 - 100 mg/dL Pt has been seen and examined by Dr. Codi Murphy. He agrees with the following assessment and plan. Assessment/Plan:  
  
1) Seizure - Keppra. EEG, MRI, neuro consult per primary team.  
  
2)  Afib w/ slow ventricular response -no OAC d/t h/o GIB, possible SSS- monitor on telemetry, stop Coreg 3.125mg BID that was listed on STR MAR- no rate slowing meds   
3) Chronic resp failure/ COPD - supplemental oxygen. Wears 4-5 LNC cont at baseline   
4) CKD stage 3 - Cr 2.2 slightly worse than recent baseline around 1.9 
  
5) Diastolic CHF - w/ bibasilar effusions, continue PO lasix   
6) H/o CAD - asymptomatic.  Asa/statin 
  
7) Cirrhosis - new dx as of Nov 2018, w/ thrombocytopenia- per primary team/GI.  
  
 8) Anemia - h/o GIB-- hgb 7.4, monitor per primary team. Cont PPI 
  
Pt was bradycardic at admission for sepsis in 11/2018- likely UTI sepsis again contributing Code Status: DNR as d/w family at bedside Thank you for consulting [address] Cardiology and allowing us to participate in the care of this patient. We will continue to follow along with you.  
 
Soniya Benz PA-C

## 2018-12-30 NOTE — H&P
HOSPITALIST H&P/CONSULTNAME:  Lisa Guillaume Age:  80 y.o. 
:   1932 MRN:   [id number] PCP: Reymundo Shepherd MD 
Consulting MD: Treatment Team: Attending Provider: Narda Saab DO 
HPI:  
Patient 14P with hx of afib not on [address] d/t hx of GIB, GERD, HTN, hx of DM (not currently on meds), Cirrhosis, diastolic CHF, CKD, COPD on 4-5LNC cont, BPH, CAD s/p CABG, PAD presents from [facility] after report of staff witnessing seizure activity x 5 min described to ER as tonic clonic. No facility members present during my interview. ER workup notable for pt bradycardic HR in 40's afib. Hgb 7.4, Cr 2.2 (baseline around 1.8-1.9). CXR with bibasilar effusions. 100% on 6lnc. CT head non acute Pt is lethargic. Family at bedside states that he has had minimal participation in rehab, has had difficulty feeding himself, is usually oriented to self only and has been very sleepy over last few days. Family denies knowledge of any hx of seizures although this is documented on problem list from 2013 but no details found. Hospitalist asked to admit for seizure disorder. Of note, has had two recent hospital admissions. Admitted to [facility] in November for septic shock from PNA. Had tachycardia and NSVT then and toprol doses increased. Also found to have dx cirrhosis at that time. Was discharged to [facility] then admitted to [facility] - for pseudomonas uti, cellulitis, ach/chronic resp failure, urinary retention now w/ alejo. Has completed course of cipro. Complete ROS done and is as stated in HPI or otherwise negative Past Medical History:  
Diagnosis Date  Atherosclerosis of artery of extremity with ulceration ([facility] 2015  Atherosclerosis of native arteries of extremity with intermittent claudication ([facility] 2015  Atopic dermatitis 2012  Atrial fibrillation ([facility]  CAD (coronary artery disease)  Carotid stenosis, bilateral 2012 50-79%  3-2010    1. Carotid Doppler (6/1/07): Greater than 70% stenosis in proximal LICA. 50% stenosis in right ICA. 2.  CTA of neck (3/15/10): Occluded distal segments of the vertebral arteries bilaterally. Atherosclerosis of the carotid bulbs bilaterally with a 50% stenosis on the left and a stenosis of less than 30% on the right. Small nodule in the right upper lobe near the apex. 3. CTA (8/29/13):  Less than 30% diameter stenosis of the cervical internal carotid arteries bilaterally.  CHF (congestive heart failure) ([address] 11/21/2012  Chronic kidney disease   
 hx elevated labs  Chronic obstructive pulmonary disease ([address]  Cirrhosis ([address] 11/7/2018  Colon, diverticulosis 1/24/2015  Coronary atherosclerosis of native coronary vessel 10/31/2016 1. Cath (5/31/07):  LMCA: 25%. LAD: ostial 75-95% stenosis. 50-75% mid. D1:  25-50%. OM3: small with 75% stenosis. RCA: 100% mid. with left to right collaterals. 2 Vessel CABG (6/4/07):  LIMA to LAD and SVG to OM. SVG was anastomosed proximally to LIMA to due severe atherosclerosis of aorta. 3.  Lexiscan cardiolite (11/30/10): Abnormal with reversible lateral wall defects. Unable to gate given atrial fibrillation. 4.  LHC (1/14/11):  EF 60%. LVEDP 21. Patent LIMA to LAD and SVG to OM1 (off LIMA). occluded small RCA with left to right collaterals. Small D1 (2 mm vessel) with 70% mid stenosis.  Diabetes ([address]  Diastolic CHF, acute on chronic (HCC) 9/12/2015 1. Echo (9/11/15) : EF 55-60%. Mild LVH. Moderate biatrial enlargement. Moderate mitral/tricuspid regurgitation.  Failed CABG (coronary artery bypass graft) 11/21/2012  GI bleed 1/2015 Hospitalized [facility]  Gout 11/21/2012  History of tobacco use  [place] (hard of hearing)  Hypercholesterolemia  Hypertension  Hyperuricemia 11/21/2012  Morbid obesity ([address]  PAD (peripheral artery disease) ([address] 11/21/2012 1.  Bilateral proximal common iliac PCI (08):  8.0 X 100 mm Cordis smart stent on right and 10 X 40 mm cordis smart stent on right. Both inflated to 7.0 mm.  Poor historian  Radiologic findings of lung field, abnormal 10/31/2016 1. CT of chest  (11/24/10): Multiple small nodules in the right lobe and stable interstitial prominence. Consistent with chronic lung disease. No evidence of malignancy.  Seizure disorder ([address] 2013  Shortness of breath dyspnea 2013  Thyroid disease  Unspecified sleep apnea   
 uses CPAP Past Surgical History:  
Procedure Laterality Date  CARDIAC SURG PROCEDURE UNLIST    
 cath ,cabg ,lexiscan cardiolite 11/10  
 COLONOSCOPY N/A 2017 COLONOSCOPY  BMI 36 TO BE ADMITTED 17 performed by Ace Goldberg MD at [facility] ENDOSCOPY  
 HX CATARACT REMOVAL Left   
 os  HX COLONOSCOPY    
 HX CORONARY ARTERY BYPASS GRAFT  2007  HX CORONARY STENT PLACEMENT  2008  
 bilateral iliac artery PCI and stents  HX HEART CATHETERIZATION    
 left-2007, 2011  HX HEENT  1970s  
 neck lipoma Prior to Admission Medications Prescriptions Last Dose Informant Patient Reported? Taking? OXYGEN-AIR DELIVERY SYSTEMS   Yes No  
Sig: Take  by inhalation continuous. 2LPM to keep 02 sat >90%  
albuterol (PROVENTIL HFA, VENTOLIN HFA, PROAIR HFA) 90 mcg/actuation inhaler   No No  
Sig: Take 2 Puffs by inhalation every six (6) hours as needed for Wheezing or Shortness of Breath. albuterol (PROVENTIL VENTOLIN) 2.5 mg /3 mL (0.083 %) nebulizer solution   Yes No  
Si.5 mg by Nebulization route every four (4) hours as needed for Wheezing. allopurinol (ZYLOPRIM) 300 mg tablet   Yes No  
Sig: Take  by mouth daily. aspirin 81 mg chewable tablet   No No  
Sig: Take 1 Tab by mouth daily. bisacodyl (DULCOLAX) 5 mg EC tablet   No No  
Sig: Take 1 Tab by mouth daily. Patient taking differently: Take 5 mg by mouth every evening. cholecalciferol (VITAMIN D3) 1,000 unit cap   Yes No  
Sig: Take 1,000 Units by mouth daily. cpap machine kit   Yes No  
Sig: by Does Not Apply route. Bilevel   
ferrous sulfate 324 mg (65 mg iron) tablet   Yes No  
Sig: Take  by mouth Daily (before breakfast). fluticasone (FLONASE) 50 mcg/actuation nasal spray   No No  
Si Sprays by Both Nostrils route daily. fluticasone-vilanterol (BREO ELLIPTA) 100-25 mcg/dose inhaler   Yes No  
Sig: Take 1 Puff by inhalation daily. furosemide (LASIX) 20 mg tablet   No No  
Sig: Take 1-2 Tabs by mouth daily. gabapentin (NEURONTIN) 100 mg capsule   Yes No  
Sig: Take  by mouth three (3) times daily. glipiZIDE (GLUCOTROL) 5 mg tablet   Yes No  
Sig: Take  by mouth two (2) times a day. hydrALAZINE (APRESOLINE) 10 mg tablet   No No  
Sig: TAKE 1 TABLET THREE TIMES DAILY. hydrocortisone (PROCTOSOL HC) 2.5 % rectal cream   Yes No  
Sig: Insert  into rectum every six (6) hours as needed for Hemorrhoids. ipratropium-albuterol (COMBIVENT RESPIMAT)  mcg/actuation inhaler   No No  
Sig: Take 1 Puff by inhalation every six (6) hours. isosorbide mononitrate ER (IMDUR) 30 mg tablet   No No  
Sig: Take 1 Tab by mouth daily. latanoprost (XALATAN) 0.005 % ophthalmic solution   Yes No  
Sig: Administer 1 Drop to both eyes nightly. levothyroxine (SYNTHROID) 50 mcg tablet   No No  
Sig: Take 1 Tab by mouth Daily (before breakfast). oxyCODONE IR (ROXICODONE) 5 mg immediate release tablet   No No  
Sig: Take 1 Tab by mouth every six (6) hours as needed for up to 10 doses. Max Daily Amount: 20 mg.  
pravastatin (PRAVACHOL) 40 mg tablet   No No  
Sig: Take 1 Tab by mouth nightly. sertraline (ZOLOFT) 50 mg tablet   No No  
Sig: Take one tablet each evening for anxiety  Indications: Generalized Anxiety Disorder Facility-Administered Medications: None No Known Allergies Social History Tobacco Use  Smoking status: Former Smoker Packs/day: 1.00 Years: 45.00 Pack years: 45.00 Types: Cigarettes Last attempt to quit: 1984 Years since quittin.9  Smokeless tobacco: Never Used  Tobacco comment: (stopped smoking in ) Substance Use Topics  Alcohol use: No  
  Alcohol/week: 0.0 oz Family History Problem Relation Age of Onset  Heart Attack Mother Josephine Other Father   
     old age  Other Brother   
     brain aneurysm  Heart Disease Other 2 children  with heart concerns, 36 & 47 yo  
 Heart Disease Son Objective:  
 
Visit Vitals /73 Pulse (!) 44 Temp 97.3 °F (36.3 °C) Resp 23 Ht 6' 2\" (1.88 m) Wt 108 kg (238 lb) SpO2 100% BMI 30.56 kg/m² Temp (24hrs), Av.3 °F (36.3 °C), Min:97.3 °F (36.3 °C), Max:97.3 °F (36.3 °C) Oxygen Therapy O2 Sat (%): 100 % (18 1431) Pulse via Oximetry: 46 beats per minute (18 1431) O2 Device: Nasal cannula (18 1042) O2 Flow Rate (L/min): 6 l/min (18 1042) Physical Exam: 
General:    Lethargic, oriented to self only, follows some commands Head:   Normocephalic, without obvious abnormality, atraumatic. Nose:  Nares normal. No drainage or sinus tenderness. Lungs:   Diminished at bases Heart:   Regular rate and rhythm,  no murmur, rub or gallop. Abdomen:   Soft, non-tender. Not distended. Bowel sounds normal.  
Extremities: No cyanosis. No edema. No clubbing Skin:     Texture, turgor normal. No rashes or lesions. Not Jaundiced Neurologic: Lethargic, Non focal  
Data Review:  
Recent Results (from the past 24 hour(s)) EKG, 12 LEAD, INITIAL Collection Time: 18 10:24 AM  
Result Value Ref Range Ventricular Rate 44 BPM  
 Atrial Rate 220 BPM  
 QRS Duration 108 ms Q-T Interval 476 ms QTC Calculation (Bezet) 406 ms Calculated R Axis 50 degrees Calculated T Axis -153 degrees Diagnosis !! AGE AND GENDER SPECIFIC ECG ANALYSIS !! Atrial fibrillation with slow ventricular response Septal infarct , age undetermined Abnormal ECG When compared with ECG of 31-OCT-2018 18:59, 
Vent. rate has decreased BY  31 BPM 
Nonspecific T wave abnormality now evident in Anterior leads Nonspecific T wave abnormality has replaced inverted T waves in Lateral leads CBC WITH AUTOMATED DIFF Collection Time: 12/30/18 10:26 AM  
Result Value Ref Range WBC 6.0 4.3 - 11.1 K/uL  
 RBC 2.93 (L) 4.23 - 5.6 M/uL HGB 7.4 (L) 13.6 - 17.2 g/dL HCT 26.0 (L) 41.1 - 50.3 % MCV 88.7 79.6 - 97.8 FL  
 MCH 25.3 (L) 26.1 - 32.9 PG  
 MCHC 28.5 (L) 31.4 - 35.0 g/dL  
 RDW 19.3 (H) 11.9 - 14.6 % PLATELET 773 (L) 524 - 450 K/uL MPV 12.5 (H) 9.4 - 12.3 FL ABSOLUTE NRBC 0.00 0.0 - 0.2 K/uL  
 DF AUTOMATED NEUTROPHILS 77 43 - 78 % LYMPHOCYTES 14 13 - 44 % MONOCYTES 7 4.0 - 12.0 % EOSINOPHILS 1 0.5 - 7.8 % BASOPHILS 0 0.0 - 2.0 % IMMATURE GRANULOCYTES 1 0.0 - 5.0 %  
 ABS. NEUTROPHILS 4.6 1.7 - 8.2 K/UL  
 ABS. LYMPHOCYTES 0.8 0.5 - 4.6 K/UL  
 ABS. MONOCYTES 0.4 0.1 - 1.3 K/UL  
 ABS. EOSINOPHILS 0.1 0.0 - 0.8 K/UL  
 ABS. BASOPHILS 0.0 0.0 - 0.2 K/UL  
 ABS. IMM. GRANS. 0.0 0.0 - 0.5 K/UL METABOLIC PANEL, COMPREHENSIVE Collection Time: 12/30/18 10:26 AM  
Result Value Ref Range Sodium 138 136 - 145 mmol/L Potassium 3.9 3.5 - 5.1 mmol/L Chloride 94 (L) 98 - 107 mmol/L  
 CO2 37 (H) 21 - 32 mmol/L Anion gap 7 7 - 16 mmol/L Glucose 123 (H) 65 - 100 mg/dL BUN 50 (H) 8 - 23 MG/DL Creatinine 2.27 (H) 0.8 - 1.5 MG/DL  
 GFR est AA 35 (L) >60 ml/min/1.73m2 GFR est non-AA 29 (L) >60 ml/min/1.73m2 Calcium 7.8 (L) 8.3 - 10.4 MG/DL Bilirubin, total 1.3 (H) 0.2 - 1.1 MG/DL  
 ALT (SGPT) 8 (L) 12 - 65 U/L  
 AST (SGOT) 9 (L) 15 - 37 U/L Alk. phosphatase 70 50 - 136 U/L Protein, total 6.0 (L) 6.3 - 8.2 g/dL Albumin 2.4 (L) 3.2 - 4.6 g/dL Globulin 3.6 (H) 2.3 - 3.5 g/dL A-G Ratio 0.7 (L) 1.2 - 3.5 POC LACTIC ACID Collection Time: 12/30/18 10:28 AM  
Result Value Ref Range Lactic Acid (POC) 1.45 0.5 - 1.9 mmol/L  
URINE MICROSCOPIC Collection Time: 12/30/18 12:07 PM  
Result Value Ref Range WBC >100 0 /hpf  
 RBC 10-20 0 /hpf Epithelial cells 10-20 0 /hpf Bacteria TRACE 0 /hpf Casts GRANULAR 0 /lpf Crystals, urine 0 0 /LPF Mucus 0 0 /lpf Yeast MODERATE Imaging She Varma Gray Assessment and Plan: Active Hospital Problems Diagnosis Date Noted  Se[facility] ([facility] 12/30/2018  Cirrhosis [facility] 11/07/2018  Pleural effusion 11/02/2018  Type 2 diabetes with nephropathy ([facility] 02/12/2018  CKD (chronic kidney disease) stage 3, GFR 30-59 ml/min ([facility]) 09/18/2013  Persistent atrial fibrillation ([facility] 11/21/2012 Coumadin therapy discontinued due to recurrent GI bleeding.  Essential hypertension 11/21/2012 A/P 
- seizures - uncertain if hx of same. Keppra. EEG, MRI, neuro consult. CT head non acute. Check ammonia, tsh.  
 
- Afib w/ bradycardia -off OAC d/t hx of GIB.  nsvt at Nov admission. ?tachy/farhad. Consult cardiology. Monitor on tele. Coreg 3.125mg bid dose listed on STR MAR, holding - Chronic resp failure/ COPD - supplemental oxygen. Wears 4-5 LNC cont at baseline. BD's resumed 
 
- CKD stage 3 - Cr 2.2 slightly worse than recent baseline around 1.9 
 
- Diastolic CHF - w/ bibasilar effusions. Will resume oral lasix dose if BP will tolerate. - CAD - asymptomatic. Asa/statin - Cirrhosis - new dx as of Nov 2018, w/ thrombocytopenia.  
 
- DM2 - holding orals, cont SSI 
 
- HTN  - holding meds except lasix 
 
- PAIN mgmt - uncertain what pt uses oxy IR prn for. Holding for now. Resume if needed and when VS would allow. - Anemia - w/ hx of GIB. hgb 7.4 slightly below baseline. Repeat q12 and transfuse if drops any lower. Check occult and iron studies.  Cont PPI 
 - dysphagia - recent hx of this per family report. Pureed diet and slp eval. 
 
- pyuria - in setting of chronic alejo. Place new alejo and repeat UA/culture. Atbx if warranted Code Status: DNR as d/w family at bedside Anticipated discharge: 3-4 days Signed By: Rachel Pablo DO   
 December 30, 2018

## 2018-12-30 NOTE — ED PROVIDER NOTES
History is obtained from EMS chiefly. They state that the patient is in a nursing home and reportedly had a seizure lasting a more from 5-10 minutes today. He was hypoxic afterwards, though he is supposed to be on oxygen around the clock. EMS was called for his seizure and he was brought here. The family states that he recently had a prolonged admission at [facility] for sepsis. He went home a few weeks ago. The patient also has a history of congestive heart failure. I do not know anything about the events earlier today. The patient has no recollection of the events. Elements of this note were created using speech recognition software. As such, errors of speech recognition may be present. Past Medical History:  
Diagnosis Date  Atherosclerosis of artery of extremity with ulceration ([address] 4/17/2015  Atherosclerosis of native arteries of extremity with intermittent claudication ([address] 4/17/2015  Atopic dermatitis 11/21/2012  Atrial fibrillation ([address]  CAD (coronary artery disease)  Carotid stenosis, bilateral 11/21/2012 50-79%  3-2010    1. Carotid Doppler (6/1/07): Greater than 70% stenosis in proximal LICA. 50% stenosis in right ICA. 2.  CTA of neck (3/15/10): Occluded distal segments of the vertebral arteries bilaterally. Atherosclerosis of the carotid bulbs bilaterally with a 50% stenosis on the left and a stenosis of less than 30% on the right. Small nodule in the right upper lobe near the apex. 3. CTA (8/29/13):  Less than 30% diameter stenosis of the cervical internal carotid arteries bilaterally.  CHF (congestive heart failure) ([address] 11/21/2012  Chronic kidney disease   
 hx elevated labs  Chronic obstructive pulmonary disease ([address]  Cirrhosis ([address] 11/7/2018  Colon, diverticulosis 1/24/2015  Coronary atherosclerosis of native coronary vessel 10/31/2016 1. Cath (5/31/07):  LMCA: 25%. LAD: ostial 75-95% stenosis. 50-75% mid. D1:  25-50%. OM3: small with 75% stenosis. RCA: 100% mid. with left to right collaterals. 2 Vessel CABG (6/4/07):  LIMA to LAD and SVG to OM. SVG was anastomosed proximally to LIMA to due severe atherosclerosis of aorta. 3.  Lexiscan cardiolite (11/30/10): Abnormal with reversible lateral wall defects. Unable to gate given atrial fibrillation. 4.  LHC (1/14/11):  EF 60%. LVEDP 21. Patent LIMA to LAD and SVG to OM1 (off LIMA). occluded small RCA with left to right collaterals. Small D1 (2 mm vessel) with 70% mid stenosis.  Diabetes ([address]  Diastolic CHF, acute on chronic (HCC) 9/12/2015 1. Echo (9/11/15) : EF 55-60%. Mild LVH. Moderate biatrial enlargement. Moderate mitral/tricuspid regurgitation.  Failed CABG (coronary artery bypass graft) 11/21/2012  GI bleed 1/2015 Hospitalized [facility]  Gout 11/21/2012  History of tobacco use  [place] (hard of hearing)  Hypercholesterolemia  Hypertension  Hyperuricemia 11/21/2012  Morbid obesity ([address]  PAD (peripheral artery disease) ([address] 11/21/2012 1. Bilateral proximal common iliac PCI (2/21/08):  8.0 X 100 mm Cordis smart stent on right and 10 X 40 mm cordis smart stent on right. Both inflated to 7.0 mm.  Poor historian  Radiologic findings of lung field, abnormal 10/31/2016 1. CT of chest  (11/24/10): Multiple small nodules in the right lobe and stable interstitial prominence. Consistent with chronic lung disease. No evidence of malignancy.  Seizure disorder ([address] 8/5/2013  Shortness of breath dyspnea 8/5/2013  Thyroid disease  Unspecified sleep apnea   
 uses CPAP Past Surgical History:  
Procedure Laterality Date  CARDIAC SURG PROCEDURE UNLIST    
 cath 5/07,cabg 6/07,lexiscan cardiolite 11/10  
 COLONOSCOPY N/A 1/27/2017 COLONOSCOPY  BMI 36 TO BE ADMITTED 1/26/17 performed by Gm Llanes MD at [facility] ENDOSCOPY  
 HX CATARACT REMOVAL Left 2003  
 os  HX COLONOSCOPY  HX CORONARY ARTERY BYPASS GRAFT  2007  HX CORONARY STENT PLACEMENT  2008  
 bilateral iliac artery PCI and stents  HX HEART CATHETERIZATION    
 left-2007, 2011  HX HEENT  1970s  
 neck lipoma Family History:  
Problem Relation Age of Onset  Heart Attack Mother Karma Whitehead Other Father   
     old age  Other Brother   
     brain aneurysm  Heart Disease Other 2 children  with heart concerns, 36 & 49 yo  
 Heart Disease Son   
 
 
Social History Socioeconomic History  Marital status:  Spouse name: Not on file  Number of children: Not on file  Years of education: Not on file  Highest education level: Not on file Social Needs  Financial resource strain: Not on file  Food insecurity - worry: Not on file  Food insecurity - inability: Not on file  Transportation needs - medical: Not on file  Transportation needs - non-medical: Not on file Occupational History  Occupation: [employer]. 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  
  Alcohol/week: 0.0 oz  Drug use: No  
 Sexual activity: Not Currently Other Topics Concern  Not on file Social History Narrative 45 pack year history cigarette smoking, stopped in . 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 , two living children, two children  with coronary artery disease, ages 36 and 48. Has always lived in [address] No pets. ALLERGIES: Patient has no known allergies. Review of Systems Constitutional: Negative for chills and fever. Gastrointestinal: Negative for nausea and vomiting. All other systems reviewed and are negative. Vitals:  
 12/30/18 1020 BP: 112/54 Pulse: (!) 41 Resp: 18 Temp: 97.3 °F (36.3 °C) SpO2: 99% Weight: 108 kg (238 lb) Height: 6' 2\" (1.88 m) Physical Exam  
Constitutional: He appears well-developed and well-nourished. HENT:  
Head: Normocephalic and atraumatic. Eyes: Conjunctivae are normal. Pupils are equal, round, and reactive to light. Neck: Normal range of motion. Neck supple. Cardiovascular: Normal rate, regular rhythm and normal heart sounds. Pulmonary/Chest: Effort normal and breath sounds normal.  
Abdominal: Soft. Bowel sounds are normal.  
Musculoskeletal: Normal range of motion. Slight left lower extremity edema. Chronic changes consistent with peripheral vascular disease Neurological: He is alert. No cranial nerve deficit. He exhibits normal muscle tone. Skin: Skin is warm and dry. Nursing note and vitals reviewed. MDM Number of Diagnoses or Management Options Atrial fibrillation, unspecified type [facility]):  
Bradycardia: new and requires workup Debility:  
Seizure [facility]):  
Diagnosis management comments: 10:37 AM additional history obtained from EMS and the family, we'll get CT scan of his head 2:46 PM discussed results with family. I spoke with Dr. Neela Boone, to see patient for admission. His bradycardia is new Amount and/or Complexity of Data Reviewed Clinical lab tests: ordered and reviewed Tests in the radiology section of CPT®: ordered and reviewed Decide to obtain previous medical records or to obtain history from someone other than the patient: yes Obtain history from someone other than the patient: yes Review and summarize past medical records: yes Discuss the patient with other providers: yes Risk of Complications, Morbidity, and/or Mortality Presenting problems: moderate Diagnostic procedures: moderate Management options: moderate Patient Progress Patient progress: stable Procedures

## 2018-12-30 NOTE — ROUTINE PROCESS
TRANSFER - OUT REPORT: 
 
Verbal report given to Zurdo Iqbal RN on [facility]  being transferred to [facility] for routine progression of care Report consisted of patients Situation, Background, Assessment and  
Recommendations(SBAR). Information from the following report(s) ED Summary was reviewed with the receiving nurse. Lines:  
Peripheral IV 12/30/18 Right Forearm (Active) Site Assessment Clean, dry, & intact 12/30/2018 10:26 AM  
Phlebitis Assessment 0 12/30/2018 10:26 AM  
Infiltration Assessment 0 12/30/2018 10:26 AM  
Dressing Status Clean, dry, & intact 12/30/2018 10:26 AM  
   
Peripheral IV 12/30/18 Right Antecubital (Active) Site Assessment Clean, dry, & intact 12/30/2018 10:27 AM  
Phlebitis Assessment 0 12/30/2018 10:27 AM  
Infiltration Assessment 0 12/30/2018 10:27 AM  
Dressing Status Clean, dry, & intact 12/30/2018 10:27 AM  
  
 
Opportunity for questions and clarification was provided. Patient transported with: 
 O2 @ 6 liters Monitor RN

## 2018-12-30 NOTE — PROGRESS NOTES
Reviewing notes for spiritual concerns Will continue to follow during this stay. Humza Pierre, staff

## 2018-12-30 NOTE — PROGRESS NOTES
TRANSFER - IN REPORT: 
 
Verbal report received from ANGELA Byrnes on Kory Jones being received from ED for routine progression of care Report consisted of patients Situation, Background, Assessment and Recommendations(SBAR). Information from the following report(s) SBAR, Kardex, [facility] and Cardiac Rhythm Afib was reviewed with the receiving nurse. Opportunity for questions and clarification was provided. Assessment completed upon patients arrival to unit and care assumed.

## 2018-12-30 NOTE — PROGRESS NOTES
Skin assessment completed. Sacrum red nonblanchable several tears on buttocks and sacrum. Allevyn placed with barrier creme - will consult wound. Skin tear right elbow. BUE bruised and mottled - LUE weeping. Dressed right elbow with meplex. Dressed left arm with krelex and xerofrom. BLE mottled and bruised. Dry cracked and weeping. Edema BLE 2plus-3plus. Scrotal edema present. Anasarca present.

## 2018-12-30 NOTE — PROGRESS NOTES
MEWS score noted to be 6 due to heart rate and orientation, which has been documented in the patient's chart since 1726. Clinical Coordinator, Taiwo Gibson RN informed and assessed patient. Patient resting in bed. Will continue to monitor.

## 2018-12-30 NOTE — ED TRIAGE NOTES
Pt arrives ems from [facility] for seizure that occurred this morning and lasted approx 5 minutes per facility. Hx of seizures. Hypotensive initially with ems was 110/60, but dropped en route 36-63 systolic before arrival. Afib w/ HR 50s, no t waves, prolonged QT interval on monitor on truck. Oxygen saturation 94% 4-5L NC, facility states pt was at 89% at the facility, wears 4-5L NC at home chronically. Recent admission and discharge from [facility] in December for urosepsis. Pt confused, alert to person. Lungs slightly congested with ems. Afebrile.

## 2018-12-31 NOTE — PROGRESS NOTES
Chandu came to help with mri safe infusion pump. Infusion pump now working, and patient ready for transport to MRI- transport and MRI called.

## 2018-12-31 NOTE — PROGRESS NOTES
Dual nurse telephone blood consent obtained with patient's wife, Russ Nelson. Consent placed on chart.

## 2018-12-31 NOTE — CONSULTS
Palliative Care Patient: Rachell Weber MRN: [id number]  SSN: [id number] YOB: 1932  Age: 80 y.o. Sex: male Date of Request: 12/31/2018 Date of Consult:  12/31/2018 Reason for Consult:  goals of care Requesting Physician: Dr Liss Sage Assessment/Plan:  
 
Principal Diagnosis:   
Debility, Unspecified  R53.81 Additional Diagnoses: · Altered Mental Status R41.82 
· Failure to Thrive  R62.7 · Fatigue, Lethargy  R53.83 
· Frailty  R54 · Counseling, Encounter for Medical Advice  Z71.9 
· Encounter for Palliative Care  Z51.5 Palliative Performance Scale (PPS): PPS: 50 Medical Decision Making:  
Reviewed and summarized notes from admission to present Discussed case with appropriate providers- Dr Liss Sage Reviewed laboratory and x-ray data from admission to present Pt resting in bed, no distress noted. No family at bedside. Pt is oriented to person and place, but not year. He cannot give details of his hospitalization, but is able to verbalize that he has a lot of things wrong with him. He stated \"I hope you can at least fix some of them. \"  He lives with his wife, Neeraj Mills, and reports that he misses her a lot. He is hopeful he will be able to go home soon (he was at [facility] prior to admission). He went on to say \"I guess it's up to the doctors and God\", meaning if he lives or not. At present, he would like to continue current efforts to improve his status. MRI and EEG pending. Discussed with Dr Liss Sage. Will continue to follow. Will discuss findings with members of the interdisciplinary team.   
 
Thank you for this referral.    
 
  
. 
 
Subjective:  
 
History obtained from:  Patient, Care Provider and Chart Chief Complaint: Bradycardia, seizure, GI bleed, AMS History of Present Illness:  Mr Joe Thorne is an 81 yo  male with PMH of CAD, CHF, CKD, COPD, cirrhosis, DM, HTN, HLD, and other conditions listed below, who presented to the ER from [facility] on 12/30/2018 with c/o seizure activity for 5 minutes. Work up in the ED revealed bradycardia, Hgb 7.4, Cr 2.2, and CXR with bibasilar effusions. CT scan did not reveal any acute abnormalities. Pt was admitted for further management. Pt has been transfused 1 unit of PRBC. Consults for GI and Neurology are pending. Advance Directive: No      
Code Status:  DNR Health Care Power of : No - Patient does not have a [address] Past Medical History:  
Diagnosis Date  Atherosclerosis of artery of extremity with ulceration ([address] 4/17/2015  Atherosclerosis of native arteries of extremity with intermittent claudication ([facility] 4/17/2015  Atopic dermatitis 11/21/2012  Atrial fibrillation ([address]  CAD (coronary artery disease)  Carotid stenosis, bilateral 11/21/2012 50-79%  3-2010    1. Carotid Doppler (6/1/07): Greater than 70% stenosis in proximal LICA. 50% stenosis in right ICA. 2.  CTA of neck (3/15/10): Occluded distal segments of the vertebral arteries bilaterally. Atherosclerosis of the carotid bulbs bilaterally with a 50% stenosis on the left and a stenosis of less than 30% on the right. Small nodule in the right upper lobe near the apex. 3. CTA (8/29/13):  Less than 30% diameter stenosis of the cervical internal carotid arteries bilaterally.  CHF (congestive heart failure) ([address] 11/21/2012  Chronic kidney disease   
 hx elevated labs  Chronic obstructive pulmonary disease ([address]  Cirrhosis ([facility] 11/7/2018  Colon, diverticulosis 1/24/2015  Coronary atherosclerosis of native coronary vessel 10/31/2016 1. Cath (5/31/07):  LMCA: 25%. LAD: ostial 75-95% stenosis. 50-75% mid. D1:  25-50%. OM3: small with 75% stenosis. RCA: 100% mid. with left to right collaterals. 2 Vessel CABG (6/4/07):  LIMA to LAD and SVG to OM. SVG was anastomosed proximally to LIMA to due severe atherosclerosis of aorta. 3.  Lexiscan cardiolite (11/30/10): Abnormal with reversible lateral wall defects. Unable to gate given atrial fibrillation. 4.  LHC (1/14/11):  EF 60%. LVEDP 21. Patent LIMA to LAD and SVG to OM1 (off LIMA). occluded small RCA with left to right collaterals. Small D1 (2 mm vessel) with 70% mid stenosis.  Diabetes ([address]  Diastolic CHF, acute on chronic (HCC) 9/12/2015 1. Echo (9/11/15) : EF 55-60%. Mild LVH. Moderate biatrial enlargement. Moderate mitral/tricuspid regurgitation.  Failed CABG (coronary artery bypass graft) 11/21/2012  GI bleed 1/2015 Hospitalized [facility]  Gout 11/21/2012  History of tobacco use  [place] (hard of hearing)  Hypercholesterolemia  Hypertension  Hyperuricemia 11/21/2012  Morbid obesity ([address]  PAD (peripheral artery disease) ([address] 11/21/2012 1. Bilateral proximal common iliac PCI (2/21/08):  8.0 X 100 mm Cordis smart stent on right and 10 X 40 mm cordis smart stent on right. Both inflated to 7.0 mm.  Poor historian  Radiologic findings of lung field, abnormal 10/31/2016 1. CT of chest  (11/24/10): Multiple small nodules in the right lobe and stable interstitial prominence. Consistent with chronic lung disease. No evidence of malignancy.  Seizure disorder ([address] 8/5/2013  Shortness of breath dyspnea 8/5/2013  Thyroid disease  Unspecified sleep apnea   
 uses CPAP Past Surgical History:  
Procedure Laterality Date  CARDIAC SURG PROCEDURE UNLIST    
 cath 5/07,cabg 6/07,lexiscan cardiolite 11/10  
 COLONOSCOPY N/A 1/27/2017 COLONOSCOPY  BMI 36 TO BE ADMITTED 1/26/17 performed by Juliet Santos MD at [facility] ENDOSCOPY  
 HX CATARACT REMOVAL Left 2003  
 os  HX COLONOSCOPY    
 HX CORONARY ARTERY BYPASS GRAFT  06-  HX CORONARY STENT PLACEMENT  02-  
 bilateral iliac artery PCI and stents  HX HEART CATHETERIZATION    
 left-2007, 2011  HX HEENT  1970s  
 neck lipoma Family History Problem Relation Age of Onset  Heart Attack Mother [facility] Other Father   
     old age  Other Brother   
     brain aneurysm  Heart Disease Other 2 children  with heart concerns, 36 & 47 yo  
 Heart Disease Son   
  
Social History Tobacco Use  Smoking status: Former Smoker Packs/day: 1.00 Years: 45.00 Pack years: 45.00 Types: Cigarettes Last attempt to quit: 1984 Years since quittin.9  Smokeless tobacco: Never Used  Tobacco comment: (stopped smoking in 1980s) Substance Use Topics  Alcohol use: No  
  Alcohol/week: 0.0 oz  
 
Prior to Admission medications Medication Sig Start Date End Date Taking? Authorizing Provider  
oxyCODONE IR (ROXICODONE) 5 mg immediate release tablet Take 1 Tab by mouth every six (6) hours as needed for up to 10 doses. Max Daily Amount: 20 mg. 18   Minor Snow MD  
furosemide (LASIX) 20 mg tablet Take 1-2 Tabs by mouth daily. 10/26/18   Rory Caceres MD  
albuterol (PROVENTIL HFA, VENTOLIN HFA, PROAIR HFA) 90 mcg/actuation inhaler Take 2 Puffs by inhalation every six (6) hours as needed for Wheezing or Shortness of Breath. 10/26/18   Rory Caceres MD  
isosorbide mononitrate ER (IMDUR) 30 mg tablet Take 1 Tab by mouth daily. 18   Doc Echavarria MD  
pravastatin (PRAVACHOL) 40 mg tablet Take 1 Tab by mouth nightly. 18   Doc Echavarria MD  
hydrALAZINE (APRESOLINE) 10 mg tablet TAKE 1 TABLET THREE TIMES DAILY. 18   Doc Echavarria MD  
aspirin 81 mg chewable tablet Take 1 Tab by mouth daily. 18   Seng Patel MD  
glipiZIDE (GLUCOTROL) 5 mg tablet Take  by mouth two (2) times a day. Provider, Historical  
gabapentin (NEURONTIN) 100 mg capsule Take  by mouth three (3) times daily.     Provider, Historical  
 hydrocortisone (PROCTOSOL HC) 2.5 % rectal cream Insert  into rectum every six (6) hours as needed for Hemorrhoids. Provider, Historical  
fluticasone-vilanterol (BREO ELLIPTA) 100-25 mcg/dose inhaler Take 1 Puff by inhalation daily. Other, MD Montez  
levothyroxine (SYNTHROID) 50 mcg tablet Take 1 Tab by mouth Daily (before breakfast). 8/7/18   Karley Echavarria MD  
OXYGEN-AIR DELIVERY SYSTEMS Take  by inhalation continuous. 2LPM to keep 02 sat >90%    Provider, Historical  
bisacodyl (DULCOLAX) 5 mg EC tablet Take 1 Tab by mouth daily. Patient taking differently: Take 5 mg by mouth every evening. 4/25/18   Ck Clayton MD  
latanoprost (XALATAN) 0.005 % ophthalmic solution Administer 1 Drop to both eyes nightly. Provider, Historical  
sertraline (ZOLOFT) 50 mg tablet Take one tablet each evening for anxiety  Indications: Generalized Anxiety Disorder 2/12/18   Karley Echavarria MD  
fluticasone (FLONASE) 50 mcg/actuation nasal spray 2 Sprays by Both Nostrils route daily. 2/12/18   Karley Echavarria MD  
cholecalciferol (VITAMIN D3) 1,000 unit cap Take 1,000 Units by mouth daily. Provider, Historical  
albuterol (PROVENTIL VENTOLIN) 2.5 mg /3 mL (0.083 %) nebulizer solution 2.5 mg by Nebulization route every four (4) hours as needed for Wheezing. Provider, Historical  
ipratropium-albuterol (COMBIVENT RESPIMAT)  mcg/actuation inhaler Take 1 Puff by inhalation every six (6) hours. 9/21/17   Robyn Cast NP  
ferrous sulfate 324 mg (65 mg iron) tablet Take  by mouth Daily (before breakfast). Provider, Historical  
allopurinol (ZYLOPRIM) 300 mg tablet Take  by mouth daily. Provider, Historical  
cpap machine kit by Does Not Apply route. Bilevel 12/8    Provider, Historical  
 
 
No Known Allergies Review of Systems: A comprehensive review of systems was negative except for:  
Constitutional: Positive for fatigue. Objective:  
 
Visit Vitals /50 (BP 1 Location: Left arm, BP Patient Position: At rest) Pulse 68 Temp 97.6 °F (36.4 °C) Resp 22 Ht 6' 2\" (1.88 m) Wt 255 lb 9.6 oz (115.9 kg) SpO2 93% BMI 32.82 kg/m² Physical Exam: 
 
General:  Cooperative. Debilitated and frail. No acute distress. Eyes:  Conjunctivae/corneas clear Nose: Nares normal. Septum midline. Neck: Supple, symmetrical, trachea midline Lungs:   Clear to auscultation bilaterally, unlabored Heart:  Irregular rate and rhythm Abdomen:   Soft, non-tender, non-distended Extremities: Normal, atraumatic, no cyanosis or edema Skin: Skin color, texture, turgor normal. Scattered bruising and skin tears Neurologic: Nonfocal  
Psych: Alert and oriented to person and place Assessment:  
 
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 
ICD-9-CM: 250.40, 583.81  2/12/2018 Yes CKD (chronic kidney disease) stage 3, GFR 30-59 ml/min (HCC) (Chronic) ICD-10-CM: N18.3 ICD-9-CM: 585.3  9/18/2013 Yes Essential hypertension (Chronic) ICD-10-CM: I10 
ICD-9-CM: 401.9  11/21/2012 Yes Persistent atrial fibrillation (HCC) (Chronic) ICD-10-CM: I48.1 ICD-9-CM: 427.31  11/21/2012 Yes Overview Addendum 11/22/2016  8:14 PM by Jessy Daley MD  
  Coumadin therapy discontinued due to recurrent GI bleeding. Signed By: Pancho Shook NP December 31, 2018

## 2018-12-31 NOTE — PROCEDURES
Routine EEG Report Reason for EEG: Convulsion Current Facility-Administered Medications:  
  cefTRIAXone (ROCEPHIN) 1 g in 0.9% sodium chloride (MBP/ADV) 50 mL, 1 g, IntraVENous, Q24H, Sharita Acosta, DO, Last Rate: 100 mL/hr at 12/31/18 0847, 1 g at 12/31/18 0847 
  pantoprazole (PROTONIX) 80 mg in sodium chloride 0.9% 20 mL injection, 80 mg, IntraVENous, Q12H, Sharita Acosta, DO, 40 mg at 12/31/18 1143 
  [START ON 1/1/2019] levETIRAcetam (KEPPRA) 500 mg in 0.9% sodium chloride 100 mL IVPB, 500 mg, IntraVENous, DAILY, Ton Valencia DO 
  lactulose (CHRONULAC) solution 20 g, 20 g, Oral, BID, Michael Benites, TANGELA 
  tuberculin injection 5 Units, 5 Units, IntraDERMal, Johnson Ledbetter, DO, 5 Units at 12/30/18 1642   albuterol (PROVENTIL VENTOLIN) nebulizer solution 2.5 mg, 2.5 mg, Nebulization, Q4H PRN, Sharita Acosta C, DO, 2.5 mg at 12/31/18 4313   allopurinol (ZYLOPRIM) tablet 300 mg, 300 mg, Oral, DAILY, Sharita Acosta C, DO, 300 mg at 12/31/18 9408   aspirin chewable tablet 81 mg, 81 mg, Oral, DAILY, Sharita Acosta, DO, 81 mg at 12/31/18 5570   cholecalciferol (VITAMIN D3) tablet 1,000 Units, 1,000 Units, Oral, DAILY, Teresa Acosta, , 1,000 Units at 12/31/18 2084   ferrous sulfate tablet 325 mg, 1 Tab, Oral, DAILY WITH BREAKFAST, Sharita Acosta, DO, 325 mg at 12/31/18 9202   fluticasone (FLONASE) 50 mcg/actuation nasal spray 2 Spray, 2 Spray, Both Nostrils, DAILY, Teresa Acosta, , 2 Spray at 12/31/18 7557   furosemide (LASIX) tablet 40 mg, 40 mg, Oral, BID, Sharita Acosta DO, 40 mg at 12/31/18 0845 
  gabapentin (NEURONTIN) capsule 200 mg, 200 mg, Oral, TID, Sharita Acosta DO, 200 mg at 12/31/18 5195   insulin lispro (HUMALOG) injection, , SubCUTAneous, AC&HS, Teresa Acosta DO, Stopped at 12/30/18 1630 
  latanoprost (XALATAN) 0.005 % ophthalmic solution 1 Drop, 1 Drop, Both Eyes, QHS, Sharita Acosta DO 
   levothyroxine (SYNTHROID) tablet 50 mcg, 50 mcg, Oral, ACB, Acosta, Sharita C, DO, 50 mcg at 12/31/18 1072   pravastatin (PRAVACHOL) tablet 40 mg, 40 mg, Oral, QHS, Acosta, Sharita C, DO, 40 mg at 12/31/18 9285   sertraline (ZOLOFT) tablet 50 mg, 50 mg, Oral, DAILY, Acosta, Sharita C, DO, 50 mg at 12/31/18 8812   sodium chloride (NS) flush 5-10 mL, 5-10 mL, IntraVENous, Q8H, Acosta, Sharita C, DO, 5 mL at 12/31/18 0600 
  sodium chloride (NS) flush 5-10 mL, 5-10 mL, IntraVENous, PRN, Acosta, Sharita C, DO 
  acetaminophen (TYLENOL) tablet 650 mg, 650 mg, Oral, Q4H PRN, Acosta, Sharita C, DO, 650 mg at 12/31/18 1151   ondansetron (ZOFRAN) injection 4 mg, 4 mg, IntraVENous, Q4H PRN, Acosta, Sharita C, DO 
  budesonide (PULMICORT) 500 mcg/2 ml nebulizer suspension, 500 mcg, Nebulization, BID RT, Acosta, Sharita C, DO, 500 mcg at 12/31/18 7269   albuterol-ipratropium (DUO-NEB) 2.5 MG-0.5 MG/3 ML, 3 mL, Nebulization, Q6H RT, Acosta, Sharita C, DO, 3 mL at 12/31/18 1337 
  0.9% sodium chloride infusion 250 mL, 250 mL, IntraVENous, PRN, Acosta, Sharita C, DO 
  DOBUTamine (DOBUTREX) 500 mg/250 mL (2,000 mcg/mL) infusion, 0-10 mcg/kg/min, IntraVENous, TITRATE, Oh Parker MD, Last Rate: 16.2 mL/hr at 12/31/18 1449, 5 mcg/kg/min at 12/31/18 1449 Technical Summary: This EEG was performed with a 32-channel digital EEG machine with electrodes placed according to the international 10-20 system of placement. Background:  
During the maximal awake state no posterior dominant rhythm was seen. Anterior background consisted of medium amplitude activity in the primarily in the theta range with occasional intermixed alpha frequencies. Hyperventilation: Hyperventilation was not performed due to medical condition Photic Stimulation: Photic stimulation was performed at various flash frequencies and no abnormalites were seen. Sleep: None Impression: The results of this EEG are abnormal.  There is slowing of the background consistent with a mild degree of encephalopathy, nonspecific in origin. There are no lateralizing features or epileptiform discharges.

## 2018-12-31 NOTE — PROGRESS NOTES
Verbal bedside report received from Tyler Bailey RN. Assumed care of patient. Dobutamine IV drip verified at bedside with outgoing RN.

## 2018-12-31 NOTE — PROGRESS NOTES
Problem: Nutrition Deficit Goal: *Optimize nutritional status Nutrition Reason for assessment: Referral received from nursing admission Malnutrition Screening Tool for recently lost 14-23# without trying and eating poorly due to decreased appetite. Assessment:  
Diet order(s): 12/30 pureed, then NPOFood/Nutrition Patient History:  Pt sleeping during RD visit. No family members at bedside. Pt recently seen by RD during admission last month in which he was unable to give much information, although he was receptive to supplementation. Noted he is admitted following seizure activity. Anthropometrics:Height: 6' 2\" (188 cm),  Weight: 115.9 kg (255 lb 9.6 oz), Weight Source: Bed, Body mass index is 32.82 kg/m². BMI class of overweight. Edema: 2-3+ pitting to BLEs WT / BMI 12/31/2018 11/13/2018 10/18/2018 WEIGHT 255 lb 9.6 oz 236 lb 11.2 oz 228 lb 3.2 oz  
 
WT / BMI 10/11/2018 10/8/2018 9/27/2018 9/21/2018 9/12/2018 WEIGHT 225 lb 225 lb 224 lb 3.2 oz 247 lb 239 lb WT / BMI 9/11/2018 9/6/2018 8/6/2018 6/13/2018 WEIGHT 239 lb 248 lb 248 lb 11.2 oz 247 lb WT / BMI 5/30/2018 5/15/2018 5/14/2018 5/9/2018 5/1/2018 WEIGHT 257 lb 249 lb 249 lb 12.8 oz 250 lb 246 lb 6.4 oz  
 
WT / BMI 4/24/2018 4/3/2018 3/7/2018 3/7/2018 3/1/2018 WEIGHT 266 lb 250 lb 255 lb 9.6 oz 251 lb 250 lb WT / BMI 2/27/2018 2/23/2018 2/20/2018 2/12/2018 2/8/2018 WEIGHT 250 lb 152 lb 260 lb 251 lb 12.8 oz 249 lb WT / BMI 2/2/2018 1/17/2018 12/11/2017 WEIGHT 245 lb 251 lb 251 lb Per weights listed in EMR, pt has had a potential 27 pound weight GAIN (? Fluid related) over 2-3 months. Pt currently weighs 4 pounds grater than he did ~1 year ago. Macronutrient needs: EER:  7193-7547 kcal /day (20-25 kcal/kg I BW) 
EPR:  69-86 grams protein/day (0.8-1 grams/kg IBW)(GFR 33)-h/o CKD Intake/Comparative Standards: Per RD meal rounds: 100% of CLQ diet. CLQ diet is inadequate in kcal/PRO. Nutrition Diagnosis: Inadequate energy intake r/t altered GI function as evidenced by pt on a CLQ diet awaiting MRCP. Intervention: 
Meals and snacks: CLQ Nutrition Supplement Therapy: ensure clear with all meals with CLQ. Provide glucerna shakes with meals as diet allows. Discharge nutrition plan: Too soon to determine. Jae [address], [address] [place] [address], [address] [place] [address], [phone]

## 2018-12-31 NOTE — PROGRESS NOTES
Bedside and Verbal shift change report given to Syed (oncoming nurse) by Jessie Diaz (offgoing nurse). Report included the following information SBAR, Kardex, MAR and Recent Results.

## 2018-12-31 NOTE — PROGRESS NOTES
Spoke with Dr. Anna Crow regarding patient's clear liquid diet orders. Per patient, he only takes medications with apple sauce. Per Dr. Anna Crow, ok to let patient take medications with apple sauce until GI can evaluate patient and determine diet needs.

## 2018-12-31 NOTE — PROGRESS NOTES
MRI consent completed with patient and patient's wife Noreen Browning. Patient on continuous dobutamine drip, so RN requested MRI safe infusion pump. MRI pump not turing on, so work order placed with [employer] to have them come fix pump so patient can have ordered MRI's. Awaiting [facility] staff. Transport to MRI delayed at this time. Service Route work order number [id number]

## 2018-12-31 NOTE — CDMP QUERY
Please clarify if this patient is being treated/managed for: 
 
CAUTI, in setting of 86yom with urinary retention, requiring treatment with IVAB 
 
=>Other Explanation of clinical findings =>Unable to Determine (no explanation of clinical findings) The medical record reflects the following: 
 
Risk Factors: 80 yom with urinary retention and chronic alejo catheter Clinical Indicators: Urinalysis with 4+ bacteria, > 100 WBC Treatment: Rocephin IV Please clarify and document your clinical opinion in the progress notes and discharge summary including the definitive and/or presumptive diagnosis, (suspected or probable), related to the above clinical findings. Please include clinical findings supporting your diagnosis. Thanks, Ana Turcios, ANGELA CDS Compliant Documentation Management Program

## 2018-12-31 NOTE — PROGRESS NOTES
Dr. Dominique De Los Santos paged about patient's prior instance of chest pain, abnormal rhythm strips and ekg. Dr. Dominique De Los Santos unable to come to unit at this time, stated he would have extender provider come to unit to look at strips and ekg when they are able. Patient still chest pain free at this time.

## 2018-12-31 NOTE — PROGRESS NOTES
Spoke to Dr. Man Maxwell about discrepancie in units of blood to transfuse. Order received with readback to transfuse 1 unit.

## 2018-12-31 NOTE — PROGRESS NOTES
PT Note: PT orders received/reviewed and evaluation attempted. Per chat pt with recent chest pain and stat EKG. Spoke with RN who agreed evaluation should be held this PM.  Evaluation will be re-attempted tomorrow as schedule and patient's status allow. Thanks, Angel Mckeon, PT, DPT

## 2018-12-31 NOTE — PROGRESS NOTES
Problem: Breathing Pattern - Ineffective Goal: *Absence of hypoxia Patient has clear diminished BS with slow weaning down of O2./ Progressing at this time to [facility]

## 2018-12-31 NOTE — PROGRESS NOTES
Spoke with Dr. Belen Castelan about patient's instance of chest pain, abnormal rhythm strips and ekg. Per Dr. Belen Castelan, decrease patient's dobutamine to 5 mcg/kg/min and if patient sustains heart rate above 60, decrease again to 2.5 mcg/kg/min. Patient chest pain free and resting at this time.

## 2018-12-31 NOTE — PROGRESS NOTES
Monitor room called and stated patient having a potential rhythm change on the monitor. Upon assessment of patient, found him complaining of chest pain 8/10. Patient stated the pain was generalized. Stat ekg obtained. GUILLERMO Sousa with cardiology paged to come look at strips and ekg. Awaiting for a response. Patient resting in bed chest pain free on 2 L NC at this time.

## 2018-12-31 NOTE — PROGRESS NOTES
Attempted to call family member's Sherley Malhotra and Brad العراقي to have MRI questionnaire completed. Voicemails left, and waiting for response.

## 2018-12-31 NOTE — PROGRESS NOTES
Pt admitted to 3rd floor [facility] for seizure. CM met with pt to discuss CM needs & DCP. Pt is alert & disoriented, but [place]. CM unable to reach pt spouse. Pt is partially dependent at home with all ADLS. Pt was living at home prior to [facility]. CM left  for admissions at [facility] to confirm rehab bed. Pt has no DME needs. Pt has no difficulty with obtaining medications or transport. DCP rehab. CM to continue to monitor for dc back to SNF. Care Management Interventions PCP Verified by CM: Yes Last Visit to PCP: 09/27/18 Mode of Transport at Discharge: BLS Transition of Care Consult (CM Consult): Discharge Planning, SNF Discharge Durable Medical Equipment: No 
Physical Therapy Consult: No 
Occupational Therapy Consult: No 
Speech Therapy Consult: No 
Current Support Network: [address] [place] [address] Confirm Follow Up Transport: Family Plan discussed with Pt/Family/Caregiver: Yes Freedom of Choice Offered: Yes Discharge Location Discharge Placement: Skilled nursing facility

## 2018-12-31 NOTE — PROGRESS NOTES
Xiomara Kim in lab updated that patient's blood transfusion ended one hour ago. Phleb to come draw labs.

## 2018-12-31 NOTE — PROGRESS NOTES
LTG: Patient will tolerate least restrictive diet without overt signs or symptoms of airway compromise. STG: Patient will tolerate clear liquid diet without overt signs or symptoms of airway compromise. STG: Patient will participate in po trials with chewable textures. Speech language pathology: bedside swallow note: Initial Assessment NAME/AGE/GENDER: Nahed Zavala is a 80 y.o. male DATE: 12/31/2018 PRIMARY DIAGNOSIS: Seizure ([facility] [address] ICD-10: Treatment Diagnosis: R13.12 Oropharyngeal Dysphagia. INTERDISCIPLINARY COLLABORATION: Registered Nurse PRECAUTIONS/ALLERGIES: Patient has no known allergies. ASSESSMENT:Based on the objective data described below, Mr. Kelly Mariano presents with possible oropharyngeal dysphagia. He was last seen by speech therapy in August 2018 with recommendations for mechanical soft diet/thin liquids. Patient reports \"they put stuff in my drink\" at facility, but consistency is unclear. Currently on clear liquids due to concern for GI bleed. Patient presented with ice chips, thin liquids via tsp and cup. Ice chips tolerated without difficulty. Cough on first trial of thin via tsp only. Approximately 7 ounces of thin liquid via single and serial sips consumed without difficulty. Patient declining straw trials due to [facility] doctor told me not to do that, and I am going to do what he says\". Recommend continue clear liquids at this time. Speech to follow for continued assessment for chewable textures once cleared for diet upgrade. Patient will benefit from skilled intervention to address the below impairments. ?????? ? ? This section established at most recent assessment?????????? 
PROBLEM LIST (Impairments causing functional limitations): 1. Possible dysphagia REHABILITATION POTENTIAL FOR STATED GOALS: Good PLAN OF CARE:  
Patient will benefit from skilled intervention to address the following impairments.  
RECOMMENDATIONS AND PLANNED INTERVENTIONS (Benefits and precautions of therapy have been discussed with the patient.): 
· Liquids:  regular thin MEDICATIONS: 
· With liquid ASPIRATION PRECAUTIONS: 
· Slow rate of intake · Small bites/sips · Upright at 90 degrees during meal 
COMPENSATORY STRATEGIES/MODIFICATIONS INCLUDING: 
· Cup/sip OTHER RECOMMENDATIONS (including follow up treatment recommendations):  
· Patient education RECOMMENDED DIET MODIFICATIONS DISCUSSED WITH: 
· Nursing · Patient FREQUENCY/DURATION: Continue to follow patient 2 times a week for duration of hospital stay to address above goals. RECOMMENDED REHABILITATION/EQUIPMENT: (at time of discharge pending progress): Due to the probability of continued deficits (see above) this patient will not likely need continued skilled speech therapy after discharge. SUBJECTIVE:  
Pleasant and cooperative. Inconsistently oriented to place. History of Present Injury/Illness: Mr. Erin Brady  has a past medical history of Atherosclerosis of artery of extremity with ulceration ([address] Atherosclerosis of native arteries of extremity with intermittent claudication ([address] Atopic dermatitis, Atrial fibrillation ([address] CAD (coronary artery disease), Carotid stenosis, bilateral, CHF (congestive heart failure) ([address] Chronic kidney disease, Chronic obstructive pulmonary disease ([address] Cirrhosis ([address] Colon, diverticulosis, Coronary atherosclerosis of native coronary vessel, Diabetes ([address] Diastolic CHF, acute on chronic ([address] Failed CABG (coronary artery bypass graft), GI bleed, Gout, History of tobacco use, [place] (hard of hearing), Hypercholesterolemia, Hypertension, Hyperuricemia, Morbid obesity ([address] PAD (peripheral artery disease) ([address] Poor historian, Radiologic findings of lung field, abnormal, Seizure disorder ([address] Shortness of breath dyspnea, Thyroid disease, and Unspecified sleep apnea. .  He also  has a past surgical history that includes pr cardiac surg procedure unlist; eleanor coronary artery bypass graft (06-); hx cataract removal (Left, 2003); hx heart catheterization; hx coronary stent placement (02-); hx heent (1970s); hx colonoscopy; ULTRASOUND (Bilateral, 11/2/2018); THORACENTESIS (Right, 11/2/2018); ULTRASOUND GUIDED ACCESS VENA CAVA FILTER INSERTION (N/A, 9/12/2018); ESOPHAGOGASTRODUODENOSCOPY (EGD) (N/A, 8/5/2018); ESOPHAGOGASTRODUODENOSCOPY (EGD) (N/A, 1/27/2017); COLONOSCOPY  BMI 36 TO BE ADMITTED 1/26/17 (N/A, 1/27/2017); ESOPHAGOGASTRODUODENOSCOPY (EGD) (N/A, 1/22/2017); ESOPHAGOGASTRODUODENAL (EGD) BIOPSY (N/A, 1/22/2017); ESOPHAGOGASTRODUODENOSCOPY (EGD)   rm 610 (N/A, 5/8/2015); COLONOSCOPY (N/A, 1/24/2015); and ESOPHAGOGASTRODUODENOSCOPY (EGD)   rm 3101 (N/A, 1/23/2015). Present Symptoms: Oropharyngeal dysphagia Pain Scale 1: Numeric (0 - 10) Pain Intensity 1: 0 Current Medications: No current facility-administered medications on file prior to encounter. Current Outpatient Medications on File Prior to Encounter Medication Sig Dispense Refill  oxyCODONE IR (ROXICODONE) 5 mg immediate release tablet Take 1 Tab by mouth every six (6) hours as needed for up to 10 doses. Max Daily Amount: 20 mg. 10 Tab 0  
 furosemide (LASIX) 20 mg tablet Take 1-2 Tabs by mouth daily. 40 Tab 1  
 albuterol (PROVENTIL HFA, VENTOLIN HFA, PROAIR HFA) 90 mcg/actuation inhaler Take 2 Puffs by inhalation every six (6) hours as needed for Wheezing or Shortness of Breath. 1 Inhaler 0  
 isosorbide mononitrate ER (IMDUR) 30 mg tablet Take 1 Tab by mouth daily. 30 Tab 5  pravastatin (PRAVACHOL) 40 mg tablet Take 1 Tab by mouth nightly. 90 Tab 3  
 hydrALAZINE (APRESOLINE) 10 mg tablet TAKE 1 TABLET THREE TIMES DAILY. 90 Tab 3  
 aspirin 81 mg chewable tablet Take 1 Tab by mouth daily. 90 Tab 3  
 glipiZIDE (GLUCOTROL) 5 mg tablet Take  by mouth two (2) times a day.  gabapentin (NEURONTIN) 100 mg capsule Take  by mouth three (3) times daily.  hydrocortisone (PROCTOSOL HC) 2.5 % rectal cream Insert  into rectum every six (6) hours as needed for Hemorrhoids.  fluticasone-vilanterol (BREO ELLIPTA) 100-25 mcg/dose inhaler Take 1 Puff by inhalation daily.  levothyroxine (SYNTHROID) 50 mcg tablet Take 1 Tab by mouth Daily (before breakfast). 90 Tab 3  
 OXYGEN-AIR DELIVERY SYSTEMS Take  by inhalation continuous. 2LPM to keep 02 sat >90%  bisacodyl (DULCOLAX) 5 mg EC tablet Take 1 Tab by mouth daily. (Patient taking differently: Take 5 mg by mouth every evening.) 15 Tab 0  
 latanoprost (XALATAN) 0.005 % ophthalmic solution Administer 1 Drop to both eyes nightly.  sertraline (ZOLOFT) 50 mg tablet Take one tablet each evening for anxiety  Indications: Generalized Anxiety Disorder 30 Tab 3  
 fluticasone (FLONASE) 50 mcg/actuation nasal spray 2 Sprays by Both Nostrils route daily. 1 Bottle 3  cholecalciferol (VITAMIN D3) 1,000 unit cap Take 1,000 Units by mouth daily.  albuterol (PROVENTIL VENTOLIN) 2.5 mg /3 mL (0.083 %) nebulizer solution 2.5 mg by Nebulization route every four (4) hours as needed for Wheezing.  ipratropium-albuterol (COMBIVENT RESPIMAT)  mcg/actuation inhaler Take 1 Puff by inhalation every six (6) hours. 3 Inhaler 3  
 ferrous sulfate 324 mg (65 mg iron) tablet Take  by mouth Daily (before breakfast).  allopurinol (ZYLOPRIM) 300 mg tablet Take  by mouth daily.  cpap machine kit by Does Not Apply route. Bilevel 12/8 Current Dietary Status:   
 Clear liquids Social History/Home Situation:   
Home Environment: Skilled nursing facility Care Facility Name: [facility] Living Alone: No 
Support Systems: Skilled nursing facility Patient Expects to be Discharged to[de-identified] Unknown Current DME Used/Available at Home: (Unknown) OBJECTIVE:  
Respiratory Status:  Nasal cannula  3 l/min(Weaned down from 5L to 3L) Oral Motor Structure/Speech:  Oral-Motor Structure/Motor Speech Labial: No impairment Dentition: Edentulous Lingual: No impairment Cognitive and Communication Status: 
Neurologic State: Alert;Confused Orientation Level: Oriented to person;Oriented to place; Disoriented to time;Disoriented to situation Cognition: Follows commands Perception: Appears intact Perseveration: No perseveration noted Safety/Judgement: Awareness of environment BEDSIDE SWALLOW EVALUATIONOral Assessment: 
Oral Assessment Labial: No impairment Dentition: Edentulous Lingual: No impairment P.O. Trials: 
Patient Position: Upright in bed The patient was given  amounts of the following:  
Consistency Presented: Thin liquid How Presented: Self-fed/presented;Cup/sip;Straw;Successive swallows;Spoon ORAL PHASE: 
Bolus Acceptance: No impairment Bolus Formation/Control: No impairment Propulsion: No impairment Oral Residue: None PHARYNGEAL PHASE: 
Initiation of Swallow: No impairment Laryngeal Elevation: Functional 
Aspiration Signs/Symptoms: None Vocal Quality: No impairment Cues for Modifications: None Effective Modifications: None Pharyngeal Phase Characteristics: No impairment, issues, or problems OTHER OBSERVATIONS: 
Rate/bite size: Questionable Endurance:  Questionable Comments: Tool Used: Dysphagia Outcome and Severity Scale (IRVING) Score Comments Normal Diet  [] 7 With no strategies or extra time needed Functional Swallow  [x] 6 May have mild oral or pharyngeal delay Mild Dysphagia 
  [] 5 Which may require one diet consistency restricted (those who demonstrate penetration which is entirely cleared on MBS would be included) Mild-Moderate Dysphagia  [] 4 With 1-2 diet consistencies restricted Moderate Dysphagia  [] 3 With 2 or more diet consistencies restricted Moderately Severe Dysphagia  [] 2 With partial PO strategies (trials with ST only) Severe Dysphagia  [] 1 With inability to tolerate any PO safely Score:  Initial: 6 Most Recent: X (Date: -- ) Interpretation of Tool: The Dysphagia Outcome and Severity Scale (IRVING) is a simple, easy-to-use, 7-point scale developed to systematically rate the functional severity of dysphagia based on objective assessment and make recommendations for diet level, independence level, and type of nutrition. Score 7 6 5 4 3 2 1 Modifier CH CI CJ CK CL CM CN ? Swallowing:  
  - CURRENT STATUS: CI - 1%-19% impaired, limited or restricted  - GOAL STATUS:  CI - 1%-19% impaired, limited or restricted  - D/C STATUS:  ---------------To be determined--------------- Payor: [facility] [place] MED[facility]ARE / Plan[place] [employer] [place]O / Product Type: Managed Care Medicare /  
 
TREATMENT:  
 (In addition to Assessment/Re-Assessment sessions the following treatments were rendered) Assessment/Reassessment only, no treatment provided today MODALITIES:  
  
  
  
  
  
  
  
  
  
  
    
  
  
  
  
  
  
  
  
   
 
ORAL MOTOR  EXERCISES: 
  
  
  
  
  
  
  
  
  
  
  
  
  
  
  
  
  
  
  
  
  
  
  
  
  
  
    
  
  
  
  
  
  
  
  
  
  
  
  
  
  
  
  
  
  
  
  
  
  
  
  
  
   
 
LARYNGEAL / PHARYNGEAL EXERCISES: 
  
  
  
  
  
  
  
  
  
  
  
  
  
  
  
  
  
  
  
  
  
    
  
  
  
  
  
  
  
  
  
  
  
  
  
  
  
  
  
  
  
   
 
__________________________________________________________________________________________________ Safety: After treatment position/precautions: · Upright in Bed. Progression/Medical Necessity:  
· Patient is expected to demonstrate progress in diet tolerance to decrease aspiration risk. Compliance with Program/Exercises: Will assess as treatment progresses Reason for Continuation of Services/Other Comments: 
· Patient continues to require skilled intervention due to possible dysphagia.  
Recommendations/Intent for next treatment session: \"Treatment next visit will focus on diet tolerance, po trials with chewable textures\". Total Treatment Duration: 
Time In: 0059 Time Out: 1059 [address] CCC-SLP

## 2018-12-31 NOTE — PROGRESS NOTES
Patient in MRI with primary RN. Stating he gets claustraphobia, but did not admit to this when consent completed earlier with wife at bedside. Paged Dr. Javi Wilson, and MD stated patient can not have any sedating medications with his current status. Patient on mri safe infusion pump for dobutamine drip and mri safe monitor on patient, RN outside of mri room with zoll at the ready.

## 2018-12-31 NOTE — WOUND CARE
Dry fragile flaky skin and bruising from on all extremities and torso, recommend Aquaphor ointment daily. Bilateral buttocks and intragluteal cleft with patches of partial thicken skin loss, chapped skin, blanchable erythema on coccyx and sacral area. Continue foam dressing for prevention at sacral area with continued diligent turning. Zinc based barrier cream for buttocks and intragluteal cleft.

## 2018-12-31 NOTE — PROGRESS NOTES
PT Note: PT orders received/reviewed and evaluation attempted. RN requested PT hold this AM as pt just finished getting bathed. Evaluation will be re-attempted as schedule and patient's status allow. Thanks, Mackenzie Bauer, PT, DPT

## 2018-12-31 NOTE — PROGRESS NOTES
Verbal bedside report given to [facility], onckarma RN. Patient's situation, background, assessment and recommendations provided. Opportunity for questions provided. Oncoming RN assumed care of patient.

## 2018-12-31 NOTE — PROGRESS NOTES
[facility] CARDIOLOGY PROGRESS NOTE 
      
 
12/31/2018 10:20 AM 
 
Admit Date: 12/30/2018 Subjective:  
 patient eating feels better. Review of Systems Constitutional: Negative for fever. Respiratory: Negative for cough. Cardiovascular: Negative for chest pain. Objective:  
  
Vitals:  
 12/31/18 0500 12/31/18 2301 12/31/18 0845 12/31/18 2402 BP: 114/48  118/50 118/50 Pulse: 68  72 68 Resp: 19   22 Temp: 97.4 °F (36.3 °C)   97.6 °F (36.4 °C) SpO2: 94% 97%  93% Weight: 115.9 kg (255 lb 9.6 oz) Height:      
 
 
 
Physical Exam  
Constitutional: He appears ill. Eyes: Pupils are equal, round, and reactive to light. Neck: Normal range of motion. Cardiovascular: An irregularly irregular rhythm present. Pulmonary/Chest: No respiratory distress. Abdominal: Soft. Musculoskeletal: He exhibits no edema. Data Review:  
Recent Labs 12/31/18 
0909 12/30/18 
1641 12/30/18 
1026   --  138  
K 3.3*  --  3.9 BUN 47*  --  50* CREA 2.03*  --  2.27* *  --  123* WBC 5.3  --  6.0 HGB 7.7* 7.0* 7.4* HCT 26.4* 25.0* 26.0*  
*  --  115* Intake/Output Summary (Last 24 hours) at 12/31/2018 1020 Last data filed at 12/31/2018 1367 Gross per 24 hour Intake 301.3 ml Output 700 ml Net -398.7 ml  
 
Current Facility-Administered Medications Medication Dose Route Frequency  cefTRIAXone (ROCEPHIN) 1 g in 0.9% sodium chloride (MBP/ADV) 50 mL  1 g IntraVENous Q24H  pantoprazole (PROTONIX) 80 mg in sodium chloride 0.9% 20 mL injection  80 mg IntraVENous Q12H  levETIRAcetam (KEPPRA) 500 mg in 0.9% sodium chloride 100 mL IVPB  500 mg IntraVENous Q12H  
 tuberculin injection 5 Units  5 Units IntraDERMal ONCE  
 albuterol (PROVENTIL VENTOLIN) nebulizer solution 2.5 mg  2.5 mg Nebulization Q4H PRN  
 allopurinol (ZYLOPRIM) tablet 300 mg  300 mg Oral DAILY  aspirin chewable tablet 81 mg  81 mg Oral DAILY  cholecalciferol (VITAMIN D3) tablet 1,000 Units  1,000 Units Oral DAILY  ferrous sulfate tablet 325 mg  1 Tab Oral DAILY WITH BREAKFAST  fluticasone (FLONASE) 50 mcg/actuation nasal spray 2 Spray  2 Spray Both Nostrils DAILY  furosemide (LASIX) tablet 40 mg  40 mg Oral BID  
 gabapentin (NEURONTIN) capsule 200 mg  200 mg Oral TID  insulin lispro (HUMALOG) injection   SubCUTAneous AC&HS  latanoprost (XALATAN) 0.005 % ophthalmic solution 1 Drop  1 Drop Both Eyes QHS  levothyroxine (SYNTHROID) tablet 50 mcg  50 mcg Oral ACB  pravastatin (PRAVACHOL) tablet 40 mg  40 mg Oral QHS  sertraline (ZOLOFT) tablet 50 mg  50 mg Oral DAILY  sodium chloride (NS) flush 5-10 mL  5-10 mL IntraVENous Q8H  
 sodium chloride (NS) flush 5-10 mL  5-10 mL IntraVENous PRN  
 acetaminophen (TYLENOL) tablet 650 mg  650 mg Oral Q4H PRN  
 ondansetron (ZOFRAN) injection 4 mg  4 mg IntraVENous Q4H PRN  
 budesonide (PULMICORT) 500 mcg/2 ml nebulizer suspension  500 mcg Nebulization BID RT  
 albuterol-ipratropium (DUO-NEB) 2.5 MG-0.5 MG/3 ML  3 mL Nebulization Q6H RT  
 0.9% sodium chloride infusion 250 mL  250 mL IntraVENous PRN  
 DOBUTamine (DOBUTREX) 500 mg/250 mL (2,000 mcg/mL) infusion  0-10 mcg/kg/min IntraVENous TITRATE Assessment/Plan: Active Problems: 
  Essential hypertension (11/21/2012) Persistent atrial fibrillation ([facility] [address] (11/21/2012) Overview: Coumadin therapy discontinued due to recurrent GI bleeding. Rate improved with dobutamine. Poor prognosis not candidate for device with acute infection at this time. Hold BB 
  CKD (chronic kidney disease) stage 3, GFR 30-59 ml/min ([facility]) (9/18/2013) Type 2 diabetes with nephropathy ([address] (2/12/2018) Pleural effusion (11/2/2018) Cirrhosis ([address] (11/7/2018) Seizure ([facility] [address] (12/30/2018) Ilya Osuna MD 
12/31/2018 10:20 AM

## 2018-12-31 NOTE — PROGRESS NOTES
Bedside and Verbal shift change report given to [address] (oncoming nurse) by Ryan Mast (offgoing nurse). Report included the following information SBAR, Kardex, MAR and Recent Results.

## 2018-12-31 NOTE — CONSULTS
[facility] Consult Note Primary GI Physician: Dr. Stefan Delgado Referring Provider:  Dr. Ashli Duran Consult Date:  12/31/2018 Admit Date:  12/30/2018 Chief Complaint: Anemia Subjective:  
 
History of Present Illness:  Patient is a 80 y.o. male with PMH of (but not limited to)A fib, CAD, CHF with EF 50-55%, CKD, COPD on oxygen 4-5 L, sleep apnea, Seizure disorder, thyroid disease, PUD, FRANK cirrhosis with thrombocytopenia (dx 11/7/18) and chronic anemia, who is seen in consultation at the request of Dr. Ashli Duran for anemia. Mr. Maggie Gonzales has been  evaluated by us previously for UGI and anemia. He underwent an EGD most recently by Dr. Altagracia Conway on 8/15/18 which revealed some gastritis but no obvious bleeding. He had a previous EGD 1/27/17 by Dr. Lobito Decker that revealed a clean based antral ulcer 5-8mm. He had a colonoscopy on the same day that revealed diverticular disease and large fecalith with poor prep. He has not had a capsule endoscopy. He was admitted after a seizure with lethargy. MRCP was recommended at his last admission due to dilated CBD and cholelithiasis. He denies any abdominal pain, N&V, GERD. Patient confused and a poor historian today. According to nursing, he had one \"bloody stool\" last night. 12/31/18 WBC 5.3, Hgb 7.7 (7.0 on 12/30, 8.6 on 11/8), Hct 26.4, MCV 88.6, RDW 18.6, plt 124, BUN 47, creat 2.03, glucose 110, sodiuim 139, potassium 3.3, 12./30/18  fecal occult blood positive, iron 16, TIBC 241, ferritin 49, ammonia 18,  T.bili 1.3 (1.3 on 11/7), albumin 2.4, ALT 8, AST 9, AP 70 
 
11/9/18 AMA elevated 45.6, ASMA negative, alpha 1 antitrypsin, YENI and hepatitis panel negative. CT HEAD WITHOUT CONTRAST, 12/30/2018  
  
History: Seizure occurring this morning. Confusion.  
  
Comparison: CT head without contrast 10/11/2018  
  
Technique:   5 mm axial scans from the skull base to the vertex. All CT scans performed at this facility use one or all of the following: Automated exposure 
control, adjustment of the mA and/or kVp according to patient's size, iterative 
reconstruction.  
  
Findings:  No evidence of intracranial hemorrhage is seen. No abnormal 
extra-axial fluid collections are seen. Moderate cortical involutional changes 
are seen which are not clearly abnormal given the patient's age. No evidence 
for acute hydrocephalus is seen. No evidence of midline shift or herniation is 
seen. No abnormal edema pattern is seen in a vascular distribution to suggest 
large artery infarction. 
  
Evaluation with bone windows shows no acute osseous changes. The visualized 
sinuses, middle ears, and mastoid air cells are well aerated. 
  
IMPRESSION IMPRESSION:   
1. No acute intracranial process evident by noncontrast CT study of the head. Right Upper Quadrant Ultrasound 11/5/18 
  
INDICATION:  Cirrhosis, follow-up abnormal appearance of gallbladder 
  
COMPARISON: Ultrasound and CT 9/2/2028 
  
TECHNIQUE:  Sonographic gray scale and Doppler images were obtained of the right 
upper quadrant of the abdomen. Technologist reports best possible imaging given 
difficulty with breath-holding and positioning. 
  
FINDINGS: Cirrhotic nodular contour again seen compatible with cirrhosis. There 
are no discrete lesions in the visualized portions of the liver. Liver size is 16 cm, within normal limits. 
  
Main portal vein is patent on Doppler imaging, demonstrating biphasic flow. 
  
The common bile duct diameter is 8-9 mm. Gallbladder demonstrates 5 mm wall 
thickening, small stones, and probable adenomyomatosis as described on prior. Sonographic Mandel's sign is negative. 
  
There are no discrete lesions in the limited visualized portions of the 
pancreas.  
  
The right kidney measures 10.6 cm in length. There is no hydronephrosis. There 
is no evidence of a solid renal mass. A benign renal cyst is again noted measuring up to 6 cm.  
  
There is no evidence of ascites.  
  
The aorta and IVC are patent on Doppler imaging. Aortic diameter is within 
normal limits as partially seen, although mostly obscured distally. 
  
  
IMPRESSION IMPRESSION:  
1. Abnormal gallbladder again seen with stones, adenomyomatosis, wall thickening 
similar to prior. 2. Increased biliary dilatation. 3. Cirrhosis. 4. Right renal benign cyst. 
  
DC4 852 The significant findings in this report have been referred to the Imaging Navigator in order to communicate to the referring provider or his/her designee 
as outlined in Section II.C.2.a.ii-iii of the ACR Practice Guideline for 
Communication of Diagnostic Imaging Findings. 
  
  
PMH: 
Past Medical History:  
Diagnosis Date  Atherosclerosis of artery of extremity with ulceration ([address] 4/17/2015  Atherosclerosis of native arteries of extremity with intermittent claudication ([address] 4/17/2015  Atopic dermatitis 11/21/2012  Atrial fibrillation ([address]  CAD (coronary artery disease)  Carotid stenosis, bilateral 11/21/2012 50-79%  3-2010    1. Carotid Doppler (6/1/07): Greater than 70% stenosis in proximal LICA. 50% stenosis in right ICA. 2.  CTA of neck (3/15/10): Occluded distal segments of the vertebral arteries bilaterally. Atherosclerosis of the carotid bulbs bilaterally with a 50% stenosis on the left and a stenosis of less than 30% on the right. Small nodule in the right upper lobe near the apex. 3. CTA (8/29/13):  Less than 30% diameter stenosis of the cervical internal carotid arteries bilaterally.  CHF (congestive heart failure) ([address] 11/21/2012  Chronic kidney disease   
 hx elevated labs  Chronic obstructive pulmonary disease ([address]  Cirrhosis ([address] 11/7/2018  Colon, diverticulosis 1/24/2015  Coronary atherosclerosis of native coronary vessel 10/31/2016 1. Cath (5/31/07):  LMCA: 25%. LAD: ostial 75-95% stenosis. 50-75% mid. D1:  25-50%. OM3: small with 75% stenosis. RCA: 100% mid. with left to right collaterals. 2 Vessel CABG (6/4/07):  LIMA to LAD and SVG to OM. SVG was anastomosed proximally to LIMA to due severe atherosclerosis of aorta. 3.  Lexiscan cardiolite (11/30/10): Abnormal with reversible lateral wall defects. Unable to gate given atrial fibrillation. 4.  LHC (1/14/11):  EF 60%. LVEDP 21. Patent LIMA to LAD and SVG to OM1 (off LIMA). occluded small RCA with left to right collaterals. Small D1 (2 mm vessel) with 70% mid stenosis.  Diabetes ([address]  Diastolic CHF, acute on chronic (HCC) 9/12/2015 1. Echo (9/11/15) : EF 55-60%. Mild LVH. Moderate biatrial enlargement. Moderate mitral/tricuspid regurgitation.  Failed CABG (coronary artery bypass graft) 11/21/2012  GI bleed 1/2015 Hospitalized [facility]  Gout 11/21/2012  History of tobacco use  [place] (hard of hearing)  Hypercholesterolemia  Hypertension  Hyperuricemia 11/21/2012  Morbid obesity ([address]  PAD (peripheral artery disease) ([address] 11/21/2012 1. Bilateral proximal common iliac PCI (2/21/08):  8.0 X 100 mm Cordis smart stent on right and 10 X 40 mm cordis smart stent on right. Both inflated to 7.0 mm.  Poor historian  Radiologic findings of lung field, abnormal 10/31/2016 1. CT of chest  (11/24/10): Multiple small nodules in the right lobe and stable interstitial prominence. Consistent with chronic lung disease. No evidence of malignancy.  Seizure disorder ([address] 8/5/2013  Shortness of breath dyspnea 8/5/2013  Thyroid disease  Unspecified sleep apnea   
 uses CPAP  
 
 
PSH: 
Past Surgical History:  
Procedure Laterality Date  CARDIAC SURG PROCEDURE UNLIST    
 cath 5/07,cabg 6/07,lexiscan cardiolite 11/10  
 COLONOSCOPY N/A 1/27/2017 COLONOSCOPY  BMI 36 TO BE ADMITTED 1/26/17 performed by Hal Kelly MD at [facility] ENDOSCOPY  
 HX CATARACT REMOVAL Left 2003  
 os  HX COLONOSCOPY    
 HX CORONARY ARTERY BYPASS GRAFT  06-  HX CORONARY STENT PLACEMENT  02-  
 bilateral iliac artery PCI and stents  HX HEART CATHETERIZATION    
 left-05-, 01-  HX HEENT  1970s  
 neck lipoma Allergies: 
No Known Allergies Home Medications: 
Prior to Admission medications Medication Sig Start Date End Date Taking? Authorizing Provider  
oxyCODONE IR (ROXICODONE) 5 mg immediate release tablet Take 1 Tab by mouth every six (6) hours as needed for up to 10 doses. Max Daily Amount: 20 mg. 11/13/18   Will Herrera MD  
furosemide (LASIX) 20 mg tablet Take 1-2 Tabs by mouth daily. 10/26/18   Priscilla Neil MD  
albuterol (PROVENTIL HFA, VENTOLIN HFA, PROAIR HFA) 90 mcg/actuation inhaler Take 2 Puffs by inhalation every six (6) hours as needed for Wheezing or Shortness of Breath. 10/26/18   Priscilla Neil MD  
isosorbide mononitrate ER (IMDUR) 30 mg tablet Take 1 Tab by mouth daily. 9/27/18   Evan Echavarria MD  
pravastatin (PRAVACHOL) 40 mg tablet Take 1 Tab by mouth nightly. 9/27/18   Evan Echavarria MD  
hydrALAZINE (APRESOLINE) 10 mg tablet TAKE 1 TABLET THREE TIMES DAILY. 9/27/18   Evan Echavarria MD  
aspirin 81 mg chewable tablet Take 1 Tab by mouth daily. 9/21/18   Vivienne Patel MD  
glipiZIDE (GLUCOTROL) 5 mg tablet Take  by mouth two (2) times a day. Provider, Historical  
gabapentin (NEURONTIN) 100 mg capsule Take  by mouth three (3) times daily. Provider, Historical  
hydrocortisone (PROCTOSOL HC) 2.5 % rectal cream Insert  into rectum every six (6) hours as needed for Hemorrhoids. Provider, Historical  
fluticasone-vilanterol (BREO ELLIPTA) 100-25 mcg/dose inhaler Take 1 Puff by inhalation daily. Other, MD [place]  
levothyroxine (SYNTHROID) 50 mcg tablet Take 1 Tab by mouth Daily (before breakfast). 8/7/18   Evan Echavarria MD  
OXYGEN-AIR DELIVERY SYSTEMS Take  by inhalation continuous.  2LPM to keep 02 sat >90%    Provider, Historical  
bisacodyl (DULCOLAX) 5 mg EC tablet Take 1 Tab by mouth daily. Patient taking differently: Take 5 mg by mouth every evening. 4/25/18   Garret Fu MD  
latanoprost (XALATAN) 0.005 % ophthalmic solution Administer 1 Drop to both eyes nightly. Provider, Historical  
sertraline (ZOLOFT) 50 mg tablet Take one tablet each evening for anxiety  Indications: Generalized Anxiety Disorder 2/12/18   Elise Echavarria Junior, MD  
fluticasone (FLONASE) 50 mcg/actuation nasal spray 2 Sprays by Both Nostrils route daily. 2/12/18   Elise Echavarria Junior, MD  
cholecalciferol (VITAMIN D3) 1,000 unit cap Take 1,000 Units by mouth daily. Provider, Historical  
albuterol (PROVENTIL VENTOLIN) 2.5 mg /3 mL (0.083 %) nebulizer solution 2.5 mg by Nebulization route every four (4) hours as needed for Wheezing. Provider, Historical  
ipratropium-albuterol (COMBIVENT RESPIMAT)  mcg/actuation inhaler Take 1 Puff by inhalation every six (6) hours. 9/21/17   Norma Espana NP  
ferrous sulfate 324 mg (65 mg iron) tablet Take  by mouth Daily (before breakfast). Provider, Historical  
allopurinol (ZYLOPRIM) 300 mg tablet Take  by mouth daily. Provider, Historical  
cpap machine kit by Does Not Apply route. Bilevel 12/8    Provider, Historical  
 
 
Hospital Medications: 
Current Facility-Administered Medications Medication Dose Route Frequency  cefTRIAXone (ROCEPHIN) 1 g in 0.9% sodium chloride (MBP/ADV) 50 mL  1 g IntraVENous Q24H  pantoprazole (PROTONIX) 80 mg in sodium chloride 0.9% 20 mL injection  80 mg IntraVENous Q12H  
 [START ON 1/1/2019] levETIRAcetam (KEPPRA) 500 mg in 0.9% sodium chloride 100 mL IVPB  500 mg IntraVENous DAILY  tuberculin injection 5 Units  5 Units IntraDERMal ONCE  
 albuterol (PROVENTIL VENTOLIN) nebulizer solution 2.5 mg  2.5 mg Nebulization Q4H PRN  
 allopurinol (ZYLOPRIM) tablet 300 mg  300 mg Oral DAILY  aspirin chewable tablet 81 mg  81 mg Oral DAILY  cholecalciferol (VITAMIN D3) tablet 1,000 Units  1,000 Units Oral DAILY  ferrous sulfate tablet 325 mg  1 Tab Oral DAILY WITH BREAKFAST  fluticasone (FLONASE) 50 mcg/actuation nasal spray 2 Spray  2 Spray Both Nostrils DAILY  furosemide (LASIX) tablet 40 mg  40 mg Oral BID  
 gabapentin (NEURONTIN) capsule 200 mg  200 mg Oral TID  insulin lispro (HUMALOG) injection   SubCUTAneous AC&HS  latanoprost (XALATAN) 0.005 % ophthalmic solution 1 Drop  1 Drop Both Eyes QHS  levothyroxine (SYNTHROID) tablet 50 mcg  50 mcg Oral ACB  pravastatin (PRAVACHOL) tablet 40 mg  40 mg Oral QHS  sertraline (ZOLOFT) tablet 50 mg  50 mg Oral DAILY  sodium chloride (NS) flush 5-10 mL  5-10 mL IntraVENous Q8H  
 sodium chloride (NS) flush 5-10 mL  5-10 mL IntraVENous PRN  
 acetaminophen (TYLENOL) tablet 650 mg  650 mg Oral Q4H PRN  
 ondansetron (ZOFRAN) injection 4 mg  4 mg IntraVENous Q4H PRN  
 budesonide (PULMICORT) 500 mcg/2 ml nebulizer suspension  500 mcg Nebulization BID RT  
 albuterol-ipratropium (DUO-NEB) 2.5 MG-0.5 MG/3 ML  3 mL Nebulization Q6H RT  
 0.9% sodium chloride infusion 250 mL  250 mL IntraVENous PRN  
 DOBUTamine (DOBUTREX) 500 mg/250 mL (2,000 mcg/mL) infusion  0-10 mcg/kg/min IntraVENous TITRATE Social History: 
Social History Tobacco Use  Smoking status: Former Smoker Packs/day: 1.00 Years: 45.00 Pack years: 45.00 Types: Cigarettes Last attempt to quit: 1984 Years since quittin.9  Smokeless tobacco: Never Used  Tobacco comment: (stopped smoking in ) Substance Use Topics  Alcohol use: No  
  Alcohol/week: 0.0 oz Family History: 
Family History Problem Relation Age of Onset  Heart Attack Mother Armen Faye Other Father   
     old age  Other Brother   
     brain aneurysm  Heart Disease Other      2 children  with heart concerns, 36 & 47 yo  
  Heart Disease Son Review of Systems: A detailed 10 system ROS is obtained, with pertinent positives as listed above. All others are negative. Diet:  Clear liquid Objective:  
 
Physical Exam: 
Vitals: 
Visit Vitals /49 Pulse (!) 54 Temp 97.6 °F (36.4 °C) Resp 22 Ht 6' 2\" (1.88 m) Wt 115.9 kg (255 lb 9.6 oz) SpO2 93% BMI 32.82 kg/m² Gen:  Pt is alert, cooperative, no acute distress MULTIPLE AREAS OF BRUISING Skin:  Extremities and face reveal no rashes. SPIDER ANGIOMAS TO ANTERIOR CHEST HEENT: Sclerae anicteric. Extra-occular muscles are intact. No oral ulcers. No abnormal pigmentation of the lips. The neck is supple. Cardiovascular: Regular rate and rhythm. No murmurs, gallops, or rubs. Respiratory:  Comfortable breathing with no accessory muscle use. Clear breath sounds anteriorly with no wheezes, rales, or rhonchi. DECREASED IN BASES ON O2 
GI:  Abdomen nondistended, soft, and nontender. Normal active bowel sounds. No enlargement of the liver or spleen. No masses palpable. Rectal:  Deferred Musculoskeletal:  BILATERAL VENOUS STASIS Neurological:  Gross memory appears intact. Patient is alert and oriented. TO PERSON AND PLACE ONLY Psychiatric:  Mood appears appropriate with judgement intact. Lymphatic:  No cervical or supraclavicular adenopathy. Laboratory:   
Recent Labs 12/31/18 
0909 12/30/18 
1641 12/30/18 
1026 WBC 5.3  --  6.0 HGB 7.7* 7.0* 7.4* HCT 26.4* 25.0* 26.0*  
*  --  115* MCV 88.6  --  88.7   --  138  
K 3.3*  --  3.9 CL 95*  --  94* CO2 37*  --  37* BUN 47*  --  50* CREA 2.03*  --  2.27* CA 7.9*  --  7.8*  
*  --  123* AP  --   --  70 SGOT  --   --  9* ALT  --   --  8*  
TBILI  --   --  1.3* ALB  --   --  2.4* TP  --   --  6.0* Assessment:  
 
Active Problems: 
  Essential hypertension (11/21/2012) Persistent atrial fibrillation ([facility] (11/21/2012) Overview: Coumadin therapy discontinued due to recurrent GI bleeding. CKD (chronic kidney disease) stage 3, GFR 30-59 ml/min (HCC) (9/18/2013) Type 2 diabetes with nephropathy ([facility] (2/12/2018) Pleural effusion (11/2/2018) Cirrhosis ([address] (11/7/2018) Seizure ([facility] (12/30/2018) 80 y.o. male with PMH of (but not limited to)A fib, CAD, CHF with EF 50-55%, CKD, COPD on oxygen 4-5 L, sleep apnea, Seizure disorder, thyroid disease, PUD, FRANK cirrhosis with thrombocytopenia (dx 11/7/18) and chronic anemia, who is seen in consultation at the request of Dr. Kimberli Kauffman for anemia. He had one episode of a small amount of blood yesterday. He has had both a recent EGD 8/2018 and colonoscopy 1/2017 without obvious source. Could consider small bowel capsule study as an outpatient. If he develops increased bleeding, consider RBC tag scan. He has known cirrhosis with current confusion. This could be some hepatic encephalopathy. We will start him on some lactulose therapy. He has an US from 11/5/18 with an 8-9mm CBD. MRCP was recommended previously, but not done. Plan: 1. Agree with serial H&H and transfuse as needed 2. If persistent overt bleeding, recommend RBC tag scan 3. Start on lactulose bid 4. CMP in am 
5. PT INR 6. MRCP to evaluate for possible CBD stone Giuseppe Wisdom. [address] [facility] Patient is seen and examined in collaboration with Dr. Joel Dasilva. Assessment and plan as per Dr. Joel Dasilva. ATTENDING NOTE:  I have seen the patient and agree with the above assessment and plan. Patient with FRANK cirrhosis complicated by thrombocytopenia currently with clinical evidence of mild hepatic encephalopathy. No convincing source of GI bleed by recent EGD and colonoscopy. We will check MRCP to definitively exclude biliary obstruction. Further recommendations will follow.   
 
Lynette Alberts MD

## 2018-12-31 NOTE — PROGRESS NOTES
Spoke with ST Sheri Parks about results of patient's swallow eval. Per [facility] patient was able to swallow thin liquids with no difficulty and is approved to drink thin liquids. Patient has a clear liquid diet right now and patient will evaluate for foods once patient is no longer on a clear liquid diet.

## 2018-12-31 NOTE — CONSULTS
Consult Patient: Cornelio Abraham MRN: [id number] YOB: 1932  Age: 80 y.o. Sex: male Subjective: Cornelio Abraham is a 80 y.o. male who is being seen for possible seizure. This is a chronically ill man who has a history of atrial fibrillation, COPD, CKD, congestive heart failure, cirrhosis of the liver, etc.  The patient was said to have a witnessed convulsive episode that lasted 5 minutes in duration. It was described as tonic-clonic. The patient was found to be in atrial fibrillation with a heart rate in the 40s. He was anemic with a bump in his serum creatinine. Otherwise, the patient cannot provide any history. Past Medical History:  
Diagnosis Date  Atherosclerosis of artery of extremity with ulceration ([address] 4/17/2015  Atherosclerosis of native arteries of extremity with intermittent claudication ([address] 4/17/2015  Atopic dermatitis 11/21/2012  Atrial fibrillation ([address]  CAD (coronary artery disease)  Carotid stenosis, bilateral 11/21/2012 50-79%  3-2010    1. Carotid Doppler (6/1/07): Greater than 70% stenosis in proximal LICA. 50% stenosis in right ICA. 2.  CTA of neck (3/15/10): Occluded distal segments of the vertebral arteries bilaterally. Atherosclerosis of the carotid bulbs bilaterally with a 50% stenosis on the left and a stenosis of less than 30% on the right. Small nodule in the right upper lobe near the apex. 3. CTA (8/29/13):  Less than 30% diameter stenosis of the cervical internal carotid arteries bilaterally.  CHF (congestive heart failure) ([address] 11/21/2012  Chronic kidney disease   
 hx elevated labs  Chronic obstructive pulmonary disease ([address]  Cirrhosis ([address] 11/7/2018  Colon, diverticulosis 1/24/2015  Coronary atherosclerosis of native coronary vessel 10/31/2016 1. Cath (5/31/07):  LMCA: 25%. LAD: ostial 75-95% stenosis. 50-75% mid. D1:  25-50%. OM3: small with 75% stenosis. RCA: 100% mid. with left to right collaterals. 2 Vessel CABG (6/4/07):  LIMA to LAD and SVG to OM. SVG was anastomosed proximally to LIMA to due severe atherosclerosis of aorta. 3.  Lexiscan cardiolite (11/30/10): Abnormal with reversible lateral wall defects. Unable to gate given atrial fibrillation. 4.  LHC (1/14/11):  EF 60%. LVEDP 21. Patent LIMA to LAD and SVG to OM1 (off LIMA). occluded small RCA with left to right collaterals. Small D1 (2 mm vessel) with 70% mid stenosis.  Diabetes ([address]  Diastolic CHF, acute on chronic (HCC) 9/12/2015 1. Echo (9/11/15) : EF 55-60%. Mild LVH. Moderate biatrial enlargement. Moderate mitral/tricuspid regurgitation.  Failed CABG (coronary artery bypass graft) 11/21/2012  GI bleed 1/2015 Hospitalized [facility]  Gout 11/21/2012  History of tobacco use  [place] (hard of hearing)  Hypercholesterolemia  Hypertension  Hyperuricemia 11/21/2012  Morbid obesity ([address]  PAD (peripheral artery disease) ([address] 11/21/2012 1. Bilateral proximal common iliac PCI (2/21/08):  8.0 X 100 mm Cordis smart stent on right and 10 X 40 mm cordis smart stent on right. Both inflated to 7.0 mm.  Poor historian  Radiologic findings of lung field, abnormal 10/31/2016 1. CT of chest  (11/24/10): Multiple small nodules in the right lobe and stable interstitial prominence. Consistent with chronic lung disease. No evidence of malignancy.  Seizure disorder ([address] 8/5/2013  Shortness of breath dyspnea 8/5/2013  Thyroid disease  Unspecified sleep apnea   
 uses CPAP Past Surgical History:  
Procedure Laterality Date  CARDIAC SURG PROCEDURE UNLIST    
 cath 5/07,cabg 6/07,lexiscan cardiolite 11/10  
 COLONOSCOPY N/A 1/27/2017 COLONOSCOPY  BMI 36 TO BE ADMITTED 1/26/17 performed by Marlene Borden MD at [facility] ENDOSCOPY  
 HX CATARACT REMOVAL Left 2003  
 os  HX COLONOSCOPY  HX CORONARY ARTERY BYPASS GRAFT  2007  HX CORONARY STENT PLACEMENT  2008  
 bilateral iliac artery PCI and stents  HX HEART CATHETERIZATION    
 left-2007, 2011  HX HEENT  1970s  
 neck lipoma Family History Problem Relation Age of Onset  Heart Attack Mother Julio C Willams Other Father   
     old age  Other Brother   
     brain aneurysm  Heart Disease Other 2 children  with heart concerns, 36 & 49 yo  
 Heart Disease Son   
 
Social History Tobacco Use  Smoking status: Former Smoker Packs/day: 1.00 Years: 45.00 Pack years: 45.00 Types: Cigarettes Last attempt to quit: 1984 Years since quittin.9  Smokeless tobacco: Never Used  Tobacco comment: (stopped smoking in 1980s) Substance Use Topics  Alcohol use: No  
  Alcohol/week: 0.0 oz  
  
Current Facility-Administered Medications Medication Dose Route Frequency Provider Last Rate Last Dose  cefTRIAXone (ROCEPHIN) 1 g in 0.9% sodium chloride (MBP/ADV) 50 mL  1 g IntraVENous Q24H Anna Marie Acosta  mL/hr at 18 0847 1 g at 18 0847  
 pantoprazole (PROTONIX) 80 mg in sodium chloride 0.9% 20 mL injection  80 mg IntraVENous Q12H Sharita Acosta, DO      
 levETIRAcetam (KEPPRA) 500 mg in 0.9% sodium chloride 100 mL IVPB  500 mg IntraVENous Q12H Sharita Acosta, DO   500 mg at 18 6797  tuberculin injection 5 Units  5 Units IntraDERMal ONCE Sharita Acosta DO   5 Units at 18 1642  albuterol (PROVENTIL VENTOLIN) nebulizer solution 2.5 mg  2.5 mg Nebulization Q4H PRN Anna Marie Acosta DO   2.5 mg at 18 9253  allopurinol (ZYLOPRIM) tablet 300 mg  300 mg Oral DAILY Sharita Acosta DO   300 mg at 18 3574  aspirin chewable tablet 81 mg  81 mg Oral DAILY Reno SLATER DO   81 mg at 18 3361  cholecalciferol (VITAMIN D3) tablet 1,000 Units  1,000 Units Oral DAILY Karel SLATER, DO   1,000 Units at 12/31/18 4807  ferrous sulfate tablet 325 mg  1 Tab Oral DAILY WITH [place] NOHEMY, DO   325 mg at 12/31/18 7584  fluticasone (FLONASE) 50 mcg/actuation nasal spray 2 Spray  2 Spray Both Nostrils DAILY Cruz Acosta, DO   2 Spray at 12/31/18 9331  furosemide (LASIX) tablet 40 mg  40 mg Oral BID Abby SLATER, DO   40 mg at 12/31/18 0845  
 gabapentin (NEURONTIN) capsule 200 mg  200 mg Oral TID Karel SLATER, DO   200 mg at 12/31/18 7308  insulin lispro (HUMALOG) injection   SubCUTAneous AC&HS Von Reus, DO   Stopped at 12/30/18 1630  
 latanoprost (XALATAN) 0.005 % ophthalmic solution 1 Drop  1 Drop Both Eyes QHS Sharita Acosta, DO      
 levothyroxine (SYNTHROID) tablet 50 mcg  50 mcg Oral ACB Sharita Acosta, DO   50 mcg at 12/31/18 6658  pravastatin (PRAVACHOL) tablet 40 mg  40 mg Oral QHS Sharita Acosta, DO   40 mg at 12/31/18 0517  sertraline (ZOLOFT) tablet 50 mg  50 mg Oral DAILY Sharita Acosta, DO   50 mg at 12/31/18 6577  sodium chloride (NS) flush 5-10 mL  5-10 mL IntraVENous Q8H Sharita Acosta, DO   5 mL at 12/31/18 0600  
 sodium chloride (NS) flush 5-10 mL  5-10 mL IntraVENous PRN Karel SLATER, DO      
 acetaminophen (TYLENOL) tablet 650 mg  650 mg Oral Q4H PRN Sharita Acosta, DO      
 ondansetron [facility]) injection 4 mg  4 mg IntraVENous Q4H PRN Sharita Acosta, DO      
 budesonide (PULMICORT) 500 mcg/2 ml nebulizer suspension  500 mcg Nebulization BID RT Cruz Acosta, DO   500 mcg at 12/31/18 4095  albuterol-ipratropium (DUO-NEB) 2.5 MG-0.5 MG/3 ML  3 mL Nebulization Q6H RT Sharita Acosta, DO   3 mL at 12/31/18 0121  
 0.9% sodium chloride infusion 250 mL  250 mL IntraVENous PRN Maddie Sheridan DO      
 DOBUTamine (DOBUTREX) 500 mg/250 mL (2,000 mcg/mL) infusion  0-10 mcg/kg/min IntraVENous TITRATE Tee Rosales MD 16.2 mL/hr at 12/30/18 1855 5 mcg/kg/min at 12/30/18 6333 No Known Allergies Review of Systems: 
CONSTITUTIONAL: No weight loss, fever, chills, weakness or fatigue. HEENT: Eyes: No visual loss, blurred vision, double vision or yellow sclerae. Ears, Nose, Throat: No hearing loss, sneezing, congestion, runny nose or sore throat. SKIN: No rash or itching. CARDIOVASCULAR: No chest pain, chest pressure or chest discomfort. No palpitations or edema. RESPIRATORY: No shortness of breath, cough or sputum. GASTROINTESTINAL: No anorexia, nausea, vomiting or diarrhea. No abdominal pain or blood. GENITOURINARY: no burning with urination. NEUROLOGICAL: No headache, dizziness, syncope, paralysis, ataxia MUSCULOSKELETAL: No muscle, back pain, joint pain or stiffness. HEMATOLOGIC: positive for anemia, bleeding, and easy bruising. LYMPHATICS: No enlarged nodes. No history of splenectomy. Significant edema in the bilateral lower limbs PSYCHIATRIC:denies currentf depression or anxiety. ENDOCRINOLOGIC: No reports of sweating, cold or heat intolerance. No polyuria or polydipsia. ALLERGIES: No history of asthma, hives, eczema or rhinitis. Objective:  
 
Vitals:  
 12/31/18 0500 12/31/18 7615 12/31/18 0845 12/31/18 6348 BP: 114/48  118/50 118/50 Pulse: 68  72 68 Resp: 19   22 Temp: 97.4 °F (36.3 °C)   97.6 °F (36.4 °C) SpO2: 94% 97%  93% Weight: 255 lb 9.6 oz (115.9 kg) Height:      
  
 
Physical Exam: 
General - chronically ill-appearing, in no acute distress. Pleasant and conversent. HEENT - Normocephalic, atraumatic. Conjunctiva, tympanic membranes, and oropharynx are clear. Neck - Supple without masses, no bruits Cardiovascular -irregular rhythm and normal rate. diastolic murmur Lungs - Clear to auscultation. Abdomen - Soft, nontender with normal bowel sounds. Extremities - peripheral pulses difficult to obtain secondary to lower extremity edema Neurological examination - Comprehension, attention her normal. Language and speech are normal. On cranial nerve examination pupils are equal round and reactive to light. Fundoscopic examination is normal. Visual acuity his impaired. Visual fields are full to finger confrontation. Extraocular motility is normal. Face is symmetric and sensation is intact to light touch. Hearing is intact to finger rustle bilaterally. Motor examination - There is normal muscle tone and bulk. Power his phone bilateral upper limbs. In the bilateral lower limbs the patient has 4/5 strength throughout. Muscle stretch reflexes are normoactive and there are no pathological reflexes present. Sensation is decreased to all modalities in a stocking glove distribution. Cerebellar examination is normal. Gait and stance could not be assessed due to fall risk Lab Results Component Value Date/Time Cholesterol, total 127 09/27/2018 01:20 PM  
 HDL Cholesterol 51 09/27/2018 01:20 PM  
 LDL, calculated 60 09/27/2018 01:20 PM  
 VLDL, calculated 16 09/27/2018 01:20 PM  
 Triglyceride 80 09/27/2018 01:20 PM  
 CHOL/HDL Ratio 2.0 04/23/2018 05:19 AM  
  
 
Lab Results Component Value Date/Time Hemoglobin A1c 5.8 (H) 09/27/2018 01:20 PM  
  
 
CT Results (most recent): Personally Reviewed Results from Hospital Encounter encounter on 12/30/18 CT HEAD WO CONT Narrative CT HEAD WITHOUT CONTRAST, 12/30/2018 History: Seizure occurring this morning. Confusion. Comparison: CT head without contrast 10/11/2018 Technique:   5 mm axial scans from the skull base to the vertex. All CT scans 
performed at this facility use one or all of the following: Automated exposure 
control, adjustment of the mA and/or kVp according to patient's size, iterative 
reconstruction. Findings:  No evidence of intracranial hemorrhage is seen. No abnormal 
extra-axial fluid collections are seen. Moderate cortical involutional changes are seen which are not clearly abnormal given the patient's age. No evidence 
for acute hydrocephalus is seen. No evidence of midline shift or herniation is 
seen. No abnormal edema pattern is seen in a vascular distribution to suggest 
large artery infarction. Evaluation with bone windows shows no acute osseous changes. The visualized 
sinuses, middle ears, and mastoid air cells are well aerated. Impression IMPRESSION:   
1. No acute intracranial process evident by noncontrast CT study of the head. Results for orders placed or performed during the hospital encounter of 12/30/18 EKG, 12 LEAD, INITIAL Result Value Ref Range Ventricular Rate 44 BPM  
 Atrial Rate 220 BPM  
 QRS Duration 108 ms Q-T Interval 476 ms QTC Calculation (Bezet) 406 ms Calculated R Axis 50 degrees Calculated T Axis -153 degrees Diagnosis    
  !! AGE AND GENDER SPECIFIC ECG ANALYSIS !! Atrial fibrillation with slow ventricular response Septal infarct , age undetermined Abnormal ECG When compared with ECG of 31-OCT-2018 18:59, 
Vent. rate has decreased BY  31 BPM 
Nonspecific T wave abnormality now evident in Anterior leads Nonspecific T wave abnormality has replaced inverted T waves in Lateral leads Assessment:  
 
27-year-old man with significant comorbidities who had a convulsive episode. This convulsive episode likely represents convulsive syncope or potentially a generalized tonic-clonic seizure. If this were a seizure that was likely provoked by metabolic abnormalities. He has been started on Keppra. Plan:  
 
Continue Keppra for now. I will decrease the dose considering his renal function Brain MRI has been ordered. I will follow up on the results of this. EEG pending Signed By: Radha Carreon DO   
 December 31, 2018 I spent 55 minutes with this patient, over 50% of that time was counseling the patient on his or her medical condition/neurological condition.

## 2018-12-31 NOTE — PROGRESS NOTES
Problem: Self Care Deficits Care Plan (Adult) Goal: *Acute Goals and Plan of Care (Insert Text) OCCUPATIONAL THERAPY: Initial Assessment and Discontinuation Summary 12/31/2018INPATIENT: Hospital Day: 2 Payor: [facility] MEDICARE / Plan: [facility] MEDICARE HMO/PPO / Product Type: Managed Care Medicare /  
  
NAME/AGE/GENDER: Margareth Lino is a 80 y.o. male PRIMARY DIAGNOSIS:  Seizure ([address] <principal problem not specified> <principal problem not specified> 
 
  
ICD-10: Treatment Diagnosis:  
 · Generalized Muscle Weakness (M62.81) Precautions/Allergies: 
   Patient has no known allergies. ASSESSMENT:  
Mr. Castillo Larkin presents in bed and agreeable to tx. Pt thought it was Jan 1, 2019 (one day off). Pt thought he was still working and had come from home. Pt could follow simple commands with encouragement. Pt is known to therapist from SNF. Pt looks better, but his performance is about the same. Pt has limited ability to improve secondary to prolonged hospitalization/debility and poor cognition. Pt is recommended to find long term placement or family needs to be trained on jessica lift. This section established at most recent assessment PROBLEM LIST (Impairments causing functional limitations): 1. Decreased Strength 2. Decreased ADL/Functional Activities 3. Decreased Transfer Abilities 4. Decreased Ambulation Ability/Technique 5. Decreased Balance 6. Decreased Activity Tolerance 7. Decreased Pacing Skills 8. Increased Fatigue 9. Increased Shortness of Breath 10. Decreased Flexibility/Joint Mobility 11. Decreased Cognition INTERVENTIONS PLANNED: (Benefits and precautions of occupational therapy have been discussed with the patient.) 1. N/A  
  
 
RECOMMENDED REHABILITATION/EQUIPMENT: (at time of discharge pending progress): Due to the probability of continued deficits (see above) this patient will not likely need continued skilled occupational therapy after discharge. Equipment:  
? None at this time OCCUPATIONAL PROFILE AND HISTORY:  
History of Present Injury/Illness (Reason for Referral): 
See H&P Past Medical History/Comorbidities:  
Mr. Castillo Larkin  has a past medical history of Atherosclerosis of artery of extremity with ulceration ([address] Atherosclerosis of native arteries of extremity with intermittent claudication ([address] Atopic dermatitis, Atrial fibrillation ([address] CAD (coronary artery disease), Carotid stenosis, bilateral, CHF (congestive heart failure) ([address] Chronic kidney disease, Chronic obstructive pulmonary disease ([address] Cirrhosis ([address] Colon, diverticulosis, Coronary atherosclerosis of native coronary vessel, Diabetes ([address] Diastolic CHF, acute on chronic ([address] Failed CABG (coronary artery bypass graft), GI bleed, Gout, History of tobacco use, [place] (hard of hearing), Hypercholesterolemia, Hypertension, Hyperuricemia, Morbid obesity ([address] PAD (peripheral artery disease) ([address] Poor historian, Radiologic findings of lung field, abnormal, Seizure disorder ([address] Shortness of breath dyspnea, Thyroid disease, and Unspecified sleep apnea. Mr. Castillo Larkin  has a past surgical history that includes pr cardiac surg procedure unlist; hx coronary artery bypass graft (06-); hx cataract removal (Left, 2003); hx heart catheterization; hx coronary stent placement (02-); hx heent (1970s); hx colonoscopy; ULTRASOUND (Bilateral, 11/2/2018); THORACENTESIS (Right, 11/2/2018); ULTRASOUND GUIDED ACCESS VENA CAVA FILTER INSERTION (N/A, 9/12/2018); ESOPHAGOGASTRODUODENOSCOPY (EGD) (N/A, 8/5/2018); ESOPHAGOGASTRODUODENOSCOPY (EGD) (N/A, 1/27/2017); COLONOSCOPY  BMI 36 TO BE ADMITTED 1/26/17 (N/A, 1/27/2017); ESOPHAGOGASTRODUODENOSCOPY (EGD) (N/A, 1/22/2017); ESOPHAGOGASTRODUODENAL (EGD) BIOPSY (N/A, 1/22/2017); ESOPHAGOGASTRODUODENOSCOPY (EGD)   rm 610 (N/A, 5/8/2015); COLONOSCOPY (N/A, 1/24/2015); and ESOPHAGOGASTRODUODENOSCOPY (EGD)   rm 3101 (N/A, 1/23/2015). Social History/Living Environment:  
Home Environment: Skilled nursing facility Care Facility Name: [facility] Living Alone: No 
Support Systems: Skilled nursing facility Patient Expects to be Discharged to[de-identified] Unknown Current DME Used/Available at Home: (Unknown) Prior Level of Function/Work/Activity: 
Pt has been in and out of hospital and SNF for the past few months. Number of Personal Factors/Comorbidities that affect the Plan of Care: Expanded review of therapy/medical records (1-2):  MODERATE COMPLEXITY ASSESSMENT OF OCCUPATIONAL PERFORMANCE[de-identified]  
Activities of Daily Living:  
Basic ADLs (From Assessment) Complex ADLs (From Assessment) Feeding: Maximum assistance Oral Facial Hygiene/Grooming: Maximum assistance Bathing: Total assistance Upper Body Dressing: Total assistance Lower Body Dressing: Total assistance Toileting: Total assistance Grooming/Bathing/Dressing Activities of Daily Living Most Recent Physical Functioning:  
Gross Assessment: 
AROM: Generally decreased, functional 
Strength: Grossly decreased, non-functional 
Coordination: Generally decreased, functional 
Sensation: Impaired Posture: 
  
Balance: 
Sitting: Impaired Sitting - Static: Poor (constant support) Sitting - Dynamic: Poor (constant support) Standing: (Not tested) Bed Mobility: 
  
Wheelchair Mobility: 
  
Transfers: 
   
 
    
 
Patient Vitals for the past 6 hrs: 
 BP BP Patient Position SpO2 O2 Flow Rate (L/min) Pulse 12/31/18 0500 114/48 At rest 94 %  68  
12/31/18 0716   97 % 3 l/min   
12/31/18 0845 118/50    72  
12/31/18 0910 118/50 At rest 93 %  68 Mental Status Neurologic State: Confused, Alert Orientation Level: Oriented to person, Oriented to place, Oriented to situation, Disoriented to time Cognition: Decreased command following, Decreased attention/concentration, Impaired decision making Physical Skills Involved: 1. Range of Motion 2. Balance 3. Strength 4. Activity Tolerance 5. Sensation 6. Fine Motor Control 7. Gross Motor Control 8. Edema 9. Skin Integrity Cognitive Skills Affected (resulting in the inability to perform in a timely and safe manner): 1. Perception 2. Executive Function 3. Immediate Memory 4. Short Term Recall 5. Long Term Memory 6. Sustained Attention 7. Divided Attention 8. Comprehension 9. Expression Psychosocial Skills Affected: 1. Environmental Adaptation 2. Self-Awareness 3. Awareness of Others Number of elements that affect the Plan of Care: 5+:  HIGH COMPLEXITY CLINICAL DECISION MAKIN[address] [place] [address] AM-PAC 6 Clicks Daily Activity Inpatient Short Form How much help from another person does the patient currently need. .. Total A Lot A Little None 1. Putting on and taking off regular lower body clothing? [x] 1   [] 2   [] 3   [] 4  
2. Bathing (including washing, rinsing, drying)? [x] 1   [] 2   [] 3   [] 4  
3. Toileting, which includes using toilet, bedpan or urinal?   [x] 1   [] 2   [] 3   [] 4  
4. Putting on and taking off regular upper body clothing? [x] 1   [] 2   [] 3   [] 4  
5. Taking care of personal grooming such as brushing teeth? [x] 1   [] 2   [] 3   [] 4  
6. Eating meals? [x] 1   [] 2   [] 3   [] 4  
© , Trustees of [address] [place] [address], under license to [facility]. All rights reserved Score:  Initial: 6 Most Recent: X (Date: -- ) Interpretation of Tool:  Represents activities that are increasingly more difficult (i.e. Bed mobility, Transfers, Gait). Score 24 23 22-20 19-15 14-10 9-7 6 Modifier CH CI CJ CK CL CM CN   
 
? Self Care:  
  - CURRENT STATUS: CN - 100% impaired, limited or restricted  - GOAL STATUS: CN - 100% impaired, limited or restricted  - D/C STATUS:  CN - 100% impaired, limited or restricted Payor: [facility] MEDICARE / Plan: [place] [facility] [place]/PPO / Product Type: Managed Care Medicare /   
· Use of outcome tool(s) and clinical judgement create a POC that gives a: MODERATE COMPLEXITY  
 
 
 
TREATMENT:  
(In addition to Assessment/Re-Assessment sessions the following treatments were rendered) Pre-treatment Symptoms/Complaints:   
Pain: Initial: N/A Post Session:  N/A Assessment/Reassessment only, no treatment provided today Braces/Orthotics/Lines/Etc:  
· IV 
· alejo catheter · O2 Device: Nasal cannula Treatment/Session Assessment:   
· Response to Treatment:  Agreeable · Interdisciplinary Collaboration:  
o Registered Nurse · After treatment position/precautions:  
o Bed alarm/tab alert on 
o Caregiver at bedside 
o Call light within reach Total Treatment Duration: OT Patient Time In/Time Out Time In: 3356 Time Out: 1517 Darci Chiang OT

## 2019-01-01 ENCOUNTER — HOSPICE ADMISSION (OUTPATIENT)
Dept: HOSPICE | Facility: HOSPICE | Age: 84
End: 2019-01-01
Payer: MEDICARE

## 2019-01-01 ENCOUNTER — PATIENT OUTREACH (OUTPATIENT)
Dept: CASE MANAGEMENT | Age: 84
End: 2019-01-01

## 2019-01-01 ENCOUNTER — APPOINTMENT (OUTPATIENT)
Dept: GENERAL RADIOLOGY | Age: 84
DRG: 101 | End: 2019-01-01
Attending: FAMILY MEDICINE
Payer: MEDICARE

## 2019-01-01 ENCOUNTER — HOSPITAL ENCOUNTER (INPATIENT)
Age: 84
LOS: 6 days | End: 2019-01-17
Attending: INTERNAL MEDICINE | Admitting: INTERNAL MEDICINE

## 2019-01-01 VITALS
BODY MASS INDEX: 28.03 KG/M2 | WEIGHT: 218.4 LBS | HEIGHT: 74 IN | RESPIRATION RATE: 20 BRPM | DIASTOLIC BLOOD PRESSURE: 44 MMHG | TEMPERATURE: 98.8 F | HEART RATE: 72 BPM | SYSTOLIC BLOOD PRESSURE: 85 MMHG

## 2019-01-01 VITALS
SYSTOLIC BLOOD PRESSURE: 108 MMHG | OXYGEN SATURATION: 95 % | TEMPERATURE: 97.8 F | BODY MASS INDEX: 28.03 KG/M2 | DIASTOLIC BLOOD PRESSURE: 54 MMHG | HEIGHT: 74 IN | HEART RATE: 75 BPM | WEIGHT: 218.4 LBS | RESPIRATION RATE: 16 BRPM

## 2019-01-01 DIAGNOSIS — N18.30 CKD (CHRONIC KIDNEY DISEASE) STAGE 3, GFR 30-59 ML/MIN (HCC): Chronic | ICD-10-CM

## 2019-01-01 DIAGNOSIS — N17.9 AKI (ACUTE KIDNEY INJURY) (HCC): ICD-10-CM

## 2019-01-01 DIAGNOSIS — J98.4 RESTRICTIVE LUNG DISEASE: Chronic | ICD-10-CM

## 2019-01-01 DIAGNOSIS — I50.23 ACUTE ON CHRONIC SYSTOLIC (CONGESTIVE) HEART FAILURE (HCC): ICD-10-CM

## 2019-01-01 DIAGNOSIS — R40.2441: ICD-10-CM

## 2019-01-01 DIAGNOSIS — J90 PLEURAL EFFUSION: ICD-10-CM

## 2019-01-01 DIAGNOSIS — J96.01 ACUTE RESPIRATORY FAILURE WITH HYPOXIA (HCC): ICD-10-CM

## 2019-01-01 DIAGNOSIS — Z95.828 S/P IVC FILTER: Chronic | ICD-10-CM

## 2019-01-01 DIAGNOSIS — J44.1 COPD EXACERBATION (HCC): ICD-10-CM

## 2019-01-01 LAB
ABO + RH BLD: NORMAL
ABO + RH BLD: NORMAL
ALBUMIN SERPL-MCNC: 2.2 G/DL (ref 3.2–4.6)
ALBUMIN SERPL-MCNC: 2.3 G/DL (ref 3.2–4.6)
ALBUMIN/GLOB SERPL: 0.6 {RATIO} (ref 1.2–3.5)
ALBUMIN/GLOB SERPL: 0.7 {RATIO} (ref 1.2–3.5)
ALP SERPL-CCNC: 65 U/L (ref 50–136)
ALP SERPL-CCNC: 66 U/L (ref 50–136)
ALT SERPL-CCNC: 6 U/L (ref 12–65)
ALT SERPL-CCNC: <6 U/L (ref 12–65)
AMMONIA PLAS-SCNC: 25 UMOL/L (ref 11–32)
ANION GAP SERPL CALC-SCNC: 4 MMOL/L (ref 7–16)
ANION GAP SERPL CALC-SCNC: 5 MMOL/L (ref 7–16)
ANION GAP SERPL CALC-SCNC: 6 MMOL/L (ref 7–16)
ANION GAP SERPL CALC-SCNC: 7 MMOL/L (ref 7–16)
ANION GAP SERPL CALC-SCNC: 8 MMOL/L (ref 7–16)
ANION GAP SERPL CALC-SCNC: 8 MMOL/L (ref 7–16)
ARTERIAL PATENCY WRIST A: YES
AST SERPL-CCNC: 10 U/L (ref 15–37)
AST SERPL-CCNC: 9 U/L (ref 15–37)
ATRIAL RATE: 83 BPM
BACTERIA SPEC CULT: ABNORMAL
BACTERIA SPEC CULT: ABNORMAL
BACTERIA SPEC CULT: NORMAL
BACTERIA SPEC CULT: NORMAL
BASE EXCESS BLD CALC-SCNC: 10 MMOL/L
BASOPHILS # BLD: 0 K/UL (ref 0–0.2)
BASOPHILS # BLD: 0 K/UL (ref 0–0.2)
BASOPHILS NFR BLD: 0 % (ref 0–2)
BASOPHILS NFR BLD: 0 % (ref 0–2)
BDY SITE: ABNORMAL
BILIRUB SERPL-MCNC: 1.2 MG/DL (ref 0.2–1.1)
BILIRUB SERPL-MCNC: 1.4 MG/DL (ref 0.2–1.1)
BLD PROD TYP BPU: NORMAL
BLD PROD TYP BPU: NORMAL
BLOOD GROUP ANTIBODIES SERPL: NORMAL
BLOOD GROUP ANTIBODIES SERPL: NORMAL
BNP SERPL-MCNC: 1840 PG/ML
BODY TEMPERATURE: 98.6
BPU ID: NORMAL
BPU ID: NORMAL
BUN SERPL-MCNC: 28 MG/DL (ref 8–23)
BUN SERPL-MCNC: 30 MG/DL (ref 8–23)
BUN SERPL-MCNC: 38 MG/DL (ref 8–23)
BUN SERPL-MCNC: 39 MG/DL (ref 8–23)
BUN SERPL-MCNC: 41 MG/DL (ref 8–23)
BUN SERPL-MCNC: 44 MG/DL (ref 8–23)
BUN SERPL-MCNC: 44 MG/DL (ref 8–23)
BUN SERPL-MCNC: 46 MG/DL (ref 8–23)
CALCIUM SERPL-MCNC: 7.7 MG/DL (ref 8.3–10.4)
CALCIUM SERPL-MCNC: 7.7 MG/DL (ref 8.3–10.4)
CALCIUM SERPL-MCNC: 7.9 MG/DL (ref 8.3–10.4)
CALCIUM SERPL-MCNC: 8 MG/DL (ref 8.3–10.4)
CALCIUM SERPL-MCNC: 8.1 MG/DL (ref 8.3–10.4)
CALCIUM SERPL-MCNC: 8.2 MG/DL (ref 8.3–10.4)
CALCULATED R AXIS, ECG10: 21 DEGREES
CALCULATED T AXIS, ECG11: -173 DEGREES
CHLORIDE SERPL-SCNC: 100 MMOL/L (ref 98–107)
CHLORIDE SERPL-SCNC: 103 MMOL/L (ref 98–107)
CHLORIDE SERPL-SCNC: 95 MMOL/L (ref 98–107)
CHLORIDE SERPL-SCNC: 96 MMOL/L (ref 98–107)
CHLORIDE SERPL-SCNC: 96 MMOL/L (ref 98–107)
CHLORIDE SERPL-SCNC: 99 MMOL/L (ref 98–107)
CO2 BLD-SCNC: 37 MMOL/L
CO2 SERPL-SCNC: 33 MMOL/L (ref 21–32)
CO2 SERPL-SCNC: 34 MMOL/L (ref 21–32)
CO2 SERPL-SCNC: 34 MMOL/L (ref 21–32)
CO2 SERPL-SCNC: 35 MMOL/L (ref 21–32)
CO2 SERPL-SCNC: 35 MMOL/L (ref 21–32)
CO2 SERPL-SCNC: 36 MMOL/L (ref 21–32)
CO2 SERPL-SCNC: 38 MMOL/L (ref 21–32)
CO2 SERPL-SCNC: 38 MMOL/L (ref 21–32)
COLLECT TIME,HTIME: 2341
CREAT SERPL-MCNC: 1.62 MG/DL (ref 0.8–1.5)
CREAT SERPL-MCNC: 1.69 MG/DL (ref 0.8–1.5)
CREAT SERPL-MCNC: 1.72 MG/DL (ref 0.8–1.5)
CREAT SERPL-MCNC: 1.8 MG/DL (ref 0.8–1.5)
CREAT SERPL-MCNC: 1.87 MG/DL (ref 0.8–1.5)
CREAT SERPL-MCNC: 1.92 MG/DL (ref 0.8–1.5)
CREAT SERPL-MCNC: 1.93 MG/DL (ref 0.8–1.5)
CREAT SERPL-MCNC: 1.99 MG/DL (ref 0.8–1.5)
CROSSMATCH RESULT,%XM: NORMAL
CROSSMATCH RESULT,%XM: NORMAL
DIAGNOSIS, 93000: NORMAL
DIFFERENTIAL METHOD BLD: ABNORMAL
DIFFERENTIAL METHOD BLD: ABNORMAL
EOSINOPHIL # BLD: 0.1 K/UL (ref 0–0.8)
EOSINOPHIL # BLD: 0.1 K/UL (ref 0–0.8)
EOSINOPHIL NFR BLD: 2 % (ref 0.5–7.8)
EOSINOPHIL NFR BLD: 3 % (ref 0.5–7.8)
ERYTHROCYTE [DISTWIDTH] IN BLOOD BY AUTOMATED COUNT: 18.6 % (ref 11.9–14.6)
ERYTHROCYTE [DISTWIDTH] IN BLOOD BY AUTOMATED COUNT: 19.2 % (ref 11.9–14.6)
ERYTHROCYTE [DISTWIDTH] IN BLOOD BY AUTOMATED COUNT: 19.3 % (ref 11.9–14.6)
ERYTHROCYTE [DISTWIDTH] IN BLOOD BY AUTOMATED COUNT: 19.4 % (ref 11.9–14.6)
ERYTHROCYTE [DISTWIDTH] IN BLOOD BY AUTOMATED COUNT: 19.4 % (ref 11.9–14.6)
ERYTHROCYTE [DISTWIDTH] IN BLOOD BY AUTOMATED COUNT: 19.7 % (ref 11.9–14.6)
ERYTHROCYTE [DISTWIDTH] IN BLOOD BY AUTOMATED COUNT: 20.2 % (ref 11.9–14.6)
ERYTHROCYTE [DISTWIDTH] IN BLOOD BY AUTOMATED COUNT: 20.6 % (ref 11.9–14.6)
FLOW RATE ISTAT,IFRATE: 3 L/MIN
GAS FLOW.O2 O2 DELIVERY SYS: ABNORMAL L/MIN
GLOBULIN SER CALC-MCNC: 3.5 G/DL (ref 2.3–3.5)
GLOBULIN SER CALC-MCNC: 3.6 G/DL (ref 2.3–3.5)
GLUCOSE BLD STRIP.AUTO-MCNC: 100 MG/DL (ref 65–100)
GLUCOSE BLD STRIP.AUTO-MCNC: 111 MG/DL (ref 65–100)
GLUCOSE BLD STRIP.AUTO-MCNC: 112 MG/DL (ref 65–100)
GLUCOSE BLD STRIP.AUTO-MCNC: 113 MG/DL (ref 65–100)
GLUCOSE BLD STRIP.AUTO-MCNC: 115 MG/DL (ref 65–100)
GLUCOSE BLD STRIP.AUTO-MCNC: 116 MG/DL (ref 65–100)
GLUCOSE BLD STRIP.AUTO-MCNC: 116 MG/DL (ref 65–100)
GLUCOSE BLD STRIP.AUTO-MCNC: 119 MG/DL (ref 65–100)
GLUCOSE BLD STRIP.AUTO-MCNC: 120 MG/DL (ref 65–100)
GLUCOSE BLD STRIP.AUTO-MCNC: 121 MG/DL (ref 65–100)
GLUCOSE BLD STRIP.AUTO-MCNC: 122 MG/DL (ref 65–100)
GLUCOSE BLD STRIP.AUTO-MCNC: 123 MG/DL (ref 65–100)
GLUCOSE BLD STRIP.AUTO-MCNC: 124 MG/DL (ref 65–100)
GLUCOSE BLD STRIP.AUTO-MCNC: 125 MG/DL (ref 65–100)
GLUCOSE BLD STRIP.AUTO-MCNC: 129 MG/DL (ref 65–100)
GLUCOSE BLD STRIP.AUTO-MCNC: 129 MG/DL (ref 65–100)
GLUCOSE BLD STRIP.AUTO-MCNC: 131 MG/DL (ref 65–100)
GLUCOSE BLD STRIP.AUTO-MCNC: 131 MG/DL (ref 65–100)
GLUCOSE BLD STRIP.AUTO-MCNC: 132 MG/DL (ref 65–100)
GLUCOSE BLD STRIP.AUTO-MCNC: 133 MG/DL (ref 65–100)
GLUCOSE BLD STRIP.AUTO-MCNC: 133 MG/DL (ref 65–100)
GLUCOSE BLD STRIP.AUTO-MCNC: 134 MG/DL (ref 65–100)
GLUCOSE BLD STRIP.AUTO-MCNC: 134 MG/DL (ref 65–100)
GLUCOSE BLD STRIP.AUTO-MCNC: 135 MG/DL (ref 65–100)
GLUCOSE BLD STRIP.AUTO-MCNC: 135 MG/DL (ref 65–100)
GLUCOSE BLD STRIP.AUTO-MCNC: 140 MG/DL (ref 65–100)
GLUCOSE BLD STRIP.AUTO-MCNC: 142 MG/DL (ref 65–100)
GLUCOSE BLD STRIP.AUTO-MCNC: 147 MG/DL (ref 65–100)
GLUCOSE BLD STRIP.AUTO-MCNC: 154 MG/DL (ref 65–100)
GLUCOSE BLD STRIP.AUTO-MCNC: 155 MG/DL (ref 65–100)
GLUCOSE BLD STRIP.AUTO-MCNC: 168 MG/DL (ref 65–100)
GLUCOSE BLD STRIP.AUTO-MCNC: 200 MG/DL (ref 65–100)
GLUCOSE BLD STRIP.AUTO-MCNC: 200 MG/DL (ref 65–100)
GLUCOSE BLD STRIP.AUTO-MCNC: 212 MG/DL (ref 65–100)
GLUCOSE BLD STRIP.AUTO-MCNC: 90 MG/DL (ref 65–100)
GLUCOSE BLD STRIP.AUTO-MCNC: 93 MG/DL (ref 65–100)
GLUCOSE BLD STRIP.AUTO-MCNC: 99 MG/DL (ref 65–100)
GLUCOSE SERPL-MCNC: 100 MG/DL (ref 65–100)
GLUCOSE SERPL-MCNC: 109 MG/DL (ref 65–100)
GLUCOSE SERPL-MCNC: 112 MG/DL (ref 65–100)
GLUCOSE SERPL-MCNC: 113 MG/DL (ref 65–100)
GLUCOSE SERPL-MCNC: 122 MG/DL (ref 65–100)
GLUCOSE SERPL-MCNC: 123 MG/DL (ref 65–100)
GLUCOSE SERPL-MCNC: 127 MG/DL (ref 65–100)
GLUCOSE SERPL-MCNC: 97 MG/DL (ref 65–100)
HCO3 BLD-SCNC: 35.9 MMOL/L (ref 22–26)
HCT VFR BLD AUTO: 25.4 % (ref 41.1–50.3)
HCT VFR BLD AUTO: 26.3 % (ref 41.1–50.3)
HCT VFR BLD AUTO: 26.3 % (ref 41.1–50.3)
HCT VFR BLD AUTO: 26.6 % (ref 41.1–50.3)
HCT VFR BLD AUTO: 26.6 % (ref 41.1–50.3)
HCT VFR BLD AUTO: 26.9 % (ref 41.1–50.3)
HCT VFR BLD AUTO: 27 % (ref 41.1–50.3)
HCT VFR BLD AUTO: 27.2 % (ref 41.1–50.3)
HCT VFR BLD AUTO: 27.5 % (ref 41.1–50.3)
HCT VFR BLD AUTO: 27.5 % (ref 41.1–50.3)
HCT VFR BLD AUTO: 27.8 % (ref 41.1–50.3)
HCT VFR BLD AUTO: 28 % (ref 41.1–50.3)
HCT VFR BLD AUTO: 28.2 % (ref 41.1–50.3)
HCT VFR BLD AUTO: 28.3 % (ref 41.1–50.3)
HCT VFR BLD AUTO: 29.1 % (ref 41.1–50.3)
HCT VFR BLD AUTO: 29.7 % (ref 41.1–50.3)
HGB BLD-MCNC: 7.5 G/DL (ref 13.6–17.2)
HGB BLD-MCNC: 7.5 G/DL (ref 13.6–17.2)
HGB BLD-MCNC: 7.6 G/DL (ref 13.6–17.2)
HGB BLD-MCNC: 7.6 G/DL (ref 13.6–17.2)
HGB BLD-MCNC: 7.7 G/DL (ref 13.6–17.2)
HGB BLD-MCNC: 7.9 G/DL (ref 13.6–17.2)
HGB BLD-MCNC: 8 G/DL (ref 13.6–17.2)
HGB BLD-MCNC: 8.1 G/DL (ref 13.6–17.2)
HGB BLD-MCNC: 8.2 G/DL (ref 13.6–17.2)
HGB BLD-MCNC: 8.4 G/DL (ref 13.6–17.2)
HGB BLD-MCNC: 8.6 G/DL (ref 13.6–17.2)
IMM GRANULOCYTES # BLD AUTO: 0 K/UL (ref 0–0.5)
IMM GRANULOCYTES # BLD: 0 K/UL (ref 0–0.5)
IMM GRANULOCYTES NFR BLD AUTO: 0 % (ref 0–5)
IMM GRANULOCYTES NFR BLD AUTO: 1 % (ref 0–5)
INR PPP: 1.5
LYMPHOCYTES # BLD: 0.8 K/UL (ref 0.5–4.6)
LYMPHOCYTES # BLD: 1 K/UL (ref 0.5–4.6)
LYMPHOCYTES NFR BLD: 15 % (ref 13–44)
LYMPHOCYTES NFR BLD: 17 % (ref 13–44)
MAGNESIUM SERPL-MCNC: 2.2 MG/DL (ref 1.8–2.4)
MCH RBC QN AUTO: 25.7 PG (ref 26.1–32.9)
MCH RBC QN AUTO: 25.9 PG (ref 26.1–32.9)
MCH RBC QN AUTO: 26.2 PG (ref 26.1–32.9)
MCH RBC QN AUTO: 26.3 PG (ref 26.1–32.9)
MCH RBC QN AUTO: 26.3 PG (ref 26.1–32.9)
MCH RBC QN AUTO: 26.4 PG (ref 26.1–32.9)
MCH RBC QN AUTO: 26.6 PG (ref 26.1–32.9)
MCH RBC QN AUTO: 26.8 PG (ref 26.1–32.9)
MCHC RBC AUTO-ENTMCNC: 27.6 G/DL (ref 31.4–35)
MCHC RBC AUTO-ENTMCNC: 28.2 G/DL (ref 31.4–35)
MCHC RBC AUTO-ENTMCNC: 28.5 G/DL (ref 31.4–35)
MCHC RBC AUTO-ENTMCNC: 28.7 G/DL (ref 31.4–35)
MCHC RBC AUTO-ENTMCNC: 29.1 G/DL (ref 31.4–35)
MCHC RBC AUTO-ENTMCNC: 29.3 G/DL (ref 31.4–35)
MCHC RBC AUTO-ENTMCNC: 29.9 G/DL (ref 31.4–35)
MCHC RBC AUTO-ENTMCNC: 30.5 G/DL (ref 31.4–35)
MCV RBC AUTO: 87.9 FL (ref 79.6–97.8)
MCV RBC AUTO: 87.9 FL (ref 79.6–97.8)
MCV RBC AUTO: 88.4 FL (ref 79.6–97.8)
MCV RBC AUTO: 91 FL (ref 79.6–97.8)
MCV RBC AUTO: 91.1 FL (ref 79.6–97.8)
MCV RBC AUTO: 92.6 FL (ref 79.6–97.8)
MCV RBC AUTO: 93.3 FL (ref 79.6–97.8)
MCV RBC AUTO: 93.9 FL (ref 79.6–97.8)
MM INDURATION POC: 0 MM (ref 0–5)
MM INDURATION POC: 0 MM (ref 0–5)
MONOCYTES # BLD: 0.3 K/UL (ref 0.1–1.3)
MONOCYTES # BLD: 0.3 K/UL (ref 0.1–1.3)
MONOCYTES NFR BLD: 5 % (ref 4–12)
MONOCYTES NFR BLD: 6 % (ref 4–12)
NEUTS SEG # BLD: 4.2 K/UL (ref 1.7–8.2)
NEUTS SEG # BLD: 4.3 K/UL (ref 1.7–8.2)
NEUTS SEG NFR BLD: 74 % (ref 43–78)
NEUTS SEG NFR BLD: 76 % (ref 43–78)
NRBC # BLD: 0 K/UL (ref 0–0.2)
O2/TOTAL GAS SETTING VFR VENT: 0.32 %
PCO2 BLD: 52.3 MMHG (ref 35–45)
PH BLD: 7.44 [PH] (ref 7.35–7.45)
PLATELET # BLD AUTO: 111 K/UL (ref 150–450)
PLATELET # BLD AUTO: 112 K/UL (ref 150–450)
PLATELET # BLD AUTO: 114 K/UL (ref 150–450)
PLATELET # BLD AUTO: 116 K/UL (ref 150–450)
PLATELET # BLD AUTO: 119 K/UL (ref 150–450)
PLATELET # BLD AUTO: 120 K/UL (ref 150–450)
PLATELET # BLD AUTO: 123 K/UL (ref 150–450)
PLATELET # BLD AUTO: 95 K/UL (ref 150–450)
PMV BLD AUTO: 11.5 FL (ref 9.4–12.3)
PMV BLD AUTO: 11.8 FL (ref 9.4–12.3)
PMV BLD AUTO: 12 FL (ref 9.4–12.3)
PMV BLD AUTO: 12.1 FL (ref 9.4–12.3)
PMV BLD AUTO: 12.5 FL (ref 9.4–12.3)
PMV BLD AUTO: 12.8 FL (ref 9.4–12.3)
PMV BLD AUTO: 12.8 FL (ref 9.4–12.3)
PMV BLD AUTO: 13 FL (ref 9.4–12.3)
PO2 BLD: 113 MMHG (ref 75–100)
POTASSIUM SERPL-SCNC: 3.2 MMOL/L (ref 3.5–5.1)
POTASSIUM SERPL-SCNC: 3.2 MMOL/L (ref 3.5–5.1)
POTASSIUM SERPL-SCNC: 3.5 MMOL/L (ref 3.5–5.1)
POTASSIUM SERPL-SCNC: 3.7 MMOL/L (ref 3.5–5.1)
POTASSIUM SERPL-SCNC: 4 MMOL/L (ref 3.5–5.1)
POTASSIUM SERPL-SCNC: 4.1 MMOL/L (ref 3.5–5.1)
POTASSIUM SERPL-SCNC: 4.2 MMOL/L (ref 3.5–5.1)
POTASSIUM SERPL-SCNC: 4.4 MMOL/L (ref 3.5–5.1)
PPD POC: NEGATIVE NEGATIVE
PPD POC: NEGATIVE NEGATIVE
PROT SERPL-MCNC: 5.8 G/DL (ref 6.3–8.2)
PROT SERPL-MCNC: 5.8 G/DL (ref 6.3–8.2)
PROTHROMBIN TIME: 17.6 SEC (ref 11.7–14.5)
Q-T INTERVAL, ECG07: 408 MS
QRS DURATION, ECG06: 108 MS
QTC CALCULATION (BEZET), ECG08: 455 MS
RBC # BLD AUTO: 2.84 M/UL (ref 4.23–5.6)
RBC # BLD AUTO: 2.85 M/UL (ref 4.23–5.6)
RBC # BLD AUTO: 2.89 M/UL (ref 4.23–5.6)
RBC # BLD AUTO: 2.89 M/UL (ref 4.23–5.6)
RBC # BLD AUTO: 2.93 M/UL (ref 4.23–5.6)
RBC # BLD AUTO: 3.02 M/UL (ref 4.23–5.6)
RBC # BLD AUTO: 3.06 M/UL (ref 4.23–5.6)
RBC # BLD AUTO: 3.19 M/UL (ref 4.23–5.6)
SAO2 % BLD: 98 % (ref 95–98)
SERVICE CMNT-IMP: ABNORMAL
SERVICE CMNT-IMP: NORMAL
SERVICE CMNT-IMP: NORMAL
SODIUM SERPL-SCNC: 138 MMOL/L (ref 136–145)
SODIUM SERPL-SCNC: 138 MMOL/L (ref 136–145)
SODIUM SERPL-SCNC: 139 MMOL/L (ref 136–145)
SODIUM SERPL-SCNC: 140 MMOL/L (ref 136–145)
SODIUM SERPL-SCNC: 140 MMOL/L (ref 136–145)
SODIUM SERPL-SCNC: 141 MMOL/L (ref 136–145)
SODIUM SERPL-SCNC: 143 MMOL/L (ref 136–145)
SODIUM SERPL-SCNC: 145 MMOL/L (ref 136–145)
SPECIMEN EXP DATE BLD: NORMAL
SPECIMEN EXP DATE BLD: NORMAL
SPECIMEN TYPE: ABNORMAL
STATUS OF UNIT,%ST: NORMAL
STATUS OF UNIT,%ST: NORMAL
UNIT DIVISION, %UDIV: 0
UNIT DIVISION, %UDIV: 0
VENTRICULAR RATE, ECG03: 75 BPM
WBC # BLD AUTO: 5.5 K/UL (ref 4.3–11.1)
WBC # BLD AUTO: 5.7 K/UL (ref 4.3–11.1)
WBC # BLD AUTO: 5.8 K/UL (ref 4.3–11.1)
WBC # BLD AUTO: 5.8 K/UL (ref 4.3–11.1)
WBC # BLD AUTO: 5.9 K/UL (ref 4.3–11.1)
WBC # BLD AUTO: 6.5 K/UL (ref 4.3–11.1)
WBC # BLD AUTO: 7.4 K/UL (ref 4.3–11.1)
WBC # BLD AUTO: 7.5 K/UL (ref 4.3–11.1)

## 2019-01-01 PROCEDURE — 77010033678 HC OXYGEN DAILY

## 2019-01-01 PROCEDURE — 82962 GLUCOSE BLOOD TEST: CPT

## 2019-01-01 PROCEDURE — 74011250637 HC RX REV CODE- 250/637: Performed by: INTERNAL MEDICINE

## 2019-01-01 PROCEDURE — 86900 BLOOD TYPING SEROLOGIC ABO: CPT

## 2019-01-01 PROCEDURE — 94760 N-INVAS EAR/PLS OXIMETRY 1: CPT

## 2019-01-01 PROCEDURE — 74011000258 HC RX REV CODE- 258: Performed by: PSYCHIATRY & NEUROLOGY

## 2019-01-01 PROCEDURE — 74011000250 HC RX REV CODE- 250: Performed by: INTERNAL MEDICINE

## 2019-01-01 PROCEDURE — 80048 BASIC METABOLIC PNL TOTAL CA: CPT

## 2019-01-01 PROCEDURE — 74011250637 HC RX REV CODE- 250/637: Performed by: NURSE PRACTITIONER

## 2019-01-01 PROCEDURE — 77030039270 HC TU BLD FLTR CARD -A

## 2019-01-01 PROCEDURE — 74011250636 HC RX REV CODE- 250/636: Performed by: PSYCHIATRY & NEUROLOGY

## 2019-01-01 PROCEDURE — 74011250636 HC RX REV CODE- 250/636: Performed by: FAMILY MEDICINE

## 2019-01-01 PROCEDURE — 97530 THERAPEUTIC ACTIVITIES: CPT

## 2019-01-01 PROCEDURE — 74011000250 HC RX REV CODE- 250: Performed by: NURSE PRACTITIONER

## 2019-01-01 PROCEDURE — 74011250636 HC RX REV CODE- 250/636: Performed by: INTERNAL MEDICINE

## 2019-01-01 PROCEDURE — 97110 THERAPEUTIC EXERCISES: CPT

## 2019-01-01 PROCEDURE — 65660000000 HC RM CCU STEPDOWN

## 2019-01-01 PROCEDURE — C9113 INJ PANTOPRAZOLE SODIUM, VIA: HCPCS | Performed by: INTERNAL MEDICINE

## 2019-01-01 PROCEDURE — 74011000258 HC RX REV CODE- 258: Performed by: INTERNAL MEDICINE

## 2019-01-01 PROCEDURE — 92526 ORAL FUNCTION THERAPY: CPT

## 2019-01-01 PROCEDURE — 0656 HSPC GENERAL INPATIENT

## 2019-01-01 PROCEDURE — 86923 COMPATIBILITY TEST ELECTRIC: CPT

## 2019-01-01 PROCEDURE — 85027 COMPLETE CBC AUTOMATED: CPT

## 2019-01-01 PROCEDURE — 99231 SBSQ HOSP IP/OBS SF/LOW 25: CPT | Performed by: NURSE PRACTITIONER

## 2019-01-01 PROCEDURE — 77030019605

## 2019-01-01 PROCEDURE — 94640 AIRWAY INHALATION TREATMENT: CPT

## 2019-01-01 PROCEDURE — 36430 TRANSFUSION BLD/BLD COMPNT: CPT

## 2019-01-01 PROCEDURE — 74011250637 HC RX REV CODE- 250/637: Performed by: FAMILY MEDICINE

## 2019-01-01 PROCEDURE — 74011250636 HC RX REV CODE- 250/636: Performed by: NURSE PRACTITIONER

## 2019-01-01 PROCEDURE — 36415 COLL VENOUS BLD VENIPUNCTURE: CPT

## 2019-01-01 PROCEDURE — 99222 1ST HOSP IP/OBS MODERATE 55: CPT | Performed by: INTERNAL MEDICINE

## 2019-01-01 PROCEDURE — P9016 RBC LEUKOCYTES REDUCED: HCPCS

## 2019-01-01 PROCEDURE — 83880 ASSAY OF NATRIURETIC PEPTIDE: CPT

## 2019-01-01 PROCEDURE — 94660 CPAP INITIATION&MGMT: CPT

## 2019-01-01 PROCEDURE — 74011250636 HC RX REV CODE- 250/636

## 2019-01-01 PROCEDURE — 99233 SBSQ HOSP IP/OBS HIGH 50: CPT | Performed by: NURSE PRACTITIONER

## 2019-01-01 PROCEDURE — 74011636637 HC RX REV CODE- 636/637: Performed by: INTERNAL MEDICINE

## 2019-01-01 PROCEDURE — 77030020263 HC SOL INJ SOD CL0.9% LFCR 1000ML

## 2019-01-01 PROCEDURE — 74011000250 HC RX REV CODE- 250: Performed by: FAMILY MEDICINE

## 2019-01-01 PROCEDURE — 94761 N-INVAS EAR/PLS OXIMETRY MLT: CPT

## 2019-01-01 PROCEDURE — 80053 COMPREHEN METABOLIC PANEL: CPT

## 2019-01-01 PROCEDURE — 3336500001 HSPC ELECTION

## 2019-01-01 PROCEDURE — 85018 HEMOGLOBIN: CPT

## 2019-01-01 PROCEDURE — 99231 SBSQ HOSP IP/OBS SF/LOW 25: CPT | Performed by: INTERNAL MEDICINE

## 2019-01-01 PROCEDURE — 85025 COMPLETE CBC W/AUTO DIFF WBC: CPT

## 2019-01-01 PROCEDURE — 82140 ASSAY OF AMMONIA: CPT

## 2019-01-01 PROCEDURE — 97162 PT EVAL MOD COMPLEX 30 MIN: CPT

## 2019-01-01 PROCEDURE — 83735 ASSAY OF MAGNESIUM: CPT

## 2019-01-01 PROCEDURE — 85610 PROTHROMBIN TIME: CPT

## 2019-01-01 PROCEDURE — 36600 WITHDRAWAL OF ARTERIAL BLOOD: CPT

## 2019-01-01 PROCEDURE — 82803 BLOOD GASES ANY COMBINATION: CPT

## 2019-01-01 PROCEDURE — 71045 X-RAY EXAM CHEST 1 VIEW: CPT

## 2019-01-01 RX ORDER — HYOSCYAMINE SULFATE 0.12 MG/1
0.12 TABLET SUBLINGUAL
Status: DISCONTINUED | OUTPATIENT
Start: 2019-01-01 | End: 2019-01-01

## 2019-01-01 RX ORDER — ACETAMINOPHEN 650 MG/1
650 SUPPOSITORY RECTAL
Status: DISCONTINUED | OUTPATIENT
Start: 2019-01-01 | End: 2019-01-18 | Stop reason: HOSPADM

## 2019-01-01 RX ORDER — MORPHINE SULFATE 2 MG/ML
2 INJECTION, SOLUTION INTRAMUSCULAR; INTRAVENOUS
Qty: 10 ML | Refills: 0 | Status: SHIPPED
Start: 2019-01-01

## 2019-01-01 RX ORDER — URSODIOL 300 MG/1
300 CAPSULE ORAL
Status: DISCONTINUED | OUTPATIENT
Start: 2019-01-01 | End: 2019-01-01 | Stop reason: HOSPADM

## 2019-01-01 RX ORDER — FUROSEMIDE 10 MG/ML
20 INJECTION INTRAMUSCULAR; INTRAVENOUS ONCE
Status: COMPLETED | OUTPATIENT
Start: 2019-01-01 | End: 2019-01-01

## 2019-01-01 RX ORDER — LOPERAMIDE HYDROCHLORIDE 2 MG/1
4 CAPSULE ORAL AS NEEDED
Status: DISCONTINUED | OUTPATIENT
Start: 2019-01-01 | End: 2019-01-01

## 2019-01-01 RX ORDER — MORPHINE SULFATE 100 MG/5ML
10 SOLUTION ORAL
Status: DISCONTINUED | OUTPATIENT
Start: 2019-01-01 | End: 2019-01-01

## 2019-01-01 RX ORDER — LEVETIRACETAM 500 MG/1
500 TABLET ORAL DAILY
Status: DISCONTINUED | OUTPATIENT
Start: 2019-01-01 | End: 2019-01-01 | Stop reason: HOSPADM

## 2019-01-01 RX ORDER — LORAZEPAM 2 MG/ML
2 INJECTION INTRAMUSCULAR
Status: DISCONTINUED | OUTPATIENT
Start: 2019-01-01 | End: 2019-01-01

## 2019-01-01 RX ORDER — BUDESONIDE 0.5 MG/2ML
500 INHALANT ORAL 2 TIMES DAILY
Qty: 120 ML | Refills: 0 | Status: SHIPPED | OUTPATIENT
Start: 2019-01-01

## 2019-01-01 RX ORDER — POTASSIUM CHLORIDE 20 MEQ/1
40 TABLET, EXTENDED RELEASE ORAL DAILY
Qty: 30 TAB | Refills: 0 | Status: SHIPPED
Start: 2019-01-01

## 2019-01-01 RX ORDER — MORPHINE SULFATE 2 MG/ML
1 INJECTION, SOLUTION INTRAMUSCULAR; INTRAVENOUS ONCE
Status: ACTIVE | OUTPATIENT
Start: 2019-01-01 | End: 2019-01-01

## 2019-01-01 RX ORDER — FUROSEMIDE 10 MG/ML
40 INJECTION INTRAMUSCULAR; INTRAVENOUS ONCE
Status: COMPLETED | OUTPATIENT
Start: 2019-01-01 | End: 2019-01-01

## 2019-01-01 RX ORDER — LORAZEPAM 2 MG/ML
0.5 INJECTION INTRAMUSCULAR
Qty: 1 VIAL | Refills: 0 | Status: SHIPPED | OUTPATIENT
Start: 2019-01-01

## 2019-01-01 RX ORDER — MORPHINE SULFATE 2 MG/ML
2 INJECTION, SOLUTION INTRAMUSCULAR; INTRAVENOUS
Status: DISCONTINUED | OUTPATIENT
Start: 2019-01-01 | End: 2019-01-18 | Stop reason: HOSPADM

## 2019-01-01 RX ORDER — OXYCODONE HYDROCHLORIDE 5 MG/1
5 TABLET ORAL
Qty: 12 TAB | Refills: 0 | Status: SHIPPED
Start: 2019-01-01

## 2019-01-01 RX ORDER — LORAZEPAM 2 MG/ML
2 INJECTION INTRAMUSCULAR
Status: DISCONTINUED | OUTPATIENT
Start: 2019-01-01 | End: 2019-01-18 | Stop reason: HOSPADM

## 2019-01-01 RX ORDER — MORPHINE SULFATE 100 MG/5ML
5-10 SOLUTION ORAL
Status: DISCONTINUED | OUTPATIENT
Start: 2019-01-01 | End: 2019-01-01 | Stop reason: HOSPADM

## 2019-01-01 RX ORDER — PANTOPRAZOLE SODIUM 40 MG/1
40 TABLET, DELAYED RELEASE ORAL
Qty: 60 TAB | Refills: 0 | Status: SHIPPED
Start: 2019-01-01

## 2019-01-01 RX ORDER — GUAIFENESIN 200 MG/1
200 TABLET ORAL
Status: DISCONTINUED | OUTPATIENT
Start: 2019-01-01 | End: 2019-01-01 | Stop reason: HOSPADM

## 2019-01-01 RX ORDER — SODIUM CHLORIDE 0.9 % (FLUSH) 0.9 %
3 SYRINGE (ML) INJECTION AS NEEDED
Status: DISCONTINUED | OUTPATIENT
Start: 2019-01-01 | End: 2019-01-01

## 2019-01-01 RX ORDER — ACETAMINOPHEN 325 MG/1
650 TABLET ORAL
Qty: 100 TAB | Refills: 0 | Status: SHIPPED
Start: 2019-01-01

## 2019-01-01 RX ORDER — HALOPERIDOL 5 MG/ML
2 INJECTION INTRAMUSCULAR
Status: DISCONTINUED | OUTPATIENT
Start: 2019-01-01 | End: 2019-01-01 | Stop reason: HOSPADM

## 2019-01-01 RX ORDER — POTASSIUM CHLORIDE 20 MEQ/1
40 TABLET, EXTENDED RELEASE ORAL DAILY
Status: COMPLETED | OUTPATIENT
Start: 2019-01-01 | End: 2019-01-01

## 2019-01-01 RX ORDER — PANTOPRAZOLE SODIUM 40 MG/1
40 TABLET, DELAYED RELEASE ORAL
Status: DISCONTINUED | OUTPATIENT
Start: 2019-01-01 | End: 2019-01-01 | Stop reason: HOSPADM

## 2019-01-01 RX ORDER — FACIAL-BODY WIPES
10 EACH TOPICAL AS NEEDED
Status: DISCONTINUED | OUTPATIENT
Start: 2019-01-01 | End: 2019-01-18 | Stop reason: HOSPADM

## 2019-01-01 RX ORDER — GLYCOPYRROLATE 0.2 MG/ML
0.2 INJECTION INTRAMUSCULAR; INTRAVENOUS
Status: DISCONTINUED | OUTPATIENT
Start: 2019-01-01 | End: 2019-01-01

## 2019-01-01 RX ORDER — MORPHINE SULFATE 2 MG/ML
0.25 INJECTION, SOLUTION INTRAMUSCULAR; INTRAVENOUS
Status: DISCONTINUED | OUTPATIENT
Start: 2019-01-01 | End: 2019-01-01

## 2019-01-01 RX ORDER — URSODIOL 300 MG/1
300 CAPSULE ORAL
Qty: 90 CAP | Refills: 0 | Status: SHIPPED
Start: 2019-01-01

## 2019-01-01 RX ORDER — LATANOPROST 50 UG/ML
1 SOLUTION/ DROPS OPHTHALMIC
Status: DISCONTINUED | OUTPATIENT
Start: 2019-01-01 | End: 2019-01-01

## 2019-01-01 RX ORDER — MORPHINE SULFATE 2 MG/ML
2 INJECTION, SOLUTION INTRAMUSCULAR; INTRAVENOUS
Status: DISCONTINUED | OUTPATIENT
Start: 2019-01-01 | End: 2019-01-01

## 2019-01-01 RX ORDER — SCOLOPAMINE TRANSDERMAL SYSTEM 1 MG/1
1 PATCH, EXTENDED RELEASE TRANSDERMAL
Status: DISCONTINUED | OUTPATIENT
Start: 2019-01-01 | End: 2019-01-01 | Stop reason: HOSPADM

## 2019-01-01 RX ORDER — HALOPERIDOL 5 MG/ML
2 INJECTION INTRAMUSCULAR
Qty: 1 VIAL | Refills: 0 | Status: SHIPPED | OUTPATIENT
Start: 2019-01-01

## 2019-01-01 RX ORDER — ALBUTEROL SULFATE 0.83 MG/ML
5 SOLUTION RESPIRATORY (INHALATION)
Status: COMPLETED | OUTPATIENT
Start: 2019-01-01 | End: 2019-01-01

## 2019-01-01 RX ORDER — MORPHINE SULFATE 100 MG/5ML
5-10 SOLUTION ORAL
Qty: 12 ML | Refills: 0 | Status: SHIPPED
Start: 2019-01-01

## 2019-01-01 RX ORDER — SODIUM CHLORIDE, SODIUM LACTATE, POTASSIUM CHLORIDE, CALCIUM CHLORIDE 600; 310; 30; 20 MG/100ML; MG/100ML; MG/100ML; MG/100ML
1000 INJECTION, SOLUTION INTRAVENOUS CONTINUOUS
Status: DISCONTINUED | OUTPATIENT
Start: 2019-01-01 | End: 2019-01-01

## 2019-01-01 RX ORDER — FUROSEMIDE 10 MG/ML
40 INJECTION INTRAMUSCULAR; INTRAVENOUS 2 TIMES DAILY
Status: DISCONTINUED | OUTPATIENT
Start: 2019-01-01 | End: 2019-01-01 | Stop reason: HOSPADM

## 2019-01-01 RX ORDER — MORPHINE SULFATE 2 MG/ML
0.5 INJECTION, SOLUTION INTRAMUSCULAR; INTRAVENOUS
Status: DISCONTINUED | OUTPATIENT
Start: 2019-01-01 | End: 2019-01-01

## 2019-01-01 RX ORDER — SCOLOPAMINE TRANSDERMAL SYSTEM 1 MG/1
1 PATCH, EXTENDED RELEASE TRANSDERMAL
Qty: 10 PATCH | Refills: 0 | Status: SHIPPED
Start: 2019-01-01

## 2019-01-01 RX ORDER — SENNOSIDES 8.6 MG/1
1 TABLET ORAL 2 TIMES DAILY
Status: DISCONTINUED | OUTPATIENT
Start: 2019-01-01 | End: 2019-01-01

## 2019-01-01 RX ORDER — HALOPERIDOL 5 MG/ML
2 INJECTION INTRAMUSCULAR
Status: DISCONTINUED | OUTPATIENT
Start: 2019-01-01 | End: 2019-01-01

## 2019-01-01 RX ORDER — SODIUM CHLORIDE 0.9 % (FLUSH) 0.9 %
3 SYRINGE (ML) INJECTION EVERY 12 HOURS
Status: DISCONTINUED | OUTPATIENT
Start: 2019-01-01 | End: 2019-01-18 | Stop reason: HOSPADM

## 2019-01-01 RX ORDER — OXYCODONE HYDROCHLORIDE 5 MG/1
5 TABLET ORAL
Status: DISCONTINUED | OUTPATIENT
Start: 2019-01-01 | End: 2019-01-01 | Stop reason: HOSPADM

## 2019-01-01 RX ORDER — POTASSIUM CHLORIDE 20 MEQ/1
40 TABLET, EXTENDED RELEASE ORAL DAILY
Status: DISCONTINUED | OUTPATIENT
Start: 2019-01-01 | End: 2019-01-01 | Stop reason: HOSPADM

## 2019-01-01 RX ORDER — GUAIFENESIN 200 MG/1
200 TABLET ORAL
Qty: 30 TAB | Refills: 0 | Status: SHIPPED
Start: 2019-01-01

## 2019-01-01 RX ORDER — MORPHINE SULFATE 2 MG/ML
INJECTION, SOLUTION INTRAMUSCULAR; INTRAVENOUS
Status: COMPLETED
Start: 2019-01-01 | End: 2019-01-01

## 2019-01-01 RX ORDER — LEVETIRACETAM 500 MG/1
500 TABLET ORAL DAILY
Qty: 30 TAB | Refills: 0 | Status: SHIPPED | OUTPATIENT
Start: 2019-01-01

## 2019-01-01 RX ORDER — MORPHINE SULFATE 2 MG/ML
2 INJECTION, SOLUTION INTRAMUSCULAR; INTRAVENOUS
Status: DISCONTINUED | OUTPATIENT
Start: 2019-01-01 | End: 2019-01-01 | Stop reason: HOSPADM

## 2019-01-01 RX ORDER — LORAZEPAM 2 MG/ML
0.5 INJECTION INTRAMUSCULAR
Status: DISCONTINUED | OUTPATIENT
Start: 2019-01-01 | End: 2019-01-01 | Stop reason: HOSPADM

## 2019-01-01 RX ORDER — FUROSEMIDE 10 MG/ML
60 INJECTION INTRAMUSCULAR; INTRAVENOUS ONCE
Status: DISCONTINUED | OUTPATIENT
Start: 2019-01-01 | End: 2019-01-01 | Stop reason: SDUPTHER

## 2019-01-01 RX ORDER — HALOPERIDOL 5 MG/ML
2 INJECTION INTRAMUSCULAR
Status: DISCONTINUED | OUTPATIENT
Start: 2019-01-01 | End: 2019-01-18 | Stop reason: HOSPADM

## 2019-01-01 RX ORDER — GLYCOPYRROLATE 0.2 MG/ML
0.2 INJECTION INTRAMUSCULAR; INTRAVENOUS
Status: DISCONTINUED | OUTPATIENT
Start: 2019-01-01 | End: 2019-01-18 | Stop reason: HOSPADM

## 2019-01-01 RX ORDER — POTASSIUM CHLORIDE 20 MEQ/1
20 TABLET, EXTENDED RELEASE ORAL ONCE
Status: COMPLETED | OUTPATIENT
Start: 2019-01-01 | End: 2019-01-01

## 2019-01-01 RX ORDER — ACETAMINOPHEN 325 MG/1
650 TABLET ORAL
Status: DISCONTINUED | OUTPATIENT
Start: 2019-01-01 | End: 2019-01-01

## 2019-01-01 RX ORDER — SODIUM CHLORIDE 0.9 % (FLUSH) 0.9 %
3 SYRINGE (ML) INJECTION AS NEEDED
Status: DISCONTINUED | OUTPATIENT
Start: 2019-01-01 | End: 2019-01-18 | Stop reason: HOSPADM

## 2019-01-01 RX ORDER — SODIUM CHLORIDE 9 MG/ML
250 INJECTION, SOLUTION INTRAVENOUS AS NEEDED
Status: DISCONTINUED | OUTPATIENT
Start: 2019-01-01 | End: 2019-01-01 | Stop reason: HOSPADM

## 2019-01-01 RX ORDER — LORAZEPAM 2 MG/ML
INJECTION INTRAMUSCULAR
Status: COMPLETED
Start: 2019-01-01 | End: 2019-01-01

## 2019-01-01 RX ADMIN — FUROSEMIDE 40 MG: 40 TABLET ORAL at 08:20

## 2019-01-01 RX ADMIN — Medication 5 ML: at 05:13

## 2019-01-01 RX ADMIN — PETROLATUM: 42 OINTMENT TOPICAL at 09:41

## 2019-01-01 RX ADMIN — SERTRALINE HYDROCHLORIDE 50 MG: 50 TABLET ORAL at 08:21

## 2019-01-01 RX ADMIN — Medication 5 ML: at 13:05

## 2019-01-01 RX ADMIN — HALOPERIDOL LACTATE 2 MG: 5 INJECTION INTRAMUSCULAR at 10:26

## 2019-01-01 RX ADMIN — GUAIFENESIN 200 MG: 200 TABLET ORAL at 21:23

## 2019-01-01 RX ADMIN — LATANOPROST 1 DROP: 50 SOLUTION/ DROPS OPHTHALMIC at 22:04

## 2019-01-01 RX ADMIN — FUROSEMIDE 40 MG: 40 TABLET ORAL at 15:47

## 2019-01-01 RX ADMIN — Medication: at 21:37

## 2019-01-01 RX ADMIN — GABAPENTIN 200 MG: 100 CAPSULE ORAL at 16:32

## 2019-01-01 RX ADMIN — GUAIFENESIN 200 MG: 200 TABLET ORAL at 09:19

## 2019-01-01 RX ADMIN — SODIUM CHLORIDE 80 MG: 9 INJECTION, SOLUTION INTRAMUSCULAR; INTRAVENOUS; SUBCUTANEOUS at 21:53

## 2019-01-01 RX ADMIN — SODIUM CHLORIDE 80 MG: 9 INJECTION, SOLUTION INTRAMUSCULAR; INTRAVENOUS; SUBCUTANEOUS at 08:19

## 2019-01-01 RX ADMIN — HALOPERIDOL LACTATE 2 MG: 5 INJECTION INTRAMUSCULAR at 13:08

## 2019-01-01 RX ADMIN — Medication 5 ML: at 13:08

## 2019-01-01 RX ADMIN — MORPHINE SULFATE 2 MG: 2 INJECTION, SOLUTION INTRAMUSCULAR; INTRAVENOUS at 22:44

## 2019-01-01 RX ADMIN — FERROUS SULFATE TAB 325 MG (65 MG ELEMENTAL FE) 325 MG: 325 (65 FE) TAB at 09:38

## 2019-01-01 RX ADMIN — PETROLATUM: 42 OINTMENT TOPICAL at 09:21

## 2019-01-01 RX ADMIN — BUDESONIDE 500 MCG: 0.5 INHALANT RESPIRATORY (INHALATION) at 08:14

## 2019-01-01 RX ADMIN — FLUTICASONE PROPIONATE 2 SPRAY: 50 SPRAY, METERED NASAL at 09:11

## 2019-01-01 RX ADMIN — OXYCODONE HYDROCHLORIDE 5 MG: 5 TABLET ORAL at 15:47

## 2019-01-01 RX ADMIN — SODIUM CHLORIDE 80 MG: 9 INJECTION, SOLUTION INTRAMUSCULAR; INTRAVENOUS; SUBCUTANEOUS at 22:06

## 2019-01-01 RX ADMIN — MORPHINE SULFATE 2 MG: 2 INJECTION, SOLUTION INTRAMUSCULAR; INTRAVENOUS at 04:14

## 2019-01-01 RX ADMIN — SODIUM CHLORIDE 80 MG: 9 INJECTION, SOLUTION INTRAMUSCULAR; INTRAVENOUS; SUBCUTANEOUS at 21:33

## 2019-01-01 RX ADMIN — LATANOPROST 1 DROP: 50 SOLUTION/ DROPS OPHTHALMIC at 22:46

## 2019-01-01 RX ADMIN — SODIUM CHLORIDE 80 MG: 9 INJECTION, SOLUTION INTRAMUSCULAR; INTRAVENOUS; SUBCUTANEOUS at 09:36

## 2019-01-01 RX ADMIN — FUROSEMIDE 40 MG: 40 TABLET ORAL at 16:32

## 2019-01-01 RX ADMIN — FUROSEMIDE 40 MG: 10 INJECTION, SOLUTION INTRAMUSCULAR; INTRAVENOUS at 13:45

## 2019-01-01 RX ADMIN — FLUTICASONE PROPIONATE 2 SPRAY: 50 SPRAY, METERED NASAL at 09:03

## 2019-01-01 RX ADMIN — IPRATROPIUM BROMIDE AND ALBUTEROL SULFATE 3 ML: .5; 3 SOLUTION RESPIRATORY (INHALATION) at 07:59

## 2019-01-01 RX ADMIN — FERROUS SULFATE TAB 325 MG (65 MG ELEMENTAL FE) 325 MG: 325 (65 FE) TAB at 07:43

## 2019-01-01 RX ADMIN — IPRATROPIUM BROMIDE AND ALBUTEROL SULFATE 3 ML: .5; 3 SOLUTION RESPIRATORY (INHALATION) at 20:14

## 2019-01-01 RX ADMIN — GABAPENTIN 200 MG: 100 CAPSULE ORAL at 08:58

## 2019-01-01 RX ADMIN — BUDESONIDE 500 MCG: 0.5 INHALANT RESPIRATORY (INHALATION) at 19:59

## 2019-01-01 RX ADMIN — BUDESONIDE 500 MCG: 0.5 INHALANT RESPIRATORY (INHALATION) at 20:55

## 2019-01-01 RX ADMIN — MORPHINE SULFATE 10 MG: 100 SOLUTION ORAL at 12:26

## 2019-01-01 RX ADMIN — LATANOPROST 1 DROP: 50 SOLUTION/ DROPS OPHTHALMIC at 21:35

## 2019-01-01 RX ADMIN — PETROLATUM: 42 OINTMENT TOPICAL at 09:54

## 2019-01-01 RX ADMIN — IPRATROPIUM BROMIDE AND ALBUTEROL SULFATE 3 ML: .5; 3 SOLUTION RESPIRATORY (INHALATION) at 08:44

## 2019-01-01 RX ADMIN — SODIUM CHLORIDE 500 MG: 900 INJECTION, SOLUTION INTRAVENOUS at 10:03

## 2019-01-01 RX ADMIN — GABAPENTIN 200 MG: 100 CAPSULE ORAL at 15:10

## 2019-01-01 RX ADMIN — GABAPENTIN 200 MG: 100 CAPSULE ORAL at 08:20

## 2019-01-01 RX ADMIN — PRAVASTATIN SODIUM 40 MG: 20 TABLET ORAL at 21:58

## 2019-01-01 RX ADMIN — SERTRALINE HYDROCHLORIDE 50 MG: 50 TABLET ORAL at 09:08

## 2019-01-01 RX ADMIN — ACETAMINOPHEN 650 MG: 325 TABLET ORAL at 07:23

## 2019-01-01 RX ADMIN — GABAPENTIN 200 MG: 100 CAPSULE ORAL at 21:42

## 2019-01-01 RX ADMIN — GABAPENTIN 200 MG: 100 CAPSULE ORAL at 09:37

## 2019-01-01 RX ADMIN — URSODIOL 300 MG: 300 CAPSULE ORAL at 09:19

## 2019-01-01 RX ADMIN — ASPIRIN 81 MG 81 MG: 81 TABLET ORAL at 08:59

## 2019-01-01 RX ADMIN — URSODIOL 300 MG: 300 CAPSULE ORAL at 12:58

## 2019-01-01 RX ADMIN — FLUTICASONE PROPIONATE 2 SPRAY: 50 SPRAY, METERED NASAL at 09:14

## 2019-01-01 RX ADMIN — LACTULOSE 20 G: 20 SOLUTION ORAL at 08:57

## 2019-01-01 RX ADMIN — LACTULOSE 20 G: 20 SOLUTION ORAL at 17:02

## 2019-01-01 RX ADMIN — SENNOSIDES 8.6 MG: 8.6 TABLET, FILM COATED ORAL at 09:25

## 2019-01-01 RX ADMIN — PRAVASTATIN SODIUM 40 MG: 20 TABLET ORAL at 23:18

## 2019-01-01 RX ADMIN — GABAPENTIN 200 MG: 100 CAPSULE ORAL at 16:23

## 2019-01-01 RX ADMIN — Medication: at 08:23

## 2019-01-01 RX ADMIN — MORPHINE SULFATE 2 MG: 2 INJECTION, SOLUTION INTRAMUSCULAR; INTRAVENOUS at 15:45

## 2019-01-01 RX ADMIN — RIFAXIMIN 550 MG: 550 TABLET ORAL at 09:34

## 2019-01-01 RX ADMIN — GABAPENTIN 200 MG: 100 CAPSULE ORAL at 09:19

## 2019-01-01 RX ADMIN — ALLOPURINOL 300 MG: 300 TABLET ORAL at 09:59

## 2019-01-01 RX ADMIN — PANTOPRAZOLE SODIUM 40 MG: 40 TABLET, DELAYED RELEASE ORAL at 16:59

## 2019-01-01 RX ADMIN — Medication: at 22:32

## 2019-01-01 RX ADMIN — ASPIRIN 81 MG 81 MG: 81 TABLET ORAL at 09:59

## 2019-01-01 RX ADMIN — IPRATROPIUM BROMIDE AND ALBUTEROL SULFATE 3 ML: .5; 3 SOLUTION RESPIRATORY (INHALATION) at 08:41

## 2019-01-01 RX ADMIN — FUROSEMIDE 40 MG: 40 TABLET ORAL at 09:38

## 2019-01-01 RX ADMIN — SODIUM CHLORIDE 500 MG: 900 INJECTION, SOLUTION INTRAVENOUS at 09:24

## 2019-01-01 RX ADMIN — Medication: at 09:16

## 2019-01-01 RX ADMIN — Medication: at 08:21

## 2019-01-01 RX ADMIN — POTASSIUM CHLORIDE 40 MEQ: 20 TABLET, EXTENDED RELEASE ORAL at 09:06

## 2019-01-01 RX ADMIN — Medication 5 ML: at 23:53

## 2019-01-01 RX ADMIN — FUROSEMIDE 40 MG: 40 TABLET ORAL at 16:34

## 2019-01-01 RX ADMIN — LACTULOSE 20 G: 20 SOLUTION ORAL at 10:00

## 2019-01-01 RX ADMIN — LACTULOSE 20 G: 20 SOLUTION ORAL at 09:07

## 2019-01-01 RX ADMIN — GUAIFENESIN 200 MG: 200 TABLET ORAL at 06:00

## 2019-01-01 RX ADMIN — ASPIRIN 81 MG 81 MG: 81 TABLET ORAL at 09:34

## 2019-01-01 RX ADMIN — IPRATROPIUM BROMIDE AND ALBUTEROL SULFATE 3 ML: .5; 3 SOLUTION RESPIRATORY (INHALATION) at 14:05

## 2019-01-01 RX ADMIN — SODIUM CHLORIDE 80 MG: 9 INJECTION, SOLUTION INTRAMUSCULAR; INTRAVENOUS; SUBCUTANEOUS at 08:57

## 2019-01-01 RX ADMIN — FUROSEMIDE 40 MG: 10 INJECTION, SOLUTION INTRAMUSCULAR; INTRAVENOUS at 08:22

## 2019-01-01 RX ADMIN — GABAPENTIN 200 MG: 100 CAPSULE ORAL at 21:44

## 2019-01-01 RX ADMIN — RIFAXIMIN 550 MG: 550 TABLET ORAL at 17:02

## 2019-01-01 RX ADMIN — IPRATROPIUM BROMIDE AND ALBUTEROL SULFATE 3 ML: .5; 3 SOLUTION RESPIRATORY (INHALATION) at 14:21

## 2019-01-01 RX ADMIN — SODIUM CHLORIDE, PRESERVATIVE FREE 3 ML: 5 INJECTION INTRAVENOUS at 14:39

## 2019-01-01 RX ADMIN — SODIUM CHLORIDE 80 MG: 9 INJECTION, SOLUTION INTRAMUSCULAR; INTRAVENOUS; SUBCUTANEOUS at 23:18

## 2019-01-01 RX ADMIN — LATANOPROST 1 DROP: 50 SOLUTION/ DROPS OPHTHALMIC at 21:01

## 2019-01-01 RX ADMIN — ACETAMINOPHEN 650 MG: 325 TABLET ORAL at 09:36

## 2019-01-01 RX ADMIN — URSODIOL 300 MG: 300 CAPSULE ORAL at 16:59

## 2019-01-01 RX ADMIN — LACTULOSE 20 G: 20 SOLUTION ORAL at 17:51

## 2019-01-01 RX ADMIN — Medication 10 ML: at 14:29

## 2019-01-01 RX ADMIN — BUDESONIDE 500 MCG: 0.5 INHALANT RESPIRATORY (INHALATION) at 08:41

## 2019-01-01 RX ADMIN — GABAPENTIN 200 MG: 100 CAPSULE ORAL at 23:18

## 2019-01-01 RX ADMIN — INSULIN LISPRO 4 UNITS: 100 INJECTION, SOLUTION INTRAVENOUS; SUBCUTANEOUS at 17:06

## 2019-01-01 RX ADMIN — LORAZEPAM 2 MG: 2 INJECTION INTRAMUSCULAR; INTRAVENOUS at 09:50

## 2019-01-01 RX ADMIN — Medication 5 ML: at 21:37

## 2019-01-01 RX ADMIN — RIFAXIMIN 550 MG: 550 TABLET ORAL at 17:08

## 2019-01-01 RX ADMIN — FERROUS SULFATE TAB 325 MG (65 MG ELEMENTAL FE) 325 MG: 325 (65 FE) TAB at 09:59

## 2019-01-01 RX ADMIN — MORPHINE SULFATE 10 MG: 100 SOLUTION ORAL at 17:25

## 2019-01-01 RX ADMIN — ALBUTEROL SULFATE 2.5 MG: 2.5 SOLUTION RESPIRATORY (INHALATION) at 09:02

## 2019-01-01 RX ADMIN — PRAVASTATIN SODIUM 40 MG: 20 TABLET ORAL at 21:23

## 2019-01-01 RX ADMIN — GABAPENTIN 200 MG: 100 CAPSULE ORAL at 16:59

## 2019-01-01 RX ADMIN — MORPHINE SULFATE 2 MG: 2 INJECTION, SOLUTION INTRAMUSCULAR; INTRAVENOUS at 19:17

## 2019-01-01 RX ADMIN — VITAMIN D, TAB 1000IU (100/BT) 1000 UNITS: 25 TAB at 09:40

## 2019-01-01 RX ADMIN — POTASSIUM CHLORIDE 40 MEQ: 20 TABLET, EXTENDED RELEASE ORAL at 09:58

## 2019-01-01 RX ADMIN — LACTULOSE 20 G: 20 SOLUTION ORAL at 17:28

## 2019-01-01 RX ADMIN — Medication 5 ML: at 06:12

## 2019-01-01 RX ADMIN — VITAMIN D, TAB 1000IU (100/BT) 1000 UNITS: 25 TAB at 08:58

## 2019-01-01 RX ADMIN — Medication: at 22:04

## 2019-01-01 RX ADMIN — RIFAXIMIN 550 MG: 550 TABLET ORAL at 09:15

## 2019-01-01 RX ADMIN — FUROSEMIDE 20 MG: 10 INJECTION, SOLUTION INTRAMUSCULAR; INTRAVENOUS at 10:40

## 2019-01-01 RX ADMIN — ALLOPURINOL 300 MG: 300 TABLET ORAL at 09:34

## 2019-01-01 RX ADMIN — ACETAMINOPHEN 650 MG: 325 TABLET ORAL at 22:17

## 2019-01-01 RX ADMIN — HALOPERIDOL LACTATE 2 MG: 5 INJECTION INTRAMUSCULAR at 17:20

## 2019-01-01 RX ADMIN — Medication: at 09:13

## 2019-01-01 RX ADMIN — GABAPENTIN 200 MG: 100 CAPSULE ORAL at 15:30

## 2019-01-01 RX ADMIN — VITAMIN D, TAB 1000IU (100/BT) 1000 UNITS: 25 TAB at 08:55

## 2019-01-01 RX ADMIN — URSODIOL 300 MG: 300 CAPSULE ORAL at 14:01

## 2019-01-01 RX ADMIN — BUDESONIDE 500 MCG: 0.5 INHALANT RESPIRATORY (INHALATION) at 21:06

## 2019-01-01 RX ADMIN — ACETAMINOPHEN 650 MG: 325 TABLET ORAL at 15:07

## 2019-01-01 RX ADMIN — LEVOTHYROXINE SODIUM 50 MCG: 50 TABLET ORAL at 05:58

## 2019-01-01 RX ADMIN — MORPHINE SULFATE 0.26 MG: 2 INJECTION, SOLUTION INTRAMUSCULAR; INTRAVENOUS at 15:31

## 2019-01-01 RX ADMIN — GUAIFENESIN 200 MG: 200 TABLET ORAL at 06:16

## 2019-01-01 RX ADMIN — Medication 5 ML: at 05:29

## 2019-01-01 RX ADMIN — LACTULOSE 20 G: 20 SOLUTION ORAL at 08:19

## 2019-01-01 RX ADMIN — VITAMIN D, TAB 1000IU (100/BT) 1000 UNITS: 25 TAB at 08:22

## 2019-01-01 RX ADMIN — LEVOTHYROXINE SODIUM 50 MCG: 50 TABLET ORAL at 05:52

## 2019-01-01 RX ADMIN — LATANOPROST 1 DROP: 50 SOLUTION/ DROPS OPHTHALMIC at 22:28

## 2019-01-01 RX ADMIN — PETROLATUM: 42 OINTMENT TOPICAL at 09:14

## 2019-01-01 RX ADMIN — LORAZEPAM 2 MG: 2 INJECTION INTRAMUSCULAR; INTRAVENOUS at 05:07

## 2019-01-01 RX ADMIN — URSODIOL 300 MG: 300 CAPSULE ORAL at 08:57

## 2019-01-01 RX ADMIN — Medication 10 ML: at 23:18

## 2019-01-01 RX ADMIN — RIFAXIMIN 550 MG: 550 TABLET ORAL at 17:25

## 2019-01-01 RX ADMIN — VITAMIN D, TAB 1000IU (100/BT) 1000 UNITS: 25 TAB at 09:20

## 2019-01-01 RX ADMIN — GABAPENTIN 200 MG: 100 CAPSULE ORAL at 21:58

## 2019-01-01 RX ADMIN — FUROSEMIDE 40 MG: 10 INJECTION, SOLUTION INTRAMUSCULAR; INTRAVENOUS at 17:44

## 2019-01-01 RX ADMIN — POTASSIUM CHLORIDE 40 MEQ: 20 TABLET, EXTENDED RELEASE ORAL at 09:33

## 2019-01-01 RX ADMIN — Medication 5 ML: at 05:43

## 2019-01-01 RX ADMIN — IPRATROPIUM BROMIDE AND ALBUTEROL SULFATE 3 ML: .5; 3 SOLUTION RESPIRATORY (INHALATION) at 20:18

## 2019-01-01 RX ADMIN — SODIUM CHLORIDE, PRESERVATIVE FREE 3 ML: 5 INJECTION INTRAVENOUS at 15:45

## 2019-01-01 RX ADMIN — GABAPENTIN 200 MG: 100 CAPSULE ORAL at 09:58

## 2019-01-01 RX ADMIN — GUAIFENESIN 200 MG: 200 TABLET ORAL at 09:01

## 2019-01-01 RX ADMIN — BUDESONIDE 500 MCG: 0.5 INHALANT RESPIRATORY (INHALATION) at 20:15

## 2019-01-01 RX ADMIN — Medication 10 ML: at 06:04

## 2019-01-01 RX ADMIN — PETROLATUM: 42 OINTMENT TOPICAL at 08:20

## 2019-01-01 RX ADMIN — IPRATROPIUM BROMIDE AND ALBUTEROL SULFATE 3 ML: .5; 3 SOLUTION RESPIRATORY (INHALATION) at 20:30

## 2019-01-01 RX ADMIN — GUAIFENESIN 200 MG: 200 TABLET ORAL at 05:58

## 2019-01-01 RX ADMIN — GUAIFENESIN 200 MG: 200 TABLET ORAL at 14:26

## 2019-01-01 RX ADMIN — GUAIFENESIN 200 MG: 200 TABLET ORAL at 14:55

## 2019-01-01 RX ADMIN — HALOPERIDOL LACTATE 2 MG: 5 INJECTION INTRAMUSCULAR at 04:14

## 2019-01-01 RX ADMIN — LATANOPROST 1 DROP: 50 SOLUTION/ DROPS OPHTHALMIC at 21:38

## 2019-01-01 RX ADMIN — SERTRALINE HYDROCHLORIDE 50 MG: 50 TABLET ORAL at 08:55

## 2019-01-01 RX ADMIN — LATANOPROST 1 DROP: 50 SOLUTION/ DROPS OPHTHALMIC at 23:51

## 2019-01-01 RX ADMIN — SODIUM CHLORIDE 500 MG: 900 INJECTION, SOLUTION INTRAVENOUS at 08:39

## 2019-01-01 RX ADMIN — FUROSEMIDE 40 MG: 10 INJECTION, SOLUTION INTRAMUSCULAR; INTRAVENOUS at 17:26

## 2019-01-01 RX ADMIN — OXYCODONE HYDROCHLORIDE 5 MG: 5 TABLET ORAL at 02:24

## 2019-01-01 RX ADMIN — URSODIOL 300 MG: 300 CAPSULE ORAL at 11:48

## 2019-01-01 RX ADMIN — ALLOPURINOL 300 MG: 300 TABLET ORAL at 09:06

## 2019-01-01 RX ADMIN — URSODIOL 300 MG: 300 CAPSULE ORAL at 09:59

## 2019-01-01 RX ADMIN — URSODIOL 300 MG: 300 CAPSULE ORAL at 11:41

## 2019-01-01 RX ADMIN — SODIUM CHLORIDE 80 MG: 9 INJECTION, SOLUTION INTRAMUSCULAR; INTRAVENOUS; SUBCUTANEOUS at 22:29

## 2019-01-01 RX ADMIN — INSULIN LISPRO 2 UNITS: 100 INJECTION, SOLUTION INTRAVENOUS; SUBCUTANEOUS at 22:07

## 2019-01-01 RX ADMIN — Medication: at 09:51

## 2019-01-01 RX ADMIN — SERTRALINE HYDROCHLORIDE 50 MG: 50 TABLET ORAL at 09:15

## 2019-01-01 RX ADMIN — SERTRALINE HYDROCHLORIDE 50 MG: 50 TABLET ORAL at 09:33

## 2019-01-01 RX ADMIN — IPRATROPIUM BROMIDE AND ALBUTEROL SULFATE 3 ML: .5; 3 SOLUTION RESPIRATORY (INHALATION) at 21:54

## 2019-01-01 RX ADMIN — LEVETIRACETAM 500 MG: 500 TABLET ORAL at 08:20

## 2019-01-01 RX ADMIN — RIFAXIMIN 550 MG: 550 TABLET ORAL at 08:59

## 2019-01-01 RX ADMIN — LEVOTHYROXINE SODIUM 50 MCG: 50 TABLET ORAL at 06:16

## 2019-01-01 RX ADMIN — POTASSIUM CHLORIDE 40 MEQ: 20 TABLET, EXTENDED RELEASE ORAL at 09:20

## 2019-01-01 RX ADMIN — SODIUM CHLORIDE, PRESERVATIVE FREE 3 ML: 5 INJECTION INTRAVENOUS at 22:44

## 2019-01-01 RX ADMIN — Medication 10 ML: at 06:08

## 2019-01-01 RX ADMIN — GABAPENTIN 200 MG: 100 CAPSULE ORAL at 21:52

## 2019-01-01 RX ADMIN — Medication 5 ML: at 15:05

## 2019-01-01 RX ADMIN — POTASSIUM CHLORIDE 20 MEQ: 20 TABLET, EXTENDED RELEASE ORAL at 17:20

## 2019-01-01 RX ADMIN — POTASSIUM CHLORIDE 40 MEQ: 20 TABLET, EXTENDED RELEASE ORAL at 08:57

## 2019-01-01 RX ADMIN — ALBUTEROL SULFATE 5 MG: 2.5 SOLUTION RESPIRATORY (INHALATION) at 12:08

## 2019-01-01 RX ADMIN — LATANOPROST 1 DROP: 50 SOLUTION/ DROPS OPHTHALMIC at 21:03

## 2019-01-01 RX ADMIN — Medication 10 ML: at 16:27

## 2019-01-01 RX ADMIN — CEFTRIAXONE SODIUM 1 G: 1 INJECTION, POWDER, FOR SOLUTION INTRAMUSCULAR; INTRAVENOUS at 09:45

## 2019-01-01 RX ADMIN — SODIUM CHLORIDE 80 MG: 9 INJECTION, SOLUTION INTRAMUSCULAR; INTRAVENOUS; SUBCUTANEOUS at 09:07

## 2019-01-01 RX ADMIN — Medication: at 21:23

## 2019-01-01 RX ADMIN — LACTULOSE 20 G: 20 SOLUTION ORAL at 17:44

## 2019-01-01 RX ADMIN — SODIUM CHLORIDE, PRESERVATIVE FREE 3 ML: 5 INJECTION INTRAVENOUS at 20:35

## 2019-01-01 RX ADMIN — IPRATROPIUM BROMIDE AND ALBUTEROL SULFATE 3 ML: .5; 3 SOLUTION RESPIRATORY (INHALATION) at 01:21

## 2019-01-01 RX ADMIN — SODIUM CHLORIDE, PRESERVATIVE FREE 3 ML: 5 INJECTION INTRAVENOUS at 10:32

## 2019-01-01 RX ADMIN — GABAPENTIN 200 MG: 100 CAPSULE ORAL at 16:34

## 2019-01-01 RX ADMIN — SODIUM CHLORIDE 80 MG: 9 INJECTION, SOLUTION INTRAMUSCULAR; INTRAVENOUS; SUBCUTANEOUS at 08:59

## 2019-01-01 RX ADMIN — PETROLATUM: 42 OINTMENT TOPICAL at 09:16

## 2019-01-01 RX ADMIN — FUROSEMIDE 40 MG: 10 INJECTION, SOLUTION INTRAMUSCULAR; INTRAVENOUS at 18:09

## 2019-01-01 RX ADMIN — RIFAXIMIN 550 MG: 550 TABLET ORAL at 17:44

## 2019-01-01 RX ADMIN — Medication: at 21:00

## 2019-01-01 RX ADMIN — SODIUM CHLORIDE, PRESERVATIVE FREE 3 ML: 5 INJECTION INTRAVENOUS at 20:40

## 2019-01-01 RX ADMIN — RIFAXIMIN 550 MG: 550 TABLET ORAL at 08:19

## 2019-01-01 RX ADMIN — ALLOPURINOL 300 MG: 300 TABLET ORAL at 08:21

## 2019-01-01 RX ADMIN — ASPIRIN 81 MG 81 MG: 81 TABLET ORAL at 08:19

## 2019-01-01 RX ADMIN — VITAMIN D, TAB 1000IU (100/BT) 1000 UNITS: 25 TAB at 08:57

## 2019-01-01 RX ADMIN — FUROSEMIDE 40 MG: 40 TABLET ORAL at 15:30

## 2019-01-01 RX ADMIN — SODIUM CHLORIDE, PRESERVATIVE FREE 3 ML: 5 INJECTION INTRAVENOUS at 21:01

## 2019-01-01 RX ADMIN — GUAIFENESIN 200 MG: 200 TABLET ORAL at 13:09

## 2019-01-01 RX ADMIN — FERROUS SULFATE TAB 325 MG (65 MG ELEMENTAL FE) 325 MG: 325 (65 FE) TAB at 08:57

## 2019-01-01 RX ADMIN — GABAPENTIN 200 MG: 100 CAPSULE ORAL at 22:06

## 2019-01-01 RX ADMIN — LEVOTHYROXINE SODIUM 50 MCG: 50 TABLET ORAL at 09:38

## 2019-01-01 RX ADMIN — LATANOPROST 1 DROP: 50 SOLUTION/ DROPS OPHTHALMIC at 22:33

## 2019-01-01 RX ADMIN — SODIUM CHLORIDE 500 MG: 900 INJECTION, SOLUTION INTRAVENOUS at 10:01

## 2019-01-01 RX ADMIN — BUDESONIDE 500 MCG: 0.5 INHALANT RESPIRATORY (INHALATION) at 08:10

## 2019-01-01 RX ADMIN — HALOPERIDOL LACTATE 2 MG: 5 INJECTION INTRAMUSCULAR at 10:04

## 2019-01-01 RX ADMIN — SERTRALINE HYDROCHLORIDE 50 MG: 50 TABLET ORAL at 08:19

## 2019-01-01 RX ADMIN — URSODIOL 300 MG: 300 CAPSULE ORAL at 09:15

## 2019-01-01 RX ADMIN — LACTULOSE 20 G: 20 SOLUTION ORAL at 08:20

## 2019-01-01 RX ADMIN — DOBUTAMINE HYDROCHLORIDE 5 MCG/KG/MIN: 200 INJECTION INTRAVENOUS at 09:22

## 2019-01-01 RX ADMIN — IPRATROPIUM BROMIDE AND ALBUTEROL SULFATE 3 ML: .5; 3 SOLUTION RESPIRATORY (INHALATION) at 14:39

## 2019-01-01 RX ADMIN — FERROUS SULFATE TAB 325 MG (65 MG ELEMENTAL FE) 325 MG: 325 (65 FE) TAB at 09:15

## 2019-01-01 RX ADMIN — BUDESONIDE 500 MCG: 0.5 INHALANT RESPIRATORY (INHALATION) at 21:54

## 2019-01-01 RX ADMIN — LACTULOSE 20 G: 20 SOLUTION ORAL at 17:25

## 2019-01-01 RX ADMIN — ALLOPURINOL 300 MG: 300 TABLET ORAL at 08:55

## 2019-01-01 RX ADMIN — IPRATROPIUM BROMIDE AND ALBUTEROL SULFATE 3 ML: .5; 3 SOLUTION RESPIRATORY (INHALATION) at 14:19

## 2019-01-01 RX ADMIN — URSODIOL 300 MG: 300 CAPSULE ORAL at 16:23

## 2019-01-01 RX ADMIN — Medication 10 ML: at 22:08

## 2019-01-01 RX ADMIN — BUDESONIDE 500 MCG: 0.5 INHALANT RESPIRATORY (INHALATION) at 21:23

## 2019-01-01 RX ADMIN — BUDESONIDE 500 MCG: 0.5 INHALANT RESPIRATORY (INHALATION) at 07:59

## 2019-01-01 RX ADMIN — FUROSEMIDE 40 MG: 10 INJECTION, SOLUTION INTRAMUSCULAR; INTRAVENOUS at 09:19

## 2019-01-01 RX ADMIN — IPRATROPIUM BROMIDE AND ALBUTEROL SULFATE 3 ML: .5; 3 SOLUTION RESPIRATORY (INHALATION) at 13:43

## 2019-01-01 RX ADMIN — GUAIFENESIN 200 MG: 200 TABLET ORAL at 09:44

## 2019-01-01 RX ADMIN — SERTRALINE HYDROCHLORIDE 50 MG: 50 TABLET ORAL at 09:38

## 2019-01-01 RX ADMIN — ASPIRIN 81 MG 81 MG: 81 TABLET ORAL at 09:38

## 2019-01-01 RX ADMIN — IPRATROPIUM BROMIDE AND ALBUTEROL SULFATE 3 ML: .5; 3 SOLUTION RESPIRATORY (INHALATION) at 08:37

## 2019-01-01 RX ADMIN — LACTULOSE 20 G: 20 SOLUTION ORAL at 17:08

## 2019-01-01 RX ADMIN — VITAMIN D, TAB 1000IU (100/BT) 1000 UNITS: 25 TAB at 09:06

## 2019-01-01 RX ADMIN — LATANOPROST 1 DROP: 50 SOLUTION/ DROPS OPHTHALMIC at 21:23

## 2019-01-01 RX ADMIN — LACTULOSE 20 G: 20 SOLUTION ORAL at 09:19

## 2019-01-01 RX ADMIN — Medication: at 09:54

## 2019-01-01 RX ADMIN — FUROSEMIDE 40 MG: 10 INJECTION, SOLUTION INTRAMUSCULAR; INTRAVENOUS at 17:08

## 2019-01-01 RX ADMIN — GUAIFENESIN 200 MG: 200 TABLET ORAL at 08:58

## 2019-01-01 RX ADMIN — SODIUM CHLORIDE 80 MG: 9 INJECTION, SOLUTION INTRAMUSCULAR; INTRAVENOUS; SUBCUTANEOUS at 09:14

## 2019-01-01 RX ADMIN — LEVETIRACETAM 500 MG: 500 TABLET ORAL at 09:59

## 2019-01-01 RX ADMIN — PETROLATUM: 42 OINTMENT TOPICAL at 22:04

## 2019-01-01 RX ADMIN — Medication: at 23:51

## 2019-01-01 RX ADMIN — FUROSEMIDE 40 MG: 40 TABLET ORAL at 09:34

## 2019-01-01 RX ADMIN — FLUTICASONE PROPIONATE 2 SPRAY: 50 SPRAY, METERED NASAL at 09:21

## 2019-01-01 RX ADMIN — POTASSIUM CHLORIDE 40 MEQ: 20 TABLET, EXTENDED RELEASE ORAL at 08:55

## 2019-01-01 RX ADMIN — Medication: at 23:19

## 2019-01-01 RX ADMIN — IPRATROPIUM BROMIDE AND ALBUTEROL SULFATE 3 ML: .5; 3 SOLUTION RESPIRATORY (INHALATION) at 08:14

## 2019-01-01 RX ADMIN — VITAMIN D, TAB 1000IU (100/BT) 1000 UNITS: 25 TAB at 09:59

## 2019-01-01 RX ADMIN — PETROLATUM: 42 OINTMENT TOPICAL at 08:23

## 2019-01-01 RX ADMIN — Medication: at 09:41

## 2019-01-01 RX ADMIN — LACTULOSE 20 G: 20 SOLUTION ORAL at 09:14

## 2019-01-01 RX ADMIN — DOBUTAMINE HYDROCHLORIDE 5 MCG/KG/MIN: 200 INJECTION INTRAVENOUS at 04:27

## 2019-01-01 RX ADMIN — BUDESONIDE 500 MCG: 0.5 INHALANT RESPIRATORY (INHALATION) at 08:05

## 2019-01-01 RX ADMIN — URSODIOL 300 MG: 300 CAPSULE ORAL at 07:43

## 2019-01-01 RX ADMIN — IPRATROPIUM BROMIDE AND ALBUTEROL SULFATE 3 ML: .5; 3 SOLUTION RESPIRATORY (INHALATION) at 20:15

## 2019-01-01 RX ADMIN — IPRATROPIUM BROMIDE AND ALBUTEROL SULFATE 3 ML: .5; 3 SOLUTION RESPIRATORY (INHALATION) at 19:54

## 2019-01-01 RX ADMIN — SODIUM CHLORIDE, PRESERVATIVE FREE 3 ML: 5 INJECTION INTRAVENOUS at 10:04

## 2019-01-01 RX ADMIN — SODIUM CHLORIDE, PRESERVATIVE FREE 3 ML: 5 INJECTION INTRAVENOUS at 21:04

## 2019-01-01 RX ADMIN — Medication 10 ML: at 05:54

## 2019-01-01 RX ADMIN — LACTULOSE 20 G: 20 SOLUTION ORAL at 08:55

## 2019-01-01 RX ADMIN — LORAZEPAM 2 MG: 2 INJECTION INTRAMUSCULAR; INTRAVENOUS at 22:44

## 2019-01-01 RX ADMIN — LEVOTHYROXINE SODIUM 50 MCG: 50 TABLET ORAL at 06:27

## 2019-01-01 RX ADMIN — FLUTICASONE PROPIONATE 2 SPRAY: 50 SPRAY, METERED NASAL at 09:27

## 2019-01-01 RX ADMIN — LACTULOSE 20 G: 20 SOLUTION ORAL at 16:37

## 2019-01-01 RX ADMIN — GABAPENTIN 200 MG: 100 CAPSULE ORAL at 09:15

## 2019-01-01 RX ADMIN — FUROSEMIDE 40 MG: 40 TABLET ORAL at 09:11

## 2019-01-01 RX ADMIN — MORPHINE SULFATE 0.5 MG: 2 INJECTION, SOLUTION INTRAMUSCULAR; INTRAVENOUS at 13:53

## 2019-01-01 RX ADMIN — SENNOSIDES 8.6 MG: 8.6 TABLET, FILM COATED ORAL at 17:34

## 2019-01-01 RX ADMIN — ASPIRIN 81 MG 81 MG: 81 TABLET ORAL at 09:15

## 2019-01-01 RX ADMIN — MORPHINE SULFATE 2 MG: 2 INJECTION, SOLUTION INTRAMUSCULAR; INTRAVENOUS at 10:03

## 2019-01-01 RX ADMIN — MORPHINE SULFATE 2 MG: 2 INJECTION, SOLUTION INTRAMUSCULAR; INTRAVENOUS at 12:22

## 2019-01-01 RX ADMIN — GABAPENTIN 200 MG: 100 CAPSULE ORAL at 08:55

## 2019-01-01 RX ADMIN — URSODIOL 300 MG: 300 CAPSULE ORAL at 11:50

## 2019-01-01 RX ADMIN — DOBUTAMINE HYDROCHLORIDE 5 MCG/KG/MIN: 200 INJECTION INTRAVENOUS at 00:10

## 2019-01-01 RX ADMIN — MORPHINE SULFATE 2 MG: 2 INJECTION, SOLUTION INTRAMUSCULAR; INTRAVENOUS at 14:37

## 2019-01-01 RX ADMIN — SODIUM CHLORIDE 500 MG: 900 INJECTION, SOLUTION INTRAVENOUS at 08:34

## 2019-01-01 RX ADMIN — GUAIFENESIN 200 MG: 200 TABLET ORAL at 17:25

## 2019-01-01 RX ADMIN — FERROUS SULFATE TAB 325 MG (65 MG ELEMENTAL FE) 325 MG: 325 (65 FE) TAB at 09:20

## 2019-01-01 RX ADMIN — IPRATROPIUM BROMIDE AND ALBUTEROL SULFATE 3 ML: .5; 3 SOLUTION RESPIRATORY (INHALATION) at 13:41

## 2019-01-01 RX ADMIN — INSULIN LISPRO 2 UNITS: 100 INJECTION, SOLUTION INTRAVENOUS; SUBCUTANEOUS at 17:21

## 2019-01-01 RX ADMIN — RIFAXIMIN 550 MG: 550 TABLET ORAL at 08:57

## 2019-01-01 RX ADMIN — RIFAXIMIN 550 MG: 550 TABLET ORAL at 09:06

## 2019-01-01 RX ADMIN — FUROSEMIDE 40 MG: 40 TABLET ORAL at 09:15

## 2019-01-01 RX ADMIN — SODIUM CHLORIDE 80 MG: 9 INJECTION, SOLUTION INTRAMUSCULAR; INTRAVENOUS; SUBCUTANEOUS at 23:51

## 2019-01-01 RX ADMIN — HALOPERIDOL LACTATE 2 MG: 5 INJECTION INTRAMUSCULAR at 12:22

## 2019-01-01 RX ADMIN — IPRATROPIUM BROMIDE AND ALBUTEROL SULFATE 3 ML: .5; 3 SOLUTION RESPIRATORY (INHALATION) at 14:04

## 2019-01-01 RX ADMIN — GUAIFENESIN 200 MG: 200 TABLET ORAL at 06:18

## 2019-01-01 RX ADMIN — SODIUM CHLORIDE, PRESERVATIVE FREE 3 ML: 5 INJECTION INTRAVENOUS at 09:18

## 2019-01-01 RX ADMIN — URSODIOL 300 MG: 300 CAPSULE ORAL at 09:11

## 2019-01-01 RX ADMIN — LEVOTHYROXINE SODIUM 50 MCG: 50 TABLET ORAL at 08:59

## 2019-01-01 RX ADMIN — SERTRALINE HYDROCHLORIDE 50 MG: 50 TABLET ORAL at 08:59

## 2019-01-01 RX ADMIN — GABAPENTIN 200 MG: 100 CAPSULE ORAL at 15:47

## 2019-01-01 RX ADMIN — Medication: at 09:21

## 2019-01-01 RX ADMIN — BUDESONIDE 500 MCG: 0.5 INHALANT RESPIRATORY (INHALATION) at 07:32

## 2019-01-01 RX ADMIN — LEVOTHYROXINE SODIUM 50 MCG: 50 TABLET ORAL at 06:01

## 2019-01-01 RX ADMIN — ALLOPURINOL 300 MG: 300 TABLET ORAL at 09:20

## 2019-01-01 RX ADMIN — BUDESONIDE 500 MCG: 0.5 INHALANT RESPIRATORY (INHALATION) at 09:09

## 2019-01-01 RX ADMIN — Medication 5 ML: at 06:03

## 2019-01-01 RX ADMIN — ASPIRIN 81 MG 81 MG: 81 TABLET ORAL at 08:58

## 2019-01-01 RX ADMIN — GUAIFENESIN 200 MG: 200 TABLET ORAL at 13:00

## 2019-01-01 RX ADMIN — SODIUM CHLORIDE, PRESERVATIVE FREE 3 ML: 5 INJECTION INTRAVENOUS at 09:26

## 2019-01-01 RX ADMIN — SERTRALINE HYDROCHLORIDE 50 MG: 50 TABLET ORAL at 08:57

## 2019-01-01 RX ADMIN — PRAVASTATIN SODIUM 40 MG: 20 TABLET ORAL at 22:29

## 2019-01-01 RX ADMIN — LORAZEPAM 2 MG: 2 INJECTION INTRAMUSCULAR; INTRAVENOUS at 19:17

## 2019-01-01 RX ADMIN — RIFAXIMIN 550 MG: 550 TABLET ORAL at 17:20

## 2019-01-01 RX ADMIN — SODIUM CHLORIDE 500 MG: 900 INJECTION, SOLUTION INTRAVENOUS at 09:35

## 2019-01-01 RX ADMIN — MORPHINE SULFATE 10 MG: 100 SOLUTION ORAL at 12:58

## 2019-01-01 RX ADMIN — GABAPENTIN 200 MG: 100 CAPSULE ORAL at 09:06

## 2019-01-01 RX ADMIN — SODIUM CHLORIDE 80 MG: 9 INJECTION, SOLUTION INTRAMUSCULAR; INTRAVENOUS; SUBCUTANEOUS at 09:59

## 2019-01-01 RX ADMIN — Medication 10 ML: at 21:36

## 2019-01-01 RX ADMIN — VITAMIN D, TAB 1000IU (100/BT) 1000 UNITS: 25 TAB at 09:34

## 2019-01-01 RX ADMIN — LACTULOSE 20 G: 20 SOLUTION ORAL at 09:39

## 2019-01-01 RX ADMIN — GUAIFENESIN 200 MG: 200 TABLET ORAL at 09:58

## 2019-01-01 RX ADMIN — PRAVASTATIN SODIUM 40 MG: 20 TABLET ORAL at 21:33

## 2019-01-01 RX ADMIN — URSODIOL 300 MG: 300 CAPSULE ORAL at 09:58

## 2019-01-01 RX ADMIN — FUROSEMIDE 40 MG: 40 TABLET ORAL at 07:43

## 2019-01-01 RX ADMIN — ALLOPURINOL 300 MG: 300 TABLET ORAL at 08:59

## 2019-01-01 RX ADMIN — Medication: at 09:08

## 2019-01-01 RX ADMIN — ALLOPURINOL 300 MG: 300 TABLET ORAL at 08:19

## 2019-01-01 RX ADMIN — IPRATROPIUM BROMIDE AND ALBUTEROL SULFATE 3 ML: .5; 3 SOLUTION RESPIRATORY (INHALATION) at 02:22

## 2019-01-01 RX ADMIN — Medication 5 ML: at 06:06

## 2019-01-01 RX ADMIN — HALOPERIDOL LACTATE 2 MG: 5 INJECTION INTRAMUSCULAR at 21:53

## 2019-01-01 RX ADMIN — GUAIFENESIN 200 MG: 200 TABLET ORAL at 17:08

## 2019-01-01 RX ADMIN — POTASSIUM CHLORIDE 40 MEQ: 20 TABLET, EXTENDED RELEASE ORAL at 08:21

## 2019-01-01 RX ADMIN — FERROUS SULFATE TAB 325 MG (65 MG ELEMENTAL FE) 325 MG: 325 (65 FE) TAB at 08:59

## 2019-01-01 RX ADMIN — RIFAXIMIN 550 MG: 550 TABLET ORAL at 16:34

## 2019-01-01 RX ADMIN — FLUTICASONE PROPIONATE 2 SPRAY: 50 SPRAY, METERED NASAL at 08:24

## 2019-01-01 RX ADMIN — IPRATROPIUM BROMIDE AND ALBUTEROL SULFATE 3 ML: .5; 3 SOLUTION RESPIRATORY (INHALATION) at 21:23

## 2019-01-01 RX ADMIN — Medication 10 ML: at 21:43

## 2019-01-01 RX ADMIN — CEFTRIAXONE SODIUM 1 G: 1 INJECTION, POWDER, FOR SOLUTION INTRAMUSCULAR; INTRAVENOUS at 09:14

## 2019-01-01 RX ADMIN — INSULIN LISPRO 2 UNITS: 100 INJECTION, SOLUTION INTRAVENOUS; SUBCUTANEOUS at 17:15

## 2019-01-01 RX ADMIN — URSODIOL 300 MG: 300 CAPSULE ORAL at 16:34

## 2019-01-01 RX ADMIN — FERROUS SULFATE TAB 325 MG (65 MG ELEMENTAL FE) 325 MG: 325 (65 FE) TAB at 09:34

## 2019-01-01 RX ADMIN — FUROSEMIDE 20 MG: 10 INJECTION, SOLUTION INTRAMUSCULAR; INTRAVENOUS at 21:44

## 2019-01-01 RX ADMIN — GABAPENTIN 200 MG: 100 CAPSULE ORAL at 21:24

## 2019-01-01 RX ADMIN — FERROUS SULFATE TAB 325 MG (65 MG ELEMENTAL FE) 325 MG: 325 (65 FE) TAB at 08:19

## 2019-01-01 RX ADMIN — Medication 5 ML: at 06:18

## 2019-01-01 RX ADMIN — GABAPENTIN 200 MG: 100 CAPSULE ORAL at 08:57

## 2019-01-01 RX ADMIN — ALLOPURINOL 300 MG: 300 TABLET ORAL at 09:15

## 2019-01-01 RX ADMIN — LEVOTHYROXINE SODIUM 50 MCG: 50 TABLET ORAL at 08:19

## 2019-01-01 RX ADMIN — RIFAXIMIN 550 MG: 550 TABLET ORAL at 08:55

## 2019-01-01 RX ADMIN — IPRATROPIUM BROMIDE AND ALBUTEROL SULFATE 3 ML: .5; 3 SOLUTION RESPIRATORY (INHALATION) at 02:45

## 2019-01-01 RX ADMIN — GABAPENTIN 200 MG: 100 CAPSULE ORAL at 09:34

## 2019-01-01 RX ADMIN — RIFAXIMIN 550 MG: 550 TABLET ORAL at 09:59

## 2019-01-01 RX ADMIN — Medication 5 ML: at 21:23

## 2019-01-01 RX ADMIN — PRAVASTATIN SODIUM 40 MG: 20 TABLET ORAL at 21:43

## 2019-01-01 RX ADMIN — URSODIOL 300 MG: 300 CAPSULE ORAL at 17:44

## 2019-01-01 RX ADMIN — PRAVASTATIN SODIUM 40 MG: 20 TABLET ORAL at 21:52

## 2019-01-01 RX ADMIN — IPRATROPIUM BROMIDE AND ALBUTEROL SULFATE 3 ML: .5; 3 SOLUTION RESPIRATORY (INHALATION) at 07:31

## 2019-01-01 RX ADMIN — URSODIOL 300 MG: 300 CAPSULE ORAL at 08:19

## 2019-01-01 RX ADMIN — LORAZEPAM 2 MG: 2 INJECTION INTRAMUSCULAR; INTRAVENOUS at 14:37

## 2019-01-01 RX ADMIN — RIFAXIMIN 550 MG: 550 TABLET ORAL at 09:38

## 2019-01-01 RX ADMIN — IPRATROPIUM BROMIDE AND ALBUTEROL SULFATE 3 ML: .5; 3 SOLUTION RESPIRATORY (INHALATION) at 20:55

## 2019-01-01 RX ADMIN — PANTOPRAZOLE SODIUM 40 MG: 40 TABLET, DELAYED RELEASE ORAL at 06:17

## 2019-01-01 RX ADMIN — BUDESONIDE 500 MCG: 0.5 INHALANT RESPIRATORY (INHALATION) at 09:02

## 2019-01-01 RX ADMIN — GUAIFENESIN 200 MG: 200 TABLET ORAL at 21:43

## 2019-01-01 RX ADMIN — SENNOSIDES 8.6 MG: 8.6 TABLET, FILM COATED ORAL at 10:32

## 2019-01-01 RX ADMIN — IPRATROPIUM BROMIDE AND ALBUTEROL SULFATE 3 ML: .5; 3 SOLUTION RESPIRATORY (INHALATION) at 09:09

## 2019-01-01 RX ADMIN — GUAIFENESIN 200 MG: 200 TABLET ORAL at 17:44

## 2019-01-01 RX ADMIN — PRAVASTATIN SODIUM 40 MG: 20 TABLET ORAL at 22:08

## 2019-01-01 RX ADMIN — MORPHINE SULFATE 2 MG: 2 INJECTION, SOLUTION INTRAMUSCULAR; INTRAVENOUS at 20:44

## 2019-01-01 RX ADMIN — SENNOSIDES 8.6 MG: 8.6 TABLET, FILM COATED ORAL at 12:53

## 2019-01-01 RX ADMIN — Medication 10 ML: at 22:34

## 2019-01-01 RX ADMIN — SODIUM CHLORIDE 80 MG: 9 INJECTION, SOLUTION INTRAMUSCULAR; INTRAVENOUS; SUBCUTANEOUS at 09:19

## 2019-01-01 RX ADMIN — RIFAXIMIN 550 MG: 550 TABLET ORAL at 17:28

## 2019-01-01 RX ADMIN — FUROSEMIDE 40 MG: 40 TABLET ORAL at 17:20

## 2019-01-01 RX ADMIN — SERTRALINE HYDROCHLORIDE 50 MG: 50 TABLET ORAL at 09:59

## 2019-01-01 RX ADMIN — Medication: at 09:05

## 2019-01-01 RX ADMIN — RIFAXIMIN 550 MG: 550 TABLET ORAL at 09:19

## 2019-01-01 RX ADMIN — FLUTICASONE PROPIONATE 2 SPRAY: 50 SPRAY, METERED NASAL at 09:41

## 2019-01-01 RX ADMIN — MORPHINE SULFATE 2 MG: 2 INJECTION, SOLUTION INTRAMUSCULAR; INTRAVENOUS at 05:07

## 2019-01-01 RX ADMIN — SODIUM CHLORIDE, PRESERVATIVE FREE 3 ML: 5 INJECTION INTRAVENOUS at 09:23

## 2019-01-01 RX ADMIN — Medication 10 ML: at 06:27

## 2019-01-01 RX ADMIN — IPRATROPIUM BROMIDE AND ALBUTEROL SULFATE 3 ML: .5; 3 SOLUTION RESPIRATORY (INHALATION) at 01:28

## 2019-01-01 RX ADMIN — Medication 5 ML: at 21:58

## 2019-01-01 RX ADMIN — FUROSEMIDE 40 MG: 10 INJECTION, SOLUTION INTRAMUSCULAR; INTRAVENOUS at 17:52

## 2019-01-01 RX ADMIN — IPRATROPIUM BROMIDE AND ALBUTEROL SULFATE 3 ML: .5; 3 SOLUTION RESPIRATORY (INHALATION) at 02:04

## 2019-01-01 RX ADMIN — CEFTRIAXONE SODIUM 1 G: 1 INJECTION, POWDER, FOR SOLUTION INTRAMUSCULAR; INTRAVENOUS at 08:19

## 2019-01-01 RX ADMIN — Medication 10 ML: at 13:01

## 2019-01-01 RX ADMIN — FUROSEMIDE 40 MG: 10 INJECTION, SOLUTION INTRAMUSCULAR; INTRAVENOUS at 11:58

## 2019-01-01 RX ADMIN — URSODIOL 300 MG: 300 CAPSULE ORAL at 17:02

## 2019-01-01 RX ADMIN — URSODIOL 300 MG: 300 CAPSULE ORAL at 16:32

## 2019-01-01 RX ADMIN — LEVOTHYROXINE SODIUM 50 MCG: 50 TABLET ORAL at 06:02

## 2019-01-01 RX ADMIN — LORAZEPAM 2 MG: 2 INJECTION INTRAMUSCULAR; INTRAVENOUS at 10:18

## 2019-01-01 RX ADMIN — SODIUM CHLORIDE, PRESERVATIVE FREE 3 ML: 5 INJECTION INTRAVENOUS at 10:19

## 2019-01-01 RX ADMIN — IPRATROPIUM BROMIDE AND ALBUTEROL SULFATE 3 ML: .5; 3 SOLUTION RESPIRATORY (INHALATION) at 02:49

## 2019-01-01 RX ADMIN — LACTULOSE 20 G: 20 SOLUTION ORAL at 17:20

## 2019-01-01 RX ADMIN — IPRATROPIUM BROMIDE AND ALBUTEROL SULFATE 3 ML: .5; 3 SOLUTION RESPIRATORY (INHALATION) at 21:06

## 2019-01-01 RX ADMIN — GUAIFENESIN 200 MG: 200 TABLET ORAL at 23:18

## 2019-01-01 RX ADMIN — URSODIOL 300 MG: 300 CAPSULE ORAL at 11:23

## 2019-01-01 RX ADMIN — Medication: at 21:54

## 2019-01-01 RX ADMIN — SODIUM CHLORIDE, PRESERVATIVE FREE 3 ML: 5 INJECTION INTRAVENOUS at 09:51

## 2019-01-01 RX ADMIN — BUDESONIDE 500 MCG: 0.5 INHALANT RESPIRATORY (INHALATION) at 20:30

## 2019-01-01 RX ADMIN — Medication 10 ML: at 21:53

## 2019-01-01 RX ADMIN — SODIUM CHLORIDE 500 MG: 900 INJECTION, SOLUTION INTRAVENOUS at 09:28

## 2019-01-01 RX ADMIN — FUROSEMIDE 40 MG: 10 INJECTION, SOLUTION INTRAMUSCULAR; INTRAVENOUS at 08:58

## 2019-01-01 RX ADMIN — GABAPENTIN 200 MG: 100 CAPSULE ORAL at 22:28

## 2019-01-01 RX ADMIN — LATANOPROST 1 DROP: 50 SOLUTION/ DROPS OPHTHALMIC at 21:43

## 2019-01-01 RX ADMIN — SODIUM CHLORIDE, PRESERVATIVE FREE 3 ML: 5 INJECTION INTRAVENOUS at 19:17

## 2019-01-01 RX ADMIN — INSULIN LISPRO 4 UNITS: 100 INJECTION, SOLUTION INTRAVENOUS; SUBCUTANEOUS at 22:31

## 2019-01-01 RX ADMIN — ASPIRIN 81 MG 81 MG: 81 TABLET ORAL at 09:06

## 2019-01-01 RX ADMIN — PETROLATUM: 42 OINTMENT TOPICAL at 09:04

## 2019-01-01 RX ADMIN — SERTRALINE HYDROCHLORIDE 50 MG: 50 TABLET ORAL at 09:20

## 2019-01-01 RX ADMIN — LEVOTHYROXINE SODIUM 50 MCG: 50 TABLET ORAL at 05:13

## 2019-01-01 RX ADMIN — GUAIFENESIN 200 MG: 200 TABLET ORAL at 22:06

## 2019-01-01 RX ADMIN — Medication 5 ML: at 13:32

## 2019-01-01 RX ADMIN — SODIUM CHLORIDE 80 MG: 9 INJECTION, SOLUTION INTRAMUSCULAR; INTRAVENOUS; SUBCUTANEOUS at 08:56

## 2019-01-01 RX ADMIN — PETROLATUM: 42 OINTMENT TOPICAL at 09:09

## 2019-01-01 RX ADMIN — LEVOTHYROXINE SODIUM 50 MCG: 50 TABLET ORAL at 06:18

## 2019-01-01 RX ADMIN — POTASSIUM CHLORIDE 40 MEQ: 20 TABLET, EXTENDED RELEASE ORAL at 11:23

## 2019-01-01 RX ADMIN — SODIUM CHLORIDE 80 MG: 9 INJECTION, SOLUTION INTRAMUSCULAR; INTRAVENOUS; SUBCUTANEOUS at 21:58

## 2019-01-01 RX ADMIN — IPRATROPIUM BROMIDE AND ALBUTEROL SULFATE 3 ML: .5; 3 SOLUTION RESPIRATORY (INHALATION) at 02:39

## 2019-01-01 RX ADMIN — IPRATROPIUM BROMIDE AND ALBUTEROL SULFATE 3 ML: .5; 3 SOLUTION RESPIRATORY (INHALATION) at 14:32

## 2019-01-01 RX ADMIN — CEFTRIAXONE SODIUM 1 G: 1 INJECTION, POWDER, FOR SOLUTION INTRAMUSCULAR; INTRAVENOUS at 08:57

## 2019-01-01 RX ADMIN — FLUTICASONE PROPIONATE 2 SPRAY: 50 SPRAY, METERED NASAL at 08:20

## 2019-01-01 RX ADMIN — SODIUM CHLORIDE, PRESERVATIVE FREE 3 ML: 5 INJECTION INTRAVENOUS at 12:24

## 2019-01-01 RX ADMIN — URSODIOL 300 MG: 300 CAPSULE ORAL at 17:28

## 2019-01-01 RX ADMIN — PANTOPRAZOLE SODIUM 40 MG: 40 TABLET, DELAYED RELEASE ORAL at 09:58

## 2019-01-01 RX ADMIN — MORPHINE SULFATE 2 MG: 2 INJECTION, SOLUTION INTRAMUSCULAR; INTRAVENOUS at 09:51

## 2019-01-01 RX ADMIN — FLUTICASONE PROPIONATE 2 SPRAY: 50 SPRAY, METERED NASAL at 09:17

## 2019-01-01 RX ADMIN — BUDESONIDE 500 MCG: 0.5 INHALANT RESPIRATORY (INHALATION) at 08:37

## 2019-01-01 RX ADMIN — BUDESONIDE 500 MCG: 0.5 INHALANT RESPIRATORY (INHALATION) at 20:18

## 2019-01-01 RX ADMIN — FERROUS SULFATE TAB 325 MG (65 MG ELEMENTAL FE) 325 MG: 325 (65 FE) TAB at 09:11

## 2019-01-01 RX ADMIN — RIFAXIMIN 550 MG: 550 TABLET ORAL at 17:51

## 2019-01-01 RX ADMIN — VITAMIN D, TAB 1000IU (100/BT) 1000 UNITS: 25 TAB at 09:15

## 2019-01-01 RX ADMIN — SODIUM CHLORIDE, PRESERVATIVE FREE 3 ML: 5 INJECTION INTRAVENOUS at 04:15

## 2019-01-01 RX ADMIN — MORPHINE SULFATE 2 MG: 2 INJECTION, SOLUTION INTRAMUSCULAR; INTRAVENOUS at 10:18

## 2019-01-01 RX ADMIN — INSULIN LISPRO 2 UNITS: 100 INJECTION, SOLUTION INTRAVENOUS; SUBCUTANEOUS at 22:30

## 2019-01-01 RX ADMIN — RIFAXIMIN 550 MG: 550 TABLET ORAL at 08:20

## 2019-01-01 RX ADMIN — ALLOPURINOL 300 MG: 300 TABLET ORAL at 09:38

## 2019-01-01 RX ADMIN — BUDESONIDE 500 MCG: 0.5 INHALANT RESPIRATORY (INHALATION) at 08:44

## 2019-01-01 RX ADMIN — BUDESONIDE 500 MCG: 0.5 INHALANT RESPIRATORY (INHALATION) at 20:14

## 2019-01-01 RX ADMIN — SODIUM CHLORIDE 500 MG: 900 INJECTION, SOLUTION INTRAVENOUS at 09:41

## 2019-01-01 RX ADMIN — DOBUTAMINE HYDROCHLORIDE 5 MCG/KG/MIN: 200 INJECTION INTRAVENOUS at 15:05

## 2019-01-01 RX ADMIN — SODIUM CHLORIDE, PRESERVATIVE FREE 3 ML: 5 INJECTION INTRAVENOUS at 08:08

## 2019-01-01 RX ADMIN — GUAIFENESIN 200 MG: 200 TABLET ORAL at 06:27

## 2019-01-01 RX ADMIN — ASPIRIN 81 MG 81 MG: 81 TABLET ORAL at 09:20

## 2019-01-01 RX ADMIN — BUDESONIDE 500 MCG: 0.5 INHALANT RESPIRATORY (INHALATION) at 19:54

## 2019-01-01 RX ADMIN — IPRATROPIUM BROMIDE AND ALBUTEROL SULFATE 3 ML: .5; 3 SOLUTION RESPIRATORY (INHALATION) at 08:05

## 2019-01-01 RX ADMIN — SODIUM CHLORIDE 80 MG: 9 INJECTION, SOLUTION INTRAMUSCULAR; INTRAVENOUS; SUBCUTANEOUS at 21:43

## 2019-01-01 RX ADMIN — URSODIOL 300 MG: 300 CAPSULE ORAL at 08:21

## 2019-01-01 RX ADMIN — FERROUS SULFATE TAB 325 MG (65 MG ELEMENTAL FE) 325 MG: 325 (65 FE) TAB at 08:22

## 2019-01-01 RX ADMIN — LATANOPROST 1 DROP: 50 SOLUTION/ DROPS OPHTHALMIC at 23:20

## 2019-01-01 RX ADMIN — GABAPENTIN 200 MG: 100 CAPSULE ORAL at 17:44

## 2019-01-01 RX ADMIN — PETROLATUM: 42 OINTMENT TOPICAL at 09:00

## 2019-01-01 RX ADMIN — ALLOPURINOL 300 MG: 300 TABLET ORAL at 08:57

## 2019-01-01 RX ADMIN — ASPIRIN 81 MG 81 MG: 81 TABLET ORAL at 08:20

## 2019-01-01 RX ADMIN — Medication: at 21:43

## 2019-01-01 RX ADMIN — FUROSEMIDE 40 MG: 40 TABLET ORAL at 08:59

## 2019-01-01 RX ADMIN — PRAVASTATIN SODIUM 40 MG: 20 TABLET ORAL at 21:44

## 2019-01-01 RX ADMIN — IPRATROPIUM BROMIDE AND ALBUTEROL SULFATE 3 ML: .5; 3 SOLUTION RESPIRATORY (INHALATION) at 19:59

## 2019-01-01 RX ADMIN — FUROSEMIDE 40 MG: 10 INJECTION, SOLUTION INTRAMUSCULAR; INTRAVENOUS at 09:59

## 2019-01-01 RX ADMIN — ASPIRIN 81 MG 81 MG: 81 TABLET ORAL at 08:55

## 2019-01-01 RX ADMIN — Medication: at 09:14

## 2019-01-01 RX ADMIN — IPRATROPIUM BROMIDE AND ALBUTEROL SULFATE 3 ML: .5; 3 SOLUTION RESPIRATORY (INHALATION) at 08:10

## 2019-01-01 RX ADMIN — VITAMIN D, TAB 1000IU (100/BT) 1000 UNITS: 25 TAB at 08:19

## 2019-01-01 NOTE — PROGRESS NOTES
Problem: Falls - Risk of 
Goal: *Absence of Falls Document Dov Tanner Fall Risk and appropriate interventions in the flowsheet. Fall Risk Interventions: 
  
 
Mentation Interventions: Bed/chair exit alarm Medication Interventions: Patient to call before getting OOB Elimination Interventions: Call light in reach

## 2019-01-01 NOTE — PROGRESS NOTES
Verbal bedside report received from Thu [place]en [place]. Assumed care of patient. Dobutamine IV drip verified at bedside with outgoing RN.

## 2019-01-01 NOTE — PROGRESS NOTES
Problem: Breathing Pattern - Ineffective Goal: *Use of effective breathing techniques Outcome: Progressing Towards Goal 
Patient progressing towards goals.  Is on minimal O2 with Clear BS

## 2019-01-01 NOTE — PROGRESS NOTES
Gastroenterology Progress Note Date of admission:  12/30/2018 Today's date:  1/1/2019 CC:     Anemia, rectal bleeding HPI:   No events or new complaints. No recurrence of overt bleeding. ROS:  
 Gen: No fevers, no chills CV:   No chest pain, no SOB Current Medications:  
 
Current Facility-Administered Medications Medication Dose Route Frequency  cefTRIAXone (ROCEPHIN) 1 g in 0.9% sodium chloride (MBP/ADV) 50 mL  1 g IntraVENous Q24H  pantoprazole (PROTONIX) 80 mg in sodium chloride 0.9% 20 mL injection  80 mg IntraVENous Q12H  levETIRAcetam (KEPPRA) 500 mg in 0.9% sodium chloride 100 mL IVPB  500 mg IntraVENous DAILY  lactulose (CHRONULAC) solution 20 g  20 g Oral BID  zinc oxide-cod liver oil (DESITIN) 40 % paste   Topical BID  mineral oil-hydrophil petrolat (AQUAPHOR) ointment   Topical DAILY  albuterol (PROVENTIL VENTOLIN) nebulizer solution 2.5 mg  2.5 mg Nebulization Q4H PRN  
 allopurinol (ZYLOPRIM) tablet 300 mg  300 mg Oral DAILY  aspirin chewable tablet 81 mg  81 mg Oral DAILY  cholecalciferol (VITAMIN D3) tablet 1,000 Units  1,000 Units Oral DAILY  ferrous sulfate tablet 325 mg  1 Tab Oral DAILY WITH BREAKFAST  fluticasone (FLONASE) 50 mcg/actuation nasal spray 2 Spray  2 Spray Both Nostrils DAILY  furosemide (LASIX) tablet 40 mg  40 mg Oral BID  
 gabapentin (NEURONTIN) capsule 200 mg  200 mg Oral TID  insulin lispro (HUMALOG) injection   SubCUTAneous AC&HS  latanoprost (XALATAN) 0.005 % ophthalmic solution 1 Drop  1 Drop Both Eyes QHS  levothyroxine (SYNTHROID) tablet 50 mcg  50 mcg Oral ACB  pravastatin (PRAVACHOL) tablet 40 mg  40 mg Oral QHS  sertraline (ZOLOFT) tablet 50 mg  50 mg Oral DAILY  sodium chloride (NS) flush 5-10 mL  5-10 mL IntraVENous Q8H  
 sodium chloride (NS) flush 5-10 mL  5-10 mL IntraVENous PRN  
 acetaminophen (TYLENOL) tablet 650 mg  650 mg Oral Q4H PRN  
  ondansetron (ZOFRAN) injection 4 mg  4 mg IntraVENous Q4H PRN  
 budesonide (PULMICORT) 500 mcg/2 ml nebulizer suspension  500 mcg Nebulization BID RT  
 albuterol-ipratropium (DUO-NEB) 2.5 MG-0.5 MG/3 ML  3 mL Nebulization Q6H RT  
 0.9% sodium chloride infusion 250 mL  250 mL IntraVENous PRN  
 DOBUTamine (DOBUTREX) 500 mg/250 mL (2,000 mcg/mL) infusion  0-10 mcg/kg/min IntraVENous TITRATE Physical Exam:  
Vitals: 
Visit Vitals BP 96/60 (BP 1 Location: Right arm, BP Patient Position: At rest) Pulse 69 Temp 97.5 °F (36.4 °C) Resp 17 Ht 6' 2\" (1.88 m) Wt 116.2 kg (256 lb 3.2 oz) SpO2 99% BMI 32.89 kg/m² Intake/Output: 
No intake/output data recorded. 12/30 1901 - 01/01 0700 In: 351.3 [P.O.:50] Out: 2576 [Urine:2575] HEENT:  No scleral icterus, no oral ulcers Abdomen: Soft, nontender, normoactive bowel sounds Extremities:  No cyanosis, no leg edema Neuro:  Awake, alert and oriented to person but not date; no asterixis Data:  
 
Recent Results (from the past 24 hour(s)) CBC W/O DIFF Collection Time: 12/31/18  9:09 AM  
Result Value Ref Range WBC 5.3 4.3 - 11.1 K/uL  
 RBC 2.98 (L) 4.23 - 5.6 M/uL HGB 7.7 (L) 13.6 - 17.2 g/dL HCT 26.4 (L) 41.1 - 50.3 % MCV 88.6 79.6 - 97.8 FL  
 MCH 25.8 (L) 26.1 - 32.9 PG  
 MCHC 29.2 (L) 31.4 - 35.0 g/dL  
 RDW 18.6 (H) 11.9 - 14.6 % PLATELET 730 (L) 870 - 450 K/uL MPV 12.2 9.4 - 12.3 FL ABSOLUTE NRBC 0.00 0.0 - 0.2 K/uL METABOLIC PANEL, BASIC Collection Time: 12/31/18  9:09 AM  
Result Value Ref Range Sodium 139 136 - 145 mmol/L Potassium 3.3 (L) 3.5 - 5.1 mmol/L Chloride 95 (L) 98 - 107 mmol/L  
 CO2 37 (H) 21 - 32 mmol/L Anion gap 7 7 - 16 mmol/L Glucose 110 (H) 65 - 100 mg/dL BUN 47 (H) 8 - 23 MG/DL Creatinine 2.03 (H) 0.8 - 1.5 MG/DL  
 GFR est AA 40 (L) >60 ml/min/1.73m2 GFR est non-AA 33 (L) >60 ml/min/1.73m2  Calcium 7.9 (L) 8.3 - 10.4 MG/DL  
BNP  
 Collection Time: 12/31/18  9:09 AM  
Result Value Ref Range  (H) 0 pg/mL GLUCOSE, POC Collection Time: 12/31/18 10:59 AM  
Result Value Ref Range Glucose (POC) 105 (H) 65 - 100 mg/dL EKG, 12 LEAD, INITIAL Collection Time: 12/31/18  1:58 PM  
Result Value Ref Range Ventricular Rate 75 BPM  
 Atrial Rate 83 BPM  
 QRS Duration 108 ms Q-T Interval 408 ms QTC Calculation (Bezet) 455 ms Calculated R Axis 21 degrees Calculated T Axis -173 degrees Diagnosis Atrial fibrillation Septal infarct (cited on or before 02-SEP-2018) Abnormal ECG When compared with ECG of 31-DEC-2018 13:58, 
Previous ECG has undetermined rhythm, needs review HGB & HCT Collection Time: 12/31/18  4:21 PM  
Result Value Ref Range HGB 7.6 (L) 13.6 - 17.2 g/dL HCT 26.5 (L) 41.1 - 50.3 % GLUCOSE, POC Collection Time: 12/31/18  4:24 PM  
Result Value Ref Range Glucose (POC) 165 (H) 65 - 100 mg/dL PLEASE READ & DOCUMENT PPD TEST IN 24 HRS Collection Time: 12/31/18  5:00 PM  
Result Value Ref Range PPD Negative Negative  
 mm Induration 0 mm GLUCOSE, POC Collection Time: 12/31/18  9:44 PM  
Result Value Ref Range Glucose (POC) 121 (H) 65 - 100 mg/dL HGB & HCT Collection Time: 12/31/18 10:53 PM  
Result Value Ref Range HGB 7.7 (L) 13.6 - 17.2 g/dL HCT 26.3 (L) 41.1 - 50.3 % HGB & HCT Collection Time: 01/01/19  3:48 AM  
Result Value Ref Range HGB 8.2 (L) 13.6 - 17.2 g/dL HCT 28.0 (L) 41.1 - 50.3 % METABOLIC PANEL, COMPREHENSIVE Collection Time: 01/01/19  3:48 AM  
Result Value Ref Range Sodium 140 136 - 145 mmol/L Potassium 3.2 (L) 3.5 - 5.1 mmol/L Chloride 95 (L) 98 - 107 mmol/L  
 CO2 38 (H) 21 - 32 mmol/L Anion gap 7 7 - 16 mmol/L Glucose 100 65 - 100 mg/dL BUN 39 (H) 8 - 23 MG/DL Creatinine 1.87 (H) 0.8 - 1.5 MG/DL  
 GFR est AA 44 (L) >60 ml/min/1.73m2 GFR est non-AA 37 (L) >60 ml/min/1.73m2 Calcium 7.7 (L) 8.3 - 10.4 MG/DL Bilirubin, total 1.4 (H) 0.2 - 1.1 MG/DL  
 ALT (SGPT) 6 (L) 12 - 65 U/L  
 AST (SGOT) 9 (L) 15 - 37 U/L Alk. phosphatase 66 50 - 136 U/L Protein, total 5.8 (L) 6.3 - 8.2 g/dL Albumin 2.3 (L) 3.2 - 4.6 g/dL Globulin 3.5 2.3 - 3.5 g/dL A-G Ratio 0.7 (L) 1.2 - 3.5 PROTHROMBIN TIME + INR Collection Time: 19  3:48 AM  
Result Value Ref Range Prothrombin time 17.6 (H) 11.7 - 14.5 sec INR 1.5 GLUCOSE, POC Collection Time: 19  6:19 AM  
Result Value Ref Range Glucose (POC) 111 (H) 65 - 100 mg/dL Impression/Plan: 
  
 
80 y.o. male with h/o A fib, CAD, CHF with EF 50-55%, CKD, COPD on oxygen 4-5 L, sleep apnea, and FRANK cirrhosis with thrombocytopenia (dx 18) and chronic anemia, who is seen for anemia and small volume rectal bleeding. He has had both a recent EGD 2018 and colonoscopy 2017 without obvious source. No recurrence of overt bleeding. MRCP demonstrates 2 filling defects in the common duct suspicious for choledocholithiasis. This will require ERCP to prevent cholangitis. Mentation is stable with some confusion, due to dementia, delirium, or mild encephalopathy. 1. Will arrange for ERCP tomorrow with Dr. Claudell Dupre. 2. Continue to monitor hemoglobin. 3. If significant recurrence of hematochezia, would check tagged red blood cell scan. 4. Start Rifaximin 550 mg tab - 1 tab PO bid. 5. Continue lactulose.   
 
Signed: 
Lorn Apgar, MD 
2019 
8:50 AM

## 2019-01-01 NOTE — PROGRESS NOTES
Verbal bedside report given to Thu Aden, oncoming RN. Patient's situation, background, assessment and recommendations provided. Opportunity for questions provided. Oncoming RN assumed care of patient. Dobutamine IV drip verified at bedside with oncoming RN.

## 2019-01-01 NOTE — PROGRESS NOTES
Hospitalist Progress Note 2019 Admit Date: 2018 10:17 AM  
NAME: Roge Howell :  1932 MRN:  [id number] Attending: Clista Seip, DO 
PCP:  Reyes Mcarthur MD 
 
SUBJECTIVE:  
Patient 11C with hx of afib not on [address] d/t hx of GIB, GERD, HTN, hx of DM (not currently on meds), Cirrhosis, diastolic CHF, CKD, COPD on 4-5LNC cont, BPH, CAD s/p CABG, PAD presents from [facility] after report of staff witnessing seizure activity x 5 min described to ER as tonic clonic. ER workup notable for pt bradycardic HR in 40's afib. Hgb 7.4, Cr 2.2 (baseline around 1.8-1.9). CXR with bibasilar effusions. 100% on 6lnc. CT head non acute. H/h dropped to 7.0 after admission to floor and pt transfused 1 unit pbr. Seen by cardiology who started dobumatine gtt for worsening bradycardia after admission to tele unit.  
  
Of note, has had two recent hospital admissions. Admitted to [facility] in November for septic shock from PNA. Was discharged to [address] [place] [address] then admitted to [facility] - for pseudomonas uti, cellulitis, ach/chronic resp failure, urinary retention now w/ alejo. Has completed course of cipro.  - seen on telemetry. Much more alert today although still very confused. Informed twice in 5 min he was in the hospital.  
 
19:  Patient's only complaint is HA. He says he otherwise feels okay. He doesn't think he actually had a seizure. He is oriented on exam.  Family at bedside. Review of Systems negative with exception of pertinent positives noted above PHYSICAL EXAM  
 
Visit Vitals /44 (BP 1 Location: Left arm, BP Patient Position: At rest) Pulse 69 Temp 98.1 °F (36.7 °C) Resp 18 Ht 6' 2\" (1.88 m) Wt 116.2 kg (256 lb 3.2 oz) SpO2 95% BMI 32.89 kg/m² Temp (24hrs), Av °F (36.7 °C), Min:97.5 °F (36.4 °C), Max:98.7 °F (37.1 °C) Oxygen Therapy O2 Sat (%): 95 % (19 1343) Pulse via Oximetry: 101 beats per minute (19 1343) O2 Device: Nasal cannula (01/01/19 1343) O2 Flow Rate (L/min): 2 l/min (01/01/19 1343) Intake/Output Summary (Last 24 hours) at 1/1/2019 1414 Last data filed at 1/1/2019 1315 Gross per 24 hour Intake 410 ml Output 2225 ml Net -1815 ml General: Alert, pleasant Lungs:  CTA Bilaterally. Heart:  Irregularly irregular,  No murmur, rub, or gallop Abdomen: Soft, Non distended, Non tender, Positive bowel sounds Extremities: No cyanosis, clubbing or edema Neurologic:  No focal deficits ASSESSMENT Active Hospital Problems Diagnosis Date Noted  Seizure ([facility] 12/30/2018  Cirrhosis ([facility] 11/07/2018  Pleural effusion 11/02/2018  Type 2 diabetes with nephropathy ([facility] 02/12/2018  CKD (chronic kidney disease) stage 3, GFR 30-59 ml/min ([facility]) 09/18/2013  Persistent atrial fibrillation ([facility] 11/21/2012 Coumadin therapy discontinued due to recurrent GI bleeding.  Essential hypertension 11/21/2012 A/P Seizures -neurology following. Cont Keppra. EEG showed general slowing only,  CT head non acute. MRI only shows chronic microischemia. 
  
Afib w/ bradycardia -off OAC d/t hx of GIB. Cardiology following. On dobutamine gtt. Holding coreg PTA med. Not currently a pacer candidate given infection 
  
Chronic resp failure/COPD - supplemental oxygen. Wears 4-5 LNC cont at baseline. BD's resumed 
  
CKD stage 3 - Cr improving/returning to baseline 
  
Diastolic CHF - w/ bibasilar effusions. Continue oral lasix CAD - asymptomatic. Asa/statin 
  
Cirrhosis - new dx as of Nov 2018, w/ thrombocytopenia. Also w/ confusion - GI has added lactulose. Enlarged CBD in Nov 2018 admit, plan for ERCP tomorrow for CBD obstruction. 
  
DM2 - holding orals, cont SSI 
  
HTN  - holding meds except lasix 
  
PAIN mgmt - uncertain what pt uses oxy IR prn for. Holding for now.  Resume if needed and when VS would allow.  
  
 Anemia - w/ hx of GIB. hgb dropped to 7 on admit, transfused 1 unit prbc. GI consulted. EGD Aug 18 and [place] Jan 17 w/o source of bleed. Cont ppi. No plans for endoscopy. GI recommends tagged RBC scan if teddy bleeding noted Dysphagia - recent hx of this per family report. Pureed diet and slp eval. 
  
UTI - suspected. Adams exchanged and cx pending. Rocephin D2 
  
DVT Prophylaxis: SCDs 
 
dispo - pt/ot/ppd ordered. Palliative care following. May need hospice if no improvement noted. Signed By: Carlos Reaves MD   
 January 1, 2019

## 2019-01-01 NOTE — PROGRESS NOTES
PT Note: Attempted this AM but pt refused due to fatigue. Will re-attempt pending schedule and pt status. Thanks, Mio Fabian, PT, DPT

## 2019-01-01 NOTE — PROGRESS NOTES
Hospitalist Progress Note 2018 Admit Date: 2018 10:17 AM  
NAME: Es Henson :  1932 MRN:  [id number] Attending: Isaiah Quiñonez DO 
PCP:  Leyla Roman MD 
 
SUBJECTIVE:  
Patient 42K with hx of afib not on [address] d/t hx of GIB, GERD, HTN, hx of DM (not currently on meds), Cirrhosis, diastolic CHF, CKD, COPD on 4-5LNC cont, BPH, CAD s/p CABG, PAD presents from [facility] after report of staff witnessing seizure activity x 5 min described to ER as tonic clonic. ER workup notable for pt bradycardic HR in 40's afib. Hgb 7.4, Cr 2.2 (baseline around 1.8-1.9). CXR with bibasilar effusions. 100% on 6lnc. CT head non acute. H/h dropped to 7.0 after admission to floor and pt transfused 1 unit pbrc. Seen by cardiology who started dobumatine gtt for worsening bradycardia after admission to tele unit.  
  
Of note, has had two recent hospital admissions. Admitted to [facility] in November for septic shock from PNA. Was discharged to [facility] then [place] [facility] pseudomonas uti, cellulitis, ach/chronic resp failure, urinary retention now w/ alejo. Has completed course of cipro.  - seen on telemetry. Much more alert today although still very confused. Informed twice in 5 min he was in the hospital.  
 
Review of Systems negative with exception of pertinent positives noted above PHYSICAL EXAM  
 
Visit Vitals /55 (BP 1 Location: Left arm, BP Patient Position: At rest) Pulse 70 Temp 97.9 °F (36.6 °C) Resp 21 Ht 6' 2\" (1.88 m) Wt 115.9 kg (255 lb 9.6 oz) SpO2 95% BMI 32.82 kg/m² Temp (24hrs), Av.4 °F (36.3 °C), Min:96.4 °F (35.8 °C), Max:97.9 °F (36.6 °C) Oxygen Therapy O2 Sat (%): 95 % (18 1813) Pulse via Oximetry: 70 beats per minute (18 1340) O2 Device: Nasal cannula (18 134) O2 Flow Rate (L/min): 2 l/min (18 1340) Intake/Output Summary (Last 24 hours) at 2018 1925 Last data filed at 2018 1630 Gross per 24 hour Intake 301.3 ml Output 1151 ml Net -849.7 ml  
  
General: Disoriented, pleasantly confused but alert Lungs:  CTA Bilaterally. Heart:  Irregularly irregular,  No murmur, rub, or gallop Abdomen: Soft, Non distended, Non tender, Positive bowel sounds Extremities: No cyanosis, clubbing or edema Neurologic:  No focal deficits ASSESSMENT Active Hospital Problems Diagnosis Date Noted  Seizure ([facility] 12/30/2018  Cirrhosis ([facility] 11/07/2018  Pleural effusion 11/02/2018  Type 2 diabetes with nephropathy ([facility] 02/12/2018  CKD (chronic kidney disease) stage 3, GFR 30-59 ml/min ([facility]) 09/18/2013  Persistent atrial fibrillation ([facility] 11/21/2012 Coumadin therapy discontinued due to recurrent GI bleeding.  Essential hypertension 11/21/2012 A/P 
- seizures -neurology following. Cont Keppra. EEG showed general slowing only,  CT head non acute. MRI pending.  
  
- Afib w/ bradycardia -off OAC d/t hx of GIB. Cardiology following. On dobutamine gtt. Holding coreg PTA med. Not currently a pacer candidate given infection 
  
- Chronic resp failure/ COPD - supplemental oxygen. Wears 4-5 LNC cont at baseline. BD's resumed 
  
- CKD stage 3 - Cr 2.0 returning to baseline 
  
- Diastolic CHF - w/ bibasilar effusions. Continue oral lasix - CAD - asymptomatic. Asa/statin 
  
- Cirrhosis - new dx as of Nov 2018, w/ thrombocytopenia. Also w/ confusion - GI has added lactulose. Enlarged CBD in Nov 2018 admit, plan for MRCP to r/o CBD obstruction. 
  
- DM2 - holding orals, cont SSI 
  
- HTN  - holding meds except lasix 
  
- PAIN mgmt - uncertain what pt uses oxy IR prn for. Holding for now. Resume if needed and when VS would allow.  
  
- Anemia - w/ hx of GIB. hgb dropped to 7 on admit, transfused 1 unit prbc. GI consulted. EGD Aug 18 and [place] Jan 17 w/o source of bleed. Cont ppi. No plans for endoscopy. GI recommends tagged RBC scan if teddy bleeding noted - dysphagia - recent hx of this per family report. Pureed diet and slp eval. 
  
- UTI - suspected. Adams exchanged and cx pending. Rocephin D2 
  
DVT Prophylaxis: SCDs 
 
dispo - pt/ot/ppd ordered. Palliative care following. May need hospice if no improvement noted. Signed By: Flakita Yan, DO   
 December 31, 2018

## 2019-01-01 NOTE — PROGRESS NOTES
Given normal WBC, afebrile, and normal procalcitonin level, will not give abx now for L leg ulcer. Monitor. If status changes, consider abx therapy.     Mimi Guzmán MD Statement Selected

## 2019-01-01 NOTE — PROGRESS NOTES
[facility] [place] CARDIOLOGY PROGRESS NOTE 
      
 
1/1/2019 10:20 AM 
 
Admit Date: 12/30/2018 Subjective:  
 patient eating feels better. Review of Systems Constitutional: Negative for fever. Respiratory: Negative for cough. Cardiovascular: Negative for chest pain. Objective:  
  
Vitals:  
 01/01/19 0000 01/01/19 0121 01/01/19 1470 01/01/19 8308 BP: 121/53  96/60 Pulse: 67  69 Resp: 17  17 Temp: 98.7 °F (37.1 °C)  97.5 °F (36.4 °C) SpO2: 100% 100% 97% 99% Weight:   116.2 kg (256 lb 3.2 oz) Height:      
 
 
 
Physical Exam  
Constitutional: He appears ill. Eyes: Pupils are equal, round, and reactive to light. Neck: Normal range of motion. Cardiovascular: Normal rate. An irregularly irregular rhythm present. Pulmonary/Chest: No respiratory distress. Abdominal: Soft. Musculoskeletal: He exhibits no tenderness. Neurological: He is alert. Skin: Skin is warm. Psychiatric: He exhibits abnormal recent memory and abnormal remote memory. Data Review:  
Recent Labs 01/01/19 
0348 12/31/18 
2253  12/31/18 
0909  12/30/18 
1026   --   --  139  --  138  
K 3.2*  --   --  3.3*  --  3.9 BUN 39*  --   --  47*  --  50* CREA 1.87*  --   --  2.03*  --  2.27*   --   --  110*  --  123* WBC  --   --   --  5.3  --  6.0 HGB 8.2* 7.7*   < > 7.7*   < > 7.4* HCT 28.0* 26.3*   < > 26.4*   < > 26.0*  
PLT  --   --   --  124*  --  115* INR 1.5  --   --   --   --   --   
 < > = values in this interval not displayed. Intake/Output Summary (Last 24 hours) at 1/1/2019 9401 Last data filed at 1/1/2019 0314 Gross per 24 hour Intake 50 ml Output 2576 ml Net -2526 ml  
 
Current Facility-Administered Medications Medication Dose Route Frequency  cefTRIAXone (ROCEPHIN) 1 g in 0.9% sodium chloride (MBP/ADV) 50 mL  1 g IntraVENous Q24H  pantoprazole (PROTONIX) 80 mg in sodium chloride 0.9% 20 mL injection 80 mg IntraVENous Q12H  levETIRAcetam (KEPPRA) 500 mg in 0.9% sodium chloride 100 mL IVPB  500 mg IntraVENous DAILY  lactulose (CHRONULAC) solution 20 g  20 g Oral BID  zinc oxide-cod liver oil (DESITIN) 40 % paste   Topical BID  mineral oil-hydrophil petrolat (AQUAPHOR) ointment   Topical DAILY  albuterol (PROVENTIL VENTOLIN) nebulizer solution 2.5 mg  2.5 mg Nebulization Q4H PRN  
 allopurinol (ZYLOPRIM) tablet 300 mg  300 mg Oral DAILY  aspirin chewable tablet 81 mg  81 mg Oral DAILY  cholecalciferol (VITAMIN D3) tablet 1,000 Units  1,000 Units Oral DAILY  ferrous sulfate tablet 325 mg  1 Tab Oral DAILY WITH BREAKFAST  fluticasone (FLONASE) 50 mcg/actuation nasal spray 2 Spray  2 Spray Both Nostrils DAILY  furosemide (LASIX) tablet 40 mg  40 mg Oral BID  
 gabapentin (NEURONTIN) capsule 200 mg  200 mg Oral TID  insulin lispro (HUMALOG) injection   SubCUTAneous AC&HS  latanoprost (XALATAN) 0.005 % ophthalmic solution 1 Drop  1 Drop Both Eyes QHS  levothyroxine (SYNTHROID) tablet 50 mcg  50 mcg Oral ACB  pravastatin (PRAVACHOL) tablet 40 mg  40 mg Oral QHS  sertraline (ZOLOFT) tablet 50 mg  50 mg Oral DAILY  sodium chloride (NS) flush 5-10 mL  5-10 mL IntraVENous Q8H  
 sodium chloride (NS) flush 5-10 mL  5-10 mL IntraVENous PRN  
 acetaminophen (TYLENOL) tablet 650 mg  650 mg Oral Q4H PRN  
 ondansetron (ZOFRAN) injection 4 mg  4 mg IntraVENous Q4H PRN  
 budesonide (PULMICORT) 500 mcg/2 ml nebulizer suspension  500 mcg Nebulization BID RT  
 albuterol-ipratropium (DUO-NEB) 2.5 MG-0.5 MG/3 ML  3 mL Nebulization Q6H RT  
 0.9% sodium chloride infusion 250 mL  250 mL IntraVENous PRN  
 DOBUTamine (DOBUTREX) 500 mg/250 mL (2,000 mcg/mL) infusion  0-10 mcg/kg/min IntraVENous TITRATE Assessment/Plan: Active Problems: 
  Essential hypertension (11/21/2012) Persistent atrial fibrillation ([address] (11/21/2012) Overview: Coumadin therapy discontinued due to recurrent GI bleeding. Rate improved with dobutamine. Poor prognosis not candidate for device with acute infection at this time. Hold BB. Wean dobutamine. CKD (chronic kidney disease) stage 3, GFR 30-59 ml/min ([facility]) (9/18/2013) Type 2 diabetes with nephropathy ([address] (2/12/2018) Pleural effusion (11/2/2018) Cirrhosis ([address] (11/7/2018) Seizure ([address] (12/30/2018) Left ventricular systolic function is normal.  Patient does not have systolic CHF Alexei Mike MD 
1/1/2019 10:20 AM

## 2019-01-01 NOTE — PROGRESS NOTES
Problem: Mobility Impaired (Adult and Pediatric) Goal: *Acute Goals and Plan of Care (Insert Text) LTG: 
(1.)Mr. Navarro will move from supine to sit and sit to supine , scoot up and down and roll side to side in bed with STAND BY ASSIST within 7 treatment day(s). (2.)Mr. Navarro will transfer from bed to chair and chair to bed with MINIMAL ASSIST using the least restrictive device within 7 treatment day(s). (3.)Mr. Navarro will ambulate with MINIMAL ASSIST for 75 feet with the least restrictive device within 7 treatment day(s). (4.)Mr. Navarro will participate in therapeutic activity/exercises x 23 minutes for increased strength within 7 days. ________________________________________________________________________________________________ PHYSICAL THERAPY: Initial Assessment, Treatment Day: Day of Assessment, AM 1/1/2019INPATIENT: Hospital Day: 3 Payor: [facility] [place] MEDICARE / Plan: [place] / Product Type: Managed Care Medicare /  
  
NAME/AGE/GENDER: Va Padilla is a 80 y.o. male PRIMARY DIAGNOSIS: Seizure ([facility] [address] <principal problem not specified> <principal problem not specified> 
 
  
ICD-10: Treatment Diagnosis:  
 · Generalized Muscle Weakness (M62.81) · Difficulty in walking, Not elsewhere classified (R26.2) · Other abnormalities of gait and mobility (R26.89) · History of falling (Z91.81) Precaution/Allergies: 
Patient has no known allergies. ASSESSMENT:  
Mr. Yuni Prieto is a 80 y.o. male in the hospital for the above who was supine in bed upon arrival.  Pt reports that he was at a SNF or rehab PTA. Pt also reported that PTA he was getting assistance with ADLs and was not yet ambulating. He stated that he was getting help to transfer to wheelchair and gets assistance rolling. Pt admitted to at least one recent fall in the past year.   Mr. Yuni Prieto presents to PT with decreased AROM and decreased strength in B LEs. During evaluation pt performed supine to sit with min-modA and prop support in sitting. Pt required maxA x 2 for STS with RW and had poor standing balance. Pt was able to stand-pivot to chair with min-modA x 2. Mr. Irish Sherwood could benefit from skilled PT as he is currently functioning below his baseline. In addition to evaluation pt received treatment consisting of therapeutic exercise. Pt performed from recliner chair with visual and verbal cuing. He tolerated well with no requests for rest breaks. Pt benefited from treatment to address decreased strength and activity tolerance. This section established at most recent assessment PROBLEM LIST (Impairments causing functional limitations): 1. Decreased Strength 2. Decreased ADL/Functional Activities 3. Decreased Transfer Abilities 4. Decreased Ambulation Ability/Technique 5. Decreased Balance 6. Decreased Activity Tolerance INTERVENTIONS PLANNED: (Benefits and precautions of physical therapy have been discussed with the patient.) 1. Balance Exercise 2. Bed Mobility 3. Family Education 4. Gait Training 5. Therapeutic Activites 6. Therapeutic Exercise/Strengthening 7. Transfer Training TREATMENT PLAN: Frequency/Duration: 3 times a week for duration of hospital stay Rehabilitation Potential For Stated Goals: Good RECOMMENDED REHABILITATION/EQUIPMENT: (at time of discharge pending progress): Due to the probability of continued deficits (see above) this patient will likely need continued skilled physical therapy after discharge. Equipment:  
? None at this time HISTORY:  
History of Present Injury/Illness (Reason for Referral): 
See H&P Past Medical History/Comorbidities:  
Mr. Irish Sherwood  has a past medical history of Atherosclerosis of artery of extremity with ulceration ([address] Atherosclerosis of native arteries of extremity with intermittent claudication ([address] Atopic dermatitis, Atrial fibrillation ([address] CAD (coronary artery disease), Carotid stenosis, bilateral, CHF (congestive heart failure) ([address] Chronic kidney disease, Chronic obstructive pulmonary disease ([address] Cirrhosis ([address] Colon, diverticulosis, Coronary atherosclerosis of native coronary vessel, Diabetes ([address] Diastolic CHF, acute on chronic ([address] Failed CABG (coronary artery bypass graft), GI bleed, Gout, History of tobacco use, [place] (hard of hearing), Hypercholesterolemia, Hypertension, Hyperuricemia, Morbid obesity ([address] PAD (peripheral artery disease) ([address] Poor historian, Radiologic findings of lung field, abnormal, Seizure disorder ([address] Shortness of breath dyspnea, Thyroid disease, and Unspecified sleep apnea. Mr. Ivon Mitchell  has a past surgical history that includes pr cardiac surg procedure unlist; hx coronary artery bypass graft (06-); hx cataract removal (Left, 2003); hx heart catheterization; hx coronary stent placement (02-); hx heent (1970s); hx colonoscopy; ULTRASOUND (Bilateral, 11/2/2018); THORACENTESIS (Right, 11/2/2018); ULTRASOUND GUIDED ACCESS VENA CAVA FILTER INSERTION (N/A, 9/12/2018); ESOPHAGOGASTRODUODENOSCOPY (EGD) (N/A, 8/5/2018); ESOPHAGOGASTRODUODENOSCOPY (EGD) (N/A, 1/27/2017); COLONOSCOPY  BMI 36 TO BE ADMITTED 1/26/17 (N/A, 1/27/2017); ESOPHAGOGASTRODUODENOSCOPY (EGD) (N/A, 1/22/2017); ESOPHAGOGASTRODUODENAL (EGD) BIOPSY (N/A, 1/22/2017); ESOPHAGOGASTRODUODENOSCOPY (EGD)   rm 610 (N/A, 5/8/2015); COLONOSCOPY (N/A, 1/24/2015); and ESOPHAGOGASTRODUODENOSCOPY (EGD)   rm 3101 (N/A, 1/23/2015). Social History/Living Environment:  
Home Environment: Skilled nursing facility Care Facility Name: [facility] One[facility]Two Story Residence: One story Living Alone: No 
Support Systems: Skilled nursing facility Patient Expects to be Discharged to[de-identified] Unknown Current DME Used/Available at Home: Wheelchair Prior Level of Function/Work/Activity: PTA was at SNF/rehab where he was getting pushed in wheelchair and given assistance for ADLs. Number of Personal Factors/Comorbidities that affect the Plan of Care: 3+: HIGH COMPLEXITY EXAMINATION:  
Most Recent Physical Functioning:  
Gross Assessment: 
AROM: Generally decreased, functional 
Strength: Generally decreased, functional 
         
  
Posture: 
  
Balance: 
Sitting: Impaired Sitting - Static: Prop sitting Sitting - Dynamic: Prop sitting Standing: Impaired Standing - Static: Poor Standing - Dynamic : Poor Bed Mobility: 
Supine to Sit: Minimum assistance; Moderate assistance Scooting: Minimum assistance; Moderate assistance Wheelchair Mobility: 
  
Transfers: 
Sit to Stand: Maximum assistance;Assist x2 Stand to Sit: Moderate assistance;Assist x2 Bed to Chair: Minimum assistance; Moderate assistance;Assist x2 Gait: 
  
   
  
Body Structures Involved: 1. Metabolic 2. Endocrine 3. Muscles Body Functions Affected: 1. Neuromusculoskeletal 
2. Movement Related 3. Metobolic/Endocrine Activities and Participation Affected: 1. General Tasks and Demands 2. Mobility 3. Self Care 4. Domestic Life 5. Community, Social and [place] Number of elements that affect the Plan of Care: 4+: HIGH COMPLEXITY CLINICAL PRESENTATION:  
Presentation: Evolving clinical presentation with changing clinical characteristics: MODERATE COMPLEXITY CLINICAL DECISION MAKIN [facility] [address] [address] AM-PAC 6 Clicks Basic Mobility Inpatient Short Form How much difficulty does the patient currently have. .. Unable A Lot A Little None 1. Turning over in bed (including adjusting bedclothes, sheets and blankets)? [] 1   [] 2   [x] 3   [] 4  
2. Sitting down on and standing up from a chair with arms ( e.g., wheelchair, bedside commode, etc.)   [] 1   [x] 2   [] 3   [] 4  
3. Moving from lying on back to sitting on the side of the bed?    [] 1   [] 2   [x] 3   [] 4  
 How much help from another person does the patient currently need. .. Total A Lot A Little None 4. Moving to and from a bed to a chair (including a wheelchair)? [] 1   [x] 2   [] 3   [] 4  
5. Need to walk in hospital room? [] 1   [x] 2   [] 3   [] 4  
6. Climbing 3-5 steps with a railing? [x] 1   [] 2   [] 3   [] 4  
© 2007, Trustees of [facility] [employer], under license to [employer]. All rights reserved Score:  Initial: 13 Most Recent: X (Date: -- ) Interpretation of Tool:  Represents activities that are increasingly more difficult (i.e. Bed mobility, Transfers, Gait). Score 24 23 22-20 19-15 14-10 9-7 6 Modifier CH CI CJ CK CL CM CN   
 
? Mobility - Walking and Moving Around:  
  - CURRENT STATUS: CL - 60%-79% impaired, limited or restricted  - GOAL STATUS: CK - 40%-59% impaired, limited or restricted  - D/C STATUS:  ---------------To be determined--------------- Payor: [facility] [place] MED[facility] / Plan[place] [employer] [place] / Product Type: Managed Care Medicare /   
 
Medical Necessity:    
· Patient demonstrates good rehab potential due to higher previous functional level. Reason for Services/Other Comments: 
· Patient continues to require skilled intervention due to decreased balance and functional mobility. Use of outcome tool(s) and clinical judgement create a POC that gives a: Questionable prediction of patient's progress: MODERATE COMPLEXITY  
  
 
 
 
TREATMENT:  
(In addition to Assessment/Re-Assessment sessions the following treatments were rendered) Pre-treatment Symptoms/Complaints:  Generalized pain 
Pain: Initial:  
Pain Intensity 1: 7 Pain Location 1: Generalized  Post Session:  7/10 Therapeutic Exercise: (8 Minutes):  Exercises per grid below to improve mobility, strength and activity tolerance.   Required minimal visual and verbal cues to promote proper body alignment and promote proper body mechanics. Progressed repetitions and complexity of movement as indicated. Date: 
1/1/19 Date: 
 Date: 
  
ACTIVITY/EXERCISE AM PM AM PM AM PM  
Seated LAQ 1 x 15 B 
1 x 25 B Seated marching 1 x 15 B 
1 x 25 B Seated AP 1 x 15 B Seated hip abd/add 1 x 15 B       
        
        
        
B = bilateral; AA = active assistive; A = active; P = passive Braces/Orthotics/Lines/Etc:  
· IV 
· alejo catheter · O2 Device: Nasal cannula Treatment/Session Assessment:   
· Response to Treatment:  Tolerated fairly given decreased activity tolerance. · Interdisciplinary Collaboration:  
o Physical Therapist 
o Registered Nurse 
o Certified Nursing Assistant/Patient Care Technician · After treatment position/precautions:  
o Up in chair 
o Bed alarm/tab alert on 
o Bed/Chair-wheels locked 
o Call light within reach 
o Nurse at bedside · Compliance with Program/Exercises: Will assess as treatment progresses · Recommendations/Intent for next treatment session: \"Next visit will focus on advancements to more challenging activities and reduction in assistance provided\". Total Treatment Duration: PT Patient Time In/Time Out Time In: 9836 Time Out: 1134 David Bravo, PT, DPT

## 2019-01-01 NOTE — PROGRESS NOTES
Verbal bedside report given to Micky Son oncoming RN. Patient's situation, background, assessment and recommendations provided. Opportunity for questions provided. Oncoming RN assumed care of patient. Dobutamine IV drip verified at bedside with oncoming RN.

## 2019-01-01 NOTE — PROGRESS NOTES
Called to bedside by patient's family. They stated patient's head \"started twitching for a few seconds\". Upon assessment, patient stated he felt light headed/dizzy when it happened. Patient asymptomatic now and talking clearly to RN. Dr. Kip Mccormick paged. Orders received for 40 meq potassium po once. Will continue to monitor.

## 2019-01-01 NOTE — PROGRESS NOTES
Bedside and Verbal shift change report given to self (oncoming nurse) by Jolly Kehr, RN (offgoing nurse). Report included the following information SBAR, Kardex, MAR and Recent Results.

## 2019-01-02 NOTE — PROGRESS NOTES
ERCP cancelled. Will start patient on ursodiol 300mg TID and sign off. We will make patient a follow up appt in the office. Our office will call with appt. Princess Naqvi. Frida Francois in collaboration with Dr. Yasmin Oscar [facility] ATTENDING NOTE:  I agree with the above assessment and plan.  
 
Poli Batista MD

## 2019-01-02 NOTE — PROGRESS NOTES
SPEECH PATHOLOGY NOTE: 
 
Speech therapy attempt this PM for po trials with chewable textures. However, patient remains on clear liquids at this time. Will follow up at later time/date once cleared for consumption of additional po intake.   
 
Lester [address] CCC-SLP

## 2019-01-02 NOTE — PROGRESS NOTES
Dr. Gustavo Chang and Dr. Tong Mark in agreement that ERCP will be on hold until patient is stable. Spoke with Arcelia Solares RN and informed that procedure is cancelled for now.

## 2019-01-02 NOTE — PROGRESS NOTES
Bedside and Verbal shift change report given to self (oncoming nurse) by Aleksandra Sanchez RN (offgoing nurse). Report included the following information SBAR, Kardex, MAR and Recent Results. Dobutamine gtt verified at bedside with offgoing RN.

## 2019-01-02 NOTE — PROGRESS NOTES
[facility] CARDIOLOGY PROGRESS NOTE 
      
 
1/2/2019 8:05 AM 
 
Admit Date: 12/30/2018 Subjective:  
Feels better. ROS: 
GEN:  No fever or chills Cardiovascular:  As noted above:no CP or palpitations. Pulmonary:  As noted above:no SOB Neuro:  No new focal motor or sensory loss Objective:  
  
Vitals:  
 01/02/19 0129 01/02/19 3963 01/02/19 0241 01/02/19 6367 BP: 126/41   118/51 Pulse: 71   76 Resp: 18   18 Temp: 97.1 °F (36.2 °C)   97 °F (36.1 °C) SpO2: 95% 99% 99% 98% Weight:    115.5 kg (254 lb 9.6 oz) Height:      
 
 
Physical Exam: 
General-no distress Neck- supple, no JVD 
CV- irregular rate and rhythm no MRG Lung- clear bilaterally in anterior lung fields. Abd- soft, nontender, nondistended Ext- trace to 1+ edema bilaterally. Skin- warm and dry Psychiatric:  Normal mood and affect. Neurologic:  Alert and oriented. Tremor present. Data Review:  
Recent Labs 01/02/19 
5403 01/01/19 
1836  01/01/19 
0348  12/31/18 
0909  12/30/18 
1026 NA  --   --   --  140  --  139  --  138 K  --   --   --  3.2*  --  3.3*  --  3.9 BUN  --   --   --  39*  --  47*  --  50* CREA  --   --   --  1.87*  --  2.03*  --  2.27* GLU  --   --   --  100  --  110*  --  123* WBC  --   --   --   --   --  5.3  --  6.0 HGB 8.0* 7.9*   < > 8.2*   < > 7.7*   < > 7.4* HCT 27.0* 26.6*   < > 28.0*   < > 26.4*   < > 26.0*  
PLT  --   --   --   --   --  124*  --  115* INR  --   --   --  1.5  --   --   --   --   
 < > = values in this interval not displayed. TELEMETRY:  Afib with HR in 60& 70's. Assessment/Plan:  
 
Principal Problem: 
  Persistent atrial fibrillation ([address] (11/21/2012) Overview: Coumadin therapy discontinued due to recurrent GI bleeding. Bradycardia has improved. Taper off Dobutamine as tolerated. Pacemaker is not a consideration at present time. Active Problems: 
  Essential hypertension (11/21/2012):stable. CKD (chronic kidney disease) stage 3, GFR 30-59 ml/min (HCC) (9/18/2013) Type 2 diabetes with nephropathy ([facility] [address] (2/12/2018) Pleural effusion (11/2/2018) Cirrhosis ([facility] [address] (11/7/2018) Seizure ([facility] [address] (12/30/2018) Aydin Guillen MD 
1/2/2019 8:05 AM

## 2019-01-02 NOTE — PROGRESS NOTES
Palliative Care Progress Note 1/3/2019 Note Patient: Nayely Garcia MRN: [id number]  SSN: [id number] YOB: 1932  Age: 80 y.o. Sex: male Assessment/Plan: Chief Complaint/Interval History: SOB Principal Diagnosis: · Debility, Unspecified  R53.81 Additional Diagnoses: · Delirium  F05 · Fatigue, Lethargy  R53.83 
· Frailty  R54 · Counseling, Encounter for Medical Advice  Z71.9 
· Encounter for Palliative Care  Z51.5 Palliative Performance Scale (PPS) PPS: 50 Medical Decision Making:  
Reviewed and summarized - No changes Discussed case with appropriate providers - Nursing team 
Reviewed laboratory and x-ray data - No labs today Patient is up in the chair today. No reports of pain or issues. We will continue to follow along in anticipation of future conversations related to goals of care secondary to complex chronic health issues. More than 50% of this 30 minute visit was spent counseling and coordination of care as outlined above. Subjective:  
 
Review of Systems: A comprehensive review of systems was negative except for: Constitutional: Positive for fatigue. Respiratory: Positive for cough/SOB. Cardiovascular: Positive for AF. Objective:  
 
Visit Vitals /44 (BP 1 Location: Left arm, BP Patient Position: At rest) Pulse 96 Temp 98 °F (36.7 °C) Resp 18 Ht 6' 2\" (1.88 m) Wt 254 lb 9.6 oz (115.5 kg) SpO2 100% BMI 32.69 kg/m² Physical Exam: 
 
General:  Cooperative. Extremely frail. Wet cough noted. Eyes:  Conjunctivae/corneas clear Nose: Nares normal. Septum midline. Neck: Supple, symmetrical, trachea midline, no JVD Lungs:   Clear to auscultation bilaterally, unlabored Heart:  Iregular rate and rhythm, no murmur Abdomen:   Soft, non-tender, non-distended Extremities: Mild twitching noted during conversation. Skin: Skin color, texture, turgor poor. Multiple bruises noted.   
Neurologic: Nonfocal  
 Psych: Alert and oriented to name & place. Able to answer questions appropriately. Signed By: Deanna Pacheco NP   
 January 3, 2019

## 2019-01-03 NOTE — PROGRESS NOTES
Hospitalist Progress Note 2019 Admit Date: 2018 10:17 AM  
NAME: Alfonso Springer :  1932 MRN:  [id number] Attending: Bassam Herrera DO 
PCP:  Glendy Brennan MD 
 
SUBJECTIVE:  
Patient 89M with hx of afib not on [address] d/t hx of GIB, GERD, HTN, hx of DM (not currently on meds), Cirrhosis, diastolic CHF, CKD, COPD on 4-5LNC cont, BPH, CAD s/p CABG, PAD presents from [facility] after report of staff witnessing seizure activity x 5 min described to ER as tonic clonic. ER workup notable for pt bradycardic HR in 40's afib. Hgb 7.4, Cr 2.2 (baseline around 1.8-1.9). CXR with bibasilar effusions. 100% on 6lnc. CT head non acute. H/h dropped to 7.0 after admission to floor and pt transfused 1 unit pbr. Seen by cardiology who started dobumatine gtt for worsening bradycardia after admission to tele unit.  
  
Of note, has had two recent hospital admissions. Admitted to [facility] in November for septic shock from PNA. Was discharged to [facility] then admitted to [facility] - for pseudomonas uti, cellulitis, ach/chronic resp failure, urinary retention now w/ alejo. Has completed course of cipro.  - seen on telemetry. Much more alert today although still very confused. Informed twice in 5 min he was in the hospital.  
 
19:  Patient's only complaint is HA. He says he otherwise feels okay. He doesn't think he actually had a seizure. He is oriented on exam.  Family at bedside. 19:  Patient wishes to eat. No current complaints. Does report that his hands/feet sometimes hurt and he reports knowing that this is related to neuropathy. Review of Systems negative with exception of pertinent positives noted above PHYSICAL EXAM  
 
Visit Vitals /43 Pulse 66 Temp 98 °F (36.7 °C) Resp 20 Ht 6' 2\" (1.88 m) Wt 115.5 kg (254 lb 9.6 oz) SpO2 96% BMI 32.69 kg/m² Temp (24hrs), Av.7 °F (36.5 °C), Min:97 °F (36.1 °C), Max:98 °F (36.7 °C) Oxygen Therapy O2 Sat (%): 96 % (01/02/19 1729) Pulse via Oximetry: 84 beats per minute (01/02/19 1419) O2 Device: Nasal cannula (01/02/19 1419) PEEP/CPAP (cm H2O): 4 cm H20 (01/02/19 0241) O2 Flow Rate (L/min): 2 l/min (01/02/19 1419) Intake/Output Summary (Last 24 hours) at 1/2/2019 1924 Last data filed at 1/2/2019 1735 Gross per 24 hour Intake 0 ml Output 2070 ml Net -2070 ml General: Alert, pleasant Lungs:  CTA Bilaterally. Heart:  Irregularly irregular,  No murmur, rub, or gallop Abdomen: Soft, Non distended, Non tender, Positive bowel sounds Extremities: No cyanosis, clubbing or edema Neurologic:  No focal deficits ASSESSMENT Active Hospital Problems Diagnosis Date Noted  Seizure ([facility] [address] 12/30/2018  Cirrhosis ([facility] [address] 11/07/2018  Pleural effusion 11/02/2018  Type 2 diabetes with nephropathy ([facility] [address] 02/12/2018  CKD (chronic kidney disease) stage 3, GFR 30-59 ml/min ([facility]) 09/18/2013  Persistent atrial fibrillation ([facility] [address] 11/21/2012 Coumadin therapy discontinued due to recurrent GI bleeding.  Essential hypertension 11/21/2012 A/P Seizures -neurology following. Cont Keppra. EEG showed general slowing only,  CT head non acute. MRI only shows chronic microischemia. Unclear if this was actually seizure event 
  
Afib w/ bradycardia -off OAC d/t hx of GIB. Cardiology following. On dobutamine gtt. Holding coreg PTA med. Not currently a pacer candidate given infection 
  
Chronic resp failure/COPD - supplemental oxygen. Wears 4-5 LNC cont at baseline. BD's resumed 
  
CKD stage 3 - Cr improving/returning to baseline 
  
Diastolic CHF - w/ bibasilar effusions. Continue oral lasix CAD - asymptomatic. Asa/statin 
  
Cirrhosis - new dx as of Nov 2018, w/ thrombocytopenia. Also w/ confusion - GI has added lactulose.  Enlarged CBD in Nov 2018 admit, plan for ERCP tomorrow for CBD obstruction. 
  
DM2 - holding orals, cont SSI 
  
HTN  - holding meds except lasix 
  
 PAIN mgmt - uncertain what pt uses oxy IR prn for. Holding for now. Resume if needed and when VS would allow.  
  
Anemia - w/ hx of GIB. hgb dropped to 7 on admit, transfused 1 unit prbc. GI consulted. EGD Aug 18 and [facility] Jan 17 w/o source of bleed. Cont ppi. No plans for endoscopy. GI recommends tagged RBC scan if teddy bleeding noted Dysphagia - recent hx of this per family report. Pureed diet and slp eval. 
  
UTI - Adams exchanged and cx showing gram neg rods. Rocephin D2 
  
DVT Prophylaxis: SCDs 
 
dispo - pt/ot/ppd ordered. Palliative care following. May need hospice if no improvement noted. Signed By: Lashell Leon MD   
 January 2, 2019

## 2019-01-03 NOTE — PROGRESS NOTES
1/3/2019 8:09 AM 
 
Admit Date: 12/30/2018 Admit Diagnosis: [facility]ure [facility] Subjective:  
Pt in bed Has SOB HR has come up to 70's off meds Objective:  
  
Visit Vitals /52 (BP 1 Location: Right arm, BP Patient Position: At rest) Pulse 76 Temp 97.1 °F (36.2 °C) Resp 18 Ht 6' 2\" (1.88 m) Wt 108.2 kg (238 lb 8 oz) SpO2 96% BMI 30.62 kg/m² Physical Exam: 
General-Well Developed in bed NAD frail Neck- supple, no JVD 
CV- irregular rate and rhythm Lungs rhonchi Abd- soft, nontender, nondistended Ext edema bilaterally. Skin- warm and dry Data Review:  
Recent Labs 01/03/19 
8165  01/01/19 
2973  12/31/18 
5647 NA  --   --  140  --  139 K  --   --  3.2*  --  3.3*  
BUN  --   --  39*  --  47* CREA  --   --  1.87*  --  2.03* WBC  --   --   --   --  5.3 HGB 8.1*   < > 8.2*   < > 7.7* HCT 28.3*   < > 28.0*   < > 26.4*  
PLT  --   --   --   --  124* INR  --   --  1.5  --   --   
 < > = values in this interval not displayed. Assessment/Plan:  
 
Principal Problem: 
  Persistent atrial fibrillation ([facility] (11/21/2012) Overview: Coumadin therapy discontinued due to recurrent GI bleeding. Rate is better no pacer now stop dobutamine He says he does not want a pacer as well Active Problems: 
  Essential hypertension (11/21/2012) CKD (chronic kidney disease) stage 3, GFR 30-59 ml/min ([facility]) (9/18/2013) Type 2 diabetes with nephropathy ([address] (2/12/2018) Pleural effusion (11/2/2018) Cirrhosis ([address] (11/7/2018) Se[facility]ure ([facility] (12/30/2018)

## 2019-01-03 NOTE — PROGRESS NOTES
CM met with pt & spouse, prefer to dc home however will agree to [facility] rehab if medically indicated. Per [facility], pt will need precert. PPD complete. PT following. CM to continue to monitor. Care Management Interventions PCP Verified by CM: Yes Last Visit to PCP: 09/27/18 Mode of Transport at Discharge: BLS Transition of Care Consult (CM Consult): Discharge Planning, SNF Discharge Durable Medical Equipment: No 
Physical Therapy Consult: No 
Occupational Therapy Consult: No 
Speech Therapy Consult: No 
Current Support Network: [address] [place] [address] Confirm Follow Up Transport: Family Plan discussed with Pt/Family/Caregiver: Yes Freedom of Choice Offered: Yes Discharge Location Discharge Placement: Skilled nursing facility

## 2019-01-03 NOTE — PROGRESS NOTES
Discussed with Dr. Reji Sargent patient's left leg being more edematous than right and also updated her on urine culture results. No new orders received.

## 2019-01-03 NOTE — PROGRESS NOTES
Shift change, with bedside reporting completed. Reinforced Safety precautions: Call for assistance prior to activity, and use of nonskid socks before getting out of bed (OOB). Hand held call-light within reach. Bed alarm on.

## 2019-01-03 NOTE — PROGRESS NOTES
Problem: Mobility Impaired (Adult and Pediatric) Goal: *Acute Goals and Plan of Care (Insert Text) LTG: 
(1.)Mr. Navarro will move from supine to sit and sit to supine , scoot up and down and roll side to side in bed with STAND BY ASSIST within 7 treatment day(s). (2.)Mr. Navarro will transfer from bed to chair and chair to bed with MINIMAL ASSIST using the least restrictive device within 7 treatment day(s). (3.)Mr. Navarro will ambulate with MINIMAL ASSIST for 75 feet with the least restrictive device within 7 treatment day(s). (4.)Mr. Navarro will participate in therapeutic activity/exercises x 23 minutes for increased strength within 7 days. ________________________________________________________________________________________________ PHYSICAL THERAPY: Daily Note, Treatment Day: 1st, AM 1/3/2019INPATIENT: Hospital Day: 5 Payor: [place] MEDICARE / Plan: [place] MED[place] H[place]/PPO / Product Type: Managed Care Medicare /  
  
NAME/AGE/GENDER: Dominick Albarado is a 80 y.o. male PRIMARY DIAGNOSIS: Seizure ([facility] Persistent atrial fibrillation (HCC) Persistent atrial fibrillation ([facility] [facility] ICD-10: Treatment Diagnosis:  
         · Generalized Muscle Weakness (M62.81) · Difficulty in walking, Not elsewhere classified (R26.2) · Other abnormalities of gait and mobility (R26.89) · History of falling (Z91.81) Precaution/Allergies: 
Patient has no known allergies. ASSESSMENT:  
Mr. Justin Munson is a 80 y.o. male in the hospital for the above who was supine in bed upon arrival.  Family present but leaving. Patient is agreeable. Pt reports that he was at a SNF or rehab PTA. Pt also reported that PTA he was getting assistance with ADLs and was not yet ambulating. He stated that he was getting help to transfer to wheelchair and gets assistance rolling. Pt admitted to at least one recent fall in the past year.    Bed mobility with min to moderate assist to roll. Supine to sit with moderate assist however the patient is very active in this task and request that we take it slow. Sitting balance edge of bed improved. Sit to stand with min to moderate assist with the use of the walker. Patient was able to take a few steps over to the recliner. Fatigues quickly and would sit prematurely without verbal cues. Patient is able to scoot back in the chair independently however he requires additional time for task completion. Better session and patient demonstrates improvement with mobility and transfers. Mr. Abram Zhou could benefit from skilled PT as he is currently functioning below his baseline. And additional skilled inpatient therapy as discharge. Will continue PT efforts. . 
 
 
This section established at most recent assessment PROBLEM LIST (Impairments causing functional limitations): 1. Decreased Strength 2. Decreased ADL/Functional Activities 3. Decreased Transfer Abilities 4. Decreased Ambulation Ability/Technique 5. Decreased Balance 6. Decreased Activity Tolerance INTERVENTIONS PLANNED: (Benefits and precautions of physical therapy have been discussed with the patient.) 1. Balance Exercise 2. Bed Mobility 3. Family Education 4. Gait Training 5. Therapeutic Activites 6. Therapeutic Exercise/Strengthening 7. Transfer Training TREATMENT PLAN: Frequency/Duration: 3 times a week for duration of hospital stay Rehabilitation Potential For Stated Goals: Good RECOMMENDED REHABILITATION/EQUIPMENT: (at time of discharge pending progress): Due to the probability of continued deficits (see above) this patient will likely need continued skilled physical therapy after discharge. Equipment:  
? None at this time HISTORY:  
History of Present Injury/Illness (Reason for Referral): 
See H&P Past Medical History/Comorbidities:  
Mr. Abram Zhou  has a past medical history of Atherosclerosis of artery of extremity with ulceration ([address] Atherosclerosis of native arteries of extremity with intermittent claudication ([address] Atopic dermatitis, Atrial fibrillation ([address] CAD (coronary artery disease), Carotid stenosis, bilateral, CHF (congestive heart failure) ([address] Chronic kidney disease, Chronic obstructive pulmonary disease ([address] Cirrhosis ([address] Colon, diverticulosis, Coronary atherosclerosis of native coronary vessel, Diabetes ([address] Diastolic CHF, acute on chronic ([address] Failed CABG (coronary artery bypass graft), GI bleed, Gout, History of tobacco use, [place] (hard of hearing), Hypercholesterolemia, Hypertension, Hyperuricemia, Morbid obesity ([address] PAD (peripheral artery disease) ([address] Poor historian, Radiologic findings of lung field, abnormal, Seizure disorder ([address] Shortness of breath dyspnea, Thyroid disease, and Unspecified sleep apnea. Mr. Erin Brady  has a past surgical history that includes pr cardiac surg procedure unlist; hx coronary artery bypass graft (06-); hx cataract removal (Left, 2003); hx heart catheterization; hx coronary stent placement (02-); hx heent (1970s); hx colonoscopy; ULTRASOUND (Bilateral, 11/2/2018); THORACENTESIS (Right, 11/2/2018); ULTRASOUND GUIDED ACCESS VENA CAVA FILTER INSERTION (N/A, 9/12/2018); ESOPHAGOGASTRODUODENOSCOPY (EGD) (N/A, 8/5/2018); ESOPHAGOGASTRODUODENOSCOPY (EGD) (N/A, 1/27/2017); COLONOSCOPY  BMI 36 TO BE ADMITTED 1/26/17 (N/A, 1/27/2017); ESOPHAGOGASTRODUODENOSCOPY (EGD) (N/A, 1/22/2017); ESOPHAGOGASTRODUODENAL (EGD) BIOPSY (N/A, 1/22/2017); ESOPHAGOGASTRODUODENOSCOPY (EGD)   rm 610 (N/A, 5/8/2015); COLONOSCOPY (N/A, 1/24/2015); and ESOPHAGOGASTRODUODENOSCOPY (EGD)   rm 3101 (N/A, 1/23/2015). Social History/Living Environment:  
Home Environment: Skilled nursing facility [facility] Facility Name: [facility] One[facility] Story Residence: One story Living Alone: No 
Support Systems: Skilled nursing facility Patient Expects to be Discharged to[de-identified] Unknown Current DME Used/Available at Home: Wheelchair Prior Level of Function/Work/Activity: PTA was at SNF/rehab where he was getting pushed in wheelchair and given assistance for ADLs. Number of Personal Factors/Comorbidities that affect the Plan of Care: 3+: HIGH COMPLEXITY EXAMINATION:  
Most Recent Physical Functioning:  
Gross Assessment: 
  
         
  
Posture: 
  
Balance: 
Sitting: Impaired Sitting - Static: Fair (occasional) Sitting - Dynamic: Fair (occasional) Standing: Impaired Standing - Static: Fair Standing - Dynamic : Poor Bed Mobility: 
Rolling: Moderate assistance Supine to Sit: Minimum assistance; Moderate assistance Scooting: Minimum assistance; Moderate assistance Wheelchair Mobility: 
  
Transfers: 
Sit to Stand: Minimum assistance; Moderate assistance Stand to Sit: Moderate assistance Stand Pivot Transfers: Moderate assistance Bed to Chair: Minimum assistance; Moderate assistance Gait: 
  
   
  
Body Structures Involved: 1. Metabolic 2. Endocrine 3. Muscles Body Functions Affected: 1. Neuromusculoskeletal 
2. Movement Related 3. Metobolic/Endocrine Activities and Participation Affected: 1. General Tasks and Demands 2. Mobility 3. Self Care 4. Domestic Life 5. Community, Social and [place] Number of elements that affect the Plan of Care: 4+: HIGH COMPLEXITY CLINICAL PRESENTATION:  
Presentation: Evolving clinical presentation with changing clinical characteristics: MODERATE COMPLEXITY CLINICAL DECISION MAKING:  
[facility] 6 Clicks Basic Mobility Inpatient Short Form How much difficulty does the patient currently have. .. Unable A Lot A Little None 1. Turning over in bed (including adjusting bedclothes, sheets and blankets)? [] 1   [] 2   [x] 3   [] 4  
2.   Sitting down on and standing up from a chair with arms ( e.g., wheelchair, bedside commode, etc.)   [] 1   [x] 2   [] 3   [] 4  
 3. Moving from lying on back to sitting on the side of the bed? [] 1   [] 2   [x] 3   [] 4 How much help from another person does the patient currently need. .. Total A Lot A Little None 4. Moving to and from a bed to a chair (including a wheelchair)? [] 1   [x] 2   [] 3   [] 4  
5. Need to walk in hospital room? [] 1   [x] 2   [] 3   [] 4  
6. Climbing 3-5 steps with a railing? [x] 1   [] 2   [] 3   [] 4  
© 2007, Trustees of [facility], under license to [employer]. All rights reserved Score:  Initial: 13 Most Recent: X (Date: -- ) Interpretation of Tool:  Represents activities that are increasingly more difficult (i.e. Bed mobility, Transfers, Gait). Score 24 23 22-20 19-15 14-10 9-7 6 Modifier CH CI CJ CK CL CM CN   
 
? Mobility - Walking and Moving Around:  
  - CURRENT STATUS: CL - 60%-79% impaired, limited or restricted  - GOAL STATUS: CK - 40%-59% impaired, limited or restricted  - D/C STATUS:  ---------------To be determined--------------- Payor: [facility] [place] MEDICARE / Plan[place] [employer] [place]O / Product Type: Managed Care Medicare /   
 
Medical Necessity:    
· Patient demonstrates good rehab potential due to higher previous functional level. Reason for Services/Other Comments: 
· Patient continues to require skilled intervention due to decreased balance and functional mobility. Use of outcome tool(s) and clinical judgement create a POC that gives a: Questionable prediction of patient's progress: MODERATE COMPLEXITY  
  
 
 
 
TREATMENT:  
(In addition to Assessment/Re-Assessment sessions the following treatments were rendered) Pre-treatment Symptoms/Complaints:  \" I can try\" Pain: Initial:  
Pain Intensity 1: 10 
Pain Location 1: Other (comment)(all over)  Post Session:  10/10  Hurts all over Therapeutic Exercise: ( ):  Exercises per grid below to improve mobility, strength and activity tolerance. Required minimal visual and verbal cues to promote proper body alignment and promote proper body mechanics. Progressed repetitions and complexity of movement as indicated. Date: 
1/1/19 Date: 
 Date: 
  
ACTIVITY/EXERCISE AM PM AM PM AM PM  
Seated LAQ 1 x 15 B 
1 x 25 B Seated marching 1 x 15 B 
1 x 25 B Seated AP 1 x 15 B Seated hip abd/add 1 x 15 B       
        
        
        
B = bilateral; AA = active assistive; A = active; P = passive Therapeutic Activity: (    25 minutes): Therapeutic activities including bed mobility, sitting balance and transfers to improve mobility, strength, balance and coordination. Required min to moderate assist    to promote coordination of bilateral, lower extremity(s). Braces/Orthotics/Lines/Etc:  
· IV 
· alejo catheter · O2 Device: Nasal cannula Treatment/Session Assessment:   
· Response to Treatment:  Tolerated fairly · Interdisciplinary Collaboration:  
o Physical Therapy Assistant 
o Registered Nurse 
o Certified Nursing Assistant/Patient Care Technician · After treatment position/precautions:  
o Up in chair 
o Bed alarm/tab alert on 
o Bed/Chair-wheels locked 
o Caregiver at bedside 
o Call light within reach 
o RN notified · Compliance with Program/Exercises: Will assess as treatment progresses · Recommendations/Intent for next treatment session: \"Next visit will focus on advancements to more challenging activities and reduction in assistance provided\". Total Treatment Duration: PT Patient Time In/Time Out Time In: 0935 Time Out: 1000 Elicia Yarbrough PTA

## 2019-01-03 NOTE — PROGRESS NOTES
LTG: Patient will tolerate least restrictive diet without overt signs or symptoms of airway compromise. MET 01/03/19 STG: Patient will tolerate clear liquid diet without overt signs or symptoms of airway compromise. MET 01/03/19 STG: Patient will participate in po trials with chewable textures. MET 01/03/19 Speech language pathology: bedside swallow note: Daily Note and Discharge NAME/AGE/GENDER: Obi Cruz is a 80 y.o. male DATE: 1/3/2019 PRIMARY DIAGNOSIS: Seizure ([address] Procedure(s) (LRB): ENDOSCOPIC RETROGRADE CHOLANGIOPANCREATOGRAPHY (ERCP) (N/A) ICD-10: Treatment Diagnosis: R13.12 Oropharyngeal Dysphagia. INTERDISCIPLINARY COLLABORATION: Registered Nurse PRECAUTIONS/ALLERGIES: Patient has no known allergies. ASSESSMENT:Patient seen for assessment with chewable textures following diet upgrade to GI soft. He is edentulous and reports rarely wearing dentures for mastication. He was presented with thin liquid via cup sip, puree, mechanical soft (pears), and coarse solid. Impaired mastication with pears due to toughness of texture. Cough x1 noted with patient stating \"they are just too hard to eat\". Coarse solids tolerated without difficulty. No cough or throat clear with thin liquids via cup. Straw sips declined as he reports he has been \"banned\" from using straws. Recommend mechanical soft diet/thin liquids. Medications whole with liquid wash. No straws per patient request. No further speech therapy indicated at this time. ?????? ? ? This section established at most recent assessment?????????? 
PROBLEM LIST (Impairments causing functional limitations): 1. Possible dysphagia REHABILITATION POTENTIAL FOR STATED GOALS: Good PLAN OF CARE:  
Patient will benefit from skilled intervention to address the following impairments.  
RECOMMENDATIONS AND PLANNED INTERVENTIONS (Benefits and precautions of therapy have been discussed with the patient.): 
 · PO:  Mechanical soft with chopped meat and vegetables · Liquids:  regular thin MEDICATIONS: 
· With liquid ASPIRATION PRECAUTIONS: 
· Slow rate of intake · Small bites/sips · Upright at 90 degrees during meal 
COMPENSATORY STRATEGIES/MODIFICATIONS INCLUDING: 
· Cup/sip OTHER RECOMMENDATIONS (including follow up treatment recommendations):  
· Patient education RECOMMENDED DIET MODIFICATIONS DISCUSSED WITH: 
· Nursing · Patient FREQUENCY/DURATION: No further speech therapy indicated at this time as oropharyngeal swallow function is within normal limits. RECOMMENDED REHABILITATION/EQUIPMENT: (at time of discharge pending progress): Due to the probability of continued deficits (see above) this patient will not likely need continued skilled speech therapy after discharge. SUBJECTIVE:  
Seen upright in bedside chair. Cooperative. Reports hunger History of Present Injury/Illness: Mr. Brent Bright  has a past medical history of Atherosclerosis of artery of extremity with ulceration ([address] Atherosclerosis of native arteries of extremity with intermittent claudication ([address] Atopic dermatitis, Atrial fibrillation ([address] CAD (coronary artery disease), Carotid stenosis, bilateral, CHF (congestive heart failure) ([address] Chronic kidney disease, Chronic obstructive pulmonary disease ([address] Cirrhosis ([address] Colon, diverticulosis, Coronary atherosclerosis of native coronary vessel, Diabetes ([address] Diastolic CHF, acute on chronic ([address] Failed CABG (coronary artery bypass graft), GI bleed, Gout, History of tobacco use, [place] (hard of hearing), Hypercholesterolemia, Hypertension, Hyperuricemia, Morbid obesity ([address] PAD (peripheral artery disease) ([address] Poor historian, Radiologic findings of lung field, abnormal, Seizure disorder ([address] Shortness of breath dyspnea, Thyroid disease, and Unspecified sleep apnea. .  He also  has a past surgical history that includes pr cardiac surg procedure unlist; hx coronary artery bypass graft (06-); hx cataract removal (Left, 2003); hx heart catheterization; hx coronary stent placement (02-); hx heent (1970s); hx colonoscopy; ULTRASOUND (Bilateral, 11/2/2018); THORACENTESIS (Right, 11/2/2018); ULTRASOUND GUIDED ACCESS VENA CAVA FILTER INSERTION (N/A, 9/12/2018); ESOPHAGOGASTRODUODENOSCOPY (EGD) (N/A, 8/5/2018); ESOPHAGOGASTRODUODENOSCOPY (EGD) (N/A, 1/27/2017); COLONOSCOPY  BMI 36 TO BE ADMITTED 1/26/17 (N/A, 1/27/2017); ESOPHAGOGASTRODUODENOSCOPY (EGD) (N/A, 1/22/2017); ESOPHAGOGASTRODUODENAL (EGD) BIOPSY (N/A, 1/22/2017); ESOPHAGOGASTRODUODENOSCOPY (EGD)   rm 610 (N/A, 5/8/2015); COLONOSCOPY (N/A, 1/24/2015); and ESOPHAGOGASTRODUODENOSCOPY (EGD)   rm 3101 (N/A, 1/23/2015). Present Symptoms: Oropharyngeal dysphagia Pain Scale 1: Visual 
Pain Intensity 1: 0 Pain Location 1: Other (comment)(all over) Pain Intervention(s) 1: Medication (see MAR) Current Medications: No current facility-administered medications on file prior to encounter. Current Outpatient Medications on File Prior to Encounter Medication Sig Dispense Refill  furosemide (LASIX) 20 mg tablet Take 1-2 Tabs by mouth daily. 40 Tab 1  
 isosorbide mononitrate ER (IMDUR) 30 mg tablet Take 1 Tab by mouth daily. 30 Tab 5  pravastatin (PRAVACHOL) 40 mg tablet Take 1 Tab by mouth nightly. 90 Tab 3  
 aspirin 81 mg chewable tablet Take 1 Tab by mouth daily. 90 Tab 3  
 levothyroxine (SYNTHROID) 50 mcg tablet Take 1 Tab by mouth Daily (before breakfast). 90 Tab 3  
 sertraline (ZOLOFT) 50 mg tablet Take one tablet each evening for anxiety  Indications: Generalized Anxiety Disorder 30 Tab 3  
 fluticasone (FLONASE) 50 mcg/actuation nasal spray 2 Sprays by Both Nostrils route daily. 1 Bottle 3  cholecalciferol (VITAMIN D3) 1,000 unit cap Take 1,000 Units by mouth daily.  ferrous sulfate 324 mg (65 mg iron) tablet Take  by mouth Daily (before breakfast).  allopurinol (ZYLOPRIM) 300 mg tablet Take  by mouth daily.  cpap machine kit by Does Not Apply route. Bilevel 12/8  oxyCODONE IR (ROXICODONE) 5 mg immediate release tablet Take 1 Tab by mouth every six (6) hours as needed for up to 10 doses. Max Daily Amount: 20 mg. 10 Tab 0  
 albuterol (PROVENTIL HFA, VENTOLIN HFA, PROAIR HFA) 90 mcg/actuation inhaler Take 2 Puffs by inhalation every six (6) hours as needed for Wheezing or Shortness of Breath. 1 Inhaler 0  
 hydrALAZINE (APRESOLINE) 10 mg tablet TAKE 1 TABLET THREE TIMES DAILY. 90 Tab 3  
 glipiZIDE (GLUCOTROL) 5 mg tablet Take  by mouth two (2) times a day.  gabapentin (NEURONTIN) 100 mg capsule Take  by mouth three (3) times daily.  hydrocortisone (PROCTOSOL HC) 2.5 % rectal cream Insert  into rectum every six (6) hours as needed for Hemorrhoids.  fluticasone-vilanterol (BREO ELLIPTA) 100-25 mcg/dose inhaler Take 1 Puff by inhalation daily.  OXYGEN-AIR DELIVERY SYSTEMS Take  by inhalation continuous. 2LPM to keep 02 sat >90%  bisacodyl (DULCOLAX) 5 mg EC tablet Take 1 Tab by mouth daily. (Patient taking differently: Take 5 mg by mouth every evening.) 15 Tab 0  
 latanoprost (XALATAN) 0.005 % ophthalmic solution Administer 1 Drop to both eyes nightly.  albuterol (PROVENTIL VENTOLIN) 2.5 mg /3 mL (0.083 %) nebulizer solution 2.5 mg by Nebulization route every four (4) hours as needed for Wheezing.  ipratropium-albuterol (COMBIVENT RESPIMAT)  mcg/actuation inhaler Take 1 Puff by inhalation every six (6) hours. 3 Inhaler 3 Current Dietary Status:   
 Clear liquids Social History/Home Situation:   
Home Environment: Skilled nursing facility Care Facility Name: [facility] Story Residence: One story Living Alone: No 
Support Systems: Skilled nursing facility Patient Expects to be Discharged to[de-identified] Unknown Current DME Used/Available at Home: Wheelchair OBJECTIVE:  
 Respiratory Status:  Nasal cannula Oral Motor Structure/Speech:  Oral-Motor Structure/Motor Speech Labial: No impairment Dentition: Edentulous Lingual: No impairment Cognitive and Communication Status: 
Neurologic State: Alert Orientation Level: Oriented to person Cognition: Follows commands Perception: Appears intact Perseveration: No perseveration noted Safety/Judgement: Fall prevention;Decreased awareness of need for safety BEDSIDE SWALLOW EVALUATIONOral Assessment: 
Oral Assessment Labial: No impairment Dentition: Edentulous Lingual: No impairment P.O. Trials: 
Patient Position: Upright in bed The patient was given  amounts of the following:  
Consistency Presented: Thin liquid; Solid;Puree;Mechanical soft How Presented: Self-fed/presented;Spoon;Straw;Successive swallows ORAL PHASE: 
Bolus Acceptance: No impairment Bolus Formation/Control: Impaired Propulsion: Delayed (# of seconds) Type of Impairment: Mastication Oral Residue: None PHARYNGEAL PHASE: 
Initiation of Swallow: No impairment Laryngeal Elevation: Functional 
Aspiration Signs/Symptoms: None Vocal Quality: No impairment Effective Modifications: None Pharyngeal Phase Characteristics: No impairment, issues, or problems OTHER OBSERVATIONS: 
Rate/bite size: Questionable Endurance:  Questionable Comments: Tool Used: Dysphagia Outcome and Severity Scale (IRVING) Score Comments Normal Diet  [] 7 With no strategies or extra time needed Functional Swallow  [x] 6 May have mild oral or pharyngeal delay Mild Dysphagia 
  [] 5 Which may require one diet consistency restricted (those who demonstrate penetration which is entirely cleared on MBS would be included) Mild-Moderate Dysphagia  [] 4 With 1-2 diet consistencies restricted Moderate Dysphagia  [] 3 With 2 or more diet consistencies restricted Moderately Severe Dysphagia  [] 2 With partial PO strategies (trials with ST only) Severe Dysphagia  [] 1 With inability to tolerate any PO safely Score:  Initial: 6 Most Recent: X (Date: -- ) Interpretation of Tool: The Dysphagia Outcome and Severity Scale (IRVING) is a simple, easy-to-use, 7-point scale developed to systematically rate the functional severity of dysphagia based on objective assessment and make recommendations for diet level, independence level, and type of nutrition. Score 7 6 5 4 3 2 1 Modifier CH CI CJ CK CL CM CN ? Swallowing:  
  - CURRENT STATUS: CI - 1%-19% impaired, limited or restricted  - GOAL STATUS:  CI - 1%-19% impaired, limited or restricted  - D/C STATUS:  CI - 1%-19% impaired, limited or restricted Payor: [place] OF [place] / [place] [employer] / Product Type: Managed Care Medicare /  
 
TREATMENT:  
 (In addition to Assessment/Re-Assessment sessions the following treatments were rendered) Assessment/Reassessment only, no treatment provided today MODALITIES:  
  
  
  
  
  
  
  
  
  
  
    
  
  
  
  
  
  
  
  
   
 
ORAL MOTOR  EXERCISES: 
  
  
  
  
  
  
  
  
  
  
  
  
  
  
  
  
  
  
  
  
  
  
  
  
  
  
    
  
  
  
  
  
  
  
  
  
  
  
  
  
  
  
  
  
  
  
  
  
  
  
  
  
   
 
LARYNGEAL / PHARYNGEAL EXERCISES: 
  
  
  
  
  
  
  
  
  
  
  
  
  
  
  
  
  
  
  
  
  
    
  
  
  
  
  
  
  
  
  
  
  
  
  
  
  
  
  
  
  
   
 
__________________________________________________________________________________________________ Safety: After treatment position/precautions: · Upright in Bed. Progression/Medical Necessity:  
· Patient is expected to demonstrate progress in diet tolerance to decrease aspiration risk. Compliance with Program/Exercises: Will assess as treatment progresses Reason for Continuation of Services/Other Comments: 
· Patient continues to require skilled intervention due to possible dysphagia. Recommendations/Intent for next treatment session: \"Treatment next visit will focus on diet tolerance, po trials with chewable textures\". Total Treatment Duration: 
Time In: 2965 Time Out: 1143 Radha Carr, [facility] EDDIE YING SHAFER, CCC-SLP

## 2019-01-03 NOTE — PROGRESS NOTES
Hospitalist Progress Note 1/3/2019 Admit Date: 2018 10:17 AM  
NAME: [place] :  1932 MRN:  [id number] Attending: Gisselle Dorado DO 
PCP:  Dawson Galicia MD 
 
SUBJECTIVE:  
Patient 52V with hx of afib not on [address] d/t hx of GIB, GERD, HTN, hx of DM (not currently on meds), Cirrhosis, diastolic CHF, CKD, COPD on 4-5LNC cont, BPH, CAD s/p CABG, PAD presents from [facility] after report of staff witnessing seizure activity x 5 min described to ER as tonic clonic. ER workup notable for pt bradycardic HR in 40's afib. Hgb 7.4, Cr 2.2 (baseline around 1.8-1.9). CXR with bibasilar effusions. 100% on 6lnc. CT head non acute. H/h dropped to 7.0 after admission to floor and pt transfused 1 unit pbrc. Seen by cardiology who started dobumatine gtt for worsening bradycardia after admission to tele unit.  
  
Of note, has had two recent hospital admissions. Admitted to [facility] in November for septic shock from PNA. Was discharged to [facility] then admitted to [facility] - for pseudomonas uti, cellulitis, ach/chronic resp failure, urinary retention now w/ alejo. Has completed course of cipro.  - seen on telemetry. Much more alert today although still very confused. Informed twice in 5 min he was in the hospital.  
 
19:  Patient's only complaint is HA. He says he otherwise feels okay. He doesn't think he actually had a seizure. He is oriented on exam.  Family at bedside. 19:  Patient wishes to eat. No current complaints. Does report that his hands/feet sometimes hurt and he reports knowing that this is related to neuropathy. 1/3/19:  Patient is confused. He tells me that he is not doing well because he was \"in a wreck\" and when I asked him to clarify what he meant, he said that he was \"just joking\" and then told me that he was just changing tires. He says he recognizes me, and asks me what I'm doing here. He does not have any complaints. Chronic hand/foot pain. Review of Systems negative with exception of pertinent positives noted above PHYSICAL EXAM  
 
Visit Vitals BP 95/40 Pulse 76 Temp 97.4 °F (36.3 °C) Resp 17 Ht 6' 2\" (1.88 m) Wt 108.2 kg (238 lb 8 oz) SpO2 92% BMI 30.62 kg/m² Temp (24hrs), Av.5 °F (36.4 °C), Min:97.1 °F (36.2 °C), Max:98 °F (36.7 °C) Oxygen Therapy O2 Sat (%): 92 % (19 0930) Pulse via Oximetry: 69 beats per minute (19 3639) O2 Device: Nasal cannula (19 0950) PEEP/CPAP (cm H2O): 2 cm H20 (19 08) O2 Flow Rate (L/min): 2 l/min (19 193) FIO2 (%): 28 % (19) Intake/Output Summary (Last 24 hours) at 1/3/2019 1343 Last data filed at 1/3/2019 2278 Gross per 24 hour Intake 150 ml Output 1600 ml Net -1450 ml General: Alert, pleasant, confused Lungs:  CTA Bilaterally. Heart:  Irregularly irregular,  No murmur, rub, or gallop Abdomen: Soft, Non distended, Non tender, Positive bowel sounds Extremities: No cyanosis, clubbing or edema Neurologic:  No focal deficits, but oriented to person/place ASSESSMENT Active Hospital Problems Diagnosis Date Noted  Se[facility]ure ([facility] 2018  Cirrhosis ([facility] 2018  Pleural effusion 2018  Type 2 diabetes with nephropathy ([facility] 2018  CKD (chronic kidney disease) stage 3, GFR 30-59 ml/min ([facility]) 2013  Persistent atrial fibrillation ([facility] 2012 Coumadin therapy discontinued due to recurrent GI bleeding.  Essential hypertension 2012 A/P Afib w/ bradycardia -off OAC d/t hx of GIB. Cardiology following. On dobutamine gtt. Holding coreg PTA med. Not currently a pacer candidate given infection 
  
Chronic resp failure/COPD - supplemental oxygen. Wears 4-5 LNC cont at baseline. BD's resumed Confusion - intermittent. Will check ammonia level again.   On lactulose. 
  
CKD stage 3 - Cr improving/returning to baseline 
  
 Diastolic CHF - w/ bibasilar effusions. Continue oral lasix Seizures -neurology consulted. Cont Keppra. EEG showed general slowing only,  CT head non acute. MRI only shows chronic microischemia. Unclear if this was actually seizure event CAD - asymptomatic. Asa/statin 
  
Cirrhosis - new dx as of Nov 2018, w/ thrombocytopenia. Also w/ confusion - GI has added lactulose. Enlarged CBD in Nov 2018 admit. GI recommends conservative management with oral meds. 
  
DM2 - holding orals, cont SSI 
  
HTN  - holding meds except lasix 
  
Pain mgmt - Resumed home meds 
  
Anemia - w/ hx of GIB. hgb dropped to 7 on admit, transfused 1 unit prbc. GI consulted. EGD Aug 18 and [place] Jan 17 w/o source of bleed. Cont ppi. No plans for endoscopy. GI recommends tagged RBC scan if teddy bleeding noted Dysphagia - recent hx of this per family report. Pureed diet and slp eval. 
  
UTI - Adams exchanged and cx showing gram neg rods. Rocephin D2 
  
DVT Prophylaxis: SCDs 
 
dispo - pt/ot/ppd ordered. Palliative care following. Slowly improving acute issues. Signed By: Casie Leger MD   
 January 3, 2019

## 2019-01-04 NOTE — PROGRESS NOTES
Bedside and Verbal shift change report given to self (oncoming nurse) by Esperanza Helms (offgoing nurse). Report included the following information SBAR, Kardex and Recent Results.

## 2019-01-04 NOTE — PROGRESS NOTES
Problem: Nutrition Deficit Goal: *Optimize nutritional status Nutrition - F/U Assessment:  
Diet order(s): 12/30 pureed, then NPO; 1/2 CLQ then GI soft/bland, chopped meats/vegetables. Food/Nutrition Patient History:  Pt seen feeding himself lunch. Noted confusion throughout admission. No family members at bedside. He states that he does not have much of an appetite and has not been eating well. Pt states he can chew/swallow fine as long as foods are \"soft. \"  States he knows he should drink glucerna shakes if he dose not eat well. Anthropometrics:Height: 6' 2\" (188 cm),  Weight: 103.4 kg; wt source: 1/4, bed scale Edema: 1+ pitting to BLEs; trace to BUEs Macronutrient needs: EER:  7585-2489 kcal /day (20-25 kcal/kg I BW) 
EPR:  69-86 grams protein/day (0.8-1 grams/kg IBW)(GFR 41)-h/o CKD Intake/Comparative Standards: Per RD meal rounds: eating ~25% of lunch. No recorded meal intakes.  
  
Nutrition Diagnosis: Predicted inadequate energy intake r/t dysphagia/aleterd mentation as evidenced by pt receiving a modified diet, eating 25% of lunch today.  
  
Intervention: 
Meals and snacks: GI soft/mechanical soft Nutrition Supplement Therapy: D/C ensure clear, continue glucerna shake TID Discharge nutrition plan: Too soon to determine. 
  
Jae Barrett [address], [address], [facility] [address], [phone]

## 2019-01-04 NOTE — PROGRESS NOTES
Problem: Mobility Impaired (Adult and Pediatric) Goal: *Acute Goals and Plan of Care (Insert Text) LTG: 
(1.)Mr. Navarro will move from supine to sit and sit to supine , scoot up and down and roll side to side in bed with STAND BY ASSIST within 7 treatment day(s). (2.)Mr. Navarro will transfer from bed to chair and chair to bed with MINIMAL ASSIST using the least restrictive device within 7 treatment day(s). (3.)Mr. Navarro will ambulate with MINIMAL ASSIST for 75 feet with the least restrictive device within 7 treatment day(s). (4.)Mr. Navarro will participate in therapeutic activity/exercises x 23 minutes for increased strength within 7 days. ________________________________________________________________________________________________ PHYSICAL THERAPY: Daily Note, Treatment Day: 2nd, PM 1/4/2019INPATIENT: Hospital Day: 6 Payor: [facility] [place] MEDICARE / Plan: [place] / Product Type: Managed Care Medicare /  
  
NAME/AGE/GENDER: Margareth Lino is a 80 y.o. male PRIMARY DIAGNOSIS: Seizure ([facility] Persistent atrial fibrillation (HCC) Persistent atrial fibrillation ([facility] ICD-10: Treatment Diagnosis:  
         · Generalized Muscle Weakness (M62.81) · Difficulty in walking, Not elsewhere classified (R26.2) · Other abnormalities of gait and mobility (R26.89) · History of falling (Z91.81) Precaution/Allergies: 
Patient has no known allergies. ASSESSMENT:  
Mr. Castillo Larkin was supine in bed upon arrival and agreeable to PT. Pt appears to have some confusion and was suspicious about staff walking by his room. Pt required min-modA for supine to sit with fair sitting balance. STS attempted several times from bed, once with maxA and then with maxA x 2 and were unable to get pt to full stand with RW. More assistance sought out while pt worked on good breathing techniques. Pt was finally able to stand with maxA x 3 demonstrating poor standing balance.   He pivoted to chair with RW and short, shuffled steps using mod-maxA. SpO2 checked after transfer and recorded at 94%. Pt performed TE from chair with several rest breaks. No improvement noted from past treatment. This section established at most recent assessment PROBLEM LIST (Impairments causing functional limitations): 1. Decreased Strength 2. Decreased ADL/Functional Activities 3. Decreased Transfer Abilities 4. Decreased Ambulation Ability/Technique 5. Decreased Balance 6. Decreased Activity Tolerance INTERVENTIONS PLANNED: (Benefits and precautions of physical therapy have been discussed with the patient.) 1. Balance Exercise 2. Bed Mobility 3. Family Education 4. Gait Training 5. Therapeutic Activites 6. Therapeutic Exercise/Strengthening 7. Transfer Training TREATMENT PLAN: Frequency/Duration: 3 times a week for duration of hospital stay Rehabilitation Potential For Stated Goals: Good RECOMMENDED REHABILITATION/EQUIPMENT: (at time of discharge pending progress): Due to the probability of continued deficits (see above) this patient will likely need continued skilled physical therapy after discharge. Equipment:  
? None at this time HISTORY:  
History of Present Injury/Illness (Reason for Referral): 
See H&P Past Medical History/Comorbidities:  
Mr. Ann Velez  has a past medical history of Atherosclerosis of artery of extremity with ulceration ([address] Atherosclerosis of native arteries of extremity with intermittent claudication ([address] Atopic dermatitis, Atrial fibrillation ([address] CAD (coronary artery disease), Carotid stenosis, bilateral, CHF (congestive heart failure) ([address] Chronic kidney disease, Chronic obstructive pulmonary disease ([address] Cirrhosis ([address] Colon, diverticulosis, Coronary atherosclerosis of native coronary vessel, Diabetes ([address] Diastolic CHF, acute on chronic ([address] Failed CABG (coronary artery bypass graft), GI bleed, Gout, History of tobacco use, [place] (hard of hearing), Hypercholesterolemia, Hypertension, Hyperuricemia, Morbid obesity ([facility] PAD (peripheral artery disease) ([facility] Poor historian, Radiologic findings of lung field, abnormal, Seizure disorder ([facility] Shortness of breath dyspnea, Thyroid disease, and Unspecified sleep apnea. Mr. Alis Dickerson  has a past surgical history that includes pr cardiac surg procedure unlist; hx coronary artery bypass graft (06-); hx cataract removal (Left, 2003); hx heart catheterization; hx coronary stent placement (02-); hx heent (1970s); hx colonoscopy; ULTRASOUND (Bilateral, 11/2/2018); THORACENTESIS (Right, 11/2/2018); ULTRASOUND GUIDED ACCESS VENA CAVA FILTER INSERTION (N/A, 9/12/2018); ESOPHAGOGASTRODUODENOSCOPY (EGD) (N/A, 8/5/2018); ESOPHAGOGASTRODUODENOSCOPY (EGD) (N/A, 1/27/2017); COLONOSCOPY  BMI 36 TO BE ADMITTED 1/26/17 (N/A, 1/27/2017); ESOPHAGOGASTRODUODENOSCOPY (EGD) (N/A, 1/22/2017); ESOPHAGOGASTRODUODENAL (EGD) BIOPSY (N/A, 1/22/2017); ESOPHAGOGASTRODUODENOSCOPY (EGD)   rm 610 (N/A, 5/8/2015); COLONOSCOPY (N/A, 1/24/2015); and ESOPHAGOGASTRODUODENOSCOPY (EGD)   rm 3101 (N/A, 1/23/2015). Social History/Living Environment:  
Home Environment: Skilled nursing facility [facility] Facility Name: [facility] Residence: One story Living Alone: No 
Support Systems: Skilled nursing facility Patient Expects to be Discharged to[de-identified] Unknown Current DME Used/Available at Home: Wheelchair Prior Level of Function/Work/Activity: PTA was at SNF/rehab where he was getting pushed in wheelchair and given assistance for ADLs. Number of Personal Factors/Comorbidities that affect the Plan of Care: 3+: HIGH COMPLEXITY EXAMINATION:  
Most Recent Physical Functioning:  
Gross Assessment: 
AROM: Generally decreased, functional 
Strength: Generally decreased, functional 
         
  
Posture: 
  
Balance: 
Sitting: Impaired Sitting - Static: Fair (occasional) Sitting - Dynamic: Fair (occasional) Standing: Impaired Standing - Static: Poor Standing - Dynamic : Poor Bed Mobility: 
Rolling: Minimum assistance Supine to Sit: Minimum assistance; Moderate assistance Scooting: Moderate assistance Wheelchair Mobility: 
  
Transfers: 
Sit to Stand: Maximum assistance;Assist x2 Stand to Sit: Maximum assistance;Assist x2 Bed to Chair: Maximum assistance;Assist x2 Interventions: Manual cues; Safety awareness training;Verbal cues; Visual cues Gait: 
  
   
  
Body Structures Involved: 1. Metabolic 2. Endocrine 3. Muscles Body Functions Affected: 1. Neuromusculoskeletal 
2. Movement Related 3. Metobolic/Endocrine Activities and Participation Affected: 1. General Tasks and Demands 2. Mobility 3. Self Care 4. Domestic Life 5. Community, Social and [place] Number of elements that affect the Plan of Care: 4+: HIGH COMPLEXITY CLINICAL PRESENTATION:  
Presentation: Evolving clinical presentation with changing clinical characteristics: MODERATE COMPLEXITY CLINICAL DECISION MAKIN[address] [place] [id number] AM-PAC 6 Clicks Basic Mobility Inpatient Short Form How much difficulty does the patient currently have. .. Unable A Lot A Little None 1. Turning over in bed (including adjusting bedclothes, sheets and blankets)? [] 1   [] 2   [x] 3   [] 4  
2. Sitting down on and standing up from a chair with arms ( e.g., wheelchair, bedside commode, etc.)   [] 1   [x] 2   [] 3   [] 4  
3. Moving from lying on back to sitting on the side of the bed? [] 1   [] 2   [x] 3   [] 4 How much help from another person does the patient currently need. .. Total A Lot A Little None 4. Moving to and from a bed to a chair (including a wheelchair)? [] 1   [x] 2   [] 3   [] 4  
5. Need to walk in hospital room? [] 1   [x] 2   [] 3   [] 4  
6. Climbing 3-5 steps with a railing? [x] 1   [] 2   [] 3   [] 4  
© , Trustees of [address] [place] [id number], under license to [employer]. All rights reserved Score:  Initial: 13 Most Recent: X (Date: -- ) Interpretation of Tool:  Represents activities that are increasingly more difficult (i.e. Bed mobility, Transfers, Gait). Score 24 23 22-20 19-15 14-10 9-7 6 Modifier CH CI CJ CK CL CM CN   
 
? Mobility - Walking and Moving Around:  
  - CURRENT STATUS: CL - 60%-79% impaired, limited or restricted  - GOAL STATUS: CK - 40%-59% impaired, limited or restricted  - D/C STATUS:  ---------------To be determined--------------- Payor: [facility] [place] / Plan[place] [employer] [place] / Product Type: Managed Care Medicare /   
 
Medical Necessity:    
· Patient demonstrates good rehab potential due to higher previous functional level. Reason for Services/Other Comments: 
· Patient continues to require skilled intervention due to decreased balance and functional mobility. Use of outcome tool(s) and clinical judgement create a POC that gives a: Questionable prediction of patient's progress: MODERATE COMPLEXITY  
  
 
 
 
TREATMENT:  
(In addition to Assessment/Re-Assessment sessions the following treatments were rendered) Pre-treatment Symptoms/Complaints: \"Go get her\" Pain: Initial:  
Pain Intensity 1: 0 Pain Location 1: Generalized  Post Session:  8/10  B feet Therapeutic Exercise: (13 Minutes):  Exercises per grid below to improve mobility, strength and activity tolerance. Required minimal visual and verbal cues to promote proper body alignment and promote proper body mechanics. Progressed repetitions as indicated. Date: 
1/1/19 Date: 
1/4/19 Date: 
  
ACTIVITY/EXERCISE AM PM AM PM AM PM  
Seated LAQ 1 x 15 B 
1 x 25 B   1 x 15 B 
1 x 20 B Seated marching 1 x 15 B 
1 x 25 B   1 x 15 B 
1 x 20 B Seated AP 1 x 15 B Seated hip abd/add 1 x 15 B   1 x 15 B 
1 x 20 B Heel taps    1 x 15 B 
1 x 20 B Toe taps    1 x 15 B 
1 x 20 B B = bilateral; AA = active assistive; A = active; P = passive Therapeutic Activity: (    10 minutes): Therapeutic activities including bed mobility, sitting balance, STS transfers and chair transfers to improve mobility, strength, balance, coordination and activity tolerance. Required max cuing   to promote static and dynamic balance in standing and promote coordination of bilateral, upper extremity(s), lower extremity(s). Braces/Orthotics/Lines/Etc:  
· alejo catheter · O2 Device: Nasal cannula Treatment/Session Assessment:   
· Response to Treatment:  See above. · Interdisciplinary Collaboration:  
o Physical Therapist 
o Registered Nurse 
o Certified Nursing Assistant/Patient Care Technician · After treatment position/precautions:  
o Up in chair 
o Bed alarm/tab alert on 
o Bed/Chair-wheels locked 
o Call light within reach 
o RN notified 
o CNA in room · Compliance with Program/Exercises: Will assess as treatment progresses · Recommendations/Intent for next treatment session: \"Next visit will focus on advancements to more challenging activities and reduction in assistance provided\". Total Treatment Duration: PT Patient Time In/Time Out Time In: 9153 Time Out: 5787 Latricia Diaz, PT, DPT

## 2019-01-04 NOTE — PROGRESS NOTES
1/4/2019 8:53 AM 
 
Admit Date: 12/30/2018 Admit Diagnosis: Seizure ([address] Subjective:  
Pt states he feels better alert this AM HR is better off dobutamine Objective:  
  
Visit Vitals BP 99/50 (BP Patient Position: At rest) Pulse 71 Temp 96.8 °F (36 °C) Resp 19 Ht 6' 2\" (1.88 m) Wt 103.4 kg (227 lb 14.4 oz) SpO2 97% BMI 29.26 kg/m² Physical Exam: 
General-Well DevelopNo Acute Distress, Alert & Oriented Neck- supple, no JVD 
CV- irregular rate and rhythm Lung- clear bilaterally Abd- soft, nontender, nondistended Ext- no edema bilaterally. Skin- warm and dry Data Review:  
Recent Labs 01/04/19 
0335   
K 3.5 BUN 28* CREA 1.69* WBC 5.7 HGB 8.2* HCT 28.2*  
* Assessment/Plan:  
 
Principal Problem: 
  Persistent atrial fibrillation ([address] (11/21/2012)HR has improved off meds not on blood thinner secondary to severe anemia Overview: Coumadin therapy discontinued due to recurrent GI bleeding. Active Problems: 
  Essential hypertension (11/21/2012)stable CKD (chronic kidney disease) stage 3, GFR 30-59 ml/min ([facility]) (9/18/2013) Type 2 diabetes with nephropathy ([address] (2/12/2018) Pleural effusion (11/2/2018) Cirrhosis ([address] (11/7/2018) Seizure ([address] (12/30/2018) Anemia  S/p transfusion ? Cause

## 2019-01-04 NOTE — PROGRESS NOTES
Patient awake and has pulled CPAP mask off, placed patient on 2L NC and given breathing treatment. Patient does not want to be placed back on CPAP at thas time.

## 2019-01-04 NOTE — PROGRESS NOTES
Bedside and Verbal shift change report given to gianfranco randhawa (oncoming nurse) by self (offgoing nurse). Report included the following information SBAR, Kardex and Recent Results.

## 2019-01-04 NOTE — PROGRESS NOTES
Shift change bedside report received from Colton Johnson RN. Shift change, with bedside reporting completed. Reinforced Safety precautions: Call for assistance prior to activity, and use of nonskid socks before getting out of bed (OOB). Verbalized understanding of safety instructions. Hand held call-light within reach. Bed alarm on.

## 2019-01-04 NOTE — PROGRESS NOTES
Hospitalist Progress Note 2019 Admit Date: 2018 10:17 AM  
NAME: Modesto Haney :  1932 MRN:  [id number] Attending: Reta Velásquez DO 
PCP:  Dominique Geller MD 
 
SUBJECTIVE:  
Patient 49W with hx of afib not on [address] d/t hx of GIB, GERD, HTN, hx of DM (not currently on meds), Cirrhosis, diastolic CHF, CKD, COPD on 4-5LNC cont, BPH, CAD s/p CABG, PAD presents from [facility] after report of staff witnessing seizure activity x 5 min described to ER as tonic clonic. ER workup notable for pt bradycardic HR in 40's afib. Hgb 7.4, Cr 2.2 (baseline around 1.8-1.9). CXR with bibasilar effusions. 100% on 6lnc. CT head non acute. H/h dropped to 7.0 after admission to floor and pt transfused 1 unit pbr. Seen by cardiology who started dobumatine gtt for worsening bradycardia after admission to tele unit.  
  
Of note, has had two recent hospital admissions. Admitted to [facility] in November for septic shock from PNA. Was discharged to [address] [place] [address] then admitted to [facility] - for pseudomonas uti, cellulitis, ach/chronic resp failure, urinary retention now w/ alejo. Has completed course of cipro. 19:  Patient has no complaints. Says he is feeling \"fine. \"  Does have intermittent hand/foot pain. Denies CP/SOB. Review of Systems negative with exception of pertinent positives noted above PHYSICAL EXAM  
 
Visit Vitals /44 (BP 1 Location: Left arm, BP Patient Position: At rest) Pulse 70 Temp 97.9 °F (36.6 °C) Resp 18 Ht 6' 2\" (1.88 m) Wt 103.4 kg (227 lb 14.4 oz) SpO2 99% BMI 29.26 kg/m² Temp (24hrs), Av.7 °F (36.5 °C), Min:96.8 °F (36 °C), Max:98.1 °F (36.7 °C) Oxygen Therapy O2 Sat (%): 99 % (19 1634) Pulse via Oximetry: 68 beats per minute (19 1421) O2 Device: Nasal cannula (19) PEEP/CPAP (cm H2O): 4 cm H20 (19) O2 Flow Rate (L/min): 2 l/min (19 1600) FIO2 (%): 28 % (19) Intake/Output Summary (Last 24 hours) at 1/4/2019 1857 Last data filed at 1/4/2019 1800 Gross per 24 hour Intake 300 ml Output 730 ml Net -430 ml General: Alert, pleasant, no distress Lungs:  CTA Bilaterally. Heart:  Irregularly irregular,  No murmur, rub, or gallop Abdomen: Soft, Non distended, Non tender, Positive bowel sounds Extremities: No cyanosis, clubbing or edema Neurologic:  No focal deficits, but oriented to person/place ASSESSMENT Active Hospital Problems Diagnosis Date Noted  Seizure ([facility] 12/30/2018  Cirrhosis ([facility] 11/07/2018  Pleural effusion 11/02/2018  [facility] 02/12/2018  CKD (chronic kidney disease) stage 3, GFR 30-59 ml/min ([facility]) 09/18/2013  Persistent atrial fibrillation ([facility] 11/21/2012 Coumadin therapy discontinued due to recurrent GI bleeding.  Essential hypertension 11/21/2012 A/P Afib w/ bradycardia -off OAC d/t hx of GIB. Cardiology following. On dobutamine gtt. Holding coreg PTA med. Not currently a pacer candidate 
  
Chronic resp failure/COPD - supplemental oxygen. Wears 4-5 LNC cont at baseline. BD's resumed CKD stage 3 - Cr improving/returning to baseline 
  
Diastolic CHF - w/ bibasilar effusions. Continue oral lasix Seizures -neurology consulted. Cont Keppra. EEG showed general slowing only,  CT head non acute. MRI only shows chronic microischemia. Unclear if this was actually seizure event CAD - asymptomatic. Asa/statin 
  
Cirrhosis - new dx as of Nov 2018, w/ thrombocytopenia. Also w/ confusion - GI has added lactulose. Enlarged CBD in Nov 2018 admit. GI recommends conservative management with oral meds. 
  
DM2 - holding orals, cont SSI 
  
HTN  - holding meds except lasix 
  
Pain mgmt - Resumed home meds 
  
Confusion - intermittent. Will check ammonia level again. On lactulose. Anemia - w/ hx of GIB. hgb dropped to 7 on admit, transfused 1 unit prbc. GI consulted. EGD Aug 18 and Beryl Jan 17 w/o source of bleed. Cont ppi. No plans for endoscopy. GI recommends tagged RBC scan if teddy bleeding noted Dysphagia - recent hx of this per family report. ST following 
  
UTI - Adams exchanged and cx showing gram neg rods. Rocephin D2 
  
DVT Prophylaxis: SCDs 
 
dispo - pt/ot/ppd ordered. Palliative care following. Slowly improving acute issues. Signed By: Laverne aTriq MD   
 January 4, 2019

## 2019-01-05 NOTE — PROGRESS NOTES
Pt son, Odell Casarez [phone] at bedside this am.  Family are requesting that pt return to [facility] for continued rehab. Referral placed. Pt insurance will require percert.

## 2019-01-05 NOTE — PROGRESS NOTES
[facility] CARDIOLOGY PROGRESS NOTE 
      
 
1/5/2019 10:51 AM 
 
Admit Date: 12/30/2018 Subjective:  
Weak overall, no CP, HR better now. Feeling \"ok\". ROS: 
Cardiovascular:  As noted above Objective:  
  
Vitals:  
 01/05/19 3989 01/05/19 0404 01/05/19 0302 01/05/19 8686 BP:  106/57 118/52 Pulse:  75 66 Resp:  20 19 Temp:  97.4 °F (36.3 °C) 97.8 °F (36.6 °C) SpO2: 100% 98% 99% 96% Weight:  104.6 kg (230 lb 8 oz) Height:  6' 2\" (1.88 m) Physical Exam: 
General-No Acute Distress Neck- supple, no JVD 
CV- regular rate and rhythm no MRG Lung- clear bilaterally with dec BS in the bases Abd- soft, nontender, nondistended Ext- no edema bilaterally. Skin- warm and dry Data Review:  
Recent Labs 01/05/19 
0327 01/04/19 
0335  138  
K 3.2* 3.5 BUN 30* 28* CREA 1.62* 1.69* * 127* WBC 7.4 5.7 HGB 7.6* 8.2* HCT 25.4* 28.2*  
* 119* Assessment/Plan:  
 
Principal Problem: 
  Persistent atrial fibrillation ([facility] (11/21/2012) Rate better now, off pressor support. Follow for now. Not candidate for [facility] with anemia. Follow. EF low normal by echo. Active Problems: 
  Essential hypertension (11/21/2012) Follow CKD (chronic kidney disease) stage 3, GFR 30-59 ml/min ([facility]) (9/18/2013) Follow, follow on PO lasix. Cirrhosis ([facility] (11/7/2018) Per main team 
 
  Seizure [facility]) (12/30/2018) Per main team 
 
Anemia: consider 1-2 units today, per hospitalist.  No [facility]. Remain on ASA for now, no obvious bleeding issues. HypoK: replace today, check AM BMP and Mg as well.   
 
 
 
Ramsey Soulier, DO 
1/5/2019 10:51 AM

## 2019-01-05 NOTE — PROGRESS NOTES
Problem: Falls - Risk of 
Goal: *Absence of Falls Document Micheal Sharpe Fall Risk and appropriate interventions in the flowsheet. Outcome: Progressing Towards Goal 
Fall Risk Interventions: 
Mobility Interventions: Assess mobility with egress test, Bed/chair exit alarm, Communicate number of staff needed for ambulation/transfer, Mechanical lift, OT consult for ADLs, Patient to call before getting OOB, PT Consult for mobility concerns, PT Consult for assist device competence, Strengthening exercises (ROM-active/passive), Utilize walker, cane, or other assistive device, Utilize gait belt for transfers/ambulation Mentation Interventions: Adequate sleep, hydration, pain control, Bed/chair exit alarm, Door open when patient unattended, Evaluate medications/consider consulting pharmacy, Eyeglasses and hearing aids, Familiar objects from home, Family/sitter at bedside, Gait belt with transfers/ambulation, Increase mobility, More frequent rounding, Reorient patient, Room close to nurse's station, Self-releasing belt, Toileting rounds, Update white board Medication Interventions: Assess postural VS orthostatic hypotension, Bed/chair exit alarm, Evaluate medications/consider consulting pharmacy, Patient to call before getting OOB, Teach patient to arise slowly, Utilize gait belt for transfers/ambulation Elimination Interventions: Bed/chair exit alarm, Call light in reach, Elevated toilet seat, Patient to call for help with toileting needs, Toilet paper/wipes in reach, Toileting schedule/hourly rounds, Urinal in reach History of Falls Interventions: Bed/chair exit alarm, Consult care management for discharge planning, Door open when patient unattended, Evaluate medications/consider consulting pharmacy, Investigate reason for fall, Room close to nurse's station, Utilize gait belt for transfer/ambulation

## 2019-01-05 NOTE — PROGRESS NOTES
Verbal bedside report given to Lester Yates oncoming RN. Patient's situation, background, assessment and recommendations provided. Opportunity for questions provided. Oncoming RN assumed care of patient.

## 2019-01-05 NOTE — PROGRESS NOTES
Hospitalist Progress Note 2019 Admit Date: 2018 10:17 AM  
NAME: Dominick Albarado :  1932 MRN:  [id number] Attending: Magdalena Cooper DO 
PCP:  Sanaz Tapia MD 
 
SUBJECTIVE:  
Patient 89K with hx of afib not on [address] d/t hx of GIB, GERD, HTN, hx of DM (not currently on meds), Cirrhosis, diastolic CHF, CKD, COPD on 4-5LNC cont, BPH, CAD s/p CABG, PAD presents from [facility] after report of staff witnessing seizure activity x 5 min described to ER as tonic clonic. ER workup notable for pt bradycardic HR in 40's afib. Hgb 7.4, Cr 2.2 (baseline around 1.8-1.9). CXR with bibasilar effusions. 100% on 6lnc. CT head non acute. H/h dropped to 7.0 after admission to floor and pt transfused 1 unit pbrc. Seen by cardiology who started dobumatine gtt for worsening bradycardia after admission to tele unit.  
  
Of note, has had two recent hospital admissions. Admitted to [facility] in November for septic shock from PNA. Was discharged to [facility] then admitted to [facility] - for pseudomonas uti, cellulitis, ach/chronic resp failure, urinary retention now w/ alejo. 19:   Says he is feeling \"as well as can be expected. \"  Does have intermittent hand/foot pain. Denies CP/SOB. Reports burning at groin. Review of Systems negative with exception of pertinent positives noted above PHYSICAL EXAM  
 
Visit Vitals /52 (BP 1 Location: Left arm, BP Patient Position: At rest) Pulse 66 Temp 97.8 °F (36.6 °C) Resp 19 Ht 6' 2\" (1.88 m) Wt 104.6 kg (230 lb 8 oz) SpO2 96% BMI 29.59 kg/m² Temp (24hrs), Av.8 °F (36.6 °C), Min:97.4 °F (36.3 °C), Max:98 °F (36.7 °C) Oxygen Therapy O2 Sat (%): 96 % (19) Pulse via Oximetry: 65 beats per minute (19) O2 Device: Nasal cannula (19) PEEP/CPAP (cm H2O): 4 cm H20 (19) O2 Flow Rate (L/min): 2 l/min (19) FIO2 (%): 28 % (19) Intake/Output Summary (Last 24 hours) at 1/5/2019 1055 Last data filed at 1/5/2019 1040 Gross per 24 hour Intake  Output 1025 ml Net -1025 ml General: Alert, pleasant, no distress Lungs:  CTA Bilaterally. Heart:  Irregularly irregular,  No murmur, rub, or gallop Abdomen: Soft, Non distended, Non tender, Positive bowel sounds Extremities: No cyanosis, clubbing or edema Neurologic:  No focal deficits, but oriented to person/place ASSESSMENT Active Hospital Problems Diagnosis Date Noted  Seizure ([facility] 12/30/2018  Cirrhosis ([facility] 11/07/2018  Pleural effusion 11/02/2018  Type 2 diabetes with nephropathy ([facility] [facility] 02/12/2018  CKD (chronic kidney disease) stage 3, GFR 30-59 ml/min ([facility]) 09/18/2013  Persistent atrial fibrillation ([facility] 11/21/2012 Coumadin therapy discontinued due to recurrent GI bleeding.  Essential hypertension 11/21/2012 A/P Afib w/ bradycardia: off OAC d/t hx of GIB. Cardiology following. Off dobutamine gtt. Holding coreg PTA med. Not currently a pacer candidate. 
  
Chronic resp failure/COPD: supplemental oxygen. Wears 4-5 LNC cont at baseline. BDs resumed CKD stage 3: Cr improved/returning to baseline 
  
Diastolic CHF w/ bibasilar effusions: Continue oral lasix \"Seizures\":  Neurology consulted. Cont Keppra. EEG showed general slowing only,  CT head non acute. MRI only shows chronic microischemia. Unclear if this was actually seizure event CAD: Asymptomatic. Asa/statin. Consider transfusion given anemia in setting of CAD. 
  
Cirrhosis: new dx as of Nov 2018, w/ thrombocytopenia. Also w/ confusion. GI has added lactulose. Enlarged CBD in Nov 2018 admit. GI recommends conservative management with oral meds. 
  
DM2: holding orals, cont SSI 
  
HTN: holding meds except lasix given borderline pressures 
  
Pain mgmt: Resumed home meds 
  
Confusion: Intermittent. On lactulose. Anemia: w/ hx of GIB. Hgb dropped to 7 on admit, transfused 1 unit prbc. GI consulted. EGD Aug 2018 and C-scope Jan 2017 w/o source of bleed. Cont ppi. No plans for endoscopy. GI recommends tagged RBC scan if teddy bleeding noted. Consider transfusion given anemia in setting of CAD. Dysphagia: recent hx of this per family report. ST following 
  
UTI: Adams exchanged and cx growing E.coli. Completed course of Rocephin 
  
DVT Prophylaxis: SCDs DISPO: pt/ot/ppd ordered. Palliative care following. Slowly improving acute issues. Hopefully stable to get back to STR soon. Signed By: Shelly Germain MD   
 January 5, 2019

## 2019-01-05 NOTE — PROGRESS NOTES
Visit with patient to build rapport with . Patient is calm. Family present Receptive to  presence. Encouraged and assured patient of our continued care. Jose Beckham,  Staff  C: [phone]  /  [email or web address]

## 2019-01-05 NOTE — PROGRESS NOTES
Bedside and Verbal shift change report given to self (oncoming nurse) by Everett Mcelroy (offgoing nurse). Report included the following information SBAR, Kardex and Recent Results.

## 2019-01-05 NOTE — PROGRESS NOTES
Spoke with Dr. Cullen Fraction regarding patients Hgb of 7.6 and K+ level of 3.2 this AM. Per Dr. Cullen Fraction, give one dose of potassium 40 meq this AM. No new orders received for Hgb this AM

## 2019-01-06 NOTE — PROGRESS NOTES
Palliative Care Progress Note 1/2/2019 Note Patient: Alfonso Springer MRN: [id number]  SSN: [id number] YOB: 1932  Age: 80 y.o. Sex: male Assessment/Plan: Chief Complaint/Interval History: SOB Principal Diagnosis: · Debility, Unspecified  R53.81 Additional Diagnoses: · Delirium  F05 · Fatigue, Lethargy  R53.83 
· Frailty  R54 · Counseling, Encounter for Medical Advice  Z71.9 
· Encounter for Palliative Care  Z51.5 Palliative Performance Scale (PPS) PPS: 50 Medical Decision Making:  
Reviewed and summarized - No changes Discussed case with appropriate providers - Nursing team 
Reviewed laboratory and x-ray data - No labs today Talked with patient & wife regarding overall condition. Discussed the combined comorbidities that are challenging recovery. Reviewed events of the past several months and explored the patient's understanding of his decline. Asked if they were aware of the severity of illnesses and the compounding issues of medical interventions. The patient initially said he was very surprised at this information, but then commented later that he actually wasn't. We concluded with following his progress over the next few days and having further discussions about progression. After patient encounter, the granddaughter arrived and was distressed by what the wife/patient heard during NP discussion. Reviewed the conversation with the granddaughter, explaining that our medical team has worked diligently to try and keep Mr. Navaror's conditions in check, but the struggle is becoming more difficult. Explained the frailty and deconditioning Mr. Navarro is experiencing. The granddaughter was tearful, but expressed understanding. The wife had described Mr. Caryl Cameron as actively dying ~ clarified that while he isn't near death, he has limited time given is significant chronic illnesses.  The family asked that I reviewed these points with Mr. Derl Lennox again - NP had another conversation with the patient about his condition. He verbalized understanding and acceptance of the challenges for recovery given the significant debility. Palliative Care will continue to assist and follow along with the medical team.     
  
More than 50% of this 30 minute visit was spent counseling and coordination of care as outlined above. Subjective:  
 
Review of Systems: A comprehensive review of systems was negative except for: Constitutional: Positive for fatigue. Respiratory: Positive for cough/SOB. Cardiovascular: Positive for AF. Objective:  
 
Visit Vitals /44 (BP 1 Location: Left arm, BP Patient Position: At rest) Pulse 96 Temp 98 °F (36.7 °C) Resp 18 Ht 6' 2\" (1.88 m) Wt 254 lb 9.6 oz (115.5 kg) SpO2 100% BMI 32.69 kg/m² Physical Exam: 
 
General:  Cooperative. Extremely frail. Wet cough noted. Eyes:  Conjunctivae/corneas clear Nose: Nares normal. Septum midline. Neck: Supple, symmetrical, trachea midline, no JVD Lungs:   Clear to auscultation bilaterally, unlabored Heart:  Iregular rate and rhythm, no murmur Abdomen:   Soft, non-tender, non-distended Extremities: Mild twitching noted during conversation. Skin: Skin color, texture, turgor poor. Multiple bruises noted. Neurologic: Nonfocal  
Psych: Alert and oriented to name & place. Able to answer questions appropriately. Signed By: Galdino Reid NP   
 January 2, 2019

## 2019-01-06 NOTE — PROGRESS NOTES
Called to bedside as patient was having severe dyspnea. Oxygenation was 96% on 1L. Patient has been on 3-5L at [facility]. Patient denies chest pain. He is very anxious and continues to ask for his wife multiple times. Son is at bedside, attempting to reassure the patient. Wife is on the way. I do not auscultate crackles on anterior exam, but it is difficult to auscultate posteriorly as pt feels too weak to sit up. He has a thick sounding cough, but unable to cough anything up. I have concern that Mr. Abram Zhou feels like he is dying. He tells me the \"only thing that will help me is if you get my wife here. \" 
 
I am waiting for patient's wife to present to the hospital so that I can have a Palliative Care talk about their goals of care and wishes, as it has not been clarified specifically how much they wish for us to do.

## 2019-01-06 NOTE — PROGRESS NOTES
Verbal bedside report given to Zuleima Carter oncoming RN. Patient's situation, background, assessment and recommendations provided. Opportunity for questions provided. Oncoming RN assumed care of patient.

## 2019-01-06 NOTE — PROGRESS NOTES
Bedside and Verbal shift change report given to self (oncoming nurse) by Stephany Pino (offgoing nurse). Report included the following information SBAR, Kardex and Recent Results.

## 2019-01-06 NOTE — PROGRESS NOTES
Patient's wife arrived at bedside. Had discussion with patient, wife, and son. Patient already looks better once Mrs. Navarro arrived. Discussion about goal of care was had. I explained that we have improved/resolved the acute issues that caused Mr. Caryl Cameron to be hospitalized, and while his labs and vital signs are actually improved, he is feeling worse this morning. I voiced my concern that Mr. Caryl Cameron feels like he is dying. We outlined tentative plan and this is what we have agreed to thus far, per patient's voiced wishes: 
 
1. UTI - completed full course of IV abx, keep alejo in place. No further work-up/treatment. Understands he will likely keep getting UTIs. 2. Hypotension - resolved, off dobutamine drip. No further interventions needed 3. Volume overload - will give additional IV lasix. May continue IV lasix as needed for comfort 4. Dyspnea - wife and son believe that he feels worse 2/2 anxiety. He has a h/o getting \"worked-up\" about things. Has had a \"bad reaction\" to Ativan in the past, causing hallucinations. When asked specifically if he would rather be comfortable or undergoing further work-up, pt opts for comfort. He is not full Comfort Care yet. But patient and family are interested in interventions to help his respiratory distress, such as morphine, as discussed. Will start low dose prn morphine, albuterol tx, scopolamine patch. 5. GI bleed/anemia - Understands that patient cannot be placed back on anticoagulation at this time as he has recent, and possibly oozing, GI bleed. Do not wish to continue [address]. Patient is not full \"Comfort Care\" but expresses wishes to start things that can make him more comfortable, as outlined above.

## 2019-01-06 NOTE — PROGRESS NOTES
Hospitalist Progress Note 2019 Admit Date: 2018 10:17 AM  
NAME: Liliana Scanlon :  1932 MRN:  [id number] Attending: Thanh Mai MD 
PCP:  Lida Barrera MD 
 
SUBJECTIVE:  
Patient 89X with hx of afib not on [address] d/t hx of GIB, GERD, HTN, hx of DM (not currently on meds), Cirrhosis, diastolic CHF, CKD, COPD on 4-5LNC cont, BPH, CAD s/p CABG, PAD presents from [facility] after report of staff witnessing seizure activity x 5 min described to ER as tonic clonic. ER workup notable for pt bradycardic HR in 40's afib. Hgb 7.4, Cr 2.2 (baseline around 1.8-1.9). CXR with bibasilar effusions. 100% on 6lnc. CT head non acute. H/h dropped to 7.0 after admission to floor and pt transfused 1 unit pbrc. Seen by cardiology who started dobumatine gtt for worsening bradycardia after admission to tele unit.  
  
Of note, has had two recent hospital admissions. Admitted to [facility] in November for septic shock from PNA. Was discharged to [facility] then admitted to [facility] - for pseudomonas uti, cellulitis, ach/chronic resp failure, urinary retention now w/ alejo. 19:  Patient was transfused 1 unit PRBCs last night. Seemed to be more somnolent last night per nursing report. Did receive 1 time dose of morphine yesterday afternoon. Also report that morning of 4 episodes of tarry stool. No report of bright red blood per rectum. Review of Systems negative with exception of pertinent positives noted above PHYSICAL EXAM  
 
Visit Vitals /54 (BP 1 Location: Right arm, BP Patient Position: At rest) Pulse 69 Temp 97.9 °F (36.6 °C) Resp 20 Ht 6' 2\" (1.88 m) Wt 104.6 kg (230 lb 8 oz) SpO2 100% BMI 29.59 kg/m² Temp (24hrs), Av.7 °F (36.5 °C), Min:96.7 °F (35.9 °C), Max:98.5 °F (36.9 °C) Oxygen Therapy O2 Sat (%): 100 % (19) Pulse via Oximetry: 74 beats per minute (19) O2 Device: Nasal cannula (19) PEEP/CPAP (cm H2O): 4 cm H20 (01/05/19 0249) O2 Flow Rate (L/min): 1 l/min(weaned from 3L based on abg) (01/06/19 0128) FIO2 (%): 28 % (01/02/19 2058) Intake/Output Summary (Last 24 hours) at 1/6/2019 0356 Last data filed at 1/5/2019 2246 Gross per 24 hour Intake 432.1 ml Output 1600 ml Net -1167.9 ml  
  
General: No distress, easily arousable. resting comfortably. Lungs:  CTA Bilaterally. Heart:  Irregularly irregular,  No murmur, rub, or gallop Abdomen: Soft, Non distended, Non tender, Positive bowel sounds Extremities: No cyanosis, clubbing or edema Neurologic:  No focal deficits, but oriented to person/place ASSESSMENT Active Hospital Problems Diagnosis Date Noted  Seizure ([facility] [address] 12/30/2018  Cirrhosis ([facility] [address] 11/07/2018  Pleural effusion 11/02/2018  Type 2 diabetes with nephropathy ([facility] [address] 02/12/2018  CKD (chronic kidney disease) stage 3, GFR 30-59 ml/min ([facility]) 09/18/2013  Persistent atrial fibrillation ([facility] [address] 11/21/2012 Coumadin therapy discontinued due to recurrent GI bleeding.  Essential hypertension 11/21/2012 A/P Afib w/ bradycardia: off OAC d/t hx of GIB. Cardiology following. Off dobutamine gtt. Holding coreg PTA med given borderline pressures. Not currently a pacer candidate. 
  
Tarry stools:  Known GIB. GI recommends conservative tx. No BRBPR. Will likely need transfusion of another unit of PRBCs today, but transfuse slowly given CHF with fluid overload on admit. Additional dose of IV lasix with transfusion. If patient has a bloody bowel movement, nurses have been asked to page MD so that a tagged RBC study can be ordered. More likely upper GIB given black stools. UTI: Alejo exchanged on admit and urine cx grew E.coli. Completed 5 day course of Rocephin. Likely CAUTI given chronic indwelling alejo from home. Chronic resp failure/COPD: supplemental oxygen.  Wears 4-5 lpm via NC continuous at baseline. BDs resumed. Respirations stable. CKD stage 3: Cr improved/returning to baseline 
  
Diastolic CHF w/ bibasilar effusions: Continue oral lasix \"Seizures\":  Neurology consulted. Cont Keppra. EEG showed general slowing only,  CT head non acute. MRI only shows chronic microischemia. Unclear if this was actually seizure event CAD: Asymptomatic. Asa/statin. Consider transfusion given anemia in setting of CAD. 
  
Cirrhosis: new dx as of Nov 2018, w/ thrombocytopenia. Also w/ confusion. GI has added lactulose - continued. Enlarged CBD in Nov 2018 admit. GI recommends conservative management with oral meds. Will recheck ammonia level this morning as pt was more somnolent last night. 
  
DM2: holding orals, cont SSI 
  
HTN: holding meds except lasix given borderline pressures 
  
Pain mgmt: Resumed home meds. Would not recommend any more IV morphine, as given his CKD and increased somnolence afterward, this is likely slow to clear. 
  
Confusion: Intermittent. On lactulose. Checking ammonia this AM.  Adjust lactulose as needed. Anemia: w/ hx of GIB. Hgb dropped to 7 on admit, transfused 1 unit prbc. GI consulted. EGD Aug 2018 and C-scope Jan 2017 w/o source of bleed. Cont ppi. No plans for endoscopy. GI recommends tagged RBC scan if teddy bleeding noted. Received 1 unit PRBC yesterday evening. Will likely transfuse additional unit today. Dysphagia: recent hx of this per family report. ST following 
  
 
  
DVT Prophylaxis: SCDs DISPO: pt/ot/ppd ordered. Palliative care following. Slowly improving acute issues. Poor overall condition. Hopefully stable to get back to STR soon. Signed By: Chichi Leslie MD   
 January 6, 2019

## 2019-01-06 NOTE — PROGRESS NOTES
[facility] CARDIOLOGY PROGRESS NOTE 
      
 
1/6/2019 10:51 AM 
 
Admit Date: 12/30/2018 Subjective:  
Weak overall, no CP, HR better now. More SOB this AM after PRBC yesterday. ROS: 
Cardiovascular:  As noted above Objective:  
  
Vitals:  
 01/06/19 0408 01/06/19 0900 01/06/19 0903 01/06/19 0603 BP: 103/48 114/54  114/54 Pulse: 70 74  74 Resp: 16 22 Temp: 98.5 °F (36.9 °C) 97.9 °F (36.6 °C) SpO2: 100% 99% 96% Weight: 103.9 kg (229 lb) Height:      
 
 
Physical Exam: 
General-No Acute Distress Neck- supple, no JVD 
CV- regular rate and rhythm no MRG Lung- rales bilat Abd- soft, nontender, nondistended Ext- mild LE edema bilaterally. Skin- warm and dry Data Review:  
Recent Labs 01/06/19 
2691 01/05/19 
0327  138  
K 4.2 3.2*  
MG 2.2  --   
BUN 44* 30* CREA 1.72* 1.62* GLU 97 123* WBC 7.5 7.4 HGB 8.2* 7.6* HCT 26.9* 25.4*  
* 114* Assessment/Plan:  
 
Principal Problem: 
  Persistent atrial fibrillation ([address] (11/21/2012) Rate better now, off pressor support. Follow for now. Not candidate for Tivra with anemia. Follow. EF low normal by echo. Volume overload this AM after PRBC, CXR pending, dose of IV lasix this AM and follow on tele. Active Problems: 
  Essential hypertension (11/21/2012) Follow CKD (chronic kidney disease) stage 3, GFR 30-59 ml/min ([facility]) (9/18/2013) Follow, follow on IV lasix, check AM labs. Cirrhosis ([address] (11/7/2018) Per main team 
 
  Seizure [facility]) (12/30/2018) Per main team 
 
Anemia: s/p PRBC, per hospitalist.  No OAC. Remain on ASA for now, no obvious bleeding issues. HypoK: replace today, check AM BMP and Mg as well. Prognosis: guarded overall.   
 
 
Malorie Rasmussen DO 
1/6/2019 11:06 AM

## 2019-01-07 NOTE — PROGRESS NOTES
Hospitalist Progress Note 2019 Admit Date: 2018 10:17 AM  
NAME: Blaine Pallas :  1932 MRN:  [id number] Attending: Ifrah Be MD 
PCP:  Mary Mandujano MD 
 
SUBJECTIVE:  
Patient 68W with hx of afib not on [facility] [place] hx of GIB, GERD, HTN, hx of DM (not currently on meds), Cirrhosis, diastolic CHF, CKD, COPD on 4-5LNC cont, BPH, CAD s/p CABG, PAD presents from [facility] after report of staff witnessing seizure activity x 5 min described to ER as tonic clonic. ER workup notable for pt bradycardic HR in 40's afib. Hgb 7.4, Cr 2.2 (baseline around 1.8-1.9). CXR with bibasilar effusions. 100% on 6lnc. CT head non acute. EEG negative, do not believe this was true seizure activity. H/H dropped to 7.0 after admission to floor and pt transfused 1 unit pbrc. Seen by cardiology who started dobumatine gtt for worsening bradycardia after admission to tele unit. He was weaned off dobutamine. Finished course of abx for UTI. GI consulted and followed for c/w GI bleed. Found to have possibly obstructing gallstones, but not a good candidate for ERCP - medical management. Hgb slowly trending down. Given additional 1 unit PRBCs. Now more edematous. Palliative Care following. 
  
(Of note, has had two recent hospital admissions. Admitted to [facility] in November for septic shock from PNA. Was discharged to [facility] then admitted to [facility] - for pseudomonas uti, cellulitis, ach/chronic resp failure, urinary retention now w/ alejo.) 
 
19:  Patient is somnolent this morning. Arousable, but falls back asleep rapidly. No complaints right now, but does appear puffier on exam than yesterday, despite receiving additional doses of IV lasix. Review of Systems negative with exception of pertinent positives noted above PHYSICAL EXAM  
 
Visit Vitals /72 (BP 1 Location: Left arm, BP Patient Position: At rest) Pulse 70 Temp 98 °F (36.7 °C) Resp 24 Ht 6' 2\" (1.88 m) Wt 101.9 kg (224 lb 9.6 oz) SpO2 93% BMI 28.84 kg/m² Temp (24hrs), Av.1 °F (36.7 °C), Min:97.8 °F (36.6 °C), Max:98.6 °F (37 °C) Oxygen Therapy O2 Sat (%): 93 % (19 0945) Pulse via Oximetry: 60 beats per minute (19 3448) O2 Device: Nasal cannula (19 1642) PEEP/CPAP (cm H2O): 0 cm H20 (19 2464) O2 Flow Rate (L/min): 3 l/min (19 9542) FIO2 (%): 32 % (19 0838) Intake/Output Summary (Last 24 hours) at 2019 1031 Last data filed at 2019 0730 Gross per 24 hour Intake 0 ml Output 1225 ml Net -1225 ml General: No distress, easily arousable. resting comfortably. Lungs:  CTA Bilaterally. Heart:  Irregularly irregular,  No murmur, rub, or gallop Abdomen: Soft, Non distended, Non tender, Positive bowel sounds Extremities: No cyanosis, clubbing. BLE and BUE edema (1+) Neurologic:  No focal deficits, but oriented to person/place ASSESSMENT Active Hospital Problems Diagnosis Date Noted  [facility]  Cirrhosis ([facility] 2018  Pleural effusion 2018  Type 2 diabetes with nephropathy [facility] 2018  CKD (chronic kidney disease) stage 3, GFR 30-59 ml/min ([facility]) 2013  Persistent atrial fibrillation ([facility] [address] 2012 Coumadin therapy discontinued due to recurrent GI bleeding.  Essential hypertension 2012 A/P Afib w/ bradycardia 
- Off OAC d/t hx of GIB. - Cardiology following.  
- Off dobutamine gtt. - Holding coreg PTA med given borderline pressures. - Not currently a pacer candidate. 
  
Tarry stools - Known GIB. - GI recommends conservative tx.   
- No BRBPR. - If patient has a bloody bowel movement, nurses have been asked to page MD so that a tagged RBC study can be ordered. More likely upper GIB given report of tarry stools. - Trend H/H 
 
UTI 
- Adams exchanged on admit and urine cx grew E.coli. - Completed 5 day course of Rocephin. - Likely CAUTI given chronic indwelling alejo from home. Chronic resp failure/COPD  
- Wears 4-5 lpm via NC continuous at baseline.  
- Now stable on 2-3L 
- BDs resumed. CKD stage 3 
- Cr improved/returning to baseline 
  
Diastolic CHF w/ bibasilar effusions - Continue lasix \"Seizures\" - Neurology consulted. - EEG showed general slowing only 
- CT head non acute. MRI only shows chronic microischemia.   
- Unclear if this was actually seizure event 
- Consider stopping Keppra, if pt remains somnolent CAD 
- Asymptomatic.  
- Asa/statin. - Consider transfusion (when he can tolerate it) given anemia in setting of CAD. 
  
Cirrhosis - New dx as of Nov 2018, w/ thrombocytopenia. Also w/ intermittent confusion. - GI has added lactulose - continued. - Enlarged CBD in Nov 2018 admit. GI recommends conservative management with oral meds. - Ammonia has remained stable DM2: holding orals, cont SSI 
  
HTN 
- Holding meds except lasix given borderline pressures 
  
Pain mgmt: Resumed home meds. Would not recommend any more IV morphine, as given his CKD and increased somnolence afterward, this is likely slow to clear. 
  
Confusion: Intermittent. On lactulose. Adjust lactulose as needed. Anemia - w/ hx of GIB. Hgb dropped to 7 on admit, transfused 1 unit prbc. - GI consulted. EGD Aug 2018 and C-scope w/o source of bleed. - Cont ppi. No plans for endoscopy. GI recommends tagged RBC scan if teddy bleeding noted. - Received additional 1 unit PRBC 1/5/19. Dysphagia: recent hx of this per family report. ST following 
  
 
  
DVT Prophylaxis: SCDs DISPO: pt/ot/ppd ordered. Palliative care following - brought up idea of Hospice. Poor overall condition. Hopefully stable to get back to STR soon. Acute issues looking better on paper, but pt's appearance is worse. Signed By: Martina Bob MD   
 January 7, 2019

## 2019-01-07 NOTE — PROGRESS NOTES
End of shift update: 
 
Patient has had intermittent episodes of dyspnea and shortness of breath. Patient given PRN dose of morphine this afternoon with little relief. Patient is also having difficulty swallowing oral fluids and intake after today's episodes of dyspnea. Patient refused oral meds after morning and didn't have appetite to eat lunch. Paged Hospitalist to inform of worsening dyspnea without relief of morphine dose. New orders were received and completed.

## 2019-01-07 NOTE — PROGRESS NOTES
PT note:  Patient is currently receiving a breathing treatment. Will return as time permits.   Aamir Schwartz, PTA

## 2019-01-07 NOTE — PROGRESS NOTES
Shift change, with bedside reporting completed. Reinforced Safety precautions: Call for assistance; Hand held call-light within reach.

## 2019-01-07 NOTE — PROGRESS NOTES
Problem: Falls - Risk of 
Goal: *Absence of Falls Document Thalia Del Rio Fall Risk and appropriate interventions in the flowsheet. Outcome: Progressing Towards Goal 
Fall Risk Interventions: 
Mobility Interventions: Bed/chair exit alarm Mentation Interventions: Adequate sleep, hydration, pain control Medication Interventions: Bed/chair exit alarm Elimination Interventions: Bed/chair exit alarm History of Falls Interventions: Bed/chair exit alarm

## 2019-01-07 NOTE — PROGRESS NOTES
Verbal bedside report given to Angel Edwarsd oncoming RN. Patient's situation, background, assessment and recommendations provided. Opportunity for questions provided. Oncoming RN assumed care of patient.

## 2019-01-07 NOTE — PROGRESS NOTES
Bedside and Verbal shift change report given to Viv CHIRINOS (oncoming nurse) by self (offgoing nurse). Report included the following information SBAR, Kardex and Recent Results.

## 2019-01-07 NOTE — PROGRESS NOTES
Notified Dr. Radha Keating MD of patient Hgb of 7.9. No new orders at this time, will continue to monitor.

## 2019-01-07 NOTE — PROGRESS NOTES
Palliative Care Progress Note 1/3/2019 Note Patient: Alfonso Springer MRN: [id number]  SSN: [id number] YOB: 1932  Age: 80 y.o. Sex: male Assessment/Plan: Chief Complaint/Interval History: Abdominal pain Principal Diagnosis: · Debility, Unspecified  R53.81 Additional Diagnoses: · Delirium  F05 · Fatigue, Lethargy  R53.83 
· Frailty  R54 
· Pain, abdomen  R10.9 · Counseling, Encounter for Medical Advice  Z71.9 
· Encounter for Palliative Care  Z51.5 Palliative Performance Scale (PPS) PPS: 50 Medical Decision Making:  
Reviewed and summarized notes and events over the weekend. Discussed case with appropriate providers: primary RN Reviewed laboratory and x-ray data. Patient resting in bed, his wife is at the bedside. Patient drowsy and falls asleep intermittently throughout visit. Reviewed patient's day yesterday and discussions regarding goals of care. Time spent counseling about the plan for STR; expressed concern that patient's debility and chronic illnesses may likely preclude him from making the progress he hopes to make in rehab. I asked patient if he would want to come back to the hospital if he got worse at [facility]. At first, patient states \"no, I don't think so\". Patient's wife states \"but you would if you needed to, right? \". Time spent introducing the role of hospice. Counseled that hospice is appropriate when patient feels that the burden of hospitalization is too great and is not prolonging a satisfactory quality of life. Answered wife's questions about what hospice is and where hospice care takes place. Wife confirms with patient that as of now, he would like to try STR again and would come back to hospital again if needed. Provided emotional support and reassured him we would always respect his decision. Spoke with patient's son, Kailash Tijerina, on the phone and reviewed above information with him as well.   He has very good understanding and insight into patient's condition and likely trajectory. Will continue to follow. More than 50% of this 45 minute visit was spent counseling and coordination of care as outlined above. Subjective:  
 
Review of Systems: A comprehensive review of systems was negative except for:  
Constitutional: Positive for fatigue. Gastrointestinal: Positive for abdominal pain. Respiratory: Positive for cough/SOB. Objective:  
 
Visit Vitals /62 (BP 1 Location: Left arm, BP Patient Position: At rest) Pulse 64 Temp 97.8 °F (36.6 °C) Resp 22 Ht 6' 2\" (1.88 m) Wt 224 lb 9.6 oz (101.9 kg) SpO2 99% BMI 28.84 kg/m² Physical Exam: 
 
General:  Cooperative. Extremely frail. Wet cough noted. Eyes:  Conjunctivae/corneas clear. Nose: Nares normal. Septum midline. O2 via NC. Neck: Supple, symmetrical, trachea midline. Lungs:   Wet cough noted at times. Heart:  Irregular rate and rhythm. Abdomen:   Soft, non-tender, non-distended. Extremities: Mild twitching noted once during conversation. Skin: Skin fragile, thin, turgor poor. Multiple bruises noted. Neurologic: Nonfocal.  
Psych: Alert and oriented to name & place. Able to answer questions appropriately. Signed By: Ross Roach NP   
 January 3, 2019

## 2019-01-07 NOTE — PROGRESS NOTES
CM spoke with Delilah Huber, pt has outstanding bill. Per [place]- pt family has refused to apply for Medicaid. [facility] will follow up with CM tomorrow, to confirm if they are able to take pt back & to confirm if pt has Medicare days available. CM met with pt, spouse, & grand-daughter. Would like pt to return to [place], [facility] they had applied for Medicaid at [place]. Pt granddaughter concerned that they will not be able to care for pt at home. If pt has no rehab days, family requesting HH - [address] Palliative Care has met with pt to discuss goals. Pt has no DME needs. CM to continue to monitor for dc needs. Care Management Interventions PCP Verified by CM: Yes Last Visit to PCP: 09/27/18 Mode of Transport at Discharge: [facility] Transition of Care Consult (CM Consult): Discharge Planning, SNF Discharge Durable Medical Equipment: No 
Physical Therapy Consult: Yes Occupational Therapy Consult: No 
Speech Therapy Consult: Yes Current Support Network: [address] [place] [address] Av Confirm Follow Up Transport: Family Plan discussed with Pt/Family/Caregiver: Yes Freedom of Choice Offered: Yes Discharge Location Discharge Placement: Skilled nursing facility

## 2019-01-08 NOTE — PROGRESS NOTES
Palliative Care Progress Note 1/3/2019 Note Patient: Blaine Pallas MRN: [id number]  SSN: [id number] YOB: 1932  Age: 80 y.o. Sex: male Assessment/Plan: Chief Complaint/Interval History: fatigue, debility Principal Diagnosis: · Debility, Unspecified  R53.81 Additional Diagnoses: · Delirium  F05 · Fatigue, Lethargy  R53.83 
· Frailty  R54 
· Pain, abdomen  R10.9 · Counseling, Encounter for Medical Advice  Z71.9 
· Encounter for Palliative Care  Z51.5 Palliative Performance Scale (PPS) PPS: 50 Medical Decision Making:  
Reviewed and summarized notes and events over the weekend. Discussed case with appropriate providers: Alexey So Reviewed laboratory and x-ray data- CBC, BMP Patient resting in bed, no distress noted. No family at bedside. He appears very weak and debilitated. Pt reports he is very tired today. We discussed plan for STR post discharge. Pt stated \"I just don't know if I'm up to it today. \"  Again, expressed concern that he may not ever be strong enough, and he stated \"I have to try. \"  Discussed with Alexey So- she is assisting with family and [place]. If we make it discharge to STR, he will be very high risk for readmission, and likely not long after discharge. Will continue to follow. More than 50% of this 15 minute visit was spent counseling and coordination of care as outlined above. Subjective:  
 
Review of Systems: A comprehensive review of systems was negative except for:  
Constitutional: Positive for fatigue. Gastrointestinal: Positive for abdominal pain. Respiratory: Positive for cough/SOB. Objective:  
 
Visit Vitals BP 97/47 Pulse 69 Temp 98.2 °F (36.8 °C) Resp 18 Ht 6' 2\" (1.88 m) Wt 222 lb 6.4 oz (100.9 kg) SpO2 97% BMI 28.55 kg/m² Physical Exam: 
 
General:  Cooperative. Extremely frail. Wet cough noted. Eyes:  Conjunctivae/corneas clear. Nose: Nares normal. Septum midline. O2 via NC. Neck: Supple, symmetrical, trachea midline. Lungs:   Wet cough noted at times. Heart:  Irregular rate and rhythm. Abdomen:   Soft, non-tender, non-distended. Extremities: Mild twitching noted once during conversation. Skin: Skin fragile, thin, turgor poor. Multiple bruises noted. Neurologic: Nonfocal.  
Psych: Alert and oriented to name & place. Able to answer questions appropriately. Signed By: Kirit Brandon NP   
 January 3, 2019

## 2019-01-08 NOTE — PROGRESS NOTES
Shift change, with bedside reporting completed. Reinforced Safety precautions: Call for assistance prior to activity, and use of nonskid socks before getting out of bed (OOB). Verbalized understanding of safety instructions. Hand held call-light within reach. Bed alarm on.

## 2019-01-08 NOTE — PROGRESS NOTES
Bedside and Verbal shift change report given to Alexis Real (oncoming nurse) by myself (offgoing nurse). Report included the following information SBAR, Kardex, Intake/Output, MAR, Accordion, Recent Results, Med Rec Status and Cardiac Rhythm Afib.

## 2019-01-08 NOTE — PROGRESS NOTES
PT attempted treatment this afternoon. Patient said he is having too much leg pain and states he feels too bad to try today. PT check with nursing and she reports he is having a difficult day today. PT will check back tomorrow.  
 
EDNA MyersT

## 2019-01-08 NOTE — PROGRESS NOTES
Hospitalist Progress Note Admit Date:  2018 10:17 AM  
Name:  Quentin Castellon Age:  80 y.o. 
:  1932 MRN:  [id number] PCP:  Blaire Johnson MD 
Treatment Team: Attending Provider: Tray Peterson DO; Consulting Provider: James Jones DO; Care Manager: Misty Dominguez; Consulting Provider: Casimiro Caballero NP; Speech Language Pathologist: Adelaida Agarwal, MANOLO; Utilization Review: Elicia Telles Subjective:  
 
Patient 80M with hx of afib not on [address] d/t hx of GIB, GERD, HTN, hx of DM (not currently on meds), Cirrhosis, diastolic CHF, CKD, COPD on 4-5LNC cont, BPH, CAD s/p CABG, PAD presents from [facility] after report of staff witnessing seizure activity x 5 min described to ER as tonic clonic. ER workup notable for pt bradycardic HR in 40's afib. Hgb 7.4, Cr 2.2 (baseline around 1.8-1.9). CXR with bibasilar effusions. 100% on 6lnc. CT head non acute. EEG negative, do not believe this was true seizure activity. H/H dropped to 7.0 after admission to floor and pt transfused 1 unit pbrc. Seen by cardiology who started dobumatine gtt for worsening bradycardia after admission to tele unit. He was weaned off dobutamine. Finished course of abx for UTI. GI consulted and followed for c/w GI bleed. Found to have possibly obstructing gallstones, but not a good candidate for ERCP - medical management. Hgb slowly trending down. Given additional 1 unit PRBCs. Now more edematous. Palliative Care following. 
  
(Of note, has had two recent hospital admissions. Admitted to [facility] in November for septic shock from PNA. Was discharged to [facility] then admitted to [facility] - for pseudomonas uti, cellulitis, ach/chronic resp failure, urinary retention now w/ alejo. ) 
  
19:   
Extremely weak. Multiple comorbidities. Hx gi bleed chronic afib see above and age 80 with significant debility.  Plan is discharge to [facility] if they will accept back. He has seen palliative care and is expected to have recurrent frequent hospitalizations is approaching end of life. Objective:  
 
Patient Vitals for the past 24 hrs: 
 Temp Pulse Resp BP SpO2  
01/08/19 1343     94 % 01/08/19 1307 98.5 °F (36.9 °C) 72 18 98/52 98 % 01/08/19 0859 98.2 °F (36.8 °C) 69 18 97/47 97 % 01/08/19 0759     94 % 01/08/19 0120 97.5 °F (36.4 °C) 66 18 103/47 99 % 01/07/19 2123     93 % 01/07/19 2106     93 % 01/07/19 2100 97.2 °F (36.2 °C) 66 19 106/52 93 % 01/07/19 1905  64 19  99 % 01/07/19 1746 97.8 °F (36.6 °C) 63 24 118/62 98 % 01/07/19 1726  72  107/48   
01/07/19 1600     98 % Oxygen Therapy O2 Sat (%): 94 % (01/08/19 1343) Pulse via Oximetry: 66 beats per minute (01/08/19 1343) O2 Device: Nasal cannula (01/08/19 1230) PEEP/CPAP (cm H2O): 0 cm H20 (01/07/19 1439) O2 Flow Rate (L/min): 1 l/min (01/08/19 1343) FIO2 (%): 28 % (01/08/19 0759) Intake/Output Summary (Last 24 hours) at 1/8/2019 1518 Last data filed at 1/8/2019 0120 Gross per 24 hour Intake 0 ml Output 775 ml Net -775 ml Physical Examination: 
Physical Exam  
HENT:  
Nose: Nose normal.  
Mouth/Throat: No oropharyngeal exudate. Eyes: Conjunctivae are normal. Pupils are equal, round, and reactive to light. Neck: Neck supple. No JVD present. Cardiovascular: Exam reveals no gallop and no friction rub. No murmur heard. Pulmonary/Chest: He has no wheezes. He has no rales. Abdominal: He exhibits no distension. There is no rebound. Musculoskeletal: He exhibits no tenderness or deformity. Neurological: He is alert. He displays normal reflexes. Skin: Skin is dry. No rash noted. He is not diaphoretic. Psychiatric: Affect normal.  
 
 
Data Review: 
I have reviewed all labs, meds, telemetry events, and studies from the last 24 hours. Recent Results (from the past 24 hour(s)) GLUCOSE, POC  
 Collection Time: 01/07/19  4:27 PM  
Result Value Ref Range Glucose (POC) 140 (H) 65 - 100 mg/dL GLUCOSE, POC Collection Time: 01/07/19  9:11 PM  
Result Value Ref Range Glucose (POC) 154 (H) 65 - 100 mg/dL CBC W/O DIFF Collection Time: 01/08/19  5:58 AM  
Result Value Ref Range WBC 5.8 4.3 - 11.1 K/uL  
 RBC 2.89 (L) 4.23 - 5.6 M/uL HGB 7.7 (L) 13.6 - 17.2 g/dL HCT 26.3 (L) 41.1 - 50.3 % MCV 91.0 79.6 - 97.8 FL  
 MCH 26.6 26.1 - 32.9 PG  
 MCHC 29.3 (L) 31.4 - 35.0 g/dL  
 RDW 19.4 (H) 11.9 - 14.6 % PLATELET 318 (L) 938 - 450 K/uL MPV 12.8 (H) 9.4 - 12.3 FL ABSOLUTE NRBC 0.00 0.0 - 0.2 K/uL METABOLIC PANEL, BASIC Collection Time: 01/08/19  5:58 AM  
Result Value Ref Range Sodium 141 136 - 145 mmol/L Potassium 4.1 3.5 - 5.1 mmol/L Chloride 100 98 - 107 mmol/L  
 CO2 36 (H) 21 - 32 mmol/L Anion gap 5 (L) 7 - 16 mmol/L Glucose 113 (H) 65 - 100 mg/dL BUN 44 (H) 8 - 23 MG/DL Creatinine 1.93 (H) 0.8 - 1.5 MG/DL  
 GFR est AA 43 (L) >60 ml/min/1.73m2 GFR est non-AA 35 (L) >60 ml/min/1.73m2 Calcium 8.2 (L) 8.3 - 10.4 MG/DL  
GLUCOSE, POC Collection Time: 01/08/19  6:23 AM  
Result Value Ref Range Glucose (POC) 125 (H) 65 - 100 mg/dL GLUCOSE, POC Collection Time: 01/08/19 12:11 PM  
Result Value Ref Range Glucose (POC) 154 (H) 65 - 100 mg/dL All Micro Results Procedure Component Value Units Date/Time CULTURE, BLOOD [[id number]] Collected:  12/30/18 1026 Order Status:  Completed Specimen:  Blood Updated:  01/04/19 1224 Special Requests: --     
  RIGHT Antecubital 
  
  Culture result: NO GROWTH 5 DAYS     
 CULTURE, BLOOD [[id number]] Collected:  12/30/18 1042 Order Status:  Completed Specimen:  Blood Updated:  01/04/19 1224 Special Requests: --     
  RIGHT 
ARM Culture result: NO GROWTH 5 DAYS     
 CULTURE, URINE [[id number]]  (Abnormal)  (Susceptibility) Collected:  12/30/18 1125 Order Status:  Completed Specimen:  Urine from [facility] Specimen Updated:  01/03/19 8246 Special Requests: NO SPECIAL REQUESTS Culture result:    
  >100,000 COLONIES/mL ESCHERICHIA COLI  
     
      
  10,000 to 50,000 COLONIES/mL CANDIDA ALBICANS Current Meds: 
Current Facility-Administered Medications Medication Dose Route Frequency  furosemide (LASIX) injection 40 mg  40 mg IntraVENous BID  morphine (ROXANOL) concentrated oral syringe 5-10 mg  5-10 mg Oral Q4H PRN  
 guaiFENesin (ORGANIDIN) tablet 200 mg  200 mg Oral Q4HWA  scopolamine (TRANSDERM-SCOP) 1 mg over 3 days 1 Patch  1 Patch TransDERmal Q72H  potassium chloride (K-DUR, KLOR-CON) SR tablet 40 mEq  40 mEq Oral DAILY  0.9% sodium chloride infusion 250 mL  250 mL IntraVENous PRN  
 glucagon (GLUCAGEN) injection 0.5-1 mg  0.5-1 mg IntraVENous Multiple  ursodiol (ACTIGALL) capsule 300 mg  300 mg Oral TID WITH MEALS  
 oxyCODONE IR (ROXICODONE) tablet 5 mg  5 mg Oral Q6H PRN  
 rifAXIMin (XIFAXAN) tablet 550 mg  550 mg Oral BID  pantoprazole (PROTONIX) 80 mg in sodium chloride 0.9% 20 mL injection  80 mg IntraVENous Q12H  levETIRAcetam (KEPPRA) 500 mg in 0.9% sodium chloride 100 mL IVPB  500 mg IntraVENous DAILY  lactulose (CHRONULAC) solution 20 g  20 g Oral BID  zinc oxide-cod liver oil (DESITIN) 40 % paste   Topical BID  mineral oil-hydrophil petrolat (AQUAPHOR) ointment   Topical DAILY  albuterol (PROVENTIL VENTOLIN) nebulizer solution 2.5 mg  2.5 mg Nebulization Q4H PRN  
 allopurinol (ZYLOPRIM) tablet 300 mg  300 mg Oral DAILY  aspirin chewable tablet 81 mg  81 mg Oral DAILY  cholecalciferol (VITAMIN D3) tablet 1,000 Units  1,000 Units Oral DAILY  ferrous sulfate tablet 325 mg  1 Tab Oral DAILY WITH BREAKFAST  fluticasone (FLONASE) 50 mcg/actuation nasal spray 2 Spray  2 Spray Both Nostrils DAILY  gabapentin (NEURONTIN) capsule 200 mg  200 mg Oral TID  insulin lispro (HUMALOG) injection   SubCUTAneous AC&HS  latanoprost (XALATAN) 0.005 % ophthalmic solution 1 Drop  1 Drop Both Eyes QHS  levothyroxine (SYNTHROID) tablet 50 mcg  50 mcg Oral ACB  pravastatin (PRAVACHOL) tablet 40 mg  40 mg Oral QHS  sertraline (ZOLOFT) tablet 50 mg  50 mg Oral DAILY  sodium chloride (NS) flush 5-10 mL  5-10 mL IntraVENous Q8H  
 sodium chloride (NS) flush 5-10 mL  5-10 mL IntraVENous PRN  
 acetaminophen (TYLENOL) tablet 650 mg  650 mg Oral Q4H PRN  
 ondansetron (ZOFRAN) injection 4 mg  4 mg IntraVENous Q4H PRN  
 budesonide (PULMICORT) 500 mcg/2 ml nebulizer suspension  500 mcg Nebulization BID RT  
 albuterol-ipratropium (DUO-NEB) 2.5 MG-0.5 MG/3 ML  3 mL Nebulization Q6H RT  
 0.9% sodium chloride infusion 250 mL  250 mL IntraVENous PRN Diet: DIET GI SOFT 
DIET NUTRITIONAL SUPPLEMENTS Other Studies (last 24 hours): No results found. Assessment and Plan:  
 
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 
ICD-9-CM: 250.40, 583.81  2/12/2018 - Present Yes  
   
 CKD (chronic kidney disease) stage 3, GFR 30-59 ml/min (HCC) (Chronic) ICD-10-CM: N18.3 ICD-9-CM: 585.3  9/18/2013 - Present Yes Essential hypertension (Chronic) ICD-10-CM: I10 
ICD-9-CM: 401.9  11/21/2012 - Present Yes * (Principal) Persistent atrial fibrillation (HCC) (Chronic) ICD-10-CM: I48.1 ICD-9-CM: 427.31  11/21/2012 - Present Yes Overview Addendum 11/22/2016  8:14 PM by Bouchra Castellon MD  
  Coumadin therapy discontinued due to recurrent GI bleeding.   
  
  
   
  
 
 
A/P:   
 Afib w/ bradycardia - bp borderline off all anticoag re: gi bleeding. Rate ok today 
  
Tarry stools - Known GIB. - GI recommends conservative tx.   
- trend slowly down hb. Prognosis felt to be guarded to poor Transfuse if hb < 7 
  
UTI 
- Alejo exchanged on admit and urine cx grew E.coli. - Completed 5 day course of Rocephin.   
- Likely CAUTI given chronic indwelling alejo from home- resolved but expect recurrence 
  
Chronic resp failure/COPD  
- Wears 4-5 lpm via NC continuous at baseline.  
- Now stable on 2-3L 
-continue home bronchodilators 
  
CKD stage 3 
- follow - stable 
  
Diastolic CHF w/ bibasilar effusions - Continue lasix-monitor lytes and creat. 
  
\"Seizures\" - Neurology consulted. - EEG showed general slowing only 
- CT head non acute. MRI only shows chronic microischemia.   
- Unclear if this was actually seizure event 
- unchanged and consider stop keppra re: fatigue but events improved 
  
CAD 
- Asymptomatic.  
- Asa/statin. - this is unchanged , consider stop asa re: hb 
  
Cirrhosis - New dx as of Nov 2018, w/ thrombocytopenia. Also w/ intermittent confusion. - GI has added lactulose - continued. - Enlarged CBD in Nov 2018 admit. GI recommends conservative management with oral meds. - Ammonia has remained stable--this plan also unchanged DM2: holding orals, cont SSI--unchanged 
  
HTN 
- lasix only current med for htn 
  
Pain mgmt: Resumed home meds avoid iv narcotics  
  
Confusion: Intermittent multifactorial including hepatic encephalopathy 
  
Anemia - w/ hx of GIB. Hgb dropped to 7 on admit, transfused 1 unit prbc. - GI consulted. EGD Aug 2018 and C-scope w/o source of bleed. - Cont ppi. No plans for endoscopy. GI recommends tagged RBC scan if teddy bleeding noted. NO TEDDY BLEEDING NOTED BUT TREND DOWN   
- Received additional 1 unit PRBC 1/5/19.  
  
Dysphagia: recent hx of this per family report.  ST following 
  
  
  
DVT Prophylaxis: SCDs 
  
 DISPO: pt/ot/ppd ordered. Palliative care following - brought up idea of Hospice. HE WANTS TO ATTEMPT \"AGGRESSIVE CARE\" But multiple physicians recommending conservative mgmt re: his significant comorbidities, and deconditioning and overal poor status and expected prognosis. [facility] WITH EXPECTED FREQUENT HOSPITALIZATIONS. 4:34pm 
I spoke with [facility] [phone] Ladonna Liu. Discussed medical opinion about expected frequent future hospital admissions, current poor status, expected poor prognosis and recommendations for hospice care. [facility] has refused to accept pt back without self pay. Zay Duran does not feel they can care for pt. At home. She will discuss hospice care with her grandfather 1/9/19.

## 2019-01-08 NOTE — PROGRESS NOTES
CM spoke with [facility] admissions coordinator pt has balance of approx $3000 - copay days. [facility] will be unable to take pt back unless family is able to settle bill with business office. Pt does not qualify for Medicaid, pt has other properties that will have to be sold before he will qualify. CM met with pt, spouse & granddaughter to discuss. Family states they are unable to pay for copay days at [facility] or any other rehab facility. We discussed [facility] & palliative care, grand-daughter in agreement with this but is concerned that she does not have support to manage pt at home. Family would like to discuss with MD. CM paged MD will come speak to family. Care Management Interventions PCP Verified by CM: Yes Last Visit to PCP: 09/27/18 Mode of Transport at Discharge: BLS Transition of Care Consult (CM Consult): Discharge Planning, SNF Discharge Durable Medical Equipment: No 
Physical Therapy Consult: Yes Occupational Therapy Consult: No 
Speech Therapy Consult: Yes Current Support Network: [address] [place] [address] Confirm Follow Up Transport: Family Plan discussed with Pt/Family/Caregiver: Yes Freedom of Choice Offered: Yes Discharge Location Discharge Placement: Home with family assistance

## 2019-01-08 NOTE — PROGRESS NOTES
Problem: Falls - Risk of 
Goal: *Absence of Falls Document Eric Scanlon Fall Risk and appropriate interventions in the flowsheet. Outcome: Progressing Towards Goal 
Fall Risk Interventions: 
Mobility Interventions: Bed/chair exit alarm, Communicate number of staff needed for ambulation/transfer, OT consult for ADLs, Patient to call before getting OOB, PT Consult for mobility concerns Mentation Interventions: Bed/chair exit alarm, Increase mobility, More frequent rounding, Reorient patient Medication Interventions: Bed/chair exit alarm, Patient to call before getting OOB, Teach patient to arise slowly Elimination Interventions: Bed/chair exit alarm, Call light in reach, Patient to call for help with toileting needs, Toileting schedule/hourly rounds History of Falls Interventions: Bed/chair exit alarm, Room close to nurse's station

## 2019-01-09 NOTE — PROGRESS NOTES
Palliative Care Progress Note 1/3/2019 Note Patient: Lisa Guillaume MRN: [id number]  SSN: [id number] YOB: 1932  Age: 80 y.o. Sex: male Assessment/Plan: Chief Complaint/Interval History: declining daily, delirious this morning Principal Diagnosis: · Debility, Unspecified  R53.81 Additional Diagnoses: · Delirium  F05 · Fatigue, Lethargy  R53.83 
· Frailty  R54 
· Pain, abdomen  R10.9 · Counseling, Encounter for Medical Advice  Z71.9 
· Encounter for Palliative Care  Z51.5 Palliative Performance Scale (PPS) PPS: 50 Medical Decision Making:  
Reviewed and summarized notes and events over last 24 hours Discussed case with appropriate providers: Ninoska Turner Reviewed laboratory and x-ray data- CBC, BMP Pt lethargic, delirious this morning. Son, Omar Kennedy, at bedside. Reviewed events and pt's decline. Omar Kennedy is aware that pt is hospice appropriate, and is in agreement with engaging hospice, but is concerned about where this will take place. They cannot care for pt at home, and they cannot afford private pay at a SNF. Pt does not qualify for Medicaid. Discussed option of  [facility], given pt's decline and symptom management needs. He is agreeable to consult. Medications provided for delirium and dyspnea. Discussed with Joan Madera RN, and Raquel Kuhn, Naima Eliza Tolliver. Will continue to follow. More than 50% of this 35 minute visit was spent counseling and coordination of care as outlined above. Subjective:  
 
Review of Systems: A comprehensive review of systems was not obtained due to pt factors: delirium Objective:  
 
Visit Vitals BP (!) 135/96 Pulse 79 Temp 97.9 °F (36.6 °C) Resp 20 Ht 6' 2\" (1.88 m) Wt 222 lb (100.7 kg) SpO2 94% BMI 28.50 kg/m² Physical Exam: 
 
General:  Delirious. Lethargic, debilitated and frail. Eyes:  Conjunctivae/corneas clear. Nose: Nares normal. Septum midline.  O2 via NC.  
 Neck: Supple, symmetrical, trachea midline. Lungs:   Decreased bilaterally Heart:  Irregular rate and rhythm. Abdomen:   Soft, non-tender, non-distended. Extremities:   
Skin: Skin fragile, thin, turgor poor. Multiple bruises noted. Neurologic: Nonfocal.  
Psych: Delirious Signed By: Paula Rhodes NP   
 January 3, 2019

## 2019-01-09 NOTE — HOSPICE
Hospice eval and presentation set for 0830 tomorrow morning with son. Thank you for this referral. 
Nain Fisher, RN, BSN Community Nurse Liaison [facility] [phone]

## 2019-01-09 NOTE — PROGRESS NOTES
PT attempted treatment again today but the patient refused. He was confused and appeared to be hurting in his legs again. PT will continue to try a couple more times and then discontinue service if he is not able to participate.  
 
Mile Prather, DPT

## 2019-01-09 NOTE — PROGRESS NOTES
Patient reaching for objects in the air and talking out loud with no one in room. Patient appears more agitated when addressing him. Haldol given and will continue to monitor.

## 2019-01-09 NOTE — PROGRESS NOTES
Shift change, with bedside reporting completed. Reinforced Safety precautions: Call for assistance prior to activity, and use of nonskid socks before getting out of bed (OOB). Verbalized understanding of safety instructions. Hand held call-light within reach. Bed alarm on and functioning.

## 2019-01-09 NOTE — PROGRESS NOTES
Patient placed on nasal CPAP with 3L bled in. No complications at this time. Will continue to monitor.

## 2019-01-09 NOTE — PROGRESS NOTES
Hospitalist Progress Note Admit Date:  2018 10:17 AM  
Name:  Sherlyn Blackwell Age:  80 y.o. 
:  1932 MRN:  [id number] PCP:  Lizy Bright MD 
Treatment Team: Attending Provider: Joseph Che DO; Consulting Provider: Dodie Baltazar DO; Care Manager: Socorro Marie; Consulting Provider: Maurisio Sky NP; Speech Language Pathologist: Kaylah Brownlee, MANOLO; Utilization Review: Shandra Jonas Subjective:  
 
Patient 80M with hx of afib not on [address] d/t hx of GIB, GERD, HTN, hx of DM (not currently on meds), Cirrhosis, diastolic CHF, CKD, COPD on 4-5LNC cont, BPH, CAD s/p CABG, PAD presents from [facility] after report of staff witnessing seizure activity x 5 min described to ER as tonic clonic. ER workup notable for pt bradycardic HR in 40's afib. Hgb 7.4, Cr 2.2 (baseline around 1.8-1.9). CXR with bibasilar effusions. 100% on 6lnc. CT head non acute. EEG negative, do not believe this was true seizure activity.  H/H dropped to 7.0 after admission to floor and pt transfused 1 unit pbrc. Seen by cardiology who started dobumatine gtt for worsening bradycardia after admission to tele unit. Antoine Sousa was weaned off dobutamine.  Finished course of abx for UTI.  GI consulted and followed for c/w GI bleed.  Found to have possibly obstructing gallstones, but not a good candidate for ERCP - medical management.  Hgb slowly trending down.  Given additional 1 unit PRBCs.  Now more edematous.  Palliative Care following. 
  
(Of note, has had two recent hospital admissions. Admitted to [facility] in November for septic shock from PNA. Was discharged to [facility] then admitted to [facility] - for pseudomonas uti, cellulitis, ach/chronic resp failure, urinary retention now w/ alejo. ) 
  
19:   
Extremely weak. Multiple comorbidities. Hx gi bleed chronic afib see above and age 80 with significant debility. Now with delerium.  I had long talk with alex on 1/8 about expected prognosis and multiple organ failure with ckd, chronic diastolic chf, cirrhosis, and chronic hypoxemic hypercarbic resp failure on home oxygen. Gi bleed with gi recommending conservative mgmt only , chronic alejo with recurrent uti. Dysphagia. Family appears to be accepting hospice appropriate but unclear if can care for pt in their home. Hospice to meet with family in am. 
 
 
 
 
Objective:  
 
Patient Vitals for the past 24 hrs: 
 Temp Pulse Resp BP SpO2  
01/09/19 0938 97.9 °F (36.6 °C) 79 20 (!) 135/96   
01/09/19 0850     94 % 01/09/19 0613 97.6 °F (36.4 °C) 67 18 94/43 94 % 01/09/19 0245     97 % 01/09/19 0113 97.6 °F (36.4 °C) 63 18 100/55 100 % 01/09/19 0010     97 % 01/08/19 2147 97.6 °F (36.4 °C) 68 18 113/62 98 % 01/08/19 2030     91 % 01/08/19 1732 98.2 °F (36.8 °C) (!) 102 17 109/48 93 % Oxygen Therapy O2 Sat (%): (EARS FINGERS AND TOES ICE COLD) (01/09/19 5186) Pulse via Oximetry: 66 beats per minute (01/09/19 0850) O2 Device: Nasal cannula (01/09/19 1420) PEEP/CPAP (cm H2O): 2 cm H20 (01/09/19 1420) O2 Flow Rate (L/min): 2 l/min (01/09/19 1200) FIO2 (%): 28 % (01/08/19 0759) Intake/Output Summary (Last 24 hours) at 1/9/2019 1627 Last data filed at 1/9/2019 1357 Gross per 24 hour Intake 600 ml Output 2100 ml Net -1500 ml Physical Examination: 
Physical Exam  
Constitutional:  
Confused this am  
HENT:  
Nose: Nose normal.  
Mouth/Throat: No oropharyngeal exudate. Eyes: Conjunctivae are normal. Right eye exhibits no discharge. Left eye exhibits no discharge. Neck: No tracheal deviation present. Cardiovascular: Exam reveals no friction rub. Irregular rate is controlled Pulmonary/Chest: No stridor. He has no wheezes. Dec breath sounds bilateral  
Abdominal: Soft. He exhibits no distension. There is no rebound. Musculoskeletal: He exhibits no tenderness or deformity. Neurological: He exhibits normal muscle tone. Confused today with periods of delerium Skin: Skin is warm. No rash noted. He is not diaphoretic. Psychiatric: Affect normal.  
 
 
Data Review: 
I have reviewed all labs, meds, telemetry events, and studies from the last 24 hours. Recent Results (from the past 24 hour(s)) GLUCOSE, POC Collection Time: 01/08/19  5:01 PM  
Result Value Ref Range Glucose (POC) 133 (H) 65 - 100 mg/dL GLUCOSE, POC Collection Time: 01/08/19  9:54 PM  
Result Value Ref Range Glucose (POC) 131 (H) 65 - 100 mg/dL METABOLIC PANEL, BASIC Collection Time: 01/09/19  5:42 AM  
Result Value Ref Range Sodium 143 136 - 145 mmol/L Potassium 3.7 3.5 - 5.1 mmol/L Chloride 100 98 - 107 mmol/L  
 CO2 35 (H) 21 - 32 mmol/L Anion gap 8 7 - 16 mmol/L Glucose 122 (H) 65 - 100 mg/dL BUN 41 (H) 8 - 23 MG/DL Creatinine 1.92 (H) 0.8 - 1.5 MG/DL  
 GFR est AA 43 (L) >60 ml/min/1.73m2 GFR est non-AA 35 (L) >60 ml/min/1.73m2 Calcium 8.2 (L) 8.3 - 10.4 MG/DL  
CBC WITH AUTOMATED DIFF Collection Time: 01/09/19  5:42 AM  
Result Value Ref Range WBC 5.5 4.3 - 11.1 K/uL  
 RBC 2.84 (L) 4.23 - 5.6 M/uL HGB 7.5 (L) 13.6 - 17.2 g/dL HCT 26.3 (L) 41.1 - 50.3 % MCV 92.6 79.6 - 97.8 FL  
 MCH 26.4 26.1 - 32.9 PG  
 MCHC 28.5 (L) 31.4 - 35.0 g/dL  
 RDW 19.7 (H) 11.9 - 14.6 % PLATELET 100 (L) 145 - 450 K/uL MPV 12.8 (H) 9.4 - 12.3 FL ABSOLUTE NRBC 0.00 0.0 - 0.2 K/uL  
 DF AUTOMATED NEUTROPHILS 76 43 - 78 % LYMPHOCYTES 15 13 - 44 % MONOCYTES 5 4.0 - 12.0 % EOSINOPHILS 3 0.5 - 7.8 % BASOPHILS 0 0.0 - 2.0 % IMMATURE GRANULOCYTES 1 0.0 - 5.0 %  
 ABS. NEUTROPHILS 4.2 1.7 - 8.2 K/UL  
 ABS. LYMPHOCYTES 0.8 0.5 - 4.6 K/UL  
 ABS. MONOCYTES 0.3 0.1 - 1.3 K/UL  
 ABS. EOSINOPHILS 0.1 0.0 - 0.8 K/UL  
 ABS. BASOPHILS 0.0 0.0 - 0.2 K/UL  
 ABS. IMM. GRANS. 0.0 0.0 - 0.5 K/UL GLUCOSE, POC  Collection Time: 01/09/19  6:28 AM  
 Result Value Ref Range Glucose (POC) 134 (H) 65 - 100 mg/dL GLUCOSE, POC Collection Time: 01/09/19 11:25 AM  
Result Value Ref Range Glucose (POC) 135 (H) 65 - 100 mg/dL All Micro Results Procedure Component Value Units Date/Time CULTURE, BLOOD [[id number]] Collected:  12/30/18 1026 Order Status:  Completed Specimen:  Blood Updated:  01/04/19 1224 Special Requests: --     
  RIGHT Antecubital 
  
  Culture result: NO GROWTH 5 DAYS     
 CULTURE, BLOOD [[id number]] Collected:  12/30/18 1042 Order Status:  Completed Specimen:  Blood Updated:  01/04/19 1224 Special Requests: --     
  RIGHT 
ARM Culture result: NO GROWTH 5 DAYS     
 CULTURE, URINE [[id number]]  (Abnormal)  (Susceptibility) Collected:  12/30/18 6791 Order Status:  Completed Specimen:  Urine from [facility] Specimen Updated:  01/03/19 1449 Special Requests: NO SPECIAL REQUESTS Culture result:    
  >100,000 COLONIES/mL ESCHERICHIA COLI  
     
      
  10,000 to 50,000 COLONIES/mL CANDIDA ALBICANS Current Meds: 
Current Facility-Administered Medications Medication Dose Route Frequency  haloperidol lactate (HALDOL) injection 2 mg  2 mg IntraVENous Q6H PRN  
 morphine injection 2 mg  2 mg IntraVENous Q2H PRN  
 LORazepam (ATIVAN) injection 0.5 mg  0.5 mg IntraVENous Q6H PRN  
 [START ON 1/10/2019] levETIRAcetam (KEPPRA) tablet 500 mg  500 mg Oral DAILY  pantoprazole (PROTONIX) tablet 40 mg  40 mg Oral ACB&D  
 furosemide (LASIX) injection 40 mg  40 mg IntraVENous BID  morphine (ROXANOL) concentrated oral syringe 5-10 mg  5-10 mg Oral Q4H PRN  
 guaiFENesin (ORGANIDIN) tablet 200 mg  200 mg Oral Q4HWA  scopolamine (TRANSDERM-SCOP) 1 mg over 3 days 1 Patch  1 Patch TransDERmal Q72H  potassium chloride (K-DUR, KLOR-CON) SR tablet 40 mEq  40 mEq Oral DAILY  0.9% sodium chloride infusion 250 mL  250 mL IntraVENous PRN  
  glucagon (GLUCAGEN) injection 0.5-1 mg  0.5-1 mg IntraVENous Multiple  ursodiol (ACTIGALL) capsule 300 mg  300 mg Oral TID WITH MEALS  
 oxyCODONE IR (ROXICODONE) tablet 5 mg  5 mg Oral Q6H PRN  
 rifAXIMin (XIFAXAN) tablet 550 mg  550 mg Oral BID  lactulose (CHRONULAC) solution 20 g  20 g Oral BID  zinc oxide-cod liver oil (DESITIN) 40 % paste   Topical BID  mineral oil-hydrophil petrolat (AQUAPHOR) ointment   Topical DAILY  albuterol (PROVENTIL VENTOLIN) nebulizer solution 2.5 mg  2.5 mg Nebulization Q4H PRN  
 allopurinol (ZYLOPRIM) tablet 300 mg  300 mg Oral DAILY  aspirin chewable tablet 81 mg  81 mg Oral DAILY  cholecalciferol (VITAMIN D3) tablet 1,000 Units  1,000 Units Oral DAILY  ferrous sulfate tablet 325 mg  1 Tab Oral DAILY WITH BREAKFAST  fluticasone (FLONASE) 50 mcg/actuation nasal spray 2 Spray  2 Spray Both Nostrils DAILY  gabapentin (NEURONTIN) capsule 200 mg  200 mg Oral TID  insulin lispro (HUMALOG) injection   SubCUTAneous AC&HS  latanoprost (XALATAN) 0.005 % ophthalmic solution 1 Drop  1 Drop Both Eyes QHS  levothyroxine (SYNTHROID) tablet 50 mcg  50 mcg Oral ACB  pravastatin (PRAVACHOL) tablet 40 mg  40 mg Oral QHS  sertraline (ZOLOFT) tablet 50 mg  50 mg Oral DAILY  sodium chloride (NS) flush 5-10 mL  5-10 mL IntraVENous Q8H  
 sodium chloride (NS) flush 5-10 mL  5-10 mL IntraVENous PRN  
 acetaminophen (TYLENOL) tablet 650 mg  650 mg Oral Q4H PRN  
 ondansetron (ZOFRAN) injection 4 mg  4 mg IntraVENous Q4H PRN  
 budesonide (PULMICORT) 500 mcg/2 ml nebulizer suspension  500 mcg Nebulization BID RT  
 albuterol-ipratropium (DUO-NEB) 2.5 MG-0.5 MG/3 ML  3 mL Nebulization Q6H RT Diet: DIET GI SOFT 
DIET NUTRITIONAL SUPPLEMENTS Other Studies (last 24 hours): No results found. Assessment and Plan:  
 
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 
ICD-9-CM: 250.40, 583.81  2/12/2018 - Present Yes  
   
 CKD (chronic kidney disease) stage 3, GFR 30-59 ml/min (HCC) (Chronic) ICD-10-CM: N18.3 ICD-9-CM: 585.3  9/18/2013 - Present Yes Essential hypertension (Chronic) ICD-10-CM: I10 
ICD-9-CM: 401.9  11/21/2012 - Present Yes * (Principal) Persistent atrial fibrillation (HCC) (Chronic) ICD-10-CM: I48.1 ICD-9-CM: 427.31  11/21/2012 - Present Yes Overview Addendum 11/22/2016  8:14 PM by Joaquín House MD  
  Coumadin therapy discontinued due to recurrent GI bleeding. A/P:   
Afib w/ bradycardia - bp borderline off all anticoag re: gi bleeding. monitoring- 
  
Tarry stools - Known GIB.    
- GI recommends conservative tx.   
- trend slowly down hb. Prognosis felt to be guarded to poor Transfuse if hb < 7--unchanged plan 
  
UTI-resolved Genevive Coop exchanged on admit and urine cx grew E.coli.   
- Completed 5 day course of Rocephin.   
- Likely CAUTI given chronic indwelling alejo from home- resolved but expect recurrence 
  
Chronic resp failure/COPD  
- Wears 4-5 lpm via NC continuous at baseline. 
-continue home bronchodilators supportive care 
  
CKD stage 3 
- follow -remains stable 
  
Diastolic CHF w/ bibasilar effusions 
- Continue lasix-monitoring bmp 
  
\"Seizures\" - Neurology consulted. - EEG showed general slowing only 
- CT head non acute. MRI only shows chronic microischemia.   
  
CAD 
- Asymptomatic.  
- Asa/statin.  - this is unchanged , consider stop asa re: hb 
  
Cirrhosis - New dx as of Nov 2018, w/ thrombocytopenia. Also w/ intermittent confusion. -Priscila Madden has added lactulose - continued.  
- Enlarged CBD in Nov 2018 admit. Priscila Madden recommends conservative management    
- consider follow up ammonia level re: increased confusion 
  
DM2: holding orals, cont SSI--unchanged 
  
HTN 
- lasix only current med for htn- 
  
Pain mgmt: Resumed home meds avoid iv narcotics  
  
Confusion: Intermittent multifactorial including hepatic encephalopathy as above 
  
Anemia--copied from prior note for cont. Of care sake - w/ hx of GIB. Hgb dropped to 7 on admit, transfused 1 unit prbc.  
- GI consulted. EGD Aug 2018 and C-scope w/o source of bleed.  
- Cont ppi. No plans for endoscopy. GI recommends tagged RBC scan if teddy bleeding noted. NO TEDDY BLEEDING NOTED BUT TREND DOWN   
- Received additional 1 unit PRBC 1/5/19.  
  
Dysphagia: st follow 
  
  
  
DVT Prophylaxis: SCDs 
  
DISPO:  Likely hospice care -- prognosis appears poor

## 2019-01-10 PROBLEM — R33.9 RETENTION OF URINE: Status: ACTIVE | Noted: 2018-01-01

## 2019-01-10 PROBLEM — I50.23 ACUTE ON CHRONIC SYSTOLIC (CONGESTIVE) HEART FAILURE (HCC): Status: ACTIVE | Noted: 2018-01-01

## 2019-01-10 PROBLEM — J96.91 RESPIRATORY FAILURE WITH HYPOXIA (HCC): Status: ACTIVE | Noted: 2019-01-01

## 2019-01-10 PROBLEM — Z87.01 HISTORY OF PNEUMONIA: Status: ACTIVE | Noted: 2018-01-01

## 2019-01-10 PROBLEM — E03.9 HYPOTHYROIDISM: Status: ACTIVE | Noted: 2018-01-01

## 2019-01-10 PROBLEM — Z86.19 HISTORY OF SEPSIS: Status: ACTIVE | Noted: 2018-01-01

## 2019-01-10 NOTE — PROGRESS NOTES
Hospitalist Progress Note Admit Date:  2018 10:17 AM  
Name:  Wesley Andrade Age:  80 y.o. 
:  1932 MRN:  [id number] PCP:  Alden Hsieh MD 
Treatment Team: Attending Provider: Irwin Barker DO; Consulting Provider: Stevenson Rodrigues DO; Care Manager: Dyana Velarde; Consulting Provider: Urban Ramos NP; Speech Language Pathologist: Dawson Trimble, SLP; Utilization Review: Saumya Swartz Subjective:  
 
 
Patient 80M with hx of afib not on [address] d/t hx of GIB, GERD, HTN, hx of DM (not currently on meds), Cirrhosis, diastolic CHF, CKD, COPD on 4-5LNC cont, BPH, CAD s/p CABG, PAD presents from [facility] after report of staff witnessing seizure activity x 5 min described to ER as tonic clonic. ER workup notable for pt bradycardic HR in 40's afib. Hgb 7.4, Cr 2.2 (baseline around 1.8-1.9). CXR with bibasilar effusions. 100% on 6lnc. CT head non acute. EEG negative, do not believe this was true seizure activity.  H/H dropped to 7.0 after admission to floor and pt transfused 1 unit pbrc. Seen by cardiology who started dobumatine gtt for worsening bradycardia after admission to tele unit. Lauren Espinoza was weaned off dobutamine.  Finished course of abx for UTI.  GI consulted and followed for c/w GI bleed.  Found to have possibly obstructing gallstones, but not a good candidate for ERCP - medical management.  Hgb slowly trending down.  Given additional 1 unit PRBCs.  Now more edematous.  Palliative Care following. 
  
(Of note, has had two recent hospital admissions. Admitted to [facility] in November for septic shock from PNA. Was discharged to [facility] then admitted to [facility] - for pseudomonas uti, cellulitis, ach/chronic resp failure, urinary retention now w/ alejo. ) 
  
1/10/19:   
 
More alert today at time of exam. Still very weak and intermittent confusion. Awaiting hospice house bed. Objective:  
 
Patient Vitals for the past 24 hrs: 
 Temp Pulse Resp BP SpO2 01/10/19 1433     98 % 01/10/19 1332 97.9 °F (36.6 °C) 74 18 103/58 99 % 01/10/19 0955    118/48   
01/10/19 0856 97.4 °F (36.3 °C) 75 16 129/53 99 % 01/10/19 0732     96 % 01/10/19 0530 98.3 °F (36.8 °C) 68 18 127/59 100 % 01/10/19 0125 97.5 °F (36.4 °C) 68 18 100/55 96 % 01/09/19 2330     96 % 01/09/19 2154 98.1 °F (36.7 °C) 68 18 92/53 96 % Oxygen Therapy O2 Sat (%): 98 % (01/10/19 1433) Pulse via Oximetry: 75 beats per minute (01/10/19 1433) O2 Device: Nasal cannula (01/10/19 1433) PEEP/CPAP (cm H2O): 4 cm H20 (01/10/19 1433) O2 Flow Rate (L/min): 4 l/min (01/10/19 1250) FIO2 (%): 28 % (01/08/19 0759) Intake/Output Summary (Last 24 hours) at 1/10/2019 1600 Last data filed at 1/10/2019 3042 Gross per 24 hour Intake  Output 800 ml Net -800 ml Physical Examination: 
Physical Exam  
HENT:  
Nose: Nose normal.  
Mouth/Throat: No oropharyngeal exudate. Eyes: Conjunctivae are normal. No scleral icterus. Neck: Neck supple. Cardiovascular: Intact distal pulses. Exam reveals no friction rub. Pulmonary/Chest: No respiratory distress. He has no rales. Abdominal: Bowel sounds are normal. He exhibits no mass. Lymphadenopathy:  
  He has no cervical adenopathy. Neurological: He displays normal reflexes. He exhibits normal muscle tone. Skin: Skin is dry. No rash noted. He is not diaphoretic. Data Review: 
I have reviewed all labs, meds, telemetry events, and studies from the last 24 hours. Recent Results (from the past 24 hour(s)) GLUCOSE, POC Collection Time: 01/09/19  4:11 PM  
Result Value Ref Range Glucose (POC) 131 (H) 65 - 100 mg/dL GLUCOSE, POC Collection Time: 01/09/19 10:21 PM  
Result Value Ref Range Glucose (POC) 124 (H) 65 - 100 mg/dL CBC WITH AUTOMATED DIFF Collection Time: 01/10/19  4:17 AM  
Result Value Ref Range WBC 5.8 4.3 - 11.1 K/uL  
 RBC 2.85 (L) 4.23 - 5.6 M/uL HGB 7.5 (L) 13.6 - 17.2 g/dL HCT 26.6 (L) 41.1 - 50.3 % MCV 93.3 79.6 - 97.8 FL  
 MCH 26.3 26.1 - 32.9 PG  
 MCHC 28.2 (L) 31.4 - 35.0 g/dL RDW 20.2 (H) 11.9 - 14.6 % PLATELET 879 (L) 769 - 450 K/uL MPV 13.0 (H) 9.4 - 12.3 FL ABSOLUTE NRBC 0.00 0.0 - 0.2 K/uL  
 DF AUTOMATED NEUTROPHILS 74 43 - 78 % LYMPHOCYTES 17 13 - 44 % MONOCYTES 6 4.0 - 12.0 % EOSINOPHILS 2 0.5 - 7.8 % BASOPHILS 0 0.0 - 2.0 % IMMATURE GRANULOCYTES 0 0.0 - 5.0 %  
 ABS. NEUTROPHILS 4.3 1.7 - 8.2 K/UL  
 ABS. LYMPHOCYTES 1.0 0.5 - 4.6 K/UL  
 ABS. MONOCYTES 0.3 0.1 - 1.3 K/UL  
 ABS. EOSINOPHILS 0.1 0.0 - 0.8 K/UL  
 ABS. BASOPHILS 0.0 0.0 - 0.2 K/UL  
 ABS. IMM. GRANS. 0.0 0.0 - 0.5 K/UL METABOLIC PANEL, BASIC Collection Time: 01/10/19  4:17 AM  
Result Value Ref Range Sodium 145 136 - 145 mmol/L Potassium 4.0 3.5 - 5.1 mmol/L Chloride 103 98 - 107 mmol/L  
 CO2 35 (H) 21 - 32 mmol/L Anion gap 7 7 - 16 mmol/L Glucose 112 (H) 65 - 100 mg/dL BUN 38 (H) 8 - 23 MG/DL Creatinine 1.99 (H) 0.8 - 1.5 MG/DL  
 GFR est AA 41 (L) >60 ml/min/1.73m2 GFR est non-AA 34 (L) >60 ml/min/1.73m2 Calcium 8.2 (L) 8.3 - 10.4 MG/DL  
GLUCOSE, POC Collection Time: 01/10/19  6:23 AM  
Result Value Ref Range Glucose (POC) 123 (H) 65 - 100 mg/dL GLUCOSE, POC Collection Time: 01/10/19 11:58 AM  
Result Value Ref Range Glucose (POC) 129 (H) 65 - 100 mg/dL All Micro Results Procedure Component Value Units Date/Time CULTURE, BLOOD [[id number]] Collected:  12/30/18 1026 Order Status:  Completed Specimen:  Blood Updated:  01/04/19 1224 Special Requests: --     
  RIGHT Antecubital 
  
  Culture result: NO GROWTH 5 DAYS     
 CULTURE, BLOOD [[id number]] Collected:  12/30/18 1042 Order Status:  Completed Specimen:  Blood Updated:  01/04/19 1224 Special Requests: --     
  RIGHT 
ARM   Culture result: NO GROWTH 5 DAYS     
 CULTURE, URINE [[id number]]  (Abnormal)  (Susceptibility) Collected: 12/30/18 1755 Order Status:  Completed Specimen:  Urine from [facility] Specimen Updated:  01/03/19 6122 Special Requests: NO SPECIAL REQUESTS Culture result:    
  >100,000 COLONIES/mL ESCHERICHIA COLI  
     
      
  10,000 to 50,000 COLONIES/mL CANDIDA ALBICANS Current Meds: 
Current Facility-Administered Medications Medication Dose Route Frequency  haloperidol lactate (HALDOL) injection 2 mg  2 mg IntraVENous Q6H PRN  
 morphine injection 2 mg  2 mg IntraVENous Q2H PRN  
 LORazepam (ATIVAN) injection 0.5 mg  0.5 mg IntraVENous Q6H PRN  
 levETIRAcetam (KEPPRA) tablet 500 mg  500 mg Oral DAILY  pantoprazole (PROTONIX) tablet 40 mg  40 mg Oral ACB&D  
 furosemide (LASIX) injection 40 mg  40 mg IntraVENous BID  morphine (ROXANOL) concentrated oral syringe 5-10 mg  5-10 mg Oral Q4H PRN  
 guaiFENesin (ORGANIDIN) tablet 200 mg  200 mg Oral Q4HWA  scopolamine (TRANSDERM-SCOP) 1 mg over 3 days 1 Patch  1 Patch TransDERmal Q72H  potassium chloride (K-DUR, KLOR-CON) SR tablet 40 mEq  40 mEq Oral DAILY  0.9% sodium chloride infusion 250 mL  250 mL IntraVENous PRN  
 glucagon (GLUCAGEN) injection 0.5-1 mg  0.5-1 mg IntraVENous Multiple  ursodiol (ACTIGALL) capsule 300 mg  300 mg Oral TID WITH MEALS  
 oxyCODONE IR (ROXICODONE) tablet 5 mg  5 mg Oral Q6H PRN  
 rifAXIMin (XIFAXAN) tablet 550 mg  550 mg Oral BID  lactulose (CHRONULAC) solution 20 g  20 g Oral BID  zinc oxide-cod liver oil (DESITIN) 40 % paste   Topical BID  mineral oil-hydrophil petrolat (AQUAPHOR) ointment   Topical DAILY  albuterol (PROVENTIL VENTOLIN) nebulizer solution 2.5 mg  2.5 mg Nebulization Q4H PRN  
 allopurinol (ZYLOPRIM) tablet 300 mg  300 mg Oral DAILY  aspirin chewable tablet 81 mg  81 mg Oral DAILY  cholecalciferol (VITAMIN D3) tablet 1,000 Units  1,000 Units Oral DAILY  ferrous sulfate tablet 325 mg  1 Tab Oral DAILY WITH BREAKFAST  fluticasone (FLONASE) 50 mcg/actuation nasal spray 2 Spray  2 Spray Both Nostrils DAILY  gabapentin (NEURONTIN) capsule 200 mg  200 mg Oral TID  insulin lispro (HUMALOG) injection   SubCUTAneous AC&HS  latanoprost (XALATAN) 0.005 % ophthalmic solution 1 Drop  1 Drop Both Eyes QHS  levothyroxine (SYNTHROID) tablet 50 mcg  50 mcg Oral ACB  pravastatin (PRAVACHOL) tablet 40 mg  40 mg Oral QHS  sertraline (ZOLOFT) tablet 50 mg  50 mg Oral DAILY  sodium chloride (NS) flush 5-10 mL  5-10 mL IntraVENous Q8H  
 sodium chloride (NS) flush 5-10 mL  5-10 mL IntraVENous PRN  
 acetaminophen (TYLENOL) tablet 650 mg  650 mg Oral Q4H PRN  
 ondansetron (ZOFRAN) injection 4 mg  4 mg IntraVENous Q4H PRN  
 budesonide (PULMICORT) 500 mcg/2 ml nebulizer suspension  500 mcg Nebulization BID RT  
 albuterol-ipratropium (DUO-NEB) 2.5 MG-0.5 MG/3 ML  3 mL Nebulization Q6H RT Diet: DIET GI SOFT 
DIET NUTRITIONAL SUPPLEMENTS Other Studies (last 24 hours): No results found. Assessment and Plan:  
 
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 
ICD-9-CM: 250.40, 583.81  2/12/2018 - Present Yes  
   
 CKD (chronic kidney disease) stage 3, GFR 30-59 ml/min (HCC) (Chronic) ICD-10-CM: N18.3 ICD-9-CM: 585.3  9/18/2013 - Present Yes Essential hypertension (Chronic) ICD-10-CM: I10 
ICD-9-CM: 401.9  11/21/2012 - Present Yes * (Principal) Persistent atrial fibrillation (HCC) (Chronic) ICD-10-CM: I48.1 ICD-9-CM: 427.31  11/21/2012 - Present Yes  Overview Addendum 11/22/2016  8:14 PM by Rito Valdes MD  
 Coumadin therapy discontinued due to recurrent GI bleeding. A/P:   
Afib w/ bradycardia 
- stable continue current meds 
  
Tarry stools - Known GIB.    
- medical rx only prognosis poor.  
  
UTI-resolved- chronic alejo 
  
Chronic resp failure/COPD  
- Wears 4-5 lpm via NC continuous at baseline. 
-continue home bronchodilators supportive care--unchanged 
  
CKD stage 3-IV 
- follow -remains stable 1.9 creat 
  
Diastolic CHF w/ bibasilar effusions 
-same meds 
  \"Seizures\" - Neurology consulted. - EEG showed general slowing only 
- CT head non acute. MRI only shows chronic microischemia.   
  
CAD 
- Asymptomatic.  
- Asa/statin.  - this is unchanged , consider stop asa re: hb 
  
Cirrhosis - New dx as of Nov 2018, w/ thrombocytopenia. Also w/ intermittent confusion. -Lawrence London has added lactulose - continued.  
- Enlarged CBD in Nov 2018 admit. Lawrence London recommends conservative management    
- consider follow up ammonia level re: increased confusion 
  
DM2: holding orals, cont SSI--unchanged 
  
HTN 
- lasix only current med for htn- 
  
Pain mgmt: Resumed home meds avoid iv narcotics  
  
Confusion: Intermittent multifactorial including hepatic encephalopathy as above 
  
Anemia--copied from prior note for cont. Of care sake 
- stable last 48hr  
- Received additional 1 unit PRBC 1/5/19.  
  
Dysphagia: st follow 
  
 
DVT Prophylaxis: SCDs 
  
DISPO:  awaiting hospice house bed hopeful 1/11/19

## 2019-01-10 NOTE — HOSPICE
Met with son at bedside and discussed hospice philosophy, levels of care, management of medications and symptoms. Son and family on board with hospice philosophy. Case discussed with Dr. Jordan Bruce and patient approved for [facility] level of care at [facility] when a bed is available. Thank you for this referral. I will reach out to the  when a bed is available and transport needs to be set up. Matilda Nix, RN, BSN Community Nurse Liaison [facility] [phone]

## 2019-01-10 NOTE — PROGRESS NOTES
Verbal bedside report given to jessica Cooper RN. Patient's situation, background, assessment and recommendations provided. Opportunity for questions provided. Oncoming RN assumed care of patient.

## 2019-01-10 NOTE — PROGRESS NOTES
Bedside and Verbal shift change report given to self (oncoming nurse) by Monet Mcfarland RN (offgoing nurse). Report included the following information SBAR, Kardex and MAR.

## 2019-01-11 NOTE — PROGRESS NOTES
Patient arrived to [facility] via EMS transport, nasal cannula in place. No family present with patient. Removed foam dressing from sacral wound, applied protective barrier cream.  Ruptured blister noted to right heel. Removed old dressings from bilateral arms. Right forearm dressing appears to be a healed skin tear. Dressing to left forearm covering skin tear which is actively bleeding after dressing removal.  Redressed with petroleum gauze and wrapped with Kerlex. Patient denies pain, reports he is thirsty. Adams catheter draining clear yellow urine. Scopolamine patch removed from behind right ear.

## 2019-01-11 NOTE — PROGRESS NOTES
01/11/19 1509   Pyschosocial Assessment 1   Concerns from previous vist? No   Significant changes in relationships or household members? No   Signs of abuse or neglect? No   Financial Need (none)   Pain signs needing to be reported to  No   Psychosocial Components/Teaching/Interventions Instructed on how to contact the agency with concerns; Emotional support/supportive listening   The patient has designated their health care surrogate Patient has not made wishes known   Visit Environment: LMSW visited with the pt in his room . He was able to hold a conversation and answer some questions. Lmsw called his son Anyi Lopes to verify answers        Circumstances for Admission: Pt was admitted to [place] [address] from [place] on 1/11/19. He was transported to room 110 via ambulance. He has a hospice diagnosis of hypoxic respiratory failure. Please see physician narrative note copied in this assessment:  DIAGNOSIS: hypoxic respiratory failure (CHF, COPD, recurrent G. I. bleed, oral anticoagulants, chronic liver disease, gallstones, chronic kidney disease, type two DM)    Erin Kirkpatrick[de-identified] This is an order to admit an 59-year-old male with long-standing COPD/CHF who has developed a series of complications starting with DVT and subsequent anticoagulation that has resulted in this most recent hospitalization for respiratory failure and increasing delirium. Despite aggressive hospital care the patient is now delirious, breathless, bedbound, not eating and requiring parenteral medications for breathlessness and agitation. There is no benefit from additional aggressive therapy. The family desires comfort measures. The patient is a DNR. Death is likely in the next few weeks. The patient will be admitted to [facility] when a bed is available for symptomatic and  end of life care. Family/Social History: Pt is an 80year old  man. He has been  to his spouse Wenceslao Sanon for over 61 years.   They had 4 children. 2 are  Britney Ngo and Haile Carrasco. His other children are Marco Coles and Fady Landaverde. Pt was in the [employer] from [id number]. He worked selling cars and his son Britney Ngo worked with him until his death. Spritiual Assessment: pt has good support from [facility] in [facility]. Maame Cruz has already visited. Decline Spiritual care per pt. Living Arrangements: Pt lived at his own house with his spouse and his granddaughter An Shea. (per Sadaf De La Cruz is special needs)   He has been in hospital and rehab since November. Patient's Emotional and Cognitive Status: Pt is awake and alert and able to hold a conversation. He does get SOB with conversation. Primary Caregiver / People Involved:Balbina Alex and An Shea    Advance Directive/HCPOA / DNR Status: Pt does not have a HCPOA on file. He is a DNR       Final Arrangements: Family has not chosen a  home        Harm to Self or Others: pt is not a harm to himself or others       Bereavement Risk: Low possible elevated due to his spouse having memory issues      Plan for the Patient: Symptom management at this time. It is expected for the pt to pass here at [address]: none at this time.   No referral to Spiritual care at this time

## 2019-01-11 NOTE — MED STUDENT NOTES
*ATTENTION:  This note has been created by a medical student for educational purposes only. Please do not refer to the content of this note for clinical decision-making, billing, or other purposes. Please see attending physicians note to obtain clinical information on this patient. *              History and Physical    Patient: Arash Morgan MRN: [id number]  SSN: [id number]    YOB: 1932  Age: 80 y.o. Sex: male      Subjective: Arash Morgan is a 80 y.o. male admitted GIP with hypoxic respiratory failure for management of pain, dyspnea and agitation. He has a hx of CHF, COPD, recurrent G. I. bleed, oral anticoagulants, chronic liver disease, gallstones, chronic kidney disease, type two DM. He has had a series of complications starting with DVT and subsequent anticoagulation that has resulted in his most recent hospitalization for respiratory failure and increasing delirium. Despite aggressive hospital care the patient became delirious, breathless, bedbound, not eating and requiring parenteral medications for breathlessness and agitation. It was decided there would be no benefit from additional aggressive therapy. The family desires only comfort measures. The patient is a DNR. Death is likely in the next few weeks so the patient has been admitted to hospice for symptomatic and end of life care. Pt is sitting quietly in bed with eyes open. Nasal cannula in place 5 L/hr. He is able to answer questions and deny that he has pain anywhere. Pt talks about having four kids but only two are still living. He says his two kids and grandchildren have been visiting him, so he has familial support at this time. Denies nausea, vomiting, chest pain, trouble breathing. Pt ate lunch today.  No family were in the room during time of the exam.     Past Medical History:   Diagnosis Date    Atherosclerosis of artery of extremity with ulceration ([facility] 4/17/2015    Atherosclerosis of native arteries of extremity with intermittent claudication ([address] 4/17/2015    Atopic dermatitis 11/21/2012    Atrial fibrillation (HCC)     CAD (coronary artery disease)     Carotid stenosis, bilateral 11/21/2012    50-79%  3-2010    1. Carotid Doppler (6/1/07): Greater than 70% stenosis in proximal LICA. 50% stenosis in right ICA. 2.  CTA of neck (3/15/10): Occluded distal segments of the vertebral arteries bilaterally. Atherosclerosis of the carotid bulbs bilaterally with a 50% stenosis on the left and a stenosis of less than 30% on the right. Small nodule in the right upper lobe near the apex. 3. CTA (8/29/13):  Less than 30% diameter stenosis of the cervical internal carotid arteries bilaterally.  CHF (congestive heart failure) ([address] 11/21/2012    Chronic kidney disease     hx elevated labs    Chronic obstructive pulmonary disease (HCC)     Cirrhosis ([address] 11/7/2018    Colon, diverticulosis 1/24/2015    Coronary atherosclerosis of native coronary vessel 10/31/2016    1. Cath (5/31/07):  LMCA: 25%. LAD: ostial 75-95% stenosis. 50-75% mid. D1:  25-50%. OM3: small with 75% stenosis. RCA: 100% mid. with left to right collaterals. 2 Vessel CABG (6/4/07):  LIMA to LAD and SVG to OM. SVG was anastomosed proximally to LIMA to due severe atherosclerosis of aorta. 3.  Lexiscan cardiolite (11/30/10): Abnormal with reversible lateral wall defects. Unable to gate given atrial fibrillation. 4.  LHC (1/14/11):  EF 60%. LVEDP 21. Patent LIMA to LAD and SVG to OM1 (off LIMA). occluded small RCA with left to right collaterals. Small D1 (2 mm vessel) with 70% mid stenosis.  Diabetes ([address]     Diastolic CHF, acute on chronic ([address] 9/12/2015    1. Echo (9/11/15) : EF 55-60%. Mild LVH. Moderate biatrial enlargement. Moderate mitral/tricuspid regurgitation.      Failed CABG (coronary artery bypass graft) 11/21/2012    GI bleed 1/2015    Hospitalized [facility]    Gout 11/21/2012    History of tobacco use     [place] (hard of hearing)     Hypercholesterolemia     Hypertension     Hyperuricemia 2012    Morbid obesity ([address]     PAD (peripheral artery disease) ([facility] 2012    1. Bilateral proximal common iliac PCI (08):  8.0 X 100 mm Cordis smart stent on right and 10 X 40 mm cordis smart stent on right. Both inflated to 7.0 mm.  Poor historian     Radiologic findings of lung field, abnormal 10/31/2016    1. CT of chest  (11/24/10): Multiple small nodules in the right lobe and stable interstitial prominence. Consistent with chronic lung disease. No evidence of malignancy.     Seizure disorder ([facility] 2013    Shortness of breath dyspnea 2013    Thyroid disease     Unspecified sleep apnea     uses CPAP     Past Surgical History:   Procedure Laterality Date    CARDIAC SURG PROCEDURE UNLIST      cath ,cabg ,lexiscan cardiolite 11/10    COLONOSCOPY N/A 2017    COLONOSCOPY  BMI 36 TO BE ADMITTED 17 performed by Jose Faulkner MD at [facility]    HX CATARACT REMOVAL Left     os    HX COLONOSCOPY      HX CORONARY ARTERY BYPASS GRAFT  2007    HX CORONARY STENT PLACEMENT  2008    bilateral iliac artery PCI and stents    HX HEART CATHETERIZATION      left-2007, 2011    HX HEENT  1970s    neck lipoma      Family History   Problem Relation Age of Onset    Heart Attack Mother    [facility] Father         old age   [facility] Brother         brain aneurysm    Heart Disease Other         2 children  with heart concerns, 36 & 49 yo    Heart Disease Son      Social History     Tobacco Use    Smoking status: Former Smoker     Packs/day: 1.00     Years: 45.00     Pack years: 45.00     Types: Cigarettes     Last attempt to quit: 1984     Years since quittin.0    Smokeless tobacco: Never Used    Tobacco comment: (stopped smoking in )   Substance Use Topics    Alcohol use: No     Alcohol/week: 0.0 oz      Prior to Admission medications Medication Sig Start Date End Date Taking? Authorizing Provider   furosemide (LASIX) 20 mg tablet Take 1-2 Tabs by mouth daily. 10/26/18  Yes Familia Johnson MD   isosorbide mononitrate ER (IMDUR) 30 mg tablet Take 1 Tab by mouth daily. 9/27/18  Yes Ankit Hernandez MD   pravastatin (PRAVACHOL) 40 mg tablet Take 1 Tab by mouth nightly. 9/27/18  Yes Ankit Hernandez MD   hydrALAZINE (APRESOLINE) 10 mg tablet TAKE 1 TABLET THREE TIMES DAILY. 9/27/18  Yes Ankit Hernandez MD   aspirin 81 mg chewable tablet Take 1 Tab by mouth daily. 9/21/18  Yes Kimberly Patel MD   glipiZIDE (GLUCOTROL) 5 mg tablet Take  by mouth two (2) times a day. Yes Provider, Historical   gabapentin (NEURONTIN) 100 mg capsule Take  by mouth three (3) times daily. Yes Provider, Historical   hydrocortisone (PROCTOSOL HC) 2.5 % rectal cream Insert  into rectum every six (6) hours as needed for Hemorrhoids. Yes Provider, Historical   levothyroxine (SYNTHROID) 50 mcg tablet Take 1 Tab by mouth Daily (before breakfast). 8/7/18  Yes Ankit Hernandez MD   OXYGEN-AIR DELIVERY SYSTEMS Take  by inhalation continuous. 2LPM to keep 02 sat >90%   Yes Provider, Historical   bisacodyl (DULCOLAX) 5 mg EC tablet Take 1 Tab by mouth daily. Patient taking differently: Take 5 mg by mouth every evening. 4/25/18  Yes Christel Cr MD   latanoprost (XALATAN) 0.005 % ophthalmic solution Administer 1 Drop to both eyes nightly. Yes Provider, Historical   sertraline (ZOLOFT) 50 mg tablet Take one tablet each evening for anxiety  Indications: Generalized Anxiety Disorder 2/12/18  Yes Ankit Hernandez MD   fluticasone (FLONASE) 50 mcg/actuation nasal spray 2 Sprays by Both Nostrils route daily. 2/12/18  Yes Mahesh Echavarria MD   cholecalciferol (VITAMIN D3) 1,000 unit cap Take 1,000 Units by mouth daily. Yes Provider, Historical   ipratropium-albuterol (COMBIVENT RESPIMAT)  mcg/actuation inhaler Take 1 Puff by inhalation every six (6) hours. 9/21/17  Yes Corrina Camara NP   ferrous sulfate 324 mg (65 mg iron) tablet Take  by mouth Daily (before breakfast). Yes Provider, Historical   cpap machine kit by Does Not Apply route. Bilevel 12/8   Yes Provider, Historical   acetaminophen (TYLENOL) 325 mg tablet Take 2 Tabs by mouth every four (4) hours as needed. 1/11/19   Elsy Knight, DO   budesonide (PULMICORT) 0.5 mg/2 mL nbsp 2 mL by Nebulization route two (2) times a day. 1/11/19   Elsy Knight, DO   guaiFENesin (ORGANIDIN) 200 mg tablet Take 1 Tab by mouth every four (4) hours (while awake). 1/11/19   Elsy Knight, DO   haloperidol lactate (HALDOL) 5 mg/mL injection 0.4 mL by IntraVENous route every six (6) hours as needed. 1/11/19   Elsy Knight, DO   lactulose (CHRONULAC) 10 gram/15 mL solution Take 30 mL by mouth two (2) times a day. 1/11/19   Elsy Knight, DO   levETIRAcetam (KEPPRA) 500 mg tablet Take 1 Tab by mouth daily. 1/12/19   Elsy Knight, DO   LORazepam (ATIVAN) 2 mg/mL injection 0.25 mL by IntraVENous route every six (6) hours as needed. Max Daily Amount: 2 mg. 1/11/19   Elsy Knight, DO   morphine (ROXANOL) 20 mg/mL concentrated oral syringe Take 0.25-0.5 mL by mouth every four (4) hours as needed. Max Daily Amount: 60 mg. 1/11/19   Mymatthew Calle, DO   morphine 2 mg/mL injection 1 mL by IntraVENous route every two (2) hours as needed. Max Daily Amount: 24 mg. 1/11/19   Elsy Knight, DO   oxyCODONE IR (ROXICODONE) 5 mg immediate release tablet Take 1 Tab by mouth every six (6) hours as needed. Max Daily Amount: 20 mg. 1/11/19   Elsy Knight, DO   pantoprazole (PROTONIX) 40 mg tablet Take 1 Tab by mouth Before breakfast and dinner. 1/11/19   Elsy Knight, DO   potassium chloride (K-DUR, KLOR-CON) 20 mEq tablet Take 2 Tabs by mouth daily. 1/12/19   Elsy Knight, DO   rifAXIMin Rita Garsia) 550 mg tablet Take 1 Tab by mouth two (2) times a day.  1/11/19   Florencio Cabot Lam Hinds DO   scopolamine (TRANSDERM-SCOP) 1 mg over 3 days pt3d 1 Patch by TransDERmal route every seventy-two (72) hours. 1/12/19   Eli Knight DO   ursodiol (ACTIGALL) 300 mg capsule Take 1 Cap by mouth three (3) times daily (with meals). 1/11/19   Derian Vasquez DO        No Known Allergies    Review of Systems:  Pertinent items are noted in the History of Present Illness. Objective:     Vitals:    01/11/19 1130 01/11/19 1351   BP: 105/58    Pulse: 65    Resp: 17    Temp: 98.4 °F (36.9 °C)    Weight:  99.1 kg (218 lb 6.4 oz)   Height:  6' 2\" (1.88 m)        Physical Exam:  GENERAL: alert, cooperative, no distress, appears older than stated age. Afebrile. Oxygen NC 5 L/hr in place. LUNG: decreased breath sounds bilaterally, no wheezes or rhonchi. Regular RR. HEART: regular rate and rhythm. 2+ radial, DP pulses. ABDOMEN: soft, non-tender. Bowel sounds hypoactive. Ecchymosis lower abdomen. : alejo in place draining clear yellow urine to bedside collection bag . EXTREMITIES: purple skin discoloration bilateral lower extremities, no weeping. SKIN: ecchymosis bilateral forearms and scattered on upper chest.    NEUROLOGIC: AOx3. Answers questions appropriately.  Intermittent twitching of bilateral feet, hands, and arms noted during exam.     Assessment:     Hospital Problems  Date Reviewed: 1/10/2019          Codes Class Noted POA    * (Principal) Respiratory failure with hypoxia ([facility] ICD-10-CM: J96.91  ICD-9-CM: 518.81  1/10/2019 Unknown        Acute on chronic systolic (congestive) heart failure ([facility] ICD-10-CM: I50.23  ICD-9-CM: 428.23, 428.0  12/12/2018 Yes        COPD exacerbation ([facility] ICD-10-CM: J44.1  ICD-9-CM: 491.21  11/1/2018 Yes        Acute on chronic renal failure ([facility] ICD-10-CM: N17.9, N18.9  ICD-9-CM: 584.9, 585.9  10/31/2018 Yes        S/P IVC filter (Chronic) ICD-10-CM: T04.913  ICD-9-CM: V45.89  9/21/2018 Yes    Overview Signed 10/31/2018  3:20 PM by Jeremiah Rodas NP 9/12/18  Dr. Franco Like:     Current Facility-Administered Medications   Medication Dose Route Frequency    sodium chloride (NS) flush 3 mL  3 mL IntraVENous PRN    morphine (ROXANOL) concentrated oral syringe 10 mg  10 mg Oral Q30MIN PRN    Or    morphine (ROXANOL) concentrated oral syringe 10 mg  10 mg SubLINGual Q30MIN PRN    morphine injection 2 mg  2 mg SubCUTAneous Q20MIN PRN    Or    morphine injection 2 mg  2 mg IntraVENous Q20MIN PRN    acetaminophen (TYLENOL) tablet 650 mg  650 mg Oral Q4H PRN    acetaminophen (TYLENOL) suppository 650 mg  650 mg Rectal Q3H PRN    loperamide (IMODIUM) capsule 4 mg  4 mg Oral PRN    senna (SENOKOT) tablet 8.6 mg  1 Tab Oral BID    hyoscyamine SL (LEVSIN/SL) tablet 0.125 mg  0.125 mg SubLINGual Q4H PRN    LORazepam (ATIVAN) injection 2 mg  2 mg IntraVENous Q2H PRN    glycopyrrolate (ROBINUL) injection 0.2 mg  0.2 mg IntraVENous Q4H PRN    haloperidol lactate (HALDOL) injection 2 mg  2 mg IntraVENous Q2H PRN     81 y/o male pt admitted GIP with hypoxic respiratory failure for management of pain, dyspnea and agitation. Pt is sitting comfortably in bed on 5 L/hr HFNC. 1) Pain: Morphine IV 2 mg every 20 min PRN   2) Agitation: Haldol IV 2 mg q 2 hrs PRN and Lorazepam IV 2 mg q 2 hrs PRN  3) Dyspnea: Morphine IV 2 mg every 20 min PRN and Glycopyrrolate IV 0.2 mg q 4 hrs PRN for secretions  4) Family/pt support: Family not at bedside during exam. Will continue to monitor symptoms and adjust medications as needed to maintain pt comfort. PPS 30%.      Signed By: Linda Villareal     January 11, 2019

## 2019-01-11 NOTE — PROGRESS NOTES
Bedside and Verbal shift change report given to self (oncoming nurse) by Samia Mayo RN (offgoing nurse). Report included the following information SBAR, Kardex and MAR.

## 2019-01-11 NOTE — ADT AUTH CERT NOTES
[address] [facility] Adult-Extended Stay (1/10/2019) by Santosh Quintanilla Review Status Review Entered In Primary 1/11/2019 10:51 Criteria Review REVIEW SUMMARY 
  
Patient: ESTHER CASTILLO Review Number: [id number] Review Status: In Primary 
  
Condition Specific: Yes 
  
Condition Level Of Care Code: ACUTE Condition Level Of Care Description: Acute 
  
  
OUTCOMES Outcome Type: Primary 
  
  
  
REVIEW DETAILS 
  
Service Date: 01/10/2019 Admit Date: 12/30/2018 Product: [address] [facility] Adult Subset: Extended Stay (Symptom or finding within 24h) 
  (Excludes PO medications unless noted) [X] Select Level of Care, One: 
            [X] ACUTE, >= One: 
                [X] Respiratory, One: 
                    [X] Partial responder, not clinically stable for discharge and requires continued stay, >= One: 
                    ~--Admin, IQ Admin Admin on 01- 10:51 AM--~ Hospitalist Progress Note More alert today at time of exam. Still very weak and intermittent confusion. Awaiting hospice house bed. T 97.4, /53, P 75, R 16, 
                     
                    RBC 2.85, HGB 7.5, HCT 26.6, , C02 35, GLUC 112, BUN 38, CREAT 1.99, CA 8.2,  
                     
                    A/P:   
                    Afib w/ bradycardia 
                    - stable continue current meds Tarry stools - Known GIB. - medical rx only prognosis poor. UTI-resolved- chronic alejo Chronic resp failure/COPD  
                    - Wears 4-5 lpm via NC continuous at baseline. 
                    -continue home bronchodilators supportive care--unchanged CKD stage 3-IV - follow -remains stable 1.9 creat Diastolic CHF w/ bibasilar effusions 
                    -same meds \"Seizures\" - Neurology consulted. - EEG showed general slowing only 
                    - CT head non acute. MRI only shows chronic microischemia. CAD - Asymptomatic.  
                    - Asa/statin. - this is unchanged , consider stop asa re: hb 
                      
                    Cirrhosis - New dx as of Nov 2018, w/ thrombocytopenia. Also w/ intermittent confusion. - GI has added lactulose - continued. - Enlarged CBD in Nov 2018 admit. GI recommends conservative management    
                    - consider follow up ammonia level re: increased confusion DM2: holding orals, cont SSI--unchanged HTN 
                    - lasix only current med for htn- 
                      
                    Pain mgmt: Resumed home meds avoid iv narcotics Confusion: Intermittent multifactorial including hepatic encephalopathy as above Anemia--copied from prior note for cont. Of care sake 
                    - stable last 48hr  
                    - Received additional 1 unit PRBC 1/5/19. Dysphagia: st follow CMNOTE:  met with pt, spouse & grand-daughter. Pt awaiting [facility] bed when available.  
                     
                     
                    TELE, DUO NEBS Q 6 HRS, ZYLOPRIM 300 MG PO QD, ASPIRIN 81 MG PO QD, PULMICORT NEB  BID, LASIX 40 MG IV BID, NEURONTIN 200 MG PO TID, ORGSANIDIN 200 MG PO Q 4 HRS WA, HALDOL 2 MG IV Q 6 HRS PRN X 1, LACTULOSE 20G PO BID,  KEPPRA 500 MG PO QD, SYNTHROID 50 MCG PO QD, PROTNIX 40 MG PO BID 
                     
                     
                     
                        [X] O2 sat 89-91%(0.89-0.91) and < baseline requiring supplemental oxygen, >= One: 
                        ~--Admin, IQ Admin Admin on 01- 10:51 AM--~ 
                        02 SAT 99% 4L NC 
                         
                         
                         
                            [X] Oxygen requirement plateau last 2d and discharge planning <= 24h 
  
Version: [employer] 2018.1 Rosemary Rojas  © 2018 [employer] and/or one of its [place]. All Rights Reserved. CPT only © 2017 American Medical Association. All Rights Reserved. [facility] Adult-Extended Stay (1/9/2019) by Chitra August RN Review Status Review Entered In Primary 1/10/2019 14:51 Criteria Review REVIEW SUMMARY 
  
Patient: ESTHER CASTILLO Review Number: [id number] Review Status: In Primary 
  
Condition Specific: Yes 
  
Condition Level Of Care Code: ACUTE Condition Level Of Care Description: Acute 
  
  
OUTCOMES Outcome Type: Primary 
  
  
  
REVIEW DETAILS 
  
Service Date: 01/09/2019 Admit Date: 12/30/2018 Product: [facility] Adult Subset: Extended Stay (Symptom or finding within 24h) 
  (Excludes PO medications unless noted)         [X] Select Level of Care, One: 
            [X] ACUTE, >= One: 
                [X] Cardiovascular or peripheral vascular, One: 
                    [X] Partial responder, not clinically stable for discharge and requires continued stay, >= One: 
                        [X] Acute heart failure, unresolved <= 3d, Both: 
                        ~--Admin, IQ Admin Admin on 01- 02:51 PM--~ 
 1-9-19 LOS/Status/Review Type: Telemetry/INpt/CS Subjective: 
                        Extremely weak. Multiple comorbidities. Hx gi bleed chronic afib see above and age 80 with significant debility. Now with delerium. I had long talk with alex on 1/8 about expected prognosis and multiple organ failure with ckd, chronic diastolic chf, cirrhosis, and chronic hypoxemic hypercarbic resp failure on home oxygen. Gi bleed with gi recommending conservative mgmt only , chronic alejo with recurrent uti. Dysphagia. Family appears to be accepting hospice appropriate but unclear if can care for pt in their home. Hospice to meet with family in am. 
                         
                        Objective: 
                        Constitutional: Confused this am  
                        Cardiovascular: Exam reveals no friction rub. Irregular rate is controlled Pulmonary/Chest: No stridor. He has no wheezes. Dec breath sounds bilateral  
                        Neurological: He exhibits normal muscle tone. Confused today with periods of delirium Vitals- 97.6, 67, 20, 94/43, 94% 3L O2 via NC Abnl labs- rbc 2.84, hgb 7.5, hct 26.3, platelet 210, co2 35, glucose 122, bun 41, creatinine 1.92, calcium 8.2, gfr 35                         Meds-  duonebs q 6hr, allopurinol 300mg po qd, asa 81mg po qd, pulmicort 500mcg neb bid, vitamin d3 1000u po qd, ferrous sulfate 325mg po qd, Lasix 40mg iv  bid, gabapentin 200mg pot id, guaifenesin 200mg po q 4hr while awake, SS insulin, lactulose 20g po bid, synthroid 50mcg po qd, protonix 40mg po bid, potassium chloride 40meq po qd, pravastatin 40mg po qd, rifaximin 550mg po bid, scopolamine 1mg patch q 72 hr x 1, Zoloft 50mg po qd, actigall 300mg pot id, keppra 500mg iv qd, protonix 80mg iv q 12hr Plan- GI soft diet, SCDs, PT, consult Hospice IM A/P: 
                        Afib w/ bradycardia - bp borderline off all anticoag re: gi bleeding. monitoring- 
                          
                        Tarry stools - Known GIB. - GI recommends conservative tx.   
                        - trend slowly down hb. Prognosis felt to be guarded to poor Transfuse if hb < 7--unchanged plan UTI-resolved - Alejo exchanged on admit and urine cx grew E.coli. - Completed 5 day course of Rocephin.   
                        - Likely CAUTI given chronic indwelling alejo from home- resolved but expect recurrence Chronic resp failure/COPD  
                        - Wears 4-5 lpm via NC continuous at baseline. 
                        -continue home bronchodilators supportive care CKD stage 3 
                        - follow -remains stable Diastolic CHF w/ bibasilar effusions - Continue lasix-monitoring bmp \"Seizures\" - Neurology consulted. - EEG showed general slowing only 
                        - CT head non acute. MRI only shows chronic microischemia. CAD - Asymptomatic. - Asa/statin. - this is unchanged , consider stop asa re: hb 
                          
                        Cirrhosis - New dx as of Nov 2018, w/ thrombocytopenia. Also w/ intermittent confusion. - GI has added lactulose - continued. - Enlarged CBD in Nov 2018 admit. GI recommends conservative management    
                        - consider follow up ammonia level re: increased confusion DM2: holding orals, cont SSI--unchanged HTN 
                        - lasix only current med for htn- 
                          
                        Pain mgmt: Resumed home meds avoid iv narcotics Confusion: Intermittent multifactorial including hepatic encephalopathy as above Anemia--copied from prior note for cont. Of care sake - w/ hx of GIB. Hgb dropped to 7 on admit, transfused 1 unit prbc. - GI consulted. EGD Aug 2018 and C-scope w/o source of bleed. - Cont ppi. No plans for endoscopy. GI recommends tagged RBC scan if teddy bleeding noted. NO TEDDY BLEEDING NOTED BUT TREND DOWN   
                        - Received additional 1 unit PRBC 1/5/19. PT Assessment: PT attempted treatment again today but the patient refused. He was confused and appeared to be hurting in his legs again. PT will continue to try a couple more times and then discontinue service if he is not able to participate. [X] Dyspnea > baseline, >= One: [X] O2 sat <= 91%(0.91) and < baseline ~--Admin, IQ Admin Admin on 01- 02:51 PM--~ 
                                94% 3L O2 via NC 
                                 
                                 
                                 
                            [X] Intervention, >= One: 
                                [X] Diuretic, One: 
                                    [X] >= 2x/24h (includes PO) 
                                    ~--Admin, IQ Admin Admin on 01- 02:50 PM--~ Lasix 40mg iv  bid 
                                     
                                     
                                     
  
Version: InterQual® 2018.1 Brina Bottom  © 2018 [employer]199 and/or one of its [place]. All Rights Reserved. CPT only © 2017 American Medical Association. All Rights Reserved.

## 2019-01-11 NOTE — PROGRESS NOTES
Problem: Hospice Orientation  Goal: Demonstrate understanding of hospice philosophy, plan of care, and home hospice program  The patient/family/caregiver will demonstrate understanding of hospice philosophy, plan of care and the home hospice program as evidenced by participation in meeting the patient's psychosocial, spiritual, medical, and physical needs inclusive of medical supplies/equipment focusing on symptoms. Outcome: Progressing Towards Goal  Oriented patient, wife, and son Rayshawn Boys to the facility and hospice plan of care.

## 2019-01-11 NOTE — PROGRESS NOTES
Problem: Mobility Impaired (Adult and Pediatric) Goal: *Acute Goals and Plan of Care (Insert Text) LTG: 
(1.)Mr. Navarro will move from supine to sit and sit to supine , scoot up and down and roll side to side in bed with STAND BY ASSIST within 7 treatment day(s). (2.)Mr. Navarro will transfer from bed to chair and chair to bed with MINIMAL ASSIST using the least restrictive device within 7 treatment day(s). (3.)Mr. Navarro will ambulate with MINIMAL ASSIST for 75 feet with the least restrictive device within 7 treatment day(s). (4.)Mr. Navarro will participate in therapeutic activity/exercises x 23 minutes for increased strength within 7 days. ________________________________________________________________________________________________ PHYSICAL THERAPY: Daily Note, Treatment Day: 3rd, AM 1/11/2019INPATIENT: Hospital Day: 13 Payor: [facility] MEDICARE / Plan: [facility] MEDICARE HMO/PPO / Product Type: Managed Care Medicare /  
  
NAME/AGE/GENDER: Navya Wood is a 80 y.o. male PRIMARY DIAGNOSIS: Seizure ([facility] [address] Persistent atrial fibrillation (HCC) Persistent atrial fibrillation ([facility] [address] ICD-10: Treatment Diagnosis:  
         · Generalized Muscle Weakness (M62.81) · Difficulty in walking, Not elsewhere classified (R26.2) · Other abnormalities of gait and mobility (R26.89) · History of falling (Z91.81) Precaution/Allergies: 
Patient has no known allergies. ASSESSMENT:  
Mr. Mary Pierce was supine in bed upon arrival and agreeable to bed-level PT participation today in order to receive assistance in dressing, bathing, and a brief change. Pt on 4L O2 via NC throughout session. Pt is preparing to be discharged to [facility] within the next hour. Pt performed rolling from supine to R and L sides of the bed, requiring maxA. Once in side-lying, pt able to use B UEs and Ramírez to support himself in sidelying. Pt performed side-lying to supine transfers with Ramírez.  Pt educated on proper rolling techniques using his B UEs. No improvement towards goals made this session. Will continue to follow pt throughout his inpatient stay. This section established at most recent assessment PROBLEM LIST (Impairments causing functional limitations): 1. Decreased Strength 2. Decreased ADL/Functional Activities 3. Decreased Transfer Abilities 4. Decreased Ambulation Ability/Technique 5. Decreased Balance 6. Decreased Activity Tolerance INTERVENTIONS PLANNED: (Benefits and precautions of physical therapy have been discussed with the patient.) 1. Balance Exercise 2. Bed Mobility 3. Family Education 4. Gait Training 5. Therapeutic Activites 6. Therapeutic Exercise/Strengthening 7. Transfer Training TREATMENT PLAN: Frequency/Duration: 3 times a week for duration of hospital stay Rehabilitation Potential For Stated Goals: Good RECOMMENDED REHABILITATION/EQUIPMENT: (at time of discharge pending progress): Due to the probability of continued deficits (see above) this patient will likely need continued skilled physical therapy after discharge. Equipment:  
? None at this time HISTORY:  
History of Present Injury/Illness (Reason for Referral): 
See H&P Past Medical History/Comorbidities:  
Mr. Barbie Benjamin  has a past medical history of Atherosclerosis of artery of extremity with ulceration ([address] Atherosclerosis of native arteries of extremity with intermittent claudication ([address] Atopic dermatitis, Atrial fibrillation ([address] CAD (coronary artery disease), Carotid stenosis, bilateral, CHF (congestive heart failure) ([address] Chronic kidney disease, Chronic obstructive pulmonary disease ([address] Cirrhosis ([address] Colon, diverticulosis, Coronary atherosclerosis of native coronary vessel, Diabetes ([address] Diastolic CHF, acute on chronic ([address] Failed CABG (coronary artery bypass graft), GI bleed, Gout, History of tobacco use, [place] (hard of hearing), Hypercholesterolemia, Hypertension, Hyperuricemia, Morbid obesity ([facility] [address] PAD (peripheral artery disease) ([facility] [address] Poor historian, Radiologic findings of lung field, abnormal, Seizure disorder ([facility] [address] Shortness of breath dyspnea, Thyroid disease, and Unspecified sleep apnea. Mr. Michael Lombardo  has a past surgical history that includes pr cardiac surg procedure unlist; hx coronary artery bypass graft (06-); hx cataract removal (Left, 2003); hx heart catheterization; hx coronary stent placement (02-); hx heent (1970s); hx colonoscopy; ULTRASOUND (Bilateral, 11/2/2018); THORACENTESIS (Right, 11/2/2018); ULTRASOUND GUIDED ACCESS VENA CAVA FILTER INSERTION (N/A, 9/12/2018); ESOPHAGOGASTRODUODENOSCOPY (EGD) (N/A, 8/5/2018); ESOPHAGOGASTRODUODENOSCOPY (EGD) (N/A, 1/27/2017); COLONOSCOPY  BMI 36 TO BE ADMITTED 1/26/17 (N/A, 1/27/2017); ESOPHAGOGASTRODUODENOSCOPY (EGD) (N/A, 1/22/2017); ESOPHAGOGASTRODUODENAL (EGD) BIOPSY (N/A, 1/22/2017); ESOPHAGOGASTRODUODENOSCOPY (EGD)   rm 610 (N/A, 5/8/2015); COLONOSCOPY (N/A, 1/24/2015); and ESOPHAGOGASTRODUODENOSCOPY (EGD)   rm 3101 (N/A, 1/23/2015). Social History/Living Environment:  
Home Environment: Skilled nursing facility [facility] Facility Name: [facility] One[facility] Residence: One story Living Alone: No 
Support Systems: Skilled nursing facility Patient Expects to be Discharged to[de-identified] Unknown Current DME Used/Available at Home: Wheelchair Prior Level of Function/Work/Activity: PTA was at SNF/rehab where he was getting pushed in wheelchair and given assistance for ADLs. Number of Personal Factors/Comorbidities that affect the Plan of Care: 3+: HIGH COMPLEXITY EXAMINATION:  
Most Recent Physical Functioning:  
Gross Assessment: 
AROM: Generally decreased, functional 
Strength: Generally decreased, functional 
Coordination: Generally decreased, functional 
         
  
Posture: 
  
Balance: 
  Bed Mobility: 
Rolling: Maximum assistance Scooting: Maximum assistance Interventions: Safety awareness training;Manual cues; Tactile cues; Verbal cues Wheelchair Mobility: 
  
Transfers: 
  
Gait: 
  
   
  
Body Structures Involved: 1. Metabolic 2. Endocrine 3. Muscles Body Functions Affected: 1. Neuromusculoskeletal 
2. Movement Related 3. Metobolic/Endocrine Activities and Participation Affected: 1. General Tasks and Demands 2. Mobility 3. Self Care 4. Domestic Life 5. Community, Social and [address] [place] [address] Number of elements that affect the Plan of Care: 4+: HIGH COMPLEXITY CLINICAL PRESENTATION:  
Presentation: Evolving clinical presentation with changing clinical characteristics: MODERATE COMPLEXITY CLINICAL DECISION MAKING:  
[facility] AM-PAC 6 Clicks Basic Mobility Inpatient Short Form How much difficulty does the patient currently have. .. Unable A Lot A Little None 1. Turning over in bed (including adjusting bedclothes, sheets and blankets)? [] 1   [] 2   [x] 3   [] 4  
2. Sitting down on and standing up from a chair with arms ( e.g., wheelchair, bedside commode, etc.)   [] 1   [x] 2   [] 3   [] 4  
3. Moving from lying on back to sitting on the side of the bed? [] 1   [] 2   [x] 3   [] 4 How much help from another person does the patient currently need. .. Total A Lot A Little None 4. Moving to and from a bed to a chair (including a wheelchair)? [] 1   [x] 2   [] 3   [] 4  
5. Need to walk in hospital room? [] 1   [x] 2   [] 3   [] 4  
6. Climbing 3-5 steps with a railing? [x] 1   [] 2   [] 3   [] 4  
© 2007, Trustees of [facility], under license to [employer]. All rights reserved Score:  Initial: 13 Most Recent: X (Date: -- ) Interpretation of Tool:  Represents activities that are increasingly more difficult (i.e. Bed mobility, Transfers, Gait). Score 24 23 22-20 19-15 14-10 9-7 6 Modifier CH CI CJ CK CL CM CN   
 
? Mobility - Walking and Moving Around:  - CURRENT STATUS: CL - 60%-79% impaired, limited or restricted  - GOAL STATUS: CK - 40%-59% impaired, limited or restricted  - D/C STATUS:  ---------------To be determined--------------- Payor: [facility] MEDICARE / Plan[place] [facility] [place]O / Product Type: Managed Care Medicare /   
 
Medical Necessity:    
· Patient demonstrates good rehab potential due to higher previous functional level. Reason for Services/Other Comments: 
· Patient continues to require skilled intervention due to decreased balance and functional mobility. Use of outcome tool(s) and clinical judgement create a POC that gives a: Questionable prediction of patient's progress: MODERATE COMPLEXITY  
  
 
 
 
TREATMENT:  
(In addition to Assessment/Re-Assessment sessions the following treatments were rendered) Pre-treatment Symptoms/Complaints: \"That strawberry milkshake was good. \" 
Pain: Initial:  
Pain Intensity 1: 0  Post Session:  No number reported Therapeutic Exercise: ( ):  Exercises per grid below to improve mobility, strength and activity tolerance. Required minimal visual and verbal cues to promote proper body alignment and promote proper body mechanics. Progressed repetitions as indicated. Date: 
1/1/19 Date: 
1/4/19 Date: 
  
ACTIVITY/EXERCISE AM PM AM PM AM PM  
Seated LAQ 1 x 15 B 
1 x 25 B   1 x 15 B 
1 x 20 B Seated marching 1 x 15 B 
1 x 25 B   1 x 15 B 
1 x 20 B Seated AP 1 x 15 B Seated hip abd/add 1 x 15 B   1 x 15 B 
1 x 20 B Heel taps    1 x 15 B 
1 x 20 B Toe taps    1 x 15 B 
1 x 20 B    
        
B = bilateral; AA = active assistive; A = active; P = passive Therapeutic Activity: (    24 minutes): Therapeutic activities including bed mobility and bed transfers to improve mobility, strength, balance, coordination and activity tolerance. Required max cuing   to promote coordination of bilateral, upper extremity(s), lower extremity(s). Braces/Orthotics/Lines/Etc:  
· alejo catheter · O2 Device: Nasal cannula Treatment/Session Assessment:   
· Response to Treatment:  See above. · Interdisciplinary Collaboration:  
o Physical Therapist 
o Registered Nurse · After treatment position/precautions:  
o Supine in bed 
o Bed alarm/tab alert on 
o Bed/Chair-wheels locked 
o Bed in low position 
o Call light within reach 
o RN notified · Compliance with Program/Exercises: Will assess as treatment progresses · Recommendations/Intent for next treatment session: \"Next visit will focus on advancements to more challenging activities and reduction in assistance provided\". Total Treatment Duration: PT Patient Time In/Time Out Time In: 1010 Time Out: 1034 Ky Moody DPT

## 2019-01-11 NOTE — PROGRESS NOTES
Bedside and Verbal shift change report given to self (oncoming nurse) by Moo Terry RN (offgoing nurse). Report included the following information SBAR, Kardex, ED Summary, Procedure Summary, Intake/Output, MAR, Recent Results and Cardiac Rhythm A Fib.

## 2019-01-11 NOTE — PROGRESS NOTES
TRANSFER - OUT REPORT: 
 
Verbal report given to [facility]) on [place]  being transferred to [facility] (unit) for routine progression of care Report consisted of patients Situation, Background, Assessment and  
Recommendations(SBAR). Information from the following report(s) SBAR, Kardex and MAR was reviewed with the receiving nurse. Lines:  
Peripheral IV 01/09/19 Anterior; Inferior; Lower;Proximal;Right Arm (Active) Site Assessment Clean, dry, & intact 1/11/2019  7:15 AM  
Phlebitis Assessment 0 1/11/2019  7:15 AM  
Infiltration Assessment 0 1/11/2019  7:15 AM  
Dressing Status Clean, dry, & intact 1/11/2019  7:15 AM  
Dressing Type Transparent;Tape 1/11/2019  7:15 AM  
Hub Color/Line Status Patent 1/11/2019  7:15 AM  
  
 
Opportunity for questions and clarification was provided. Patient transported with: 
IV and alejo catheter for continued care at hospice house. Heart monitor removed from patient for transport to hospice house.

## 2019-01-11 NOTE — PROGRESS NOTES
Bedside and Verbal shift change report given to Jarret1 W Tony Couch (oncoming nurse) by self Av Mock nurse). Report included the following information SBAR, Kardex and MAR. Patient opened eyes to light touch and verbalized some comprehensible phrases at shift change, patient resting in bed, no distress. Oncoming Rn assuming care.

## 2019-01-11 NOTE — PROGRESS NOTES
Bedside and Verbal shift change report given to Sherman Che RN (oncoming nurse) by self Yarelis Ping nurse). Report included the following information SBAR, Kardex, Intake/Output, MAR, Recent Results and Cardiac Rhythm A Fib.

## 2019-01-11 NOTE — PROGRESS NOTES
Problem: Falls - Risk of 
Goal: *Absence of Falls Document Anastasia Ceballos Fall Risk and appropriate interventions in the flowsheet. Outcome: Progressing Towards Goal 
Fall Risk Interventions: 
Mobility Interventions: Bed/chair exit alarm, Communicate number of staff needed for ambulation/transfer, Patient to call before getting OOB Mentation Interventions: Bed/chair exit alarm, Door open when patient unattended, More frequent rounding, Reorient patient, Room close to nurse's station Medication Interventions: Assess postural VS orthostatic hypotension, Bed/chair exit alarm, Patient to call before getting OOB, Teach patient to arise slowly Elimination Interventions: Bed/chair exit alarm, Call light in reach, Toileting schedule/hourly rounds History of Falls Interventions: Bed/chair exit alarm, Investigate reason for fall, Room close to nurse's station

## 2019-01-11 NOTE — PROGRESS NOTES
Zaira 65. Transport 10:30am. All parties in agreement, no further needs noted. Care Management Interventions PCP Verified by CM: Yes Last Visit to PCP: 09/27/18 [facility] Time of Discharge: 21  Transition of Care Consult (CM Consult): Discharge Planning, Hospice House Discharge Durable Medical Equipment: No 
Physical Therapy Consult: Yes Occupational Therapy Consult: No 
Speech Therapy Consult: Yes Current Support Network: [address] [place] [address] Confirm Follow Up Transport: Family Plan discussed with Pt/Family/Caregiver: Yes Freedom of Choice Offered: Yes Discharge Location Discharge Placement: Other:([facility])

## 2019-01-12 NOTE — H&P
History and Physical    Patient: James Pop MRN: [id number]  SSN: [id number]    YOB: 1932  Age: 80 y.o. Sex: male      Subjective: James Pop is a 80 y.o. male who has a hx of afib not on [address] d/t hx of GIB, GERD, HTN, hx of DM (not currently on meds), Cirrhosis, diastolic CHF, CKD, COPD on 4-5LNC cont, BPH, CAD s/p CABG, PAD presents from [facility] after report of staff witnessing seizure activity x 5 min described to ER as tonic clonic. ER workup notable for pt bradycardic HR in 40's afib. Hgb 7.4, Cr 2.2 (baseline around 1.8-1.9). CXR with bibasilar effusions. 100% on 6lnc. CT head non acute. EEG negative, do not believe this was true seizure activity.  H/H dropped to 7.0 after admission to floor and pt transfused 1 unit pbrc. Seen by cardiology who started dobumatine gtt for worsening bradycardia after admission to tele unit. Ruby Renee was weaned off dobutamine.  Finished course of abx for UTI.  GI consulted and followed for c/w GI bleed.  Found to have possibly obstructing gallstones, but not a good candidate for ERCP - medical management.  Hgb slowly trending down.  Given additional 1 unit PRBCs.  Now more edematous.  Palliative Care following. Of note, has had two recent hospital admissions. Admitted to [facility] in November for septic shock from PNA. Was discharged to [facility] then admitted to [facility] 12/5-12/14 for pseudomonas uti, cellulitis, ach/chronic resp failure, urinary retention now w/ alejo. During his hospitalization his seizures abated and his respiratory status stabilized but he became increasing delirious and required parenteral medications for this. The attending and consulting physicians felt that further aggressive diagnostic or therapeutic interventions would not be of any benefit. The family and patient desire comfort measures. The patient has been transferred to [facility] for comfort measures.   If he can be weaned off his parenteral medications that he may well transition to hospice home care.           Past Medical History:   Diagnosis Date    Atherosclerosis of artery of extremity with ulceration ([facility] 4/17/2015    Atherosclerosis of native arteries of extremity with intermittent claudication ([address] 4/17/2015    Atopic dermatitis 11/21/2012    Atrial fibrillation (HCC)     CAD (coronary artery disease)     Carotid stenosis, bilateral 11/21/2012    50-79%  3-2010    1. Carotid Doppler (6/1/07): Greater than 70% stenosis in proximal LICA. 50% stenosis in right ICA. 2.  CTA of neck (3/15/10): Occluded distal segments of the vertebral arteries bilaterally. Atherosclerosis of the carotid bulbs bilaterally with a 50% stenosis on the left and a stenosis of less than 30% on the right. Small nodule in the right upper lobe near the apex. 3. CTA (8/29/13):  Less than 30% diameter stenosis of the cervical internal carotid arteries bilaterally.  CHF (congestive heart failure) ([address] 11/21/2012    Chronic kidney disease     hx elevated labs    Chronic obstructive pulmonary disease (HCC)     Cirrhosis ([facility] 11/7/2018    Colon, diverticulosis 1/24/2015    Coronary atherosclerosis of native coronary vessel 10/31/2016    1. Cath (5/31/07):  LMCA: 25%. LAD: ostial 75-95% stenosis. 50-75% mid. D1:  25-50%. OM3: small with 75% stenosis. RCA: 100% mid. with left to right collaterals. 2 Vessel CABG (6/4/07):  LIMA to LAD and SVG to OM. SVG was anastomosed proximally to LIMA to due severe atherosclerosis of aorta. 3.  Lexiscan cardiolite (11/30/10): Abnormal with reversible lateral wall defects. Unable to gate given atrial fibrillation. 4.  LHC (1/14/11):  EF 60%. LVEDP 21. Patent LIMA to LAD and SVG to OM1 (off LIMA). occluded small RCA with left to right collaterals. Small D1 (2 mm vessel) with 70% mid stenosis.  Diabetes ([address]     Diastolic CHF, acute on chronic ([address] 9/12/2015    1. Echo (9/11/15) : EF 55-60%. Mild LVH.   Moderate biatrial enlargement. Moderate mitral/tricuspid regurgitation.  Failed CABG (coronary artery bypass graft) 2012    GI bleed 2015    Hospitalized [facility]    Gout 2012    History of tobacco use     [place] (hard of hearing)     Hypercholesterolemia     Hypertension     Hyperuricemia 2012    Morbid obesity ([address]     PAD (peripheral artery disease) ([facility] 2012    1. Bilateral proximal common iliac PCI (08):  8.0 X 100 mm Cordis smart stent on right and 10 X 40 mm cordis smart stent on right. Both inflated to 7.0 mm.  Poor historian     Radiologic findings of lung field, abnormal 10/31/2016    1. CT of chest  (11/24/10): Multiple small nodules in the right lobe and stable interstitial prominence. Consistent with chronic lung disease. No evidence of malignancy.     Seizure disorder ([facility] 2013    Shortness of breath dyspnea 2013    Thyroid disease     Unspecified sleep apnea     uses CPAP     Past Surgical History:   Procedure Laterality Date    CARDIAC SURG PROCEDURE UNLIST      cath ,cabg ,lexiscan cardiolite 11/10    COLONOSCOPY N/A 2017    COLONOSCOPY  BMI 36 TO BE ADMITTED 17 performed by Adrián Campbell [facility] at [facility] ENDOSCOPY    HX CATARACT REMOVAL Left     os    HX COLONOSCOPY      HX CORONARY ARTERY BYPASS GRAFT  2007    HX CORONARY STENT PLACEMENT  2008    bilateral iliac artery PCI and stents    HX HEART CATHETERIZATION      left-2007, 2011    HX HEENT  1970s    neck lipoma      Family History   Problem Relation Age of Onset    Heart Attack Mother    [facility] Other Father         old age   [facility] Other Brother         brain aneurysm    Heart Disease Other         2 children  with heart concerns, 36 & 49 yo    Heart Disease Son      Social History     Tobacco Use    Smoking status: Former Smoker     Packs/day: 1.00     Years: 45.00     Pack years: 45.00     Types: Cigarettes     Last attempt to quit: 1984     Years since quittin.0    Smokeless tobacco: Never Used    Tobacco comment: (stopped smoking in 1980s)   Substance Use Topics    Alcohol use: No     Alcohol/week: 0.0 oz      Prior to Admission medications    Medication Sig Start Date End Date Taking? Authorizing Provider   furosemide (LASIX) 20 mg tablet Take 1-2 Tabs by mouth daily. 10/26/18  Yes Sara Brunson MD   isosorbide mononitrate ER (IMDUR) 30 mg tablet Take 1 Tab by mouth daily. 18  Yes Viet Riley MD   pravastatin (PRAVACHOL) 40 mg tablet Take 1 Tab by mouth nightly. 18  Yes Viet Riley MD   hydrALAZINE (APRESOLINE) 10 mg tablet TAKE 1 TABLET THREE TIMES DAILY. 18  Yes Viet Riley MD   aspirin 81 mg chewable tablet Take 1 Tab by mouth daily. 18  Yes Sherri Patel MD   glipiZIDE (GLUCOTROL) 5 mg tablet Take  by mouth two (2) times a day. Yes Provider, Historical   gabapentin (NEURONTIN) 100 mg capsule Take  by mouth three (3) times daily. Yes Provider, Historical   hydrocortisone (PROCTOSOL HC) 2.5 % rectal cream Insert  into rectum every six (6) hours as needed for Hemorrhoids. Yes Provider, Historical   levothyroxine (SYNTHROID) 50 mcg tablet Take 1 Tab by mouth Daily (before breakfast). 18  Yes Viet Riley MD   OXYGEN-AIR DELIVERY SYSTEMS Take  by inhalation continuous. 2LPM to keep 02 sat >90%   Yes Provider, Historical   bisacodyl (DULCOLAX) 5 mg EC tablet Take 1 Tab by mouth daily. Patient taking differently: Take 5 mg by mouth every evening. 18  Yes Kirsten Blood MD   latanoprost (XALATAN) 0.005 % ophthalmic solution Administer 1 Drop to both eyes nightly. Yes Provider, Historical   sertraline (ZOLOFT) 50 mg tablet Take one tablet each evening for anxiety  Indications: Generalized Anxiety Disorder 18  Yes Viet Riley MD   fluticasone (FLONASE) 50 mcg/actuation nasal spray 2 Sprays by Both Nostrils route daily.  18  Yes Viet Riley MD   cholecalciferol (VITAMIN D3) 1,000 unit cap Take 1,000 Units by mouth daily. Yes Provider, Historical   ipratropium-albuterol (COMBIVENT RESPIMAT)  mcg/actuation inhaler Take 1 Puff by inhalation every six (6) hours. 9/21/17  Yes Colon Vaughan, NP   ferrous sulfate 324 mg (65 mg iron) tablet Take  by mouth Daily (before breakfast). Yes Provider, Historical   cpap machine kit by Does Not Apply route. Bilevel 12/8   Yes Provider, Historical   acetaminophen (TYLENOL) 325 mg tablet Take 2 Tabs by mouth every four (4) hours as needed. 1/11/19   Evangelina Knight, DO   budesonide (PULMICORT) 0.5 mg/2 mL nbsp 2 mL by Nebulization route two (2) times a day. 1/11/19   Evangelina Knight, DO   guaiFENesin (ORGANIDIN) 200 mg tablet Take 1 Tab by mouth every four (4) hours (while awake). 1/11/19   Evangelina Knight, DO   haloperidol lactate (HALDOL) 5 mg/mL injection 0.4 mL by IntraVENous route every six (6) hours as needed. 1/11/19   Evangelina Knight, DO   lactulose (CHRONULAC) 10 gram/15 mL solution Take 30 mL by mouth two (2) times a day. 1/11/19   Evangelina Knight, DO   levETIRAcetam (KEPPRA) 500 mg tablet Take 1 Tab by mouth daily. 1/12/19   Evangelina Knight, DO   LORazepam (ATIVAN) 2 mg/mL injection 0.25 mL by IntraVENous route every six (6) hours as needed. Max Daily Amount: 2 mg. 1/11/19   Evangelina Knight DO   morphine (ROXANOL) 20 mg/mL concentrated oral syringe Take 0.25-0.5 mL by mouth every four (4) hours as needed. Max Daily Amount: 60 mg. 1/11/19   Derek Garrett, DO   morphine 2 mg/mL injection 1 mL by IntraVENous route every two (2) hours as needed. Max Daily Amount: 24 mg. 1/11/19   Evangelina Knight DO   oxyCODONE IR (ROXICODONE) 5 mg immediate release tablet Take 1 Tab by mouth every six (6) hours as needed. Max Daily Amount: 20 mg. 1/11/19   Evangelina Knight DO   pantoprazole (PROTONIX) 40 mg tablet Take 1 Tab by mouth Before breakfast and dinner.  1/11/19   Derek Garrett DO potassium chloride (K-DUR, KLOR-CON) 20 mEq tablet Take 2 Tabs by mouth daily. 1/12/19   Joellen Knight DO   rifAXIMin Cruz Ember) 550 mg tablet Take 1 Tab by mouth two (2) times a day. 1/11/19   Joellen Knight DO   scopolamine (TRANSDERM-SCOP) 1 mg over 3 days pt3d 1 Patch by TransDERmal route every seventy-two (72) hours. 1/12/19   Joellen Knight DO   ursodiol (ACTIGALL) 300 mg capsule Take 1 Cap by mouth three (3) times daily (with meals). 1/11/19   Lavmehreen Barr,         No Known Allergies    Review of Systems: The remainder of the admission historical database is as outlined in the Clinical Record. Objective:     Vitals:    01/11/19 1130 01/11/19 1351 01/12/19 0600   BP: 105/58  117/62   Pulse: 65  75   Resp: 17  18   Temp: 98.4 °F (36.9 °C)  96.1 °F (35.6 °C)   Weight:  99.1 kg (218 lb 6.4 oz)    Height:  6' 2\" (1.88 m)         Physical Exam:      Vital signs are stable as outlined. Skin examination reveals extensive ecchymoses related mostly to senile purpura and hospitalization  With a skin tear in the left arm. Head and neck examination reveals no jaundice adenopathy or cervical venous distention. Thorax examination reveals no chest wall tenderness with distant breath sounds spontaneous wheezing or respiratory distress. Cardiovascular examination reveals a regular rhythm without murmurs or rubs. Abdominal examination revealed no palpable masses or tenderness. Extremities reveal moderate osteoarthritic changes with no acute inflamed or unstable joints    . Peripheral vascular examination reveals no edema or calf tenderness and adequate peripheral pulses without peripheral ischemic changes. Neurologic examination reveals a little confusion but is cooperative) no lateralizing abnormalities.     Assessment:     Hospital Problems  Date Reviewed: 1/10/2019          Codes Class Noted POA    * (Principal) Respiratory failure with hypoxia ([facility] ICD-10-CM: J96.91  ICD-9-CM: 518.81  1/10/2019 Unknown        COPD exacerbation ([facility] ICD-10-CM: J44.1  ICD-9-CM: 491.21  11/1/2018 Yes        Acute on chronic systolic (congestive) heart failure (HCC) ICD-10-CM: I50.23  ICD-9-CM: 428.23, 428.0  12/12/2018 Yes        Acute on chronic renal failure [facility]) ICD-10-CM: N17.9, N18.9  ICD-9-CM: 584.9, 585.9  10/31/2018 Yes        S/P IVC filter (Chronic) ICD-10-CM: I01.053  ICD-9-CM: V45.89  9/21/2018 Yes    Overview Signed 10/31/2018  3:20 PM by Milagros eBy NP     9/12/18  Dr. Fouzia Vital:     Current Facility-Administered Medications   Medication Dose Route Frequency    sodium chloride (NS) flush 3 mL  3 mL IntraVENous PRN    morphine (ROXANOL) concentrated oral syringe 10 mg  10 mg Oral Q30MIN PRN    Or    morphine (ROXANOL) concentrated oral syringe 10 mg  10 mg SubLINGual Q30MIN PRN    morphine injection 2 mg  2 mg SubCUTAneous Q20MIN PRN    Or    morphine injection 2 mg  2 mg IntraVENous Q20MIN PRN    acetaminophen (TYLENOL) tablet 650 mg  650 mg Oral Q4H PRN    acetaminophen (TYLENOL) suppository 650 mg  650 mg Rectal Q3H PRN    loperamide (IMODIUM) capsule 4 mg  4 mg Oral PRN    senna (SENOKOT) tablet 8.6 mg  1 Tab Oral BID    hyoscyamine SL (LEVSIN/SL) tablet 0.125 mg  0.125 mg SubLINGual Q4H PRN    LORazepam (ATIVAN) injection 2 mg  2 mg IntraVENous Q2H PRN    glycopyrrolate (ROBINUL) injection 0.2 mg  0.2 mg IntraVENous Q4H PRN    haloperidol lactate (HALDOL) injection 2 mg  2 mg IntraVENous Q2H PRN    sodium chloride (NS) flush 3 mL  3 mL IntraVENous Q12H    sodium chloride (NS) flush 3 mL  3 mL IntraVENous PRN    latanoprost (XALATAN) 0.005 % ophthalmic solution 1 Drop (Patient Supplied)  1 Drop Both Eyes QHS       The patient is comfortable the present time. We will adjust his medications and advance his activities. It is possible that he will transition to stability with possible at home hospice care sometime next week.       Signed By: Sumaya Naik MD     January 12, 2019

## 2019-01-12 NOTE — PROGRESS NOTES
Problem: Falls - Risk of  Goal: *Absence of Falls  Document Nehemiah Fall Risk and appropriate interventions in the flowsheet. Outcome: Progressing Towards Goal  Fall Risk Interventions:  Mobility Interventions: Bed/chair exit alarm    Mentation Interventions: Bed/chair exit alarm, Door open when patient unattended, Evaluate medications/consider consulting pharmacy, Reorient patient    Medication Interventions: Bed/chair exit alarm, Evaluate medications/consider consulting pharmacy    Elimination Interventions: Bed/chair exit alarm, Call light in reach    History of Falls Interventions: Bed/chair exit alarm, Door open when patient unattended, Evaluate medications/consider consulting pharmacy        Problem: Pressure Injury - Risk of  Goal: *Prevention of pressure injury  Document Tam Scale and appropriate interventions in the flowsheet.   Outcome: Progressing Towards Goal  Pressure Injury Interventions:  Sensory Interventions: Assess changes in LOC, Check visual cues for pain    Moisture Interventions: Absorbent underpads, Internal/External urinary devices, Moisture barrier, Apply protective barrier, creams and emollients    Activity Interventions: Pressure redistribution bed/mattress(bed type)    Mobility Interventions: Pressure redistribution bed/mattress (bed type)    Nutrition Interventions: Document food/fluid/supplement intake    Friction and Shear Interventions: Apply protective barrier, creams and emollients, Lift sheet

## 2019-01-12 NOTE — PROGRESS NOTES
Pt I'd by name and . Pt calm. Drowsy. No distress. No facial grimace. Flacc =0-1. Resp non labored on 5L n/c. Lungs diminished. BS diminished. HR irreg. No edema noted at this time. Adams cath draining  kody urine. Right peripheral line patent. Dressing clean, dry,intact. SR up x2. Bed low/locked. Call light with in reach. Door opened. Tab alerts on.

## 2019-01-13 NOTE — PROGRESS NOTES
Bedside Report taken from Gurwinder Almodovar RN. Pt identified. Pt in bed with eyes closed; displaying no signs or symptoms of pain, dyspnea, agitation,nausea, or vomiting. FLACC 0. Bed locked and low, side rails up, tabs/bed alarm in place for pt. safety. Call light with in reach, and door opened for continued monitoring.

## 2019-01-13 NOTE — PROGRESS NOTES
Pt alert, oriented to self, able to make needs known at times. Drowsy, face relaxed, resps non-labored. Pt had 3 large formed soft BMs this am, following administration of am Senna, so PM Senna held per order. Pt had dyspnea and pain, with all the bed changes, and repositionings, so prn Roxanol was given for comfort. PRN effective in symptom management at this time. Dressing change done per orders, and pt tolerated well. Pt sleepy following prn, and sleeps through most of visit with wife and son, who were present at bedside for a good amount of time this afternoon. Teaching on disease process and progression and symptom and medication management with family, and questions encouraged. Understanding of teaching verbalized, and active listening and emotional support provided. Pt resting comfortably at present, and no further needs noted. Safety measures in place.

## 2019-01-14 NOTE — PROGRESS NOTES
Pt I'd by name and . Pt calm. Denies pain at this time. No distress. No facial grimace. Flacc =0-1. Resp non labored on 5L n/c. Lungs diminished. BS diminished. HR irreg. No edema noted at this time. Adams cath draining  kody urine. Right peripheral line patent. Dressing clean, dry,intact. SR up x2. Bed low/locked. Call light with in reach. Door opened. Tab alerts on.

## 2019-01-14 NOTE — PROGRESS NOTES
This nurse hears pt yelling, \"Help! Help me, please! Help! \" from hallway. Upon entrance to room, pt appears frightened, states, \"Where am I? I can't breath! I need to breath! \"  This nurse sits head of pt's bed up, turns fan on above him, and places a cool washcloth to pt's forehead. Attempts to distract/redirect are somewhat effective, but continues to be dyspneic, with resps in 35s. Pt confused, shifts weight in bed, states he needs to get up \"now\". Pt does not know where he needs to go, but feels he needs to go somewhere. This nurse talks to pt about where he is, and provides active listening and emotional support. Accessory muscle use noted, as pt works hard to breath. Pt then given prn morphine and ativan for symptom management, when pt still unable to settle and states that he just wants to be able to rest.  This nurse stays with pt until meds become effective, and pt is able to rest with eyes closed, face relaxed. Resps ease and slow to 20. Safety measures in place along with frequent rounding.

## 2019-01-14 NOTE — PROGRESS NOTES
Report received from off-going nurse, visual identification made, assumed care of pt. Pt resting quietly with eyes closed, no agitation or restlessness, no grimacing or groaning. Pt respirations unlabored. Tab alert in place, rails up x 2, bed in lowest position, safety maintained. FLACC 0.  0710 pt asking for blanket. 3688 physical assessment completed. Pt not responsive enough to take oral medication  1032 pt anxious, dyspneic, morphine and ativan administered slow iv push. 1400 pt wife and son at bedside, discussed assessment findings. 1438 pt agitated and dyspneic, administered morphine and haldol  1530 Follow up on prn medication, pt resting in bed with eyes closed, FLACC =0, no s/sx of dyspnea, agitation or n/v. Will continue to monitor. 1800 pt continues to rest quietly, no grimacing groaning or agitation.  Pt did not require any further prn after the 1438 dose

## 2019-01-14 NOTE — [facility] IDG MASTER NOTE
[facility]osp[facility] Interdisciplinary Group Collaborative  Date: 01/14/19  Time: 4:58 PM    ___________________    Patient: Chayito Valentin  ___________________    Diagnoses:  Diagnoses of Acute respiratory failure with hypoxia ([facility] [address] COPD exacerbation ([facility] [address] Acute on chronic systolic (congestive) heart failure ([facility] [address] S/P IVC filter, ALFREDITO (acute kidney injury) ([facility] [address] Coma with Luh coma scale score not fully reported, in the field (EMT or ambulance) ([facility] [address] CKD (chronic kidney disease) stage 3, GFR 30-59 ml/min ([facility] [address] Pleural effusion, and Restrictive lung disease were pertinent to this visit.     Current Medications:    Current Facility-Administered Medications:     bisacodyl (DULCOLAX) suppository 10 mg, 10 mg, Rectal, PRN, Wash Me, NP    sodium chloride (NS) flush 3 mL, 3 mL, IntraVENous, PRN, Marco Glasgow MD, 3 mL at 01/14/19 1439    morphine (ROXANOL) concentrated oral syringe 10 mg, 10 mg, Oral, Q30MIN PRN, 10 mg at 01/12/19 1226 **OR** morphine (ROXANOL) concentrated oral syringe 10 mg, 10 mg, SubLINGual, Q30MIN PRN, Marco Glasgow MD    morphine injection 2 mg, 2 mg, SubCUTAneous, Q20MIN PRN **OR** morphine injection 2 mg, 2 mg, IntraVENous, Q20MIN PRN, Marco Glasgow MD, 2 mg at 01/14/19 1437    acetaminophen (TYLENOL) tablet 650 mg, 650 mg, Oral, Q4H PRN, Marco Glasgow MD    acetaminophen (TYLENOL) suppository 650 mg, 650 mg, Rectal, Q3H PRN, Marco Glasgow MD    loperamide (IMODIUM) capsule 4 mg, 4 mg, Oral, PRN, Marco Glasgow MD    senna (SENOKOT) tablet 8.6 mg, 1 Tab, Oral, BID, Almita Edmondson MD, Stopped at 01/14/19 1355    hyoscyamine SL (LEVSIN/SL) tablet 0.125 mg, 0.125 mg, SubLINGual, Q4H PRN, Marco Glasgow MD    LORazepam (ATIVAN) injection 2 mg, 2 mg, IntraVENous, Q2H PRN, Wash Me, NP, 2 mg at 01/14/19 1437    glycopyrrolate (ROBINUL) injection 0.2 mg, 0.2 mg, IntraVENous, Q4H PRN, Wash Me, NP    haloperidol lactate (HALDOL) injection 2 mg, 2 mg, IntraVENous, Q2H PRN, Naveen Longoria NP, 2 mg at 01/14/19 0414    sodium chloride (NS) flush 3 mL, 3 mL, IntraVENous, Q12H, Naveen Longoria, NP, 3 mL at 01/14/19 0923    latanoprost (XALATAN) 0.005 % ophthalmic solution 1 Drop (Patient Supplied), 1 Drop, Both Eyes, QHS, Naveen Longoria, NP, 1 Drop at 01/13/19 2101    Orders:  Orders Placed This Encounter    IP CONSULT TO SPIRITUAL CARE     Standing Status:   Standing     Number of Occurrences:   1     Order Specific Question:   Reason for Consult: Answer: Once on week one, then PRN. For [facility] Patients Only. For contracted patients, their primary hospice will continue to manage spiritual care needs.  DIET PLEASURE     Standing Status:   Standing     Number of Occurrences:   1    VITAL SIGNS     Standing Status:   Standing     Number of Occurrences:   1    NURSING-MISCELLANEOUS: Comfort Care Measures CONTINUOUS     Standing Status:   Standing     Number of Occurrences:   1     Order Specific Question:   Description of Order:     Answer:   Comfort Care Measures    VITAL SIGNS     Standing Status:   Standing     Number of Occurrences:   1    WHYTE CATHETER, CARE     1. Whyte Catheter care every shift and PRN  2. Notify Physician of Whyte Catheter leakage, occlusion, gross adherent sediment or accidental removal  3. Change Whyte 30 days after insertion. Standing Status:   Standing     Number of Occurrences:   1    BLADDER CHECKS     May scan bladder PRN for urinary retention and or patient discomfort     Standing Status:   Standing     Number of Occurrences:   1    PAIN ASSESSMENT Pain and Symptoms: Assess ever 4 hours and PRN, for GIP level of care. PRN Routine     Standing Status:   Standing     Number of Occurrences:   1     Order Specific Question:   Please describe the test or procedure you would like to order. Answer:   Pain and Symptoms: Assess ever 4 hours and PRN, for GIP level of care.     BEDREST, COMPLETE     Standing Status: Standing     Number of Occurrences:   1    NURSING-MISCELLANEOUS: SUMMARY: 80 male with severe COPD and CHF admitted for progressive AMS and respiratory distress. Thanx  CONTINUOUS     80 male with severe COPD and CHF admitted for progressive AMS and respiratory distress. Thanx     Standing Status:   Standing     Number of Occurrences:   1     Order Specific Question:   Description of Order:     Answer:   SUMMARY:    WOUND CARE, DRESSING CHANGE     Wound Care:  Location: left forearm (PRESENT ON ADMISSION)  Skin Tear: Cleanse skin with wound cleanser. Reposition loose skin flap with a sterile Q-Tip then secure with Steri-Strip prn. Cover with non stick dressing and change every 3 days. Assess every shift and PRN. Standing Status:   Standing     Number of Occurrences:   5    WOUND CARE, DRESSING CHANGE     Wound Care:  Location: right heel (PRESENT ON ADMISSION)  Ruptured blister (Cavalon)- Cleanse wound location with wound cleanser, pat dry and apply Cavilon Durable Barrier Cream every 12 hours and PRN if soiled. Turn every 2 hours. Assess with each nursing assessment visit. Standing Status:   Standing     Number of Occurrences:   29    WOUND CARE, DRESSING CHANGE     Wound Care:  Location: sacrum (PRESENT ON ADMISSION)  Decubitus Wound Stage II (Cavalon)- Cleanse wound location with wound cleanser, pat dry and apply Cavilon Durable Barrier Cream every 12 hours and PRN if soiled. Turn every 2 hours. Assess with each nursing assessment visit. Standing Status:   Standing     Number of Occurrences:   29    WOUND CARE, DRESSING CHANGE     Wound Care:  Location: head of penis  Decubitus Wound Stage II (Cavalon)- Cleanse wound location with wound cleanser, pat dry and apply Cavilon Durable Barrier Cream every 12 hours and PRN if soiled. Turn every 2 hours. Assess with each nursing assessment visit.      Standing Status:   Standing     Number of Occurrences:   29    REASONS TO CONTINUE WHYTE CATHETER DUE TO     Standing Status:   Standing     Number of Occurrences:   1     Order Specific Question:   REASONS:     Answer:   End of Life Care    NURSING-MISCELLANEOUS: admit 1/11: Admitted GIP with hypoxic respiratory failure for management of pain, dyspnea and agitation. DIAGNOSIS: hypoxic respiratory failure (CHF, COPD, recurrent G. I. bleed, oral anticoagulants, chronic liver disease, ga. .. 1/11: Admitted GIP with hypoxic respiratory failure for management of pain, dyspnea and agitation. DIAGNOSIS: hypoxic respiratory failure (CHF, COPD, recurrent G. I. bleed, oral anticoagulants, chronic liver disease, gallstones, chronic kidney disease, type two DM)         Danish Borja[de-identified] This is an order to admit an 51-year-old male with long-standing COPD/CHF who has developed a series of complications starting with DVT and subsequent anticoagulation that has resulted in this most recent hospitalization for respiratory failure and increasing delirium. Despite aggressive hospital care the patient is now delirious, breathless, bedbound, not eating and requiring parenteral medications for breathlessness and agitation. There is no benefit from additional aggressive therapy. The family desires comfort measures. The patient is a DNR. Death is likely in the next few weeks. The patient will be admitted to [address] [place] [address] when a bed is available for symptomatic and  end of life care. Standing Status:   Standing     Number of Occurrences:   1     Order Specific Question:   Description of Order:     Answer:   admit    DO NOT RESUSCITATE     Standing Status:   Standing     Number of Occurrences:   1     Order Specific Question:   Comfort Measures Only? Answer: Yes    OXYGEN CANNULA Liters per minute: 5; Indications for O2 therapy: RESPIRATORY DISTRESS PRN Routine     Standing Status:   Standing     Number of Occurrences:   1     Order Specific Question:   Liters per minute:      Answer:   5     Order Specific Question: Indications for O2 therapy     Answer:   RESPIRATORY DISTRESS    sodium chloride (NS) flush 3 mL    OR Linked Order Group     morphine (ROXANOL) concentrated oral syringe 10 mg     morphine (ROXANOL) concentrated oral syringe 10 mg    OR Linked Order Group     morphine injection 2 mg     morphine injection 2 mg    acetaminophen (TYLENOL) tablet 650 mg    acetaminophen (TYLENOL) suppository 650 mg    loperamide (IMODIUM) capsule 4 mg    senna (SENOKOT) tablet 8.6 mg    hyoscyamine SL (LEVSIN/SL) tablet 0.125 mg    DISCONTD: glycopyrrolate (ROBINUL) injection 0.2 mg    DISCONTD: haloperidol lactate (HALDOL) injection 2 mg    LORazepam (ATIVAN) injection 2 mg    glycopyrrolate (ROBINUL) injection 0.2 mg    haloperidol lactate (HALDOL) injection 2 mg    sodium chloride (NS) flush 3 mL    DISCONTD: sodium chloride (NS) flush 3 mL    latanoprost (XALATAN) 0.005 % ophthalmic solution 1 Drop (Patient Supplied)     OP SIG:Administer 1 Drop to both eyes nightly.  bisacodyl (DULCOLAX) suppository 10 mg    furosemide (LASIX) injection 40 mg    INITIAL PHYSICIAN ORDER: HOSPICE Level Of Care: General Inpatient; Reason for Admission: resp failure for breathing and delirium mgmt     Standing Status:   Standing     Number of Occurrences:   1     Order Specific Question:   Status     Answer:   Hospice     Order Specific Question:   Level Of Care     Answer:   General Inpatient     Order Specific Question:   Reason for Admission     Answer:   resp failure for breathing and delirium mgmt     Order Specific Question:   Inpatient Hospitalization Certified Necessary for the Following Reasons     Answer:   3.  Patient receiving treatment that can only be provided in an inpatient setting (further clarification in H&P documentation)     Order Specific Question:   Admitting Diagnosis     Answer:   Respiratory failure with hypoxia [facility]) [[id number]]     Order Specific Question:   Terminal Prognosis Diagnosis(es) Answer:   Respiratory failure with hypoxia [facility]) [[id number]]     Order Specific Question:   Admitting Physician     Answer:   Adelaide Bess     Order Specific Question:   Attending Physician     Answer:   Adelaide Bess    INITIAL PHYSICIAN ORDER: HOSPICE Level Of Care: General Inpatient; Reason for Admission: resp mgmt of COPD and CHF     Standing Status:   Standing     Number of Occurrences:   1     Order Specific Question:   Status     Answer:   Hospice     Order Specific Question:   Level Of Care     Answer:   General Inpatient     Order Specific Question:   Reason for Admission     Answer:   resp mgmt of COPD and CHF     Order Specific Question:   Inpatient Hospitalization Certified Necessary for the Following Reasons     Answer:   3. Patient receiving treatment that can only be provided in an inpatient setting (further clarification in H&P documentation)     Order Specific Question:   Admitting Diagnosis     Answer:   Respiratory failure with hypoxia [facility]) [[id number]]     Order Specific Question:   Terminal Prognosis Diagnosis(es)     Answer:   Respiratory failure with hypoxia [facility]) [[id number]]     Order Specific Question:   Admitting Physician     Answer:   Adelaide Bess     Order Specific Question:   Attending Physician     Answer:   Benson Rodriguez [[id number]]    IP CONSULT TO SOCIAL WORK     Standing Status:   Standing     Number of Occurrences:   1     Order Specific Question:   Reason for Consult: Answer: For [facility] Only. For contracted patients, their primary hospice will continue to manage social work needs.        Allergies:  No Known Allergies    Care Plan:  Multidisciplinary Problems (Active)     Problem: Coping and Emotional Distress     Dates: Start: 01/11/19       Description:     Disciplines: Interdisciplinary    Goal: Demonstrate Acceptance of Terminal Illness and Disease Progression     Dates: Start: 01/11/19   Expected End: 01/19/19       Description: Patient/family/caregiver will demonstrate acceptance of terminal disease and understanding of disease progression while employing appropriate mechanisms. Disciplines: Interdisciplinary    Intervention: Assess emotional status and coping mechanisms     Dates: Start: 19       Description: Assess patient/caregiver emotional status and coping mechanisms            Intervention: Assess family's level of coping     Dates: Start: 19       Description:           Intervention: Instruct on effective coping strategies     Dates: Start: 19       Description: Patient/family/caregiver will demonstrate acceptance of terminal disease and understanding of disease progression while employing coping mechanisms                      Problem: Falls - Risk of     Dates: Start: 19       Description:     Disciplines: Interdisciplinary    Goal: *Absence of Falls     Dates: Start: 19   Expected End: 19       Description: Document Milind Kebede Fall Risk and appropriate interventions in the flowsheet.     Disciplines: Interdisciplinary                Problem: Grieving     Dates: Start: 19       Description:     Disciplines: Interdisciplinary    Goal: *Able to identify stages of grieving process     Dates: Start: 19   Expected End: 19       Description:     Disciplines: Interdisciplinary    Intervention: Assist patient and family to decide about autopsy,  plans, completing important life tasks, coping with impending death, engaging in meaningful rituals, providing care of the body after death     Dates: Start: 19       Description:                       Problem: Hospice Orientation     Dates: Start: 19       Description:     Disciplines: Interdisciplinary    Goal: Demonstrate understanding of hospice philosophy, plan of care, and home hospice program     Dates: Start: 19   Expected End: 19       Description: The patient/family/caregiver will demonstrate understanding of hospice philosophy, plan of care and the home hospice program as evidenced by participation in meeting the patient's psychosocial, spiritual, medical, and physical needs inclusive of medical supplies/equipment focusing on symptoms. Disciplines: Interdisciplinary    Intervention: Instruct on hospice philosophy     Dates: Start: 01/11/19       Description:           Intervention: Instruct: hospice orientation     Dates: Start: 01/11/19       Description:           Intervention: Instruct: medical equipment     Dates: Start: 01/11/19       Description: Instruct patient/caregiver on medical equipment and supplies. Intervention: Instruct: medical power of  and will     Dates: Start: 01/11/19       Description:           Intervention: Instruct:terminal illness     Dates: Start: 01/11/19       Description:                       Problem: Patient Education: Go to Patient Education Activity     Dates: Start: 01/11/19       Description:     Disciplines: Interdisciplinary    Goal: Patient/Family Education     Dates: Start: 01/11/19   Expected End: 01/19/19       Description:     Disciplines: Interdisciplinary                Problem: Patient Education: Go to Patient Education Activity     Dates: Start: 01/11/19       Description:     Disciplines: Interdisciplinary    Goal: Patient/Family Education     Dates: Start: 01/11/19   Expected End: 01/19/19       Description:     Disciplines: Interdisciplinary                Problem: Pressure Injury - Risk of     Dates: Start: 01/11/19       Description:     Disciplines: Interdisciplinary    Goal: *Prevention of pressure injury     Dates: Start: 01/11/19   Expected End: 01/19/19       Description: Document Tam Scale and appropriate interventions in the flowsheet.     Disciplines: Interdisciplinary                Problem: Spiritual Evaluation     Dates: Start: 01/11/19       Description:     Disciplines: Interdisciplinary    Goal: Identify beliefs/practices that support hospice experience     Dates: Start: 01/11/19   Expected End: 01/19/19       Description: Patient/family identify their beliefs/practices that impair Hospice experience. Patient/family identify their beliefs/practices that support Hospice experience. Patient coping identified. Spiritual distress identified and decreased with visit. Disciplines: Interdisciplinary    Intervention: Assess spiritual needs     Dates: Start: 01/11/19       Description: Assist with spiritual questions                          ___________________    Care Team Notes          POC/IDG Notes      [facility] IDG  Notes by Meg Gloria at 01/14/19 1458  Version 1 of 1    Author:  Meg lGoria Service:  HOSPICE Author Type:  Pastoral Care    Filed:  01/14/19 1459 Date of Service:  01/14/19 1458 Status:  Signed    :  Meg Gloria (Pastoral Care)       Patient: Irma President    Date: 01/14/19  Time: 2:58 PM    [facility]  Notes    Patient/family decline spiritual care. Per SW, family has good support from community of frieda. Signed by: Brianna NELSON [facility] IDG Nurse Notes by Azael Pierce RN at 01/14/19 1217  Version 1 of 1    Author:  Azael Pierce RN Service:  Yarely Diego Author Type:  Registered Nurse    Filed:  01/14/19 1234 Date of Service:  01/14/19 1217 Status:  Cosign Needed    :  Azael Pierce RN (Registered Nurse) Cosign Required:  Yes       Patient: Irma President    Date: 01/14/19  Time: 12:17 PM    [facility] Nurse Notes  81 yo male patient with hospice diagnosis of hypoxic respiratory failure secondary to CHF and COPD. General in patient for dyspnea and agitation. Pt is having frequent episodes of respiratory distress and requiring PRN Morphine and Ativan IV. Haldol is being used PRN for increased agitation. Significant wounds in various stages of healing and treatments. Adams patent with 350 UOP. O2 via NC at 5 L. Dependent care for ADL's.   Increased work of breathing associated with exertion. Appetite is fair. Lasix 40 mg IV once today ordered per Dr. Gissel Sherwood. Comprehensive plan of care reviewed. IDG and pt./family in agreement with plan of care. The IDG identifies through on-going assessment when a change is needed to the POC; the pt/family will receive care and services necessitated by changes in POC. Medications reviewed by the pharmacist and Medical Director. Signed by: Lilli Childress RN       [facility] IDG  Notes by Ann Guzman at 01/14/19 1213  Version 1 of 1    Author:  Ann Guzman Service:  Licensed Clinical  Author Type:      Filed:  01/14/19 1220 Date of Service:  01/14/19 1213 Status:  Signed    :  Ann Guzman ()       Patient: Irma President    Date: 01/14/19  Time: 12:13 PM    [facility]  Notes  LMSW will continue to provide emotional support to the pt and his family. Dc plans:   Pt is nearing his demise  He had a bad morning needing medication intervention. He was hollering out and asking where he was. Signed by: Linwood Martinez       [facility] IDG Volunteer Notes by Irvin Ventura at 01/14/19 1152  Version 1 of 1    Author:  Irvin Ventura Service:  Yarely iDego Author Type:  Hospice Volunteer/    Filed:  01/14/19 1153 Date of Service:  01/14/19 1152 Status:  Signed    :  Irvin Ventura (Hospice Volunteer/)           [place] [address] of Care Review     Status Codes I = Initiated C=Continued R=Revised RS = Resolved     I.  Volunteer     Goal: Hospice house volunteer (s) enhances the quality of remaining life while patient is at the hospice house. Interventions: Frutoso Golder Frutoso Golder Era Finger Volunteer (s) will provide companionship to the patient and/or family by visiting at the hospice house       . Frutoso Golder Era Finger Volunteer (s) will provide respite as needed when requested by patient and/or family. Ally Woodard  Volunteer will provide activities such as music, reading, pet therapy, etc. as requested. Ally Woodard  Comfort bag delivered. Any other special requests or information regarding volunteer services:     No further needs identified at this time. These notes have been discussed in [facility] meeting.           Signed by: Elsy Henley

## 2019-01-14 NOTE — [facility] IDG SOCIAL WORKER NOTES
Patient: Sun Gonzáles    Date: 01/14/19  Time: 12:13 PM    [facility]  Notes  LMSW will continue to provide emotional support to the pt and his family. Dc plans:   Pt is nearing his demise  He had a bad morning needing medication intervention. He was hollering out and asking where he was.           Signed by: Kervin Carrasco

## 2019-01-14 NOTE — [facility] IDG CHAPLAIN NOTES
Patient: Ebony Dominguez    Date: 01/14/19  Time: 2:58 PM    [facility]  Notes    Patient/family decline spiritual care. Per SW, family has good support from community of frieda.         Signed by: Natasha Klein

## 2019-01-14 NOTE — [facility] IDG NURSE NOTES
Patient: Roberto Mejia Date: 01/14/19 Time: 12:17 PM 
 
[facility] Nurse Notes 79 yo male patient with hospice diagnosis of hypoxic respiratory failure secondary to CHF and COPD. General in patient for dyspnea and agitation. Pt is having frequent episodes of respiratory distress and requiring PRN Morphine and Ativan IV. Haldol is being used PRN for increased agitation. Significant wounds in various stages of healing and treatments. Adams patent with 350 UOP. O2 via NC at 5 L. Dependent care for ADL's. Increased work of breathing associated with exertion. Appetite is fair. Lasix 40 mg IV once today ordered per Dr. Irlanda Pappas. Comprehensive plan of care reviewed. IDG and pt./family in agreement with plan of care. The IDG identifies through on-going assessment when a change is needed to the POC; the pt/family will receive care and services necessitated by changes in POC. Medications reviewed by the pharmacist and Medical Director.  
  
 
 
 
Signed by: Hever Flores RN

## 2019-01-14 NOTE — [facility] IDG VOLUNTEER NOTES
[address] Review     Status Codes I = Initiated C=Continued R=Revised RS = Resolved     I.  Volunteer     Goal: Hospice house volunteer (s) enhances the quality of remaining life while patient is at the hospice house. Interventions: Emilie Ho Volunteer (s) will provide companionship to the patient and/or family by visiting at the hospice house       . Emilie Ho Volunteer (s) will provide respite as needed when requested by patient and/or family. Emilie Elizondo Speaks  Volunteer will provide activities such as music, reading, pet therapy, etc. as requested. Emilie Elizondo Speaks  Comfort bag delivered. Any other special requests or information regarding volunteer services:     No further needs identified at this time. These notes have been discussed in [facility] meeting.           Signed by: Lubna Mata

## 2019-01-14 NOTE — PROGRESS NOTES
Please note this patient was IP d/c to Hospice and no further OSCAR indicated. Leland Tsang LPN/ Care Coordinator DPSWOW:[phone] Lisa [address] [place] [address] 
[email or web address] note will not be viewable in [address]

## 2019-01-14 NOTE — MED STUDENT NOTES
*ATTENTION:  This note has been created by a medical student for educational purposes only. Please do not refer to the content of this note for clinical decision-making, billing, or other purposes. Please see attending physicians note to obtain clinical information on this patient. *              Progress Note    Patient: Alfredo Ovalle MRN: [id number]  SSN: [id number]    YOB: 1932  Age: 80 y.o. Sex: male      Admit Date: 1/11/2019    LOS: 3 days     Subjective:     Pt recently treated with morphine and ativan for an episode of agitation and confusion. Nurse heard the pt yelling asking for help, trying to get out of bed, and saying he couldn't breath. She gave him medicine and a cold washcloth to his head attempting to calm him down. Pt has quieted down in the room lying flat on his back, but his arms and legs are still moving intermittently. Review of Systems:  Review of systems not obtained due to patient factors. Objective:     Vitals:    01/12/19 1623 01/13/19 0517 01/13/19 1812 01/14/19 0605   BP: 99/60 122/63 131/59 119/61   Pulse: 72 70 65 64   Resp: 20 18 18 20   Temp: 96.7 °F (35.9 °C) 95.8 °F (35.4 °C) 96.5 °F (35.8 °C) 95.3 °F (35.2 °C)   Weight:       Height:            Intake and Output:  Current Shift: No intake/output data recorded. Last three shifts: 01/12 1901 - 01/14 0700  In: -   Out: 950 [Urine:950]    Physical Exam:   GENERAL: mild distress, appears older than stated age. Afebrile. Oxygen NC 5 L/hr in place. LUNG: decreased breath sounds bilaterally, no wheezes or rhonchi. Regular RR. HEART: regular rate and rhythm. 2+ radial, DP pulses. ABDOMEN: soft, non-tender. Bowel sounds hypoactive. Ecchymosis lower abdomen. : alejo in place draining clear yellow urine to bedside collection bag . EXTREMITIES: purple skin discoloration bilateral lower extremities, no weeping.    SKIN: ecchymosis bilateral forearms and scattered on upper chest.    NEUROLOGIC: intermittent twitching of bilateral feet, hands, and arms noted during exam.     Lab/Data Review:  No new labs resulted in the last 24 hours. Assessment:     Principal Problem:    Respiratory failure with hypoxia ([facility] (1/10/2019)    Active Problems:    Acute on chronic renal failure ([facility] (10/31/2018)      S/P IVC filter (9/21/2018)      Overview: 9/12/18  Dr. James Jackman      COPD exacerbation [facility]) (11/1/2018)      Acute on chronic systolic (congestive) heart failure ([facility] (12/12/2018)        Plan:     Current Facility-Administered Medications   Medication Dose Route Frequency    sodium chloride (NS) flush 3 mL  3 mL IntraVENous PRN    morphine (ROXANOL) concentrated oral syringe 10 mg  10 mg Oral Q30MIN PRN    Or    morphine (ROXANOL) concentrated oral syringe 10 mg  10 mg SubLINGual Q30MIN PRN    morphine injection 2 mg  2 mg SubCUTAneous Q20MIN PRN    Or    morphine injection 2 mg  2 mg IntraVENous Q20MIN PRN    acetaminophen (TYLENOL) tablet 650 mg  650 mg Oral Q4H PRN    acetaminophen (TYLENOL) suppository 650 mg  650 mg Rectal Q3H PRN    loperamide (IMODIUM) capsule 4 mg  4 mg Oral PRN    senna (SENOKOT) tablet 8.6 mg  1 Tab Oral BID    hyoscyamine SL (LEVSIN/SL) tablet 0.125 mg  0.125 mg SubLINGual Q4H PRN    LORazepam (ATIVAN) injection 2 mg  2 mg IntraVENous Q2H PRN    glycopyrrolate (ROBINUL) injection 0.2 mg  0.2 mg IntraVENous Q4H PRN    haloperidol lactate (HALDOL) injection 2 mg  2 mg IntraVENous Q2H PRN    sodium chloride (NS) flush 3 mL  3 mL IntraVENous Q12H    sodium chloride (NS) flush 3 mL  3 mL IntraVENous PRN    latanoprost (XALATAN) 0.005 % ophthalmic solution 1 Drop (Patient Supplied)  1 Drop Both Eyes QHS     79 y/o male pt admitted GIP with hypoxic respiratory failure for management of pain, dyspnea and agitation.     1) Pain: Morphine IV 2 mg every 20 min PRN   2) Agitation: Haldol IV 2 mg q 2 hrs PRN and Lorazepam IV 2 mg q 2 hrs PRN  3) Dyspnea: Morphine IV 2 mg every 20 min PRN and Glycopyrrolate IV 0.2 mg q 4 hrs PRN for secretions  4) Family/pt support: Family not at bedside during exam. Will continue to monitor symptoms and adjust medications as needed to maintain pt comfort. PPS 30%.      Signed By: Antione Gill     January 14, 2019

## 2019-01-15 NOTE — MED STUDENT NOTES
*ATTENTION:  This note has been created by a medical student for educational purposes only. Please do not refer to the content of this note for clinical decision-making, billing, or other purposes. Please see attending physicians note to obtain clinical information on this patient. *              Progress Note    Patient: Jeff Otoole MRN: [id number]  SSN: [id number]    YOB: 1932  Age: 80 y.o. Sex: male      Admit Date: 1/11/2019    LOS: 4 days     Subjective:     Pt resting quietly in bed with his eyes closed. He has a nasal cannula on 5 L/hr. His breath sounds are non labored. No signs of distress, agitation, pain, or moaning/groaning. No family members are present during the visit. Review of Systems:  Review of systems not obtained due to patient factors. Objective:     Vitals:    01/13/19 1812 01/14/19 0605 01/14/19 1635 01/15/19 0500   BP: 131/59 119/61 124/58    Pulse: 65 64 77    Resp: 18 20 18 22   Temp: 96.5 °F (35.8 °C) 95.3 °F (35.2 °C) 97.3 °F (36.3 °C)    Weight:       Height:            Intake and Output:  Current Shift: No intake/output data recorded. Last three shifts: 01/13 1901 - 01/15 0700  In: -   Out: 1200 [Urine:1200]    Physical Exam:   GENERAL: mild distress, appears older than stated age, afebrile. Oxygen HFNC 5 L/hr in place.   LUNG: decreased breath sounds bilaterally, no wheezes or rhonchi. Regular RR.   HEART: regular rate and rhythm. + pulses.   ABDOMEN: soft, non-tender. Bowel sounds hypoactive. Ecchymosis lower abdomen.    : alejo in place draining kody urine to bedside collection bag .   EXTREMITIES: purple skin discoloration bilateral lower extremities, no weeping.   SKIN: ecchymosis bilateral forearms and scattered on upper chest.    NEUROLOGIC: intermittent twitching of bilateral feet, hands, and arms noted during exam.     Lab/Data Review:  No new labs resulted in the last 24 hours.     Assessment:     Principal Problem:    Respiratory failure with hypoxia ([facility] (1/10/2019)    Active Problems:    Acute on chronic renal failure ([facility] (10/31/2018)      S/P IVC filter (9/21/2018)      Overview: 9/12/18  Dr. Allyson Narvaez      COPD exacerbation [facility]) (11/1/2018)      Acute on chronic systolic (congestive) heart failure ([facility] (12/12/2018)        Plan:     Current Facility-Administered Medications   Medication Dose Route Frequency    morphine (ROXANOL) concentrated oral syringe 10 mg  10 mg Oral Q30MIN PRN    Or    morphine (ROXANOL) concentrated oral syringe 10 mg  10 mg SubLINGual Q30MIN PRN    morphine injection 2 mg  2 mg SubCUTAneous Q20MIN PRN    Or    morphine injection 2 mg  2 mg IntraVENous Q20MIN PRN    LORazepam (ATIVAN) injection 2 mg  2 mg IntraVENous Q2H PRN    haloperidol lactate (HALDOL) injection 2 mg  2 mg IntraVENous Q2H PRN    bisacodyl (DULCOLAX) suppository 10 mg  10 mg Rectal PRN    sodium chloride (NS) flush 3 mL  3 mL IntraVENous PRN    acetaminophen (TYLENOL) tablet 650 mg  650 mg Oral Q4H PRN    acetaminophen (TYLENOL) suppository 650 mg  650 mg Rectal Q3H PRN    loperamide (IMODIUM) capsule 4 mg  4 mg Oral PRN    senna (SENOKOT) tablet 8.6 mg  1 Tab Oral BID    hyoscyamine SL (LEVSIN/SL) tablet 0.125 mg  0.125 mg SubLINGual Q4H PRN    glycopyrrolate (ROBINUL) injection 0.2 mg  0.2 mg IntraVENous Q4H PRN    sodium chloride (NS) flush 3 mL  3 mL IntraVENous Q12H    latanoprost (XALATAN) 0.005 % ophthalmic solution 1 Drop (Patient Supplied)  1 Drop Both Eyes QHS     79 y/o male pt admitted GIP with hypoxic respiratory failure for management of pain, dyspnea and agitation.     1) Pain: Morphine IV 2 mg every 20 min PRN   2) Agitation: Haldol IV 2 mg q 2 hrs PRN and Lorazepam IV 2 mg q 2 hrs PRN  3) Dyspnea: Morphine IV 2 mg every 20 min PRN and Glycopyrrolate IV 0.2 mg q 4 hrs PRN for secretions  4) Family/pt support: Family not at bedside during exam. Will continue to monitor symptoms and adjust medications as needed to maintain pt comfort.  PPS 20%.     Signed By: Nano [place]a     January 15, 2019

## 2019-01-15 NOTE — PROGRESS NOTES
Report taken from off going RN. Pt identified. Pt in bed resting with eyes closed. No s/s of distress noted. Bed locked and low, side rails up, and door open for continuous monitoring. RN will continue to monitor.

## 2019-01-16 NOTE — MED STUDENT NOTES
*ATTENTION:  This note has been created by a medical student for educational purposes only. Please do not refer to the content of this note for clinical decision-making, billing, or other purposes. Please see attending physicians note to obtain clinical information on this patient. *              Progress Note    Patient: Cherelle Perez MRN: [id number]  SSN: [id number]    YOB: 1932  Age: 80 y.o. Sex: male      Admit Date: 1/11/2019    LOS: 5 days     Subjective:     Pt resting quietly in bed. His head is turned to the right and eyes are closed. He continues to be unresponsive to greeting or questions. Pt has a nasal cannula on 5 L/hr. Breath sounds are not labored, but some gurgling can be heard every few breaths. No signs of pain, agitation, distress, moaning or groaning during the visit. No family or other visitors present at bedside. Review of Systems:  Review of systems not obtained due to patient factors. Objective:     Vitals:    01/14/19 1635 01/15/19 0500 01/15/19 1629 01/16/19 0539   BP: 124/58  126/58 125/58   Pulse: 77  82 84   Resp: 18 22 24 22   Temp: 97.3 °F (36.3 °C)  96.2 °F (35.7 °C) 96.6 °F (35.9 °C)   Weight:       Height:            Intake and Output:  Current Shift: No intake/output data recorded. Last three shifts: 01/14 1901 - 01/16 0700  In: 0   Out: 750 [Urine:750]    Physical Exam:   GENERAL: mild distress, appears older than stated age, afebrile. Oxygen HFNC 5 L/hr in place.   LUNG: decreased breath sounds bilaterally, no wheezes or rhonchi. RR increased with intermittent gurgling.   HEART: regular rate and rhythm. + weak pulses.   ABDOMEN: soft, non-tender. Bowel sounds hypoactive.  Ecchymosis lower abdomen.    : alejo in place draining kody urine to bedside collection bag .   EXTREMITIES: purple skin discoloration bilateral lower extremities, no weeping.   SKIN: ecchymosis bilateral forearms and scattered on upper chest. Pale nailbeds bilateral hands and feet.  NEUROLOGIC: intermittent twitching of bilateral feet, hands, and arms noted during exam.     Lab/Data Review:  No new labs resulted in the last 24 hours. Assessment:     Principal Problem:    Respiratory failure with hypoxia ([facility] (1/10/2019)    Active Problems:    Acute on chronic renal failure ([facility]) (10/31/2018)      S/P IVC filter (9/21/2018)      Overview: 9/12/18  Dr. Demetri Dickey      COPD exacerbation [facility]) (11/1/2018)      Acute on chronic systolic (congestive) heart failure ([facility] (12/12/2018)        Plan:     Current Facility-Administered Medications   Medication Dose Route Frequency    morphine injection 2 mg  2 mg SubCUTAneous Q20MIN PRN    Or    morphine injection 2 mg  2 mg IntraVENous Q20MIN PRN    LORazepam (ATIVAN) injection 2 mg  2 mg IntraVENous Q2H PRN    haloperidol lactate (HALDOL) injection 2 mg  2 mg IntraVENous Q2H PRN    bisacodyl (DULCOLAX) suppository 10 mg  10 mg Rectal PRN    sodium chloride (NS) flush 3 mL  3 mL IntraVENous PRN    acetaminophen (TYLENOL) suppository 650 mg  650 mg Rectal Q3H PRN    glycopyrrolate (ROBINUL) injection 0.2 mg  0.2 mg IntraVENous Q4H PRN    sodium chloride (NS) flush 3 mL  3 mL IntraVENous Q12H     81 y/o male pt admitted GIP with hypoxic respiratory failure for management of pain, dyspnea and agitation.    1) Pain: Morphine IV 2 mg every 20 min PRN   2) Agitation: Haldol IV 2 mg q 2 hrs PRN and Lorazepam IV 2 mg q 2 hrs PRN  3) Dyspnea: Morphine IV 2 mg every 20 min PRN and Glycopyrrolate IV 0.2 mg q 4 hrs PRN for secretions  4) Family/pt support: Family not at bedside during exam. Will continue to monitor symptoms and adjust medications as needed to maintain pt comfort. PPS 20%.     Signed By: Emily Clement     January 16, 2019

## 2019-01-16 NOTE — PROGRESS NOTES
Report received. Pt round. Pt identified by name. In bed with eyes closed. No s/s of pain, agitation or dyspnea. Bed low and SR up with tab alerts on.     1003 Pt calling out. Unable to understand pt. Morphine 2 mg IV for pain and Haldol 2 mg IV for agitation. 1222 Morphine 2 mg IV for pain and Haldol 2 mg IV for agitation. Wife at bedside.

## 2019-01-17 NOTE — PROGRESS NOTES
Problem: Hospice Orientation  Goal: Demonstrate understanding of hospice philosophy, plan of care, and home hospice program  The patient/family/caregiver will demonstrate understanding of hospice philosophy, plan of care and the home hospice program as evidenced by participation in meeting the patient's psychosocial, spiritual, medical, and physical needs inclusive of medical supplies/equipment focusing on symptoms.   Outcome: Progressing Towards Goal  Educate wife on end of life

## 2019-01-17 NOTE — MED STUDENT NOTES
I have reviewed this MEDICAL STUDENT clinical note. I have reviewed the clinical history with the patient//family and examined the patient today. The patient Care Plan has been reviewed and adjusted as needed. patient appears to be stable and comfortable. No overt source for fever abdominal aspiration pneumonia likely. Will palliate fever and anticipate death within the next few days. *ATTENTION:  This note has been created by a medical student for educational purposes only. Please do not refer to the content of this note for clinical decision-making, billing, or other purposes. Please see attending physicians note to obtain clinical information on this patient. *            Progress Note    Patient: Aleks Connor MRN: [id number]  SSN: [id number]    YOB: 1932  Age: 80 y.o. Sex: male      Admit Date: 1/11/2019    LOS: 6 days     Subjective:     Pt is resting quietly in bed with his head and neck supported by a neck pillow. His eyes are open slightly and rolled back. He remains unresponsive to greeting or questions. Pt has a nasal cannula on 5 L/hr. Breath sounds are not labored, but sound shallow and quicker than yesterday. He has spiked a fever and his skin is warm to the touch. No signs of pain, agitation, distress. No family or other visitors were present during the visit. Review of Systems:  Review of systems not obtained due to patient factors. Objective:     Vitals:    01/15/19 1629 01/16/19 0539 01/16/19 1622 01/17/19 0350   BP: 126/58 125/58 122/60 117/56   Pulse: 82 84 86 (!) 102   Resp: 24 22 16 20   Temp: 96.2 °F (35.7 °C) 96.6 °F (35.9 °C) 97.4 °F (36.3 °C) 100.1 °F (37.8 °C)   Weight:       Height:            Intake and Output:  Current Shift: No intake/output data recorded. Last three shifts: 01/15 1901 - 01/17 0700  In: -   Out: 36 [Urine:375]    Physical Exam:   GENERAL: mild distress, appears older than stated age. Febrile.   Oxygen HFNC 5 L/hr in place.   LUNG: coarse rhonchus breath sounds bilaterally, RR increased. HEART: tachycardic, regular rhythm. + weak distal pulses.   ABDOMEN: soft, non-tender. Bowel sounds hypoactive. Ecchymosis lower abdomen.    : alejo in place draining  a small amount of dark kody urine to bedside collection bag.   EXTREMITIES: purple skin discoloration bilateral lower extremities, no weeping.   SKIN: ecchymosis bilateral forearms and scattered on upper chest. Pale nailbeds bilateral hands and feet. Skin is warm to the touch. NEUROLOGIC: intermittent twitching of bilateral feet, hands, and arms noted during exam.     Lab/Data Review:  No new labs resulted in the last 24 hours. Assessment:     Principal Problem:    Respiratory failure with hypoxia ([facility] (1/10/2019)    Active Problems:    Acute on chronic renal failure (HCC) (10/31/2018)      S/P IVC filter (9/21/2018)      Overview: 9/12/18  Dr. Kandice Coronel      COPD exacerbation [facility]) (11/1/2018)      Acute on chronic systolic (congestive) heart failure ([facility] (12/12/2018)        Plan:     Current Facility-Administered Medications   Medication Dose Route Frequency    morphine injection 2 mg  2 mg SubCUTAneous Q20MIN PRN    Or    morphine injection 2 mg  2 mg IntraVENous Q20MIN PRN    LORazepam (ATIVAN) injection 2 mg  2 mg IntraVENous Q2H PRN    haloperidol lactate (HALDOL) injection 2 mg  2 mg IntraVENous Q2H PRN    bisacodyl (DULCOLAX) suppository 10 mg  10 mg Rectal PRN    sodium chloride (NS) flush 3 mL  3 mL IntraVENous PRN    acetaminophen (TYLENOL) suppository 650 mg  650 mg Rectal Q3H PRN    glycopyrrolate (ROBINUL) injection 0.2 mg  0.2 mg IntraVENous Q4H PRN    sodium chloride (NS) flush 3 mL  3 mL IntraVENous Q12H     79 y/o male pt admitted GIP with hypoxic respiratory failure for management of pain, dyspnea and agitation. Pt appears more comfortable in bed today, no signs of agitation or distress.  Will continue Morphine IV 2 mg every 20 min PRN for pain and Haldol IV 2 mg q 2 hrs PRN and Lorazepam IV 2 mg q 2 hrs PRN for agitation. Continue Morphine IV 2 mg every 20 min PRN for dyspnea and Glycopyrrolate IV 0.2 mg q 4 hrs PRN for secretions. Family/pt support: Family not at bedside during exam. Pt has spiked a fever today, as well as shown an increase in his heart rate, will continue to monitor symptoms closely and adjust medications as needed to maintain pt comfort. PPS 10%.     Signed By: Shaunna Camilo     January 17, 2019

## 2019-01-17 NOTE — PROGRESS NOTES
Report received. Pt round. Pt identified by name. In bed with eyes closed. No s/s of pain, agitation or dyspnea. Bed low and SR up with tab alerts on.    1045 Returned phone call to son regarding pt.     1440 Wife here with patient. Questioning if pt will \"get better\" Explained to wife s/s of end of life including increased time sleeping and inability to eat and drink. Voices understanding, but is tearful. Emotional support given. 1752 Went to check on pt. No pulse/respirations x 1 minute. 550 Moises Earl Son, Carol Childress, notified via telephone of pt's passing.

## 2019-01-17 NOTE — PROGRESS NOTES
Patient report and walking rounds completed with the off going RN at [id number]. The patient is identified by name and  on the ID band. He shows no signs of distress at this time. The bed is low and locked and the call light is within his reach. The door to the room is left open so staff may observe patient.

## 2019-01-17 NOTE — [facility] NOTES
Patient: Suzanna Philip    Date: 01/17/19  Time: 11:27 AM    [facility]  Notes    Patient/family decline spiritual care/support.         Signed by: Quirino Goodpasture

## 2019-01-17 NOTE — PROGRESS NOTES
Patient rounding made. Patient medicated for anxiety pain and dyspnea. CNA soon to begin bed bath. Safety measures in place. No family in room at this time.  Will continue to monitor

## 2019-12-11 NOTE — PROGRESS NOTES
Bedside and Verbal shift change report given to Lachelle Godoy RN (oncoming nurse) by self Luda [place] nurse). Report included the following information SBAR, Kardex, Procedure Summary, Intake/Output, MAR, Recent Results and Cardiac Rhythm a-fib. no

## 2020-04-02 NOTE — PROGRESS NOTES
ASSESSMENT AND PLAN: 69 year old White male with multiple medical problems     1. Chronic kidney disease stage IIIB-renal function fluctuates      A. avoid nephrotoxic medications  B. continue furosemide 40 mg p.o. Daily  C. continue Nephrocaps 1 capsule p.o. Daily  D. continue fish oil thousand milligrams p.o. Daily  E. Continue current fluid intake - 90 up to 100 oz per 24 hours  F. extended set of renal labs prior to follow-up visit      2. Hypertension-well-controlled, continue current treatment     3. Metabolic bone disease-increase vitamin D3 does from 1000 mg p.o. daily up to 2000 mg p.o. daily     4. Anemia-monitor H&H     FOLLOWUP VISIT:  Follow-up visit in 5-6 months.   Wife is caregiver, wife was sweeping porch and feet slipped and she took a fall. CM did not get to follow up as completely  as needed due to wife discussing her own issues. CM encouraged wife to call her PCP office and get an appointment. Wife reported she has been taking half of patient's [place], CM educated patient on the risks of this and why it is inappropriate. Patient has had to increase/resume lasix due to weight increase. Patient has increasedglipizide to 7.5 mg and wife reported the patient took metformin last night, CM explained that she does not see that as part of the instructions in the last 2 calls. Office has been giving the patient instructions, in which has not been sharing with wife. CM encouraged wife to call office for clarification. CM sent office a message about wife requests to have all calls go to her and in regards to confusion about the metformin. CM completed focused assessment as noted. This note will not be viewable in [address]

## 2020-05-22 NOTE — PROGRESS NOTES
08/02/18 1430 Oxygen Therapy O2 Sat (%) 95 % Pulse via Oximetry 152 beats per minute O2 Device Nasal cannula O2 Flow Rate (L/min) 2 l/min Never smoker

## 2021-02-01 NOTE — PROGRESS NOTES
SBAR received from [facility]. Patient remains in stable condition with respirations even/unlabored. No acute distress noted at this time. SCDs noted. Oxygen noted to 3 L NC. Call light remains within reach, patient encouraged to call nurse prn assist. Will continue to monitor per policy. [place] INPATIENT ENCOUNTER   PLASTIC AND RECONSTRUCTIVE SURGERY DAILY PROGRESS NOTE    ADMISSION DATE:  1/28/2021  DATE:  2/1/2021  CURRENT HOSPITAL DAY:  Hospital Day: 5  SURGEON:  Todd Bradley MD  ATTENDING PHYSICIAN:  Clovis Lunsford MD  CODE STATUS:  Full Resuscitation    PROCEDURE: Open reduction and internal fixation of the left distal radius andulna comminuted intra-articular greater than 3-part fracture with dorsal wrist bridge plate    POST-OP DAY:  3 Day Post-Op    SUBJECTIVE:   Letty Stringer is a 69 year old female patient admitted with a distal radius and ulnar fracture. No issues overnight. She is preparing for discharge today. Her pain has been managed with oral medications.  No longer needing the sling, splint remains in place as ordered.     OBJECTIVE:    VITAL SIGNS:    Vital Last Value 24 Hour Range   Temperature 98.7 °F (37.1 °C) Temp  Min: 98.5 °F (36.9 °C)  Max: 99.6 °F (37.6 °C)   Pulse 85 Pulse  Min: 78  Max: 85   Respiratory 18 Resp  Min: 18  Max: 18   Blood Pressure 114/57 BP  Min: 114/57  Max: 133/63   Pulse Oximetry (!) 87 % SpO2  Min: 87 %  Max: 96 %     INTAKE/OUTPUT:      Intake/Output Summary (Last 24 hours) at 2/1/2021 1217  Last data filed at 1/31/2021 1818  Gross per 24 hour   Intake 600 ml   Output --   Net 600 ml         PHYSICAL EXAM:    Constitutional:  The patient is alert, oriented and cooperative.   Integument:  Warm. Dry.   Respiratory:  Breathing comfortably on room air.   Left Upper Extremity:Splint in place, some ecchymosis on the proximal forearm. Fingers are pink and warm. Able to actively flex/extend fingers                              ASSESSMENT:    Letty Stringer is a 69 year old female patient admitted with a distal radius and ulnar fracture. She is doing well postoperatively. She is still having pain, but managed with oral medications. Planning for discharge today.      PLAN:   - Scripts are printed and in patients chart   - Follow up for splint fabrication  and clinic appt. already scheduled  - Keep arm elevated on pillow.  - Must wrap arm in waterproof bag and keep dry when showering.   -Call Plastic Surgery Team with question or concerns.      Jeannie Anne PA-C  Plastic & Reconstructive Surgery  Pager: [phone] [facility], [address]  [place] [address]  Phone: [phone]      Attending addendum:  Patient seen and examined with Jeannie Anne PA-C.  Agree with assessment and plan as documented and edited above.  Fingers pink and well perfused, splint maintained.  Expected pain.  Discussed operative findings.  Will follow-up with repeat imaging, call with concerns in interim period    Todd Bradley M.D.  Plastic and Reconstructive Surgery  Hand and Microsurgery  [facility]

## 2022-12-06 NOTE — ROUTINE PROCESS
TRANSFER - OUT REPORT: 
 
Verbal report given to Muna Snow (name) on [facility]  being transferred to 6(unit) for routine progression of care Report consisted of patients Situation, Background, Assessment and  
Recommendations(SBAR). Information from the following report(s) ED Summary was reviewed with the receiving nurse. Lines:  
Peripheral IV 10/08/18 Left; Outer Antecubital (Active) Site Assessment Clean, dry, & intact 10/8/2018  1:08 PM  
Phlebitis Assessment 0 10/8/2018  1:08 PM  
Infiltration Assessment 0 10/8/2018  1:08 PM  
Dressing Status Clean, dry, & intact 10/8/2018  1:08 PM  
  
 
Opportunity for questions and clarification was provided. Patient transported with: 
 belongings. Male

## 2022-12-08 NOTE — DISCHARGE SUMMARY
Hospitalist Discharge Summary Admit Date:  2018 10:17 AM  
Name:  Elena Loredo Age:  80 y.o. 
:  1932 MRN:  [id number] PCP:  Cosme Roach MD 
Treatment Team: Attending Provider: Yunier Baron DO; Consulting Provider: Bettina Fermin DO; Care Manager: Codi Barnes; Speech Language Pathologist: Kay Calles, MANOLO; Utilization Review: [facility] Problem List for this Hospitalization: 
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 
ICD-9-CM: 250.40, 583.81  2018 - Present Yes  
   
 CKD (chronic kidney disease) stage 3, GFR 30-59 ml/min (HCC) (Chronic) ICD-10-CM: N18.3 ICD-9-CM: 585.3  2013 - Present Yes Essential hypertension (Chronic) ICD-10-CM: I10 
ICD-9-CM: 401.9  2012 - Present Yes * (Principal) Persistent atrial fibrillation (HCC) (Chronic) ICD-10-CM: I48.1 ICD-9-CM: 427.31  2012 - Present Yes Overview Addendum 2016  8:14 PM by Yousuf Robison MD  
  Coumadin therapy discontinued due to recurrent GI bleeding. Admission HPI from 2018:   
\"  
Patient 86M with hx of afib not on [address] d/t hx of GIB, GERD, HTN, hx of DM (not currently on meds), Cirrhosis, diastolic CHF, CKD, COPD on 4-5LNC cont, BPH, CAD s/p CABG, PAD presents from [facility] after report of staff witnessing seizure activity x 5 min described to ER as tonic clonic. ER workup notable for pt bradycardic HR in 40's afib. Hgb 7.4, Cr 2.2 (baseline around 1.8-1.9). CXR with bibasilar effusions. 100% on 6lnc.  CT head non acute. EEG negative, do not believe this was true seizure activity.  H/H dropped to 7.0 after admission to floor and pt transfused 1 unit pbrc. Seen by cardiology who started dobumatine gtt for worsening bradycardia after admission to tele unit. Sunshine Lepe was weaned off dobutamine.  Finished course of abx for UTI.  GI consulted and followed for c/w GI bleed.  Found to have possibly obstructing gallstones, but not a good candidate for ERCP - medical management.  Hgb slowly trending down.  Given additional 1 unit PRBCs.  Now more edematous.  Palliative Care following. 
  
(Of note, has had two recent hospital admissions. Admitted to [facility] in November for septic shock from PNA. Was discharged to [facility] then admitted to [facility] 12/5-12/14 for pseudomonas uti, cellulitis, ach/chronic resp failure, urinary retention now w/ alejo. ) 
  Hospital Course: No signifcant progress and multiple organ systems impaired with ckd, o2 dep copd, dd chf, cirrhosis , gi bleed with chronic blood loss, seizures and cad. Chronic atrial fibrillation. Frequent hospitalizations and is expected to be at end of life. He has chronic pain and suffering and has agreed hospice appropriate. He has been discharged to hospice house and death is expected but timing is unclear. Follow up instructions and discharge meds at bottom of this note. Plan was discussed with lettygter and son has agreed. All questions answered. Patient was stable at time of discharge. 10 systems reviewed and negative except as noted in HPI. Diagnostic Imaging/Tests:  
Mri Brain Wo Cont Result Date: 12/31/2018 Cranial MRI without contrast: 12/31/2018 INDICATION: Seizures COMPARISON: CT brain 12/30/2018 TECHNIQUE: T1 sagittal, diffusion-weighted images, T2, gradient echo, FLAIR, and T1 axial sequences and coronal T2 sequences were performed. FINDINGS: There are no restricted diffusion abnormalities. The 71 ventricles and the basilar vascular flow voids are unremarkable. The pituitary, 7th and 8th nerves, orbits, and sinuses are unremarkable. There is no hemorrhage. Bilateral Virchow-Jimmie spaces are demonstrated. There is scattered foci of T2 prolongation within the subcortical, periventricular, and pontine white matter. IMPRESSION: Mild pontine and supratentorial microischemia. Mri Abd Wo Cont Result Date: 1/2/2019 MRCP, 12/31/2018: Indication: Dilated common bile duct and cholelithiasis. Procedure: Three-dimensional MRCP was performed using maximum intensity projection reconstruction. The examination consisted of axial T2W/HASTE, axial fat-saturated T2W, axial T1 and an out-of-phase,and T2 weighted MIP images rotated about the axis of the biliary tree generated. Comparison: Limited abdominal ultrasound 11/5/2018 Findings: Evaluation of the lung bases is limited by MRI imaging. Moderate bilateral pleural effusions are seen. . Additional signal is seen which may represent atelectasis although is very poorly characterized by MRI. No intrahepatic biliary ductal dilatation is seen. The extra hepatic bile ducts are prominent although not clearly dilated measuring 7 mm. 2 focal filling defects are seen within the mid to distal common bile duct best appreciated on MRCP image 69 suggesting potential stones. The gallbladder shows no definite acute changes. Intermediate T2 signal is seen within the gallbladder which could represent material such as sludge. No abnormal focal caliber changes are seen of the biliary tree. The pancreatic duct is normal in caliber measuring 1 to 2 mm. The course of the pancreatic duct is normal without evidence for pancreas divisum. Evaluation of the solid organs is very limited without intravenous contrast. However, no focal signal abnormalities are definitely seen of the liver, spleen, pancreas or kidneys. Some hepatic nodularity is seen which can suggest cirrhosis.   No significant drop in signal intensity of the liver is seen to suggest fatty infiltration. No contour deforming abnormalities are seen of the pancreas although a small lesion could be missed due to the noncontrast technique. No evidence of hydronephrosis is seen. T2 hyperintense lesions are seen in the bilateral kidneys. Lesions off the lower pole cortex of the left kidney are complex demonstrating intrinsic T1 signal. Therefore, these cannot be characterized as benign cysts with certainty. The loops of bowel are normal in caliber. No abnormal intra-abdominal fluid collections are seen. Abnormal signal is seen within the IVC. This occurs at the level of where an IVC filter would be expected. It should be confirmed that this represents an IVC filter given that this is incompletely characterized. IMPRESSION: 1. At least 2 filling defects within the mid to distal common bile duct raising suspicion for potential stones. 2.  Moderate bilateral pleural effusions and small to moderate ascites suggesting fluid overload. 3.  Abnormal signal within the IVC which occurs at the level of where an IVC filter would be expected. It should be confirmed that the patient has an IVC filter given that this cannot be characterized by MRI. 4. Complex exophytic lesions off the lower pole cortex of the left kidney which cannot be confirmed to be benign given their intrinsic T1 signal. Given their size, a renal ultrasound would be initially recommended for assessment. 5. Cirrhotic appearing liver. Ct Head Wo Cont Result Date: 12/30/2018 CT HEAD WITHOUT CONTRAST, 12/30/2018 History: Seizure occurring this morning. Confusion. Comparison: CT head without contrast 10/11/2018 Technique:   5 mm axial scans from the skull base to the vertex.  All CT scans performed at this facility use one or all of the following: Automated exposure control, adjustment of the mA and/or kVp according to patient's size, iterative reconstruction. Findings:  No evidence of intracranial hemorrhage is seen. No abnormal extra-axial fluid collections are seen. Moderate cortical involutional changes are seen which are not clearly abnormal given the patient's age. No evidence for acute hydrocephalus is seen. No evidence of midline shift or herniation is seen. No abnormal edema pattern is seen in a vascular distribution to suggest large artery infarction. Evaluation with bone windows shows no acute osseous changes. The visualized sinuses, middle ears, and mastoid air cells are well aerated. IMPRESSION:  1. No acute intracranial process evident by noncontrast CT study of the head. Xr Chest [facility] Result Date: 12/30/2018 CHEST X-RAY, single portable view  12/30/2018 History: Seizure. Chest congestion. Technique: Single frontal view of the chest. Comparison: Chest x-ray 11/3/2018 Findings: Stable post surgical changes overlie the mediastinal silhouette. Once again, there is disruption of multiple sternal wires. The cardiac silhouette is mildly enlarged although stable. The lungs are expanded without evidence for pneumothorax. Small basilar effusions are seen which appear slightly worse on the left. IMPRESSION: 1.  Stable cardiomegaly with slightly worsened basilar effusions. Early heart failure is not excluded. Echocardiogram results: No results found for this visit on 12/30/18. All Micro Results Procedure Component Value Units Date/Time CULTURE, BLOOD [[id number]] Collected:  12/30/18 1026 Order Status:  Completed Specimen:  Blood Updated:  01/04/19 1224 Special Requests: --     
  RIGHT Antecubital 
  
  Culture result: NO GROWTH 5 DAYS     
 CULTURE, BLOOD [[id number]] Collected:  12/30/18 1042 Order Status:  Completed Specimen:  Blood Updated:  01/04/19 1224 Special Requests: --     
  RIGHT 
ARM Culture result: NO GROWTH 5 DAYS CULTURE, URINE [[id number]]  (Abnormal)  (Susceptibility) Collected:  12/30/18 9515 Order Status:  Completed Specimen:  Urine from [place] Specimen Updated:  01/03/19 8620 Special Requests: NO SPECIAL REQUESTS Culture result:    
  >100,000 COLONIES/mL ESCHERICHIA COLI  
     
      
  10,000 to 50,000 COLONIES/mL NICKY ALBICANS Labs: Results:  
   
BMP, Mg, Phos Recent Labs  
  01/10/19 
0417 01/09/19 
0542  143  
K 4.0 3.7  100 CO2 35* 35* AGAP 7 8 BUN 38* 41* CREA 1.99* 1.92* CA 8.2* 8.2*  
* 122* CBC Recent Labs  
  01/11/19 
0407 01/10/19 
0417 01/09/19 
0542 WBC 6.5 5.8 5.5  
RBC 2.93* 2.85* 2.84* HGB 7.6* 7.5* 7.5* HCT 27.5* 26.6* 26.3*  
* 112* 116* GRANS  --  74 76 LYMPH  --  17 15 EOS  --  2 3 MONOS  --  6 5  
BASOS  --  0 0 IG  --  0 1 ANEU  --  4.3 4.2 ABL  --  1.0 0.8 JENNIFER  --  0.1 0.1 ABM  --  0.3 0.3 ABB  --  0.0 0.0 AIG  --  0.0 0.0  
  
LFT No results for input(s): SGOT, ALT, TBIL, AP, TP, ALB, GLOB, AGRAT, GPT in the last 72 hours. Cardiac Testing Lab Results Component Value Date/Time BNP 1,840 (H) 01/06/2019 04:18 AM  
  (H) 12/31/2018 09:09 AM  
 BNP 2,876 11/06/2018 02:28 AM  
  01/21/2016 12:23 PM  
  09/21/2015 02:38 PM  
  09/09/2015 10:10 AM  
 CK 91 05/06/2015 07:55 PM  
  09/21/2013 06:35 AM  
 Troponin-I <0.05 03/28/2012 03:00 PM  
 Troponin-I, Qt. 0.03 10/11/2018 04:42 PM  
 Troponin-I, Qt. 0.02 04/22/2018 02:39 AM  
 Troponin-I, Qt. 0.03 01/31/2018 10:52 PM  
  
Coagulation Tests Lab Results Component Value Date/Time  Prothrombin time 17.6 (H) 01/01/2019 03:48 AM  
 Prothrombin time 20.3 (H) 10/31/2018 02:26 PM  
 Prothrombin time 11.5 01/26/2017 10:05 AM  
 INR 1.5 01/01/2019 03:48 AM  
 INR 1.8 10/31/2018 02:26 PM  
 INR 1.1 01/26/2017 10:05 AM  
 aPTT 35.5 (H) 10/31/2018 02:26 PM  
 aPTT 28.5 01/26/2017 10:05 AM  
 aPTT 37.1 (H) 07/13/2008 05:40 AM  
  
A1c Lab Results Component Value Date/Time Hemoglobin A1c 5.8 (H) 09/27/2018 01:20 PM  
 Hemoglobin A1c 6.9 (H) 08/01/2018 09:45 PM  
 Hemoglobin A1c 6.9 (H) 08/01/2018 02:08 PM  
  
Lipid Panel Lab Results Component Value Date/Time Cholesterol, total 127 09/27/2018 01:20 PM  
 HDL Cholesterol 51 09/27/2018 01:20 PM  
 LDL, calculated 60 09/27/2018 01:20 PM  
 VLDL, calculated 16 09/27/2018 01:20 PM  
 Triglyceride 80 09/27/2018 01:20 PM  
 CHOL/HDL Ratio 2.0 04/23/2018 05:19 AM  
  
Thyroid Panel Lab Results Component Value Date/Time TSH 10.100 (H) 12/30/2018 10:26 AM  
 TSH 1.940 08/01/2018 02:08 PM  
 T4, Total 8.0 11/05/2008 04:49 PM  
 T4, Free 1.0 04/23/2018 05:19 AM  
 T3 Uptake 31 11/05/2008 04:49 PM  
    
Most Recent UA Lab Results Component Value Date/Time Color YELLOW 12/30/2018 05:51 PM  
 Appearance TURBID 12/30/2018 05:51 PM  
 Specific gravity 1.017 12/30/2018 05:51 PM  
 pH (UA) 5.5 12/30/2018 05:51 PM  
 Protein 100 (A) 12/30/2018 05:51 PM  
 Glucose NEGATIVE  12/30/2018 05:51 PM  
 Ketone NEGATIVE  12/30/2018 05:51 PM  
 Bilirubin NEGATIVE  12/30/2018 05:51 PM  
 Blood LARGE (A) 12/30/2018 05:51 PM  
 Urobilinogen 0.2 12/30/2018 05:51 PM  
 Nitrites NEGATIVE  12/30/2018 05:51 PM  
 Leukocyte Esterase LARGE (A) 12/30/2018 05:51 PM  
  
 
No Known Allergies Immunization History Administered Date(s) Administered  Influenza High Dose Vaccine PF 10/24/2016, 08/29/2017, 09/27/2018  Influenza Vaccine 10/01/2010, 09/01/2012, 09/01/2013, 01/26/2015  Influenza Vaccine (Quad) PF 09/11/2015  Influenza Vaccine Whole 10/01/2007  Pneumococcal Conjugate (PCV-13) 10/19/2015  Pneumococcal Vaccine (Unspecified Type) 01/01/2015  TB Skin Test (PPD) Intradermal 05/07/2015, 09/17/2016, 01/22/2017, 11/01/2017, 04/22/2018, 08/02/2018, 10/09/2018, 11/05/2018, 12/30/2018  ZZZ-RETIRED (DO NOT USE) Pneumococcal Vaccine (Unspecified Type) 10/01/2006, 11/21/2011 All Labs from Last 24 Hrs: 
Recent Results (from the past 24 hour(s)) GLUCOSE, POC Collection Time: 01/10/19 11:58 AM  
Result Value Ref Range Glucose (POC) 129 (H) 65 - 100 mg/dL GLUCOSE, POC Collection Time: 01/10/19  9:37 PM  
Result Value Ref Range Glucose (POC) 125 (H) 65 - 100 mg/dL CBC W/O DIFF Collection Time: 01/11/19  4:07 AM  
Result Value Ref Range WBC 6.5 4.3 - 11.1 K/uL  
 RBC 2.93 (L) 4.23 - 5.6 M/uL HGB 7.6 (L) 13.6 - 17.2 g/dL HCT 27.5 (L) 41.1 - 50.3 % MCV 93.9 79.6 - 97.8 FL  
 MCH 25.9 (L) 26.1 - 32.9 PG  
 MCHC 27.6 (L) 31.4 - 35.0 g/dL RDW 20.6 (H) 11.9 - 14.6 % PLATELET 487 (L) 035 - 450 K/uL MPV 12.5 (H) 9.4 - 12.3 FL ABSOLUTE NRBC 0.00 0.0 - 0.2 K/uL GLUCOSE, POC Collection Time: 01/11/19  6:11 AM  
Result Value Ref Range Glucose (POC) 116 (H) 65 - 100 mg/dL Discharge Exam: 
Patient Vitals for the past 24 hrs: 
 Temp Pulse Resp BP SpO2  
01/11/19 0844     95 % 01/11/19 0822 97.8 °F (36.6 °C) 75 16 108/54 95 % 01/11/19 0530 97.3 °F (36.3 °C) 68 20 104/70 94 % 01/11/19 0125 97.8 °F (36.6 °C) 86 20 105/51 95 % 01/10/19 2248     95 % 01/10/19 2128 97.8 °F (36.6 °C) 66 20 101/40 93 % 01/10/19 2015     100 % 01/10/19 1803 97.8 °F (36.6 °C) 72 17 121/70 97 % 01/10/19 1433     98 % 01/10/19 1332 97.9 °F (36.6 °C) 74 18 103/58 99 % 01/10/19 0955    118/48  Oxygen Therapy O2 Sat (%): 95 % (01/11/19 0844) Pulse via Oximetry: 69 beats per minute (01/11/19 0844) O2 Device: Nasal cannula (01/11/19 0844) PEEP/CPAP (cm H2O): 4 cm H20 (01/11/19 0844) O2 Flow Rate (L/min): 4 l/min (01/10/19 1250) FIO2 (%): 28 % (01/08/19 0759) Intake/Output Summary (Last 24 hours) at 1/11/2019 4640 Last data filed at 1/11/2019 4719 Gross per 24 hour Intake 250 ml Output 1675 ml Net -1425 ml Physical exam: 
Physical Exam  
 Constitutional: He is oriented to person, place, and time. No distress. HENT:  
Head: Atraumatic. Nose: Nose normal.  
Eyes: Pupils are equal, round, and reactive to light. No scleral icterus. Neck: No tracheal deviation present. Cardiovascular: Normal rate. Exam reveals no gallop. Pulmonary/Chest: He has wheezes. He exhibits no tenderness. Abdominal: There is no tenderness. There is no guarding. Musculoskeletal: He exhibits no tenderness or deformity. Neurological: He is oriented to person, place, and time. He has normal reflexes. Skin: No rash noted. He is not diaphoretic.  
ecchymosis Psychiatric: Affect normal.  
 
 
Discharge Info:  
Current Discharge Medication List  
  
START taking these medications Details  
acetaminophen (TYLENOL) 325 mg tablet Take 2 Tabs by mouth every four (4) hours as needed. Qty: 100 Tab, Refills: 0  
  
budesonide (PULMICORT) 0.5 mg/2 mL nbsp 2 mL by Nebulization route two (2) times a day. Qty: 120 mL, Refills: 0  
  
guaiFENesin (ORGANIDIN) 200 mg tablet Take 1 Tab by mouth every four (4) hours (while awake). Qty: 30 Tab, Refills: 0  
  
haloperidol lactate (HALDOL) 5 mg/mL injection 0.4 mL by IntraVENous route every six (6) hours as needed. Qty: 1 Vial, Refills: 0  
  
lactulose (CHRONULAC) 10 gram/15 mL solution Take 30 mL by mouth two (2) times a day. Qty: 1 Bottle, Refills: 0  
  
levETIRAcetam (KEPPRA) 500 mg tablet Take 1 Tab by mouth daily. Qty: 30 Tab, Refills: 0 LORazepam (ATIVAN) 2 mg/mL injection 0.25 mL by IntraVENous route every six (6) hours as needed. Max Daily Amount: 2 mg. Qty: 1 Vial, Refills: 0 Associated Diagnoses: Palliative care status  
  
morphine (ROXANOL) 20 mg/mL concentrated oral syringe Take 0.25-0.5 mL by mouth every four (4) hours as needed. Max Daily Amount: 60 mg. 
Qty: 12 mL, Refills: 0  Associated Diagnoses: Other chronic pain  
  
morphine 2 mg/mL injection 1 mL by IntraVENous route every two (2) hours as needed. Max Daily Amount: 24 mg. Qty: 10 mL, Refills: 0 Associated Diagnoses: Encounter for palliative care  
  
pantoprazole (PROTONIX) 40 mg tablet Take 1 Tab by mouth Before breakfast and dinner. Qty: 60 Tab, Refills: 0  
  
potassium chloride (K-DUR, KLOR-CON) 20 mEq tablet Take 2 Tabs by mouth daily. Qty: 30 Tab, Refills: 0  
  
rifAXIMin (XIFAXAN) 550 mg tablet Take 1 Tab by mouth two (2) times a day. Qty: 60 Tab, Refills: 0  
  
scopolamine (TRANSDERM-SCOP) 1 mg over 3 days pt3d 1 Patch by TransDERmal route every seventy-two (72) hours. Qty: 10 Patch, Refills: 0  
  
ursodiol (ACTIGALL) 300 mg capsule Take 1 Cap by mouth three (3) times daily (with meals). Qty: 90 Cap, Refills: 0 CONTINUE these medications which have CHANGED Details  
oxyCODONE IR (ROXICODONE) 5 mg immediate release tablet Take 1 Tab by mouth every six (6) hours as needed. Max Daily Amount: 20 mg. 
Qty: 12 Tab, Refills: 0 Associated Diagnoses: Encounter for palliative care CONTINUE these medications which have NOT CHANGED Details  
furosemide (LASIX) 20 mg tablet Take 1-2 Tabs by mouth daily. Qty: 40 Tab, Refills: 1  
  
isosorbide mononitrate ER (IMDUR) 30 mg tablet Take 1 Tab by mouth daily. Qty: 30 Tab, Refills: 5  
  
pravastatin (PRAVACHOL) 40 mg tablet Take 1 Tab by mouth nightly. Qty: 90 Tab, Refills: 3 Associated Diagnoses: Mixed hyperlipidemia  
  
aspirin 81 mg chewable tablet Take 1 Tab by mouth daily. Qty: 90 Tab, Refills: 3  
  
levothyroxine (SYNTHROID) 50 mcg tablet Take 1 Tab by mouth Daily (before breakfast). Qty: 90 Tab, Refills: 3  
  
sertraline (ZOLOFT) 50 mg tablet Take one tablet each evening for anxiety  Indications: Generalized Anxiety Disorder 
Qty: 30 Tab, Refills: 3  
  
fluticasone (FLONASE) 50 mcg/actuation nasal spray 2 Sprays by Both Nostrils route daily. Qty: 1 Bottle, Refills: 3 cholecalciferol (VITAMIN D3) 1,000 unit cap Take 1,000 Units by mouth daily. ferrous sulfate 324 mg (65 mg iron) tablet Take  by mouth Daily (before breakfast). cpap machine kit by Does Not Apply route. Bilevel 12/8  
  
hydrALAZINE (APRESOLINE) 10 mg tablet TAKE 1 TABLET THREE TIMES DAILY. Qty: 90 Tab, Refills: 3  
  
glipiZIDE (GLUCOTROL) 5 mg tablet Take  by mouth two (2) times a day.  
  
gabapentin (NEURONTIN) 100 mg capsule Take  by mouth three (3) times daily. hydrocortisone (PROCTOSOL HC) 2.5 % rectal cream Insert  into rectum every six (6) hours as needed for Hemorrhoids. OXYGEN-AIR DELIVERY SYSTEMS Take  by inhalation continuous. 2LPM to keep 02 sat >90%  
  
bisacodyl (DULCOLAX) 5 mg EC tablet Take 1 Tab by mouth daily. Qty: 15 Tab, Refills: 0  
  
latanoprost (XALATAN) 0.005 % ophthalmic solution Administer 1 Drop to both eyes nightly. ipratropium-albuterol (COMBIVENT RESPIMAT)  mcg/actuation inhaler Take 1 Puff by inhalation every six (6) hours. Qty: 3 Inhaler, Refills: 3 STOP taking these medications  
  
 allopurinol (ZYLOPRIM) 300 mg tablet Comments:  
Reason for Stopping:   
   
 albuterol (PROVENTIL HFA, VENTOLIN HFA, PROAIR HFA) 90 mcg/actuation inhaler Comments:  
Reason for Stopping:   
   
 fluticasone-vilanterol (BREO ELLIPTA) 100-25 mcg/dose inhaler Comments:  
Reason for Stopping:   
   
 albuterol (PROVENTIL VENTOLIN) 2.5 mg /3 mL (0.083 %) nebulizer solution Comments:  
Reason for Stopping:   
   
  
 
 
 
Disposition: [facility] [place] Activity: Activity as tolerated Diet: DIET GI SOFT Sallye Pavy DIET NUTRITIONAL SUPPLEMENTS All Meals; Glucerna Follow-up Appointments Procedures  FOLLOW UP VISIT Appointment in: 3 - 5 Days House provider at hospice house House provider at hospice house Standing Status:   Standing Number of Occurrences:   1 Order Specific Question:   Appointment in Answer:   3 - 5 Days Follow-up Information Follow up With Specialties Details Why Contact Info Karine Talamantes MD [facility]   [address] ,[address]  
[phone] Kandis Jose MD Geriatric Medicine   [address]  [address] [phone] Time spent in patient discharge planning and coordination 39 minutes.

## 2023-01-20 NOTE — PROGRESS NOTES
CM met with pt, spouse & grand-daughter. Pt awaiting [facility] bed when available. CM answered questions & concerns. CM to continue to follow for dc needs. Care Management Interventions PCP Verified by CM: Yes Last Visit to PCP: 09/27/18 Mode of Transport at Discharge: [facility] Transition of Care Consult (CM Consult): Discharge Planning, Hospice House Discharge Durable Medical Equipment: No 
Physical Therapy Consult: Yes Occupational Therapy Consult: No 
Speech Therapy Consult: Yes Current Support Network: [address] [place] [address] Confirm Follow Up Transport: Family Plan discussed with Pt/Family/Caregiver: Yes Freedom of Choice Offered: Yes Discharge Location Discharge Placement: Other: Alert and interactive, no focal deficits

## 2024-04-24 NOTE — PROGRESS NOTES
[facility] CARDIOLOGY PROGRESS NOTE           8/22/2017 8:31 AM    Admit Date: 8/21/2017      Subjective:   1.2L out, no CP, his SOB is much better- he wants to go home. Weight down 3 lbs. ROS:  Cardiovascular:  As noted above    Objective:      Vitals:    08/22/17 0240 08/22/17 0339 08/22/17 0429 08/22/17 0725   BP:  116/83  129/50   Pulse:  68  65   Resp:  20  19   Temp:  98.3 °F (36.8 °C)  98.2 °F (36.8 °C)   SpO2: 91% 91%  90%   Weight:   121.3 kg (267 lb 8 oz)    Height:           Physical Exam:  General-No Acute Distress, CPAP in place  Neck- supple, no JVD  CV- irregularly irregular   Lung- clear bilaterally  Abd- soft, nontender, nondistended  Ext- 1+ edema bilaterally. Skin- warm and dry    Data Review:   Recent Labs      08/22/17   0550  08/21/17   1030   NA  138  139   K  4.3  5.0   BUN  49*  47*   CREA  2.41*  2.46*   GLU  137*  186*   WBC  6.9  6.6   HGB  12.2*  13.1*   HCT  38.7*  39.9*   PLT  149*  123*     Echo:  -  Left ventricle: Systolic function was normal. Ejection fraction was  estimated in the range of 55 % to 60 %. There were no regional wall motion  abnormalities. There was mild concentric hypertrophy. -  Left atrium: The atrium was moderately dilated. -  Inferior vena cava, hepatic veins: The inferior vena cava was mildly  dilated. The respirophasic change in diameter was less than 50%. -  Aortic valve: There was mild regurgitation. -  Mitral valve: There was moderate regurgitation. The mitral valve area by  pressure half time was 2.47 cm2. The findings were most consistent with mild  mitral stenosis. -  Tricuspid valve: There was moderate regurgitation. Assessment/Plan:   Acute on chronic diastolic (congestive) heart failure- EF 55-60% with mild MS, mod TR- -1.2L diuresis w Lasix 60 mg IV BID- ? Change to po?, cont hydralazine/imdur, toprol.      Persistent atrial fibrillation- Coumadin therapy discontinued due to recurrent GI bleeding, cont ASA, Mri ordered/both   toprol. CKD (chronic kidney disease) stage 3, GFR 30-59 ml/min (9/18/2013)- Cr stable, monitor w diuresis. ROBERTO on CPAP    Type 2 diabetes mellitus with peripheral neuropathy ([facility] [address] (11/5/2015)- Per primary. Hypoxia (8/21/2017)- RA. Thrombocytopenia- Stable. GUILLERMO Esquivel  8/22/2017 8:31 AM             [facility] CARDIOLOGY     8/22/2017     11:15 AM    I have personally seen and examined Jodi Srivastava RONNIE Melanie with [facility]. I agree and confirm findings in history, physical exam, and assessment/plan as outlined above with following pertinent additions/exceptions:   Feels better with diuresis. PCP had decreased lasix to 20 mg approximately 1 month prior to admission. No fever or chills. PE: CV: IRIR  L: CTA bilaterally E: +edema. ASS/Plan:  OK for discharge from my perspective. Would change lasix to 40 mg BID at discharge for 3 days then 40 mg a day with a PRN dose as needed for worsening edema or weight gain. Discussed this with patient and wife. Check BMP in 1 week and follow up with cardiology 1-2 weeks (I will arrange and place in discharge instructions).        Heaven Seay MD

## (undated) DEVICE — KENDALL RADIOLUCENT FOAM MONITORING ELECTRODE RECTANGULAR SHAPE: Brand: KENDALL

## (undated) DEVICE — BLOCK BITE AD 60FR W/ VELC STRP ADDRESSES MOST PT AND

## (undated) DEVICE — SYR 50ML SLIP TIP NSAF LF STRL --

## (undated) DEVICE — CONNECTOR TBNG OD5-7MM O2 END DISP

## (undated) DEVICE — GEL MEDC ULTRASOUND 5L -- REPLACED BY 326862

## (undated) DEVICE — SYR 3ML LL TIP 1/10ML GRAD --

## (undated) DEVICE — CANNULA NSL ORAL AD FOR CAPNOFLEX CO2 O2 AIRLFE

## (undated) DEVICE — CONTAINER PREFIL FRMLN 40ML --

## (undated) DEVICE — KIT THORCENT 8FR L5IN POLYUR W/ 18/22/25GA NDL 3 W STPCOCK

## (undated) DEVICE — NDL PRT INJ NSAF BLNT 18GX1.5 --

## (undated) DEVICE — GOWN,PREVENTION PLUS,2XL,ST,22/CS: Brand: MEDLINE

## (undated) DEVICE — SYR 5ML 1/5 GRAD LL NSAF LF --

## (undated) DEVICE — FORCEPS BX L240CM JAW DIA2.8MM L CAP W/ NDL MIC MESH TOOTH

## (undated) DEVICE — STERILE POLYISOPRENE POWDER-FREE SURGICAL GLOVES: Brand: PROTEXIS